# Patient Record
Sex: FEMALE | Race: WHITE | NOT HISPANIC OR LATINO | Employment: OTHER | URBAN - METROPOLITAN AREA
[De-identification: names, ages, dates, MRNs, and addresses within clinical notes are randomized per-mention and may not be internally consistent; named-entity substitution may affect disease eponyms.]

---

## 2017-01-04 ENCOUNTER — TRANSCRIBE ORDERS (OUTPATIENT)
Dept: ENDOCRINOLOGY | Facility: CLINIC | Age: 77
End: 2017-01-04

## 2017-01-04 DIAGNOSIS — E11.40 TYPE 2 DIABETES MELLITUS WITH DIABETIC NEUROPATHY, UNSPECIFIED LONG TERM INSULIN USE STATUS: Primary | ICD-10-CM

## 2017-03-27 ENCOUNTER — HOSPITAL ENCOUNTER (OUTPATIENT)
Dept: OTHER | Age: 77
Discharge: HOME | End: 2017-03-27
Attending: NURSE PRACTITIONER | Admitting: NURSE PRACTITIONER

## 2017-05-15 ENCOUNTER — HOSPITAL ENCOUNTER (OUTPATIENT)
Dept: OTHER | Age: 77
Discharge: HOME | End: 2017-05-15
Attending: INTERNAL MEDICINE | Admitting: INTERNAL MEDICINE

## 2017-05-15 DIAGNOSIS — M79.642 PAIN IN BOTH HANDS: ICD-10-CM

## 2017-05-15 DIAGNOSIS — M25.562 PAIN IN BOTH KNEES, UNSPECIFIED CHRONICITY: ICD-10-CM

## 2017-05-15 DIAGNOSIS — M79.641 PAIN IN BOTH HANDS: ICD-10-CM

## 2017-05-15 DIAGNOSIS — M25.561 PAIN IN BOTH KNEES, UNSPECIFIED CHRONICITY: ICD-10-CM

## 2017-07-10 ENCOUNTER — HOSPITAL ENCOUNTER (OUTPATIENT)
Dept: OTHER | Age: 77
Discharge: HOME | End: 2017-07-10
Attending: NURSE PRACTITIONER | Admitting: NURSE PRACTITIONER

## 2017-08-30 ENCOUNTER — OFFICE VISIT (OUTPATIENT)
Dept: PHARMACY | Age: 77
End: 2017-08-30

## 2017-08-30 DIAGNOSIS — Z79.899 MEDICATION MANAGEMENT: Primary | ICD-10-CM

## 2017-08-30 RX ORDER — GABAPENTIN 400 MG/1
400 CAPSULE ORAL 3 TIMES DAILY
COMMUNITY
End: 2019-12-03 | Stop reason: DRUGHIGH

## 2017-08-30 RX ORDER — OMEPRAZOLE 20 MG/1
40 CAPSULE, DELAYED RELEASE ORAL DAILY
Status: ON HOLD | COMMUNITY
End: 2020-01-18 | Stop reason: SDUPTHER

## 2017-08-30 RX ORDER — PROPRANOLOL HYDROCHLORIDE 20 MG/1
20 TABLET ORAL 2 TIMES DAILY
COMMUNITY
End: 2019-12-03 | Stop reason: DRUGHIGH

## 2017-08-30 RX ORDER — GLIPIZIDE 10 MG/1
10 TABLET ORAL
Status: ON HOLD | COMMUNITY
End: 2019-12-19

## 2017-08-30 RX ORDER — LOSARTAN POTASSIUM 50 MG/1
50 TABLET ORAL DAILY
COMMUNITY
End: 2019-12-03 | Stop reason: DRUGHIGH

## 2017-08-30 RX ORDER — LORAZEPAM 2 MG/1
2 TABLET ORAL 2 TIMES DAILY PRN
COMMUNITY
End: 2019-12-03

## 2017-08-30 RX ORDER — MELOXICAM 15 MG/1
15 TABLET ORAL DAILY
COMMUNITY
End: 2019-12-03

## 2017-08-30 RX ORDER — HYDROCODONE BITARTRATE AND ACETAMINOPHEN 7.5; 325 MG/1; MG/1
1 TABLET ORAL 2 TIMES DAILY PRN
Status: ON HOLD | COMMUNITY
End: 2019-12-19

## 2017-08-30 RX ORDER — LANOLIN ALCOHOL/MO/W.PET/CERES
1000 CREAM (GRAM) TOPICAL DAILY
COMMUNITY
End: 2019-12-03

## 2017-09-01 ENCOUNTER — HOSPITAL ENCOUNTER (OUTPATIENT)
Dept: OTHER | Age: 77
Discharge: HOME | End: 2017-09-01
Attending: NURSE PRACTITIONER | Admitting: NURSE PRACTITIONER

## 2017-09-08 ENCOUNTER — HOSPITAL ENCOUNTER (OUTPATIENT)
Dept: OTHER | Age: 77
Discharge: HOME | End: 2017-09-08
Attending: NURSE PRACTITIONER | Admitting: NURSE PRACTITIONER

## 2017-09-08 LAB
A/G RATIO: 1.7 (ref 0.8–2)
ALBUMIN SERPL-MCNC: 4.5 G/DL (ref 3.4–4.8)
ALP BLD-CCNC: 64 U/L (ref 25–100)
ALT SERPL-CCNC: 11 U/L (ref 4–36)
ANION GAP SERPL CALCULATED.3IONS-SCNC: 14 MMOL/L (ref 3–16)
AST SERPL-CCNC: 18 U/L (ref 8–33)
BASOPHILS ABSOLUTE: 0 K/UL (ref 0–0.1)
BASOPHILS RELATIVE PERCENT: 0.4 %
BILIRUB SERPL-MCNC: <0.2 MG/DL (ref 0.3–1.2)
BUN BLDV-MCNC: 25 MG/DL (ref 6–20)
CALCIUM SERPL-MCNC: 9.6 MG/DL (ref 8.5–10.5)
CHLORIDE BLD-SCNC: 100 MMOL/L (ref 98–107)
CO2: 26 MMOL/L (ref 20–30)
CREAT SERPL-MCNC: 1.2 MG/DL (ref 0.4–1.2)
EOSINOPHILS ABSOLUTE: 0.2 K/UL (ref 0–0.4)
EOSINOPHILS RELATIVE PERCENT: 2.6 %
GFR AFRICAN AMERICAN: 53
GFR NON-AFRICAN AMERICAN: 44
GLOBULIN: 2.7 G/DL
GLUCOSE BLD-MCNC: 148 MG/DL (ref 74–106)
HCT VFR BLD CALC: 40.7 % (ref 37–47)
HEMOGLOBIN: 13.2 G/DL (ref 11.5–16.5)
LYMPHOCYTES ABSOLUTE: 2.5 K/UL (ref 1.5–4)
LYMPHOCYTES RELATIVE PERCENT: 33.8 % (ref 20–40)
MCH RBC QN AUTO: 31.2 PG (ref 27–32)
MCHC RBC AUTO-ENTMCNC: 32.4 G/DL (ref 31–35)
MCV RBC AUTO: 96.2 FL (ref 80–100)
MONOCYTES ABSOLUTE: 0.6 K/UL (ref 0.2–0.8)
MONOCYTES RELATIVE PERCENT: 7.9 % (ref 3–10)
NEUTROPHILS ABSOLUTE: 4 K/UL (ref 2–7.5)
NEUTROPHILS RELATIVE PERCENT: 55.3 %
PDW BLD-RTO: 13.3 % (ref 11–16)
PLATELET # BLD: 187 K/UL (ref 150–400)
PMV BLD AUTO: 11.6 FL (ref 6–10)
POTASSIUM SERPL-SCNC: 5 MMOL/L (ref 3.4–5.1)
RBC # BLD: 4.23 M/UL (ref 3.8–5.8)
SODIUM BLD-SCNC: 140 MMOL/L (ref 136–145)
TOTAL PROTEIN: 7.2 G/DL (ref 6.4–8.3)
WBC # BLD: 7.3 K/UL (ref 4–11)

## 2017-10-02 ENCOUNTER — HOSPITAL ENCOUNTER (OUTPATIENT)
Dept: OTHER | Age: 77
Discharge: HOME | End: 2017-10-02
Attending: NURSE PRACTITIONER | Admitting: NURSE PRACTITIONER

## 2017-10-02 LAB
A/G RATIO: 1.4 (ref 0.8–2)
ALBUMIN SERPL-MCNC: 4.4 G/DL (ref 3.4–4.8)
ALP BLD-CCNC: 65 U/L (ref 25–100)
ALT SERPL-CCNC: 9 U/L (ref 4–36)
ANION GAP SERPL CALCULATED.3IONS-SCNC: 13 MMOL/L (ref 3–16)
AST SERPL-CCNC: 17 U/L (ref 8–33)
BASOPHILS ABSOLUTE: 0 K/UL (ref 0–0.1)
BASOPHILS RELATIVE PERCENT: 0.2 %
BILIRUB SERPL-MCNC: <0.2 MG/DL (ref 0.3–1.2)
BUN BLDV-MCNC: 21 MG/DL (ref 6–20)
CALCIUM SERPL-MCNC: 9.8 MG/DL (ref 8.5–10.5)
CHLORIDE BLD-SCNC: 102 MMOL/L (ref 98–107)
CO2: 27 MMOL/L (ref 20–30)
CREAT SERPL-MCNC: 1.3 MG/DL (ref 0.4–1.2)
EOSINOPHILS ABSOLUTE: 0.2 K/UL (ref 0–0.4)
EOSINOPHILS RELATIVE PERCENT: 1.7 %
GFR AFRICAN AMERICAN: 48
GFR NON-AFRICAN AMERICAN: 40
GLOBULIN: 3.1 G/DL
GLUCOSE BLD-MCNC: 134 MG/DL (ref 74–106)
HCT VFR BLD CALC: 43 % (ref 37–47)
HEMOGLOBIN: 14 G/DL (ref 11.5–16.5)
LYMPHOCYTES ABSOLUTE: 2.8 K/UL (ref 1.5–4)
LYMPHOCYTES RELATIVE PERCENT: 31.6 % (ref 20–40)
MCH RBC QN AUTO: 31 PG (ref 27–32)
MCHC RBC AUTO-ENTMCNC: 32.6 G/DL (ref 31–35)
MCV RBC AUTO: 95.3 FL (ref 80–100)
MONOCYTES ABSOLUTE: 0.7 K/UL (ref 0.2–0.8)
MONOCYTES RELATIVE PERCENT: 7.9 % (ref 3–10)
NEUTROPHILS ABSOLUTE: 5.1 K/UL (ref 2–7.5)
NEUTROPHILS RELATIVE PERCENT: 58.6 %
PDW BLD-RTO: 13.2 % (ref 11–16)
PLATELET # BLD: 203 K/UL (ref 150–400)
PMV BLD AUTO: 11.4 FL (ref 6–10)
POTASSIUM SERPL-SCNC: 5.1 MMOL/L (ref 3.4–5.1)
RBC # BLD: 4.51 M/UL (ref 3.8–5.8)
SODIUM BLD-SCNC: 142 MMOL/L (ref 136–145)
TOTAL PROTEIN: 7.5 G/DL (ref 6.4–8.3)
WBC # BLD: 8.8 K/UL (ref 4–11)

## 2017-10-04 ENCOUNTER — HOSPITAL ENCOUNTER (OUTPATIENT)
Dept: OTHER | Age: 77
Discharge: HOME | End: 2017-10-04
Attending: NURSE PRACTITIONER | Admitting: NURSE PRACTITIONER

## 2017-10-04 DIAGNOSIS — M54.5 LOW BACK PAIN, UNSPECIFIED BACK PAIN LATERALITY, UNSPECIFIED CHRONICITY, WITH SCIATICA PRESENCE UNSPECIFIED: ICD-10-CM

## 2017-10-05 LAB — URINE CULTURE, ROUTINE: NORMAL

## 2017-10-11 ENCOUNTER — OFFICE VISIT (OUTPATIENT)
Dept: CARDIOLOGY | Facility: CLINIC | Age: 77
End: 2017-10-11

## 2017-10-11 ENCOUNTER — HOSPITAL ENCOUNTER (OUTPATIENT)
Dept: CARDIOLOGY | Facility: HOSPITAL | Age: 77
Discharge: HOME | End: 2017-10-11
Attending: INTERNAL MEDICINE | Admitting: INTERNAL MEDICINE

## 2017-10-11 VITALS
SYSTOLIC BLOOD PRESSURE: 146 MMHG | BODY MASS INDEX: 30.62 KG/M2 | DIASTOLIC BLOOD PRESSURE: 86 MMHG | HEIGHT: 68 IN | WEIGHT: 202 LBS

## 2017-10-11 VITALS
HEART RATE: 72 BPM | BODY MASS INDEX: 30.92 KG/M2 | SYSTOLIC BLOOD PRESSURE: 148 MMHG | HEIGHT: 68 IN | WEIGHT: 204 LBS | DIASTOLIC BLOOD PRESSURE: 86 MMHG

## 2017-10-11 DIAGNOSIS — R94.31 ABNORMAL EKG: Primary | ICD-10-CM

## 2017-10-11 DIAGNOSIS — R94.31 ABNORMAL EKG: ICD-10-CM

## 2017-10-11 DIAGNOSIS — I10 ESSENTIAL HYPERTENSION: ICD-10-CM

## 2017-10-11 PROBLEM — K21.9 GERD (GASTROESOPHAGEAL REFLUX DISEASE): Status: ACTIVE | Noted: 2017-10-11

## 2017-10-11 PROBLEM — E78.5 HYPERLIPIDEMIA LDL GOAL <70: Status: ACTIVE | Noted: 2017-10-11

## 2017-10-11 PROBLEM — R00.2 PALPITATIONS: Status: ACTIVE | Noted: 2017-10-11

## 2017-10-11 PROBLEM — M19.90 OSTEOARTHRITIS: Status: ACTIVE | Noted: 2017-10-11

## 2017-10-11 PROBLEM — F41.9 ANXIETY: Status: ACTIVE | Noted: 2017-10-11

## 2017-10-11 PROBLEM — E11.9 TYPE 2 DIABETES MELLITUS: Status: ACTIVE | Noted: 2017-10-11

## 2017-10-11 PROCEDURE — 93000 ELECTROCARDIOGRAM COMPLETE: CPT | Performed by: INTERNAL MEDICINE

## 2017-10-11 PROCEDURE — 93306 TTE W/DOPPLER COMPLETE: CPT

## 2017-10-11 PROCEDURE — 99204 OFFICE O/P NEW MOD 45 MIN: CPT | Performed by: INTERNAL MEDICINE

## 2017-10-11 PROCEDURE — 93306 TTE W/DOPPLER COMPLETE: CPT | Performed by: INTERNAL MEDICINE

## 2017-10-11 RX ORDER — DOXYCYCLINE HYCLATE 100 MG/1
100 CAPSULE ORAL 2 TIMES DAILY
COMMUNITY
End: 2018-10-05

## 2017-10-11 RX ORDER — PROPRANOLOL HYDROCHLORIDE 40 MG/1
40 TABLET ORAL 2 TIMES DAILY
COMMUNITY
End: 2023-01-28 | Stop reason: HOSPADM

## 2017-10-11 RX ORDER — HYDROCODONE BITARTRATE AND ACETAMINOPHEN 7.5; 325 MG/1; MG/1
1 TABLET ORAL EVERY 6 HOURS PRN
Status: ON HOLD | COMMUNITY
End: 2023-01-11 | Stop reason: SDUPTHER

## 2017-10-11 RX ORDER — LORAZEPAM 1 MG/1
1 TABLET ORAL EVERY 8 HOURS PRN
Status: ON HOLD | COMMUNITY
End: 2023-01-11 | Stop reason: SDUPTHER

## 2017-10-11 RX ORDER — AMLODIPINE BESYLATE 10 MG/1
10 TABLET ORAL DAILY
Status: ON HOLD | COMMUNITY
End: 2023-01-10

## 2017-10-11 RX ORDER — OMEPRAZOLE 40 MG/1
40 CAPSULE, DELAYED RELEASE ORAL DAILY
Status: ON HOLD | COMMUNITY

## 2017-10-11 RX ORDER — GLIPIZIDE 10 MG/1
10 TABLET ORAL
Status: ON HOLD | COMMUNITY
End: 2023-01-11

## 2017-10-11 RX ORDER — LOSARTAN POTASSIUM 100 MG/1
100 TABLET ORAL DAILY
Status: ON HOLD | COMMUNITY
End: 2023-01-11

## 2017-10-11 RX ORDER — GABAPENTIN 300 MG/1
300 CAPSULE ORAL 3 TIMES DAILY
COMMUNITY
End: 2018-10-05

## 2017-10-11 NOTE — PROGRESS NOTES
Encounter Date:10/11/2017    Patient ID: Cheri rCuz is a 77 y.o. female who resides in [place], [place].    CC/Reason for visit:  Chest Pain; Fatigue; and Palpitations         Problem List Items Addressed This Visit        Cardiovascular and Mediastinum    Essential hypertension    Relevant Medications    losartan (COZAAR) 50 MG tablet    propranolol (INDERAL) 20 MG tablet    amLODIPine (NORVASC) 2.5 MG tablet       Other    Abnormal EKG - Primary    Overview     · EKG at [facility] reportedly shows inferior infarct  · EKG (10/11/17): Sinus rhythm.  Poor R-wave progression in the precordial leads.  · Echo (10/11/17): Normal LVEF with no regional wall motion abnormality.  No significant valvular abnormality         Current Assessment & Plan     The patient has no symptoms of angina and no clinical history of previous MI.  Her echo performed today shows no evidence of prior myocardial infarction or regional wall motion abnormality.  The patient would prefer not to proceed with noninvasive ischemic testing because she had a terrible experience with it in the past.  I think that it's reasonable to defer such testing given her asymptomatic status and normal echo.  I advised her to contact my office if she were to develop exertional chest pain symptoms.         Relevant Orders    ECG 12 Lead    Adult Transthoracic Echo Complete W/ Cont if Necessary Per Protocol (Completed)               · We will defer noninvasive ischemic evaluation  · Patient advised to contact the office if she were to develop exertional chest discomfort or shortness of breath  · Follow-up with me as needed        Cheri Cruz is referred to my office by  EVELINA Arreguin for evaluation of abnormal EKG.  The patient is a 77-year-old female who saw her primary care provider recently.  During examination, the patient mentioned that she has had stable palpitations symptoms for nearly 50 years.  These symptoms typically occur when she is  "anxious and improve with benzodiazepine use.  Due to this history, the patient underwent an EKG.  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  The patient denies any history of prolonged chest pain symptoms or symptoms of prolonged indigestion.  She has no exertional symptoms of angina presently.  She states the palpitations that she is experiences have not become more frequent or prolonged in nature.  She states that her blood pressure has been elevated and recently one of her blood pressure medicines was increased.  She also states that her hemoglobin A1c is >8.  She has not been on statin therapy for prevention of CV disease.  She does have a history of muscle ache as well as osteoarthritis.  He denies TIA or stroke symptoms.  No orthopnea or PND.  The patient adamantly denies chemical stress testing as the last time she had a chemical stress test she \"would've rather \".    Review of Systems   Constitution: Negative for weakness and malaise/fatigue.   Eyes: Negative for vision loss in left eye and vision loss in right eye.   Cardiovascular: Negative for chest pain, dyspnea on exertion, near-syncope, orthopnea, palpitations, paroxysmal nocturnal dyspnea and syncope.   Musculoskeletal: Negative for myalgias.   Neurological: Negative for brief paralysis, excessive daytime sleepiness, focal weakness, numbness and paresthesias.   All other systems reviewed and are negative.      The patient's past medical, social, family history and ROS reviewed in the patient's electronic medical record.    Allergies  Penicillins and Sulfa antibiotics    Outpatient Prescriptions Marked as Taking for the 10/11/17 encounter (Office Visit) with Jonathon Shaw MD   Medication Sig Dispense Refill   • amLODIPine (NORVASC) 2.5 MG tablet Take 2.5 mg by mouth Daily.     • doxycycline (VIBRAMYCIN) 100 MG capsule Take 100 mg by mouth 2 (Two) Times a Day.     • gabapentin (NEURONTIN) 300 MG capsule Take 300 mg by mouth 3 " "(Three) Times a Day.     • glipiZIDE (GLUCOTROL) 10 MG tablet Take 10 mg by mouth 2 (Two) Times a Day Before Meals.     • HYDROcodone-acetaminophen (NORCO) 7.5-325 MG per tablet Take 1 tablet by mouth Every 6 (Six) Hours As Needed for Moderate Pain .     • LORazepam (ATIVAN) 1 MG tablet Take 1 mg by mouth Every 8 (Eight) Hours As Needed for Anxiety.     • losartan (COZAAR) 50 MG tablet Take 50 mg by mouth 2 (Two) Times a Day.     • metFORMIN (GLUCOPHAGE) 1000 MG tablet Take 1,000 mg by mouth 2 (Two) Times a Day With Meals.     • omeprazole (priLOSEC) 40 MG capsule Take 40 mg by mouth Daily.     • propranolol (INDERAL) 20 MG tablet Take 20 mg by mouth 3 (Three) Times a Day.           Blood pressure 148/86, pulse 72, height 68\" (172.7 cm), weight 204 lb (92.5 kg).  Body mass index is 31.02 kg/(m^2).    Physical Exam   Constitutional: She is oriented to person, place, and time. She appears well-developed and well-nourished.   HENT:   Head: Normocephalic and atraumatic.   Eyes: Pupils are equal, round, and reactive to light. No scleral icterus.   Neck: No JVD present. No thyromegaly present.   Cardiovascular: Normal rate and regular rhythm.  Exam reveals no gallop.    No murmur heard.  Pulses:       Dorsalis pedis pulses are 1+ on the right side, and 2+ on the left side.        Posterior tibial pulses are 1+ on the right side, and 1+ on the left side.   Pulmonary/Chest: Effort normal and breath sounds normal.   Abdominal: Soft. She exhibits no distension. There is no hepatosplenomegaly.   Musculoskeletal: She exhibits no edema.   Neurological: She is alert and oriented to person, place, and time.   Skin: Skin is warm and dry.   Psychiatric: She has a normal mood and affect. Her behavior is normal.       Data Review:     ECG 12 Lead  Date/Time: 10/11/2017 1:29 PM  Performed by: LASHAWN RADER  Authorized by: LASHAWN RADER   Rhythm: sinus rhythm  BPM: 69  Other findings: LVH and PRWP  Clinical " impression: abnormal ECG              Jonathon Shaw MD  10/17/2017

## 2017-10-11 NOTE — ASSESSMENT & PLAN NOTE
The patient has no symptoms of angina and no clinical history of previous MI.  Her echo performed today shows no evidence of prior myocardial infarction or regional wall motion abnormality.  The patient would prefer not to proceed with noninvasive ischemic testing because she had a terrible experience with it in the past.  I think that it's reasonable to defer such testing given her asymptomatic status and normal echo.  I advised her to contact my office if she were to develop exertional chest pain symptoms.

## 2017-10-12 LAB
BH CV ECHO MEAS - AO MAX PG (FULL): 3.5 MMHG
BH CV ECHO MEAS - AO MAX PG: 7.6 MMHG
BH CV ECHO MEAS - AO MEAN PG (FULL): 2 MMHG
BH CV ECHO MEAS - AO MEAN PG: 5 MMHG
BH CV ECHO MEAS - AO ROOT AREA (BSA CORRECTED): 1.2
BH CV ECHO MEAS - AO ROOT AREA: 4.9 CM^2
BH CV ECHO MEAS - AO ROOT DIAM: 2.5 CM
BH CV ECHO MEAS - AO V2 MAX: 138 CM/SEC
BH CV ECHO MEAS - AO V2 MEAN: 103 CM/SEC
BH CV ECHO MEAS - AO V2 VTI: 31.9 CM
BH CV ECHO MEAS - AVA(I,A): 2.3 CM^2
BH CV ECHO MEAS - AVA(I,D): 2.3 CM^2
BH CV ECHO MEAS - AVA(V,A): 2.3 CM^2
BH CV ECHO MEAS - AVA(V,D): 2.3 CM^2
BH CV ECHO MEAS - BSA(HAYCOCK): 2.1 M^2
BH CV ECHO MEAS - BSA: 2.1 M^2
BH CV ECHO MEAS - BZI_BMI: 30.7 KILOGRAMS/M^2
BH CV ECHO MEAS - BZI_METRIC_HEIGHT: 172.7 CM
BH CV ECHO MEAS - BZI_METRIC_WEIGHT: 91.6 KG
BH CV ECHO MEAS - EDV(CUBED): 95.4 ML
BH CV ECHO MEAS - EDV(TEICH): 95.9 ML
BH CV ECHO MEAS - EF(CUBED): 81.8 %
BH CV ECHO MEAS - EF(TEICH): 74.6 %
BH CV ECHO MEAS - ESV(CUBED): 17.4 ML
BH CV ECHO MEAS - ESV(TEICH): 24.4 ML
BH CV ECHO MEAS - FS: 43.3 %
BH CV ECHO MEAS - IVS/LVPW: 1
BH CV ECHO MEAS - IVSD: 1.1 CM
BH CV ECHO MEAS - LA DIMENSION: 4.1 CM
BH CV ECHO MEAS - LA/AO: 1.6
BH CV ECHO MEAS - LAT PEAK E' VEL: 12 CM/SEC
BH CV ECHO MEAS - LV MASS(C)D: 188.6 GRAMS
BH CV ECHO MEAS - LV MASS(C)DI: 91.9 GRAMS/M^2
BH CV ECHO MEAS - LV MAX PG: 4.2 MMHG
BH CV ECHO MEAS - LV MEAN PG: 3 MMHG
BH CV ECHO MEAS - LV V1 MAX: 102 CM/SEC
BH CV ECHO MEAS - LV V1 MEAN: 75.4 CM/SEC
BH CV ECHO MEAS - LV V1 VTI: 23.3 CM
BH CV ECHO MEAS - LVIDD: 4.6 CM
BH CV ECHO MEAS - LVIDS: 2.6 CM
BH CV ECHO MEAS - LVOT AREA (M): 3.1 CM^2
BH CV ECHO MEAS - LVOT AREA: 3.1 CM^2
BH CV ECHO MEAS - LVOT DIAM: 2 CM
BH CV ECHO MEAS - LVPWD: 1.1 CM
BH CV ECHO MEAS - MED PEAK E' VEL: 4.96 CM/SEC
BH CV ECHO MEAS - MV A MAX VEL: 122 CM/SEC
BH CV ECHO MEAS - MV DEC SLOPE: 276 CM/SEC^2
BH CV ECHO MEAS - MV E MAX VEL: 83.9 CM/SEC
BH CV ECHO MEAS - MV E/A: 0.69
BH CV ECHO MEAS - MV MAX PG: 6.9 MMHG
BH CV ECHO MEAS - MV MEAN PG: 3 MMHG
BH CV ECHO MEAS - MV P1/2T MAX VEL: 103 CM/SEC
BH CV ECHO MEAS - MV P1/2T: 109.3 MSEC
BH CV ECHO MEAS - MV V2 MAX: 131 CM/SEC
BH CV ECHO MEAS - MV V2 MEAN: 74.5 CM/SEC
BH CV ECHO MEAS - MV V2 VTI: 34.2 CM
BH CV ECHO MEAS - MVA P1/2T LCG: 2.1 CM^2
BH CV ECHO MEAS - MVA(P1/2T): 2 CM^2
BH CV ECHO MEAS - MVA(VTI): 2.1 CM^2
BH CV ECHO MEAS - PA ACC SLOPE: 867 CM/SEC^2
BH CV ECHO MEAS - PA ACC TIME: 0.11 SEC
BH CV ECHO MEAS - PA MAX PG: 4.8 MMHG
BH CV ECHO MEAS - PA PR(ACCEL): 28.2 MMHG
BH CV ECHO MEAS - PA V2 MAX: 109 CM/SEC
BH CV ECHO MEAS - RAP SYSTOLE: 3 MMHG
BH CV ECHO MEAS - RVSP: 21.3 MMHG
BH CV ECHO MEAS - SI(AO): 76.3 ML/M^2
BH CV ECHO MEAS - SI(CUBED): 38 ML/M^2
BH CV ECHO MEAS - SI(LVOT): 35.7 ML/M^2
BH CV ECHO MEAS - SI(TEICH): 34.8 ML/M^2
BH CV ECHO MEAS - SV(AO): 156.6 ML
BH CV ECHO MEAS - SV(CUBED): 78.1 ML
BH CV ECHO MEAS - SV(LVOT): 73.2 ML
BH CV ECHO MEAS - SV(TEICH): 71.5 ML
BH CV ECHO MEAS - TAPSE (>1.6): 2.9 CM2
BH CV ECHO MEAS - TR MAX VEL: 214 CM/SEC
BH CV VAS BP RIGHT ARM: NORMAL MMHG
BH CV XLRA - RV BASE: 2.8 CM
BH CV XLRA - RV LENGTH: 7.3 CM
BH CV XLRA - RV MID: 3.1 CM
BH CV XLRA - TDI S': 9.79 CM/SEC
E/E' RATIO: 7
LEFT ATRIUM VOLUME INDEX: 29.8 ML/M2
LV EF 2D ECHO EST: 70 %

## 2017-12-22 ENCOUNTER — HOSPITAL ENCOUNTER (OUTPATIENT)
Dept: OTHER | Age: 77
Discharge: HOME | End: 2017-12-22
Attending: NURSE PRACTITIONER | Admitting: NURSE PRACTITIONER

## 2017-12-22 LAB
RAPID INFLUENZA  B AGN: NEGATIVE
RAPID INFLUENZA A AGN: NEGATIVE

## 2018-01-09 ENCOUNTER — HOSPITAL ENCOUNTER (OUTPATIENT)
Dept: OTHER | Age: 78
Discharge: HOME | End: 2018-01-09
Attending: NURSE PRACTITIONER | Admitting: NURSE PRACTITIONER

## 2018-03-07 ENCOUNTER — HOSPITAL ENCOUNTER (OUTPATIENT)
Dept: OTHER | Age: 78
Discharge: HOME | End: 2018-03-07
Attending: NURSE PRACTITIONER | Admitting: NURSE PRACTITIONER

## 2018-03-26 ENCOUNTER — HOSPITAL ENCOUNTER (OUTPATIENT)
Dept: OTHER | Age: 78
Discharge: HOME | End: 2018-03-26
Attending: NURSE PRACTITIONER | Admitting: NURSE PRACTITIONER

## 2018-04-10 ENCOUNTER — HOSPITAL ENCOUNTER (OUTPATIENT)
Dept: OTHER | Age: 78
Discharge: HOME | End: 2018-04-10
Attending: NURSE PRACTITIONER | Admitting: NURSE PRACTITIONER

## 2018-04-23 ENCOUNTER — HOSPITAL ENCOUNTER (OUTPATIENT)
Dept: OTHER | Age: 78
Discharge: HOME | End: 2018-04-23
Attending: NURSE PRACTITIONER | Admitting: NURSE PRACTITIONER

## 2018-07-25 ENCOUNTER — HOSPITAL ENCOUNTER (OUTPATIENT)
Facility: HOSPITAL | Age: 78
Discharge: HOME | End: 2018-07-25
Payer: MEDICARE

## 2018-09-04 ENCOUNTER — HOSPITAL ENCOUNTER (OUTPATIENT)
Facility: HOSPITAL | Age: 78
Discharge: HOME | End: 2018-09-04
Payer: MEDICARE

## 2018-09-04 PROCEDURE — 93005 ELECTROCARDIOGRAM TRACING: CPT

## 2018-09-04 PROCEDURE — 36415 COLL VENOUS BLD VENIPUNCTURE: CPT

## 2018-10-03 NOTE — PROGRESS NOTES
"Encounter Date:10/05/2018    Patient ID: Cheri Cruz is a 78 y.o. female who resides in [place], [place]    CC/Reason for visit:  Abnormal ECG            Cheri Cruz returns today for 12 month follow-up for palpitations and cardiac risk factors.  At her last visit one year ago the patient was seen in consultation for an abnormal EKG.  Echocardiogram showed normal LVEF and normal valvular structure.  The patient had no evidence of a previous MI or regional wall abnormality.  She also preferred not to proceed with a noninvasive ischemic stress test because of a terrible experience in the 1980s where she felt like \"she would die\".  Since her last visit she has done well.  Nothing has changed with her palpitations.  She is not particularly symptomatic when they occur but they do make her \"nervous\".  She denies chest pain but does experience shortness of breath when she is performing her household duties.  Nothing has changed beyond her baseline over the last several years.  She has type 2 diabetes mellitus and her most recent hemoglobin A1c was a little over 7.  Although she is diabetic she is currently not on aspirin or statin therapy.  Does recall trying Lipitor in the past but it caused severe myalgias.  She has chronic arthritis and chronic and is supposed to have a nerve block procedure and in the near future.    Review of Systems   Constitution: Negative for weakness and malaise/fatigue.   Eyes: Negative for vision loss in left eye and vision loss in right eye.   Cardiovascular: Positive for palpitations. Negative for chest pain, dyspnea on exertion, near-syncope, orthopnea, paroxysmal nocturnal dyspnea and syncope.   Respiratory: Positive for shortness of breath.    Musculoskeletal: Positive for back pain. Negative for myalgias.   Neurological: Negative for brief paralysis, excessive daytime sleepiness, focal weakness, numbness and paresthesias.   All other systems reviewed and are negative.      The " "patient's past medical, social, family history and ROS reviewed in the patient's electronic medical record.    Allergies  Penicillins and Sulfa antibiotics    Outpatient Prescriptions Marked as Taking for the 10/5/18 encounter (Office Visit) with Tiffany Hutchinson APRN   Medication Sig Dispense Refill   • amLODIPine (NORVASC) 10 MG tablet Take 10 mg by mouth Daily.     • busPIRone (BUSPAR) 7.5 MG tablet Take 7.5 mg by mouth 2 (Two) Times a Day.     • DULoxetine (CYMBALTA) 60 MG capsule Take 60 mg by mouth Daily.     • glipiZIDE (GLUCOTROL) 10 MG tablet Take 10 mg by mouth 2 (Two) Times a Day Before Meals.     • HYDROcodone-acetaminophen (NORCO) 7.5-325 MG per tablet Take 1 tablet by mouth Every 6 (Six) Hours As Needed for Moderate Pain .     • LORazepam (ATIVAN) 1 MG tablet Take 1 mg by mouth Every 8 (Eight) Hours As Needed for Anxiety.     • losartan (COZAAR) 100 MG tablet Take 100 mg by mouth Daily.     • metFORMIN (GLUCOPHAGE) 1000 MG tablet Take 1,000 mg by mouth 2 (Two) Times a Day With Meals.     • omeprazole (priLOSEC) 40 MG capsule Take 40 mg by mouth Daily.     • propranolol (INDERAL) 20 MG tablet Take 20 mg by mouth 2 (Two) Times a Day.             Blood pressure 126/72, pulse 70, height 170.2 cm (67\"), weight 92.5 kg (204 lb).  Body mass index is 31.95 kg/m².  There were no vitals filed for this visit.    Physical Exam   Constitutional: She is oriented to person, place, and time. She appears well-developed and well-nourished.   HENT:   Head: Normocephalic and atraumatic.   Eyes: Pupils are equal, round, and reactive to light. No scleral icterus.   Neck: No JVD present. Carotid bruit is not present. No thyromegaly present.   Cardiovascular: Normal rate and regular rhythm.  Exam reveals no gallop.    No murmur heard.  Pulmonary/Chest: Effort normal and breath sounds normal.   Abdominal: Soft. She exhibits no distension. There is no hepatosplenomegaly.   Musculoskeletal: She exhibits no edema. " "  Neurological: She is alert and oriented to person, place, and time.   Skin: Skin is warm and dry.   Psychiatric: She has a normal mood and affect. Her behavior is normal.       Data Review:     Procedures    No results found for: CHOL, TRIG, HDL, LDL, AST, ALT    No results found for: HGBA1C         Problem List Items Addressed This Visit        Cardiovascular and Mediastinum    Hyperlipidemia LDL goal <100    Overview     · Statin therapy indicated given diabetic status         Current Assessment & Plan     · Recommend statin therapy given presence diabetes mellitus  · Start Crestor 5 mg daily         Relevant Medications    rosuvastatin (CRESTOR) 5 MG tablet    Essential hypertension    Current Assessment & Plan     · Hypertension is controlled  · Continue losartan 100 mg daily  · Continue amlodipine 10 mg daily         Abnormal EKG    Overview     · EKG at [facility] reportedly shows inferior infarct  · EKG (10/11/17): Sinus rhythm.  Poor R-wave progression in the precordial leads.  · Echo (10/11/17): Normal LVEF with no regional wall motion abnormality.  No significant valvular abnormality         Current Assessment & Plan     · If patient experiences exertional chest pain symptoms or increase in palpitations will order a myocardial perfusion study         Palpitations - Primary    Overview     · Echo (10/11/17): Normal LVEF with no regional wall motion abnormality.  No significant valvular abnormality         Current Assessment & Plan     · Continue propranolol 20 mg twice a day  · No changes in frequency or intensity of palpitations         Relevant Medications    aspirin 81 MG tablet      The patient is doing well without any changes in her symptoms.  She denies chest pain and her shortness of breath is unchanged beyond her baseline over the last several years.  She certainly has risk factors for coronary disease and I did recommend she have a stress test however the patient is \"terrified\" of stress " test since her bad experience.  It is reasonable to defer for now given her lack of anginal symptoms.  We will try a primary prevention strategy.  I instructed her to start an 81 mg aspirin daily.  I will prescribe Crestor 5 mg daily given her presence of diabetes mellitus.  I instructed her to contact our office if she looks exertional chest pain and shortness of breath and or her palpitations progress or worsen.         · Start 81 mg aspirin  · Start Crestor 5 mg daily.  Follow-up with PCP for management of lipids  · Continue all other medications as prescribed  · Follow-up with me in 12 months or sooner if needed    Laila Hutchinson, EVELINA  10/5/2018

## 2018-10-05 ENCOUNTER — OFFICE VISIT (OUTPATIENT)
Dept: CARDIOLOGY | Facility: CLINIC | Age: 78
End: 2018-10-05

## 2018-10-05 VITALS
SYSTOLIC BLOOD PRESSURE: 126 MMHG | DIASTOLIC BLOOD PRESSURE: 72 MMHG | HEIGHT: 67 IN | HEART RATE: 70 BPM | BODY MASS INDEX: 32.02 KG/M2 | WEIGHT: 204 LBS

## 2018-10-05 DIAGNOSIS — E78.5 HYPERLIPIDEMIA LDL GOAL <100: ICD-10-CM

## 2018-10-05 DIAGNOSIS — R94.31 ABNORMAL EKG: ICD-10-CM

## 2018-10-05 DIAGNOSIS — R00.2 PALPITATIONS: Primary | ICD-10-CM

## 2018-10-05 DIAGNOSIS — I10 ESSENTIAL HYPERTENSION: ICD-10-CM

## 2018-10-05 PROCEDURE — 99214 OFFICE O/P EST MOD 30 MIN: CPT | Performed by: NURSE PRACTITIONER

## 2018-10-05 RX ORDER — BUSPIRONE HYDROCHLORIDE 7.5 MG/1
7.5 TABLET ORAL 2 TIMES DAILY
Status: ON HOLD | COMMUNITY

## 2018-10-05 RX ORDER — ROSUVASTATIN CALCIUM 5 MG/1
5 TABLET, COATED ORAL DAILY
Qty: 30 TABLET | Refills: 3 | Status: SHIPPED | OUTPATIENT
Start: 2018-10-05 | End: 2018-10-05 | Stop reason: SDUPTHER

## 2018-10-05 RX ORDER — ROSUVASTATIN CALCIUM 5 MG/1
5 TABLET, COATED ORAL DAILY
Qty: 30 TABLET | Refills: 3 | Status: ON HOLD | OUTPATIENT
Start: 2018-10-05 | End: 2023-01-11

## 2018-10-05 RX ORDER — DULOXETIN HYDROCHLORIDE 60 MG/1
60 CAPSULE, DELAYED RELEASE ORAL DAILY
Status: ON HOLD | COMMUNITY

## 2018-10-05 NOTE — ASSESSMENT & PLAN NOTE
· If patient experiences exertional chest pain symptoms or increase in palpitations will order a myocardial perfusion study

## 2018-10-05 NOTE — ASSESSMENT & PLAN NOTE
· Hemoglobin A1c goal less than 7.0  · Consistent low carbohydrate diet.  · Start aspirin and statin therapy for primary prevention

## 2018-11-05 ENCOUNTER — HOSPITAL ENCOUNTER (OUTPATIENT)
Facility: HOSPITAL | Age: 78
Discharge: HOME | End: 2018-11-05
Payer: MEDICARE

## 2018-11-05 PROCEDURE — 36415 COLL VENOUS BLD VENIPUNCTURE: CPT

## 2018-11-06 PROCEDURE — 36415 COLL VENOUS BLD VENIPUNCTURE: CPT

## 2019-04-22 ENCOUNTER — HOSPITAL ENCOUNTER (OUTPATIENT)
Dept: ULTRASOUND IMAGING | Facility: HOSPITAL | Age: 79
Discharge: HOME | End: 2019-04-22
Payer: MEDICARE

## 2019-04-22 ENCOUNTER — HOSPITAL ENCOUNTER (OUTPATIENT)
Facility: HOSPITAL | Age: 79
Discharge: HOME | End: 2019-04-22
Payer: MEDICARE

## 2019-04-22 DIAGNOSIS — E11.649 UNCONTROLLED TYPE 2 DIABETES MELLITUS WITH HYPOGLYCEMIA WITHOUT COMA (HCC): ICD-10-CM

## 2019-04-22 DIAGNOSIS — I10 ESSENTIAL HYPERTENSION, MALIGNANT: ICD-10-CM

## 2019-04-22 DIAGNOSIS — E11.65 UNCONTROLLED TYPE 2 DIABETES MELLITUS WITH HYPERGLYCEMIA (HCC): ICD-10-CM

## 2019-04-22 DIAGNOSIS — N18.30 CHRONIC KIDNEY DISEASE, STAGE III (MODERATE) (HCC): ICD-10-CM

## 2019-04-22 LAB
ALBUMIN SERPL-MCNC: 4.4 G/DL (ref 3.4–4.8)
ANION GAP SERPL CALCULATED.3IONS-SCNC: 9 MMOL/L (ref 3–16)
BACTERIA: ABNORMAL /HPF
BILIRUBIN URINE: NEGATIVE
BLOOD, URINE: NEGATIVE
BUN BLDV-MCNC: 20 MG/DL (ref 6–20)
CALCIUM SERPL-MCNC: 9.5 MG/DL (ref 8.5–10.5)
CHLORIDE BLD-SCNC: 105 MMOL/L (ref 98–107)
CLARITY: CLEAR
CO2: 24 MMOL/L (ref 20–30)
COLOR: YELLOW
CREAT SERPL-MCNC: 1.3 MG/DL (ref 0.4–1.2)
CREATININE URINE: 111.6 MG/DL (ref 28–259)
EPITHELIAL CELLS, UA: ABNORMAL /HPF
GFR AFRICAN AMERICAN: 48
GFR NON-AFRICAN AMERICAN: 40
GLUCOSE BLD-MCNC: 167 MG/DL (ref 74–106)
GLUCOSE URINE: NEGATIVE MG/DL
HBA1C MFR BLD: 7.4 %
KETONES, URINE: NEGATIVE MG/DL
LEUKOCYTE ESTERASE, URINE: ABNORMAL
MICROSCOPIC EXAMINATION: YES
MUCUS: ABNORMAL /LPF
NITRITE, URINE: NEGATIVE
PARATHYROID HORMONE INTACT: 88.9 PG/ML (ref 14–72)
PH UA: 5.5 (ref 5–8)
PHOSPHORUS: 3.8 MG/DL (ref 2.5–4.5)
POTASSIUM SERPL-SCNC: 4.9 MMOL/L (ref 3.4–5.1)
PROTEIN PROTEIN: 17 MG/DL
PROTEIN UA: NEGATIVE MG/DL
PROTEIN/CREAT RATIO: 0.2 MG/DL
RBC UA: ABNORMAL /HPF (ref 0–2)
SODIUM BLD-SCNC: 138 MMOL/L (ref 136–145)
SPECIFIC GRAVITY UA: 1.02 (ref 1–1.03)
TSH SERPL DL<=0.05 MIU/L-ACNC: 4.43 UIU/ML (ref 0.35–5.5)
URIC ACID, SERUM: 5.1 MG/DL (ref 2.5–7.1)
URINE TYPE: ABNORMAL
UROBILINOGEN, URINE: 0.2 E.U./DL
VITAMIN D 25-HYDROXY: 15.9 (ref 32–100)
WBC UA: ABNORMAL /HPF (ref 0–5)

## 2019-04-22 PROCEDURE — 83036 HEMOGLOBIN GLYCOSYLATED A1C: CPT

## 2019-04-22 PROCEDURE — 76770 US EXAM ABDO BACK WALL COMP: CPT

## 2019-04-22 PROCEDURE — 93976 VASCULAR STUDY: CPT

## 2019-04-22 PROCEDURE — 82306 VITAMIN D 25 HYDROXY: CPT

## 2019-04-22 PROCEDURE — 82570 ASSAY OF URINE CREATININE: CPT

## 2019-04-22 PROCEDURE — 81001 URINALYSIS AUTO W/SCOPE: CPT

## 2019-04-22 PROCEDURE — 36415 COLL VENOUS BLD VENIPUNCTURE: CPT

## 2019-04-22 PROCEDURE — 84443 ASSAY THYROID STIM HORMONE: CPT

## 2019-04-22 PROCEDURE — 84550 ASSAY OF BLOOD/URIC ACID: CPT

## 2019-04-22 PROCEDURE — 84156 ASSAY OF PROTEIN URINE: CPT

## 2019-04-22 PROCEDURE — 80069 RENAL FUNCTION PANEL: CPT

## 2019-04-22 PROCEDURE — 83970 ASSAY OF PARATHORMONE: CPT

## 2019-07-01 ENCOUNTER — APPOINTMENT (OUTPATIENT)
Dept: GENERAL RADIOLOGY | Facility: HOSPITAL | Age: 79
End: 2019-07-01

## 2019-07-01 ENCOUNTER — HOSPITAL ENCOUNTER (EMERGENCY)
Facility: HOSPITAL | Age: 79
Discharge: HOME | End: 2019-07-01
Attending: EMERGENCY MEDICINE | Admitting: EMERGENCY MEDICINE

## 2019-07-01 VITALS
SYSTOLIC BLOOD PRESSURE: 92 MMHG | WEIGHT: 212 LBS | RESPIRATION RATE: 16 BRPM | OXYGEN SATURATION: 93 % | DIASTOLIC BLOOD PRESSURE: 61 MMHG | HEART RATE: 70 BPM | HEIGHT: 67 IN | BODY MASS INDEX: 33.27 KG/M2 | TEMPERATURE: 97.4 F

## 2019-07-01 DIAGNOSIS — S42.291A OTHER CLOSED DISPLACED FRACTURE OF PROXIMAL END OF RIGHT HUMERUS, INITIAL ENCOUNTER: Primary | ICD-10-CM

## 2019-07-01 PROCEDURE — 73080 X-RAY EXAM OF ELBOW: CPT

## 2019-07-01 PROCEDURE — 99283 EMERGENCY DEPT VISIT LOW MDM: CPT

## 2019-07-01 PROCEDURE — 73110 X-RAY EXAM OF WRIST: CPT

## 2019-07-01 PROCEDURE — 73030 X-RAY EXAM OF SHOULDER: CPT

## 2019-07-01 PROCEDURE — 71101 X-RAY EXAM UNILAT RIBS/CHEST: CPT

## 2019-07-01 RX ORDER — DIAZEPAM 5 MG/1
5 TABLET ORAL EVERY 8 HOURS PRN
Qty: 10 TABLET | Refills: 0 | Status: ON HOLD | OUTPATIENT
Start: 2019-07-01 | End: 2023-01-11

## 2019-07-01 RX ORDER — OXYCODONE HYDROCHLORIDE AND ACETAMINOPHEN 5; 325 MG/1; MG/1
1 TABLET ORAL ONCE
Status: COMPLETED | OUTPATIENT
Start: 2019-07-01 | End: 2019-07-01

## 2019-07-01 RX ORDER — ONDANSETRON 4 MG/1
4 TABLET, ORALLY DISINTEGRATING ORAL ONCE
Status: COMPLETED | OUTPATIENT
Start: 2019-07-01 | End: 2019-07-01

## 2019-07-01 RX ORDER — DIAZEPAM 5 MG/1
5 TABLET ORAL ONCE
Status: COMPLETED | OUTPATIENT
Start: 2019-07-01 | End: 2019-07-01

## 2019-07-01 RX ORDER — MORPHINE SULFATE 4 MG/ML
4 INJECTION, SOLUTION INTRAMUSCULAR; INTRAVENOUS ONCE
Status: DISCONTINUED | OUTPATIENT
Start: 2019-07-01 | End: 2019-07-01

## 2019-07-01 RX ADMIN — DIAZEPAM 5 MG: 5 TABLET ORAL at 13:55

## 2019-07-01 RX ADMIN — ONDANSETRON 4 MG: 4 TABLET, ORALLY DISINTEGRATING ORAL at 12:17

## 2019-07-01 RX ADMIN — OXYCODONE HYDROCHLORIDE AND ACETAMINOPHEN 1 TABLET: 5; 325 TABLET ORAL at 12:17

## 2019-07-01 NOTE — ED NOTES
At this time, Dr. Carlos was contacted and left a voicemail for Dr. Long.        Malini Cortes  07/01/19 [id number]

## 2019-07-01 NOTE — ED NOTES
At this time, Dr. Carlos's PA, states Dr. Carlos has not seen this patient in the past three years. He also states that he would prefer if Dr. Long calls the on call doctor.      Malini Cortes  07/01/19 1319       Malini Cortes  07/01/19 1322

## 2019-07-01 NOTE — ED PROVIDER NOTES
Subjective   78 year old female presenting with fall. She states that just prior to arrival she tripped walking out of her house, fell 2 steps onto her right side.  Did not strike her head or lose consciousness.  She complains of right rib, shoulder, elbow and wrist pain.  Denies headache, nausea, vomiting, numbness, weakness.  Has a couple abrasions as well.  No other complaints.            Review of Systems   Constitutional: Negative.    HENT: Negative.    Eyes: Negative.    Respiratory: Negative.    Cardiovascular: Negative.    Gastrointestinal: Negative.    Genitourinary: Negative.    Musculoskeletal: Positive for arthralgias. Negative for back pain and neck pain.   Skin: Negative.    Neurological: Negative.    Psychiatric/Behavioral: Negative.        Past Medical History:   Diagnosis Date   • Abnormal heart rhythm    • Arrhythmia    • Diabetes mellitus (CMS/[facility])    • Hypertension    • Kidney disorder    • Uterus cancer (CMS/[facility])        Allergies   Allergen Reactions   • Penicillins    • Sulfa Antibiotics        Past Surgical History:   Procedure Laterality Date   • CATARACT EXTRACTION, BILATERAL         Family History   Problem Relation Age of Onset   • Heart disease Mother    • Cancer Brother        Social History     Socioeconomic History   • Marital status: Unknown     Spouse name: Not on file   • Number of children: Not on file   • Years of education: Not on file   • Highest education level: Not on file   Tobacco Use   • Smoking status: Never Smoker   • Smokeless tobacco: Never Used   Substance and Sexual Activity   • Alcohol use: No   • Drug use: No   • Sexual activity: Defer           Objective   Physical Exam   Constitutional: She is oriented to person, place, and time. She appears well-developed and well-nourished. No distress.   HENT:   Head: Normocephalic and atraumatic.   Right Ear: External ear normal.   Left Ear: External ear normal.   Nose: Nose normal.   Mouth/Throat: Oropharynx is clear and  moist.   Eyes: Conjunctivae and EOM are normal. Pupils are equal, round, and reactive to light.   Neck: Normal range of motion. Neck supple.   Normal range of motion, no midline tenderness   Cardiovascular: Normal rate, regular rhythm, normal heart sounds and intact distal pulses.   2+ radials   Pulmonary/Chest: Effort normal and breath sounds normal. No respiratory distress.   Abdominal: Soft. Bowel sounds are normal. She exhibits no distension. There is no tenderness. There is no rebound and no guarding.   Musculoskeletal: Normal range of motion. She exhibits no edema or deformity.   Tenderness to the right shoulder, elbow and wrist, all joints are stable, no other tenderness/instability/deformity   Neurological: She is alert and oriented to person, place, and time.   Normal strength and sensation   Skin: Skin is warm and dry. No rash noted.   Small abrasion to the right knee and wrist   Psychiatric: She has a normal mood and affect. Her behavior is normal.   Nursing note and vitals reviewed.      Procedures           ED Course                  MDM  Number of Diagnoses or Management Options  Other closed displaced fracture of proximal end of right humerus, initial encounter:   Diagnosis management comments: 78-year-old female with fall.  Well-developed, well-nourished elderly lady in no distress with exam as above.  She is neurovascular intact.  Will check x-rays and treat pain.  Do not feel head or neck imaging is indicated at this time.  Disposition pending work-up.    DDX: Fall, contusion, abrasion, fracture, dislocation    X-rays reveal comminuted displaced proximal humerus fracture.  Per patient request attempted to contact Dr. Carlos, he is unavailable.  Discussed with Dr. Heard, will place into sling and swestefania, will see in clinic first thing tomorrow for possible operative planning.  Supportive care discussed otherwise.  Patient and family are comfortable with an understanding the plan.       Amount and/or  Complexity of Data Reviewed  Tests in the radiology section of CPT®: reviewed          Final diagnoses:   Other closed displaced fracture of proximal end of right humerus, initial encounter            Aniket Long MD  07/01/19 [id number]

## 2019-07-01 NOTE — ED NOTES
At this time, Dr. Heard was contacted and transferred to Dr. Long.      Malini Cortes  07/01/19 7708

## 2019-07-08 ENCOUNTER — TRANSCRIBE ORDERS (OUTPATIENT)
Dept: ADMINISTRATIVE | Facility: HOSPITAL | Age: 79
End: 2019-07-08

## 2019-07-08 DIAGNOSIS — S42.351A CLOSED DISPLACED COMMINUTED FRACTURE OF SHAFT OF RIGHT HUMERUS, INITIAL ENCOUNTER: Primary | ICD-10-CM

## 2019-07-10 ENCOUNTER — APPOINTMENT (OUTPATIENT)
Dept: GENERAL RADIOLOGY | Facility: HOSPITAL | Age: 79
End: 2019-07-10

## 2019-07-10 ENCOUNTER — HOSPITAL ENCOUNTER (EMERGENCY)
Facility: HOSPITAL | Age: 79
Discharge: HOME | End: 2019-07-10
Attending: EMERGENCY MEDICINE | Admitting: EMERGENCY MEDICINE

## 2019-07-10 ENCOUNTER — APPOINTMENT (OUTPATIENT)
Dept: CT IMAGING | Facility: HOSPITAL | Age: 79
End: 2019-07-10

## 2019-07-10 VITALS
OXYGEN SATURATION: 94 % | DIASTOLIC BLOOD PRESSURE: 76 MMHG | WEIGHT: 212 LBS | BODY MASS INDEX: 33.27 KG/M2 | SYSTOLIC BLOOD PRESSURE: 135 MMHG | TEMPERATURE: 99 F | HEIGHT: 67 IN | HEART RATE: 77 BPM | RESPIRATION RATE: 18 BRPM
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
## 2019-07-10 DIAGNOSIS — S20.212A RIB CONTUSION, LEFT, INITIAL ENCOUNTER: ICD-10-CM

## 2019-07-10 DIAGNOSIS — S40.012A CONTUSION OF LEFT SHOULDER, INITIAL ENCOUNTER: ICD-10-CM

## 2019-07-10 LAB
GLUCOSE BLDC GLUCOMTR-MCNC: 290 MG/DL (ref 70–130)
HOLD SPECIMEN: NORMAL
HOLD SPECIMEN: NORMAL
WHOLE BLOOD HOLD SPECIMEN: NORMAL

## 2019-07-10 PROCEDURE — 73030 X-RAY EXAM OF SHOULDER: CPT

## 2019-07-10 PROCEDURE — 99283 EMERGENCY DEPT VISIT LOW MDM: CPT

## 2019-07-10 PROCEDURE — 93005 ELECTROCARDIOGRAM TRACING: CPT

## 2019-07-10 PROCEDURE — 82962 GLUCOSE BLOOD TEST: CPT

## 2019-07-10 PROCEDURE — 70450 CT HEAD/BRAIN W/O DYE: CPT

## 2019-07-10 PROCEDURE — 71101 X-RAY EXAM UNILAT RIBS/CHEST: CPT

## 2019-07-10 RX ORDER — SODIUM CHLORIDE 0.9 % (FLUSH) 0.9 %
10 SYRINGE (ML) INJECTION AS NEEDED
Status: DISCONTINUED | OUTPATIENT
Start: 2019-07-10 | End: 2019-07-11 | Stop reason: HOSPADM

## 2019-07-11 NOTE — ED NOTES
Pt reports her blurry vision is better, her only c/o is pain from a fall      Mirela Roberts RN  07/10/19 [id number]

## 2019-07-11 NOTE — ED PROVIDER NOTES
Subjective   78-year-old female presents to the ED with chief complaint of fall.  The patient indicates that she tripped over a fan cord and fell into a tall house vein.  She landed on her left side on top of the fan.  She presents complaining of some left-sided chest wall pain as well as left shoulder pain.  She has a known right shoulder fracture and known swelling edema and ecchymosis to her right arm that is been present since the fracture occurred on July 1, 2019.  Patient also notes that during the fall she felt like she had an episode of double vision.  She does note that she had some cataract surgery on her eyes about a month and a half ago.  She states intermittently she has had some changes in vision but it is currently back to her baseline.  She denies headache lightheadedness or dizziness.  She did not strike her head.  She denies neck or back pain.  No nausea vomiting diarrhea or abdominal pain.  No chest pain or shortness of breath.  No other complaints at this time.            Review of Systems   Eyes: Positive for visual disturbance. Negative for photophobia, pain and redness.   Cardiovascular: Negative for chest pain.        Left rib pain   Musculoskeletal: Positive for arthralgias.   All other systems reviewed and are negative.      Past Medical History:   Diagnosis Date   • Abnormal heart rhythm    • Arrhythmia    • Diabetes mellitus (CMS/HCC)    • Hypertension    • Kidney disorder    • Uterus cancer (CMS/HCC)        Allergies   Allergen Reactions   • Penicillins    • Sulfa Antibiotics        Past Surgical History:   Procedure Laterality Date   • CATARACT EXTRACTION, BILATERAL         Family History   Problem Relation Age of Onset   • Heart disease Mother    • Cancer Brother        Social History     Socioeconomic History   • Marital status: Unknown     Spouse name: Not on file   • Number of children: Not on file   • Years of education: Not on file   • Highest education level: Not on file    Tobacco Use   • Smoking status: Never Smoker   • Smokeless tobacco: Never Used   Substance and Sexual Activity   • Alcohol use: No   • Drug use: No   • Sexual activity: Defer           Objective   Physical Exam   Constitutional: She is oriented to person, place, and time. No distress.   Chronically ill appearing   HENT:   Head: Normocephalic and atraumatic.   Nose: Nose normal.   Eyes: Conjunctivae and EOM are normal.   Cardiovascular: Normal rate, regular rhythm and intact distal pulses.   Pulmonary/Chest: Effort normal and breath sounds normal. No respiratory distress. She exhibits tenderness.   TTP left inferior anterior chest wall    Abdominal: Soft. She exhibits no distension. There is no tenderness. There is no guarding.   Musculoskeletal: She exhibits no edema or deformity.        Cervical back: She exhibits no tenderness.        Thoracic back: She exhibits no tenderness.        Lumbar back: She exhibits no tenderness.   TTP left anterior shoulder   Pitting edema of the right forearm with ecchymosis and edema.  Distal pulses normal.   Neurological: She is alert and oriented to person, place, and time. No cranial nerve deficit.   Skin: She is not diaphoretic.   Nursing note and vitals reviewed.      Procedures           ED Course  ED Course as of Jul 11 0500   Wed Jul 10, 2019   2225 EKG interpreted by me.  Sinus rhythm.  Rate of 86.  Nonspecific Q wave.  No ST segment depression or elevation.  No T wave at normalities.  Abnormal EKG  [CG]      ED Course User Index  [CG] Theron Joe DO      Presents status post fall.  Imaging is negative for acute traumatic injury.  She states that she has had some intermittent diplopia in the last month and a half following her cataract surgery.  She has normal vision per her own account currently.  She is resting comfortably.  No neurological deficits.  Recommend following up outpatient with her surgeon.  She is agreeable to this plan.  Discharged to follow-up as  needed.            MDM      Final diagnoses:   Fall, initial encounter   Contusion of left shoulder, initial encounter   Rib contusion, left, initial encounter            Theron Joe, DO  07/11/19 [id number]

## 2019-07-12 ENCOUNTER — HOSPITAL ENCOUNTER (OUTPATIENT)
Dept: CT IMAGING | Facility: HOSPITAL | Age: 79
Discharge: HOME | End: 2019-07-12
Admitting: ORTHOPAEDIC SURGERY

## 2019-07-12 DIAGNOSIS — S42.351A CLOSED DISPLACED COMMINUTED FRACTURE OF SHAFT OF RIGHT HUMERUS, INITIAL ENCOUNTER: ICD-10-CM

## 2019-07-12 PROCEDURE — 73200 CT UPPER EXTREMITY W/O DYE: CPT

## 2019-07-19 ENCOUNTER — HOSPITAL ENCOUNTER (OUTPATIENT)
Facility: HOSPITAL | Age: 79
Discharge: HOME | End: 2019-07-19
Payer: MEDICARE

## 2019-07-19 ENCOUNTER — HOSPITAL ENCOUNTER (OUTPATIENT)
Dept: GENERAL RADIOLOGY | Facility: HOSPITAL | Age: 79
Discharge: HOME | End: 2019-07-19
Payer: MEDICARE

## 2019-07-19 DIAGNOSIS — E78.5 HYPERLIPIDEMIA, UNSPECIFIED HYPERLIPIDEMIA TYPE: ICD-10-CM

## 2019-07-19 DIAGNOSIS — S42.201S CLOSED FRACTURE OF PROXIMAL END OF RIGHT HUMERUS, UNSPECIFIED FRACTURE MORPHOLOGY, SEQUELA: ICD-10-CM

## 2019-07-19 DIAGNOSIS — Z01.818 PREOP EXAMINATION: ICD-10-CM

## 2019-07-19 DIAGNOSIS — E11.65 UNCONTROLLED TYPE 2 DIABETES MELLITUS WITH HYPERGLYCEMIA (HCC): ICD-10-CM

## 2019-07-19 PROCEDURE — 93005 ELECTROCARDIOGRAM TRACING: CPT

## 2019-07-19 PROCEDURE — 36415 COLL VENOUS BLD VENIPUNCTURE: CPT

## 2019-07-19 PROCEDURE — 71046 X-RAY EXAM CHEST 2 VIEWS: CPT

## 2019-08-07 ENCOUNTER — HOSPITAL ENCOUNTER (OUTPATIENT)
Facility: HOSPITAL | Age: 79
Setting detail: SURGERY ADMIT
End: 2019-08-07
Attending: ORTHOPAEDIC SURGERY | Admitting: ORTHOPAEDIC SURGERY

## 2019-08-15 ENCOUNTER — HOSPITAL ENCOUNTER (OUTPATIENT)
Dept: ULTRASOUND IMAGING | Facility: HOSPITAL | Age: 79
Discharge: HOME | End: 2019-08-15
Payer: MEDICARE

## 2019-08-15 DIAGNOSIS — H53.2 DOUBLE VISION: ICD-10-CM

## 2019-08-15 DIAGNOSIS — G83.12 MONOPARESIS OF LOWER EXTREMITY AFFECTING LEFT DOMINANT SIDE (HCC): ICD-10-CM

## 2019-08-15 PROCEDURE — 93880 EXTRACRANIAL BILAT STUDY: CPT

## 2019-09-23 ENCOUNTER — OFFICE VISIT (OUTPATIENT)
Dept: NEUROLOGY | Facility: CLINIC | Age: 79
End: 2019-09-23

## 2019-09-23 VITALS
WEIGHT: 213 LBS | BODY MASS INDEX: 33.43 KG/M2 | OXYGEN SATURATION: 95 % | RESPIRATION RATE: 16 BRPM | HEART RATE: 65 BPM | SYSTOLIC BLOOD PRESSURE: 130 MMHG | DIASTOLIC BLOOD PRESSURE: 84 MMHG | HEIGHT: 67 IN

## 2019-09-23 DIAGNOSIS — R42 VERTIGO: Primary | ICD-10-CM

## 2019-09-23 PROCEDURE — 99214 OFFICE O/P EST MOD 30 MIN: CPT | Performed by: NURSE PRACTITIONER

## 2019-09-23 RX ORDER — BUSPIRONE HYDROCHLORIDE 15 MG/1
7.5 TABLET ORAL 2 TIMES DAILY
Refills: 2 | Status: ON HOLD | COMMUNITY
Start: 2019-08-06 | End: 2023-01-11 | Stop reason: SDUPTHER

## 2019-09-23 RX ORDER — LANOLIN ALCOHOL/MO/W.PET/CERES
1000 CREAM (GRAM) TOPICAL
Status: ON HOLD | COMMUNITY
End: 2023-01-11

## 2019-09-23 RX ORDER — GABAPENTIN 300 MG/1
600 CAPSULE ORAL 2 TIMES DAILY
Refills: 5 | Status: ON HOLD | COMMUNITY
Start: 2019-09-09 | End: 2023-01-28 | Stop reason: SDUPTHER

## 2019-09-23 RX ORDER — MECLIZINE HYDROCHLORIDE 25 MG/1
25 TABLET ORAL 3 TIMES DAILY PRN
Qty: 90 TABLET | Refills: 3 | Status: SHIPPED | OUTPATIENT
Start: 2019-09-23 | End: 2020-02-26

## 2019-09-23 RX ORDER — ASPIRIN 81 MG/1
81 TABLET ORAL DAILY
Status: ON HOLD | COMMUNITY
End: 2023-01-11

## 2019-09-23 RX ORDER — LORAZEPAM 0.5 MG/1
0.5 TABLET ORAL EVERY 8 HOURS PRN
Refills: 0 | COMMUNITY
Start: 2019-08-22 | End: 2023-01-28 | Stop reason: HOSPADM

## 2019-09-23 RX ORDER — MELOXICAM 15 MG/1
15 TABLET ORAL
COMMUNITY
End: 2023-01-28 | Stop reason: HOSPADM

## 2019-09-23 RX ORDER — GLIPIZIDE 10 MG/1
20 TABLET ORAL
Status: ON HOLD | COMMUNITY
End: 2023-01-11 | Stop reason: SDUPTHER

## 2019-09-23 RX ORDER — GABAPENTIN 400 MG/1
600 CAPSULE ORAL 3 TIMES DAILY
Status: ON HOLD | COMMUNITY
End: 2023-01-11 | Stop reason: SDUPTHER

## 2019-09-23 RX ORDER — PRAVASTATIN SODIUM 20 MG
20 TABLET ORAL
COMMUNITY
End: 2023-01-28 | Stop reason: HOSPADM

## 2019-09-23 RX ORDER — OMEPRAZOLE 20 MG/1
40 CAPSULE, DELAYED RELEASE ORAL
Status: ON HOLD | COMMUNITY
End: 2023-01-11 | Stop reason: SDUPTHER

## 2019-10-21 PROBLEM — R42 VERTIGO: Status: ACTIVE | Noted: 2019-10-21

## 2019-12-03 ENCOUNTER — APPOINTMENT (OUTPATIENT)
Dept: GENERAL RADIOLOGY | Facility: HOSPITAL | Age: 79
End: 2019-12-03
Payer: MEDICARE

## 2019-12-03 ENCOUNTER — APPOINTMENT (OUTPATIENT)
Dept: CT IMAGING | Facility: HOSPITAL | Age: 79
End: 2019-12-03
Payer: MEDICARE

## 2019-12-03 ENCOUNTER — HOSPITAL ENCOUNTER (EMERGENCY)
Facility: HOSPITAL | Age: 79
Discharge: ANOTHER HOSPITAL | End: 2019-12-03
Attending: EMERGENCY MEDICINE
Payer: MEDICARE

## 2019-12-03 VITALS
SYSTOLIC BLOOD PRESSURE: 86 MMHG | BODY MASS INDEX: 32.1 KG/M2 | OXYGEN SATURATION: 97 % | WEIGHT: 220.5 LBS | DIASTOLIC BLOOD PRESSURE: 42 MMHG | HEART RATE: 71 BPM | RESPIRATION RATE: 19 BRPM | TEMPERATURE: 98.2 F

## 2019-12-03 DIAGNOSIS — E87.5 HYPERKALEMIA: ICD-10-CM

## 2019-12-03 DIAGNOSIS — E16.2 HYPOGLYCEMIA: Primary | ICD-10-CM

## 2019-12-03 DIAGNOSIS — I95.9 HYPOTENSION, UNSPECIFIED HYPOTENSION TYPE: ICD-10-CM

## 2019-12-03 DIAGNOSIS — N17.9 AKI (ACUTE KIDNEY INJURY) (HCC): ICD-10-CM

## 2019-12-03 DIAGNOSIS — D72.829 LEUKOCYTOSIS, UNSPECIFIED TYPE: ICD-10-CM

## 2019-12-03 LAB
A/G RATIO: 1.2 (ref 0.8–2)
ALBUMIN SERPL-MCNC: 3.4 G/DL (ref 3.4–4.8)
ALP BLD-CCNC: 396 U/L (ref 25–100)
ALT SERPL-CCNC: 255 U/L (ref 4–36)
AMMONIA: 87 MCG/DL (ref 19–87)
AMORPHOUS: ABNORMAL /HPF
ANION GAP SERPL CALCULATED.3IONS-SCNC: 25 MMOL/L (ref 3–16)
AST SERPL-CCNC: 1064 U/L (ref 8–33)
BACTERIA: ABNORMAL /HPF
BASOPHILS ABSOLUTE: 0.1 K/UL (ref 0–0.1)
BASOPHILS RELATIVE PERCENT: 0.3 %
BILIRUB SERPL-MCNC: 2.6 MG/DL (ref 0.3–1.2)
BILIRUBIN URINE: ABNORMAL
BLOOD, URINE: NEGATIVE
BUN BLDV-MCNC: 68 MG/DL (ref 6–20)
CALCIUM SERPL-MCNC: 8.4 MG/DL (ref 8.5–10.5)
CHLORIDE BLD-SCNC: 92 MMOL/L (ref 98–107)
CHP ED QC CHECK: NORMAL
CLARITY: ABNORMAL
CO2: 14 MMOL/L (ref 20–30)
COLOR: ABNORMAL
CREAT SERPL-MCNC: 5 MG/DL (ref 0.4–1.2)
EOSINOPHILS ABSOLUTE: 0 K/UL (ref 0–0.4)
EOSINOPHILS RELATIVE PERCENT: 0 %
EPITHELIAL CELLS, UA: ABNORMAL /HPF
GFR AFRICAN AMERICAN: 10
GFR NON-AFRICAN AMERICAN: 8
GLOBULIN: 2.8 G/DL
GLUCOSE BLD-MCNC: 106 MG/DL (ref 74–106)
GLUCOSE BLD-MCNC: 110 MG/DL
GLUCOSE BLD-MCNC: 110 MG/DL (ref 74–106)
GLUCOSE BLD-MCNC: 153 MG/DL (ref 74–106)
GLUCOSE URINE: 100 MG/DL
HCT VFR BLD CALC: 37.8 % (ref 37–47)
HEMOGLOBIN: 11.6 G/DL (ref 11.5–16.5)
IMMATURE GRANULOCYTES #: 0.3 K/UL
IMMATURE GRANULOCYTES %: 1 % (ref 0–5)
KETONES, URINE: NEGATIVE MG/DL
LACTIC ACID: 7.1 MMOL/L (ref 0.4–2)
LEUKOCYTE ESTERASE, URINE: NEGATIVE
LYMPHOCYTES ABSOLUTE: 0.8 K/UL (ref 1.5–4)
LYMPHOCYTES RELATIVE PERCENT: 3 %
MCH RBC QN AUTO: 30.6 PG (ref 27–32)
MCHC RBC AUTO-ENTMCNC: 30.7 G/DL (ref 31–35)
MCV RBC AUTO: 99.7 FL (ref 80–100)
MICROSCOPIC EXAMINATION: YES
MONOCYTES ABSOLUTE: 1 K/UL (ref 0.2–0.8)
MONOCYTES RELATIVE PERCENT: 3.8 %
NEUTROPHILS ABSOLUTE: 24.5 K/UL (ref 2–7.5)
NEUTROPHILS RELATIVE PERCENT: 91.9 %
NITRITE, URINE: NEGATIVE
PDW BLD-RTO: 14.5 % (ref 11–16)
PERFORMED ON: ABNORMAL
PERFORMED ON: NORMAL
PH UA: 5 (ref 5–8)
PLATELET # BLD: 214 K/UL (ref 150–400)
PMV BLD AUTO: 12.6 FL (ref 6–10)
POTASSIUM REFLEX MAGNESIUM: 6.2 MMOL/L (ref 3.4–5.1)
PRO-BNP: 4994 PG/ML (ref 0–1800)
PROTEIN UA: 100 MG/DL
RBC # BLD: 3.79 M/UL (ref 3.8–5.8)
RBC UA: ABNORMAL /HPF (ref 0–2)
SODIUM BLD-SCNC: 131 MMOL/L (ref 136–145)
SPECIFIC GRAVITY UA: >=1.03 (ref 1–1.03)
TOTAL PROTEIN: 6.2 G/DL (ref 6.4–8.3)
TROPONIN: <0.3 NG/ML
URINE REFLEX TO CULTURE: ABNORMAL
URINE TYPE: ABNORMAL
UROBILINOGEN, URINE: 0.2 E.U./DL
WBC # BLD: 26.6 K/UL (ref 4–11)
WBC UA: ABNORMAL /HPF (ref 0–5)

## 2019-12-03 PROCEDURE — 84484 ASSAY OF TROPONIN QUANT: CPT

## 2019-12-03 PROCEDURE — 6370000000 HC RX 637 (ALT 250 FOR IP): Performed by: EMERGENCY MEDICINE

## 2019-12-03 PROCEDURE — 2500000003 HC RX 250 WO HCPCS: Performed by: EMERGENCY MEDICINE

## 2019-12-03 PROCEDURE — 96367 TX/PROPH/DG ADDL SEQ IV INF: CPT

## 2019-12-03 PROCEDURE — 96365 THER/PROPH/DIAG IV INF INIT: CPT

## 2019-12-03 PROCEDURE — 85025 COMPLETE CBC W/AUTO DIFF WBC: CPT

## 2019-12-03 PROCEDURE — 2580000003 HC RX 258: Performed by: EMERGENCY MEDICINE

## 2019-12-03 PROCEDURE — 87150 DNA/RNA AMPLIFIED PROBE: CPT

## 2019-12-03 PROCEDURE — 96361 HYDRATE IV INFUSION ADD-ON: CPT

## 2019-12-03 PROCEDURE — 87040 BLOOD CULTURE FOR BACTERIA: CPT

## 2019-12-03 PROCEDURE — 74176 CT ABD & PELVIS W/O CONTRAST: CPT

## 2019-12-03 PROCEDURE — 83605 ASSAY OF LACTIC ACID: CPT

## 2019-12-03 PROCEDURE — 81001 URINALYSIS AUTO W/SCOPE: CPT

## 2019-12-03 PROCEDURE — 71045 X-RAY EXAM CHEST 1 VIEW: CPT

## 2019-12-03 PROCEDURE — 83880 ASSAY OF NATRIURETIC PEPTIDE: CPT

## 2019-12-03 PROCEDURE — 6370000000 HC RX 637 (ALT 250 FOR IP)

## 2019-12-03 PROCEDURE — 99284 EMERGENCY DEPT VISIT MOD MDM: CPT

## 2019-12-03 PROCEDURE — 96375 TX/PRO/DX INJ NEW DRUG ADDON: CPT

## 2019-12-03 PROCEDURE — 87186 SC STD MICRODIL/AGAR DIL: CPT

## 2019-12-03 PROCEDURE — 82140 ASSAY OF AMMONIA: CPT

## 2019-12-03 PROCEDURE — 6360000002 HC RX W HCPCS: Performed by: EMERGENCY MEDICINE

## 2019-12-03 PROCEDURE — 70450 CT HEAD/BRAIN W/O DYE: CPT

## 2019-12-03 PROCEDURE — 80053 COMPREHEN METABOLIC PANEL: CPT

## 2019-12-03 PROCEDURE — 36415 COLL VENOUS BLD VENIPUNCTURE: CPT

## 2019-12-03 RX ORDER — PANTOPRAZOLE SODIUM 40 MG/1
40 TABLET, DELAYED RELEASE ORAL
Status: DISCONTINUED | OUTPATIENT
Start: 2019-12-04 | End: 2019-12-04 | Stop reason: HOSPADM

## 2019-12-03 RX ORDER — 0.9 % SODIUM CHLORIDE 0.9 %
1000 INTRAVENOUS SOLUTION INTRAVENOUS ONCE
Status: COMPLETED | OUTPATIENT
Start: 2019-12-03 | End: 2019-12-03

## 2019-12-03 RX ORDER — MECLIZINE HYDROCHLORIDE 25 MG/1
25 TABLET ORAL 3 TIMES DAILY PRN
Status: ON HOLD | COMMUNITY
End: 2020-01-18 | Stop reason: SDUPTHER

## 2019-12-03 RX ORDER — DULOXETIN HYDROCHLORIDE 60 MG/1
60 CAPSULE, DELAYED RELEASE ORAL DAILY
Status: ON HOLD | COMMUNITY
End: 2020-01-18 | Stop reason: SDUPTHER

## 2019-12-03 RX ORDER — DEXTROSE MONOHYDRATE 25 G/50ML
25 INJECTION, SOLUTION INTRAVENOUS ONCE
Status: COMPLETED | OUTPATIENT
Start: 2019-12-03 | End: 2019-12-03

## 2019-12-03 RX ORDER — DEXTROSE MONOHYDRATE 50 MG/ML
100 INJECTION, SOLUTION INTRAVENOUS PRN
Status: DISCONTINUED | OUTPATIENT
Start: 2019-12-03 | End: 2019-12-04 | Stop reason: HOSPADM

## 2019-12-03 RX ORDER — DULOXETIN HYDROCHLORIDE 30 MG/1
CAPSULE, DELAYED RELEASE ORAL
Status: COMPLETED
Start: 2019-12-03 | End: 2019-12-03

## 2019-12-03 RX ORDER — PROPRANOLOL HYDROCHLORIDE 40 MG/1
40 TABLET ORAL 2 TIMES DAILY
Status: ON HOLD | COMMUNITY
End: 2019-12-19

## 2019-12-03 RX ORDER — GABAPENTIN 300 MG/1
300 CAPSULE ORAL 3 TIMES DAILY
Status: DISCONTINUED | OUTPATIENT
Start: 2019-12-03 | End: 2019-12-04 | Stop reason: HOSPADM

## 2019-12-03 RX ORDER — DULOXETIN HYDROCHLORIDE 30 MG/1
60 CAPSULE, DELAYED RELEASE ORAL DAILY
Status: DISCONTINUED | OUTPATIENT
Start: 2019-12-04 | End: 2019-12-04 | Stop reason: HOSPADM

## 2019-12-03 RX ORDER — AMLODIPINE BESYLATE 10 MG/1
10 TABLET ORAL DAILY
Status: ON HOLD | COMMUNITY
End: 2020-01-18 | Stop reason: SDUPTHER

## 2019-12-03 RX ORDER — GABAPENTIN 300 MG/1
600 CAPSULE ORAL 3 TIMES DAILY
Status: ON HOLD | COMMUNITY
End: 2021-06-16

## 2019-12-03 RX ORDER — HYDROCODONE BITARTRATE AND ACETAMINOPHEN 5; 325 MG/1; MG/1
1 TABLET ORAL ONCE
Status: COMPLETED | OUTPATIENT
Start: 2019-12-03 | End: 2019-12-03

## 2019-12-03 RX ORDER — NICOTINE POLACRILEX 4 MG
15 LOZENGE BUCCAL PRN
Status: DISCONTINUED | OUTPATIENT
Start: 2019-12-03 | End: 2019-12-04 | Stop reason: HOSPADM

## 2019-12-03 RX ORDER — LOSARTAN POTASSIUM 100 MG/1
25 TABLET ORAL DAILY
Status: ON HOLD | COMMUNITY
End: 2020-01-18 | Stop reason: SDUPTHER

## 2019-12-03 RX ORDER — CALCIUM GLUCONATE 94 MG/ML
1 INJECTION, SOLUTION INTRAVENOUS ONCE
Status: COMPLETED | OUTPATIENT
Start: 2019-12-03 | End: 2019-12-03

## 2019-12-03 RX ORDER — DEXTROSE MONOHYDRATE 25 G/50ML
12.5 INJECTION, SOLUTION INTRAVENOUS PRN
Status: DISCONTINUED | OUTPATIENT
Start: 2019-12-03 | End: 2019-12-04 | Stop reason: HOSPADM

## 2019-12-03 RX ADMIN — SODIUM CHLORIDE 1000 ML: 9 INJECTION, SOLUTION INTRAVENOUS at 18:35

## 2019-12-03 RX ADMIN — HYDROCODONE BITARTRATE AND ACETAMINOPHEN 1 TABLET: 5; 325 TABLET ORAL at 22:49

## 2019-12-03 RX ADMIN — DULOXETIN HYDROCHLORIDE 60 MG: 30 CAPSULE, DELAYED RELEASE ORAL at 22:48

## 2019-12-03 RX ADMIN — GABAPENTIN 300 MG: 300 CAPSULE ORAL at 22:49

## 2019-12-03 RX ADMIN — SODIUM CHLORIDE 1000 ML: 9 INJECTION, SOLUTION INTRAVENOUS at 16:45

## 2019-12-03 RX ADMIN — DEXTROSE MONOHYDRATE 25 G: 25 INJECTION, SOLUTION INTRAVENOUS at 17:58

## 2019-12-03 RX ADMIN — CALCIUM GLUCONATE 1 G: 98 INJECTION, SOLUTION INTRAVENOUS at 17:59

## 2019-12-03 RX ADMIN — VANCOMYCIN 1000 MG: 1 INJECTION, SOLUTION INTRAVENOUS at 19:23

## 2019-12-03 RX ADMIN — INSULIN HUMAN 10 UNITS: 100 INJECTION, SOLUTION PARENTERAL at 17:57

## 2019-12-03 RX ADMIN — DULOXETINE HYDROCHLORIDE 60 MG: 30 CAPSULE, DELAYED RELEASE ORAL at 22:48

## 2019-12-03 RX ADMIN — MEROPENEM 1 G: 1 INJECTION, POWDER, FOR SOLUTION INTRAVENOUS at 18:02

## 2019-12-03 RX ADMIN — SODIUM BICARBONATE 50 MEQ: 84 INJECTION, SOLUTION INTRAVENOUS at 17:52

## 2019-12-03 ASSESSMENT — PAIN DESCRIPTION - ORIENTATION: ORIENTATION: RIGHT

## 2019-12-03 ASSESSMENT — PAIN DESCRIPTION - LOCATION: LOCATION: SHOULDER

## 2019-12-05 LAB — REPORT: NORMAL

## 2019-12-07 LAB
BLOOD CULTURE, ROUTINE: ABNORMAL
BLOOD CULTURE, ROUTINE: ABNORMAL
CULTURE, BLOOD 2: ABNORMAL
ORGANISM: ABNORMAL

## 2019-12-19 ENCOUNTER — HOSPITAL ENCOUNTER (INPATIENT)
Facility: HOSPITAL | Age: 79
LOS: 31 days | Discharge: HOME HEALTH | DRG: 947 | End: 2020-01-19
Attending: INTERNAL MEDICINE | Admitting: INTERNAL MEDICINE
Payer: MEDICARE

## 2019-12-19 PROBLEM — R53.81 DECLINING FUNCTIONAL STATUS: Status: ACTIVE | Noted: 2019-12-19

## 2019-12-19 LAB
GLUCOSE BLD-MCNC: 231 MG/DL (ref 74–106)
GLUCOSE BLD-MCNC: 338 MG/DL (ref 74–106)
PERFORMED ON: ABNORMAL
PERFORMED ON: ABNORMAL

## 2019-12-19 PROCEDURE — 6370000000 HC RX 637 (ALT 250 FOR IP): Performed by: PHYSICIAN ASSISTANT

## 2019-12-19 PROCEDURE — 1200000002 HC SEMI PRIVATE SWING BED

## 2019-12-19 PROCEDURE — G8997 SWALLOW GOAL STATUS: HCPCS

## 2019-12-19 PROCEDURE — G8996 SWALLOW CURRENT STATUS: HCPCS

## 2019-12-19 PROCEDURE — 92610 EVALUATE SWALLOWING FUNCTION: CPT

## 2019-12-19 PROCEDURE — G8998 SWALLOW D/C STATUS: HCPCS

## 2019-12-19 RX ORDER — ONDANSETRON HYDROCHLORIDE 8 MG/1
8 TABLET, FILM COATED ORAL EVERY 6 HOURS PRN
Status: ON HOLD | COMMUNITY
End: 2020-01-18 | Stop reason: HOSPADM

## 2019-12-19 RX ORDER — SENNA AND DOCUSATE SODIUM 50; 8.6 MG/1; MG/1
1 TABLET, FILM COATED ORAL 2 TIMES DAILY
Status: DISCONTINUED | OUTPATIENT
Start: 2019-12-19 | End: 2020-01-19 | Stop reason: HOSPADM

## 2019-12-19 RX ORDER — POLYETHYLENE GLYCOL 3350 17 G/17G
17 POWDER, FOR SOLUTION ORAL DAILY
Status: ON HOLD | COMMUNITY
End: 2020-01-18 | Stop reason: SDUPTHER

## 2019-12-19 RX ORDER — BUSPIRONE HYDROCHLORIDE 5 MG/1
5 TABLET ORAL 2 TIMES DAILY
Status: ON HOLD | COMMUNITY
End: 2020-01-18 | Stop reason: SDUPTHER

## 2019-12-19 RX ORDER — CLOTRIMAZOLE 1 %
CREAM (GRAM) TOPICAL 2 TIMES DAILY
Status: DISCONTINUED | OUTPATIENT
Start: 2019-12-19 | End: 2020-01-19 | Stop reason: HOSPADM

## 2019-12-19 RX ORDER — MECLIZINE HYDROCHLORIDE 25 MG/1
25 TABLET ORAL 3 TIMES DAILY PRN
Status: DISCONTINUED | OUTPATIENT
Start: 2019-12-19 | End: 2020-01-19 | Stop reason: HOSPADM

## 2019-12-19 RX ORDER — LOSARTAN POTASSIUM 25 MG/1
25 TABLET ORAL DAILY
Status: DISCONTINUED | OUTPATIENT
Start: 2019-12-19 | End: 2020-01-19 | Stop reason: HOSPADM

## 2019-12-19 RX ORDER — INSULIN GLARGINE 100 [IU]/ML
27 INJECTION, SOLUTION SUBCUTANEOUS NIGHTLY
Status: DISCONTINUED | OUTPATIENT
Start: 2019-12-19 | End: 2020-01-15

## 2019-12-19 RX ORDER — DEXTROSE MONOHYDRATE 25 G/50ML
12.5 INJECTION, SOLUTION INTRAVENOUS PRN
Status: DISCONTINUED | OUTPATIENT
Start: 2019-12-19 | End: 2020-01-19 | Stop reason: HOSPADM

## 2019-12-19 RX ORDER — ASPIRIN 81 MG/1
81 TABLET ORAL DAILY
Status: DISCONTINUED | OUTPATIENT
Start: 2019-12-19 | End: 2020-01-19 | Stop reason: HOSPADM

## 2019-12-19 RX ORDER — HYDROCODONE BITARTRATE AND ACETAMINOPHEN 5; 325 MG/1; MG/1
0.5 TABLET ORAL EVERY 8 HOURS PRN
Status: DISCONTINUED | OUTPATIENT
Start: 2019-12-19 | End: 2020-01-01

## 2019-12-19 RX ORDER — GABAPENTIN 300 MG/1
300 CAPSULE ORAL 3 TIMES DAILY
Status: DISCONTINUED | OUTPATIENT
Start: 2019-12-19 | End: 2020-01-19 | Stop reason: HOSPADM

## 2019-12-19 RX ORDER — BUSPIRONE HYDROCHLORIDE 5 MG/1
5 TABLET ORAL 2 TIMES DAILY
Status: DISCONTINUED | OUTPATIENT
Start: 2019-12-19 | End: 2020-01-19 | Stop reason: HOSPADM

## 2019-12-19 RX ORDER — PANTOPRAZOLE SODIUM 40 MG/1
40 TABLET, DELAYED RELEASE ORAL
Status: DISCONTINUED | OUTPATIENT
Start: 2019-12-20 | End: 2020-01-19 | Stop reason: HOSPADM

## 2019-12-19 RX ORDER — ACETAMINOPHEN 325 MG/1
650 TABLET ORAL EVERY 6 HOURS PRN
Status: DISCONTINUED | OUTPATIENT
Start: 2019-12-19 | End: 2020-01-19 | Stop reason: HOSPADM

## 2019-12-19 RX ORDER — AMOXICILLIN 250 MG
1 CAPSULE ORAL 2 TIMES DAILY
Status: ON HOLD | COMMUNITY
End: 2020-01-18 | Stop reason: SDUPTHER

## 2019-12-19 RX ORDER — INSULIN GLARGINE 100 [IU]/ML
27 INJECTION, SOLUTION SUBCUTANEOUS NIGHTLY
Status: ON HOLD | COMMUNITY
End: 2020-01-18 | Stop reason: SDUPTHER

## 2019-12-19 RX ORDER — NICOTINE POLACRILEX 4 MG
15 LOZENGE BUCCAL PRN
Status: DISCONTINUED | OUTPATIENT
Start: 2019-12-19 | End: 2020-01-19 | Stop reason: HOSPADM

## 2019-12-19 RX ORDER — FLUTICASONE PROPIONATE 50 MCG
2 SPRAY, SUSPENSION (ML) NASAL 2 TIMES DAILY
Status: ON HOLD | COMMUNITY
End: 2020-01-18 | Stop reason: SDUPTHER

## 2019-12-19 RX ORDER — HYDROCODONE BITARTRATE AND ACETAMINOPHEN 5; 325 MG/1; MG/1
0.5 TABLET ORAL EVERY 8 HOURS PRN
Status: ON HOLD | COMMUNITY
End: 2020-03-24

## 2019-12-19 RX ORDER — POLYETHYLENE GLYCOL 3350 17 G/17G
17 POWDER, FOR SOLUTION ORAL DAILY
Status: DISCONTINUED | OUTPATIENT
Start: 2019-12-19 | End: 2019-12-21 | Stop reason: SDUPTHER

## 2019-12-19 RX ORDER — LIDOCAINE 50 MG/G
1 PATCH TOPICAL DAILY PRN
Status: ON HOLD | COMMUNITY
End: 2020-01-18 | Stop reason: SDUPTHER

## 2019-12-19 RX ORDER — LIDOCAINE 4 G/G
1 PATCH TOPICAL DAILY
Status: DISCONTINUED | OUTPATIENT
Start: 2019-12-19 | End: 2020-01-05

## 2019-12-19 RX ORDER — ONDANSETRON 4 MG/1
4 TABLET, FILM COATED ORAL EVERY 8 HOURS PRN
Status: DISCONTINUED | OUTPATIENT
Start: 2019-12-19 | End: 2020-01-19 | Stop reason: HOSPADM

## 2019-12-19 RX ORDER — DULOXETIN HYDROCHLORIDE 30 MG/1
60 CAPSULE, DELAYED RELEASE ORAL DAILY
Status: DISCONTINUED | OUTPATIENT
Start: 2019-12-19 | End: 2020-01-19 | Stop reason: HOSPADM

## 2019-12-19 RX ORDER — ACETAMINOPHEN 325 MG/1
650 TABLET ORAL EVERY 6 HOURS PRN
Status: ON HOLD | COMMUNITY
End: 2020-01-18 | Stop reason: HOSPADM

## 2019-12-19 RX ORDER — DEXTROSE MONOHYDRATE 50 MG/ML
100 INJECTION, SOLUTION INTRAVENOUS PRN
Status: DISCONTINUED | OUTPATIENT
Start: 2019-12-19 | End: 2020-01-19 | Stop reason: HOSPADM

## 2019-12-19 RX ORDER — CLOTRIMAZOLE 1 %
CREAM (GRAM) TOPICAL 2 TIMES DAILY
Status: ON HOLD | COMMUNITY
End: 2020-01-18 | Stop reason: HOSPADM

## 2019-12-19 RX ORDER — AMLODIPINE BESYLATE 5 MG/1
10 TABLET ORAL DAILY
Status: DISCONTINUED | OUTPATIENT
Start: 2019-12-19 | End: 2020-01-19 | Stop reason: HOSPADM

## 2019-12-19 RX ORDER — FLUTICASONE PROPIONATE 50 MCG
2 SPRAY, SUSPENSION (ML) NASAL 2 TIMES DAILY
Status: DISCONTINUED | OUTPATIENT
Start: 2019-12-19 | End: 2020-01-19 | Stop reason: HOSPADM

## 2019-12-19 RX ADMIN — SENNOSIDES AND DOCUSATE SODIUM 1 TABLET: 8.6; 5 TABLET ORAL at 20:52

## 2019-12-19 RX ADMIN — Medication 4 UNITS: at 17:10

## 2019-12-19 RX ADMIN — FLUTICASONE PROPIONATE 2 SPRAY: 50 SPRAY, METERED NASAL at 20:57

## 2019-12-19 RX ADMIN — Medication 14 UNITS: at 17:09

## 2019-12-19 RX ADMIN — MICONAZOLE NITRATE 1 APPLICATOR: 20 CREAM VAGINAL at 20:52

## 2019-12-19 RX ADMIN — INSULIN LISPRO 1 UNITS: 100 INJECTION, SOLUTION INTRAVENOUS; SUBCUTANEOUS at 20:57

## 2019-12-19 RX ADMIN — BUSPIRONE HYDROCHLORIDE 5 MG: 5 TABLET ORAL at 20:52

## 2019-12-19 RX ADMIN — CLOTRIMAZOLE: 10 CREAM TOPICAL at 20:52

## 2019-12-19 RX ADMIN — GABAPENTIN 300 MG: 300 CAPSULE ORAL at 20:52

## 2019-12-19 RX ADMIN — INSULIN GLARGINE 27 UNITS: 100 INJECTION, SOLUTION SUBCUTANEOUS at 20:58

## 2019-12-19 NOTE — PROGRESS NOTES
Skilled ST services not indicated at this time. General  Chart Reviewed: Yes  Subjective  Subjective: Patient upright in bed, with family at bedside, consuming dinner. Alert and Ox4, denies difficulty swallowing and reported having MBSS at [facility] yesterday. Behavior/Cognition: Alert; Cooperative;Pleasant mood  Respiratory Status: Room air  Communication Observation: Functional  Follows Directions: Complex  Dentition: Some missing teeth  Patient Positioning: Upright in bed  Baseline Vocal Quality: Normal  Volitional Cough: Strong  Prior Dysphagia History: None reported or indicated. Consistencies Administered: Reg solid; Thin - cup    Vision/Hearing  Vision  Vision: Within Functional Limits  Hearing  Hearing: Within functional limits    Oral Motor Deficits  Oral/Motor  Oral Motor: Within functional limits    Oral Phase Dysfunction  Oral Phase  Oral Phase: WNL  Oral Phase  Oral Phase - Comment: No deficits indicated. Indicators of Pharyngeal Phase Dysfunction   Pharyngeal Phase  Pharyngeal Phase: WNL  Pharyngeal Phase   Pharyngeal: No deficits indicated    Prognosis  Prognosis  Prognosis for safe diet advancement: good  Barriers to reach goals: age;motivation  Barriers/Prognosis Comment: Good for recommended diet. Individuals consulted  Consulted and agree with results and recommendations: Patient; Family member  Family member consulted: Daughter, son and     Education  Patient Education: Aspiration precaution guidelines. safe swallow strategies. Patient Education Response: Verbalizes understanding;Demonstrated understanding  Safety Devices in place: Yes  Type of devices: Left in bed;Call light within reach    G-Code  SLP G-Codes  Functional Assessment Tool Used:  Informal bedside swallow evaluation  Functional Limitations: Swallowing  Swallow Current Status (): 0 percent impaired, limited or restricted  Swallow Goal Status (): 0 percent impaired, limited or restricted  Swallow Discharge Status (): 0 percent impaired, limited or restricted    Therapy Time  SLP Individual Minutes  Time In: 1815  Time Out: 8053  Minutes: [address] [place], [place]  12/19/2019 6:35 PM

## 2019-12-20 PROBLEM — K12.0 APHTHOUS ULCER: Status: ACTIVE | Noted: 2019-12-20

## 2019-12-20 PROBLEM — F32.A ANXIETY AND DEPRESSION: Status: ACTIVE | Noted: 2019-12-20

## 2019-12-20 PROBLEM — G89.29 CHRONIC BACK PAIN: Status: ACTIVE | Noted: 2019-12-20

## 2019-12-20 PROBLEM — M54.9 CHRONIC BACK PAIN: Status: ACTIVE | Noted: 2019-12-20

## 2019-12-20 PROBLEM — G62.9 PERIPHERAL NEUROPATHY: Status: ACTIVE | Noted: 2019-12-20

## 2019-12-20 PROBLEM — R13.10 DYSPHAGIA: Status: ACTIVE | Noted: 2019-12-20

## 2019-12-20 PROBLEM — F41.9 ANXIETY AND DEPRESSION: Status: ACTIVE | Noted: 2019-12-20

## 2019-12-20 PROBLEM — S42.301A: Status: ACTIVE | Noted: 2019-12-20

## 2019-12-20 PROBLEM — N18.30 CKD (CHRONIC KIDNEY DISEASE) STAGE 3, GFR 30-59 ML/MIN (HCC): Status: ACTIVE | Noted: 2019-12-20

## 2019-12-20 LAB
A/G RATIO: 0.8 (ref 0.8–2)
ALBUMIN SERPL-MCNC: 2.8 G/DL (ref 3.4–4.8)
ALP BLD-CCNC: 84 U/L (ref 25–100)
ALT SERPL-CCNC: <5 U/L (ref 4–36)
ANION GAP SERPL CALCULATED.3IONS-SCNC: 11 MMOL/L (ref 3–16)
AST SERPL-CCNC: 14 U/L (ref 8–33)
BASOPHILS ABSOLUTE: 0 K/UL (ref 0–0.1)
BASOPHILS RELATIVE PERCENT: 0.5 %
BILIRUB SERPL-MCNC: 0.3 MG/DL (ref 0.3–1.2)
BUN BLDV-MCNC: 11 MG/DL (ref 6–20)
CALCIUM SERPL-MCNC: 8.4 MG/DL (ref 8.5–10.5)
CHLORIDE BLD-SCNC: 100 MMOL/L (ref 98–107)
CO2: 28 MMOL/L (ref 20–30)
CREAT SERPL-MCNC: 1.3 MG/DL (ref 0.4–1.2)
EOSINOPHILS ABSOLUTE: 0.2 K/UL (ref 0–0.4)
EOSINOPHILS RELATIVE PERCENT: 2.6 %
FERRITIN: 180 NG/ML (ref 22–322)
FOLATE: 12.68 NG/ML
GFR AFRICAN AMERICAN: 48
GFR NON-AFRICAN AMERICAN: 40
GLOBULIN: 3.7 G/DL
GLUCOSE BLD-MCNC: 138 MG/DL (ref 74–106)
GLUCOSE BLD-MCNC: 150 MG/DL (ref 74–106)
GLUCOSE BLD-MCNC: 204 MG/DL (ref 74–106)
GLUCOSE BLD-MCNC: 225 MG/DL (ref 74–106)
GLUCOSE BLD-MCNC: 244 MG/DL (ref 74–106)
HCT VFR BLD CALC: 26.1 % (ref 37–47)
HEMOGLOBIN: 7.7 G/DL (ref 11.5–16.5)
IMMATURE GRANULOCYTES #: 0 K/UL
IMMATURE GRANULOCYTES %: 0.5 % (ref 0–5)
IRON SATURATION: 26 % (ref 15–50)
IRON: 46 UG/DL (ref 37–145)
LYMPHOCYTES ABSOLUTE: 1.3 K/UL (ref 1.5–4)
LYMPHOCYTES RELATIVE PERCENT: 23.1 %
MAGNESIUM: 1.1 MG/DL (ref 1.7–2.4)
MCH RBC QN AUTO: 30 PG (ref 27–32)
MCHC RBC AUTO-ENTMCNC: 29.5 G/DL (ref 31–35)
MCV RBC AUTO: 101.6 FL (ref 80–100)
MONOCYTES ABSOLUTE: 0.5 K/UL (ref 0.2–0.8)
MONOCYTES RELATIVE PERCENT: 9.4 %
NEUTROPHILS ABSOLUTE: 3.7 K/UL (ref 2–7.5)
NEUTROPHILS RELATIVE PERCENT: 63.9 %
PDW BLD-RTO: 15.5 % (ref 11–16)
PERFORMED ON: ABNORMAL
PLATELET # BLD: 220 K/UL (ref 150–400)
PMV BLD AUTO: 10.6 FL (ref 6–10)
POTASSIUM REFLEX MAGNESIUM: 3.4 MMOL/L (ref 3.4–5.1)
RBC # BLD: 2.57 M/UL (ref 3.8–5.8)
SODIUM BLD-SCNC: 139 MMOL/L (ref 136–145)
TOTAL IRON BINDING CAPACITY: 180 UG/DL (ref 250–450)
TOTAL PROTEIN: 6.5 G/DL (ref 6.4–8.3)
VITAMIN B-12: 540 PG/ML (ref 211–911)
WBC # BLD: 5.7 K/UL (ref 4–11)

## 2019-12-20 PROCEDURE — 6360000002 HC RX W HCPCS: Performed by: PHYSICIAN ASSISTANT

## 2019-12-20 PROCEDURE — 83735 ASSAY OF MAGNESIUM: CPT

## 2019-12-20 PROCEDURE — 85025 COMPLETE CBC W/AUTO DIFF WBC: CPT

## 2019-12-20 PROCEDURE — 1200000002 HC SEMI PRIVATE SWING BED

## 2019-12-20 PROCEDURE — 82607 VITAMIN B-12: CPT

## 2019-12-20 PROCEDURE — 82728 ASSAY OF FERRITIN: CPT

## 2019-12-20 PROCEDURE — 99305 1ST NF CARE MODERATE MDM 35: CPT | Performed by: INTERNAL MEDICINE

## 2019-12-20 PROCEDURE — 6370000000 HC RX 637 (ALT 250 FOR IP): Performed by: PHYSICIAN ASSISTANT

## 2019-12-20 PROCEDURE — 6360000002 HC RX W HCPCS: Performed by: INTERNAL MEDICINE

## 2019-12-20 PROCEDURE — 83540 ASSAY OF IRON: CPT

## 2019-12-20 PROCEDURE — 6370000000 HC RX 637 (ALT 250 FOR IP)

## 2019-12-20 PROCEDURE — 82746 ASSAY OF FOLIC ACID SERUM: CPT

## 2019-12-20 PROCEDURE — 97530 THERAPEUTIC ACTIVITIES: CPT

## 2019-12-20 PROCEDURE — 97161 PT EVAL LOW COMPLEX 20 MIN: CPT

## 2019-12-20 PROCEDURE — 97165 OT EVAL LOW COMPLEX 30 MIN: CPT

## 2019-12-20 PROCEDURE — 36415 COLL VENOUS BLD VENIPUNCTURE: CPT

## 2019-12-20 PROCEDURE — 80053 COMPREHEN METABOLIC PANEL: CPT

## 2019-12-20 PROCEDURE — 83550 IRON BINDING TEST: CPT

## 2019-12-20 RX ORDER — MAGNESIUM SULFATE IN WATER 40 MG/ML
2 INJECTION, SOLUTION INTRAVENOUS ONCE
Status: COMPLETED | OUTPATIENT
Start: 2019-12-20 | End: 2019-12-20

## 2019-12-20 RX ORDER — DIPHENHYDRAMINE HYDROCHLORIDE AND LIDOCAINE HYDROCHLORIDE AND ALUMINUM HYDROXIDE AND MAGNESIUM HYDRO
5 KIT 4 TIMES DAILY
Status: DISCONTINUED | OUTPATIENT
Start: 2019-12-20 | End: 2020-01-01

## 2019-12-20 RX ORDER — DIPHENHYDRAMINE HYDROCHLORIDE AND LIDOCAINE HYDROCHLORIDE AND ALUMINUM HYDROXIDE AND MAGNESIUM HYDRO
KIT
Status: COMPLETED
Start: 2019-12-20 | End: 2019-12-20

## 2019-12-20 RX ADMIN — INSULIN LISPRO 1 UNITS: 100 INJECTION, SOLUTION INTRAVENOUS; SUBCUTANEOUS at 21:26

## 2019-12-20 RX ADMIN — INSULIN GLARGINE 27 UNITS: 100 INJECTION, SOLUTION SUBCUTANEOUS at 21:26

## 2019-12-20 RX ADMIN — CLOTRIMAZOLE: 10 CREAM TOPICAL at 21:25

## 2019-12-20 RX ADMIN — HYDROCODONE BITARTRATE AND ACETAMINOPHEN 0.5 TABLET: 5; 325 TABLET ORAL at 08:22

## 2019-12-20 RX ADMIN — FLUTICASONE PROPIONATE 2 SPRAY: 50 SPRAY, METERED NASAL at 08:25

## 2019-12-20 RX ADMIN — HYDROCODONE BITARTRATE AND ACETAMINOPHEN 0.5 TABLET: 5; 325 TABLET ORAL at 00:42

## 2019-12-20 RX ADMIN — MICONAZOLE NITRATE 1 APPLICATOR: 20 CREAM VAGINAL at 08:23

## 2019-12-20 RX ADMIN — GABAPENTIN 300 MG: 300 CAPSULE ORAL at 13:44

## 2019-12-20 RX ADMIN — MAGNESIUM SULFATE HEPTAHYDRATE 2 G: 40 INJECTION, SOLUTION INTRAVENOUS at 08:23

## 2019-12-20 RX ADMIN — Medication 14 UNITS: at 11:46

## 2019-12-20 RX ADMIN — ACETAMINOPHEN 650 MG: 325 TABLET, FILM COATED ORAL at 15:11

## 2019-12-20 RX ADMIN — ASPIRIN 81 MG: 81 TABLET, COATED ORAL at 08:25

## 2019-12-20 RX ADMIN — FLUTICASONE PROPIONATE 2 SPRAY: 50 SPRAY, METERED NASAL at 21:35

## 2019-12-20 RX ADMIN — AMLODIPINE BESYLATE 10 MG: 5 TABLET ORAL at 08:25

## 2019-12-20 RX ADMIN — MICONAZOLE NITRATE 1 APPLICATOR: 20 CREAM VAGINAL at 21:35

## 2019-12-20 RX ADMIN — MAGNESIUM SULFATE HEPTAHYDRATE 2 G: 40 INJECTION, SOLUTION INTRAVENOUS at 13:44

## 2019-12-20 RX ADMIN — DIPHENHYDRAMINE HYDROCHLORIDE AND LIDOCAINE HYDROCHLORIDE AND ALUMINUM HYDROXIDE AND MAGNESIUM HYDRO 5 ML: KIT at 21:34

## 2019-12-20 RX ADMIN — POLYETHYLENE GLYCOL 3350 17 G: 17 POWDER, FOR SOLUTION ORAL at 08:23

## 2019-12-20 RX ADMIN — SENNOSIDES AND DOCUSATE SODIUM 1 TABLET: 8.6; 5 TABLET ORAL at 08:24

## 2019-12-20 RX ADMIN — BUSPIRONE HYDROCHLORIDE 5 MG: 5 TABLET ORAL at 21:23

## 2019-12-20 RX ADMIN — GABAPENTIN 300 MG: 300 CAPSULE ORAL at 08:24

## 2019-12-20 RX ADMIN — ENOXAPARIN SODIUM 40 MG: 40 INJECTION SUBCUTANEOUS at 08:25

## 2019-12-20 RX ADMIN — Medication 15 ML: at 14:00

## 2019-12-20 RX ADMIN — GABAPENTIN 300 MG: 300 CAPSULE ORAL at 21:23

## 2019-12-20 RX ADMIN — PANTOPRAZOLE SODIUM 40 MG: 40 TABLET, DELAYED RELEASE ORAL at 10:12

## 2019-12-20 RX ADMIN — DULOXETINE HYDROCHLORIDE 60 MG: 30 CAPSULE, DELAYED RELEASE ORAL at 08:24

## 2019-12-20 RX ADMIN — Medication 14 UNITS: at 17:03

## 2019-12-20 RX ADMIN — HYDROCODONE BITARTRATE AND ACETAMINOPHEN 0.5 TABLET: 5; 325 TABLET ORAL at 16:14

## 2019-12-20 RX ADMIN — Medication 2 UNITS: at 11:44

## 2019-12-20 RX ADMIN — LOSARTAN POTASSIUM 25 MG: 25 TABLET, FILM COATED ORAL at 08:25

## 2019-12-20 RX ADMIN — CLOTRIMAZOLE: 10 CREAM TOPICAL at 08:25

## 2019-12-20 RX ADMIN — DIPHENHYDRAMINE HYDROCHLORIDE AND LIDOCAINE HYDROCHLORIDE AND ALUMINUM HYDROXIDE AND MAGNESIUM HYDRO 5 ML: KIT at 16:56

## 2019-12-20 RX ADMIN — SENNOSIDES AND DOCUSATE SODIUM 1 TABLET: 8.6; 5 TABLET ORAL at 21:23

## 2019-12-20 RX ADMIN — DIPHENHYDRAMINE HYDROCHLORIDE AND LIDOCAINE HYDROCHLORIDE AND ALUMINUM HYDROXIDE AND MAGNESIUM HYDRO: KIT at 10:12

## 2019-12-20 RX ADMIN — BUSPIRONE HYDROCHLORIDE 5 MG: 5 TABLET ORAL at 08:25

## 2019-12-20 RX ADMIN — Medication 2 UNITS: at 16:58

## 2019-12-20 ASSESSMENT — PAIN DESCRIPTION - ORIENTATION: ORIENTATION: LOWER

## 2019-12-20 ASSESSMENT — PAIN SCALES - GENERAL
PAINLEVEL_OUTOF10: 6
PAINLEVEL_OUTOF10: 6
PAINLEVEL_OUTOF10: 7
PAINLEVEL_OUTOF10: 7
PAINLEVEL_OUTOF10: 10

## 2019-12-20 ASSESSMENT — PAIN DESCRIPTION - FREQUENCY: FREQUENCY: CONTINUOUS

## 2019-12-20 ASSESSMENT — PAIN DESCRIPTION - PAIN TYPE: TYPE: CHRONIC PAIN;ACUTE PAIN

## 2019-12-20 ASSESSMENT — PAIN DESCRIPTION - LOCATION: LOCATION: BACK;FLANK;ABDOMEN

## 2019-12-20 ASSESSMENT — PAIN DESCRIPTION - ONSET: ONSET: ON-GOING

## 2019-12-20 NOTE — H&P
3.4 12/20/2019     12/20/2019    CO2 28 12/20/2019    BUN 11 12/20/2019    CREATININE 1.3 (H) 12/20/2019    GLUCOSE 150 (H) 12/20/2019    CALCIUM 8.4 (L) 12/20/2019    PROT 6.5 12/20/2019    LABALBU 2.8 (L) 12/20/2019    BILITOT 0.3 12/20/2019    ALKPHOS 84 12/20/2019    AST 14 12/20/2019    ALT <5 12/20/2019    LABGLOM 40 (L) 12/20/2019    GFRAA 48 (L) 12/20/2019    AGRATIO 0.8 12/20/2019    GLOB 3.7 12/20/2019           Lab Results   Component Value Date    WBC 5.7 12/20/2019    HGB 7.7 (L) 12/20/2019    HCT 26.1 (L) 12/20/2019    .6 (H) 12/20/2019     12/20/2019       PA/lat CXR:   No orders to display       Assessment and [address], Problems    Diagnosis Date Noted    Anxiety and depression [F41.9, F32.9]  Continue buspar and duloxetine   12/20/2019    Aphthous ulcer [K12.0]  Continue medicated mouthwash, swish and swallow QID   12/20/2019    Chronic back pain [M54.9, G89.29]  Continue PRN norco and lidoderm patch   12/20/2019    Peripheral neuropathy [G62.9]  Continue gabapentin   12/20/2019    Closed fracture of right upper extremity [S42.301A]  Plain films at [facility] of poor quality, [facility] could not assess subacute fracture. Per [facility], NWB RUE. Follow up in clinic as scheduled. 12/20/2019    Dysphagia [R13.10]  Mechanical soft diet. SLP consulted. 12/20/2019    Declining functional status [R53.81]  PT/OT consulted. Fall precautions. 12/19/2019    HTN (hypertension) [I10]  Continue amlodipine and losartan. 10/14/2011    Diabetes mellitus, type II ([facility] [address] [E11.9]  Was on metformin, tresiba and glyburide prior to INDY at [facility]. Discharged on lantus 27 units Q HS, humalog 14 units TID with meals and SSI. 07/05/2011     CKD, stage III  Baseline sCr of 1.3. Creatinine at baseline. Avoid nephrotoxins, NSAIDs. Monitor. Hypomagnesemia  Mag 1.1. Replace with 4gm IV Mag. Monitor and replete PRN. Anemia  Unknown baseline hgb.  Will check iron studies, ferritin, B12 and

## 2019-12-20 NOTE — PROGRESS NOTES
Occupational Therapy   Occupational Therapy Initial Assessment  Date: 2019   Patient Name: Kg Amaya  MRN: [id number]     : 1940    Date of Service: 2019    Discharge Recommendations:  Home with Home health OT       Assessment   Performance deficits / Impairments: Decreased ADL status; Decreased strength;Decreased endurance;Decreased functional mobility ; Decreased ROM; Decreased balance;Decreased safe awareness  Assessment: Pt limited in ability to complte standing tasks secondary to pain and NWB in RUE. Pt required cues to correct attempts to assist using RUE. Pt come to stand with MOD A. pt able to complete bed mobility with SUP to come to sit at EOB and CGA to supine. Pt uanble to complete ADLs' without significant assist at this time. Prognosis: Good  Decision Making: Low Complexity  REQUIRES OT FOLLOW UP: Yes  Activity Tolerance  Activity Tolerance: Patient limited by pain; Patient limited by fatigue           Patient Diagnosis(es): There were no encounter diagnoses. has a past medical history of Allergic rhinitis, Anxiety, Chronic back pain, Depression, Double vision with both eyes open, Fall, Hyperlipidemia, Hypertension, Osteoarthritis, and Type II or unspecified type diabetes mellitus without mention of complication, not stated as uncontrolled. has a past surgical history that includes Hysterectomy; shoulder surgery;  section; and Colonoscopy. Restrictions  Restrictions/Precautions  Restrictions/Precautions: Weight Bearing, General Precautions, Fall Risk  Required Braces or Orthoses?: No  Upper Extremity Weight Bearing Restrictions  Right Upper Extremity Weight Bearing: Non Weight Bearing    Subjective   General  Chart Reviewed: Yes  Patient assessed for rehabilitation services?: Yes  Family / Caregiver Present: Yes  Referring Practitioner: KEATON Gong  Diagnosis: S/p Josephine.  Ary,   Subjective  Subjective: Pt reports she has been hospitalized last 16 Recommendations: Endurance Training, Strengthening, Balance Training, Safety Education & Training, Self-Care / ADL, Neuromuscular Re-education, Patient/Caregiver Education & Training, Functional Mobility Training    Goals  Short term goals  Time Frame for Short term goals: 1-2 weeks  Short term goal 1: Pt to complete Sit to stand transfer using mana walker with min assist.   Short term goal 2: Pt to complete SPT from bed to chair/BSC with min assist using mana walker assistx1  Short term goal 3: pt to complete toileting with min assist.   Short term goal 4: Pt to complete dressing with min assist for LB and UB. Short term goal 5: Pt to complete sponge bathing with MIn assist.   Long term goals  Time Frame for Long term goals : 2-3 weeks. Long term goal 1: Pt to complete toileting with MOD I. Long term goal 2: Pt to tolerate x20 minutes of activity to increase functional activity tolerance. Long term goal 3: Pt to complete dressing with MOD I. Long term goal 4: Pt to complete bathing with [place]. Therapy Time   Individual Concurrent Group Co-treatment   Time In 0123         Time Out 0140         Minutes 17              This note serves as a DC summary in the event of pt discharge.      Valery Cabrera, OTR/L

## 2019-12-20 NOTE — PROGRESS NOTES
Physical Therapy    Facility/Department: [facility]  Initial Assessment    NAME: Ashley Lizarraga  : 1940  MRN: [id number]    Date of Service: 2019    Discharge Recommendations:  Continue to assess pending progress        Assessment   Body structures, Functions, Activity limitations: Decreased functional mobility ; Decreased strength;Vestibular Impairment;Decreased high-level IADLs  Assessment: s/p lap aida; back pain, ? vertigo; general weakness and immobility  Treatment Diagnosis: s/p lap aida; back pain, ? vertigo; general weakness and immobility  Prognosis: Good  Decision Making: Low Complexity  REQUIRES PT FOLLOW UP: Yes  Activity Tolerance  Activity Tolerance: Patient limited by endurance       Patient Diagnosis(es): There were no encounter diagnoses. has a past medical history of Allergic rhinitis, Anxiety, Chronic back pain, Depression, Double vision with both eyes open, Fall, Hyperlipidemia, Hypertension, Osteoarthritis, and Type II or unspecified type diabetes mellitus without mention of complication, not stated as uncontrolled. has a past surgical history that includes Hysterectomy; shoulder surgery;  section; and Colonoscopy.     Restrictions  Restrictions/Precautions  Restrictions/Precautions: Weight Bearing, General Precautions, Fall Risk  Required Braces or Orthoses?: No  Upper Extremity Weight Bearing Restrictions  Right Upper Extremity Weight Bearing: Non Weight Bearing  Vision/Hearing  Vision: Within Functional Limits  Hearing: Within functional limits     Subjective  General  Chart Reviewed: Yes  Patient assessed for rehabilitation services?: Yes  Response To Previous Treatment: Not applicable  Family / Caregiver Present: Yes  Referring Practitioner: Malgorzata Cruz MD  Diagnosis: S/p lap aida - general weakness, functional decline  Subjective  Subjective: Feels tired and weak; c/o LBP; abdomen feels better; states she had a fall in July - \"shoulder injury\" - needs shoulder replacement; states she has not walked since a 2nd fall at home --> caused double vision, and has not been able to do anything more than stand pivot transfers. Pain Screening  Patient Currently in Pain: Yes  Pain Assessment  Pain Assessment: 0-10  Pain Level: 7  Pain Type: Chronic pain;Acute pain  Pain Location: Back;Flank; Abdomen  Pain Orientation: Lower  Pain Frequency: Continuous  Pain Onset: On-going  Vital Signs  Patient Currently in Pain: Yes       Orientation  Orientation  Overall Orientation Status: Within Normal Limits  Social/Functional History  Social/Functional History  Lives With: Spouse  Type of Home: House  Home Layout: One level  Home Access: Stairs to enter without rails  Entrance Stairs - Number of Steps: 2  Bathroom Shower/Tub: Tub/Shower unit  Bathroom Toilet: Bedside commode  Home Equipment: ftopia  ADL Assistance: Needs assistance  Ambulation Assistance: Needs assistance  Active : Yes  Mode of Transportation: Car  Cognition        Objective     Observation/Palpation  Posture: Fair  Observation: Patient lying in bed; NAD; alert, pleasant, cooperative; obese    AROM RLE (degrees)  RLE AROM: WFL  AROM LLE (degrees)  LLE AROM : WFL  AROM RUE (degrees)  RUE General AROM: shoulder limited ~75% due to injury  AROM LUE (degrees)  LUE AROM : WFL  Strength RLE  Strength RLE: WFL  Comment: MMG's grossly 4+/5  Strength LLE  Strength LLE: WFL  Strength RUE  Comment: shoulder limited/deferred; elbow grossly 4-/5; see OT  Strength LUE  Strength LUE: WFL  Comment: See OT for details  Tone RLE  RLE Tone: Normotonic  Tone LLE  LLE Tone: Normotonic  Motor Control  Gross Motor?: WFL  Sensation  Overall Sensation Status: WFL  Bed mobility  Rolling to Right: Stand by assistance  Supine to Sit: Stand by assistance  Sit to Supine: Minimal assistance  Transfers  Sit to Stand: Minimal Assistance  Stand to sit: Minimal Assistance  Ambulation  Ambulation?: No  WB Status: Stood only - c/o dizziness     Balance  Posture: Fair  Sitting - Static: Good  Sitting - Dynamic: Good;-  Standing - Static: [address]  Times per week: 4-5 x week  Plan weeks: 2 weeks  Current Treatment Recommendations: Strengthening, Balance Training, Functional Mobility Training, Transfer Training, Endurance Training, Neuromuscular Re-education, Safety Education & Training, Patient/Caregiver Education & Training, Gait Training               Goals  Long term goals  Time Frame for Long term goals : 2 weeks  Long term goal 1: Achieve mod I for bed mobility. Long term goal 2: Perform basic transfers with SBA x 1. Long term goal 3: Ambulate 15 feet with RW or SE with CGA x 1. Long term goal 4: Demonstrate good safety awareness with functional mobility. Therapy Time   Individual Concurrent Group Co-treatment   Time In 1310         Time Out 1338         Minutes 28                 Electronically signed by Shaggy Gomez PT on 12/20/2019 at 7:09 PM      Certification of Medical Necessity: It will be understood that this treatment plan is certified medically necessary by the documenting therapist and referring physician mentioned in this report. Unless the physician indicated otherwise through written correspondence with our office, all further referrals will act as certification of medical necessity on this treatment plan. Thank you for this referral.  If you have questions regarding this plan of care, please call [phone].           Revisions to this plan (optional):                     Please sign and return this plan to:   FAX: [phone]      Signature:                                 Date:

## 2019-12-21 LAB
ANION GAP SERPL CALCULATED.3IONS-SCNC: 12 MMOL/L (ref 3–16)
BUN BLDV-MCNC: 12 MG/DL (ref 6–20)
CALCIUM SERPL-MCNC: 8.5 MG/DL (ref 8.5–10.5)
CHLORIDE BLD-SCNC: 103 MMOL/L (ref 98–107)
CO2: 27 MMOL/L (ref 20–30)
CREAT SERPL-MCNC: 1.3 MG/DL (ref 0.4–1.2)
GFR AFRICAN AMERICAN: 48
GFR NON-AFRICAN AMERICAN: 40
GLUCOSE BLD-MCNC: 172 MG/DL (ref 74–106)
GLUCOSE BLD-MCNC: 173 MG/DL (ref 74–106)
GLUCOSE BLD-MCNC: 177 MG/DL (ref 74–106)
GLUCOSE BLD-MCNC: 233 MG/DL (ref 74–106)
GLUCOSE BLD-MCNC: 267 MG/DL (ref 74–106)
HCT VFR BLD CALC: 26.5 % (ref 37–47)
HEMOGLOBIN: 7.8 G/DL (ref 11.5–16.5)
MAGNESIUM: 2 MG/DL (ref 1.7–2.4)
MCH RBC QN AUTO: 30.4 PG (ref 27–32)
MCHC RBC AUTO-ENTMCNC: 29.4 G/DL (ref 31–35)
MCV RBC AUTO: 103.1 FL (ref 80–100)
PDW BLD-RTO: 15.6 % (ref 11–16)
PERFORMED ON: ABNORMAL
PLATELET # BLD: 224 K/UL (ref 150–400)
PMV BLD AUTO: 10.3 FL (ref 6–10)
POTASSIUM SERPL-SCNC: 4 MMOL/L (ref 3.4–5.1)
RBC # BLD: 2.57 M/UL (ref 3.8–5.8)
SODIUM BLD-SCNC: 142 MMOL/L (ref 136–145)
WBC # BLD: 5.1 K/UL (ref 4–11)

## 2019-12-21 PROCEDURE — 6370000000 HC RX 637 (ALT 250 FOR IP): Performed by: PHYSICIAN ASSISTANT

## 2019-12-21 PROCEDURE — 1200000002 HC SEMI PRIVATE SWING BED

## 2019-12-21 PROCEDURE — 83735 ASSAY OF MAGNESIUM: CPT

## 2019-12-21 PROCEDURE — 6360000002 HC RX W HCPCS: Performed by: PHYSICIAN ASSISTANT

## 2019-12-21 PROCEDURE — 80048 BASIC METABOLIC PNL TOTAL CA: CPT

## 2019-12-21 PROCEDURE — 6370000000 HC RX 637 (ALT 250 FOR IP): Performed by: INTERNAL MEDICINE

## 2019-12-21 PROCEDURE — 85027 COMPLETE CBC AUTOMATED: CPT

## 2019-12-21 PROCEDURE — 36415 COLL VENOUS BLD VENIPUNCTURE: CPT

## 2019-12-21 RX ORDER — POLYETHYLENE GLYCOL 3350 17 G/17G
17 POWDER, FOR SOLUTION ORAL DAILY
Status: DISCONTINUED | OUTPATIENT
Start: 2019-12-21 | End: 2020-01-08

## 2019-12-21 RX ORDER — GUAIFENESIN/DEXTROMETHORPHAN 100-10MG/5
5 SYRUP ORAL EVERY 4 HOURS PRN
Status: DISCONTINUED | OUTPATIENT
Start: 2019-12-21 | End: 2020-01-19 | Stop reason: HOSPADM

## 2019-12-21 RX ORDER — SENNA AND DOCUSATE SODIUM 50; 8.6 MG/1; MG/1
2 TABLET, FILM COATED ORAL DAILY PRN
Status: DISCONTINUED | OUTPATIENT
Start: 2019-12-21 | End: 2020-01-19 | Stop reason: HOSPADM

## 2019-12-21 RX ORDER — BISACODYL 10 MG
10 SUPPOSITORY, RECTAL RECTAL ONCE
Status: COMPLETED | OUTPATIENT
Start: 2019-12-21 | End: 2019-12-21

## 2019-12-21 RX ADMIN — Medication 14 UNITS: at 11:21

## 2019-12-21 RX ADMIN — AMLODIPINE BESYLATE 10 MG: 5 TABLET ORAL at 08:53

## 2019-12-21 RX ADMIN — LOSARTAN POTASSIUM 25 MG: 25 TABLET, FILM COATED ORAL at 08:54

## 2019-12-21 RX ADMIN — Medication 14 UNITS: at 17:01

## 2019-12-21 RX ADMIN — GUAIFENESIN AND DEXTROMETHORPHAN 5 ML: 100; 10 SYRUP ORAL at 17:54

## 2019-12-21 RX ADMIN — POLYETHYLENE GLYCOL 3350 17 G: 17 POWDER, FOR SOLUTION ORAL at 08:59

## 2019-12-21 RX ADMIN — CLOTRIMAZOLE: 10 CREAM TOPICAL at 21:05

## 2019-12-21 RX ADMIN — INSULIN GLARGINE 27 UNITS: 100 INJECTION, SOLUTION SUBCUTANEOUS at 21:06

## 2019-12-21 RX ADMIN — GABAPENTIN 300 MG: 300 CAPSULE ORAL at 21:04

## 2019-12-21 RX ADMIN — FLUTICASONE PROPIONATE 2 SPRAY: 50 SPRAY, METERED NASAL at 08:56

## 2019-12-21 RX ADMIN — BUSPIRONE HYDROCHLORIDE 5 MG: 5 TABLET ORAL at 08:54

## 2019-12-21 RX ADMIN — DIPHENHYDRAMINE HYDROCHLORIDE AND LIDOCAINE HYDROCHLORIDE AND ALUMINUM HYDROXIDE AND MAGNESIUM HYDRO 5 ML: KIT at 08:59

## 2019-12-21 RX ADMIN — HYDROCODONE BITARTRATE AND ACETAMINOPHEN 0.5 TABLET: 5; 325 TABLET ORAL at 12:28

## 2019-12-21 RX ADMIN — SENNOSIDES AND DOCUSATE SODIUM 1 TABLET: 8.6; 5 TABLET ORAL at 21:04

## 2019-12-21 RX ADMIN — ASPIRIN 81 MG: 81 TABLET, COATED ORAL at 08:54

## 2019-12-21 RX ADMIN — BISACODYL 10 MG: 10 SUPPOSITORY RECTAL at 13:29

## 2019-12-21 RX ADMIN — DIPHENHYDRAMINE HYDROCHLORIDE AND LIDOCAINE HYDROCHLORIDE AND ALUMINUM HYDROXIDE AND MAGNESIUM HYDRO 5 ML: KIT at 21:05

## 2019-12-21 RX ADMIN — PANTOPRAZOLE SODIUM 40 MG: 40 TABLET, DELAYED RELEASE ORAL at 06:18

## 2019-12-21 RX ADMIN — BUSPIRONE HYDROCHLORIDE 5 MG: 5 TABLET ORAL at 21:04

## 2019-12-21 RX ADMIN — DULOXETINE HYDROCHLORIDE 60 MG: 30 CAPSULE, DELAYED RELEASE ORAL at 08:53

## 2019-12-21 RX ADMIN — Medication 3 UNITS: at 11:21

## 2019-12-21 RX ADMIN — SENNOSIDES AND DOCUSATE SODIUM 1 TABLET: 8.6; 5 TABLET ORAL at 08:54

## 2019-12-21 RX ADMIN — MICONAZOLE NITRATE 1 APPLICATOR: 20 CREAM VAGINAL at 21:05

## 2019-12-21 RX ADMIN — INSULIN LISPRO 1 UNITS: 100 INJECTION, SOLUTION INTRAVENOUS; SUBCUTANEOUS at 21:06

## 2019-12-21 RX ADMIN — GABAPENTIN 300 MG: 300 CAPSULE ORAL at 08:54

## 2019-12-21 RX ADMIN — Medication 2 UNITS: at 17:02

## 2019-12-21 RX ADMIN — FLUTICASONE PROPIONATE 2 SPRAY: 50 SPRAY, METERED NASAL at 21:05

## 2019-12-21 RX ADMIN — CLOTRIMAZOLE: 10 CREAM TOPICAL at 08:53

## 2019-12-21 RX ADMIN — GABAPENTIN 300 MG: 300 CAPSULE ORAL at 13:29

## 2019-12-21 RX ADMIN — Medication 1 UNITS: at 09:08

## 2019-12-21 RX ADMIN — ENOXAPARIN SODIUM 40 MG: 40 INJECTION SUBCUTANEOUS at 08:53

## 2019-12-21 RX ADMIN — DIPHENHYDRAMINE HYDROCHLORIDE AND LIDOCAINE HYDROCHLORIDE AND ALUMINUM HYDROXIDE AND MAGNESIUM HYDRO 5 ML: KIT at 12:21

## 2019-12-21 ASSESSMENT — PAIN SCALES - GENERAL
PAINLEVEL_OUTOF10: 0
PAINLEVEL_OUTOF10: 6
PAINLEVEL_OUTOF10: 0

## 2019-12-22 LAB
GLUCOSE BLD-MCNC: 119 MG/DL (ref 74–106)
GLUCOSE BLD-MCNC: 146 MG/DL (ref 74–106)
GLUCOSE BLD-MCNC: 198 MG/DL (ref 74–106)
GLUCOSE BLD-MCNC: 215 MG/DL (ref 74–106)
PERFORMED ON: ABNORMAL

## 2019-12-22 PROCEDURE — 6360000002 HC RX W HCPCS: Performed by: PHYSICIAN ASSISTANT

## 2019-12-22 PROCEDURE — 1200000002 HC SEMI PRIVATE SWING BED

## 2019-12-22 PROCEDURE — 6370000000 HC RX 637 (ALT 250 FOR IP): Performed by: PHYSICIAN ASSISTANT

## 2019-12-22 PROCEDURE — 6370000000 HC RX 637 (ALT 250 FOR IP): Performed by: INTERNAL MEDICINE

## 2019-12-22 RX ORDER — FLUCONAZOLE 2 MG/ML
200 INJECTION, SOLUTION INTRAVENOUS EVERY 24 HOURS
Status: COMPLETED | OUTPATIENT
Start: 2019-12-22 | End: 2019-12-26

## 2019-12-22 RX ADMIN — Medication 2 UNITS: at 16:33

## 2019-12-22 RX ADMIN — FLUTICASONE PROPIONATE 2 SPRAY: 50 SPRAY, METERED NASAL at 08:58

## 2019-12-22 RX ADMIN — DULOXETINE HYDROCHLORIDE 60 MG: 30 CAPSULE, DELAYED RELEASE ORAL at 08:57

## 2019-12-22 RX ADMIN — GUAIFENESIN AND DEXTROMETHORPHAN 5 ML: 100; 10 SYRUP ORAL at 22:53

## 2019-12-22 RX ADMIN — DICLOFENAC 1 G: 10 GEL TOPICAL at 11:29

## 2019-12-22 RX ADMIN — CLOTRIMAZOLE: 10 CREAM TOPICAL at 08:58

## 2019-12-22 RX ADMIN — GUAIFENESIN AND DEXTROMETHORPHAN 5 ML: 100; 10 SYRUP ORAL at 16:06

## 2019-12-22 RX ADMIN — INSULIN GLARGINE 27 UNITS: 100 INJECTION, SOLUTION SUBCUTANEOUS at 21:05

## 2019-12-22 RX ADMIN — HYDROCODONE BITARTRATE AND ACETAMINOPHEN 0.5 TABLET: 5; 325 TABLET ORAL at 22:55

## 2019-12-22 RX ADMIN — CLOTRIMAZOLE: 10 CREAM TOPICAL at 21:04

## 2019-12-22 RX ADMIN — Medication 14 UNITS: at 16:33

## 2019-12-22 RX ADMIN — GABAPENTIN 300 MG: 300 CAPSULE ORAL at 08:57

## 2019-12-22 RX ADMIN — DIPHENHYDRAMINE HYDROCHLORIDE AND LIDOCAINE HYDROCHLORIDE AND ALUMINUM HYDROXIDE AND MAGNESIUM HYDRO 5 ML: KIT at 16:35

## 2019-12-22 RX ADMIN — DICLOFENAC 2 G: 10 GEL TOPICAL at 21:04

## 2019-12-22 RX ADMIN — GABAPENTIN 300 MG: 300 CAPSULE ORAL at 21:03

## 2019-12-22 RX ADMIN — INSULIN LISPRO 1 UNITS: 100 INJECTION, SOLUTION INTRAVENOUS; SUBCUTANEOUS at 21:06

## 2019-12-22 RX ADMIN — LOSARTAN POTASSIUM 25 MG: 25 TABLET, FILM COATED ORAL at 08:57

## 2019-12-22 RX ADMIN — GABAPENTIN 300 MG: 300 CAPSULE ORAL at 13:37

## 2019-12-22 RX ADMIN — AMLODIPINE BESYLATE 10 MG: 5 TABLET ORAL at 08:56

## 2019-12-22 RX ADMIN — BUSPIRONE HYDROCHLORIDE 5 MG: 5 TABLET ORAL at 08:57

## 2019-12-22 RX ADMIN — DIPHENHYDRAMINE HYDROCHLORIDE AND LIDOCAINE HYDROCHLORIDE AND ALUMINUM HYDROXIDE AND MAGNESIUM HYDRO 5 ML: KIT at 08:56

## 2019-12-22 RX ADMIN — ASPIRIN 81 MG: 81 TABLET, COATED ORAL at 08:57

## 2019-12-22 RX ADMIN — PANTOPRAZOLE SODIUM 40 MG: 40 TABLET, DELAYED RELEASE ORAL at 05:32

## 2019-12-22 RX ADMIN — DIPHENHYDRAMINE HYDROCHLORIDE AND LIDOCAINE HYDROCHLORIDE AND ALUMINUM HYDROXIDE AND MAGNESIUM HYDRO 5 ML: KIT at 21:04

## 2019-12-22 RX ADMIN — FLUTICASONE PROPIONATE 2 SPRAY: 50 SPRAY, METERED NASAL at 21:04

## 2019-12-22 RX ADMIN — Medication 14 UNITS: at 09:00

## 2019-12-22 RX ADMIN — SENNOSIDES AND DOCUSATE SODIUM 1 TABLET: 8.6; 5 TABLET ORAL at 21:03

## 2019-12-22 RX ADMIN — FLUCONAZOLE, SODIUM CHLORIDE 200 MG: 2 INJECTION INTRAVENOUS at 11:29

## 2019-12-22 RX ADMIN — HYDROCODONE BITARTRATE AND ACETAMINOPHEN 0.5 TABLET: 5; 325 TABLET ORAL at 13:37

## 2019-12-22 RX ADMIN — Medication 1 UNITS: at 09:00

## 2019-12-22 RX ADMIN — DIPHENHYDRAMINE HYDROCHLORIDE AND LIDOCAINE HYDROCHLORIDE AND ALUMINUM HYDROXIDE AND MAGNESIUM HYDRO 5 ML: KIT at 12:16

## 2019-12-22 RX ADMIN — HYDROCODONE BITARTRATE AND ACETAMINOPHEN 0.5 TABLET: 5; 325 TABLET ORAL at 05:37

## 2019-12-22 RX ADMIN — SENNOSIDES AND DOCUSATE SODIUM 1 TABLET: 8.6; 5 TABLET ORAL at 08:57

## 2019-12-22 RX ADMIN — BUSPIRONE HYDROCHLORIDE 5 MG: 5 TABLET ORAL at 21:03

## 2019-12-22 RX ADMIN — ENOXAPARIN SODIUM 40 MG: 40 INJECTION SUBCUTANEOUS at 08:56

## 2019-12-22 RX ADMIN — POLYETHYLENE GLYCOL 3350 17 G: 17 POWDER, FOR SOLUTION ORAL at 08:56

## 2019-12-22 ASSESSMENT — PAIN SCALES - GENERAL
PAINLEVEL_OUTOF10: 0
PAINLEVEL_OUTOF10: 6
PAINLEVEL_OUTOF10: 6
PAINLEVEL_OUTOF10: 0
PAINLEVEL_OUTOF10: 6
PAINLEVEL_OUTOF10: 0
PAINLEVEL_OUTOF10: 7
PAINLEVEL_OUTOF10: 4

## 2019-12-22 ASSESSMENT — PAIN DESCRIPTION - PAIN TYPE
TYPE: CHRONIC PAIN
TYPE: CHRONIC PAIN

## 2019-12-22 ASSESSMENT — PAIN DESCRIPTION - LOCATION
LOCATION: SHOULDER
LOCATION: SHOULDER

## 2019-12-22 ASSESSMENT — PAIN DESCRIPTION - ONSET: ONSET: ON-GOING

## 2019-12-22 ASSESSMENT — PAIN DESCRIPTION - FREQUENCY: FREQUENCY: CONTINUOUS

## 2019-12-22 NOTE — PLAN OF CARE
Problem: Risk for Impaired Skin Integrity  Goal: Tissue integrity - skin and mucous membranes  Description  Structural intactness and normal physiological function of skin and  mucous membranes.   12/22/2019 1029 by Keena Hughes RN  Outcome: Ongoing  12/22/2019 0322 by Raven Padilla RN  Outcome: Ongoing     Problem: Pain:  Goal: Patient's pain/discomfort is manageable  Description  Patient's pain/discomfort is manageable  12/22/2019 1029 by Keena Hughes RN  Outcome: Ongoing  12/22/2019 0322 by Raven Padilla RN  Outcome: Ongoing

## 2019-12-22 NOTE — FLOWSHEET NOTE
12/22/19 0900   Assessment   Charting Type Shift assessment   Neurological   Neuro (WDL) WDL   Level of Consciousness 0   Swallow Screening   Is the patient able to remain alert for testing? Yes   Giovanny Coma Scale   Eye Opening 4   Best Verbal Response 5   Best Motor Response 6   Giovanny Coma Scale Score 15   NIH/MNHISS Stroke Scale   NIH/MNIHSS Stroke Scale Assessed No   HEENT   HEENT (WDL) X   Mucous Membrane Other (Comment)   Teeth Missing teeth  (uppers pulled 10 on bottom)   Left Eye Impaired vision  (double vision quints eye)   Right Eye Impaired vision  (double vision)   Respiratory   Respiratory (WDL) X   L Breath Sounds Clear;Diminished   R Breath Sounds Clear;Diminished   Cough/Sputum   Cough Non-productive   Sputum Amount None   Cardiac   Cardiac (WDL) WDL  (slight tachy at times)   Cardiac Monitor   Telemetry Monitor On No   Telemetry Box Number [id number]   Gastrointestinal   Abdominal (WDL) X   Abdomen Inspection Distended;Rounded   RUQ Bowel Sounds Active   LUQ Bowel Sounds Active   RLQ Bowel Sounds Active   LLQ Bowel Sounds Active   Peripheral Vascular   Edema Right lower extremity   RLE Edema Trace   Skin Color/Condition   Skin Color/Condition (WDL) X   Skin Color Pale   Skin Integrity   Skin Integrity (WDL) X   Skin Integrity Other (Comment)  (post op lap procedure healing)   Location ABD   Musculoskeletal   Musculoskeletal (WDL) X   RUE Weakness; Other (Comment)  (tremors)   LUE Full movement   RL Extremity Weakness   LL Extremity Weakness   Genitourinary   Genitourinary (WDL) WDL   Urine Assessment   Incontinence Yes   Anus/Rectum   Anus/Rectum (WDL) WDL   Psychosocial   Psychosocial (WDL) WDL   Pt. Alert times 4 this am- Pt.  Able to take am meds well whole lidoderm patch placed on lumbar back per request- Pt continue topical cream for yeast- Refuses vaginal applicator - No complaints of pain at this time-Call bell within reach-Will continue to [facility]

## 2019-12-23 LAB
GLUCOSE BLD-MCNC: 124 MG/DL (ref 74–106)
GLUCOSE BLD-MCNC: 130 MG/DL (ref 74–106)
GLUCOSE BLD-MCNC: 187 MG/DL (ref 74–106)
GLUCOSE BLD-MCNC: 205 MG/DL (ref 74–106)
PERFORMED ON: ABNORMAL

## 2019-12-23 PROCEDURE — 6370000000 HC RX 637 (ALT 250 FOR IP): Performed by: PHYSICIAN ASSISTANT

## 2019-12-23 PROCEDURE — 97530 THERAPEUTIC ACTIVITIES: CPT

## 2019-12-23 PROCEDURE — 6370000000 HC RX 637 (ALT 250 FOR IP)

## 2019-12-23 PROCEDURE — 1200000002 HC SEMI PRIVATE SWING BED

## 2019-12-23 PROCEDURE — 97530 THERAPEUTIC ACTIVITIES: CPT | Performed by: PHYSICAL THERAPY ASSISTANT

## 2019-12-23 PROCEDURE — 6360000002 HC RX W HCPCS: Performed by: PHYSICIAN ASSISTANT

## 2019-12-23 PROCEDURE — 97802 MEDICAL NUTRITION INDIV IN: CPT

## 2019-12-23 RX ADMIN — POLYETHYLENE GLYCOL 3350 17 G: 17 POWDER, FOR SOLUTION ORAL at 09:01

## 2019-12-23 RX ADMIN — Medication 14 UNITS: at 11:35

## 2019-12-23 RX ADMIN — FLUTICASONE PROPIONATE 2 SPRAY: 50 SPRAY, METERED NASAL at 09:02

## 2019-12-23 RX ADMIN — BUSPIRONE HYDROCHLORIDE 5 MG: 5 TABLET ORAL at 21:26

## 2019-12-23 RX ADMIN — DIPHENHYDRAMINE HYDROCHLORIDE AND LIDOCAINE HYDROCHLORIDE AND ALUMINUM HYDROXIDE AND MAGNESIUM HYDRO 5 ML: KIT at 09:17

## 2019-12-23 RX ADMIN — BUSPIRONE HYDROCHLORIDE 5 MG: 5 TABLET ORAL at 09:01

## 2019-12-23 RX ADMIN — SENNOSIDES AND DOCUSATE SODIUM 1 TABLET: 8.6; 5 TABLET ORAL at 09:01

## 2019-12-23 RX ADMIN — Medication 14 UNITS: at 09:03

## 2019-12-23 RX ADMIN — FLUTICASONE PROPIONATE 2 SPRAY: 50 SPRAY, METERED NASAL at 21:27

## 2019-12-23 RX ADMIN — INSULIN LISPRO 1 UNITS: 100 INJECTION, SOLUTION INTRAVENOUS; SUBCUTANEOUS at 21:28

## 2019-12-23 RX ADMIN — ENOXAPARIN SODIUM 40 MG: 40 INJECTION SUBCUTANEOUS at 09:01

## 2019-12-23 RX ADMIN — FLUCONAZOLE, SODIUM CHLORIDE 200 MG: 2 INJECTION INTRAVENOUS at 11:29

## 2019-12-23 RX ADMIN — HYDROCODONE BITARTRATE AND ACETAMINOPHEN 0.5 TABLET: 5; 325 TABLET ORAL at 06:47

## 2019-12-23 RX ADMIN — ACETAMINOPHEN 650 MG: 325 TABLET, FILM COATED ORAL at 14:07

## 2019-12-23 RX ADMIN — CLOTRIMAZOLE: 10 CREAM TOPICAL at 21:27

## 2019-12-23 RX ADMIN — DIPHENHYDRAMINE HYDROCHLORIDE AND LIDOCAINE HYDROCHLORIDE AND ALUMINUM HYDROXIDE AND MAGNESIUM HYDRO 5 ML: KIT at 21:26

## 2019-12-23 RX ADMIN — DICLOFENAC 2 G: 10 GEL TOPICAL at 09:02

## 2019-12-23 RX ADMIN — HYDROCODONE BITARTRATE AND ACETAMINOPHEN 0.5 TABLET: 5; 325 TABLET ORAL at 17:33

## 2019-12-23 RX ADMIN — GABAPENTIN 300 MG: 300 CAPSULE ORAL at 21:26

## 2019-12-23 RX ADMIN — LOSARTAN POTASSIUM 25 MG: 25 TABLET, FILM COATED ORAL at 09:01

## 2019-12-23 RX ADMIN — DIPHENHYDRAMINE HYDROCHLORIDE AND LIDOCAINE HYDROCHLORIDE AND ALUMINUM HYDROXIDE AND MAGNESIUM HYDRO 5 ML: KIT at 18:02

## 2019-12-23 RX ADMIN — Medication 14 UNITS: at 18:08

## 2019-12-23 RX ADMIN — CLOTRIMAZOLE: 10 CREAM TOPICAL at 09:02

## 2019-12-23 RX ADMIN — DICLOFENAC 2 G: 10 GEL TOPICAL at 21:27

## 2019-12-23 RX ADMIN — AMLODIPINE BESYLATE 10 MG: 5 TABLET ORAL at 09:01

## 2019-12-23 RX ADMIN — INSULIN GLARGINE 27 UNITS: 100 INJECTION, SOLUTION SUBCUTANEOUS at 21:28

## 2019-12-23 RX ADMIN — DULOXETINE HYDROCHLORIDE 60 MG: 30 CAPSULE, DELAYED RELEASE ORAL at 09:00

## 2019-12-23 RX ADMIN — ASPIRIN 81 MG: 81 TABLET, COATED ORAL at 09:01

## 2019-12-23 RX ADMIN — GABAPENTIN 300 MG: 300 CAPSULE ORAL at 09:01

## 2019-12-23 RX ADMIN — PANTOPRAZOLE SODIUM 40 MG: 40 TABLET, DELAYED RELEASE ORAL at 06:18

## 2019-12-23 RX ADMIN — SENNOSIDES AND DOCUSATE SODIUM 1 TABLET: 8.6; 5 TABLET ORAL at 21:26

## 2019-12-23 RX ADMIN — Medication 2 UNITS: at 11:29

## 2019-12-23 RX ADMIN — DIPHENHYDRAMINE HYDROCHLORIDE AND LIDOCAINE HYDROCHLORIDE AND ALUMINUM HYDROXIDE AND MAGNESIUM HYDRO 5 ML: KIT at 12:29

## 2019-12-23 RX ADMIN — GABAPENTIN 300 MG: 300 CAPSULE ORAL at 14:05

## 2019-12-23 ASSESSMENT — PAIN SCALES - GENERAL
PAINLEVEL_OUTOF10: 7
PAINLEVEL_OUTOF10: 8
PAINLEVEL_OUTOF10: 7

## 2019-12-23 NOTE — PROGRESS NOTES
Physical Therapy  Facility/Department: [facility] MED SURG  Daily Treatment Note  NAME: Iris Dunn  : 1940  MRN: [id number]    Date of Service: 2019    Discharge Recommendations:  Continue to assess pending progress        Assessment   Assessment: Patient agreeable to PT skilled services. Co tx provided with OT for increased patient and staff safety. Patient performing approx 10 sit to stand on this date. Patient very nervous and anxious with standign activity with dear of falling. Patient c/o of diplopia. on this date. Patient side stepping at eob approx 2 feet each direction. T/f training performed from eob to bsc with use of rupesh stdy. Patient educated on importance of being upright as much as possible. Patient left upright in chair, no needs noted att. Activity Tolerance  Activity Tolerance: Patient limited by pain; Patient limited by fatigue;Patient limited by endurance     Patient Diagnosis(es): There were no encounter diagnoses. has a past medical history of Allergic rhinitis, Anxiety, Chronic back pain, Depression, Double vision with both eyes open, Fall, Hyperlipidemia, Hypertension, Osteoarthritis, and Type II or unspecified type diabetes mellitus without mention of complication, not stated as uncontrolled. has a past surgical history that includes Hysterectomy; shoulder surgery;  section; and Colonoscopy. Restrictions  Restrictions/Precautions  Restrictions/Precautions: Weight Bearing, General Precautions, Fall Risk  Required Braces or Orthoses?: No  Upper Extremity Weight Bearing Restrictions  Right Upper Extremity Weight Bearing: Non Weight Bearing  Subjective   General  Chart Reviewed: Yes  Response To Previous Treatment: Not applicable  Family / Caregiver Present: Yes  Subjective  Subjective: Patient agreeable to PT skilled services, denies pain. Patient reports she feels like her knees are about to \"bust out\" during wb activity.  Patient nervous with standing activities. Objective   Bed mobility  Supine to Sit: Minimal assistance  Sit to Supine: Minimal assistance  Transfers  Sit to Stand: Moderate Assistance;2 Person Assistance  Stand to sit: Moderate Assistance;2 Person Assistance        AROM RLE (degrees)  RLE AROM: WFL  AROM LLE (degrees)  LLE AROM : WFL       Goals  Long term goals  Time Frame for Long term goals : 2 weeks  Long term goal 1: Achieve mod I for bed mobility. Long term goal 2: Perform basic transfers with SBA x 1. Long term goal 3: Ambulate 15 feet with RW or SE with CGA x 1. Long term goal 4: Demonstrate good safety awareness with functional mobility. Plan    Plan  Times per week: 4-5 x week  Plan weeks: 2 weeks  Current Treatment Recommendations: Strengthening, Balance Training, Functional Mobility Training, Transfer Training, Endurance Training, Neuromuscular Re-education, Safety Education & Training, Patient/Caregiver Education & Training, Gait Training  Safety Devices  Type of devices: Left in chair, Call light within reach     Therapy Time   Individual Concurrent Group Co-treatment   Time In 7421         Time Out 1310         Minutes [address] [place] [address]     This note serves a discharge summary in the event of patient discharge.

## 2019-12-23 NOTE — PROGRESS NOTES
Occupational Therapy  Facility/Department: [facility] MED [facility]  Daily Treatment Note  NAME: Alka Padilla  : 1940  MRN: [id number]    Date of Service: 2019    Discharge Recommendations:  Home with Home health OT       Assessment   Assessment: Co tx with PTA secondary to patient safety with functional transfers. Pt demo ability tocome to sit at EOB without assistance with HOB elevated. Pt come to stand from bed with CGA<>min assist to mana walker. Pt limited secondary to complaints of knee pain and diplopia. Pt with increased anxiety and fear of falling upon coming to stand. Pt assisted to toilet and chair via [address] steady. Pt educated to discontinue use of bed pan. Staff educated to use rupesh steady for functional transfers with assistx2. Pt come to stand ~10 times throughout session. pt having difficulty maintaining position secondary to c/o knee pain. Pt left seated upright in chair with call light in reach and spouse present in room. Activity Tolerance  Activity Tolerance: Patient limited by fatigue;Patient limited by pain         Patient Diagnosis(es): There were no encounter diagnoses. has a past medical history of Allergic rhinitis, Anxiety, Chronic back pain, Depression, Double vision with both eyes open, Fall, Hyperlipidemia, Hypertension, Osteoarthritis, and Type II or unspecified type diabetes mellitus without mention of complication, not stated as uncontrolled. has a past surgical history that includes Hysterectomy; shoulder surgery;  section; and Colonoscopy.     Restrictions  Restrictions/Precautions  Restrictions/Precautions: Weight Bearing, General Precautions, Fall Risk  Required Braces or Orthoses?: No  Upper Extremity Weight Bearing Restrictions  Right Upper Extremity Weight Bearing: Non Weight Bearing  Subjective   General  Chart Reviewed: Yes  Patient assessed for rehabilitation services?: Yes  Family / Caregiver Present: Yes  Referring Practitioner: Corky Buckley

## 2019-12-23 NOTE — CONSULTS
Nutrition Assessment    Type and Reason for Visit: Initial, Consult    Nutrition Recommendations: Will start ONS BID r/t dislike of hospital and increased needs r/t wound healing. Nutrition Assessment: pt at risk for nutritional compromise AEB increased needs and limited preferences. Malnutrition Assessment:  · Malnutrition Status: At risk for malnutrition  · Context: Acute illness or injury  · Findings of the 6 clinical characteristics of malnutrition (Minimum of 2 out of 6 clinical characteristics is required to make the diagnosis of moderate or severe Protein Calorie Malnutrition based on AND/ASPEN Guidelines):  1. Energy Intake- ,      2. Weight Loss-No significant weight loss,    3. Fat Loss-No significant subcutaneous fat loss,    4. Muscle Loss-No significant muscle mass loss,    5. Fluid Accumulation-Mild fluid accumulation, Extremities  6.  Strength-     Nutrition Risk Level: Moderate    Nutrient Needs:  · Estimated Daily Total Kcal: 8516-9604  · Estimated Daily Protein (g): 77(1.25 gm/kg IBw)  · Estimated Daily Total Fluid (ml/day): 0735-1040(4 ml/kcal of est energy intakes/needs)    Nutrition Diagnosis:   · Problem: Food and nutrition-related knowledge deficit  · Etiology: related to Endocrine dysfunction     Signs and symptoms:  as evidenced by Patient report of, Localized or generalized fluid accumulation, Lab values    Objective Information:  · Nutrition-Focused Physical Findings: pt has no known wt loss, no apparent fat loss or muscle wasting, does have healing incision from gallbladder removal.  Has trace edema.   · Wound Type: Surgical Wound  · Current Nutrition Therapies:  · Oral Diet Orders: Carb Control 4 Carbs/Meal, Dental Soft   · Oral Diet intake: %  · Oral Nutrition Supplement (ONS) Orders:    · ONS intake:    · Anthropometric Measures:  · Ht: 5' 7\" (170.2 cm)   · Current Body Wt: 211 lb (95.7 kg)  · Admission Body Wt:    · Usual Body Wt:    · % Weight Change:  ,     · Ideal

## 2019-12-23 NOTE — PLAN OF CARE
Problem: Falls - Risk of:  Goal: Will remain free from falls  Description  Will remain free from falls  Outcome: Ongoing     Problem: Risk for Impaired Skin Integrity  Goal: Tissue integrity - skin and mucous membranes  Description  Structural intactness and normal physiological function of skin and  mucous membranes.   Outcome: Ongoing     Problem: Infection:  Goal: Will remain free from infection  Description  Will remain free from infection  Outcome: Ongoing     Problem: Safety:  Goal: Free from accidental physical injury  Description  Free from accidental physical injury  Outcome: Ongoing     Problem: Daily Care:  Goal: Daily care needs are met  Description  Daily care needs are met  Outcome: Ongoing     Problem: Pain:  Goal: Patient's pain/discomfort is manageable  Description  Patient's pain/discomfort is manageable  Outcome: Ongoing  Goal: Pain level will decrease  Description  Pain level will decrease  Outcome: Ongoing  Goal: Control of acute pain  Description  Control of acute pain  Outcome: Ongoing

## 2019-12-24 LAB
GLUCOSE BLD-MCNC: 110 MG/DL (ref 74–106)
GLUCOSE BLD-MCNC: 198 MG/DL (ref 74–106)
GLUCOSE BLD-MCNC: 228 MG/DL (ref 74–106)
GLUCOSE BLD-MCNC: 310 MG/DL (ref 74–106)
PERFORMED ON: ABNORMAL

## 2019-12-24 PROCEDURE — 6360000002 HC RX W HCPCS: Performed by: PHYSICIAN ASSISTANT

## 2019-12-24 PROCEDURE — 97535 SELF CARE MNGMENT TRAINING: CPT

## 2019-12-24 PROCEDURE — 97530 THERAPEUTIC ACTIVITIES: CPT

## 2019-12-24 PROCEDURE — 1200000002 HC SEMI PRIVATE SWING BED

## 2019-12-24 PROCEDURE — 97530 THERAPEUTIC ACTIVITIES: CPT | Performed by: PHYSICAL THERAPY ASSISTANT

## 2019-12-24 PROCEDURE — 6370000000 HC RX 637 (ALT 250 FOR IP): Performed by: PHYSICIAN ASSISTANT

## 2019-12-24 RX ADMIN — Medication 14 UNITS: at 11:05

## 2019-12-24 RX ADMIN — Medication 14 UNITS: at 18:24

## 2019-12-24 RX ADMIN — GABAPENTIN 300 MG: 300 CAPSULE ORAL at 21:05

## 2019-12-24 RX ADMIN — POLYETHYLENE GLYCOL 3350 17 G: 17 POWDER, FOR SOLUTION ORAL at 09:00

## 2019-12-24 RX ADMIN — SENNOSIDES AND DOCUSATE SODIUM 1 TABLET: 8.6; 5 TABLET ORAL at 09:11

## 2019-12-24 RX ADMIN — FLUCONAZOLE, SODIUM CHLORIDE 200 MG: 2 INJECTION INTRAVENOUS at 12:22

## 2019-12-24 RX ADMIN — AMLODIPINE BESYLATE 10 MG: 5 TABLET ORAL at 09:00

## 2019-12-24 RX ADMIN — BUSPIRONE HYDROCHLORIDE 5 MG: 5 TABLET ORAL at 09:01

## 2019-12-24 RX ADMIN — HYDROCODONE BITARTRATE AND ACETAMINOPHEN 0.5 TABLET: 5; 325 TABLET ORAL at 21:05

## 2019-12-24 RX ADMIN — Medication 14 UNITS: at 09:05

## 2019-12-24 RX ADMIN — BUSPIRONE HYDROCHLORIDE 5 MG: 5 TABLET ORAL at 21:05

## 2019-12-24 RX ADMIN — INSULIN LISPRO 2 UNITS: 100 INJECTION, SOLUTION INTRAVENOUS; SUBCUTANEOUS at 21:07

## 2019-12-24 RX ADMIN — HYDROCODONE BITARTRATE AND ACETAMINOPHEN 0.5 TABLET: 5; 325 TABLET ORAL at 12:22

## 2019-12-24 RX ADMIN — GABAPENTIN 300 MG: 300 CAPSULE ORAL at 09:01

## 2019-12-24 RX ADMIN — ASPIRIN 81 MG: 81 TABLET, COATED ORAL at 09:00

## 2019-12-24 RX ADMIN — CLOTRIMAZOLE: 10 CREAM TOPICAL at 21:07

## 2019-12-24 RX ADMIN — DICLOFENAC 2 G: 10 GEL TOPICAL at 21:07

## 2019-12-24 RX ADMIN — SENNOSIDES AND DOCUSATE SODIUM 1 TABLET: 8.6; 5 TABLET ORAL at 09:01

## 2019-12-24 RX ADMIN — ENOXAPARIN SODIUM 40 MG: 40 INJECTION SUBCUTANEOUS at 09:00

## 2019-12-24 RX ADMIN — GABAPENTIN 300 MG: 300 CAPSULE ORAL at 12:22

## 2019-12-24 RX ADMIN — PANTOPRAZOLE SODIUM 40 MG: 40 TABLET, DELAYED RELEASE ORAL at 05:54

## 2019-12-24 RX ADMIN — LOSARTAN POTASSIUM 25 MG: 25 TABLET, FILM COATED ORAL at 09:01

## 2019-12-24 RX ADMIN — SENNOSIDES AND DOCUSATE SODIUM 1 TABLET: 8.6; 5 TABLET ORAL at 21:05

## 2019-12-24 RX ADMIN — DULOXETINE HYDROCHLORIDE 60 MG: 30 CAPSULE, DELAYED RELEASE ORAL at 09:00

## 2019-12-24 RX ADMIN — Medication 1 UNITS: at 11:04

## 2019-12-24 RX ADMIN — HYDROCODONE BITARTRATE AND ACETAMINOPHEN 0.5 TABLET: 5; 325 TABLET ORAL at 02:48

## 2019-12-24 RX ADMIN — Medication 2 UNITS: at 18:23

## 2019-12-24 RX ADMIN — INSULIN GLARGINE 27 UNITS: 100 INJECTION, SOLUTION SUBCUTANEOUS at 21:08

## 2019-12-24 RX ADMIN — DICLOFENAC 2 G: 10 GEL TOPICAL at 09:02

## 2019-12-24 ASSESSMENT — PAIN SCALES - GENERAL
PAINLEVEL_OUTOF10: 8
PAINLEVEL_OUTOF10: 6
PAINLEVEL_OUTOF10: 10
PAINLEVEL_OUTOF10: 6

## 2019-12-24 ASSESSMENT — PAIN DESCRIPTION - PAIN TYPE: TYPE: CHRONIC PAIN

## 2019-12-24 ASSESSMENT — PAIN DESCRIPTION - LOCATION: LOCATION: SHOULDER

## 2019-12-24 NOTE — PROGRESS NOTES
Bed mobility  Supine to Sit: Modified independent  Scooting: Modified independent  Transfers  Sit to stand: Minimal assistance;2 Person assistance        Plan   Plan  Times per week: 3-5  Times per day: Daily  Plan weeks: 1-3  Current Treatment Recommendations: Endurance Training, Strengthening, Balance Training, Safety Education & Training, Self-Care / ADL, Neuromuscular Re-education, Patient/Caregiver Education & Training, Functional Mobility Training    Goals  Short term goals  Time Frame for Short term goals: 1-2 weeks  Short term goal 1: Pt to complete Sit to stand transfer using mana walker with min assist.   Short term goal 2: Pt to complete SPT from bed to chair/BSC with min assist using mana walker assistx1  Short term goal 3: pt to complete toileting with min assist.   Short term goal 4: Pt to complete dressing with min assist for LB and UB. Short term goal 5: Pt to complete sponge bathing with MIn assist.   Long term goals  Time Frame for Long term goals : 2-3 weeks. Long term goal 1: Pt to complete toileting with MOD I. Long term goal 2: Pt to tolerate x20 minutes of activity to increase functional activity tolerance. Long term goal 3: Pt to complete dressing with MOD I. Long term goal 4: Pt to complete bathing with SULLIVAN. Therapy Time   Individual Concurrent Group Co-treatment   Time In 0945         Time Out 7409         Minutes 29              This note serves as a DC summary in the event of pt discharge.      SHIRLEY Ugalde/CAMILLA

## 2019-12-24 NOTE — PROGRESS NOTES
Physical Therapy  Facility/Department: [facility] MED SURG  Daily Treatment Note  NAME: Rashmi Laguerre  : 1940  MRN: [id number]    Date of Service: 2019    Discharge Recommendations:  Continue to assess pending progress        Assessment   Assessment: Co tx with OT on this date. Pt demonstrated Dunn with bed mobility skills and able to come to stand with min assistx2.7 sts performed  throughout tx session today. Transferred to chair via [address] steady and attempted sit to stand transfers and attempt to advace with weight shifting and stepping. Pt tolerated standing 30-50 second increments. pt unable to take steps using mana walker with assist x2. Pt reports weakness in BLE knees limiting ability to complete task. Pt assisted to recliner left with call light in reach  Activity Tolerance  Activity Tolerance: Patient limited by fatigue;Patient limited by endurance     Patient Diagnosis(es): There were no encounter diagnoses. has a past medical history of Allergic rhinitis, Anxiety, Chronic back pain, Depression, Double vision with both eyes open, Fall, Hyperlipidemia, Hypertension, Osteoarthritis, and Type II or unspecified type diabetes mellitus without mention of complication, not stated as uncontrolled. has a past surgical history that includes Hysterectomy; shoulder surgery;  section; and Colonoscopy. Restrictions  Restrictions/Precautions  Restrictions/Precautions: Weight Bearing, General Precautions, Fall Risk  Required Braces or Orthoses?: No  Upper Extremity Weight Bearing Restrictions  Right Upper Extremity Weight Bearing: Non Weight Bearing  Subjective   General  Chart Reviewed: Yes  Response To Previous Treatment: Patient with no complaints from previous session. Family / Caregiver Present: No  Subjective  Subjective: Patient agreeable to PT skilled services. Denies pain.         Objective   Bed mobility  Rolling to Left: Modified independent  Supine to Sit: Modified

## 2019-12-24 NOTE — FLOWSHEET NOTE
Pt laying in bed, Alert, No acute distress noted at this time. Pt continues on RA. Pt is Alert and Oriented x 4. Pt reports no BM in 2-3 days. Refused MOM, or any alternative. No change from previous assessment. POC for the day reviewed. Pt agrees she will participated in swing bed Activity for the day reviewed. Pt requested PRN pain medication with AM medications for  Chronic pain, aware that she can pain medication after 1045 this AM. See MAR. Pt denies questions. Pt denies any other needs at this time. Will continue to monitor. 12/24/19 0914   Assessment   Charting Type Shift assessment   Neurological   Neuro (WDL) WDL   Level of Consciousness 0   [place] Coma Scale   Eye Opening 4   Best Verbal Response 5   Best Motor Response 6   [place] Coma Scale Score 15   HEENT   HEENT (WDL) X   Mucous Membrane Other (Comment)   Teeth Missing teeth   Left Eye Impaired vision  (double vision quints eye)   Right Eye Impaired vision  (double vision)   Respiratory   Respiratory (WDL) X   L Breath Sounds Clear;Diminished   R Breath Sounds Clear;Diminished   Cough/Sputum   Cough Non-productive;Dry   Frequency Infrequent   Cough Description   Sputum Amount None   Cardiac   Cardiac (WDL) X   Cardiac Monitor   Telemetry Monitor On No   Telemetry Audible Yes   Telemetry Alarms Set Yes   Telemetry Box Number [id number]   Telemetry Monitor Alarm Parameters    Gastrointestinal   Abdominal (WDL) X   Abdomen Inspection Distended;Rounded; Soft   RUQ Bowel Sounds Active   LUQ Bowel Sounds Active   RLQ Bowel Sounds Active   LLQ Bowel Sounds Active   Peripheral Vascular   Edema Right lower extremity   RLE Edema Trace   Skin Color/Condition   Skin Color/Condition (WDL) X   Skin Color Pale   Skin Condition/Temp Dry; Warm   Skin Integrity   Skin Integrity (WDL) X   Musculoskeletal   Musculoskeletal (WDL) X   RUE Weakness; Other (Comment)  (tremors)   LUE Full movement   RL Extremity Weakness   LL Extremity Weakness   Genitourinary Genitourinary (WDL) WDL   Urine Assessment   Incontinence Yes   Anus/Rectum   Anus/Rectum (WDL) WDL   Psychosocial   Psychosocial (WDL) WDL

## 2019-12-25 LAB
BASOPHILS ABSOLUTE: 0 K/UL (ref 0–0.1)
BASOPHILS RELATIVE PERCENT: 0.4 %
EOSINOPHILS ABSOLUTE: 0.2 K/UL (ref 0–0.4)
EOSINOPHILS RELATIVE PERCENT: 3.4 %
GLUCOSE BLD-MCNC: 182 MG/DL (ref 74–106)
GLUCOSE BLD-MCNC: 224 MG/DL (ref 74–106)
GLUCOSE BLD-MCNC: 229 MG/DL (ref 74–106)
GLUCOSE BLD-MCNC: 283 MG/DL (ref 74–106)
HCT VFR BLD CALC: 27.6 % (ref 37–47)
HEMOGLOBIN: 8.3 G/DL (ref 11.5–16.5)
IMMATURE GRANULOCYTES #: 0 K/UL
IMMATURE GRANULOCYTES %: 0.8 % (ref 0–5)
LYMPHOCYTES ABSOLUTE: 1.9 K/UL (ref 1.5–4)
LYMPHOCYTES RELATIVE PERCENT: 36.5 %
MCH RBC QN AUTO: 30.5 PG (ref 27–32)
MCHC RBC AUTO-ENTMCNC: 30.1 G/DL (ref 31–35)
MCV RBC AUTO: 101.5 FL (ref 80–100)
MONOCYTES ABSOLUTE: 0.5 K/UL (ref 0.2–0.8)
MONOCYTES RELATIVE PERCENT: 9.7 %
NEUTROPHILS ABSOLUTE: 2.5 K/UL (ref 2–7.5)
NEUTROPHILS RELATIVE PERCENT: 49.2 %
PDW BLD-RTO: 15.7 % (ref 11–16)
PERFORMED ON: ABNORMAL
PLATELET # BLD: 260 K/UL (ref 150–400)
PMV BLD AUTO: 10.3 FL (ref 6–10)
RBC # BLD: 2.72 M/UL (ref 3.8–5.8)
WBC # BLD: 5.1 K/UL (ref 4–11)

## 2019-12-25 PROCEDURE — 6360000002 HC RX W HCPCS: Performed by: PHYSICIAN ASSISTANT

## 2019-12-25 PROCEDURE — 85025 COMPLETE CBC W/AUTO DIFF WBC: CPT

## 2019-12-25 PROCEDURE — 36415 COLL VENOUS BLD VENIPUNCTURE: CPT

## 2019-12-25 PROCEDURE — 6370000000 HC RX 637 (ALT 250 FOR IP): Performed by: INTERNAL MEDICINE

## 2019-12-25 PROCEDURE — 6370000000 HC RX 637 (ALT 250 FOR IP): Performed by: PHYSICIAN ASSISTANT

## 2019-12-25 PROCEDURE — 1200000002 HC SEMI PRIVATE SWING BED

## 2019-12-25 RX ADMIN — Medication 14 UNITS: at 11:40

## 2019-12-25 RX ADMIN — ENOXAPARIN SODIUM 40 MG: 40 INJECTION SUBCUTANEOUS at 08:16

## 2019-12-25 RX ADMIN — INSULIN LISPRO 2 UNITS: 100 INJECTION, SOLUTION INTRAVENOUS; SUBCUTANEOUS at 21:06

## 2019-12-25 RX ADMIN — Medication 2 UNITS: at 18:11

## 2019-12-25 RX ADMIN — SENNOSIDES AND DOCUSATE SODIUM 1 TABLET: 8.6; 5 TABLET ORAL at 21:01

## 2019-12-25 RX ADMIN — DULOXETINE HYDROCHLORIDE 60 MG: 30 CAPSULE, DELAYED RELEASE ORAL at 08:15

## 2019-12-25 RX ADMIN — PANTOPRAZOLE SODIUM 40 MG: 40 TABLET, DELAYED RELEASE ORAL at 05:55

## 2019-12-25 RX ADMIN — ASPIRIN 81 MG: 81 TABLET, COATED ORAL at 08:15

## 2019-12-25 RX ADMIN — DIPHENHYDRAMINE HYDROCHLORIDE AND LIDOCAINE HYDROCHLORIDE AND ALUMINUM HYDROXIDE AND MAGNESIUM HYDRO 5 ML: KIT at 08:18

## 2019-12-25 RX ADMIN — FLUTICASONE PROPIONATE 2 SPRAY: 50 SPRAY, METERED NASAL at 21:02

## 2019-12-25 RX ADMIN — Medication 2 UNITS: at 08:23

## 2019-12-25 RX ADMIN — Medication 1 UNITS: at 11:37

## 2019-12-25 RX ADMIN — Medication 14 UNITS: at 08:27

## 2019-12-25 RX ADMIN — DIPHENHYDRAMINE HYDROCHLORIDE AND LIDOCAINE HYDROCHLORIDE AND ALUMINUM HYDROXIDE AND MAGNESIUM HYDRO 5 ML: KIT at 11:31

## 2019-12-25 RX ADMIN — INSULIN GLARGINE 27 UNITS: 100 INJECTION, SOLUTION SUBCUTANEOUS at 21:06

## 2019-12-25 RX ADMIN — GABAPENTIN 300 MG: 300 CAPSULE ORAL at 18:27

## 2019-12-25 RX ADMIN — BUSPIRONE HYDROCHLORIDE 5 MG: 5 TABLET ORAL at 08:15

## 2019-12-25 RX ADMIN — HYDROCODONE BITARTRATE AND ACETAMINOPHEN 0.5 TABLET: 5; 325 TABLET ORAL at 18:25

## 2019-12-25 RX ADMIN — MECLIZINE HYDROCHLORIDE 25 MG: 25 TABLET ORAL at 18:26

## 2019-12-25 RX ADMIN — DICLOFENAC 2 G: 10 GEL TOPICAL at 08:17

## 2019-12-25 RX ADMIN — DIPHENHYDRAMINE HYDROCHLORIDE AND LIDOCAINE HYDROCHLORIDE AND ALUMINUM HYDROXIDE AND MAGNESIUM HYDRO 5 ML: KIT at 21:03

## 2019-12-25 RX ADMIN — BUSPIRONE HYDROCHLORIDE 5 MG: 5 TABLET ORAL at 21:01

## 2019-12-25 RX ADMIN — FLUCONAZOLE, SODIUM CHLORIDE 200 MG: 2 INJECTION INTRAVENOUS at 11:31

## 2019-12-25 RX ADMIN — SENNOSIDES AND DOCUSATE SODIUM 1 TABLET: 8.6; 5 TABLET ORAL at 08:15

## 2019-12-25 RX ADMIN — GABAPENTIN 300 MG: 300 CAPSULE ORAL at 08:15

## 2019-12-25 RX ADMIN — CLOTRIMAZOLE: 10 CREAM TOPICAL at 08:19

## 2019-12-25 RX ADMIN — AMLODIPINE BESYLATE 10 MG: 5 TABLET ORAL at 08:14

## 2019-12-25 RX ADMIN — GUAIFENESIN AND DEXTROMETHORPHAN 5 ML: 100; 10 SYRUP ORAL at 16:43

## 2019-12-25 RX ADMIN — LOSARTAN POTASSIUM 25 MG: 25 TABLET, FILM COATED ORAL at 08:15

## 2019-12-25 RX ADMIN — MECLIZINE HYDROCHLORIDE 25 MG: 25 TABLET ORAL at 08:30

## 2019-12-25 RX ADMIN — DICLOFENAC 2 G: 10 GEL TOPICAL at 21:02

## 2019-12-25 RX ADMIN — Medication 14 UNITS: at 18:12

## 2019-12-25 ASSESSMENT — PAIN SCALES - GENERAL: PAINLEVEL_OUTOF10: 5

## 2019-12-25 NOTE — FLOWSHEET NOTE
12/25/19 0838   Assessment   Charting Type Shift assessment   Neurological   Neuro (WDL) WDL   Level of Consciousness 0   [place] Coma Scale   Eye Opening 4   Best Verbal Response 5   Best Motor Response 6   Giovanny Coma Scale Score 15   NIH/MNHISS Stroke Scale   NIH/MNIHSS Stroke Scale Assessed No   HEENT   HEENT (WDL) X   Mucous Membrane Other (Comment)   Teeth Missing teeth   Left Eye Impaired vision  (double vision quints eye)   Right Eye Impaired vision  (double vision)   Respiratory   Respiratory (WDL) X   L Breath Sounds Clear;Diminished   R Breath Sounds Clear;Diminished   Cough/Sputum   Cough Non-productive;Dry   Cough Description   Sputum Amount None   Cardiac   Cardiac (WDL) X   Cardiac Monitor   Telemetry Monitor On No   Gastrointestinal   Abdominal (WDL) X   Abdomen Inspection Distended;Rounded; Soft   RUQ Bowel Sounds Active   LUQ Bowel Sounds Active   RLQ Bowel Sounds Active   LLQ Bowel Sounds Active   Peripheral Vascular   Edema Right lower extremity; Left lower extremity   RLE Edema +1;Pitting   LLE Edema Pitting;+1   Skin Color/Condition   Skin Color/Condition (WDL) X   Skin Color Pale   Skin Condition/Temp Warm;Dry   Skin Integrity   Skin Integrity (WDL) X   Skin Integrity Other (Comment)  (healing incision)   Location abd   Musculoskeletal   Musculoskeletal (WDL) X   RUE Weakness; Other (Comment)  (tremors)   LUE Full movement   RL Extremity Weakness   LL Extremity Weakness   Genitourinary   Genitourinary (WDL) WDL   Urine Assessment   Incontinence Yes   Anus/Rectum   Anus/Rectum (WDL) WDL   Psychosocial   Psychosocial (WDL) WDL   Pt awake in bed. Pt alert and oriented. Pt appears in no acute distress. Pt currently on RA. Pt lung sounds clear with diminished sounds in bases juan j. Pt encouraged to cough and deep breathe. Pt incision on abd healing. Pt patch placed on sacral spine. Pt has +1 pitting edema in BLE. Pt call bell and bedside table within reach. Will continue to monitor pt.

## 2019-12-25 NOTE — PROGRESS NOTES
Pt had visitors earlier in the shift. She was hungry earlier and stated she didn't eat well. Brought her a sandwich box. Pt was satisfied.

## 2019-12-25 NOTE — PLAN OF CARE
Problem: Falls - Risk of:  Goal: Will remain free from falls  Description  Will remain free from falls  12/25/2019 0836 by Maury Vargas RN  Outcome: Ongoing  12/25/2019 0522 by Jordan Nguyen RN  Outcome: Ongoing  12/25/2019 0522 by Jordan Nguyen RN  Outcome: Ongoing  Goal: Absence of physical injury  Description  Absence of physical injury  12/25/2019 0836 by Maury Vargas RN  Outcome: Ongoing  12/25/2019 0522 by Jordan Nguyen RN  Outcome: Ongoing  12/25/2019 0522 by Jordan Nguyen RN  Outcome: Ongoing     Problem: Risk for Impaired Skin Integrity  Goal: Tissue integrity - skin and mucous membranes  Description  Structural intactness and normal physiological function of skin and  mucous membranes.   12/25/2019 0836 by Maury Vargas RN  Outcome: Ongoing  12/25/2019 0522 by Jordan Nguyen RN  Outcome: Ongoing  12/25/2019 0522 by Jordan Nguyen RN  Outcome: Ongoing     Problem: Infection:  Goal: Will remain free from infection  Description  Will remain free from infection  12/25/2019 0522 by Jordan Nguyen RN  Outcome: Ongoing  12/25/2019 0522 by Jordan Nguyen RN  Outcome: Ongoing     Problem: Safety:  Goal: Free from accidental physical injury  Description  Free from accidental physical injury  12/25/2019 0522 by Jordan Nguyen RN  Outcome: Ongoing  12/25/2019 0522 by Jordan Nguyen RN  Outcome: Ongoing  Goal: Free from intentional harm  Description  Free from intentional harm  12/25/2019 0522 by Jordan Nguyen RN  Outcome: Ongoing  12/25/2019 0522 by Jordan Nguyen RN  Outcome: Ongoing     Problem: Pain:  Goal: Patient's pain/discomfort is manageable  Description  Patient's pain/discomfort is manageable  12/25/2019 0836 by Maury Vargas RN  Outcome: Ongoing  12/25/2019 0522 by Jordan Nguyen RN  Outcome: Ongoing  12/25/2019 0522 by Jordan Nguyen RN  Outcome: Ongoing  Goal: Pain level will decrease  Description  Pain level will decrease  12/25/2019 0522 by Jordan Nguyen RN  Outcome: Ongoing  12/25/2019 0522 by

## 2019-12-26 LAB
ANION GAP SERPL CALCULATED.3IONS-SCNC: 9 MMOL/L (ref 3–16)
BUN BLDV-MCNC: 13 MG/DL (ref 6–20)
CALCIUM SERPL-MCNC: 8.8 MG/DL (ref 8.5–10.5)
CHLORIDE BLD-SCNC: 100 MMOL/L (ref 98–107)
CO2: 30 MMOL/L (ref 20–30)
CREAT SERPL-MCNC: 1.3 MG/DL (ref 0.4–1.2)
GFR AFRICAN AMERICAN: 48
GFR NON-AFRICAN AMERICAN: 40
GLUCOSE BLD-MCNC: 146 MG/DL (ref 74–106)
GLUCOSE BLD-MCNC: 150 MG/DL (ref 74–106)
GLUCOSE BLD-MCNC: 154 MG/DL (ref 74–106)
GLUCOSE BLD-MCNC: 202 MG/DL (ref 74–106)
GLUCOSE BLD-MCNC: 233 MG/DL (ref 74–106)
HCT VFR BLD CALC: 28 % (ref 37–47)
HEMOGLOBIN: 8.3 G/DL (ref 11.5–16.5)
MCH RBC QN AUTO: 30.4 PG (ref 27–32)
MCHC RBC AUTO-ENTMCNC: 29.6 G/DL (ref 31–35)
MCV RBC AUTO: 102.6 FL (ref 80–100)
PDW BLD-RTO: 15.7 % (ref 11–16)
PERFORMED ON: ABNORMAL
PLATELET # BLD: 256 K/UL (ref 150–400)
PMV BLD AUTO: 10.3 FL (ref 6–10)
POTASSIUM SERPL-SCNC: 4.7 MMOL/L (ref 3.4–5.1)
RBC # BLD: 2.73 M/UL (ref 3.8–5.8)
SODIUM BLD-SCNC: 139 MMOL/L (ref 136–145)
WBC # BLD: 5.5 K/UL (ref 4–11)

## 2019-12-26 PROCEDURE — 80048 BASIC METABOLIC PNL TOTAL CA: CPT

## 2019-12-26 PROCEDURE — 6360000002 HC RX W HCPCS: Performed by: PHYSICIAN ASSISTANT

## 2019-12-26 PROCEDURE — 97530 THERAPEUTIC ACTIVITIES: CPT

## 2019-12-26 PROCEDURE — 97116 GAIT TRAINING THERAPY: CPT | Performed by: PHYSICAL THERAPY ASSISTANT

## 2019-12-26 PROCEDURE — 36415 COLL VENOUS BLD VENIPUNCTURE: CPT

## 2019-12-26 PROCEDURE — 1200000002 HC SEMI PRIVATE SWING BED

## 2019-12-26 PROCEDURE — 85027 COMPLETE CBC AUTOMATED: CPT

## 2019-12-26 PROCEDURE — 97530 THERAPEUTIC ACTIVITIES: CPT | Performed by: PHYSICAL THERAPY ASSISTANT

## 2019-12-26 PROCEDURE — 6370000000 HC RX 637 (ALT 250 FOR IP): Performed by: PHYSICIAN ASSISTANT

## 2019-12-26 PROCEDURE — 99308 SBSQ NF CARE LOW MDM 20: CPT | Performed by: INTERNAL MEDICINE

## 2019-12-26 PROCEDURE — 6370000000 HC RX 637 (ALT 250 FOR IP): Performed by: INTERNAL MEDICINE

## 2019-12-26 RX ADMIN — Medication 1 UNITS: at 08:38

## 2019-12-26 RX ADMIN — BUSPIRONE HYDROCHLORIDE 5 MG: 5 TABLET ORAL at 08:35

## 2019-12-26 RX ADMIN — CLOTRIMAZOLE: 10 CREAM TOPICAL at 08:36

## 2019-12-26 RX ADMIN — SENNOSIDES AND DOCUSATE SODIUM 1 TABLET: 8.6; 5 TABLET ORAL at 08:34

## 2019-12-26 RX ADMIN — GUAIFENESIN AND DEXTROMETHORPHAN 5 ML: 100; 10 SYRUP ORAL at 16:41

## 2019-12-26 RX ADMIN — SENNOSIDES AND DOCUSATE SODIUM 1 TABLET: 8.6; 5 TABLET ORAL at 20:50

## 2019-12-26 RX ADMIN — PANTOPRAZOLE SODIUM 40 MG: 40 TABLET, DELAYED RELEASE ORAL at 06:28

## 2019-12-26 RX ADMIN — GABAPENTIN 300 MG: 300 CAPSULE ORAL at 20:50

## 2019-12-26 RX ADMIN — Medication 2 UNITS: at 16:36

## 2019-12-26 RX ADMIN — Medication 14 UNITS: at 08:43

## 2019-12-26 RX ADMIN — INSULIN LISPRO 1 UNITS: 100 INJECTION, SOLUTION INTRAVENOUS; SUBCUTANEOUS at 20:55

## 2019-12-26 RX ADMIN — FLUTICASONE PROPIONATE 2 SPRAY: 50 SPRAY, METERED NASAL at 20:51

## 2019-12-26 RX ADMIN — DICLOFENAC 2 G: 10 GEL TOPICAL at 08:35

## 2019-12-26 RX ADMIN — LOSARTAN POTASSIUM 25 MG: 25 TABLET, FILM COATED ORAL at 08:35

## 2019-12-26 RX ADMIN — Medication 14 UNITS: at 16:38

## 2019-12-26 RX ADMIN — GABAPENTIN 300 MG: 300 CAPSULE ORAL at 08:35

## 2019-12-26 RX ADMIN — DICLOFENAC 2 G: 10 GEL TOPICAL at 20:51

## 2019-12-26 RX ADMIN — AMLODIPINE BESYLATE 10 MG: 5 TABLET ORAL at 08:35

## 2019-12-26 RX ADMIN — Medication 14 UNITS: at 11:36

## 2019-12-26 RX ADMIN — BUSPIRONE HYDROCHLORIDE 5 MG: 5 TABLET ORAL at 20:50

## 2019-12-26 RX ADMIN — HYDROCODONE BITARTRATE AND ACETAMINOPHEN 0.5 TABLET: 5; 325 TABLET ORAL at 09:04

## 2019-12-26 RX ADMIN — HYDROCODONE BITARTRATE AND ACETAMINOPHEN 0.5 TABLET: 5; 325 TABLET ORAL at 17:37

## 2019-12-26 RX ADMIN — Medication 1 UNITS: at 11:32

## 2019-12-26 RX ADMIN — DULOXETINE HYDROCHLORIDE 60 MG: 30 CAPSULE, DELAYED RELEASE ORAL at 08:35

## 2019-12-26 RX ADMIN — INSULIN GLARGINE 27 UNITS: 100 INJECTION, SOLUTION SUBCUTANEOUS at 20:55

## 2019-12-26 RX ADMIN — FLUCONAZOLE, SODIUM CHLORIDE 200 MG: 2 INJECTION INTRAVENOUS at 11:25

## 2019-12-26 RX ADMIN — ENOXAPARIN SODIUM 40 MG: 40 INJECTION SUBCUTANEOUS at 08:34

## 2019-12-26 RX ADMIN — GABAPENTIN 300 MG: 300 CAPSULE ORAL at 13:37

## 2019-12-26 RX ADMIN — ASPIRIN 81 MG: 81 TABLET, COATED ORAL at 08:35

## 2019-12-26 ASSESSMENT — PAIN SCALES - GENERAL
PAINLEVEL_OUTOF10: 7
PAINLEVEL_OUTOF10: 7

## 2019-12-26 NOTE — FLOWSHEET NOTE
12/26/19 0854   Assessment   Charting Type Shift assessment   Neurological   Neuro (WDL) WDL   Level of Consciousness 0   [place] Coma Scale   Eye Opening 4   Best Verbal Response 5   Best Motor Response 6   Giovanny Coma Scale Score 15   NIH/MNHISS Stroke Scale   NIH/MNIHSS Stroke Scale Assessed No   HEENT   HEENT (WDL) X   Mucous Membrane Other (Comment)   Teeth Missing teeth   Left Eye Impaired vision  (double vision quints eye)   Right Eye Impaired vision  (double vision)   Respiratory   Respiratory (WDL) X   L Breath Sounds Clear;Diminished   R Breath Sounds Clear;Diminished   Cough/Sputum   Cough Non-productive;Dry   Cough Description   Sputum Amount None   Cardiac   Cardiac (WDL) X   Cardiac Monitor   Telemetry Monitor On No   Gastrointestinal   Abdominal (WDL) X   Abdomen Inspection Distended;Rounded; Soft   RUQ Bowel Sounds Active   LUQ Bowel Sounds Active   RLQ Bowel Sounds Active   LLQ Bowel Sounds Active   Skin Color/Condition   Skin Color/Condition (WDL) X   Skin Color Pale   Skin Condition/Temp Warm;Dry   Skin Integrity   Skin Integrity (WDL) X   Skin Integrity Other (Comment)  (healing incision)   Location abd   Musculoskeletal   Musculoskeletal (WDL) X   RUE Weakness; Other (Comment)  (tremors)   LUE Full movement   RL Extremity Weakness   LL Extremity Weakness   Genitourinary   Genitourinary (WDL) WDL   Urine Assessment   Incontinence Yes   Anus/Rectum   Anus/Rectum (WDL) WDL   Psychosocial   Psychosocial (WDL) WDL   Pt awake in bed. Pt alert and oriented. Pt appears in no acute distress. Pt currently on RA. Pt lung sounds clear with diminished sounds in bases juan j. Pt encouraged to cough and deep breathe. Pt states pain, see eMAR for intervention. Pt call bell and bedside table within reach. Will continue to monitor pt.

## 2019-12-26 NOTE — PROGRESS NOTES
conjunctiva normal, EOMI. HENT:  Atraumatic, external ears normal, external nose/nares normal, oropharynx moist, no pharyngeal exudates. Neck:  Supple. No JVD or thyromegaly. Respiratory:  No respiratory distress, normal breath sounds, no rales, no wheezing. Cardiovascular:  Normal rate, normal rhythm, no murmurs, no gallops, no rubs. GI:  Soft, nondistended, normal bowel sounds, nontender, no organomegaly, no mass. Results:     Lab Results   Component Value Date    WBC 5.5 12/26/2019    HGB 8.3 (L) 12/26/2019    HCT 28.0 (L) 12/26/2019    .6 (H) 12/26/2019     12/26/2019       Lab Results   Component Value Date     12/26/2019    K 4.7 12/26/2019    K 3.4 12/20/2019     12/26/2019    CO2 30 12/26/2019    BUN 13 12/26/2019    CREATININE 1.3 12/26/2019    GLUCOSE 150 12/26/2019    CALCIUM 8.8 12/26/2019        Assessment and Plan: Active Hospital Problems    Diagnosis Date Noted    Anxiety and depression [F41.9, F32.9] 12/20/2019    Aphthous ulcer [K12.0] 12/20/2019    Chronic back pain [M54.9, G89.29] 12/20/2019    Peripheral neuropathy [G62.9] 12/20/2019    Closed fracture of right upper extremity [S42.301A] 12/20/2019    Dysphagia [R13.10] 12/20/2019    CKD (chronic kidney disease) stage 3, GFR 30-59 ml/min (HCC) [N18.3] 12/20/2019    Declining functional status [R53.81] 12/19/2019    HTN (hypertension) [I10] 10/14/2011    Diabetes mellitus, type II ([facility] [address] [E11.9] 07/05/2011       -Aphthous ulcer  Continue medicated mouthwash, swish and swallow QID  -Chronic back pain   Continue PRN norco and lidoderm patch      -Peripheral neuropathy   Continue gabapentin      -Closed fracture of right upper extremity   Plain films at [facility] of poor quality, [facility] could not assess subacute fracture. Per [facility], NWB RUE. Follow up in clinic as scheduled. -Dysphagia   Mechanical soft diet. SLP consulted. -Declining functional status   PT/OT consulted.  Fall precautions. -HTN (hypertension) [I10]  Continue amlodipine and losartan. -Diabetes mellitus, type II   Was on metformin, tresiba and glyburide prior to INDY at [facility]. Discharged on lantus 27 units Q HS, humalog 14 units TID with meals and SSI. CKD, stage III  Baseline sCr of 1.3. Creatinine at baseline. Avoid nephrotoxins, NSAIDs. Monitor.     -Hypomagnesemia   Monitor and replete PRN. -Anemia  Unknown baseline hgb. Hgb today 8.3  Will check iron studies, B12 and folate WNL  Monitor and transfuse for Hgb <7.0. No active bleeding.     -Recent cholecystitis s/p CCY with suspected cholangitis  Completed antibiotics at [facility]. Follow up with [facility] in Clinic in 4 weeks.                  Electronically signed by Jam Hammer MD on 12/26/2019 at 11:05 AM

## 2019-12-26 NOTE — PROGRESS NOTES
assistance        Functional Mobility  Functional - Mobility Device: Hemiwalker  Activity: Other  Functional Mobility Comments: 4,4, 4, 6     Transfers  Stand Step Transfers: Minimal assistance;2 Person assistance  Sit to stand: Minimal assistance;2 Person assistance; Independent              Plan   Plan  Times per week: 3-5  Times per day: Daily  Plan weeks: 1-3  Current Treatment Recommendations: Endurance Training, Strengthening, Balance Training, Safety Education & Training, Self-Care / ADL, Neuromuscular Re-education, Patient/Caregiver Education & Training, Functional Mobility Training    Goals  Short term goals  Time Frame for Short term goals: 1-2 weeks  Short term goal 1: Pt to complete Sit to stand transfer using mana walker with min assist.   Short term goal 2: Pt to complete SPT from bed to chair/BSC with min assist using mana walker assistx1  Short term goal 3: pt to complete toileting with min assist.   Short term goal 4: Pt to complete dressing with min assist for LB and UB. Short term goal 5: Pt to complete sponge bathing with MIn assist.   Long term goals  Time Frame for Long term goals : 2-3 weeks. Long term goal 1: Pt to complete toileting with MOD I. Long term goal 2: Pt to tolerate x20 minutes of activity to increase functional activity tolerance. Long term goal 3: Pt to complete dressing with MOD I. Long term goal 4: Pt to complete bathing with SULLIVAN. Therapy Time   Individual Concurrent Group Co-treatment   Time In 1218         Time Out 3231         Minutes 38              This note serves as a DC summary in the event of pt discharge.      Valery Cabrera, OTR/L

## 2019-12-27 LAB
GLUCOSE BLD-MCNC: 166 MG/DL (ref 74–106)
GLUCOSE BLD-MCNC: 219 MG/DL (ref 74–106)
GLUCOSE BLD-MCNC: 220 MG/DL (ref 74–106)
GLUCOSE BLD-MCNC: 260 MG/DL (ref 74–106)
PERFORMED ON: ABNORMAL

## 2019-12-27 PROCEDURE — 97530 THERAPEUTIC ACTIVITIES: CPT

## 2019-12-27 PROCEDURE — 6370000000 HC RX 637 (ALT 250 FOR IP): Performed by: PHYSICIAN ASSISTANT

## 2019-12-27 PROCEDURE — 6360000002 HC RX W HCPCS: Performed by: PHYSICIAN ASSISTANT

## 2019-12-27 PROCEDURE — 6370000000 HC RX 637 (ALT 250 FOR IP): Performed by: INTERNAL MEDICINE

## 2019-12-27 PROCEDURE — 92610 EVALUATE SWALLOWING FUNCTION: CPT

## 2019-12-27 PROCEDURE — 97116 GAIT TRAINING THERAPY: CPT | Performed by: PHYSICAL THERAPY ASSISTANT

## 2019-12-27 PROCEDURE — 1200000002 HC SEMI PRIVATE SWING BED

## 2019-12-27 PROCEDURE — 97803 MED NUTRITION INDIV SUBSEQ: CPT

## 2019-12-27 RX ADMIN — HYDROCODONE BITARTRATE AND ACETAMINOPHEN 0.5 TABLET: 5; 325 TABLET ORAL at 16:54

## 2019-12-27 RX ADMIN — GUAIFENESIN AND DEXTROMETHORPHAN 5 ML: 100; 10 SYRUP ORAL at 17:00

## 2019-12-27 RX ADMIN — GABAPENTIN 300 MG: 300 CAPSULE ORAL at 15:01

## 2019-12-27 RX ADMIN — Medication 2 UNITS: at 16:58

## 2019-12-27 RX ADMIN — FLUTICASONE PROPIONATE 2 SPRAY: 50 SPRAY, METERED NASAL at 21:00

## 2019-12-27 RX ADMIN — DIPHENHYDRAMINE HYDROCHLORIDE AND LIDOCAINE HYDROCHLORIDE AND ALUMINUM HYDROXIDE AND MAGNESIUM HYDRO 5 ML: KIT at 20:59

## 2019-12-27 RX ADMIN — INSULIN LISPRO 2 UNITS: 100 INJECTION, SOLUTION INTRAVENOUS; SUBCUTANEOUS at 21:04

## 2019-12-27 RX ADMIN — Medication 14 UNITS: at 16:57

## 2019-12-27 RX ADMIN — DULOXETINE HYDROCHLORIDE 60 MG: 30 CAPSULE, DELAYED RELEASE ORAL at 08:20

## 2019-12-27 RX ADMIN — BUSPIRONE HYDROCHLORIDE 5 MG: 5 TABLET ORAL at 20:59

## 2019-12-27 RX ADMIN — SENNOSIDES AND DOCUSATE SODIUM 1 TABLET: 8.6; 5 TABLET ORAL at 08:20

## 2019-12-27 RX ADMIN — AMLODIPINE BESYLATE 10 MG: 5 TABLET ORAL at 08:20

## 2019-12-27 RX ADMIN — CLOTRIMAZOLE: 10 CREAM TOPICAL at 20:59

## 2019-12-27 RX ADMIN — LOSARTAN POTASSIUM 25 MG: 25 TABLET, FILM COATED ORAL at 08:20

## 2019-12-27 RX ADMIN — ENOXAPARIN SODIUM 40 MG: 40 INJECTION SUBCUTANEOUS at 08:21

## 2019-12-27 RX ADMIN — BUSPIRONE HYDROCHLORIDE 5 MG: 5 TABLET ORAL at 08:20

## 2019-12-27 RX ADMIN — Medication 2 UNITS: at 12:22

## 2019-12-27 RX ADMIN — Medication 14 UNITS: at 08:28

## 2019-12-27 RX ADMIN — GABAPENTIN 300 MG: 300 CAPSULE ORAL at 20:59

## 2019-12-27 RX ADMIN — PANTOPRAZOLE SODIUM 40 MG: 40 TABLET, DELAYED RELEASE ORAL at 06:32

## 2019-12-27 RX ADMIN — INSULIN GLARGINE 27 UNITS: 100 INJECTION, SOLUTION SUBCUTANEOUS at 21:04

## 2019-12-27 RX ADMIN — GABAPENTIN 300 MG: 300 CAPSULE ORAL at 08:20

## 2019-12-27 RX ADMIN — SENNOSIDES AND DOCUSATE SODIUM 1 TABLET: 8.6; 5 TABLET ORAL at 20:59

## 2019-12-27 RX ADMIN — HYDROCODONE BITARTRATE AND ACETAMINOPHEN 0.5 TABLET: 5; 325 TABLET ORAL at 08:26

## 2019-12-27 RX ADMIN — Medication 14 UNITS: at 12:21

## 2019-12-27 RX ADMIN — ASPIRIN 81 MG: 81 TABLET, COATED ORAL at 08:20

## 2019-12-27 RX ADMIN — Medication 1 UNITS: at 08:31

## 2019-12-27 ASSESSMENT — PAIN SCALES - GENERAL
PAINLEVEL_OUTOF10: 6
PAINLEVEL_OUTOF10: 6

## 2019-12-27 NOTE — PROGRESS NOTES
Nutrition Assessment    Type and Reason for Visit: (follow up)    Nutrition Recommendations: encourage compliance of diet and intakes. Nutrition Assessment: pt at risk for nutritional compromise AEB increased needs and limited preferences. Does not have upper dentures. Malnutrition Assessment:  · Malnutrition Status: At risk for malnutrition  · Context: Acute illness or injury  · Findings of the 6 clinical characteristics of malnutrition (Minimum of 2 out of 6 clinical characteristics is required to make the diagnosis of moderate or severe Protein Calorie Malnutrition based on AND/ASPEN Guidelines):  1. Energy Intake- ,      2. Weight Loss-No significant weight loss,    3. Fat Loss-No significant subcutaneous fat loss,    4. Muscle Loss-No significant muscle mass loss,    5. Fluid Accumulation-Mild fluid accumulation, Extremities  6.  Strength-Not measured    Nutrition Risk Level: Moderate    Nutrient Needs:  · Estimated Daily Total Kcal: 3106-7859  · Estimated Daily Protein (g): 77(1.25 gm/kg IBw)  · Estimated Daily Total Fluid (ml/day): 3807-9554(4 ml/kcal of est energy intakes/needs)    Nutrition Diagnosis:   · Problem: Predicted suboptimal energy intake  · Etiology: related to Partial or complete edentulism, Renal dysfunction, Endocrine dysfunction(limited food preferences.)     Signs and symptoms:  as evidenced by Patient report of, Localized or generalized fluid accumulation, Lab values, Intake 50-75%    Objective Information:  · Nutrition-Focused Physical Findings: pt has no known wt loss, no apparent fat loss or muscle wasting, does have healing incision from gallbladder removal.  Has trace edema.   · Wound Type: Surgical Wound  · Current Nutrition Therapies:  · Oral Diet Orders: Dental Soft   · Oral Diet intake: 51-75%  · Oral Nutrition Supplement (ONS) Orders: Standard High Calorie Oral Supplement  · ONS intake: 26-50%  · Anthropometric Measures:  · Ht: 5' 7\" (170.2 cm)   · Current Body Wt: 213 lb 3 oz (96.7 kg)  · Admission Body Wt: 211 lb (95.7 kg)  · Usual Body Wt:    · % Weight Change:  ,     · Ideal Body Wt: 135 lb (61.2 kg), % Ideal Body 156  · Adjusted Body Wt:  , body weight adjusted for    · BMI Classification: BMI 30.0 - 34.9 Obese Class I(33.14)    Nutrition Interventions:   Continue current diet, Continue current ONS  Continued Inpatient Monitoring, Coordination of Care    Nutrition Evaluation:   · Evaluation: Progressing toward goals   · Goals: to meet est needs for age/condition    · Monitoring: Nutrition Progression, Meal Intake, Supplement Intake, I&O, Weight, Pertinent Labs      Electronically signed by Jayashree Hughes on 12/27/19 at 12:46 PM

## 2019-12-28 LAB
GLUCOSE BLD-MCNC: 198 MG/DL (ref 74–106)
GLUCOSE BLD-MCNC: 205 MG/DL (ref 74–106)
GLUCOSE BLD-MCNC: 228 MG/DL (ref 74–106)
GLUCOSE BLD-MCNC: 236 MG/DL (ref 74–106)
PERFORMED ON: ABNORMAL

## 2019-12-28 PROCEDURE — 1200000002 HC SEMI PRIVATE SWING BED

## 2019-12-28 PROCEDURE — 6370000000 HC RX 637 (ALT 250 FOR IP): Performed by: PHYSICIAN ASSISTANT

## 2019-12-28 PROCEDURE — 6360000002 HC RX W HCPCS: Performed by: PHYSICIAN ASSISTANT

## 2019-12-28 RX ADMIN — CLOTRIMAZOLE: 10 CREAM TOPICAL at 21:40

## 2019-12-28 RX ADMIN — PANTOPRAZOLE SODIUM 40 MG: 40 TABLET, DELAYED RELEASE ORAL at 06:17

## 2019-12-28 RX ADMIN — GABAPENTIN 300 MG: 300 CAPSULE ORAL at 13:10

## 2019-12-28 RX ADMIN — ENOXAPARIN SODIUM 40 MG: 40 INJECTION SUBCUTANEOUS at 08:33

## 2019-12-28 RX ADMIN — BUSPIRONE HYDROCHLORIDE 5 MG: 5 TABLET ORAL at 07:34

## 2019-12-28 RX ADMIN — FLUTICASONE PROPIONATE 2 SPRAY: 50 SPRAY, METERED NASAL at 21:46

## 2019-12-28 RX ADMIN — HYDROCODONE BITARTRATE AND ACETAMINOPHEN 0.5 TABLET: 5; 325 TABLET ORAL at 21:39

## 2019-12-28 RX ADMIN — GABAPENTIN 300 MG: 300 CAPSULE ORAL at 07:34

## 2019-12-28 RX ADMIN — Medication 14 UNITS: at 07:41

## 2019-12-28 RX ADMIN — DULOXETINE HYDROCHLORIDE 60 MG: 30 CAPSULE, DELAYED RELEASE ORAL at 07:34

## 2019-12-28 RX ADMIN — Medication 1 UNITS: at 07:42

## 2019-12-28 RX ADMIN — SENNOSIDES AND DOCUSATE SODIUM 1 TABLET: 8.6; 5 TABLET ORAL at 07:34

## 2019-12-28 RX ADMIN — MECLIZINE HYDROCHLORIDE 25 MG: 25 TABLET ORAL at 13:10

## 2019-12-28 RX ADMIN — Medication 2 UNITS: at 16:30

## 2019-12-28 RX ADMIN — SENNOSIDES AND DOCUSATE SODIUM 1 TABLET: 8.6; 5 TABLET ORAL at 21:39

## 2019-12-28 RX ADMIN — Medication 14 UNITS: at 16:32

## 2019-12-28 RX ADMIN — INSULIN GLARGINE 27 UNITS: 100 INJECTION, SOLUTION SUBCUTANEOUS at 21:46

## 2019-12-28 RX ADMIN — HYDROCODONE BITARTRATE AND ACETAMINOPHEN 0.5 TABLET: 5; 325 TABLET ORAL at 06:21

## 2019-12-28 RX ADMIN — GABAPENTIN 300 MG: 300 CAPSULE ORAL at 21:39

## 2019-12-28 RX ADMIN — AMLODIPINE BESYLATE 10 MG: 5 TABLET ORAL at 07:34

## 2019-12-28 RX ADMIN — LOSARTAN POTASSIUM 25 MG: 25 TABLET, FILM COATED ORAL at 07:34

## 2019-12-28 RX ADMIN — DIPHENHYDRAMINE HYDROCHLORIDE AND LIDOCAINE HYDROCHLORIDE AND ALUMINUM HYDROXIDE AND MAGNESIUM HYDRO 5 ML: KIT at 21:41

## 2019-12-28 RX ADMIN — INSULIN LISPRO 1 UNITS: 100 INJECTION, SOLUTION INTRAVENOUS; SUBCUTANEOUS at 21:46

## 2019-12-28 RX ADMIN — Medication 2 UNITS: at 11:17

## 2019-12-28 RX ADMIN — ASPIRIN 81 MG: 81 TABLET, COATED ORAL at 07:34

## 2019-12-28 RX ADMIN — BUSPIRONE HYDROCHLORIDE 5 MG: 5 TABLET ORAL at 21:39

## 2019-12-28 RX ADMIN — Medication 14 UNITS: at 11:20

## 2019-12-28 ASSESSMENT — PAIN SCALES - GENERAL
PAINLEVEL_OUTOF10: 6
PAINLEVEL_OUTOF10: 6

## 2019-12-28 NOTE — PLAN OF CARE
Problem: Falls - Risk of:  Goal: Will remain free from falls  Description  Will remain free from falls  12/28/2019 0945 by Jasmin Villa RN  Outcome: Ongoing  12/27/2019 2241 by Hemant Parker RN  Outcome: Ongoing     Problem: Risk for Impaired Skin Integrity  Goal: Tissue integrity - skin and mucous membranes  Description  Structural intactness and normal physiological function of skin and  mucous membranes.   12/28/2019 0945 by Jasmin Villa RN  Outcome: Ongoing  12/27/2019 2241 by Hemant Parker RN  Outcome: Ongoing     Problem: Infection:  Goal: Will remain free from infection  Description  Will remain free from infection  12/27/2019 2241 by Hemant Parker RN  Outcome: Ongoing     Problem: Pain:  Goal: Patient's pain/discomfort is manageable  Description  Patient's pain/discomfort is manageable  Outcome: Ongoing

## 2019-12-28 NOTE — FLOWSHEET NOTE
12/27/19 2246   Assessment   Charting Type Shift assessment   Neurological   Neuro (WDL) WDL   Level of Consciousness 0   Swallow Screening   Is the patient able to remain alert for testing? Yes   Giovanny Coma Scale   Eye Opening 4   Best Verbal Response 5   Best Motor Response 6   Gi[place]anny Coma Scale Score 15   NIH/MNHISS Stroke Scale   NIH/MNIHSS Stroke Scale Assessed No   HEENT   HEENT (WDL) X   Teeth Missing teeth   Left Eye Impaired vision;Double Vision   Right Eye Impaired vision;Double vision   Respiratory   Respiratory (WDL) X   L Breath Sounds Clear   R Breath Sounds Clear   Cough/Sputum   Cough Non-productive;Dry   Cough Description   Sputum Amount None   Cardiac   Cardiac (WDL) X   Cardiac Monitor   Telemetry Monitor On No   Gastrointestinal   Abdominal (WDL) X   Abdomen Inspection Distended;Rounded; Soft   RUQ Bowel Sounds Active   LUQ Bowel Sounds Active   RLQ Bowel Sounds Active   LLQ Bowel Sounds Active   Peripheral Vascular   Edema Right lower extremity; Left lower extremity   RLE Edema Pitting;+1   LLE Edema Pitting;+1   Skin Color/Condition   Skin Color/Condition (WDL) X   Skin Color Pale   Skin Condition/Temp Warm;Dry   Skin Integrity   Skin Integrity (WDL) X   Skin Integrity Intact  (healing scabbed areas)   Location Abdomen   Preventative Dressing No   Skin Fold Management No   Musculoskeletal   Musculoskeletal (WDL) X   RUE Weakness; Other (Comment)  (tremors)   LUE Full movement   RL Extremity Weakness   LL Extremity Weakness   Genitourinary   Genitourinary (WDL) WDL   Psychosocial   Psychosocial (WDL) WDL

## 2019-12-29 LAB
GLUCOSE BLD-MCNC: 111 MG/DL (ref 74–106)
GLUCOSE BLD-MCNC: 142 MG/DL (ref 74–106)
GLUCOSE BLD-MCNC: 200 MG/DL (ref 74–106)
GLUCOSE BLD-MCNC: 244 MG/DL (ref 74–106)
PERFORMED ON: ABNORMAL

## 2019-12-29 PROCEDURE — 6370000000 HC RX 637 (ALT 250 FOR IP): Performed by: PHYSICIAN ASSISTANT

## 2019-12-29 PROCEDURE — 1200000002 HC SEMI PRIVATE SWING BED

## 2019-12-29 PROCEDURE — 6360000002 HC RX W HCPCS: Performed by: PHYSICIAN ASSISTANT

## 2019-12-29 RX ADMIN — Medication 14 UNITS: at 11:31

## 2019-12-29 RX ADMIN — ENOXAPARIN SODIUM 40 MG: 40 INJECTION SUBCUTANEOUS at 08:30

## 2019-12-29 RX ADMIN — Medication 14 UNITS: at 08:38

## 2019-12-29 RX ADMIN — FLUTICASONE PROPIONATE 2 SPRAY: 50 SPRAY, METERED NASAL at 08:31

## 2019-12-29 RX ADMIN — GABAPENTIN 300 MG: 300 CAPSULE ORAL at 20:13

## 2019-12-29 RX ADMIN — HYDROCODONE BITARTRATE AND ACETAMINOPHEN 0.5 TABLET: 5; 325 TABLET ORAL at 20:13

## 2019-12-29 RX ADMIN — GABAPENTIN 300 MG: 300 CAPSULE ORAL at 08:31

## 2019-12-29 RX ADMIN — AMLODIPINE BESYLATE 10 MG: 5 TABLET ORAL at 08:31

## 2019-12-29 RX ADMIN — FLUTICASONE PROPIONATE 2 SPRAY: 50 SPRAY, METERED NASAL at 20:15

## 2019-12-29 RX ADMIN — GABAPENTIN 300 MG: 300 CAPSULE ORAL at 15:10

## 2019-12-29 RX ADMIN — INSULIN LISPRO 1 UNITS: 100 INJECTION, SOLUTION INTRAVENOUS; SUBCUTANEOUS at 20:19

## 2019-12-29 RX ADMIN — SENNOSIDES AND DOCUSATE SODIUM 1 TABLET: 8.6; 5 TABLET ORAL at 08:31

## 2019-12-29 RX ADMIN — PANTOPRAZOLE SODIUM 40 MG: 40 TABLET, DELAYED RELEASE ORAL at 06:30

## 2019-12-29 RX ADMIN — INSULIN GLARGINE 27 UNITS: 100 INJECTION, SOLUTION SUBCUTANEOUS at 20:19

## 2019-12-29 RX ADMIN — Medication 1 UNITS: at 11:31

## 2019-12-29 RX ADMIN — LOSARTAN POTASSIUM 25 MG: 25 TABLET, FILM COATED ORAL at 08:31

## 2019-12-29 RX ADMIN — BUSPIRONE HYDROCHLORIDE 5 MG: 5 TABLET ORAL at 20:13

## 2019-12-29 RX ADMIN — SENNOSIDES AND DOCUSATE SODIUM 1 TABLET: 8.6; 5 TABLET ORAL at 20:13

## 2019-12-29 RX ADMIN — HYDROCODONE BITARTRATE AND ACETAMINOPHEN 0.5 TABLET: 5; 325 TABLET ORAL at 06:30

## 2019-12-29 RX ADMIN — Medication 2 UNITS: at 08:36

## 2019-12-29 RX ADMIN — BUSPIRONE HYDROCHLORIDE 5 MG: 5 TABLET ORAL at 08:31

## 2019-12-29 RX ADMIN — ASPIRIN 81 MG: 81 TABLET, COATED ORAL at 08:31

## 2019-12-29 RX ADMIN — CLOTRIMAZOLE: 10 CREAM TOPICAL at 20:15

## 2019-12-29 RX ADMIN — DIPHENHYDRAMINE HYDROCHLORIDE AND LIDOCAINE HYDROCHLORIDE AND ALUMINUM HYDROXIDE AND MAGNESIUM HYDRO 5 ML: KIT at 20:13

## 2019-12-29 RX ADMIN — DULOXETINE HYDROCHLORIDE 60 MG: 30 CAPSULE, DELAYED RELEASE ORAL at 08:31

## 2019-12-29 ASSESSMENT — PAIN SCALES - GENERAL
PAINLEVEL_OUTOF10: 7
PAINLEVEL_OUTOF10: 6

## 2019-12-30 LAB
GLUCOSE BLD-MCNC: 178 MG/DL (ref 74–106)
GLUCOSE BLD-MCNC: 179 MG/DL (ref 74–106)
GLUCOSE BLD-MCNC: 269 MG/DL (ref 74–106)
GLUCOSE BLD-MCNC: 280 MG/DL (ref 74–106)
PERFORMED ON: ABNORMAL

## 2019-12-30 PROCEDURE — 97530 THERAPEUTIC ACTIVITIES: CPT

## 2019-12-30 PROCEDURE — 6370000000 HC RX 637 (ALT 250 FOR IP): Performed by: PHYSICIAN ASSISTANT

## 2019-12-30 PROCEDURE — 1200000002 HC SEMI PRIVATE SWING BED

## 2019-12-30 PROCEDURE — 6360000002 HC RX W HCPCS: Performed by: PHYSICIAN ASSISTANT

## 2019-12-30 PROCEDURE — 97116 GAIT TRAINING THERAPY: CPT | Performed by: PHYSICAL THERAPY ASSISTANT

## 2019-12-30 PROCEDURE — 97530 THERAPEUTIC ACTIVITIES: CPT | Performed by: PHYSICAL THERAPY ASSISTANT

## 2019-12-30 RX ADMIN — INSULIN GLARGINE 27 UNITS: 100 INJECTION, SOLUTION SUBCUTANEOUS at 21:47

## 2019-12-30 RX ADMIN — Medication 3 UNITS: at 11:45

## 2019-12-30 RX ADMIN — Medication 14 UNITS: at 17:54

## 2019-12-30 RX ADMIN — FLUTICASONE PROPIONATE 2 SPRAY: 50 SPRAY, METERED NASAL at 09:37

## 2019-12-30 RX ADMIN — CLOTRIMAZOLE: 10 CREAM TOPICAL at 09:38

## 2019-12-30 RX ADMIN — POLYETHYLENE GLYCOL 3350 17 G: 17 POWDER, FOR SOLUTION ORAL at 09:36

## 2019-12-30 RX ADMIN — BUSPIRONE HYDROCHLORIDE 5 MG: 5 TABLET ORAL at 21:45

## 2019-12-30 RX ADMIN — Medication 14 UNITS: at 11:46

## 2019-12-30 RX ADMIN — Medication 1 UNITS: at 17:54

## 2019-12-30 RX ADMIN — Medication 1 UNITS: at 09:44

## 2019-12-30 RX ADMIN — GABAPENTIN 300 MG: 300 CAPSULE ORAL at 13:49

## 2019-12-30 RX ADMIN — DIPHENHYDRAMINE HYDROCHLORIDE AND LIDOCAINE HYDROCHLORIDE AND ALUMINUM HYDROXIDE AND MAGNESIUM HYDRO 5 ML: KIT at 21:46

## 2019-12-30 RX ADMIN — ASPIRIN 81 MG: 81 TABLET, COATED ORAL at 09:36

## 2019-12-30 RX ADMIN — AMLODIPINE BESYLATE 10 MG: 5 TABLET ORAL at 09:36

## 2019-12-30 RX ADMIN — SENNOSIDES AND DOCUSATE SODIUM 1 TABLET: 8.6; 5 TABLET ORAL at 09:36

## 2019-12-30 RX ADMIN — SENNOSIDES AND DOCUSATE SODIUM 1 TABLET: 8.6; 5 TABLET ORAL at 21:45

## 2019-12-30 RX ADMIN — Medication 14 UNITS: at 09:44

## 2019-12-30 RX ADMIN — ENOXAPARIN SODIUM 40 MG: 40 INJECTION SUBCUTANEOUS at 09:36

## 2019-12-30 RX ADMIN — LOSARTAN POTASSIUM 25 MG: 25 TABLET, FILM COATED ORAL at 09:37

## 2019-12-30 RX ADMIN — PANTOPRAZOLE SODIUM 40 MG: 40 TABLET, DELAYED RELEASE ORAL at 06:25

## 2019-12-30 RX ADMIN — GABAPENTIN 300 MG: 300 CAPSULE ORAL at 09:36

## 2019-12-30 RX ADMIN — HYDROCODONE BITARTRATE AND ACETAMINOPHEN 0.5 TABLET: 5; 325 TABLET ORAL at 06:25

## 2019-12-30 RX ADMIN — BUSPIRONE HYDROCHLORIDE 5 MG: 5 TABLET ORAL at 09:36

## 2019-12-30 RX ADMIN — HYDROCODONE BITARTRATE AND ACETAMINOPHEN 0.5 TABLET: 5; 325 TABLET ORAL at 13:49

## 2019-12-30 RX ADMIN — FLUTICASONE PROPIONATE 2 SPRAY: 50 SPRAY, METERED NASAL at 21:46

## 2019-12-30 RX ADMIN — HYDROCODONE BITARTRATE AND ACETAMINOPHEN 0.5 TABLET: 5; 325 TABLET ORAL at 21:45

## 2019-12-30 RX ADMIN — INSULIN LISPRO 2 UNITS: 100 INJECTION, SOLUTION INTRAVENOUS; SUBCUTANEOUS at 21:47

## 2019-12-30 RX ADMIN — GABAPENTIN 300 MG: 300 CAPSULE ORAL at 21:45

## 2019-12-30 RX ADMIN — DULOXETINE HYDROCHLORIDE 60 MG: 30 CAPSULE, DELAYED RELEASE ORAL at 09:36

## 2019-12-30 ASSESSMENT — PAIN SCALES - GENERAL
PAINLEVEL_OUTOF10: 6
PAINLEVEL_OUTOF10: 4
PAINLEVEL_OUTOF10: 6

## 2019-12-30 NOTE — PROGRESS NOTES
Physical Therapy  Facility/Department: [facility] MED SURG  Daily Treatment Note  NAME: Favian Barajas  : 1940  MRN: [id number]    Date of Service: 2019    Discharge Recommendations:  Continue to assess pending progress        Assessment   Assessment: Patient agreeable to PT skilled services. Co tx provided with OT for increased patient and staff safety. Paitent amb 50ftx2 reps on this date. Max cuing provided for three point gait pattern. Increased assist required as patient fatigued. Patient returned to room and completed 3 t/f from chair to chair. Patient demonstrated poor safety awareness, sitting prior to lining up with chair and reaching for arm rests. Patient t/f to reclienr chair with use of rupesh stdy, no needs noted. Spouse present, call light within reach. Activity Tolerance  Activity Tolerance: Patient Tolerated treatment well     Patient Diagnosis(es): There were no encounter diagnoses. has a past medical history of Allergic rhinitis, Anxiety, Chronic back pain, Depression, Double vision with both eyes open, Fall, Hyperlipidemia, Hypertension, Osteoarthritis, and Type II or unspecified type diabetes mellitus without mention of complication, not stated as uncontrolled. has a past surgical history that includes Hysterectomy; shoulder surgery;  section; and Colonoscopy. Restrictions  Restrictions/Precautions  Restrictions/Precautions: Weight Bearing, General Precautions, Fall Risk  Required Braces or Orthoses?: No  Upper Extremity Weight Bearing Restrictions  Right Upper Extremity Weight Bearing: Non Weight Bearing  Subjective   General  Chart Reviewed: Yes  Response To Previous Treatment: Patient with no complaints from previous session. Family / Caregiver Present: No  Subjective  Subjective: Patient agreeable to PT skilled services. Patient reports she has had a fall today and yesterday landing on her knee.        Objective   Bed mobility  Rolling to Right: Minimal assistance  Supine to Sit: Minimal assistance  Transfers  Sit to Stand: Moderate Assistance;2 Person Assistance  Stand to sit: Moderate Assistance;2 Person Assistance  Bed to Chair: Moderate assistance;2 Person Assistance;Minimal assistance  Ambulation  Ambulation?: Yes  Ambulation 1  Surface: level tile  Device: Hemiwalker  Assistance: Minimal assistance;2 Person assistance  Distance: [id number]  Comments: Poor safety awareness. Decreased step length and cadance. Step to gait cycle. PROM RLE (degrees)  RLE PROM: WFL  AROM RLE (degrees)  RLE AROM: WFL  PROM LLE (degrees)  LLE PROM: WFL  AROM LLE (degrees)  LLE AROM : WFL       Goals  Long term goals  Time Frame for Long term goals : 2 weeks  Long term goal 1: Achieve mod I for bed mobility. Long term goal 2: Perform basic transfers with SBA x 1. Long term goal 3: Ambulate 15 feet with RW or SE with CGA x 1. Long term goal 4: Demonstrate good safety awareness with functional mobility. Plan    Plan  Times per week: 4-5 x week  Plan weeks: 2 weeks  Current Treatment Recommendations: Strengthening, Balance Training, Functional Mobility Training, Transfer Training, Endurance Training, Neuromuscular Re-education, Safety Education & Training, Patient/Caregiver Education & Training, Gait Training  Safety Devices  Type of devices: Call light within reach, Left in chair     Therapy Time   Individual Concurrent Group Co-treatment   Time In 1260         Time Out 1438         Minutes [address] [place] [address]     This note serves a discharge summary in the event of patient discharge.

## 2019-12-30 NOTE — FLOWSHEET NOTE
Pt laying in bed, Alert, No acute distress noted at this time. Pt continues on RA. Pt is Alert and Oriented x 4. No change from previous assessment, no edema noted. Abdomen is soft, non-tender, \"sore on the inside\" of RUQ, denies any changes. POC for the day reviewed. Pt agrees she  will participated in swing bed Activity for the day reviewed. Pt denies questions. Pt denies any other needs at this time. Will continue to monitor. 12/30/19 0930   Assessment   Charting Type Shift assessment   Neurological   Neuro (WDL) WDL   Level of Consciousness 0   [place] Coma Scale   Eye Opening 4   Best Verbal Response 5   Best Motor Response 6   [place] Coma Scale Score 15   HEENT   HEENT (WDL) X   Mucous Membrane Other (Comment)   Teeth Missing teeth   Left Eye Impaired vision;Double Vision   Right Eye Impaired vision;Double vision   Respiratory   Respiratory (WDL) X   L Breath Sounds Clear   R Breath Sounds Clear   Cough/Sputum   Cough Non-productive;Dry   Cough Description   Sputum Amount None   Cardiac   Cardiac (WDL) X   Cardiac Monitor   Telemetry Monitor On No   Gastrointestinal   Abdominal (WDL) X   Abdomen Inspection Distended;Rounded; Soft   RUQ Bowel Sounds Active   LUQ Bowel Sounds Active   RLQ Bowel Sounds Active   LLQ Bowel Sounds Active   Peripheral Vascular   Edema None   RLE Edema None   LLE Edema None   Skin Color/Condition   Skin Color/Condition (WDL) X   Skin Integrity   Skin Integrity (WDL) X   Skin Integrity Intact  (incisions)   Location abd   Preventative Dressing No   Skin Fold Management No   Musculoskeletal   Musculoskeletal (WDL) X   RUE Weakness; Other (Comment)  (tremors)   LUE Full movement   RL Extremity Weakness   LL Extremity Weakness   Genitourinary   Genitourinary (WDL) WDL   Urine Assessment   Incontinence No   Anus/Rectum   Anus/Rectum (WDL) WDL   Psychosocial   Psychosocial (WDL) WDL

## 2019-12-30 NOTE — PROGRESS NOTES
Occupational Therapy  Facility/Department: [facility] MED SURG  Daily Treatment Note  NAME: Haley Fraire  : 1940  MRN: [id number]    Date of Service: 2019    Discharge Recommendations:  Home with Home health OT       Assessment   Assessment: CO tx with PTA on this date. Pt come to sit at EOB with SBA. Pt come to stand with CGA and transferred with min assist x2. Pt ambulated in hallway with CGA to come to stand and MOD A to weight shift. Pt having difficulty attempting to weight shift. Pt assisted to chair and educated to continue to increase time OOB and to have staff assist with going to bathroom using rupesh steady. Pt left with spouse present in room. Patient Diagnosis(es): There were no encounter diagnoses. has a past medical history of Allergic rhinitis, Anxiety, Chronic back pain, Depression, Double vision with both eyes open, Fall, Hyperlipidemia, Hypertension, Osteoarthritis, and Type II or unspecified type diabetes mellitus without mention of complication, not stated as uncontrolled. has a past surgical history that includes Hysterectomy; shoulder surgery;  section; and Colonoscopy. Restrictions  Restrictions/Precautions  Restrictions/Precautions: Weight Bearing, General Precautions, Fall Risk  Required Braces or Orthoses?: No  Upper Extremity Weight Bearing Restrictions  Right Upper Extremity Weight Bearing: Non Weight Bearing  Subjective   General  Chart Reviewed: Yes  Patient assessed for rehabilitation services?: Yes  Family / Caregiver Present: Yes  Referring Practitioner: KEATON Covington  Diagnosis: S/p Avinash Mcallister,   Subjective  Subjective: Pt reports she has been hospitalized last 16 days. Has been unable to complete self care and mobility. Using lift for transfers.        Orientation     Objective             Functional Mobility  Assist Level: (CGA<>MOD A)  Functional Mobility Comments: 25, 18, 8, 10 Plan   Plan  Times per week: 3-5  Times per day: Daily  Plan weeks: 1-3  Current Treatment Recommendations: Endurance Training, Strengthening, Balance Training, Safety Education & Training, Self-Care / ADL, Neuromuscular Re-education, Patient/Caregiver Education & Training, Functional Mobility Training  G-Code     OutComes Score                                                  AM-PAC Score             Goals  Short term goals  Time Frame for Short term goals: 1-2 weeks  Short term goal 1: Pt to complete Sit to stand transfer using mana walker with min assist.   Short term goal 2: Pt to complete SPT from bed to chair/BSC with min assist using mana walker assistx1  Short term goal 3: pt to complete toileting with min assist.   Short term goal 4: Pt to complete dressing with min assist for LB and UB. Short term goal 5: Pt to complete sponge bathing with MIn assist.   Long term goals  Time Frame for Long term goals : 2-3 weeks. Long term goal 1: Pt to complete toileting with MOD I. Long term goal 2: Pt to tolerate x20 minutes of activity to increase functional activity tolerance. Long term goal 3: Pt to complete dressing with MOD I. Long term goal 4: Pt to complete bathing with SULLIVAN. Therapy Time   Individual Concurrent Group Co-treatment   Time In  3776         Time Out  1125         Minutes  30           This note serves as a DC summary in the event of pt discharge.         SHIRLEY Ugalde/L

## 2019-12-31 LAB
GLUCOSE BLD-MCNC: 128 MG/DL (ref 74–106)
GLUCOSE BLD-MCNC: 216 MG/DL (ref 74–106)
GLUCOSE BLD-MCNC: 218 MG/DL (ref 74–106)
GLUCOSE BLD-MCNC: 87 MG/DL (ref 74–106)
PERFORMED ON: ABNORMAL
PERFORMED ON: NORMAL

## 2019-12-31 PROCEDURE — 6370000000 HC RX 637 (ALT 250 FOR IP): Performed by: INTERNAL MEDICINE

## 2019-12-31 PROCEDURE — 6370000000 HC RX 637 (ALT 250 FOR IP): Performed by: PHYSICIAN ASSISTANT

## 2019-12-31 PROCEDURE — 6370000000 HC RX 637 (ALT 250 FOR IP)

## 2019-12-31 PROCEDURE — 1200000002 HC SEMI PRIVATE SWING BED

## 2019-12-31 PROCEDURE — 97530 THERAPEUTIC ACTIVITIES: CPT

## 2019-12-31 PROCEDURE — 97116 GAIT TRAINING THERAPY: CPT

## 2019-12-31 PROCEDURE — 6360000002 HC RX W HCPCS: Performed by: PHYSICIAN ASSISTANT

## 2019-12-31 RX ORDER — LORAZEPAM 0.5 MG/1
0.5 TABLET ORAL EVERY 8 HOURS PRN
Status: DISCONTINUED | OUTPATIENT
Start: 2019-12-31 | End: 2020-01-19 | Stop reason: HOSPADM

## 2019-12-31 RX ORDER — LORAZEPAM 0.5 MG/1
TABLET ORAL
Status: COMPLETED
Start: 2019-12-31 | End: 2019-12-31

## 2019-12-31 RX ADMIN — GABAPENTIN 300 MG: 300 CAPSULE ORAL at 08:34

## 2019-12-31 RX ADMIN — Medication 14 UNITS: at 17:34

## 2019-12-31 RX ADMIN — ENOXAPARIN SODIUM 40 MG: 40 INJECTION SUBCUTANEOUS at 08:35

## 2019-12-31 RX ADMIN — PANTOPRAZOLE SODIUM 40 MG: 40 TABLET, DELAYED RELEASE ORAL at 05:47

## 2019-12-31 RX ADMIN — DULOXETINE HYDROCHLORIDE 60 MG: 30 CAPSULE, DELAYED RELEASE ORAL at 08:34

## 2019-12-31 RX ADMIN — Medication 14 UNITS: at 11:31

## 2019-12-31 RX ADMIN — HYDROCODONE BITARTRATE AND ACETAMINOPHEN 0.5 TABLET: 5; 325 TABLET ORAL at 08:39

## 2019-12-31 RX ADMIN — INSULIN GLARGINE 27 UNITS: 100 INJECTION, SOLUTION SUBCUTANEOUS at 22:29

## 2019-12-31 RX ADMIN — AMLODIPINE BESYLATE 10 MG: 5 TABLET ORAL at 08:35

## 2019-12-31 RX ADMIN — LORAZEPAM 0.5 MG: 0.5 TABLET ORAL at 11:32

## 2019-12-31 RX ADMIN — HYDROCODONE BITARTRATE AND ACETAMINOPHEN 0.5 TABLET: 5; 325 TABLET ORAL at 21:18

## 2019-12-31 RX ADMIN — GUAIFENESIN AND DEXTROMETHORPHAN 5 ML: 100; 10 SYRUP ORAL at 21:22

## 2019-12-31 RX ADMIN — SENNOSIDES AND DOCUSATE SODIUM 1 TABLET: 8.6; 5 TABLET ORAL at 21:18

## 2019-12-31 RX ADMIN — ASPIRIN 81 MG: 81 TABLET, COATED ORAL at 08:34

## 2019-12-31 RX ADMIN — Medication 2 UNITS: at 08:44

## 2019-12-31 RX ADMIN — INSULIN LISPRO 6 UNITS: 100 INJECTION, SOLUTION INTRAVENOUS; SUBCUTANEOUS at 11:32

## 2019-12-31 RX ADMIN — Medication 14 UNITS: at 08:48

## 2019-12-31 RX ADMIN — BUSPIRONE HYDROCHLORIDE 5 MG: 5 TABLET ORAL at 21:18

## 2019-12-31 RX ADMIN — SENNOSIDES AND DOCUSATE SODIUM 1 TABLET: 8.6; 5 TABLET ORAL at 08:35

## 2019-12-31 RX ADMIN — LOSARTAN POTASSIUM 25 MG: 25 TABLET, FILM COATED ORAL at 08:35

## 2019-12-31 RX ADMIN — LORAZEPAM 0.5 MG: 0.5 TABLET ORAL at 21:18

## 2019-12-31 RX ADMIN — BUSPIRONE HYDROCHLORIDE 5 MG: 5 TABLET ORAL at 08:35

## 2019-12-31 RX ADMIN — GABAPENTIN 300 MG: 300 CAPSULE ORAL at 14:39

## 2019-12-31 RX ADMIN — GABAPENTIN 300 MG: 300 CAPSULE ORAL at 21:18

## 2019-12-31 ASSESSMENT — PAIN SCALES - GENERAL
PAINLEVEL_OUTOF10: 6
PAINLEVEL_OUTOF10: 6
PAINLEVEL_OUTOF10: 5
PAINLEVEL_OUTOF10: 3

## 2019-12-31 NOTE — FLOWSHEET NOTE
Pt laying in bed, Alert, No acute distress noted at this time. Pt is Alert and Oriented x 4. No change from previous assessment. POC for the day reviewed. Pt agrees  will participated in swing bed Activity for the day reviewed. Pt denies questions. Pt denies any other needs at this time. Will continue to monitor. 12/31/19 1015   Assessment   Charting Type Shift assessment   Neurological   Neuro (WDL) WDL   Level of Consciousness 0   [place] Coma Scale   Eye Opening 4   Best Verbal Response 5   Best Motor Response 6   [place] Coma Scale Score 15   HEENT   HEENT (WDL) X   Mucous Membrane Other (Comment)   Teeth Missing teeth   Left Eye Impaired vision;Double Vision   Right Eye Impaired vision;Double vision   Respiratory   Respiratory (WDL) X   L Breath Sounds Clear   R Breath Sounds Clear   Cough/Sputum   Cough Non-productive;Dry   Cough Description   Sputum Amount None   Cardiac   Cardiac (WDL) X   Cardiac Monitor   Telemetry Monitor On No   Gastrointestinal   Abdominal (WDL) X   Abdomen Inspection Distended;Rounded; Soft   RUQ Bowel Sounds Active   LUQ Bowel Sounds Active   RLQ Bowel Sounds Active   LLQ Bowel Sounds Active   Peripheral Vascular   Edema None   RLE Edema None   LLE Edema None   Skin Color/Condition   Skin Color/Condition (WDL) X   Skin Integrity   Skin Integrity (WDL) X   Musculoskeletal   Musculoskeletal (WDL) X   RUE Weakness; Other (Comment)  (tremors)   LUE Full movement   RL Extremity Weakness   LL Extremity Weakness   Genitourinary   Genitourinary (WDL) WDL   Urine Assessment   Incontinence No   Anus/Rectum   Anus/Rectum (WDL) WDL   Psychosocial   Psychosocial (WDL) WDL

## 2019-12-31 NOTE — PROGRESS NOTES
Spoke with Dr. Allegra Kelly requiring family and pt request for anxiety medication. Reports that she takes 0.5mg Ativan q8 at home. Also reviewed BG trend, verbal to increase sliding to high. Aware that Pt receives 14 units standing.

## 2019-12-31 NOTE — PROGRESS NOTES
Occupational Therapy  Facility/Department: [facility] MED SURG  Daily Treatment Note  NAME: Erwin Herrmann  : 1940  MRN: [id number]    Date of Service: 2019    Discharge Recommendations:  Home with Home health OT       Assessment   Performance deficits / Impairments: Decreased ADL status; Decreased strength;Decreased endurance;Decreased functional mobility ; Decreased ROM; Decreased balance;Decreased safe awareness  Assessment: Co tx with PTA. Pt continues to c/o knee & shoulder pain & anxiety with ambulation. Pt ambulated with Min physical assist x2 using hemiwalker with w/c follow. Pt required verbal prompting for safety techniques. Pt ambulated 75 feet total with 3 rest breaks. OT provided ransfer training from EOB to w/c & w/c to bedside chair using safety techniques. Safety continues to be a barrier to pt functional performances. Family member present. Pt was seated in bedside chair with call bell in reach upon exit. OT Education: Transfer Training  Activity Tolerance  Activity Tolerance: Patient limited by fatigue;Patient limited by pain         Patient Diagnosis(es): There were no encounter diagnoses. has a past medical history of Allergic rhinitis, Anxiety, Chronic back pain, Depression, Double vision with both eyes open, Fall, Hyperlipidemia, Hypertension, Osteoarthritis, and Type II or unspecified type diabetes mellitus without mention of complication, not stated as uncontrolled. has a past surgical history that includes Hysterectomy; shoulder surgery;  section; and Colonoscopy.     Restrictions  Restrictions/Precautions  Restrictions/Precautions: Weight Bearing, General Precautions, Fall Risk  Required Braces or Orthoses?: No  Upper Extremity Weight Bearing Restrictions  Right Upper Extremity Weight Bearing: Non Weight Bearing  Subjective   General  Chart Reviewed: Yes  Patient assessed for rehabilitation services?: Yes  Family / Caregiver Present: Yes  Referring Practitioner:

## 2020-01-01 LAB
GLUCOSE BLD-MCNC: 185 MG/DL (ref 74–106)
GLUCOSE BLD-MCNC: 224 MG/DL (ref 74–106)
GLUCOSE BLD-MCNC: 230 MG/DL (ref 74–106)
GLUCOSE BLD-MCNC: 273 MG/DL (ref 74–106)
PERFORMED ON: ABNORMAL

## 2020-01-01 PROCEDURE — 87186 SC STD MICRODIL/AGAR DIL: CPT

## 2020-01-01 PROCEDURE — 6360000002 HC RX W HCPCS: Performed by: PHYSICIAN ASSISTANT

## 2020-01-01 PROCEDURE — 87086 URINE CULTURE/COLONY COUNT: CPT

## 2020-01-01 PROCEDURE — 6370000000 HC RX 637 (ALT 250 FOR IP): Performed by: INTERNAL MEDICINE

## 2020-01-01 PROCEDURE — 87077 CULTURE AEROBIC IDENTIFY: CPT

## 2020-01-01 PROCEDURE — 1200000002 HC SEMI PRIVATE SWING BED

## 2020-01-01 PROCEDURE — 6370000000 HC RX 637 (ALT 250 FOR IP): Performed by: PHYSICIAN ASSISTANT

## 2020-01-01 RX ORDER — HYDROCODONE BITARTRATE AND ACETAMINOPHEN 5; 325 MG/1; MG/1
1 TABLET ORAL EVERY 6 HOURS PRN
Status: DISCONTINUED | OUTPATIENT
Start: 2020-01-01 | End: 2020-01-19 | Stop reason: HOSPADM

## 2020-01-01 RX ADMIN — CLOTRIMAZOLE: 10 CREAM TOPICAL at 20:54

## 2020-01-01 RX ADMIN — GABAPENTIN 300 MG: 300 CAPSULE ORAL at 20:53

## 2020-01-01 RX ADMIN — DULOXETINE HYDROCHLORIDE 60 MG: 30 CAPSULE, DELAYED RELEASE ORAL at 09:00

## 2020-01-01 RX ADMIN — PANTOPRAZOLE SODIUM 40 MG: 40 TABLET, DELAYED RELEASE ORAL at 06:27

## 2020-01-01 RX ADMIN — Medication 14 UNITS: at 16:56

## 2020-01-01 RX ADMIN — INSULIN GLARGINE 27 UNITS: 100 INJECTION, SOLUTION SUBCUTANEOUS at 20:56

## 2020-01-01 RX ADMIN — Medication 14 UNITS: at 11:19

## 2020-01-01 RX ADMIN — ASPIRIN 81 MG: 81 TABLET, COATED ORAL at 08:59

## 2020-01-01 RX ADMIN — SENNOSIDES AND DOCUSATE SODIUM 1 TABLET: 8.6; 5 TABLET ORAL at 20:53

## 2020-01-01 RX ADMIN — GABAPENTIN 300 MG: 300 CAPSULE ORAL at 08:59

## 2020-01-01 RX ADMIN — BUSPIRONE HYDROCHLORIDE 5 MG: 5 TABLET ORAL at 20:53

## 2020-01-01 RX ADMIN — INSULIN LISPRO 3 UNITS: 100 INJECTION, SOLUTION INTRAVENOUS; SUBCUTANEOUS at 16:57

## 2020-01-01 RX ADMIN — FLUTICASONE PROPIONATE 2 SPRAY: 50 SPRAY, METERED NASAL at 08:59

## 2020-01-01 RX ADMIN — SENNOSIDES AND DOCUSATE SODIUM 1 TABLET: 8.6; 5 TABLET ORAL at 09:00

## 2020-01-01 RX ADMIN — FLUTICASONE PROPIONATE 2 SPRAY: 50 SPRAY, METERED NASAL at 20:54

## 2020-01-01 RX ADMIN — INSULIN LISPRO 3 UNITS: 100 INJECTION, SOLUTION INTRAVENOUS; SUBCUTANEOUS at 20:56

## 2020-01-01 RX ADMIN — GABAPENTIN 300 MG: 300 CAPSULE ORAL at 13:29

## 2020-01-01 RX ADMIN — BUSPIRONE HYDROCHLORIDE 5 MG: 5 TABLET ORAL at 09:00

## 2020-01-01 RX ADMIN — CLOTRIMAZOLE: 10 CREAM TOPICAL at 09:02

## 2020-01-01 RX ADMIN — LOSARTAN POTASSIUM 25 MG: 25 TABLET, FILM COATED ORAL at 09:00

## 2020-01-01 RX ADMIN — AMLODIPINE BESYLATE 10 MG: 5 TABLET ORAL at 09:00

## 2020-01-01 RX ADMIN — HYDROCODONE BITARTRATE AND ACETAMINOPHEN 1 TABLET: 5; 325 TABLET ORAL at 15:23

## 2020-01-01 RX ADMIN — HYDROCODONE BITARTRATE AND ACETAMINOPHEN 0.5 TABLET: 5; 325 TABLET ORAL at 09:00

## 2020-01-01 RX ADMIN — Medication 14 UNITS: at 09:08

## 2020-01-01 RX ADMIN — ENOXAPARIN SODIUM 40 MG: 40 INJECTION SUBCUTANEOUS at 08:59

## 2020-01-01 RX ADMIN — INSULIN LISPRO 6 UNITS: 100 INJECTION, SOLUTION INTRAVENOUS; SUBCUTANEOUS at 09:08

## 2020-01-01 RX ADMIN — INSULIN LISPRO 9 UNITS: 100 INJECTION, SOLUTION INTRAVENOUS; SUBCUTANEOUS at 11:20

## 2020-01-01 ASSESSMENT — PAIN SCALES - GENERAL
PAINLEVEL_OUTOF10: 0
PAINLEVEL_OUTOF10: 5
PAINLEVEL_OUTOF10: 8
PAINLEVEL_OUTOF10: 0
PAINLEVEL_OUTOF10: 7
PAINLEVEL_OUTOF10: 0
PAINLEVEL_OUTOF10: 0
PAINLEVEL_OUTOF10: 10

## 2020-01-01 ASSESSMENT — PAIN DESCRIPTION - ORIENTATION: ORIENTATION: LOWER

## 2020-01-01 ASSESSMENT — PAIN DESCRIPTION - LOCATION: LOCATION: SHOULDER

## 2020-01-01 ASSESSMENT — PAIN DESCRIPTION - PAIN TYPE: TYPE: CHRONIC PAIN

## 2020-01-01 NOTE — FLOWSHEET NOTE
01/01/20 0900   Assessment   Charting Type Shift assessment   Neurological   Neuro (WDL) WDL   Level of Consciousness 0   Swallow Screening   Is the patient able to remain alert for testing? Yes   Was the Patient Eating a Modified Diet Prior to being Admitted? No   [place] Coma Scale   Eye Opening 4   Best Verbal Response 5   Best Motor Response 6   Giovanny Coma Scale Score 15   NIH/MNHISS Stroke Scale   NIH/MNIHSS Stroke Scale Assessed No   HEENT   HEENT (WDL) X   Mucous Membrane Other (Comment)   Teeth Missing teeth   Left Eye Impaired vision;Double Vision   Right Eye Impaired vision;Double vision   Respiratory   Respiratory (WDL) X   L Breath Sounds Clear   R Breath Sounds Clear   Cough/Sputum   Cough Non-productive;Dry   Cough Description   Sputum Amount None   Cardiac   Cardiac (WDL) X   Cardiac Monitor   Telemetry Monitor On No   Telemetry Audible Yes   Telemetry Alarms Set Yes   Telemetry Box Number [id number]   Telemetry Monitor Alarm Parameters    Gastrointestinal   Abdominal (WDL) X   Abdomen Inspection Distended;Rounded; Soft   RUQ Bowel Sounds Active   LUQ Bowel Sounds Active   RLQ Bowel Sounds Active   LLQ Bowel Sounds Active   Peripheral Vascular   Edema None   RLE Edema None   LLE Edema None   Skin Color/Condition   Skin Color/Condition (WDL) X   Skin Color Pale   Skin Condition/Temp Dry; Warm   Skin Integrity   Skin Integrity (WDL) X   Skin Integrity Intact  (incisions healing abd)   Location ABD   Preventative Dressing No   Skin Fold Management No   Musculoskeletal   Musculoskeletal (WDL) X   RUE Weakness; Other (Comment)  (tremors)   LUE Full movement   RL Extremity Weakness   LL Extremity Weakness   Genitourinary   Genitourinary (WDL) WDL   Urine Assessment   Incontinence No   Anus/Rectum   Anus/Rectum (WDL) WDL   Psychosocial   Psychosocial (WDL) WDL   Pt.  Alert times 4 this am -Pt able to take meds whole- Pt given pain med for complaints of pain- Call bell within reach- Will monitor- [facility]

## 2020-01-02 LAB
GLUCOSE BLD-MCNC: 121 MG/DL (ref 74–106)
GLUCOSE BLD-MCNC: 143 MG/DL (ref 74–106)
GLUCOSE BLD-MCNC: 173 MG/DL (ref 74–106)
GLUCOSE BLD-MCNC: 216 MG/DL (ref 74–106)
GLUCOSE BLD-MCNC: 220 MG/DL (ref 74–106)
PERFORMED ON: ABNORMAL

## 2020-01-02 PROCEDURE — 97116 GAIT TRAINING THERAPY: CPT | Performed by: PHYSICAL THERAPY ASSISTANT

## 2020-01-02 PROCEDURE — 6370000000 HC RX 637 (ALT 250 FOR IP): Performed by: INTERNAL MEDICINE

## 2020-01-02 PROCEDURE — 6360000002 HC RX W HCPCS: Performed by: PHYSICIAN ASSISTANT

## 2020-01-02 PROCEDURE — 6370000000 HC RX 637 (ALT 250 FOR IP): Performed by: PHYSICIAN ASSISTANT

## 2020-01-02 PROCEDURE — 97535 SELF CARE MNGMENT TRAINING: CPT

## 2020-01-02 PROCEDURE — 97803 MED NUTRITION INDIV SUBSEQ: CPT

## 2020-01-02 PROCEDURE — 1200000002 HC SEMI PRIVATE SWING BED

## 2020-01-02 PROCEDURE — 97530 THERAPEUTIC ACTIVITIES: CPT

## 2020-01-02 PROCEDURE — 97530 THERAPEUTIC ACTIVITIES: CPT | Performed by: PHYSICAL THERAPY ASSISTANT

## 2020-01-02 RX ADMIN — AMLODIPINE BESYLATE 10 MG: 5 TABLET ORAL at 08:17

## 2020-01-02 RX ADMIN — DULOXETINE HYDROCHLORIDE 60 MG: 30 CAPSULE, DELAYED RELEASE ORAL at 08:18

## 2020-01-02 RX ADMIN — CLOTRIMAZOLE: 10 CREAM TOPICAL at 20:44

## 2020-01-02 RX ADMIN — INSULIN LISPRO 3 UNITS: 100 INJECTION, SOLUTION INTRAVENOUS; SUBCUTANEOUS at 08:19

## 2020-01-02 RX ADMIN — GABAPENTIN 300 MG: 300 CAPSULE ORAL at 13:40

## 2020-01-02 RX ADMIN — SENNOSIDES AND DOCUSATE SODIUM 1 TABLET: 8.6; 5 TABLET ORAL at 20:43

## 2020-01-02 RX ADMIN — Medication 14 UNITS: at 18:13

## 2020-01-02 RX ADMIN — INSULIN LISPRO 3 UNITS: 100 INJECTION, SOLUTION INTRAVENOUS; SUBCUTANEOUS at 20:45

## 2020-01-02 RX ADMIN — CLOTRIMAZOLE: 10 CREAM TOPICAL at 08:19

## 2020-01-02 RX ADMIN — ASPIRIN 81 MG: 81 TABLET, COATED ORAL at 08:18

## 2020-01-02 RX ADMIN — INSULIN LISPRO 3 UNITS: 100 INJECTION, SOLUTION INTRAVENOUS; SUBCUTANEOUS at 18:11

## 2020-01-02 RX ADMIN — ENOXAPARIN SODIUM 40 MG: 40 INJECTION SUBCUTANEOUS at 08:17

## 2020-01-02 RX ADMIN — INSULIN LISPRO 6 UNITS: 100 INJECTION, SOLUTION INTRAVENOUS; SUBCUTANEOUS at 11:45

## 2020-01-02 RX ADMIN — SENNOSIDES AND DOCUSATE SODIUM 1 TABLET: 8.6; 5 TABLET ORAL at 08:18

## 2020-01-02 RX ADMIN — GABAPENTIN 300 MG: 300 CAPSULE ORAL at 20:43

## 2020-01-02 RX ADMIN — GABAPENTIN 300 MG: 300 CAPSULE ORAL at 08:18

## 2020-01-02 RX ADMIN — HYDROCODONE BITARTRATE AND ACETAMINOPHEN 1 TABLET: 5; 325 TABLET ORAL at 08:17

## 2020-01-02 RX ADMIN — Medication 14 UNITS: at 11:46

## 2020-01-02 RX ADMIN — FLUTICASONE PROPIONATE 2 SPRAY: 50 SPRAY, METERED NASAL at 20:44

## 2020-01-02 RX ADMIN — PANTOPRAZOLE SODIUM 40 MG: 40 TABLET, DELAYED RELEASE ORAL at 06:25

## 2020-01-02 RX ADMIN — Medication 14 UNITS: at 08:23

## 2020-01-02 RX ADMIN — LOSARTAN POTASSIUM 25 MG: 25 TABLET, FILM COATED ORAL at 08:17

## 2020-01-02 RX ADMIN — BUSPIRONE HYDROCHLORIDE 5 MG: 5 TABLET ORAL at 20:43

## 2020-01-02 RX ADMIN — INSULIN GLARGINE 27 UNITS: 100 INJECTION, SOLUTION SUBCUTANEOUS at 20:45

## 2020-01-02 RX ADMIN — BUSPIRONE HYDROCHLORIDE 5 MG: 5 TABLET ORAL at 08:18

## 2020-01-02 ASSESSMENT — PAIN SCALES - GENERAL: PAINLEVEL_OUTOF10: 6

## 2020-01-02 NOTE — PROGRESS NOTES
Hysterectomy; shoulder surgery;  section; and Colonoscopy. Restrictions  Restrictions/Precautions  Restrictions/Precautions: Weight Bearing, General Precautions, Fall Risk  Required Braces or Orthoses?: No  Upper Extremity Weight Bearing Restrictions  Right Upper Extremity Weight Bearing: Non Weight Bearing  Subjective   General  Chart Reviewed: Yes  Patient assessed for rehabilitation services?: Yes  Family / Caregiver Present: Yes  Referring Practitioner: KEATON Mishra  Diagnosis: S/p Lap. Ary,   Subjective  Subjective: Pt reports she has been hospitalized last 16 days. Has been unable to complete self care and mobility. Using lift for transfers. Orientation     Objective    ADL  Grooming: Contact guard assistance  Toileting: Contact guard assistance;Minimal assistance        Functional Mobility  Functional Mobility Comments: 29, 25, 18, 10, 12         Plan   Plan  Times per week: 3-5  Times per day: Daily  Plan weeks: 1-3  Current Treatment Recommendations: Endurance Training, Strengthening, Balance Training, Safety Education & Training, Self-Care / ADL, Neuromuscular Re-education, Patient/Caregiver Education & Training, Functional Mobility Training    Goals  Short term goals  Time Frame for Short term goals: 1-2 weeks  Short term goal 1: Pt to complete Sit to stand transfer using mana walker with min assist. GOAL MET  Short term goal 2: Pt to complete SPT from bed to chair/BSC with min assist using mana walker assistx1 GOAL MET  Short term goal 3: pt to complete toileting with min assist. GOAL MET  Short term goal 4: Pt to complete dressing with min assist for LB and UB. GOAL MET  Short term goal 5: Pt to complete sponge bathing with MIn assist. Progressing  Long term goals   Time Frame for Long term goals : 2-3 weeks. Long term goal 1: Pt to complete toileting with MOD I. GOAL NOT MET  Long term goal 2: Pt to tolerate x20 minutes of activity to increase functional activity tolerance. GOAL NOT MET  Long term goal 3: Pt to complete dressing with MOD I. GOAL NOT MET  Long term goal 4: Pt to complete bathing with SULLIVAN. GOAL NOT MET       Therapy Time   Individual Concurrent Group Co-treatment   Time In 1101         Time Out 1140         Minutes 39              This note serves as a DC summary in the event of pt discharge.      Valery Cabrera, OTR/L

## 2020-01-02 NOTE — PROGRESS NOTES
Physical Therapy  Facility/Department: [facility] MED SURG  Daily Treatment Note  NAME: Addi Dickens  : 1940  MRN: [id number]    Date of Service: 2020    Discharge Recommendations:  Continue to assess pending progress        Assessment   Assessment: Patient agreeable to PT skilled services. Co tx provided with OT for increased patient and staff safety. Patient completed bed mobility skills coming to eob. Patient t/f to w/c with cuing on safety awareness. Patient amb short distances today with c/o stomach sickness and dizzness. Patient returned to room and educated on t/f to and from elevaed toilet seat. Patient with poor safety awareness reacing outside chidi and sitting without stepping back to chair. Patient t/f to recliner, left with call light. NO needs noted att. Activity Tolerance  Activity Tolerance: Patient limited by endurance; Patient limited by pain; Patient limited by fatigue     Patient Diagnosis(es): There were no encounter diagnoses. has a past medical history of Allergic rhinitis, Anxiety, Chronic back pain, Depression, Double vision with both eyes open, Fall, Hyperlipidemia, Hypertension, Osteoarthritis, and Type II or unspecified type diabetes mellitus without mention of complication, not stated as uncontrolled. has a past surgical history that includes Hysterectomy; shoulder surgery;  section; and Colonoscopy. Restrictions  Restrictions/Precautions  Restrictions/Precautions: Weight Bearing, General Precautions, Fall Risk  Required Braces or Orthoses?: No  Upper Extremity Weight Bearing Restrictions  Right Upper Extremity Weight Bearing: Non Weight Bearing  Subjective   General  Chart Reviewed: Yes  Response To Previous Treatment: Patient with no complaints from previous session. Family / Caregiver Present: No  Subjective  Subjective: Patient agreeable to PT skilled services. Patient reports feeling sick to stomach today.      Objective   Bed mobility  Rolling to Left: Contact guard assistance;Minimal assistance  Supine to Sit: Contact guard assistance  Sit to Supine: Contact guard assistance  Transfers  Sit to Stand: Contact guard assistance  Stand to sit: Contact guard assistance  Bed to Chair: Contact guard assistance;Minimal assistance  Ambulation  Ambulation?: Yes  WB Status: FWB  Ambulation 1  Surface: level tile  Device: Hemiwalker  Assistance: Minimal assistance;2 Person assistance;Contact guard assistance  Distance: 29ft, 25ft, 18ft, 12ft, and 10ft. Comments: Poor safety awareness. Decreased step length and cadance. Step to gait cycle. PROM RLE (degrees)  RLE PROM: WFL  AROM RLE (degrees)  RLE AROM: WFL  PROM LLE (degrees)  LLE PROM: WFL  AROM LLE (degrees)  LLE AROM : WFL       Goals  Long term goals  Time Frame for Long term goals : 2 weeks  Long term goal 1: Achieve mod I for bed mobility. Long term goal 2: Perform basic transfers with SBA x 1. Long term goal 3: Ambulate 15 feet with RW or SE with CGA x 1. Long term goal 4: Demonstrate good safety awareness with functional mobility. Plan    Plan  Times per week: 4-5 x week  Plan weeks: 2 weeks  Current Treatment Recommendations: Strengthening, Balance Training, Functional Mobility Training, Transfer Training, Endurance Training, Neuromuscular Re-education, Safety Education & Training, Patient/Caregiver Education & Training, Gait Training  Safety Devices  Type of devices: Call light within reach, Left in chair     Therapy Time   Individual Concurrent Group Co-treatment   Time In 1102         Time Out 1140         Minutes [address] [facility], [facility]     This note serves a discharge summary in the event of patient discharge.

## 2020-01-02 NOTE — PROGRESS NOTES
Nutrition Assessment    Type and Reason for Visit: (follow up)    Nutrition Recommendations: Encourage intakes of meals. Recommend only taking supplement when eating less than 50% of meals. Nutrition Assessment: Pt at risk for nutritional compromise AEB increased needs. Pt is eating good and has ONS in place. Malnutrition Assessment:  · Malnutrition Status: At risk for malnutrition  · Context: Acute illness or injury  · Findings of the 6 clinical characteristics of malnutrition (Minimum of 2 out of 6 clinical characteristics is required to make the diagnosis of moderate or severe Protein Calorie Malnutrition based on AND/ASPEN Guidelines):  1. Energy Intake- ,      2. Weight Loss-No significant weight loss,    3. Fat Loss-No significant subcutaneous fat loss,    4. Muscle Loss-No significant muscle mass loss,    5. Fluid Accumulation-Mild fluid accumulation, Extremities  6.  Strength-Not measured    Nutrition Risk Level: Moderate    Nutrient Needs:  · Estimated Daily Total Kcal: 9038-4120  · Estimated Daily Protein (g): 77(1.25 gm/kg IBw)  · Estimated Daily Total Fluid (ml/day): 5977-0328(1 ml/kcal of est energy intakes/needs)    Nutrition Diagnosis:   · Problem: No nutrition diagnosis at this time  · Etiology: related to Partial or complete edentulism, Renal dysfunction, Endocrine dysfunction(limited food preferences.)     Signs and symptoms:  as evidenced by Patient report of, Localized or generalized fluid accumulation, Lab values, Intake 50-75%    Objective Information:  · Nutrition-Focused Physical Findings: pt has no known wt loss, no apparent fat loss or muscle wasting, does have healing incision from gallbladder removal.  Has trace edema.   · Wound Type: Surgical Wound  · Current Nutrition Therapies:  · Oral Diet Orders: Dental Soft, Carb Control 4 Carbs/Meal   · Oral Diet intake: %  · Oral Nutrition Supplement (ONS) Orders: Diabetic Oral Supplement(BID)  · ONS intake: %  · Anthropometric Measures:  · Ht: 5' 7\" (170.2 cm)   · Current Body Wt: 210 lb 8.6 oz (95.5 kg)  · Admission Body Wt: 211 lb (95.7 kg)  · Usual Body Wt:    · % Weight Change:  ,     · Ideal Body Wt: 135 lb (61.2 kg), % Ideal Body 156  · Adjusted Body Wt:  , body weight adjusted for    · BMI Classification: BMI 30.0 - 34.9 Obese Class I(33.14)    Nutrition Interventions:   Continue current ONS, Continue current diet  Continued Inpatient Monitoring, Coordination of Care, Education Initiated, Education Completed(spoke with  about supplementation, only to use if patient not eating or eating less than 50% of meal.)    Nutrition Evaluation:   · Evaluation: Progressing toward goals   · Goals: to meet est needs for age/condition    · Monitoring: Meal Intake, Supplement Intake, Wound Healing, Pertinent Labs, Weight, Patient/Family Education      Electronically signed by Ryan Scales on 1/2/20 at 2:26 PM

## 2020-01-03 LAB
GLUCOSE BLD-MCNC: 168 MG/DL (ref 74–106)
GLUCOSE BLD-MCNC: 190 MG/DL (ref 74–106)
GLUCOSE BLD-MCNC: 242 MG/DL (ref 74–106)
GLUCOSE BLD-MCNC: 265 MG/DL (ref 74–106)
PERFORMED ON: ABNORMAL

## 2020-01-03 PROCEDURE — 6360000002 HC RX W HCPCS: Performed by: PHYSICIAN ASSISTANT

## 2020-01-03 PROCEDURE — 97530 THERAPEUTIC ACTIVITIES: CPT

## 2020-01-03 PROCEDURE — 97116 GAIT TRAINING THERAPY: CPT | Performed by: PHYSICAL THERAPY ASSISTANT

## 2020-01-03 PROCEDURE — 1200000002 HC SEMI PRIVATE SWING BED

## 2020-01-03 PROCEDURE — 6360000002 HC RX W HCPCS: Performed by: NURSE PRACTITIONER

## 2020-01-03 PROCEDURE — 6370000000 HC RX 637 (ALT 250 FOR IP): Performed by: INTERNAL MEDICINE

## 2020-01-03 PROCEDURE — 6370000000 HC RX 637 (ALT 250 FOR IP): Performed by: NURSE PRACTITIONER

## 2020-01-03 PROCEDURE — 6370000000 HC RX 637 (ALT 250 FOR IP): Performed by: PHYSICIAN ASSISTANT

## 2020-01-03 RX ORDER — LEVOFLOXACIN 5 MG/ML
250 INJECTION, SOLUTION INTRAVENOUS EVERY 24 HOURS
Status: COMPLETED | OUTPATIENT
Start: 2020-01-04 | End: 2020-01-10

## 2020-01-03 RX ORDER — PHENAZOPYRIDINE HYDROCHLORIDE 95 MG/1
95 TABLET ORAL 2 TIMES DAILY PRN
Status: DISPENSED | OUTPATIENT
Start: 2020-01-03 | End: 2020-01-06

## 2020-01-03 RX ORDER — LEVOFLOXACIN 5 MG/ML
500 INJECTION, SOLUTION INTRAVENOUS ONCE
Status: COMPLETED | OUTPATIENT
Start: 2020-01-03 | End: 2020-01-03

## 2020-01-03 RX ADMIN — INSULIN LISPRO 5 UNITS: 100 INJECTION, SOLUTION INTRAVENOUS; SUBCUTANEOUS at 21:11

## 2020-01-03 RX ADMIN — CLOTRIMAZOLE: 10 CREAM TOPICAL at 08:08

## 2020-01-03 RX ADMIN — GABAPENTIN 300 MG: 300 CAPSULE ORAL at 12:59

## 2020-01-03 RX ADMIN — BUSPIRONE HYDROCHLORIDE 5 MG: 5 TABLET ORAL at 08:07

## 2020-01-03 RX ADMIN — GABAPENTIN 300 MG: 300 CAPSULE ORAL at 21:09

## 2020-01-03 RX ADMIN — Medication 14 UNITS: at 08:15

## 2020-01-03 RX ADMIN — SENNOSIDES AND DOCUSATE SODIUM 1 TABLET: 8.6; 5 TABLET ORAL at 21:09

## 2020-01-03 RX ADMIN — PANTOPRAZOLE SODIUM 40 MG: 40 TABLET, DELAYED RELEASE ORAL at 08:07

## 2020-01-03 RX ADMIN — HYDROCODONE BITARTRATE AND ACETAMINOPHEN 1 TABLET: 5; 325 TABLET ORAL at 13:46

## 2020-01-03 RX ADMIN — INSULIN LISPRO 3 UNITS: 100 INJECTION, SOLUTION INTRAVENOUS; SUBCUTANEOUS at 08:09

## 2020-01-03 RX ADMIN — ENOXAPARIN SODIUM 40 MG: 40 INJECTION SUBCUTANEOUS at 08:05

## 2020-01-03 RX ADMIN — SENNOSIDES AND DOCUSATE SODIUM 1 TABLET: 8.6; 5 TABLET ORAL at 08:07

## 2020-01-03 RX ADMIN — BUSPIRONE HYDROCHLORIDE 5 MG: 5 TABLET ORAL at 21:09

## 2020-01-03 RX ADMIN — FLUTICASONE PROPIONATE 2 SPRAY: 50 SPRAY, METERED NASAL at 21:09

## 2020-01-03 RX ADMIN — INSULIN GLARGINE 27 UNITS: 100 INJECTION, SOLUTION SUBCUTANEOUS at 21:11

## 2020-01-03 RX ADMIN — AMLODIPINE BESYLATE 10 MG: 5 TABLET ORAL at 08:06

## 2020-01-03 RX ADMIN — DULOXETINE HYDROCHLORIDE 60 MG: 30 CAPSULE, DELAYED RELEASE ORAL at 08:05

## 2020-01-03 RX ADMIN — LOSARTAN POTASSIUM 25 MG: 25 TABLET, FILM COATED ORAL at 08:07

## 2020-01-03 RX ADMIN — INSULIN LISPRO 3 UNITS: 100 INJECTION, SOLUTION INTRAVENOUS; SUBCUTANEOUS at 11:43

## 2020-01-03 RX ADMIN — Medication 14 UNITS: at 17:41

## 2020-01-03 RX ADMIN — FLUTICASONE PROPIONATE 2 SPRAY: 50 SPRAY, METERED NASAL at 08:07

## 2020-01-03 RX ADMIN — GABAPENTIN 300 MG: 300 CAPSULE ORAL at 08:06

## 2020-01-03 RX ADMIN — HYDROCODONE BITARTRATE AND ACETAMINOPHEN 1 TABLET: 5; 325 TABLET ORAL at 22:35

## 2020-01-03 RX ADMIN — CLOTRIMAZOLE: 10 CREAM TOPICAL at 21:09

## 2020-01-03 RX ADMIN — LEVOFLOXACIN 500 MG: 5 INJECTION, SOLUTION INTRAVENOUS at 17:33

## 2020-01-03 RX ADMIN — INSULIN LISPRO 6 UNITS: 100 INJECTION, SOLUTION INTRAVENOUS; SUBCUTANEOUS at 17:34

## 2020-01-03 RX ADMIN — ASPIRIN 81 MG: 81 TABLET, COATED ORAL at 08:07

## 2020-01-03 RX ADMIN — Medication 95 MG: at 18:58

## 2020-01-03 RX ADMIN — Medication 14 UNITS: at 11:47

## 2020-01-03 ASSESSMENT — PAIN SCALES - GENERAL
PAINLEVEL_OUTOF10: 7
PAINLEVEL_OUTOF10: 7

## 2020-01-03 NOTE — PROGRESS NOTES
Occupational Therapy  Facility/Department: [facility]  Daily Treatment Note  NAME: Alka Padilla  : 1940  MRN: [id number]    Date of Service: 1/3/2020    Discharge Recommendations:  Home with Home health OT       Assessment   Assessment: Co tx with PTA. Pt education continued for safe transfer techniques. Poor carryover between sessions. Pt continues to demonstrate poor ability to follow through with techniques discussed in prior sessions to decrease risk of falls and for energy conservation. Pt having difficulty with sequencing use of mana cane with weight shifting to decrease risko f falls. Pt continues to have c/o dizziness. Pt ambulated 25, 25, 15, 15, 25 feet with mana walker and WC follow. Pt requires min<>CGA for all transfers with poor safety noted descending to sitting position. Patient Diagnosis(es): There were no encounter diagnoses. has a past medical history of Allergic rhinitis, Anxiety, Chronic back pain, Depression, Double vision with both eyes open, Fall, Hyperlipidemia, Hypertension, Osteoarthritis, and Type II or unspecified type diabetes mellitus without mention of complication, not stated as uncontrolled. has a past surgical history that includes Hysterectomy; shoulder surgery;  section; and Colonoscopy. Restrictions  Restrictions/Precautions  Restrictions/Precautions: Weight Bearing, General Precautions, Fall Risk  Required Braces or Orthoses?: No  Upper Extremity Weight Bearing Restrictions  Right Upper Extremity Weight Bearing: Non Weight Bearing  Subjective   General  Chart Reviewed: Yes  Patient assessed for rehabilitation services?: Yes  Family / Caregiver Present: Yes  Referring Practitioner: KEATON Casiano  Diagnosis: S/p Lap. Ary,   Subjective  Subjective: Pt reports she has been hospitalized last 16 days. Has been unable to complete self care and mobility. Using lift for transfers.        Orientation     Objective    ADL  LE Dressing:

## 2020-01-04 LAB
ANION GAP SERPL CALCULATED.3IONS-SCNC: 11 MMOL/L (ref 3–16)
BUN BLDV-MCNC: 22 MG/DL (ref 6–20)
CALCIUM SERPL-MCNC: 9.1 MG/DL (ref 8.5–10.5)
CHLORIDE BLD-SCNC: 100 MMOL/L (ref 98–107)
CO2: 27 MMOL/L (ref 20–30)
CREAT SERPL-MCNC: 1.5 MG/DL (ref 0.4–1.2)
GFR AFRICAN AMERICAN: 41
GFR NON-AFRICAN AMERICAN: 34
GLUCOSE BLD-MCNC: 168 MG/DL (ref 74–106)
GLUCOSE BLD-MCNC: 189 MG/DL (ref 74–106)
GLUCOSE BLD-MCNC: 194 MG/DL (ref 74–106)
GLUCOSE BLD-MCNC: 198 MG/DL (ref 74–106)
GLUCOSE BLD-MCNC: 244 MG/DL (ref 74–106)
HCT VFR BLD CALC: 30 % (ref 37–47)
HEMOGLOBIN: 9 G/DL (ref 11.5–16.5)
MCH RBC QN AUTO: 30.8 PG (ref 27–32)
MCHC RBC AUTO-ENTMCNC: 30 G/DL (ref 31–35)
MCV RBC AUTO: 102.7 FL (ref 80–100)
PDW BLD-RTO: 15.8 % (ref 11–16)
PERFORMED ON: ABNORMAL
PLATELET # BLD: 192 K/UL (ref 150–400)
PMV BLD AUTO: 11.2 FL (ref 6–10)
POTASSIUM SERPL-SCNC: 4.8 MMOL/L (ref 3.4–5.1)
RBC # BLD: 2.92 M/UL (ref 3.8–5.8)
SODIUM BLD-SCNC: 138 MMOL/L (ref 136–145)
WBC # BLD: 5.5 K/UL (ref 4–11)

## 2020-01-04 PROCEDURE — 80048 BASIC METABOLIC PNL TOTAL CA: CPT

## 2020-01-04 PROCEDURE — 36415 COLL VENOUS BLD VENIPUNCTURE: CPT

## 2020-01-04 PROCEDURE — 6370000000 HC RX 637 (ALT 250 FOR IP): Performed by: PHYSICIAN ASSISTANT

## 2020-01-04 PROCEDURE — 2580000003 HC RX 258: Performed by: NURSE PRACTITIONER

## 2020-01-04 PROCEDURE — 6370000000 HC RX 637 (ALT 250 FOR IP): Performed by: INTERNAL MEDICINE

## 2020-01-04 PROCEDURE — 1200000002 HC SEMI PRIVATE SWING BED

## 2020-01-04 PROCEDURE — 6360000002 HC RX W HCPCS: Performed by: NURSE PRACTITIONER

## 2020-01-04 PROCEDURE — 85027 COMPLETE CBC AUTOMATED: CPT

## 2020-01-04 PROCEDURE — 6360000002 HC RX W HCPCS: Performed by: PHYSICIAN ASSISTANT

## 2020-01-04 RX ORDER — SODIUM CHLORIDE 9 MG/ML
INJECTION, SOLUTION INTRAVENOUS CONTINUOUS
Status: ACTIVE | OUTPATIENT
Start: 2020-01-04 | End: 2020-01-04

## 2020-01-04 RX ADMIN — PANTOPRAZOLE SODIUM 40 MG: 40 TABLET, DELAYED RELEASE ORAL at 06:17

## 2020-01-04 RX ADMIN — ASPIRIN 81 MG: 81 TABLET, COATED ORAL at 08:14

## 2020-01-04 RX ADMIN — INSULIN GLARGINE 27 UNITS: 100 INJECTION, SOLUTION SUBCUTANEOUS at 21:07

## 2020-01-04 RX ADMIN — INSULIN LISPRO 3 UNITS: 100 INJECTION, SOLUTION INTRAVENOUS; SUBCUTANEOUS at 08:19

## 2020-01-04 RX ADMIN — HYDROCODONE BITARTRATE AND ACETAMINOPHEN 1 TABLET: 5; 325 TABLET ORAL at 14:14

## 2020-01-04 RX ADMIN — ENOXAPARIN SODIUM 40 MG: 40 INJECTION SUBCUTANEOUS at 08:13

## 2020-01-04 RX ADMIN — Medication 14 UNITS: at 11:10

## 2020-01-04 RX ADMIN — SODIUM CHLORIDE: 9 INJECTION, SOLUTION INTRAVENOUS at 07:47

## 2020-01-04 RX ADMIN — GABAPENTIN 300 MG: 300 CAPSULE ORAL at 21:05

## 2020-01-04 RX ADMIN — HYDROCODONE BITARTRATE AND ACETAMINOPHEN 1 TABLET: 5; 325 TABLET ORAL at 21:06

## 2020-01-04 RX ADMIN — SENNOSIDES AND DOCUSATE SODIUM 1 TABLET: 8.6; 5 TABLET ORAL at 08:14

## 2020-01-04 RX ADMIN — CLOTRIMAZOLE: 10 CREAM TOPICAL at 21:00

## 2020-01-04 RX ADMIN — CLOTRIMAZOLE: 10 CREAM TOPICAL at 08:15

## 2020-01-04 RX ADMIN — Medication 14 UNITS: at 16:38

## 2020-01-04 RX ADMIN — DULOXETINE HYDROCHLORIDE 60 MG: 30 CAPSULE, DELAYED RELEASE ORAL at 08:13

## 2020-01-04 RX ADMIN — INSULIN LISPRO 3 UNITS: 100 INJECTION, SOLUTION INTRAVENOUS; SUBCUTANEOUS at 11:11

## 2020-01-04 RX ADMIN — LOSARTAN POTASSIUM 25 MG: 25 TABLET, FILM COATED ORAL at 08:14

## 2020-01-04 RX ADMIN — BUSPIRONE HYDROCHLORIDE 5 MG: 5 TABLET ORAL at 08:14

## 2020-01-04 RX ADMIN — Medication 14 UNITS: at 08:16

## 2020-01-04 RX ADMIN — BUSPIRONE HYDROCHLORIDE 5 MG: 5 TABLET ORAL at 21:06

## 2020-01-04 RX ADMIN — AMLODIPINE BESYLATE 10 MG: 5 TABLET ORAL at 08:14

## 2020-01-04 RX ADMIN — GABAPENTIN 300 MG: 300 CAPSULE ORAL at 08:14

## 2020-01-04 RX ADMIN — GABAPENTIN 300 MG: 300 CAPSULE ORAL at 13:50

## 2020-01-04 RX ADMIN — HYDROCODONE BITARTRATE AND ACETAMINOPHEN 1 TABLET: 5; 325 TABLET ORAL at 08:13

## 2020-01-04 RX ADMIN — INSULIN LISPRO 2 UNITS: 100 INJECTION, SOLUTION INTRAVENOUS; SUBCUTANEOUS at 21:06

## 2020-01-04 RX ADMIN — LEVOFLOXACIN 250 MG: 5 INJECTION, SOLUTION INTRAVENOUS at 16:40

## 2020-01-04 RX ADMIN — INSULIN LISPRO 6 UNITS: 100 INJECTION, SOLUTION INTRAVENOUS; SUBCUTANEOUS at 16:38

## 2020-01-04 RX ADMIN — POLYETHYLENE GLYCOL 3350 17 G: 17 POWDER, FOR SOLUTION ORAL at 08:13

## 2020-01-04 RX ADMIN — SENNOSIDES AND DOCUSATE SODIUM 1 TABLET: 8.6; 5 TABLET ORAL at 21:05

## 2020-01-04 ASSESSMENT — PAIN SCALES - GENERAL
PAINLEVEL_OUTOF10: 7
PAINLEVEL_OUTOF10: 7
PAINLEVEL_OUTOF10: 4
PAINLEVEL_OUTOF10: 4
PAINLEVEL_OUTOF10: 0
PAINLEVEL_OUTOF10: 8
PAINLEVEL_OUTOF10: 0
PAINLEVEL_OUTOF10: 0
PAINLEVEL_OUTOF10: 7
PAINLEVEL_OUTOF10: 0

## 2020-01-04 ASSESSMENT — PAIN DESCRIPTION - LOCATION
LOCATION: BACK;KNEE;SHOULDER
LOCATION: BACK;SHOULDER

## 2020-01-04 ASSESSMENT — PAIN DESCRIPTION - ORIENTATION
ORIENTATION: RIGHT
ORIENTATION: RIGHT

## 2020-01-04 ASSESSMENT — PAIN DESCRIPTION - ONSET: ONSET: ON-GOING

## 2020-01-04 ASSESSMENT — PAIN DESCRIPTION - PAIN TYPE
TYPE: CHRONIC PAIN
TYPE: CHRONIC PAIN

## 2020-01-04 ASSESSMENT — PAIN DESCRIPTION - FREQUENCY: FREQUENCY: CONTINUOUS

## 2020-01-05 PROBLEM — N30.01 ACUTE CYSTITIS WITH HEMATURIA: Status: ACTIVE | Noted: 2020-01-05

## 2020-01-05 LAB
GLUCOSE BLD-MCNC: 121 MG/DL (ref 74–106)
GLUCOSE BLD-MCNC: 196 MG/DL (ref 74–106)
GLUCOSE BLD-MCNC: 234 MG/DL (ref 74–106)
GLUCOSE BLD-MCNC: 235 MG/DL (ref 74–106)
ORGANISM: ABNORMAL
PERFORMED ON: ABNORMAL
URINE CULTURE, ROUTINE: ABNORMAL

## 2020-01-05 PROCEDURE — 51798 US URINE CAPACITY MEASURE: CPT

## 2020-01-05 PROCEDURE — 6360000002 HC RX W HCPCS: Performed by: PHYSICIAN ASSISTANT

## 2020-01-05 PROCEDURE — 99232 SBSQ HOSP IP/OBS MODERATE 35: CPT | Performed by: NURSE PRACTITIONER

## 2020-01-05 PROCEDURE — 6370000000 HC RX 637 (ALT 250 FOR IP): Performed by: NURSE PRACTITIONER

## 2020-01-05 PROCEDURE — 1200000002 HC SEMI PRIVATE SWING BED

## 2020-01-05 PROCEDURE — 2580000003 HC RX 258: Performed by: NURSE PRACTITIONER

## 2020-01-05 PROCEDURE — 6370000000 HC RX 637 (ALT 250 FOR IP): Performed by: PHYSICIAN ASSISTANT

## 2020-01-05 PROCEDURE — 6360000002 HC RX W HCPCS: Performed by: NURSE PRACTITIONER

## 2020-01-05 PROCEDURE — 6370000000 HC RX 637 (ALT 250 FOR IP): Performed by: INTERNAL MEDICINE

## 2020-01-05 RX ORDER — LIDOCAINE 4 G/G
2 PATCH TOPICAL DAILY
Status: DISCONTINUED | OUTPATIENT
Start: 2020-01-05 | End: 2020-01-19 | Stop reason: HOSPADM

## 2020-01-05 RX ORDER — SODIUM CHLORIDE 9 MG/ML
INJECTION, SOLUTION INTRAVENOUS CONTINUOUS
Status: ACTIVE | OUTPATIENT
Start: 2020-01-05 | End: 2020-01-06

## 2020-01-05 RX ADMIN — PANTOPRAZOLE SODIUM 40 MG: 40 TABLET, DELAYED RELEASE ORAL at 06:21

## 2020-01-05 RX ADMIN — INSULIN LISPRO 6 UNITS: 100 INJECTION, SOLUTION INTRAVENOUS; SUBCUTANEOUS at 11:29

## 2020-01-05 RX ADMIN — INSULIN LISPRO 3 UNITS: 100 INJECTION, SOLUTION INTRAVENOUS; SUBCUTANEOUS at 20:25

## 2020-01-05 RX ADMIN — GABAPENTIN 300 MG: 300 CAPSULE ORAL at 08:39

## 2020-01-05 RX ADMIN — SODIUM CHLORIDE: 9 INJECTION, SOLUTION INTRAVENOUS at 10:32

## 2020-01-05 RX ADMIN — Medication 14 UNITS: at 11:30

## 2020-01-05 RX ADMIN — HYDROCODONE BITARTRATE AND ACETAMINOPHEN 1 TABLET: 5; 325 TABLET ORAL at 12:24

## 2020-01-05 RX ADMIN — ASPIRIN 81 MG: 81 TABLET, COATED ORAL at 08:39

## 2020-01-05 RX ADMIN — Medication 14 UNITS: at 08:45

## 2020-01-05 RX ADMIN — HYDROCODONE BITARTRATE AND ACETAMINOPHEN 1 TABLET: 5; 325 TABLET ORAL at 20:16

## 2020-01-05 RX ADMIN — DULOXETINE HYDROCHLORIDE 60 MG: 30 CAPSULE, DELAYED RELEASE ORAL at 08:38

## 2020-01-05 RX ADMIN — LORAZEPAM 0.5 MG: 0.5 TABLET ORAL at 20:16

## 2020-01-05 RX ADMIN — MECLIZINE HYDROCHLORIDE 25 MG: 25 TABLET ORAL at 20:16

## 2020-01-05 RX ADMIN — SENNOSIDES AND DOCUSATE SODIUM 1 TABLET: 8.6; 5 TABLET ORAL at 20:19

## 2020-01-05 RX ADMIN — INSULIN LISPRO 3 UNITS: 100 INJECTION, SOLUTION INTRAVENOUS; SUBCUTANEOUS at 08:47

## 2020-01-05 RX ADMIN — GABAPENTIN 300 MG: 300 CAPSULE ORAL at 13:57

## 2020-01-05 RX ADMIN — GABAPENTIN 300 MG: 300 CAPSULE ORAL at 20:17

## 2020-01-05 RX ADMIN — BUSPIRONE HYDROCHLORIDE 5 MG: 5 TABLET ORAL at 08:39

## 2020-01-05 RX ADMIN — INSULIN GLARGINE 27 UNITS: 100 INJECTION, SOLUTION SUBCUTANEOUS at 20:24

## 2020-01-05 RX ADMIN — CLOTRIMAZOLE: 10 CREAM TOPICAL at 20:20

## 2020-01-05 RX ADMIN — FLUTICASONE PROPIONATE 2 SPRAY: 50 SPRAY, METERED NASAL at 20:19

## 2020-01-05 RX ADMIN — AMLODIPINE BESYLATE 10 MG: 5 TABLET ORAL at 08:39

## 2020-01-05 RX ADMIN — LEVOFLOXACIN 250 MG: 5 INJECTION, SOLUTION INTRAVENOUS at 17:06

## 2020-01-05 RX ADMIN — SENNOSIDES AND DOCUSATE SODIUM 1 TABLET: 8.6; 5 TABLET ORAL at 08:39

## 2020-01-05 RX ADMIN — BUSPIRONE HYDROCHLORIDE 5 MG: 5 TABLET ORAL at 20:18

## 2020-01-05 RX ADMIN — DICLOFENAC 1 G: 10 GEL TOPICAL at 20:20

## 2020-01-05 RX ADMIN — ENOXAPARIN SODIUM 40 MG: 40 INJECTION SUBCUTANEOUS at 08:38

## 2020-01-05 RX ADMIN — CLOTRIMAZOLE: 10 CREAM TOPICAL at 08:50

## 2020-01-05 RX ADMIN — HYDROCODONE BITARTRATE AND ACETAMINOPHEN 1 TABLET: 5; 325 TABLET ORAL at 06:21

## 2020-01-05 RX ADMIN — LOSARTAN POTASSIUM 25 MG: 25 TABLET, FILM COATED ORAL at 08:38

## 2020-01-05 ASSESSMENT — PAIN SCALES - GENERAL
PAINLEVEL_OUTOF10: 5
PAINLEVEL_OUTOF10: 7
PAINLEVEL_OUTOF10: 6
PAINLEVEL_OUTOF10: 3
PAINLEVEL_OUTOF10: 4
PAINLEVEL_OUTOF10: 0
PAINLEVEL_OUTOF10: 5
PAINLEVEL_OUTOF10: 0
PAINLEVEL_OUTOF10: 3
PAINLEVEL_OUTOF10: 7

## 2020-01-05 NOTE — PLAN OF CARE
Problem: Infection:  Goal: Will remain free from infection  Description  Will remain free from infection  1/5/2020 1011 by Aiden Tidwell RN  Outcome: Ongoing  1/4/2020 2318 by Mustapha Ruffin RN  Outcome: Ongoing     Problem: Pain:  Goal: Patient's pain/discomfort is manageable  Description  Patient's pain/discomfort is manageable  1/5/2020 1011 by Aiden Tidwell RN  Outcome: Ongoing  1/4/2020 2318 by Mustapha Ruffin RN  Outcome: Ongoing

## 2020-01-05 NOTE — PROGRESS NOTES
Urine culture positive klebsiella pneumoniae. Sensitive to levaquin. Cont IV levaquin for 5 days. Light IVF. Pyridium PRN. PVR WNL. Monitor. 01/05/2020    Anxiety and depression [F41.9, F32.9]  Stable. Continue current regimen and medication. Will monitor. 12/20/2019    Aphthous ulcer [K12.0]  Stable. Continue current regimen and medication. Will monitor. 12/20/2019    Chronic back pain [M54.9, G89.29]  Stable. Continue current regimen and medication. Added patch for right shoulder too. PT/OT. Will monitor. 12/20/2019    Peripheral neuropathy [G62.9]  Stable. Continue current regimen. 12/20/2019    Closed fracture of right upper extremity [S42.301A]  C/o right shoulder pain. Pain control. PT/OT. Follow general surgery/orthopedic surgery recommendations. Follow up ortho outpatient. 12/20/2019    Dysphagia [R13.10]  Diet per speech and dietician following. Aspiration precautions. 12/20/2019    CKD (chronic kidney disease) stage 3, GFR 30-59 ml/min (HCC) [N18.3]  Creat 1.5 and baseline 1.3. Stable. Avoid nephrotoxic agents. IVF today. Monitor. 12/20/2019    Declining functional status [R53.81]  Manage all aspects of care from chronic to acute. Physical and occupational therapy following and working on improving overall strength and endurance. Continue PT/OT. Dietician follwing for adequate nutrition. Ensure proper wound care, skin assessments and turning patient Q2H if needed. Monitor lab work. 12/19/2019    HTN (hypertension) [I10]  Stable. Cont current regimen. Adjust meds if needed. Low sodium. 10/14/2011    Diabetes mellitus, type II ([facility] [address] [E11.9]  Monitor fingersticks and administer SSI per protocol. Hypoglycemia protocol. Diabetic diet. Diabetes education and lifestyle modifications provided. Monitor closely and adjust medication regimen as needed. 07/05/2011   GI Prophylaxis. Patient was seen and examined and plans of care discussed via telephone with Dr. Srini Puente.

## 2020-01-06 LAB
GLUCOSE BLD-MCNC: 125 MG/DL (ref 74–106)
GLUCOSE BLD-MCNC: 136 MG/DL (ref 74–106)
GLUCOSE BLD-MCNC: 176 MG/DL (ref 74–106)
GLUCOSE BLD-MCNC: 226 MG/DL (ref 74–106)
PERFORMED ON: ABNORMAL

## 2020-01-06 PROCEDURE — 6360000002 HC RX W HCPCS: Performed by: NURSE PRACTITIONER

## 2020-01-06 PROCEDURE — 2700000000 HC OXYGEN THERAPY PER DAY

## 2020-01-06 PROCEDURE — 97110 THERAPEUTIC EXERCISES: CPT | Performed by: PHYSICAL THERAPY ASSISTANT

## 2020-01-06 PROCEDURE — 6370000000 HC RX 637 (ALT 250 FOR IP): Performed by: PHYSICIAN ASSISTANT

## 2020-01-06 PROCEDURE — 97530 THERAPEUTIC ACTIVITIES: CPT

## 2020-01-06 PROCEDURE — 6370000000 HC RX 637 (ALT 250 FOR IP): Performed by: INTERNAL MEDICINE

## 2020-01-06 PROCEDURE — 99308 SBSQ NF CARE LOW MDM 20: CPT | Performed by: INTERNAL MEDICINE

## 2020-01-06 PROCEDURE — 1200000002 HC SEMI PRIVATE SWING BED

## 2020-01-06 PROCEDURE — 6360000002 HC RX W HCPCS: Performed by: PHYSICIAN ASSISTANT

## 2020-01-06 PROCEDURE — 97116 GAIT TRAINING THERAPY: CPT | Performed by: PHYSICAL THERAPY ASSISTANT

## 2020-01-06 RX ADMIN — GABAPENTIN 300 MG: 300 CAPSULE ORAL at 21:23

## 2020-01-06 RX ADMIN — BUSPIRONE HYDROCHLORIDE 5 MG: 5 TABLET ORAL at 21:23

## 2020-01-06 RX ADMIN — SENNOSIDES AND DOCUSATE SODIUM 1 TABLET: 8.6; 5 TABLET ORAL at 09:00

## 2020-01-06 RX ADMIN — INSULIN LISPRO 6 UNITS: 100 INJECTION, SOLUTION INTRAVENOUS; SUBCUTANEOUS at 11:39

## 2020-01-06 RX ADMIN — CLOTRIMAZOLE: 10 CREAM TOPICAL at 09:01

## 2020-01-06 RX ADMIN — INSULIN GLARGINE 27 UNITS: 100 INJECTION, SOLUTION SUBCUTANEOUS at 21:33

## 2020-01-06 RX ADMIN — Medication 14 UNITS: at 11:43

## 2020-01-06 RX ADMIN — SENNOSIDES AND DOCUSATE SODIUM 1 TABLET: 8.6; 5 TABLET ORAL at 21:23

## 2020-01-06 RX ADMIN — MECLIZINE HYDROCHLORIDE 25 MG: 25 TABLET ORAL at 21:23

## 2020-01-06 RX ADMIN — POLYETHYLENE GLYCOL 3350 17 G: 17 POWDER, FOR SOLUTION ORAL at 09:00

## 2020-01-06 RX ADMIN — DULOXETINE HYDROCHLORIDE 60 MG: 30 CAPSULE, DELAYED RELEASE ORAL at 09:00

## 2020-01-06 RX ADMIN — INSULIN LISPRO 3 UNITS: 100 INJECTION, SOLUTION INTRAVENOUS; SUBCUTANEOUS at 09:02

## 2020-01-06 RX ADMIN — LORAZEPAM 0.5 MG: 0.5 TABLET ORAL at 21:22

## 2020-01-06 RX ADMIN — LOSARTAN POTASSIUM 25 MG: 25 TABLET, FILM COATED ORAL at 09:00

## 2020-01-06 RX ADMIN — Medication 14 UNITS: at 09:08

## 2020-01-06 RX ADMIN — PANTOPRAZOLE SODIUM 40 MG: 40 TABLET, DELAYED RELEASE ORAL at 05:56

## 2020-01-06 RX ADMIN — BUSPIRONE HYDROCHLORIDE 5 MG: 5 TABLET ORAL at 09:00

## 2020-01-06 RX ADMIN — GABAPENTIN 300 MG: 300 CAPSULE ORAL at 09:00

## 2020-01-06 RX ADMIN — FLUTICASONE PROPIONATE 2 SPRAY: 50 SPRAY, METERED NASAL at 09:01

## 2020-01-06 RX ADMIN — ENOXAPARIN SODIUM 40 MG: 40 INJECTION SUBCUTANEOUS at 09:00

## 2020-01-06 RX ADMIN — LEVOFLOXACIN 250 MG: 5 INJECTION, SOLUTION INTRAVENOUS at 17:17

## 2020-01-06 RX ADMIN — ASPIRIN 81 MG: 81 TABLET, COATED ORAL at 09:00

## 2020-01-06 RX ADMIN — CLOTRIMAZOLE: 10 CREAM TOPICAL at 21:25

## 2020-01-06 RX ADMIN — HYDROCODONE BITARTRATE AND ACETAMINOPHEN 1 TABLET: 5; 325 TABLET ORAL at 17:17

## 2020-01-06 RX ADMIN — GABAPENTIN 300 MG: 300 CAPSULE ORAL at 13:35

## 2020-01-06 RX ADMIN — HYDROCODONE BITARTRATE AND ACETAMINOPHEN 1 TABLET: 5; 325 TABLET ORAL at 09:07

## 2020-01-06 RX ADMIN — AMLODIPINE BESYLATE 10 MG: 5 TABLET ORAL at 09:00

## 2020-01-06 RX ADMIN — FLUTICASONE PROPIONATE 2 SPRAY: 50 SPRAY, METERED NASAL at 21:24

## 2020-01-06 RX ADMIN — Medication 14 UNITS: at 17:18

## 2020-01-06 ASSESSMENT — PAIN SCALES - GENERAL
PAINLEVEL_OUTOF10: 6
PAINLEVEL_OUTOF10: 7

## 2020-01-06 NOTE — FLOWSHEET NOTE
Patient awake in bed,  at bedside. No complaints or needs at this time. PRN pain medication administered. Instructed to call out for assistance. 01/06/20 0900   Assessment   Charting Type Shift assessment   Neurological   Neuro (WDL) WDL   Level of Consciousness 0   Gi[place]anny Coma Scale   Eye Opening 4   Best Verbal Response 5   Best Motor Response 6   [place] Coma Scale Score 15   HEENT   HEENT (WDL) X   Teeth Missing teeth   Left Eye Impaired vision;Double Vision   Right Eye Impaired vision;Double vision   Respiratory   Respiratory (WDL) X   L Breath Sounds Clear   R Breath Sounds Clear   Cough/Sputum   Cough Non-productive;Dry   Cough Description   Sputum Amount None   Cardiac   Cardiac (WDL) WDL   Cardiac Monitor   Telemetry Monitor On No   Gastrointestinal   Abdominal (WDL) X   Abdomen Inspection Distended;Rounded; Soft   RUQ Bowel Sounds Active   LUQ Bowel Sounds Active   RLQ Bowel Sounds Active   LLQ Bowel Sounds Active   Peripheral Vascular   Peripheral Vascular (WDL) WDL   Edema None   Skin Color/Condition   Skin Color/Condition (WDL) X   Skin Color Pale   Skin Condition/Temp Dry; Warm   Skin Integrity   Skin Integrity (WDL) X  (migdalia area redness- medication applied per order)   Musculoskeletal   Musculoskeletal (WDL) X   RUE Limited movement; Injury/trauma;Weakness  (tremors)   LUE Full movement   RL Extremity Weakness   LL Extremity Weakness   Genitourinary   Genitourinary (WDL) WDL   Urine Assessment   Incontinence No   Anus/Rectum   Anus/Rectum (WDL) WDL   Psychosocial   Psychosocial (WDL) WDL

## 2020-01-06 NOTE — PLAN OF CARE
Problem: Infection:  Goal: Will remain free from infection  Description  Will remain free from infection  1/5/2020 1011 by Lilly Salgado RN  Outcome: Ongoing     Problem: Safety:  Goal: Free from accidental physical injury  Description  Free from accidental physical injury  Outcome: Ongoing     Problem: Daily Care:  Goal: Daily care needs are met  Description  Daily care needs are met  Outcome: Ongoing     Problem: Pain:  Goal: Patient's pain/discomfort is manageable  Description  Patient's pain/discomfort is manageable  1/5/2020 1011 by Lilly Salgado RN  Outcome: Ongoing

## 2020-01-06 NOTE — PROGRESS NOTES
Extremity Weight Bearing: Non Weight Bearing  Subjective   General  Chart Reviewed: Yes  Response To Previous Treatment: Patient with no complaints from previous session. Family / Caregiver Present: No  Subjective  Subjective: Patient agreeable to PT skilled services. Patient reports she has fallen three times over the weekend. Objective   Bed mobility  Rolling to Left: Contact guard assistance  Rolling to Right: Contact guard assistance  Supine to Sit: Stand by assistance  Sit to Supine: Contact guard assistance  Scooting: Stand by assistance  Transfers  Sit to Stand: Contact guard assistance;Minimal Assistance  Stand to sit: Contact guard assistance  Bed to Chair: Contact guard assistance;Minimal assistance  Ambulation  Ambulation?: Yes  WB Status: FWB  Ambulation 1  Surface: level tile  Device: Hemiwalker  Assistance: Minimal assistance;2 Person assistance;Contact guard assistance  Distance: 25ftx 2 reps, 15ft, and 10x 2 reps  Comments: Poor safety awareness. Decreased step length and cadance. Step to gait cycle. PROM RLE (degrees)  RLE PROM: WFL  AROM RLE (degrees)  RLE AROM: WFL  PROM LLE (degrees)  LLE PROM: WFL  AROM LLE (degrees)  LLE AROM : WFL     Goals  Long term goals  Time Frame for Long term goals : 2 weeks  Long term goal 1: Achieve mod I for bed mobility. Long term goal 2: Perform basic transfers with SBA x 1. Long term goal 3: Ambulate 15 feet with RW or SE with CGA x 1. Long term goal 4: Demonstrate good safety awareness with functional mobility.     Plan    Plan  Times per week: 4-5 x week  Plan weeks: 2 weeks  Current Treatment Recommendations: Strengthening, Balance Training, Functional Mobility Training, Transfer Training, Endurance Training, Neuromuscular Re-education, Safety Education & Training, Patient/Caregiver Education & Training, Gait Training  Safety Devices  Type of devices: Call light within reach, Left in chair     Therapy Time   Individual Concurrent Group

## 2020-01-06 NOTE — PROGRESS NOTES
Has been unable to complete self care and mobility. Using lift for transfers. Orientation     Objective             Functional Mobility  Functional - Mobility Device: Hemiwalker  Activity: Other  Assist Level: Contact guard assistance(CGA<>MIN A)  Functional Mobility Comments: 25 x2, 15, 10x2  Bed mobility  Supine to Sit: Stand by assistance  Scooting: Stand by assistance  Transfers  Stand Pivot Transfers: Contact guard assistance;Minimal assistance;2 Person assistance  Sit to stand: Contact guard assistance;Minimal assistance     Plan   Plan  Times per week: 3-5  Times per day: Daily  Plan weeks: 1-3  Current Treatment Recommendations: Endurance Training, Strengthening, Balance Training, Safety Education & Training, Self-Care / ADL, Neuromuscular Re-education, Patient/Caregiver Education & Training, Functional Mobility Training    Goals  Short term goals  Time Frame for Short term goals: 1-2 weeks  Short term goal 1: Pt to complete Sit to stand transfer using mana walker with min assist.   Short term goal 2: Pt to complete SPT from bed to chair/BSC with min assist using mana walker assistx1  Short term goal 3: pt to complete toileting with min assist.   Short term goal 4: Pt to complete dressing with min assist for LB and UB. Short term goal 5: Pt to complete sponge bathing with MIn assist.   Long term goals  Time Frame for Long term goals : 2-3 weeks. Long term goal 1: Pt to complete toileting with MOD I. Long term goal 2: Pt to tolerate x20 minutes of activity to increase functional activity tolerance. Long term goal 3: Pt to complete dressing with MOD I. Long term goal 4: Pt to complete bathing with [place]. Therapy Time   Individual Concurrent Group Co-treatment   Time In 2588         Time Out 0315         Minutes 55              This note serves as a DC summary in the event of pt discharge.      Valery Cabrera, OTR/L

## 2020-01-06 NOTE — PROGRESS NOTES
Progress Note      Subjective:   Chief complaint: Declining functional status     Interval History:   Reports feeling weak on the left side since onset of illness several months ago. Also complains of right knee pain and requests to be seen by Ortho for possible injections. Review of systems:     Constitutional:  Denies fever or chills. Eyes:  Denies change in visual acuity or discharge. HENT:  Denies nasal congestion or sore throat. Respiratory:  Denies cough or shortness of breath. Cardiovascular:  Denies chest pain, palpitation or swelling in LEs. GI:  Denies abdominal pain, nausea, vomiting, bloody stools or diarrhea. :  Denies dysuria or frequency. Musculoskeletal:  + Right knee pain. Integument:  Denies rash or itching. Neurologic:  Denies headache, focal weakness or sensory changes. Endocrine:  Denies polyuria or polydipsia. Lymphatic:  Denies swollen glands or night sweats. Psychiatric:  Denies depression or anxiety. Past medical history, surgical history, family history and social history reviewed and unchanged compared to H&P earlier this admission.     Medications:   Scheduled Meds:   lidocaine  2 patch Transdermal Daily    levofloxacin  250 mg Intravenous Q24H    insulin lispro  0-18 Units Subcutaneous TID WC    insulin lispro  0-9 Units Subcutaneous Nightly    polyethylene glycol  17 g Oral Daily    amLODIPine  10 mg Oral Daily    aspirin  81 mg Oral Daily    busPIRone  5 mg Oral BID    clotrimazole   Topical BID    DULoxetine  60 mg Oral Daily    fluticasone  2 spray Each Nostril BID    gabapentin  300 mg Oral TID    insulin glargine  27 Units Subcutaneous Nightly    insulin lispro  14 Units Subcutaneous TID WC    losartan  25 mg Oral Daily    pantoprazole  40 mg Oral QAM AC    sennosides-docusate sodium  1 tablet Oral BID    enoxaparin  40 mg Subcutaneous Daily     Continuous Infusions:   dextrose         Objective:     Vital Signs  Temp: 97.9 °F (36.6 °C)  Pulse: 98  Resp: 18  BP: 110/62  SpO2: 91 %  O2 Device: Nasal cannula       Vital signs reviewed in electronic charts. Physical exam    Constitutional:  Well developed, well nourished, no acute distress. Eyes:  PERRL, conjunctiva normal, EOMI. HENT:  Atraumatic, external ears normal, external nose/nares normal, oropharynx moist, no pharyngeal exudates. Neck:  Supple. No JVD or thyromegaly. Respiratory:  No respiratory distress, normal breath sounds, no rales, no wheezing. Cardiovascular:  Normal rate, normal rhythm, no murmurs, no gallops, no rubs. GI:  Soft, nondistended, normal bowel sounds, nontender, no organomegaly, no mass. :  No costovertebral angle tenderness. Musculoskeletal:  No edema, no tenderness, no obvious deformities. Patient is moving all extremities. Integument:  Well hydrated, no rash. Lymphatic:  No cervical or axillary lymphadenopathy noted. Neurologic:  Alert & oriented x 3,  no focal deficits noted. Strength is equal throughout. Psychiatric:  Speech and behavior appropriate. Results:     Lab Results   Component Value Date    WBC 5.5 01/04/2020    HGB 9.0 (L) 01/04/2020    HCT 30.0 (L) 01/04/2020    .7 (H) 01/04/2020     01/04/2020       Lab Results   Component Value Date     01/04/2020    K 4.8 01/04/2020    K 3.4 12/20/2019     01/04/2020    CO2 27 01/04/2020    BUN 22 01/04/2020    CREATININE 1.5 01/04/2020    GLUCOSE 194 01/04/2020    CALCIUM 9.1 01/04/2020        Assessment and Plan:   Aphthous ulcer  Continue medicated mouthwash, swish and swallow QID    Chronic back pain and chronic right knee pain  Continue PRN norco and lidoderm patch  Will need outpatient referral to [facility] at discharge                 Peripheral neuropathy   Continue gabapentin                 Closed fracture of right upper extremity   Plain films at [facility] of poor quality, [facility] could not assess subacute fracture. Per [facility], NWB RUE.  Follow up in clinic as scheduled.                 Dysphagia   Mechanical soft diet. SLP following                 Declining functional status   PT/OT consulted. Fall precautions.     HTN (hypertension) [I10]  Continue amlodipine and losartan.                 Diabetes mellitus, type II   Was on metformin, tresiba and glyburide prior to INDY at [facility]. Discharged on lantus 27 units Q HS, humalog 14 units TID with meals and SSI.        CKD, stage III  Baseline sCr of 1.3. Creatinine at baseline. Avoid nephrotoxins, NSAIDs. Monitor.     Hypomagnesemia   Monitor and replete PRN.     Anemia  Unknown baseline hgb. Hgb stable  Iron studies, B12 and folate WNL  Monitor and transfuse for Hgb <7.0. No active bleeding.     Recent cholecystitis s/p CCY with suspected cholangitis  Completed antibiotics at [facility]. Follow up with [facility] in Clinic in 4 weeks.       Patient was seen and examined by Dr. Hannah Kothari and plan of care reviewed.       Electronically signed by KEATON Feliz on 1/6/2020 at 3:22 PM

## 2020-01-07 LAB
GLUCOSE BLD-MCNC: 107 MG/DL (ref 74–106)
GLUCOSE BLD-MCNC: 140 MG/DL (ref 74–106)
GLUCOSE BLD-MCNC: 221 MG/DL (ref 74–106)
GLUCOSE BLD-MCNC: 235 MG/DL (ref 74–106)
PERFORMED ON: ABNORMAL

## 2020-01-07 PROCEDURE — 97530 THERAPEUTIC ACTIVITIES: CPT

## 2020-01-07 PROCEDURE — 6370000000 HC RX 637 (ALT 250 FOR IP): Performed by: PHYSICIAN ASSISTANT

## 2020-01-07 PROCEDURE — 1200000002 HC SEMI PRIVATE SWING BED

## 2020-01-07 PROCEDURE — 97530 THERAPEUTIC ACTIVITIES: CPT | Performed by: PHYSICAL THERAPY ASSISTANT

## 2020-01-07 PROCEDURE — 6370000000 HC RX 637 (ALT 250 FOR IP): Performed by: INTERNAL MEDICINE

## 2020-01-07 PROCEDURE — 6360000002 HC RX W HCPCS: Performed by: PHYSICIAN ASSISTANT

## 2020-01-07 PROCEDURE — 6360000002 HC RX W HCPCS: Performed by: NURSE PRACTITIONER

## 2020-01-07 RX ADMIN — CLOTRIMAZOLE: 10 CREAM TOPICAL at 20:36

## 2020-01-07 RX ADMIN — Medication 14 UNITS: at 11:30

## 2020-01-07 RX ADMIN — DICLOFENAC 1 G: 10 GEL TOPICAL at 20:36

## 2020-01-07 RX ADMIN — FLUTICASONE PROPIONATE 2 SPRAY: 50 SPRAY, METERED NASAL at 09:22

## 2020-01-07 RX ADMIN — HYDROCODONE BITARTRATE AND ACETAMINOPHEN 1 TABLET: 5; 325 TABLET ORAL at 20:36

## 2020-01-07 RX ADMIN — Medication 14 UNITS: at 09:24

## 2020-01-07 RX ADMIN — SENNOSIDES AND DOCUSATE SODIUM 1 TABLET: 8.6; 5 TABLET ORAL at 20:36

## 2020-01-07 RX ADMIN — PANTOPRAZOLE SODIUM 40 MG: 40 TABLET, DELAYED RELEASE ORAL at 05:55

## 2020-01-07 RX ADMIN — DULOXETINE HYDROCHLORIDE 60 MG: 30 CAPSULE, DELAYED RELEASE ORAL at 09:22

## 2020-01-07 RX ADMIN — ASPIRIN 81 MG: 81 TABLET, COATED ORAL at 09:22

## 2020-01-07 RX ADMIN — HYDROCODONE BITARTRATE AND ACETAMINOPHEN 1 TABLET: 5; 325 TABLET ORAL at 12:58

## 2020-01-07 RX ADMIN — LOSARTAN POTASSIUM 25 MG: 25 TABLET, FILM COATED ORAL at 09:22

## 2020-01-07 RX ADMIN — SENNOSIDES AND DOCUSATE SODIUM 1 TABLET: 8.6; 5 TABLET ORAL at 09:22

## 2020-01-07 RX ADMIN — HYDROCODONE BITARTRATE AND ACETAMINOPHEN 1 TABLET: 5; 325 TABLET ORAL at 05:55

## 2020-01-07 RX ADMIN — INSULIN LISPRO 6 UNITS: 100 INJECTION, SOLUTION INTRAVENOUS; SUBCUTANEOUS at 09:23

## 2020-01-07 RX ADMIN — LEVOFLOXACIN 250 MG: 5 INJECTION, SOLUTION INTRAVENOUS at 17:00

## 2020-01-07 RX ADMIN — Medication 14 UNITS: at 17:25

## 2020-01-07 RX ADMIN — BUSPIRONE HYDROCHLORIDE 5 MG: 5 TABLET ORAL at 09:22

## 2020-01-07 RX ADMIN — GABAPENTIN 300 MG: 300 CAPSULE ORAL at 20:36

## 2020-01-07 RX ADMIN — LORAZEPAM 0.5 MG: 0.5 TABLET ORAL at 20:36

## 2020-01-07 RX ADMIN — MECLIZINE HYDROCHLORIDE 25 MG: 25 TABLET ORAL at 20:37

## 2020-01-07 RX ADMIN — BUSPIRONE HYDROCHLORIDE 5 MG: 5 TABLET ORAL at 20:36

## 2020-01-07 RX ADMIN — GABAPENTIN 300 MG: 300 CAPSULE ORAL at 09:22

## 2020-01-07 RX ADMIN — GABAPENTIN 300 MG: 300 CAPSULE ORAL at 14:16

## 2020-01-07 RX ADMIN — AMLODIPINE BESYLATE 10 MG: 5 TABLET ORAL at 09:22

## 2020-01-07 RX ADMIN — INSULIN LISPRO 2 UNITS: 100 INJECTION, SOLUTION INTRAVENOUS; SUBCUTANEOUS at 20:40

## 2020-01-07 RX ADMIN — INSULIN LISPRO 6 UNITS: 100 INJECTION, SOLUTION INTRAVENOUS; SUBCUTANEOUS at 11:31

## 2020-01-07 RX ADMIN — INSULIN GLARGINE 27 UNITS: 100 INJECTION, SOLUTION SUBCUTANEOUS at 20:39

## 2020-01-07 RX ADMIN — ENOXAPARIN SODIUM 40 MG: 40 INJECTION SUBCUTANEOUS at 09:22

## 2020-01-07 RX ADMIN — FLUTICASONE PROPIONATE 2 SPRAY: 50 SPRAY, METERED NASAL at 20:36

## 2020-01-07 ASSESSMENT — PAIN SCALES - GENERAL
PAINLEVEL_OUTOF10: 7
PAINLEVEL_OUTOF10: 7
PAINLEVEL_OUTOF10: 6

## 2020-01-07 NOTE — PROGRESS NOTES
Occupational Therapy  Facility/Department: [facility]  Daily Treatment Note  NAME: Ambrose Garcia  : 1940  MRN: [id number]    Date of Service: 2020    Discharge Recommendations:  Home with Home health OT       Assessment   Assessment: Co tx with PTA. Pt hesitant to participate secondary to pain in RLE knee. Pt education provided on pt participation to improve independence with transfers in order to return home safely with spouse. Pt/caregiver education provided on transfer techniques. Spouse assisted with transfers x3. Pt ambulated to bathroom with CGA and transferred with CGA x2. Pt demo improved ability to completed transfers with carryover however impulsive motions noted requiring cues to correct. Patient Diagnosis(es): There were no encounter diagnoses. has a past medical history of Allergic rhinitis, Anxiety, Chronic back pain, Depression, Double vision with both eyes open, Fall, Hyperlipidemia, Hypertension, Osteoarthritis, and Type II or unspecified type diabetes mellitus without mention of complication, not stated as uncontrolled. has a past surgical history that includes Hysterectomy; shoulder surgery;  section; and Colonoscopy. Restrictions  Restrictions/Precautions  Restrictions/Precautions: Weight Bearing, General Precautions, Fall Risk  Required Braces or Orthoses?: No  Upper Extremity Weight Bearing Restrictions  Right Upper Extremity Weight Bearing: Non Weight Bearing  Subjective   General  Chart Reviewed: Yes  Patient assessed for rehabilitation services?: Yes  Family / Caregiver Present: Yes  Referring Practitioner: KEATON Nuno  Diagnosis: S/p Avinash Mcallister,   Subjective  Subjective: Pt reports she has been hospitalized last 16 days. Has been unable to complete self care and mobility. Using lift for transfers.        Orientation     Objective             Functional Mobility  Functional - Mobility Device: Hemiwalker  Activity: Other  Assist Level:

## 2020-01-07 NOTE — PROGRESS NOTES
Physical Therapy  Facility/Department: [facility] MED SURG  Daily Treatment Note  NAME: Oksana Jauregui  : 1940  MRN: [id number]    Date of Service: 2020    Discharge Recommendations:  Continue to assess pending progress        Assessment   Assessment: Patient agreeable to PT skilled services with Mod verbal encourgaement. Co tx provided with OT for increased patient and staff safety. Patient able to complete bed mobility skills coming to eob. T/f training performed from tob to chair and back. Mod cuing and education cont. to be required for proper use of mana walker, and turning for energy conservation. Spouse participated in t/f x 2 with cuing provided as needed. Patient t/f from eob to elevated toilet seat and back. Patient left in chair with call light and spouse present. No needs noted att. Activity Tolerance  Activity Tolerance: Patient Tolerated treatment well     Patient Diagnosis(es): There were no encounter diagnoses. has a past medical history of Allergic rhinitis, Anxiety, Chronic back pain, Depression, Double vision with both eyes open, Fall, Hyperlipidemia, Hypertension, Osteoarthritis, and Type II or unspecified type diabetes mellitus without mention of complication, not stated as uncontrolled. has a past surgical history that includes Hysterectomy; shoulder surgery;  section; and Colonoscopy. Restrictions  Restrictions/Precautions  Restrictions/Precautions: Weight Bearing, General Precautions, Fall Risk  Required Braces or Orthoses?: No  Upper Extremity Weight Bearing Restrictions  Right Upper Extremity Weight Bearing: Non Weight Bearing  Subjective   General  Chart Reviewed: Yes  Response To Previous Treatment: Patient with no complaints from previous session. Family / Caregiver Present: No  Subjective  Subjective: Patient agreeable to PT skilled services. Patient reports she is sore from falling yesterday afternoon.        Objective   Bed mobility  Rolling to Right: Contact

## 2020-01-07 NOTE — FLOWSHEET NOTE
Patient awake in bed, no complaints at this time. Pt dressing to LFA is CDI. Bacitracin ointment ordered per verbal order from PA to apply to skin tear site. Will redress wound after med order is filled. 01/07/20 0927   Assessment   Charting Type Shift assessment   Neurological   Neuro (WDL) WDL   Level of Consciousness 0   Giovanny Coma Scale   Eye Opening 4   Best Verbal Response 5   Best Motor Response 6   [place] Coma Scale Score 15   NIH/MNHISS Stroke Scale   Interval Baseline   HEENT   HEENT (WDL) X   Teeth Missing teeth   Left Eye Impaired vision;Double Vision   Right Eye Impaired vision;Double vision   Respiratory   Respiratory (WDL) X   L Breath Sounds Clear   R Breath Sounds Clear   Cough/Sputum   Cough Non-productive;Dry   Cough Description   Sputum Amount None   Cardiac   Cardiac (WDL) WDL   Cardiac Monitor   Telemetry Monitor On No   Gastrointestinal   Abdominal (WDL) X   Last BM (including prior to admit) 01/06/20   Abdomen Inspection Distended;Rounded; Soft   RUQ Bowel Sounds Active   LUQ Bowel Sounds Active   RLQ Bowel Sounds Active   LLQ Bowel Sounds Active   Peripheral Vascular   Peripheral Vascular (WDL) WDL   Edema None   Skin Color/Condition   Skin Color/Condition (WDL) X   Skin Color Pale   Skin Condition/Temp Dry; Warm   Skin Integrity   Skin Integrity (WDL) X   Skin Integrity Redness   Location migdalia area   Multiple Skin Integrity Sites Yes   Skin Integrity Site 2   Skin Integrity Location 2 Tear   Location 2 left forearm   Preventative Dressing Yes   Musculoskeletal   Musculoskeletal (WDL) X   RUE Limited movement; Injury/trauma;Weakness  (tremors)   LUE Full movement   RL Extremity Weakness   LL Extremity Weakness   Genitourinary   Genitourinary (WDL) WDL   Urine Assessment   Incontinence No   Anus/Rectum   Anus/Rectum (WDL) WDL   Psychosocial   Psychosocial (WDL) WDL

## 2020-01-08 LAB
GLUCOSE BLD-MCNC: 165 MG/DL (ref 74–106)
GLUCOSE BLD-MCNC: 171 MG/DL (ref 74–106)
GLUCOSE BLD-MCNC: 187 MG/DL (ref 74–106)
GLUCOSE BLD-MCNC: 195 MG/DL (ref 74–106)
PERFORMED ON: ABNORMAL

## 2020-01-08 PROCEDURE — 6360000002 HC RX W HCPCS: Performed by: PHYSICIAN ASSISTANT

## 2020-01-08 PROCEDURE — 6370000000 HC RX 637 (ALT 250 FOR IP): Performed by: PHYSICIAN ASSISTANT

## 2020-01-08 PROCEDURE — 6370000000 HC RX 637 (ALT 250 FOR IP): Performed by: INTERNAL MEDICINE

## 2020-01-08 PROCEDURE — 1200000002 HC SEMI PRIVATE SWING BED

## 2020-01-08 PROCEDURE — 97535 SELF CARE MNGMENT TRAINING: CPT

## 2020-01-08 PROCEDURE — 97530 THERAPEUTIC ACTIVITIES: CPT | Performed by: PHYSICAL THERAPY ASSISTANT

## 2020-01-08 PROCEDURE — 6360000002 HC RX W HCPCS: Performed by: NURSE PRACTITIONER

## 2020-01-08 PROCEDURE — 97530 THERAPEUTIC ACTIVITIES: CPT

## 2020-01-08 RX ORDER — POLYETHYLENE GLYCOL 3350 17 G/17G
17 POWDER, FOR SOLUTION ORAL 2 TIMES DAILY
Status: DISCONTINUED | OUTPATIENT
Start: 2020-01-08 | End: 2020-01-19 | Stop reason: HOSPADM

## 2020-01-08 RX ADMIN — LOSARTAN POTASSIUM 25 MG: 25 TABLET, FILM COATED ORAL at 08:53

## 2020-01-08 RX ADMIN — FLUTICASONE PROPIONATE 2 SPRAY: 50 SPRAY, METERED NASAL at 20:35

## 2020-01-08 RX ADMIN — DULOXETINE HYDROCHLORIDE 60 MG: 30 CAPSULE, DELAYED RELEASE ORAL at 08:54

## 2020-01-08 RX ADMIN — LORAZEPAM 0.5 MG: 0.5 TABLET ORAL at 20:39

## 2020-01-08 RX ADMIN — INSULIN LISPRO 3 UNITS: 100 INJECTION, SOLUTION INTRAVENOUS; SUBCUTANEOUS at 09:01

## 2020-01-08 RX ADMIN — SENNOSIDES AND DOCUSATE SODIUM 1 TABLET: 8.6; 5 TABLET ORAL at 08:53

## 2020-01-08 RX ADMIN — GABAPENTIN 300 MG: 300 CAPSULE ORAL at 14:29

## 2020-01-08 RX ADMIN — Medication 14 UNITS: at 17:27

## 2020-01-08 RX ADMIN — Medication 14 UNITS: at 11:54

## 2020-01-08 RX ADMIN — LEVOFLOXACIN 250 MG: 5 INJECTION, SOLUTION INTRAVENOUS at 19:30

## 2020-01-08 RX ADMIN — GABAPENTIN 300 MG: 300 CAPSULE ORAL at 08:54

## 2020-01-08 RX ADMIN — ENOXAPARIN SODIUM 40 MG: 40 INJECTION SUBCUTANEOUS at 08:53

## 2020-01-08 RX ADMIN — INSULIN LISPRO 3 UNITS: 100 INJECTION, SOLUTION INTRAVENOUS; SUBCUTANEOUS at 11:57

## 2020-01-08 RX ADMIN — AMLODIPINE BESYLATE 10 MG: 5 TABLET ORAL at 08:53

## 2020-01-08 RX ADMIN — GABAPENTIN 300 MG: 300 CAPSULE ORAL at 20:35

## 2020-01-08 RX ADMIN — PANTOPRAZOLE SODIUM 40 MG: 40 TABLET, DELAYED RELEASE ORAL at 05:58

## 2020-01-08 RX ADMIN — INSULIN LISPRO 2 UNITS: 100 INJECTION, SOLUTION INTRAVENOUS; SUBCUTANEOUS at 20:41

## 2020-01-08 RX ADMIN — Medication 14 UNITS: at 09:06

## 2020-01-08 RX ADMIN — HYDROCODONE BITARTRATE AND ACETAMINOPHEN 1 TABLET: 5; 325 TABLET ORAL at 20:39

## 2020-01-08 RX ADMIN — INSULIN GLARGINE 27 UNITS: 100 INJECTION, SOLUTION SUBCUTANEOUS at 20:41

## 2020-01-08 RX ADMIN — BUSPIRONE HYDROCHLORIDE 5 MG: 5 TABLET ORAL at 20:35

## 2020-01-08 RX ADMIN — INSULIN LISPRO 3 UNITS: 100 INJECTION, SOLUTION INTRAVENOUS; SUBCUTANEOUS at 17:25

## 2020-01-08 RX ADMIN — CLOTRIMAZOLE: 10 CREAM TOPICAL at 20:36

## 2020-01-08 RX ADMIN — BUSPIRONE HYDROCHLORIDE 5 MG: 5 TABLET ORAL at 08:53

## 2020-01-08 RX ADMIN — HYDROCODONE BITARTRATE AND ACETAMINOPHEN 1 TABLET: 5; 325 TABLET ORAL at 08:53

## 2020-01-08 RX ADMIN — ASPIRIN 81 MG: 81 TABLET, COATED ORAL at 08:53

## 2020-01-08 ASSESSMENT — PAIN SCALES - GENERAL
PAINLEVEL_OUTOF10: 7
PAINLEVEL_OUTOF10: 7

## 2020-01-08 NOTE — FLOWSHEET NOTE
01/08/20 0909   Assessment   Charting Type Shift assessment   Neurological   Neuro (WDL) WDL   Level of Consciousness 0   [place] Coma Scale   Eye Opening 4   Best Verbal Response 5   Best Motor Response 6   Giovanny Coma Scale Score 15   NIH/MNHISS Stroke Scale   Interval Baseline   HEENT   HEENT (WDL) X   Teeth Missing teeth   Left Eye Impaired vision;Double Vision   Right Eye Impaired vision;Double vision   Respiratory   Respiratory (WDL) X   L Breath Sounds Clear   R Breath Sounds Clear   Cough/Sputum   Cough Non-productive;Dry   Cough Description   Sputum Amount None   Cardiac   Cardiac (WDL) WDL   Cardiac Monitor   Telemetry Monitor On No   Gastrointestinal   Abdominal (WDL) X   Abdomen Inspection Distended;Rounded; Soft   RUQ Bowel Sounds Active   LUQ Bowel Sounds Active   RLQ Bowel Sounds Active   LLQ Bowel Sounds Active   Peripheral Vascular   Peripheral Vascular (WDL) WDL   Edema None   Skin Color/Condition   Skin Color/Condition (WDL) X   Skin Color Pale   Skin Condition/Temp Warm;Dry   Skin Integrity   Skin Integrity (WDL) X   Skin Integrity Redness   Location migdalia area   Skin Integrity Site 2   Skin Integrity Location 2 Tear   Location 2 LFA   Musculoskeletal   Musculoskeletal (WDL) X   RUE Limited movement; Injury/trauma;Weakness  (tremors)   LUE Full movement   RL Extremity Weakness   LL Extremity Weakness   Genitourinary   Genitourinary (WDL) WDL   Urine Assessment   Incontinence No   Anus/Rectum   Anus/Rectum (WDL) WDL   Psychosocial   Psychosocial (WDL) WDL   Pt awake in bed. Pt alert and oriented. Pt appears in no acute distress. Pt currently on RA. Pt lung sounds clear juan j. Pt encouraged to cough and deep breathe. Pt dressing on LFA CDI. Pt call bell and bedside table within reach. Will continue to monitor pt.

## 2020-01-08 NOTE — PLAN OF CARE
Problem: Falls - Risk of:  Goal: Will remain free from falls  Description  Will remain free from falls  1/8/2020 0908 by Diane Devries RN  Outcome: Ongoing  1/8/2020 0216 by Tc Villeda RN  Outcome: Ongoing     Problem: Daily Care:  Goal: Daily care needs are met  Description  Daily care needs are met  1/8/2020 0224 by Tc Villeda RN  Outcome: Ongoing  1/8/2020 0216 by Tc Villeda RN  Outcome: Ongoing     Problem: Pain:  Goal: Patient's pain/discomfort is manageable  Description  Patient's pain/discomfort is manageable  Outcome: Ongoing  Goal: Pain level will decrease  Description  Pain level will decrease  Outcome: Ongoing     Problem: Risk for Impaired Skin Integrity  Goal: Tissue integrity - skin and mucous membranes  Description  Structural intactness and normal physiological function of skin and  mucous membranes.   1/8/2020 0908 by Diane Devries RN  Outcome: Ongoing  1/8/2020 0224 by Tc Villeda RN  Outcome: Ongoing

## 2020-01-08 NOTE — PROGRESS NOTES
Time Out 0228         Minutes 36              This note serves as a DC summary in the event of pt discharge.      Valery Cabrera, OTR/L

## 2020-01-08 NOTE — PROGRESS NOTES
assistance  Ambulation  Ambulation?: Yes  WB Status: FWB  Ambulation 1  Surface: level tile  Device: Hemiwalker  Assistance: Minimal assistance;2 Person assistance;Contact guard assistance  Distance: 15ft, 12ft  Comments: Poor safety awareness. Decreased step length and cadance. Step to gait cycle. PROM RLE (degrees)  RLE PROM: WFL  AROM RLE (degrees)  RLE AROM: WFL  PROM LLE (degrees)  LLE PROM: WFL  AROM LLE (degrees)  LLE AROM : WFL       Goals  Long term goals  Time Frame for Long term goals : 2 weeks  Long term goal 1: Achieve mod I for bed mobility. Long term goal 2: Perform basic transfers with SBA x 1. Long term goal 3: Ambulate 15 feet with RW or SE with CGA x 1. Long term goal 4: Demonstrate good safety awareness with functional mobility. Plan    Plan  Times per week: 4-5 x week  Plan weeks: 2 weeks  Current Treatment Recommendations: Strengthening, Balance Training, Functional Mobility Training, Transfer Training, Endurance Training, Neuromuscular Re-education, Safety Education & Training, Patient/Caregiver Education & Training, Gait Training  Safety Devices  Type of devices: Call light within reach, Left in chair     Therapy Time   Individual Concurrent Group Co-treatment   Time In 1352         Time Out 1428         Minutes 1240 [facility]     This note serves a discharge summary in the event of patient discharge.

## 2020-01-09 LAB
ANION GAP SERPL CALCULATED.3IONS-SCNC: 9 MMOL/L (ref 3–16)
BUN BLDV-MCNC: 18 MG/DL (ref 6–20)
CALCIUM SERPL-MCNC: 8.8 MG/DL (ref 8.5–10.5)
CHLORIDE BLD-SCNC: 100 MMOL/L (ref 98–107)
CO2: 27 MMOL/L (ref 20–30)
CREAT SERPL-MCNC: 1.4 MG/DL (ref 0.4–1.2)
FERRITIN: 167.2 NG/ML (ref 22–322)
GFR AFRICAN AMERICAN: 44
GFR NON-AFRICAN AMERICAN: 36
GLUCOSE BLD-MCNC: 121 MG/DL (ref 74–106)
GLUCOSE BLD-MCNC: 169 MG/DL (ref 74–106)
GLUCOSE BLD-MCNC: 187 MG/DL (ref 74–106)
GLUCOSE BLD-MCNC: 188 MG/DL (ref 74–106)
GLUCOSE BLD-MCNC: 200 MG/DL (ref 74–106)
HCT VFR BLD CALC: 28.2 % (ref 37–47)
HEMOGLOBIN: 8.5 G/DL (ref 11.5–16.5)
IRON SATURATION: 21 % (ref 15–50)
IRON: 51 UG/DL (ref 37–145)
MCH RBC QN AUTO: 30.7 PG (ref 27–32)
MCHC RBC AUTO-ENTMCNC: 30.1 G/DL (ref 31–35)
MCV RBC AUTO: 101.8 FL (ref 80–100)
PDW BLD-RTO: 15.9 % (ref 11–16)
PERFORMED ON: ABNORMAL
PLATELET # BLD: 177 K/UL (ref 150–400)
PMV BLD AUTO: 11 FL (ref 6–10)
POTASSIUM SERPL-SCNC: 4.7 MMOL/L (ref 3.4–5.1)
RBC # BLD: 2.77 M/UL (ref 3.8–5.8)
SODIUM BLD-SCNC: 136 MMOL/L (ref 136–145)
TOTAL IRON BINDING CAPACITY: 245 UG/DL (ref 250–450)
WBC # BLD: 7 K/UL (ref 4–11)

## 2020-01-09 PROCEDURE — 97116 GAIT TRAINING THERAPY: CPT | Performed by: PHYSICAL THERAPY ASSISTANT

## 2020-01-09 PROCEDURE — 6360000002 HC RX W HCPCS: Performed by: NURSE PRACTITIONER

## 2020-01-09 PROCEDURE — 97803 MED NUTRITION INDIV SUBSEQ: CPT

## 2020-01-09 PROCEDURE — 83550 IRON BINDING TEST: CPT

## 2020-01-09 PROCEDURE — 6370000000 HC RX 637 (ALT 250 FOR IP): Performed by: NURSE PRACTITIONER

## 2020-01-09 PROCEDURE — 1200000002 HC SEMI PRIVATE SWING BED

## 2020-01-09 PROCEDURE — 80048 BASIC METABOLIC PNL TOTAL CA: CPT

## 2020-01-09 PROCEDURE — 82728 ASSAY OF FERRITIN: CPT

## 2020-01-09 PROCEDURE — 97530 THERAPEUTIC ACTIVITIES: CPT | Performed by: PHYSICAL THERAPY ASSISTANT

## 2020-01-09 PROCEDURE — 83540 ASSAY OF IRON: CPT

## 2020-01-09 PROCEDURE — 85027 COMPLETE CBC AUTOMATED: CPT

## 2020-01-09 PROCEDURE — 6360000002 HC RX W HCPCS: Performed by: PHYSICIAN ASSISTANT

## 2020-01-09 PROCEDURE — 36415 COLL VENOUS BLD VENIPUNCTURE: CPT

## 2020-01-09 PROCEDURE — 6370000000 HC RX 637 (ALT 250 FOR IP): Performed by: INTERNAL MEDICINE

## 2020-01-09 PROCEDURE — 97535 SELF CARE MNGMENT TRAINING: CPT

## 2020-01-09 PROCEDURE — 6370000000 HC RX 637 (ALT 250 FOR IP): Performed by: PHYSICIAN ASSISTANT

## 2020-01-09 RX ADMIN — INSULIN LISPRO 2 UNITS: 100 INJECTION, SOLUTION INTRAVENOUS; SUBCUTANEOUS at 21:29

## 2020-01-09 RX ADMIN — LOSARTAN POTASSIUM 25 MG: 25 TABLET, FILM COATED ORAL at 08:11

## 2020-01-09 RX ADMIN — BUSPIRONE HYDROCHLORIDE 5 MG: 5 TABLET ORAL at 21:27

## 2020-01-09 RX ADMIN — BUSPIRONE HYDROCHLORIDE 5 MG: 5 TABLET ORAL at 08:12

## 2020-01-09 RX ADMIN — SENNOSIDES AND DOCUSATE SODIUM 1 TABLET: 8.6; 5 TABLET ORAL at 08:12

## 2020-01-09 RX ADMIN — GABAPENTIN 300 MG: 300 CAPSULE ORAL at 21:26

## 2020-01-09 RX ADMIN — HYDROCODONE BITARTRATE AND ACETAMINOPHEN 1 TABLET: 5; 325 TABLET ORAL at 21:26

## 2020-01-09 RX ADMIN — INSULIN GLARGINE 27 UNITS: 100 INJECTION, SOLUTION SUBCUTANEOUS at 21:29

## 2020-01-09 RX ADMIN — INSULIN LISPRO 3 UNITS: 100 INJECTION, SOLUTION INTRAVENOUS; SUBCUTANEOUS at 11:23

## 2020-01-09 RX ADMIN — PANTOPRAZOLE SODIUM 40 MG: 40 TABLET, DELAYED RELEASE ORAL at 05:39

## 2020-01-09 RX ADMIN — Medication 14 UNITS: at 16:43

## 2020-01-09 RX ADMIN — DULOXETINE HYDROCHLORIDE 60 MG: 30 CAPSULE, DELAYED RELEASE ORAL at 08:11

## 2020-01-09 RX ADMIN — Medication 14 UNITS: at 08:19

## 2020-01-09 RX ADMIN — Medication 14 UNITS: at 11:24

## 2020-01-09 RX ADMIN — INSULIN LISPRO 3 UNITS: 100 INJECTION, SOLUTION INTRAVENOUS; SUBCUTANEOUS at 08:16

## 2020-01-09 RX ADMIN — LEVOFLOXACIN 250 MG: 5 INJECTION, SOLUTION INTRAVENOUS at 16:43

## 2020-01-09 RX ADMIN — GABAPENTIN 300 MG: 300 CAPSULE ORAL at 08:11

## 2020-01-09 RX ADMIN — LORAZEPAM 0.5 MG: 0.5 TABLET ORAL at 21:27

## 2020-01-09 RX ADMIN — ENOXAPARIN SODIUM 40 MG: 40 INJECTION SUBCUTANEOUS at 08:12

## 2020-01-09 RX ADMIN — GABAPENTIN 300 MG: 300 CAPSULE ORAL at 14:05

## 2020-01-09 RX ADMIN — FLUTICASONE PROPIONATE 2 SPRAY: 50 SPRAY, METERED NASAL at 21:27

## 2020-01-09 RX ADMIN — ASPIRIN 81 MG: 81 TABLET, COATED ORAL at 08:12

## 2020-01-09 RX ADMIN — HYDROCODONE BITARTRATE AND ACETAMINOPHEN 1 TABLET: 5; 325 TABLET ORAL at 08:24

## 2020-01-09 RX ADMIN — AMLODIPINE BESYLATE 10 MG: 5 TABLET ORAL at 08:11

## 2020-01-09 ASSESSMENT — PAIN SCALES - GENERAL
PAINLEVEL_OUTOF10: 7
PAINLEVEL_OUTOF10: 7

## 2020-01-09 NOTE — FLOWSHEET NOTE
01/09/20 0831   Assessment   Charting Type Shift assessment   Neurological   Neuro (WDL) WDL   Level of Consciousness 0   [place] Coma Scale   Eye Opening 4   Best Verbal Response 5   Best Motor Response 6   Giovanny Coma Scale Score 15   HEENT   HEENT (WDL) X   Teeth Missing teeth   Left Eye Impaired vision;Double Vision   Right Eye Impaired vision;Double vision   Respiratory   Respiratory (WDL) X   L Breath Sounds Clear   R Breath Sounds Clear   Cough/Sputum   Cough Non-productive;Dry   Cough Description   Sputum Amount None   Cardiac   Cardiac (WDL) WDL   Cardiac Monitor   Telemetry Monitor On No   Gastrointestinal   Abdominal (WDL) X   Abdomen Inspection Distended;Rounded; Soft   RUQ Bowel Sounds Active   LUQ Bowel Sounds Active   RLQ Bowel Sounds Active   LLQ Bowel Sounds Active   Peripheral Vascular   Peripheral Vascular (WDL) WDL   Edema None   Skin Color/Condition   Skin Color/Condition (WDL) X   Skin Color Pale   Skin Condition/Temp Warm;Dry   Skin Integrity   Skin Integrity (WDL) X   Skin Integrity Redness   Location [place] area   Skin Integrity Site 2   Skin Integrity Location 2 Tear   Location 2 LFA   Preventative Dressing Yes   Musculoskeletal   Musculoskeletal (WDL) X   RUE Limited movement; Injury/trauma;Weakness  (tremors)   LUE Full movement   RL Extremity Weakness   LL Extremity Weakness   Genitourinary   Genitourinary (WDL) WDL   Urine Assessment   Incontinence No   Anus/Rectum   Anus/Rectum (WDL) WDL   Psychosocial   Psychosocial (WDL) WDL   Pt awake in bed. Pt alert and oriented. Pt appears in no acute distress. Pt currently on R. Pt lung sounds clear juan j. Pt encouraged to cough and deep breathe. Pt states pain 7 out of 10, see eMAR for intervention. Pt lidocaine patch placed on lower back. Pt dressing on LUE CDI. Pt call bell and bedside table within reach. Will continue to monitor pt.

## 2020-01-09 NOTE — PROGRESS NOTES
Physical Therapy  Facility/Department: [facility] MED SURG  Daily Treatment Note  NAME: Bibiana Sanchez  : 1940  MRN: [id number]    Date of Service: 2020    Discharge Recommendations:  Continue to assess pending progress        Assessment   Assessment: Patient agreeable to PT skilled services. Co tx provided with OT for increased patient and staff safety. Patient with decreased amb distance with increased c/o R knee pain and dizzyness. Patient requests to stop gait training activity at this time. Patient returned to room and completed t/f training x 6 reps from chair to chair. Patient cont. to demonstrate poor safety awareness with inability to self correct with max verbal cues. Patient left upright in chair, call light within reach. Activity Tolerance  Activity Tolerance: Patient limited by pain; Patient limited by fatigue;Patient limited by endurance     Patient Diagnosis(es): There were no encounter diagnoses. has a past medical history of Allergic rhinitis, Anxiety, Chronic back pain, Depression, Double vision with both eyes open, Fall, Hyperlipidemia, Hypertension, Osteoarthritis, and Type II or unspecified type diabetes mellitus without mention of complication, not stated as uncontrolled. has a past surgical history that includes Hysterectomy; shoulder surgery;  section; and Colonoscopy. Restrictions  Restrictions/Precautions  Restrictions/Precautions: Weight Bearing, General Precautions, Fall Risk  Required Braces or Orthoses?: No  Upper Extremity Weight Bearing Restrictions  Right Upper Extremity Weight Bearing: Non Weight Bearing  Subjective   General  Chart Reviewed: Yes  Response To Previous Treatment: Patient with no complaints from previous session. Family / Caregiver Present: No  Subjective  Subjective: Patient agreeable to PT skilled services. Patient reports R knee pain, does not rate.           Objective   Bed mobility  Rolling to Left: Contact guard assistance  Rolling to

## 2020-01-09 NOTE — PROGRESS NOTES
Occupational Therapy  Facility/Department: [facility]  Daily Treatment Note  NAME: Jessica Oconnor  : 1940  MRN: [id number]    Date of Service: 2020    Discharge Recommendations:  Home with Home health OT       Assessment   Assessment: Pt on bed pan upon arrival to pt room. Pt educated on not using bed pan in order to prepare for discharge home. Pt states, \"I do have a bed pan at home. \" Pt assisted off of bed pan. Pt donned gown with SULLIVAN on EOB. Pt donned socks with SULLIVAN. Pt transferred to chair with CGA using mana walker. Pt ambulated 25 feet, 15 feet with CGA<>min assist. Pt having difficulty with carryover of basic safety information despite max verbal cuing daily. Pt completed SPT 7x with poor safety. Pt required min with LOB requiring mod assist to correct. Pt left seated on chair with call light in reach. Patient Diagnosis(es): There were no encounter diagnoses. has a past medical history of Allergic rhinitis, Anxiety, Chronic back pain, Depression, Double vision with both eyes open, Fall, Hyperlipidemia, Hypertension, Osteoarthritis, and Type II or unspecified type diabetes mellitus without mention of complication, not stated as uncontrolled. has a past surgical history that includes Hysterectomy; shoulder surgery;  section; and Colonoscopy. Restrictions  Restrictions/Precautions  Restrictions/Precautions: Weight Bearing, General Precautions, Fall Risk  Required Braces or Orthoses?: No  Upper Extremity Weight Bearing Restrictions  Right Upper Extremity Weight Bearing: Non Weight Bearing  Subjective   General  Chart Reviewed: Yes  Patient assessed for rehabilitation services?: Yes  Family / Caregiver Present: Yes  Referring Practitioner: KEATON Garcia  Diagnosis: S/p Avinash Mcallister,   Subjective  Subjective: Pt reports she has been hospitalized last 16 days. Has been unable to complete self care and mobility. Using lift for transfers.        Orientation     Objective ADL  UE Dressing: Setup        Functional Mobility  Functional - Mobility Device: Hemiwalker  Activity: Other  Assist Level: Contact guard assistance(x2)  Functional Mobility Comments: 25, 15 feet CGA     Transfers  Stand Pivot Transfers: Contact guard assistance; Moderate assistance;2 Person assistance  Sit to stand: Stand by assistance     Plan   Plan  Times per week: 3-5  Times per day: Daily  Plan weeks: 1-3  Current Treatment Recommendations: Endurance Training, Strengthening, Balance Training, Safety Education & Training, Self-Care / ADL, Neuromuscular Re-education, Patient/Caregiver Education & Training, Functional Mobility Training    Goals  Short term goals  Time Frame for Short term goals: 1-2 weeks  Short term goal 1: Pt to complete Sit to stand transfer using mana walker with min assist.   Short term goal 2: Pt to complete SPT from bed to chair/BSC with min assist using mana walker assistx1  Short term goal 3: pt to complete toileting with min assist.   Short term goal 4: Pt to complete dressing with min assist for LB and UB. Short term goal 5: Pt to complete sponge bathing with MIn assist.   Long term goals  Time Frame for Long term goals : 2-3 weeks. Long term goal 1: Pt to complete toileting with MOD I. Long term goal 2: Pt to tolerate x20 minutes of activity to increase functional activity tolerance. Long term goal 3: Pt to complete dressing with MOD I. Long term goal 4: Pt to complete bathing with SULLIVAN. Therapy Time   Individual Concurrent Group Co-treatment   Time In 3119         Time Out 0246         Minutes 33              This note serves as a DC summary in the event of pt discharge.      Valery Cabrera OTR/L

## 2020-01-10 LAB
GLUCOSE BLD-MCNC: 158 MG/DL (ref 74–106)
GLUCOSE BLD-MCNC: 175 MG/DL (ref 74–106)
GLUCOSE BLD-MCNC: 201 MG/DL (ref 74–106)
GLUCOSE BLD-MCNC: 295 MG/DL (ref 74–106)
PERFORMED ON: ABNORMAL

## 2020-01-10 PROCEDURE — 1200000002 HC SEMI PRIVATE SWING BED

## 2020-01-10 PROCEDURE — 97530 THERAPEUTIC ACTIVITIES: CPT

## 2020-01-10 PROCEDURE — 6370000000 HC RX 637 (ALT 250 FOR IP): Performed by: PHYSICIAN ASSISTANT

## 2020-01-10 PROCEDURE — 6360000002 HC RX W HCPCS: Performed by: PHYSICIAN ASSISTANT

## 2020-01-10 PROCEDURE — 6370000000 HC RX 637 (ALT 250 FOR IP): Performed by: INTERNAL MEDICINE

## 2020-01-10 PROCEDURE — 97116 GAIT TRAINING THERAPY: CPT | Performed by: PHYSICAL THERAPY ASSISTANT

## 2020-01-10 PROCEDURE — 6370000000 HC RX 637 (ALT 250 FOR IP): Performed by: NURSE PRACTITIONER

## 2020-01-10 RX ADMIN — INSULIN LISPRO 9 UNITS: 100 INJECTION, SOLUTION INTRAVENOUS; SUBCUTANEOUS at 09:00

## 2020-01-10 RX ADMIN — GABAPENTIN 300 MG: 300 CAPSULE ORAL at 21:08

## 2020-01-10 RX ADMIN — FLUTICASONE PROPIONATE 2 SPRAY: 50 SPRAY, METERED NASAL at 21:09

## 2020-01-10 RX ADMIN — INSULIN LISPRO 3 UNITS: 100 INJECTION, SOLUTION INTRAVENOUS; SUBCUTANEOUS at 08:52

## 2020-01-10 RX ADMIN — Medication 14 UNITS: at 08:51

## 2020-01-10 RX ADMIN — DULOXETINE HYDROCHLORIDE 60 MG: 30 CAPSULE, DELAYED RELEASE ORAL at 08:43

## 2020-01-10 RX ADMIN — SENNOSIDES AND DOCUSATE SODIUM 1 TABLET: 8.6; 5 TABLET ORAL at 08:43

## 2020-01-10 RX ADMIN — INSULIN LISPRO 3 UNITS: 100 INJECTION, SOLUTION INTRAVENOUS; SUBCUTANEOUS at 17:02

## 2020-01-10 RX ADMIN — PANTOPRAZOLE SODIUM 40 MG: 40 TABLET, DELAYED RELEASE ORAL at 05:48

## 2020-01-10 RX ADMIN — GABAPENTIN 300 MG: 300 CAPSULE ORAL at 14:47

## 2020-01-10 RX ADMIN — ENOXAPARIN SODIUM 40 MG: 40 INJECTION SUBCUTANEOUS at 08:44

## 2020-01-10 RX ADMIN — HYDROCODONE BITARTRATE AND ACETAMINOPHEN 1 TABLET: 5; 325 TABLET ORAL at 21:08

## 2020-01-10 RX ADMIN — INSULIN LISPRO 3 UNITS: 100 INJECTION, SOLUTION INTRAVENOUS; SUBCUTANEOUS at 21:10

## 2020-01-10 RX ADMIN — BUSPIRONE HYDROCHLORIDE 5 MG: 5 TABLET ORAL at 21:08

## 2020-01-10 RX ADMIN — AMLODIPINE BESYLATE 10 MG: 5 TABLET ORAL at 08:43

## 2020-01-10 RX ADMIN — INSULIN GLARGINE 27 UNITS: 100 INJECTION, SOLUTION SUBCUTANEOUS at 21:11

## 2020-01-10 RX ADMIN — GABAPENTIN 300 MG: 300 CAPSULE ORAL at 08:43

## 2020-01-10 RX ADMIN — Medication 14 UNITS: at 17:07

## 2020-01-10 RX ADMIN — LEVOFLOXACIN 250 MG: 5 INJECTION, SOLUTION INTRAVENOUS at 17:02

## 2020-01-10 RX ADMIN — LORAZEPAM 0.5 MG: 0.5 TABLET ORAL at 21:09

## 2020-01-10 RX ADMIN — Medication 14 UNITS: at 11:27

## 2020-01-10 RX ADMIN — BUSPIRONE HYDROCHLORIDE 5 MG: 5 TABLET ORAL at 08:43

## 2020-01-10 RX ADMIN — ASPIRIN 81 MG: 81 TABLET, COATED ORAL at 08:39

## 2020-01-10 RX ADMIN — LOSARTAN POTASSIUM 25 MG: 25 TABLET, FILM COATED ORAL at 08:43

## 2020-01-10 ASSESSMENT — PAIN SCALES - GENERAL: PAINLEVEL_OUTOF10: 6

## 2020-01-10 NOTE — PROGRESS NOTES
Occupational Therapy  Facility/Department: [facility] MED [facility]  Daily Treatment Note  NAME: Brandon Arora  : 1940  MRN: [id number]    Date of Service: 1/10/2020    Discharge Recommendations:  Home with Home health OT       Assessment   Assessment: Cotx with PTA. pt continues to demo poor safety with functional SPT with max verbal cuing. Pt ambulated 25 feet x4 reps with CGA x2 assist and WC follow. Pt continues to require mod verbal cuing for safety with mana walker. pt demo non compliance with NWB status of RUE. pt assisted to chair with min assist and left with call light in reach. Patient Diagnosis(es): There were no encounter diagnoses. has a past medical history of Allergic rhinitis, Anxiety, Chronic back pain, Depression, Double vision with both eyes open, Fall, Hyperlipidemia, Hypertension, Osteoarthritis, and Type II or unspecified type diabetes mellitus without mention of complication, not stated as uncontrolled. has a past surgical history that includes Hysterectomy; shoulder surgery;  section; and Colonoscopy. Restrictions  Restrictions/Precautions  Restrictions/Precautions: Weight Bearing, General Precautions, Fall Risk  Required Braces or Orthoses?: No  Upper Extremity Weight Bearing Restrictions  Right Upper Extremity Weight Bearing: Non Weight Bearing  Subjective   General  Chart Reviewed: Yes  Patient assessed for rehabilitation services?: Yes  Family / Caregiver Present: Yes  Referring Practitioner: KEATON Aguilera  Diagnosis: S/p Avinash Mcallister,   Subjective  Subjective: Pt reports she has been hospitalized last 16 days. Has been unable to complete self care and mobility. Using lift for transfers.        Orientation     Objective    ADL  UE Dressing: Setup  LE Dressing: Setup        Functional Mobility  Functional - Mobility Device: Hemiwalker  Assist Level: Contact guard assistance  Functional Mobility Comments: 25 feet x4               Plan   Plan  Times per week: 3-5  Times per day: Daily  Plan weeks: 1-3  Current Treatment Recommendations: Endurance Training, Strengthening, Balance Training, Safety Education & Training, Self-Care / ADL, Neuromuscular Re-education, Patient/Caregiver Education & Training, Functional Mobility Training    Goals  Short term goals  Time Frame for Short term goals: 1-2 weeks  Short term goal 1: Pt to complete Sit to stand transfer using mana walker with min assist.   Short term goal 2: Pt to complete SPT from bed to chair/BSC with min assist using mana walker assistx1  Short term goal 3: pt to complete toileting with min assist.   Short term goal 4: Pt to complete dressing with min assist for LB and UB. Short term goal 5: Pt to complete sponge bathing with MIn assist.   Long term goals  Time Frame for Long term goals : 2-3 weeks. Long term goal 1: Pt to complete toileting with MOD I. Long term goal 2: Pt to tolerate x20 minutes of activity to increase functional activity tolerance. Long term goal 3: Pt to complete dressing with MOD I. Long term goal 4: Pt to complete bathing with SULLIVAN. Therapy Time   Individual Concurrent Group Co-treatment   Time In 0106         Time Out 2367         Minutes 29              This note serves as a DC summary in the event of pt discharge.      Valery Cabrera OTR/L

## 2020-01-11 LAB
GLUCOSE BLD-MCNC: 143 MG/DL (ref 74–106)
GLUCOSE BLD-MCNC: 148 MG/DL (ref 74–106)
GLUCOSE BLD-MCNC: 216 MG/DL (ref 74–106)
GLUCOSE BLD-MCNC: 285 MG/DL (ref 74–106)
PERFORMED ON: ABNORMAL

## 2020-01-11 PROCEDURE — 6370000000 HC RX 637 (ALT 250 FOR IP): Performed by: NURSE PRACTITIONER

## 2020-01-11 PROCEDURE — 6370000000 HC RX 637 (ALT 250 FOR IP): Performed by: INTERNAL MEDICINE

## 2020-01-11 PROCEDURE — 6360000002 HC RX W HCPCS: Performed by: PHYSICIAN ASSISTANT

## 2020-01-11 PROCEDURE — 1200000002 HC SEMI PRIVATE SWING BED

## 2020-01-11 PROCEDURE — 6370000000 HC RX 637 (ALT 250 FOR IP): Performed by: PHYSICIAN ASSISTANT

## 2020-01-11 RX ADMIN — SENNOSIDES AND DOCUSATE SODIUM 1 TABLET: 8.6; 5 TABLET ORAL at 21:22

## 2020-01-11 RX ADMIN — FLUTICASONE PROPIONATE 2 SPRAY: 50 SPRAY, METERED NASAL at 21:22

## 2020-01-11 RX ADMIN — INSULIN LISPRO 1 UNITS: 100 INJECTION, SOLUTION INTRAVENOUS; SUBCUTANEOUS at 09:41

## 2020-01-11 RX ADMIN — SENNOSIDES AND DOCUSATE SODIUM 1 TABLET: 8.6; 5 TABLET ORAL at 09:34

## 2020-01-11 RX ADMIN — Medication 14 UNITS: at 17:43

## 2020-01-11 RX ADMIN — GABAPENTIN 300 MG: 300 CAPSULE ORAL at 21:22

## 2020-01-11 RX ADMIN — DULOXETINE HYDROCHLORIDE 60 MG: 30 CAPSULE, DELAYED RELEASE ORAL at 09:34

## 2020-01-11 RX ADMIN — HYDROCODONE BITARTRATE AND ACETAMINOPHEN 1 TABLET: 5; 325 TABLET ORAL at 09:44

## 2020-01-11 RX ADMIN — INSULIN LISPRO 3 UNITS: 100 INJECTION, SOLUTION INTRAVENOUS; SUBCUTANEOUS at 17:42

## 2020-01-11 RX ADMIN — HYDROCODONE BITARTRATE AND ACETAMINOPHEN 1 TABLET: 5; 325 TABLET ORAL at 21:22

## 2020-01-11 RX ADMIN — INSULIN GLARGINE 27 UNITS: 100 INJECTION, SOLUTION SUBCUTANEOUS at 21:26

## 2020-01-11 RX ADMIN — INSULIN LISPRO 3 UNITS: 100 INJECTION, SOLUTION INTRAVENOUS; SUBCUTANEOUS at 21:26

## 2020-01-11 RX ADMIN — AMLODIPINE BESYLATE 10 MG: 5 TABLET ORAL at 09:34

## 2020-01-11 RX ADMIN — ENOXAPARIN SODIUM 40 MG: 40 INJECTION SUBCUTANEOUS at 09:34

## 2020-01-11 RX ADMIN — ASPIRIN 81 MG: 81 TABLET, COATED ORAL at 09:34

## 2020-01-11 RX ADMIN — Medication 14 UNITS: at 11:36

## 2020-01-11 RX ADMIN — BUSPIRONE HYDROCHLORIDE 5 MG: 5 TABLET ORAL at 21:22

## 2020-01-11 RX ADMIN — CLOTRIMAZOLE: 10 CREAM TOPICAL at 22:03

## 2020-01-11 RX ADMIN — GABAPENTIN 300 MG: 300 CAPSULE ORAL at 14:15

## 2020-01-11 RX ADMIN — MECLIZINE HYDROCHLORIDE 25 MG: 25 TABLET ORAL at 09:44

## 2020-01-11 RX ADMIN — Medication 14 UNITS: at 09:43

## 2020-01-11 RX ADMIN — BUSPIRONE HYDROCHLORIDE 5 MG: 5 TABLET ORAL at 09:34

## 2020-01-11 RX ADMIN — GABAPENTIN 300 MG: 300 CAPSULE ORAL at 09:34

## 2020-01-11 RX ADMIN — MECLIZINE HYDROCHLORIDE 25 MG: 25 TABLET ORAL at 21:21

## 2020-01-11 RX ADMIN — INSULIN LISPRO 9 UNITS: 100 INJECTION, SOLUTION INTRAVENOUS; SUBCUTANEOUS at 11:34

## 2020-01-11 RX ADMIN — LOSARTAN POTASSIUM 25 MG: 25 TABLET, FILM COATED ORAL at 09:34

## 2020-01-11 RX ADMIN — LORAZEPAM 0.5 MG: 0.5 TABLET ORAL at 21:22

## 2020-01-11 RX ADMIN — PANTOPRAZOLE SODIUM 40 MG: 40 TABLET, DELAYED RELEASE ORAL at 06:33

## 2020-01-11 ASSESSMENT — PAIN SCALES - GENERAL
PAINLEVEL_OUTOF10: 7
PAINLEVEL_OUTOF10: 8

## 2020-01-12 LAB
GLUCOSE BLD-MCNC: 145 MG/DL (ref 74–106)
GLUCOSE BLD-MCNC: 153 MG/DL (ref 74–106)
GLUCOSE BLD-MCNC: 185 MG/DL (ref 74–106)
GLUCOSE BLD-MCNC: 228 MG/DL (ref 74–106)
PERFORMED ON: ABNORMAL

## 2020-01-12 PROCEDURE — 1200000002 HC SEMI PRIVATE SWING BED

## 2020-01-12 PROCEDURE — 6370000000 HC RX 637 (ALT 250 FOR IP): Performed by: INTERNAL MEDICINE

## 2020-01-12 PROCEDURE — 6370000000 HC RX 637 (ALT 250 FOR IP): Performed by: PHYSICIAN ASSISTANT

## 2020-01-12 PROCEDURE — 6360000002 HC RX W HCPCS: Performed by: PHYSICIAN ASSISTANT

## 2020-01-12 PROCEDURE — 6370000000 HC RX 637 (ALT 250 FOR IP): Performed by: NURSE PRACTITIONER

## 2020-01-12 PROCEDURE — 6370000000 HC RX 637 (ALT 250 FOR IP)

## 2020-01-12 RX ORDER — BUSPIRONE HYDROCHLORIDE 15 MG/1
TABLET ORAL
Status: COMPLETED
Start: 2020-01-12 | End: 2020-01-12

## 2020-01-12 RX ADMIN — INSULIN LISPRO 3 UNITS: 100 INJECTION, SOLUTION INTRAVENOUS; SUBCUTANEOUS at 17:10

## 2020-01-12 RX ADMIN — Medication 14 UNITS: at 11:14

## 2020-01-12 RX ADMIN — BUSPIRONE HYDROCHLORIDE 5 MG: 15 TABLET ORAL at 21:01

## 2020-01-12 RX ADMIN — PANTOPRAZOLE SODIUM 40 MG: 40 TABLET, DELAYED RELEASE ORAL at 05:38

## 2020-01-12 RX ADMIN — DULOXETINE HYDROCHLORIDE 60 MG: 30 CAPSULE, DELAYED RELEASE ORAL at 09:10

## 2020-01-12 RX ADMIN — Medication 14 UNITS: at 09:16

## 2020-01-12 RX ADMIN — GABAPENTIN 300 MG: 300 CAPSULE ORAL at 09:10

## 2020-01-12 RX ADMIN — GABAPENTIN 300 MG: 300 CAPSULE ORAL at 14:35

## 2020-01-12 RX ADMIN — INSULIN LISPRO 3 UNITS: 100 INJECTION, SOLUTION INTRAVENOUS; SUBCUTANEOUS at 21:07

## 2020-01-12 RX ADMIN — LORAZEPAM 0.5 MG: 0.5 TABLET ORAL at 21:06

## 2020-01-12 RX ADMIN — AMLODIPINE BESYLATE 10 MG: 5 TABLET ORAL at 09:11

## 2020-01-12 RX ADMIN — INSULIN GLARGINE 27 UNITS: 100 INJECTION, SOLUTION SUBCUTANEOUS at 21:07

## 2020-01-12 RX ADMIN — HYDROCODONE BITARTRATE AND ACETAMINOPHEN 1 TABLET: 5; 325 TABLET ORAL at 05:41

## 2020-01-12 RX ADMIN — ENOXAPARIN SODIUM 40 MG: 40 INJECTION SUBCUTANEOUS at 09:11

## 2020-01-12 RX ADMIN — INSULIN LISPRO 3 UNITS: 100 INJECTION, SOLUTION INTRAVENOUS; SUBCUTANEOUS at 11:13

## 2020-01-12 RX ADMIN — BUSPIRONE HYDROCHLORIDE 5 MG: 5 TABLET ORAL at 09:10

## 2020-01-12 RX ADMIN — GABAPENTIN 300 MG: 300 CAPSULE ORAL at 21:01

## 2020-01-12 RX ADMIN — CLOTRIMAZOLE: 10 CREAM TOPICAL at 21:03

## 2020-01-12 RX ADMIN — HYDROCODONE BITARTRATE AND ACETAMINOPHEN 1 TABLET: 5; 325 TABLET ORAL at 21:00

## 2020-01-12 RX ADMIN — INSULIN LISPRO 3 UNITS: 100 INJECTION, SOLUTION INTRAVENOUS; SUBCUTANEOUS at 09:17

## 2020-01-12 RX ADMIN — BUSPIRONE HYDROCHLORIDE 5 MG: 5 TABLET ORAL at 21:01

## 2020-01-12 RX ADMIN — LOSARTAN POTASSIUM 25 MG: 25 TABLET, FILM COATED ORAL at 09:10

## 2020-01-12 RX ADMIN — SENNOSIDES AND DOCUSATE SODIUM 1 TABLET: 8.6; 5 TABLET ORAL at 09:10

## 2020-01-12 RX ADMIN — SENNOSIDES AND DOCUSATE SODIUM 1 TABLET: 8.6; 5 TABLET ORAL at 21:01

## 2020-01-12 RX ADMIN — ASPIRIN 81 MG: 81 TABLET, COATED ORAL at 09:10

## 2020-01-12 ASSESSMENT — PAIN SCALES - GENERAL
PAINLEVEL_OUTOF10: 7
PAINLEVEL_OUTOF10: 7

## 2020-01-13 LAB
GLUCOSE BLD-MCNC: 204 MG/DL (ref 74–106)
GLUCOSE BLD-MCNC: 214 MG/DL (ref 74–106)
GLUCOSE BLD-MCNC: 315 MG/DL (ref 74–106)
GLUCOSE BLD-MCNC: 80 MG/DL (ref 74–106)
PERFORMED ON: ABNORMAL
PERFORMED ON: NORMAL

## 2020-01-13 PROCEDURE — 97530 THERAPEUTIC ACTIVITIES: CPT

## 2020-01-13 PROCEDURE — 6360000002 HC RX W HCPCS: Performed by: PHYSICIAN ASSISTANT

## 2020-01-13 PROCEDURE — 6370000000 HC RX 637 (ALT 250 FOR IP): Performed by: INTERNAL MEDICINE

## 2020-01-13 PROCEDURE — 97530 THERAPEUTIC ACTIVITIES: CPT | Performed by: PHYSICAL THERAPY ASSISTANT

## 2020-01-13 PROCEDURE — 6370000000 HC RX 637 (ALT 250 FOR IP): Performed by: NURSE PRACTITIONER

## 2020-01-13 PROCEDURE — 97110 THERAPEUTIC EXERCISES: CPT

## 2020-01-13 PROCEDURE — 1200000002 HC SEMI PRIVATE SWING BED

## 2020-01-13 PROCEDURE — 6370000000 HC RX 637 (ALT 250 FOR IP): Performed by: PHYSICIAN ASSISTANT

## 2020-01-13 PROCEDURE — 97116 GAIT TRAINING THERAPY: CPT | Performed by: PHYSICAL THERAPY ASSISTANT

## 2020-01-13 RX ADMIN — ASPIRIN 81 MG: 81 TABLET, COATED ORAL at 08:50

## 2020-01-13 RX ADMIN — LOSARTAN POTASSIUM 25 MG: 25 TABLET, FILM COATED ORAL at 08:50

## 2020-01-13 RX ADMIN — MECLIZINE HYDROCHLORIDE 25 MG: 25 TABLET ORAL at 20:43

## 2020-01-13 RX ADMIN — AMLODIPINE BESYLATE 10 MG: 5 TABLET ORAL at 08:50

## 2020-01-13 RX ADMIN — INSULIN LISPRO 6 UNITS: 100 INJECTION, SOLUTION INTRAVENOUS; SUBCUTANEOUS at 20:44

## 2020-01-13 RX ADMIN — Medication 14 UNITS: at 11:46

## 2020-01-13 RX ADMIN — SENNOSIDES AND DOCUSATE SODIUM 1 TABLET: 8.6; 5 TABLET ORAL at 20:43

## 2020-01-13 RX ADMIN — Medication 14 UNITS: at 08:57

## 2020-01-13 RX ADMIN — SENNOSIDES AND DOCUSATE SODIUM 1 TABLET: 8.6; 5 TABLET ORAL at 08:50

## 2020-01-13 RX ADMIN — GABAPENTIN 300 MG: 300 CAPSULE ORAL at 08:50

## 2020-01-13 RX ADMIN — DULOXETINE HYDROCHLORIDE 60 MG: 30 CAPSULE, DELAYED RELEASE ORAL at 08:50

## 2020-01-13 RX ADMIN — HYDROCODONE BITARTRATE AND ACETAMINOPHEN 1 TABLET: 5; 325 TABLET ORAL at 08:49

## 2020-01-13 RX ADMIN — LORAZEPAM 0.5 MG: 0.5 TABLET ORAL at 20:42

## 2020-01-13 RX ADMIN — ENOXAPARIN SODIUM 40 MG: 40 INJECTION SUBCUTANEOUS at 08:50

## 2020-01-13 RX ADMIN — INSULIN LISPRO 6 UNITS: 100 INJECTION, SOLUTION INTRAVENOUS; SUBCUTANEOUS at 11:39

## 2020-01-13 RX ADMIN — PANTOPRAZOLE SODIUM 40 MG: 40 TABLET, DELAYED RELEASE ORAL at 06:03

## 2020-01-13 RX ADMIN — HYDROCODONE BITARTRATE AND ACETAMINOPHEN 1 TABLET: 5; 325 TABLET ORAL at 20:42

## 2020-01-13 RX ADMIN — BUSPIRONE HYDROCHLORIDE 5 MG: 5 TABLET ORAL at 20:42

## 2020-01-13 RX ADMIN — FLUTICASONE PROPIONATE 2 SPRAY: 50 SPRAY, METERED NASAL at 20:43

## 2020-01-13 RX ADMIN — INSULIN GLARGINE 27 UNITS: 100 INJECTION, SOLUTION SUBCUTANEOUS at 20:43

## 2020-01-13 RX ADMIN — GABAPENTIN 300 MG: 300 CAPSULE ORAL at 13:40

## 2020-01-13 RX ADMIN — GABAPENTIN 300 MG: 300 CAPSULE ORAL at 20:43

## 2020-01-13 RX ADMIN — INSULIN LISPRO 6 UNITS: 100 INJECTION, SOLUTION INTRAVENOUS; SUBCUTANEOUS at 08:52

## 2020-01-13 ASSESSMENT — PAIN SCALES - GENERAL
PAINLEVEL_OUTOF10: 7
PAINLEVEL_OUTOF10: 7

## 2020-01-13 NOTE — PLAN OF CARE
Problem: Falls - Risk of:  Goal: Will remain free from falls  Description  Will remain free from falls  Outcome: Ongoing     Problem: Risk for Impaired Skin Integrity  Goal: Tissue integrity - skin and mucous membranes  Description  Structural intactness and normal physiological function of skin and  mucous membranes.   Outcome: Completed     Problem: Infection:  Goal: Will remain free from infection  Description  Will remain free from infection  Outcome: Ongoing     Problem: Daily Care:  Goal: Daily care needs are met  Description  Daily care needs are met  Outcome: Ongoing

## 2020-01-13 NOTE — PROGRESS NOTES
assistance  Ambulation  Ambulation?: Yes  WB Status: FWB  Ambulation 1  Surface: level tile  Device: Hemiwalker  Assistance: Minimal assistance;2 Person assistance;Contact guard assistance  Distance: [id number]  Comments: Poor safety awareness. Decreased step length and cadance. Step to gait cycle. PROM RLE (degrees)  RLE PROM: WFL  AROM RLE (degrees)  RLE AROM: WFL  PROM LLE (degrees)  LLE PROM: WFL  AROM LLE (degrees)  LLE AROM : WFL             Goals  Long term goals  Time Frame for Long term goals : 2 weeks  Long term goal 1: Achieve mod I for bed mobility. Long term goal 2: Perform basic transfers with SBA x 1. Long term goal 3: Ambulate 15 feet with RW or SE with CGA x 1. Long term goal 4: Demonstrate good safety awareness with functional mobility. Plan    Plan  Times per week: 4-5 x week  Plan weeks: 2 weeks  Current Treatment Recommendations: Strengthening, Balance Training, Functional Mobility Training, Transfer Training, Endurance Training, Neuromuscular Re-education, Safety Education & Training, Patient/Caregiver Education & Training, Gait Training  Safety Devices  Type of devices: Call light within reach, Left in chair     Therapy Time   Individual Concurrent Group Co-treatment   Time In 1356         Time Out 1434         Minutes [address], PTA     This note serves a discharge summary in the event of patient discharge.

## 2020-01-13 NOTE — PROGRESS NOTES
Occupational Therapy  Facility/Department: [facility] MED SURG  Daily Treatment Note  NAME: Kristen Vanessa  : 1940  MRN: [id number]    Date of Service: 2020    Discharge Recommendations:  Home with Home health OT       Assessment   Performance deficits / Impairments: Decreased ADL status; Decreased strength;Decreased endurance;Decreased functional mobility ; Decreased ROM; Decreased balance;Decreased safe awareness  Assessment: Co tx with PTA on this date for safety. Pt ambulated 25 feet x5 trials with CGA requiring min verbal cuing. Pt completed functional SPT using mana walker with CGA on this date demo improved safety overall however, continues to demo safety deficits/concerns which could lead to falls. Pt having difficulty with basic carryover of skills. Activity Tolerance  Activity Tolerance: Patient limited by fatigue;Patient limited by pain         Patient Diagnosis(es): There were no encounter diagnoses. has a past medical history of Allergic rhinitis, Anxiety, Chronic back pain, Depression, Double vision with both eyes open, Fall, Hyperlipidemia, Hypertension, Osteoarthritis, and Type II or unspecified type diabetes mellitus without mention of complication, not stated as uncontrolled. has a past surgical history that includes Hysterectomy; shoulder surgery;  section; and Colonoscopy. Restrictions  Restrictions/Precautions  Restrictions/Precautions: Weight Bearing, General Precautions, Fall Risk  Required Braces or Orthoses?: No  Upper Extremity Weight Bearing Restrictions  Right Upper Extremity Weight Bearing: Non Weight Bearing  Subjective   General  Chart Reviewed: Yes  Patient assessed for rehabilitation services?: Yes  Family / Caregiver Present: Yes  Referring Practitioner: KEATON Ramirez  Diagnosis: S/p Lap. Ary,   Subjective  Subjective: Pt reports she has been hospitalized last 16 days. Has been unable to complete self care and mobility. Using lift for transfers. Orientation     Objective                                                                                     Plan   Plan  Times per week: 3-5  Times per day: Daily  Plan weeks: 1-3  Current Treatment Recommendations: Endurance Training, Strengthening, Balance Training, Safety Education & Training, Self-Care / ADL, Neuromuscular Re-education, Patient/Caregiver Education & Training, Functional Mobility Training    Goals  Short term goals  Time Frame for Short term goals: 1-2 weeks  Short term goal 1: Pt to complete Sit to stand transfer using mnaa walker with min assist.   Short term goal 2: Pt to complete SPT from bed to chair/BSC with min assist using mana walker assistx1  Short term goal 3: pt to complete toileting with min assist.   Short term goal 4: Pt to complete dressing with min assist for LB and UB. Short term goal 5: Pt to complete sponge bathing with MIn assist.   Long term goals  Time Frame for Long term goals : 2-3 weeks. Long term goal 1: Pt to complete toileting with MOD I. Long term goal 2: Pt to tolerate x20 minutes of activity to increase functional activity tolerance. Long term goal 3: Pt to complete dressing with MOD I. Long term goal 4: Pt to complete bathing with SULLIVAN. Therapy Time   Individual Concurrent Group Co-treatment   Time In 2155         Time Out 0234         Minutes 38              This note serves as a DC summary in the event of pt discharge.      SHIRLEY Ugalde/L

## 2020-01-13 NOTE — PROGRESS NOTES
Occupational Therapy  Facility/Department: [facility] MED [facility]  Daily Treatment Note  NAME: Reyna Morris  : 1940  MRN: [id number]    Date of Service: 2020    Discharge Recommendations:  Home with Home health OT       Assessment   Performance deficits / Impairments: Decreased ADL status; Decreased strength;Decreased endurance;Decreased functional mobility ; Decreased ROM; Decreased balance;Decreased safe awareness  Assessment: Pt seen for skilled OT interventions. Family present. R UE exercises & core exercises were limited due to fatigue/ decreased strength. Pt does continues to not follow through with safety commands during transfers, Upon exit, pt supine in bed with call bell in reach. Activity Tolerance  Activity Tolerance: Patient limited by fatigue;Patient limited by pain         Patient Diagnosis(es): There were no encounter diagnoses. has a past medical history of Allergic rhinitis, Anxiety, Chronic back pain, Depression, Double vision with both eyes open, Fall, Hyperlipidemia, Hypertension, Osteoarthritis, and Type II or unspecified type diabetes mellitus without mention of complication, not stated as uncontrolled. has a past surgical history that includes Hysterectomy; shoulder surgery;  section; and Colonoscopy. Restrictions  Restrictions/Precautions  Restrictions/Precautions: Weight Bearing, General Precautions, Fall Risk  Required Braces or Orthoses?: No  Upper Extremity Weight Bearing Restrictions  Right Upper Extremity Weight Bearing: Non Weight Bearing  Subjective   General  Chart Reviewed: Yes  Patient assessed for rehabilitation services?: Yes  Family / Caregiver Present: Yes  Referring Practitioner: KEATON Mishra  Diagnosis: S/p Avinash Mcallister,   Subjective  Subjective: Pt reports she has been hospitalized last 16 days. Has been unable to complete self care and mobility. Using lift for transfers.        Objective    Balance  Sitting Balance: Stand by assistance  Standing Balance: Contact guard assistance  Functional Mobility  Functional - Mobility Device: Hemiwalker  Assist Level: Contact guard assistance  Bed mobility  Sit to Supine: Contact guard assistance  Transfers  Stand Pivot Transfers: Contact guard assistance        Type of ROM/Therapeutic Exercise  Comment: R UE due to L UE restrictions   Exercises  Shoulder Flexion: 10x  Shoulder Extension: 10x  Shoulder ABduction: 10x  Shoulder ADduction: 10x  Horizontal ABduction: 10x  Horizontal ADduction: 10x  Elbow Flexion: 10x  Elbow Extension: 10x  Supination: 10x  Pronation: 10x      Plan   Plan  Times per week: 3-5  Times per day: Daily  Plan weeks: 1-3  Current Treatment Recommendations: Endurance Training, Strengthening, Balance Training, Safety Education & Training, Self-Care / ADL, Neuromuscular Re-education, Patient/Caregiver Education & Training, Functional Mobility Training    Goals  Short term goals  Time Frame for Short term goals: 1-2 weeks  Short term goal 1: Pt to complete Sit to stand transfer using mana walker with min assist.   Short term goal 2: Pt to complete SPT from bed to chair/BSC with min assist using mana walker assistx1  Short term goal 3: pt to complete toileting with min assist.   Short term goal 4: Pt to complete dressing with min assist for LB and UB. Short term goal 5: Pt to complete sponge bathing with MIn assist.   Long term goals  Time Frame for Long term goals : 2-3 weeks. Long term goal 1: Pt to complete toileting with MOD I. Long term goal 2: Pt to tolerate x20 minutes of activity to increase functional activity tolerance. Long term goal 3: Pt to complete dressing with MOD I. Long term goal 4: Pt to complete bathing with SULLIVAN. Therapy Time   Individual Concurrent Group Co-treatment   Time In 2762         Time Out 0876         Minutes 18               In event of d/c, this note will serve as discharge summary.    Dorothy Sesay, OTR/L

## 2020-01-14 LAB
GLUCOSE BLD-MCNC: 102 MG/DL (ref 74–106)
GLUCOSE BLD-MCNC: 314 MG/DL (ref 74–106)
GLUCOSE BLD-MCNC: 342 MG/DL (ref 74–106)
PERFORMED ON: ABNORMAL
PERFORMED ON: ABNORMAL
PERFORMED ON: NORMAL

## 2020-01-14 PROCEDURE — 6370000000 HC RX 637 (ALT 250 FOR IP): Performed by: INTERNAL MEDICINE

## 2020-01-14 PROCEDURE — 6370000000 HC RX 637 (ALT 250 FOR IP): Performed by: NURSE PRACTITIONER

## 2020-01-14 PROCEDURE — 97530 THERAPEUTIC ACTIVITIES: CPT | Performed by: PHYSICAL THERAPY ASSISTANT

## 2020-01-14 PROCEDURE — 6360000002 HC RX W HCPCS: Performed by: PHYSICIAN ASSISTANT

## 2020-01-14 PROCEDURE — 1200000002 HC SEMI PRIVATE SWING BED

## 2020-01-14 PROCEDURE — 97530 THERAPEUTIC ACTIVITIES: CPT

## 2020-01-14 PROCEDURE — 97535 SELF CARE MNGMENT TRAINING: CPT

## 2020-01-14 PROCEDURE — 6370000000 HC RX 637 (ALT 250 FOR IP): Performed by: PHYSICIAN ASSISTANT

## 2020-01-14 RX ADMIN — SENNOSIDES AND DOCUSATE SODIUM 1 TABLET: 8.6; 5 TABLET ORAL at 22:05

## 2020-01-14 RX ADMIN — ASPIRIN 81 MG: 81 TABLET, COATED ORAL at 09:17

## 2020-01-14 RX ADMIN — HYDROCODONE BITARTRATE AND ACETAMINOPHEN 1 TABLET: 5; 325 TABLET ORAL at 22:04

## 2020-01-14 RX ADMIN — HYDROCODONE BITARTRATE AND ACETAMINOPHEN 1 TABLET: 5; 325 TABLET ORAL at 09:17

## 2020-01-14 RX ADMIN — ENOXAPARIN SODIUM 40 MG: 40 INJECTION SUBCUTANEOUS at 13:10

## 2020-01-14 RX ADMIN — GABAPENTIN 300 MG: 300 CAPSULE ORAL at 09:17

## 2020-01-14 RX ADMIN — BUSPIRONE HYDROCHLORIDE 5 MG: 5 TABLET ORAL at 09:17

## 2020-01-14 RX ADMIN — INSULIN LISPRO 6 UNITS: 100 INJECTION, SOLUTION INTRAVENOUS; SUBCUTANEOUS at 22:07

## 2020-01-14 RX ADMIN — MECLIZINE HYDROCHLORIDE 25 MG: 25 TABLET ORAL at 22:05

## 2020-01-14 RX ADMIN — GABAPENTIN 300 MG: 300 CAPSULE ORAL at 22:05

## 2020-01-14 RX ADMIN — FLUTICASONE PROPIONATE 2 SPRAY: 50 SPRAY, METERED NASAL at 22:12

## 2020-01-14 RX ADMIN — INSULIN LISPRO 12 UNITS: 100 INJECTION, SOLUTION INTRAVENOUS; SUBCUTANEOUS at 16:32

## 2020-01-14 RX ADMIN — LORAZEPAM 0.5 MG: 0.5 TABLET ORAL at 22:04

## 2020-01-14 RX ADMIN — PANTOPRAZOLE SODIUM 40 MG: 40 TABLET, DELAYED RELEASE ORAL at 06:48

## 2020-01-14 RX ADMIN — LOSARTAN POTASSIUM 25 MG: 25 TABLET, FILM COATED ORAL at 09:17

## 2020-01-14 RX ADMIN — DULOXETINE HYDROCHLORIDE 60 MG: 30 CAPSULE, DELAYED RELEASE ORAL at 09:17

## 2020-01-14 RX ADMIN — INSULIN GLARGINE 27 UNITS: 100 INJECTION, SOLUTION SUBCUTANEOUS at 22:08

## 2020-01-14 RX ADMIN — CLOTRIMAZOLE: 10 CREAM TOPICAL at 22:13

## 2020-01-14 RX ADMIN — AMLODIPINE BESYLATE 10 MG: 5 TABLET ORAL at 09:17

## 2020-01-14 RX ADMIN — BUSPIRONE HYDROCHLORIDE 5 MG: 5 TABLET ORAL at 22:05

## 2020-01-14 RX ADMIN — LORAZEPAM 0.5 MG: 0.5 TABLET ORAL at 09:18

## 2020-01-14 RX ADMIN — GABAPENTIN 300 MG: 300 CAPSULE ORAL at 13:10

## 2020-01-14 ASSESSMENT — PAIN SCALES - GENERAL
PAINLEVEL_OUTOF10: 7
PAINLEVEL_OUTOF10: 8

## 2020-01-14 NOTE — PROGRESS NOTES
Physical Therapy  Facility/Department: [facility] MED SURG  Daily Treatment Note  NAME: Claire Lundy  : 1940  MRN: [id number]    Date of Service: 2020    Discharge Recommendations:  Continue to assess pending progress        Assessment   Assessment: Patient agreeable to PT skilled services. Co tx provided with OT. Patient completed bed mobiltiy activities to eob. Patient amb to and from restroom. Cuingprovided for increased saferty and ind with t/f training. Following adl, patient t/f to eob and then to chair. Patient left with call light. No needs noted att. Activity Tolerance  Activity Tolerance: Patient Tolerated treatment well     Patient Diagnosis(es): There were no encounter diagnoses. has a past medical history of Allergic rhinitis, Anxiety, Chronic back pain, Depression, Double vision with both eyes open, Fall, Hyperlipidemia, Hypertension, Osteoarthritis, and Type II or unspecified type diabetes mellitus without mention of complication, not stated as uncontrolled. has a past surgical history that includes Hysterectomy; shoulder surgery;  section; and Colonoscopy. Restrictions  Restrictions/Precautions  Restrictions/Precautions: Weight Bearing, General Precautions, Fall Risk  Required Braces or Orthoses?: No  Upper Extremity Weight Bearing Restrictions  Right Upper Extremity Weight Bearing: Non Weight Bearing  Subjective   General  Chart Reviewed: Yes  Response To Previous Treatment: Patient with no complaints from previous session. Family / Caregiver Present: No  Subjective  Subjective: Patient agreeable to PT skilled services. Patient requests to go to restroom upon therapists enterance.      Objective      Transfers  Sit to Stand: Stand by assistance  Stand to sit: Stand by assistance  Bed to Chair: Contact guard assistance               PROM RLE (degrees)  RLE PROM: WFL  AROM RLE (degrees)  RLE AROM: WFL  PROM LLE (degrees)  LLE PROM: WFL  AROM LLE (degrees)  LLE AROM : [facility] Goals  Long term goals  Time Frame for Long term goals : 2 weeks  Long term goal 1: Achieve mod I for bed mobility. Long term goal 2: Perform basic transfers with SBA x 1. Long term goal 3: Ambulate 15 feet with RW or SE with CGA x 1. Long term goal 4: Demonstrate good safety awareness with functional mobility. Plan    Plan  Times per week: 4-5 x week  Plan weeks: 2 weeks  Current Treatment Recommendations: Strengthening, Balance Training, Functional Mobility Training, Transfer Training, Endurance Training, Neuromuscular Re-education, Safety Education & Training, Patient/Caregiver Education & Training, Gait Training  Safety Devices  Type of devices: Call light within reach, Left in chair     Therapy Time   Individual Concurrent Group Co-treatment   Time In 1312         Time Out 1328         Minutes JEAN CLAUDE Brumfield     This note serves a discharge summary in the event of patient discharge.

## 2020-01-14 NOTE — PROGRESS NOTES
assistance  Transfers  Stand Pivot Transfers: Contact guard assistance;2 Person assistance  Sit to stand: Contact guard assistance;2 Person assistance                                                                 Plan   Plan  Times per week: 3-5  Times per day: Daily  Plan weeks: 1-3  Current Treatment Recommendations: Endurance Training, Strengthening, Balance Training, Safety Education & Training, Self-Care / ADL, Neuromuscular Re-education, Patient/Caregiver Education & Training, Functional Mobility Training  G-Code     OutComes Score                                                  AM-PAC Score             Goals  Short term goals  Time Frame for Short term goals: 1-2 weeks  Short term goal 1: Pt to complete Sit to stand transfer using mana walker with min assist.   Short term goal 2: Pt to complete SPT from bed to chair/BSC with min assist using mana walker assistx1  Short term goal 3: pt to complete toileting with min assist.   Short term goal 4: Pt to complete dressing with min assist for LB and UB. Short term goal 5: Pt to complete sponge bathing with MIn assist.   Long term goals  Time Frame for Long term goals : 2-3 weeks. Long term goal 1: Pt to complete toileting with MOD I. Long term goal 2: Pt to tolerate x20 minutes of activity to increase functional activity tolerance. Long term goal 3: Pt to complete dressing with MOD I. Long term goal 4: Pt to complete bathing with SULLIVAN. Therapy Time   Individual Concurrent Group Co-treatment   Time In 0326         Time Out 5808         Minutes 23              This note serves as a DC summary in the event of pt discharge.      Valery Cabrera OTR/L

## 2020-01-14 NOTE — CARE COORDINATION
Tx session withheld on this date per patient MARC for MD visit. Will cont. With PT skilled services on following date, 1.15.20.     Electronically signed by Sky Adams PTA on 1/14/20 at 12:27 PM

## 2020-01-14 NOTE — FLOWSHEET NOTE
01/13/20 2049   Assessment   Charting Type Shift assessment   Neurological   Neuro (WDL) WDL   Level of Consciousness 0   [place] Coma Scale   Eye Opening 4   Best Verbal Response 5   Best Motor Response 6   [place] Coma Scale Score 15   HEENT   HEENT (WDL) X   Mucous Membrane Other (Comment)   Teeth Missing teeth   Left Eye Impaired vision;Double Vision   Right Eye Impaired vision;Double vision   Respiratory   Respiratory (WDL) WDL   L Breath Sounds Clear   R Breath Sounds Clear   Cough/Sputum   Cough Non-productive;Dry   Cough Description   Sputum Amount None   Cardiac   Cardiac (WDL) WDL   Cardiac Monitor   Telemetry Monitor On No   Gastrointestinal   Abdominal (WDL) X   Abdomen Inspection Soft   RUQ Bowel Sounds Active   LUQ Bowel Sounds Active   RLQ Bowel Sounds Active   LLQ Bowel Sounds Active   Peripheral Vascular   Peripheral Vascular (WDL) WDL   Edema None   RLE Edema None   LLE Edema None   Skin Color/Condition   Skin Color/Condition (WDL) X   Skin Color Pale   Skin Condition/Temp Warm;Dry   Skin Integrity   Skin Integrity (WDL) X   Skin Integrity Redness   Location [place] area   Preventative Dressing No   Skin Integrity Site 2   Skin Integrity Location 2 Tear   Location 2 LFA   Preventative Dressing Yes   Date Applied 01/13/20   Musculoskeletal   Musculoskeletal (WDL) X   RUE Limited movement; Injury/trauma;Weakness  (tremors)   LUE Full movement   RL Extremity Weakness   LL Extremity Weakness   Genitourinary   Genitourinary (WDL) WDL   Urine Assessment   Incontinence Yes  (at times)   Anus/Rectum   Anus/Rectum (WDL) WDL   Psychosocial   Psychosocial (WDL) WDL     Patient awake in bed. Alert and oriented x4. Lung sounds CTA, patient continues on RA, no acute distress noted. Patient c/o left knee pain, requests prn pain meds , meds administered, see mar. Call bell and bedside table within reach. Bed in lowest position. Will continue to monitor.

## 2020-01-14 NOTE — CARE COORDINATION
Pt went to see [facility] per Bharat Bernabe from [place] summary. Spoke with [facility] from Dr. Vega Gutierrez office (trauma surgeon). [facility] states that pt was under the impression that she was getting injections in her knee, but Prim does not do that as he is a trauma surgeon. [facility] states that pt was educated she was there for the shoulder fractures and the MD could talk with her about that today. Pt declined to see MD.    Pt is also refusing to [facility] medicine this week for knee injection eval as she states Dr. Shanon Cabot will do them. Pt states she will call and schedule them. She also states that Dr. Shanon Cabot will also take care of her shoulder.

## 2020-01-14 NOTE — FLOWSHEET NOTE
01/14/20 0800   Assessment   Charting Type Shift assessment   Neurological   Neuro (WDL) WDL   Level of Consciousness 0   [place] Coma Scale   Eye Opening 4   Best Verbal Response 5   Best Motor Response 6   Gi[place]anny Coma Scale Score 15   HEENT   HEENT (WDL) X   Right Eye Impaired vision;Double vision   Left Eye Impaired vision;Double Vision   Mucous Membrane Other (Comment)   Teeth Missing teeth   Respiratory   Respiratory (WDL) WDL   L Breath Sounds Clear   R Breath Sounds Clear   Cough/Sputum   Cough Non-productive;Dry   Frequency Infrequent   Cough Description   Sputum Amount None   Cardiac   Cardiac (WDL) WDL   Cardiac Monitor   Telemetry Monitor On No   Gastrointestinal   Abdominal (WDL) X   Abdomen Inspection Soft   RUQ Bowel Sounds Active   LUQ Bowel Sounds Active   RLQ Bowel Sounds Active   LLQ Bowel Sounds Active   Peripheral Vascular   Peripheral Vascular (WDL) WDL   Edema None   RLE Edema None   LLE Edema None   Skin Color/Condition   Skin Color/Condition (WDL) X   Skin Color Pale   Skin Condition/Temp Warm;Dry   Skin Integrity   Skin Integrity (WDL) X   Skin Integrity Redness   Location [place] area   Skin Integrity Site 2   Skin Integrity Location 2 Tear   Location 2 lfa   Musculoskeletal   Musculoskeletal (WDL) X   RUE Limited movement; Injury/trauma;Weakness  (tremors)   LUE Full movement   RL Extremity Weakness   LL Extremity Weakness   Genitourinary   Genitourinary (WDL) WDL   Urine Assessment   Incontinence Yes  (at times)   Anus/Rectum   Anus/Rectum (WDL) WDL   Psychosocial   Psychosocial (WDL) WDL

## 2020-01-14 NOTE — PROGRESS NOTES
Occupational Therapy  Facility/Department: [facility] MED SURG  Daily Treatment Note  NAME: Althia Ganser  : 1940  MRN: [id number]    Date of Service: 2020    Discharge Recommendations:  Home with Home health OT       Assessment   Assessment: Co tx with PTA. Pt completed bed mobility without assistance. Pt ambulated to bathroom with CGA x2 assist with hemiwalker placement. Pt requires cues for safety during toileting transfers. pt completed LB clothing management and [place] hygiene without assistance. Pt assisted to chair and SULLIVAN with lunch. Patient Diagnosis(es): There were no encounter diagnoses. has a past medical history of Allergic rhinitis, Anxiety, Chronic back pain, Depression, Double vision with both eyes open, Fall, Hyperlipidemia, Hypertension, Osteoarthritis, and Type II or unspecified type diabetes mellitus without mention of complication, not stated as uncontrolled. has a past surgical history that includes Hysterectomy; shoulder surgery;  section; and Colonoscopy. Restrictions  Restrictions/Precautions  Restrictions/Precautions: Weight Bearing, General Precautions, Fall Risk  Required Braces or Orthoses?: No  Upper Extremity Weight Bearing Restrictions  Right Upper Extremity Weight Bearing: Non Weight Bearing  Subjective   General  Chart Reviewed: Yes  Patient assessed for rehabilitation services?: Yes  Family / Caregiver Present: Yes  Referring Practitioner: KEATON Angulo  Diagnosis: S/p Avinash Mcallister,   Subjective  Subjective: Pt reports she has been hospitalized last 16 days. Has been unable to complete self care and mobility. Using lift for transfers.        Orientation     Objective    ADL  Toileting: Contact guard assistance        Functional Mobility  Functional - Mobility Device: Hemiwalker  Activity: To/from bathroom  Assist Level: Contact guard assistance  Bed mobility  Supine to Sit: Stand by assistance  Scooting: Stand by assistance  Transfers  Stand Pivot Transfers: Contact guard assistance;2 Person assistance  Sit to stand: Contact guard assistance;2 Person assistance                                                                 Plan   Plan  Times per week: 3-5  Times per day: Daily  Plan weeks: 1-3  Current Treatment Recommendations: Endurance Training, Strengthening, Balance Training, Safety Education & Training, Self-Care / ADL, Neuromuscular Re-education, Patient/Caregiver Education & Training, Functional Mobility Training  G-Code     OutComes Score                                                  AM-PAC Score             Goals  Short term goals  Time Frame for Short term goals: 1-2 weeks  Short term goal 1: Pt to complete Sit to stand transfer using mana walker with min assist.   Short term goal 2: Pt to complete SPT from bed to chair/BSC with min assist using mana walker assistx1  Short term goal 3: pt to complete toileting with min assist.   Short term goal 4: Pt to complete dressing with min assist for LB and UB. Short term goal 5: Pt to complete sponge bathing with MIn assist.   Long term goals  Time Frame for Long term goals : 2-3 weeks. Long term goal 1: Pt to complete toileting with MOD I. Long term goal 2: Pt to tolerate x20 minutes of activity to increase functional activity tolerance. Long term goal 3: Pt to complete dressing with MOD I. Long term goal 4: Pt to complete bathing with SULLIVAN. Therapy Time   Individual Concurrent Group Co-treatment   Time In 0112         Time Out 0121         Minutes 9              This note serves as a DC summary in the event of pt discharge.      Valery Cabrera OTR/L

## 2020-01-15 LAB
GLUCOSE BLD-MCNC: 178 MG/DL (ref 74–106)
GLUCOSE BLD-MCNC: 233 MG/DL (ref 74–106)
GLUCOSE BLD-MCNC: 279 MG/DL (ref 74–106)
GLUCOSE BLD-MCNC: 97 MG/DL (ref 74–106)
PERFORMED ON: ABNORMAL
PERFORMED ON: NORMAL

## 2020-01-15 PROCEDURE — 97530 THERAPEUTIC ACTIVITIES: CPT | Performed by: PHYSICAL THERAPY ASSISTANT

## 2020-01-15 PROCEDURE — 6370000000 HC RX 637 (ALT 250 FOR IP): Performed by: PHYSICIAN ASSISTANT

## 2020-01-15 PROCEDURE — 6360000002 HC RX W HCPCS: Performed by: PHYSICIAN ASSISTANT

## 2020-01-15 PROCEDURE — 6370000000 HC RX 637 (ALT 250 FOR IP): Performed by: INTERNAL MEDICINE

## 2020-01-15 PROCEDURE — 6370000000 HC RX 637 (ALT 250 FOR IP): Performed by: NURSE PRACTITIONER

## 2020-01-15 PROCEDURE — 97116 GAIT TRAINING THERAPY: CPT | Performed by: PHYSICAL THERAPY ASSISTANT

## 2020-01-15 PROCEDURE — 1200000002 HC SEMI PRIVATE SWING BED

## 2020-01-15 PROCEDURE — 97530 THERAPEUTIC ACTIVITIES: CPT

## 2020-01-15 RX ORDER — INSULIN GLARGINE 100 [IU]/ML
30 INJECTION, SOLUTION SUBCUTANEOUS NIGHTLY
Status: DISCONTINUED | OUTPATIENT
Start: 2020-01-15 | End: 2020-01-19 | Stop reason: HOSPADM

## 2020-01-15 RX ADMIN — HYDROCODONE BITARTRATE AND ACETAMINOPHEN 1 TABLET: 5; 325 TABLET ORAL at 09:12

## 2020-01-15 RX ADMIN — GABAPENTIN 300 MG: 300 CAPSULE ORAL at 09:12

## 2020-01-15 RX ADMIN — MECLIZINE HYDROCHLORIDE 25 MG: 25 TABLET ORAL at 21:17

## 2020-01-15 RX ADMIN — HYDROCODONE BITARTRATE AND ACETAMINOPHEN 1 TABLET: 5; 325 TABLET ORAL at 21:17

## 2020-01-15 RX ADMIN — GABAPENTIN 300 MG: 300 CAPSULE ORAL at 21:17

## 2020-01-15 RX ADMIN — INSULIN LISPRO 2 UNITS: 100 INJECTION, SOLUTION INTRAVENOUS; SUBCUTANEOUS at 21:18

## 2020-01-15 RX ADMIN — GABAPENTIN 300 MG: 300 CAPSULE ORAL at 13:18

## 2020-01-15 RX ADMIN — FLUTICASONE PROPIONATE 2 SPRAY: 50 SPRAY, METERED NASAL at 21:23

## 2020-01-15 RX ADMIN — Medication 14 UNITS: at 09:18

## 2020-01-15 RX ADMIN — AMLODIPINE BESYLATE 10 MG: 5 TABLET ORAL at 09:12

## 2020-01-15 RX ADMIN — SENNOSIDES AND DOCUSATE SODIUM 1 TABLET: 8.6; 5 TABLET ORAL at 09:12

## 2020-01-15 RX ADMIN — LORAZEPAM 0.5 MG: 0.5 TABLET ORAL at 21:16

## 2020-01-15 RX ADMIN — FLUTICASONE PROPIONATE 2 SPRAY: 50 SPRAY, METERED NASAL at 09:13

## 2020-01-15 RX ADMIN — BUSPIRONE HYDROCHLORIDE 5 MG: 5 TABLET ORAL at 21:17

## 2020-01-15 RX ADMIN — CLOTRIMAZOLE: 10 CREAM TOPICAL at 21:23

## 2020-01-15 RX ADMIN — ASPIRIN 81 MG: 81 TABLET, COATED ORAL at 09:12

## 2020-01-15 RX ADMIN — Medication 14 UNITS: at 11:49

## 2020-01-15 RX ADMIN — INSULIN GLARGINE 30 UNITS: 100 INJECTION, SOLUTION SUBCUTANEOUS at 21:17

## 2020-01-15 RX ADMIN — ENOXAPARIN SODIUM 40 MG: 40 INJECTION SUBCUTANEOUS at 09:13

## 2020-01-15 RX ADMIN — INSULIN LISPRO 6 UNITS: 100 INJECTION, SOLUTION INTRAVENOUS; SUBCUTANEOUS at 09:15

## 2020-01-15 RX ADMIN — LOSARTAN POTASSIUM 25 MG: 25 TABLET, FILM COATED ORAL at 09:20

## 2020-01-15 RX ADMIN — BUSPIRONE HYDROCHLORIDE 5 MG: 5 TABLET ORAL at 09:12

## 2020-01-15 RX ADMIN — PANTOPRAZOLE SODIUM 40 MG: 40 TABLET, DELAYED RELEASE ORAL at 06:05

## 2020-01-15 RX ADMIN — DULOXETINE HYDROCHLORIDE 60 MG: 30 CAPSULE, DELAYED RELEASE ORAL at 09:12

## 2020-01-15 RX ADMIN — SENNOSIDES AND DOCUSATE SODIUM 1 TABLET: 8.6; 5 TABLET ORAL at 21:17

## 2020-01-15 RX ADMIN — INSULIN LISPRO 9 UNITS: 100 INJECTION, SOLUTION INTRAVENOUS; SUBCUTANEOUS at 11:49

## 2020-01-15 ASSESSMENT — PAIN SCALES - GENERAL
PAINLEVEL_OUTOF10: 7
PAINLEVEL_OUTOF10: 6

## 2020-01-15 NOTE — PROGRESS NOTES
Physical Therapy  Facility/Department: [facility] MED SURG  Daily Treatment Note  NAME: You Morris  : 1940  MRN: [id number]    Date of Service: 1/15/2020    Discharge Recommendations:  Continue to assess pending progress        Assessment   Assessment: Patient agreeable to PT skilled services. Co tx provided with OT. Patient completed bed mobiltiy activities to eob. Patient demo improvement with gait training activity with increased distance noted. Patient also with improved yamile. Patient returned to room and completed three t/f from chair to chair. Patient cont. To demo decreased safety awareness with use of mana walker. Max cuing privded for self correction. Patient left upright in chair, no needs noted. Activity Tolerance  Activity Tolerance: Patient Tolerated treatment well     Patient Diagnosis(es): There were no encounter diagnoses. has a past medical history of Allergic rhinitis, Anxiety, Chronic back pain, Depression, Double vision with both eyes open, Fall, Hyperlipidemia, Hypertension, Osteoarthritis, and Type II or unspecified type diabetes mellitus without mention of complication, not stated as uncontrolled. has a past surgical history that includes Hysterectomy; shoulder surgery;  section; and Colonoscopy. Restrictions  Restrictions/Precautions  Restrictions/Precautions: Weight Bearing, General Precautions, Fall Risk  Required Braces or Orthoses?: No  Upper Extremity Weight Bearing Restrictions  Right Upper Extremity Weight Bearing: Non Weight Bearing  Subjective   General  Chart Reviewed: Yes  Response To Previous Treatment: Patient with no complaints from previous session. Family / Caregiver Present: Yes  Subjective  Subjective: Patient agreeable to PT skilled services. Patient cont. to report feeling sick to stomach.      Objective      Transfers  Sit to Stand: Stand by assistance  Stand to sit: Stand by assistance  Bed to Chair: Contact guard

## 2020-01-15 NOTE — FLOWSHEET NOTE
01/14/20 2210   Assessment   Charting Type Shift assessment   Neurological   Neuro (WDL) WDL   Level of Consciousness 0   [place] Coma Scale   Eye Opening 4   Best Verbal Response 5   Best Motor Response 6   Giovanny Coma Scale Score 15   HEENT   HEENT (WDL) X   Right Eye Impaired vision;Double vision   Left Eye Impaired vision;Double Vision   Teeth Missing teeth   Respiratory   Respiratory (WDL) WDL   L Breath Sounds Clear   R Breath Sounds Clear   Cough/Sputum   Cough Non-productive;Dry   Cough Description   Sputum Amount None   Cardiac   Cardiac (WDL) WDL   Cardiac Monitor   Telemetry Monitor On No   Gastrointestinal   Abdominal (WDL) X   Abdomen Inspection Soft   RUQ Bowel Sounds Active   LUQ Bowel Sounds Active   RLQ Bowel Sounds Active   LLQ Bowel Sounds Active   Peripheral Vascular   Peripheral Vascular (WDL) WDL   Edema None   RLE Edema None   LLE Edema None   Skin Color/Condition   Skin Color/Condition (WDL) X   Skin Color Pale   Skin Condition/Temp Warm;Dry   Skin Integrity   Skin Integrity (WDL) X   Skin Integrity Redness   Location [place] area   Skin Integrity Site 2   Skin Integrity Location 2 Tear   Location 2 LFA   Preventative Dressing Yes   Date Applied 01/13/20   Musculoskeletal   Musculoskeletal (WDL) X   RUE Limited movement; Injury/trauma;Weakness  (tremors)   LUE Full movement   RL Extremity Weakness   LL Extremity Weakness   Genitourinary   Genitourinary (WDL) WDL   Urine Assessment   Incontinence Yes  (at times)   Anus/Rectum   Anus/Rectum (WDL) WDL   Psychosocial   Psychosocial (WDL) WDL     Patient awake in bed. Alert and oriented x4. Lung sounds CTA, patient continues on RA, no acute distress noted. Patient c/o generalized pain, meds administered, see mar. Call bell and bedside table within reach. Bed in lowest position. Will continue to monitor.

## 2020-01-15 NOTE — PROGRESS NOTES
Measures:  · Ht: 5' 7\" (170.2 cm)   · Current Body Wt: 219 lb (99.3 kg)  · Admission Body Wt: 211 lb (95.7 kg)  · Usual Body Wt:    · % Weight Change:  ,     · Ideal Body Wt: 135 lb (61.2 kg), % Ideal Body 156  · Adjusted Body Wt:  , body weight adjusted for    · BMI Classification: BMI 30.0 - 34.9 Obese Class I(33.14)    Nutrition Interventions:   Continue current diet, Continue current ONS  (Explained to family that patient doesn't need supplement at this time, however pt demands supplement.   Tried emphasizing not to take unless eating less than 50% of meals.)    Nutrition Evaluation:   · Evaluation: Progressing toward goals   · Goals: to meet est needs for age/condition    · Monitoring: Meal Intake, Supplement Intake, Wound Healing, I&O, Weight, Pertinent Labs      Electronically signed by Patricia Bennett on 1/15/20 at 12:54 PM

## 2020-01-16 LAB
GLUCOSE BLD-MCNC: 100 MG/DL (ref 74–106)
GLUCOSE BLD-MCNC: 209 MG/DL (ref 74–106)
GLUCOSE BLD-MCNC: 219 MG/DL (ref 74–106)
GLUCOSE BLD-MCNC: 258 MG/DL (ref 74–106)
PERFORMED ON: ABNORMAL
PERFORMED ON: NORMAL

## 2020-01-16 PROCEDURE — 6370000000 HC RX 637 (ALT 250 FOR IP): Performed by: PHYSICIAN ASSISTANT

## 2020-01-16 PROCEDURE — 97116 GAIT TRAINING THERAPY: CPT | Performed by: PHYSICAL THERAPY ASSISTANT

## 2020-01-16 PROCEDURE — 6370000000 HC RX 637 (ALT 250 FOR IP): Performed by: NURSE PRACTITIONER

## 2020-01-16 PROCEDURE — 6370000000 HC RX 637 (ALT 250 FOR IP): Performed by: INTERNAL MEDICINE

## 2020-01-16 PROCEDURE — 97530 THERAPEUTIC ACTIVITIES: CPT

## 2020-01-16 PROCEDURE — 6360000002 HC RX W HCPCS: Performed by: PHYSICIAN ASSISTANT

## 2020-01-16 PROCEDURE — 97535 SELF CARE MNGMENT TRAINING: CPT

## 2020-01-16 PROCEDURE — 97530 THERAPEUTIC ACTIVITIES: CPT | Performed by: PHYSICAL THERAPY ASSISTANT

## 2020-01-16 PROCEDURE — 1200000002 HC SEMI PRIVATE SWING BED

## 2020-01-16 RX ADMIN — LOSARTAN POTASSIUM 25 MG: 25 TABLET, FILM COATED ORAL at 08:24

## 2020-01-16 RX ADMIN — BUSPIRONE HYDROCHLORIDE 5 MG: 5 TABLET ORAL at 22:12

## 2020-01-16 RX ADMIN — HYDROCODONE BITARTRATE AND ACETAMINOPHEN 1 TABLET: 5; 325 TABLET ORAL at 22:12

## 2020-01-16 RX ADMIN — ASPIRIN 81 MG: 81 TABLET, COATED ORAL at 08:24

## 2020-01-16 RX ADMIN — Medication 14 UNITS: at 08:32

## 2020-01-16 RX ADMIN — AMLODIPINE BESYLATE 10 MG: 5 TABLET ORAL at 08:24

## 2020-01-16 RX ADMIN — INSULIN LISPRO 6 UNITS: 100 INJECTION, SOLUTION INTRAVENOUS; SUBCUTANEOUS at 08:29

## 2020-01-16 RX ADMIN — HYDROCODONE BITARTRATE AND ACETAMINOPHEN 1 TABLET: 5; 325 TABLET ORAL at 08:24

## 2020-01-16 RX ADMIN — Medication 14 UNITS: at 11:44

## 2020-01-16 RX ADMIN — DULOXETINE HYDROCHLORIDE 60 MG: 30 CAPSULE, DELAYED RELEASE ORAL at 08:24

## 2020-01-16 RX ADMIN — GABAPENTIN 300 MG: 300 CAPSULE ORAL at 08:24

## 2020-01-16 RX ADMIN — LORAZEPAM 0.5 MG: 0.5 TABLET ORAL at 22:12

## 2020-01-16 RX ADMIN — SENNOSIDES AND DOCUSATE SODIUM 1 TABLET: 8.6; 5 TABLET ORAL at 08:24

## 2020-01-16 RX ADMIN — ENOXAPARIN SODIUM 40 MG: 40 INJECTION SUBCUTANEOUS at 08:25

## 2020-01-16 RX ADMIN — FLUTICASONE PROPIONATE 2 SPRAY: 50 SPRAY, METERED NASAL at 22:13

## 2020-01-16 RX ADMIN — BUSPIRONE HYDROCHLORIDE 5 MG: 5 TABLET ORAL at 08:24

## 2020-01-16 RX ADMIN — GABAPENTIN 300 MG: 300 CAPSULE ORAL at 14:52

## 2020-01-16 RX ADMIN — GABAPENTIN 300 MG: 300 CAPSULE ORAL at 22:12

## 2020-01-16 RX ADMIN — INSULIN GLARGINE 30 UNITS: 100 INJECTION, SOLUTION SUBCUTANEOUS at 22:15

## 2020-01-16 RX ADMIN — PANTOPRAZOLE SODIUM 40 MG: 40 TABLET, DELAYED RELEASE ORAL at 05:44

## 2020-01-16 RX ADMIN — INSULIN LISPRO 6 UNITS: 100 INJECTION, SOLUTION INTRAVENOUS; SUBCUTANEOUS at 11:44

## 2020-01-16 RX ADMIN — INSULIN LISPRO 5 UNITS: 100 INJECTION, SOLUTION INTRAVENOUS; SUBCUTANEOUS at 22:14

## 2020-01-16 ASSESSMENT — PAIN SCALES - GENERAL
PAINLEVEL_OUTOF10: 6
PAINLEVEL_OUTOF10: 6

## 2020-01-16 NOTE — PROGRESS NOTES
Physical Therapy  Facility/Department: [facility] MED SURG  Daily Treatment Note  NAME: Favian Barajas  : 1940  MRN: [id number]    Date of Service: 2020    Discharge Recommendations:  Continue to assess pending progress        Assessment   Assessment: Patient agreeable to PT skilled services. Patient demo improved tolerance to tx session with increased amb distance and self propelling of w/c. Patient cont to receive verbal cues for gait deficits with difficulty to self correct. Patient returned to room and placed in chair. No needs noted. Activity Tolerance  Activity Tolerance: Patient Tolerated treatment well     Patient Diagnosis(es): There were no encounter diagnoses. has a past medical history of Allergic rhinitis, Anxiety, Chronic back pain, Depression, Double vision with both eyes open, Fall, Hyperlipidemia, Hypertension, Osteoarthritis, and Type II or unspecified type diabetes mellitus without mention of complication, not stated as uncontrolled. has a past surgical history that includes Hysterectomy; shoulder surgery;  section; and Colonoscopy. Restrictions  Restrictions/Precautions  Restrictions/Precautions: Weight Bearing, General Precautions, Fall Risk  Required Braces or Orthoses?: No  Upper Extremity Weight Bearing Restrictions  Right Upper Extremity Weight Bearing: Non Weight Bearing  Subjective   General  Chart Reviewed: Yes  Response To Previous Treatment: Patient with no complaints from previous session. Family / Caregiver Present: Yes  Subjective  Subjective: Patient agreeable to PT skilled services. Patient states \"I'm tired but will do what I can. \"     Objective         Ambulation  Ambulation?: Yes  WB Status: FWB  Ambulation 1  Surface: level tile  Device: Hemiwalker  Assistance: Contact guard assistance  Distance: 175ft  Comments: Poor safety awareness. Decreased step length and cadance. Step to gait cycle.             PROM RLE (degrees)  RLE PROM: WFL  AROM RLE (degrees)  RLE AROM: WFL  PROM LLE (degrees)  LLE PROM: WFL  AROM LLE (degrees)  LLE AROM : WFL       Goals  Long term goals  Time Frame for Long term goals : 2 weeks  Long term goal 1: Achieve mod I for bed mobility. Long term goal 2: Perform basic transfers with SBA x 1. Long term goal 3: Ambulate 15 feet with RW or SE with CGA x 1. Long term goal 4: Demonstrate good safety awareness with functional mobility. Plan    Plan  Times per week: 4-5 x week  Plan weeks: 2 weeks  Current Treatment Recommendations: Strengthening, Balance Training, Functional Mobility Training, Transfer Training, Endurance Training, Neuromuscular Re-education, Safety Education & Training, Patient/Caregiver Education & Training, Gait Training  Safety Devices  Type of devices: Call light within reach, Left in chair     Therapy Time   Individual Concurrent Group Co-treatment   Time In 8134         Time Out 1433         Minutes [address] [place] [address]     This note serves a discharge summary in the event of patient discharge.

## 2020-01-16 NOTE — FLOWSHEET NOTE
01/16/20 1319   Assessment   Charting Type Shift assessment   Neurological   Neuro (WDL) WDL   Level of Consciousness 0   [place] Coma Scale   Eye Opening 4   Best Verbal Response 5   Best Motor Response 6   Giovanny Coma Scale Score 15   HEENT   HEENT (WDL) X   Right Eye Impaired vision;Double vision   Left Eye Impaired vision;Double Vision   Teeth Missing teeth   Respiratory   Respiratory (WDL) WDL   L Breath Sounds Clear   R Breath Sounds Clear   Cough/Sputum   Cough Non-productive;Dry   Cough Description   Sputum Amount None   Cardiac   Cardiac (WDL) WDL   Cardiac Monitor   Telemetry Monitor On No   Gastrointestinal   Abdominal (WDL) X   Abdomen Inspection Soft   RUQ Bowel Sounds Active   LUQ Bowel Sounds Active   RLQ Bowel Sounds Active   LLQ Bowel Sounds Active   Peripheral Vascular   Peripheral Vascular (WDL) WDL   Edema None   RLE Edema Trace   LLE Edema Trace   Skin Color/Condition   Skin Color/Condition (WDL) X   Skin Color Pale   Skin Condition/Temp Warm;Dry   Skin Integrity   Skin Integrity (WDL) X   Skin Integrity Redness   Location [place] area   Preventative Dressing No   Skin Fold Management No   Skin Integrity Site 2   Skin Integrity Location 2 Tear   Location 2 LFA   Preventative Dressing Yes   Musculoskeletal   Musculoskeletal (WDL) X   RUE Limited movement; Injury/trauma;Weakness  (tremors)   LUE Full movement   RL Extremity Weakness   LL Extremity Weakness   Genitourinary   Genitourinary (WDL) WDL   Urine Assessment   Incontinence Yes  (at times)   Anus/Rectum   Anus/Rectum (WDL) WDL   Psychosocial   Psychosocial (WDL) WDL   Pt awake in chair. Pt alert and oriented. Pt appears in no acute distress. Pt currently on RA. Pt lung sounds clear juan j. Pt encouraged to cough and deep breathe. Pt states pain, see eMAR for intervention. Pt call bell and bedside table within reach. Will continue to monitor pt.

## 2020-01-17 LAB
ANION GAP SERPL CALCULATED.3IONS-SCNC: 9 MMOL/L (ref 3–16)
BILIRUBIN URINE: NEGATIVE
BLOOD, URINE: NEGATIVE
BUN BLDV-MCNC: 18 MG/DL (ref 6–20)
CALCIUM SERPL-MCNC: 9 MG/DL (ref 8.5–10.5)
CHLORIDE BLD-SCNC: 101 MMOL/L (ref 98–107)
CLARITY: CLEAR
CO2: 28 MMOL/L (ref 20–30)
COLOR: YELLOW
CREAT SERPL-MCNC: 1.5 MG/DL (ref 0.4–1.2)
GFR AFRICAN AMERICAN: 41
GFR NON-AFRICAN AMERICAN: 34
GLUCOSE BLD-MCNC: 221 MG/DL (ref 74–106)
GLUCOSE BLD-MCNC: 231 MG/DL (ref 74–106)
GLUCOSE BLD-MCNC: 237 MG/DL (ref 74–106)
GLUCOSE BLD-MCNC: 85 MG/DL (ref 74–106)
GLUCOSE BLD-MCNC: 88 MG/DL (ref 74–106)
GLUCOSE URINE: 250 MG/DL
HCT VFR BLD CALC: 30.1 % (ref 37–47)
HEMOGLOBIN: 9.2 G/DL (ref 11.5–16.5)
KETONES, URINE: NEGATIVE MG/DL
LEUKOCYTE ESTERASE, URINE: NEGATIVE
MCH RBC QN AUTO: 30.8 PG (ref 27–32)
MCHC RBC AUTO-ENTMCNC: 30.6 G/DL (ref 31–35)
MCV RBC AUTO: 100.7 FL (ref 80–100)
MICROSCOPIC EXAMINATION: ABNORMAL
NITRITE, URINE: NEGATIVE
PDW BLD-RTO: 15.2 % (ref 11–16)
PERFORMED ON: ABNORMAL
PERFORMED ON: NORMAL
PH UA: 7 (ref 5–8)
PLATELET # BLD: 209 K/UL (ref 150–400)
PMV BLD AUTO: 10.3 FL (ref 6–10)
POTASSIUM SERPL-SCNC: 4.7 MMOL/L (ref 3.4–5.1)
PROTEIN UA: NEGATIVE MG/DL
RBC # BLD: 2.99 M/UL (ref 3.8–5.8)
SODIUM BLD-SCNC: 138 MMOL/L (ref 136–145)
SPECIFIC GRAVITY UA: 1.01 (ref 1–1.03)
URINE REFLEX TO CULTURE: ABNORMAL
URINE TYPE: ABNORMAL
UROBILINOGEN, URINE: 0.2 E.U./DL
WBC # BLD: 5.9 K/UL (ref 4–11)

## 2020-01-17 PROCEDURE — 80048 BASIC METABOLIC PNL TOTAL CA: CPT

## 2020-01-17 PROCEDURE — 97530 THERAPEUTIC ACTIVITIES: CPT | Performed by: PHYSICAL THERAPY ASSISTANT

## 2020-01-17 PROCEDURE — 6370000000 HC RX 637 (ALT 250 FOR IP): Performed by: NURSE PRACTITIONER

## 2020-01-17 PROCEDURE — 81003 URINALYSIS AUTO W/O SCOPE: CPT

## 2020-01-17 PROCEDURE — 6360000002 HC RX W HCPCS: Performed by: PHYSICIAN ASSISTANT

## 2020-01-17 PROCEDURE — 6370000000 HC RX 637 (ALT 250 FOR IP): Performed by: INTERNAL MEDICINE

## 2020-01-17 PROCEDURE — 97116 GAIT TRAINING THERAPY: CPT | Performed by: PHYSICAL THERAPY ASSISTANT

## 2020-01-17 PROCEDURE — 6370000000 HC RX 637 (ALT 250 FOR IP): Performed by: PHYSICIAN ASSISTANT

## 2020-01-17 PROCEDURE — 1200000002 HC SEMI PRIVATE SWING BED

## 2020-01-17 PROCEDURE — 36415 COLL VENOUS BLD VENIPUNCTURE: CPT

## 2020-01-17 PROCEDURE — 97530 THERAPEUTIC ACTIVITIES: CPT

## 2020-01-17 PROCEDURE — 85027 COMPLETE CBC AUTOMATED: CPT

## 2020-01-17 RX ADMIN — BUSPIRONE HYDROCHLORIDE 5 MG: 5 TABLET ORAL at 09:41

## 2020-01-17 RX ADMIN — DULOXETINE HYDROCHLORIDE 60 MG: 30 CAPSULE, DELAYED RELEASE ORAL at 09:41

## 2020-01-17 RX ADMIN — HYDROCODONE BITARTRATE AND ACETAMINOPHEN 1 TABLET: 5; 325 TABLET ORAL at 20:08

## 2020-01-17 RX ADMIN — PANTOPRAZOLE SODIUM 40 MG: 40 TABLET, DELAYED RELEASE ORAL at 05:54

## 2020-01-17 RX ADMIN — Medication 14 UNITS: at 12:06

## 2020-01-17 RX ADMIN — AMLODIPINE BESYLATE 10 MG: 5 TABLET ORAL at 09:42

## 2020-01-17 RX ADMIN — INSULIN LISPRO 6 UNITS: 100 INJECTION, SOLUTION INTRAVENOUS; SUBCUTANEOUS at 12:04

## 2020-01-17 RX ADMIN — LORAZEPAM 0.5 MG: 0.5 TABLET ORAL at 20:08

## 2020-01-17 RX ADMIN — HYDROCODONE BITARTRATE AND ACETAMINOPHEN 1 TABLET: 5; 325 TABLET ORAL at 05:57

## 2020-01-17 RX ADMIN — Medication 14 UNITS: at 09:47

## 2020-01-17 RX ADMIN — SENNOSIDES AND DOCUSATE SODIUM 1 TABLET: 8.6; 5 TABLET ORAL at 20:08

## 2020-01-17 RX ADMIN — GABAPENTIN 300 MG: 300 CAPSULE ORAL at 09:42

## 2020-01-17 RX ADMIN — LOSARTAN POTASSIUM 25 MG: 25 TABLET, FILM COATED ORAL at 09:42

## 2020-01-17 RX ADMIN — ENOXAPARIN SODIUM 40 MG: 40 INJECTION SUBCUTANEOUS at 09:42

## 2020-01-17 RX ADMIN — INSULIN LISPRO 3 UNITS: 100 INJECTION, SOLUTION INTRAVENOUS; SUBCUTANEOUS at 20:15

## 2020-01-17 RX ADMIN — INSULIN GLARGINE 30 UNITS: 100 INJECTION, SOLUTION SUBCUTANEOUS at 20:15

## 2020-01-17 RX ADMIN — POLYETHYLENE GLYCOL 3350 17 G: 17 POWDER, FOR SOLUTION ORAL at 09:43

## 2020-01-17 RX ADMIN — BUSPIRONE HYDROCHLORIDE 5 MG: 5 TABLET ORAL at 20:08

## 2020-01-17 RX ADMIN — SENNOSIDES AND DOCUSATE SODIUM 1 TABLET: 8.6; 5 TABLET ORAL at 09:41

## 2020-01-17 RX ADMIN — ASPIRIN 81 MG: 81 TABLET, COATED ORAL at 09:41

## 2020-01-17 RX ADMIN — GABAPENTIN 300 MG: 300 CAPSULE ORAL at 20:08

## 2020-01-17 RX ADMIN — GABAPENTIN 300 MG: 300 CAPSULE ORAL at 14:00

## 2020-01-17 RX ADMIN — INSULIN LISPRO 6 UNITS: 100 INJECTION, SOLUTION INTRAVENOUS; SUBCUTANEOUS at 09:45

## 2020-01-17 ASSESSMENT — PAIN SCALES - GENERAL
PAINLEVEL_OUTOF10: 5
PAINLEVEL_OUTOF10: 7

## 2020-01-17 NOTE — CARE COORDINATION
[facility] notified of referral and they will review pt case.  Faxed [facility] orders to [facility] at this time

## 2020-01-17 NOTE — PROGRESS NOTES
guard assistance  Distance: [id number]  Comments: Poor safety awareness. Decreased step length and cadance. Step to gait cycle. PROM RLE (degrees)  RLE PROM: WFL  AROM RLE (degrees)  RLE AROM: WFL  PROM LLE (degrees)  LLE PROM: WFL  AROM LLE (degrees)  LLE AROM : WFL       Goals  Long term goals  Time Frame for Long term goals : 2 weeks  Long term goal 1: Achieve mod I for bed mobility. Long term goal 2: Perform basic transfers with SBA x 1. Long term goal 3: Ambulate 15 feet with RW or SE with CGA x 1. Long term goal 4: Demonstrate good safety awareness with functional mobility. Plan    Plan  Times per week: 4-5 x week  Plan weeks: 2 weeks  Current Treatment Recommendations: Strengthening, Balance Training, Functional Mobility Training, Transfer Training, Endurance Training, Neuromuscular Re-education, Safety Education & Training, Patient/Caregiver Education & Training, Gait Training  Safety Devices  Type of devices: Call light within reach, Left in chair     Therapy Time   Individual Concurrent Group Co-treatment   Time In 7733         Time Out 1418         Minutes [facility]     This note serves a discharge summary in the event of patient discharge.

## 2020-01-17 NOTE — CARE COORDINATION
Pt notified of last covered day and signed [place] 30. Pt would like to go home Saturday or Sunday. Pt DME orders (KALYN yanes, BSC) faxed to [facility] and notified by phone. Pt aware that they may have to bring her equipment to her house Monday. Pt agreeable to [facility] but no company requested at this time.

## 2020-01-17 NOTE — PROGRESS NOTES
Occupational Therapy  Facility/Department: [facility]  Daily Treatment Note  NAME: Addi Dickens  : 1940  MRN: [id number]    Date of Service: 2020    Discharge Recommendations:  Home with Home health OT       Assessment   Assessment: Co tx with PTA on this date. Pt come to sitat EOB with CGA. Pt ambulated 25 feet x3 requiring min<>CGA x2 assist with WC follow. Continues to demo poor safety. Pt self propelled WC 75 feet with increased timing needed> Pt completed SPT with min assist wtih cues for safety and sequencing task appropriately. Left seated in chair with call ight in reach. Pt and spouse stated they were prepared for DC home no further training needed on transfer techniques. Pt declined any needs for toileting ETC.             Patient Diagnosis(es): There were no encounter diagnoses. has a past medical history of Allergic rhinitis, Anxiety, Chronic back pain, Depression, Double vision with both eyes open, Fall, Hyperlipidemia, Hypertension, Osteoarthritis, and Type II or unspecified type diabetes mellitus without mention of complication, not stated as uncontrolled. has a past surgical history that includes Hysterectomy; shoulder surgery;  section; and Colonoscopy. Restrictions  Restrictions/Precautions  Restrictions/Precautions: Weight Bearing, General Precautions, Fall Risk  Required Braces or Orthoses?: No  Upper Extremity Weight Bearing Restrictions  Right Upper Extremity Weight Bearing: Non Weight Bearing  Subjective   General  Chart Reviewed: Yes  Patient assessed for rehabilitation services?: Yes  Family / Caregiver Present: Yes  Referring Practitioner: KEATON Woodruff  Diagnosis: S/p Lap. Ary,   Subjective  Subjective: Pt reports she has been hospitalized last 16 days. Has been unable to complete self care and mobility. Using lift for transfers.        Orientation     Objective Plan   Plan  Times per week: 3-5  Times per day: Daily  Plan weeks: 1-3  Current Treatment Recommendations: Endurance Training, Strengthening, Balance Training, Safety Education & Training, Self-Care / ADL, Neuromuscular Re-education, Patient/Caregiver Education & Training, Functional Mobility Training  G-Code     OutComes Score                                                  AM-PAC Score             Goals  Short term goals  Time Frame for Short term goals: 1-2 weeks  Short term goal 1: Pt to complete Sit to stand transfer using mana walker with min assist.   Short term goal 2: Pt to complete SPT from bed to chair/BSC with min assist using mana walker assistx1  Short term goal 3: pt to complete toileting with min assist.   Short term goal 4: Pt to complete dressing with min assist for LB and UB. Short term goal 5: Pt to complete sponge bathing with MIn assist.   Long term goals  Time Frame for Long term goals : 2-3 weeks. Long term goal 1: Pt to complete toileting with MOD I. Long term goal 2: Pt to tolerate x20 minutes of activity to increase functional activity tolerance. Long term goal 3: Pt to complete dressing with MOD I. Long term goal 4: Pt to complete bathing with SULLIVAN. Therapy Time   Individual Concurrent Group Co-treatment   Time In 0155         Time Out 7381         Minutes 24              This note serves as a DC summary in the event of pt discharge.      SHIRLEY Ugalde/L

## 2020-01-18 LAB
GLUCOSE BLD-MCNC: 178 MG/DL (ref 74–106)
GLUCOSE BLD-MCNC: 196 MG/DL (ref 74–106)
GLUCOSE BLD-MCNC: 228 MG/DL (ref 74–106)
GLUCOSE BLD-MCNC: 229 MG/DL (ref 74–106)
PERFORMED ON: ABNORMAL

## 2020-01-18 PROCEDURE — 6370000000 HC RX 637 (ALT 250 FOR IP): Performed by: PHYSICIAN ASSISTANT

## 2020-01-18 PROCEDURE — 6360000002 HC RX W HCPCS: Performed by: PHYSICIAN ASSISTANT

## 2020-01-18 PROCEDURE — 6370000000 HC RX 637 (ALT 250 FOR IP): Performed by: NURSE PRACTITIONER

## 2020-01-18 PROCEDURE — 6370000000 HC RX 637 (ALT 250 FOR IP): Performed by: INTERNAL MEDICINE

## 2020-01-18 PROCEDURE — 1200000002 HC SEMI PRIVATE SWING BED

## 2020-01-18 RX ORDER — AMOXICILLIN 250 MG
1 CAPSULE ORAL 2 TIMES DAILY
Qty: 60 TABLET | Refills: 0 | Status: ON HOLD | OUTPATIENT
Start: 2020-01-18 | End: 2020-04-20 | Stop reason: SDUPTHER

## 2020-01-18 RX ORDER — FLUTICASONE PROPIONATE 50 MCG
2 SPRAY, SUSPENSION (ML) NASAL 2 TIMES DAILY
Qty: 1 BOTTLE | Refills: 3 | Status: ON HOLD | OUTPATIENT
Start: 2020-01-18 | End: 2020-03-24

## 2020-01-18 RX ORDER — LIDOCAINE 50 MG/G
1 PATCH TOPICAL DAILY PRN
Qty: 30 PATCH | Refills: 0 | Status: ON HOLD | OUTPATIENT
Start: 2020-01-18 | End: 2020-03-24

## 2020-01-18 RX ORDER — BUSPIRONE HYDROCHLORIDE 5 MG/1
5 TABLET ORAL 2 TIMES DAILY
Qty: 60 TABLET | Refills: 0 | Status: ON HOLD | OUTPATIENT
Start: 2020-01-18 | End: 2020-03-24

## 2020-01-18 RX ORDER — MECLIZINE HYDROCHLORIDE 25 MG/1
25 TABLET ORAL 3 TIMES DAILY PRN
Qty: 30 TABLET | Refills: 0 | Status: SHIPPED | OUTPATIENT
Start: 2020-01-18

## 2020-01-18 RX ORDER — DULOXETIN HYDROCHLORIDE 60 MG/1
60 CAPSULE, DELAYED RELEASE ORAL DAILY
Qty: 30 CAPSULE | Refills: 0 | Status: ON HOLD | OUTPATIENT
Start: 2020-01-18 | End: 2020-03-24

## 2020-01-18 RX ORDER — LOSARTAN POTASSIUM 25 MG/1
25 TABLET ORAL DAILY
Qty: 30 TABLET | Refills: 0 | Status: ON HOLD | OUTPATIENT
Start: 2020-01-18 | End: 2020-03-24

## 2020-01-18 RX ORDER — INSULIN GLARGINE 100 [IU]/ML
30 INJECTION, SOLUTION SUBCUTANEOUS NIGHTLY
Qty: 1 VIAL | Refills: 3 | Status: ON HOLD
Start: 2020-01-18 | End: 2020-04-20 | Stop reason: SDUPTHER

## 2020-01-18 RX ORDER — AMLODIPINE BESYLATE 10 MG/1
10 TABLET ORAL DAILY
Qty: 30 TABLET | Refills: 0 | Status: ON HOLD | OUTPATIENT
Start: 2020-01-18 | End: 2020-03-24

## 2020-01-18 RX ORDER — INSULIN LISPRO 100 [IU]/ML
0-18 INJECTION, SOLUTION INTRAVENOUS; SUBCUTANEOUS
Qty: 16.2 ML | Refills: 0 | Status: ON HOLD | OUTPATIENT
Start: 2020-01-18 | End: 2020-04-20

## 2020-01-18 RX ORDER — BLOOD-GLUCOSE METER
1 KIT MISCELLANEOUS DAILY
Qty: 1 KIT | Refills: 0 | Status: SHIPPED | OUTPATIENT
Start: 2020-01-18

## 2020-01-18 RX ORDER — OMEPRAZOLE 20 MG/1
40 CAPSULE, DELAYED RELEASE ORAL DAILY
Qty: 30 CAPSULE | Refills: 0 | Status: ON HOLD | OUTPATIENT
Start: 2020-01-18 | End: 2020-04-20 | Stop reason: SDUPTHER

## 2020-01-18 RX ORDER — POLYETHYLENE GLYCOL 3350 17 G/17G
17 POWDER, FOR SOLUTION ORAL 2 TIMES DAILY
Qty: 1020 G | Refills: 0 | Status: SHIPPED | OUTPATIENT
Start: 2020-01-18 | End: 2020-02-17

## 2020-01-18 RX ADMIN — AMLODIPINE BESYLATE 10 MG: 5 TABLET ORAL at 08:47

## 2020-01-18 RX ADMIN — INSULIN LISPRO 2 UNITS: 100 INJECTION, SOLUTION INTRAVENOUS; SUBCUTANEOUS at 21:01

## 2020-01-18 RX ADMIN — Medication 14 UNITS: at 08:54

## 2020-01-18 RX ADMIN — ENOXAPARIN SODIUM 40 MG: 40 INJECTION SUBCUTANEOUS at 08:48

## 2020-01-18 RX ADMIN — SENNOSIDES AND DOCUSATE SODIUM 1 TABLET: 8.6; 5 TABLET ORAL at 08:48

## 2020-01-18 RX ADMIN — POLYETHYLENE GLYCOL 3350 17 G: 17 POWDER, FOR SOLUTION ORAL at 08:48

## 2020-01-18 RX ADMIN — BUSPIRONE HYDROCHLORIDE 5 MG: 5 TABLET ORAL at 08:47

## 2020-01-18 RX ADMIN — ACETAMINOPHEN 650 MG: 325 TABLET, FILM COATED ORAL at 13:24

## 2020-01-18 RX ADMIN — GABAPENTIN 300 MG: 300 CAPSULE ORAL at 20:55

## 2020-01-18 RX ADMIN — PANTOPRAZOLE SODIUM 40 MG: 40 TABLET, DELAYED RELEASE ORAL at 06:11

## 2020-01-18 RX ADMIN — INSULIN LISPRO 6 UNITS: 100 INJECTION, SOLUTION INTRAVENOUS; SUBCUTANEOUS at 16:38

## 2020-01-18 RX ADMIN — LORAZEPAM 0.5 MG: 0.5 TABLET ORAL at 20:55

## 2020-01-18 RX ADMIN — GABAPENTIN 300 MG: 300 CAPSULE ORAL at 13:15

## 2020-01-18 RX ADMIN — INSULIN LISPRO 6 UNITS: 100 INJECTION, SOLUTION INTRAVENOUS; SUBCUTANEOUS at 08:54

## 2020-01-18 RX ADMIN — FLUTICASONE PROPIONATE 2 SPRAY: 50 SPRAY, METERED NASAL at 08:47

## 2020-01-18 RX ADMIN — Medication 14 UNITS: at 11:20

## 2020-01-18 RX ADMIN — LOSARTAN POTASSIUM 25 MG: 25 TABLET, FILM COATED ORAL at 08:47

## 2020-01-18 RX ADMIN — BUSPIRONE HYDROCHLORIDE 5 MG: 5 TABLET ORAL at 20:55

## 2020-01-18 RX ADMIN — MECLIZINE HYDROCHLORIDE 25 MG: 25 TABLET ORAL at 21:00

## 2020-01-18 RX ADMIN — INSULIN GLARGINE 30 UNITS: 100 INJECTION, SOLUTION SUBCUTANEOUS at 21:01

## 2020-01-18 RX ADMIN — HYDROCODONE BITARTRATE AND ACETAMINOPHEN 1 TABLET: 5; 325 TABLET ORAL at 15:18

## 2020-01-18 RX ADMIN — GABAPENTIN 300 MG: 300 CAPSULE ORAL at 08:47

## 2020-01-18 RX ADMIN — ASPIRIN 81 MG: 81 TABLET, COATED ORAL at 08:48

## 2020-01-18 RX ADMIN — HYDROCODONE BITARTRATE AND ACETAMINOPHEN 1 TABLET: 5; 325 TABLET ORAL at 20:55

## 2020-01-18 RX ADMIN — DULOXETINE HYDROCHLORIDE 60 MG: 30 CAPSULE, DELAYED RELEASE ORAL at 08:48

## 2020-01-18 RX ADMIN — INSULIN LISPRO 3 UNITS: 100 INJECTION, SOLUTION INTRAVENOUS; SUBCUTANEOUS at 11:20

## 2020-01-18 RX ADMIN — Medication 14 UNITS: at 16:38

## 2020-01-18 RX ADMIN — HYDROCODONE BITARTRATE AND ACETAMINOPHEN 1 TABLET: 5; 325 TABLET ORAL at 08:47

## 2020-01-18 ASSESSMENT — PAIN DESCRIPTION - PAIN TYPE
TYPE: CHRONIC PAIN

## 2020-01-18 ASSESSMENT — PAIN SCALES - GENERAL
PAINLEVEL_OUTOF10: 0
PAINLEVEL_OUTOF10: 6
PAINLEVEL_OUTOF10: 9
PAINLEVEL_OUTOF10: 7
PAINLEVEL_OUTOF10: 7
PAINLEVEL_OUTOF10: 0
PAINLEVEL_OUTOF10: 5
PAINLEVEL_OUTOF10: 10
PAINLEVEL_OUTOF10: 5
PAINLEVEL_OUTOF10: 0

## 2020-01-18 ASSESSMENT — PAIN DESCRIPTION - ORIENTATION: ORIENTATION: RIGHT

## 2020-01-18 ASSESSMENT — PAIN DESCRIPTION - LOCATION
LOCATION: BACK;KNEE;SHOULDER
LOCATION: BACK;KNEE;SHOULDER

## 2020-01-18 NOTE — PLAN OF CARE
Problem: Falls - Risk of:  Goal: Will remain free from falls  Description  Will remain free from falls  Outcome: Ongoing     Problem: Pain:  Goal: Patient's pain/discomfort is manageable  Description  Patient's pain/discomfort is manageable  Outcome: Ongoing

## 2020-01-18 NOTE — DISCHARGE SUMMARY
Discharge Summary      Patient ID: Bibiana Sanchez      Patient's PCP: Kristie Hopkins, MIRNA - CNP    Admit Date: 12/19/2019     Discharge Date:  1/19/20    Admitting Provider: Elizabeth Bartholomew MD    Discharging Provider: KEATON Saucedo     Reason for this admission:   Declining functional status    Discharge Diagnoses: Active Hospital Problems    Diagnosis Date Noted    Acute cystitis with hematuria [N30.01] 01/05/2020    Anxiety and depression [F41.9, F32.9] 12/20/2019    Aphthous ulcer [K12.0] 12/20/2019    Chronic back pain [M54.9, G89.29] 12/20/2019    Peripheral neuropathy [G62.9] 12/20/2019    Closed fracture of right upper extremity [S42.301A] 12/20/2019    Dysphagia [R13.10] 12/20/2019    CKD (chronic kidney disease) stage 3, GFR 30-59 ml/min (HCC) [N18.3] 12/20/2019    Declining functional status [R53.81] 12/19/2019    HTN (hypertension) [I10] 10/14/2011    Diabetes mellitus, type II ([facility] [address] [E11.9] 07/05/2011       Procedures:  No orders to display       Consults:   IP CONSULT TO CASE MANAGEMENT  IP CONSULT TO PODIATRY  IP CONSULT TO DIETITIAN  IP CONSULT TO HOME CARE NEEDS  PT/OT    Briefly:   Ms. Sheryl Krishnamurthy is a 66 yo female with PMH of chronic back pain, CKD, HTN, DM II who was admitted due to declining functional status. Hospital Course: Active Hospital Problems    Diagnosis Date Noted    Acute cystitis with hematuria [N30.01]  Urine culture 1/1 klebsiella pneumoniae. Completed antibiotic. Repeat urine negative. 01/05/2020    Anxiety and depression [F41.9, F32.9]  continue home regimen 12/20/2019    Aphthous ulcer [K12.0]  Resolved. 12/20/2019    Chronic back pain [M54.9, G89.29]  Continue prn norco and lidoderm patch. Plans to follow up with Dr. Tylor Lord regarding chronic right knee pain, right shoulder pain, and chronic back pain.   12/20/2019    Peripheral neuropathy [G62.9]  Continue gabapentin 12/20/2019    Closed fracture of right upper extremity [S42.301A]  Per dc summary at 300 mg by mouth 3 times daily. Historical Med           Gabapentin  300 mg capsule tid provided by Dr. Jayshree Funes at [place] [address] 0.5 mg tab q8h prn pain script provided by Dr. Jayshree Funes at [facility]     Patient was seen and examined by Dr. Marian Wooten and plan of care reviewed. Signed:  Electronically signed by KEATON Wright on 1/19/2020 at 9:05 AM       Thank you MIRNA Fu CNP for the opportunity to be involved in this patient's care. If you have any questions or concerns please feel free to contact me at [phone].

## 2020-01-18 NOTE — FLOWSHEET NOTE
01/18/20 0815   Assessment   Charting Type Shift assessment   Neurological   Neuro (WDL) WDL   Level of Consciousness 0   Swallow Screening   Is the patient able to remain alert for testing? Yes   Was the Patient Eating a Modified Diet Prior to being Admitted? No   Is there an Existing PEG or Abdominal Feeding Tube? No   Does the patient have a Head of Bed [facility]) restriction <30°? No   [place] Coma Scale   Eye Opening 4   Best Verbal Response 5   Best Motor Response 6   [place] Coma Scale Score 15   HEENT   HEENT (WDL) X   Right Eye Impaired vision;Double vision   Left Eye Impaired vision;Double Vision   Teeth Missing teeth   Respiratory   Respiratory (WDL) WDL   L Breath Sounds Clear   R Breath Sounds Clear   Cough/Sputum   Cough Non-productive;Dry   Cough Description   Sputum Amount None   Cardiac   Cardiac (WDL) WDL   Cardiac Monitor   Telemetry Monitor On No   Gastrointestinal   Abdominal (WDL) X   Abdomen Inspection Soft   RUQ Bowel Sounds Active   LUQ Bowel Sounds Active   RLQ Bowel Sounds Active   LLQ Bowel Sounds Active   Peripheral Vascular   Peripheral Vascular (WDL) WDL   Edema None   RLE Edema Trace   LLE Edema Trace   Skin Color/Condition   Skin Color/Condition (WDL) X   Skin Color Pale   Skin Condition/Temp Dry; Warm   Skin Integrity   Skin Integrity (WDL) X   Skin Integrity Redness   Location migdalia area   Preventative Dressing No   Skin Fold Management No   Multiple Skin Integrity Sites Yes   Skin Integrity Site 2   Skin Integrity Location 2 Tear   Location 2 LFA   Preventative Dressing Yes   Musculoskeletal   Musculoskeletal (WDL) X   RUE Limited movement; Injury/trauma;Weakness  (tremors)   LUE Full movement   RL Extremity Weakness   LL Extremity Weakness   Genitourinary   Genitourinary (WDL) X  (incontinent)   Urine Assessment   Incontinence Yes  (at times)   Anus/Rectum   Anus/Rectum (WDL) WDL   Psychosocial   Psychosocial (WDL) WDL   Pt.  Alert times 4 this am- Pt able to take am meds well whole pain pill given for chronic pain Lidoderm patches pplaced on lower right backside- Pt assisted up to chair- Call bell within reach- Will monitor- GUZMAN

## 2020-01-19 VITALS
HEIGHT: 67 IN | TEMPERATURE: 97.7 F | WEIGHT: 219.1 LBS | OXYGEN SATURATION: 93 % | RESPIRATION RATE: 18 BRPM | DIASTOLIC BLOOD PRESSURE: 51 MMHG | BODY MASS INDEX: 34.39 KG/M2 | HEART RATE: 90 BPM | SYSTOLIC BLOOD PRESSURE: 121 MMHG

## 2020-01-19 PROBLEM — D64.9 ANEMIA: Status: ACTIVE | Noted: 2020-01-19

## 2020-01-19 PROBLEM — Z90.49 HISTORY OF CHOLECYSTECTOMY: Status: ACTIVE | Noted: 2020-01-19

## 2020-01-19 LAB
GLUCOSE BLD-MCNC: 168 MG/DL (ref 74–106)
GLUCOSE BLD-MCNC: 217 MG/DL (ref 74–106)
PERFORMED ON: ABNORMAL
PERFORMED ON: ABNORMAL

## 2020-01-19 PROCEDURE — 6370000000 HC RX 637 (ALT 250 FOR IP): Performed by: NURSE PRACTITIONER

## 2020-01-19 PROCEDURE — 99315 NF DSCHRG MGMT 30 MIN/LESS: CPT | Performed by: INTERNAL MEDICINE

## 2020-01-19 PROCEDURE — 6370000000 HC RX 637 (ALT 250 FOR IP): Performed by: INTERNAL MEDICINE

## 2020-01-19 PROCEDURE — 6360000002 HC RX W HCPCS: Performed by: PHYSICIAN ASSISTANT

## 2020-01-19 PROCEDURE — 6370000000 HC RX 637 (ALT 250 FOR IP): Performed by: PHYSICIAN ASSISTANT

## 2020-01-19 RX ADMIN — ASPIRIN 81 MG: 81 TABLET, COATED ORAL at 08:33

## 2020-01-19 RX ADMIN — AMLODIPINE BESYLATE 10 MG: 5 TABLET ORAL at 08:33

## 2020-01-19 RX ADMIN — BUSPIRONE HYDROCHLORIDE 5 MG: 5 TABLET ORAL at 08:33

## 2020-01-19 RX ADMIN — Medication 14 UNITS: at 08:30

## 2020-01-19 RX ADMIN — HYDROCODONE BITARTRATE AND ACETAMINOPHEN 1 TABLET: 5; 325 TABLET ORAL at 08:33

## 2020-01-19 RX ADMIN — INSULIN LISPRO 6 UNITS: 100 INJECTION, SOLUTION INTRAVENOUS; SUBCUTANEOUS at 11:44

## 2020-01-19 RX ADMIN — GABAPENTIN 300 MG: 300 CAPSULE ORAL at 08:33

## 2020-01-19 RX ADMIN — ENOXAPARIN SODIUM 40 MG: 40 INJECTION SUBCUTANEOUS at 08:32

## 2020-01-19 RX ADMIN — Medication 14 UNITS: at 11:43

## 2020-01-19 RX ADMIN — DULOXETINE HYDROCHLORIDE 60 MG: 30 CAPSULE, DELAYED RELEASE ORAL at 08:32

## 2020-01-19 RX ADMIN — MECLIZINE HYDROCHLORIDE 25 MG: 25 TABLET ORAL at 08:33

## 2020-01-19 RX ADMIN — SENNOSIDES AND DOCUSATE SODIUM 1 TABLET: 8.6; 5 TABLET ORAL at 08:33

## 2020-01-19 RX ADMIN — PANTOPRAZOLE SODIUM 40 MG: 40 TABLET, DELAYED RELEASE ORAL at 06:09

## 2020-01-19 RX ADMIN — INSULIN LISPRO 3 UNITS: 100 INJECTION, SOLUTION INTRAVENOUS; SUBCUTANEOUS at 08:31

## 2020-01-19 RX ADMIN — LOSARTAN POTASSIUM 25 MG: 25 TABLET, FILM COATED ORAL at 08:33

## 2020-01-19 ASSESSMENT — PAIN DESCRIPTION - PAIN TYPE: TYPE: CHRONIC PAIN

## 2020-01-19 ASSESSMENT — PAIN DESCRIPTION - ORIENTATION: ORIENTATION: RIGHT

## 2020-01-19 ASSESSMENT — PAIN DESCRIPTION - LOCATION: LOCATION: BACK;KNEE;SHOULDER

## 2020-01-19 ASSESSMENT — PAIN DESCRIPTION - ONSET: ONSET: ON-GOING

## 2020-01-19 ASSESSMENT — PAIN SCALES - GENERAL
PAINLEVEL_OUTOF10: 6
PAINLEVEL_OUTOF10: 6
PAINLEVEL_OUTOF10: 0
PAINLEVEL_OUTOF10: 0

## 2020-01-19 ASSESSMENT — PAIN DESCRIPTION - FREQUENCY: FREQUENCY: CONTINUOUS

## 2020-02-03 ENCOUNTER — HOSPITAL ENCOUNTER (OUTPATIENT)
Facility: HOSPITAL | Age: 80
Discharge: HOME | End: 2020-02-03
Payer: MEDICARE

## 2020-02-26 RX ORDER — MECLIZINE HYDROCHLORIDE 25 MG/1
TABLET ORAL
Qty: 90 TABLET | Refills: 0 | Status: ON HOLD | OUTPATIENT
Start: 2020-02-26

## 2020-03-09 ENCOUNTER — HOSPITAL ENCOUNTER (OUTPATIENT)
Facility: HOSPITAL | Age: 80
Discharge: HOME | End: 2020-03-09
Payer: MEDICARE

## 2020-03-09 PROCEDURE — 36415 COLL VENOUS BLD VENIPUNCTURE: CPT

## 2020-03-24 ENCOUNTER — HOSPITAL ENCOUNTER (INPATIENT)
Facility: HOSPITAL | Age: 80
LOS: 27 days | Discharge: HOME HEALTH | DRG: 561 | End: 2020-04-20
Attending: INTERNAL MEDICINE | Admitting: INTERNAL MEDICINE
Payer: MEDICARE

## 2020-03-24 LAB
GLUCOSE BLD-MCNC: 248 MG/DL (ref 74–106)
PERFORMED ON: ABNORMAL

## 2020-03-24 PROCEDURE — 97161 PT EVAL LOW COMPLEX 20 MIN: CPT

## 2020-03-24 PROCEDURE — 6370000000 HC RX 637 (ALT 250 FOR IP): Performed by: PHYSICIAN ASSISTANT

## 2020-03-24 PROCEDURE — 1200000002 HC SEMI PRIVATE SWING BED

## 2020-03-24 PROCEDURE — 97530 THERAPEUTIC ACTIVITIES: CPT

## 2020-03-24 PROCEDURE — 6360000002 HC RX W HCPCS: Performed by: PHYSICIAN ASSISTANT

## 2020-03-24 RX ORDER — POLYETHYLENE GLYCOL 3350 17 G/17G
17 POWDER, FOR SOLUTION ORAL DAILY PRN
Status: DISCONTINUED | OUTPATIENT
Start: 2020-03-24 | End: 2020-04-20 | Stop reason: HOSPADM

## 2020-03-24 RX ORDER — PRAVASTATIN SODIUM 20 MG
20 TABLET ORAL NIGHTLY
Status: DISCONTINUED | OUTPATIENT
Start: 2020-03-24 | End: 2020-04-20 | Stop reason: HOSPADM

## 2020-03-24 RX ORDER — NICOTINE POLACRILEX 4 MG
15 LOZENGE BUCCAL PRN
Status: DISCONTINUED | OUTPATIENT
Start: 2020-03-24 | End: 2020-04-20 | Stop reason: HOSPADM

## 2020-03-24 RX ORDER — VITAMIN B COMPLEX
1000 TABLET ORAL DAILY
Status: DISCONTINUED | OUTPATIENT
Start: 2020-03-25 | End: 2020-04-20 | Stop reason: HOSPADM

## 2020-03-24 RX ORDER — PROPRANOLOL HYDROCHLORIDE 40 MG/1
40 TABLET ORAL 2 TIMES DAILY
Status: ON HOLD | COMMUNITY
End: 2020-04-20 | Stop reason: SDUPTHER

## 2020-03-24 RX ORDER — GABAPENTIN 300 MG/1
300 CAPSULE ORAL 3 TIMES DAILY
Status: DISCONTINUED | OUTPATIENT
Start: 2020-03-24 | End: 2020-04-01

## 2020-03-24 RX ORDER — INSULIN GLARGINE 100 [IU]/ML
44 INJECTION, SOLUTION SUBCUTANEOUS NIGHTLY
Status: DISCONTINUED | OUTPATIENT
Start: 2020-03-24 | End: 2020-04-10

## 2020-03-24 RX ORDER — MECLIZINE HYDROCHLORIDE 25 MG/1
25 TABLET ORAL 3 TIMES DAILY PRN
Status: DISCONTINUED | OUTPATIENT
Start: 2020-03-24 | End: 2020-04-20 | Stop reason: HOSPADM

## 2020-03-24 RX ORDER — ALOGLIPTIN 12.5 MG/1
12.5 TABLET, FILM COATED ORAL DAILY
Status: DISCONTINUED | OUTPATIENT
Start: 2020-03-25 | End: 2020-04-20 | Stop reason: HOSPADM

## 2020-03-24 RX ORDER — LORAZEPAM 0.5 MG/1
0.5 TABLET ORAL DAILY PRN
Status: ON HOLD | COMMUNITY
End: 2021-06-16

## 2020-03-24 RX ORDER — METHOCARBAMOL 500 MG/1
500 TABLET, FILM COATED ORAL EVERY 8 HOURS PRN
Status: ON HOLD | COMMUNITY
End: 2020-04-20 | Stop reason: SDUPTHER

## 2020-03-24 RX ORDER — POLYETHYLENE GLYCOL 3350 17 G/17G
17 POWDER, FOR SOLUTION ORAL 2 TIMES DAILY
Status: DISCONTINUED | OUTPATIENT
Start: 2020-03-24 | End: 2020-04-20 | Stop reason: HOSPADM

## 2020-03-24 RX ORDER — PRAVASTATIN SODIUM 20 MG
20 TABLET ORAL NIGHTLY
Status: ON HOLD | COMMUNITY
End: 2020-04-20 | Stop reason: SDUPTHER

## 2020-03-24 RX ORDER — ACETAMINOPHEN 500 MG
1000 TABLET ORAL 4 TIMES DAILY
Status: ON HOLD | COMMUNITY
End: 2020-04-20 | Stop reason: HOSPADM

## 2020-03-24 RX ORDER — DEXTROSE MONOHYDRATE 50 MG/ML
100 INJECTION, SOLUTION INTRAVENOUS PRN
Status: DISCONTINUED | OUTPATIENT
Start: 2020-03-24 | End: 2020-04-20 | Stop reason: HOSPADM

## 2020-03-24 RX ORDER — LORAZEPAM 0.5 MG/1
0.5 TABLET ORAL DAILY PRN
Status: DISCONTINUED | OUTPATIENT
Start: 2020-03-24 | End: 2020-04-20 | Stop reason: HOSPADM

## 2020-03-24 RX ORDER — POLYETHYLENE GLYCOL 3350 17 G/17G
17 POWDER, FOR SOLUTION ORAL 2 TIMES DAILY
Status: ON HOLD | COMMUNITY
End: 2020-04-20 | Stop reason: SDUPTHER

## 2020-03-24 RX ORDER — TRAMADOL HYDROCHLORIDE 50 MG/1
50 TABLET ORAL 2 TIMES DAILY PRN
Status: ON HOLD | COMMUNITY
End: 2020-04-20 | Stop reason: HOSPADM

## 2020-03-24 RX ORDER — ONDANSETRON 8 MG/1
4 TABLET, ORALLY DISINTEGRATING ORAL EVERY 6 HOURS PRN
Status: ON HOLD | COMMUNITY
End: 2020-04-20 | Stop reason: HOSPADM

## 2020-03-24 RX ORDER — PANTOPRAZOLE SODIUM 40 MG/1
40 TABLET, DELAYED RELEASE ORAL
Status: DISCONTINUED | OUTPATIENT
Start: 2020-03-25 | End: 2020-04-20 | Stop reason: HOSPADM

## 2020-03-24 RX ORDER — ACETAMINOPHEN 500 MG
1000 TABLET ORAL 4 TIMES DAILY
Status: DISCONTINUED | OUTPATIENT
Start: 2020-03-24 | End: 2020-03-25

## 2020-03-24 RX ORDER — DEXTROSE MONOHYDRATE 25 G/50ML
12.5 INJECTION, SOLUTION INTRAVENOUS PRN
Status: DISCONTINUED | OUTPATIENT
Start: 2020-03-24 | End: 2020-04-20 | Stop reason: HOSPADM

## 2020-03-24 RX ORDER — METHOCARBAMOL 500 MG/1
500 TABLET, FILM COATED ORAL EVERY 8 HOURS PRN
Status: DISCONTINUED | OUTPATIENT
Start: 2020-03-24 | End: 2020-03-25

## 2020-03-24 RX ORDER — TRAMADOL HYDROCHLORIDE 50 MG/1
50 TABLET ORAL 2 TIMES DAILY PRN
Status: DISCONTINUED | OUTPATIENT
Start: 2020-03-24 | End: 2020-03-25

## 2020-03-24 RX ORDER — ACETAMINOPHEN 325 MG/1
650 TABLET ORAL EVERY 4 HOURS PRN
Status: DISCONTINUED | OUTPATIENT
Start: 2020-03-24 | End: 2020-04-20 | Stop reason: HOSPADM

## 2020-03-24 RX ORDER — SENNA AND DOCUSATE SODIUM 50; 8.6 MG/1; MG/1
1 TABLET, FILM COATED ORAL 2 TIMES DAILY
Status: DISCONTINUED | OUTPATIENT
Start: 2020-03-24 | End: 2020-04-20 | Stop reason: HOSPADM

## 2020-03-24 RX ORDER — ASPIRIN 81 MG/1
81 TABLET, CHEWABLE ORAL DAILY
Status: DISCONTINUED | OUTPATIENT
Start: 2020-03-25 | End: 2020-04-20 | Stop reason: HOSPADM

## 2020-03-24 RX ORDER — PROPRANOLOL HYDROCHLORIDE 20 MG/1
40 TABLET ORAL 2 TIMES DAILY
Status: DISCONTINUED | OUTPATIENT
Start: 2020-03-24 | End: 2020-04-20 | Stop reason: HOSPADM

## 2020-03-24 RX ADMIN — PROPRANOLOL HYDROCHLORIDE 40 MG: 20 TABLET ORAL at 20:22

## 2020-03-24 RX ADMIN — ACETAMINOPHEN 1000 MG: 500 TABLET, FILM COATED ORAL at 17:18

## 2020-03-24 RX ADMIN — GABAPENTIN 300 MG: 300 CAPSULE ORAL at 20:22

## 2020-03-24 RX ADMIN — TRAMADOL HYDROCHLORIDE 50 MG: 50 TABLET, FILM COATED ORAL at 23:55

## 2020-03-24 RX ADMIN — PRAVASTATIN SODIUM 20 MG: 20 TABLET ORAL at 20:22

## 2020-03-24 RX ADMIN — GABAPENTIN 300 MG: 300 CAPSULE ORAL at 17:18

## 2020-03-24 RX ADMIN — SENNOSIDES AND DOCUSATE SODIUM 1 TABLET: 8.6; 5 TABLET ORAL at 20:22

## 2020-03-24 RX ADMIN — ACETAMINOPHEN 1000 MG: 500 TABLET, FILM COATED ORAL at 20:23

## 2020-03-24 RX ADMIN — Medication 1 UNITS: at 20:25

## 2020-03-24 RX ADMIN — Medication 2 UNITS: at 16:47

## 2020-03-24 RX ADMIN — ENOXAPARIN SODIUM 30 MG: 30 INJECTION SUBCUTANEOUS at 20:22

## 2020-03-24 RX ADMIN — METHOCARBAMOL TABLETS 500 MG: 500 TABLET, COATED ORAL at 23:55

## 2020-03-24 RX ADMIN — Medication 44 UNITS: at 20:25

## 2020-03-24 ASSESSMENT — PAIN DESCRIPTION - FREQUENCY: FREQUENCY: CONTINUOUS

## 2020-03-24 ASSESSMENT — PAIN SCALES - GENERAL
PAINLEVEL_OUTOF10: 7
PAINLEVEL_OUTOF10: 7

## 2020-03-24 ASSESSMENT — PAIN DESCRIPTION - DESCRIPTORS: DESCRIPTORS: ACHING;SORE

## 2020-03-24 ASSESSMENT — PAIN DESCRIPTION - ORIENTATION: ORIENTATION: RIGHT

## 2020-03-24 ASSESSMENT — PAIN DESCRIPTION - LOCATION: LOCATION: HIP

## 2020-03-24 ASSESSMENT — PAIN DESCRIPTION - ONSET: ONSET: ON-GOING

## 2020-03-24 NOTE — PROGRESS NOTES
assistance  Supine to Sit: Moderate assistance;2 Person assistance  Sit to Supine: Moderate assistance;2 Person assistance  Scooting: Contact guard assistance  Transfers  Sit to Stand: Moderate Assistance;2 Person Assistance  Stand to sit: Moderate Assistance;2 Person Assistance  Ambulation  Ambulation?: No     Balance  Posture: Fair  Sitting - Static: Good;-  Sitting - Dynamic: Fair;+  Standing - Static: Poor;+        Plan   Plan  Times per week: 4-5 x week  Plan weeks: 2 weeks  Current Treatment Recommendations: Strengthening, Gait Training, Balance Training, Functional Mobility Training, Transfer Training, Pain Management, Neuromuscular Re-education, Safety Education & Training, Patient/Caregiver Education & Training            Goals  Long term goals  Time Frame for Long term goals : 2 weeks  Long term goal 1: Achieve Bed mobility with mod I.  Long term goal 2: Perform basic transfers with CGA x 1. Long term goal 3: Ambulate 10 feet with CGA x 1 with SW wtih good safety awareness. Long term goal 4: Patient-family education/training as needed. Therapy Time   Individual Concurrent Group Co-treatment   Time In 2717         Time Out 1625         Minutes 39                 Electronically signed by Liv Law PT on 4/17/3548 at 9:83 PM    Certification of Medical Necessity: It will be understood that this treatment plan is certified medically necessary by the documenting therapist and referring physician mentioned in this report. Unless the physician indicated otherwise through written correspondence with our office, all further referrals will act as certification of medical necessity on this treatment plan. Thank you for this referral.  If you have questions regarding this plan of care, please call [phone].           Revisions to this plan (optional):                     Please sign and return this plan to:   FAX: [phone]      Signature:                                 Date:

## 2020-03-25 PROBLEM — Z87.81 HISTORY OF FEMUR FRACTURE: Status: ACTIVE | Noted: 2020-03-25

## 2020-03-25 PROBLEM — S72.91XA CLOSED FRACTURE OF RIGHT FEMUR (HCC): Status: ACTIVE | Noted: 2020-03-25

## 2020-03-25 LAB
A/G RATIO: 1 (ref 0.8–2)
ALBUMIN SERPL-MCNC: 3.2 G/DL (ref 3.4–4.8)
ALP BLD-CCNC: 72 U/L (ref 25–100)
ALT SERPL-CCNC: <5 U/L (ref 4–36)
ANION GAP SERPL CALCULATED.3IONS-SCNC: 11 MMOL/L (ref 3–16)
AST SERPL-CCNC: 11 U/L (ref 8–33)
BASOPHILS ABSOLUTE: 0 K/UL (ref 0–0.1)
BASOPHILS RELATIVE PERCENT: 0.3 %
BILIRUB SERPL-MCNC: 0.4 MG/DL (ref 0.3–1.2)
BUN BLDV-MCNC: 22 MG/DL (ref 6–20)
CALCIUM SERPL-MCNC: 9 MG/DL (ref 8.5–10.5)
CHLORIDE BLD-SCNC: 101 MMOL/L (ref 98–107)
CO2: 28 MMOL/L (ref 20–30)
CREAT SERPL-MCNC: 1.6 MG/DL (ref 0.4–1.2)
EOSINOPHILS ABSOLUTE: 0.2 K/UL (ref 0–0.4)
EOSINOPHILS RELATIVE PERCENT: 2.5 %
GFR AFRICAN AMERICAN: 38
GFR NON-AFRICAN AMERICAN: 31
GLOBULIN: 3.2 G/DL
GLUCOSE BLD-MCNC: 160 MG/DL (ref 74–106)
GLUCOSE BLD-MCNC: 198 MG/DL (ref 74–106)
GLUCOSE BLD-MCNC: 235 MG/DL (ref 74–106)
GLUCOSE BLD-MCNC: 245 MG/DL (ref 74–106)
GLUCOSE BLD-MCNC: 256 MG/DL (ref 74–106)
HCT VFR BLD CALC: 27.6 % (ref 37–47)
HEMOGLOBIN: 8.4 G/DL (ref 11.5–16.5)
IMMATURE GRANULOCYTES #: 0.1 K/UL
IMMATURE GRANULOCYTES %: 1.1 % (ref 0–5)
LYMPHOCYTES ABSOLUTE: 2.2 K/UL (ref 1.5–4)
LYMPHOCYTES RELATIVE PERCENT: 24.8 %
MCH RBC QN AUTO: 30.5 PG (ref 27–32)
MCHC RBC AUTO-ENTMCNC: 30.4 G/DL (ref 31–35)
MCV RBC AUTO: 100.4 FL (ref 80–100)
MONOCYTES ABSOLUTE: 0.9 K/UL (ref 0.2–0.8)
MONOCYTES RELATIVE PERCENT: 10.3 %
NEUTROPHILS ABSOLUTE: 5.3 K/UL (ref 2–7.5)
NEUTROPHILS RELATIVE PERCENT: 61 %
PDW BLD-RTO: 15.7 % (ref 11–16)
PERFORMED ON: ABNORMAL
PLATELET # BLD: 223 K/UL (ref 150–400)
PMV BLD AUTO: 11.2 FL (ref 6–10)
POTASSIUM REFLEX MAGNESIUM: 4.8 MMOL/L (ref 3.4–5.1)
RBC # BLD: 2.75 M/UL (ref 3.8–5.8)
SODIUM BLD-SCNC: 140 MMOL/L (ref 136–145)
TOTAL PROTEIN: 6.4 G/DL (ref 6.4–8.3)
WBC # BLD: 8.7 K/UL (ref 4–11)

## 2020-03-25 PROCEDURE — 97802 MEDICAL NUTRITION INDIV IN: CPT

## 2020-03-25 PROCEDURE — 1200000002 HC SEMI PRIVATE SWING BED

## 2020-03-25 PROCEDURE — 36415 COLL VENOUS BLD VENIPUNCTURE: CPT

## 2020-03-25 PROCEDURE — 6370000000 HC RX 637 (ALT 250 FOR IP): Performed by: PHYSICIAN ASSISTANT

## 2020-03-25 PROCEDURE — 97530 THERAPEUTIC ACTIVITIES: CPT

## 2020-03-25 PROCEDURE — 85025 COMPLETE CBC W/AUTO DIFF WBC: CPT

## 2020-03-25 PROCEDURE — 80053 COMPREHEN METABOLIC PANEL: CPT

## 2020-03-25 PROCEDURE — 99305 1ST NF CARE MODERATE MDM 35: CPT | Performed by: INTERNAL MEDICINE

## 2020-03-25 PROCEDURE — 97165 OT EVAL LOW COMPLEX 30 MIN: CPT

## 2020-03-25 PROCEDURE — 6360000002 HC RX W HCPCS: Performed by: PHYSICIAN ASSISTANT

## 2020-03-25 PROCEDURE — 97110 THERAPEUTIC EXERCISES: CPT

## 2020-03-25 PROCEDURE — 6370000000 HC RX 637 (ALT 250 FOR IP)

## 2020-03-25 PROCEDURE — 92610 EVALUATE SWALLOWING FUNCTION: CPT

## 2020-03-25 PROCEDURE — 6370000000 HC RX 637 (ALT 250 FOR IP): Performed by: PEDIATRICS

## 2020-03-25 RX ORDER — HYDROCODONE BITARTRATE AND ACETAMINOPHEN 5; 325 MG/1; MG/1
1 TABLET ORAL 3 TIMES DAILY
Status: DISCONTINUED | OUTPATIENT
Start: 2020-03-25 | End: 2020-04-01

## 2020-03-25 RX ORDER — HYDROCODONE BITARTRATE AND ACETAMINOPHEN 5; 325 MG/1; MG/1
TABLET ORAL
Status: COMPLETED
Start: 2020-03-25 | End: 2020-03-25

## 2020-03-25 RX ORDER — METHOCARBAMOL 500 MG/1
500 TABLET, FILM COATED ORAL 2 TIMES DAILY
Status: DISCONTINUED | OUTPATIENT
Start: 2020-03-25 | End: 2020-04-01

## 2020-03-25 RX ORDER — HYDROCODONE BITARTRATE AND ACETAMINOPHEN 5; 325 MG/1; MG/1
1 TABLET ORAL 2 TIMES DAILY
Status: DISCONTINUED | OUTPATIENT
Start: 2020-03-25 | End: 2020-03-25

## 2020-03-25 RX ORDER — ACETAMINOPHEN 500 MG
500 TABLET ORAL 4 TIMES DAILY
Status: DISCONTINUED | OUTPATIENT
Start: 2020-03-25 | End: 2020-04-08

## 2020-03-25 RX ADMIN — HYDROCODONE BITARTRATE AND ACETAMINOPHEN 1 TABLET: 5; 325 TABLET ORAL at 17:56

## 2020-03-25 RX ADMIN — SENNOSIDES AND DOCUSATE SODIUM 1 TABLET: 8.6; 5 TABLET ORAL at 09:22

## 2020-03-25 RX ADMIN — ACETAMINOPHEN 650 MG: 325 TABLET, FILM COATED ORAL at 05:22

## 2020-03-25 RX ADMIN — Medication 1 UNITS: at 11:24

## 2020-03-25 RX ADMIN — SENNOSIDES AND DOCUSATE SODIUM 1 TABLET: 8.6; 5 TABLET ORAL at 20:46

## 2020-03-25 RX ADMIN — PRAVASTATIN SODIUM 20 MG: 20 TABLET ORAL at 20:46

## 2020-03-25 RX ADMIN — ACETAMINOPHEN 1000 MG: 500 TABLET, FILM COATED ORAL at 09:25

## 2020-03-25 RX ADMIN — METHOCARBAMOL TABLETS 500 MG: 500 TABLET, COATED ORAL at 09:22

## 2020-03-25 RX ADMIN — METHOCARBAMOL TABLETS 500 MG: 500 TABLET, COATED ORAL at 20:46

## 2020-03-25 RX ADMIN — Medication 1 UNITS: at 17:57

## 2020-03-25 RX ADMIN — ALOGLIPTIN 12.5 MG: 12.5 TABLET, FILM COATED ORAL at 09:25

## 2020-03-25 RX ADMIN — MELATONIN 1000 UNITS: at 09:22

## 2020-03-25 RX ADMIN — ENOXAPARIN SODIUM 30 MG: 30 INJECTION SUBCUTANEOUS at 20:46

## 2020-03-25 RX ADMIN — INSULIN LISPRO 6 UNITS: 100 INJECTION, SOLUTION INTRAVENOUS; SUBCUTANEOUS at 09:33

## 2020-03-25 RX ADMIN — Medication 1 UNITS: at 20:50

## 2020-03-25 RX ADMIN — HYDROCODONE BITARTRATE AND ACETAMINOPHEN 1 TABLET: 5; 325 TABLET ORAL at 12:04

## 2020-03-25 RX ADMIN — GABAPENTIN 300 MG: 300 CAPSULE ORAL at 09:23

## 2020-03-25 RX ADMIN — PROPRANOLOL HYDROCHLORIDE 40 MG: 20 TABLET ORAL at 09:22

## 2020-03-25 RX ADMIN — HYDROCODONE BITARTRATE AND ACETAMINOPHEN 1 TABLET: 5; 325 TABLET ORAL at 18:15

## 2020-03-25 RX ADMIN — POLYETHYLENE GLYCOL (3350) 17 G: 17 POWDER, FOR SOLUTION ORAL at 09:23

## 2020-03-25 RX ADMIN — PROPRANOLOL HYDROCHLORIDE 40 MG: 20 TABLET ORAL at 20:48

## 2020-03-25 RX ADMIN — INSULIN LISPRO 6 UNITS: 100 INJECTION, SOLUTION INTRAVENOUS; SUBCUTANEOUS at 17:59

## 2020-03-25 RX ADMIN — PANTOPRAZOLE SODIUM 40 MG: 40 TABLET, DELAYED RELEASE ORAL at 05:22

## 2020-03-25 RX ADMIN — ASPIRIN 81 MG 81 MG: 81 TABLET ORAL at 09:22

## 2020-03-25 RX ADMIN — Medication 2 UNITS: at 09:25

## 2020-03-25 RX ADMIN — INSULIN LISPRO 6 UNITS: 100 INJECTION, SOLUTION INTRAVENOUS; SUBCUTANEOUS at 11:24

## 2020-03-25 RX ADMIN — GABAPENTIN 300 MG: 300 CAPSULE ORAL at 16:46

## 2020-03-25 RX ADMIN — Medication 44 UNITS: at 20:50

## 2020-03-25 RX ADMIN — ACETAMINOPHEN 500 MG: 500 TABLET, FILM COATED ORAL at 20:47

## 2020-03-25 RX ADMIN — TRAMADOL HYDROCHLORIDE 50 MG: 50 TABLET, FILM COATED ORAL at 09:22

## 2020-03-25 RX ADMIN — GABAPENTIN 300 MG: 300 CAPSULE ORAL at 20:46

## 2020-03-25 RX ADMIN — LORAZEPAM 0.5 MG: 0.5 TABLET ORAL at 05:22

## 2020-03-25 ASSESSMENT — PAIN DESCRIPTION - ORIENTATION: ORIENTATION: RIGHT

## 2020-03-25 ASSESSMENT — PAIN SCALES - GENERAL
PAINLEVEL_OUTOF10: 7
PAINLEVEL_OUTOF10: 8
PAINLEVEL_OUTOF10: 8
PAINLEVEL_OUTOF10: 9
PAINLEVEL_OUTOF10: 10
PAINLEVEL_OUTOF10: 8
PAINLEVEL_OUTOF10: 10
PAINLEVEL_OUTOF10: 7
PAINLEVEL_OUTOF10: 8
PAINLEVEL_OUTOF10: 8

## 2020-03-25 ASSESSMENT — PAIN DESCRIPTION - PAIN TYPE: TYPE: SURGICAL PAIN

## 2020-03-25 ASSESSMENT — PAIN DESCRIPTION - LOCATION: LOCATION: HIP

## 2020-03-25 NOTE — PLAN OF CARE
Problem: Safety:  Goal: Free from accidental physical injury  Description: Free from accidental physical injury  Outcome: Ongoing

## 2020-03-25 NOTE — H&P
reports that she has never smoked. She has never used smokeless tobacco.  ETOH:   reports no history of alcohol use. OCCUPATION:  None     Family History:       Problem Relation Age of Onset    Heart Disease Mother         CHF,CAD    Cancer Maternal Aunt     High Blood Pressure Sister        Review of system  Constitutional:  Denies fever or chills . Positive for declining functional status. Eyes:  Denies eye pain or redness  HENT:  Denies nasal congestion or sore throat   Respiratory:  Denies cough or shortness of breath   Cardiovascular:  Denies chest pain or edema   GI:  Denies abdominal pain, nausea, vomiting, bloody stools or diarrhea   :  Denies dysuria or frequency  Musculoskeletal:  Positive for right hip pain and chronic arthralgias. Integument:  Denies rash or itching  Neurologic:  Denies headache, dizziness, numbness, tingling or unilateral weakness  Psychiatric:  Denies acute depression or acute anxiety      Vitals:    03/25/20 0759   BP: (!) 126/50   Pulse: 84   Resp: 20   Temp: 96.8 °F (36 °C)   SpO2: 92%       Physical exam  Constitutional:  Well developed, elderly lady, well nourished, no acute distress. Eyes:  PERRL, conjunctiva normal, sclera without icterus. HENT:  Atraumatic, external ears normal, nose normal, oropharynx moist, no pharyngeal exudates. Neck- supple, no JVD, no lymphadenopathy. Respiratory:  No respiratory distress on room air, no wheezing, rales or rhonchi detected. Cardiovascular:  Normal rate, normal rhythm, no murmurs, no gallops, no rubs. GI:  Soft, nondistended, normal bowel sounds, nontender, no hepatosplenomegaly appreciated. Musculoskeletal:  No edema, cyanosis or obvious acute deformity. Moving all extremities. Decreased ROM RLE in setting of surgical repair right femur fracture. Integument:  Warm and dry. No rash. Neurologic:  Alert & oriented x 3, no apparent focal deficits noted.    Psychiatric:  Speech and behavior appropriate.       Lab Results Component Value Date     03/25/2020    K 4.8 03/25/2020     03/25/2020    CO2 28 03/25/2020    BUN 22 (H) 03/25/2020    CREATININE 1.6 (H) 03/25/2020    GLUCOSE 256 (H) 03/25/2020    CALCIUM 9.0 03/25/2020    PROT 6.4 03/25/2020    LABALBU 3.2 (L) 03/25/2020    BILITOT 0.4 03/25/2020    ALKPHOS 72 03/25/2020    AST 11 03/25/2020    ALT <5 03/25/2020    LABGLOM 31 (L) 03/25/2020    GFRAA 38 (L) 03/25/2020    AGRATIO 1.0 03/25/2020    GLOB 3.2 03/25/2020           Lab Results   Component Value Date    WBC 8.7 03/25/2020    HGB 8.4 (L) 03/25/2020    HCT 27.6 (L) 03/25/2020    .4 (H) 03/25/2020     03/25/2020       Assessment and Plan     Active Hospital Problems    Diagnosis Date Noted    Closed fracture of right femur ([address] Casimiro Stiles  S/p intramedullary nailing on 3/18 at [facility]. Continue PT/OT. Weight bearing as tolerated RLE. Due to uncontrolled pain added norco prn today. Continue scheduled tylenol. Robaxin prn. dvt ppx with lovenox 30 mg daily until 4/22. Fall precautions. F/u with Marcelle Lightning at [facility] on 4/3/20 as scheduled. Case management following for dc planning assistance. 03/25/2020    Anemia [D64.9]  Hgb trended down to 6.3 at [facility] following  surgical intervention she received 1 unit PRBC on 3/20 and hemoglobin trended up. Hgb 8.4 today. Monitor closely. On GI ppx. Of note per dc summary on 1/19 b12, folate, iron studies within normal limits. 01/19/2020    CKD (chronic kidney disease) stage 3, GFR 30-59 ml/min ([facility]) [N18.3]  Patient with acute renal failure at outside hospital however this has improved. Cr 1.6 today. Baseline per chart review ~ 1.5. avoid nephrotoxic agent as able. Monitor. 12/20/2019    Declining functional status [R53.81]  See above under femur fx 12/19/2019    HTN (hypertension) [I10]  BP stable. Previously prescribed losartan and amlodipine which have been held. Remained normotensive. Adjust regimen as needed.  10/14/2011    Diabetes mellitus, type II

## 2020-03-25 NOTE — PROGRESS NOTES
goal 1: Pt to complete SPT from bed to BSC/chair with MOD A. Short term goal 2: Pt to complete dressing with min assist using AE as needed. Short term goal 3: Pt to complete toileting with min assist.   Short term goal 4: Pt to complete sponge bathing with Min A. Short term goal 5: Pt to tolerate x15 minutes of activity to increase independence with ADL tasks. Long term goals  Time Frame for Long term goals : 2 weeks  Long term goal 1: Pt to complete SPT from bed to chair with CGA using RW. Long term goal 2: pt to complete dressing with SULLIVAN. Long term goal 3: pt tocomplete toileting with SBA. Long term goal 4: Pt to complete bathing with SULLIVAN. Therapy Time   Individual Concurrent Group Co-treatment   Time In 0848         Time Out 0911         Minutes 23              This note serves as a DC summary in the event of pt discharge.      Valery Cabrera OTR/L

## 2020-03-25 NOTE — PROGRESS NOTES
lives at home with )     Recommended Diet and Intervention  Diet Solids Recommendation: Dysphagia Soft and Bite-Sized (Dysphagia III)(d/t missing teeth)  Liquid Consistency Recommendation: Thin  Recommended Form of Meds: PO     Compensatory Swallowing Strategies  Compensatory Swallowing Strategies: Alternate solids and liquids;Small bites/sips;Upright as possible for all oral intake;Eat/Feed slowly; Lingual sweep;Remain upright for 30-45 minutes after meals    Treatment/Goals  Short-term Goals  Timeframe for Short-term Goals: NA; skilled ST services not indicated at this time  Long-term Goals  Timeframe for Long-term Goals: NA; skilled ST services not indicated at this time    General  Comments: Pt reported that she has a difficult time chewing sometimes due to not having teeth on top. She stated that she would like to eat \"soft food for dental purposes\" and she would like her \"meat cut up in little pieces. \" She also reported that she was able to chew and swallow her breakfast.   Subjective  Subjective: Patient upright in bed; pleasant and agreeable to ST eval. Oriented x4. Behavior/Cognition: Alert; Cooperative;Pleasant mood  Respiratory Status: Room air  O2 Device: None (Room air)  Communication Observation: Functional  Follows Directions: Simple  Dentition: Some missing teeth  Patient Positioning: Upright in bed  Prior Dysphagia History: Patient was evaluated on 12/19/19 with no diet change and no overt s/sx of dysphagia indicated. Consistencies Administered: Dysphagia Soft and Bite-Sized (Dysphagia III); Thin - straw    Vision/Hearing  Vision  Vision: Impaired  Vision Exceptions: Wears glasses for reading    Oral Motor Deficits  Oral/Motor  Oral Motor: Within functional limits    Oral Phase Dysfunction  Oral Phase  Oral Phase: WFL  Oral Phase  Oral Phase - Comment: No overt s/sx of oral phase dysphagia indicated.       Indicators of Pharyngeal Phase Dysfunction   Pharyngeal Phase  Pharyngeal Phase:

## 2020-03-25 NOTE — PROGRESS NOTES
9/91/9245    I certify that the skilled nursing and/or rehabilitation services are required to be given on a daily basis, which as a practical matter can only be provided in a skilled nursing unit on an inpatient basis.     Electronically signed by Annetta Coleman RN on 3/25/2020 at 8:11 AM

## 2020-03-25 NOTE — CONSULTS
Nutrition Assessment    Type and Reason for Visit: Initial, Consult    Nutrition Recommendations: Will add a protein ONS BID. Nutrition Assessment: Pt at risk for ongoing nutritional compromise r/t obesity and increased needs AEB BMI, surgical wound. Malnutrition Assessment:  · Malnutrition Status: No malnutrition  · Context: Acute illness or injury  · Findings of the 6 clinical characteristics of malnutrition (Minimum of 2 out of 6 clinical characteristics is required to make the diagnosis of moderate or severe Protein Calorie Malnutrition based on AND/ASPEN Guidelines):  1. Energy Intake-Unable to assess, Unable to assess    2. Weight Loss-No significant weight loss,    3. Fat Loss-No significant subcutaneous fat loss,    4. Muscle Loss-No significant muscle mass loss,    5. Fluid Accumulation-No significant fluid accumulation,    6.  Strength-Not measured    Nutrition Risk Level: Low    Nutrient Needs:  · Estimated Daily Total Kcal: 2161-4626  · Estimated Daily Protein (g): 76-92(1.25-1.5 gm/kg IBW)  · Estimated Daily Total Fluid (ml/day): 6313-6231(3 ml/kcal)    Nutrition Diagnosis:   · Problem: Food and nutrition-related knowledge deficit  · Etiology: related to Endocrine dysfunction, Renal dysfunction     Signs and symptoms:  as evidenced by Presence of wounds, Lab values    Objective Information:  · Nutrition-Focused Physical Findings: No known weight loss, no apparent fat loss or muslce wasting. No edema reported. Patient has incision for broken hip repair.   · Wound Type: Surgical Wound  · Current Nutrition Therapies:  · Oral Diet Orders: Carb Control 4 Carbs/Meal   · Oral Diet intake: %  · Oral Nutrition Supplement (ONS) Orders: None  · ONS intake:    · Anthropometric Measures:  · Ht: 5' 7\" (170.2 cm)   · Current Body Wt: 211 lb (95.7 kg)(from past  appointment 12/19, no current wt.)  · Admission Body Wt:    · Usual Body Wt:    · % Weight Change:  ,     · Ideal Body Wt: 135 lb (61.2

## 2020-03-26 LAB
GLUCOSE BLD-MCNC: 104 MG/DL (ref 74–106)
GLUCOSE BLD-MCNC: 155 MG/DL (ref 74–106)
GLUCOSE BLD-MCNC: 179 MG/DL (ref 74–106)
GLUCOSE BLD-MCNC: 192 MG/DL (ref 74–106)
PERFORMED ON: ABNORMAL
PERFORMED ON: NORMAL

## 2020-03-26 PROCEDURE — 6370000000 HC RX 637 (ALT 250 FOR IP): Performed by: PEDIATRICS

## 2020-03-26 PROCEDURE — 6370000000 HC RX 637 (ALT 250 FOR IP): Performed by: PHYSICIAN ASSISTANT

## 2020-03-26 PROCEDURE — 6360000002 HC RX W HCPCS: Performed by: PHYSICIAN ASSISTANT

## 2020-03-26 PROCEDURE — 97530 THERAPEUTIC ACTIVITIES: CPT

## 2020-03-26 PROCEDURE — 1200000002 HC SEMI PRIVATE SWING BED

## 2020-03-26 RX ADMIN — ACETAMINOPHEN 650 MG: 325 TABLET, FILM COATED ORAL at 06:36

## 2020-03-26 RX ADMIN — GABAPENTIN 300 MG: 300 CAPSULE ORAL at 13:50

## 2020-03-26 RX ADMIN — Medication 44 UNITS: at 21:33

## 2020-03-26 RX ADMIN — SENNOSIDES AND DOCUSATE SODIUM 1 TABLET: 8.6; 5 TABLET ORAL at 08:04

## 2020-03-26 RX ADMIN — INSULIN LISPRO 6 UNITS: 100 INJECTION, SOLUTION INTRAVENOUS; SUBCUTANEOUS at 17:10

## 2020-03-26 RX ADMIN — Medication 1 UNITS: at 21:33

## 2020-03-26 RX ADMIN — Medication 1 UNITS: at 17:10

## 2020-03-26 RX ADMIN — HYDROCODONE BITARTRATE AND ACETAMINOPHEN 1 TABLET: 5; 325 TABLET ORAL at 08:06

## 2020-03-26 RX ADMIN — ACETAMINOPHEN 500 MG: 500 TABLET, FILM COATED ORAL at 17:52

## 2020-03-26 RX ADMIN — PRAVASTATIN SODIUM 20 MG: 20 TABLET ORAL at 21:31

## 2020-03-26 RX ADMIN — INSULIN LISPRO 6 UNITS: 100 INJECTION, SOLUTION INTRAVENOUS; SUBCUTANEOUS at 11:11

## 2020-03-26 RX ADMIN — ASPIRIN 81 MG 81 MG: 81 TABLET ORAL at 08:04

## 2020-03-26 RX ADMIN — GABAPENTIN 300 MG: 300 CAPSULE ORAL at 08:01

## 2020-03-26 RX ADMIN — LORAZEPAM 0.5 MG: 0.5 TABLET ORAL at 06:36

## 2020-03-26 RX ADMIN — PROPRANOLOL HYDROCHLORIDE 40 MG: 20 TABLET ORAL at 21:31

## 2020-03-26 RX ADMIN — GABAPENTIN 300 MG: 300 CAPSULE ORAL at 21:31

## 2020-03-26 RX ADMIN — POLYETHYLENE GLYCOL (3350) 17 G: 17 POWDER, FOR SOLUTION ORAL at 08:01

## 2020-03-26 RX ADMIN — HYDROCODONE BITARTRATE AND ACETAMINOPHEN 1 TABLET: 5; 325 TABLET ORAL at 21:33

## 2020-03-26 RX ADMIN — ACETAMINOPHEN 500 MG: 500 TABLET, FILM COATED ORAL at 08:04

## 2020-03-26 RX ADMIN — HYDROCODONE BITARTRATE AND ACETAMINOPHEN 1 TABLET: 5; 325 TABLET ORAL at 13:51

## 2020-03-26 RX ADMIN — MELATONIN 1000 UNITS: at 08:15

## 2020-03-26 RX ADMIN — ALOGLIPTIN 12.5 MG: 12.5 TABLET, FILM COATED ORAL at 08:28

## 2020-03-26 RX ADMIN — ACETAMINOPHEN 500 MG: 500 TABLET, FILM COATED ORAL at 21:31

## 2020-03-26 RX ADMIN — ENOXAPARIN SODIUM 30 MG: 30 INJECTION SUBCUTANEOUS at 21:31

## 2020-03-26 RX ADMIN — METHOCARBAMOL TABLETS 500 MG: 500 TABLET, COATED ORAL at 21:31

## 2020-03-26 RX ADMIN — ACETAMINOPHEN 500 MG: 500 TABLET, FILM COATED ORAL at 13:04

## 2020-03-26 RX ADMIN — PANTOPRAZOLE SODIUM 40 MG: 40 TABLET, DELAYED RELEASE ORAL at 06:36

## 2020-03-26 RX ADMIN — Medication 1 UNITS: at 11:10

## 2020-03-26 RX ADMIN — INSULIN LISPRO 6 UNITS: 100 INJECTION, SOLUTION INTRAVENOUS; SUBCUTANEOUS at 08:07

## 2020-03-26 RX ADMIN — METHOCARBAMOL TABLETS 500 MG: 500 TABLET, COATED ORAL at 08:12

## 2020-03-26 RX ADMIN — PROPRANOLOL HYDROCHLORIDE 40 MG: 20 TABLET ORAL at 08:01

## 2020-03-26 ASSESSMENT — PAIN SCALES - GENERAL
PAINLEVEL_OUTOF10: 6
PAINLEVEL_OUTOF10: 6
PAINLEVEL_OUTOF10: 7
PAINLEVEL_OUTOF10: 9
PAINLEVEL_OUTOF10: 6
PAINLEVEL_OUTOF10: 9

## 2020-03-27 LAB
ANION GAP SERPL CALCULATED.3IONS-SCNC: 12 MMOL/L (ref 3–16)
BUN BLDV-MCNC: 25 MG/DL (ref 6–20)
CALCIUM SERPL-MCNC: 8.9 MG/DL (ref 8.5–10.5)
CHLORIDE BLD-SCNC: 99 MMOL/L (ref 98–107)
CO2: 26 MMOL/L (ref 20–30)
CREAT SERPL-MCNC: 1.6 MG/DL (ref 0.4–1.2)
FOLATE: 14.03 NG/ML
GFR AFRICAN AMERICAN: 38
GFR NON-AFRICAN AMERICAN: 31
GLUCOSE BLD-MCNC: 156 MG/DL (ref 74–106)
GLUCOSE BLD-MCNC: 173 MG/DL (ref 74–106)
GLUCOSE BLD-MCNC: 206 MG/DL (ref 74–106)
GLUCOSE BLD-MCNC: 220 MG/DL (ref 74–106)
GLUCOSE BLD-MCNC: 279 MG/DL (ref 74–106)
HCT VFR BLD CALC: 27.7 % (ref 37–47)
HEMOGLOBIN: 8.3 G/DL (ref 11.5–16.5)
MCH RBC QN AUTO: 30.4 PG (ref 27–32)
MCHC RBC AUTO-ENTMCNC: 30 G/DL (ref 31–35)
MCV RBC AUTO: 101.5 FL (ref 80–100)
PDW BLD-RTO: 16.3 % (ref 11–16)
PERFORMED ON: ABNORMAL
PLATELET # BLD: 254 K/UL (ref 150–400)
PMV BLD AUTO: 10.4 FL (ref 6–10)
POTASSIUM SERPL-SCNC: 4.2 MMOL/L (ref 3.4–5.1)
RBC # BLD: 2.73 M/UL (ref 3.8–5.8)
SODIUM BLD-SCNC: 137 MMOL/L (ref 136–145)
VITAMIN B-12: 305 PG/ML (ref 211–911)
WBC # BLD: 7.9 K/UL (ref 4–11)

## 2020-03-27 PROCEDURE — 6370000000 HC RX 637 (ALT 250 FOR IP): Performed by: INTERNAL MEDICINE

## 2020-03-27 PROCEDURE — 97110 THERAPEUTIC EXERCISES: CPT

## 2020-03-27 PROCEDURE — 80048 BASIC METABOLIC PNL TOTAL CA: CPT

## 2020-03-27 PROCEDURE — 6360000002 HC RX W HCPCS: Performed by: PHYSICIAN ASSISTANT

## 2020-03-27 PROCEDURE — 6370000000 HC RX 637 (ALT 250 FOR IP): Performed by: PHYSICIAN ASSISTANT

## 2020-03-27 PROCEDURE — 36415 COLL VENOUS BLD VENIPUNCTURE: CPT

## 2020-03-27 PROCEDURE — 97530 THERAPEUTIC ACTIVITIES: CPT

## 2020-03-27 PROCEDURE — 85027 COMPLETE CBC AUTOMATED: CPT

## 2020-03-27 PROCEDURE — 1200000002 HC SEMI PRIVATE SWING BED

## 2020-03-27 PROCEDURE — 82746 ASSAY OF FOLIC ACID SERUM: CPT

## 2020-03-27 PROCEDURE — 82607 VITAMIN B-12: CPT

## 2020-03-27 PROCEDURE — 6370000000 HC RX 637 (ALT 250 FOR IP): Performed by: PEDIATRICS

## 2020-03-27 RX ORDER — TIZANIDINE 4 MG/1
2 TABLET ORAL ONCE
Status: COMPLETED | OUTPATIENT
Start: 2020-03-27 | End: 2020-03-27

## 2020-03-27 RX ORDER — MECLIZINE HYDROCHLORIDE 25 MG/1
TABLET ORAL
Qty: 90 TABLET | Refills: 0 | OUTPATIENT
Start: 2020-03-27

## 2020-03-27 RX ADMIN — METHOCARBAMOL TABLETS 500 MG: 500 TABLET, COATED ORAL at 21:21

## 2020-03-27 RX ADMIN — INSULIN LISPRO 6 UNITS: 100 INJECTION, SOLUTION INTRAVENOUS; SUBCUTANEOUS at 11:17

## 2020-03-27 RX ADMIN — ACETAMINOPHEN 500 MG: 500 TABLET, FILM COATED ORAL at 21:20

## 2020-03-27 RX ADMIN — METHOCARBAMOL TABLETS 500 MG: 500 TABLET, COATED ORAL at 08:34

## 2020-03-27 RX ADMIN — ACETAMINOPHEN 500 MG: 500 TABLET, FILM COATED ORAL at 08:34

## 2020-03-27 RX ADMIN — Medication 1 UNITS: at 21:22

## 2020-03-27 RX ADMIN — ASPIRIN 81 MG 81 MG: 81 TABLET ORAL at 08:35

## 2020-03-27 RX ADMIN — Medication 2 UNITS: at 16:58

## 2020-03-27 RX ADMIN — GABAPENTIN 300 MG: 300 CAPSULE ORAL at 21:21

## 2020-03-27 RX ADMIN — Medication 44 UNITS: at 21:22

## 2020-03-27 RX ADMIN — PRAVASTATIN SODIUM 20 MG: 20 TABLET ORAL at 21:21

## 2020-03-27 RX ADMIN — Medication 1 UNITS: at 08:48

## 2020-03-27 RX ADMIN — TIZANIDINE 2 MG: 4 TABLET ORAL at 05:52

## 2020-03-27 RX ADMIN — LORAZEPAM 0.5 MG: 0.5 TABLET ORAL at 21:35

## 2020-03-27 RX ADMIN — PANTOPRAZOLE SODIUM 40 MG: 40 TABLET, DELAYED RELEASE ORAL at 05:52

## 2020-03-27 RX ADMIN — Medication 10 UNITS: at 16:59

## 2020-03-27 RX ADMIN — HYDROCODONE BITARTRATE AND ACETAMINOPHEN 1 TABLET: 5; 325 TABLET ORAL at 08:33

## 2020-03-27 RX ADMIN — POLYETHYLENE GLYCOL (3350) 17 G: 17 POWDER, FOR SOLUTION ORAL at 08:33

## 2020-03-27 RX ADMIN — HYDROCODONE BITARTRATE AND ACETAMINOPHEN 1 TABLET: 5; 325 TABLET ORAL at 14:01

## 2020-03-27 RX ADMIN — INSULIN LISPRO 6 UNITS: 100 INJECTION, SOLUTION INTRAVENOUS; SUBCUTANEOUS at 08:37

## 2020-03-27 RX ADMIN — MELATONIN 1000 UNITS: at 08:34

## 2020-03-27 RX ADMIN — ALOGLIPTIN 12.5 MG: 12.5 TABLET, FILM COATED ORAL at 08:37

## 2020-03-27 RX ADMIN — SENNOSIDES AND DOCUSATE SODIUM 1 TABLET: 8.6; 5 TABLET ORAL at 08:34

## 2020-03-27 RX ADMIN — ENOXAPARIN SODIUM 30 MG: 30 INJECTION SUBCUTANEOUS at 21:21

## 2020-03-27 RX ADMIN — HYDROCODONE BITARTRATE AND ACETAMINOPHEN 1 TABLET: 5; 325 TABLET ORAL at 21:21

## 2020-03-27 RX ADMIN — ACETAMINOPHEN 650 MG: 325 TABLET, FILM COATED ORAL at 01:14

## 2020-03-27 RX ADMIN — GABAPENTIN 300 MG: 300 CAPSULE ORAL at 08:34

## 2020-03-27 RX ADMIN — ACETAMINOPHEN 500 MG: 500 TABLET, FILM COATED ORAL at 16:57

## 2020-03-27 RX ADMIN — PROPRANOLOL HYDROCHLORIDE 40 MG: 20 TABLET ORAL at 08:34

## 2020-03-27 RX ADMIN — GABAPENTIN 300 MG: 300 CAPSULE ORAL at 14:01

## 2020-03-27 RX ADMIN — LORAZEPAM 0.5 MG: 0.5 TABLET ORAL at 01:14

## 2020-03-27 RX ADMIN — Medication 3 UNITS: at 11:12

## 2020-03-27 ASSESSMENT — PAIN SCALES - GENERAL
PAINLEVEL_OUTOF10: 8
PAINLEVEL_OUTOF10: 6
PAINLEVEL_OUTOF10: 7
PAINLEVEL_OUTOF10: 7
PAINLEVEL_OUTOF10: 9
PAINLEVEL_OUTOF10: 5

## 2020-03-27 NOTE — PROGRESS NOTES
Pt c/o muscle spasms in right leg. Pt moaning and yelling out. States she wants some medicine and she wants it now to staff. Advised pt that MD was notified and awaiting orders. Luzmaria, on call, ordered zanaflex once. See MAR.

## 2020-03-27 NOTE — PROGRESS NOTES
Pain: Yes  Pain Assessment  Pain Assessment: 0-10  Pain Level: 5  Vital Signs  Patient Currently in Pain: Yes       Orientation  Orientation  Overall Orientation Status: Within Normal Limits  Cognition      Objective   Bed mobility  Rolling to Right: Minimal assistance  Supine to Sit: Moderate assistance  Sit to Supine: Moderate assistance  Scooting: Contact guard assistance;Minimal assistance  Transfers  Sit to Stand: Minimal Assistance  Stand to sit: Minimal Assistance  Bed to Chair: Minimal assistance  Comment: used sera-steady for transfer to chair; patient unable to tolerate adequate WBing on the right LE to ambulate           Exercises  Quad Sets: 20x  Gluteal Sets: 20x  Hip Abduction: 20x - assist with right LE  Knee Long Arc Quad: 20x  Ankle Pumps: 20x                                 Goals  Long term goals  Time Frame for Long term goals : 2 weeks  Long term goal 1: Achieve Bed mobility with mod I.  Long term goal 2: Perform basic transfers with CGA x 1. Long term goal 3: Ambulate 10 feet [facility] x [facility]. Long term goal 4: Patient-family education/training as needed.     Plan    Plan  Times per week: 4-5 x week  Plan weeks: 2 weeks  Current Treatment Recommendations: Strengthening, Gait Training, Balance Training, Functional Mobility Training, Transfer Training, Pain Management, Neuromuscular Re-education, Safety Education & Training, Patient/Caregiver Education & Training  Safety Devices  Type of devices: Left in chair, Call light within reach, Nurse notified     Therapy Time   Individual Concurrent Group Co-treatment   Time In 0951         Time Out 1022         Minutes [facility] [address] [facility]

## 2020-03-27 NOTE — PROGRESS NOTES
Occupational Therapy  Facility/Department: [facility] MED SURG  Daily Treatment Note  NAME: Smith Doan  : 1940  MRN: [id number]    Date of Service: 3/27/2020    Discharge Recommendations:  Home with Home health OT       Assessment   Assessment: Pt continues to be limited by pain. Pt required MOD A to come to sit at EOB. Continues to present with L side leaning. Pt come to stand with decreased level of assist. Pt presents with poor safety awareness with descent to seated position. Pt come to stand ~10x throughout session with focus on standing tolerance and weight shifting. Pt unable to complete SPT at this time and rupesh dangelo used to complete transfer. Completed co tx with PT on this date for pt safety in attempts to progress transfers. Activity Tolerance  Activity Tolerance: Patient limited by pain         Patient Diagnosis(es): There were no encounter diagnoses. has a past medical history of Allergic rhinitis, Anxiety, Chronic back pain, Depression, Double vision with both eyes open, Fall, Hyperlipidemia, Hypertension, Osteoarthritis, and Type II or unspecified type diabetes mellitus without mention of complication, not stated as uncontrolled. has a past surgical history that includes Hysterectomy; shoulder surgery;  section; and Colonoscopy. Restrictions  Restrictions/Precautions  Restrictions/Precautions: Weight Bearing, General Precautions, Fall Risk  Required Braces or Orthoses?: No  Lower Extremity Weight Bearing Restrictions  Right Lower Extremity Weight Bearing: Weight Bearing As Tolerated  Subjective   General  Chart Reviewed: Yes  Patient assessed for rehabilitation services?: Yes  Family / Caregiver Present: No  Referring Practitioner: Hellen Dunaway PA-C  Diagnosis: RLE ORIF 3/18/20  S/P mechanical fall. Subjective  Subjective: Pt states she fell from her chair leaning over and fell onto floor. Pt agreeable to OT services.        Orientation     Objective                Bed

## 2020-03-28 LAB
GLUCOSE BLD-MCNC: 168 MG/DL (ref 74–106)
GLUCOSE BLD-MCNC: 179 MG/DL (ref 74–106)
GLUCOSE BLD-MCNC: 181 MG/DL (ref 74–106)
GLUCOSE BLD-MCNC: 209 MG/DL (ref 74–106)
PERFORMED ON: ABNORMAL

## 2020-03-28 PROCEDURE — 6370000000 HC RX 637 (ALT 250 FOR IP): Performed by: PEDIATRICS

## 2020-03-28 PROCEDURE — 1200000002 HC SEMI PRIVATE SWING BED

## 2020-03-28 PROCEDURE — 6370000000 HC RX 637 (ALT 250 FOR IP): Performed by: PHYSICIAN ASSISTANT

## 2020-03-28 PROCEDURE — 6360000002 HC RX W HCPCS: Performed by: PHYSICIAN ASSISTANT

## 2020-03-28 RX ADMIN — ASPIRIN 81 MG 81 MG: 81 TABLET ORAL at 08:43

## 2020-03-28 RX ADMIN — ACETAMINOPHEN 500 MG: 500 TABLET, FILM COATED ORAL at 16:56

## 2020-03-28 RX ADMIN — PANTOPRAZOLE SODIUM 40 MG: 40 TABLET, DELAYED RELEASE ORAL at 06:30

## 2020-03-28 RX ADMIN — GABAPENTIN 300 MG: 300 CAPSULE ORAL at 20:44

## 2020-03-28 RX ADMIN — Medication 10 UNITS: at 17:02

## 2020-03-28 RX ADMIN — GABAPENTIN 300 MG: 300 CAPSULE ORAL at 08:43

## 2020-03-28 RX ADMIN — ALOGLIPTIN 12.5 MG: 12.5 TABLET, FILM COATED ORAL at 11:28

## 2020-03-28 RX ADMIN — METHOCARBAMOL TABLETS 500 MG: 500 TABLET, COATED ORAL at 20:43

## 2020-03-28 RX ADMIN — PROPRANOLOL HYDROCHLORIDE 40 MG: 20 TABLET ORAL at 08:43

## 2020-03-28 RX ADMIN — PROPRANOLOL HYDROCHLORIDE 40 MG: 20 TABLET ORAL at 20:44

## 2020-03-28 RX ADMIN — ENOXAPARIN SODIUM 30 MG: 30 INJECTION SUBCUTANEOUS at 20:43

## 2020-03-28 RX ADMIN — Medication 44 UNITS: at 20:44

## 2020-03-28 RX ADMIN — ACETAMINOPHEN 500 MG: 500 TABLET, FILM COATED ORAL at 20:44

## 2020-03-28 RX ADMIN — METHOCARBAMOL TABLETS 500 MG: 500 TABLET, COATED ORAL at 08:43

## 2020-03-28 RX ADMIN — HYDROCODONE BITARTRATE AND ACETAMINOPHEN 1 TABLET: 5; 325 TABLET ORAL at 20:44

## 2020-03-28 RX ADMIN — Medication 2 UNITS: at 17:00

## 2020-03-28 RX ADMIN — MELATONIN 1000 UNITS: at 08:43

## 2020-03-28 RX ADMIN — HYDROCODONE BITARTRATE AND ACETAMINOPHEN 1 TABLET: 5; 325 TABLET ORAL at 08:42

## 2020-03-28 RX ADMIN — ACETAMINOPHEN 500 MG: 500 TABLET, FILM COATED ORAL at 13:35

## 2020-03-28 RX ADMIN — PRAVASTATIN SODIUM 20 MG: 20 TABLET ORAL at 20:44

## 2020-03-28 RX ADMIN — Medication 10 UNITS: at 11:22

## 2020-03-28 RX ADMIN — ACETAMINOPHEN 500 MG: 500 TABLET, FILM COATED ORAL at 08:42

## 2020-03-28 RX ADMIN — Medication 10 UNITS: at 08:47

## 2020-03-28 RX ADMIN — Medication 1 UNITS: at 20:44

## 2020-03-28 RX ADMIN — SENNOSIDES AND DOCUSATE SODIUM 1 TABLET: 8.6; 5 TABLET ORAL at 20:43

## 2020-03-28 RX ADMIN — Medication 1 UNITS: at 11:21

## 2020-03-28 RX ADMIN — LORAZEPAM 0.5 MG: 0.5 TABLET ORAL at 20:43

## 2020-03-28 RX ADMIN — HYDROCODONE BITARTRATE AND ACETAMINOPHEN 1 TABLET: 5; 325 TABLET ORAL at 13:35

## 2020-03-28 RX ADMIN — GABAPENTIN 300 MG: 300 CAPSULE ORAL at 13:35

## 2020-03-28 RX ADMIN — Medication 1 UNITS: at 08:44

## 2020-03-28 ASSESSMENT — PAIN SCALES - GENERAL
PAINLEVEL_OUTOF10: 6
PAINLEVEL_OUTOF10: 7
PAINLEVEL_OUTOF10: 6
PAINLEVEL_OUTOF10: 7

## 2020-03-28 NOTE — PLAN OF CARE
Problem: Pain:  Goal: Pain level will decrease  Description: Pain level will decrease  3/28/2020 0935 by Brent Wilson RN  Outcome: Ongoing  3/28/2020 0056 by Juju Vernon RN  Outcome: Ongoing  Goal: Control of acute pain  Description: Control of acute pain  3/28/2020 0056 by Juju Vernon RN  Outcome: Ongoing  Goal: Control of chronic pain  Description: Control of chronic pain  3/28/2020 0056 by Juju Vernon RN  Outcome: Ongoing  Goal: Patient's pain/discomfort is manageable  Description: Patient's pain/discomfort is manageable  3/28/2020 0056 by Juju Vernon RN  Outcome: Ongoing     Problem: Infection:  Goal: Will remain free from infection  Description: Will remain free from infection  3/28/2020 0056 by Juju Vernon RN  Outcome: Ongoing     Problem: Safety:  Goal: Free from accidental physical injury  Description: Free from accidental physical injury  3/28/2020 0935 by Brent Wilson RN  Outcome: Ongoing  3/28/2020 0056 by Juju Vernon RN  Outcome: Ongoing  Goal: Free from intentional harm  Description: Free from intentional harm  3/28/2020 0056 by Juju Vernon RN  Outcome: Ongoing     Problem: Skin Integrity:  Goal: Skin integrity will stabilize  Description: Skin integrity will stabilize  3/28/2020 0935 by Brent Wilson RN  Outcome: Ongoing  3/28/2020 0056 by Juju Vernon RN  Outcome: Ongoing

## 2020-03-29 LAB
GLUCOSE BLD-MCNC: 117 MG/DL (ref 74–106)
GLUCOSE BLD-MCNC: 164 MG/DL (ref 74–106)
GLUCOSE BLD-MCNC: 177 MG/DL (ref 74–106)
GLUCOSE BLD-MCNC: 239 MG/DL (ref 74–106)
PERFORMED ON: ABNORMAL

## 2020-03-29 PROCEDURE — 6370000000 HC RX 637 (ALT 250 FOR IP)

## 2020-03-29 PROCEDURE — 1200000002 HC SEMI PRIVATE SWING BED

## 2020-03-29 PROCEDURE — 6370000000 HC RX 637 (ALT 250 FOR IP): Performed by: PHYSICIAN ASSISTANT

## 2020-03-29 PROCEDURE — 6370000000 HC RX 637 (ALT 250 FOR IP): Performed by: PEDIATRICS

## 2020-03-29 PROCEDURE — 6360000002 HC RX W HCPCS: Performed by: PHYSICIAN ASSISTANT

## 2020-03-29 RX ADMIN — GABAPENTIN 300 MG: 300 CAPSULE ORAL at 20:55

## 2020-03-29 RX ADMIN — Medication 1 UNITS: at 17:05

## 2020-03-29 RX ADMIN — ENOXAPARIN SODIUM 30 MG: 30 INJECTION SUBCUTANEOUS at 20:55

## 2020-03-29 RX ADMIN — LORAZEPAM 0.5 MG: 0.5 TABLET ORAL at 21:58

## 2020-03-29 RX ADMIN — Medication 1 UNITS: at 22:27

## 2020-03-29 RX ADMIN — SENNOSIDES AND DOCUSATE SODIUM 1 TABLET: 8.6; 5 TABLET ORAL at 08:11

## 2020-03-29 RX ADMIN — Medication 10 UNITS: at 08:14

## 2020-03-29 RX ADMIN — ACETAMINOPHEN 500 MG: 500 TABLET, FILM COATED ORAL at 20:55

## 2020-03-29 RX ADMIN — METHOCARBAMOL TABLETS 500 MG: 500 TABLET, COATED ORAL at 20:55

## 2020-03-29 RX ADMIN — ACETAMINOPHEN 500 MG: 500 TABLET, FILM COATED ORAL at 11:26

## 2020-03-29 RX ADMIN — MELATONIN 1000 UNITS: at 08:10

## 2020-03-29 RX ADMIN — ACETAMINOPHEN 500 MG: 500 TABLET, FILM COATED ORAL at 17:05

## 2020-03-29 RX ADMIN — PRAVASTATIN SODIUM 20 MG: 20 TABLET ORAL at 20:55

## 2020-03-29 RX ADMIN — PROPRANOLOL HYDROCHLORIDE 40 MG: 20 TABLET ORAL at 08:11

## 2020-03-29 RX ADMIN — ASPIRIN 81 MG 81 MG: 81 TABLET ORAL at 08:11

## 2020-03-29 RX ADMIN — GABAPENTIN 300 MG: 300 CAPSULE ORAL at 15:06

## 2020-03-29 RX ADMIN — POLYETHYLENE GLYCOL (3350) 17 G: 17 POWDER, FOR SOLUTION ORAL at 08:11

## 2020-03-29 RX ADMIN — ALOGLIPTIN 12.5 MG: 12.5 TABLET, FILM COATED ORAL at 08:10

## 2020-03-29 RX ADMIN — PANTOPRAZOLE SODIUM 40 MG: 40 TABLET, DELAYED RELEASE ORAL at 04:27

## 2020-03-29 RX ADMIN — Medication 44 UNITS: at 20:55

## 2020-03-29 RX ADMIN — PROPRANOLOL HYDROCHLORIDE 40 MG: 20 TABLET ORAL at 20:55

## 2020-03-29 RX ADMIN — Medication 1 UNITS: at 11:29

## 2020-03-29 RX ADMIN — HYDROCODONE BITARTRATE AND ACETAMINOPHEN 1 TABLET: 5; 325 TABLET ORAL at 04:25

## 2020-03-29 RX ADMIN — GABAPENTIN 300 MG: 300 CAPSULE ORAL at 08:10

## 2020-03-29 RX ADMIN — HYDROCODONE BITARTRATE AND ACETAMINOPHEN 1 TABLET: 5; 325 TABLET ORAL at 15:06

## 2020-03-29 RX ADMIN — METHOCARBAMOL TABLETS 500 MG: 500 TABLET, COATED ORAL at 08:10

## 2020-03-29 RX ADMIN — ACETAMINOPHEN 500 MG: 500 TABLET, FILM COATED ORAL at 08:10

## 2020-03-29 RX ADMIN — Medication 10 UNITS: at 11:31

## 2020-03-29 RX ADMIN — HYDROCODONE BITARTRATE AND ACETAMINOPHEN 1 TABLET: 5; 325 TABLET ORAL at 20:55

## 2020-03-29 RX ADMIN — Medication 10 UNITS: at 17:05

## 2020-03-29 ASSESSMENT — PAIN SCALES - GENERAL
PAINLEVEL_OUTOF10: 7
PAINLEVEL_OUTOF10: 9
PAINLEVEL_OUTOF10: 7
PAINLEVEL_OUTOF10: 7
PAINLEVEL_OUTOF10: 10
PAINLEVEL_OUTOF10: 8

## 2020-03-30 LAB
ANION GAP SERPL CALCULATED.3IONS-SCNC: 10 MMOL/L (ref 3–16)
BUN BLDV-MCNC: 22 MG/DL (ref 6–20)
CALCIUM SERPL-MCNC: 9.6 MG/DL (ref 8.5–10.5)
CHLORIDE BLD-SCNC: 101 MMOL/L (ref 98–107)
CO2: 27 MMOL/L (ref 20–30)
CREAT SERPL-MCNC: 1.5 MG/DL (ref 0.4–1.2)
GFR AFRICAN AMERICAN: 41
GFR NON-AFRICAN AMERICAN: 34
GLUCOSE BLD-MCNC: 143 MG/DL (ref 74–106)
GLUCOSE BLD-MCNC: 170 MG/DL (ref 74–106)
GLUCOSE BLD-MCNC: 178 MG/DL (ref 74–106)
GLUCOSE BLD-MCNC: 182 MG/DL (ref 74–106)
GLUCOSE BLD-MCNC: 90 MG/DL (ref 74–106)
HCT VFR BLD CALC: 29.1 % (ref 37–47)
HEMOGLOBIN: 8.6 G/DL (ref 11.5–16.5)
MCH RBC QN AUTO: 30.3 PG (ref 27–32)
MCHC RBC AUTO-ENTMCNC: 29.6 G/DL (ref 31–35)
MCV RBC AUTO: 102.5 FL (ref 80–100)
PDW BLD-RTO: 16.2 % (ref 11–16)
PERFORMED ON: ABNORMAL
PERFORMED ON: NORMAL
PLATELET # BLD: 299 K/UL (ref 150–400)
PMV BLD AUTO: 10.7 FL (ref 6–10)
POTASSIUM SERPL-SCNC: 5 MMOL/L (ref 3.4–5.1)
RBC # BLD: 2.84 M/UL (ref 3.8–5.8)
SODIUM BLD-SCNC: 138 MMOL/L (ref 136–145)
WBC # BLD: 8.2 K/UL (ref 4–11)

## 2020-03-30 PROCEDURE — 85027 COMPLETE CBC AUTOMATED: CPT

## 2020-03-30 PROCEDURE — 6370000000 HC RX 637 (ALT 250 FOR IP): Performed by: PEDIATRICS

## 2020-03-30 PROCEDURE — 6370000000 HC RX 637 (ALT 250 FOR IP): Performed by: PHYSICIAN ASSISTANT

## 2020-03-30 PROCEDURE — 80048 BASIC METABOLIC PNL TOTAL CA: CPT

## 2020-03-30 PROCEDURE — 6360000002 HC RX W HCPCS: Performed by: PHYSICIAN ASSISTANT

## 2020-03-30 PROCEDURE — 97530 THERAPEUTIC ACTIVITIES: CPT

## 2020-03-30 PROCEDURE — 1200000002 HC SEMI PRIVATE SWING BED

## 2020-03-30 PROCEDURE — 36415 COLL VENOUS BLD VENIPUNCTURE: CPT

## 2020-03-30 RX ADMIN — Medication 10 UNITS: at 11:14

## 2020-03-30 RX ADMIN — ENOXAPARIN SODIUM 30 MG: 30 INJECTION SUBCUTANEOUS at 21:16

## 2020-03-30 RX ADMIN — GABAPENTIN 300 MG: 300 CAPSULE ORAL at 14:20

## 2020-03-30 RX ADMIN — Medication 1 UNITS: at 11:15

## 2020-03-30 RX ADMIN — ASPIRIN 81 MG 81 MG: 81 TABLET ORAL at 08:29

## 2020-03-30 RX ADMIN — LORAZEPAM 0.5 MG: 0.5 TABLET ORAL at 21:18

## 2020-03-30 RX ADMIN — Medication 1 UNITS: at 08:34

## 2020-03-30 RX ADMIN — Medication 1 UNITS: at 21:26

## 2020-03-30 RX ADMIN — PANTOPRAZOLE SODIUM 40 MG: 40 TABLET, DELAYED RELEASE ORAL at 06:12

## 2020-03-30 RX ADMIN — GABAPENTIN 300 MG: 300 CAPSULE ORAL at 08:32

## 2020-03-30 RX ADMIN — MELATONIN 1000 UNITS: at 08:30

## 2020-03-30 RX ADMIN — SENNOSIDES AND DOCUSATE SODIUM 1 TABLET: 8.6; 5 TABLET ORAL at 21:16

## 2020-03-30 RX ADMIN — PROPRANOLOL HYDROCHLORIDE 40 MG: 20 TABLET ORAL at 08:29

## 2020-03-30 RX ADMIN — HYDROCODONE BITARTRATE AND ACETAMINOPHEN 1 TABLET: 5; 325 TABLET ORAL at 08:30

## 2020-03-30 RX ADMIN — ALOGLIPTIN 12.5 MG: 12.5 TABLET, FILM COATED ORAL at 08:32

## 2020-03-30 RX ADMIN — Medication 44 UNITS: at 21:27

## 2020-03-30 RX ADMIN — PROPRANOLOL HYDROCHLORIDE 40 MG: 20 TABLET ORAL at 21:17

## 2020-03-30 RX ADMIN — HYDROCODONE BITARTRATE AND ACETAMINOPHEN 1 TABLET: 5; 325 TABLET ORAL at 21:17

## 2020-03-30 RX ADMIN — Medication 10 UNITS: at 08:35

## 2020-03-30 RX ADMIN — ACETAMINOPHEN 500 MG: 500 TABLET, FILM COATED ORAL at 08:30

## 2020-03-30 RX ADMIN — ACETAMINOPHEN 500 MG: 500 TABLET, FILM COATED ORAL at 14:20

## 2020-03-30 RX ADMIN — ACETAMINOPHEN 650 MG: 325 TABLET, FILM COATED ORAL at 04:27

## 2020-03-30 RX ADMIN — METHOCARBAMOL TABLETS 500 MG: 500 TABLET, COATED ORAL at 21:16

## 2020-03-30 RX ADMIN — ACETAMINOPHEN 500 MG: 500 TABLET, FILM COATED ORAL at 21:18

## 2020-03-30 RX ADMIN — METHOCARBAMOL TABLETS 500 MG: 500 TABLET, COATED ORAL at 08:29

## 2020-03-30 RX ADMIN — PRAVASTATIN SODIUM 20 MG: 20 TABLET ORAL at 21:17

## 2020-03-30 RX ADMIN — HYDROCODONE BITARTRATE AND ACETAMINOPHEN 1 TABLET: 5; 325 TABLET ORAL at 14:20

## 2020-03-30 RX ADMIN — GABAPENTIN 300 MG: 300 CAPSULE ORAL at 21:17

## 2020-03-30 ASSESSMENT — PAIN SCALES - GENERAL
PAINLEVEL_OUTOF10: 10
PAINLEVEL_OUTOF10: 7
PAINLEVEL_OUTOF10: 7
PAINLEVEL_OUTOF10: 6

## 2020-03-30 NOTE — PROGRESS NOTES
Occupational Therapy  Facility/Department: [facility] MED [facility]  Daily Treatment Note  NAME: Zach Donahue  : 1940  MRN: [id number]    Date of Service: 3/30/2020    Discharge Recommendations:  Home with Home health OT       Assessment   Assessment: Co tx with PT on this date secondary to decreased pt safety awareness with transfers. Pt come to sit at EOB with min assist on this date. Pt continues to present with left sided leaning consistently with ability to self correct however, gradually begins to lean as seated. Pt come to stand at [facility] with CGA ~10 attempts on this date. Pt able to demo weight shifting with c/o pain and attempts to sit. Pt side stepped 1 step and sat down. Pt completed SPT with min x2 assist with max verbal cuing. Pt left seated upright in recliner. Patient Diagnosis(es): There were no encounter diagnoses. has a past medical history of Allergic rhinitis, Anxiety, Chronic back pain, Depression, Double vision with both eyes open, Fall, Hyperlipidemia, Hypertension, Osteoarthritis, and Type II or unspecified type diabetes mellitus without mention of complication, not stated as uncontrolled. has a past surgical history that includes Hysterectomy; shoulder surgery;  section; and Colonoscopy. Restrictions  Restrictions/Precautions  Restrictions/Precautions: Weight Bearing, General Precautions, Fall Risk  Required Braces or Orthoses?: No  Lower Extremity Weight Bearing Restrictions  Right Lower Extremity Weight Bearing: Weight Bearing As Tolerated  Subjective   General  Chart Reviewed: Yes  Patient assessed for rehabilitation services?: Yes  Family / Caregiver Present: No  Referring Practitioner: José Antonio Velez PA-C  Diagnosis: RLE ORIF 3/18/20  S/P mechanical fall. Subjective  Subjective: Pt states she fell from her chair leaning over and fell onto floor. Pt agreeable to OT services.        Orientation     Objective       Plan   Plan  Times per week: 3-5  Times per day: Daily  Plan weeks: 2  Current Treatment Recommendations: Self-Care / ADL, Strengthening, Balance Training, Safety Education & Training, Patient/Caregiver Education & Training    Goals  Short term goals  Time Frame for Short term goals: 1 week  Short term goal 1: Pt to complete SPT from bed to BSC/chair with MOD A. Short term goal 2: Pt to complete dressing with min assist using AE as needed. Short term goal 3: Pt to complete toileting with min assist.   Short term goal 4: Pt to complete sponge bathing with Min A. Short term goal 5: Pt to tolerate x15 minutes of activity to increase independence with ADL tasks. Long term goals  Time Frame for Long term goals : 2 weeks  Long term goal 1: Pt to complete SPT from bed to chair with CGA using RW. Long term goal 2: pt to complete dressing with SULLIVAN. Long term goal 3: pt tocomplete toileting with SBA. Long term goal 4: Pt to complete bathing with SULLIVAN. Therapy Time   Individual Concurrent Group Co-treatment   Time In 0926         Time Out 9606         Minutes 29              This note serves as a DC summary in the event of pt discharge.      Valery Cabrera, OTR/L

## 2020-03-30 NOTE — PROGRESS NOTES
Physical Therapy  Facility/Department: [facility] MED SURG  Daily Treatment Note  NAME: Av Alvarado  : 1940  MRN: [id number]    Date of Service: 3/30/2020    Discharge Recommendations:  Continue to assess pending progress, Home with Home health PT, 24 hour supervision or assist        Assessment   Body structures, Functions, Activity limitations: Decreased functional mobility ; Decreased strength;Decreased high-level IADLs;Decreased balance; Increased pain  Assessment: Co-treat with OT was performed this date; pt was able to perform sit to stand with greater ease today. Pt was also able to perform a stand pivot transfer from bed to chair with small steps and assistance with guiding the walker. Pt was left sitting in a chair with feet elevated. Treatment Diagnosis: s/p right hip fracture with ORIF - IMN - 3-  Prognosis: Good  Decision Making: Low Complexity  REQUIRES PT FOLLOW UP: Yes  Activity Tolerance  Activity Tolerance: Patient Tolerated treatment well;Patient limited by pain; Patient limited by endurance     Patient Diagnosis(es): There were no encounter diagnoses. has a past medical history of Allergic rhinitis, Anxiety, Chronic back pain, Depression, Double vision with both eyes open, Fall, Hyperlipidemia, Hypertension, Osteoarthritis, and Type II or unspecified type diabetes mellitus without mention of complication, not stated as uncontrolled. has a past surgical history that includes Hysterectomy; shoulder surgery;  section; and Colonoscopy. Restrictions  Restrictions/Precautions  Restrictions/Precautions: Weight Bearing, General Precautions, Fall Risk  Required Braces or Orthoses?: No  Lower Extremity Weight Bearing Restrictions  Right Lower Extremity Weight Bearing: Weight Bearing As Tolerated     Subjective   General  Chart Reviewed: Yes  Response To Previous Treatment: Patient with no complaints from previous session.   Family / Caregiver Present: No  Referring Practitioner: Robin Meneses MD  Subjective  Subjective: Pt presents supine in bed, agreeable to PT treatment although states she is sore and having pain. Orientation  Orientation  Overall Orientation Status: Within Normal Limits    Objective   Bed mobility  Rolling to Left: Stand by assistance  Rolling to Right: Minimal assistance  Supine to Sit: Moderate assistance  Sit to Supine: Moderate assistance  Scooting: Contact guard assistance;Minimal assistance     Transfers  Sit to Stand: Minimal Assistance  Stand to sit: Minimal Assistance  Bed to Chair: Minimal assistance  Ambulation  Ambulation?: No          Goals  Long term goals  Time Frame for Long term goals : 2 weeks  Long term goal 1: Achieve Bed mobility with mod I.  Long term goal 2: Perform basic transfers with CGA x 1. Long term goal 3: Ambulate 10 feet with CGA x 1 with [facility] good safety awareness. Long term goal 4: Patient-family education/training as needed. Plan    Plan  Times per week: 4-5 x week  Plan weeks: 2 weeks  Current Treatment Recommendations: Strengthening, Gait Training, Balance Training, Functional Mobility Training, Transfer Training, Pain Management, Neuromuscular Re-education, Safety Education & Training, Patient/Caregiver Education & Training  Safety Devices  Type of devices: Left in chair, Call light within reach, Nurse notified     Therapy Time   Individual Concurrent Group Co-treatment   Time In [id number]         Time Out 0955         Minutes 30                 Marielos Dhaliwal, PT       This note serves as D/C summary if patient is discharged prior to next visit.

## 2020-03-31 LAB
GLUCOSE BLD-MCNC: 155 MG/DL (ref 74–106)
GLUCOSE BLD-MCNC: 168 MG/DL (ref 74–106)
GLUCOSE BLD-MCNC: 170 MG/DL (ref 74–106)
GLUCOSE BLD-MCNC: 189 MG/DL (ref 74–106)
PERFORMED ON: ABNORMAL

## 2020-03-31 PROCEDURE — 6360000002 HC RX W HCPCS: Performed by: PHYSICIAN ASSISTANT

## 2020-03-31 PROCEDURE — 1200000002 HC SEMI PRIVATE SWING BED

## 2020-03-31 PROCEDURE — 97110 THERAPEUTIC EXERCISES: CPT

## 2020-03-31 PROCEDURE — 6370000000 HC RX 637 (ALT 250 FOR IP): Performed by: PHYSICIAN ASSISTANT

## 2020-03-31 PROCEDURE — 97530 THERAPEUTIC ACTIVITIES: CPT

## 2020-03-31 PROCEDURE — 6370000000 HC RX 637 (ALT 250 FOR IP): Performed by: PEDIATRICS

## 2020-03-31 RX ORDER — CYCLOBENZAPRINE HCL 10 MG
5 TABLET ORAL EVERY 8 HOURS PRN
Status: DISCONTINUED | OUTPATIENT
Start: 2020-03-31 | End: 2020-04-08

## 2020-03-31 RX ADMIN — GABAPENTIN 300 MG: 300 CAPSULE ORAL at 08:30

## 2020-03-31 RX ADMIN — HYDROCODONE BITARTRATE AND ACETAMINOPHEN 1 TABLET: 5; 325 TABLET ORAL at 14:41

## 2020-03-31 RX ADMIN — SENNOSIDES AND DOCUSATE SODIUM 1 TABLET: 8.6; 5 TABLET ORAL at 20:31

## 2020-03-31 RX ADMIN — Medication 10 UNITS: at 08:41

## 2020-03-31 RX ADMIN — HYDROCODONE BITARTRATE AND ACETAMINOPHEN 1 TABLET: 5; 325 TABLET ORAL at 20:32

## 2020-03-31 RX ADMIN — ALOGLIPTIN 12.5 MG: 12.5 TABLET, FILM COATED ORAL at 08:35

## 2020-03-31 RX ADMIN — HYDROCODONE BITARTRATE AND ACETAMINOPHEN 1 TABLET: 5; 325 TABLET ORAL at 08:30

## 2020-03-31 RX ADMIN — MELATONIN 1000 UNITS: at 08:30

## 2020-03-31 RX ADMIN — GABAPENTIN 300 MG: 300 CAPSULE ORAL at 20:32

## 2020-03-31 RX ADMIN — Medication 1 UNITS: at 20:37

## 2020-03-31 RX ADMIN — ENOXAPARIN SODIUM 40 MG: 40 INJECTION SUBCUTANEOUS at 20:31

## 2020-03-31 RX ADMIN — ACETAMINOPHEN 500 MG: 500 TABLET, FILM COATED ORAL at 20:31

## 2020-03-31 RX ADMIN — ACETAMINOPHEN 500 MG: 500 TABLET, FILM COATED ORAL at 17:00

## 2020-03-31 RX ADMIN — PANTOPRAZOLE SODIUM 40 MG: 40 TABLET, DELAYED RELEASE ORAL at 06:11

## 2020-03-31 RX ADMIN — METHOCARBAMOL TABLETS 500 MG: 500 TABLET, COATED ORAL at 08:29

## 2020-03-31 RX ADMIN — CYCLOBENZAPRINE HYDROCHLORIDE 5 MG: 10 TABLET, FILM COATED ORAL at 23:21

## 2020-03-31 RX ADMIN — Medication 44 UNITS: at 20:36

## 2020-03-31 RX ADMIN — ACETAMINOPHEN 650 MG: 325 TABLET, FILM COATED ORAL at 03:42

## 2020-03-31 RX ADMIN — PRAVASTATIN SODIUM 20 MG: 20 TABLET ORAL at 20:32

## 2020-03-31 RX ADMIN — Medication 1 UNITS: at 08:35

## 2020-03-31 RX ADMIN — Medication 1 UNITS: at 17:01

## 2020-03-31 RX ADMIN — PROPRANOLOL HYDROCHLORIDE 40 MG: 20 TABLET ORAL at 08:29

## 2020-03-31 RX ADMIN — SENNOSIDES AND DOCUSATE SODIUM 1 TABLET: 8.6; 5 TABLET ORAL at 08:30

## 2020-03-31 RX ADMIN — ASPIRIN 81 MG 81 MG: 81 TABLET ORAL at 08:29

## 2020-03-31 RX ADMIN — LORAZEPAM 0.5 MG: 0.5 TABLET ORAL at 23:21

## 2020-03-31 RX ADMIN — Medication 1 UNITS: at 11:33

## 2020-03-31 RX ADMIN — GABAPENTIN 300 MG: 300 CAPSULE ORAL at 14:41

## 2020-03-31 RX ADMIN — ACETAMINOPHEN 500 MG: 500 TABLET, FILM COATED ORAL at 14:40

## 2020-03-31 RX ADMIN — PROPRANOLOL HYDROCHLORIDE 40 MG: 20 TABLET ORAL at 20:32

## 2020-03-31 RX ADMIN — METHOCARBAMOL TABLETS 500 MG: 500 TABLET, COATED ORAL at 20:32

## 2020-03-31 RX ADMIN — Medication 10 UNITS: at 17:03

## 2020-03-31 RX ADMIN — Medication 10 UNITS: at 11:34

## 2020-03-31 ASSESSMENT — PAIN SCALES - GENERAL
PAINLEVEL_OUTOF10: 9
PAINLEVEL_OUTOF10: 10
PAINLEVEL_OUTOF10: 7
PAINLEVEL_OUTOF10: 9
PAINLEVEL_OUTOF10: 8

## 2020-03-31 NOTE — PROGRESS NOTES
Occupational Therapy  Facility/Department: [facility] MED SUR[facility]  Daily Treatment Note  NAME: Lee Gutierrez  : 1940  MRN: [id number]    Date of Service: 3/31/2020    Discharge Recommendations:  Home with Home health OT       Assessment   Assessment: Pt seen for OT services. Pt come to sit at EOB with MOD A. Pt transferred from bed to chair using rupesh steady requiring min assist to come to stand. Pt comes to stand from elevated surface. Pt with c/o pain in RLE. Pt lunch tray arrived and session ended. Plan for BID session this PM.             Patient Diagnosis(es): There were no encounter diagnoses. has a past medical history of Allergic rhinitis, Anxiety, Chronic back pain, Depression, Double vision with both eyes open, Fall, Hyperlipidemia, Hypertension, Osteoarthritis, and Type II or unspecified type diabetes mellitus without mention of complication, not stated as uncontrolled. has a past surgical history that includes Hysterectomy; shoulder surgery;  section; and Colonoscopy. Restrictions  Restrictions/Precautions  Restrictions/Precautions: Weight Bearing, General Precautions, Fall Risk  Required Braces or Orthoses?: No  Lower Extremity Weight Bearing Restrictions  Right Lower Extremity Weight Bearing: Weight Bearing As Tolerated  Subjective   General  Chart Reviewed: Yes  Patient assessed for rehabilitation services?: Yes  Family / Caregiver Present: No  Referring Practitioner: Manfred Jo PA-C  Diagnosis: RLE ORIF 3/18/20  S/P mechanical fall. Subjective  Subjective: Pt states she fell from her chair leaning over and fell onto floor. Pt agreeable to OT services.        Orientation     Objective                Bed mobility  Supine to Sit: Moderate assistance  Scooting: Contact guard assistance  Transfers  Stand Pivot Transfers: (rupesh steady)  Sit to stand: Minimal assistance(elevated surface)        Plan   Plan  Times per week: 3-5  Times per day: Daily  Plan weeks: 2  Current Treatment Recommendations: Self-Care / ADL, Strengthening, Balance Training, Safety Education & Training, Patient/Caregiver Education & Training    Goals  Short term goals  Time Frame for Short term goals: 1 week  Short term goal 1: Pt to complete SPT from bed to BSC/chair with MOD A. Short term goal 2: Pt to complete dressing with min assist using AE as needed. Short term goal 3: Pt to complete toileting with min assist.   Short term goal 4: Pt to complete sponge bathing with Min A. Short term goal 5: Pt to tolerate x15 minutes of activity to increase independence with ADL tasks. Long term goals  Time Frame for Long term goals : 2 weeks  Long term goal 1: Pt to complete SPT from bed to chair with CGA using RW. Long term goal 2: pt to complete dressing with SULLIVAN. Long term goal 3: pt tocomplete toileting with SBA. Long term goal 4: Pt to complete bathing with SULLIVAN. Therapy Time   Individual Concurrent Group Co-treatment   Time In 3212         Time Out 0904         Minutes 16              This note serves as a DC summary in the event of pt discharge.      Valery Cabrera OTR/L

## 2020-03-31 NOTE — PROGRESS NOTES
Physical Therapy  Facility/Department: [facility] MED SURG  Daily Treatment Note  NAME: Rcihard Pizano  : 1940  MRN: [id number]    Date of Service: 3/31/2020    Discharge Recommendations:  Continue to assess pending progress, Home with Home health PT, 24 hour supervision or assist        Assessment   Body structures, Functions, Activity limitations: Decreased functional mobility ; Decreased strength;Decreased high-level IADLs;Decreased balance; Increased pain  Assessment: Pt is moving sit to stand with greater ease from the EOB. Standing endurance was performed today with pt tolerating 70\" of standing. Bed therex was performed for LE ROM and strengthening. Treatment Diagnosis: s/p right hip fracture with ORIF - IMN - 3-  Prognosis: Good  Decision Making: Low Complexity  REQUIRES PT FOLLOW UP: Yes  Activity Tolerance  Activity Tolerance: Patient Tolerated treatment well;Patient limited by pain; Patient limited by endurance     Patient Diagnosis(es): There were no encounter diagnoses. has a past medical history of Allergic rhinitis, Anxiety, Chronic back pain, Depression, Double vision with both eyes open, Fall, Hyperlipidemia, Hypertension, Osteoarthritis, and Type II or unspecified type diabetes mellitus without mention of complication, not stated as uncontrolled. has a past surgical history that includes Hysterectomy; shoulder surgery;  section; and Colonoscopy. Restrictions  Restrictions/Precautions  Restrictions/Precautions: Weight Bearing, General Precautions, Fall Risk  Required Braces or Orthoses?: No  Lower Extremity Weight Bearing Restrictions  Right Lower Extremity Weight Bearing: Weight Bearing As Tolerated     Subjective   General  Chart Reviewed: Yes  Response To Previous Treatment: Patient with no complaints from previous session.   Family / Caregiver Present: No  Referring Practitioner: Ina Suarez MD  Subjective  Subjective: Pt presents sitting up in a chair; she would like to use the bed side commode. Pain Screening  Patient Currently in Pain: Yes  Vital Signs  Patient Currently in Pain: Yes       Orientation  Orientation  Overall Orientation Status: Within Normal Limits    Objective   Bed mobility  Rolling to Left: Stand by assistance  Rolling to Right: Minimal assistance  Sit to Supine: Moderate assistance  Scooting: Contact guard assistance     Transfers  Sit to Stand: Minimal Assistance  Stand to sit: Minimal Assistance  Comment: Shyam dangelo was used for transfer to [facility] and back to bed. Exercises  Quad Sets: 3x10 RLE  Heelslides: 3x10 B  Ankle Pumps: 30x  Other exercises  Other exercises?: Yes  Other exercises 1: Standing tolerance: 3 trials - 70\", 30\", 70\"                       Goals  Long term goals  Time Frame for Long term goals : 2 weeks  Long term goal 1: Achieve Bed mobility with mod I.  Long term goal 2: Perform basic transfers with CGA x 1. Long term goal 3: Ambulate 10 feet with [facility] x [facility]. Long term goal 4: Patient-family education/training as needed. Plan    Plan  Times per week: 4-5 x week  Plan weeks: 2 weeks  Current Treatment Recommendations: Strengthening, Gait Training, Balance Training, Functional Mobility Training, Transfer Training, Pain Management, Neuromuscular Re-education, Safety Education & Training, Patient/Caregiver Education & Training  Safety Devices  Type of devices: Call light within reach, Left in bed     Therapy Time   Individual Concurrent Group Co-treatment   Time In 1325         Time Out 1358         Minutes [address] PT       This note serves as D/C summary if patient is discharged prior to next visit.

## 2020-03-31 NOTE — PROGRESS NOTES
Occupational Therapy  Facility/Department: [facility] MED [facility]  Daily Treatment Note  NAME: Ileana Selby  : 1940  MRN: [id number]    Date of Service: 3/31/2020    Discharge Recommendations:  Home with Home health OT       Assessment   Assessment: Co tx with PT on this date. Pt on BSC requiring max assist for toileting and LB clothing management. Pt transferred to bed via rupesh steady. Pt completed come to stand with CGA x2 3x standing 60, 70, 70 seconds. Pt with c/o pain and fatigue in attempts to stand. Pt requested to stop standing activity. OT ended session and left in care of PT. Patient Diagnosis(es): There were no encounter diagnoses. has a past medical history of Allergic rhinitis, Anxiety, Chronic back pain, Depression, Double vision with both eyes open, Fall, Hyperlipidemia, Hypertension, Osteoarthritis, and Type II or unspecified type diabetes mellitus without mention of complication, not stated as uncontrolled. has a past surgical history that includes Hysterectomy; shoulder surgery;  section; and Colonoscopy. Restrictions  Restrictions/Precautions  Restrictions/Precautions: Weight Bearing, General Precautions, Fall Risk  Required Braces or Orthoses?: No  Lower Extremity Weight Bearing Restrictions  Right Lower Extremity Weight Bearing: Weight Bearing As Tolerated  Subjective   General  Chart Reviewed: Yes  Patient assessed for rehabilitation services?: Yes  Family / Caregiver Present: No  Referring Practitioner: Sadiq Hartley PA-C  Diagnosis: RLE ORIF 3/18/20  S/P mechanical fall. Subjective  Subjective: Pt states she fell from her chair leaning over and fell onto floor. Pt agreeable to OT services.        Orientation     Objective                Bed mobility  Supine to Sit: Moderate assistance  Scooting: Contact guard assistance  Transfers  Stand Pivot Transfers: (rupesh steady)  Sit to stand: Minimal assistance(elevated surface)         Plan   Plan  Times per week: 3-5  Times per day: Daily  Plan weeks: 2  Current Treatment Recommendations: Self-Care / ADL, Strengthening, Balance Training, Safety Education & Training, Patient/Caregiver Education & Training    Goals  Short term goals  Time Frame for Short term goals: 1 week  Short term goal 1: Pt to complete SPT from bed to BSC/chair with MOD A. Short term goal 2: Pt to complete dressing with min assist using AE as needed. Short term goal 3: Pt to complete toileting with min assist.   Short term goal 4: Pt to complete sponge bathing with Min A. Short term goal 5: Pt to tolerate x15 minutes of activity to increase independence with ADL tasks. Long term goals  Time Frame for Long term goals : 2 weeks  Long term goal 1: Pt to complete SPT from bed to chair with CGA using RW. Long term goal 2: pt to complete dressing with SULLIVAN. Long term goal 3: pt tocomplete toileting with SBA. Long term goal 4: Pt to complete bathing with SULLIVAN. Therapy Time   Individual Concurrent Group Co-treatment   Time In 0129         Time Out 0144         Minutes 15              This note serves as a DC summary in the event of pt discharge.      Valery Cabrera, OTR/L

## 2020-04-01 LAB
GLUCOSE BLD-MCNC: 100 MG/DL (ref 74–106)
GLUCOSE BLD-MCNC: 127 MG/DL (ref 74–106)
GLUCOSE BLD-MCNC: 213 MG/DL (ref 74–106)
GLUCOSE BLD-MCNC: 217 MG/DL (ref 74–106)
PERFORMED ON: ABNORMAL
PERFORMED ON: NORMAL

## 2020-04-01 PROCEDURE — 6370000000 HC RX 637 (ALT 250 FOR IP): Performed by: PEDIATRICS

## 2020-04-01 PROCEDURE — 1200000002 HC SEMI PRIVATE SWING BED

## 2020-04-01 PROCEDURE — 6360000002 HC RX W HCPCS: Performed by: PHYSICIAN ASSISTANT

## 2020-04-01 PROCEDURE — 97110 THERAPEUTIC EXERCISES: CPT

## 2020-04-01 PROCEDURE — 97530 THERAPEUTIC ACTIVITIES: CPT

## 2020-04-01 PROCEDURE — 97535 SELF CARE MNGMENT TRAINING: CPT

## 2020-04-01 PROCEDURE — 6370000000 HC RX 637 (ALT 250 FOR IP): Performed by: PHYSICIAN ASSISTANT

## 2020-04-01 RX ORDER — METHOCARBAMOL 500 MG/1
500 TABLET, FILM COATED ORAL 3 TIMES DAILY
Status: DISCONTINUED | OUTPATIENT
Start: 2020-04-01 | End: 2020-04-20 | Stop reason: HOSPADM

## 2020-04-01 RX ORDER — GABAPENTIN 400 MG/1
400 CAPSULE ORAL 3 TIMES DAILY
Status: DISCONTINUED | OUTPATIENT
Start: 2020-04-01 | End: 2020-04-20 | Stop reason: HOSPADM

## 2020-04-01 RX ORDER — OXYCODONE HYDROCHLORIDE 5 MG/1
10 TABLET ORAL 4 TIMES DAILY
Status: DISCONTINUED | OUTPATIENT
Start: 2020-04-01 | End: 2020-04-09

## 2020-04-01 RX ORDER — MORPHINE SULFATE 4 MG/ML
4 INJECTION, SOLUTION INTRAMUSCULAR; INTRAVENOUS EVERY 4 HOURS PRN
Status: DISCONTINUED | OUTPATIENT
Start: 2020-04-01 | End: 2020-04-06

## 2020-04-01 RX ADMIN — Medication 44 UNITS: at 20:23

## 2020-04-01 RX ADMIN — MELATONIN 1000 UNITS: at 08:31

## 2020-04-01 RX ADMIN — Medication 10 UNITS: at 16:55

## 2020-04-01 RX ADMIN — Medication 2 UNITS: at 11:10

## 2020-04-01 RX ADMIN — PRAVASTATIN SODIUM 20 MG: 20 TABLET ORAL at 20:22

## 2020-04-01 RX ADMIN — ALOGLIPTIN 12.5 MG: 12.5 TABLET, FILM COATED ORAL at 08:35

## 2020-04-01 RX ADMIN — ENOXAPARIN SODIUM 40 MG: 40 INJECTION SUBCUTANEOUS at 20:23

## 2020-04-01 RX ADMIN — PROPRANOLOL HYDROCHLORIDE 40 MG: 20 TABLET ORAL at 08:30

## 2020-04-01 RX ADMIN — METHOCARBAMOL TABLETS 500 MG: 500 TABLET, COATED ORAL at 20:22

## 2020-04-01 RX ADMIN — OXYCODONE 10 MG: 5 TABLET ORAL at 13:22

## 2020-04-01 RX ADMIN — ASPIRIN 81 MG 81 MG: 81 TABLET ORAL at 08:30

## 2020-04-01 RX ADMIN — ACETAMINOPHEN 500 MG: 500 TABLET, FILM COATED ORAL at 20:22

## 2020-04-01 RX ADMIN — MORPHINE SULFATE 4 MG: 4 INJECTION INTRAVENOUS at 10:05

## 2020-04-01 RX ADMIN — HYDROCODONE BITARTRATE AND ACETAMINOPHEN 1 TABLET: 5; 325 TABLET ORAL at 08:31

## 2020-04-01 RX ADMIN — SENNOSIDES AND DOCUSATE SODIUM 1 TABLET: 8.6; 5 TABLET ORAL at 20:22

## 2020-04-01 RX ADMIN — PROPRANOLOL HYDROCHLORIDE 40 MG: 20 TABLET ORAL at 20:22

## 2020-04-01 RX ADMIN — ACETAMINOPHEN 500 MG: 500 TABLET, FILM COATED ORAL at 08:30

## 2020-04-01 RX ADMIN — METHOCARBAMOL TABLETS 500 MG: 500 TABLET, COATED ORAL at 08:31

## 2020-04-01 RX ADMIN — CYCLOBENZAPRINE HYDROCHLORIDE 5 MG: 10 TABLET, FILM COATED ORAL at 08:30

## 2020-04-01 RX ADMIN — OXYCODONE 10 MG: 5 TABLET ORAL at 16:51

## 2020-04-01 RX ADMIN — Medication 2 UNITS: at 16:54

## 2020-04-01 RX ADMIN — ACETAMINOPHEN 650 MG: 325 TABLET, FILM COATED ORAL at 03:56

## 2020-04-01 RX ADMIN — METHOCARBAMOL TABLETS 500 MG: 500 TABLET, COATED ORAL at 13:22

## 2020-04-01 RX ADMIN — ACETAMINOPHEN 500 MG: 500 TABLET, FILM COATED ORAL at 16:51

## 2020-04-01 RX ADMIN — OXYCODONE 10 MG: 5 TABLET ORAL at 20:22

## 2020-04-01 RX ADMIN — Medication 10 UNITS: at 08:32

## 2020-04-01 RX ADMIN — SENNOSIDES AND DOCUSATE SODIUM 1 TABLET: 8.6; 5 TABLET ORAL at 08:29

## 2020-04-01 RX ADMIN — GABAPENTIN 400 MG: 400 CAPSULE ORAL at 13:22

## 2020-04-01 RX ADMIN — PANTOPRAZOLE SODIUM 40 MG: 40 TABLET, DELAYED RELEASE ORAL at 08:35

## 2020-04-01 RX ADMIN — GABAPENTIN 400 MG: 400 CAPSULE ORAL at 20:22

## 2020-04-01 RX ADMIN — GABAPENTIN 300 MG: 300 CAPSULE ORAL at 08:29

## 2020-04-01 RX ADMIN — Medication 10 UNITS: at 11:11

## 2020-04-01 ASSESSMENT — PAIN SCALES - GENERAL
PAINLEVEL_OUTOF10: 9
PAINLEVEL_OUTOF10: 4
PAINLEVEL_OUTOF10: 4
PAINLEVEL_OUTOF10: 5
PAINLEVEL_OUTOF10: 7
PAINLEVEL_OUTOF10: 6
PAINLEVEL_OUTOF10: 6
PAINLEVEL_OUTOF10: 7

## 2020-04-01 NOTE — PLAN OF CARE
Problem: Pain:  Goal: Pain level will decrease  Description: Pain level will decrease  4/1/2020 0838 by Mary Chaudhari RN  Outcome: Ongoing  3/31/2020 2108 by Safia Salamanca LPN  Outcome: Ongoing  Goal: Control of acute pain  Description: Control of acute pain  3/31/2020 2108 by Safia Salamanca LPN  Outcome: Ongoing     Problem: Infection:  Goal: Will remain free from infection  Description: Will remain free from infection  Outcome: Ongoing     Problem: Skin Integrity:  Goal: Skin integrity will stabilize  Description: Skin integrity will stabilize  Outcome: Ongoing

## 2020-04-01 NOTE — PROGRESS NOTES
in Pain: Yes  Pain Assessment  Pain Assessment: 0-10  Pain Level: 4  Vital Signs  Patient Currently in Pain: Yes       Orientation  Orientation  Overall Orientation Status: Within Normal Limits  Cognition      Objective   Bed mobility  Rolling to Right: Minimal assistance  Supine to Sit: Moderate assistance  Sit to Supine: Moderate assistance  Scooting: Contact guard assistance  Transfers  Sit to Stand: Contact guard assistance;Minimal Assistance  Stand to sit: Contact guard assistance;Minimal Assistance  Bed to Chair: Minimal assistance  Ambulation  Ambulation?: Yes  Ambulation 1  Surface: level tile  Device: Rolling Walker  Assistance: Minimal assistance;2 Person assistance  Gait Deviations: Slow Trang;Decreased step length;Decreased step height  Distance: 3 feet, 5 feet  Comments: Improved tolerance to WBing on right LE; fatigues easily        Exercises  Quad Sets: 3x10 RLE  Gluteal Sets: 20x  Hip Flexion: seated marching - AAROM for right hip - 2 x 20  Hip Abduction: 20x - assist with right LE + adduction pillow squeeze  Knee Long Arc Quad: 20x2  Ankle Pumps: 30x                              Goals  Long term goals  Time Frame for Long term goals : 2 weeks  Long term goal 1: Achieve Bed mobility with mod I.  Long term goal 2: Perform basic transfers with CGA x 1. Long term goal 3: Ambulate 10 feet with CGA x [facility]. Long term goal 4: Patient-family education/training as needed.     Plan    Plan  Times per week: 4-5 x week  Plan weeks: 2 weeks  Current Treatment Recommendations: Strengthening, Gait Training, Balance Training, Functional Mobility Training, Transfer Training, Pain Management, Neuromuscular Re-education, Safety Education & Training, Patient/Caregiver Education & Training  Safety Devices  Type of devices: Call light within reach, Left in bed     Therapy Time   Individual Concurrent Group Co-treatment   Time In 0084         Time Out 1308         Minutes 23 Laurie Rosenbaum, PT

## 2020-04-02 LAB
GLUCOSE BLD-MCNC: 117 MG/DL (ref 74–106)
GLUCOSE BLD-MCNC: 125 MG/DL (ref 74–106)
GLUCOSE BLD-MCNC: 149 MG/DL (ref 74–106)
GLUCOSE BLD-MCNC: 92 MG/DL (ref 74–106)
PERFORMED ON: ABNORMAL
PERFORMED ON: NORMAL

## 2020-04-02 PROCEDURE — 6370000000 HC RX 637 (ALT 250 FOR IP): Performed by: PEDIATRICS

## 2020-04-02 PROCEDURE — 1200000002 HC SEMI PRIVATE SWING BED

## 2020-04-02 PROCEDURE — 6370000000 HC RX 637 (ALT 250 FOR IP): Performed by: PHYSICIAN ASSISTANT

## 2020-04-02 PROCEDURE — 97110 THERAPEUTIC EXERCISES: CPT

## 2020-04-02 PROCEDURE — 6360000002 HC RX W HCPCS: Performed by: PHYSICIAN ASSISTANT

## 2020-04-02 PROCEDURE — 99308 SBSQ NF CARE LOW MDM 20: CPT | Performed by: INTERNAL MEDICINE

## 2020-04-02 RX ADMIN — Medication 1 UNITS: at 20:55

## 2020-04-02 RX ADMIN — ALOGLIPTIN 12.5 MG: 12.5 TABLET, FILM COATED ORAL at 08:31

## 2020-04-02 RX ADMIN — ACETAMINOPHEN 500 MG: 500 TABLET, FILM COATED ORAL at 14:45

## 2020-04-02 RX ADMIN — MORPHINE SULFATE 4 MG: 4 INJECTION INTRAVENOUS at 11:33

## 2020-04-02 RX ADMIN — ASPIRIN 81 MG 81 MG: 81 TABLET ORAL at 08:31

## 2020-04-02 RX ADMIN — ACETAMINOPHEN 500 MG: 500 TABLET, FILM COATED ORAL at 20:54

## 2020-04-02 RX ADMIN — OXYCODONE 10 MG: 5 TABLET ORAL at 14:46

## 2020-04-02 RX ADMIN — GABAPENTIN 400 MG: 400 CAPSULE ORAL at 08:24

## 2020-04-02 RX ADMIN — MELATONIN 1000 UNITS: at 08:28

## 2020-04-02 RX ADMIN — ACETAMINOPHEN 500 MG: 500 TABLET, FILM COATED ORAL at 16:44

## 2020-04-02 RX ADMIN — PANTOPRAZOLE SODIUM 40 MG: 40 TABLET, DELAYED RELEASE ORAL at 06:04

## 2020-04-02 RX ADMIN — GABAPENTIN 400 MG: 400 CAPSULE ORAL at 14:45

## 2020-04-02 RX ADMIN — METHOCARBAMOL TABLETS 500 MG: 500 TABLET, COATED ORAL at 14:45

## 2020-04-02 RX ADMIN — Medication 10 UNITS: at 08:32

## 2020-04-02 RX ADMIN — ENOXAPARIN SODIUM 40 MG: 40 INJECTION SUBCUTANEOUS at 20:54

## 2020-04-02 RX ADMIN — METHOCARBAMOL TABLETS 500 MG: 500 TABLET, COATED ORAL at 08:24

## 2020-04-02 RX ADMIN — ACETAMINOPHEN 500 MG: 500 TABLET, FILM COATED ORAL at 08:31

## 2020-04-02 RX ADMIN — Medication 10 UNITS: at 17:40

## 2020-04-02 RX ADMIN — POLYETHYLENE GLYCOL (3350) 17 G: 17 POWDER, FOR SOLUTION ORAL at 08:28

## 2020-04-02 RX ADMIN — GABAPENTIN 400 MG: 400 CAPSULE ORAL at 20:54

## 2020-04-02 RX ADMIN — PROPRANOLOL HYDROCHLORIDE 40 MG: 20 TABLET ORAL at 20:54

## 2020-04-02 RX ADMIN — PROPRANOLOL HYDROCHLORIDE 40 MG: 20 TABLET ORAL at 08:28

## 2020-04-02 RX ADMIN — SENNOSIDES AND DOCUSATE SODIUM 1 TABLET: 8.6; 5 TABLET ORAL at 08:28

## 2020-04-02 RX ADMIN — METHOCARBAMOL TABLETS 500 MG: 500 TABLET, COATED ORAL at 20:54

## 2020-04-02 RX ADMIN — PRAVASTATIN SODIUM 20 MG: 20 TABLET ORAL at 20:54

## 2020-04-02 RX ADMIN — OXYCODONE 10 MG: 5 TABLET ORAL at 08:24

## 2020-04-02 RX ADMIN — OXYCODONE 10 MG: 5 TABLET ORAL at 20:54

## 2020-04-02 RX ADMIN — Medication 10 UNITS: at 11:29

## 2020-04-02 RX ADMIN — OXYCODONE 10 MG: 5 TABLET ORAL at 16:44

## 2020-04-02 RX ADMIN — Medication 44 UNITS: at 20:55

## 2020-04-02 RX ADMIN — CYCLOBENZAPRINE HYDROCHLORIDE 5 MG: 10 TABLET, FILM COATED ORAL at 11:33

## 2020-04-02 RX ADMIN — SENNOSIDES AND DOCUSATE SODIUM 1 TABLET: 8.6; 5 TABLET ORAL at 20:54

## 2020-04-02 ASSESSMENT — PAIN SCALES - GENERAL
PAINLEVEL_OUTOF10: 7
PAINLEVEL_OUTOF10: 9
PAINLEVEL_OUTOF10: 8
PAINLEVEL_OUTOF10: 7
PAINLEVEL_OUTOF10: 7
PAINLEVEL_OUTOF10: 8
PAINLEVEL_OUTOF10: 7

## 2020-04-02 NOTE — PROGRESS NOTES
Progress Note      Subjective:   Chief complaint:   Right hip pain    Interval History:   After working with PT OT on 4/1, patient had increased pain. Pain medication adjusted with better improvement of pain. Wound also examined with no evidence of acute infection. Daughter, Fred Elise, updated. Review of systems:     Constitutional:  Denies fever or chills. Positive for generalized weakness. Eyes:  Denies change in visual acuity or discharge. HENT:  Denies nasal congestion or sore throat. Respiratory:  Denies cough or shortness of breath. Cardiovascular:  Denies chest pain, palpitation or swelling in LEs. GI:  Denies abdominal pain, nausea, vomiting, bloody stools or diarrhea. :  Denies dysuria or frequency. Musculoskeletal: Positive for right hip pain. Denies back pain. .   Integument:  Denies rash or itching. Neurologic:  Denies headache, focal weakness or sensory changes. Endocrine:  Denies polyuria or polydipsia. Lymphatic:  Denies swollen glands or night sweats. Psychiatric:  Denies depression or anxiety. Past medical history, surgical history, family history and social history reviewed and unchanged compared to H&P earlier this admission.     Medications:   Scheduled Meds:   oxyCODONE  10 mg Oral 4x Daily    gabapentin  400 mg Oral TID    methocarbamol  500 mg Oral TID    enoxaparin  40 mg Subcutaneous Nightly    insulin lispro  10 Units Subcutaneous TID WC    acetaminophen  500 mg Oral 4x Daily    aspirin  81 mg Oral Daily    insulin glargine  44 Units Subcutaneous Nightly    pantoprazole  40 mg Oral QAM AC    polyethylene glycol  17 g Oral BID    pravastatin  20 mg Oral Nightly    propranolol  40 mg Oral BID    sennosides-docusate sodium  1 tablet Oral BID    alogliptin  12.5 mg Oral Daily    Vitamin D  1,000 Units Oral Daily    insulin lispro  0-6 Units Subcutaneous TID     insulin lispro  0-3 Units Subcutaneous Nightly     Continuous Infusions:   dextrose IV morphine. Will discontinue IV morphine once pain is better controlled. No regimen while receiving narcotics. Follow-up with Avinash Alonso at [facility] on 4/3/2020 unless rescheduled.  following for discharge planning assistance. 03/25/2020    Anemia [D64.9]  Received 1 unit packed red blood cell on 3/20 at [facility] when hemoglobin trended down to 6.3. Hemoglobin stable since arrival to swing bed. B12, folate and iron studies checked in January and found to be normal.   01/19/2020    CKD (chronic kidney disease) stage 3, GFR 30-59 ml/min ([facility]) [N18.3]  Patient with acute renal failure at outside hospital however creatinine has improved to baseline of 1.5. Avoid nephrotoxins and NSAIDs. Monitor closely. Encourage good p.o. intake. 12/20/2019    Declining functional status [R53.81]  Suspect secondary to recent right femur fracture. Continue PT OT and fall precautions. 12/19/2019    HTN (hypertension) [I10]  Blood pressure stable with current regimen of propranolol. 10/14/2011    Diabetes mellitus, type II ([facility] [address] [E11.9]  Hemoglobin A1c 9.6. Continue insulin regimen, alogliptin. Diabetic diet. 07/05/2011     Patient was seen and examined by Dr. Luiza Casillas and plan of care reviewed.       Electronically signed by KEATON Love on 4/2/2020 at 11:26 AM

## 2020-04-02 NOTE — PROGRESS NOTES
bed exercises. Pain Screening  Patient Currently in Pain: Yes  Pain Assessment  Pain Level: 8  Vital Signs  Patient Currently in Pain: Yes       Orientation  Orientation  Overall Orientation Status: Within Normal Limits  Cognition      Objective                  Exercises  Quad Sets: 1 x 20  Heelslides: 2 x 10  Gluteal Sets: 20x  Ankle Pumps: 20x                                 Goals  Long term goals  Time Frame for Long term goals : 2 weeks  Long term goal 1: Achieve Bed mobility with mod I.  Long term goal 2: Perform basic transfers with CGA x 1. Long term goal 3: Ambulate 10 feet with [facility] x [facility] with [facility] good safety awareness. Long term goal 4: Patient-family education/training as needed.     Plan    Plan  Times per week: 4-5 x week  Plan weeks: 2 weeks  Current Treatment Recommendations: Strengthening, Gait Training, Balance Training, Functional Mobility Training, Transfer Training, Pain Management, Neuromuscular Re-education, Safety Education & Training, Patient/Caregiver Education & Training  Safety Devices  Type of devices: Call light within reach, Left in bed     Therapy Time   Individual Concurrent Group Co-treatment   Time In 1413         Time Out 1428         Minutes 15                 Camden Gonzalez [place]

## 2020-04-02 NOTE — CARE COORDINATION
2 attempts to tx pt for OT services on this date. Pt with increased c/o pain despite pain medication administered. Pt reports she is unable to complete UE ther ex in bed.

## 2020-04-03 LAB
GLUCOSE BLD-MCNC: 245 MG/DL (ref 74–106)
GLUCOSE BLD-MCNC: 254 MG/DL (ref 74–106)
GLUCOSE BLD-MCNC: 79 MG/DL (ref 74–106)
GLUCOSE BLD-MCNC: 88 MG/DL (ref 74–106)
PERFORMED ON: ABNORMAL
PERFORMED ON: ABNORMAL
PERFORMED ON: NORMAL
PERFORMED ON: NORMAL

## 2020-04-03 PROCEDURE — 6370000000 HC RX 637 (ALT 250 FOR IP): Performed by: PHYSICIAN ASSISTANT

## 2020-04-03 PROCEDURE — 97535 SELF CARE MNGMENT TRAINING: CPT

## 2020-04-03 PROCEDURE — 97530 THERAPEUTIC ACTIVITIES: CPT

## 2020-04-03 PROCEDURE — 6360000002 HC RX W HCPCS: Performed by: PHYSICIAN ASSISTANT

## 2020-04-03 PROCEDURE — 1200000002 HC SEMI PRIVATE SWING BED

## 2020-04-03 PROCEDURE — 6370000000 HC RX 637 (ALT 250 FOR IP): Performed by: PEDIATRICS

## 2020-04-03 RX ADMIN — PRAVASTATIN SODIUM 20 MG: 20 TABLET ORAL at 20:25

## 2020-04-03 RX ADMIN — ACETAMINOPHEN 500 MG: 500 TABLET, FILM COATED ORAL at 12:50

## 2020-04-03 RX ADMIN — SENNOSIDES AND DOCUSATE SODIUM 1 TABLET: 8.6; 5 TABLET ORAL at 08:00

## 2020-04-03 RX ADMIN — ACETAMINOPHEN 500 MG: 500 TABLET, FILM COATED ORAL at 08:01

## 2020-04-03 RX ADMIN — METHOCARBAMOL TABLETS 500 MG: 500 TABLET, COATED ORAL at 08:00

## 2020-04-03 RX ADMIN — MELATONIN 1000 UNITS: at 08:01

## 2020-04-03 RX ADMIN — PROPRANOLOL HYDROCHLORIDE 40 MG: 20 TABLET ORAL at 08:00

## 2020-04-03 RX ADMIN — OXYCODONE 10 MG: 5 TABLET ORAL at 12:49

## 2020-04-03 RX ADMIN — METHOCARBAMOL TABLETS 500 MG: 500 TABLET, COATED ORAL at 20:25

## 2020-04-03 RX ADMIN — Medication 10 UNITS: at 08:07

## 2020-04-03 RX ADMIN — ACETAMINOPHEN 500 MG: 500 TABLET, FILM COATED ORAL at 00:00

## 2020-04-03 RX ADMIN — Medication 2 UNITS: at 16:42

## 2020-04-03 RX ADMIN — OXYCODONE 10 MG: 5 TABLET ORAL at 20:25

## 2020-04-03 RX ADMIN — OXYCODONE 10 MG: 5 TABLET ORAL at 08:03

## 2020-04-03 RX ADMIN — ASPIRIN 81 MG 81 MG: 81 TABLET ORAL at 08:00

## 2020-04-03 RX ADMIN — GABAPENTIN 400 MG: 400 CAPSULE ORAL at 08:01

## 2020-04-03 RX ADMIN — Medication 2 UNITS: at 20:27

## 2020-04-03 RX ADMIN — SENNOSIDES AND DOCUSATE SODIUM 1 TABLET: 8.6; 5 TABLET ORAL at 20:25

## 2020-04-03 RX ADMIN — ACETAMINOPHEN 650 MG: 325 TABLET, FILM COATED ORAL at 05:24

## 2020-04-03 RX ADMIN — OXYCODONE 10 MG: 5 TABLET ORAL at 16:38

## 2020-04-03 RX ADMIN — GABAPENTIN 400 MG: 400 CAPSULE ORAL at 20:25

## 2020-04-03 RX ADMIN — Medication 10 UNITS: at 16:44

## 2020-04-03 RX ADMIN — POLYETHYLENE GLYCOL (3350) 17 G: 17 POWDER, FOR SOLUTION ORAL at 20:26

## 2020-04-03 RX ADMIN — Medication 44 UNITS: at 20:26

## 2020-04-03 RX ADMIN — PANTOPRAZOLE SODIUM 40 MG: 40 TABLET, DELAYED RELEASE ORAL at 05:24

## 2020-04-03 RX ADMIN — PROPRANOLOL HYDROCHLORIDE 40 MG: 20 TABLET ORAL at 20:25

## 2020-04-03 RX ADMIN — ENOXAPARIN SODIUM 40 MG: 40 INJECTION SUBCUTANEOUS at 20:26

## 2020-04-03 RX ADMIN — METHOCARBAMOL TABLETS 500 MG: 500 TABLET, COATED ORAL at 14:38

## 2020-04-03 RX ADMIN — ACETAMINOPHEN 500 MG: 500 TABLET, FILM COATED ORAL at 20:25

## 2020-04-03 RX ADMIN — POLYETHYLENE GLYCOL (3350) 17 G: 17 POWDER, FOR SOLUTION ORAL at 08:02

## 2020-04-03 RX ADMIN — GABAPENTIN 400 MG: 400 CAPSULE ORAL at 14:38

## 2020-04-03 RX ADMIN — ALOGLIPTIN 12.5 MG: 12.5 TABLET, FILM COATED ORAL at 08:00

## 2020-04-03 ASSESSMENT — PAIN SCALES - GENERAL
PAINLEVEL_OUTOF10: 9
PAINLEVEL_OUTOF10: 10
PAINLEVEL_OUTOF10: 6
PAINLEVEL_OUTOF10: 8
PAINLEVEL_OUTOF10: 9
PAINLEVEL_OUTOF10: 5
PAINLEVEL_OUTOF10: 7

## 2020-04-03 ASSESSMENT — PAIN DESCRIPTION - PAIN TYPE: TYPE: ACUTE PAIN

## 2020-04-03 ASSESSMENT — PAIN DESCRIPTION - LOCATION: LOCATION: HIP

## 2020-04-04 LAB
GLUCOSE BLD-MCNC: 104 MG/DL (ref 74–106)
GLUCOSE BLD-MCNC: 126 MG/DL (ref 74–106)
GLUCOSE BLD-MCNC: 161 MG/DL (ref 74–106)
GLUCOSE BLD-MCNC: 338 MG/DL (ref 74–106)
PERFORMED ON: ABNORMAL
PERFORMED ON: NORMAL

## 2020-04-04 PROCEDURE — 6370000000 HC RX 637 (ALT 250 FOR IP): Performed by: PHYSICIAN ASSISTANT

## 2020-04-04 PROCEDURE — 6370000000 HC RX 637 (ALT 250 FOR IP): Performed by: PEDIATRICS

## 2020-04-04 PROCEDURE — 1200000002 HC SEMI PRIVATE SWING BED

## 2020-04-04 PROCEDURE — 6360000002 HC RX W HCPCS: Performed by: PHYSICIAN ASSISTANT

## 2020-04-04 RX ADMIN — SENNOSIDES AND DOCUSATE SODIUM 1 TABLET: 8.6; 5 TABLET ORAL at 20:40

## 2020-04-04 RX ADMIN — OXYCODONE 10 MG: 5 TABLET ORAL at 14:34

## 2020-04-04 RX ADMIN — METHOCARBAMOL TABLETS 500 MG: 500 TABLET, COATED ORAL at 14:35

## 2020-04-04 RX ADMIN — PANTOPRAZOLE SODIUM 40 MG: 40 TABLET, DELAYED RELEASE ORAL at 06:12

## 2020-04-04 RX ADMIN — Medication 44 UNITS: at 20:40

## 2020-04-04 RX ADMIN — Medication 10 UNITS: at 11:35

## 2020-04-04 RX ADMIN — PROPRANOLOL HYDROCHLORIDE 40 MG: 20 TABLET ORAL at 08:03

## 2020-04-04 RX ADMIN — GABAPENTIN 400 MG: 400 CAPSULE ORAL at 08:03

## 2020-04-04 RX ADMIN — Medication 10 UNITS: at 08:11

## 2020-04-04 RX ADMIN — MELATONIN 1000 UNITS: at 08:03

## 2020-04-04 RX ADMIN — ACETAMINOPHEN 500 MG: 500 TABLET, FILM COATED ORAL at 20:40

## 2020-04-04 RX ADMIN — OXYCODONE 10 MG: 5 TABLET ORAL at 19:44

## 2020-04-04 RX ADMIN — Medication 1 UNITS: at 08:07

## 2020-04-04 RX ADMIN — GABAPENTIN 400 MG: 400 CAPSULE ORAL at 20:40

## 2020-04-04 RX ADMIN — Medication 3 UNITS: at 20:41

## 2020-04-04 RX ADMIN — OXYCODONE 10 MG: 5 TABLET ORAL at 08:03

## 2020-04-04 RX ADMIN — GABAPENTIN 400 MG: 400 CAPSULE ORAL at 14:35

## 2020-04-04 RX ADMIN — PROPRANOLOL HYDROCHLORIDE 40 MG: 20 TABLET ORAL at 20:40

## 2020-04-04 RX ADMIN — METHOCARBAMOL TABLETS 500 MG: 500 TABLET, COATED ORAL at 20:40

## 2020-04-04 RX ADMIN — POLYETHYLENE GLYCOL (3350) 17 G: 17 POWDER, FOR SOLUTION ORAL at 08:04

## 2020-04-04 RX ADMIN — ENOXAPARIN SODIUM 40 MG: 40 INJECTION SUBCUTANEOUS at 20:36

## 2020-04-04 RX ADMIN — ASPIRIN 81 MG 81 MG: 81 TABLET ORAL at 08:03

## 2020-04-04 RX ADMIN — OXYCODONE 10 MG: 5 TABLET ORAL at 23:03

## 2020-04-04 RX ADMIN — ALOGLIPTIN 12.5 MG: 12.5 TABLET, FILM COATED ORAL at 08:06

## 2020-04-04 RX ADMIN — ACETAMINOPHEN 500 MG: 500 TABLET, FILM COATED ORAL at 14:34

## 2020-04-04 RX ADMIN — ACETAMINOPHEN 500 MG: 500 TABLET, FILM COATED ORAL at 08:04

## 2020-04-04 RX ADMIN — SENNOSIDES AND DOCUSATE SODIUM 1 TABLET: 8.6; 5 TABLET ORAL at 08:03

## 2020-04-04 RX ADMIN — METHOCARBAMOL TABLETS 500 MG: 500 TABLET, COATED ORAL at 08:03

## 2020-04-04 RX ADMIN — PRAVASTATIN SODIUM 20 MG: 20 TABLET ORAL at 20:40

## 2020-04-04 ASSESSMENT — PAIN SCALES - GENERAL
PAINLEVEL_OUTOF10: 7
PAINLEVEL_OUTOF10: 9
PAINLEVEL_OUTOF10: 9
PAINLEVEL_OUTOF10: 6
PAINLEVEL_OUTOF10: 6
PAINLEVEL_OUTOF10: 8

## 2020-04-04 ASSESSMENT — PAIN DESCRIPTION - LOCATION
LOCATION: HIP
LOCATION: HIP

## 2020-04-04 ASSESSMENT — PAIN DESCRIPTION - PAIN TYPE
TYPE: ACUTE PAIN
TYPE: ACUTE PAIN

## 2020-04-04 NOTE — PLAN OF CARE
Problem: Pain:  Goal: Pain level will decrease  Description: Pain level will decrease  4/4/2020 1041 by Saintclair Carton, RN  Outcome: Ongoing  4/3/2020 2357 by Pinky Richmond RN  Outcome: Ongoing     Problem: Infection:  Goal: Will remain free from infection  Description: Will remain free from infection  4/3/2020 2357 by Pinky Richmond RN  Outcome: Ongoing     Problem: Skin Integrity:  Goal: Skin integrity will stabilize  Description: Skin integrity will stabilize  Outcome: Ongoing

## 2020-04-05 LAB
ANION GAP SERPL CALCULATED.3IONS-SCNC: 11 MMOL/L (ref 3–16)
ANION GAP SERPL CALCULATED.3IONS-SCNC: 12 MMOL/L (ref 3–16)
BASOPHILS ABSOLUTE: 0.1 K/UL (ref 0–0.1)
BASOPHILS RELATIVE PERCENT: 0.6 %
BUN BLDV-MCNC: 29 MG/DL (ref 6–20)
BUN BLDV-MCNC: 31 MG/DL (ref 6–20)
CALCIUM SERPL-MCNC: 9.3 MG/DL (ref 8.5–10.5)
CALCIUM SERPL-MCNC: 9.5 MG/DL (ref 8.5–10.5)
CHLORIDE BLD-SCNC: 100 MMOL/L (ref 98–107)
CHLORIDE BLD-SCNC: 99 MMOL/L (ref 98–107)
CO2: 24 MMOL/L (ref 20–30)
CO2: 26 MMOL/L (ref 20–30)
CREAT SERPL-MCNC: 1.5 MG/DL (ref 0.4–1.2)
CREAT SERPL-MCNC: 1.6 MG/DL (ref 0.4–1.2)
EOSINOPHILS ABSOLUTE: 0.2 K/UL (ref 0–0.4)
EOSINOPHILS RELATIVE PERCENT: 2.6 %
GFR AFRICAN AMERICAN: 38
GFR AFRICAN AMERICAN: 41
GFR NON-AFRICAN AMERICAN: 31
GFR NON-AFRICAN AMERICAN: 34
GLUCOSE BLD-MCNC: 104 MG/DL (ref 74–106)
GLUCOSE BLD-MCNC: 145 MG/DL (ref 74–106)
GLUCOSE BLD-MCNC: 148 MG/DL (ref 74–106)
GLUCOSE BLD-MCNC: 186 MG/DL (ref 74–106)
GLUCOSE BLD-MCNC: 74 MG/DL (ref 74–106)
GLUCOSE BLD-MCNC: 98 MG/DL (ref 74–106)
HCT VFR BLD CALC: 31 % (ref 37–47)
HEMOGLOBIN: 9.3 G/DL (ref 11.5–16.5)
IMMATURE GRANULOCYTES #: 0 K/UL
IMMATURE GRANULOCYTES %: 0.3 % (ref 0–5)
LYMPHOCYTES ABSOLUTE: 1.9 K/UL (ref 1.5–4)
LYMPHOCYTES RELATIVE PERCENT: 21.2 %
MCH RBC QN AUTO: 30.3 PG (ref 27–32)
MCHC RBC AUTO-ENTMCNC: 30 G/DL (ref 31–35)
MCV RBC AUTO: 101 FL (ref 80–100)
MONOCYTES ABSOLUTE: 0.6 K/UL (ref 0.2–0.8)
MONOCYTES RELATIVE PERCENT: 6.8 %
NEUTROPHILS ABSOLUTE: 6.1 K/UL (ref 2–7.5)
NEUTROPHILS RELATIVE PERCENT: 68.5 %
PDW BLD-RTO: 16.1 % (ref 11–16)
PERFORMED ON: ABNORMAL
PERFORMED ON: ABNORMAL
PERFORMED ON: NORMAL
PERFORMED ON: NORMAL
PLATELET # BLD: 304 K/UL (ref 150–400)
PMV BLD AUTO: 10 FL (ref 6–10)
POTASSIUM SERPL-SCNC: 5.5 MMOL/L (ref 3.4–5.1)
POTASSIUM SERPL-SCNC: 6.4 MMOL/L (ref 3.4–5.1)
POTASSIUM SERPL-SCNC: 6.5 MMOL/L (ref 3.4–5.1)
RBC # BLD: 3.07 M/UL (ref 3.8–5.8)
SODIUM BLD-SCNC: 135 MMOL/L (ref 136–145)
SODIUM BLD-SCNC: 137 MMOL/L (ref 136–145)
WBC # BLD: 8.9 K/UL (ref 4–11)

## 2020-04-05 PROCEDURE — 84132 ASSAY OF SERUM POTASSIUM: CPT

## 2020-04-05 PROCEDURE — 6370000000 HC RX 637 (ALT 250 FOR IP): Performed by: PEDIATRICS

## 2020-04-05 PROCEDURE — 6370000000 HC RX 637 (ALT 250 FOR IP): Performed by: PHYSICIAN ASSISTANT

## 2020-04-05 PROCEDURE — 6360000002 HC RX W HCPCS: Performed by: PHYSICIAN ASSISTANT

## 2020-04-05 PROCEDURE — 80048 BASIC METABOLIC PNL TOTAL CA: CPT

## 2020-04-05 PROCEDURE — 2580000003 HC RX 258: Performed by: PHYSICIAN ASSISTANT

## 2020-04-05 PROCEDURE — 1200000002 HC SEMI PRIVATE SWING BED

## 2020-04-05 PROCEDURE — 93005 ELECTROCARDIOGRAM TRACING: CPT

## 2020-04-05 PROCEDURE — 36415 COLL VENOUS BLD VENIPUNCTURE: CPT

## 2020-04-05 PROCEDURE — 85025 COMPLETE CBC W/AUTO DIFF WBC: CPT

## 2020-04-05 RX ORDER — SODIUM CHLORIDE 9 MG/ML
INJECTION, SOLUTION INTRAVENOUS CONTINUOUS
Status: DISCONTINUED | OUTPATIENT
Start: 2020-04-05 | End: 2020-04-06

## 2020-04-05 RX ORDER — SODIUM POLYSTYRENE SULFONATE 15 G/60ML
30 SUSPENSION ORAL; RECTAL ONCE
Status: COMPLETED | OUTPATIENT
Start: 2020-04-05 | End: 2020-04-05

## 2020-04-05 RX ADMIN — Medication 44 UNITS: at 20:07

## 2020-04-05 RX ADMIN — Medication 1 UNITS: at 20:08

## 2020-04-05 RX ADMIN — SENNOSIDES AND DOCUSATE SODIUM 1 TABLET: 8.6; 5 TABLET ORAL at 07:52

## 2020-04-05 RX ADMIN — GABAPENTIN 400 MG: 400 CAPSULE ORAL at 19:59

## 2020-04-05 RX ADMIN — PROPRANOLOL HYDROCHLORIDE 40 MG: 20 TABLET ORAL at 19:58

## 2020-04-05 RX ADMIN — LORAZEPAM 0.5 MG: 0.5 TABLET ORAL at 19:58

## 2020-04-05 RX ADMIN — METHOCARBAMOL TABLETS 500 MG: 500 TABLET, COATED ORAL at 07:52

## 2020-04-05 RX ADMIN — GABAPENTIN 400 MG: 400 CAPSULE ORAL at 07:52

## 2020-04-05 RX ADMIN — PANTOPRAZOLE SODIUM 40 MG: 40 TABLET, DELAYED RELEASE ORAL at 06:25

## 2020-04-05 RX ADMIN — ACETAMINOPHEN 500 MG: 500 TABLET, FILM COATED ORAL at 07:52

## 2020-04-05 RX ADMIN — PRAVASTATIN SODIUM 20 MG: 20 TABLET ORAL at 19:59

## 2020-04-05 RX ADMIN — METHOCARBAMOL TABLETS 500 MG: 500 TABLET, COATED ORAL at 19:59

## 2020-04-05 RX ADMIN — ASPIRIN 81 MG 81 MG: 81 TABLET ORAL at 07:52

## 2020-04-05 RX ADMIN — MELATONIN 1000 UNITS: at 07:53

## 2020-04-05 RX ADMIN — PROPRANOLOL HYDROCHLORIDE 40 MG: 20 TABLET ORAL at 07:52

## 2020-04-05 RX ADMIN — OXYCODONE 10 MG: 5 TABLET ORAL at 14:08

## 2020-04-05 RX ADMIN — ENOXAPARIN SODIUM 40 MG: 40 INJECTION SUBCUTANEOUS at 19:57

## 2020-04-05 RX ADMIN — GABAPENTIN 400 MG: 400 CAPSULE ORAL at 14:08

## 2020-04-05 RX ADMIN — METHOCARBAMOL TABLETS 500 MG: 500 TABLET, COATED ORAL at 14:08

## 2020-04-05 RX ADMIN — SODIUM POLYSTYRENE SULFONATE 30 G: 15 SUSPENSION ORAL; RECTAL at 11:17

## 2020-04-05 RX ADMIN — OXYCODONE 10 MG: 5 TABLET ORAL at 23:28

## 2020-04-05 RX ADMIN — OXYCODONE 10 MG: 5 TABLET ORAL at 19:25

## 2020-04-05 RX ADMIN — ACETAMINOPHEN 500 MG: 500 TABLET, FILM COATED ORAL at 20:00

## 2020-04-05 RX ADMIN — SODIUM CHLORIDE: 9 INJECTION, SOLUTION INTRAVENOUS at 23:29

## 2020-04-05 RX ADMIN — SODIUM CHLORIDE: 9 INJECTION, SOLUTION INTRAVENOUS at 11:15

## 2020-04-05 RX ADMIN — Medication 10 UNITS: at 11:15

## 2020-04-05 RX ADMIN — SENNOSIDES AND DOCUSATE SODIUM 1 TABLET: 8.6; 5 TABLET ORAL at 19:58

## 2020-04-05 RX ADMIN — ACETAMINOPHEN 500 MG: 500 TABLET, FILM COATED ORAL at 17:07

## 2020-04-05 RX ADMIN — OXYCODONE 10 MG: 5 TABLET ORAL at 06:25

## 2020-04-05 RX ADMIN — ALOGLIPTIN 12.5 MG: 12.5 TABLET, FILM COATED ORAL at 07:57

## 2020-04-05 RX ADMIN — Medication 1 UNITS: at 11:16

## 2020-04-05 ASSESSMENT — PAIN SCALES - GENERAL
PAINLEVEL_OUTOF10: 6
PAINLEVEL_OUTOF10: 8
PAINLEVEL_OUTOF10: 8
PAINLEVEL_OUTOF10: 7
PAINLEVEL_OUTOF10: 5
PAINLEVEL_OUTOF10: 9
PAINLEVEL_OUTOF10: 8

## 2020-04-05 ASSESSMENT — PAIN DESCRIPTION - ORIENTATION: ORIENTATION: RIGHT

## 2020-04-05 ASSESSMENT — PAIN DESCRIPTION - PAIN TYPE: TYPE: ACUTE PAIN

## 2020-04-05 ASSESSMENT — PAIN DESCRIPTION - LOCATION: LOCATION: HIP

## 2020-04-06 LAB
ANION GAP SERPL CALCULATED.3IONS-SCNC: 11 MMOL/L (ref 3–16)
BUN BLDV-MCNC: 28 MG/DL (ref 6–20)
CALCIUM SERPL-MCNC: 8.8 MG/DL (ref 8.5–10.5)
CHLORIDE BLD-SCNC: 102 MMOL/L (ref 98–107)
CO2: 27 MMOL/L (ref 20–30)
CREAT SERPL-MCNC: 1.5 MG/DL (ref 0.4–1.2)
GFR AFRICAN AMERICAN: 41
GFR NON-AFRICAN AMERICAN: 34
GLUCOSE BLD-MCNC: 113 MG/DL (ref 74–106)
GLUCOSE BLD-MCNC: 170 MG/DL (ref 74–106)
GLUCOSE BLD-MCNC: 188 MG/DL (ref 74–106)
GLUCOSE BLD-MCNC: 95 MG/DL (ref 74–106)
GLUCOSE BLD-MCNC: 97 MG/DL (ref 74–106)
HCT VFR BLD CALC: 34 % (ref 37–47)
HEMOGLOBIN: 9.8 G/DL (ref 11.5–16.5)
MCH RBC QN AUTO: 30.6 PG (ref 27–32)
MCHC RBC AUTO-ENTMCNC: 28.8 G/DL (ref 31–35)
MCV RBC AUTO: 106.3 FL (ref 80–100)
PDW BLD-RTO: 16.1 % (ref 11–16)
PERFORMED ON: ABNORMAL
PERFORMED ON: ABNORMAL
PERFORMED ON: NORMAL
PERFORMED ON: NORMAL
PLATELET # BLD: 282 K/UL (ref 150–400)
PMV BLD AUTO: 10.1 FL (ref 6–10)
POTASSIUM SERPL-SCNC: 5.7 MMOL/L (ref 3.4–5.1)
RBC # BLD: 3.2 M/UL (ref 3.8–5.8)
SODIUM BLD-SCNC: 140 MMOL/L (ref 136–145)
WBC # BLD: 7.8 K/UL (ref 4–11)

## 2020-04-06 PROCEDURE — 6370000000 HC RX 637 (ALT 250 FOR IP): Performed by: PHYSICIAN ASSISTANT

## 2020-04-06 PROCEDURE — 97110 THERAPEUTIC EXERCISES: CPT

## 2020-04-06 PROCEDURE — 6370000000 HC RX 637 (ALT 250 FOR IP): Performed by: PEDIATRICS

## 2020-04-06 PROCEDURE — 1200000002 HC SEMI PRIVATE SWING BED

## 2020-04-06 PROCEDURE — 85027 COMPLETE CBC AUTOMATED: CPT

## 2020-04-06 PROCEDURE — 80048 BASIC METABOLIC PNL TOTAL CA: CPT

## 2020-04-06 PROCEDURE — 97530 THERAPEUTIC ACTIVITIES: CPT

## 2020-04-06 PROCEDURE — 97535 SELF CARE MNGMENT TRAINING: CPT

## 2020-04-06 PROCEDURE — 36415 COLL VENOUS BLD VENIPUNCTURE: CPT

## 2020-04-06 PROCEDURE — 6360000002 HC RX W HCPCS: Performed by: PHYSICIAN ASSISTANT

## 2020-04-06 RX ORDER — SODIUM POLYSTYRENE SULFONATE 15 G/60ML
15 SUSPENSION ORAL; RECTAL ONCE
Status: COMPLETED | OUTPATIENT
Start: 2020-04-06 | End: 2020-04-06

## 2020-04-06 RX ADMIN — GABAPENTIN 400 MG: 400 CAPSULE ORAL at 20:21

## 2020-04-06 RX ADMIN — ASPIRIN 81 MG 81 MG: 81 TABLET ORAL at 08:57

## 2020-04-06 RX ADMIN — MELATONIN 1000 UNITS: at 08:57

## 2020-04-06 RX ADMIN — Medication 1 UNITS: at 11:36

## 2020-04-06 RX ADMIN — METHOCARBAMOL TABLETS 500 MG: 500 TABLET, COATED ORAL at 20:21

## 2020-04-06 RX ADMIN — GABAPENTIN 400 MG: 400 CAPSULE ORAL at 08:57

## 2020-04-06 RX ADMIN — Medication 1 UNITS: at 20:34

## 2020-04-06 RX ADMIN — OXYCODONE 10 MG: 5 TABLET ORAL at 23:01

## 2020-04-06 RX ADMIN — ALOGLIPTIN 12.5 MG: 12.5 TABLET, FILM COATED ORAL at 08:56

## 2020-04-06 RX ADMIN — GABAPENTIN 400 MG: 400 CAPSULE ORAL at 12:36

## 2020-04-06 RX ADMIN — CYCLOBENZAPRINE HYDROCHLORIDE 5 MG: 10 TABLET, FILM COATED ORAL at 06:22

## 2020-04-06 RX ADMIN — OXYCODONE 10 MG: 5 TABLET ORAL at 15:29

## 2020-04-06 RX ADMIN — SENNOSIDES AND DOCUSATE SODIUM 1 TABLET: 8.6; 5 TABLET ORAL at 08:57

## 2020-04-06 RX ADMIN — Medication 10 UNITS: at 11:39

## 2020-04-06 RX ADMIN — METHOCARBAMOL TABLETS 500 MG: 500 TABLET, COATED ORAL at 08:57

## 2020-04-06 RX ADMIN — ENOXAPARIN SODIUM 40 MG: 40 INJECTION SUBCUTANEOUS at 20:20

## 2020-04-06 RX ADMIN — ACETAMINOPHEN 500 MG: 500 TABLET, FILM COATED ORAL at 08:57

## 2020-04-06 RX ADMIN — CYCLOBENZAPRINE HYDROCHLORIDE 5 MG: 10 TABLET, FILM COATED ORAL at 23:01

## 2020-04-06 RX ADMIN — ACETAMINOPHEN 500 MG: 500 TABLET, FILM COATED ORAL at 18:33

## 2020-04-06 RX ADMIN — PROPRANOLOL HYDROCHLORIDE 40 MG: 20 TABLET ORAL at 20:21

## 2020-04-06 RX ADMIN — PRAVASTATIN SODIUM 20 MG: 20 TABLET ORAL at 20:23

## 2020-04-06 RX ADMIN — LORAZEPAM 0.5 MG: 0.5 TABLET ORAL at 20:21

## 2020-04-06 RX ADMIN — POLYETHYLENE GLYCOL (3350) 17 G: 17 POWDER, FOR SOLUTION ORAL at 20:23

## 2020-04-06 RX ADMIN — METHOCARBAMOL TABLETS 500 MG: 500 TABLET, COATED ORAL at 12:36

## 2020-04-06 RX ADMIN — SODIUM POLYSTYRENE SULFONATE 15 G: 15 SUSPENSION ORAL; RECTAL at 12:40

## 2020-04-06 RX ADMIN — SENNOSIDES AND DOCUSATE SODIUM 1 TABLET: 8.6; 5 TABLET ORAL at 20:21

## 2020-04-06 RX ADMIN — POLYETHYLENE GLYCOL (3350) 17 G: 17 POWDER, FOR SOLUTION ORAL at 08:57

## 2020-04-06 RX ADMIN — PANTOPRAZOLE SODIUM 40 MG: 40 TABLET, DELAYED RELEASE ORAL at 06:16

## 2020-04-06 RX ADMIN — PROPRANOLOL HYDROCHLORIDE 40 MG: 20 TABLET ORAL at 08:56

## 2020-04-06 RX ADMIN — OXYCODONE 10 MG: 5 TABLET ORAL at 18:33

## 2020-04-06 RX ADMIN — ACETAMINOPHEN 500 MG: 500 TABLET, FILM COATED ORAL at 20:21

## 2020-04-06 RX ADMIN — OXYCODONE 10 MG: 5 TABLET ORAL at 06:16

## 2020-04-06 RX ADMIN — Medication 44 UNITS: at 20:35

## 2020-04-06 RX ADMIN — ACETAMINOPHEN 500 MG: 500 TABLET, FILM COATED ORAL at 12:37

## 2020-04-06 ASSESSMENT — PAIN SCALES - GENERAL
PAINLEVEL_OUTOF10: 6
PAINLEVEL_OUTOF10: 7
PAINLEVEL_OUTOF10: 8
PAINLEVEL_OUTOF10: 8
PAINLEVEL_OUTOF10: 7
PAINLEVEL_OUTOF10: 5

## 2020-04-07 LAB
ANION GAP SERPL CALCULATED.3IONS-SCNC: 11 MMOL/L (ref 3–16)
BUN BLDV-MCNC: 27 MG/DL (ref 6–20)
CALCIUM SERPL-MCNC: 9.1 MG/DL (ref 8.5–10.5)
CHLORIDE BLD-SCNC: 100 MMOL/L (ref 98–107)
CO2: 27 MMOL/L (ref 20–30)
CREAT SERPL-MCNC: 1.6 MG/DL (ref 0.4–1.2)
GFR AFRICAN AMERICAN: 38
GFR NON-AFRICAN AMERICAN: 31
GLUCOSE BLD-MCNC: 110 MG/DL (ref 74–106)
GLUCOSE BLD-MCNC: 110 MG/DL (ref 74–106)
GLUCOSE BLD-MCNC: 120 MG/DL (ref 74–106)
GLUCOSE BLD-MCNC: 123 MG/DL (ref 74–106)
GLUCOSE BLD-MCNC: 124 MG/DL (ref 74–106)
PERFORMED ON: ABNORMAL
POTASSIUM SERPL-SCNC: 5.1 MMOL/L (ref 3.4–5.1)
SODIUM BLD-SCNC: 138 MMOL/L (ref 136–145)

## 2020-04-07 PROCEDURE — 6370000000 HC RX 637 (ALT 250 FOR IP): Performed by: PHYSICIAN ASSISTANT

## 2020-04-07 PROCEDURE — 1200000002 HC SEMI PRIVATE SWING BED

## 2020-04-07 PROCEDURE — 97110 THERAPEUTIC EXERCISES: CPT

## 2020-04-07 PROCEDURE — 80048 BASIC METABOLIC PNL TOTAL CA: CPT

## 2020-04-07 PROCEDURE — 97535 SELF CARE MNGMENT TRAINING: CPT

## 2020-04-07 PROCEDURE — 97803 MED NUTRITION INDIV SUBSEQ: CPT

## 2020-04-07 PROCEDURE — 97530 THERAPEUTIC ACTIVITIES: CPT

## 2020-04-07 PROCEDURE — 6360000002 HC RX W HCPCS: Performed by: PHYSICIAN ASSISTANT

## 2020-04-07 PROCEDURE — 36415 COLL VENOUS BLD VENIPUNCTURE: CPT

## 2020-04-07 PROCEDURE — 6370000000 HC RX 637 (ALT 250 FOR IP): Performed by: PEDIATRICS

## 2020-04-07 RX ADMIN — PROPRANOLOL HYDROCHLORIDE 40 MG: 20 TABLET ORAL at 20:08

## 2020-04-07 RX ADMIN — MECLIZINE HYDROCHLORIDE 25 MG: 25 TABLET ORAL at 08:26

## 2020-04-07 RX ADMIN — SENNOSIDES AND DOCUSATE SODIUM 1 TABLET: 8.6; 5 TABLET ORAL at 20:09

## 2020-04-07 RX ADMIN — Medication 44 UNITS: at 20:13

## 2020-04-07 RX ADMIN — PANTOPRAZOLE SODIUM 40 MG: 40 TABLET, DELAYED RELEASE ORAL at 06:52

## 2020-04-07 RX ADMIN — Medication 10 UNITS: at 17:32

## 2020-04-07 RX ADMIN — ACETAMINOPHEN 500 MG: 500 TABLET, FILM COATED ORAL at 20:08

## 2020-04-07 RX ADMIN — ENOXAPARIN SODIUM 40 MG: 40 INJECTION SUBCUTANEOUS at 20:09

## 2020-04-07 RX ADMIN — ACETAMINOPHEN 500 MG: 500 TABLET, FILM COATED ORAL at 17:31

## 2020-04-07 RX ADMIN — POLYETHYLENE GLYCOL (3350) 17 G: 17 POWDER, FOR SOLUTION ORAL at 08:24

## 2020-04-07 RX ADMIN — METHOCARBAMOL TABLETS 500 MG: 500 TABLET, COATED ORAL at 08:24

## 2020-04-07 RX ADMIN — OXYCODONE 10 MG: 5 TABLET ORAL at 14:41

## 2020-04-07 RX ADMIN — OXYCODONE 10 MG: 5 TABLET ORAL at 20:09

## 2020-04-07 RX ADMIN — PRAVASTATIN SODIUM 20 MG: 20 TABLET ORAL at 20:09

## 2020-04-07 RX ADMIN — OXYCODONE 10 MG: 5 TABLET ORAL at 06:52

## 2020-04-07 RX ADMIN — PROPRANOLOL HYDROCHLORIDE 40 MG: 20 TABLET ORAL at 08:23

## 2020-04-07 RX ADMIN — GABAPENTIN 400 MG: 400 CAPSULE ORAL at 14:41

## 2020-04-07 RX ADMIN — ASPIRIN 81 MG 81 MG: 81 TABLET ORAL at 08:24

## 2020-04-07 RX ADMIN — SENNOSIDES AND DOCUSATE SODIUM 1 TABLET: 8.6; 5 TABLET ORAL at 08:24

## 2020-04-07 RX ADMIN — GABAPENTIN 400 MG: 400 CAPSULE ORAL at 20:09

## 2020-04-07 RX ADMIN — ACETAMINOPHEN 500 MG: 500 TABLET, FILM COATED ORAL at 08:23

## 2020-04-07 RX ADMIN — ALOGLIPTIN 12.5 MG: 12.5 TABLET, FILM COATED ORAL at 08:26

## 2020-04-07 RX ADMIN — METHOCARBAMOL TABLETS 500 MG: 500 TABLET, COATED ORAL at 20:08

## 2020-04-07 RX ADMIN — METHOCARBAMOL TABLETS 500 MG: 500 TABLET, COATED ORAL at 14:41

## 2020-04-07 RX ADMIN — Medication 10 UNITS: at 08:27

## 2020-04-07 RX ADMIN — Medication 10 UNITS: at 11:35

## 2020-04-07 RX ADMIN — GABAPENTIN 400 MG: 400 CAPSULE ORAL at 08:23

## 2020-04-07 RX ADMIN — MELATONIN 1000 UNITS: at 08:24

## 2020-04-07 ASSESSMENT — PAIN SCALES - GENERAL
PAINLEVEL_OUTOF10: 7
PAINLEVEL_OUTOF10: 8
PAINLEVEL_OUTOF10: 5
PAINLEVEL_OUTOF10: 8

## 2020-04-07 NOTE — PROGRESS NOTES
Physical Therapy  Facility/Department: [facility] MED SURG  14 Day Re-Assessment / Daily Treatment Note  NAME: Kaiden Tuttle  : 1940  MRN: [id number]    Date of Service: 2020    Discharge Recommendations:  Continue to assess pending progress, Home with Home health PT, 24 hour supervision or assist        Assessment   Body structures, Functions, Activity limitations: Decreased functional mobility ; Decreased strength;Decreased high-level IADLs;Decreased balance; Increased pain  Assessment: Functional mobility training was performed with pt performing sit to stand x approximately 4-5 times. Pt was able to take 3 steps using a walker x 2 bouts. Education was given to pt regarding proper walker advancement distance as well as LE sequencing and posture. Treatment Diagnosis: s/p right hip fracture with ORIF - IMN - 3-  Prognosis: Good  Decision Making: Low Complexity  REQUIRES PT FOLLOW UP: Yes  Activity Tolerance  Activity Tolerance: Patient Tolerated treatment well;Patient limited by pain; Patient limited by endurance     Patient Diagnosis(es): There were no encounter diagnoses. has a past medical history of Allergic rhinitis, Anxiety, Chronic back pain, Depression, Double vision with both eyes open, Fall, Hyperlipidemia, Hypertension, Osteoarthritis, and Type II or unspecified type diabetes mellitus without mention of complication, not stated as uncontrolled. has a past surgical history that includes Hysterectomy; shoulder surgery;  section; and Colonoscopy. Restrictions  Restrictions/Precautions  Restrictions/Precautions: Weight Bearing, General Precautions, Fall Risk  Required Braces or Orthoses?: No  Lower Extremity Weight Bearing Restrictions  Right Lower Extremity Weight Bearing: Weight Bearing As Tolerated     Subjective   General  Chart Reviewed: Yes  Response To Previous Treatment: Patient with no complaints from previous session.   Family / Caregiver Present: No  Referring Practitioner: Yudy Coburn MD  Subjective  Subjective: Pt presents sitting on bedside commode; pt has been working with OT and is dressed and willing to perform functional mobility training. Pain Screening  Patient Currently in Pain: Yes  Vital Signs  Patient Currently in Pain: Yes       Orientation  Orientation  Overall Orientation Status: Within Normal Limits    Objective      Transfers  Sit to Stand: Contact guard assistance;Minimal Assistance  Stand to sit: Contact guard assistance;Minimal Assistance  Bed to Chair: Minimal assistance     Ambulation  Ambulation?: Yes  Ambulation 1  Surface: level tile  Device: Rolling Walker  Assistance: Minimal assistance;2 Person assistance  Gait Deviations: Slow Trang;Decreased step length;Decreased step height  Distance: 3 steps x 2 bouts     Balance  Posture: Fair  Sitting - Static: Good;-  Sitting - Dynamic: Fair;+  Standing - Static: Poor;+        Goals  Long term goals  Time Frame for Long term goals : 2 weeks  Long term goal 1: Achieve Bed mobility with mod I.  Long term goal 2: Perform basic transfers with CGA x 1. Long term goal 3: Ambulate 10 feet with CGA x 1 with [facility] good safety awareness. Long term goal 4: Patient-family education/training as needed. Plan    Plan  Times per week: 4-5 x week  Plan weeks: 2 weeks  Current Treatment Recommendations: Strengthening, Gait Training, Balance Training, Functional Mobility Training, Transfer Training, Pain Management, Neuromuscular Re-education, Safety Education & Training, Patient/Caregiver Education & Training  Safety Devices  Type of devices: Call light within reach, Left in chair     Therapy Time   Individual Concurrent Group Co-treatment   Time In 1006         Time Out 1025         Minutes Lexi [address] PT     This note serves as D/C summary if patient is discharged prior to next visit.

## 2020-04-07 NOTE — PROGRESS NOTES
Called family to set up transportation for apt on 4/10/20 dr Rodrigues Heart @3:40p    Daughter  Nya Flynn to transport pt to apt.

## 2020-04-07 NOTE — PROGRESS NOTES
Family called back and changed time for apt   Date is the same 4/10/20   Time is 1pm  Have pt ready at 11am

## 2020-04-08 LAB
GLUCOSE BLD-MCNC: 107 MG/DL (ref 74–106)
GLUCOSE BLD-MCNC: 143 MG/DL (ref 74–106)
GLUCOSE BLD-MCNC: 214 MG/DL (ref 74–106)
GLUCOSE BLD-MCNC: 98 MG/DL (ref 74–106)
PERFORMED ON: ABNORMAL
PERFORMED ON: NORMAL

## 2020-04-08 PROCEDURE — 6370000000 HC RX 637 (ALT 250 FOR IP): Performed by: PEDIATRICS

## 2020-04-08 PROCEDURE — 97110 THERAPEUTIC EXERCISES: CPT

## 2020-04-08 PROCEDURE — 6370000000 HC RX 637 (ALT 250 FOR IP): Performed by: PHYSICIAN ASSISTANT

## 2020-04-08 PROCEDURE — 97530 THERAPEUTIC ACTIVITIES: CPT

## 2020-04-08 PROCEDURE — 1200000002 HC SEMI PRIVATE SWING BED

## 2020-04-08 PROCEDURE — 6360000002 HC RX W HCPCS: Performed by: PHYSICIAN ASSISTANT

## 2020-04-08 PROCEDURE — 99308 SBSQ NF CARE LOW MDM 20: CPT | Performed by: INTERNAL MEDICINE

## 2020-04-08 PROCEDURE — 97535 SELF CARE MNGMENT TRAINING: CPT

## 2020-04-08 RX ADMIN — GABAPENTIN 400 MG: 400 CAPSULE ORAL at 14:01

## 2020-04-08 RX ADMIN — OXYCODONE 10 MG: 5 TABLET ORAL at 22:56

## 2020-04-08 RX ADMIN — SENNOSIDES AND DOCUSATE SODIUM 1 TABLET: 8.6; 5 TABLET ORAL at 19:49

## 2020-04-08 RX ADMIN — PROPRANOLOL HYDROCHLORIDE 40 MG: 20 TABLET ORAL at 19:48

## 2020-04-08 RX ADMIN — ASPIRIN 81 MG 81 MG: 81 TABLET ORAL at 08:33

## 2020-04-08 RX ADMIN — OXYCODONE 10 MG: 5 TABLET ORAL at 14:01

## 2020-04-08 RX ADMIN — PRAVASTATIN SODIUM 20 MG: 20 TABLET ORAL at 19:48

## 2020-04-08 RX ADMIN — Medication 10 UNITS: at 11:29

## 2020-04-08 RX ADMIN — SENNOSIDES AND DOCUSATE SODIUM 1 TABLET: 8.6; 5 TABLET ORAL at 08:33

## 2020-04-08 RX ADMIN — METHOCARBAMOL TABLETS 500 MG: 500 TABLET, COATED ORAL at 14:01

## 2020-04-08 RX ADMIN — METHOCARBAMOL TABLETS 500 MG: 500 TABLET, COATED ORAL at 19:48

## 2020-04-08 RX ADMIN — ALOGLIPTIN 12.5 MG: 12.5 TABLET, FILM COATED ORAL at 08:33

## 2020-04-08 RX ADMIN — Medication 1 UNITS: at 19:50

## 2020-04-08 RX ADMIN — PANTOPRAZOLE SODIUM 40 MG: 40 TABLET, DELAYED RELEASE ORAL at 06:39

## 2020-04-08 RX ADMIN — GABAPENTIN 400 MG: 400 CAPSULE ORAL at 08:33

## 2020-04-08 RX ADMIN — PROPRANOLOL HYDROCHLORIDE 40 MG: 20 TABLET ORAL at 08:33

## 2020-04-08 RX ADMIN — MELATONIN 1000 UNITS: at 08:33

## 2020-04-08 RX ADMIN — OXYCODONE 10 MG: 5 TABLET ORAL at 18:24

## 2020-04-08 RX ADMIN — METHOCARBAMOL TABLETS 500 MG: 500 TABLET, COATED ORAL at 08:33

## 2020-04-08 RX ADMIN — OXYCODONE 10 MG: 5 TABLET ORAL at 06:39

## 2020-04-08 RX ADMIN — Medication 2 UNITS: at 11:27

## 2020-04-08 RX ADMIN — OXYCODONE 10 MG: 5 TABLET ORAL at 00:04

## 2020-04-08 RX ADMIN — ENOXAPARIN SODIUM 40 MG: 40 INJECTION SUBCUTANEOUS at 19:49

## 2020-04-08 RX ADMIN — ACETAMINOPHEN 500 MG: 500 TABLET, FILM COATED ORAL at 08:33

## 2020-04-08 RX ADMIN — POLYETHYLENE GLYCOL (3350) 17 G: 17 POWDER, FOR SOLUTION ORAL at 08:33

## 2020-04-08 RX ADMIN — Medication 44 UNITS: at 19:49

## 2020-04-08 RX ADMIN — GABAPENTIN 400 MG: 400 CAPSULE ORAL at 19:48

## 2020-04-08 ASSESSMENT — PAIN SCALES - GENERAL
PAINLEVEL_OUTOF10: 7
PAINLEVEL_OUTOF10: 7
PAINLEVEL_OUTOF10: 8
PAINLEVEL_OUTOF10: 6
PAINLEVEL_OUTOF10: 6
PAINLEVEL_OUTOF10: 4
PAINLEVEL_OUTOF10: 7
PAINLEVEL_OUTOF10: 7

## 2020-04-08 NOTE — PROGRESS NOTES
Objective:     Vital Signs  Temp: 97.6 °F (36.4 °C)  Pulse: 89  Resp: 20  BP: (!) 151/58  SpO2: 91 %  O2 Device: None (Room air)       Vital signs reviewed in electronic charts. Physical exam  Constitutional:  Well developed, well nourished, no acute distress. Eyes:  PERRL, conjunctiva normal, EOMI. HENT:  Atraumatic, external ears normal, external nose/nares normal, oropharynx moist, no pharyngeal exudates. Neck:  Supple. No JVD or thyromegaly. Respiratory:  No respiratory distress, normal breath sounds, no rales, no wheezing. Cardiovascular:  Normal rate, normal rhythm, no murmurs, no gallops, no rubs. GI:  Soft, nondistended, normal bowel sounds, nontender, no organomegaly, no mass. :  No costovertebral angle tenderness. Musculoskeletal:  No edema, no tenderness, no obvious deformities. Patient is moving all extremities. ROM decreased right hip and right lower extremity. Integument:  Well hydrated, no rash. dressing right hip c/d/i. Neurologic:  Alert & oriented x 3,  no focal deficits noted. Follows commands. Psychiatric:  Speech and behavior appropriate.       Results:     Lab Results   Component Value Date    WBC 7.8 04/06/2020    HGB 9.8 (L) 04/06/2020    HCT 34.0 (L) 04/06/2020    .3 (H) 04/06/2020     04/06/2020       Lab Results   Component Value Date     04/07/2020    K 5.1 04/07/2020    K 4.8 03/25/2020     04/07/2020    CO2 27 04/07/2020    BUN 27 04/07/2020    CREATININE 1.6 04/07/2020    GLUCOSE 123 04/07/2020    CALCIUM 9.1 04/07/2020        Assessment and Plan: Active Hospital Problems    Diagnosis Date Noted    History of femur fracture [Z87.81]  - Status post intramedullary nailing on 3/18 by [address] [place] [address]. - Continue PT OT. Weightbearing as tolerated right lower extremity. - Continue pain regimen of oxycodone, Neurontin, Robaxin. Will wean as tolerated.   - bowel regimen ordered  - Follow-up with Norman Cloud at [facility] on 4/10/2020

## 2020-04-08 NOTE — CARE COORDINATION
Interdisciplinary rounding completed. Erna Way (provider rep), Cliff Guzman (), Sylwia Wooten (nursing), (PT), Valery (OT), Benny Lozano (RT), Denise (pharmacy), and Yadira (dietitian) all involved. Activities reviewed with OT.      lives with SO and family assist.  Has walker, WC, BSC, cane. [facility] (pt's provider). DME per therapy recs Transfers  Sit to Stand: Contact guard assistance;Minimal Assistance  Stand to sit: Contact guard assistance;Minimal Assistance  Bed to Chair: Minimal assistance      Ambulation  Ambulation?: Yes  Ambulation 1  Surface: level tile  Device: Rolling Walker  Assistance: Minimal assistance;2 Person assistance  Gait Deviations: Slow Trang;Decreased step length;Decreased step height  Distance: 3 steps x 2 bouts wt: 222.1lb; carb control diet, dysphagia 3, soft and bite sized. Weight stable; No edema, healing incision, eating 62-86% of meals No current antibiotics. Lovenox 40mg subq nightly for VTE prophylaxis. has appt on Friday with MD.  Transportation being arranged- daughter, Diana Garcia, stacey. Spoke to daughter on the phone as well. All questions answered.   Agreeable to POC

## 2020-04-08 NOTE — PROGRESS NOTES
Patient in bed with eyes closed. Patient woke for medication administration. Patient states pain at this time is 7 on scale of 1-10. Per therapy patient did well today with standing and pivoting around. Dr Benjamin Smoke aware. Scheduled tylenol discontinued at this time and prn dose remains. Will continue to monitor.

## 2020-04-08 NOTE — PROGRESS NOTES
Occupational Therapy  Facility/Department: [facility] MED SURG  Daily Treatment Note  NAME: Estephanie Barrera  : 1940  MRN: [id number]    Date of Service: 2020    Discharge Recommendations:  Home with Home health OT       Assessment   Assessment: Pt agreeable to OT services. Pt continues to require MOD A for bed mobility to transition to seated position however, pt sleeps in recliner at home. Pt continues to lean to left side able to self correct with cues during ADL's and static sitting at EOB. Pt transferred to [facility] with CGA MOD VC's for safety. pt able to don socks seated at EOB with SULLIVAN using AE. pt able to initiate pants and undergarment using reacher with SULLIVAN however, after standing and transferring pt fatigue and required min assist in standing to complete LB clothing management to pull up over hips. Pt completed sponge bathing seated on BSC without difficulty only requiring SULLIVAN assistance. Partial cotx with PT after ADL retraining. Pt ambulated 6 feet 3x with CGA x2. Pt demo poor safety with transfers with poor control of descent into chair despite max verbal cuing to correct. Pt continues to report limitations secondary to pain. Pt left seated upright in recliner with call light in reach. Patient Diagnosis(es): There were no encounter diagnoses. has a past medical history of Allergic rhinitis, Anxiety, Chronic back pain, Depression, Double vision with both eyes open, Fall, Hyperlipidemia, Hypertension, Osteoarthritis, and Type II or unspecified type diabetes mellitus without mention of complication, not stated as uncontrolled. has a past surgical history that includes Hysterectomy; shoulder surgery;  section; and Colonoscopy.     Restrictions  Restrictions/Precautions  Restrictions/Precautions: Weight Bearing, General Precautions, Fall Risk  Required Braces or Orthoses?: No  Lower Extremity Weight Bearing Restrictions  Right Lower Extremity Weight Bearing: Weight Bearing As

## 2020-04-08 NOTE — PROGRESS NOTES
Occupational Therapy  Facility/Department: [facility] MED SURG  Daily Treatment Note  NAME: Colleen Munoz  : 1940  MRN: [id number]    Date of Service: 2020    Discharge Recommendations:  Continue to assess pending progress. Assessment   Assessment: Pt seen for BID session. Pt tolerated with some rest breaks. Pt completed x10 reps secondary to reported fatigue after ambulating. Pt tolerated increased distance with CGA x2. partial co tx with PT during ambulation. Pt come to stand with min assist from recliner. Pt left in chair with call light in reach. Patient Diagnosis(es): There were no encounter diagnoses. has a past medical history of Allergic rhinitis, Anxiety, Chronic back pain, Depression, Double vision with both eyes open, Fall, Hyperlipidemia, Hypertension, Osteoarthritis, and Type II or unspecified type diabetes mellitus without mention of complication, not stated as uncontrolled. has a past surgical history that includes Hysterectomy; shoulder surgery;  section; and Colonoscopy. Restrictions  Restrictions/Precautions  Restrictions/Precautions: Weight Bearing, General Precautions, Fall Risk  Required Braces or Orthoses?: No  Lower Extremity Weight Bearing Restrictions  Right Lower Extremity Weight Bearing: Weight Bearing As Tolerated  Subjective   General  Chart Reviewed: Yes  Patient assessed for rehabilitation services?: Yes  Family / Caregiver Present: No  Referring Practitioner: Grisel Bonilla PA-C  Diagnosis: RLE ORIF 3/18/20  S/P mechanical fall. Subjective  Subjective: Pt states she fell from her chair leaning over and fell onto floor. Pt agreeable to OT services. Orientation     Objective    ADL  UE Bathing: Setup  LE Bathing: Setup  UE Dressing: Setup  LE Dressing: Minimal assistance; Increased time to complete        Standing Balance  Time: 3 minutes  Activity: static standing  Functional Mobility  Functional - Mobility Device: Standard

## 2020-04-09 ENCOUNTER — APPOINTMENT (OUTPATIENT)
Dept: ULTRASOUND IMAGING | Facility: HOSPITAL | Age: 80
DRG: 561 | End: 2020-04-09
Attending: INTERNAL MEDICINE
Payer: MEDICARE

## 2020-04-09 LAB
ANION GAP SERPL CALCULATED.3IONS-SCNC: 10 MMOL/L (ref 3–16)
BUN BLDV-MCNC: 26 MG/DL (ref 6–20)
CALCIUM SERPL-MCNC: 9.2 MG/DL (ref 8.5–10.5)
CHLORIDE BLD-SCNC: 100 MMOL/L (ref 98–107)
CO2: 29 MMOL/L (ref 20–30)
CREAT SERPL-MCNC: 1.5 MG/DL (ref 0.4–1.2)
GFR AFRICAN AMERICAN: 41
GFR NON-AFRICAN AMERICAN: 34
GLUCOSE BLD-MCNC: 103 MG/DL (ref 74–106)
GLUCOSE BLD-MCNC: 113 MG/DL (ref 74–106)
GLUCOSE BLD-MCNC: 115 MG/DL (ref 74–106)
GLUCOSE BLD-MCNC: 128 MG/DL (ref 74–106)
GLUCOSE BLD-MCNC: 132 MG/DL (ref 74–106)
PERFORMED ON: ABNORMAL
PERFORMED ON: NORMAL
POTASSIUM SERPL-SCNC: 5.5 MMOL/L (ref 3.4–5.1)
SODIUM BLD-SCNC: 139 MMOL/L (ref 136–145)

## 2020-04-09 PROCEDURE — 6370000000 HC RX 637 (ALT 250 FOR IP): Performed by: INTERNAL MEDICINE

## 2020-04-09 PROCEDURE — 97530 THERAPEUTIC ACTIVITIES: CPT

## 2020-04-09 PROCEDURE — 6360000002 HC RX W HCPCS: Performed by: PHYSICIAN ASSISTANT

## 2020-04-09 PROCEDURE — 1200000002 HC SEMI PRIVATE SWING BED

## 2020-04-09 PROCEDURE — 93971 EXTREMITY STUDY: CPT

## 2020-04-09 PROCEDURE — 97802 MEDICAL NUTRITION INDIV IN: CPT

## 2020-04-09 PROCEDURE — 80048 BASIC METABOLIC PNL TOTAL CA: CPT

## 2020-04-09 PROCEDURE — 97110 THERAPEUTIC EXERCISES: CPT

## 2020-04-09 PROCEDURE — 6370000000 HC RX 637 (ALT 250 FOR IP): Performed by: PHYSICIAN ASSISTANT

## 2020-04-09 PROCEDURE — 36415 COLL VENOUS BLD VENIPUNCTURE: CPT

## 2020-04-09 RX ORDER — OXYCODONE HYDROCHLORIDE 5 MG/1
10 TABLET ORAL EVERY 6 HOURS SCHEDULED
Status: DISCONTINUED | OUTPATIENT
Start: 2020-04-09 | End: 2020-04-20

## 2020-04-09 RX ADMIN — POLYETHYLENE GLYCOL (3350) 17 G: 17 POWDER, FOR SOLUTION ORAL at 20:29

## 2020-04-09 RX ADMIN — PRAVASTATIN SODIUM 20 MG: 20 TABLET ORAL at 20:24

## 2020-04-09 RX ADMIN — METHOCARBAMOL TABLETS 500 MG: 500 TABLET, COATED ORAL at 08:22

## 2020-04-09 RX ADMIN — Medication 10 UNITS: at 08:24

## 2020-04-09 RX ADMIN — OXYCODONE 10 MG: 5 TABLET ORAL at 13:40

## 2020-04-09 RX ADMIN — SENNOSIDES AND DOCUSATE SODIUM 1 TABLET: 8.6; 5 TABLET ORAL at 08:22

## 2020-04-09 RX ADMIN — PROPRANOLOL HYDROCHLORIDE 40 MG: 20 TABLET ORAL at 08:22

## 2020-04-09 RX ADMIN — ENOXAPARIN SODIUM 40 MG: 40 INJECTION SUBCUTANEOUS at 20:23

## 2020-04-09 RX ADMIN — METHOCARBAMOL TABLETS 500 MG: 500 TABLET, COATED ORAL at 20:25

## 2020-04-09 RX ADMIN — Medication 10 UNITS: at 18:03

## 2020-04-09 RX ADMIN — OXYCODONE 10 MG: 5 TABLET ORAL at 17:57

## 2020-04-09 RX ADMIN — PROPRANOLOL HYDROCHLORIDE 40 MG: 20 TABLET ORAL at 20:24

## 2020-04-09 RX ADMIN — GABAPENTIN 400 MG: 400 CAPSULE ORAL at 08:22

## 2020-04-09 RX ADMIN — GABAPENTIN 400 MG: 400 CAPSULE ORAL at 13:40

## 2020-04-09 RX ADMIN — MELATONIN 1000 UNITS: at 08:22

## 2020-04-09 RX ADMIN — ALOGLIPTIN 12.5 MG: 12.5 TABLET, FILM COATED ORAL at 08:22

## 2020-04-09 RX ADMIN — OXYCODONE 10 MG: 5 TABLET ORAL at 06:22

## 2020-04-09 RX ADMIN — METHOCARBAMOL TABLETS 500 MG: 500 TABLET, COATED ORAL at 13:40

## 2020-04-09 RX ADMIN — GABAPENTIN 400 MG: 400 CAPSULE ORAL at 20:24

## 2020-04-09 RX ADMIN — ASPIRIN 81 MG 81 MG: 81 TABLET ORAL at 08:22

## 2020-04-09 RX ADMIN — SENNOSIDES AND DOCUSATE SODIUM 1 TABLET: 8.6; 5 TABLET ORAL at 20:24

## 2020-04-09 RX ADMIN — PANTOPRAZOLE SODIUM 40 MG: 40 TABLET, DELAYED RELEASE ORAL at 06:22

## 2020-04-09 RX ADMIN — POLYETHYLENE GLYCOL (3350) 17 G: 17 POWDER, FOR SOLUTION ORAL at 08:23

## 2020-04-09 ASSESSMENT — PAIN SCALES - GENERAL
PAINLEVEL_OUTOF10: 7
PAINLEVEL_OUTOF10: 8
PAINLEVEL_OUTOF10: 0
PAINLEVEL_OUTOF10: 7

## 2020-04-09 NOTE — CARE COORDINATION
Attempted second treatment this afternoon, pt stating she has been having increase in pain this afternoon and doesn't feel like she can stand. Will continue to monitor and attempt PT tomorrow.       Electronically signed by Pollo Blake PT on 7/6/8400 at 2:47 PM

## 2020-04-09 NOTE — PROGRESS NOTES
Physical Therapy  Facility/Department: [facility] MED SURG  Daily Treatment Note  NAME: Abby Belcher  : 1940  MRN: [id number]    Date of Service: 2020    Discharge Recommendations:  Continue to assess pending progress, Home with Home health PT, 24 hour supervision or assist        Assessment   Body structures, Functions, Activity limitations: Decreased functional mobility ; Decreased strength;Decreased high-level IADLs;Decreased balance; Increased pain  Assessment: Pt performed bed therex with good tolerance. She required Min A to move supine to sit but was able to transfer bed to chair using walker x CGA. Pt demonstrated improved eccentric control when lowering to sit in the chair. Treatment Diagnosis: s/p right hip fracture with ORIF - IMN - 3-  Prognosis: Good  Decision Making: Low Complexity  REQUIRES PT FOLLOW UP: Yes  Activity Tolerance  Activity Tolerance: Patient Tolerated treatment well;Patient limited by pain; Patient limited by endurance     Patient Diagnosis(es): There were no encounter diagnoses. has a past medical history of Allergic rhinitis, Anxiety, Chronic back pain, Depression, Double vision with both eyes open, Fall, Hyperlipidemia, Hypertension, Osteoarthritis, and Type II or unspecified type diabetes mellitus without mention of complication, not stated as uncontrolled. has a past surgical history that includes Hysterectomy; shoulder surgery;  section; and Colonoscopy. Restrictions  Restrictions/Precautions  Restrictions/Precautions: Weight Bearing, General Precautions, Fall Risk  Required Braces or Orthoses?: No  Lower Extremity Weight Bearing Restrictions  Right Lower Extremity Weight Bearing: Weight Bearing As Tolerated     Subjective   General  Chart Reviewed: Yes  Family / Caregiver Present: No  Referring Practitioner: Arnulfo Rodriguez MD  Subjective  Subjective: Pt reports having soreness in her R hip and shoulder from yesterday's therapy sessions. Orientation  Orientation  Overall Orientation Status: Within Normal Limits    Objective   Bed mobility  Supine to Sit: Moderate assistance  Scooting: Minimal assistance  Transfers  Sit to Stand: Contact guard assistance;Minimal Assistance  Stand to sit: Contact guard assistance;Minimal Assistance  Bed to Chair: Contact guard assistance           Exercises  Straight Leg Raise: 2x10 LLE  Heelslides: 2x20 B  Hip Abduction: supine: 2x10 RLE, 2x20 LLE  Ankle Pumps: x30                        Goals  Long term goals  Time Frame for Long term goals : 2 weeks  Long term goal 1: Achieve Bed mobility with mod I.  Long term goal 2: Perform basic transfers with CGA x 1. Long term goal 3: Ambulate 10 feet with CGA x 1 with  wtih good safety awareness. Long term goal 4: Patient-family education/training as needed. Plan    Plan  Times per week: 4-5 x week  Plan weeks: 2 weeks  Current Treatment Recommendations: Strengthening, Gait Training, Balance Training, Functional Mobility Training, Transfer Training, Pain Management, Neuromuscular Re-education, Safety Education & Training, Patient/Caregiver Education & Training  Safety Devices  Type of devices: Call light within reach, Left in chair     Therapy Time   Individual Concurrent Group Co-treatment   Time In 0950         Time Out 1019         Minutes Heather 36, PT     This note serves as D/C summary if patient is discharged prior to next visit.

## 2020-04-09 NOTE — CONSULTS
Nutrition Assessment    Type and Reason for Visit: Reassess, Consult, Patient Education    Nutrition Recommendations: Diet changed to include low potassium. Written and verbal info given to patient to guide choices in lower potassium foods. Encourage compliance of diet. Nutrition Assessment: Pt at risk for additional nutritional compromise r/t nutrition knowledge deficit  AEB elevated potassium level and patient states not knowing what to eat. Malnutrition Assessment:  · Malnutrition Status: No malnutrition  · Context: Acute illness or injury  · Findings of the 6 clinical characteristics of malnutrition (Minimum of 2 out of 6 clinical characteristics is required to make the diagnosis of moderate or severe Protein Calorie Malnutrition based on AND/ASPEN Guidelines):  1. Energy Intake-Unable to assess, Unable to assess    2. Weight Loss-No significant weight loss,    3. Fat Loss-No significant subcutaneous fat loss,    4. Muscle Loss-No significant muscle mass loss,    5. Fluid Accumulation-No significant fluid accumulation,    6.  Strength-Not measured    Nutrition Risk Level: Low, Moderate    Nutrient Needs:  · Estimated Daily Total Kcal: 7934-8369  · Estimated Daily Protein (g): 76-92(1.25-1.5 gm/kg IBW)  · Estimated Daily Total Fluid (ml/day): 3087-0508(3 ml/kcal)    Nutrition Diagnosis:   · Problem: Food and nutrition-related knowledge deficit  · Etiology: related to Renal dysfunction, Lack of prior nutrition-related education     Signs and symptoms:  as evidenced by Lab values, Patient report of    Objective Information:  · Nutrition-Focused Physical Findings: Weight stable, no edema; redness is noted.   · Wound Type: Surgical Wound  · Current Nutrition Therapies:  · Oral Diet Orders: Carb Control 4 Carbs/Meal, Low Potassium   · Oral Diet intake: (62-86% x past 9 meals)  · Oral Nutrition Supplement (ONS) Orders: Wound Healing Oral Supplement  · ONS intake:    · Anthropometric Measures:  · Ht: 5' 7\" (170.2 cm)   · Current Body Wt: 223 lb 6.4 oz (101.3 kg)  · Admission Body Wt: 222 lb 6.4 oz (100.9 kg)(3/31/20)  · Usual Body Wt:    · % Weight Change:  ,     · Ideal Body Wt: 135 lb (61.2 kg), % Ideal Body 156  · Adjusted Body Wt:  , body weight adjusted for    · BMI Classification: BMI 30.0 - 34.9 Obese Class I(33.1)    Nutrition Interventions:   Modify current diet, Continue current ONS  Continued Inpatient Monitoring, Education Needed, Education Initiated, Education Completed, Coordination of Care    Nutrition Evaluation:   · Evaluation: Goals set   · Goals: to meet est needs and help patient to gain the knowledge needed for improving pertinent labs and QOL.     · Monitoring: Nutrition Progression, Meal Intake, Supplement Intake, I&O, Weight, Pertinent Labs, Patient/Family Education      Electronically signed by Carmelo Sewell on 4/9/20 at 1:21 PM EDT

## 2020-04-10 LAB
GLUCOSE BLD-MCNC: 111 MG/DL (ref 74–106)
GLUCOSE BLD-MCNC: 207 MG/DL (ref 74–106)
GLUCOSE BLD-MCNC: 210 MG/DL (ref 74–106)
GLUCOSE BLD-MCNC: 219 MG/DL (ref 74–106)
PERFORMED ON: ABNORMAL

## 2020-04-10 PROCEDURE — 97535 SELF CARE MNGMENT TRAINING: CPT

## 2020-04-10 PROCEDURE — 97110 THERAPEUTIC EXERCISES: CPT

## 2020-04-10 PROCEDURE — 6370000000 HC RX 637 (ALT 250 FOR IP): Performed by: INTERNAL MEDICINE

## 2020-04-10 PROCEDURE — 6360000002 HC RX W HCPCS: Performed by: PHYSICIAN ASSISTANT

## 2020-04-10 PROCEDURE — 6370000000 HC RX 637 (ALT 250 FOR IP): Performed by: PHYSICIAN ASSISTANT

## 2020-04-10 PROCEDURE — 1200000002 HC SEMI PRIVATE SWING BED

## 2020-04-10 RX ORDER — INSULIN GLARGINE 100 [IU]/ML
10 INJECTION, SOLUTION SUBCUTANEOUS NIGHTLY
Status: DISCONTINUED | OUTPATIENT
Start: 2020-04-10 | End: 2020-04-11

## 2020-04-10 RX ORDER — ZINC OXIDE AND DIMETHICONE 120; 10 MG/G; MG/G
CREAM TOPICAL DAILY PRN
Status: DISCONTINUED | OUTPATIENT
Start: 2020-04-10 | End: 2020-04-20 | Stop reason: HOSPADM

## 2020-04-10 RX ADMIN — METHOCARBAMOL TABLETS 500 MG: 500 TABLET, COATED ORAL at 07:55

## 2020-04-10 RX ADMIN — PANTOPRAZOLE SODIUM 40 MG: 40 TABLET, DELAYED RELEASE ORAL at 05:58

## 2020-04-10 RX ADMIN — ENOXAPARIN SODIUM 40 MG: 40 INJECTION SUBCUTANEOUS at 21:01

## 2020-04-10 RX ADMIN — Medication 2 UNITS: at 16:48

## 2020-04-10 RX ADMIN — SENNOSIDES AND DOCUSATE SODIUM 1 TABLET: 8.6; 5 TABLET ORAL at 21:00

## 2020-04-10 RX ADMIN — ALOGLIPTIN 12.5 MG: 12.5 TABLET, FILM COATED ORAL at 07:56

## 2020-04-10 RX ADMIN — INSULIN GLARGINE 10 UNITS: 100 INJECTION, SOLUTION SUBCUTANEOUS at 21:27

## 2020-04-10 RX ADMIN — MELATONIN 1000 UNITS: at 07:56

## 2020-04-10 RX ADMIN — GABAPENTIN 400 MG: 400 CAPSULE ORAL at 07:56

## 2020-04-10 RX ADMIN — POLYETHYLENE GLYCOL (3350) 17 G: 17 POWDER, FOR SOLUTION ORAL at 21:01

## 2020-04-10 RX ADMIN — OXYCODONE 10 MG: 5 TABLET ORAL at 05:58

## 2020-04-10 RX ADMIN — Medication 1 UNITS: at 21:27

## 2020-04-10 RX ADMIN — OXYCODONE 10 MG: 5 TABLET ORAL at 21:01

## 2020-04-10 RX ADMIN — METHOCARBAMOL TABLETS 500 MG: 500 TABLET, COATED ORAL at 21:00

## 2020-04-10 RX ADMIN — ASPIRIN 81 MG 81 MG: 81 TABLET ORAL at 07:56

## 2020-04-10 RX ADMIN — PROPRANOLOL HYDROCHLORIDE 40 MG: 20 TABLET ORAL at 21:00

## 2020-04-10 RX ADMIN — PROPRANOLOL HYDROCHLORIDE 40 MG: 20 TABLET ORAL at 07:55

## 2020-04-10 RX ADMIN — GABAPENTIN 400 MG: 400 CAPSULE ORAL at 21:00

## 2020-04-10 RX ADMIN — PRAVASTATIN SODIUM 20 MG: 20 TABLET ORAL at 21:00

## 2020-04-10 RX ADMIN — SENNOSIDES AND DOCUSATE SODIUM 1 TABLET: 8.6; 5 TABLET ORAL at 07:56

## 2020-04-10 RX ADMIN — OXYCODONE 10 MG: 5 TABLET ORAL at 00:39

## 2020-04-10 RX ADMIN — Medication 2 UNITS: at 11:32

## 2020-04-10 RX ADMIN — OXYCODONE 10 MG: 5 TABLET ORAL at 15:43

## 2020-04-10 ASSESSMENT — PAIN SCALES - GENERAL
PAINLEVEL_OUTOF10: 0
PAINLEVEL_OUTOF10: 9
PAINLEVEL_OUTOF10: 0
PAINLEVEL_OUTOF10: 10

## 2020-04-10 NOTE — CARE COORDINATION
PT: attempt to treat; patient with c/o soreness and tiredness, already worked some with OT; declined PT at this time.   Electronically signed by Dhara Murray PT on 4/10/2020 at 1:18 PM

## 2020-04-10 NOTE — PROGRESS NOTES
2100 RN spoke with MD Josiah Sheriff. Patient's HS fingerstick glucose is 113. RN held sliding scale insulin. MD states to also hold 44 units of scheduled lantus. RN will continue to monitor.

## 2020-04-10 NOTE — PROGRESS NOTES
Dr. More Ban nurse returned call and stated that they must have missed the stiches on pt lower leg and that they needed to be removed. Stitches were removed from lower leg at this time without difficulty. Pt tolerated well.

## 2020-04-11 LAB
ANION GAP SERPL CALCULATED.3IONS-SCNC: 10 MMOL/L (ref 3–16)
BUN BLDV-MCNC: 31 MG/DL (ref 6–20)
CALCIUM SERPL-MCNC: 9 MG/DL (ref 8.5–10.5)
CHLORIDE BLD-SCNC: 101 MMOL/L (ref 98–107)
CO2: 28 MMOL/L (ref 20–30)
CREAT SERPL-MCNC: 1.4 MG/DL (ref 0.4–1.2)
FERRITIN: 78.1 NG/ML (ref 22–322)
GFR AFRICAN AMERICAN: 44
GFR NON-AFRICAN AMERICAN: 36
GLUCOSE BLD-MCNC: 167 MG/DL (ref 74–106)
GLUCOSE BLD-MCNC: 177 MG/DL (ref 74–106)
GLUCOSE BLD-MCNC: 181 MG/DL (ref 74–106)
GLUCOSE BLD-MCNC: 184 MG/DL (ref 74–106)
GLUCOSE BLD-MCNC: 210 MG/DL (ref 74–106)
HCT VFR BLD CALC: 29.9 % (ref 37–47)
HEMOGLOBIN: 8.8 G/DL (ref 11.5–16.5)
IRON SATURATION: 25 % (ref 15–50)
IRON: 56 UG/DL (ref 37–145)
MCH RBC QN AUTO: 30.6 PG (ref 27–32)
MCHC RBC AUTO-ENTMCNC: 29.4 G/DL (ref 31–35)
MCV RBC AUTO: 103.8 FL (ref 80–100)
PDW BLD-RTO: 15.9 % (ref 11–16)
PERFORMED ON: ABNORMAL
PLATELET # BLD: 192 K/UL (ref 150–400)
PMV BLD AUTO: 11 FL (ref 6–10)
POTASSIUM SERPL-SCNC: 4.9 MMOL/L (ref 3.4–5.1)
RBC # BLD: 2.88 M/UL (ref 3.8–5.8)
SODIUM BLD-SCNC: 139 MMOL/L (ref 136–145)
TOTAL IRON BINDING CAPACITY: 221 UG/DL (ref 250–450)
WBC # BLD: 5.6 K/UL (ref 4–11)

## 2020-04-11 PROCEDURE — 1200000002 HC SEMI PRIVATE SWING BED

## 2020-04-11 PROCEDURE — 36415 COLL VENOUS BLD VENIPUNCTURE: CPT

## 2020-04-11 PROCEDURE — 85027 COMPLETE CBC AUTOMATED: CPT

## 2020-04-11 PROCEDURE — 6360000002 HC RX W HCPCS: Performed by: PHYSICIAN ASSISTANT

## 2020-04-11 PROCEDURE — 83550 IRON BINDING TEST: CPT

## 2020-04-11 PROCEDURE — 82728 ASSAY OF FERRITIN: CPT

## 2020-04-11 PROCEDURE — 83540 ASSAY OF IRON: CPT

## 2020-04-11 PROCEDURE — 80048 BASIC METABOLIC PNL TOTAL CA: CPT

## 2020-04-11 PROCEDURE — 6370000000 HC RX 637 (ALT 250 FOR IP): Performed by: INTERNAL MEDICINE

## 2020-04-11 PROCEDURE — 6370000000 HC RX 637 (ALT 250 FOR IP): Performed by: PHYSICIAN ASSISTANT

## 2020-04-11 RX ORDER — INSULIN GLARGINE 100 [IU]/ML
15 INJECTION, SOLUTION SUBCUTANEOUS NIGHTLY
Status: DISCONTINUED | OUTPATIENT
Start: 2020-04-11 | End: 2020-04-16

## 2020-04-11 RX ADMIN — ENOXAPARIN SODIUM 40 MG: 40 INJECTION SUBCUTANEOUS at 20:26

## 2020-04-11 RX ADMIN — METHOCARBAMOL TABLETS 500 MG: 500 TABLET, COATED ORAL at 13:03

## 2020-04-11 RX ADMIN — MELATONIN 1000 UNITS: at 08:05

## 2020-04-11 RX ADMIN — GABAPENTIN 400 MG: 400 CAPSULE ORAL at 20:27

## 2020-04-11 RX ADMIN — METHOCARBAMOL TABLETS 500 MG: 500 TABLET, COATED ORAL at 08:05

## 2020-04-11 RX ADMIN — ASPIRIN 81 MG 81 MG: 81 TABLET ORAL at 08:05

## 2020-04-11 RX ADMIN — OXYCODONE 10 MG: 5 TABLET ORAL at 08:04

## 2020-04-11 RX ADMIN — Medication 1 UNITS: at 16:53

## 2020-04-11 RX ADMIN — INSULIN GLARGINE 15 UNITS: 100 INJECTION, SOLUTION SUBCUTANEOUS at 20:29

## 2020-04-11 RX ADMIN — SENNOSIDES AND DOCUSATE SODIUM 1 TABLET: 8.6; 5 TABLET ORAL at 08:05

## 2020-04-11 RX ADMIN — GABAPENTIN 400 MG: 400 CAPSULE ORAL at 13:03

## 2020-04-11 RX ADMIN — GABAPENTIN 400 MG: 400 CAPSULE ORAL at 08:05

## 2020-04-11 RX ADMIN — METHOCARBAMOL TABLETS 500 MG: 500 TABLET, COATED ORAL at 20:27

## 2020-04-11 RX ADMIN — PRAVASTATIN SODIUM 20 MG: 20 TABLET ORAL at 20:27

## 2020-04-11 RX ADMIN — LORAZEPAM 0.5 MG: 0.5 TABLET ORAL at 11:10

## 2020-04-11 RX ADMIN — Medication 1 UNITS: at 11:23

## 2020-04-11 RX ADMIN — PROPRANOLOL HYDROCHLORIDE 40 MG: 20 TABLET ORAL at 08:05

## 2020-04-11 RX ADMIN — PANTOPRAZOLE SODIUM 40 MG: 40 TABLET, DELAYED RELEASE ORAL at 06:16

## 2020-04-11 RX ADMIN — OXYCODONE 10 MG: 5 TABLET ORAL at 03:58

## 2020-04-11 RX ADMIN — Medication 1 UNITS: at 08:07

## 2020-04-11 RX ADMIN — OXYCODONE 10 MG: 5 TABLET ORAL at 15:53

## 2020-04-11 RX ADMIN — OXYCODONE 10 MG: 5 TABLET ORAL at 20:26

## 2020-04-11 RX ADMIN — SENNOSIDES AND DOCUSATE SODIUM 1 TABLET: 8.6; 5 TABLET ORAL at 20:28

## 2020-04-11 RX ADMIN — PROPRANOLOL HYDROCHLORIDE 40 MG: 20 TABLET ORAL at 20:27

## 2020-04-11 RX ADMIN — Medication 1 UNITS: at 20:29

## 2020-04-11 RX ADMIN — ALOGLIPTIN 12.5 MG: 12.5 TABLET, FILM COATED ORAL at 08:05

## 2020-04-11 ASSESSMENT — PAIN SCALES - GENERAL
PAINLEVEL_OUTOF10: 8
PAINLEVEL_OUTOF10: 8
PAINLEVEL_OUTOF10: 7
PAINLEVEL_OUTOF10: 0

## 2020-04-11 NOTE — PLAN OF CARE
Problem: Pain:  Goal: Pain level will decrease  Description: Pain level will decrease  4/11/2020 0919 by Veronique Lucero RN  Outcome: Ongoing  4/11/2020 0008 by Jessa Castillo RN  Outcome: Ongoing  Goal: Control of acute pain  Description: Control of acute pain  4/11/2020 0008 by Jessa Castillo RN  Outcome: Ongoing  Goal: Control of chronic pain  Description: Control of chronic pain  4/11/2020 0008 by Jessa Castillo RN  Outcome: Ongoing  Goal: Patient's pain/discomfort is manageable  Description: Patient's pain/discomfort is manageable  4/11/2020 0008 by Jessa Castillo RN  Outcome: Ongoing     Problem: Infection:  Goal: Will remain free from infection  Description: Will remain free from infection  4/11/2020 0008 by Jessa Castillo RN  Outcome: Ongoing     Problem: Safety:  Goal: Free from accidental physical injury  Description: Free from accidental physical injury  4/11/2020 0919 by Veronique Lucero RN  Outcome: Ongoing  4/11/2020 0008 by Jessa Castillo RN  Outcome: Ongoing  Goal: Free from intentional harm  Description: Free from intentional harm  4/11/2020 0008 by Jessa Castillo RN  Outcome: Ongoing     Problem: Daily Care:  Goal: Daily care needs are met  Description: Daily care needs are met  4/11/2020 0008 by Jessa Castillo RN  Outcome: Ongoing     Problem: Skin Integrity:  Goal: Skin integrity will stabilize  Description: Skin integrity will stabilize  4/11/2020 0919 by Veronique Lucero RN  Outcome: Ongoing  4/11/2020 0008 by Jessa Castillo RN  Outcome: Ongoing     Problem: Discharge Planning:  Goal: Patients continuum of care needs are met  Description: Patients continuum of care needs are met  4/11/2020 0008 by Jessa Castillo RN  Outcome: Ongoing

## 2020-04-12 LAB
GLUCOSE BLD-MCNC: 160 MG/DL (ref 74–106)
GLUCOSE BLD-MCNC: 178 MG/DL (ref 74–106)
GLUCOSE BLD-MCNC: 252 MG/DL (ref 74–106)
GLUCOSE BLD-MCNC: 255 MG/DL (ref 74–106)
PERFORMED ON: ABNORMAL

## 2020-04-12 PROCEDURE — 6360000002 HC RX W HCPCS: Performed by: PHYSICIAN ASSISTANT

## 2020-04-12 PROCEDURE — 6370000000 HC RX 637 (ALT 250 FOR IP): Performed by: INTERNAL MEDICINE

## 2020-04-12 PROCEDURE — 1200000002 HC SEMI PRIVATE SWING BED

## 2020-04-12 PROCEDURE — 6370000000 HC RX 637 (ALT 250 FOR IP): Performed by: PHYSICIAN ASSISTANT

## 2020-04-12 RX ORDER — FERROUS SULFATE TAB EC 324 MG (65 MG FE EQUIVALENT) 324 (65 FE) MG
324 TABLET DELAYED RESPONSE ORAL
Status: DISCONTINUED | OUTPATIENT
Start: 2020-04-12 | End: 2020-04-20 | Stop reason: HOSPADM

## 2020-04-12 RX ADMIN — PROPRANOLOL HYDROCHLORIDE 40 MG: 20 TABLET ORAL at 07:54

## 2020-04-12 RX ADMIN — METHOCARBAMOL TABLETS 500 MG: 500 TABLET, COATED ORAL at 07:55

## 2020-04-12 RX ADMIN — INSULIN GLARGINE 15 UNITS: 100 INJECTION, SOLUTION SUBCUTANEOUS at 20:10

## 2020-04-12 RX ADMIN — PANTOPRAZOLE SODIUM 40 MG: 40 TABLET, DELAYED RELEASE ORAL at 06:20

## 2020-04-12 RX ADMIN — OXYCODONE 10 MG: 5 TABLET ORAL at 15:00

## 2020-04-12 RX ADMIN — OXYCODONE 10 MG: 5 TABLET ORAL at 20:10

## 2020-04-12 RX ADMIN — OXYCODONE 10 MG: 5 TABLET ORAL at 03:08

## 2020-04-12 RX ADMIN — ENOXAPARIN SODIUM 40 MG: 40 INJECTION SUBCUTANEOUS at 20:08

## 2020-04-12 RX ADMIN — PROPRANOLOL HYDROCHLORIDE 40 MG: 20 TABLET ORAL at 20:08

## 2020-04-12 RX ADMIN — GABAPENTIN 400 MG: 400 CAPSULE ORAL at 13:33

## 2020-04-12 RX ADMIN — MELATONIN 1000 UNITS: at 07:54

## 2020-04-12 RX ADMIN — SENNOSIDES AND DOCUSATE SODIUM 1 TABLET: 8.6; 5 TABLET ORAL at 20:08

## 2020-04-12 RX ADMIN — POLYETHYLENE GLYCOL (3350) 17 G: 17 POWDER, FOR SOLUTION ORAL at 07:56

## 2020-04-12 RX ADMIN — OXYCODONE 10 MG: 5 TABLET ORAL at 07:54

## 2020-04-12 RX ADMIN — GABAPENTIN 400 MG: 400 CAPSULE ORAL at 20:08

## 2020-04-12 RX ADMIN — SENNOSIDES AND DOCUSATE SODIUM 1 TABLET: 8.6; 5 TABLET ORAL at 07:54

## 2020-04-12 RX ADMIN — FERROUS SULFATE TAB EC 324 MG (65 MG FE EQUIVALENT) 324 MG: 324 (65 FE) TABLET DELAYED RESPONSE at 08:47

## 2020-04-12 RX ADMIN — POLYETHYLENE GLYCOL (3350) 17 G: 17 POWDER, FOR SOLUTION ORAL at 20:08

## 2020-04-12 RX ADMIN — GABAPENTIN 400 MG: 400 CAPSULE ORAL at 07:55

## 2020-04-12 RX ADMIN — ALOGLIPTIN 12.5 MG: 12.5 TABLET, FILM COATED ORAL at 07:55

## 2020-04-12 RX ADMIN — Medication 3 UNITS: at 11:35

## 2020-04-12 RX ADMIN — Medication 1 UNITS: at 07:57

## 2020-04-12 RX ADMIN — METHOCARBAMOL TABLETS 500 MG: 500 TABLET, COATED ORAL at 13:33

## 2020-04-12 RX ADMIN — Medication 1 UNITS: at 17:16

## 2020-04-12 RX ADMIN — PRAVASTATIN SODIUM 20 MG: 20 TABLET ORAL at 20:08

## 2020-04-12 RX ADMIN — ASPIRIN 81 MG 81 MG: 81 TABLET ORAL at 07:54

## 2020-04-12 RX ADMIN — Medication 2 UNITS: at 20:10

## 2020-04-12 RX ADMIN — METHOCARBAMOL TABLETS 500 MG: 500 TABLET, COATED ORAL at 20:08

## 2020-04-12 ASSESSMENT — PAIN SCALES - GENERAL
PAINLEVEL_OUTOF10: 9
PAINLEVEL_OUTOF10: 7
PAINLEVEL_OUTOF10: 8
PAINLEVEL_OUTOF10: 7

## 2020-04-12 ASSESSMENT — PAIN DESCRIPTION - LOCATION: LOCATION: HIP

## 2020-04-12 ASSESSMENT — PAIN DESCRIPTION - PAIN TYPE: TYPE: ACUTE PAIN

## 2020-04-12 ASSESSMENT — PAIN DESCRIPTION - ORIENTATION: ORIENTATION: RIGHT

## 2020-04-12 NOTE — PLAN OF CARE
Problem: Pain:  Goal: Pain level will decrease  Description: Pain level will decrease  Outcome: Ongoing     Problem: Safety:  Goal: Free from accidental physical injury  Description: Free from accidental physical injury  Outcome: Ongoing     Problem: Skin Integrity:  Goal: Skin integrity will stabilize  Description: Skin integrity will stabilize  Outcome: Ongoing

## 2020-04-13 LAB
BILIRUBIN URINE: NEGATIVE
BLOOD, URINE: NEGATIVE
CLARITY: CLEAR
COLOR: YELLOW
EPITHELIAL CELLS, UA: ABNORMAL /HPF (ref 0–5)
GLUCOSE BLD-MCNC: 179 MG/DL (ref 74–106)
GLUCOSE BLD-MCNC: 202 MG/DL (ref 74–106)
GLUCOSE BLD-MCNC: 209 MG/DL (ref 74–106)
GLUCOSE BLD-MCNC: 246 MG/DL (ref 74–106)
GLUCOSE URINE: NEGATIVE MG/DL
KETONES, URINE: NEGATIVE MG/DL
LEUKOCYTE ESTERASE, URINE: NEGATIVE
MICROSCOPIC EXAMINATION: NORMAL
NITRITE, URINE: NEGATIVE
PERFORMED ON: ABNORMAL
PH UA: 5.5 (ref 5–8)
PROTEIN UA: NEGATIVE MG/DL
RBC UA: ABNORMAL /HPF (ref 0–4)
SPECIFIC GRAVITY UA: 1.01 (ref 1–1.03)
URINE REFLEX TO CULTURE: NORMAL
URINE TYPE: ABNORMAL
URINE TYPE: NORMAL
UROBILINOGEN, URINE: 0.2 E.U./DL
WBC UA: ABNORMAL /HPF (ref 0–5)

## 2020-04-13 PROCEDURE — 6360000002 HC RX W HCPCS: Performed by: PHYSICIAN ASSISTANT

## 2020-04-13 PROCEDURE — 81001 URINALYSIS AUTO W/SCOPE: CPT

## 2020-04-13 PROCEDURE — 97535 SELF CARE MNGMENT TRAINING: CPT

## 2020-04-13 PROCEDURE — 6370000000 HC RX 637 (ALT 250 FOR IP): Performed by: INTERNAL MEDICINE

## 2020-04-13 PROCEDURE — 1200000002 HC SEMI PRIVATE SWING BED

## 2020-04-13 PROCEDURE — 97530 THERAPEUTIC ACTIVITIES: CPT

## 2020-04-13 PROCEDURE — 6370000000 HC RX 637 (ALT 250 FOR IP): Performed by: PHYSICIAN ASSISTANT

## 2020-04-13 RX ADMIN — Medication 1 UNITS: at 20:53

## 2020-04-13 RX ADMIN — OXYCODONE 10 MG: 5 TABLET ORAL at 20:47

## 2020-04-13 RX ADMIN — INSULIN GLARGINE 15 UNITS: 100 INJECTION, SOLUTION SUBCUTANEOUS at 20:53

## 2020-04-13 RX ADMIN — OXYCODONE 10 MG: 5 TABLET ORAL at 16:58

## 2020-04-13 RX ADMIN — PROPRANOLOL HYDROCHLORIDE 40 MG: 20 TABLET ORAL at 08:41

## 2020-04-13 RX ADMIN — GABAPENTIN 400 MG: 400 CAPSULE ORAL at 08:41

## 2020-04-13 RX ADMIN — METHOCARBAMOL TABLETS 500 MG: 500 TABLET, COATED ORAL at 20:47

## 2020-04-13 RX ADMIN — PROPRANOLOL HYDROCHLORIDE 40 MG: 20 TABLET ORAL at 20:48

## 2020-04-13 RX ADMIN — LORAZEPAM 0.5 MG: 0.5 TABLET ORAL at 14:22

## 2020-04-13 RX ADMIN — Medication 2 UNITS: at 08:42

## 2020-04-13 RX ADMIN — PANTOPRAZOLE SODIUM 40 MG: 40 TABLET, DELAYED RELEASE ORAL at 05:57

## 2020-04-13 RX ADMIN — Medication 2 UNITS: at 11:33

## 2020-04-13 RX ADMIN — POLYETHYLENE GLYCOL (3350) 17 G: 17 POWDER, FOR SOLUTION ORAL at 20:48

## 2020-04-13 RX ADMIN — SENNOSIDES AND DOCUSATE SODIUM 1 TABLET: 8.6; 5 TABLET ORAL at 08:41

## 2020-04-13 RX ADMIN — GABAPENTIN 400 MG: 400 CAPSULE ORAL at 20:48

## 2020-04-13 RX ADMIN — PRAVASTATIN SODIUM 20 MG: 20 TABLET ORAL at 20:48

## 2020-04-13 RX ADMIN — INSULIN LISPRO 5 UNITS: 100 INJECTION, SOLUTION INTRAVENOUS; SUBCUTANEOUS at 17:02

## 2020-04-13 RX ADMIN — SENNOSIDES AND DOCUSATE SODIUM 1 TABLET: 8.6; 5 TABLET ORAL at 20:48

## 2020-04-13 RX ADMIN — Medication 2 UNITS: at 17:01

## 2020-04-13 RX ADMIN — POLYETHYLENE GLYCOL (3350) 17 G: 17 POWDER, FOR SOLUTION ORAL at 08:41

## 2020-04-13 RX ADMIN — OXYCODONE 10 MG: 5 TABLET ORAL at 03:46

## 2020-04-13 RX ADMIN — MELATONIN 1000 UNITS: at 08:41

## 2020-04-13 RX ADMIN — METHOCARBAMOL TABLETS 500 MG: 500 TABLET, COATED ORAL at 08:40

## 2020-04-13 RX ADMIN — GABAPENTIN 400 MG: 400 CAPSULE ORAL at 14:20

## 2020-04-13 RX ADMIN — ASPIRIN 81 MG 81 MG: 81 TABLET ORAL at 08:41

## 2020-04-13 RX ADMIN — ENOXAPARIN SODIUM 40 MG: 40 INJECTION SUBCUTANEOUS at 20:47

## 2020-04-13 RX ADMIN — OXYCODONE 10 MG: 5 TABLET ORAL at 08:41

## 2020-04-13 RX ADMIN — METHOCARBAMOL TABLETS 500 MG: 500 TABLET, COATED ORAL at 14:20

## 2020-04-13 RX ADMIN — ALOGLIPTIN 12.5 MG: 12.5 TABLET, FILM COATED ORAL at 08:41

## 2020-04-13 RX ADMIN — FERROUS SULFATE TAB EC 324 MG (65 MG FE EQUIVALENT) 324 MG: 324 (65 FE) TABLET DELAYED RESPONSE at 08:41

## 2020-04-13 ASSESSMENT — PAIN SCALES - GENERAL
PAINLEVEL_OUTOF10: 8
PAINLEVEL_OUTOF10: 5
PAINLEVEL_OUTOF10: 6
PAINLEVEL_OUTOF10: 7
PAINLEVEL_OUTOF10: 8

## 2020-04-13 ASSESSMENT — PAIN DESCRIPTION - ORIENTATION
ORIENTATION: RIGHT
ORIENTATION: RIGHT

## 2020-04-13 ASSESSMENT — PAIN DESCRIPTION - LOCATION
LOCATION: HIP
LOCATION: HIP

## 2020-04-13 ASSESSMENT — PAIN DESCRIPTION - PAIN TYPE
TYPE: ACUTE PAIN
TYPE: SURGICAL PAIN

## 2020-04-13 NOTE — PROGRESS NOTES
Occupational Therapy  Facility/Department: [facility] MED [facility]  Daily Treatment Note  NAME: Semaj Pantoja  : 1940  MRN: [id number]    Date of Service: 2020    Discharge Recommendations:  Home with Home health OT       Assessment   Assessment: Pt seen for BID session partial co tx with PT for ambulation. Pt continues to have decreased safety awareness with descent to seated position. Pt fatigues easily with attempts to come to stand and complete standing tolerance activity and ambulation. Pt completed sponge bathing with SLULIVAN seated on BSC. Pt able to complete LB dressing using AE with SBA however, when standing to complete LB clothing management pt fatigues and requires CGA<>MIN A to pull up pants. Pt ambulated in room with PT and OT 6-10 feet intervals. Pt fatigued easily and requested to return to bed at end of session. Patient Diagnosis(es): There were no encounter diagnoses. has a past medical history of Allergic rhinitis, Anxiety, Chronic back pain, Depression, Double vision with both eyes open, Fall, Hyperlipidemia, Hypertension, Osteoarthritis, and Type II or unspecified type diabetes mellitus without mention of complication, not stated as uncontrolled. has a past surgical history that includes Hysterectomy; shoulder surgery;  section; and Colonoscopy. Restrictions  Restrictions/Precautions  Restrictions/Precautions: Weight Bearing, General Precautions, Fall Risk  Required Braces or Orthoses?: No  Lower Extremity Weight Bearing Restrictions  Right Lower Extremity Weight Bearing: Weight Bearing As Tolerated  Subjective   General  Chart Reviewed: Yes  Patient assessed for rehabilitation services?: Yes  Family / Caregiver Present: No  Referring Practitioner: Simona Rodriguez PA-C  Diagnosis: RLE ORIF 3/18/20  S/P mechanical fall. Subjective  Subjective: Pt states she fell from her chair leaning over and fell onto floor. Pt agreeable to OT services.        Orientation

## 2020-04-13 NOTE — PROGRESS NOTES
Occupational Therapy  Facility/Department: [facility]  Daily Treatment Note  NAME: Migue Vargas  : 1940  MRN: [id number]    Date of Service: 2020    Discharge Recommendations:  Home with Home health OT       Assessment   Assessment: pt agreeable to OT services. Pt come to sit at EOB with decreased level of assist. Pt able to sit at EOB and don socks using sock aid with SULLIVAN. Pt come to stand and completed SPT with decreased level of assist and decreased verbal cuing. Pt tolerated tx well. Pt SULLIVAN with lunch tray and session ended. Patient Diagnosis(es): There were no encounter diagnoses. has a past medical history of Allergic rhinitis, Anxiety, Chronic back pain, Depression, Double vision with both eyes open, Fall, Hyperlipidemia, Hypertension, Osteoarthritis, and Type II or unspecified type diabetes mellitus without mention of complication, not stated as uncontrolled. has a past surgical history that includes Hysterectomy; shoulder surgery;  section; and Colonoscopy. Restrictions  Restrictions/Precautions  Restrictions/Precautions: Weight Bearing, General Precautions, Fall Risk  Required Braces or Orthoses?: No  Lower Extremity Weight Bearing Restrictions  Right Lower Extremity Weight Bearing: Weight Bearing As Tolerated  Subjective   General  Chart Reviewed: Yes  Patient assessed for rehabilitation services?: Yes  Family / Caregiver Present: No  Referring Practitioner: Karla Remy PA-C  Diagnosis: RLE ORIF 3/18/20  S/P mechanical fall. Subjective  Subjective: Pt states she fell from her chair leaning over and fell onto floor. Pt agreeable to OT services.        Orientation     Objective                Bed mobility  Supine to Sit: Minimal assistance  Scooting: Stand by assistance  Transfers  Stand Pivot Transfers: Contact guard assistance;Stand by assistance  Sit to stand: Stand by assistance(from EOB)        Plan   Plan  Times per week: 3-5  Times per day:

## 2020-04-13 NOTE — PROGRESS NOTES
Physical Therapy  Facility/Department: [facility] MED SURG  Daily Treatment Note  NAME: Jasmin Soto  : 1940  MRN: [id number]    Date of Service: 2020    Discharge Recommendations:  Continue to assess pending progress, Home with Home health PT, 24 hour supervision or assist        Assessment   Body structures, Functions, Activity limitations: Decreased functional mobility ; Decreased strength;Decreased high-level IADLs;Decreased balance; Increased pain  Assessment: Pt required frequent sitting rest breaks due to R hip pain and fatigue in UE's from using the walker. Pt is improving with functional mobility with greater ease in moving sit to stand and improved eccentric lowering. She requires cues to bring walker with her during pivot sit to bed. Treatment Diagnosis: s/p right hip fracture with ORIF - IMN - 3-  Prognosis: Good  Decision Making: Low Complexity  REQUIRES PT FOLLOW UP: Yes  Activity Tolerance  Activity Tolerance: Patient Tolerated treatment well;Patient limited by pain; Patient limited by endurance     Patient Diagnosis(es): There were no encounter diagnoses. has a past medical history of Allergic rhinitis, Anxiety, Chronic back pain, Depression, Double vision with both eyes open, Fall, Hyperlipidemia, Hypertension, Osteoarthritis, and Type II or unspecified type diabetes mellitus without mention of complication, not stated as uncontrolled. has a past surgical history that includes Hysterectomy; shoulder surgery;  section; and Colonoscopy. Restrictions  Restrictions/Precautions  Restrictions/Precautions: Weight Bearing, General Precautions, Fall Risk  Required Braces or Orthoses?: No  Lower Extremity Weight Bearing Restrictions  Right Lower Extremity Weight Bearing: Weight Bearing As Tolerated     Subjective   General  Chart Reviewed: Yes  Response To Previous Treatment: Patient with no complaints from previous session.   Family / Caregiver Present: No  Referring Practitioner: Shreya Uribe MD    Subjective: Pt is agreeable to attempt to ambulate with PT / OT; she has just completed toileting and ADL activies with OT. Pain Screening  Patient Currently in Pain: Yes  Pain Assessment  Pain Assessment: 0-10  Pain Level: 8  Pain Type: Surgical pain  Pain Location: Hip  Pain Orientation: Right  Vital Signs  Patient Currently in Pain: Yes       Objective      Transfers  Sit to Stand: Contact guard assistance;Minimal Assistance  Stand to sit: Contact guard assistance;Minimal Assistance  Bed to Chair: Contact guard assistance    Ambulation  Ambulation?: Yes  Ambulation 1  Surface: level tile  Device: Standard Walker  Assistance: Contact guard assistance;2 Person assistance  Quality of Gait: R foot toe out laterally  Gait Deviations: Slow Trang;Decreased step length;Decreased step height  Distance: 6', 10', 5'          Goals  Long term goals  Time Frame for Long term goals : 2 weeks  Long term goal 1: Achieve Bed mobility with mod I.  Long term goal 2: Perform basic transfers with CGA x 1. Long term goal 3: Ambulate 10 feet with CGA x 1 with [facility] good safety awareness. Long term goal 4: Patient-family education/training as needed. Plan    Plan  Times per week: 4-5 x week  Plan weeks: 2 weeks  Current Treatment Recommendations: Strengthening, Gait Training, Balance Training, Functional Mobility Training, Transfer Training, Pain Management, Neuromuscular Re-education, Safety Education & Training, Patient/Caregiver Education & Training  Safety Devices  Type of devices: Call light within reach, Left in bed     Therapy Time   Individual Concurrent Group Co-treatment   Time In 1603         Time Out 1618         Minutes Mela Jacinto 50, PT       This note serves as D/C summary if patient is discharged prior to next visit.

## 2020-04-14 LAB
GLUCOSE BLD-MCNC: 143 MG/DL (ref 74–106)
GLUCOSE BLD-MCNC: 147 MG/DL (ref 74–106)
GLUCOSE BLD-MCNC: 188 MG/DL (ref 74–106)
GLUCOSE BLD-MCNC: 228 MG/DL (ref 74–106)
PERFORMED ON: ABNORMAL

## 2020-04-14 PROCEDURE — 99308 SBSQ NF CARE LOW MDM 20: CPT | Performed by: INTERNAL MEDICINE

## 2020-04-14 PROCEDURE — 6360000002 HC RX W HCPCS: Performed by: PHYSICIAN ASSISTANT

## 2020-04-14 PROCEDURE — 97803 MED NUTRITION INDIV SUBSEQ: CPT

## 2020-04-14 PROCEDURE — 6370000000 HC RX 637 (ALT 250 FOR IP): Performed by: PHYSICIAN ASSISTANT

## 2020-04-14 PROCEDURE — 97530 THERAPEUTIC ACTIVITIES: CPT

## 2020-04-14 PROCEDURE — 1200000002 HC SEMI PRIVATE SWING BED

## 2020-04-14 PROCEDURE — 97535 SELF CARE MNGMENT TRAINING: CPT

## 2020-04-14 PROCEDURE — 6370000000 HC RX 637 (ALT 250 FOR IP): Performed by: INTERNAL MEDICINE

## 2020-04-14 RX ADMIN — POLYETHYLENE GLYCOL (3350) 17 G: 17 POWDER, FOR SOLUTION ORAL at 08:01

## 2020-04-14 RX ADMIN — ENOXAPARIN SODIUM 40 MG: 40 INJECTION SUBCUTANEOUS at 20:09

## 2020-04-14 RX ADMIN — METHOCARBAMOL TABLETS 500 MG: 500 TABLET, COATED ORAL at 12:44

## 2020-04-14 RX ADMIN — PROPRANOLOL HYDROCHLORIDE 40 MG: 20 TABLET ORAL at 20:08

## 2020-04-14 RX ADMIN — LORAZEPAM 0.5 MG: 0.5 TABLET ORAL at 20:38

## 2020-04-14 RX ADMIN — Medication 1 UNITS: at 17:08

## 2020-04-14 RX ADMIN — INSULIN GLARGINE 15 UNITS: 100 INJECTION, SOLUTION SUBCUTANEOUS at 20:09

## 2020-04-14 RX ADMIN — MECLIZINE HYDROCHLORIDE 25 MG: 25 TABLET ORAL at 20:43

## 2020-04-14 RX ADMIN — OXYCODONE 10 MG: 5 TABLET ORAL at 20:09

## 2020-04-14 RX ADMIN — PROPRANOLOL HYDROCHLORIDE 40 MG: 20 TABLET ORAL at 08:00

## 2020-04-14 RX ADMIN — SENNOSIDES AND DOCUSATE SODIUM 1 TABLET: 8.6; 5 TABLET ORAL at 20:09

## 2020-04-14 RX ADMIN — OXYCODONE 10 MG: 5 TABLET ORAL at 03:09

## 2020-04-14 RX ADMIN — SENNOSIDES AND DOCUSATE SODIUM 1 TABLET: 8.6; 5 TABLET ORAL at 08:00

## 2020-04-14 RX ADMIN — GABAPENTIN 400 MG: 400 CAPSULE ORAL at 12:44

## 2020-04-14 RX ADMIN — Medication 1 UNITS: at 11:28

## 2020-04-14 RX ADMIN — METHOCARBAMOL TABLETS 500 MG: 500 TABLET, COATED ORAL at 08:00

## 2020-04-14 RX ADMIN — INSULIN LISPRO 5 UNITS: 100 INJECTION, SOLUTION INTRAVENOUS; SUBCUTANEOUS at 11:29

## 2020-04-14 RX ADMIN — ALOGLIPTIN 12.5 MG: 12.5 TABLET, FILM COATED ORAL at 08:01

## 2020-04-14 RX ADMIN — OXYCODONE 10 MG: 5 TABLET ORAL at 08:00

## 2020-04-14 RX ADMIN — INSULIN LISPRO 5 UNITS: 100 INJECTION, SOLUTION INTRAVENOUS; SUBCUTANEOUS at 08:06

## 2020-04-14 RX ADMIN — GABAPENTIN 400 MG: 400 CAPSULE ORAL at 08:00

## 2020-04-14 RX ADMIN — MECLIZINE HYDROCHLORIDE 25 MG: 25 TABLET ORAL at 12:44

## 2020-04-14 RX ADMIN — Medication 1 UNITS: at 08:01

## 2020-04-14 RX ADMIN — PANTOPRAZOLE SODIUM 40 MG: 40 TABLET, DELAYED RELEASE ORAL at 06:31

## 2020-04-14 RX ADMIN — GABAPENTIN 400 MG: 400 CAPSULE ORAL at 20:09

## 2020-04-14 RX ADMIN — OXYCODONE 10 MG: 5 TABLET ORAL at 15:52

## 2020-04-14 RX ADMIN — Medication 1 UNITS: at 20:10

## 2020-04-14 RX ADMIN — INSULIN LISPRO 5 UNITS: 100 INJECTION, SOLUTION INTRAVENOUS; SUBCUTANEOUS at 17:11

## 2020-04-14 RX ADMIN — PRAVASTATIN SODIUM 20 MG: 20 TABLET ORAL at 20:09

## 2020-04-14 RX ADMIN — METHOCARBAMOL TABLETS 500 MG: 500 TABLET, COATED ORAL at 20:08

## 2020-04-14 RX ADMIN — MELATONIN 1000 UNITS: at 08:00

## 2020-04-14 RX ADMIN — FERROUS SULFATE TAB EC 324 MG (65 MG FE EQUIVALENT) 324 MG: 324 (65 FE) TABLET DELAYED RESPONSE at 08:01

## 2020-04-14 RX ADMIN — ASPIRIN 81 MG 81 MG: 81 TABLET ORAL at 08:01

## 2020-04-14 ASSESSMENT — PAIN SCALES - GENERAL
PAINLEVEL_OUTOF10: 6
PAINLEVEL_OUTOF10: 7
PAINLEVEL_OUTOF10: 6
PAINLEVEL_OUTOF10: 6

## 2020-04-14 NOTE — FLOWSHEET NOTE
03/28/20 0936   Assessment   Charting Type Shift assessment   Neurological   Neuro (WDL) X  (pt general weaker on the L side, pt states this is baseline)   Level of Consciousness 0   Orientation Level Oriented X4   Cognition Follows commands   Language Clear   NIH/MNIHSS Stroke Scale Assessed No   [place] Coma Scale   Eye Opening 4   Best Verbal Response 5   Best Motor Response 6   [place] Coma Scale Score 15   HEENT   HEENT (WDL) WDL   Respiratory   Respiratory (WDL) WDL   Cardiac   Cardiac (WDL) WDL   Gastrointestinal   Abdominal (WDL) WDL   Peripheral Vascular   Peripheral Vascular (WDL) WDL   Edema None   Skin Color/Condition   Skin Color/Condition (WDL) X   Skin Color Pale   Skin Condition/Temp Warm;Dry   Skin Integrity   Skin Integrity (WDL) X   Skin Integrity Other (Comment)  (healing sx incision)   Location rt hip   Skin Fold Management Yes   Multiple Skin Integrity Sites Yes   Skin Integrity Site 2   Skin Integrity Location 2 Bruising   Location 2 groin   Skin Integrity Site 3   Skin Integrity Location 3 Redness    Location 3 skin folds   Musculoskeletal   Musculoskeletal (WDL) X   RUE Full movement   LUE Full movement   RL Extremity Weakness   LL Extremity Weakness   Genitourinary   Genitourinary (WDL) X  (pt states incontinene at times )   Flank Tenderness No   Suprapubic Tenderness No   Dysuria No   Anus/Rectum   Anus/Rectum (WDL) WDL   Psychosocial   Psychosocial (WDL) WDL   Pt awake in bed. Pt alert and oriented. Pt appears in no acute distress. Pt currently on RA. Pt lung sounds clear throughout. Pt encouraged to cough and deep breathe. Pt states pain is getting under control. Pt dressing on right hip CDI. Pt call bell and bedside table within reach. Will continue to monitor pt.
04/09/20 1800   Cardiac Monitor   Telemetry Monitor On No   Skin Integrity   Skin Integrity (WDL) X   Skin Integrity Other (Comment)  (surgical site)   Location right hip   Preventative Dressing Yes  (coverderm changed)   Dressing Site   (hip)   Date Applied 04/09/20   Assessed this shift? Yes   pt c/o incision site hurting. Changed coverderm at this time. Incision healing well with sutures in place. Small amount of what appears to be scabbing where pt c/o pain. Pt has f/u appt tomorrow and pt states it will be fine until then. Pt given scheduled pain medication and coverderms CDI. Pt encouraged to turn frequently and reposition to help with comfort.
Patient asleep in bed, easy to wake. Alert and oriented x 4. Pain 6/10. No distress noted. Lung sounds clear. Encouraged to cough & deep breathe. Call bell and bedside table in reach. Pt given night snack, sugarless pudding. Pt does not need anything at this time. Instructed to call out for assistance. Will continue to monitor. 04/01/20 2022   Assessment   Charting Type Shift assessment   Neurological   Neuro (WDL) X  (pt weaker on the L side, pt states baseline)   Level of Consciousness 0   Orientation Level Oriented X4   Cognition Follows commands   Language Clear   [place] Coma Scale   Eye Opening 4   Best Verbal Response 5   Best Motor Response 6   [place] Coma Scale Score 15   HEENT   HEENT (WDL) WDL   Respiratory   Respiratory (WDL) WDL   Cardiac   Cardiac (WDL) WDL   Cardiac Monitor   Telemetry Monitor On No   Gastrointestinal   Abdominal (WDL) WDL   Peripheral Vascular   Peripheral Vascular (WDL) WDL   Edema None   Skin Color/Condition   Skin Color/Condition (WDL) X   Skin Color Pale   Skin Condition/Temp Dry; Warm   Skin Integrity   Skin Integrity (WDL) X   Skin Integrity   (healing incision)   Location right hip   Preventative Dressing Yes   Date Applied 04/01/20   Assessed this shift?    (dressing CDI)   Skin Integrity Site 2   Skin Integrity Location 2 Bruising   Location 2 scattered   Skin Integrity Site 3   Skin Integrity Location 3 Redness    Location 3 skin folds   Musculoskeletal   Musculoskeletal (WDL) X   RUE Full movement   LUE Full movement   RL Extremity Weakness   LL Extremity Weakness   Genitourinary   Genitourinary (WDL) X  (pt states incontinence at times )   Flank Tenderness No   Suprapubic Tenderness No   Dysuria No   Psychosocial   Psychosocial (WDL) WDL
Patient awake in bed, alert and oriented x 4. Pt not complaining of pain at this time. No distress noted. Lung sounds clear, pt on room air. Encouraged to cough & deep breathe. Call bell and bedside table in reach. Instructed to call out for assistance. Will continue to monitor.         03/29/20 2055   Assessment   Charting Type Shift assessment   Neurological   Neuro (WDL) X  (pt general weaker on the L side, pt states this is baseline)   Level of Consciousness 0   Orientation Level Oriented X4   Cognition Follows commands   Language Clear   [place] Coma Scale   Eye Opening 4   Best Verbal Response 5   Best Motor Response 6   [place] Coma Scale Score 15   HEENT   HEENT (WDL) WDL   Respiratory   Respiratory (WDL) WDL   Cardiac   Cardiac (WDL) WDL   Cardiac Monitor   Telemetry Monitor On No   Gastrointestinal   Abdominal (WDL) WDL   Peripheral Vascular   Peripheral Vascular (WDL) WDL   Edema None   Skin Color/Condition   Skin Color/Condition (WDL) X   Skin Color Pale   Skin Condition/Temp Warm;Dry   Skin Integrity   Skin Integrity (WDL) X   Skin Integrity   (healing incision)   Location right hip   Preventative Dressing   (covaderm)   Multiple Skin Integrity Sites Yes   Skin Integrity Site 2   Skin Integrity Location 2 Bruising   Location 2 groin   Skin Integrity Site 3   Skin Integrity Location 3 Redness    Location 3 skin folds   Musculoskeletal   Musculoskeletal (WDL) X   RUE Full movement   LUE Full movement   RL Extremity Weakness   LL Extremity Weakness   Genitourinary   Genitourinary (WDL) X  (pt states incontinence at times )   Flank Tenderness No   Suprapubic Tenderness No   Dysuria No   Anus/Rectum   Anus/Rectum (WDL) WDL   Psychosocial   Psychosocial (WDL) WDL
Pt resting quietly in bed. Am assessment complete, respirations equal and unlabored. Pt took am medications without difficulty. Pt instructed to call out with any needs or concerns, none at this time. Reviewed plan of care with pt, verbalized understanding. Call bell within pt reach.       04/05/20 0930   Assessment   Charting Type Shift assessment   Neurological   Neuro (WDL) X   Level of Consciousness 0   Orientation Level Oriented X4   Cognition Follows commands   Language Clear   [place] Coma Scale   Eye Opening 4   Best Verbal Response 5   Best Motor Response 6   [place] Coma Scale Score 15   HEENT   HEENT (WDL) X   Right Eye Impaired vision   Left Eye Impaired vision   Teeth Missing teeth   Respiratory   Respiratory (WDL) WDL   Cardiac   Cardiac (WDL) WDL   Cardiac Monitor   Telemetry Monitor On No   Gastrointestinal   Abdominal (WDL) WDL   Peripheral Vascular   Peripheral Vascular (WDL) WDL   Edema None   Skin Color/Condition   Skin Color/Condition (WDL) X   Skin Color Pale   Skin Condition/Temp Warm;Dry   Skin Integrity   Skin Integrity (WDL) X   Skin Integrity Intact   Location right hip   Skin Integrity Site 2   Skin Integrity Location 2 Bruising   Location 2 scattered   Skin Integrity Site 3   Skin Integrity Location 3 Redness    Location 3 skin folds   Musculoskeletal   Musculoskeletal (WDL) X   RUE Full movement   LUE Full movement   RL Extremity Weakness   LL Extremity Weakness   Genitourinary   Genitourinary (WDL) X   Flank Tenderness No   Suprapubic Tenderness No   Dysuria No   Urine Assessment   Incontinence Yes  (At times)   Psychosocial   Psychosocial (WDL) WDL
Abdominal (WDL) WDL   Abdomen Inspection Soft;Rounded;Rotund   Last BM (including prior to admit) 04/09/20   Tenderness Nontender; Soft   RUQ Bowel Sounds Active   LUQ Bowel Sounds Active   RLQ Bowel Sounds Active   LLQ Bowel Sounds Active   Peripheral Vascular   Peripheral Vascular (WDL) WDL   Edema None   Skin Color/Condition   Skin Color/Condition (WDL) WDL   Skin Integrity   Skin Integrity (WDL) X   Skin Integrity Other (Comment)  (surgical incision)   Location right hip   Preventative Dressing Yes   Skin Fold Management Yes   Multiple Skin Integrity Sites Yes   Dressing Site Abdominal pannus   Treatment Pharmaceutical   Assessed this shift Red   Assessed this shift? Yes   Skin Integrity Site 2   Skin Integrity Location 2 Bruising   Location 2 scattered   Preventative Dressing No   Skin Integrity Site 3   Skin Integrity Location 3 Redness    Location 3 skin folds   Preventative Dressing No   Assessed this shift?  Yes   Musculoskeletal   Musculoskeletal (WDL) X   RUE Full movement   LUE Full movement   RL Extremity Weakness;Limited movement   LL Extremity Weakness;Limited movement   Genitourinary   Genitourinary (WDL) X   Flank Tenderness No   Suprapubic Tenderness No   Dysuria No   Urine Assessment   Incontinence Yes  (at times)   Genitalia   Female Genitalia Normal   Psychosocial   Psychosocial (WDL) WDL
Location 2 Bruising   Location 2 scattered   Preventative Dressing No   Musculoskeletal   Musculoskeletal (WDL) X   RUE Full movement   LUE Full movement   RL Extremity Weakness;Limited movement   LL Extremity Weakness;Limited movement   Genitourinary   Genitourinary (WDL) X   Flank Tenderness No   Suprapubic Tenderness No   Dysuria No   Urine Assessment   Incontinence Yes  (at times)   Anus/Rectum   Anus/Rectum (WDL) WDL   Psychosocial   Psychosocial (WDL) WDL   Pt resting in bed in no noted distress this morning. Pt took medication with no difficulty. Pt is s/p right hip surgery with staples and coverderm covering incision site CDI. Pt c/o some pain this morning, was given scheduled medications. Pt on room air and denies dyspnea except on exertion. No further needs noted.
Soft;Rounded;Rotund   Tenderness Nontender; Soft   RUQ Bowel Sounds Active   LUQ Bowel Sounds Active   RLQ Bowel Sounds Active   LLQ Bowel Sounds Active   Peripheral Vascular   Peripheral Vascular (WDL) WDL   Edema None   Skin Color/Condition   Skin Color/Condition (WDL) WDL   Skin Integrity   Skin Integrity (WDL) X   Skin Integrity Other (Comment)  (surgical incision)   Location right hip   Skin Integrity Site 2   Skin Integrity Location 2 Bruising   Location 2 scattered   Preventative Dressing No   Skin Integrity Site 3   Skin Integrity Location 3 Redness    Location 3 skin folds   Preventative Dressing No   Assessed this shift?  Yes   Musculoskeletal   Musculoskeletal (WDL) X   RUE Full movement   LUE Full movement   RL Extremity Weakness;Limited movement   LL Extremity Weakness;Limited movement   Genitourinary   Genitourinary (WDL) X   Flank Tenderness No   Suprapubic Tenderness No   Dysuria No   Urine Urgency   Urine Urgency Yes   Urine Assessment   Incontinence Yes  (at times)   Genitalia   Female Genitalia Normal   Psychosocial   Psychosocial (WDL) WDL
Urine Urgency Yes   Urine Assessment   Incontinence Yes   Genitalia   Female Genitalia Normal   Anus/Rectum   Anus/Rectum (WDL) WDL   Psychosocial   Psychosocial (WDL) WDL     No acute distress  Will monitor

## 2020-04-14 NOTE — PROGRESS NOTES
Physical Therapy  Facility/Department: [facility] MED SURG  Daily Treatment Note  NAME: Migue Vargas  : 1940  MRN: [id number]    Date of Service: 2020    Discharge Recommendations:  Continue to assess pending progress, Home with Home health PT, 24 hour supervision or assist        Assessment   Body structures, Functions, Activity limitations: Decreased functional mobility ; Decreased strength;Decreased high-level IADLs;Decreased balance; Increased pain  Assessment: Pt was able to increase with ambulation distance today and demonstrated consistent good eccentric lowering to seated position from standing. Pt is making good progress toward goals. Treatment Diagnosis: s/p right hip fracture with ORIF - IMN - 3-  Prognosis: Good  Decision Making: Low Complexity  REQUIRES PT FOLLOW UP: Yes  Activity Tolerance  Activity Tolerance: Patient Tolerated treatment well;Patient limited by pain; Patient limited by endurance     Patient Diagnosis(es): There were no encounter diagnoses. has a past medical history of Allergic rhinitis, Anxiety, Chronic back pain, Depression, Double vision with both eyes open, Fall, Hyperlipidemia, Hypertension, Osteoarthritis, and Type II or unspecified type diabetes mellitus without mention of complication, not stated as uncontrolled. has a past surgical history that includes Hysterectomy; shoulder surgery;  section; and Colonoscopy. Restrictions  Restrictions/Precautions  Restrictions/Precautions: Weight Bearing, General Precautions, Fall Risk  Required Braces or Orthoses?: No  Lower Extremity Weight Bearing Restrictions  Right Lower Extremity Weight Bearing: Weight Bearing As Tolerated     Subjective   General  Chart Reviewed: Yes  Response To Previous Treatment: Patient with no complaints from previous session. Family / Caregiver Present: No  Referring Practitioner: Loyda Alvarado MD  Subjective  Subjective: Pt presents supine in bed, agreeable to PT / OT treatment. Orientation  Orientation  Overall Orientation Status: Within Normal Limits    Objective   Bed mobility  Rolling to Left: Stand by assistance  Rolling to Right: Stand by assistance  Supine to Sit: Stand by assistance  Scooting: Stand by assistance     Transfers  Sit to Stand: Contact guard assistance  Stand to sit: Contact guard assistance  Bed to Chair: Contact guard assistance     Ambulation  Ambulation?: Yes  Ambulation 1  Surface: level tile  Device: Standard Walker  Assistance: Contact guard assistance;2 Person assistance  Quality of Gait: R foot toe out laterally  Distance: 2x17', then 5' WC to recliner. Goals  Long term goals  Time Frame for Long term goals : 2 weeks  Long term goal 1: Achieve Bed mobility with mod I.  Long term goal 2: Perform basic transfers with CGA x 1. Long term goal 3: Ambulate 10 feet with CGA x 1 with [facility] good safety awareness. Long term goal 4: Patient-family education/training as needed. Plan    Plan  Times per week: 4-5 x week  Plan weeks: 2 weeks  Current Treatment Recommendations: Strengthening, Gait Training, Balance Training, Functional Mobility Training, Transfer Training, Pain Management, Neuromuscular Re-education, Safety Education & Training, Patient/Caregiver Education & Training  Safety Devices  Type of devices: Left in chair, Call light within reach     Therapy Time   Individual Concurrent Group Co-treatment   Time In 1030         Time Out 1059         Minutes Heather 36, PT         This note serves as D/C summary if patient is discharged prior to next visit.

## 2020-04-15 LAB
GLUCOSE BLD-MCNC: 171 MG/DL (ref 74–106)
GLUCOSE BLD-MCNC: 194 MG/DL (ref 74–106)
GLUCOSE BLD-MCNC: 198 MG/DL (ref 74–106)
GLUCOSE BLD-MCNC: 206 MG/DL (ref 74–106)
PERFORMED ON: ABNORMAL

## 2020-04-15 PROCEDURE — 97535 SELF CARE MNGMENT TRAINING: CPT

## 2020-04-15 PROCEDURE — 6370000000 HC RX 637 (ALT 250 FOR IP): Performed by: PHYSICIAN ASSISTANT

## 2020-04-15 PROCEDURE — 97530 THERAPEUTIC ACTIVITIES: CPT

## 2020-04-15 PROCEDURE — 1200000002 HC SEMI PRIVATE SWING BED

## 2020-04-15 PROCEDURE — 6370000000 HC RX 637 (ALT 250 FOR IP): Performed by: INTERNAL MEDICINE

## 2020-04-15 PROCEDURE — 6360000002 HC RX W HCPCS: Performed by: PHYSICIAN ASSISTANT

## 2020-04-15 RX ADMIN — METHOCARBAMOL TABLETS 500 MG: 500 TABLET, COATED ORAL at 13:52

## 2020-04-15 RX ADMIN — Medication 1 UNITS: at 20:56

## 2020-04-15 RX ADMIN — PROPRANOLOL HYDROCHLORIDE 40 MG: 20 TABLET ORAL at 20:53

## 2020-04-15 RX ADMIN — Medication 1 UNITS: at 17:41

## 2020-04-15 RX ADMIN — INSULIN LISPRO 5 UNITS: 100 INJECTION, SOLUTION INTRAVENOUS; SUBCUTANEOUS at 11:19

## 2020-04-15 RX ADMIN — POLYETHYLENE GLYCOL (3350) 17 G: 17 POWDER, FOR SOLUTION ORAL at 08:39

## 2020-04-15 RX ADMIN — OXYCODONE 10 MG: 5 TABLET ORAL at 13:52

## 2020-04-15 RX ADMIN — PROPRANOLOL HYDROCHLORIDE 40 MG: 20 TABLET ORAL at 08:38

## 2020-04-15 RX ADMIN — SENNOSIDES AND DOCUSATE SODIUM 1 TABLET: 8.6; 5 TABLET ORAL at 08:39

## 2020-04-15 RX ADMIN — INSULIN GLARGINE 15 UNITS: 100 INJECTION, SOLUTION SUBCUTANEOUS at 20:56

## 2020-04-15 RX ADMIN — GABAPENTIN 400 MG: 400 CAPSULE ORAL at 20:53

## 2020-04-15 RX ADMIN — ENOXAPARIN SODIUM 40 MG: 40 INJECTION SUBCUTANEOUS at 20:54

## 2020-04-15 RX ADMIN — ASPIRIN 81 MG 81 MG: 81 TABLET ORAL at 08:39

## 2020-04-15 RX ADMIN — PANTOPRAZOLE SODIUM 40 MG: 40 TABLET, DELAYED RELEASE ORAL at 06:03

## 2020-04-15 RX ADMIN — GABAPENTIN 400 MG: 400 CAPSULE ORAL at 13:52

## 2020-04-15 RX ADMIN — INSULIN LISPRO 5 UNITS: 100 INJECTION, SOLUTION INTRAVENOUS; SUBCUTANEOUS at 08:55

## 2020-04-15 RX ADMIN — MELATONIN 1000 UNITS: at 08:38

## 2020-04-15 RX ADMIN — Medication 1 UNITS: at 08:39

## 2020-04-15 RX ADMIN — Medication 1 UNITS: at 11:17

## 2020-04-15 RX ADMIN — METHOCARBAMOL TABLETS 500 MG: 500 TABLET, COATED ORAL at 08:38

## 2020-04-15 RX ADMIN — INSULIN LISPRO 5 UNITS: 100 INJECTION, SOLUTION INTRAVENOUS; SUBCUTANEOUS at 17:42

## 2020-04-15 RX ADMIN — SENNOSIDES AND DOCUSATE SODIUM 1 TABLET: 8.6; 5 TABLET ORAL at 20:53

## 2020-04-15 RX ADMIN — OXYCODONE 10 MG: 5 TABLET ORAL at 03:22

## 2020-04-15 RX ADMIN — ALOGLIPTIN 12.5 MG: 12.5 TABLET, FILM COATED ORAL at 08:39

## 2020-04-15 RX ADMIN — FERROUS SULFATE TAB EC 324 MG (65 MG FE EQUIVALENT) 324 MG: 324 (65 FE) TABLET DELAYED RESPONSE at 08:39

## 2020-04-15 RX ADMIN — METHOCARBAMOL TABLETS 500 MG: 500 TABLET, COATED ORAL at 20:53

## 2020-04-15 RX ADMIN — GABAPENTIN 400 MG: 400 CAPSULE ORAL at 08:39

## 2020-04-15 RX ADMIN — OXYCODONE 10 MG: 5 TABLET ORAL at 08:38

## 2020-04-15 RX ADMIN — PRAVASTATIN SODIUM 20 MG: 20 TABLET ORAL at 20:53

## 2020-04-15 RX ADMIN — OXYCODONE 10 MG: 5 TABLET ORAL at 20:53

## 2020-04-15 ASSESSMENT — PAIN SCALES - GENERAL
PAINLEVEL_OUTOF10: 7

## 2020-04-15 NOTE — PROGRESS NOTES
Orientation  Orientation  Overall Orientation Status: Within Normal Limits  Cognition      Objective   Bed mobility  Rolling to Right: Stand by assistance  Supine to Sit: Stand by assistance  Transfers  Sit to Stand: Contact guard assistance  Stand to sit: Contact guard assistance  Bed to Chair: Contact guard assistance  Ambulation  Ambulation?: Yes  Ambulation 1  Surface: level tile  Device: Standard Walker  Assistance: Contact guard assistance;2 Person assistance  Gait Deviations: Slow Trang;Decreased step length;Decreased step height  Distance: 15', 18', 15'  Comments: Improved tolerance to [address] on right LE; fatigues easily               Strength RLE  R Hip Flexion: 3+/5  R Hip ABduction: 3+/5  R Hip ADduction: 3+/5  R Knee Flexion: 4-/5  R Knee Extension: 4-/5  R Ankle Dorsiflexion: 4/5                       Goals  Long term goals  Time Frame for Long term goals : 2 weeks  Long term goal 1: Achieve Bed mobility with mod I.  Long term goal 2: Perform basic transfers with CGA x 1. Long term goal 3: Ambulate 10 feet with CGA x 1 with [facility] good safety awareness. Long term goal 4: Patient-family education/training as needed.     Plan    Plan  Times per week: 4-5 x week  Plan weeks: 1-2 weeks  Current Treatment Recommendations: Strengthening, Gait Training, Balance Training, Functional Mobility Training, Transfer Training, Pain Management, Neuromuscular Re-education, Safety Education & Training, Patient/Caregiver Education & Training  Safety Devices  Type of devices: Left in chair, Call light within reach     Therapy Time   Individual Concurrent Group Co-treatment   Time In 0947         Time Out 1025         Minutes Lew White [address] [place] [address]

## 2020-04-15 NOTE — PROGRESS NOTES
Occupational Therapy  Facility/Department: [facility] MED SURG  Daily Treatment Note  NAME: Araceli Luis  : 1940  MRN: [id number]    Date of Service: 4/15/2020    Discharge Recommendations:  Home with Home health OT       Assessment   Assessment: Pt agreeable to OT services. Co tx with PT. Come to sit with SBA. Pt completed LB dressing leaning to left side on this date with occasional CGA<>min assist to correct. Pt transferred to standing and completed LB clothing management with CGA. Pt donned socks using sock aid with extra time needed to complete task. Pt ambulated variable distances with fatigue noted and several rest breaks. Pt tolerated increased activity however, continues to be limited by pain and fatigue. Pt left seated upright in recliner with call light in reach. Patient Diagnosis(es): There were no encounter diagnoses. has a past medical history of Allergic rhinitis, Anxiety, Chronic back pain, Depression, Double vision with both eyes open, Fall, Hyperlipidemia, Hypertension, Osteoarthritis, and Type II or unspecified type diabetes mellitus without mention of complication, not stated as uncontrolled. has a past surgical history that includes Hysterectomy; shoulder surgery;  section; and Colonoscopy. Restrictions  Restrictions/Precautions  Restrictions/Precautions: Weight Bearing, General Precautions, Fall Risk  Required Braces or Orthoses?: No  Lower Extremity Weight Bearing Restrictions  Right Lower Extremity Weight Bearing: Weight Bearing As Tolerated  Subjective   General  Chart Reviewed: Yes  Patient assessed for rehabilitation services?: Yes  Family / Caregiver Present: No  Referring Practitioner: Han Steel PA-C  Diagnosis: RLE ORIF 3/18/20  S/P mechanical fall. Subjective  Subjective: Pt states she fell from her chair leaning over and fell onto floor. Pt agreeable to OT services.        Orientation     Objective    ADL  LE Dressing: Contact guard

## 2020-04-16 LAB
GLUCOSE BLD-MCNC: 117 MG/DL (ref 74–106)
GLUCOSE BLD-MCNC: 165 MG/DL (ref 74–106)
GLUCOSE BLD-MCNC: 183 MG/DL (ref 74–106)
GLUCOSE BLD-MCNC: 193 MG/DL (ref 74–106)
PERFORMED ON: ABNORMAL

## 2020-04-16 PROCEDURE — 97110 THERAPEUTIC EXERCISES: CPT

## 2020-04-16 PROCEDURE — 6370000000 HC RX 637 (ALT 250 FOR IP): Performed by: PHYSICIAN ASSISTANT

## 2020-04-16 PROCEDURE — 97530 THERAPEUTIC ACTIVITIES: CPT

## 2020-04-16 PROCEDURE — 6360000002 HC RX W HCPCS: Performed by: PHYSICIAN ASSISTANT

## 2020-04-16 PROCEDURE — 6370000000 HC RX 637 (ALT 250 FOR IP): Performed by: INTERNAL MEDICINE

## 2020-04-16 PROCEDURE — 97535 SELF CARE MNGMENT TRAINING: CPT

## 2020-04-16 PROCEDURE — 1200000002 HC SEMI PRIVATE SWING BED

## 2020-04-16 RX ORDER — INSULIN GLARGINE 100 [IU]/ML
20 INJECTION, SOLUTION SUBCUTANEOUS NIGHTLY
Status: DISCONTINUED | OUTPATIENT
Start: 2020-04-16 | End: 2020-04-17

## 2020-04-16 RX ADMIN — OXYCODONE 10 MG: 5 TABLET ORAL at 02:54

## 2020-04-16 RX ADMIN — OXYCODONE 10 MG: 5 TABLET ORAL at 08:32

## 2020-04-16 RX ADMIN — Medication 1 UNITS: at 20:30

## 2020-04-16 RX ADMIN — OXYCODONE 10 MG: 5 TABLET ORAL at 15:41

## 2020-04-16 RX ADMIN — METHOCARBAMOL TABLETS 500 MG: 500 TABLET, COATED ORAL at 20:22

## 2020-04-16 RX ADMIN — Medication 1 UNITS: at 08:35

## 2020-04-16 RX ADMIN — PANTOPRAZOLE SODIUM 40 MG: 40 TABLET, DELAYED RELEASE ORAL at 05:43

## 2020-04-16 RX ADMIN — ENOXAPARIN SODIUM 40 MG: 40 INJECTION SUBCUTANEOUS at 20:21

## 2020-04-16 RX ADMIN — GABAPENTIN 400 MG: 400 CAPSULE ORAL at 15:41

## 2020-04-16 RX ADMIN — FERROUS SULFATE TAB EC 324 MG (65 MG FE EQUIVALENT) 324 MG: 324 (65 FE) TABLET DELAYED RESPONSE at 08:32

## 2020-04-16 RX ADMIN — POLYETHYLENE GLYCOL (3350) 17 G: 17 POWDER, FOR SOLUTION ORAL at 20:24

## 2020-04-16 RX ADMIN — MELATONIN 1000 UNITS: at 08:33

## 2020-04-16 RX ADMIN — SENNOSIDES AND DOCUSATE SODIUM 1 TABLET: 8.6; 5 TABLET ORAL at 20:22

## 2020-04-16 RX ADMIN — PRAVASTATIN SODIUM 20 MG: 20 TABLET ORAL at 20:21

## 2020-04-16 RX ADMIN — ASPIRIN 81 MG 81 MG: 81 TABLET ORAL at 08:32

## 2020-04-16 RX ADMIN — INSULIN LISPRO 5 UNITS: 100 INJECTION, SOLUTION INTRAVENOUS; SUBCUTANEOUS at 11:37

## 2020-04-16 RX ADMIN — METHOCARBAMOL TABLETS 500 MG: 500 TABLET, COATED ORAL at 08:32

## 2020-04-16 RX ADMIN — INSULIN LISPRO 5 UNITS: 100 INJECTION, SOLUTION INTRAVENOUS; SUBCUTANEOUS at 08:39

## 2020-04-16 RX ADMIN — ALOGLIPTIN 12.5 MG: 12.5 TABLET, FILM COATED ORAL at 08:32

## 2020-04-16 RX ADMIN — OXYCODONE 10 MG: 5 TABLET ORAL at 20:21

## 2020-04-16 RX ADMIN — METHOCARBAMOL TABLETS 500 MG: 500 TABLET, COATED ORAL at 15:41

## 2020-04-16 RX ADMIN — INSULIN LISPRO 5 UNITS: 100 INJECTION, SOLUTION INTRAVENOUS; SUBCUTANEOUS at 17:01

## 2020-04-16 RX ADMIN — MECLIZINE HYDROCHLORIDE 25 MG: 25 TABLET ORAL at 20:22

## 2020-04-16 RX ADMIN — LORAZEPAM 0.5 MG: 0.5 TABLET ORAL at 20:22

## 2020-04-16 RX ADMIN — SENNOSIDES AND DOCUSATE SODIUM 1 TABLET: 8.6; 5 TABLET ORAL at 08:32

## 2020-04-16 RX ADMIN — INSULIN GLARGINE 20 UNITS: 100 INJECTION, SOLUTION SUBCUTANEOUS at 20:30

## 2020-04-16 RX ADMIN — GABAPENTIN 400 MG: 400 CAPSULE ORAL at 08:32

## 2020-04-16 RX ADMIN — Medication 1 UNITS: at 11:34

## 2020-04-16 RX ADMIN — PROPRANOLOL HYDROCHLORIDE 40 MG: 20 TABLET ORAL at 20:22

## 2020-04-16 RX ADMIN — GABAPENTIN 400 MG: 400 CAPSULE ORAL at 20:22

## 2020-04-16 ASSESSMENT — PAIN SCALES - GENERAL
PAINLEVEL_OUTOF10: 7
PAINLEVEL_OUTOF10: 5
PAINLEVEL_OUTOF10: 5
PAINLEVEL_OUTOF10: 6
PAINLEVEL_OUTOF10: 8
PAINLEVEL_OUTOF10: 5

## 2020-04-16 NOTE — PROGRESS NOTES
Physical Therapy  Facility/Department: [facility] MED SURG  Daily Treatment Note  NAME: Altagracia Parker  : 1940  MRN: [id number]    Date of Service: 2020    Discharge Recommendations:  Continue to assess pending progress, Home with Home health PT, 24 hour supervision or assist        Assessment   Body structures, Functions, Activity limitations: Decreased functional mobility ; Decreased strength;Decreased high-level IADLs;Decreased balance; Increased pain  Assessment: Pt was able to increase with ambulation distance today and demonstrated consistent good eccentric lowering to seated position from standing. Pt is making good progress toward goals. Treatment Diagnosis: s/p right hip fracture with ORIF - IMN - 3-  Prognosis: Good  Decision Making: Low Complexity  REQUIRES PT FOLLOW UP: Yes  Activity Tolerance  Activity Tolerance: Patient Tolerated treatment well;Patient limited by pain; Patient limited by endurance     Patient Diagnosis(es): There were no encounter diagnoses. has a past medical history of Allergic rhinitis, Anxiety, Chronic back pain, Depression, Double vision with both eyes open, Fall, Hyperlipidemia, Hypertension, Osteoarthritis, and Type II or unspecified type diabetes mellitus without mention of complication, not stated as uncontrolled. has a past surgical history that includes Hysterectomy; shoulder surgery;  section; and Colonoscopy. Restrictions  Restrictions/Precautions  Restrictions/Precautions: Weight Bearing, General Precautions, Fall Risk  Required Braces or Orthoses?: No  Lower Extremity Weight Bearing Restrictions  Right Lower Extremity Weight Bearing: Weight Bearing As Tolerated  Subjective   General  Chart Reviewed: Yes  Response To Previous Treatment: Patient with no complaints from previous session. Family / Caregiver Present: No  Referring Practitioner: Dc Nicole MD  Subjective  Subjective: Patient in chair; no new c/o; agreeable to treatment.   Pain

## 2020-04-17 LAB
GLUCOSE BLD-MCNC: 193 MG/DL (ref 74–106)
GLUCOSE BLD-MCNC: 195 MG/DL (ref 74–106)
GLUCOSE BLD-MCNC: 211 MG/DL (ref 74–106)
GLUCOSE BLD-MCNC: 231 MG/DL (ref 74–106)
PERFORMED ON: ABNORMAL

## 2020-04-17 PROCEDURE — 6370000000 HC RX 637 (ALT 250 FOR IP): Performed by: INTERNAL MEDICINE

## 2020-04-17 PROCEDURE — 6370000000 HC RX 637 (ALT 250 FOR IP): Performed by: PHYSICIAN ASSISTANT

## 2020-04-17 PROCEDURE — 97530 THERAPEUTIC ACTIVITIES: CPT

## 2020-04-17 PROCEDURE — 97110 THERAPEUTIC EXERCISES: CPT

## 2020-04-17 PROCEDURE — 6360000002 HC RX W HCPCS: Performed by: PHYSICIAN ASSISTANT

## 2020-04-17 PROCEDURE — 1200000002 HC SEMI PRIVATE SWING BED

## 2020-04-17 PROCEDURE — 97535 SELF CARE MNGMENT TRAINING: CPT

## 2020-04-17 RX ORDER — INSULIN GLARGINE 100 [IU]/ML
25 INJECTION, SOLUTION SUBCUTANEOUS NIGHTLY
Status: DISCONTINUED | OUTPATIENT
Start: 2020-04-17 | End: 2020-04-18

## 2020-04-17 RX ADMIN — METHOCARBAMOL TABLETS 500 MG: 500 TABLET, COATED ORAL at 14:21

## 2020-04-17 RX ADMIN — OXYCODONE 10 MG: 5 TABLET ORAL at 14:21

## 2020-04-17 RX ADMIN — SENNOSIDES AND DOCUSATE SODIUM 1 TABLET: 8.6; 5 TABLET ORAL at 08:12

## 2020-04-17 RX ADMIN — INSULIN LISPRO 5 UNITS: 100 INJECTION, SOLUTION INTRAVENOUS; SUBCUTANEOUS at 16:42

## 2020-04-17 RX ADMIN — Medication 1 UNITS: at 08:15

## 2020-04-17 RX ADMIN — PROPRANOLOL HYDROCHLORIDE 40 MG: 20 TABLET ORAL at 08:11

## 2020-04-17 RX ADMIN — SENNOSIDES AND DOCUSATE SODIUM 1 TABLET: 8.6; 5 TABLET ORAL at 20:11

## 2020-04-17 RX ADMIN — PANTOPRAZOLE SODIUM 40 MG: 40 TABLET, DELAYED RELEASE ORAL at 06:05

## 2020-04-17 RX ADMIN — Medication 1 UNITS: at 16:41

## 2020-04-17 RX ADMIN — ASPIRIN 81 MG 81 MG: 81 TABLET ORAL at 08:11

## 2020-04-17 RX ADMIN — INSULIN GLARGINE 25 UNITS: 100 INJECTION, SOLUTION SUBCUTANEOUS at 20:12

## 2020-04-17 RX ADMIN — ALOGLIPTIN 12.5 MG: 12.5 TABLET, FILM COATED ORAL at 08:21

## 2020-04-17 RX ADMIN — Medication 1 UNITS: at 20:12

## 2020-04-17 RX ADMIN — PROPRANOLOL HYDROCHLORIDE 40 MG: 20 TABLET ORAL at 20:11

## 2020-04-17 RX ADMIN — OXYCODONE 10 MG: 5 TABLET ORAL at 20:11

## 2020-04-17 RX ADMIN — METHOCARBAMOL TABLETS 500 MG: 500 TABLET, COATED ORAL at 20:11

## 2020-04-17 RX ADMIN — Medication 2 UNITS: at 11:42

## 2020-04-17 RX ADMIN — GABAPENTIN 400 MG: 400 CAPSULE ORAL at 20:11

## 2020-04-17 RX ADMIN — GABAPENTIN 400 MG: 400 CAPSULE ORAL at 14:21

## 2020-04-17 RX ADMIN — PRAVASTATIN SODIUM 20 MG: 20 TABLET ORAL at 20:11

## 2020-04-17 RX ADMIN — ENOXAPARIN SODIUM 40 MG: 40 INJECTION SUBCUTANEOUS at 20:11

## 2020-04-17 RX ADMIN — METHOCARBAMOL TABLETS 500 MG: 500 TABLET, COATED ORAL at 08:11

## 2020-04-17 RX ADMIN — GABAPENTIN 400 MG: 400 CAPSULE ORAL at 08:11

## 2020-04-17 RX ADMIN — FERROUS SULFATE TAB EC 324 MG (65 MG FE EQUIVALENT) 324 MG: 324 (65 FE) TABLET DELAYED RESPONSE at 08:11

## 2020-04-17 RX ADMIN — INSULIN LISPRO 5 UNITS: 100 INJECTION, SOLUTION INTRAVENOUS; SUBCUTANEOUS at 08:13

## 2020-04-17 RX ADMIN — INSULIN LISPRO 5 UNITS: 100 INJECTION, SOLUTION INTRAVENOUS; SUBCUTANEOUS at 11:43

## 2020-04-17 RX ADMIN — MELATONIN 1000 UNITS: at 08:11

## 2020-04-17 RX ADMIN — POLYETHYLENE GLYCOL (3350) 17 G: 17 POWDER, FOR SOLUTION ORAL at 20:11

## 2020-04-17 RX ADMIN — OXYCODONE 10 MG: 5 TABLET ORAL at 08:11

## 2020-04-17 ASSESSMENT — PAIN DESCRIPTION - PAIN TYPE: TYPE: ACUTE PAIN

## 2020-04-17 ASSESSMENT — PAIN DESCRIPTION - LOCATION: LOCATION: HIP

## 2020-04-17 ASSESSMENT — PAIN SCALES - GENERAL
PAINLEVEL_OUTOF10: 9
PAINLEVEL_OUTOF10: 7
PAINLEVEL_OUTOF10: 9

## 2020-04-17 NOTE — PLAN OF CARE
Problem: Safety:  Goal: Free from accidental physical injury  Description: Free from accidental physical injury  Outcome: Ongoing     Problem: Daily Care:  Goal: Daily care needs are met  Description: Daily care needs are met  Outcome: Ongoing

## 2020-04-17 NOTE — PROGRESS NOTES
Physical Therapy  Facility/Department: [facility] MED SURG  Daily Treatment Note  NAME: Semaj Pantoja  : 1940  MRN: [id number]    Date of Service: 2020    Discharge Recommendations:  Continue to assess pending progress, Home with Home health PT, 24 hour supervision or assist        Assessment   Body structures, Functions, Activity limitations: Decreased functional mobility ; Decreased strength;Decreased high-level IADLs;Decreased balance; Increased pain  Assessment: continued c/o right thigh, hip pain; limited tolerance to tx today compared to the past 2 days- due to pain and fatigue; overall functional mobility is improved  Treatment Diagnosis: s/p right hip fracture with ORIF - IMN - 3-  Prognosis: Good  Decision Making: Low Complexity  REQUIRES PT FOLLOW UP: Yes  Activity Tolerance  Activity Tolerance: Patient Tolerated treatment well;Patient limited by pain; Patient limited by endurance     Patient Diagnosis(es): There were no encounter diagnoses. has a past medical history of Allergic rhinitis, Anxiety, Chronic back pain, Depression, Double vision with both eyes open, Fall, Hyperlipidemia, Hypertension, Osteoarthritis, and Type II or unspecified type diabetes mellitus without mention of complication, not stated as uncontrolled. has a past surgical history that includes Hysterectomy; shoulder surgery;  section; and Colonoscopy. Restrictions  Restrictions/Precautions  Restrictions/Precautions: Weight Bearing, General Precautions, Fall Risk  Required Braces or Orthoses?: No  Lower Extremity Weight Bearing Restrictions  Right Lower Extremity Weight Bearing: Weight Bearing As Tolerated  Subjective   General  Chart Reviewed: Yes  Response To Previous Treatment: Patient with no complaints from previous session. Family / Caregiver Present: No  Referring Practitioner: Tabitha Simeon MD  Subjective  Subjective: Patient in chair; no new c/o - continues to have hip pain; agreeable to treatment.

## 2020-04-17 NOTE — PROGRESS NOTES
Practitioner: Marvella Harada, PA-C  Diagnosis: RLE ORIF 3/18/20  S/P mechanical fall. Subjective  Subjective: Pt states she fell from her chair leaning over and fell onto floor. Pt agreeable to OT services. Orientation     Objective    ADL  LE Dressing: Contact guard assistance  Toileting: Contact guard assistance           Bed mobility  Supine to Sit: Modified independent  Scooting: Modified independent  Transfers  Stand Pivot Transfers: Contact guard assistance  Sit to stand: Stand by assistance  Stand to sit: Contact guard assistance;Maximum assistance        Plan   Plan  Times per week: 3-5  Times per day: Daily  Plan weeks: 2  Current Treatment Recommendations: Self-Care / ADL, Strengthening, Balance Training, Safety Education & Training, Patient/Caregiver Education & Training    Goals  Short term goals  Time Frame for Short term goals: 1 week  Short term goal 1: Pt to complete SPT from bed to BSC/chair with MOD A. Short term goal 2: Pt to complete dressing with min assist using AE as needed. Short term goal 3: Pt to complete toileting with min assist.   Short term goal 4: Pt to complete sponge bathing with Min A. Short term goal 5: Pt to tolerate x15 minutes of activity to increase independence with ADL tasks. Long term goals  Time Frame for Long term goals : 2 weeks  Long term goal 1: Pt to complete SPT from bed to chair with CGA using RW. Long term goal 2: pt to complete dressing with [place]. Long term goal 3: pt tocomplete toileting with SBA. Long term goal 4: Pt to complete bathing with [place]. Therapy Time   Individual Concurrent Group Co-treatment   Time In 4017         Time Out 2001         Minutes 40              This note serves as a DC summary in the event of pt discharge.      Valery Cabrera OTR/L

## 2020-04-18 LAB
GLUCOSE BLD-MCNC: 164 MG/DL (ref 74–106)
GLUCOSE BLD-MCNC: 188 MG/DL (ref 74–106)
GLUCOSE BLD-MCNC: 228 MG/DL (ref 74–106)
GLUCOSE BLD-MCNC: 252 MG/DL (ref 74–106)
PERFORMED ON: ABNORMAL

## 2020-04-18 PROCEDURE — 6370000000 HC RX 637 (ALT 250 FOR IP): Performed by: PHYSICIAN ASSISTANT

## 2020-04-18 PROCEDURE — 6360000002 HC RX W HCPCS: Performed by: PHYSICIAN ASSISTANT

## 2020-04-18 PROCEDURE — 6370000000 HC RX 637 (ALT 250 FOR IP): Performed by: INTERNAL MEDICINE

## 2020-04-18 PROCEDURE — 1200000002 HC SEMI PRIVATE SWING BED

## 2020-04-18 RX ORDER — INSULIN GLARGINE 100 [IU]/ML
30 INJECTION, SOLUTION SUBCUTANEOUS NIGHTLY
Status: DISCONTINUED | OUTPATIENT
Start: 2020-04-18 | End: 2020-04-20 | Stop reason: HOSPADM

## 2020-04-18 RX ADMIN — ENOXAPARIN SODIUM 40 MG: 40 INJECTION SUBCUTANEOUS at 20:15

## 2020-04-18 RX ADMIN — GABAPENTIN 400 MG: 400 CAPSULE ORAL at 20:16

## 2020-04-18 RX ADMIN — SENNOSIDES AND DOCUSATE SODIUM 1 TABLET: 8.6; 5 TABLET ORAL at 20:16

## 2020-04-18 RX ADMIN — PROPRANOLOL HYDROCHLORIDE 40 MG: 20 TABLET ORAL at 20:16

## 2020-04-18 RX ADMIN — GABAPENTIN 400 MG: 400 CAPSULE ORAL at 07:46

## 2020-04-18 RX ADMIN — INSULIN LISPRO 10 UNITS: 100 INJECTION, SOLUTION INTRAVENOUS; SUBCUTANEOUS at 16:21

## 2020-04-18 RX ADMIN — POLYETHYLENE GLYCOL (3350) 17 G: 17 POWDER, FOR SOLUTION ORAL at 07:53

## 2020-04-18 RX ADMIN — OXYCODONE 10 MG: 5 TABLET ORAL at 15:00

## 2020-04-18 RX ADMIN — INSULIN LISPRO 5 UNITS: 100 INJECTION, SOLUTION INTRAVENOUS; SUBCUTANEOUS at 07:55

## 2020-04-18 RX ADMIN — Medication 2 UNITS: at 20:17

## 2020-04-18 RX ADMIN — OXYCODONE 10 MG: 5 TABLET ORAL at 20:15

## 2020-04-18 RX ADMIN — MELATONIN 1000 UNITS: at 07:47

## 2020-04-18 RX ADMIN — PRAVASTATIN SODIUM 20 MG: 20 TABLET ORAL at 20:16

## 2020-04-18 RX ADMIN — Medication 1 UNITS: at 16:19

## 2020-04-18 RX ADMIN — INSULIN LISPRO 10 UNITS: 100 INJECTION, SOLUTION INTRAVENOUS; SUBCUTANEOUS at 11:37

## 2020-04-18 RX ADMIN — OXYCODONE 10 MG: 5 TABLET ORAL at 03:52

## 2020-04-18 RX ADMIN — METHOCARBAMOL TABLETS 500 MG: 500 TABLET, COATED ORAL at 07:46

## 2020-04-18 RX ADMIN — METHOCARBAMOL TABLETS 500 MG: 500 TABLET, COATED ORAL at 15:01

## 2020-04-18 RX ADMIN — SENNOSIDES AND DOCUSATE SODIUM 1 TABLET: 8.6; 5 TABLET ORAL at 07:47

## 2020-04-18 RX ADMIN — METHOCARBAMOL TABLETS 500 MG: 500 TABLET, COATED ORAL at 20:15

## 2020-04-18 RX ADMIN — Medication 1 UNITS: at 11:36

## 2020-04-18 RX ADMIN — PROPRANOLOL HYDROCHLORIDE 40 MG: 20 TABLET ORAL at 07:47

## 2020-04-18 RX ADMIN — PANTOPRAZOLE SODIUM 40 MG: 40 TABLET, DELAYED RELEASE ORAL at 06:09

## 2020-04-18 RX ADMIN — FERROUS SULFATE TAB EC 324 MG (65 MG FE EQUIVALENT) 324 MG: 324 (65 FE) TABLET DELAYED RESPONSE at 07:46

## 2020-04-18 RX ADMIN — GABAPENTIN 400 MG: 400 CAPSULE ORAL at 15:01

## 2020-04-18 RX ADMIN — Medication 2 UNITS: at 07:53

## 2020-04-18 RX ADMIN — OXYCODONE 10 MG: 5 TABLET ORAL at 07:47

## 2020-04-18 RX ADMIN — INSULIN GLARGINE 30 UNITS: 100 INJECTION, SOLUTION SUBCUTANEOUS at 20:16

## 2020-04-18 RX ADMIN — ASPIRIN 81 MG 81 MG: 81 TABLET ORAL at 07:46

## 2020-04-18 RX ADMIN — ALOGLIPTIN 12.5 MG: 12.5 TABLET, FILM COATED ORAL at 07:47

## 2020-04-18 ASSESSMENT — PAIN DESCRIPTION - LOCATION: LOCATION: HIP

## 2020-04-18 ASSESSMENT — PAIN SCALES - GENERAL
PAINLEVEL_OUTOF10: 8
PAINLEVEL_OUTOF10: 8
PAINLEVEL_OUTOF10: 10
PAINLEVEL_OUTOF10: 8

## 2020-04-18 ASSESSMENT — PAIN DESCRIPTION - ORIENTATION: ORIENTATION: RIGHT

## 2020-04-18 ASSESSMENT — PAIN DESCRIPTION - PAIN TYPE: TYPE: ACUTE PAIN

## 2020-04-19 LAB
GLUCOSE BLD-MCNC: 121 MG/DL (ref 74–106)
GLUCOSE BLD-MCNC: 126 MG/DL (ref 74–106)
GLUCOSE BLD-MCNC: 148 MG/DL (ref 74–106)
GLUCOSE BLD-MCNC: 152 MG/DL (ref 74–106)
PERFORMED ON: ABNORMAL

## 2020-04-19 PROCEDURE — 1200000002 HC SEMI PRIVATE SWING BED

## 2020-04-19 PROCEDURE — 6370000000 HC RX 637 (ALT 250 FOR IP): Performed by: INTERNAL MEDICINE

## 2020-04-19 PROCEDURE — 6370000000 HC RX 637 (ALT 250 FOR IP): Performed by: PHYSICIAN ASSISTANT

## 2020-04-19 PROCEDURE — 6360000002 HC RX W HCPCS: Performed by: PHYSICIAN ASSISTANT

## 2020-04-19 RX ADMIN — PANTOPRAZOLE SODIUM 40 MG: 40 TABLET, DELAYED RELEASE ORAL at 05:36

## 2020-04-19 RX ADMIN — METHOCARBAMOL TABLETS 500 MG: 500 TABLET, COATED ORAL at 07:52

## 2020-04-19 RX ADMIN — GABAPENTIN 400 MG: 400 CAPSULE ORAL at 20:25

## 2020-04-19 RX ADMIN — OXYCODONE 10 MG: 5 TABLET ORAL at 20:35

## 2020-04-19 RX ADMIN — INSULIN LISPRO 10 UNITS: 100 INJECTION, SOLUTION INTRAVENOUS; SUBCUTANEOUS at 11:44

## 2020-04-19 RX ADMIN — LORAZEPAM 0.5 MG: 0.5 TABLET ORAL at 20:25

## 2020-04-19 RX ADMIN — FERROUS SULFATE TAB EC 324 MG (65 MG FE EQUIVALENT) 324 MG: 324 (65 FE) TABLET DELAYED RESPONSE at 07:52

## 2020-04-19 RX ADMIN — OXYCODONE 10 MG: 5 TABLET ORAL at 03:37

## 2020-04-19 RX ADMIN — METHOCARBAMOL TABLETS 500 MG: 500 TABLET, COATED ORAL at 13:57

## 2020-04-19 RX ADMIN — PROPRANOLOL HYDROCHLORIDE 40 MG: 20 TABLET ORAL at 07:52

## 2020-04-19 RX ADMIN — INSULIN LISPRO 2 UNITS: 100 INJECTION, SOLUTION INTRAVENOUS; SUBCUTANEOUS at 11:43

## 2020-04-19 RX ADMIN — SENNOSIDES AND DOCUSATE SODIUM 1 TABLET: 8.6; 5 TABLET ORAL at 07:52

## 2020-04-19 RX ADMIN — PRAVASTATIN SODIUM 20 MG: 20 TABLET ORAL at 20:24

## 2020-04-19 RX ADMIN — OXYCODONE 10 MG: 5 TABLET ORAL at 13:57

## 2020-04-19 RX ADMIN — METHOCARBAMOL TABLETS 500 MG: 500 TABLET, COATED ORAL at 20:24

## 2020-04-19 RX ADMIN — SENNOSIDES AND DOCUSATE SODIUM 1 TABLET: 8.6; 5 TABLET ORAL at 20:24

## 2020-04-19 RX ADMIN — OXYCODONE 10 MG: 5 TABLET ORAL at 07:52

## 2020-04-19 RX ADMIN — PROPRANOLOL HYDROCHLORIDE 40 MG: 20 TABLET ORAL at 20:24

## 2020-04-19 RX ADMIN — INSULIN GLARGINE 30 UNITS: 100 INJECTION, SOLUTION SUBCUTANEOUS at 20:26

## 2020-04-19 RX ADMIN — MELATONIN 1000 UNITS: at 07:52

## 2020-04-19 RX ADMIN — Medication 1 UNITS: at 07:55

## 2020-04-19 RX ADMIN — GABAPENTIN 400 MG: 400 CAPSULE ORAL at 13:57

## 2020-04-19 RX ADMIN — INSULIN LISPRO 10 UNITS: 100 INJECTION, SOLUTION INTRAVENOUS; SUBCUTANEOUS at 16:37

## 2020-04-19 RX ADMIN — ALOGLIPTIN 12.5 MG: 12.5 TABLET, FILM COATED ORAL at 07:53

## 2020-04-19 RX ADMIN — POLYETHYLENE GLYCOL (3350) 17 G: 17 POWDER, FOR SOLUTION ORAL at 07:53

## 2020-04-19 RX ADMIN — ASPIRIN 81 MG 81 MG: 81 TABLET ORAL at 07:53

## 2020-04-19 RX ADMIN — GABAPENTIN 400 MG: 400 CAPSULE ORAL at 07:52

## 2020-04-19 RX ADMIN — ENOXAPARIN SODIUM 40 MG: 40 INJECTION SUBCUTANEOUS at 20:25

## 2020-04-19 RX ADMIN — POLYETHYLENE GLYCOL (3350) 17 G: 17 POWDER, FOR SOLUTION ORAL at 20:24

## 2020-04-19 RX ADMIN — INSULIN LISPRO 10 UNITS: 100 INJECTION, SOLUTION INTRAVENOUS; SUBCUTANEOUS at 07:57

## 2020-04-19 ASSESSMENT — PAIN SCALES - GENERAL
PAINLEVEL_OUTOF10: 8
PAINLEVEL_OUTOF10: 9

## 2020-04-20 VITALS
HEIGHT: 67 IN | HEART RATE: 75 BPM | SYSTOLIC BLOOD PRESSURE: 144 MMHG | RESPIRATION RATE: 18 BRPM | DIASTOLIC BLOOD PRESSURE: 36 MMHG | OXYGEN SATURATION: 96 % | BODY MASS INDEX: 35 KG/M2 | TEMPERATURE: 98.3 F | WEIGHT: 223 LBS

## 2020-04-20 PROBLEM — E87.5 HYPERKALEMIA: Status: ACTIVE | Noted: 2020-04-20

## 2020-04-20 LAB
GLUCOSE BLD-MCNC: 145 MG/DL (ref 74–106)
GLUCOSE BLD-MCNC: 257 MG/DL (ref 74–106)
PERFORMED ON: ABNORMAL
PERFORMED ON: ABNORMAL

## 2020-04-20 PROCEDURE — 6370000000 HC RX 637 (ALT 250 FOR IP): Performed by: PHYSICIAN ASSISTANT

## 2020-04-20 PROCEDURE — 6370000000 HC RX 637 (ALT 250 FOR IP): Performed by: INTERNAL MEDICINE

## 2020-04-20 PROCEDURE — 97803 MED NUTRITION INDIV SUBSEQ: CPT

## 2020-04-20 PROCEDURE — 99315 NF DSCHRG MGMT 30 MIN/LESS: CPT | Performed by: INTERNAL MEDICINE

## 2020-04-20 RX ORDER — AMOXICILLIN 250 MG
1 CAPSULE ORAL 2 TIMES DAILY
Qty: 60 TABLET | Refills: 0 | Status: ON HOLD | OUTPATIENT
Start: 2020-04-20 | End: 2021-06-16

## 2020-04-20 RX ORDER — POLYETHYLENE GLYCOL 3350 17 G/17G
17 POWDER, FOR SOLUTION ORAL 2 TIMES DAILY
Qty: 527 G | Refills: 1 | Status: ON HOLD | OUTPATIENT
Start: 2020-04-20 | End: 2021-06-16

## 2020-04-20 RX ORDER — FERROUS SULFATE TAB EC 324 MG (65 MG FE EQUIVALENT) 324 (65 FE) MG
324 TABLET DELAYED RESPONSE ORAL
Qty: 30 TABLET | Refills: 3 | Status: ON HOLD | OUTPATIENT
Start: 2020-04-21 | End: 2021-11-03

## 2020-04-20 RX ORDER — OXYCODONE HYDROCHLORIDE 5 MG/1
10 TABLET ORAL EVERY 6 HOURS PRN
Status: DISCONTINUED | OUTPATIENT
Start: 2020-04-20 | End: 2020-04-20 | Stop reason: HOSPADM

## 2020-04-20 RX ORDER — INSULIN LISPRO 100 [IU]/ML
0-18 INJECTION, SOLUTION INTRAVENOUS; SUBCUTANEOUS
Qty: 16.2 ML | Refills: 0 | Status: ON HOLD | OUTPATIENT
Start: 2020-04-20 | End: 2020-10-01

## 2020-04-20 RX ORDER — OMEPRAZOLE 20 MG/1
40 CAPSULE, DELAYED RELEASE ORAL DAILY
Qty: 60 CAPSULE | Refills: 0 | Status: SHIPPED | OUTPATIENT
Start: 2020-04-20

## 2020-04-20 RX ORDER — METHOCARBAMOL 500 MG/1
500 TABLET, FILM COATED ORAL EVERY 8 HOURS PRN
Qty: 30 TABLET | Refills: 0 | Status: SHIPPED | OUTPATIENT
Start: 2020-04-20 | End: 2020-04-30

## 2020-04-20 RX ORDER — PRAVASTATIN SODIUM 20 MG
20 TABLET ORAL NIGHTLY
Qty: 30 TABLET | Refills: 0 | Status: ON HOLD | OUTPATIENT
Start: 2020-04-20 | End: 2021-06-16

## 2020-04-20 RX ORDER — PROPRANOLOL HYDROCHLORIDE 40 MG/1
40 TABLET ORAL 2 TIMES DAILY
Qty: 60 TABLET | Refills: 0 | Status: SHIPPED | OUTPATIENT
Start: 2020-04-20

## 2020-04-20 RX ORDER — INSULIN GLARGINE 100 [IU]/ML
30 INJECTION, SOLUTION SUBCUTANEOUS NIGHTLY
Qty: 1 VIAL | Refills: 3 | Status: ON HOLD | OUTPATIENT
Start: 2020-04-20 | End: 2020-10-01

## 2020-04-20 RX ORDER — INSULIN LISPRO 100 [IU]/ML
10 INJECTION, SOLUTION INTRAVENOUS; SUBCUTANEOUS
Qty: 9 ML | Refills: 0 | Status: ON HOLD | OUTPATIENT
Start: 2020-04-20 | End: 2020-10-01

## 2020-04-20 RX ADMIN — MELATONIN 1000 UNITS: at 09:12

## 2020-04-20 RX ADMIN — POLYETHYLENE GLYCOL (3350) 17 G: 17 POWDER, FOR SOLUTION ORAL at 09:11

## 2020-04-20 RX ADMIN — METHOCARBAMOL TABLETS 500 MG: 500 TABLET, COATED ORAL at 09:12

## 2020-04-20 RX ADMIN — ALOGLIPTIN 12.5 MG: 12.5 TABLET, FILM COATED ORAL at 09:12

## 2020-04-20 RX ADMIN — PANTOPRAZOLE SODIUM 40 MG: 40 TABLET, DELAYED RELEASE ORAL at 05:33

## 2020-04-20 RX ADMIN — OXYCODONE 10 MG: 5 TABLET ORAL at 03:06

## 2020-04-20 RX ADMIN — ASPIRIN 81 MG 81 MG: 81 TABLET ORAL at 09:12

## 2020-04-20 RX ADMIN — INSULIN LISPRO 10 UNITS: 100 INJECTION, SOLUTION INTRAVENOUS; SUBCUTANEOUS at 11:31

## 2020-04-20 RX ADMIN — INSULIN LISPRO 3 UNITS: 100 INJECTION, SOLUTION INTRAVENOUS; SUBCUTANEOUS at 11:30

## 2020-04-20 RX ADMIN — SENNOSIDES AND DOCUSATE SODIUM 1 TABLET: 8.6; 5 TABLET ORAL at 09:11

## 2020-04-20 RX ADMIN — OXYCODONE 10 MG: 5 TABLET ORAL at 09:11

## 2020-04-20 RX ADMIN — FERROUS SULFATE TAB EC 324 MG (65 MG FE EQUIVALENT) 324 MG: 324 (65 FE) TABLET DELAYED RESPONSE at 09:12

## 2020-04-20 RX ADMIN — GABAPENTIN 400 MG: 400 CAPSULE ORAL at 09:11

## 2020-04-20 RX ADMIN — PROPRANOLOL HYDROCHLORIDE 40 MG: 20 TABLET ORAL at 09:12

## 2020-04-20 ASSESSMENT — PAIN SCALES - GENERAL
PAINLEVEL_OUTOF10: 6
PAINLEVEL_OUTOF10: 3

## 2020-04-20 NOTE — DISCHARGE SUMMARY
thigh.  Neurologic:  Alert & oriented x 3,  no focal deficits noted. Follows commands. Answers questions appropriately. Psychiatric:  Speech and behavior appropriate. Disposition: home with family assist and home health   Discharged Condition: Stable  Activity: activity as tolerated  Diet: diabetic diet and low potassium diet  Follow Up: Primary Care Physician in 2 weeks. Follow up with Berto Alejo in 3 weeks. Patient to proceed with the following lab (cbc, bmp) in 2 weeks  Monitor blood glucose three times a day and dose sliding scale insulin as prescribed. Write down readings to take to next PCP appointment so medication doses can be changed as needed. Labs:  For convenience and continuity at follow-up the following most recent labs are provided:    CBC:   Lab Results   Component Value Date    WBC 5.6 04/11/2020    HGB 8.8 04/11/2020    HCT 29.9 04/11/2020     04/11/2020       RENAL:   Lab Results   Component Value Date     04/11/2020    K 4.9 04/11/2020    K 4.8 03/25/2020     04/11/2020    CO2 28 04/11/2020    BUN 31 04/11/2020    CREATININE 1.4 04/11/2020         Discharge Medications:      Current Discharge Medication List           Details   ferrous sulfate 324 (65 Fe) MG EC tablet Take 1 tablet by mouth daily (with breakfast)  Qty: 30 tablet, Refills: 3              Details   aspirin 81 MG tablet Take 1 tablet by mouth daily  Qty: 30 tablet, Refills: 1      enoxaparin (LOVENOX) 40 MG/0.4ML injection Inject 0.4 mLs into the skin nightly for 3 days  Qty: 1.2 mL, Refills: 0    Comments: Please provide appropriate needles -stop date 4/22      insulin glargine (LANTUS) 100 UNIT/ML injection vial Inject 30 Units into the skin nightly  Qty: 1 vial, Refills: 3    Comments: Please provide appropriate pen needles      diclofenac sodium (VOLTAREN) 1 % GEL Apply 1 g topically 4 times daily as needed for Pain  Qty: 1 Tube, Refills: 3      polyethylene glycol (GLYCOLAX) packet Take 17

## 2020-04-20 NOTE — PROGRESS NOTES
Nutrition Assessment    Type and Reason for Visit: (follow up)    Nutrition Recommendations: No new recommendations at this time. Nutrition Assessment: Pt appears to be stable from a nutritional standpoint r/t stable intakes past 10 meals and stable weight. Malnutrition Assessment:  · Malnutrition Status: No malnutrition  · Context: Acute illness or injury  · Findings of the 6 clinical characteristics of malnutrition (Minimum of 2 out of 6 clinical characteristics is required to make the diagnosis of moderate or severe Protein Calorie Malnutrition based on AND/ASPEN Guidelines):  1. Energy Intake-Unable to assess, Unable to assess    2. Weight Loss-No significant weight loss,    3. Fat Loss-No significant subcutaneous fat loss,    4. Muscle Loss-No significant muscle mass loss,    5. Fluid Accumulation-No significant fluid accumulation,    6.  Strength-Not measured    Nutrition Risk Level: Low    Nutrient Needs:  · Estimated Daily Total Kcal: 6809-7067  · Estimated Daily Protein (g): 76-92(1.25-1.5 gm/kg IBW)  · Estimated Daily Total Fluid (ml/day): 2793-2896(0 ml/kcal)    Nutrition Diagnosis:   · Problem: Overweight/Obese  · Etiology: related to Excessive energy intake     Signs and symptoms:  as evidenced by BMI    Objective Information:  · Nutrition-Focused Physical Findings: No edema, weight appears to be stable. Healing incision to rt hip.   · Wound Type: Surgical Wound  · Current Nutrition Therapies:  · Oral Diet Orders: Carb Control 4 Carbs/Meal, Dysphagia  Soft and Bite-Sized (Dysphagia 3)   · Oral Diet intake: (71-95% x past 10 meals.)  · Oral Nutrition Supplement (ONS) Orders: Wound Healing Oral Supplement  · ONS intake: 51-75%  · Anthropometric Measures:  · Ht: 5' 7\" (170.2 cm)   · Current Body Wt: 223 lb 1.7 oz (101.2 kg)  · Admission Body Wt: 222 lb 6.4 oz (100.9 kg)(3/31/20)  · Usual Body Wt:    · % Weight Change:  ,     · Ideal Body Wt: 135 lb (61.2 kg), % Ideal Body 156  · Adjusted Body

## 2020-06-30 ENCOUNTER — HOSPITAL ENCOUNTER (OUTPATIENT)
Facility: HOSPITAL | Age: 80
Discharge: HOME | End: 2020-06-30
Payer: MEDICARE

## 2020-07-07 ENCOUNTER — HOSPITAL ENCOUNTER (OUTPATIENT)
Facility: HOSPITAL | Age: 80
Discharge: HOME | End: 2020-07-07
Payer: MEDICARE

## 2020-07-07 LAB
A/G RATIO: 1.4 (ref 0.8–2)
ALBUMIN SERPL-MCNC: 3.6 G/DL (ref 3.4–4.8)
ALP BLD-CCNC: 81 U/L (ref 25–100)
ALT SERPL-CCNC: <5 U/L (ref 4–36)
ANION GAP SERPL CALCULATED.3IONS-SCNC: 10 MMOL/L (ref 3–16)
AST SERPL-CCNC: 12 U/L (ref 8–33)
BASOPHILS ABSOLUTE: 0 K/UL (ref 0–0.1)
BASOPHILS RELATIVE PERCENT: 0.5 %
BILIRUB SERPL-MCNC: <0.2 MG/DL (ref 0.3–1.2)
BUN BLDV-MCNC: 33 MG/DL (ref 6–20)
CALCIUM SERPL-MCNC: 9.1 MG/DL (ref 8.5–10.5)
CHLORIDE BLD-SCNC: 103 MMOL/L (ref 98–107)
CHOLESTEROL, TOTAL: 235 MG/DL (ref 0–200)
CO2: 26 MMOL/L (ref 20–30)
CREAT SERPL-MCNC: 1.8 MG/DL (ref 0.4–1.2)
EOSINOPHILS ABSOLUTE: 0.2 K/UL (ref 0–0.4)
EOSINOPHILS RELATIVE PERCENT: 3.2 %
FOLATE: 12.26 NG/ML
GFR AFRICAN AMERICAN: 33
GFR NON-AFRICAN AMERICAN: 27
GLOBULIN: 2.6 G/DL
GLUCOSE BLD-MCNC: 188 MG/DL (ref 74–106)
HBA1C MFR BLD: 8.3 %
HCT VFR BLD CALC: 37.6 % (ref 37–47)
HDLC SERPL-MCNC: 41 MG/DL (ref 40–60)
HEMOGLOBIN: 11.8 G/DL (ref 11.5–16.5)
IMMATURE GRANULOCYTES #: 0 K/UL
IMMATURE GRANULOCYTES %: 0.7 % (ref 0–5)
LDL CHOLESTEROL CALCULATED: 170 MG/DL
LYMPHOCYTES ABSOLUTE: 2.3 K/UL (ref 1.5–4)
LYMPHOCYTES RELATIVE PERCENT: 38.3 %
MCH RBC QN AUTO: 30.5 PG (ref 27–32)
MCHC RBC AUTO-ENTMCNC: 31.4 G/DL (ref 31–35)
MCV RBC AUTO: 97.2 FL (ref 80–100)
MONOCYTES ABSOLUTE: 0.5 K/UL (ref 0.2–0.8)
MONOCYTES RELATIVE PERCENT: 8.4 %
NEUTROPHILS ABSOLUTE: 2.9 K/UL (ref 2–7.5)
NEUTROPHILS RELATIVE PERCENT: 48.9 %
PDW BLD-RTO: 13.9 % (ref 11–16)
PLATELET # BLD: 164 K/UL (ref 150–400)
PMV BLD AUTO: 11.5 FL (ref 6–10)
POTASSIUM SERPL-SCNC: 5.3 MMOL/L (ref 3.4–5.1)
RBC # BLD: 3.87 M/UL (ref 3.8–5.8)
SODIUM BLD-SCNC: 139 MMOL/L (ref 136–145)
TOTAL PROTEIN: 6.2 G/DL (ref 6.4–8.3)
TRIGL SERPL-MCNC: 122 MG/DL (ref 0–249)
TSH SERPL DL<=0.05 MIU/L-ACNC: 5.43 UIU/ML (ref 0.35–5.5)
VITAMIN B-12: 391 PG/ML (ref 211–911)
VITAMIN D 25-HYDROXY: 24.4 (ref 32–100)
VLDLC SERPL CALC-MCNC: 24 MG/DL
WBC # BLD: 6 K/UL (ref 4–11)

## 2020-07-07 PROCEDURE — 82746 ASSAY OF FOLIC ACID SERUM: CPT

## 2020-07-07 PROCEDURE — 85025 COMPLETE CBC W/AUTO DIFF WBC: CPT

## 2020-07-07 PROCEDURE — 80053 COMPREHEN METABOLIC PANEL: CPT

## 2020-07-07 PROCEDURE — 84443 ASSAY THYROID STIM HORMONE: CPT

## 2020-07-07 PROCEDURE — 82607 VITAMIN B-12: CPT

## 2020-07-07 PROCEDURE — 80061 LIPID PANEL: CPT

## 2020-07-07 PROCEDURE — 82306 VITAMIN D 25 HYDROXY: CPT

## 2020-07-07 PROCEDURE — 83036 HEMOGLOBIN GLYCOSYLATED A1C: CPT

## 2020-07-07 PROCEDURE — 36415 COLL VENOUS BLD VENIPUNCTURE: CPT

## 2020-07-20 ENCOUNTER — TELEPHONE (OUTPATIENT)
Dept: NEUROLOGY | Facility: CLINIC | Age: 80
End: 2020-07-20

## 2020-07-20 NOTE — TELEPHONE ENCOUNTER
BRANDEE FROM [facility] NEEDS LAST OFFICE NOTE FROM [facility] ON 9-     [phone] FAX  [phone] PHONE

## 2020-09-29 ENCOUNTER — APPOINTMENT (OUTPATIENT)
Dept: CT IMAGING | Facility: HOSPITAL | Age: 80
DRG: 176 | End: 2020-09-29
Payer: MEDICARE

## 2020-09-29 ENCOUNTER — HOSPITAL ENCOUNTER (INPATIENT)
Facility: HOSPITAL | Age: 80
LOS: 2 days | Discharge: HOME | DRG: 176 | End: 2020-10-01
Attending: EMERGENCY MEDICINE | Admitting: INTERNAL MEDICINE
Payer: MEDICARE

## 2020-09-29 ENCOUNTER — APPOINTMENT (OUTPATIENT)
Dept: GENERAL RADIOLOGY | Facility: HOSPITAL | Age: 80
DRG: 176 | End: 2020-09-29
Payer: MEDICARE

## 2020-09-29 PROBLEM — I26.99 PULMONARY EMBOLISM ON RIGHT (HCC): Status: ACTIVE | Noted: 2020-09-29

## 2020-09-29 LAB
A/G RATIO: 1.2 (ref 0.8–2)
ALBUMIN SERPL-MCNC: 3.7 G/DL (ref 3.4–4.8)
ALP BLD-CCNC: 213 U/L (ref 25–100)
ALT SERPL-CCNC: 188 U/L (ref 4–36)
ANION GAP SERPL CALCULATED.3IONS-SCNC: 12 MMOL/L (ref 3–16)
AST SERPL-CCNC: 318 U/L (ref 8–33)
BASOPHILS ABSOLUTE: 0 K/UL (ref 0–0.1)
BASOPHILS RELATIVE PERCENT: 0.3 %
BILIRUB SERPL-MCNC: 0.4 MG/DL (ref 0.3–1.2)
BILIRUBIN URINE: NEGATIVE
BLOOD, URINE: NEGATIVE
BUN BLDV-MCNC: 20 MG/DL (ref 6–20)
CALCIUM SERPL-MCNC: 9.1 MG/DL (ref 8.5–10.5)
CHLORIDE BLD-SCNC: 99 MMOL/L (ref 98–107)
CLARITY: CLEAR
CO2: 23 MMOL/L (ref 20–30)
COLOR: YELLOW
CREAT SERPL-MCNC: 1.6 MG/DL (ref 0.4–1.2)
D DIMER: 1.4 UG/ML FEU (ref 0–0.6)
EOSINOPHILS ABSOLUTE: 0.1 K/UL (ref 0–0.4)
EOSINOPHILS RELATIVE PERCENT: 1.5 %
GFR AFRICAN AMERICAN: 38
GFR NON-AFRICAN AMERICAN: 31
GLOBULIN: 3.2 G/DL
GLUCOSE BLD-MCNC: 219 MG/DL (ref 74–106)
GLUCOSE URINE: NEGATIVE MG/DL
HCT VFR BLD CALC: 40.4 % (ref 37–47)
HEMOGLOBIN: 12.6 G/DL (ref 11.5–16.5)
IMMATURE GRANULOCYTES #: 0.1 K/UL
IMMATURE GRANULOCYTES %: 0.5 % (ref 0–5)
KETONES, URINE: NEGATIVE MG/DL
LEUKOCYTE ESTERASE, URINE: NEGATIVE
LYMPHOCYTES ABSOLUTE: 1.8 K/UL (ref 1.5–4)
LYMPHOCYTES RELATIVE PERCENT: 19.5 %
MCH RBC QN AUTO: 31.9 PG (ref 27–32)
MCHC RBC AUTO-ENTMCNC: 31.2 G/DL (ref 31–35)
MCV RBC AUTO: 102.3 FL (ref 80–100)
MICROSCOPIC EXAMINATION: NORMAL
MONOCYTES ABSOLUTE: 0.8 K/UL (ref 0.2–0.8)
MONOCYTES RELATIVE PERCENT: 8.5 %
NEUTROPHILS ABSOLUTE: 6.4 K/UL (ref 2–7.5)
NEUTROPHILS RELATIVE PERCENT: 69.7 %
NITRITE, URINE: NEGATIVE
PDW BLD-RTO: 13.4 % (ref 11–16)
PH UA: 5.5 (ref 5–8)
PLATELET # BLD: 182 K/UL (ref 150–400)
PMV BLD AUTO: 10.6 FL (ref 6–10)
POTASSIUM REFLEX MAGNESIUM: 4.6 MMOL/L (ref 3.4–5.1)
PRO-BNP: 878 PG/ML (ref 0–1800)
PROTEIN UA: NEGATIVE MG/DL
RBC # BLD: 3.95 M/UL (ref 3.8–5.8)
SODIUM BLD-SCNC: 134 MMOL/L (ref 136–145)
SPECIFIC GRAVITY UA: 1.01 (ref 1–1.03)
TOTAL PROTEIN: 6.9 G/DL (ref 6.4–8.3)
TROPONIN: <0.3 NG/ML
TROPONIN: <0.3 NG/ML
URINE REFLEX TO CULTURE: NORMAL
URINE TYPE: NORMAL
UROBILINOGEN, URINE: 0.2 E.U./DL
WBC # BLD: 9.2 K/UL (ref 4–11)

## 2020-09-29 PROCEDURE — 80053 COMPREHEN METABOLIC PANEL: CPT

## 2020-09-29 PROCEDURE — 6360000002 HC RX W HCPCS: Performed by: EMERGENCY MEDICINE

## 2020-09-29 PROCEDURE — 2580000003 HC RX 258: Performed by: EMERGENCY MEDICINE

## 2020-09-29 PROCEDURE — 85025 COMPLETE CBC W/AUTO DIFF WBC: CPT

## 2020-09-29 PROCEDURE — 96361 HYDRATE IV INFUSION ADD-ON: CPT

## 2020-09-29 PROCEDURE — 96372 THER/PROPH/DIAG INJ SC/IM: CPT

## 2020-09-29 PROCEDURE — 6360000004 HC RX CONTRAST MEDICATION: Performed by: EMERGENCY MEDICINE

## 2020-09-29 PROCEDURE — 84484 ASSAY OF TROPONIN QUANT: CPT

## 2020-09-29 PROCEDURE — 93005 ELECTROCARDIOGRAM TRACING: CPT

## 2020-09-29 PROCEDURE — 96360 HYDRATION IV INFUSION INIT: CPT

## 2020-09-29 PROCEDURE — 71045 X-RAY EXAM CHEST 1 VIEW: CPT

## 2020-09-29 PROCEDURE — 36415 COLL VENOUS BLD VENIPUNCTURE: CPT

## 2020-09-29 PROCEDURE — 94640 AIRWAY INHALATION TREATMENT: CPT

## 2020-09-29 PROCEDURE — 6370000000 HC RX 637 (ALT 250 FOR IP): Performed by: EMERGENCY MEDICINE

## 2020-09-29 PROCEDURE — 83880 ASSAY OF NATRIURETIC PEPTIDE: CPT

## 2020-09-29 PROCEDURE — 71275 CT ANGIOGRAPHY CHEST: CPT

## 2020-09-29 PROCEDURE — 99284 EMERGENCY DEPT VISIT MOD MDM: CPT

## 2020-09-29 PROCEDURE — 1200000000 HC SEMI PRIVATE

## 2020-09-29 PROCEDURE — 99283 EMERGENCY DEPT VISIT LOW MDM: CPT

## 2020-09-29 PROCEDURE — 81003 URINALYSIS AUTO W/O SCOPE: CPT

## 2020-09-29 PROCEDURE — 85379 FIBRIN DEGRADATION QUANT: CPT

## 2020-09-29 RX ORDER — IPRATROPIUM BROMIDE AND ALBUTEROL SULFATE 2.5; .5 MG/3ML; MG/3ML
1 SOLUTION RESPIRATORY (INHALATION) ONCE
Status: COMPLETED | OUTPATIENT
Start: 2020-09-29 | End: 2020-09-29

## 2020-09-29 RX ORDER — DULOXETIN HYDROCHLORIDE 60 MG/1
60 CAPSULE, DELAYED RELEASE ORAL DAILY
COMMUNITY

## 2020-09-29 RX ORDER — OXYBUTYNIN CHLORIDE 5 MG/1
5 TABLET ORAL 2 TIMES DAILY
COMMUNITY

## 2020-09-29 RX ORDER — 0.9 % SODIUM CHLORIDE 0.9 %
500 INTRAVENOUS SOLUTION INTRAVENOUS ONCE
Status: COMPLETED | OUTPATIENT
Start: 2020-09-29 | End: 2020-09-29

## 2020-09-29 RX ORDER — ERGOCALCIFEROL 1.25 MG/1
50000 CAPSULE ORAL WEEKLY
COMMUNITY

## 2020-09-29 RX ADMIN — IOPAMIDOL 100 ML: 755 INJECTION, SOLUTION INTRAVENOUS at 18:35

## 2020-09-29 RX ADMIN — SODIUM CHLORIDE 500 ML: 9 INJECTION, SOLUTION INTRAVENOUS at 15:41

## 2020-09-29 RX ADMIN — ENOXAPARIN SODIUM 100 MG: 100 INJECTION SUBCUTANEOUS at 19:31

## 2020-09-29 RX ADMIN — IPRATROPIUM BROMIDE AND ALBUTEROL SULFATE 1 AMPULE: .5; 3 SOLUTION RESPIRATORY (INHALATION) at 15:50

## 2020-09-29 ASSESSMENT — PAIN DESCRIPTION - LOCATION: LOCATION: BACK;HEAD;HIP

## 2020-09-29 ASSESSMENT — PAIN SCALES - GENERAL: PAINLEVEL_OUTOF10: 5

## 2020-09-29 ASSESSMENT — PAIN DESCRIPTION - PAIN TYPE: TYPE: CHRONIC PAIN

## 2020-09-29 NOTE — ED PROVIDER NOTES
Emergency Department Encounter  Location: [address] [place] [address] Court    Patient: Roxann Beth  MRN: [id number]  : 1940  Date of evaluation: 6/15/9613  ED Provider: Nikia Hudson MD    [facility] Remigio Hoskins was checked out to me by Dr. Manjula Juan. Please see his/her initial documentation for details of the patient's initial ED presentation, physical exam and completed studies. In brief, Roxann Beth is a 79 y.o. female that presented to the emergency department for upper mid chest pain x 4 days. Pains have been intermittent and \"stabbing\" in nature. Patient also complains of SOA and a cough x 4 years. She admits to chronic dyspnea for the last few years. She has chronic back pain and chronic right shoulder pain. She is on chronic pain meds. She also has a lot of anxiety and is asking for \"nerve medicine\". She takes Ativan at home.   She has been having a lot of stress recently    I have reviewed and interpreted all of the currently available lab results and diagnostics from this visit:  Results for orders placed or performed during the hospital encounter of 20   CBC Auto Differential   Result Value Ref Range    WBC 9.2 4.0 - 11.0 K/uL    RBC 3.95 3.80 - 5.80 M/uL    Hemoglobin 12.6 11.5 - 16.5 g/dL    Hematocrit 40.4 37.0 - 47.0 %    .3 (H) 80.0 - 100.0 fL    MCH 31.9 27.0 - 32.0 pg    MCHC 31.2 31.0 - 35.0 g/dL    RDW 13.4 11.0 - 16.0 %    Platelets 599 262 - 661 K/uL    MPV 10.6 (H) 6.0 - 10.0 fL    Neutrophils % 69.7 %    Immature Granulocytes % 0.5 0.0 - 5.0 %    Lymphocytes % 19.5 %    Monocytes % 8.5 %    Eosinophils % 1.5 %    Basophils % 0.3 %    Neutrophils Absolute 6.4 2.0 - 7.5 K/uL    Immature Granulocytes # 0.1 K/uL    Lymphocytes Absolute 1.8 1.5 - 4.0 K/uL    Monocytes Absolute 0.8 0.2 - 0.8 K/uL    Eosinophils Absolute 0.1 0.0 - 0.4 K/uL    Basophils Absolute 0.0 0.0 - 0.1 K/uL   Troponin   Result Value Ref Range    Troponin <0.30 <0.30 ng/mL   Brain Natriuretic Peptide   Result Value Ref Range    Pro- 0 - 1,800 pg/mL   Comprehensive Metabolic Panel w/ Reflex to MG   Result Value Ref Range    Sodium 134 (L) 136 - 145 mmol/L    Potassium reflex Magnesium 4.6 3.4 - 5.1 mmol/L    Chloride 99 98 - 107 mmol/L    CO2 23 20 - 30 mmol/L    Anion Gap 12 3 - 16    Glucose 219 (H) 74 - 106 mg/dL    BUN 20 6 - 20 mg/dL    CREATININE 1.6 (H) 0.4 - 1.2 mg/dL    GFR Non- 31 (L) >59    GFR  38 (L) >59    Calcium 9.1 8.5 - 10.5 mg/dL    Total Protein 6.9 6.4 - 8.3 g/dL    Alb 3.7 3.4 - 4.8 g/dL    Albumin/Globulin Ratio 1.2 0.8 - 2.0    Total Bilirubin 0.4 0.3 - 1.2 mg/dL    Alkaline Phosphatase 213 (H) 25 - 100 U/L     (H) 4 - 36 U/L     (H) 8 - 33 U/L    Globulin 3.2 g/dL   D-Dimer, Quantitative   Result Value Ref Range    D-Dimer, Quant 1.40 (HH) 0.00 - 0.60 ug/mL FEU   Troponin   Result Value Ref Range    Troponin <0.30 <0.30 ng/mL   Urinalysis Reflex to Culture    Specimen: Urine voided   Result Value Ref Range    Color, UA Yellow Straw/Yellow    Clarity, UA Clear Clear    Glucose, Ur Negative Negative mg/dL    Bilirubin Urine Negative Negative    Ketones, Urine Negative Negative mg/dL    Specific Gravity, UA 1.010 1.005 - 1.030    Blood, Urine Negative Negative    pH, UA 5.5 5.0 - 8.0    Protein, UA Negative Negative mg/dL    Urobilinogen, Urine 0.2 <2.0 E.U./dL    Nitrite, Urine Negative Negative    Leukocyte Esterase, Urine Negative Negative    Microscopic Examination Not Indicated     Urine Type Voided     Urine Reflex to Culture Not Indicated      Xr Chest Portable    Result Date: 9/29/2020  AP CHEST HISTORY: Chest pain COMPARISON 12/3/2019 FINDINGS: The heart size is within normal limits. Lungs appear clear of acute disease. No acute disease     Cta Pulmonary W Contrast    Result Date: 9/29/2020  EXAM: CT CHEST PULMONARY EMBOLUS PROTOCOL ON 9/29/2020 INDICATION: Chest pain COMPARISON: None. TECHNIQUE: Multidetector CT imaging was obtained through the chest using CT pulmonary angiogram protocol. Axial images, multiplanar reformatted images and maximum intensity projection images were reviewed. Dose modulation, iterative reconstruction and/or weight based adjustments of the mA/kV were utilized to reduce radiation dose to as low as reasonably achievable IV Contrast Media and volume: 100 and cc Isovue 370 FINDINGS: : No additional abnormality DIAGNOSTIC QUALITY: Suboptimal. Multiple images are nondiagnostic due to motion and streak artifact PULMONARY EMBOLI: There appears to be acute emboli centrally in segmental and subsegmental arteries of the right upper lung and possibly the right lower lobe but the exam is limited. RIGHT HEART STRAIN: Signs present. There is reflux of contrast into the inferior vena cava. LUNGS AND AIRWAYS: The tracheobronchial tree appears patent. Evaluation of the lungs is degraded by motion. There is no obvious acute consolidation. PLEURA: There are no pleural effusions. HEART / GREAT VESSELS: The heart is enlarged. There is coronary artery calcification visualized. The aorta is normal in caliber. ADENOPATHY: There is no obvious adenopathy except for calcified lymph nodes. CHEST WALL / LOWER NECK: No significant abnormality. UPPER ABDOMEN: Images to the upper abdomen are limited. There is fatty infiltration of the liver. There is a moderate hiatal hernia with wall thickening of the distal esophagus. BONES: Severe degenerative changes are seen in the shoulders. OTHER FINDINGS: None. Findings consistent with pulmonary emboli and right upper lobe segmental and subsegmental arteries and possibly right lower lobe segmental and subsegmental arteries. However the exam is significantly degraded by motion. There is reflux of contrast into the inferior vena cava which can be seen with right heart strain. There is a small hiatal hernia with wall thickening of the distal esophagus. There is fatty infiltration of the liver       Final ED Course and MDM: In brief, Shantal Lamb is a 79 y.o. female whose care was signed out to me by the outgoing provider. In brief, the patient has multiple PE's and will need admission. 1  Called Dr. Tamica Velazquez  2030  Dr. Tamica Velazquez accepted the patient for admission. 2130  Dr. Jimi Jauregui, cardiology recommended ECHO tomorrow. He was comfortable with her staying at our facility. Lovenox was given. ED Medication Orders (From admission, onward)    Start Ordered     Status Ordering Provider    09/29/20 1930 09/29/20 1924  enoxaparin (LOVENOX) injection 100 mg  ONCE      Last MAR action:  Given - by Maranda Prescott on 09/29/20 at [address] [facility], [address] [place] [address]    11/83/54 0043 09/29/20 1834  iopamidol (ISOVUE-370) 76 % injection 100 mL  IMG ONCE PRN      Last MAR action:  Given - by Nicole Lipoma on 09/29/20 at [address] [place] [address] T    09/29/20 1545 09/29/20 1531  ipratropium-albuterol (DUONEB) nebulizer solution 1 ampule  ONCE      Last MAR action:  Given - by ZAKIA DAVENPORT on 09/29/20 at [facility] T    09/29/20 1545 09/29/20 1532  0.9 % sodium chloride bolus  ONCE      Last MAR action:  Stopped - by Maranda Prescott on 09/29/20 at [address] [place] [address]          Final Impression      1.  Multiple subsegmental pulmonary emboli without acute cor pulmonale ([facility] [address]        DISPOSITION  09/29/2020 07:35:47 PM     (Please note that portions of this note may have been completed with a voice recognition program. Efforts were made to edit the dictations but occasionally words are mis-transcribed.)    Farzana Reyna MD  [facility] [place]  27/90/49 Stephanie Pimentel MD  09/29/20 3123

## 2020-09-29 NOTE — ED TRIAGE NOTES
Patient states she has had upper mid chest pain, ongoing for 4 days. Pain comes and goes, worse in the evening. She states that she has gotten weaker. Denies chest pain during triage. Also has a cough, ongoing for 4 years. She also states she has been under a lot of stress and has chronic pain in the right shoulder due to a fracture.

## 2020-09-29 NOTE — ED NOTES
I did speak with pt daughter Merly Suárez [phone] advised that pt will be admitted to the floor, will be given lovenox injection and will most likely be switched to elequis for her PE, per Dr. Parminder Castillo. Agreement stated, advised no visitors in er at this time but that she may be able to visit tomorrow when the pt is on the floor. Call transferred to pt in room so that she may speak with her family.      Gretel Lawler RN  09/29/20 1940

## 2020-09-29 NOTE — ED NOTES
Called radiology in regards to Dr. Lehman Olszewski wanting patient to go ahead a have CTA. Radiology with another patient will get to her as soon as possible.       Dana Dowling, RN  09/29/20 1225

## 2020-09-30 ENCOUNTER — APPOINTMENT (OUTPATIENT)
Dept: ULTRASOUND IMAGING | Facility: HOSPITAL | Age: 80
DRG: 176 | End: 2020-09-30
Payer: MEDICARE

## 2020-09-30 ENCOUNTER — OUTSIDE FACILITY SERVICE (OUTPATIENT)
Dept: CARDIOLOGY | Facility: CLINIC | Age: 80
End: 2020-09-30

## 2020-09-30 PROBLEM — R74.01 TRANSAMINITIS: Status: ACTIVE | Noted: 2020-09-30

## 2020-09-30 LAB
A/G RATIO: 1.4 (ref 0.8–2)
ALBUMIN SERPL-MCNC: 3.9 G/DL (ref 3.4–4.8)
ALP BLD-CCNC: 197 U/L (ref 25–100)
ALT SERPL-CCNC: 134 U/L (ref 4–36)
ANION GAP SERPL CALCULATED.3IONS-SCNC: 11 MMOL/L (ref 3–16)
AST SERPL-CCNC: 133 U/L (ref 8–33)
BASOPHILS ABSOLUTE: 0 K/UL (ref 0–0.1)
BASOPHILS RELATIVE PERCENT: 0.4 %
BILIRUB SERPL-MCNC: 0.4 MG/DL (ref 0.3–1.2)
BUN BLDV-MCNC: 19 MG/DL (ref 6–20)
CALCIUM SERPL-MCNC: 9.1 MG/DL (ref 8.5–10.5)
CHLORIDE BLD-SCNC: 100 MMOL/L (ref 98–107)
CO2: 26 MMOL/L (ref 20–30)
CREAT SERPL-MCNC: 1.6 MG/DL (ref 0.4–1.2)
EOSINOPHILS ABSOLUTE: 0.2 K/UL (ref 0–0.4)
EOSINOPHILS RELATIVE PERCENT: 2.1 %
GFR AFRICAN AMERICAN: 38
GFR NON-AFRICAN AMERICAN: 31
GLOBULIN: 2.7 G/DL
GLUCOSE BLD-MCNC: 226 MG/DL (ref 74–106)
GLUCOSE BLD-MCNC: 230 MG/DL (ref 74–106)
GLUCOSE BLD-MCNC: 238 MG/DL (ref 74–106)
GLUCOSE BLD-MCNC: 271 MG/DL (ref 74–106)
GLUCOSE BLD-MCNC: 275 MG/DL (ref 74–106)
GLUCOSE BLD-MCNC: 314 MG/DL (ref 74–106)
HCT VFR BLD CALC: 41.7 % (ref 37–47)
HEMOGLOBIN: 12.2 G/DL (ref 11.5–16.5)
IMMATURE GRANULOCYTES #: 0 K/UL
IMMATURE GRANULOCYTES %: 0.4 % (ref 0–5)
LV EF: 58 %
LVEF MODALITY: NORMAL
LYMPHOCYTES ABSOLUTE: 1.8 K/UL (ref 1.5–4)
LYMPHOCYTES RELATIVE PERCENT: 23.8 %
MCH RBC QN AUTO: 31.9 PG (ref 27–32)
MCHC RBC AUTO-ENTMCNC: 29.3 G/DL (ref 31–35)
MCV RBC AUTO: 109.2 FL (ref 80–100)
MONOCYTES ABSOLUTE: 0.8 K/UL (ref 0.2–0.8)
MONOCYTES RELATIVE PERCENT: 10.7 %
NEUTROPHILS ABSOLUTE: 4.8 K/UL (ref 2–7.5)
NEUTROPHILS RELATIVE PERCENT: 62.6 %
PDW BLD-RTO: 13.6 % (ref 11–16)
PERFORMED ON: ABNORMAL
PLATELET # BLD: 121 K/UL (ref 150–400)
PMV BLD AUTO: 11.4 FL (ref 6–10)
POTASSIUM REFLEX MAGNESIUM: 4.5 MMOL/L (ref 3.4–5.1)
RBC # BLD: 3.82 M/UL (ref 3.8–5.8)
SODIUM BLD-SCNC: 137 MMOL/L (ref 136–145)
TOTAL PROTEIN: 6.6 G/DL (ref 6.4–8.3)
WBC # BLD: 7.7 K/UL (ref 4–11)

## 2020-09-30 PROCEDURE — 99223 1ST HOSP IP/OBS HIGH 75: CPT | Performed by: INTERNAL MEDICINE

## 2020-09-30 PROCEDURE — 96361 HYDRATE IV INFUSION ADD-ON: CPT

## 2020-09-30 PROCEDURE — 76705 ECHO EXAM OF ABDOMEN: CPT

## 2020-09-30 PROCEDURE — 2580000003 HC RX 258: Performed by: INTERNAL MEDICINE

## 2020-09-30 PROCEDURE — 93306 TTE W/DOPPLER COMPLETE: CPT

## 2020-09-30 PROCEDURE — 36415 COLL VENOUS BLD VENIPUNCTURE: CPT

## 2020-09-30 PROCEDURE — 6370000000 HC RX 637 (ALT 250 FOR IP): Performed by: INTERNAL MEDICINE

## 2020-09-30 PROCEDURE — 93306 TTE W/DOPPLER COMPLETE: CPT | Performed by: INTERNAL MEDICINE

## 2020-09-30 PROCEDURE — 93970 EXTREMITY STUDY: CPT

## 2020-09-30 PROCEDURE — 85025 COMPLETE CBC W/AUTO DIFF WBC: CPT

## 2020-09-30 PROCEDURE — 6370000000 HC RX 637 (ALT 250 FOR IP): Performed by: PHYSICIAN ASSISTANT

## 2020-09-30 PROCEDURE — 2580000003 HC RX 258: Performed by: PHYSICIAN ASSISTANT

## 2020-09-30 PROCEDURE — 1200000000 HC SEMI PRIVATE

## 2020-09-30 PROCEDURE — 80053 COMPREHEN METABOLIC PANEL: CPT

## 2020-09-30 PROCEDURE — 2500000003 HC RX 250 WO HCPCS: Performed by: INTERNAL MEDICINE

## 2020-09-30 RX ORDER — POLYETHYLENE GLYCOL 3350 17 G/17G
17 POWDER, FOR SOLUTION ORAL DAILY PRN
Status: DISCONTINUED | OUTPATIENT
Start: 2020-09-30 | End: 2020-09-30

## 2020-09-30 RX ORDER — MECLIZINE HYDROCHLORIDE 25 MG/1
25 TABLET ORAL 3 TIMES DAILY PRN
Status: DISCONTINUED | OUTPATIENT
Start: 2020-09-30 | End: 2020-10-01 | Stop reason: HOSPADM

## 2020-09-30 RX ORDER — POLYETHYLENE GLYCOL 3350 17 G/17G
17 POWDER, FOR SOLUTION ORAL 2 TIMES DAILY
Status: DISCONTINUED | OUTPATIENT
Start: 2020-09-30 | End: 2020-10-01 | Stop reason: HOSPADM

## 2020-09-30 RX ORDER — LORAZEPAM 0.5 MG/1
0.5 TABLET ORAL DAILY PRN
Status: DISCONTINUED | OUTPATIENT
Start: 2020-09-30 | End: 2020-10-01 | Stop reason: HOSPADM

## 2020-09-30 RX ORDER — GABAPENTIN 300 MG/1
300 CAPSULE ORAL 3 TIMES DAILY
Status: DISCONTINUED | OUTPATIENT
Start: 2020-09-30 | End: 2020-10-01 | Stop reason: HOSPADM

## 2020-09-30 RX ORDER — ONDANSETRON 2 MG/ML
4 INJECTION INTRAMUSCULAR; INTRAVENOUS EVERY 6 HOURS PRN
Status: DISCONTINUED | OUTPATIENT
Start: 2020-09-30 | End: 2020-09-30

## 2020-09-30 RX ORDER — HYDROCODONE BITARTRATE AND ACETAMINOPHEN 7.5; 325 MG/1; MG/1
1 TABLET ORAL EVERY 6 HOURS PRN
Status: DISCONTINUED | OUTPATIENT
Start: 2020-09-30 | End: 2020-10-01 | Stop reason: HOSPADM

## 2020-09-30 RX ORDER — ACETAMINOPHEN 325 MG/1
650 TABLET ORAL EVERY 6 HOURS PRN
Status: DISCONTINUED | OUTPATIENT
Start: 2020-09-30 | End: 2020-10-01 | Stop reason: HOSPADM

## 2020-09-30 RX ORDER — PROMETHAZINE HYDROCHLORIDE 25 MG/1
12.5 TABLET ORAL EVERY 6 HOURS PRN
Status: DISCONTINUED | OUTPATIENT
Start: 2020-09-30 | End: 2020-09-30

## 2020-09-30 RX ORDER — DULOXETIN HYDROCHLORIDE 30 MG/1
60 CAPSULE, DELAYED RELEASE ORAL DAILY
Status: DISCONTINUED | OUTPATIENT
Start: 2020-09-30 | End: 2020-10-01 | Stop reason: HOSPADM

## 2020-09-30 RX ORDER — ONDANSETRON 2 MG/ML
4 INJECTION INTRAMUSCULAR; INTRAVENOUS EVERY 6 HOURS PRN
Status: DISCONTINUED | OUTPATIENT
Start: 2020-09-30 | End: 2020-10-01 | Stop reason: HOSPADM

## 2020-09-30 RX ORDER — DEXTROSE MONOHYDRATE 50 MG/ML
100 INJECTION, SOLUTION INTRAVENOUS PRN
Status: DISCONTINUED | OUTPATIENT
Start: 2020-09-30 | End: 2020-10-01 | Stop reason: HOSPADM

## 2020-09-30 RX ORDER — INSULIN GLARGINE 100 [IU]/ML
35 INJECTION, SOLUTION SUBCUTANEOUS NIGHTLY
Status: DISCONTINUED | OUTPATIENT
Start: 2020-09-30 | End: 2020-10-01 | Stop reason: HOSPADM

## 2020-09-30 RX ORDER — INSULIN GLARGINE 100 [IU]/ML
30 INJECTION, SOLUTION SUBCUTANEOUS NIGHTLY
Status: DISCONTINUED | OUTPATIENT
Start: 2020-09-30 | End: 2020-09-30

## 2020-09-30 RX ORDER — SODIUM CHLORIDE 0.9 % (FLUSH) 0.9 %
10 SYRINGE (ML) INJECTION EVERY 12 HOURS SCHEDULED
Status: DISCONTINUED | OUTPATIENT
Start: 2020-09-30 | End: 2020-10-01 | Stop reason: HOSPADM

## 2020-09-30 RX ORDER — ACETAMINOPHEN 650 MG/1
650 SUPPOSITORY RECTAL EVERY 6 HOURS PRN
Status: DISCONTINUED | OUTPATIENT
Start: 2020-09-30 | End: 2020-09-30

## 2020-09-30 RX ORDER — ERGOCALCIFEROL 1.25 MG/1
50000 CAPSULE ORAL WEEKLY
Status: DISCONTINUED | OUTPATIENT
Start: 2020-09-30 | End: 2020-09-30

## 2020-09-30 RX ORDER — ACETAMINOPHEN 325 MG/1
650 TABLET ORAL EVERY 6 HOURS PRN
Status: DISCONTINUED | OUTPATIENT
Start: 2020-09-30 | End: 2020-09-30

## 2020-09-30 RX ORDER — SODIUM CHLORIDE 9 MG/ML
INJECTION, SOLUTION INTRAVENOUS CONTINUOUS
Status: DISCONTINUED | OUTPATIENT
Start: 2020-09-30 | End: 2020-10-01 | Stop reason: HOSPADM

## 2020-09-30 RX ORDER — NICOTINE POLACRILEX 4 MG
15 LOZENGE BUCCAL PRN
Status: DISCONTINUED | OUTPATIENT
Start: 2020-09-30 | End: 2020-10-01 | Stop reason: HOSPADM

## 2020-09-30 RX ORDER — PANTOPRAZOLE SODIUM 40 MG/1
40 TABLET, DELAYED RELEASE ORAL
Status: DISCONTINUED | OUTPATIENT
Start: 2020-09-30 | End: 2020-10-01 | Stop reason: HOSPADM

## 2020-09-30 RX ORDER — OXYBUTYNIN CHLORIDE 5 MG/1
5 TABLET ORAL 2 TIMES DAILY
Status: DISCONTINUED | OUTPATIENT
Start: 2020-09-30 | End: 2020-10-01 | Stop reason: HOSPADM

## 2020-09-30 RX ORDER — PROPRANOLOL HYDROCHLORIDE 20 MG/1
40 TABLET ORAL 2 TIMES DAILY
Status: DISCONTINUED | OUTPATIENT
Start: 2020-09-30 | End: 2020-10-01 | Stop reason: HOSPADM

## 2020-09-30 RX ORDER — SODIUM CHLORIDE 0.9 % (FLUSH) 0.9 %
10 SYRINGE (ML) INJECTION PRN
Status: DISCONTINUED | OUTPATIENT
Start: 2020-09-30 | End: 2020-09-30

## 2020-09-30 RX ORDER — SODIUM CHLORIDE 0.9 % (FLUSH) 0.9 %
10 SYRINGE (ML) INJECTION EVERY 12 HOURS SCHEDULED
Status: DISCONTINUED | OUTPATIENT
Start: 2020-09-30 | End: 2020-09-30

## 2020-09-30 RX ORDER — SODIUM CHLORIDE 0.9 % (FLUSH) 0.9 %
10 SYRINGE (ML) INJECTION PRN
Status: DISCONTINUED | OUTPATIENT
Start: 2020-09-30 | End: 2020-10-01 | Stop reason: HOSPADM

## 2020-09-30 RX ORDER — FERROUS SULFATE TAB EC 324 MG (65 MG FE EQUIVALENT) 324 (65 FE) MG
324 TABLET DELAYED RESPONSE ORAL
Status: DISCONTINUED | OUTPATIENT
Start: 2020-09-30 | End: 2020-10-01 | Stop reason: HOSPADM

## 2020-09-30 RX ORDER — ERGOCALCIFEROL 1.25 MG/1
50000 CAPSULE ORAL WEEKLY
Status: DISCONTINUED | OUTPATIENT
Start: 2020-10-06 | End: 2020-10-01 | Stop reason: HOSPADM

## 2020-09-30 RX ORDER — VITAMIN B COMPLEX
1000 TABLET ORAL DAILY
Status: DISCONTINUED | OUTPATIENT
Start: 2020-09-30 | End: 2020-10-01 | Stop reason: HOSPADM

## 2020-09-30 RX ORDER — ALOGLIPTIN 12.5 MG/1
6.25 TABLET, FILM COATED ORAL DAILY
Status: DISCONTINUED | OUTPATIENT
Start: 2020-09-30 | End: 2020-10-01 | Stop reason: HOSPADM

## 2020-09-30 RX ORDER — ACETAMINOPHEN 650 MG/1
650 SUPPOSITORY RECTAL EVERY 6 HOURS PRN
Status: DISCONTINUED | OUTPATIENT
Start: 2020-09-30 | End: 2020-10-01 | Stop reason: HOSPADM

## 2020-09-30 RX ORDER — DEXTROSE MONOHYDRATE 25 G/50ML
12.5 INJECTION, SOLUTION INTRAVENOUS PRN
Status: DISCONTINUED | OUTPATIENT
Start: 2020-09-30 | End: 2020-10-01 | Stop reason: HOSPADM

## 2020-09-30 RX ORDER — PROMETHAZINE HYDROCHLORIDE 25 MG/1
12.5 TABLET ORAL EVERY 6 HOURS PRN
Status: DISCONTINUED | OUTPATIENT
Start: 2020-09-30 | End: 2020-10-01 | Stop reason: HOSPADM

## 2020-09-30 RX ORDER — ASPIRIN 81 MG/1
81 TABLET, CHEWABLE ORAL DAILY
Status: DISCONTINUED | OUTPATIENT
Start: 2020-09-30 | End: 2020-10-01 | Stop reason: HOSPADM

## 2020-09-30 RX ADMIN — INSULIN LISPRO 2 UNITS: 100 INJECTION, SOLUTION INTRAVENOUS; SUBCUTANEOUS at 20:09

## 2020-09-30 RX ADMIN — OXYBUTYNIN CHLORIDE 5 MG: 5 TABLET ORAL at 20:02

## 2020-09-30 RX ADMIN — POLYETHYLENE GLYCOL 3350 17 G: 17 POWDER, FOR SOLUTION ORAL at 09:25

## 2020-09-30 RX ADMIN — FERROUS SULFATE TAB EC 324 MG (65 MG FE EQUIVALENT) 324 MG: 324 (65 FE) TABLET DELAYED RESPONSE at 09:25

## 2020-09-30 RX ADMIN — MICONAZOLE NITRATE: 2 POWDER TOPICAL at 20:13

## 2020-09-30 RX ADMIN — POLYETHYLENE GLYCOL 3350 17 G: 17 POWDER, FOR SOLUTION ORAL at 20:03

## 2020-09-30 RX ADMIN — PROPRANOLOL HYDROCHLORIDE 40 MG: 20 TABLET ORAL at 00:46

## 2020-09-30 RX ADMIN — LORAZEPAM 0.5 MG: 0.5 TABLET ORAL at 20:02

## 2020-09-30 RX ADMIN — DULOXETINE HYDROCHLORIDE 60 MG: 30 CAPSULE, DELAYED RELEASE ORAL at 09:25

## 2020-09-30 RX ADMIN — INSULIN LISPRO 6 UNITS: 100 INJECTION, SOLUTION INTRAVENOUS; SUBCUTANEOUS at 18:39

## 2020-09-30 RX ADMIN — Medication 1000 UNITS: at 09:34

## 2020-09-30 RX ADMIN — SODIUM CHLORIDE: 9 INJECTION, SOLUTION INTRAVENOUS at 10:36

## 2020-09-30 RX ADMIN — INSULIN LISPRO 2 UNITS: 100 INJECTION, SOLUTION INTRAVENOUS; SUBCUTANEOUS at 11:33

## 2020-09-30 RX ADMIN — INSULIN GLARGINE 30 UNITS: 100 INJECTION, SOLUTION SUBCUTANEOUS at 00:46

## 2020-09-30 RX ADMIN — INSULIN LISPRO 2 UNITS: 100 INJECTION, SOLUTION INTRAVENOUS; SUBCUTANEOUS at 09:37

## 2020-09-30 RX ADMIN — PANTOPRAZOLE SODIUM 40 MG: 40 TABLET, DELAYED RELEASE ORAL at 09:37

## 2020-09-30 RX ADMIN — HYDROCODONE BITARTRATE AND ACETAMINOPHEN 1 TABLET: 7.5; 325 TABLET ORAL at 20:54

## 2020-09-30 RX ADMIN — GABAPENTIN 300 MG: 300 CAPSULE ORAL at 13:02

## 2020-09-30 RX ADMIN — ASPIRIN 81 MG: 81 TABLET, CHEWABLE ORAL at 09:24

## 2020-09-30 RX ADMIN — PROPRANOLOL HYDROCHLORIDE 40 MG: 20 TABLET ORAL at 20:02

## 2020-09-30 RX ADMIN — ALOGLIPTIN 6.25 MG: 12.5 TABLET, FILM COATED ORAL at 09:24

## 2020-09-30 RX ADMIN — GABAPENTIN 300 MG: 300 CAPSULE ORAL at 20:02

## 2020-09-30 RX ADMIN — GABAPENTIN 300 MG: 300 CAPSULE ORAL at 09:24

## 2020-09-30 RX ADMIN — INSULIN GLARGINE 35 UNITS: 100 INJECTION, SOLUTION SUBCUTANEOUS at 20:05

## 2020-09-30 RX ADMIN — APIXABAN 10 MG: 5 TABLET, FILM COATED ORAL at 20:02

## 2020-09-30 RX ADMIN — OXYBUTYNIN CHLORIDE 5 MG: 5 TABLET ORAL at 00:46

## 2020-09-30 RX ADMIN — SODIUM CHLORIDE, PRESERVATIVE FREE 10 ML: 5 INJECTION INTRAVENOUS at 20:11

## 2020-09-30 RX ADMIN — PROPRANOLOL HYDROCHLORIDE 40 MG: 20 TABLET ORAL at 09:25

## 2020-09-30 RX ADMIN — APIXABAN 10 MG: 5 TABLET, FILM COATED ORAL at 13:02

## 2020-09-30 RX ADMIN — OXYBUTYNIN CHLORIDE 5 MG: 5 TABLET ORAL at 09:24

## 2020-09-30 ASSESSMENT — PAIN SCALES - GENERAL
PAINLEVEL_OUTOF10: 7
PAINLEVEL_OUTOF10: 0
PAINLEVEL_OUTOF10: 2
PAINLEVEL_OUTOF10: 0

## 2020-09-30 ASSESSMENT — PAIN DESCRIPTION - LOCATION: LOCATION: BACK;HEAD;HIP

## 2020-09-30 ASSESSMENT — PAIN DESCRIPTION - PAIN TYPE: TYPE: CHRONIC PAIN

## 2020-09-30 NOTE — FLOWSHEET NOTE
09/30/20 0945   Assessment   Charting Type Shift assessment   Neurological   Neuro (WDL) WDL   Level of Consciousness 0   [place] Coma Scale   Eye Opening 4   Best Verbal Response 5   Best Motor Response 6   [place] Coma Scale Score 15   NIH/MNHISS Stroke Scale   NIH/MNIHSS Stroke Scale Assessed No   HEENT   HEENT (WDL) X   Right Eye Double vision   Left Eye Double Vision   Teeth Other (Comment)  (missing upper teeth)   Respiratory   Respiratory (WDL) X   Breath Sounds   Right Upper Lobe Diminished   Right Middle Lobe Diminished   Right Lower Lobe Diminished   Left Upper Lobe Diminished   Left Lower Lobe Diminished   Cough/Sputum   Cough Dry;Non-productive   Sputum Amount None   Sputum Color CHAVA   Tenacity CHAVA   Cardiac   Cardiac (WDL) WDL   Cardiac Monitor   Telemetry Monitor On Yes   Telemetry Audible Yes   Telemetry Alarms Set Yes   Telemetry Box Number [id number]   Gastrointestinal   Abdominal (WDL) X   Abdomen Inspection Rounded   Last BM (including prior to admit) 09/28/20   Tenderness Soft;Tenderness   RUQ Bowel Sounds Active   LUQ Bowel Sounds Active   RLQ Bowel Sounds Active   LLQ Bowel Sounds Active   Hernia Umbilical   Peripheral Vascular   Peripheral Vascular (WDL) WDL   Skin Color/Condition   Skin Color/Condition (WDL) X   Skin Color Pale   Skin Condition/Temp Cool;Dry   Skin Integrity   Skin Integrity (WDL) X   Skin Integrity Bruising   Location abdomen    Musculoskeletal   Musculoskeletal (WDL) X   RUE Limited movement;Weakness   LUE Full movement   RL Extremity Limited movement;Weakness   LL Extremity Limited movement;Weakness   Genitourinary   Genitourinary (WDL) X  (incontinent at times)   Urine Assessment   Incontinence No   Urine Color Yellow/straw   Urine Appearance Clear   Urine Odor No odor   Anus/Rectum   Anus/Rectum (WDL) WDL   Psychosocial   Psychosocial (WDL) WDL     Patient resting in bed at this time. Patient show no signs of shortness of breath at this time.  Patient with no complaints of pain. Patient states her last bowel movement was 9/28- miralax administered per order. Bowel sounds active. Call light within reach, will continue to monitor.

## 2020-09-30 NOTE — FLOWSHEET NOTE
09/30/20 0000   Assessment   Charting Type Admission   Neurological   Neuro (WDL) WDL   Swallow Screening   Is the patient able to remain alert for testing? Yes   Was the Patient Eating a Modified Diet Prior to being Admitted? No   [place] Coma Scale   Eye Opening 4   Best Verbal Response 5   Best Motor Response 6   [place] Coma Scale Score 15   NIH/MNHISS Stroke Scale   NIH/MNIHSS Stroke Scale Assessed No   Respiratory   Respiratory (WDL) X   Breath Sounds   Right Upper Lobe Clear   Right Middle Lobe Diminished   Right Lower Lobe Diminished   Left Upper Lobe Clear   Left Lower Lobe Diminished   Cough/Sputum   Sputum How Obtained Cough on request   Cough Dry;Non-productive   Frequency Infrequent   Sputum Amount None   Sputum Color CHAVA   Tenacity CHAVA   Cardiac   Cardiac (WDL) WDL   Cardiac Monitor   Telemetry Monitor On No   Gastrointestinal   Abdominal (WDL) X   Abdomen Inspection Rounded   Last BM (including prior to admit) 09/29/20   Tenderness Soft   RUQ Bowel Sounds Active   LUQ Bowel Sounds Active   RLQ Bowel Sounds Active   LLQ Bowel Sounds Active   Peripheral Vascular   Peripheral Vascular (WDL) WDL   Skin Color/Condition   Skin Color/Condition (WDL) X   Skin Color Pale   Skin Condition/Temp Cool;Dry   Skin Integrity   Skin Integrity (WDL) WDL   Musculoskeletal   Musculoskeletal (WDL) X   RUE Limited movement;Weakness   LUE Full movement   RL Extremity Limited movement;Weakness   LL Extremity Limited movement;Weakness   Genitourinary   Genitourinary (WDL) X  (incontnence at times)   Anus/Rectum   Anus/Rectum (WDL) WDL   Psychosocial   Psychosocial (WDL) WDL   Pt.  Arrive to floor in stretcher and transferred to bed -Pt alert times 4- pt stated, \" I was getting short of breath today with mid chest pain and my  family brought to hospital via car\"-Pt admitted with PE- diagnosis- Lovenox given in er noted oxygen 94% on room  air at this time-Telemetry placed -NSR on monitor rate of 86- Pt oriented to call bell tv and phone- No complaints at this time-Pt told to call out with any need -Pt verbalized understanding- Will monitor

## 2020-09-30 NOTE — CONSULTS
Lovenox for pulmonary embolism ordered per pharmacy per Dr. Jack James. Crcl 26.69. Please make lovenox 1mg/kg (100mg)  subQ q24h. Patient received lovenox 100mg subq at 1931 on 9-29-20. [facility]. Ph.  9/30/2020 1:03 AM

## 2020-09-30 NOTE — H&P
History and Physical    Patient:  Ina Romero    CHIEF COMPLAINT:    Chest pain    HISTORY OF PRESENT ILLNESS:   The patient is a 79 y.o. female with past medical history of chronic back pain, chronic right shoulder pain secondary to past fracture, hypertension, hyperlipidemia, depression, osteoarthritis, diabetes mellitus type 2 and anxiety who presented to the emergency department complaining of chest pain and shortness of breath for 4 days. Patient also endorsed a chronic cough. For the past 4 nights, she states she had been stressed. While sitting in her chair at home, she would experience a stabbing substernal pain. Pain radiated into her neck and upper back. She denied any exertional symptoms, nausea, dizziness, diaphoresis. Patient denied any recent travel. No known exposure to COVID-19. No fevers reported. In the emergency department, patient underwent routine evaluation. WBC 9.2, hematocrit 40.4, sodium 134, potassium 4.6, CO2 23, BUN 20, creatinine 1.6, proBNP 875, troponin less than 0.30, alkaline phos 213, , , total bilirubin 0.4, glucose 219. EKG with normal sinus rhythm, heart rate 83. D-dimer elevated at 1.4.  UA negative. Chest x-ray negative. CTA chest revealed findings consistent with pulmonary emboli and right upper lobe segmental and subsegmental arteries and possibly right lower lobe segmental and subsegmental arteries. However this exam is significantly degraded by motion. There is reflux of contrast into the inferior vena cava which could be seen with right heart strain. There is small hiatal hernia with wall thickening of the distal esophagus. There is fatty infiltration of the liver. Given findings of PE, patient was started on therapeutic Lovenox and admitted for further care.     Past Medical History:      Diagnosis Date    Allergic rhinitis     Anxiety     Chronic back pain     Depression     Double vision with both eyes open     Pt states she can see ok with one eye shut    Fall     Hyperlipidemia     Hypertension     Osteoarthritis     Type II or unspecified type diabetes mellitus without mention of complication, not stated as uncontrolled        Past Surgical History:      Procedure Laterality Date     SECTION      COLONOSCOPY      HYSTERECTOMY      TOTAL    SHOULDER SURGERY      RIGHT X 2       Medications Prior to Admission:    Prior to Admission medications    Medication Sig Start Date End Date Taking? Authorizing Provider   DULoxetine (CYMBALTA) 60 MG extended release capsule Take 60 mg by mouth daily   Yes Historical Provider, MD   oxybutynin (DITROPAN) 5 MG tablet Take 5 mg by mouth 2 times daily   Yes Historical Provider, MD   aspirin 81 MG tablet Take 1 tablet by mouth daily 20 Yes Vera Score, PA   ferrous sulfate 324 (65 Fe) MG EC tablet Take 1 tablet by mouth daily (with breakfast) 20  Yes Vera Score, PA   omeprazole (PRILOSEC) 20 MG delayed release capsule Take 2 capsules by mouth daily 20  Yes Vera Score, PA   vitamin D (CHOLECALCIFEROL) 25 MCG (1000 UT) TABS tablet Take 1 tablet by mouth daily 20  Yes Vera Score, PA   propranolol (INDERAL) 40 MG tablet Take 1 tablet by mouth 2 times daily 20  Yes Vera Score, PA   pravastatin (PRAVACHOL) 20 MG tablet Take 1 tablet by mouth nightly 20  Yes Vera Score, PA   SITagliptin (JANUVIA) 25 MG tablet Take 2 tablets by mouth daily 20  Yes Vera Score, PA   LORazepam (ATIVAN) 0.5 MG tablet Take 0.5 mg by mouth daily as needed for Anxiety. Yes Historical Provider, MD   meclizine (ANTIVERT) 25 MG tablet Take 1 tablet by mouth 3 times daily as needed for Dizziness 20  Yes Vera Score, PA   glucose monitoring kit (FREESTYLE) monitoring kit 1 kit by Does not apply route daily 20  Yes Vera Score, PA   gabapentin (NEURONTIN) 300 MG capsule Take 300 mg by mouth 3 times daily.    Yes Historical Provider, MD vitamin D (ERGOCALCIFEROL) 1.25 MG (29178 UT) CAPS capsule Take 50,000 Units by mouth once a week    Historical Provider, MD   insulin glargine (LANTUS) 100 UNIT/ML injection vial Inject 30 Units into the skin nightly 4/20/20   KEATON Sanchez   diclofenac sodium (VOLTAREN) 1 % GEL Apply 1 g topically 4 times daily as needed for Pain 4/20/20   KEATON Sanchez   polyethylene glycol [facility]) packet Take 17 g by mouth 2 times daily 4/20/20   KEATON Sanchez   senna-docusate (PERICOLACE) 8.6-50 MG per tablet Take 1 tablet by mouth 2 times daily 4/20/20   KEATON Sanchez   insulin lispro, 1 Unit Dial, (HUMALOG KWIKPEN) 100 UNIT/ML SOPN Inject 0-18 Units into the skin 3 times daily (before meals)   No Insulin   140-199 3 Units   200-249 6 Units   250-299 9 Units   300-349 12   Units 350-400 15 Units   Over 400 18 Units 4/20/20 5/20/20  KEATON Sanchez   insulin lispro, 1 Unit Dial, (HUMALOG KWIKPEN) 100 UNIT/ML SOPN Inject 10 Units into the skin 3 times daily (before meals) 4/20/20 5/20/20  KEATON Sanchez       Allergies:  Latex; Penicillins; Sulfa antibiotics; and Tetracyclines & related    Social History:   TOBACCO:   reports that she has never smoked. She has never used smokeless tobacco.  ETOH:   reports no history of alcohol use. OCCUPATION:  None     Family History:       Problem Relation Age of Onset    Heart Disease Mother         CHF,CAD    Cancer Maternal Aunt     High Blood Pressure Sister        Review of system  Constitutional:  Denies fever or chills   Eyes:  Denies eye pain or redness  HENT:  Denies nasal congestion or sore throat   Respiratory: Positive for cough and shortness of breath. Cardiovascular: Positive for chest pain.   GI:  Denies abdominal pain, nausea, vomiting, bloody stools or diarrhea   :  Denies dysuria or frequency  Musculoskeletal:  Denies acute neck pain or body aches  Integument:  Denies rash or itching  Neurologic:  Denies headache, dizziness, numbness, tingling or unilateral weakness  Psychiatric:  Denies acute depression or acute anxiety      Vital Signs  Temp: 98.9 °F (37.2 °C)  Pulse: 67  Resp: 18  BP: (!) 110/52  SpO2: 91 %  O2 Device: None (Room air)       vital signs reviewed in electronic chart. Physical exam  Constitutional:  Well developed, well nourished, elderly debilitated lady lying in bed in no acute distress  Eyes:  PERRL, no scleral icterus, conjunctiva normal   HENT:  Atraumatic, external ears normal, nose normal, oropharynx moist, no pharyngeal exudates. Neck- supple, no JVD, no lymphadenopathy  Respiratory:  No respiratory distress, no wheezing, rales or rhonchi detected  Cardiovascular:  Normal rate, normal rhythm, no murmurs, no gallops, no rubs, no edema   GI:  Soft, nondistended, normal bowel sounds, nontender, no voluntary guarding  Musculoskeletal:  No cyanosis or obvious acute deformity. Moving all extremities   Integument:  Warm and dry.   Lymphatic:  No cervical or axillary lymphadenopathy noted   Neurologic:  Alert & oriented x 3, no apparent focal deficits noted   Psychiatric:  Speech and behavior appropriate         Lab Results   Component Value Date     09/30/2020    K 4.5 09/30/2020     09/30/2020    CO2 26 09/30/2020    BUN 19 09/30/2020    CREATININE 1.6 (H) 09/30/2020    GLUCOSE 271 (H) 09/30/2020    CALCIUM 9.1 09/30/2020    PROT 6.6 09/30/2020    LABALBU 3.9 09/30/2020    BILITOT 0.4 09/30/2020    ALKPHOS 197 (H) 09/30/2020     (H) 09/30/2020     (H) 09/30/2020    LABGLOM 31 (L) 09/30/2020    GFRAA 38 (L) 09/30/2020    AGRATIO 1.4 09/30/2020    GLOB 2.7 09/30/2020           Lab Results   Component Value Date    WBC 7.7 09/30/2020    HGB 12.2 09/30/2020    HCT 41.7 09/30/2020    .2 (H) 09/30/2020     (L) 09/30/2020       PA/lat CXR:   US LIVER   Final Result      Fatty infiltration of the liver      Cholecystectomy      2 cm fluid collection within the gallbladder fossa adjacent to surgical clips indeterminate. Findings may relate to seroma or biloma. Infected collection cannot be excluded. Correlation with clinical history and laboratory values recommended. CTA PULMONARY W CONTRAST   Final Result      Findings consistent with pulmonary emboli and right upper lobe segmental and subsegmental arteries and possibly right lower lobe segmental and subsegmental arteries. However the exam is significantly degraded by motion. There is reflux of contrast into the inferior vena cava which can be seen with right heart strain. There is a small hiatal hernia with wall thickening of the distal esophagus. There is fatty infiltration of the liver            XR CHEST PORTABLE   Final Result      No acute disease               EKG: Normal sinus rhythm, rate 83 bpm, normal QRS, normal QT, no ST depression or elevation, normal T wave, Q waves in the inferior leads    Assessment and Plan     Active Hospital Problems    Diagnosis Date Noted    Pulmonary embolism on right [facility]) [I26.99]  -CTA chest 9/29 with findings consistent with pulmonary emboli and right upper lobe segmental and subsegmental arteries and possibly right lower lobe segmental and subsegmental arteries. There is reflux of contrast into the inferior vena cava which could be seen with right heart strain.  -Echocardiogram 9/30 with EF 70%. RVSP 29.  Echocardiogram findings do not suggest evidence of right heart strain.  -Started on therapeutic Lovenox by the ED. Will transition to Eliquis today (9/30).   -Continue telemetry  -Check bilateral lower extremity venous duplex   09/29/2020    Chronic back pain [M54.9, G89.29]  -continue home gabapentin  -PRN Tylenol   12/20/2019    CKD (chronic kidney disease) stage 3, GFR 30-59 ml/min ([facility]) [N18.3]  -Serum creatinine currently at baseline of 1.6  -Avoid nephrotoxins and NSAIDs  -Encourage good p.o. intake   12/20/2019    Peripheral neuropathy [G62.9]  -Continue home gabapentin 12/20/2019    Anxiety and depression [F41.9, F32.9]  -Continue home Cymbalta and PRN Ativan   12/20/2019    HTN (hypertension) [I10]  -BP initially elevated on admission but now normotensive with systolic   -Continue home propranolol   10/14/2011    Diabetes mellitus, type II ([facility] [address] [E11.9]  -Home regimen: Januvia, Lantus 30 units nightly and sliding scale insulin  -Blood glucose is currently elevated and ranging from 219-271 with above regimen  -We will increase Lantus to 35 units nightly and increase sliding scale insulin to medium dose. Continue substitute for Januvia. Transaminitis  -Unclear etiology. History of cholecystectomy.  -Liver ultrasound 9/30 with fatty infiltration of the liver. 2 mm fluid collection within the gallbladder fossa adjacent to surgical clips indeterminate. Findings may relate to seroma or bilioma. Infected collection cannot be excluded. -Consult  tomorrow 07/05/2011     Patient was seen and examined by Dr. Farah Record and plan of care reviewed.       Irlanda Kearney certifies per [employer] regulation for 42 .15(a), that the patient may reasonably be expected to be discharged or transferred to a hospital within 96 hours after admission to [address] [place] [address]    Electronically signed by KEATON Ching on 9/30/2020 at 1:46 PM

## 2020-09-30 NOTE — ED NOTES
Report given to Angel Luis Clark RN on medical unit at this time.       Valencia Allen RN  09/29/20 0395

## 2020-09-30 NOTE — ED NOTES
Medical unit to call for report after nurse who will be taking patient gets to facility.       Kathi Tariq RN  09/29/20 2008

## 2020-09-30 NOTE — ED NOTES
Called [facility] in regards to getting consult with cardiology for patient per request of Dr. Diane Mahoney.      Kathi Mai RN  09/29/20 [id number]

## 2020-09-30 NOTE — PROGRESS NOTES
Confirmed with insurance that the pt's Eliquis will be available without a prior authorization upon discharge.     Nuzhat Snyder, ScarD

## 2020-09-30 NOTE — ED NOTES
Patient vitals prior to taking patient down to medical unit are as follows: Heart Rate:85, BP:150/55, O2 Saturation:99% on 2L nasal cannula.       Alden Schneider RN  09/29/20 2102

## 2020-09-30 NOTE — PROGRESS NOTES
Spoke with pharmacy  regarding NPO status due to US liver ordered for am - OK to give long acting insulin per pharmacy blood glucose 314at this time-Sliding scale insulin held per pharmacy recommendation -will continue to monitor and reassess-

## 2020-09-30 NOTE — ED NOTES
Received call from [address] [place] [address] with [facility] with the cariology from [facility] in [place] on the line. Transferred call to Dr. Saul Osuna.      Sandra Darling  09/29/20 2125

## 2020-10-01 ENCOUNTER — OUTSIDE FACILITY SERVICE (OUTPATIENT)
Dept: CARDIOLOGY | Facility: CLINIC | Age: 80
End: 2020-10-01

## 2020-10-01 ENCOUNTER — OUTSIDE FACILITY SERVICE (OUTPATIENT)
Dept: SURGERY | Facility: CLINIC | Age: 80
End: 2020-10-01

## 2020-10-01 VITALS
TEMPERATURE: 98.2 F | DIASTOLIC BLOOD PRESSURE: 55 MMHG | HEART RATE: 78 BPM | HEIGHT: 66 IN | SYSTOLIC BLOOD PRESSURE: 142 MMHG | RESPIRATION RATE: 19 BRPM | OXYGEN SATURATION: 91 % | BODY MASS INDEX: 35.9 KG/M2 | WEIGHT: 223.4 LBS

## 2020-10-01 DIAGNOSIS — Z01.818 PREOP TESTING: Primary | ICD-10-CM

## 2020-10-01 LAB
A/G RATIO: 1.1 (ref 0.8–2)
ALBUMIN SERPL-MCNC: 3.2 G/DL (ref 3.4–4.8)
ALP BLD-CCNC: 137 U/L (ref 25–100)
ALT SERPL-CCNC: 68 U/L (ref 4–36)
ANION GAP SERPL CALCULATED.3IONS-SCNC: 8 MMOL/L (ref 3–16)
AST SERPL-CCNC: 37 U/L (ref 8–33)
BILIRUB SERPL-MCNC: 0.3 MG/DL (ref 0.3–1.2)
BUN BLDV-MCNC: 16 MG/DL (ref 6–20)
CALCIUM SERPL-MCNC: 8.2 MG/DL (ref 8.5–10.5)
CHLORIDE BLD-SCNC: 102 MMOL/L (ref 98–107)
CO2: 26 MMOL/L (ref 20–30)
CREAT SERPL-MCNC: 1.6 MG/DL (ref 0.4–1.2)
GFR AFRICAN AMERICAN: 38
GFR NON-AFRICAN AMERICAN: 31
GLOBULIN: 2.8 G/DL
GLUCOSE BLD-MCNC: 230 MG/DL (ref 74–106)
GLUCOSE BLD-MCNC: 289 MG/DL (ref 74–106)
GLUCOSE BLD-MCNC: 329 MG/DL (ref 74–106)
HCT VFR BLD CALC: 36.6 % (ref 37–47)
HEMOGLOBIN: 11 G/DL (ref 11.5–16.5)
MCH RBC QN AUTO: 31.3 PG (ref 27–32)
MCHC RBC AUTO-ENTMCNC: 30.1 G/DL (ref 31–35)
MCV RBC AUTO: 104.3 FL (ref 80–100)
PDW BLD-RTO: 13.7 % (ref 11–16)
PERFORMED ON: ABNORMAL
PERFORMED ON: ABNORMAL
PLATELET # BLD: 139 K/UL (ref 150–400)
PMV BLD AUTO: 11 FL (ref 6–10)
POTASSIUM REFLEX MAGNESIUM: 4.5 MMOL/L (ref 3.4–5.1)
RBC # BLD: 3.51 M/UL (ref 3.8–5.8)
SODIUM BLD-SCNC: 136 MMOL/L (ref 136–145)
TOTAL PROTEIN: 6 G/DL (ref 6.4–8.3)
WBC # BLD: 6 K/UL (ref 4–11)

## 2020-10-01 PROCEDURE — 99222 1ST HOSP IP/OBS MODERATE 55: CPT | Performed by: SURGERY

## 2020-10-01 PROCEDURE — 96361 HYDRATE IV INFUSION ADD-ON: CPT

## 2020-10-01 PROCEDURE — 85027 COMPLETE CBC AUTOMATED: CPT

## 2020-10-01 PROCEDURE — 97535 SELF CARE MNGMENT TRAINING: CPT

## 2020-10-01 PROCEDURE — 80053 COMPREHEN METABOLIC PANEL: CPT

## 2020-10-01 PROCEDURE — 36415 COLL VENOUS BLD VENIPUNCTURE: CPT

## 2020-10-01 PROCEDURE — 6370000000 HC RX 637 (ALT 250 FOR IP): Performed by: INTERNAL MEDICINE

## 2020-10-01 PROCEDURE — 2580000003 HC RX 258: Performed by: PHYSICIAN ASSISTANT

## 2020-10-01 PROCEDURE — 99238 HOSP IP/OBS DSCHRG MGMT 30/<: CPT | Performed by: INTERNAL MEDICINE

## 2020-10-01 PROCEDURE — 6370000000 HC RX 637 (ALT 250 FOR IP): Performed by: PHYSICIAN ASSISTANT

## 2020-10-01 PROCEDURE — 97165 OT EVAL LOW COMPLEX 30 MIN: CPT

## 2020-10-01 PROCEDURE — 97161 PT EVAL LOW COMPLEX 20 MIN: CPT

## 2020-10-01 PROCEDURE — 99222 1ST HOSP IP/OBS MODERATE 55: CPT | Performed by: INTERNAL MEDICINE

## 2020-10-01 RX ORDER — PEN NEEDLE, DIABETIC 29 GAUGE
1 NEEDLE, DISPOSABLE MISCELLANEOUS DAILY
Qty: 100 EACH | Refills: 3 | Status: ON HOLD | OUTPATIENT
Start: 2020-10-01 | End: 2021-06-16

## 2020-10-01 RX ORDER — INSULIN GLARGINE 100 [IU]/ML
35 INJECTION, SOLUTION SUBCUTANEOUS NIGHTLY
Qty: 3150 UNITS | Refills: 1 | Status: SHIPPED | OUTPATIENT
Start: 2020-10-01 | End: 2020-10-02 | Stop reason: HOSPADM

## 2020-10-01 RX ORDER — INSULIN ASPART 100 [IU]/ML
INJECTION, SOLUTION INTRAVENOUS; SUBCUTANEOUS
Qty: 5 PEN | Refills: 3 | Status: SHIPPED | OUTPATIENT
Start: 2020-10-01 | End: 2020-10-01 | Stop reason: SDUPTHER

## 2020-10-01 RX ORDER — INSULIN ASPART 100 [IU]/ML
INJECTION, SOLUTION INTRAVENOUS; SUBCUTANEOUS
Qty: 5 PEN | Refills: 3 | Status: ON HOLD | OUTPATIENT
Start: 2020-10-01 | End: 2021-06-16

## 2020-10-01 RX ORDER — INSULIN GLARGINE 100 [IU]/ML
35 INJECTION, SOLUTION SUBCUTANEOUS NIGHTLY
Qty: 3150 UNITS | Refills: 1 | Status: SHIPPED | OUTPATIENT
Start: 2020-10-01 | End: 2020-10-01

## 2020-10-01 RX ORDER — INSULIN ASPART 100 [IU]/ML
INJECTION, SOLUTION INTRAVENOUS; SUBCUTANEOUS
Status: ON HOLD | COMMUNITY
End: 2020-10-01 | Stop reason: HOSPADM

## 2020-10-01 RX ADMIN — POLYETHYLENE GLYCOL 3350 17 G: 17 POWDER, FOR SOLUTION ORAL at 08:58

## 2020-10-01 RX ADMIN — HYDROCODONE BITARTRATE AND ACETAMINOPHEN 1 TABLET: 7.5; 325 TABLET ORAL at 05:13

## 2020-10-01 RX ADMIN — OXYBUTYNIN CHLORIDE 5 MG: 5 TABLET ORAL at 08:59

## 2020-10-01 RX ADMIN — FERROUS SULFATE TAB EC 324 MG (65 MG FE EQUIVALENT) 324 MG: 324 (65 FE) TABLET DELAYED RESPONSE at 08:59

## 2020-10-01 RX ADMIN — SODIUM CHLORIDE: 9 INJECTION, SOLUTION INTRAVENOUS at 13:08

## 2020-10-01 RX ADMIN — GABAPENTIN 300 MG: 300 CAPSULE ORAL at 08:58

## 2020-10-01 RX ADMIN — PANTOPRAZOLE SODIUM 40 MG: 40 TABLET, DELAYED RELEASE ORAL at 05:13

## 2020-10-01 RX ADMIN — INSULIN LISPRO 8 UNITS: 100 INJECTION, SOLUTION INTRAVENOUS; SUBCUTANEOUS at 09:05

## 2020-10-01 RX ADMIN — APIXABAN 10 MG: 5 TABLET, FILM COATED ORAL at 08:59

## 2020-10-01 RX ADMIN — GABAPENTIN 300 MG: 300 CAPSULE ORAL at 13:34

## 2020-10-01 RX ADMIN — PROPRANOLOL HYDROCHLORIDE 40 MG: 20 TABLET ORAL at 08:59

## 2020-10-01 RX ADMIN — MICONAZOLE NITRATE: 2 POWDER TOPICAL at 09:00

## 2020-10-01 RX ADMIN — ALOGLIPTIN 6.25 MG: 12.5 TABLET, FILM COATED ORAL at 08:58

## 2020-10-01 RX ADMIN — DULOXETINE HYDROCHLORIDE 60 MG: 30 CAPSULE, DELAYED RELEASE ORAL at 08:59

## 2020-10-01 RX ADMIN — SODIUM CHLORIDE: 9 INJECTION, SOLUTION INTRAVENOUS at 03:18

## 2020-10-01 RX ADMIN — Medication 1000 UNITS: at 08:59

## 2020-10-01 RX ADMIN — ASPIRIN 81 MG: 81 TABLET, CHEWABLE ORAL at 08:59

## 2020-10-01 RX ADMIN — INSULIN LISPRO 4 UNITS: 100 INJECTION, SOLUTION INTRAVENOUS; SUBCUTANEOUS at 11:26

## 2020-10-01 ASSESSMENT — PAIN SCALES - GENERAL
PAINLEVEL_OUTOF10: 5
PAINLEVEL_OUTOF10: 0
PAINLEVEL_OUTOF10: 0
PAINLEVEL_OUTOF10: 2
PAINLEVEL_OUTOF10: 0

## 2020-10-01 ASSESSMENT — PAIN DESCRIPTION - PAIN TYPE
TYPE: CHRONIC PAIN
TYPE: CHRONIC PAIN

## 2020-10-01 ASSESSMENT — PAIN DESCRIPTION - LOCATION: LOCATION: BACK;HEAD;HIP

## 2020-10-01 NOTE — DISCHARGE SUMMARY
Discharge Summary      Patient ID: Cynthia Escamilla      Patient's PCP: Marquis Turcios, MIRNA - CNP    Admit Date: 9/29/2020     Discharge Date:  10/01/2020    Admitting Provider: Tami Le MD    Discharging Provider: KEATON Rich     Reason for this admission:   Chest pain  Bilateral PE's    Discharge Diagnoses: Active Hospital Problems    Diagnosis Date Noted    Transaminitis [R74.01] 09/30/2020    Pulmonary embolism on right [facility]) [I26.99] 09/29/2020    Chronic back pain [M54.9, G89.29] 12/20/2019    CKD (chronic kidney disease) stage 3, GFR 30-59 ml/min [N18.30] 12/20/2019    Peripheral neuropathy [G62.9] 12/20/2019    Anxiety and depression [F41.9, F32.9] 12/20/2019    HTN (hypertension) [I10] 10/14/2011    Hyperlipidemia [E78.5] 10/14/2011    Diabetes mellitus, type II ([facility] [address] [E11.9] 07/05/2011       Procedures:  US DUP LOWER EXTREMITIES BILATERAL VENOUS   Final Result      No evidence for deep venous thrombosis involving the bilateral lower extremities      US LIVER   Final Result      Fatty infiltration of the liver      Cholecystectomy      2 cm fluid collection within the gallbladder fossa adjacent to surgical clips indeterminate. Findings may relate to seroma or biloma. Infected collection cannot be excluded. Correlation with clinical history and laboratory values recommended. CTA PULMONARY W CONTRAST   Final Result      Findings consistent with pulmonary emboli and right upper lobe segmental and subsegmental arteries and possibly right lower lobe segmental and subsegmental arteries. However the exam is significantly degraded by motion. There is reflux of contrast into the inferior vena cava which can be seen with right heart strain. There is a small hiatal hernia with wall thickening of the distal esophagus.       There is fatty infiltration of the liver            XR CHEST PORTABLE   Final Result      No acute disease               Consults:   IP CONSULT TO PHARMACY  IP CONSULT TO GENERAL SURGERY  IP CONSULT TO CARDIOLOGY  PT OT    Briefly:   Ms. Keaton Weller is a 79year old female with PMH of chronic back pain, chronic right shoulder pain secondary to past fracture, HTN, HLD, depression, OA, DMII and anxiety admitted for bilateral PE's. [facility] Eliquis. CT chest with possible right heart strain, however, echo with RVSP 29, thus not consistent with right heart strain. BLE Venous duplex negative for DVT. Pt also noted to have elevated LFTs and liver US was obtained. This revealed fatty infiltration of the liver, prior CCY and 2 cm fluid collection within the gallbladder fossa adjacent to surgical clips indeterminate. Of note, on CTA chest, a hiatal hernia with wall thickening of the distal esophagus seen. Pt evaluated by Ezequiel Aly with recommendations for EGD to evaluate area of thickening on CT scan. Cardiology, Zaida Wrihgt also evaluated with recommendations for    Hospital Course: Active Hospital Problems     Diagnosis Date Noted    Pulmonary embolism on right [facility]) [I26.99]  -CTA chest 9/29 with findings consistent with pulmonary emboli and right upper lobe segmental and subsegmental arteries and possibly right lower lobe segmental and subsegmental arteries. There is reflux of contrast into the inferior vena cava which could be seen with right heart strain.  -Echocardiogram 9/30 with EF 70%. RVSP 29.  Echocardiogram findings do not suggest evidence of right heart strain.  -Started on therapeutic Lovenox by the ED. Transitioned to Eliquis 9/30   -BLE venous duplex negative for DVT  -HR 80s, O2 sats stable on RA  - consulted with recs for     09/29/2020    Chronic back pain [M54.9, G89.29]  -continue home gabapentin  -PRN Tylenol and norco    12/20/2019    CKD (chronic kidney disease) stage 3, GFR 30-59 ml/min ([facility]) [N18.3]  -Serum creatinine currently at baseline of 1.6  -Avoid nephrotoxins and NSAIDs  -Encourage good p.o. intake    12/20/2019    Peripheral neuropathy [G62.9]  -Continue home gabapentin    12/20/2019    Anxiety and depression [F41.9, F32.9]  -Continue home Cymbalta and PRN Ativan    12/20/2019    HTN (hypertension) [I10]  -BP initially elevated on admission but now normotensive with systolic   -Continue home propranolol    10/14/2011    Diabetes mellitus, type II ([facility] [address] [E11.9]  -Home regimen: Januvia, Lantus 30 units nightly and sliding scale insulin  -Blood glucose elevated with above regimen so lantus increased to 35 units QHS with better control  -continue SSI and Januvia  -provided a 3 month refill of lantus and SSI with 1 refill      Transaminitis  Thickening of distal esophagus  -Unclear etiology. History of cholecystectomy.  -Liver ultrasound 9/30 with fatty infiltration of the liver. 2 mm fluid collection within the gallbladder fossa adjacent to surgical clips indeterminate. Findings may relate to seroma or bilioma. Infected collection cannot be excluded.  - consulted with recommendations for EGD next week as OP        Disposition: home    Discharged Condition: Stable    Vital Signs  Temp: 98.2 °F (36.8 °C)  Pulse: 78  Resp: 19  BP: (!) 142/55  SpO2: 91 %  O2 Device: None (Room air)       Vital signs reviewed in electronic chart. Physical exam  Constitutional:  Well developed, well nourished, elderly debilitated lady lying in bed in no acute distress  Eyes:  PERRL, no scleral icterus, conjunctiva normal   HENT:  Atraumatic, external ears normal, nose normal, oropharynx moist, no pharyngeal exudates. Neck- supple, no JVD, no lymphadenopathy  Respiratory:  No respiratory distress, no wheezing, rales or rhonchi detected  Cardiovascular:  Normal rate, normal rhythm, no murmurs, no gallops, no rubs, no edema   GI:  Soft, nondistended, normal bowel sounds, nontender, no voluntary guarding  Musculoskeletal:  No cyanosis or obvious acute deformity.  Moving all extremities   Integument:  Warm and dry.  Lymphatic:  No cervical or axillary lymphadenopathy noted   Neurologic:  Alert & oriented x 3, no apparent focal deficits noted   Psychiatric:  Speech and behavior appropriate . Activity: activity as tolerated  Diet: diabetic diet  Follow Up: Primary Care Physician in 2 weeks,  as scheduled and  as scheduled      Labs: For convenience and continuity at follow-up the following most recent labs are provided:    CBC:   Lab Results   Component Value Date    WBC 6.0 10/01/2020    HGB 11.0 10/01/2020    HCT 36.6 10/01/2020     10/01/2020       RENAL:   Lab Results   Component Value Date     10/01/2020    K 4.5 10/01/2020     10/01/2020    CO2 26 10/01/2020    BUN 16 10/01/2020    CREATININE 1.6 10/01/2020         Discharge Medications:     Current Discharge Medication List           Details   !! apixaban (ELIQUIS) 5 MG TABS tablet Take 2 tablets by mouth 2 times daily for 6 days  Qty: 24 tablet, Refills: 0      !! apixaban (ELIQUIS) 5 MG TABS tablet Take 1 tablet by mouth 2 times daily Start after completion of loading dose Eliquis 10mg BID x 6 days  Qty: 60 tablet, Refills: 0      Insulin Pen Needle ([employer] PEN NEEDLES 29G) 29G X 12MM MISC 1 each by Does not apply route daily  Qty: 100 each, Refills: 3       !! - Potential duplicate medications found. Please discuss with provider. Details   insulin glargine (LANTUS) 100 UNIT/ML injection vial Inject 35 Units into the skin nightly Dispense on 340 B plan  Qty: 3150 Units, Refills: 1      insulin aspart (NOVOLOG FLEXPEN) 100 UNIT/ML injection pen Per low dose sliding scale.  Please run on 340B program.  Qty: 5 pen, Refills: 3              Details   DULoxetine (CYMBALTA) 60 MG extended release capsule Take 60 mg by mouth daily      oxybutynin (DITROPAN) 5 MG tablet Take 5 mg by mouth 2 times daily      aspirin 81 MG tablet Take 1 tablet by mouth daily  Qty: 30 tablet, Refills: 1      ferrous sulfate 324 (65 Fe) MG EC tablet Take 1 tablet by mouth daily (with breakfast)  Qty: 30 tablet, Refills: 3      omeprazole (PRILOSEC) 20 MG delayed release capsule Take 2 capsules by mouth daily  Qty: 60 capsule, Refills: 0      vitamin D (CHOLECALCIFEROL) 25 MCG (1000 UT) TABS tablet Take 1 tablet by mouth daily  Qty: 30 tablet, Refills: 0      propranolol (INDERAL) 40 MG tablet Take 1 tablet by mouth 2 times daily  Qty: 60 tablet, Refills: 0      pravastatin (PRAVACHOL) 20 MG tablet Take 1 tablet by mouth nightly  Qty: 30 tablet, Refills: 0      SITagliptin (JANUVIA) 25 MG tablet Take 2 tablets by mouth daily  Qty: 30 tablet, Refills: 0      LORazepam (ATIVAN) 0.5 MG tablet Take 0.5 mg by mouth daily as needed for Anxiety. meclizine (ANTIVERT) 25 MG tablet Take 1 tablet by mouth 3 times daily as needed for Dizziness  Qty: 30 tablet, Refills: 0      glucose monitoring kit (FREESTYLE) monitoring kit 1 kit by Does not apply route daily  Qty: 1 kit, Refills: 0      gabapentin (NEURONTIN) 300 MG capsule Take 300 mg by mouth 3 times daily. vitamin D (ERGOCALCIFEROL) 1.25 MG (62558 UT) CAPS capsule Take 50,000 Units by mouth once a week      diclofenac sodium (VOLTAREN) 1 % GEL Apply 1 g topically 4 times daily as needed for Pain  Qty: 1 Tube, Refills: 3      polyethylene glycol (GLYCOLAX) packet Take 17 g by mouth 2 times daily  Qty: 527 g, Refills: 1      senna-docusate (PERICOLACE) 8.6-50 MG per tablet Take 1 tablet by mouth 2 times daily  Qty: 60 tablet, Refills: 0              Patient was seen and examined by Dr. Farah Record and plan of care reviewed. Signed:  Electronically signed by KEATON Ching on 10/1/2020 at 1:12 PM       Thank you MIRNA Ku CNP for the opportunity to be involved in this patient's care. If you have any questions or concerns please feel free to contact me at [phone].

## 2020-10-01 NOTE — FLOWSHEET NOTE
09/30/20 1940   Vital Signs   Temp 98.8 °F (37.1 °C)   Temp Source Oral   Pulse 80   Resp 19   BP (!) 154/69   MAP (mmHg) 87   Oxygen Therapy   SpO2 92 %   O2 Device None (Room air)

## 2020-10-01 NOTE — PROGRESS NOTES
Physical Therapy    Facility/Department: [facility] MED SURG  Initial Assessment    NAME: Fernando Mendes  : 1940  MRN: [id number]    Date of Service: 10/1/2020    Discharge Recommendations:  Continue to assess pending progress        Assessment   Body structures, Functions, Activity limitations: Decreased functional mobility ; Decreased high-level IADLs;Decreased balance  Assessment: Pulmonary embolism; general weakness and immobility; difficulty walking; will benefit from PT to help improve mobility and safety  Treatment Diagnosis: Pulmonary embolism; general weakness and immobility; difficulty walking  Prognosis: Good  Decision Making: Low Complexity  REQUIRES PT FOLLOW UP: Yes  Activity Tolerance  Activity Tolerance: Patient Tolerated treatment well       Patient Diagnosis(es): The encounter diagnosis was Multiple subsegmental pulmonary emboli without acute cor pulmonale ([address] has a past medical history of Allergic rhinitis, Anxiety, Chronic back pain, Depression, Double vision with both eyes open, Fall, Hyperlipidemia, Hypertension, Osteoarthritis, and Type II or unspecified type diabetes mellitus without mention of complication, not stated as uncontrolled. has a past surgical history that includes Hysterectomy;  section; Colonoscopy; and shoulder surgery.     Restrictions  Restrictions/Precautions  Restrictions/Precautions: General Precautions, Fall Risk  Required Braces or Orthoses?: No  Vision/Hearing  Vision Exceptions: Wears glasses for reading  Hearing: Within functional limits     Subjective  General  Chart Reviewed: Yes  Patient assessed for rehabilitation services?: Yes  Response To Previous Treatment: Not applicable  Family / Caregiver Present: No  Referring Practitioner: Christa Sam MD  Diagnosis: Pulmonary embolism  Follows Commands: Within Functional Limits  Subjective  Subjective: States she feels ok this morning; agreeable to consult; has been walking short distances with a walker; c/o left torso weakness  Pain Screening  Patient Currently in Pain: Yes  Pain Assessment  Pain Assessment: 0-10  Pain Level: 2  Pain Type: Chronic pain  Vital Signs  Patient Currently in Pain: Yes       Orientation  Orientation  Overall Orientation Status: Within Functional Limits  Social/Functional History  Social/Functional History  Lives With: Spouse  Type of Home: House  Home Layout: One level  Home Access: Ramped entrance  Bathroom Shower/Tub: Tub/Shower unit  Bathroom Toilet: Standard  Bathroom Equipment: Shower chair, Grab bars in shower  Bathroom Accessibility: Accessible  Home Equipment: Rolling walker, [place], Lift chair, Hospital bed  Receives Help From: Family  ADL Assistance: Needs assistance  Homemaking Assistance: Needs assistance  Homemaking Responsibilities: No  Ambulation Assistance: Needs assistance  Transfer Assistance: Needs assistance  Active : No  Cognition        Objective     Observation/Palpation  Posture: Fair  Observation: Room air, NAD, lying in bed, pleasant , cooperative    AROM RLE (degrees)  RLE AROM: WFL  AROM LLE (degrees)  LLE AROM : WFL  Strength RLE  Strength RLE: WFL  Comment: MMG's grossly 4+/5 - 5-/5  Strength LLE  Strength LLE: WFL  Comment: MMG's grossly 4+/5 - 5-/5  Tone RLE  RLE Tone: Normotonic  Tone LLE  LLE Tone: Normotonic  Motor Control  Gross Motor?: WFL  Sensation  Overall Sensation Status: WFL  Bed mobility  Rolling to Left: Stand by assistance  Supine to Sit: Contact guard assistance  Sit to Supine: Stand by assistance  Scooting: Stand by assistance  Transfers  Sit to Stand: Stand by assistance;Contact guard assistance  Stand to sit: Stand by assistance;Contact guard assistance  Ambulation  Ambulation?: Yes  Ambulation 1  Surface: level tile  Device: Standard Walker  Assistance: Contact guard assistance  Gait Deviations: Slow Trang;Decreased step length;Decreased step height  Distance: 20 feet     Balance  Posture: Fair  Sitting - Static: Good  Sitting - Dynamic: Good;-  Standing - Static: Fair  Standing - Dynamic: Fair;-        Plan   Plan  Times per week: 3-4 days  Plan weeks: 3-4 days  Current Treatment Recommendations: Strengthening, Neuromuscular Re-education, Safety Education & Training, Transfer Training, Gait Training, Functional Mobility Training, Balance Training               Goals  Long term goals  Time Frame for Long term goals : 3-4 days  Long term goal 1: Achieve Supervision for bed mobility. Long term goal 2: Achieve Supervision-SBA x 1 for basic transfers. Long term goal 3: Transfer bed to chair with walker and SBA x 1. Long term goal 4: Ambulate 30 feet x 2 with SW with good safety awareness. Therapy Time   Individual Concurrent Group Co-treatment   Time In           Time Out           Minutes                   Griselda Tam PT       Certification of Medical Necessity: It will be understood that this treatment plan is certified medically necessary by the documenting therapist and referring physician mentioned in this report. Unless the physician indicated otherwise through written correspondence with our office, all further referrals will act as certification of medical necessity on this treatment plan. Thank you for this referral.  If you have questions regarding this plan of care, please call [phone].           Revisions to this plan (optional):                     Please sign and return this plan to:   FAX: [phone]      Signature:                                 Date:

## 2020-10-01 NOTE — H&P
[facility] Consult Note      Referring Provider: No ref. provider found  Primary Provider:  Lorrie Canada, EVELINA  Reason for Consultation: Pulmonary emboli    Problem list:    1. Hyperlipidemia  2. Hypertension  3. Abnormal EKG  4. Palpitations  5. Pulmonary emobli  1. Admitted to [facility] 9/2020 with probable bilateral pulmonary emboli  2. CTA chest pulmonary embolus protocol 9/29/20: acute emboli centrally in the segmental and subsegmental arteries of the right upper lung and possibly the right lower lobe. Signs of RV strain.  Limited exam.   3. US Duplex bilateral lower extremities, 9/30/20: no DVT noted.  4. Echo 9/29/20: EF 60%, no RV strain. Trace MR. Mild TR.   6. Cholelithiasis  1. US Liver 9/30/20: Fatty infiltration of the liver, 2 cm fluid collection within the gallbladder fossa adjacent to surgical clips indeterminate. Seroma vs. Biloma.     Allergies:  Penicillins, Sulfa antibiotics, Tetracyclines & related, and Valium [diazepam]    Current Medications:    1. Apixaban 10 mg bid until 10/7/20 then Apixaban 5 mg bid thereafter.   2. Vitamin D 50,000 unites oral once weekly  3. Insulin lispro 0-6 unites subcutaneous nightly.   4. Lantus 35 units subcutaneous nightly.   5. Insulin lispro 0-12 units three times daily with meals.   6. Aspirin 81 mg daily  7. Cymbalta 60 mg daily.   8. Gabapentin 300  Mg tid  9. Alogliptin 6.25 mg daily  10. Vitamin D 1000 units oral daily.   11. Ferrous sulfate EC tablet 324 mg oral daily.   12. Protonix 40  Mg daily.   13. Oxybutynin 5 mg tablet oral bid  14. Glycolax 17 grams oral twice daily.   15. Propranolol 40  Mg tablet twice daily.   PRN meds:   Norco 7.5/325 mg Q6hrs PRN.   Ativan 25 mg oral three times daily PRN  Promethazine 12.5 mg every 6 hours PRN.   Zofran 4 mg IV every 6 hours.   TYlenol 650 mg oral every 6 hours as needed.       History of present illness:  Ms. Cruz is a 81 y/o female with the above noted past medical history who  presented to [facility] on 9/29/20 with complaints of chest discomfort and shortness of breath for a couple of months. She had tried an inhaler without any improvement at which time her daughter felt she needed to come to ED for further evaluation. Upon arrival a CT chest PE protocol was ordered revealing right sided and possible left sided pulmonary embolic with signs of RV strain. She subsequently was started on heparin gtt and transitioned to Eliquis today. She had an echocardiogram today as well which did not show any evidence of RV strain. She remains somewhat short of breath but states it is improved. She still has some substernal sharp chest pain. It is not worsened with inspiration. She denies PND, orthopnea, AMARA. She reports having a fall in December and being mostly sedentary since that time. Her mother had a h/o DVT but otherwise no family history of blood clots.       Social History:  Social History     Socioeconomic History   • Marital status: Unknown     Spouse name: Not on file   • Number of children: Not on file   • Years of education: Not on file   • Highest education level: Not on file   Tobacco Use   • Smoking status: Never Smoker   • Smokeless tobacco: Never Used   Substance and Sexual Activity   • Alcohol use: No   • Drug use: No   • Sexual activity: Defer       Family History:  Family History   Problem Relation Age of Onset   • Heart disease Mother    • Cancer Mother    • Congenital heart disease Mother    • Cancer Father    • Cancer Brother    • Alcohol abuse Brother        Review of Systems  Pertinent positives are listed in the HPI.  All other systems reviewed are negative.     Objective     Vital Signs  Wt. 223 lbs.   /55.  HR 72.   Temp 98.2.  RR 19.   O2 91%.        Physical Exam:  GENERAL: This is a well-developed, well-nourished, female who is in no acute distress. Alert and oriented x3. Normal mood and affect.   SKIN: Pink and warm without rash or abnormality noted.    HEENT: Head is normocephalic and atraumatic. Pupils are equal and reactive to light bilaterally. Mucous membranes are pink and moist.   NECK: Supple without lymphadenopathy or thyromegaly. There is no jugular venous distention at 30°.  LUNGS: Clear to auscultation bilaterally without wheezing, rhonchi, or rales noted.   CARDIOVASCULAR: The heart has a regular rate with a normal S1 and S2. There is no murmur, gallop, rub, or click appreciated. The PMI is nondisplaced. Carotid upstrokes are 2+ and symmetrical without bruits.   ABDOMEN: Soft and nondistended with positive bowel sounds x4. The patient denies tenderness of palpitation.   MUSCULOSKELETAL: There are no obvious bony abnormalities. Normal range of tenderness to palpation.   NEUROLOGICAL: Nonfocal.   PERIPHERAL VASCULAR: Femoral pulses are 2+ and symmetrical without bruits. Posterior tibial and dorsalis pedis pulses are 2+ and symmetrical. There is no peripheral edema.      EKG:  n/a  Tele: SR 83 bpm, cannot rule out prior inferior infarct.     Labs:   9/29: creatinine 1.6BUN 20, ProBNP 878, Troponin negative x 2, , , Alk phos 213, bilirubin 0.4. Hgb 12.6, Hct 40.4.     No results found for: INR, PROTIME       Ejection Fraction:      Results Review:  I reviewed the patients new clinical results.      Assessment and Plan:    1) Pulmonary emboli  - PE noted on right and probably left.   -RV strain noted on CT chest. No RV strain on Echo.   - Started on heparin gtt x 24 hours. Eliquis 10 mg bid to start today and continue until 10/7 at which time she will be on 5 mg bid.   - We agree with use of Eliquis as above.   - Nothing further to add at this time.     2) Hypertension  - per primary team.   3) Hyperlipidemia  - continue statin  4) CKD  - creatinine 1.6.   5) Possible seroma vs. Biloma at prior CCY site.   - per primary team.       I discussed the patients findings and my recommendations with patient.    Scribed for Breanna France MD by  KEIRY Rice, APRN. 10/1/2020  12:49 EDT

## 2020-10-01 NOTE — PROGRESS NOTES
Confirmed with family that pt was taking at home [place] 200unit/mL 15 units nightly, novolog 10 units TID with meals, and also novolog sliding scale with meals (3 units for every 50 starting at glucose of 150). Pt wants insulin on 340B plan and will be going home with Lantus [place] not on 340B) and low dose sliding scale novolog. Scheduled novolog has been discontinued at this time. Notified pt and daughter Neetu Landry of the above info and that the Lantus will be replacing [place]. They are excited that pt will get insulin on the 340B plan at [facility]. Of note, pt's daughter Neetu Landry stated on the phone that she has been forgetting to give the pt her scheduled novolog 10 units with meals when she doesn't need the sliding scale. Thus, this could be contributing to high glucose upon admission. Lastly, the pt's med rec was not correctly done upon admission as it listed the pt as taking Lantus (30 units nightly) and Humalog (scheduled and prn). Med rec has been updated to accurately reflect the insulins the pt was taking at home.     Ladi Hopper, PharmD

## 2020-10-01 NOTE — FLOWSHEET NOTE
09/30/20 2100   Assessment   Charting Type Shift assessment   Neurological   Neuro (WDL) WDL   Level of Consciousness 0   Swallow Screening   Is the patient able to remain alert for testing? Yes   Was the Patient Eating a Modified Diet Prior to being Admitted? No   [place] Coma Scale   Eye Opening 4   Best Verbal Response 5   Best Motor Response 6   [place] Coma Scale Score 15   NIH/MNHISS Stroke Scale   NIH/MNIHSS Stroke Scale Assessed No   HEENT   HEENT (WDL) X   Right Eye Double vision   Left Eye Double Vision   Teeth Other (Comment)  (missing upper teeth)   Respiratory   Respiratory (WDL) X   Breath Sounds   Right Upper Lobe Diminished   Right Middle Lobe Diminished   Right Lower Lobe Diminished   Left Upper Lobe Diminished   Left Lower Lobe Diminished   Cough/Sputum   Cough Dry;Non-productive   Sputum Amount None   Sputum Color CHAVA   Tenacity CHAVA   Cardiac   Cardiac (WDL) WDL   Rhythm Interpretation   Lead Monitored Lead II   Rhythm Normal sinus rhythm   Cardiac Monitor   Telemetry Monitor On Yes   Telemetry Audible Yes   Telemetry Alarms Set Yes   Telemetry Box Number [id number]   Gastrointestinal   Abdominal (WDL) X   Abdomen Inspection Rounded   Tenderness Soft;Tenderness   RUQ Bowel Sounds Active   LUQ Bowel Sounds Active   RLQ Bowel Sounds Active   LLQ Bowel Sounds Active   Hernia Umbilical   Peripheral Vascular   Peripheral Vascular (WDL) WDL   Skin Color/Condition   Skin Color/Condition (WDL) X   Skin Color Pale   Skin Condition/Temp Cool;Dry   Skin Integrity   Skin Integrity (WDL) X   Skin Integrity Bruising   Location abdomen   Skin Integrity Site 2   Skin Integrity Location 2 Redness   Location 2 ABD   Preventative Dressing No   Assessed this shift?  Yes   Musculoskeletal   Musculoskeletal (WDL) X   RUE Limited movement;Weakness   LUE Full movement   RL Extremity Limited movement;Weakness   LL Extremity Limited movement;Weakness   Genitourinary   Genitourinary (WDL) X  (incontinent at times)   Urine Assessment   Incontinence No   Urine Color Yellow/straw   Urine Appearance Clear   Urine Odor No odor   Anus/Rectum   Anus/Rectum (WDL) WDL   Psychosocial   Psychosocial (WDL) WDL   Pt  Alert times  4-  Pt able to take pm medications well whole- Pt complaining of generalized chronic pan- received new order for pain medication every 6 hours -Pain medication given-Call bell within reach -No complaints of shortness of breath pt 92% on room air Will  monitor

## 2020-10-01 NOTE — PROGRESS NOTES
emboli  Subjective  Subjective: Pt states that she was having trouble walking and feeling fatigued. She was able to transfer with someone closeby, but could not ambulate anywhere. Patient Currently in Pain: Yes  Vital Signs  Patient Currently in Pain: Yes  Social/Functional History  Social/Functional History  Lives With: Spouse  Type of Home: House  Home Layout: One level  Home Access: Ramped entrance  Bathroom Shower/Tub: Tub/Shower unit  Bathroom Toilet: Standard  Bathroom Equipment: Shower chair, Grab bars in shower  Bathroom Accessibility: Accessible  Home Equipment: Rolling walker, Kassy Marvin, [address] chair, Hospital bed  Receives Help From: Family(daughters and son)  ADL Assistance: Needs assistance(Spouse assists with dressing, bathing and toileting as needed.  Pt states she is able complete most of the activity her self)  Homemaking Assistance: Needs assistance  Homemaking Responsibilities: No  Ambulation Assistance: Needs assistance(with RW.)  Transfer Assistance: Needs assistance(family provides CGA<>SBA)  Active : No       Objective   Vision: Impaired  Vision Exceptions: Wears glasses for reading  Hearing: Within functional limits    Orientation  Overall Orientation Status: Within Functional Limits  Observation/Palpation  Observation: Room air, NAD, lying in bed  Balance  Sitting Balance: Stand by assistance  Standing Balance: Stand by assistance  Functional Mobility  Functional - Mobility Device: Standard Walker  Activity: Other  Assist Level: Contact guard assistance  Functional Mobility Comments: 20 feet  ADL  LE Dressing: Contact guard assistance;Minimal assistance  Tone RUE  RUE Tone: Normotonic  Tone LUE  LUE Tone: Normotonic  Coordination  Movements Are Fluid And Coordinated: Yes     Bed mobility  Supine to Sit: Contact guard assistance  Sit to Supine: Stand by assistance  Scooting: Stand by assistance  Transfers  Stand Pivot Transfers: Contact guard assistance  Sit to stand: Stand by assistance  Stand to sit: Stand by assistance     Cognition  Overall Cognitive Status: WFL                 LUE AROM (degrees)  LUE AROM : WFL  RUE PROM (degrees)  RUE General PROM: limited in end ranges  RUE AROM (degrees)  RUE General AROM: limited in end ranges  LUE Strength  LUE Strength Comment: 4/5 grossly BUE                   Plan   Plan  Times per week: 3-5  Times per day: Daily  Plan weeks: 1  Current Treatment Recommendations: ROM, Safety Education & Training, Balance Training, Patient/Caregiver Education & Training, Self-Care / ADL, Functional Mobility Training, Endurance Training    Goals  Short term goals  Time Frame for Short term goals: 1 week  Short term goal 1: Pt will complete dressing with SUP. Short term goal 2: Pt will complete bathing with SUP. Short term goal 3: Pt will complete toileting with SUP. Short term goal 4: Pt will complete ADL transfer with SUP with good safety awareness. Short term goal 5: Pt will increase activity tolerance x15 min to increase independence in ADL routine. Therapy Time   Individual Concurrent Group Co-treatment   Time In 8410         Time Out 1636         Minutes 25              This note serves as a DC summary in the event of pt discharge.      Valery Cabrera, OTR/L

## 2020-10-01 NOTE — CONSULTS
PATIENT:    Maury Mederos     PHYSICIAN:    Devon Boone MD, FACS    YOB: 1940    DATE: 10/01/20      Reason for Consultation: Abnormal CT scan with thickening of the esophagus and stomach      Subjective . History of present illness: 80-year-old feel presents having recent pulmonary embolus requiring anticoagulation. Patient comfortable now breathing well with minimal oxygen. Patient placed on blood thinners, found to have an abnormal thickening of the stomach and esophagus on CT scan. Consulted for EGD and evaluation. No rectal bleeding, no melena or significant abdominal pain. No difficulty swallowing or reflux symptoms. No weight loss or anemia. Review of Systems:  Constitutional:  Negative for chills, fever, and unexpected weight change. HENT: Negative for trouble swallowing and voice change. Eyes:  Negative for visual disturbance. Respiratory:  Negative for apnea, cough, chest tightness, shortness of breath, and wheezing. Cardiovascular:  Negative for chest pain, palpitations, and leg swelling. Gastrointestinal:  Negative for abdominal distention, abdominal pain, anal bleeding, blood in stool, constipation, diarrhea, nausea, rectal pain, and vomiting. Musculoskeletal:  Negative for back pain, gait problem, and joint swelling. Skin:  Negative for color change, rash, and wound  Neurological:  Negative for dizziness, syncope, speech difficulty, weakness, numbness, and headaches. Hematological:  Negative for adenopathy. Does not bruise/bleed easily. Psychiatric/Behavioral:  Negative for confusion.   The patient is not nervous/anxious    History:    Past Medical History:   Diagnosis Date    Allergic rhinitis     Anxiety     Chronic back pain     Depression     Double vision with both eyes open     Pt states she can see ok with one eye shut    Fall     Hyperlipidemia     Hypertension     Osteoarthritis     Type II or unspecified type diabetes mellitus without mention of complication, not stated as uncontrolled        Past Surgical History:   Procedure Laterality Date     SECTION      COLONOSCOPY      HYSTERECTOMY      TOTAL    SHOULDER SURGERY      RIGHT X 2       Family History   Problem Relation Age of Onset    Heart Disease Mother         CHF,CAD    Cancer Maternal Aunt     High Blood Pressure Sister        Social History     Socioeconomic History    Marital status:      Spouse name: Not on file    Number of children: Not on file    Years of education: Not on file    Highest education level: Not on file   Occupational History    Not on file   Social Needs    Financial resource strain: Not on file    Food insecurity     Worry: Not on file     Inability: Not on file    Transportation needs     Medical: Not on file     Non-medical: Not on file   Tobacco Use    Smoking status: Never Smoker    Smokeless tobacco: Never Used   Substance and Sexual Activity    Alcohol use: No    Drug use: No    Sexual activity: Yes     Partners: Male   Lifestyle    Physical activity     Days per week: Not on file     Minutes per session: Not on file    Stress: Not on file   Relationships    Social connections     Talks on phone: Not on file     Gets together: Not on file     Attends Jewish service: Not on file     Active member of club or organization: Not on file     Attends meetings of clubs or organizations: Not on file     Relationship status: Not on file    Intimate partner violence     Fear of current or ex partner: Not on file     Emotionally abused: Not on file     Physically abused: Not on file     Forced sexual activity: Not on file   Other Topics Concern    Not on file   Social History Narrative    Not on file       Allergies:   Allergies   Allergen Reactions    Latex     Penicillins     Sulfa Antibiotics     Tetracyclines & Related        Medications:    Current Facility-Administered Medications:     aspirin chewable tablet 81 mg, 81 mg, Oral, Daily, Jabari Woodruff MD, 81 mg at 10/01/20 0859    DULoxetine (CYMBALTA) extended release capsule 60 mg, 60 mg, Oral, Daily, Jabari Woodruff MD, 60 mg at 10/01/20 3601    ferrous sulfate EC tablet 324 mg, 324 mg, Oral, Daily with breakfast, Jabari Woodruff MD, 324 mg at 10/01/20 9593    gabapentin (NEURONTIN) capsule 300 mg, 300 mg, Oral, TID, Jabari Woodruff MD, 300 mg at 10/01/20 1014    LORazepam (ATIVAN) tablet 0.5 mg, 0.5 mg, Oral, Daily PRN, Jabari Woodruff MD, 0.5 mg at 09/30/20 2002    meclizine (ANTIVERT) tablet 25 mg, 25 mg, Oral, TID PRN, Jabari Woodruff MD    pantoprazole (PROTONIX) tablet 40 mg, 40 mg, Oral, QAM AC, Jabari Woodruff MD, 40 mg at 10/01/20 0513    oxybutynin (DITROPAN) tablet 5 mg, 5 mg, Oral, BID, Jabari Woodruff MD, 5 mg at 10/01/20 0859    polyethylene glycol (GLYCOLAX) packet 17 g, 17 g, Oral, BID, Jabari Woodruff MD, 17 g at 10/01/20 0858    propranolol (INDERAL) tablet 40 mg, 40 mg, Oral, BID, Jabari Woodruff MD, 40 mg at 10/01/20 7929    alogliptin (NESINA) tablet 6.25 mg, 6.25 mg, Oral, Daily, Jabari Woodruff MD, 6.25 mg at 10/01/20 1601    Vitamin D (CHOLECALCIFEROL) tablet 1,000 Units, 1,000 Units, Oral, Daily, Jabari Woodruff MD, 1,000 Units at 10/01/20 0859    sodium chloride flush 0.9 % injection 10 mL, 10 mL, Intravenous, 2 times per day, Jabari Woodruff MD, 10 mL at 09/30/20 2011    promethazine (PHENERGAN) tablet 12.5 mg, 12.5 mg, Oral, Q6H PRN **OR** ondansetron (ZOFRAN) injection 4 mg, 4 mg, Intravenous, Q6H PRN, Jabari Woodruff MD    sodium chloride flush 0.9 % injection 10 mL, 10 mL, Intravenous, PRN, Jabari Woodruff MD    acetaminophen (TYLENOL) tablet 650 mg, 650 mg, Oral, Q6H PRN **OR** acetaminophen (TYLENOL) suppository 650 mg, 650 mg, Rectal, Q6H PRN, MD Catherine Black  [START ON 10/6/2020] vitamin D (ERGOCALCIFEROL) capsule 50,000 Units, 50,000 Units, Oral, Weekly, Jabari Woodruff MD    miconazole (MICOTIN) 2 % powder, , Topical, BID, Jabari Woodruff MD    glucose (GLUTOSE) 40 % oral gel 15 g, 15 g, Oral, PRN, KEATON Jones    dextrose 50 % IV solution, 12.5 g, Intravenous, PRN, KEATON Jones    glucagon (rDNA) injection 1 mg, 1 mg, Intramuscular, PRN, KEATON Jones    dextrose 5 % solution, 100 mL/hr, Intravenous, PRN, KEATON Jones    0.9 % sodium chloride infusion, , Intravenous, Continuous, KEATON Jones, Last Rate: 75 mL/hr at 10/01/20 0318    apixaban (ELIQUIS) tablet 10 mg, 10 mg, Oral, BID, 10 mg at 10/01/20 0859 **FOLLOWED BY** [START ON 10/7/2020] apixaban (ELIQUIS) tablet 5 mg, 5 mg, Oral, BID, Steffi Goodman MD    insulin lispro (HUMALOG) injection vial 0-12 Units, 0-12 Units, Subcutaneous, TID WC, KEATON Zarate, 4 Units at 10/01/20 1126    insulin lispro (HUMALOG) injection vial 0-6 Units, 0-6 Units, Subcutaneous, Nightly, KEATON Jones, 2 Units at 09/30/20 2009    insulin glargine (LANTUS) injection vial 35 Units, 35 Units, Subcutaneous, Nightly, Herb Miranda [place], 35 Units at 09/30/20 2005    HYDROcodone-acetaminophen (NORCO) 7.5-325 MG per tablet 1 tablet, 1 tablet, Oral, Q6H PRN, KEATON Jones, 1 tablet at 10/01/20 0513    Objective     Vital Signs:   Vitals:    10/01/20 0041 10/01/20 0512 10/01/20 0538 10/01/20 1017   BP: (!) 132/42 (!) 142/55     Pulse: 76 72  78   Resp: 20 19     Temp: 98.6 °F (37 °C) 98.2 °F (36.8 °C)     TempSrc: Oral      SpO2: 90% 91%     Weight:   223 lb 6.4 oz (101.3 kg)    Height:           Physical Exam:   General Appearance:    Alert, cooperative, in no acute distress   Head:    Normocephalic, without obvious abnormality, atraumatic   Eyes:            Lids and lashes normal, conjunctivae and sclerae normal, no   icterus, no pallor, corneas clear, PERRLA   Throat:   No oral lesions, no thrush, oral mucosa moist   Neck:   No adenopathy, supple, trachea midline, no thyromegaly, no   carotid bruit, no JVD   Lungs:     Clear to auscultation,respirations regular, even and                  unlabored    Heart: Regular rhythm and normal rate, normal S1 and S2, no            murmur, no gallop, no rub, no click   Chest Wall:    No abnormalities observed   Abdomen:     Normal bowel sounds, no masses, no organomegaly, soft        non-tender, non-distended, no guarding, no rebound                tenderness   Extremities:   Moves all extremities well, no edema, no cyanosis, no             redness   Pulses:   Pulses palpable and equal bilaterally   Skin:   No bleeding, bruising or rash   Lymph nodes:   No palpable adenopathy   Neurologic:   Cranial nerves 2 - 12 grossly intact, sensation intact, DTR       present and equal bilaterally   Results Review:   I reviewed the patient's new clinical results. Assessment and Plan:    1. Multiple subsegmental pulmonary emboli without acute cor pulmonale (HCC)    2. Abnormal thickening of distal esophagus stomach on CT scan    Patient scheduled for EGD next week to evaluate the area of thickening on CT scan of the esophagus. Risk of bleeding perforation discussed and patient agreeable.     I discussed the patients findings and my recommendations with patient    Electronically signed by Mathew Verduzco FACS  10/01/20  12:00 PM

## 2020-10-01 NOTE — PROGRESS NOTES
Pt d/c'd per MD order. D/C instructions given written and verbal. Pt/Family with questions and   concerns R/T D/C. Irlanda APARICIO in to talk with pt and  prior to d/c.

## 2020-10-01 NOTE — PLAN OF CARE
Problem: Falls - Risk of:  Goal: Will remain free from falls  Description: Will remain free from falls  9/30/2020 2157 by Marcos Bernal RN  Outcome: Ongoing     Problem: Pain:  Goal: Pain level will decrease  Description: Pain level will decrease  9/30/2020 2157 by Marcos Bernal RN  Outcome: Ongoing  9/30/2020 1232 by Arin Mortensen RN  Outcome: Ongoing

## 2020-10-02 RX ORDER — INSULIN GLARGINE 100 [IU]/ML
35 INJECTION, SOLUTION SUBCUTANEOUS NIGHTLY
Qty: 5 PEN | Refills: 3 | Status: ON HOLD | OUTPATIENT
Start: 2020-10-02 | End: 2020-10-13 | Stop reason: HOSPADM

## 2020-10-07 ENCOUNTER — PREP FOR SURGERY (OUTPATIENT)
Dept: OTHER | Facility: HOSPITAL | Age: 80
End: 2020-10-07

## 2020-10-07 DIAGNOSIS — D50.0 IRON DEFICIENCY ANEMIA DUE TO CHRONIC BLOOD LOSS: Primary | ICD-10-CM

## 2020-10-08 ENCOUNTER — APPOINTMENT (OUTPATIENT)
Dept: GENERAL RADIOLOGY | Facility: HOSPITAL | Age: 80
DRG: 195 | End: 2020-10-08
Payer: MEDICARE

## 2020-10-08 ENCOUNTER — HOSPITAL ENCOUNTER (INPATIENT)
Facility: HOSPITAL | Age: 80
LOS: 7 days | Discharge: HOME | DRG: 195 | End: 2020-10-15
Attending: EMERGENCY MEDICINE | Admitting: INTERNAL MEDICINE
Payer: MEDICARE

## 2020-10-08 PROBLEM — J18.9 PNEUMONIA: Status: ACTIVE | Noted: 2020-10-08

## 2020-10-08 LAB
A/G RATIO: 1.1 (ref 0.8–2)
ALBUMIN SERPL-MCNC: 3.8 G/DL (ref 3.4–4.8)
ALP BLD-CCNC: 378 U/L (ref 25–100)
ALT SERPL-CCNC: 120 U/L (ref 4–36)
AMMONIA: 42 MCG/DL (ref 19–87)
ANION GAP SERPL CALCULATED.3IONS-SCNC: 14 MMOL/L (ref 3–16)
APTT: 34 SEC (ref 21.6–33.4)
AST SERPL-CCNC: 237 U/L (ref 8–33)
BASE EXCESS ARTERIAL: 0.8 MMOL/L (ref -3–3)
BASOPHILS ABSOLUTE: 0 K/UL (ref 0–0.1)
BASOPHILS RELATIVE PERCENT: 0.3 %
BILIRUB SERPL-MCNC: 4.1 MG/DL (ref 0.3–1.2)
BILIRUBIN URINE: ABNORMAL
BLOOD, URINE: NEGATIVE
BUN BLDV-MCNC: 20 MG/DL (ref 6–20)
CALCIUM SERPL-MCNC: 9.3 MG/DL (ref 8.5–10.5)
CHLORIDE BLD-SCNC: 98 MMOL/L (ref 98–107)
CLARITY: CLEAR
CO2: 22 MMOL/L (ref 20–30)
COLOR: ABNORMAL
CREAT SERPL-MCNC: 1.6 MG/DL (ref 0.4–1.2)
EOSINOPHILS ABSOLUTE: 0.1 K/UL (ref 0–0.4)
EOSINOPHILS RELATIVE PERCENT: 0.4 %
EPITHELIAL CELLS, UA: ABNORMAL /HPF (ref 0–5)
FIO2: 0.21 %
GFR AFRICAN AMERICAN: 38
GFR NON-AFRICAN AMERICAN: 31
GLOBULIN: 3.5 G/DL
GLUCOSE BLD-MCNC: 180 MG/DL (ref 74–106)
GLUCOSE BLD-MCNC: 243 MG/DL (ref 74–106)
GLUCOSE BLD-MCNC: 249 MG/DL (ref 74–106)
GLUCOSE URINE: 100 MG/DL
HCO3 ARTERIAL: 26 MMOL/L (ref 22–26)
HCT VFR BLD CALC: 40 % (ref 37–47)
HEMOGLOBIN: 12.2 G/DL (ref 11.5–16.5)
IMMATURE GRANULOCYTES #: 0.1 K/UL
IMMATURE GRANULOCYTES %: 0.8 % (ref 0–5)
INR BLD: 1.64 (ref 0.87–1.14)
KETONES, URINE: ABNORMAL MG/DL
LACTIC ACID: 2.8 MMOL/L (ref 0.4–2)
LEUKOCYTE ESTERASE, URINE: NEGATIVE
LYMPHOCYTES ABSOLUTE: 1.3 K/UL (ref 1.5–4)
LYMPHOCYTES RELATIVE PERCENT: 10 %
MCH RBC QN AUTO: 31.8 PG (ref 27–32)
MCHC RBC AUTO-ENTMCNC: 30.5 G/DL (ref 31–35)
MCV RBC AUTO: 104.2 FL (ref 80–100)
MICROSCOPIC EXAMINATION: YES
MONOCYTES ABSOLUTE: 1.1 K/UL (ref 0.2–0.8)
MONOCYTES RELATIVE PERCENT: 8.5 %
NEUTROPHILS ABSOLUTE: 10.1 K/UL (ref 2–7.5)
NEUTROPHILS RELATIVE PERCENT: 80 %
NITRITE, URINE: NEGATIVE
O2 SAT, ARTERIAL: 94.2 %
O2 THERAPY: ABNORMAL
PCO2 ARTERIAL: 43.9 MMHG (ref 35–45)
PDW BLD-RTO: 13.8 % (ref 11–16)
PERFORMED ON: ABNORMAL
PERFORMED ON: ABNORMAL
PH ARTERIAL: 7.39 (ref 7.35–7.45)
PH UA: 5.5 (ref 5–8)
PLATELET # BLD: 279 K/UL (ref 150–400)
PMV BLD AUTO: 11.5 FL (ref 6–10)
PO2 ARTERIAL: 75.2 MMHG (ref 80–100)
POTASSIUM REFLEX MAGNESIUM: 4.5 MMOL/L (ref 3.4–5.1)
PRO-BNP: 1165 PG/ML (ref 0–1800)
PROTEIN UA: 30 MG/DL
PROTHROMBIN TIME: 19.5 SEC (ref 11.7–14.6)
RAPID INFLUENZA  B AGN: NEGATIVE
RAPID INFLUENZA A AGN: NEGATIVE
RBC # BLD: 3.84 M/UL (ref 3.8–5.8)
RBC UA: ABNORMAL /HPF (ref 0–4)
SARS-COV-2, NAAT: NOT DETECTED
SODIUM BLD-SCNC: 134 MMOL/L (ref 136–145)
SPECIFIC GRAVITY UA: 1.02 (ref 1–1.03)
TCO2 ARTERIAL: 27.3 MMOL/L (ref 24–30)
TOTAL CK: 45 U/L (ref 26–174)
TOTAL PROTEIN: 7.3 G/DL (ref 6.4–8.3)
TROPONIN: <0.3 NG/ML
URINE REFLEX TO CULTURE: ABNORMAL
URINE TYPE: ABNORMAL
UROBILINOGEN, URINE: >=8 E.U./DL
WBC # BLD: 12.7 K/UL (ref 4–11)
WBC UA: ABNORMAL /HPF (ref 0–5)

## 2020-10-08 PROCEDURE — 80053 COMPREHEN METABOLIC PANEL: CPT

## 2020-10-08 PROCEDURE — 85610 PROTHROMBIN TIME: CPT

## 2020-10-08 PROCEDURE — 2580000003 HC RX 258: Performed by: PHYSICIAN ASSISTANT

## 2020-10-08 PROCEDURE — 36415 COLL VENOUS BLD VENIPUNCTURE: CPT

## 2020-10-08 PROCEDURE — 84484 ASSAY OF TROPONIN QUANT: CPT

## 2020-10-08 PROCEDURE — 81001 URINALYSIS AUTO W/SCOPE: CPT

## 2020-10-08 PROCEDURE — 6370000000 HC RX 637 (ALT 250 FOR IP): Performed by: PHYSICIAN ASSISTANT

## 2020-10-08 PROCEDURE — 87804 INFLUENZA ASSAY W/OPTIC: CPT

## 2020-10-08 PROCEDURE — 85730 THROMBOPLASTIN TIME PARTIAL: CPT

## 2020-10-08 PROCEDURE — 94640 AIRWAY INHALATION TREATMENT: CPT

## 2020-10-08 PROCEDURE — 36600 WITHDRAWAL OF ARTERIAL BLOOD: CPT

## 2020-10-08 PROCEDURE — 93005 ELECTROCARDIOGRAM TRACING: CPT

## 2020-10-08 PROCEDURE — 82803 BLOOD GASES ANY COMBINATION: CPT

## 2020-10-08 PROCEDURE — 6360000002 HC RX W HCPCS: Performed by: EMERGENCY MEDICINE

## 2020-10-08 PROCEDURE — 82140 ASSAY OF AMMONIA: CPT

## 2020-10-08 PROCEDURE — 82550 ASSAY OF CK (CPK): CPT

## 2020-10-08 PROCEDURE — 99284 EMERGENCY DEPT VISIT MOD MDM: CPT

## 2020-10-08 PROCEDURE — U0002 COVID-19 LAB TEST NON-CDC: HCPCS

## 2020-10-08 PROCEDURE — 83880 ASSAY OF NATRIURETIC PEPTIDE: CPT

## 2020-10-08 PROCEDURE — 71045 X-RAY EXAM CHEST 1 VIEW: CPT

## 2020-10-08 PROCEDURE — 87040 BLOOD CULTURE FOR BACTERIA: CPT

## 2020-10-08 PROCEDURE — 1200000000 HC SEMI PRIVATE

## 2020-10-08 PROCEDURE — 85025 COMPLETE CBC W/AUTO DIFF WBC: CPT

## 2020-10-08 PROCEDURE — 83605 ASSAY OF LACTIC ACID: CPT

## 2020-10-08 RX ORDER — INSULIN GLARGINE 100 [IU]/ML
35 INJECTION, SOLUTION SUBCUTANEOUS NIGHTLY
Status: DISCONTINUED | OUTPATIENT
Start: 2020-10-08 | End: 2020-10-10

## 2020-10-08 RX ORDER — ASPIRIN 81 MG/1
81 TABLET, CHEWABLE ORAL DAILY
Status: DISCONTINUED | OUTPATIENT
Start: 2020-10-09 | End: 2020-10-15 | Stop reason: HOSPADM

## 2020-10-08 RX ORDER — PROPRANOLOL HYDROCHLORIDE 20 MG/1
40 TABLET ORAL 2 TIMES DAILY
Status: DISCONTINUED | OUTPATIENT
Start: 2020-10-08 | End: 2020-10-15 | Stop reason: HOSPADM

## 2020-10-08 RX ORDER — LEVOFLOXACIN 5 MG/ML
750 INJECTION, SOLUTION INTRAVENOUS ONCE
Status: COMPLETED | OUTPATIENT
Start: 2020-10-08 | End: 2020-10-08

## 2020-10-08 RX ORDER — DEXTROSE MONOHYDRATE 25 G/50ML
12.5 INJECTION, SOLUTION INTRAVENOUS PRN
Status: DISCONTINUED | OUTPATIENT
Start: 2020-10-08 | End: 2020-10-15 | Stop reason: HOSPADM

## 2020-10-08 RX ORDER — IPRATROPIUM BROMIDE AND ALBUTEROL SULFATE 2.5; .5 MG/3ML; MG/3ML
1 SOLUTION RESPIRATORY (INHALATION)
Status: DISCONTINUED | OUTPATIENT
Start: 2020-10-08 | End: 2020-10-11

## 2020-10-08 RX ORDER — SODIUM CHLORIDE 9 MG/ML
INJECTION, SOLUTION INTRAVENOUS CONTINUOUS
Status: ACTIVE | OUTPATIENT
Start: 2020-10-08 | End: 2020-10-09

## 2020-10-08 RX ORDER — PROMETHAZINE HYDROCHLORIDE 25 MG/1
12.5 TABLET ORAL EVERY 6 HOURS PRN
Status: DISCONTINUED | OUTPATIENT
Start: 2020-10-08 | End: 2020-10-15 | Stop reason: HOSPADM

## 2020-10-08 RX ORDER — ACETAMINOPHEN 325 MG/1
650 TABLET ORAL EVERY 6 HOURS PRN
Status: DISCONTINUED | OUTPATIENT
Start: 2020-10-08 | End: 2020-10-08

## 2020-10-08 RX ORDER — NICOTINE POLACRILEX 4 MG
15 LOZENGE BUCCAL PRN
Status: DISCONTINUED | OUTPATIENT
Start: 2020-10-08 | End: 2020-10-15 | Stop reason: HOSPADM

## 2020-10-08 RX ORDER — FERROUS SULFATE TAB EC 324 MG (65 MG FE EQUIVALENT) 324 (65 FE) MG
324 TABLET DELAYED RESPONSE ORAL
Status: DISCONTINUED | OUTPATIENT
Start: 2020-10-09 | End: 2020-10-15 | Stop reason: HOSPADM

## 2020-10-08 RX ORDER — DEXTROSE MONOHYDRATE 50 MG/ML
100 INJECTION, SOLUTION INTRAVENOUS PRN
Status: DISCONTINUED | OUTPATIENT
Start: 2020-10-08 | End: 2020-10-15 | Stop reason: HOSPADM

## 2020-10-08 RX ORDER — SODIUM CHLORIDE 0.9 % (FLUSH) 0.9 %
10 SYRINGE (ML) INJECTION PRN
Status: DISCONTINUED | OUTPATIENT
Start: 2020-10-08 | End: 2020-10-15 | Stop reason: HOSPADM

## 2020-10-08 RX ORDER — ONDANSETRON 2 MG/ML
4 INJECTION INTRAMUSCULAR; INTRAVENOUS EVERY 6 HOURS PRN
Status: DISCONTINUED | OUTPATIENT
Start: 2020-10-08 | End: 2020-10-15 | Stop reason: HOSPADM

## 2020-10-08 RX ORDER — ALOGLIPTIN 12.5 MG/1
12.5 TABLET, FILM COATED ORAL DAILY
Status: DISCONTINUED | OUTPATIENT
Start: 2020-10-08 | End: 2020-10-15 | Stop reason: HOSPADM

## 2020-10-08 RX ORDER — GABAPENTIN 300 MG/1
300 CAPSULE ORAL 3 TIMES DAILY
Status: DISCONTINUED | OUTPATIENT
Start: 2020-10-08 | End: 2020-10-15 | Stop reason: HOSPADM

## 2020-10-08 RX ORDER — VITAMIN B COMPLEX
1000 TABLET ORAL DAILY
Status: DISCONTINUED | OUTPATIENT
Start: 2020-10-09 | End: 2020-10-15 | Stop reason: HOSPADM

## 2020-10-08 RX ORDER — HYDROCODONE BITARTRATE AND ACETAMINOPHEN 10; 325 MG/1; MG/1
1 TABLET ORAL 3 TIMES DAILY PRN
Status: ON HOLD | COMMUNITY
End: 2021-11-03

## 2020-10-08 RX ORDER — ACETAMINOPHEN 650 MG/1
650 SUPPOSITORY RECTAL EVERY 6 HOURS PRN
Status: DISCONTINUED | OUTPATIENT
Start: 2020-10-08 | End: 2020-10-08

## 2020-10-08 RX ORDER — POLYETHYLENE GLYCOL 3350 17 G/17G
17 POWDER, FOR SOLUTION ORAL DAILY PRN
Status: DISCONTINUED | OUTPATIENT
Start: 2020-10-08 | End: 2020-10-15 | Stop reason: HOSPADM

## 2020-10-08 RX ORDER — SENNA AND DOCUSATE SODIUM 50; 8.6 MG/1; MG/1
1 TABLET, FILM COATED ORAL 2 TIMES DAILY
Status: DISCONTINUED | OUTPATIENT
Start: 2020-10-08 | End: 2020-10-15 | Stop reason: HOSPADM

## 2020-10-08 RX ORDER — SODIUM CHLORIDE 0.9 % (FLUSH) 0.9 %
10 SYRINGE (ML) INJECTION EVERY 12 HOURS SCHEDULED
Status: DISCONTINUED | OUTPATIENT
Start: 2020-10-08 | End: 2020-10-15 | Stop reason: HOSPADM

## 2020-10-08 RX ORDER — POLYETHYLENE GLYCOL 3350 17 G/17G
17 POWDER, FOR SOLUTION ORAL 2 TIMES DAILY
Status: DISCONTINUED | OUTPATIENT
Start: 2020-10-08 | End: 2020-10-15 | Stop reason: HOSPADM

## 2020-10-08 RX ORDER — ACETAMINOPHEN 325 MG/1
650 TABLET ORAL 2 TIMES DAILY PRN
Status: DISCONTINUED | OUTPATIENT
Start: 2020-10-08 | End: 2020-10-15 | Stop reason: HOSPADM

## 2020-10-08 RX ORDER — PANTOPRAZOLE SODIUM 40 MG/1
40 TABLET, DELAYED RELEASE ORAL
Status: DISCONTINUED | OUTPATIENT
Start: 2020-10-09 | End: 2020-10-15 | Stop reason: HOSPADM

## 2020-10-08 RX ORDER — LORAZEPAM 0.5 MG/1
0.5 TABLET ORAL 2 TIMES DAILY PRN
Status: DISCONTINUED | OUTPATIENT
Start: 2020-10-08 | End: 2020-10-15 | Stop reason: HOSPADM

## 2020-10-08 RX ORDER — HYDROCODONE BITARTRATE AND ACETAMINOPHEN 10; 325 MG/1; MG/1
1 TABLET ORAL 3 TIMES DAILY PRN
Status: DISCONTINUED | OUTPATIENT
Start: 2020-10-08 | End: 2020-10-15 | Stop reason: HOSPADM

## 2020-10-08 RX ORDER — MECLIZINE HYDROCHLORIDE 25 MG/1
25 TABLET ORAL 3 TIMES DAILY PRN
Status: DISCONTINUED | OUTPATIENT
Start: 2020-10-08 | End: 2020-10-15 | Stop reason: HOSPADM

## 2020-10-08 RX ORDER — LORAZEPAM 0.5 MG/1
0.5 TABLET ORAL DAILY PRN
Status: DISCONTINUED | OUTPATIENT
Start: 2020-10-08 | End: 2020-10-08

## 2020-10-08 RX ORDER — ASPIRIN 81 MG/1
81 TABLET, CHEWABLE ORAL DAILY
Status: DISCONTINUED | OUTPATIENT
Start: 2020-10-08 | End: 2020-10-08

## 2020-10-08 RX ORDER — LEVOFLOXACIN 5 MG/ML
750 INJECTION, SOLUTION INTRAVENOUS
Status: DISCONTINUED | OUTPATIENT
Start: 2020-10-10 | End: 2020-10-15 | Stop reason: HOSPADM

## 2020-10-08 RX ORDER — OXYBUTYNIN CHLORIDE 5 MG/1
5 TABLET ORAL 2 TIMES DAILY
Status: DISCONTINUED | OUTPATIENT
Start: 2020-10-08 | End: 2020-10-15 | Stop reason: HOSPADM

## 2020-10-08 RX ADMIN — DOCUSATE SODIUM 50MG AND SENNOSIDES 8.6MG 1 TABLET: 8.6; 5 TABLET, FILM COATED ORAL at 20:33

## 2020-10-08 RX ADMIN — OXYBUTYNIN CHLORIDE 5 MG: 5 TABLET ORAL at 20:33

## 2020-10-08 RX ADMIN — IPRATROPIUM BROMIDE AND ALBUTEROL SULFATE 1 AMPULE: .5; 3 SOLUTION RESPIRATORY (INHALATION) at 19:46

## 2020-10-08 RX ADMIN — LEVOFLOXACIN 750 MG: 5 INJECTION, SOLUTION INTRAVENOUS at 10:42

## 2020-10-08 RX ADMIN — GABAPENTIN 300 MG: 300 CAPSULE ORAL at 12:40

## 2020-10-08 RX ADMIN — INSULIN GLARGINE 35 UNITS: 100 INJECTION, SOLUTION SUBCUTANEOUS at 20:36

## 2020-10-08 RX ADMIN — GABAPENTIN 300 MG: 300 CAPSULE ORAL at 20:34

## 2020-10-08 RX ADMIN — INSULIN LISPRO 2 UNITS: 100 INJECTION, SOLUTION INTRAVENOUS; SUBCUTANEOUS at 16:24

## 2020-10-08 RX ADMIN — IPRATROPIUM BROMIDE AND ALBUTEROL SULFATE 1 AMPULE: .5; 3 SOLUTION RESPIRATORY (INHALATION) at 16:34

## 2020-10-08 RX ADMIN — PROPRANOLOL HYDROCHLORIDE 40 MG: 20 TABLET ORAL at 20:33

## 2020-10-08 RX ADMIN — SODIUM CHLORIDE: 9 INJECTION, SOLUTION INTRAVENOUS at 11:47

## 2020-10-08 RX ADMIN — POLYETHYLENE GLYCOL (3350) 17 G: 17 POWDER, FOR SOLUTION ORAL at 20:40

## 2020-10-08 RX ADMIN — APIXABAN 5 MG: 5 TABLET, FILM COATED ORAL at 20:34

## 2020-10-08 RX ADMIN — INSULIN LISPRO 1 UNITS: 100 INJECTION, SOLUTION INTRAVENOUS; SUBCUTANEOUS at 20:35

## 2020-10-08 ASSESSMENT — PAIN SCALES - GENERAL
PAINLEVEL_OUTOF10: 0
PAINLEVEL_OUTOF10: 6

## 2020-10-08 ASSESSMENT — PAIN DESCRIPTION - ORIENTATION: ORIENTATION: LOWER;MID

## 2020-10-08 ASSESSMENT — PAIN DESCRIPTION - DESCRIPTORS: DESCRIPTORS: PRESSURE

## 2020-10-08 ASSESSMENT — PAIN DESCRIPTION - FREQUENCY: FREQUENCY: INTERMITTENT

## 2020-10-08 ASSESSMENT — PAIN DESCRIPTION - PROGRESSION: CLINICAL_PROGRESSION: GRADUALLY WORSENING

## 2020-10-08 ASSESSMENT — PAIN DESCRIPTION - PAIN TYPE: TYPE: ACUTE PAIN

## 2020-10-08 ASSESSMENT — PAIN DESCRIPTION - LOCATION: LOCATION: ABDOMEN

## 2020-10-08 NOTE — ED NOTES
Nilson Sage RN in [facility] notified that patient would not be at her appointment for EGD this morning with Dr Anat Motta due to her being an ED patient. She stated that registration had called and notified them earlier.      Temple Osgood, RN  10/08/20 0027

## 2020-10-08 NOTE — ED PROVIDER NOTES
[address] ENCOUNTER      Pt Name: Ashutosh Covarrubias  MRN: [id number]  YOB: 1940  Date of evaluation: 10/8/2020  Provider: Esvin Siu MD    CHIEF COMPLAINT       Chief Complaint   Patient presents with    Shortness of Breath         HISTORY OF PRESENT ILLNESS  (Location/Symptom, Timing/Onset, Context/Setting, Quality, Duration, Modifying Factors, Severity.)   Ashutosh Covarrubias is a 79 y.o. female who presents to the emergency department by private vehicle complaining of shortness of breath. She was recently admitted for 2 days and discharged a week ago. She was started on anticoagulation with Eliquis for pulmonary embolism. She reports she has been short of breath since that diagnosis and was discharged also on home oxygen at 1.5 L per nasal cannula. She reports no improvement in breathing and may have been worse since last night. She has a cough which is sometimes productive. No fever or chills. She has had some chest discomfort ever since she was admitted. Nursing notes were reviewed. REVIEW OFSYSTEMS    (2-9 systems for level 4, 10 or more for level 5)   ROS:  General:  No fevers, no chills, no weakness  Cardiovascular:  + chest pain, no palpitations  Respiratory:  + shortness of breath, no cough, no wheezing  Gastrointestinal:  No pain, no nausea, no vomiting, no diarrhea  Musculoskeletal:  No muscle pain, no joint pain  Skin:  No rash, no easy bruising  Neurologic:  No speech problems, no headache, no extremity weakness  Psychiatric:  No anxiety  Genitourinary:  No dysuria, no hematuria    Except as noted above the remainder of the review of systems was reviewed and negative.        PAST MEDICAL HISTORY     Past Medical History:   Diagnosis Date    Allergic rhinitis     Anxiety     Chronic back pain     Depression     Double vision with both eyes open     Pt states she can see ok with one eye shut    Fall     Hyperlipidemia     Hypertension     Osteoarthritis     Type II or unspecified type diabetes mellitus without mention of complication, not stated as uncontrolled          SURGICAL HISTORY       Past Surgical History:   Procedure Laterality Date     SECTION      COLONOSCOPY      HYSTERECTOMY      TOTAL    SHOULDER SURGERY      RIGHT X 2         CURRENT MEDICATIONS       Previous Medications    APIXABAN (ELIQUIS) 5 MG TABS TABLET    Take 1 tablet by mouth 2 times daily Start after completion of loading dose Eliquis 10mg BID x 6 days    ASPIRIN 81 MG TABLET    Take 1 tablet by mouth daily    DICLOFENAC SODIUM (VOLTAREN) 1 % GEL    Apply 1 g topically 4 times daily as needed for Pain    DULOXETINE (CYMBALTA) 60 MG EXTENDED RELEASE CAPSULE    Take 60 mg by mouth daily    FERROUS SULFATE 324 (65 FE) MG EC TABLET    Take 1 tablet by mouth daily (with breakfast)    GABAPENTIN (NEURONTIN) 300 MG CAPSULE    Take 300 mg by mouth 3 times daily. GLUCOSE MONITORING KIT (FREESTYLE) MONITORING KIT    1 kit by Does not apply route daily    INSULIN ASPART (NOVOLOG FLEXPEN) 100 UNIT/ML INJECTION PEN    Per low dose sliding scale. Please run on 340B program.    INSULIN GLARGINE (LANTUS SOLOSTAR) 100 UNIT/ML INJECTION PEN    Inject 35 Units into the skin nightly Please run on 340b program    INSULIN PEN NEEDLE ([employer] PEN NEEDLES 29G) 29G X 12MM MISC    1 each by Does not apply route daily    LORAZEPAM (ATIVAN) 0.5 MG TABLET    Take 0.5 mg by mouth daily as needed for Anxiety.     MECLIZINE (ANTIVERT) 25 MG TABLET    Take 1 tablet by mouth 3 times daily as needed for Dizziness    OMEPRAZOLE (PRILOSEC) 20 MG DELAYED RELEASE CAPSULE    Take 2 capsules by mouth daily    OXYBUTYNIN (DITROPAN) 5 MG TABLET    Take 5 mg by mouth 2 times daily    POLYETHYLENE GLYCOL (GLYCOLAX) PACKET    Take 17 g by mouth 2 times daily    PRAVASTATIN (PRAVACHOL) 20 MG TABLET    Take 1 tablet by mouth nightly    PROPRANOLOL (INDERAL) 40 MG TABLET Take 1 tablet by mouth 2 times daily    SENNA-DOCUSATE (PERICOLACE) 8.6-50 MG PER TABLET    Take 1 tablet by mouth 2 times daily    SITAGLIPTIN (JANUVIA) 25 MG TABLET    Take 2 tablets by mouth daily    VITAMIN D (CHOLECALCIFEROL) 25 MCG (1000 UT) TABS TABLET    Take 1 tablet by mouth daily    VITAMIN D (ERGOCALCIFEROL) 1.25 MG (53974 UT) CAPS CAPSULE    Take 50,000 Units by mouth once a week       ALLERGIES     Latex;  Penicillins; Sulfa antibiotics; and Tetracyclines & related    FAMILY HISTORY       Family History   Problem Relation Age of Onset    Heart Disease Mother         CHF,CAD    Cancer Maternal Aunt     High Blood Pressure Sister           SOCIAL HISTORY       Social History     Socioeconomic History    Marital status:      Spouse name: None    Number of children: None    Years of education: None    Highest education level: None   Occupational History    None   Social Needs    Financial resource strain: None    Food insecurity     Worry: None     Inability: None    Transportation needs     Medical: None     Non-medical: None   Tobacco Use    Smoking status: Never Smoker    Smokeless tobacco: Never Used   Substance and Sexual Activity    Alcohol use: No    Drug use: No    Sexual activity: Yes     Partners: Male   Lifestyle    Physical activity     Days per week: None     Minutes per session: None    Stress: None   Relationships    Social connections     Talks on phone: None     Gets together: None     Attends Episcopal service: None     Active member of club or organization: None     Attends meetings of clubs or organizations: None     Relationship status: None    Intimate partner violence     Fear of current or ex partner: None     Emotionally abused: None     Physically abused: None     Forced sexual activity: None   Other Topics Concern    None   Social History Narrative    None         PHYSICAL EXAM    (up to 7 for level 4, 8 or more for level 5)     ED Triage Vitals BP Temp Temp src Pulse Resp SpO2 Height Weight   -- -- -- -- -- -- -- --       Physical Exam  General :Patient is awake, alert, oriented, in no acute distress, nontoxic appearing  HEENT: Pupils are equally round and reactive to light, EOMI, conjunctivae clear. Oral mucosa is moist, no exudate. Uvula is midline  Neck: Neck is supple, full range of motion, trachea midline  Cardiac: Heart regular rate, rhythm, no murmurs, rubs, or gallops  Lungs: Lungs are clear to auscultation, there is no wheezing, rhonchi, or rales. There is no use of accessory muscles. Chest wall: There is no tenderness to palpation over the chest wall or over ribs  Abdomen: Abdomen is soft, nontender, nondistended. There is no firm or pulsatile masses, no rebound rigidity or guarding. Musculoskeletal: 5 out of 5 strength in all 4 extremities. No focal muscle deficits are appreciated  Neuro: Motor intact, sensory intact, level of consciousness is normal, cerebellar function is normal, reflexes are grossly normal. No evidence of incontinence or loss of bowel or bladder function, no saddle anesthesia noted   Dermatology: Skin is warm and dry  Psych: Mentation is grossly normal, cognition is grossly normal. Affect is appropriate. DIAGNOSTIC RESULTS     EKG: All EKG's are interpreted by the Emergency Department Physician who either signs or Co-signs this chart in the [address] a cardiologist.    The EKG interpreted by me shows normal sinus rhythm at 78 bpm.  No acute ischemic change    RADIOLOGY:   Non-plain film images such as CT, Ultrasound and MRI are read by the radiologist. Plain radiographic images are visualized and preliminarily interpreted by the emergency physician with the below findings:      ? Radiologist's Report Reviewed:  XR CHEST PORTABLE   Final Result   Impression: Airspace opacity of the left lung base laterally.             ED BEDSIDE ULTRASOUND:   Performed by ED Physician - none    LABS:    I have reviewed and interpreted all of the currently available lab results from this visit (ifapplicable):  Results for orders placed or performed during the hospital encounter of 10/08/20   CBC Auto Differential   Result Value Ref Range    WBC 12.7 (H) 4.0 - 11.0 K/uL    RBC 3.84 3.80 - 5.80 M/uL    Hemoglobin 12.2 11.5 - 16.5 g/dL    Hematocrit 40.0 37.0 - 47.0 %    .2 (H) 80.0 - 100.0 fL    MCH 31.8 27.0 - 32.0 pg    MCHC 30.5 (L) 31.0 - 35.0 g/dL    RDW 13.8 11.0 - 16.0 %    Platelets 973 275 - 571 K/uL    MPV 11.5 (H) 6.0 - 10.0 fL    Neutrophils % 80.0 %    Immature Granulocytes % 0.8 0.0 - 5.0 %    Lymphocytes % 10.0 %    Monocytes % 8.5 %    Eosinophils % 0.4 %    Basophils % 0.3 %    Neutrophils Absolute 10.1 (H) 2.0 - 7.5 K/uL    Immature Granulocytes # 0.1 K/uL    Lymphocytes Absolute 1.3 (L) 1.5 - 4.0 K/uL    Monocytes Absolute 1.1 (H) 0.2 - 0.8 K/uL    Eosinophils Absolute 0.1 0.0 - 0.4 K/uL    Basophils Absolute 0.0 0.0 - 0.1 K/uL   Comprehensive Metabolic Panel w/ Reflex to MG   Result Value Ref Range    Sodium 134 (L) 136 - 145 mmol/L    Potassium reflex Magnesium 4.5 3.4 - 5.1 mmol/L    Chloride 98 98 - 107 mmol/L    CO2 22 20 - 30 mmol/L    Anion Gap 14 3 - 16    Glucose 243 (H) 74 - 106 mg/dL    BUN 20 6 - 20 mg/dL    CREATININE 1.6 (H) 0.4 - 1.2 mg/dL    GFR Non- 31 (L) >59    GFR  38 (L) >59    Calcium 9.3 8.5 - 10.5 mg/dL    Total Protein 7.3 6.4 - 8.3 g/dL    Alb 3.8 3.4 - 4.8 g/dL    Albumin/Globulin Ratio 1.1 0.8 - 2.0    Total Bilirubin 4.1 (H) 0.3 - 1.2 mg/dL    Alkaline Phosphatase 378 (H) 25 - 100 U/L     (H) 4 - 36 U/L     (H) 8 - 33 U/L    Globulin 3.5 g/dL   Protime-INR   Result Value Ref Range    Protime 19.5 (H) 11.7 - 14.6 sec    INR 1.64 (H) 0.87 - 1.14   APTT   Result Value Ref Range    aPTT 34.0 (H) 21.6 - 33.4 sec   Troponin   Result Value Ref Range    Troponin <0.30 <0.30 ng/mL   Brain Natriuretic Peptide   Result Value Ref Range    Pro-BNP 1,165 0 - 1,800 pg/mL   Lactic Acid, Plasma   Result Value Ref Range    Lactic Acid 2.8 (H) 0.4 - 2.0 mmol/L   Blood Gas, Arterial   Result Value Ref Range    pH, Arterial 7.390 7.350 - 7.450    pCO2, Arterial 43.9 35.0 - 45.0 mmHg    pO2, Arterial 75.2 (L) 80.0 - 100.0 mmHg    HCO3, Arterial 26.0 22.0 - 26.0 mmol/L    Base Excess, Arterial 0.8 -3.0 - 3.0 mmol/L    O2 Sat, Arterial 94.2 >92 %    TCO2, Arterial 27.3 24.0 - 30.0 mmol/L    O2 Therapy Unknown     FIO2 0.21 Not Established %        All other labs were within normal range or not returned as of this dictation. EMERGENCY DEPARTMENT COURSE and DIFFERENTIAL DIAGNOSIS/MDM:   Vitals: There were no vitals filed for this visit. MEDICATIONS ADMINISTERED IN ED:  Medications - No data to display    10:29 AM.  Patient accepted by hospitalist Manjula Rojas for admission         CONSULTS:  None    PROCEDURES:  Procedures    CRITICAL CARE TIME    Total Critical Care time was 0 minutes, excluding separately reportable procedures. There was a high probability of clinically significant/life threatening deterioration in the patient's condition which required my urgent intervention. FINAL IMPRESSION      1. Pneumonia due to organism New Problem         DISPOSITION/PLAN   DISPOSITION        PATIENT REFERRED TO:  No follow-up provider specified. DISCHARGE MEDICATIONS:  New Prescriptions    No medications on file       Comment: Please note this report has been produced using speech recognition software and may contain errorsrelated to that system including errors in grammar, punctuation, and spelling, as well as words and phrases that may be inappropriate. If there are any questions or concerns please feel free to contact the dictating providerfor clarification.     Alessandra Miller MD  Attending Emergency Physician             Alessandra Miller MD  10/08/20 8704

## 2020-10-08 NOTE — PROGRESS NOTES
To floor by er tech, nad noted, see vitals, oriented to room and call light, admit forms completed by staff, provider aware of arrival.

## 2020-10-08 NOTE — ED NOTES
Bed 8-2 is available for admission on medical unit per S[place]. Ramesh Persaud [place] requested to call ED for possible admission.      Ayaz Chicas RN  10/08/20 1015

## 2020-10-08 NOTE — ED NOTES
Rachael Garza, patient's daughter and spouse updated on admission.      Anne Bahena RN  10/08/20 4674

## 2020-10-08 NOTE — ED NOTES
Attempted to call patient report. It is unknown which nurse is taking patient. They will call back asap.      Ambrose Vanessa RN  10/08/20 8507

## 2020-10-08 NOTE — ED TRIAGE NOTES
Patient states that she has had soa since she was released from hospital about a week ago. soa worse starting last night.

## 2020-10-08 NOTE — ED NOTES
Patient report to [place], RN on medical unit. Patient to go to room 8-2 when registration completes admission.      Chris Rutherford RN  10/08/20 4004

## 2020-10-08 NOTE — ED NOTES
Malissa Valero, patient's son called and updated. He states that patient had episodes of hallucinations last night and some this morning on the way to hospital. He also states that she is unable to walk / use her legs where she is so weak. Dr Evonne Holloway notified.      Adore Bettencourt, RN  10/08/20 AZIZA Hinojosa  10/08/20 0182

## 2020-10-09 LAB
A/G RATIO: 1 (ref 0.8–2)
ALBUMIN SERPL-MCNC: 3.2 G/DL (ref 3.4–4.8)
ALP BLD-CCNC: 317 U/L (ref 25–100)
ALT SERPL-CCNC: 94 U/L (ref 4–36)
ANION GAP SERPL CALCULATED.3IONS-SCNC: 9 MMOL/L (ref 3–16)
AST SERPL-CCNC: 136 U/L (ref 8–33)
BASOPHILS ABSOLUTE: 0 K/UL (ref 0–0.1)
BASOPHILS RELATIVE PERCENT: 0.3 %
BILIRUB SERPL-MCNC: 3.7 MG/DL (ref 0.3–1.2)
BUN BLDV-MCNC: 20 MG/DL (ref 6–20)
CALCIUM SERPL-MCNC: 8.9 MG/DL (ref 8.5–10.5)
CHLORIDE BLD-SCNC: 103 MMOL/L (ref 98–107)
CO2: 26 MMOL/L (ref 20–30)
CREAT SERPL-MCNC: 1.7 MG/DL (ref 0.4–1.2)
EOSINOPHILS ABSOLUTE: 0.1 K/UL (ref 0–0.4)
EOSINOPHILS RELATIVE PERCENT: 2 %
GFR AFRICAN AMERICAN: 35
GFR NON-AFRICAN AMERICAN: 29
GLOBULIN: 3.3 G/DL
GLUCOSE BLD-MCNC: 162 MG/DL (ref 74–106)
GLUCOSE BLD-MCNC: 173 MG/DL (ref 74–106)
GLUCOSE BLD-MCNC: 176 MG/DL (ref 74–106)
GLUCOSE BLD-MCNC: 200 MG/DL (ref 74–106)
GLUCOSE BLD-MCNC: 258 MG/DL (ref 74–106)
HCT VFR BLD CALC: 37.1 % (ref 37–47)
HEMOGLOBIN: 10.8 G/DL (ref 11.5–16.5)
IMMATURE GRANULOCYTES #: 0 K/UL
IMMATURE GRANULOCYTES %: 0.5 % (ref 0–5)
LACTIC ACID: 1.8 MMOL/L (ref 0.4–2)
LYMPHOCYTES ABSOLUTE: 1.2 K/UL (ref 1.5–4)
LYMPHOCYTES RELATIVE PERCENT: 20.1 %
MCH RBC QN AUTO: 31.7 PG (ref 27–32)
MCHC RBC AUTO-ENTMCNC: 29.1 G/DL (ref 31–35)
MCV RBC AUTO: 108.8 FL (ref 80–100)
MONOCYTES ABSOLUTE: 0.6 K/UL (ref 0.2–0.8)
MONOCYTES RELATIVE PERCENT: 10.5 %
NEUTROPHILS ABSOLUTE: 3.9 K/UL (ref 2–7.5)
NEUTROPHILS RELATIVE PERCENT: 66.6 %
PDW BLD-RTO: 13.7 % (ref 11–16)
PERFORMED ON: ABNORMAL
PLATELET # BLD: 169 K/UL (ref 150–400)
PMV BLD AUTO: 10.9 FL (ref 6–10)
POTASSIUM REFLEX MAGNESIUM: 4.7 MMOL/L (ref 3.4–5.1)
RBC # BLD: 3.41 M/UL (ref 3.8–5.8)
SODIUM BLD-SCNC: 138 MMOL/L (ref 136–145)
TOTAL PROTEIN: 6.5 G/DL (ref 6.4–8.3)
WBC # BLD: 5.9 K/UL (ref 4–11)

## 2020-10-09 PROCEDURE — 97535 SELF CARE MNGMENT TRAINING: CPT

## 2020-10-09 PROCEDURE — 2700000000 HC OXYGEN THERAPY PER DAY

## 2020-10-09 PROCEDURE — 99222 1ST HOSP IP/OBS MODERATE 55: CPT | Performed by: INTERNAL MEDICINE

## 2020-10-09 PROCEDURE — 94640 AIRWAY INHALATION TREATMENT: CPT

## 2020-10-09 PROCEDURE — 94761 N-INVAS EAR/PLS OXIMETRY MLT: CPT

## 2020-10-09 PROCEDURE — 85025 COMPLETE CBC W/AUTO DIFF WBC: CPT

## 2020-10-09 PROCEDURE — 6370000000 HC RX 637 (ALT 250 FOR IP): Performed by: PHYSICIAN ASSISTANT

## 2020-10-09 PROCEDURE — 80053 COMPREHEN METABOLIC PANEL: CPT

## 2020-10-09 PROCEDURE — 97161 PT EVAL LOW COMPLEX 20 MIN: CPT

## 2020-10-09 PROCEDURE — 97165 OT EVAL LOW COMPLEX 30 MIN: CPT

## 2020-10-09 PROCEDURE — 36415 COLL VENOUS BLD VENIPUNCTURE: CPT

## 2020-10-09 PROCEDURE — 83605 ASSAY OF LACTIC ACID: CPT

## 2020-10-09 PROCEDURE — 1200000000 HC SEMI PRIVATE

## 2020-10-09 RX ADMIN — INSULIN LISPRO 1 UNITS: 100 INJECTION, SOLUTION INTRAVENOUS; SUBCUTANEOUS at 08:28

## 2020-10-09 RX ADMIN — IPRATROPIUM BROMIDE AND ALBUTEROL SULFATE 1 AMPULE: .5; 3 SOLUTION RESPIRATORY (INHALATION) at 06:21

## 2020-10-09 RX ADMIN — PROPRANOLOL HYDROCHLORIDE 40 MG: 20 TABLET ORAL at 20:28

## 2020-10-09 RX ADMIN — INSULIN LISPRO 3 UNITS: 100 INJECTION, SOLUTION INTRAVENOUS; SUBCUTANEOUS at 11:16

## 2020-10-09 RX ADMIN — APIXABAN 5 MG: 5 TABLET, FILM COATED ORAL at 08:21

## 2020-10-09 RX ADMIN — FERROUS SULFATE TAB EC 324 MG (65 MG FE EQUIVALENT) 324 MG: 324 (65 FE) TABLET DELAYED RESPONSE at 08:22

## 2020-10-09 RX ADMIN — PROPRANOLOL HYDROCHLORIDE 40 MG: 20 TABLET ORAL at 08:22

## 2020-10-09 RX ADMIN — OXYBUTYNIN CHLORIDE 5 MG: 5 TABLET ORAL at 20:28

## 2020-10-09 RX ADMIN — IPRATROPIUM BROMIDE AND ALBUTEROL SULFATE 1 AMPULE: .5; 3 SOLUTION RESPIRATORY (INHALATION) at 17:26

## 2020-10-09 RX ADMIN — GABAPENTIN 300 MG: 300 CAPSULE ORAL at 08:22

## 2020-10-09 RX ADMIN — ASPIRIN 81 MG: 81 TABLET, CHEWABLE ORAL at 08:22

## 2020-10-09 RX ADMIN — GABAPENTIN 300 MG: 300 CAPSULE ORAL at 20:28

## 2020-10-09 RX ADMIN — POLYETHYLENE GLYCOL (3350) 17 G: 17 POWDER, FOR SOLUTION ORAL at 20:30

## 2020-10-09 RX ADMIN — GABAPENTIN 300 MG: 300 CAPSULE ORAL at 14:17

## 2020-10-09 RX ADMIN — DOCUSATE SODIUM 50MG AND SENNOSIDES 8.6MG 1 TABLET: 8.6; 5 TABLET, FILM COATED ORAL at 20:28

## 2020-10-09 RX ADMIN — APIXABAN 5 MG: 5 TABLET, FILM COATED ORAL at 20:28

## 2020-10-09 RX ADMIN — ALOGLIPTIN 12.5 MG: 12.5 TABLET, FILM COATED ORAL at 08:23

## 2020-10-09 RX ADMIN — INSULIN GLARGINE 35 UNITS: 100 INJECTION, SOLUTION SUBCUTANEOUS at 20:32

## 2020-10-09 RX ADMIN — IPRATROPIUM BROMIDE AND ALBUTEROL SULFATE 1 AMPULE: .5; 3 SOLUTION RESPIRATORY (INHALATION) at 20:59

## 2020-10-09 RX ADMIN — HYDROCODONE BITARTRATE AND ACETAMINOPHEN 1 TABLET: 10; 325 TABLET ORAL at 08:26

## 2020-10-09 RX ADMIN — IPRATROPIUM BROMIDE AND ALBUTEROL SULFATE 1 AMPULE: .5; 3 SOLUTION RESPIRATORY (INHALATION) at 11:10

## 2020-10-09 RX ADMIN — PANTOPRAZOLE SODIUM 40 MG: 40 TABLET, DELAYED RELEASE ORAL at 05:14

## 2020-10-09 RX ADMIN — INSULIN LISPRO 2 UNITS: 100 INJECTION, SOLUTION INTRAVENOUS; SUBCUTANEOUS at 17:17

## 2020-10-09 RX ADMIN — Medication 1000 UNITS: at 08:22

## 2020-10-09 RX ADMIN — DOCUSATE SODIUM 50MG AND SENNOSIDES 8.6MG 1 TABLET: 8.6; 5 TABLET, FILM COATED ORAL at 08:22

## 2020-10-09 RX ADMIN — INSULIN LISPRO 1 UNITS: 100 INJECTION, SOLUTION INTRAVENOUS; SUBCUTANEOUS at 20:31

## 2020-10-09 RX ADMIN — POLYETHYLENE GLYCOL (3350) 17 G: 17 POWDER, FOR SOLUTION ORAL at 08:23

## 2020-10-09 RX ADMIN — OXYBUTYNIN CHLORIDE 5 MG: 5 TABLET ORAL at 08:22

## 2020-10-09 ASSESSMENT — PAIN SCALES - GENERAL
PAINLEVEL_OUTOF10: 6
PAINLEVEL_OUTOF10: 6

## 2020-10-09 ASSESSMENT — PAIN DESCRIPTION - PAIN TYPE: TYPE: CHRONIC PAIN

## 2020-10-09 NOTE — PROGRESS NOTES
Physical Therapy    Facility/Department: [facility] MED SURG  Initial Assessment    NAME: Pavel Yang  : 1940  MRN: [id number]    Date of Service: 10/9/2020    Discharge Recommendations:  Home with assist PRN, 24 hour supervision or assist, Home with Home health PT        Assessment   Body structures, Functions, Activity limitations: Decreased functional mobility ; Decreased high-level IADLs  Assessment: SOB; difficulty walking, general weakness; will benefit from PT to help improve functional mobility and safety  Treatment Diagnosis: SOB; difficulty walking, general weakness  Prognosis: Excellent  Decision Making: Low Complexity  REQUIRES PT FOLLOW UP: Yes  Activity Tolerance  Activity Tolerance: Patient Tolerated treatment well       Patient Diagnosis(es): The encounter diagnosis was Pneumonia due to organism. has a past medical history of Allergic rhinitis, Anxiety, Chronic back pain, Depression, Double vision with both eyes open, Fall, Hyperlipidemia, Hypertension, Osteoarthritis, and Type II or unspecified type diabetes mellitus without mention of complication, not stated as uncontrolled. has a past surgical history that includes Hysterectomy;  section; Colonoscopy; and shoulder surgery. Restrictions  Restrictions/Precautions  Restrictions/Precautions: General Precautions, Fall Risk  Required Braces or Orthoses?: No  Vision/Hearing  Vision: Within Functional Limits  Hearing: Within functional limits     Subjective  General  Chart Reviewed: Yes  Patient assessed for rehabilitation services?: Yes  Response To Previous Treatment: Not applicable  Family / Caregiver Present: No  Referring Practitioner: Moon Duran MD  Diagnosis: SOB  Follows Commands: Within Functional Limits  Subjective  Subjective: Patient admitted for SOB; reports feeling OK this morning; agreeable to consult; uses walker at home, ambulates up to 20 feet.   Pain Screening  Patient Currently in Pain: Yes  Pain Assessment  Pain Assessment: 0-10  Pain Level: 6  Pain Type: Chronic pain  Vital Signs  Patient Currently in Pain: Yes       Orientation  Orientation  Overall Orientation Status: Within Functional Limits  Social/Functional History  Social/Functional History  Lives With: Spouse  Type of Home: House  Home Layout: One level  Home Access: Ramped entrance  Bathroom Shower/Tub: Tub/Shower unit  Bathroom Toilet: Standard  Bathroom Equipment: 3-in-1 commode  Bathroom Accessibility: Not accessible  Home Equipment: Rolling walker, Oxygen, Wheelchair-manual, Lift chair, Hospital bed, Alert Button, Cane  Receives Help From: Family  ADL Assistance: Needs assistance  Homemaking Responsibilities: No  Ambulation Assistance: Needs assistance  Transfer Assistance: Needs assistance  Active : No  Cognition        Objective     Observation/Palpation  Posture: Fair  Observation: 1.5L 02, lying in bed, NAD, pleasant, cooperative    AROM RLE (degrees)  RLE AROM: WFL  AROM LLE (degrees)  LLE AROM : WFL  AROM RUE (degrees)  RUE AROM : WFL  AROM LUE (degrees)  LUE AROM : WFL  Strength RLE  Strength RLE: WFL  Strength LLE  Strength LLE: WFL  Strength RUE  Strength RUE: WFL  Comment: See OT for details  Strength LUE  Strength LUE: WFL  Comment: See OT for details  Tone RLE  RLE Tone: Normotonic  Tone LLE  LLE Tone: Normotonic  Motor Control  Gross Motor?: WFL  Sensation  Overall Sensation Status: WFL  Bed mobility  Rolling to Left: Modified independent  Supine to Sit: Stand by assistance  Scooting: Stand by assistance  Transfers  Sit to Stand: Contact guard assistance  Stand to sit: Contact guard assistance  Bed to Chair: Contact guard assistance  Ambulation  Ambulation?: Yes  Ambulation 1  Surface: level tile  Device: Standard Walker  Assistance: Contact guard assistance  Gait Deviations: Slow Trang;Decreased step length;Decreased step height  Distance: 20 feet     Balance  Posture: Fair  Sitting - Static: Good  Sitting - Dynamic: Good;-  Standing - Static: Fair  Standing - Dynamic: Fair;-        Plan   Plan  Times per week: 3-4 days  Plan weeks: 3-4 days  Current Treatment Recommendations: Strengthening, Neuromuscular Re-education, Safety Education & Training, Balance Training, Functional Mobility Training, Transfer Training, Gait Training, Patient/Caregiver Education & Training               Goals  Long term goals  Time Frame for Long term goals : 3-4 days  Long term goal 1: Achieve basic transfers with SBA x - supervision  Long term goal 2: Ambulate 30 feet x 2 with SBA x 1 with SW with good safety awareness. Therapy Time   Individual Concurrent Group Co-treatment   Time In           Time Out           Minutes                   Lui Moore PT       Certification of Medical Necessity: It will be understood that this treatment plan is certified medically necessary by the documenting therapist and referring physician mentioned in this report. Unless the physician indicated otherwise through written correspondence with our office, all further referrals will act as certification of medical necessity on this treatment plan. Thank you for this referral.  If you have questions regarding this plan of care, please call [phone].           Revisions to this plan (optional):                     Please sign and return this plan to:   FAX: [phone]      Signature:                                 Date:

## 2020-10-09 NOTE — PROGRESS NOTES
Pt was previously sent home on Lantus on the 340B program during last admission. Lantus is no longer available on the 340B program, Levemir is currently the covered option. Discussed with [facility] that the pt needs a rx sent to [facility] upon discharge for Face++ on 340B. Please send rx for pen needles as well. Pharmacy will continue to follow.   Blanka Acuna, ScarD

## 2020-10-09 NOTE — FLOWSHEET NOTE
10/08/20 2050   Assessment   Charting Type Shift assessment   Neurological   Neuro (WDL) WDL   Level of Consciousness 0   [place] Coma Scale   Eye Opening 4   Best Verbal Response 5   Best Motor Response 6   Giovanny Coma Scale Score 15   HEENT   HEENT (WDL) X   Right Eye Impaired vision   Left Eye Impaired vision   Teeth Missing teeth   Respiratory   Respiratory (WDL) X   L Breath Sounds Diminished   R Breath Sounds Diminished   Breath Sounds   Right Upper Lobe Diminished   Right Middle Lobe Diminished   Right Lower Lobe Diminished   Left Upper Lobe Diminished   Left Lower Lobe Diminished   Cough/Sputum   Cough Non-productive   Cardiac   Cardiac (WDL) WDL   Rhythm Interpretation   Lead Monitored Lead II   Rhythm Normal sinus rhythm   Cardiac Monitor   Telemetry Monitor On Yes   Telemetry Audible Yes   Telemetry Alarms Set Yes   Telemetry Box Number [id number]   Gastrointestinal   Abdomen Inspection Rounded   Peripheral Vascular   Peripheral Vascular (WDL) WDL   Skin Color/Condition   Skin Color/Condition (WDL) X   Skin Color Pale   Skin Condition/Temp Dry; Warm   Skin Integrity   Skin Integrity (WDL) X   Skin Integrity Bruising   Location BUE   Musculoskeletal   Musculoskeletal (WDL) X   RUE Limited movement   LUE Full movement   RL Extremity Limited movement;Weakness; Unsteady   LL Extremity Limited movement;Weakness; Unsteady   Genitourinary   Genitourinary (WDL) X   Urine Assessment   Incontinence Yes  (at times)   Psychosocial   Psychosocial (WDL) WDL

## 2020-10-09 NOTE — PROGRESS NOTES
Occupational Therapy   Occupational Therapy Initial Assessment  Date: 10/9/2020   Patient Name: David Case  MRN: [id number]     : 1940    Date of Service: 10/9/2020    Discharge Recommendations:  24 hour supervision or assist       Assessment   Performance deficits / Impairments: Decreased ADL status; Decreased endurance;Decreased functional mobility ; Decreased high-level IADLs;Decreased balance;Decreased strength;Decreased ROM; Decreased safe awareness  Assessment: Pt agreeable to OT services. Pt come to sit at EOB without difficulty. Pt able to follow all commands appropriately. Pt limited in AROM in RUE from previous injury. Pt demo poor safety awareness upon coming to stand with LOB requiring min assist for correction. Pt ambulated with standard walker x20 feet with CGA . Pt transferred to chair with CGA. Pt 02 sats 86% upon returning to chair. Pt able to rebound within ~1 minute. Pt completed LB dressing with SBA to initiate and CGA in standing with no LOB. Pt able to use BUE to pull up brief in standing. Pt left seated in chair with call light in reach. Pt will benefit from skilled OT services to improve  safety awareness with ADL tasks and to decrease risk of falls. Pt reports she does require assist for ADL tasks at home from spouse and family. Pt states distance ambulated today is typical distance she ambulates at home. Occupational therapy during IP stay to maintain ADL status to prevent decline and focus on fall prevention techniques during ADL and mobility. Prognosis: Good  Decision Making: Low Complexity  REQUIRES OT FOLLOW UP: Yes  Activity Tolerance  Activity Tolerance: Patient Tolerated treatment well           Patient Diagnosis(es): The encounter diagnosis was Pneumonia due to organism.      has a past medical history of Allergic rhinitis, Anxiety, Chronic back pain, Depression, Double vision with both eyes open, Fall, Hyperlipidemia, Hypertension, Osteoarthritis, and Type II or unspecified type diabetes mellitus without mention of complication, not stated as uncontrolled. has a past surgical history that includes Hysterectomy;  section; Colonoscopy; and shoulder surgery. Restrictions  Restrictions/Precautions  Restrictions/Precautions: General Precautions, Fall Risk    Subjective   General  Chart Reviewed: Yes  Patient assessed for rehabilitation services?: Yes  Family / Caregiver Present: No  Referring Practitioner: Chad Gonzalez MD  Diagnosis: Pneumonia  Subjective  Subjective: Pt reports she has not improved since finding out she had PE last week. Pt agreeable to OT services.   Patient Currently in Pain: Yes  Pain Assessment  Pain Level: 6  Vital Signs  Pulse: 67  Level of Consciousness: Alert  Patient Currently in Pain: Yes  Social/Functional History  Social/Functional History  Lives With: Spouse  Type of Home: House  Home Layout: One level  Home Access: Ramped entrance  Bathroom Shower/Tub: Tub/Shower unit([place])  Bathroom Toilet: Standard  Bathroom Equipment: 3-in-1 commode  Bathroom Accessibility: Not accessible  Home Equipment: Rolling walker, Oxygen, Wheelchair-manual, Lift chair, Hospital bed, Alert Button, [address] Help From: Family  ADL Assistance: Needs assistance(Spouse assists with dressing as needed, assists with bathing, toilets with CGA at [facility])  Homemaking Responsibilities: No  Ambulation Assistance: Needs assistance(uses RW)  Transfer Assistance: Needs assistance(pt requires CGA with transfers and ambulation)  Active : No       Objective   Vision: Within Functional Limits  Hearing: Within functional limits    Orientation  Overall Orientation Status: Within Functional Limits  Observation/Palpation  Observation: 1.5L 02, lying in bed, NAD,  Functional Mobility  Functional Mobility Comments: 20 feet with standard walker CGA x2  ADL  LE Dressing: Stand by assistance;Contact guard assistance  Tone RUE  RUE Tone: Normotonic  Tone LUE  LUE Tone:

## 2020-10-09 NOTE — PLAN OF CARE
Problem: Falls - Risk of:  Goal: Will remain free from falls  Description: Will remain free from falls  10/9/2020 0924 by Erna Louis RN  Outcome: Ongoing  10/8/2020 2105 by Mando Johnson LPN  Outcome: Ongoing  Goal: Absence of physical injury  Description: Absence of physical injury  10/8/2020 2105 by Mando Johnson LPN  Outcome: Ongoing     Problem: Skin Integrity:  Goal: Will show no infection signs and symptoms  Description: Will show no infection signs and symptoms  10/8/2020 2105 by Mando Johnson LPN  Outcome: Ongoing     Problem: PAIN  Goal: Patient's pain/discomfort is manageable  Outcome: Ongoing

## 2020-10-09 NOTE — FLOWSHEET NOTE
10/09/20 0926   Assessment   Charting Type Shift assessment   Neurological   Neuro (WDL) WDL   Level of Consciousness 0   Swallow Screening   Is the patient able to remain alert for testing? Yes   Giovanny Coma Scale   Eye Opening 4   Best Verbal Response 5   Best Motor Response 6   Giovanny Coma Scale Score 15   HEENT   HEENT (WDL) X   Right Eye Impaired vision   Left Eye Impaired vision   Teeth Missing teeth   Respiratory   Respiratory (WDL) X   L Breath Sounds Diminished   R Breath Sounds Diminished   Breath Sounds   Right Upper Lobe Diminished   Right Middle Lobe Diminished   Right Lower Lobe Diminished   Left Upper Lobe Diminished   Left Lower Lobe Diminished   Cardiac   Cardiac (WDL) WDL   Cardiac Regularity Regular   Cardiac Rhythm NSR  (no complaints of soa at this time, RR reg/unlabored at 22bpm)   Rhythm Interpretation   Pulse 67   Cardiac Monitor   Telemetry Monitor On Yes   Telemetry Audible Yes   Telemetry Alarms Set Yes   Telemetry Box Number [id number]   Telemetry Monitor Alarm Parameters    Gastrointestinal   Abdominal (WDL) X   RUQ Bowel Sounds Active   LUQ Bowel Sounds Active   RLQ Bowel Sounds Active   LLQ Bowel Sounds Active   Abdomen Inspection Rounded   Tenderness Soft;Tenderness   Hernia Umbilical   Peripheral Vascular   Peripheral Vascular (WDL) WDL   Skin Color/Condition   Skin Color/Condition (WDL) X   Skin Color Pale   Skin Condition/Temp Dry; Warm   Skin Integrity   Skin Integrity (WDL) X   Skin Integrity Bruising   Location scattered   Skin Integrity Site 2   Skin Integrity Location 2 Redness   Location 2 skin folds   Musculoskeletal   Musculoskeletal (WDL) X   RUE Limited movement   LUE Full movement   RL Extremity Limited movement;Weakness; Unsteady   LL Extremity Limited movement;Weakness; Unsteady   Genitourinary   Genitourinary (WDL) X   Urine Assessment   Incontinence Yes  (at times)   Psychosocial   Psychosocial (WDL) WDL

## 2020-10-09 NOTE — H&P
History and Physical    Patient:  Leslie Armenta    CHIEF COMPLAINT:    Shortness of breath     HISTORY OF PRESENT ILLNESS:   The patient is a 79 y.o. female with PMH of chronic back pain, anxiety, HTN, CKD, HTN, DM II, HLD, peipheral neuropathy, recently diagnosed pulmonary embolism on eliquis who presents due to shortness of breath. Patient states her shorntess of breath started 2 weeks ago. She was admitted at this facility from  to 10/1 and diagnosed with PE. Anticoagulation initiated. She was discharged home on 1.5L O2. She presented to ED stating her shortness of breath has not improved. She is having cough, productive at times. No fever, sweats, chills. Functional status is at her baseline. She is able to walk approximately 20 feet with assistance which is what she does at home. Has 24 hour care from family. She denies abdominal pain. Denies known sick contacts. States that she does have occasional nausea and episode of vomiting without any worsening factors. Past Medical History:      Diagnosis Date    Allergic rhinitis     Anxiety     Chronic back pain     Depression     Double vision with both eyes open     Pt states she can see ok with one eye shut    Fall     Hyperlipidemia     Hypertension     Osteoarthritis     Type II or unspecified type diabetes mellitus without mention of complication, not stated as uncontrolled        Past Surgical History:      Procedure Laterality Date     SECTION      COLONOSCOPY      HYSTERECTOMY      TOTAL    SHOULDER SURGERY      RIGHT X 2       Medications Prior to Admission:    Prior to Admission medications    Medication Sig Start Date End Date Taking? Authorizing Provider   OXYGEN Inhale into the lungs 1.5 L N/C   Yes Historical Provider, MD   HYDROcodone-acetaminophen (NORCO)  MG per tablet Take 1 tablet by mouth 3 times daily as needed for Pain.    Yes Historical Provider, MD   insulin glargine (LANTUS SOLOSTAR) 100 UNIT/ML injection pen Inject 35 Units into the skin nightly Please run on 340b program 10/2/20  Yes KEATON Riley   apixaban (ELIQUIS) 5 MG TABS tablet Take 1 tablet by mouth 2 times daily Start after completion of loading dose Eliquis 10mg BID x 6 days  Patient taking differently: Take 5 mg by mouth 2 times daily Patient has completed her loading dose of Eliquis 10mg twice daily for 6 days this morning. 10/7/20  Yes KEATON Riley   oxybutynin (DITROPAN) 5 MG tablet Take 5 mg by mouth 2 times daily   Yes Historical Provider, MD   vitamin D (ERGOCALCIFEROL) 1.25 MG (81419 UT) CAPS capsule Take 50,000 Units by mouth once a week   Yes Historical Provider, MD   aspirin 81 MG tablet Take 1 tablet by mouth daily 4/20/20 10/8/20 Yes KEATON Hayden   diclofenac sodium (VOLTAREN) 1 % GEL Apply 1 g topically 4 times daily as needed for Pain 4/20/20  Yes KEATON Hayden   ferrous sulfate 324 (65 Fe) MG EC tablet Take 1 tablet by mouth daily (with breakfast) 4/21/20  Yes KEATON Hayden   polyethylene glycol [facility]) packet Take 17 g by mouth 2 times daily 4/20/20  Yes KEATON Hayden   omeprazole (PRILOSEC) 20 MG delayed release capsule Take 2 capsules by mouth daily 4/20/20  Yes KEATON Hayden   vitamin D (CHOLECALCIFEROL) 25 MCG (1000 UT) TABS tablet Take 1 tablet by mouth daily 4/20/20  Yes KEATON Hayden   propranolol (INDERAL) 40 MG tablet Take 1 tablet by mouth 2 times daily 4/20/20  Yes KEATON Hayden   pravastatin (PRAVACHOL) 20 MG tablet Take 1 tablet by mouth nightly 4/20/20  Yes KEATON Hayden   SITagliptin (JANUVIA) 25 MG tablet Take 2 tablets by mouth daily 4/20/20  Yes KEATON Hayden   LORazepam (ATIVAN) 0.5 MG tablet Take 0.5 mg by mouth daily as needed for Anxiety.    Yes Historical Provider, MD   meclizine (ANTIVERT) 25 MG tablet Take 1 tablet by mouth 3 times daily as needed for Dizziness 1/18/20  Yes KEATON Hayden   gabapentin (NEURONTIN) 300 MG capsule Take 300 mg by mouth 3 times daily. Yes Historical Provider, MD   insulin aspart (NOVOLOG FLEXPEN) 100 UNIT/ML injection pen Per low dose sliding scale. Please run on 340B program. 10/1/20   KEATON Omer   Insulin Pen Needle ([employer] PEN NEEDLES 29G) 29G X 12MM MISC 1 each by Does not apply route daily 10/1/20   KEATON Omer   DULoxetine (CYMBALTA) 60 MG extended release capsule Take 60 mg by mouth daily Patient does not know if she is taking this medication    Historical Provider, MD   senna-docusate (PERICOLACE) 8.6-50 MG per tablet Take 1 tablet by mouth 2 times daily 4/20/20   KEATON Nogueira   glucose monitoring kit (FREESTYLE) monitoring kit 1 kit by Does not apply route daily 1/18/20   Maria R KEATON Marmolejo       Allergies:  Latex; Penicillins; Sulfa antibiotics; Tetracyclines & related; and Diazepam    Social History:   TOBACCO:   reports that she has never smoked. She has never used smokeless tobacco.  ETOH:   reports no history of alcohol use. OCCUPATION:  None     Family History:       Problem Relation Age of Onset    Heart Disease Mother         CHF,CAD    Cancer Maternal Aunt     High Blood Pressure Sister        Review of system  Constitutional:  Denies fever or chills. Eyes:  Denies eye pain or redness  HENT:  Denies nasal congestion or sore throat   Respiratory:  Admits to cough and shortness of breath   Cardiovascular:  Denies chest pain or edema   GI:  Denies abdominal pain, bloody stools or diarrhea. Positive for occasional nausea and vomiting (not currently). :  Denies dysuria or frequency  Musculoskeletal:  Denies acute neck pain or body aches. Positive for chronic arthralgias.    Integument:  Denies rash or itching  Neurologic:  Denies headache, dizziness, numbness, tingling or unilateral weakness  Psychiatric:  Denies acute depression or acute anxiety      Vital Signs  Temp: 98 °F (36.7 °C)  Pulse: 67  Resp: 18  BP: (!) 122/59  SpO2: 90 %  O2 Device: None (Room air)  O2 Flow Rate (L/min): 1.5 L/min    vital signs reviewed in electronic chart. Physical exam  Constitutional:  Well developed, well nourished, no acute distress  Eyes:  PERRL, no scleral icterus, conjunctiva normal , chronic left ptosis  HENT:  Atraumatic, external ears normal, nose normal, oropharynx moist, no pharyngeal exudates. Neck- supple, no JVD, no lymphadenopathy  Respiratory:  No respiratory distress on 1.5L O2, diminished breath sounds throughout, no wheezing, rales or rhonchi detected  Cardiovascular:  Normal rate, normal rhythm, no murmurs, no gallops, no rubs, no edema   GI:  Soft, obese, nondistended, normal bowel sounds, nontender, no voluntary guarding  Musculoskeletal:  No cyanosis or obvious acute deformity. Moving all extremities   Integument:  Warm and dry. Lymphatic:  No cervical or axillary lymphadenopathy noted   Neurologic:  Alert & oriented x 3, no apparent focal deficits noted   Psychiatric:  Speech and behavior appropriate         Lab Results   Component Value Date     10/09/2020    K 4.7 10/09/2020     10/09/2020    CO2 26 10/09/2020    BUN 20 10/09/2020    CREATININE 1.7 (H) 10/09/2020    GLUCOSE 173 (H) 10/09/2020    CALCIUM 8.9 10/09/2020    PROT 6.5 10/09/2020    LABALBU 3.2 (L) 10/09/2020    BILITOT 3.7 (H) 10/09/2020    ALKPHOS 317 (H) 10/09/2020     (H) 10/09/2020    ALT 94 (H) 10/09/2020    LABGLOM 29 (L) 10/09/2020    GFRAA 35 (L) 10/09/2020    AGRATIO 1.0 10/09/2020    GLOB 3.3 10/09/2020           Lab Results   Component Value Date    WBC 5.9 10/09/2020    HGB 10.8 (L) 10/09/2020    HCT 37.1 10/09/2020    .8 (H) 10/09/2020     10/09/2020       XR CHEST PORTABLE   Final Result   Impression: Airspace opacity of the left lung base laterally. XR CHEST PORTABLE   Final Result   Impression: Airspace opacity of the left lung base laterally.             Assessment and Plan     Active Hospital Problems    Diagnosis Date Noted    Pneumonia [J18.9]  - noted on CXR on arrival  - started on levaquin  - continue 1.5L O2  - duonebs ordered 10/08/2020    Transaminitis [R74.01]  - etiology unclear. History of cholecystectomy. Recent Liver US 9/30 with fatty infiltration of liver, 2 mm fluid collection with gallbladder fossa adjacent to surgical clips indeterminate, findings may relate to seroma or bilioma. Infected collection cannot be excluded. Previously seen by Dr. Marlon Gomez during recent admission last week who recommended EGD. - no adominal pain. Does admit to occasional n/v but not currently. - 10/8 , , Bilirubin 4.1, alk phos 378 on arrival  - 10/9 ALT 94, , bilirubin 3.7, Alk phos 317  - follow up with Dr. Marlon Gomez as scheduled. Consider GI follow up as outpatient as well. - continue to monitor  09/30/2020    Pulmonary embolism on right [facility]) [I26.99]  - CTA chest 9/29 with findings consistent with pulmonary emboli and right upper lobe segmental and subsegmental arteries and possibly right lower lobe segmental and subsegmental arteries.  There is reflux of contrast into the inferior vena cava which could be seen with right heart strain.  - Echocardiogram 9/30 with EF 70%.   RVSP 29.  Echocardiogram findings do not suggest evidence of right heart strain.  - Follow up with  as scheduled  - continue eliquis and supplemental O2 09/29/2020    Chronic back pain [M54.9, G89.29]  - continue current regimen  12/20/2019    Declining functional status [R53.81]  - PT/OT consulted and patient at her baseline functionally per therapy  - plan to resume home health on discharge 12/19/2019    HTN (hypertension) [I10]  - BP stable on current regimen  10/14/2011    Type 2 diabetes mellitus with stage 3 chronic kidney disease, with long-term current use of insulin (HCC) [E11.22, N18.30, Z79.4]  - Cr at baseline around 1.7  - continue currnet regimen with sliding scale coverage as needed   - encourage diabetic diet  07/05/2011     Patient was seen and examined by Dr. Christa Sam and plan of care reviewed.     Chang Goyal certifies per CMS regulation for 42 .15(a), that the patient may reasonably be expected to be discharged or transferred to a hospital within 96 hours after admission to [address] [place] [address]    Electronically signed by KEATON Jacinto on 10/9/2020 at 1:43 PM

## 2020-10-10 LAB
A/G RATIO: 1.1 (ref 0.8–2)
ALBUMIN SERPL-MCNC: 3.3 G/DL (ref 3.4–4.8)
ALP BLD-CCNC: 286 U/L (ref 25–100)
ALT SERPL-CCNC: 68 U/L (ref 4–36)
ANION GAP SERPL CALCULATED.3IONS-SCNC: 12 MMOL/L (ref 3–16)
AST SERPL-CCNC: 79 U/L (ref 8–33)
BILIRUB SERPL-MCNC: 1.6 MG/DL (ref 0.3–1.2)
BUN BLDV-MCNC: 20 MG/DL (ref 6–20)
CALCIUM SERPL-MCNC: 8.9 MG/DL (ref 8.5–10.5)
CHLORIDE BLD-SCNC: 103 MMOL/L (ref 98–107)
CO2: 22 MMOL/L (ref 20–30)
CREAT SERPL-MCNC: 1.6 MG/DL (ref 0.4–1.2)
GFR AFRICAN AMERICAN: 38
GFR NON-AFRICAN AMERICAN: 31
GLOBULIN: 3.1 G/DL
GLUCOSE BLD-MCNC: 162 MG/DL (ref 74–106)
GLUCOSE BLD-MCNC: 176 MG/DL (ref 74–106)
GLUCOSE BLD-MCNC: 188 MG/DL (ref 74–106)
GLUCOSE BLD-MCNC: 217 MG/DL (ref 74–106)
GLUCOSE BLD-MCNC: 317 MG/DL (ref 74–106)
HCT VFR BLD CALC: 36 % (ref 37–47)
HEMOGLOBIN: 10.7 G/DL (ref 11.5–16.5)
MCH RBC QN AUTO: 31.8 PG (ref 27–32)
MCHC RBC AUTO-ENTMCNC: 29.7 G/DL (ref 31–35)
MCV RBC AUTO: 107.1 FL (ref 80–100)
PDW BLD-RTO: 14 % (ref 11–16)
PERFORMED ON: ABNORMAL
PLATELET # BLD: 181 K/UL (ref 150–400)
PMV BLD AUTO: 11.1 FL (ref 6–10)
POTASSIUM SERPL-SCNC: 4.4 MMOL/L (ref 3.4–5.1)
RBC # BLD: 3.36 M/UL (ref 3.8–5.8)
SODIUM BLD-SCNC: 137 MMOL/L (ref 136–145)
TOTAL PROTEIN: 6.4 G/DL (ref 6.4–8.3)
WBC # BLD: 5.5 K/UL (ref 4–11)

## 2020-10-10 PROCEDURE — 85027 COMPLETE CBC AUTOMATED: CPT

## 2020-10-10 PROCEDURE — 36415 COLL VENOUS BLD VENIPUNCTURE: CPT

## 2020-10-10 PROCEDURE — 99232 SBSQ HOSP IP/OBS MODERATE 35: CPT | Performed by: PHYSICIAN ASSISTANT

## 2020-10-10 PROCEDURE — 94640 AIRWAY INHALATION TREATMENT: CPT

## 2020-10-10 PROCEDURE — 2700000000 HC OXYGEN THERAPY PER DAY

## 2020-10-10 PROCEDURE — 2580000003 HC RX 258: Performed by: PHYSICIAN ASSISTANT

## 2020-10-10 PROCEDURE — 6360000002 HC RX W HCPCS: Performed by: PHYSICIAN ASSISTANT

## 2020-10-10 PROCEDURE — 6370000000 HC RX 637 (ALT 250 FOR IP): Performed by: PHYSICIAN ASSISTANT

## 2020-10-10 PROCEDURE — 80053 COMPREHEN METABOLIC PANEL: CPT

## 2020-10-10 PROCEDURE — 94761 N-INVAS EAR/PLS OXIMETRY MLT: CPT

## 2020-10-10 PROCEDURE — 1200000000 HC SEMI PRIVATE

## 2020-10-10 RX ORDER — FUROSEMIDE 10 MG/ML
20 INJECTION INTRAMUSCULAR; INTRAVENOUS ONCE
Status: COMPLETED | OUTPATIENT
Start: 2020-10-10 | End: 2020-10-10

## 2020-10-10 RX ORDER — INSULIN GLARGINE 100 [IU]/ML
38 INJECTION, SOLUTION SUBCUTANEOUS NIGHTLY
Status: DISCONTINUED | OUTPATIENT
Start: 2020-10-10 | End: 2020-10-12

## 2020-10-10 RX ADMIN — INSULIN GLARGINE 38 UNITS: 100 INJECTION, SOLUTION SUBCUTANEOUS at 20:45

## 2020-10-10 RX ADMIN — LORAZEPAM 0.5 MG: 0.5 TABLET ORAL at 20:44

## 2020-10-10 RX ADMIN — ASPIRIN 81 MG: 81 TABLET, CHEWABLE ORAL at 08:37

## 2020-10-10 RX ADMIN — POLYETHYLENE GLYCOL (3350) 17 G: 17 POWDER, FOR SOLUTION ORAL at 08:39

## 2020-10-10 RX ADMIN — OXYBUTYNIN CHLORIDE 5 MG: 5 TABLET ORAL at 20:40

## 2020-10-10 RX ADMIN — IPRATROPIUM BROMIDE AND ALBUTEROL SULFATE 1 AMPULE: .5; 3 SOLUTION RESPIRATORY (INHALATION) at 06:06

## 2020-10-10 RX ADMIN — APIXABAN 5 MG: 5 TABLET, FILM COATED ORAL at 08:33

## 2020-10-10 RX ADMIN — Medication 1000 UNITS: at 08:33

## 2020-10-10 RX ADMIN — PROPRANOLOL HYDROCHLORIDE 40 MG: 20 TABLET ORAL at 08:33

## 2020-10-10 RX ADMIN — HYDROCODONE BITARTRATE AND ACETAMINOPHEN 1 TABLET: 10; 325 TABLET ORAL at 20:45

## 2020-10-10 RX ADMIN — APIXABAN 5 MG: 5 TABLET, FILM COATED ORAL at 20:40

## 2020-10-10 RX ADMIN — GABAPENTIN 300 MG: 300 CAPSULE ORAL at 08:33

## 2020-10-10 RX ADMIN — OXYBUTYNIN CHLORIDE 5 MG: 5 TABLET ORAL at 08:33

## 2020-10-10 RX ADMIN — Medication 10 ML: at 08:34

## 2020-10-10 RX ADMIN — FUROSEMIDE 20 MG: 10 INJECTION, SOLUTION INTRAMUSCULAR; INTRAVENOUS at 10:43

## 2020-10-10 RX ADMIN — GABAPENTIN 300 MG: 300 CAPSULE ORAL at 20:39

## 2020-10-10 RX ADMIN — INSULIN LISPRO 1 UNITS: 100 INJECTION, SOLUTION INTRAVENOUS; SUBCUTANEOUS at 20:45

## 2020-10-10 RX ADMIN — IPRATROPIUM BROMIDE AND ALBUTEROL SULFATE 1 AMPULE: .5; 3 SOLUTION RESPIRATORY (INHALATION) at 22:23

## 2020-10-10 RX ADMIN — INSULIN LISPRO 4 UNITS: 100 INJECTION, SOLUTION INTRAVENOUS; SUBCUTANEOUS at 11:35

## 2020-10-10 RX ADMIN — INSULIN LISPRO 1 UNITS: 100 INJECTION, SOLUTION INTRAVENOUS; SUBCUTANEOUS at 16:25

## 2020-10-10 RX ADMIN — DOCUSATE SODIUM 50MG AND SENNOSIDES 8.6MG 1 TABLET: 8.6; 5 TABLET, FILM COATED ORAL at 08:33

## 2020-10-10 RX ADMIN — FERROUS SULFATE TAB EC 324 MG (65 MG FE EQUIVALENT) 324 MG: 324 (65 FE) TABLET DELAYED RESPONSE at 08:32

## 2020-10-10 RX ADMIN — PANTOPRAZOLE SODIUM 40 MG: 40 TABLET, DELAYED RELEASE ORAL at 05:29

## 2020-10-10 RX ADMIN — LEVOFLOXACIN 750 MG: 5 INJECTION, SOLUTION INTRAVENOUS at 08:33

## 2020-10-10 RX ADMIN — GABAPENTIN 300 MG: 300 CAPSULE ORAL at 14:54

## 2020-10-10 RX ADMIN — IPRATROPIUM BROMIDE AND ALBUTEROL SULFATE 1 AMPULE: .5; 3 SOLUTION RESPIRATORY (INHALATION) at 16:52

## 2020-10-10 RX ADMIN — ALOGLIPTIN 12.5 MG: 12.5 TABLET, FILM COATED ORAL at 08:32

## 2020-10-10 RX ADMIN — INSULIN LISPRO 1 UNITS: 100 INJECTION, SOLUTION INTRAVENOUS; SUBCUTANEOUS at 08:40

## 2020-10-10 ASSESSMENT — PAIN SCALES - GENERAL
PAINLEVEL_OUTOF10: 3
PAINLEVEL_OUTOF10: 0
PAINLEVEL_OUTOF10: 5

## 2020-10-10 ASSESSMENT — PAIN DESCRIPTION - PAIN TYPE: TYPE: CHRONIC PAIN

## 2020-10-10 ASSESSMENT — PAIN DESCRIPTION - LOCATION: LOCATION: BACK

## 2020-10-10 NOTE — PLAN OF CARE
Problem: Falls - Risk of:  Goal: Will remain free from falls  Description: Will remain free from falls  10/10/2020 0945 by Erna Louis RN  Outcome: Ongoing  10/9/2020 2121 by Mando Johnson LPN  Outcome: Ongoing  Goal: Absence of physical injury  Description: Absence of physical injury  10/9/2020 2121 by Mando Johnson LPN  Outcome: Ongoing     Problem: Skin Integrity:  Goal: Will show no infection signs and symptoms  Description: Will show no infection signs and symptoms  10/9/2020 2121 by Mando Johnson LPN  Outcome: Ongoing     Problem: PAIN  Goal: Patient's pain/discomfort is manageable  Outcome: Ongoing

## 2020-10-10 NOTE — FLOWSHEET NOTE
10/10/20 0946   Assessment   Charting Type Shift assessment   Neurological   Neuro (WDL) WDL   Level of Consciousness 0   Swallow Screening   Is the patient able to remain alert for testing? Yes   [place] Coma Scale   Eye Opening 4   Best Verbal Response 5   Best Motor Response 6   [place] Coma Scale Score 15   HEENT   HEENT (WDL) X   Right Eye Impaired vision   Left Eye Impaired vision   Teeth Missing teeth   Respiratory   Respiratory (WDL) X   L Breath Sounds Diminished   R Breath Sounds Diminished   Breath Sounds   Right Upper Lobe Diminished   Right Middle Lobe Diminished   Right Lower Lobe Diminished   Left Upper Lobe Diminished   Left Lower Lobe Diminished   Cardiac   Cardiac (WDL) WDL   Cardiac Regularity Regular   Rhythm Interpretation   Pulse 72   Cardiac Monitor   Telemetry Monitor On Yes   Telemetry Audible Yes   Telemetry Alarms Set Yes   Telemetry Box Number [id number]   Telemetry Monitor Alarm Parameters    Gastrointestinal   Abdominal (WDL) X   RUQ Bowel Sounds Active   LUQ Bowel Sounds Active   RLQ Bowel Sounds Active   LLQ Bowel Sounds Active   Abdomen Inspection Rounded   Tenderness Soft;Tenderness   Hernia Umbilical   Peripheral Vascular   Peripheral Vascular (WDL) WDL   Skin Color/Condition   Skin Color/Condition (WDL) X   Skin Color Pale   Skin Condition/Temp Dry; Warm   Skin Integrity   Skin Integrity (WDL) X   Skin Integrity Bruising   Location scattered   Skin Integrity Site 2   Skin Integrity Location 2 Redness   Location 2 skin folds   Musculoskeletal   Musculoskeletal (WDL) X   RUE Limited movement   LUE Full movement   RL Extremity Limited movement;Weakness; Unsteady   LL Extremity Limited movement;Weakness; Unsteady   Genitourinary   Genitourinary (WDL) X   Urine Assessment   Incontinence Yes  (at times)   Urine Color Tea   Anus/Rectum   Anus/Rectum (WDL) WDL   Psychosocial   Psychosocial (WDL) WDL

## 2020-10-10 NOTE — PROGRESS NOTES
Progress Note      Subjective:   Chief complaint: shortness of breath     Interval History:   Resting in bed sleeping this morning. On room air. Denies shortness of breath and states seh is feeling better today. O2 sat noted to be 77% on RA. Placed on 2L and improved to 97%. Denies acute complaint today. Family coming to visit. Review of systems:   Constitutional:  Denies fever or chills. positive for chronic fatigue. Eyes:  Denies eye pain or redness  HENT:  Denies nasal congestion or sore throat   Respiratory:  denies cough and shortness of breath. Positive for hypoxia on room air. Cardiovascular:  Denies chest pain or edema   GI:  Denies abdominal pain, bloody stools or diarrhea. Positive for occasional nausea and vomiting (not currently). :  Denies dysuria or frequency  Musculoskeletal:  Denies acute neck pain or body aches. Positive for chronic arthralgias. Integument:  Denies rash or itching  Neurologic:  Denies headache, dizziness, numbness, tingling or unilateral weakness  Psychiatric:  Denies acute depression or acute anxiety       Past medical history, surgical history, family history and social history reviewed and unchanged compared to H&P earlier this admission.     Medications:   Scheduled Meds:   insulin glargine  38 Units Subcutaneous Nightly    apixaban  5 mg Oral BID    ferrous sulfate  324 mg Oral Daily with breakfast    gabapentin  300 mg Oral TID    pantoprazole  40 mg Oral QAM AC    oxybutynin  5 mg Oral BID    polyethylene glycol  17 g Oral BID    propranolol  40 mg Oral BID    sennosides-docusate sodium  1 tablet Oral BID    alogliptin  12.5 mg Oral Daily    Vitamin D  1,000 Units Oral Daily    sodium chloride flush  10 mL Intravenous 2 times per day    ipratropium-albuterol  1 ampule Inhalation Q4H WA    levofloxacin  750 mg Intravenous Q48H    influenza virus vaccine  0.5 mL Intramuscular Prior to discharge    aspirin  81 mg Oral Daily    insulin lispro  0-6 Units Subcutaneous TID WC    insulin lispro  0-3 Units Subcutaneous Nightly     Continuous Infusions:   dextrose         Objective:     Vital Signs  Temp: 97.8 °F (36.6 °C)  Pulse: 72  Resp: 20  BP: 129/89  SpO2: 97 %  O2 Device: Nasal cannula  O2 Flow Rate (L/min): 2 L/min    Vital signs reviewed in electronic charts. Physical exam  Constitutional:  Well developed, well nourished, no acute distress  Eyes:  PERRL, no scleral icterus, conjunctiva normal , chronic left ptosis  HENT:  Atraumatic, external ears normal, nose normal, oropharynx moist, no pharyngeal exudates. Neck- supple, no JVD, no lymphadenopathy  Respiratory:  No respiratory distress on room air, diminished breath sounds throughout, no wheezing, rales or rhonchi detected  Cardiovascular:  Normal rate, normal rhythm, no murmurs, no gallops, no rubs, no edema   GI:  Soft, obese, nondistended, normal bowel sounds, nontender, no voluntary guarding  Musculoskeletal:  No cyanosis or obvious acute deformity. Moving all extremities   Integument:  Warm and dry. Neurologic:  Alert & oriented x 3, no apparent focal deficits noted   Psychiatric:  Speech and behavior appropriate        Results:     Lab Results   Component Value Date    WBC 5.5 10/10/2020    HGB 10.7 (L) 10/10/2020    HCT 36.0 (L) 10/10/2020    .1 (H) 10/10/2020     10/10/2020       Lab Results   Component Value Date     10/10/2020    K 4.4 10/10/2020    K 4.7 10/09/2020     10/10/2020    CO2 22 10/10/2020    BUN 20 10/10/2020    CREATININE 1.6 10/10/2020    GLUCOSE 217 10/10/2020    CALCIUM 8.9 10/10/2020        Assessment and Plan:      Pneumonia [J18.9]  - noted on CXR on arrival  - started on levaquin  - continue supplemental O2  - duonebs ordered  - repeat CXR in AM  10/08/2020    Transaminitis [R74.01]  - etiology unclear. History of cholecystectomy.  Recent Liver US 9/30 with fatty infiltration of liver, 2 mm fluid collection with gallbladder fossa adjacent to surgical clips indeterminate, findings may relate to seroma or bilioma. Infected collection cannot be excluded. Previously seen by Dr. Danielle Kaba during recent admission last week who recommended EGD which was scheduled, however patient missed appointment due to current hospitalization. - no adominal pain. Does admit to occasional n/v but not currently. - 10/8 , , Bilirubin 4.1, alk phos 378 on arrival  - 10/9 ALT 94, , bilirubin 3.7, Alk phos 317  - 10/10 ALT 68, AST 79, bilirubin 1.6  - continues to trend down without specific intervention   - follow up with Dr. Danielle Kaba as scheduled. Consider GI follow up as outpatient as well. - continue to monitor  09/30/2020    Pulmonary embolism on right [facility]) [I26.99]  - CTA chest 9/29 with findings consistent with pulmonary emboli and right upper lobe segmental and subsegmental arteries and possibly right lower lobe segmental and subsegmental arteries.  There is reflux of contrast into the inferior vena cava which could be seen with right heart strain.  - Echocardiogram 9/30 with EF 55-60%.  Echocardiogram findings do not suggest evidence of right heart strain.  - Follow up with  as scheduled  - continue eliquis and supplemental O2 09/29/2020    Chronic back pain [M54.9, G89.29]  - continue current regimen  12/20/2019    Declining functional status [R53.81]  - PT/OT consulted and patient at her baseline functionally per therapy  - plan to resume home health on discharge 12/19/2019    HTN (hypertension) [I10]  - BP stable on current regimen  10/14/2011    Type 2 diabetes mellitus with stage 3 chronic kidney disease, with long-term current use of insulin ([facility] [address] [E11.22, N18.30, Z79.4]  - Cr at baseline around 1.7  - hyperglycemic and lantus dose increased.  Continue currnet regimen with sliding scale coverage as needed   - encourage diabetic diet             The case was discussed with Dr. Diane Mahoney by phone and plan of care reviewed    Electronically signed by KETAON Hayden on 10/10/2020 at 11:37 AM

## 2020-10-10 NOTE — FLOWSHEET NOTE
10/09/20 2030   Assessment   Charting Type Shift assessment   Neurological   Neuro (WDL) WDL   Level of Consciousness 0   [place] Coma Scale   Eye Opening 4   Best Verbal Response 5   Best Motor Response 6   [place] Coma Scale Score 15   HEENT   HEENT (WDL) X   Right Eye Impaired vision   Left Eye Impaired vision   Teeth Missing teeth   Respiratory   Respiratory (WDL) X   L Breath Sounds Diminished   R Breath Sounds Diminished   Breath Sounds   Right Upper Lobe Diminished   Right Middle Lobe Diminished   Right Lower Lobe Diminished   Left Upper Lobe Diminished   Left Lower Lobe Diminished   Cardiac   Cardiac (WDL) WDL   Cardiac Regularity Regular   Cardiac Rhythm NSR  (no complaints of soa at this time, RR reg/unlabored at 22bpm)   Cardiac Monitor   Telemetry Monitor On Yes   Telemetry Audible Yes   Telemetry Alarms Set Yes   Telemetry Box Number [id number]   Telemetry Monitor Alarm Parameters    Gastrointestinal   Abdominal (WDL) X   RUQ Bowel Sounds Active   LUQ Bowel Sounds Active   RLQ Bowel Sounds Active   LLQ Bowel Sounds Active   Abdomen Inspection Rounded   Tenderness Soft;Tenderness   Hernia Umbilical   Peripheral Vascular   Peripheral Vascular (WDL) WDL   Skin Color/Condition   Skin Color/Condition (WDL) X   Skin Color Pale   Skin Condition/Temp Dry; Warm   Skin Integrity   Skin Integrity (WDL) X   Skin Integrity Bruising   Location Scattered   Skin Integrity Site 2   Skin Integrity Location 2 Redness   Location 2 Skin Folds    Preventative Dressing No   Musculoskeletal   Musculoskeletal (WDL) X   RUE Limited movement   LUE Full movement   RL Extremity Limited movement;Weakness; Unsteady   LL Extremity Limited movement;Weakness; Unsteady   Genitourinary   Genitourinary (WDL) X   Urine Assessment   Incontinence Yes  (at times)   Psychosocial   Psychosocial (WDL) WDL

## 2020-10-11 ENCOUNTER — APPOINTMENT (OUTPATIENT)
Dept: CT IMAGING | Facility: HOSPITAL | Age: 80
DRG: 195 | End: 2020-10-11
Payer: MEDICARE

## 2020-10-11 ENCOUNTER — APPOINTMENT (OUTPATIENT)
Dept: GENERAL RADIOLOGY | Facility: HOSPITAL | Age: 80
DRG: 195 | End: 2020-10-11
Payer: MEDICARE

## 2020-10-11 LAB
A/G RATIO: 1.4 (ref 0.8–2)
ALBUMIN SERPL-MCNC: 3.4 G/DL (ref 3.4–4.8)
ALP BLD-CCNC: 324 U/L (ref 25–100)
ALT SERPL-CCNC: 71 U/L (ref 4–36)
ANION GAP SERPL CALCULATED.3IONS-SCNC: 9 MMOL/L (ref 3–16)
AST SERPL-CCNC: 136 U/L (ref 8–33)
BILIRUB SERPL-MCNC: 2.3 MG/DL (ref 0.3–1.2)
BUN BLDV-MCNC: 20 MG/DL (ref 6–20)
CALCIUM SERPL-MCNC: 9 MG/DL (ref 8.5–10.5)
CHLORIDE BLD-SCNC: 102 MMOL/L (ref 98–107)
CO2: 24 MMOL/L (ref 20–30)
CREAT SERPL-MCNC: 1.6 MG/DL (ref 0.4–1.2)
GFR AFRICAN AMERICAN: 38
GFR NON-AFRICAN AMERICAN: 31
GLOBULIN: 2.4 G/DL
GLUCOSE BLD-MCNC: 205 MG/DL (ref 74–106)
GLUCOSE BLD-MCNC: 239 MG/DL (ref 74–106)
GLUCOSE BLD-MCNC: 246 MG/DL (ref 74–106)
GLUCOSE BLD-MCNC: 256 MG/DL (ref 74–106)
GLUCOSE BLD-MCNC: 286 MG/DL (ref 74–106)
HCT VFR BLD CALC: 35.8 % (ref 37–47)
HEMOGLOBIN: 11 G/DL (ref 11.5–16.5)
MCH RBC QN AUTO: 33.1 PG (ref 27–32)
MCHC RBC AUTO-ENTMCNC: 30.7 G/DL (ref 31–35)
MCV RBC AUTO: 107.8 FL (ref 80–100)
PDW BLD-RTO: 14.2 % (ref 11–16)
PERFORMED ON: ABNORMAL
PLATELET # BLD: 207 K/UL (ref 150–400)
PMV BLD AUTO: 11.6 FL (ref 6–10)
POTASSIUM SERPL-SCNC: 4.7 MMOL/L (ref 3.4–5.1)
RBC # BLD: 3.32 M/UL (ref 3.8–5.8)
SODIUM BLD-SCNC: 135 MMOL/L (ref 136–145)
TOTAL PROTEIN: 5.8 G/DL (ref 6.4–8.3)
WBC # BLD: 5.8 K/UL (ref 4–11)

## 2020-10-11 PROCEDURE — 1200000000 HC SEMI PRIVATE

## 2020-10-11 PROCEDURE — 74176 CT ABD & PELVIS W/O CONTRAST: CPT

## 2020-10-11 PROCEDURE — 80053 COMPREHEN METABOLIC PANEL: CPT

## 2020-10-11 PROCEDURE — 94761 N-INVAS EAR/PLS OXIMETRY MLT: CPT

## 2020-10-11 PROCEDURE — 93005 ELECTROCARDIOGRAM TRACING: CPT

## 2020-10-11 PROCEDURE — 94640 AIRWAY INHALATION TREATMENT: CPT

## 2020-10-11 PROCEDURE — 99232 SBSQ HOSP IP/OBS MODERATE 35: CPT | Performed by: PHYSICIAN ASSISTANT

## 2020-10-11 PROCEDURE — 6370000000 HC RX 637 (ALT 250 FOR IP): Performed by: PHYSICIAN ASSISTANT

## 2020-10-11 PROCEDURE — 36415 COLL VENOUS BLD VENIPUNCTURE: CPT

## 2020-10-11 PROCEDURE — 71045 X-RAY EXAM CHEST 1 VIEW: CPT

## 2020-10-11 PROCEDURE — 2580000003 HC RX 258: Performed by: PHYSICIAN ASSISTANT

## 2020-10-11 PROCEDURE — 85027 COMPLETE CBC AUTOMATED: CPT

## 2020-10-11 RX ORDER — LIDOCAINE 4 G/G
1 PATCH TOPICAL DAILY
Status: DISCONTINUED | OUTPATIENT
Start: 2020-10-11 | End: 2020-10-15 | Stop reason: HOSPADM

## 2020-10-11 RX ORDER — IPRATROPIUM BROMIDE AND ALBUTEROL SULFATE 2.5; .5 MG/3ML; MG/3ML
1 SOLUTION RESPIRATORY (INHALATION) EVERY 4 HOURS PRN
Status: DISCONTINUED | OUTPATIENT
Start: 2020-10-11 | End: 2020-10-15 | Stop reason: HOSPADM

## 2020-10-11 RX ADMIN — PROPRANOLOL HYDROCHLORIDE 40 MG: 20 TABLET ORAL at 20:25

## 2020-10-11 RX ADMIN — HYDROCODONE BITARTRATE AND ACETAMINOPHEN 1 TABLET: 10; 325 TABLET ORAL at 17:30

## 2020-10-11 RX ADMIN — LORAZEPAM 0.5 MG: 0.5 TABLET ORAL at 17:30

## 2020-10-11 RX ADMIN — FERROUS SULFATE TAB EC 324 MG (65 MG FE EQUIVALENT) 324 MG: 324 (65 FE) TABLET DELAYED RESPONSE at 09:49

## 2020-10-11 RX ADMIN — LIDOCAINE HYDROCHLORIDE: 20 SOLUTION ORAL; TOPICAL at 16:08

## 2020-10-11 RX ADMIN — PROPRANOLOL HYDROCHLORIDE 40 MG: 20 TABLET ORAL at 09:49

## 2020-10-11 RX ADMIN — DOCUSATE SODIUM 50MG AND SENNOSIDES 8.6MG 1 TABLET: 8.6; 5 TABLET, FILM COATED ORAL at 09:49

## 2020-10-11 RX ADMIN — GABAPENTIN 300 MG: 300 CAPSULE ORAL at 20:25

## 2020-10-11 RX ADMIN — APIXABAN 5 MG: 5 TABLET, FILM COATED ORAL at 20:25

## 2020-10-11 RX ADMIN — INSULIN LISPRO 2 UNITS: 100 INJECTION, SOLUTION INTRAVENOUS; SUBCUTANEOUS at 20:28

## 2020-10-11 RX ADMIN — PANTOPRAZOLE SODIUM 40 MG: 40 TABLET, DELAYED RELEASE ORAL at 06:10

## 2020-10-11 RX ADMIN — Medication 10 ML: at 20:32

## 2020-10-11 RX ADMIN — Medication 1000 UNITS: at 09:49

## 2020-10-11 RX ADMIN — OXYBUTYNIN CHLORIDE 5 MG: 5 TABLET ORAL at 09:49

## 2020-10-11 RX ADMIN — DICLOFENAC SODIUM 1 G: 10 GEL TOPICAL at 20:32

## 2020-10-11 RX ADMIN — IPRATROPIUM BROMIDE AND ALBUTEROL SULFATE 1 AMPULE: .5; 3 SOLUTION RESPIRATORY (INHALATION) at 06:04

## 2020-10-11 RX ADMIN — INSULIN LISPRO 2 UNITS: 100 INJECTION, SOLUTION INTRAVENOUS; SUBCUTANEOUS at 11:37

## 2020-10-11 RX ADMIN — ACETAMINOPHEN 650 MG: 325 TABLET, FILM COATED ORAL at 20:25

## 2020-10-11 RX ADMIN — APIXABAN 5 MG: 5 TABLET, FILM COATED ORAL at 09:49

## 2020-10-11 RX ADMIN — OXYBUTYNIN CHLORIDE 5 MG: 5 TABLET ORAL at 20:25

## 2020-10-11 RX ADMIN — ASPIRIN 81 MG: 81 TABLET, CHEWABLE ORAL at 09:49

## 2020-10-11 RX ADMIN — MECLIZINE HYDROCHLORIDE 25 MG: 25 TABLET ORAL at 20:25

## 2020-10-11 RX ADMIN — GABAPENTIN 300 MG: 300 CAPSULE ORAL at 13:33

## 2020-10-11 RX ADMIN — GABAPENTIN 300 MG: 300 CAPSULE ORAL at 09:49

## 2020-10-11 RX ADMIN — PROMETHAZINE HYDROCHLORIDE 12.5 MG: 25 TABLET ORAL at 20:25

## 2020-10-11 RX ADMIN — INSULIN LISPRO 3 UNITS: 100 INJECTION, SOLUTION INTRAVENOUS; SUBCUTANEOUS at 17:24

## 2020-10-11 RX ADMIN — ALOGLIPTIN 12.5 MG: 12.5 TABLET, FILM COATED ORAL at 09:49

## 2020-10-11 RX ADMIN — INSULIN LISPRO 2 UNITS: 100 INJECTION, SOLUTION INTRAVENOUS; SUBCUTANEOUS at 09:52

## 2020-10-11 RX ADMIN — INSULIN GLARGINE 38 UNITS: 100 INJECTION, SOLUTION SUBCUTANEOUS at 20:28

## 2020-10-11 RX ADMIN — Medication 10 ML: at 09:53

## 2020-10-11 ASSESSMENT — PAIN SCALES - GENERAL
PAINLEVEL_OUTOF10: 0
PAINLEVEL_OUTOF10: 8
PAINLEVEL_OUTOF10: 7
PAINLEVEL_OUTOF10: 0

## 2020-10-11 NOTE — PROGRESS NOTES
Prior to discharge    aspirin  81 mg Oral Daily    insulin lispro  0-6 Units Subcutaneous TID WC    insulin lispro  0-3 Units Subcutaneous Nightly     Continuous Infusions:   dextrose         Objective:     Vital Signs  Temp: 97.8 °F (36.6 °C)  Pulse: 81  Resp: 18  BP: (!) 148/69  SpO2: 97 %  O2 Device: Nasal cannula  O2 Flow Rate (L/min): 1.5 L/min    Vital signs reviewed in electronic charts. Physical exam  Constitutional:  Well developed, well nourished, no acute distress. Obese. Eyes:  PERRL, no scleral icterus, conjunctiva normal , chronic left ptosis  HENT:  Atraumatic, external ears normal, nose normal, oropharynx moist, no pharyngeal exudates. Neck- supple, no JVD, no lymphadenopathy  Respiratory:  No respiratory distress on room air, diminished breath sounds throughout, no wheezing, rales or rhonchi detected  Cardiovascular:  Normal rate, normal rhythm, no murmurs, no gallops, no rubs, no edema   GI:  Soft, obese, nondistended, normal bowel sounds, nontender, no voluntary guarding  Musculoskeletal:  No cyanosis or obvious acute deformity. Moving all extremities   Integument:  Warm and dry. Neurologic:  Alert & oriented x 3, no apparent focal deficits noted   Psychiatric:  Speech and behavior appropriate      Results:     Lab Results   Component Value Date    WBC 5.8 10/11/2020    HGB 11.0 (L) 10/11/2020    HCT 35.8 (L) 10/11/2020    .8 (H) 10/11/2020     10/11/2020       Lab Results   Component Value Date     10/11/2020    K 4.7 10/11/2020    K 4.7 10/09/2020     10/11/2020    CO2 24 10/11/2020    BUN 20 10/11/2020    CREATININE 1.6 10/11/2020    GLUCOSE 239 10/11/2020    CALCIUM 9.0 10/11/2020        Assessment and Plan:      Pneumonia [J18.9]  - noted on CXR on arrival  - started on levaquin  - continue 1.5L O2  - duonebs ordered  - repeat CXR 10/11 without acute finding  10/08/2020    Transaminitis [R74.01]  - etiology unclear. History of cholecystectomy.  Recent Liver US 9/30 with fatty infiltration of liver, 2 mm fluid collection with gallbladder fossa adjacent to surgical clips indeterminate, findings may relate to seroma or bilioma. Infected collection cannot be excluded. Previously seen by Dr. Kymberly Jean during recent admission last week who recommended EGD due to thickening of distal esophagus. - no adominal pain. Does admit to occasional n/v but not currently. - 10/8 , , Bilirubin 4.1, alk phos 378 on arrival  - 10/9 ALT 94, , bilirubin 3.7, Alk phos 317  - 10/10 ALT 68, AST 79, bilirubin 1.6, alk phos 286  - 10/11 ALT 71, , bilirubin 2.3, Alk phos 324  - will obtain ct abd/pelv today with fluctuating LFTs, bilirubin, alk phos with history of complicated cholecystectomy with questionable cholangitis  As well as recent liver US findings above   - plan to follow up with Dr. Kymberly Jean as scheduled.    - continue to monitor   - per chart review [facility] records no blue surgery follow up in January or February as recommended following cholecystectomy in dec 2019 09/30/2020    Pulmonary embolism on right [facility]) [I26.99]  - CTA chest 9/29 with findings consistent with pulmonary emboli and right upper lobe segmental and subsegmental arteries and possibly right lower lobe segmental and subsegmental arteries.  There is reflux of contrast into the inferior vena cava which could be seen with right heart strain.  - Echocardiogram 9/30 with EF 55-60%.  Echocardiogram findings do not suggest evidence of right heart strain.  - Follow up with  as scheduled  - continue eliquis and supplemental O2 09/29/2020    Chronic back pain [M54.9, G89.29]  - continue current regimen  - add lidoderm patch 10/11  12/20/2019    Declining functional status [R53.81]  - PT/OT consulted and patient at her baseline functionally per therapy  - plan to resume home health on discharge 12/19/2019    HTN (hypertension) [I10]  - BP stable on current regimen  10/14/2011    Type 2 diabetes mellitus with stage 3 chronic kidney disease, with long-term current use of insulin ([facility]) [E11.22, N18.30, Z79.4]  - Cr at baseline around 1.6  - increased lantus regimen for hyperglycemia. Continue sliding scale coverage as needed. - encourage diabetic diet compliance        The case was discussed with Dr. Christa Sam by phone and plan of care reviewed.     Electronically signed by KEATON Sands on 10/11/2020 at 11:27 AM

## 2020-10-11 NOTE — FLOWSHEET NOTE
10/11/20 1308   Assessment   Charting Type Shift assessment   Neurological   Neuro (WDL) WDL   Level of Consciousness 0   Swallow Screening   Is the patient able to remain alert for testing? Yes   [place] Coma Scale   Eye Opening 4   Best Verbal Response 5   Best Motor Response 6   Gi[place]anny Coma Scale Score 15   HEENT   HEENT (WDL) X   Right Eye Impaired vision   Left Eye Impaired vision   Teeth Missing teeth   Respiratory   Respiratory (WDL) X   L Breath Sounds Diminished   R Breath Sounds Diminished   Breath Sounds   Right Upper Lobe Clear;Diminished   Right Middle Lobe Diminished   Right Lower Lobe Diminished   Left Upper Lobe Clear;Diminished   Left Lower Lobe Diminished   Cough/Sputum   Sputum How Obtained None   Cardiac   Cardiac (WDL) WDL   Cardiac Regularity Regular   Cardiac Rhythm NSR  (no complaints of soa at this time, RR reg/unlabored at 22bpm)   Cardiac Monitor   Telemetry Monitor On Yes   Telemetry Audible Yes   Telemetry Alarms Set Yes   Telemetry Box Number [id number]   Telemetry Monitor Alarm Parameters    Gastrointestinal   Abdominal (WDL) X   RUQ Bowel Sounds Active   LUQ Bowel Sounds Active   RLQ Bowel Sounds Active   LLQ Bowel Sounds Active   Abdomen Inspection Rounded   Tenderness Soft;Tenderness   Hernia Umbilical   Peripheral Vascular   Peripheral Vascular (WDL) WDL   Skin Color/Condition   Skin Color/Condition (WDL) X   Skin Color Pale   Skin Condition/Temp Dry; Warm   Skin Integrity   Skin Integrity (WDL) X   Skin Integrity Bruising   Location scattered   Musculoskeletal   Musculoskeletal (WDL) X   RUE Limited movement   LUE Full movement   RL Extremity Limited movement;Weakness; Unsteady   LL Extremity Limited movement;Weakness; Unsteady   Genitourinary   Genitourinary (WDL) X   Urine Assessment   Incontinence Yes  (at times)   Anus/Rectum   Anus/Rectum (WDL) WDL   Psychosocial   Psychosocial (WDL) WDL

## 2020-10-11 NOTE — FLOWSHEET NOTE
10/10/20 2100   Assessment   Charting Type Shift assessment   Neurological   Neuro (WDL) WDL   Level of Consciousness 0   Swallow Screening   Is the patient able to remain alert for testing? Yes   Was the Patient Eating a Modified Diet Prior to being Admitted? No   [place] Coma Scale   Eye Opening 4   Best Verbal Response 5   Best Motor Response 6   [place] Coma Scale Score 15   NIH/MNHISS Stroke Scale   NIH/MNIHSS Stroke Scale Assessed No   HEENT   HEENT (WDL) X   Right Eye Impaired vision   Left Eye Impaired vision   Teeth Missing teeth   Respiratory   Respiratory (WDL) X   L Breath Sounds Diminished   R Breath Sounds Diminished   Breath Sounds   Right Upper Lobe Clear;Diminished   Right Middle Lobe Diminished   Right Lower Lobe Diminished   Left Upper Lobe Clear;Diminished   Left Lower Lobe Diminished   Cough/Sputum   Sputum How Obtained None   Cough and Deep Breathe   Cough and Deep Breathe Yes   Cardiac   Cardiac (WDL) WDL   Cardiac Regularity Regular   Rhythm Interpretation   Lead Monitored Lead II   Rhythm Normal sinus rhythm   Cardiac Monitor   Telemetry Monitor On Yes   Telemetry Audible Yes   Telemetry Alarms Set Yes   Telemetry Box Number [id number]   Telemetry Monitor Alarm Parameters    Gastrointestinal   Abdominal (WDL) X   RUQ Bowel Sounds Active   LUQ Bowel Sounds Active   RLQ Bowel Sounds Active   LLQ Bowel Sounds Active   Abdomen Inspection Rounded   Tenderness Soft;Tenderness   Hernia Umbilical   Peripheral Vascular   Peripheral Vascular (WDL) WDL   Skin Color/Condition   Skin Color/Condition (WDL) X   Skin Color Pale   Skin Condition/Temp Dry; Warm   Skin Integrity   Skin Integrity (WDL) X   Skin Integrity Bruising   Location scattered   Musculoskeletal   Musculoskeletal (WDL) X   RUE Limited movement   LUE Full movement   RL Extremity Limited movement;Weakness; Unsteady   LL Extremity Limited movement;Weakness; Unsteady   Genitourinary   Genitourinary (WDL) X   Urine Assessment   Incontinence

## 2020-10-11 NOTE — PROGRESS NOTES
Pt called out with c/o chest pain. EKG normal, VSS. KEATON Perera notified. Note new orders for GI cocktail. Will continue to monitor.

## 2020-10-11 NOTE — PLAN OF CARE
Problem: Falls - Risk of:  Goal: Will remain free from falls  Description: Will remain free from falls  10/10/2020 2125 by Gerardo Valencia RN  Outcome: Ongoing  10/10/2020 0945 by Ila Johnson RN  Outcome: Ongoing     Problem: PAIN  Goal: Patient's pain/discomfort is manageable  10/10/2020 0945 by Ila Johnson RN  Outcome: Ongoing

## 2020-10-12 LAB
A/G RATIO: 1 (ref 0.8–2)
ALBUMIN SERPL-MCNC: 3.3 G/DL (ref 3.4–4.8)
ALP BLD-CCNC: 357 U/L (ref 25–100)
ALT SERPL-CCNC: 94 U/L (ref 4–36)
ANION GAP SERPL CALCULATED.3IONS-SCNC: 9 MMOL/L (ref 3–16)
AST SERPL-CCNC: 198 U/L (ref 8–33)
BILIRUB SERPL-MCNC: 3.6 MG/DL (ref 0.3–1.2)
BLOOD CULTURE, ROUTINE: NORMAL
BUN BLDV-MCNC: 18 MG/DL (ref 6–20)
CALCIUM SERPL-MCNC: 9.4 MG/DL (ref 8.5–10.5)
CHLORIDE BLD-SCNC: 99 MMOL/L (ref 98–107)
CO2: 27 MMOL/L (ref 20–30)
CREAT SERPL-MCNC: 1.6 MG/DL (ref 0.4–1.2)
CULTURE, BLOOD 2: NORMAL
GFR AFRICAN AMERICAN: 38
GFR NON-AFRICAN AMERICAN: 31
GLOBULIN: 3.3 G/DL
GLUCOSE BLD-MCNC: 170 MG/DL (ref 74–106)
GLUCOSE BLD-MCNC: 204 MG/DL (ref 74–106)
GLUCOSE BLD-MCNC: 281 MG/DL (ref 74–106)
GLUCOSE BLD-MCNC: 290 MG/DL (ref 74–106)
GLUCOSE BLD-MCNC: 359 MG/DL (ref 74–106)
HBA1C MFR BLD: 7.5 %
HCT VFR BLD CALC: 37.3 % (ref 37–47)
HEMOGLOBIN: 11.2 G/DL (ref 11.5–16.5)
LIPASE: 378 U/L (ref 5.6–51.3)
MCH RBC QN AUTO: 31.8 PG (ref 27–32)
MCHC RBC AUTO-ENTMCNC: 30 G/DL (ref 31–35)
MCV RBC AUTO: 106 FL (ref 80–100)
PDW BLD-RTO: 13.9 % (ref 11–16)
PERFORMED ON: ABNORMAL
PLATELET # BLD: 218 K/UL (ref 150–400)
PMV BLD AUTO: 11.5 FL (ref 6–10)
POTASSIUM SERPL-SCNC: 4.9 MMOL/L (ref 3.4–5.1)
RBC # BLD: 3.52 M/UL (ref 3.8–5.8)
SODIUM BLD-SCNC: 135 MMOL/L (ref 136–145)
TOTAL PROTEIN: 6.6 G/DL (ref 6.4–8.3)
WBC # BLD: 6.6 K/UL (ref 4–11)

## 2020-10-12 PROCEDURE — 97110 THERAPEUTIC EXERCISES: CPT

## 2020-10-12 PROCEDURE — 36415 COLL VENOUS BLD VENIPUNCTURE: CPT

## 2020-10-12 PROCEDURE — 83690 ASSAY OF LIPASE: CPT

## 2020-10-12 PROCEDURE — 6360000002 HC RX W HCPCS: Performed by: PHYSICIAN ASSISTANT

## 2020-10-12 PROCEDURE — 99231 SBSQ HOSP IP/OBS SF/LOW 25: CPT | Performed by: INTERNAL MEDICINE

## 2020-10-12 PROCEDURE — 6370000000 HC RX 637 (ALT 250 FOR IP): Performed by: PHYSICIAN ASSISTANT

## 2020-10-12 PROCEDURE — 2700000000 HC OXYGEN THERAPY PER DAY

## 2020-10-12 PROCEDURE — 2580000003 HC RX 258: Performed by: PHYSICIAN ASSISTANT

## 2020-10-12 PROCEDURE — 80053 COMPREHEN METABOLIC PANEL: CPT

## 2020-10-12 PROCEDURE — 1200000000 HC SEMI PRIVATE

## 2020-10-12 PROCEDURE — 83036 HEMOGLOBIN GLYCOSYLATED A1C: CPT

## 2020-10-12 PROCEDURE — 97530 THERAPEUTIC ACTIVITIES: CPT

## 2020-10-12 PROCEDURE — 85027 COMPLETE CBC AUTOMATED: CPT

## 2020-10-12 PROCEDURE — 97535 SELF CARE MNGMENT TRAINING: CPT

## 2020-10-12 PROCEDURE — 97116 GAIT TRAINING THERAPY: CPT

## 2020-10-12 RX ORDER — INSULIN GLARGINE 100 [IU]/ML
45 INJECTION, SOLUTION SUBCUTANEOUS NIGHTLY
Status: DISCONTINUED | OUTPATIENT
Start: 2020-10-12 | End: 2020-10-13

## 2020-10-12 RX ADMIN — GABAPENTIN 300 MG: 300 CAPSULE ORAL at 09:28

## 2020-10-12 RX ADMIN — POLYETHYLENE GLYCOL (3350) 17 G: 17 POWDER, FOR SOLUTION ORAL at 20:36

## 2020-10-12 RX ADMIN — DOCUSATE SODIUM 50MG AND SENNOSIDES 8.6MG 1 TABLET: 8.6; 5 TABLET, FILM COATED ORAL at 09:28

## 2020-10-12 RX ADMIN — Medication 10 ML: at 20:46

## 2020-10-12 RX ADMIN — OXYBUTYNIN CHLORIDE 5 MG: 5 TABLET ORAL at 09:27

## 2020-10-12 RX ADMIN — DICLOFENAC SODIUM 1 G: 10 GEL TOPICAL at 20:46

## 2020-10-12 RX ADMIN — APIXABAN 5 MG: 5 TABLET, FILM COATED ORAL at 09:25

## 2020-10-12 RX ADMIN — LEVOFLOXACIN 750 MG: 5 INJECTION, SOLUTION INTRAVENOUS at 09:24

## 2020-10-12 RX ADMIN — INSULIN LISPRO 1 UNITS: 100 INJECTION, SOLUTION INTRAVENOUS; SUBCUTANEOUS at 17:12

## 2020-10-12 RX ADMIN — APIXABAN 5 MG: 5 TABLET, FILM COATED ORAL at 20:37

## 2020-10-12 RX ADMIN — INSULIN GLARGINE 45 UNITS: 100 INJECTION, SOLUTION SUBCUTANEOUS at 20:42

## 2020-10-12 RX ADMIN — ASPIRIN 81 MG: 81 TABLET, CHEWABLE ORAL at 09:25

## 2020-10-12 RX ADMIN — PANTOPRAZOLE SODIUM 40 MG: 40 TABLET, DELAYED RELEASE ORAL at 05:13

## 2020-10-12 RX ADMIN — GABAPENTIN 300 MG: 300 CAPSULE ORAL at 20:37

## 2020-10-12 RX ADMIN — Medication 1000 UNITS: at 09:25

## 2020-10-12 RX ADMIN — OXYBUTYNIN CHLORIDE 5 MG: 5 TABLET ORAL at 20:37

## 2020-10-12 RX ADMIN — PROPRANOLOL HYDROCHLORIDE 40 MG: 20 TABLET ORAL at 09:27

## 2020-10-12 RX ADMIN — INSULIN LISPRO 5 UNITS: 100 INJECTION, SOLUTION INTRAVENOUS; SUBCUTANEOUS at 11:50

## 2020-10-12 RX ADMIN — POLYETHYLENE GLYCOL (3350) 17 G: 17 POWDER, FOR SOLUTION ORAL at 09:25

## 2020-10-12 RX ADMIN — INSULIN LISPRO 5 UNITS: 100 INJECTION, SOLUTION INTRAVENOUS; SUBCUTANEOUS at 11:51

## 2020-10-12 RX ADMIN — GABAPENTIN 300 MG: 300 CAPSULE ORAL at 14:35

## 2020-10-12 RX ADMIN — ALOGLIPTIN 12.5 MG: 12.5 TABLET, FILM COATED ORAL at 09:25

## 2020-10-12 RX ADMIN — INSULIN LISPRO 1 UNITS: 100 INJECTION, SOLUTION INTRAVENOUS; SUBCUTANEOUS at 20:42

## 2020-10-12 RX ADMIN — INSULIN LISPRO 5 UNITS: 100 INJECTION, SOLUTION INTRAVENOUS; SUBCUTANEOUS at 17:14

## 2020-10-12 RX ADMIN — FERROUS SULFATE TAB EC 324 MG (65 MG FE EQUIVALENT) 324 MG: 324 (65 FE) TABLET DELAYED RESPONSE at 09:25

## 2020-10-12 RX ADMIN — PROPRANOLOL HYDROCHLORIDE 40 MG: 20 TABLET ORAL at 20:37

## 2020-10-12 NOTE — PROGRESS NOTES
Physical Therapy  Facility/Department: [facility] MED SURG  Daily Treatment Note  NAME: Fernando Mendes  : 1940  MRN: [id number]    Date of Service: 10/12/2020    Discharge Recommendations:  Home with assist PRN, 24 hour supervision or assist, Home with Home health PT, Continue to assess pending progress      Assessment   Body structures, Functions, Activity limitations: Decreased functional mobility ; Decreased high-level IADLs  Assessment: Cotreated with OT. Patient completed supine to sit with SBA and sit to stand with CGA. Transferred to chair for a short rest.  Ambulated 8 feet and 15 feet with standard walker and CGA with 2nd person SBA for safety. Patient requires cues for safety awareness and proper hand/foot placement and for pushing up from and reaching back for transfer surfaces. Transferred back to the recliner for seated exercises. Treatment Diagnosis: SOB; difficulty walking, general weakness  Prognosis: Excellent  REQUIRES PT FOLLOW UP: Yes  Activity Tolerance  Activity Tolerance: Patient Tolerated treatment well     Patient Diagnosis(es): The encounter diagnosis was Pneumonia due to organism. has a past medical history of Allergic rhinitis, Anxiety, Chronic back pain, Depression, Double vision with both eyes open, Fall, Hyperlipidemia, Hypertension, Osteoarthritis, and Type II or unspecified type diabetes mellitus without mention of complication, not stated as uncontrolled. has a past surgical history that includes Hysterectomy;  section; Colonoscopy; and shoulder surgery. Restrictions  Restrictions/Precautions  Restrictions/Precautions: General Precautions, Fall Risk  Required Braces or Orthoses?: No  Subjective   General  Response To Previous Treatment: Not applicable  Family / Caregiver Present: No  Referring Practitioner: Christa Sam MD  Subjective  Subjective: Patient states she doesn't know if she can do anything.   Pain Screening  Patient Currently in Pain: Denies  Vital Signs  Patient Currently in Pain: Denies       Orientation  Orientation  Overall Orientation Status: Within Normal Limits  Cognition      Objective   Bed mobility  Rolling to Left: Modified independent  Supine to Sit: Stand by assistance  Scooting: Stand by assistance  Transfers  Sit to Stand: Contact guard assistance  Stand to sit: Contact guard assistance  Bed to Chair: Contact guard assistance  Ambulation  Ambulation?: Yes  Ambulation 1  Surface: level tile  Device: Standard Walker  Assistance: Contact guard assistance;2 Person assistance(2nd person SBA for safety)  Gait Deviations: Slow Trang;Decreased step length;Decreased step height  Distance: 8 and 15 feet     Balance  Posture: Fair  Sitting - Static: Good  Sitting - Dynamic: Good;-  Standing - Static: Fair  Standing - Dynamic: Fair;-  Exercises  Hip Flexion: 10  Knee Long Arc Quad: 10  Ankle Pumps: 10      Goals  Long term goals  Time Frame for Long term goals : 3-4 days  Long term goal 1: Achieve basic transfers with SBA x - supervision  Long term goal 2: Ambulate 30 feet x 2 with SBA x 1 with SW with good safety awareness. Plan    Plan  Times per week: 3-4 days  Plan weeks: 3-4 days  Current Treatment Recommendations: Strengthening, Neuromuscular Re-education, Safety Education & Training, Balance Training, Functional Mobility Training, Transfer Training, Gait Training, Patient/Caregiver Education & Training  Safety Devices  Type of devices: Left in chair, Call light within reach     Therapy Time   Individual Concurrent Group Co-treatment   Time In 1021         Time Out 1052         Minutes 31              This note serves as D/C summary if patient is discharged prior to next visit.   Chelsey Rivera, PTA

## 2020-10-12 NOTE — PROGRESS NOTES
Occupational Therapy  Facility/Department: [facility] MED SURG  Daily Treatment Note  NAME: Shiva Mendez  : 1940  MRN: [id number]    Date of Service: 10/12/2020    Discharge Recommendations:  24 hour supervision or assist       Assessment   Assessment: Pt seen for OT services with co tx with PTA. Pt reports having chest pain last night. Pt states that has since resolved. Pt come to sit at EOB with CGA. Pt doffed and donned UB clothing with SULLIVAN. Pt donned pants with CGA seated in chair. Pt completed transfers to stand with SBA but required CGA for SPT. Pt ambulated 8 feet, 15 feet with CGA x2 to pull up chair as needed and for IV management. Pt transferred to chair and completed 2x5 chair push ups with c/o fatigue. Pt completed grooming seated in chair with MOD I. Left seated in chair with call light. Patient Diagnosis(es): The encounter diagnosis was Pneumonia due to organism. has a past medical history of Allergic rhinitis, Anxiety, Chronic back pain, Depression, Double vision with both eyes open, Fall, Hyperlipidemia, Hypertension, Osteoarthritis, and Type II or unspecified type diabetes mellitus without mention of complication, not stated as uncontrolled. has a past surgical history that includes Hysterectomy;  section; Colonoscopy; and shoulder surgery. Restrictions  Restrictions/Precautions  Restrictions/Precautions: General Precautions, Fall Risk  Required Braces or Orthoses?: No  Subjective   General  Chart Reviewed: Yes  Patient assessed for rehabilitation services?: Yes  Family / Caregiver Present: No  Referring Practitioner: Mel Fitzpatrick MD  Diagnosis: Pneumonia  Subjective  Subjective: Pt reports she has not improved since finding out she had PE last week. Pt agreeable to OT services.       Orientation     Objective    ADL  UE Dressing: Setup  LE Dressing: Contact guard assistance        Functional Mobility  Functional - Mobility Device: Rolling Walker  Activity: Other  Assist Level: Contact guard assistance(x2)  Functional Mobility Comments: 8 feet with RW, 15 feet with RW  Bed mobility  Supine to Sit: Contact guard assistance  Scooting: Stand by assistance  Transfers  Stand Pivot Transfers: Contact guard assistance;2 Person assistance  Sit to stand: Stand by assistance                                            Exercises  Chair Push-ups: 2 x5                    Plan   Plan  Times per week: 3-5  Times per day: Daily  Plan weeks: 1  Current Treatment Recommendations: Strengthening, ROM, Balance Training, Functional Mobility Training, Endurance Training, Self-Care / ADL, Patient/Caregiver Education & Training, Safety Education & Training  G-Code     OutComes Score                                                  AM-PAC Score             Goals  Short term goals  Time Frame for Short term goals: 1 week  Short term goal 1: Pt to complete toileting with SBA demo good safety awareness. Short term goal 2: Pt to complete sponge bathing with CGA demo good safety awareness  Short term goal 3: Pt to increase functional activity tolerance x15 minutes maintaining 02 sats >90%. Short term goal 4: Pt to complete dressing with SUP. Short term goal 5: Pt to complete ADL transfers with SBA using walker with good safety awareness. Therapy Time   Individual Concurrent Group Co-treatment   Time In 1020         Time Out 1051         Minutes 31              This note serves as a DC summary in the event of pt discharge.    Valery Cabrera OTR/L

## 2020-10-12 NOTE — PROGRESS NOTES
aspirin  81 mg Oral Daily    insulin lispro  0-6 Units Subcutaneous TID     insulin lispro  0-3 Units Subcutaneous Nightly     Continuous Infusions:   dextrose         Objective:     Vital Signs  Temp: 98.4 °F (36.9 °C)  Pulse: 74  Resp: 18  BP: (!) 113/50  SpO2: 99 %  O2 Device: Nasal cannula  O2 Flow Rate (L/min): 1.5 L/min    Vital signs reviewed in electronic charts. Physical exam    Constitutional:  Well developed, well nourished, morbidly obese female sitting upright in bed in no acute distress. Eyes:  PERRL, conjunctiva normal, EOMI. HENT:  Atraumatic, external ears normal, external nose/nares normal, oropharynx moist, no pharyngeal exudates. Neck:  Supple. No JVD or thyromegaly. Respiratory:  No respiratory distress, normal breath sounds, no rales, no wheezing. Cardiovascular:  Normal rate, normal rhythm, no murmurs, no gallops, no rubs. GI:  Soft, nondistended, normal bowel sounds, nontender, no organomegaly, no mass. :  No costovertebral angle tenderness. Musculoskeletal:  No edema, no tenderness, no obvious deformities. Patient is moving all extremities. Integument:  Well hydrated, no rash. Lymphatic:  No cervical or axillary lymphadenopathy noted. Neurologic:  Alert & oriented x 3,  no focal deficits noted. Strength is equal throughout. Psychiatric:  Speech and behavior appropriate. Results:     Lab Results   Component Value Date    WBC 6.6 10/12/2020    HGB 11.2 (L) 10/12/2020    HCT 37.3 10/12/2020    .0 (H) 10/12/2020     10/12/2020       Lab Results   Component Value Date     10/12/2020    K 4.9 10/12/2020    K 4.7 10/09/2020    CL 99 10/12/2020    CO2 27 10/12/2020    BUN 18 10/12/2020    CREATININE 1.6 10/12/2020    GLUCOSE 290 10/12/2020    CALCIUM 9.4 10/12/2020        Assessment and Plan:      Active Hospital Problems    Diagnosis Date Noted    Pneumonia of left lower lobe due to infectious organism [J18.9]  -Continue Levaquin and Jeremi  -Repeat chest x-ray 10/11 without acute findings  -At baseline oxygen requirement of 1.5 L  -Encourage incentive spirometry and ambulation   10/08/2020    Transaminitis [R74.01]  -Unclear etiology. History of cholecystectomy. Recent liver ultrasound 9/30 with fatty infiltration of liver, 2 mm fluid collection with gallbladder fossa adjacent to surgical clips indeterminate, findings may relate to seroma or bili aroma. Infected collection cannot be excluded. Repeat CT abdomen pelvis 10/11 with continued evidence of fluid collection that is approximately the same size.  -Patient seen and examined by Dr. Danielle Kaba last admission who recommended EGD due to thickening of distal esophagus. Patient unable to make follow-up appointment.  -Since patient had cholecystectomy at [facility], she wishes to follow-up with [facility] GI. Will schedule outpatient follow-up with [facility] upon discharge.  -Denies abdominal pain   09/30/2020    Pulmonary embolism on right [facility]) [I26.99]  -CTA chest 9/29 with findings consistent with pulmonary emboli and right upper lobe segmental and subsegmental arteries and possibly right lower lobe segmental and subsegmental arteries.  There is reflux of contrast into the inferior vena cava which could be seen with right heart strain.  - Echocardiogram 9/30 with EF 55-60%.  Echocardiogram findings do not suggest evidence of right heart strain.  - Follow up with  as scheduled  - continue eliquis and supplemental O2   09/29/2020    Chronic back pain [M54.9, G89.29]  -Continue current pain regimen and Lidoderm patch   12/20/2019    Declining functional status [R53.81]  -PT OT consulted and feel patient is at her baseline functional level. They recommend discharge home with home health. Family requesting swing bed admission. Pre-CERT sent to insurance for review.    12/19/2019    HTN (hypertension) [I10]  -Blood pressure stable with current regimen   10/14/2011    Type 2 diabetes mellitus with stage 3 chronic kidney disease, with long-term current use of insulin ([facility]) [E11.22, N18.30, Z79.4]  -Blood glucose is noted to be elevated. Will increase Lantus from 38 units to 45 units nightly and add Humalog 5 units 3 times daily. Continue sliding scale insulin.  -Encourage compliance with diabetic diet 07/05/2011     Patient was seen and examined by Dr. Sharon Camara and plan of care reviewed.       Electronically signed by KEATON Bean on 10/12/2020 at 4:05 PM

## 2020-10-12 NOTE — CARE COORDINATION
[place] for SWB initiated this AM.  Spoke with daughter, Julieth Lee, about the likelihood that SWB will be denied due to she is only requiring CGA and IV meds q48 that can be changed to PO. Daughter asked how they were supposed to care for her. I asked about NHP and she adamantly refused. Pt is at or better than baseline physically. I have been in contact with the RN reviewer at the insurance and the case is going to the medical director for review.

## 2020-10-12 NOTE — CARE COORDINATION
10/08/20 1150   Discharge Planning   [facility] Spouse/Significant Other;Children   Current Services Prior To Admission Durable Medical Equipment;Home Care;Oxygen Therapy   DME Wheelchair;Walker; Hospital Bed;Bedside Commode;Oxygen Therapy (Comment); Cane  (lift chair, alert button, RW)   [facility]   ([address] [place] [address])   Potential Assistance Needed Home Care   Potential Assistance Purchasing Medications No   DME   (as per therapy recs)   Type of Home Care Services Nursing Services;OT;PT;Aide Services;Skilled Therapy   Patient expects to be discharged to: Home   Expected Discharge Date 10/15/20   Follow Up Appointment: Best Day/Time    (any)

## 2020-10-12 NOTE — PLAN OF CARE
Problem: Falls - Risk of:  Goal: Will remain free from falls  Description: Will remain free from falls  10/12/2020 1500 by Lizy Ortega RN  Outcome: Ongoing     Problem: Skin Integrity:  Goal: Will show no infection signs and symptoms  Description: Will show no infection signs and symptoms  10/12/2020 1500 by Lizy Ortega RN  Outcome: Ongoing

## 2020-10-12 NOTE — ACP (ADVANCE CARE PLANNING)
Advance Care Planning     Advance Care Planning Activator (Inpatient)  Conversation Note      Date of ACP Conversation: 10/8/2020    Conversation Conducted with: Patient with Decision Making Capacity and daughter    ACP Activator: Hunter Styles    *When Decision Maker makes decisions on behalf of the incapacitated patient: Magdalena Ramierz is asked to consider and make decisions based on patient values, known preferences, or best interests. Health Care Decision Maker:     Current Designated Health Care Decision Maker:   (If there is a valid [place] named in the [address] [place] [address] Makers\" box in the ACP activity, but it is not visible above, be sure to open that field and then select the health care decision maker relationship (ie \"primary\") in the blank space to the right of the name.) Validate  this information as still accurate & up-to-date; edit [place] field as needed.)    Note: Assess and validate information in current ACP documents, as indicated. If no Decision Maker listed above or available through scanned documents, then:    If no Authorized Decision Maker has previously been identified, then patient chooses [place]:  \"Who would you like to name as your primary health care decision-maker? \"                 Name: Meghann Christianson        Relationship: Spouse          Phone number: [phone]  Juana Wetzel this person be reached easily? \" Yes   Name: Britni Phillips        Relationship: child          Phone number: [phone]  Juana Wetzel this person be reached easily? \" Yes    Note: If the relationship of these Decision-Makers to the patient does NOT follow your state's Next of Kin hierarchy, recommend that patient complete ACP document that meets state-specific requirements to allow them to act on the patient's behalf when appropriate. Care Preferences    Ventilation:   \"If you were in your present state of health and suddenly became very ill and were unable to breathe on your own, what would your preference be about the use of a ventilator (breathing machine) if it were available to you? \"      Would the patient desire the use of ventilator (breathing machine)?: yes    \"If your health worsens and it becomes clear that your chance of recovery is unlikely, what would your preference be about the use of a ventilator (breathing machine) if it were available to you? \"     Would the patient desire the use of ventilator (breathing machine)?: Yes      Resuscitation  \"CPR works best to restart the heart when there is a sudden event, like a heart attack, in someone who is otherwise healthy. Unfortunately, CPR does not typically restart the heart for people who have serious health conditions or who are very sick. \"    \"In the event your heart stopped as a result of an underlying serious health condition, would you want attempts to be made to restart your heart (answer \"yes\" for attempt to resuscitate) or would you prefer a natural death (answer \"no\" for do not attempt to resuscitate)? \" yes      NOTE: If the patient has a valid advance directive AND now provides care preference(s) that are inconsistent with that prior directive, advise the patient to consider either: creating a new advance directive that complies with state-specific requirements; or, if that is not possible, orally revoking that prior directive in accordance with state-specific requirements, which must be documented in the EHR. [] Yes   [x] No   Educated Patient / Emir Faria regarding differences between Advance Directives and portable DNR orders.     Length of ACP Conversation in minutes:      Conversation Outcomes:  [x] ACP discussion completed  [] Existing advance directive reviewed with patient; no changes to patient's previously recorded wishes  [] New Advance Directive completed  [] Portable Do Not Rescitate prepared for Provider review and signature  [] POLST/POST/MOLST/MOST prepared for Provider review

## 2020-10-13 LAB
GLUCOSE BLD-MCNC: 127 MG/DL (ref 74–106)
GLUCOSE BLD-MCNC: 186 MG/DL (ref 74–106)
GLUCOSE BLD-MCNC: 234 MG/DL (ref 74–106)
GLUCOSE BLD-MCNC: 282 MG/DL (ref 74–106)
PERFORMED ON: ABNORMAL

## 2020-10-13 PROCEDURE — 6370000000 HC RX 637 (ALT 250 FOR IP): Performed by: PHYSICIAN ASSISTANT

## 2020-10-13 PROCEDURE — 2580000003 HC RX 258: Performed by: PHYSICIAN ASSISTANT

## 2020-10-13 PROCEDURE — 99231 SBSQ HOSP IP/OBS SF/LOW 25: CPT | Performed by: INTERNAL MEDICINE

## 2020-10-13 PROCEDURE — 2700000000 HC OXYGEN THERAPY PER DAY

## 2020-10-13 PROCEDURE — 1200000000 HC SEMI PRIVATE

## 2020-10-13 RX ORDER — INSULIN DETEMIR 100 [IU]/ML
35 INJECTION, SOLUTION SUBCUTANEOUS NIGHTLY
Qty: 45 ML | Refills: 3 | Status: SHIPPED | OUTPATIENT
Start: 2020-10-13 | End: 2020-10-13 | Stop reason: SDUPTHER

## 2020-10-13 RX ORDER — LEVOFLOXACIN 750 MG/1
750 TABLET ORAL
Qty: 2 TABLET | Refills: 0 | Status: SHIPPED | OUTPATIENT
Start: 2020-10-13 | End: 2020-10-15 | Stop reason: HOSPADM

## 2020-10-13 RX ORDER — INSULIN GLARGINE 100 [IU]/ML
50 INJECTION, SOLUTION SUBCUTANEOUS NIGHTLY
Status: DISCONTINUED | OUTPATIENT
Start: 2020-10-13 | End: 2020-10-14

## 2020-10-13 RX ORDER — INSULIN DETEMIR 100 [IU]/ML
50 INJECTION, SOLUTION SUBCUTANEOUS NIGHTLY
Qty: 45 ML | Refills: 3 | Status: SHIPPED | OUTPATIENT
Start: 2020-10-13 | End: 2020-10-15 | Stop reason: HOSPADM

## 2020-10-13 RX ADMIN — INSULIN LISPRO 2 UNITS: 100 INJECTION, SOLUTION INTRAVENOUS; SUBCUTANEOUS at 08:01

## 2020-10-13 RX ADMIN — Medication 1000 UNITS: at 08:00

## 2020-10-13 RX ADMIN — INSULIN GLARGINE 50 UNITS: 100 INJECTION, SOLUTION SUBCUTANEOUS at 21:34

## 2020-10-13 RX ADMIN — DOCUSATE SODIUM 50MG AND SENNOSIDES 8.6MG 1 TABLET: 8.6; 5 TABLET, FILM COATED ORAL at 08:00

## 2020-10-13 RX ADMIN — ALOGLIPTIN 12.5 MG: 12.5 TABLET, FILM COATED ORAL at 08:00

## 2020-10-13 RX ADMIN — INSULIN LISPRO 3 UNITS: 100 INJECTION, SOLUTION INTRAVENOUS; SUBCUTANEOUS at 11:05

## 2020-10-13 RX ADMIN — FERROUS SULFATE TAB EC 324 MG (65 MG FE EQUIVALENT) 324 MG: 324 (65 FE) TABLET DELAYED RESPONSE at 08:00

## 2020-10-13 RX ADMIN — DICLOFENAC SODIUM 1 G: 10 GEL TOPICAL at 21:39

## 2020-10-13 RX ADMIN — INSULIN LISPRO 1 UNITS: 100 INJECTION, SOLUTION INTRAVENOUS; SUBCUTANEOUS at 21:34

## 2020-10-13 RX ADMIN — OXYBUTYNIN CHLORIDE 5 MG: 5 TABLET ORAL at 21:29

## 2020-10-13 RX ADMIN — Medication 10 ML: at 21:39

## 2020-10-13 RX ADMIN — GABAPENTIN 300 MG: 300 CAPSULE ORAL at 21:28

## 2020-10-13 RX ADMIN — PROPRANOLOL HYDROCHLORIDE 40 MG: 20 TABLET ORAL at 08:00

## 2020-10-13 RX ADMIN — PANTOPRAZOLE SODIUM 40 MG: 40 TABLET, DELAYED RELEASE ORAL at 05:51

## 2020-10-13 RX ADMIN — ASPIRIN 81 MG: 81 TABLET, CHEWABLE ORAL at 08:00

## 2020-10-13 RX ADMIN — OXYBUTYNIN CHLORIDE 5 MG: 5 TABLET ORAL at 08:00

## 2020-10-13 RX ADMIN — APIXABAN 5 MG: 5 TABLET, FILM COATED ORAL at 08:00

## 2020-10-13 RX ADMIN — DOCUSATE SODIUM 50MG AND SENNOSIDES 8.6MG 1 TABLET: 8.6; 5 TABLET, FILM COATED ORAL at 21:28

## 2020-10-13 RX ADMIN — GABAPENTIN 300 MG: 300 CAPSULE ORAL at 14:53

## 2020-10-13 RX ADMIN — PROPRANOLOL HYDROCHLORIDE 40 MG: 20 TABLET ORAL at 21:29

## 2020-10-13 RX ADMIN — INSULIN LISPRO 10 UNITS: 100 INJECTION, SOLUTION INTRAVENOUS; SUBCUTANEOUS at 11:05

## 2020-10-13 RX ADMIN — APIXABAN 5 MG: 5 TABLET, FILM COATED ORAL at 21:28

## 2020-10-13 RX ADMIN — INSULIN LISPRO 5 UNITS: 100 INJECTION, SOLUTION INTRAVENOUS; SUBCUTANEOUS at 08:02

## 2020-10-13 RX ADMIN — DICLOFENAC SODIUM 1 G: 10 GEL TOPICAL at 08:09

## 2020-10-13 RX ADMIN — LORAZEPAM 0.5 MG: 0.5 TABLET ORAL at 08:09

## 2020-10-13 RX ADMIN — GABAPENTIN 300 MG: 300 CAPSULE ORAL at 08:00

## 2020-10-13 RX ADMIN — HYDROCODONE BITARTRATE AND ACETAMINOPHEN 1 TABLET: 10; 325 TABLET ORAL at 08:09

## 2020-10-13 ASSESSMENT — PAIN SCALES - GENERAL: PAINLEVEL_OUTOF10: 2

## 2020-10-13 NOTE — PROGRESS NOTES
Progress Note      Subjective:   Chief complaint:   Shortness of air    Interval History:   Patient seen and examined this morning. She complains of feeling generally weak. No acute events overnight. She denies any shortness of breath and states her breathing has improved. Review of systems:     Constitutional:  Denies fever or chills. Positive for generalized weakness and fatigue. Eyes:  Denies change in visual acuity or discharge. HENT:  Denies nasal congestion or sore throat. Respiratory:  Denies cough or shortness of breath. Cardiovascular:  Denies chest pain, palpitation or swelling in LEs. GI:  Denies abdominal pain, nausea, vomiting, bloody stools or diarrhea. :  Denies dysuria or frequency. Musculoskeletal:  Denies back pain or joint pain. Integument:  Denies rash or itching. Neurologic:  Denies headache, focal weakness or sensory changes. Endocrine:  Denies polyuria or polydipsia. Lymphatic:  Denies swollen glands or night sweats. Psychiatric:  Denies depression or anxiety. Past medical history, surgical history, family history and social history reviewed and unchanged compared to H&P earlier this admission.     Medications:   Scheduled Meds:   insulin lispro  10 Units Subcutaneous TID WC    insulin glargine  50 Units Subcutaneous Nightly    lidocaine  1 patch Transdermal Daily    diclofenac sodium  1 g Topical BID    apixaban  5 mg Oral BID    ferrous sulfate  324 mg Oral Daily with breakfast    gabapentin  300 mg Oral TID    pantoprazole  40 mg Oral QAM AC    oxybutynin  5 mg Oral BID    polyethylene glycol  17 g Oral BID    propranolol  40 mg Oral BID    sennosides-docusate sodium  1 tablet Oral BID    alogliptin  12.5 mg Oral Daily    Vitamin D  1,000 Units Oral Daily    sodium chloride flush  10 mL Intravenous 2 times per day    levofloxacin  750 mg Intravenous Q48H    influenza virus vaccine  0.5 mL Intramuscular Prior to discharge    aspirin  81 mg Oral Daily    insulin lispro  0-6 Units Subcutaneous TID WC    insulin lispro  0-3 Units Subcutaneous Nightly     Continuous Infusions:   dextrose         Objective:     Vital Signs  Temp: 99 °F (37.2 °C)  Pulse: 75  Resp: 18  BP: (!) 113/57  SpO2: 92 %  O2 Device: None (Room air)  O2 Flow Rate (L/min): 1.5 L/min    Vital signs reviewed in electronic charts. Physical exam  Constitutional:  Well developed, well nourished, morbidly obese female sitting upright in bed in no acute distress. Eyes:  PERRL, conjunctiva normal, EOMI. HENT:  Atraumatic, external ears normal, external nose/nares normal, oropharynx moist, no pharyngeal exudates. Neck:  Supple. No JVD or thyromegaly. Respiratory:  No respiratory distress, normal breath sounds, no rales, no wheezing. Cardiovascular:  Normal rate, normal rhythm, no murmurs, no gallops, no rubs. GI:  Soft, nondistended, normal bowel sounds, nontender, no organomegaly, no mass. :  No costovertebral angle tenderness. Musculoskeletal:  No edema, no tenderness, no obvious deformities. Patient is moving all extremities. Integument:  Well hydrated, no rash. Lymphatic:  No cervical or axillary lymphadenopathy noted. Neurologic:  Alert & oriented x 3,  no focal deficits noted. Strength is equal throughout. Psychiatric:  Speech and behavior appropriate    Results:     Lab Results   Component Value Date    WBC 6.6 10/12/2020    HGB 11.2 (L) 10/12/2020    HCT 37.3 10/12/2020    .0 (H) 10/12/2020     10/12/2020       Lab Results   Component Value Date     10/12/2020    K 4.9 10/12/2020    K 4.7 10/09/2020    CL 99 10/12/2020    CO2 27 10/12/2020    BUN 18 10/12/2020    CREATININE 1.6 10/12/2020    GLUCOSE 290 10/12/2020    CALCIUM 9.4 10/12/2020        Assessment and Plan:      Active Hospital Problems     Diagnosis Date Noted    Pneumonia of left lower lobe due to infectious organism [J18.9]  -Treated with Levaquin and DuoNebs with mellitus with stage 3 chronic kidney disease, with long-term current use of insulin (HCC) [E11.22, N18.30, Z79.4]  -Blood glucose is noted to be elevated.  Will increase Lantus from 50 units nightly and Humalog to 10 units 3 times daily.  Continue sliding scale insulin.  -Encourage compliance with diabetic diet          Patient was seen and examined by Dr. Sharon Camara and plan of care reviewed.       Electronically signed by KEATON Bean on 10/13/2020 at 2:56 PM

## 2020-10-13 NOTE — CARE COORDINATION
Pt daughter Darliss Sexton called back, notified that pt SWG denied and given appeals number. Rylee Salinas said she will call to appeal the denial as she wants her mother to have another week of therapy.

## 2020-10-13 NOTE — FLOWSHEET NOTE
10/12/20 2030   Assessment   Charting Type Shift assessment   Neurological   Neuro (WDL) WDL   Level of Consciousness 0   Spencer Coma Scale   Eye Opening 4   Best Verbal Response 5   Best Motor Response 6   Giovanny Coma Scale Score 15   HEENT   HEENT (WDL) X   Right Eye Impaired vision   Left Eye Impaired vision   Teeth Missing teeth   Respiratory   Respiratory (WDL) X   L Breath Sounds Diminished   R Breath Sounds Diminished   Breath Sounds   Right Upper Lobe Clear;Diminished   Right Middle Lobe Diminished   Right Lower Lobe Diminished   Left Upper Lobe Clear;Diminished   Left Lower Lobe Diminished   Cough/Sputum   Sputum How Obtained None   Cardiac   Cardiac (WDL) WDL   Cardiac Regularity Regular   Cardiac Monitor   Telemetry Monitor On Yes   Telemetry Audible Yes   Telemetry Alarms Set Yes   Telemetry Box Number [id number]   Telemetry Monitor Alarm Parameters    Gastrointestinal   Abdominal (WDL) X   RUQ Bowel Sounds Active   LUQ Bowel Sounds Active   RLQ Bowel Sounds Active   LLQ Bowel Sounds Active   Abdomen Inspection Rounded   Tenderness Soft;Tenderness   Hernia Umbilical   Peripheral Vascular   Peripheral Vascular (WDL) WDL   Skin Color/Condition   Skin Color/Condition (WDL) X   Skin Color Pale   Skin Condition/Temp Dry; Warm   Skin Integrity   Skin Integrity (WDL) X   Skin Integrity Bruising   Location Scattered   Skin Integrity Site 2   Skin Integrity Location 2 Redness   Location 2 Skin Folds    Musculoskeletal   Musculoskeletal (WDL) X   RUE Limited movement   LUE Full movement   RL Extremity Limited movement;Weakness; Unsteady   LL Extremity Limited movement;Weakness; Unsteady   Genitourinary   Genitourinary (WDL) X   Urine Assessment   Incontinence Yes  (at times)   Anus/Rectum   Anus/Rectum (WDL) WDL   Psychosocial   Psychosocial (WDL) WDL

## 2020-10-13 NOTE — CARE COORDINATION
Pt daughter Naomy Shepard called and stated that pt and pt family all agreed they wanted to appeal denial. Naomy Shepard stated that she did submit appeal form for her mother on insurance site.

## 2020-10-13 NOTE — FLOWSHEET NOTE
10/13/20 1101   Assessment   Charting Type Shift assessment   Neurological   Neuro (WDL) WDL   Level of Consciousness 0   Byars Coma Scale   Eye Opening 4   Best Verbal Response 5   Best Motor Response 6   Giovanny Coma Scale Score 15   HEENT   HEENT (WDL) X   Right Eye Impaired vision   Left Eye Impaired vision   Teeth Missing teeth   Respiratory   Respiratory (WDL) X   L Breath Sounds Diminished   R Breath Sounds Diminished   Breath Sounds   Right Upper Lobe Clear   Right Middle Lobe Clear   Right Lower Lobe Diminished   Left Upper Lobe Clear   Left Lower Lobe Diminished   Cough/Sputum   Sputum How Obtained None   Cardiac   Cardiac (WDL) WDL   Cardiac Regularity Regular   Gastrointestinal   Abdominal (WDL) X   RUQ Bowel Sounds Active   LUQ Bowel Sounds Active   RLQ Bowel Sounds Active   LLQ Bowel Sounds Active   Abdomen Inspection Rounded   Tenderness Soft;Tenderness   Hernia Umbilical   Peripheral Vascular   Peripheral Vascular (WDL) WDL   Skin Color/Condition   Skin Color/Condition (WDL) X   Skin Integrity   Skin Integrity (WDL) X   Musculoskeletal   Musculoskeletal (WDL) X   RUE Limited movement   LUE Full movement   RL Extremity Limited movement;Weakness; Unsteady   LL Extremity Limited movement;Weakness; Unsteady   Genitourinary   Genitourinary (WDL) X   Urine Assessment   Incontinence Yes  (at times)   Anus/Rectum   Anus/Rectum (WDL) WDL   Psychosocial   Psychosocial (WDL) WDL   Pt in bed denies complaints. Denies pain. No dizziness. Abdomen soft and BS Positive in each quad. No nausea or vomiting. Anticipates dc today.

## 2020-10-13 NOTE — DISCHARGE SUMMARY
Discharge Summary      Patient ID: Ashutosh Covarrubias      Patient's PCP: MIRNA Catalan CNP    Admit Date: 10/8/2020     Discharge Date:  10/13/2020    Admitting Provider: Min Mckeon MD    Discharging Provider: KEATON Randall     Reason for this admission:   [facility]    Discharge Diagnoses: Active Hospital Problems    Diagnosis Date Noted    Pneumonia of left lower lobe due to infectious organism [J18.9] 10/08/2020    Transaminitis [R74.01] 09/30/2020    Pulmonary embolism on right ([address] [I26.99] 09/29/2020    Chronic back pain [M54.9, G89.29] 12/20/2019    Declining functional status [R53.81] 12/19/2019    HTN (hypertension) [I10] 10/14/2011    Type 2 diabetes mellitus with stage 3 chronic kidney disease, with long-term current use of insulin (HCC) [E11.22, N18.30, Z79.4] 07/05/2011       Procedures:  CT ABDOMEN PELVIS WO CONTRAST Additional Contrast? None   Final Result   Addendum 1 of 1   ADDENDUM #1   Addendum report      Not mentioned on initial dictation is a small fluid collection in the    region of the gallbladder fossa measuring 1.6 cm (previously 2 cm on    ultrasound of 9/30/2020). Consideration might be given to repeat liver ultrasound in 2 weeks to    confirm continued regression or resolution of the fluid collection. Graylon Travis EXAM: CT ABDOMEN AND PELVIS WITHOUT CONTRAST      INDICATION: transaminitis, hyperbilirubinemia, fluid collection liver us    7/61, history complicated cholecystectomy,      COMPARISON: 12/3/2019. TECHNIQUE: Axial CT imaging obtained from lung bases through pelvis. Axial    images and multiplanar reformatted images are provided for review. Individualized dose optimization technique was used in order to meet ALARA    standards for radiation dose    reduction.   In addition to vendor specific dose reduction algorithms, the    dose reduction techniques vary based on the specific scanner utilized but    frequently include automated exposure control, L  -Encourage incentive spirometry and ambulation    10/08/2020    Transaminitis [R74.01]  -Unclear etiology. History of cholecystectomy. Recent liver ultrasound 9/30 with fatty infiltration of liver, 2 mm fluid collection with gallbladder fossa adjacent to surgical clips indeterminate, findings may relate to seroma or bili aroma. Infected collection cannot be excluded. Repeat CT abdomen pelvis 10/11 with continued evidence of fluid collection that is approximately the same size.  -Patient seen and examined by Dr. Sid Ma last admission who recommended EGD due to thickening of distal esophagus. Patient unable to make follow-up appointment.  -Since patient had cholecystectomy at [facility], she wishes to follow-up with [facility].  OP follow up scheduled for 12/3/20 @ 10:40AM.  -Denies abdominal pain    09/30/2020    Pulmonary embolism on right [facility]) [I26.99]  -CTA chest 9/29 with findings consistent with pulmonary emboli and right upper lobe segmental and subsegmental arteries and possibly right lower lobe segmental and subsegmental arteries.  There is reflux of contrast into the inferior vena cava which could be seen with right heart strain.  - Echocardiogram 9/30 with EF 55-60%.  Echocardiogram findings do not suggest evidence of right heart strain.  - Follow up with  as scheduled  - continue eliquis and supplemental O2    09/29/2020    Chronic back pain [M54.9, G89.29]  -Continue current pain regimen and Lidoderm patch    12/20/2019    Declining functional status [R53.81]  -PT OT consulted and feel patient is at her baseline functional level. They recommend discharge home with home health and 24 hour supervision.  Swing bed denied by insurance.    12/19/2019    HTN (hypertension) [I10]  -Blood pressure stable with current regimen    10/14/2011    Type 2 diabetes mellitus with stage 3 chronic kidney disease, with long-term current use of insulin (HCC) [E11.22, N18.30, Z79.4]  -Blood glucose is noted to nightly  Qty: 30 tablet, Refills: 0      SITagliptin (JANUVIA) 25 MG tablet Take 2 tablets by mouth daily  Qty: 30 tablet, Refills: 0      LORazepam (ATIVAN) 0.5 MG tablet Take 0.5 mg by mouth daily as needed for Anxiety. meclizine (ANTIVERT) 25 MG tablet Take 1 tablet by mouth 3 times daily as needed for Dizziness  Qty: 30 tablet, Refills: 0      gabapentin (NEURONTIN) 300 MG capsule Take 300 mg by mouth 3 times daily. insulin aspart (NOVOLOG FLEXPEN) 100 UNIT/ML injection pen Per low dose sliding scale. Please run on 340B program.  Qty: 5 pen, Refills: 3      Insulin Pen Needle ([employer] PEN NEEDLES 29G) 29G X 12MM MISC 1 each by Does not apply route daily  Qty: 100 each, Refills: 3      DULoxetine (CYMBALTA) 60 MG extended release capsule Take 60 mg by mouth daily Patient does not know if she is taking this medication      senna-docusate (PERICOLACE) 8.6-50 MG per tablet Take 1 tablet by mouth 2 times daily  Qty: 60 tablet, Refills: 0      glucose monitoring kit (FREESTYLE) monitoring kit 1 kit by Does not apply route daily  Qty: 1 kit, Refills: 0              Patient was seen and examined by Dr. Erika Wright and plan of care reviewed. Signed:  Electronically signed by KEATON Kauffman on 10/13/2020 at 10:35 AM       Thank you MIRNA Duggan CNP for the opportunity to be involved in this patient's care. If you have any questions or concerns please feel free to contact me at [phone].

## 2020-10-14 LAB
A/G RATIO: 1.4 (ref 0.8–2)
ALBUMIN SERPL-MCNC: 3.4 G/DL (ref 3.4–4.8)
ALP BLD-CCNC: 287 U/L (ref 25–100)
ALT SERPL-CCNC: 52 U/L (ref 4–36)
ANION GAP SERPL CALCULATED.3IONS-SCNC: 12 MMOL/L (ref 3–16)
AST SERPL-CCNC: 45 U/L (ref 8–33)
BILIRUB SERPL-MCNC: 1 MG/DL (ref 0.3–1.2)
BUN BLDV-MCNC: 25 MG/DL (ref 6–20)
CALCIUM SERPL-MCNC: 9.1 MG/DL (ref 8.5–10.5)
CHLORIDE BLD-SCNC: 102 MMOL/L (ref 98–107)
CO2: 23 MMOL/L (ref 20–30)
CREAT SERPL-MCNC: 1.6 MG/DL (ref 0.4–1.2)
GFR AFRICAN AMERICAN: 38
GFR NON-AFRICAN AMERICAN: 31
GLOBULIN: 2.5 G/DL
GLUCOSE BLD-MCNC: 114 MG/DL (ref 74–106)
GLUCOSE BLD-MCNC: 238 MG/DL (ref 74–106)
GLUCOSE BLD-MCNC: 252 MG/DL (ref 74–106)
GLUCOSE BLD-MCNC: 255 MG/DL (ref 74–106)
GLUCOSE BLD-MCNC: 96 MG/DL (ref 74–106)
PERFORMED ON: ABNORMAL
PERFORMED ON: NORMAL
POTASSIUM REFLEX MAGNESIUM: 5.2 MMOL/L (ref 3.4–5.1)
SODIUM BLD-SCNC: 137 MMOL/L (ref 136–145)
TOTAL PROTEIN: 5.9 G/DL (ref 6.4–8.3)

## 2020-10-14 PROCEDURE — 2700000000 HC OXYGEN THERAPY PER DAY

## 2020-10-14 PROCEDURE — 97530 THERAPEUTIC ACTIVITIES: CPT

## 2020-10-14 PROCEDURE — 80053 COMPREHEN METABOLIC PANEL: CPT

## 2020-10-14 PROCEDURE — 6370000000 HC RX 637 (ALT 250 FOR IP): Performed by: PHYSICIAN ASSISTANT

## 2020-10-14 PROCEDURE — 2580000003 HC RX 258: Performed by: PHYSICIAN ASSISTANT

## 2020-10-14 PROCEDURE — 36415 COLL VENOUS BLD VENIPUNCTURE: CPT

## 2020-10-14 PROCEDURE — 99231 SBSQ HOSP IP/OBS SF/LOW 25: CPT | Performed by: INTERNAL MEDICINE

## 2020-10-14 PROCEDURE — 1200000000 HC SEMI PRIVATE

## 2020-10-14 PROCEDURE — 97116 GAIT TRAINING THERAPY: CPT

## 2020-10-14 PROCEDURE — 6360000002 HC RX W HCPCS: Performed by: PHYSICIAN ASSISTANT

## 2020-10-14 RX ORDER — INSULIN GLARGINE 100 [IU]/ML
60 INJECTION, SOLUTION SUBCUTANEOUS NIGHTLY
Status: DISCONTINUED | OUTPATIENT
Start: 2020-10-14 | End: 2020-10-15 | Stop reason: HOSPADM

## 2020-10-14 RX ADMIN — POLYETHYLENE GLYCOL (3350) 17 G: 17 POWDER, FOR SOLUTION ORAL at 20:05

## 2020-10-14 RX ADMIN — DOCUSATE SODIUM 50MG AND SENNOSIDES 8.6MG 1 TABLET: 8.6; 5 TABLET, FILM COATED ORAL at 08:41

## 2020-10-14 RX ADMIN — PROPRANOLOL HYDROCHLORIDE 40 MG: 20 TABLET ORAL at 20:05

## 2020-10-14 RX ADMIN — HYDROCODONE BITARTRATE AND ACETAMINOPHEN 1 TABLET: 10; 325 TABLET ORAL at 09:34

## 2020-10-14 RX ADMIN — Medication 1000 UNITS: at 08:32

## 2020-10-14 RX ADMIN — INSULIN LISPRO 15 UNITS: 100 INJECTION, SOLUTION INTRAVENOUS; SUBCUTANEOUS at 12:01

## 2020-10-14 RX ADMIN — DICLOFENAC SODIUM 1 G: 10 GEL TOPICAL at 08:40

## 2020-10-14 RX ADMIN — PANTOPRAZOLE SODIUM 40 MG: 40 TABLET, DELAYED RELEASE ORAL at 08:32

## 2020-10-14 RX ADMIN — GABAPENTIN 300 MG: 300 CAPSULE ORAL at 20:05

## 2020-10-14 RX ADMIN — OXYBUTYNIN CHLORIDE 5 MG: 5 TABLET ORAL at 20:05

## 2020-10-14 RX ADMIN — APIXABAN 5 MG: 5 TABLET, FILM COATED ORAL at 20:05

## 2020-10-14 RX ADMIN — DICLOFENAC SODIUM 1 G: 10 GEL TOPICAL at 20:07

## 2020-10-14 RX ADMIN — ASPIRIN 81 MG: 81 TABLET, CHEWABLE ORAL at 08:31

## 2020-10-14 RX ADMIN — INSULIN LISPRO 3 UNITS: 100 INJECTION, SOLUTION INTRAVENOUS; SUBCUTANEOUS at 08:47

## 2020-10-14 RX ADMIN — APIXABAN 5 MG: 5 TABLET, FILM COATED ORAL at 08:31

## 2020-10-14 RX ADMIN — GABAPENTIN 300 MG: 300 CAPSULE ORAL at 08:32

## 2020-10-14 RX ADMIN — Medication 10 ML: at 20:07

## 2020-10-14 RX ADMIN — LORAZEPAM 0.5 MG: 0.5 TABLET ORAL at 20:05

## 2020-10-14 RX ADMIN — INSULIN LISPRO 2 UNITS: 100 INJECTION, SOLUTION INTRAVENOUS; SUBCUTANEOUS at 12:02

## 2020-10-14 RX ADMIN — GABAPENTIN 300 MG: 300 CAPSULE ORAL at 14:07

## 2020-10-14 RX ADMIN — LEVOFLOXACIN 750 MG: 5 INJECTION, SOLUTION INTRAVENOUS at 08:28

## 2020-10-14 RX ADMIN — DOCUSATE SODIUM 50MG AND SENNOSIDES 8.6MG 1 TABLET: 8.6; 5 TABLET, FILM COATED ORAL at 20:05

## 2020-10-14 RX ADMIN — OXYBUTYNIN CHLORIDE 5 MG: 5 TABLET ORAL at 08:32

## 2020-10-14 RX ADMIN — PROPRANOLOL HYDROCHLORIDE 40 MG: 20 TABLET ORAL at 08:31

## 2020-10-14 RX ADMIN — FERROUS SULFATE TAB EC 324 MG (65 MG FE EQUIVALENT) 324 MG: 324 (65 FE) TABLET DELAYED RESPONSE at 08:31

## 2020-10-14 RX ADMIN — ALOGLIPTIN 12.5 MG: 12.5 TABLET, FILM COATED ORAL at 08:31

## 2020-10-14 RX ADMIN — INSULIN LISPRO 15 UNITS: 100 INJECTION, SOLUTION INTRAVENOUS; SUBCUTANEOUS at 17:23

## 2020-10-14 ASSESSMENT — PAIN DESCRIPTION - LOCATION: LOCATION: BACK

## 2020-10-14 ASSESSMENT — PAIN SCALES - GENERAL
PAINLEVEL_OUTOF10: 6
PAINLEVEL_OUTOF10: 4
PAINLEVEL_OUTOF10: 4

## 2020-10-14 NOTE — FLOWSHEET NOTE
10/14/20 0858   Assessment   Charting Type Shift assessment   Neurological   Neuro (WDL) WDL   Level of Consciousness 0   [place] Coma Scale   Eye Opening 4   Best Verbal Response 5   Best Motor Response 6   [place] Coma Scale Score 15   HEENT   HEENT (WDL) X   Right Eye Impaired vision   Left Eye Impaired vision   Teeth Missing teeth   Respiratory   Respiratory (WDL) X   L Breath Sounds Diminished   R Breath Sounds Diminished   Breath Sounds   Right Upper Lobe Clear   Right Middle Lobe Clear   Right Lower Lobe Diminished   Left Upper Lobe Clear   Left Lower Lobe Diminished   Cough/Sputum   Cough Non-productive   Cough and Deep Breathe   Cough and Deep Breathe Yes   Cardiac   Cardiac (WDL) WDL   Cardiac Regularity Regular   Cardiac Monitor   Telemetry Monitor On Yes   Telemetry Audible Yes   Telemetry Alarms Set Yes   Telemetry Box Number [id number]   Telemetry Monitor Alarm Parameters    Gastrointestinal   Abdominal (WDL) X   RUQ Bowel Sounds Active   LUQ Bowel Sounds Active   RLQ Bowel Sounds Active   LLQ Bowel Sounds Active   Abdomen Inspection Rounded   Last BM (including prior to admit) 10/12/02   Tenderness Soft;Tenderness   Peripheral Vascular   Peripheral Vascular (WDL) WDL   Skin Color/Condition   Skin Color/Condition (WDL) X   Skin Color Pale   Skin Condition/Temp Dry; Warm   Skin Integrity   Skin Integrity (WDL) X   Skin Integrity Bruising   Location scattered   Musculoskeletal   Musculoskeletal (WDL) X   RUE Limited movement   LUE Full movement   RL Extremity Limited movement;Weakness; Unsteady   LL Extremity Limited movement;Weakness; Unsteady   Genitourinary   Genitourinary (WDL) X   Urine Assessment   Incontinence Yes  (at times)   Anus/Rectum   Anus/Rectum (WDL) WDL   Psychosocial   Psychosocial (WDL) WDL   Patient resting quietly in bed at this time. Took medications with applesauce with no difficulty. Patient complains of some back pain rating a 4/10. Patient denies any needs at this time.  Will continue to monitor.

## 2020-10-14 NOTE — FLOWSHEET NOTE
10/13/20 2100   Assessment   Charting Type Shift assessment   Neurological   Neuro (WDL) WDL   Level of Consciousness 0   [place] Coma Scale   Eye Opening 4   Best Verbal Response 5   Best Motor Response 6   [place] Coma Scale Score 15   HEENT   HEENT (WDL) X   Right Eye Impaired vision   Left Eye Impaired vision   Teeth Missing teeth   Respiratory   Respiratory (WDL) X   L Breath Sounds Diminished   R Breath Sounds Diminished   Breath Sounds   Right Upper Lobe Clear   Right Middle Lobe Clear   Right Lower Lobe Diminished   Left Upper Lobe Clear   Left Lower Lobe Diminished   Cough/Sputum   Sputum How Obtained None   Cardiac   Cardiac (WDL) WDL   Cardiac Regularity Regular   Cardiac Monitor   Telemetry Monitor On Yes   Telemetry Audible Yes   Telemetry Alarms Set Yes   Telemetry Box Number [id number]   Telemetry Monitor Alarm Parameters    Gastrointestinal   Abdominal (WDL) X   RUQ Bowel Sounds Active   LUQ Bowel Sounds Active   RLQ Bowel Sounds Active   LLQ Bowel Sounds Active   Abdomen Inspection Rounded   Tenderness Soft;Tenderness   Hernia Umbilical   Peripheral Vascular   Peripheral Vascular (WDL) WDL   Skin Color/Condition   Skin Color/Condition (WDL) X   Skin Color Pale   Skin Condition/Temp Dry; Warm   Skin Integrity   Skin Integrity (WDL) X   Skin Integrity Bruising   Location Scattered   Skin Integrity Site 2   Skin Integrity Location 2 Redness   Location 2 Skin Folds    Musculoskeletal   Musculoskeletal (WDL) X   RUE Limited movement   LUE Full movement   RL Extremity Limited movement;Weakness; Unsteady   LL Extremity Limited movement;Weakness; Unsteady   Genitourinary   Genitourinary (WDL) X   Urine Assessment   Incontinence Yes  (at times)   Anus/Rectum   Anus/Rectum (WDL) WDL   Psychosocial   Psychosocial (WDL) WDL

## 2020-10-14 NOTE — PLAN OF CARE
Problem: Pain Control  Goal: Maintain pain level at or below patient's acceptable level (or 5 if patient is unable to determine acceptable level)  10/14/2020 1105 by Markus Gurrola RN  Outcome: Ongoing  10/13/2020 2228 by Mando Johnson LPN  Outcome: Ongoing  Goal: Improvement in pain related behaviors BP/HR WNL  10/13/2020 2228 by Mando Johnson LPN  Outcome: Ongoing     Problem: Respiratory  Goal: No pulmonary complications  23/98/8474 2228 by Mando Johnson LPN  Outcome: Ongoing  Goal: O2 Sat > 90%  10/14/2020 1105 by Markus Gurrola RN  Outcome: Ongoing  10/13/2020 2228 by Mando Johnson LPN  Outcome: Ongoing     Problem:   Goal: Adequate urinary output  Outcome: Ongoing     Problem: Skin Integrity/Risk  Goal: No skin breakdown during hospitalization  Outcome: Ongoing

## 2020-10-14 NOTE — PROGRESS NOTES
Progress Note      Subjective:   Chief complaint:   SOA  Generalized weakness    Interval History:   Patient seen and examined this morning. She complains of low back pain. She also reports feeling generally weak. She states she had difficulty sleeping. No other complaints. Review of systems:     Constitutional:  Denies fever or chills. Positive for generalized weakness and fatigue. Eyes:  Denies change in visual acuity or discharge. HENT:  Denies nasal congestion or sore throat. Respiratory:  Denies cough or shortness of breath. Cardiovascular:  Denies chest pain, palpitation or swelling in LEs. GI:  Denies abdominal pain, nausea, vomiting, bloody stools or diarrhea. :  Denies dysuria or frequency. Musculoskeletal:  Denies joint pain. Positive for low back pain. Integument:  Denies rash or itching. Neurologic:  Denies headache, focal weakness or sensory changes. Endocrine:  Denies polyuria or polydipsia. Lymphatic:  Denies swollen glands or night sweats. Psychiatric:  Denies depression or anxiety. Past medical history, surgical history, family history and social history reviewed and unchanged compared to H&P earlier this admission.     Medications:   Scheduled Meds:   insulin glargine  60 Units Subcutaneous Nightly    insulin lispro  15 Units Subcutaneous TID WC    lidocaine  1 patch Transdermal Daily    diclofenac sodium  1 g Topical BID    apixaban  5 mg Oral BID    ferrous sulfate  324 mg Oral Daily with breakfast    gabapentin  300 mg Oral TID    pantoprazole  40 mg Oral QAM AC    oxybutynin  5 mg Oral BID    polyethylene glycol  17 g Oral BID    propranolol  40 mg Oral BID    sennosides-docusate sodium  1 tablet Oral BID    alogliptin  12.5 mg Oral Daily    Vitamin D  1,000 Units Oral Daily    sodium chloride flush  10 mL Intravenous 2 times per day    levofloxacin  750 mg Intravenous Q48H    influenza virus vaccine  0.5 mL Intramuscular Prior to discharge    aspirin  81 mg Oral Daily    insulin lispro  0-6 Units Subcutaneous TID     insulin lispro  0-3 Units Subcutaneous Nightly     Continuous Infusions:   dextrose         Objective:     Vital Signs  Temp: 97.7 °F (36.5 °C)  Pulse: 64  Resp: 18  BP: (!) 126/90  SpO2: 96 %  O2 Device: Nasal cannula  O2 Flow Rate (L/min): 1.5 L/min    Vital signs reviewed in electronic charts. Physical exam    Constitutional:  Well developed, well nourished, morbidly obese female lying in bed in no acute distress. Eyes:  PERRL, conjunctiva normal, EOMI. HENT:  Atraumatic, external ears normal, external nose/nares normal, oropharynx moist, no pharyngeal exudates. Neck:  Supple. No JVD or thyromegaly. Respiratory:  No respiratory distress, normal breath sounds, no rales, no wheezing. Cardiovascular:  Normal rate, normal rhythm, no murmurs, no gallops, no rubs. GI:  Soft, nondistended, normal bowel sounds, nontender, no organomegaly, no mass. :  No costovertebral angle tenderness. Musculoskeletal:  No edema, no tenderness, no obvious deformities. Patient is moving all extremities. Integument:  Well hydrated, no rash. Lymphatic:  No cervical or axillary lymphadenopathy noted. Neurologic:  Alert & oriented x 3,  no focal deficits noted. Strength is equal throughout. Psychiatric:  Speech and behavior appropriate. Results:     Lab Results   Component Value Date    WBC 6.6 10/12/2020    HGB 11.2 (L) 10/12/2020    HCT 37.3 10/12/2020    .0 (H) 10/12/2020     10/12/2020       Lab Results   Component Value Date     10/12/2020    K 4.9 10/12/2020    K 4.7 10/09/2020    CL 99 10/12/2020    CO2 27 10/12/2020    BUN 18 10/12/2020    CREATININE 1.6 10/12/2020    GLUCOSE 290 10/12/2020    CALCIUM 9.4 10/12/2020        Assessment and Plan:      Active Hospital Problems     Diagnosis Date Noted    Pneumonia of left lower lobe due to infectious organism [J18.9]  -Treating with Levaquin and DuoNebs with improvement  -Repeat chest x-ray 10/11 without acute findings  -At baseline oxygen requirement of 1.5 L  -Encourage incentive spirometry and ambulation    10/08/2020    Transaminitis [R74.01]  -Unclear etiology.  History of cholecystectomy.  Recent liver ultrasound 9/30 with fatty infiltration of liver, 2 mm fluid collection with gallbladder fossa adjacent to surgical clips indeterminate, findings may relate to seroma or bilioma.  Infected collection cannot be excluded.  Repeat CT abdomen pelvis 10/11 with continued evidence of fluid collection that is approximately the same size.  -Patient seen and examined by [address] [place] [address] admission who recommended EGD due to thickening of distal esophagus.  Patient unable to make follow-up appointment.  -Since patient had cholecystectomy at [facility], she wishes to follow-up with [facility].  OP follow up scheduled for 12/3/20 @ 10:40AM.  -Denies abdominal pain    09/30/2020    Pulmonary embolism on right [facility]) [I26.99]  -CTA chest 9/29 with findings consistent with pulmonary emboli and right upper lobe segmental and subsegmental arteries and possibly right lower lobe segmental and subsegmental arteries.  There is reflux of contrast into the inferior vena cava which could be seen with right heart strain.  - Echocardiogram 9/30 with EF 55-60%.  Echocardiogram findings do not suggest evidence of right heart strain.  - Follow up with  as scheduled  - continue eliquis and supplemental O2    09/29/2020    Chronic back pain [M54.9, G89.29]  -Continue current pain regimen and Lidoderm patch    12/20/2019    Declining functional status [R53.81]  -PT OT consulted and feel patient is at her baseline functional level.  They recommend discharge home with home health and 24 hour supervision. Swing bed denied by insurance 10/13.  Family filed appeal 10/13.    12/19/2019    HTN (hypertension) [I10]  -Blood pressure stable with current regimen    10/14/2011    Type 2 diabetes mellitus

## 2020-10-14 NOTE — PROGRESS NOTES
Occupational Therapy  Facility/Department: [facility]  Daily Treatment Note  NAME: Pavel Yang  : 1940  MRN: [id number]    Date of Service: 10/14/2020    Discharge Recommendations:  24 hour supervision or assist       Assessment   Assessment: Pt seen for OT services. Co tx with PTA. Pt come to sit at EOB with SBA. Pt donned pants with CGA. Pt continues leaning to left side requiring cues to self correct. Pt come to stand with SBA. Pt ambulated CGA x2 with WC follow. Pt completed chair push ups x5 x2 sets. Pt SULLIVAN with lunch tray and session ended. Pt left with call light in reach and spouse present in room. Patient Diagnosis(es): The primary encounter diagnosis was Pneumonia due to organism. A diagnosis of Transaminitis was also pertinent to this visit. has a past medical history of Allergic rhinitis, Anxiety, Chronic back pain, Depression, Double vision with both eyes open, Fall, Hyperlipidemia, Hypertension, Osteoarthritis, and Type II or unspecified type diabetes mellitus without mention of complication, not stated as uncontrolled. has a past surgical history that includes Hysterectomy;  section; Colonoscopy; and shoulder surgery. Restrictions  Restrictions/Precautions  Restrictions/Precautions: General Precautions, Fall Risk  Required Braces or Orthoses?: No  Subjective   General  Chart Reviewed: Yes  Patient assessed for rehabilitation services?: Yes  Family / Caregiver Present: No  Referring Practitioner: Rancho Stratton MD  Diagnosis: Pneumonia  Subjective  Subjective: Pt reports she has not improved since finding out she had PE last week. Pt agreeable to OT services.       Orientation     Objective    ADL  LE Dressing: Contact guard assistance        Functional Mobility  Functional - Mobility Device: Rolling Walker  Assist Level: Contact guard assistance  Functional Mobility Comments: 20 feet x2 with chair follow and RW     Exercises  Chair Push-ups: 2 x5    Plan Plan  Times per week: 3-5  Times per day: Daily  Plan weeks: 1  Current Treatment Recommendations: Strengthening, ROM, Balance Training, Functional Mobility Training, Endurance Training, Self-Care / ADL, Patient/Caregiver Education & Training, Safety Education & Training    Goals  Short term goals  Time Frame for Short term goals: 1 week  Short term goal 1: Pt to complete toileting with SBA demo good safety awareness. Short term goal 2: Pt to complete sponge bathing with CGA demo good safety awareness  Short term goal 3: Pt to increase functional activity tolerance x15 minutes maintaining 02 sats >90%. Short term goal 4: Pt to complete dressing with SUP. Short term goal 5: Pt to complete ADL transfers with SBA using walker with good safety awareness. Therapy Time   Individual Concurrent Group Co-treatment   Time In 2874         Time Out 4168         Minutes 20              This note serves as a DC summary in the event of pt discharge.      Valery Cabrera, OTR/L

## 2020-10-15 VITALS
SYSTOLIC BLOOD PRESSURE: 110 MMHG | TEMPERATURE: 98 F | HEART RATE: 73 BPM | BODY MASS INDEX: 34.53 KG/M2 | HEIGHT: 67 IN | DIASTOLIC BLOOD PRESSURE: 78 MMHG | WEIGHT: 220 LBS | OXYGEN SATURATION: 94 % | RESPIRATION RATE: 18 BRPM

## 2020-10-15 LAB
GLUCOSE BLD-MCNC: 208 MG/DL (ref 74–106)
GLUCOSE BLD-MCNC: 225 MG/DL (ref 74–106)
PERFORMED ON: ABNORMAL
PERFORMED ON: ABNORMAL

## 2020-10-15 PROCEDURE — 99238 HOSP IP/OBS DSCHRG MGMT 30/<: CPT | Performed by: INTERNAL MEDICINE

## 2020-10-15 PROCEDURE — 6370000000 HC RX 637 (ALT 250 FOR IP): Performed by: PHYSICIAN ASSISTANT

## 2020-10-15 PROCEDURE — 2580000003 HC RX 258: Performed by: PHYSICIAN ASSISTANT

## 2020-10-15 PROCEDURE — 97161 PT EVAL LOW COMPLEX 20 MIN: CPT

## 2020-10-15 PROCEDURE — 97802 MEDICAL NUTRITION INDIV IN: CPT

## 2020-10-15 RX ORDER — INSULIN GLARGINE 100 [IU]/ML
60 INJECTION, SOLUTION SUBCUTANEOUS NIGHTLY
Qty: 5400 UNITS | Refills: 3 | Status: SHIPPED | OUTPATIENT
Start: 2020-10-15 | End: 2020-10-15 | Stop reason: HOSPADM

## 2020-10-15 RX ORDER — INSULIN DETEMIR 100 [IU]/ML
65 INJECTION, SOLUTION SUBCUTANEOUS NIGHTLY
Qty: 5 PEN | Refills: 3 | Status: ON HOLD | OUTPATIENT
Start: 2020-10-15 | End: 2021-11-03

## 2020-10-15 RX ORDER — PEN NEEDLE, DIABETIC 29 GAUGE
1 NEEDLE, DISPOSABLE MISCELLANEOUS DAILY
Qty: 100 EACH | Refills: 3 | Status: SHIPPED | OUTPATIENT
Start: 2020-10-15

## 2020-10-15 RX ORDER — INSULIN ASPART 100 [IU]/ML
15 INJECTION, SOLUTION INTRAVENOUS; SUBCUTANEOUS
Qty: 5 PEN | Refills: 3 | Status: ON HOLD | OUTPATIENT
Start: 2020-10-15 | End: 2021-11-03

## 2020-10-15 RX ADMIN — ASPIRIN 81 MG: 81 TABLET, CHEWABLE ORAL at 08:31

## 2020-10-15 RX ADMIN — DICLOFENAC SODIUM 1 G: 10 GEL TOPICAL at 08:32

## 2020-10-15 RX ADMIN — GABAPENTIN 300 MG: 300 CAPSULE ORAL at 08:31

## 2020-10-15 RX ADMIN — Medication 10 ML: at 08:32

## 2020-10-15 RX ADMIN — PROPRANOLOL HYDROCHLORIDE 40 MG: 20 TABLET ORAL at 08:31

## 2020-10-15 RX ADMIN — Medication 1000 UNITS: at 08:31

## 2020-10-15 RX ADMIN — INSULIN LISPRO 2 UNITS: 100 INJECTION, SOLUTION INTRAVENOUS; SUBCUTANEOUS at 08:32

## 2020-10-15 RX ADMIN — HYDROCODONE BITARTRATE AND ACETAMINOPHEN 1 TABLET: 10; 325 TABLET ORAL at 08:41

## 2020-10-15 RX ADMIN — FERROUS SULFATE TAB EC 324 MG (65 MG FE EQUIVALENT) 324 MG: 324 (65 FE) TABLET DELAYED RESPONSE at 08:31

## 2020-10-15 RX ADMIN — INSULIN LISPRO 15 UNITS: 100 INJECTION, SOLUTION INTRAVENOUS; SUBCUTANEOUS at 12:20

## 2020-10-15 RX ADMIN — APIXABAN 5 MG: 5 TABLET, FILM COATED ORAL at 08:31

## 2020-10-15 RX ADMIN — DOCUSATE SODIUM 50MG AND SENNOSIDES 8.6MG 1 TABLET: 8.6; 5 TABLET, FILM COATED ORAL at 08:31

## 2020-10-15 RX ADMIN — POLYETHYLENE GLYCOL (3350) 17 G: 17 POWDER, FOR SOLUTION ORAL at 08:32

## 2020-10-15 RX ADMIN — GABAPENTIN 300 MG: 300 CAPSULE ORAL at 14:49

## 2020-10-15 RX ADMIN — ALOGLIPTIN 12.5 MG: 12.5 TABLET, FILM COATED ORAL at 08:31

## 2020-10-15 RX ADMIN — OXYBUTYNIN CHLORIDE 5 MG: 5 TABLET ORAL at 08:31

## 2020-10-15 RX ADMIN — INSULIN LISPRO 15 UNITS: 100 INJECTION, SOLUTION INTRAVENOUS; SUBCUTANEOUS at 08:37

## 2020-10-15 RX ADMIN — PANTOPRAZOLE SODIUM 40 MG: 40 TABLET, DELAYED RELEASE ORAL at 05:55

## 2020-10-15 RX ADMIN — INSULIN LISPRO 2 UNITS: 100 INJECTION, SOLUTION INTRAVENOUS; SUBCUTANEOUS at 12:20

## 2020-10-15 ASSESSMENT — PAIN SCALES - GENERAL: PAINLEVEL_OUTOF10: 5

## 2020-10-15 NOTE — PROGRESS NOTES
Pt discharged at this time 1615. Pt daughter and spouse at bedside. Pt educated on new medications. Pt educated on when to take medications. Pt educated on pharmacy to  medications. Pt educated on follow up appointments. Pt and family states no further questions. Pt stable. Pt left via wc on BMT.

## 2020-10-15 NOTE — PLAN OF CARE
Problem: Pain Control  Goal: Maintain pain level at or below patient's acceptable level (or 5 if patient is unable to determine acceptable level)  Outcome: Ongoing  Goal: Improvement in pain related behaviors BP/HR WNL  Outcome: Ongoing     Problem: Cardiovascular  Goal: Hemodynamic stability  Outcome: Ongoing     Problem: Respiratory  Goal: No pulmonary complications  50/87/2157 2207 by Yadira Lam LPN  Outcome: Ongoing  Goal: O2 Sat > 90%  10/15/2020 0842 by Anderson Lowe RN  Outcome: Ongoing  10/14/2020 2207 by Yadira Lam LPN  Outcome: Ongoing     Problem: GI  Goal: No bowel complications  Outcome: Ongoing

## 2020-10-15 NOTE — CARE COORDINATION
Spoke with daughters (2) at bsd with pt. Jah December states she did the appeal.  Fang Dickens that [facility] does not have an appeal on file. Daughters are agreeable to take her home once they were notified of denial of inpt and swing bed.   Requesting [place] with [employer] [place]

## 2020-10-15 NOTE — CARE COORDINATION
Spoke with [facility] reps for over an hour and a half on the phone. No appeal was initiated for SWING bed stay. Inpatient stay has been denied as well. Pt and daughter at [facility] notified. They were notified that pt is medically cleared for DC with HH. Daughter states she can't take her home with her vehicle. I told them I can get transportation for her, but daughter stated \"no one is putting mom in a cab\". Educated that we use a medical transport bus. Daughter is going to call the other daughter and discuss plan.

## 2020-10-15 NOTE — PROGRESS NOTES
Comprehensive Nutrition Assessment    Type and Reason for Visit:  RD Nutrition Re-Screen/LOS    Nutrition Recommendations/Plan: Will monitor intakes and provide contact info if patient decides she wants to follow prescribed plan. Nutrition Assessment:  Pt is LOS and has low risk for nutritional compromise. Will offer nutritional counseling for prescribed diet. Malnutrition Assessment:  Malnutrition Status:  No malnutrition    Context:  Acute Illness     Findings of the 6 clinical characteristics of malnutrition:  Energy Intake:  No significant decrease in energy intake  Weight Loss:  No significant weight loss     Body Fat Loss:  No significant body fat loss     Muscle Mass Loss:  No significant muscle mass loss    Fluid Accumulation:  No significant fluid accumulation     Strength:  Not Performed    Estimated Daily Nutrient Needs:  Energy (kcal):  7127-3250(15-16 kcal/kg abw); Weight Used for Energy Requirements:  Current     Protein (g):  74-86(1.3-1.4 gm/kg IBW); Weight Used for Protein Requirements:  Ideal        Fluid (ml/day):  8539-9042 (1 ml/kcal); Weight Used for Fluid Requirements:  Current      Nutrition Related Findings:  Pt is obese,  No edema reported, does havr redness noted but no other skin issues. PT has elevated ALT and Blood Glucose . Eating fair-good. Non-adherence with diabetic diet. Wounds:  None       Current Nutrition Therapies:    DIET CARB CONTROL; Anthropometric Measures:  · Height: 5' 7\" (170.2 cm)  · Current Body Weight: 220 lb (99.8 kg)   · Admission Body Weight:      · Usual Body Weight: 223 lb (101.2 kg)     · Ideal Body Weight: 135 lbs; % Ideal Body Weight 163 %   · BMI: 34.4  · Adjusted Body Weight:  ; No Adjustment   · Adjusted BMI:      · BMI Categories: Obese Class 1 (BMI 30.0-34. 9)       Nutrition Diagnosis:   · Limited adherence to nutrition-related recommendations related to endocrine dysfuntion as evidenced by BMI, lab values      Nutrition Interventions:   Food and/or Nutrient Delivery:  Continue Current Diet  Nutrition Education/Counseling:  Education needed, Education initiated, Education completed(Pt listened but said she knew what to do,)   Coordination of Nutrition Care:  No recommendation at this time    Goals:  to meet est needs for age/condition, to provide info to patient to help with adherence of diet.        Nutrition Monitoring and Evaluation:   Behavioral-Environmental Outcomes:  Readiness for Change   Food/Nutrient Intake Outcomes:  Food and Nutrient Intake  Physical Signs/Symptoms Outcomes:  Biochemical Data, Weight, Skin     Discharge Planning:    Continue current diet     Electronically signed by Akbar Cartwright on 10/15/20 at 1:26 PM EDT

## 2020-10-15 NOTE — DISCHARGE SUMMARY
Discharge Summary      Patient ID: Zuleima Dc      Patient's PCP: Bev Rodríguez, MIRNA - CNP    Admit Date: 10/8/2020     Discharge Date:  10/15/2020    Admitting Provider: Steffi Goodman MD    Discharging Provider: KEATON Jones     Reason for this admission:   [facility]    Discharge Diagnoses: Active Hospital Problems    Diagnosis Date Noted    Pneumonia of left lower lobe due to infectious organism [J18.9] 10/08/2020    Transaminitis [R74.01] 09/30/2020    Pulmonary embolism on right ([address] [I26.99] 09/29/2020    Chronic back pain [M54.9, G89.29] 12/20/2019    Declining functional status [R53.81] 12/19/2019    HTN (hypertension) [I10] 10/14/2011    Type 2 diabetes mellitus with stage 3 chronic kidney disease, with long-term current use of insulin (HCC) [E11.22, N18.30, Z79.4] 07/05/2011       Procedures:  CT ABDOMEN PELVIS WO CONTRAST Additional Contrast? None   Final Result   Addendum 1 of 1   ADDENDUM #1   Addendum report      Not mentioned on initial dictation is a small fluid collection in the    region of the gallbladder fossa measuring 1.6 cm (previously 2 cm on    ultrasound of 9/30/2020). Consideration might be given to repeat liver ultrasound in 2 weeks to    confirm continued regression or resolution of the fluid collection. [facility] Billing EXAM: CT ABDOMEN AND PELVIS WITHOUT CONTRAST      INDICATION: transaminitis, hyperbilirubinemia, fluid collection liver us    2/46, history complicated cholecystectomy,      COMPARISON: 12/3/2019. TECHNIQUE: Axial CT imaging obtained from lung bases through pelvis. Axial    images and multiplanar reformatted images are provided for review. Individualized dose optimization technique was used in order to meet ALARA    standards for radiation dose    reduction.   In addition to vendor specific dose reduction algorithms, the    dose reduction techniques vary based on the specific scanner utilized but    frequently include automated exposure control, adjustment of the mA and/or    kV according to patient size,    and use of iterative reconstruction technique. IV Contrast: None    Oral Contrast: None      FINDINGS:      LUNG BASES: Multiple calcified granulomas. Small hiatal hernia. Buster Founds LIVER: Normal.      GALLBLADDER AND BILIARY DUCTS: Postcholecystectomy. Buster Founds PANCREAS: Normal.      SPLEEN: Multiple tiny calcified granulomas. .      ADRENAL GLANDS: Normal.      KIDNEYS AND URETERS: No hydronephrosis  No renal stones. URINARY BLADDER: Normal.      REPRODUCTIVE ORGANS: Post hysterectomy. BOWEL: Normal caliber. Normal appendix. LYMPH NODES: No abnormally enlarged nodes. PERITONEUM/RETROPERITONEUM: No ascites or free air. VESSELS: Atherosclerotic calcification of aorta without evidence of    aneurysm. ABDOMINAL WALL: No significant abnormality. Gomer Founds BONES: No destructive process. Degenerative changes lumbar spine. Final      XR CHEST PORTABLE   Final Result      No focal airspace disease. XR CHEST PORTABLE   Final Result   Impression: Airspace opacity of the left lung base laterally. Consults:   IP CONSULT TO GENERAL SURGERY  IP CONSULT TO HOME CARE NEEDS  PT OT    Briefly:   78-year-old female with past medical history of chronic back pain, anxiety, hypertension, chronic kidney disease, DMII, HLD, peripheral neuropathy, recently diagnosed pulmonary embolism on eliquis admitted to acute care for pneumonia. She was treated with levaquin and duonebs with improvement. She was evaluated by PT OT with recommendations for 24 hour supervision or assist. She requested swing bed admission, but this was denied by her insurance.     Hospital Course:       Active Hospital Problems     Diagnosis Date Noted    Pneumonia of left lower lobe due to infectious organism [J18.9]  -Treated with a course Levaquin and DuoNebs with improvement  -Repeat chest x-ray 10/11 without acute findings  -At baseline oxygen requirement of 1.5 L  -Encourage incentive spirometry and ambulation    10/08/2020    Transaminitis [R74.01]  -Unclear etiology.  History of cholecystectomy.  Recent liver ultrasound 9/30 with fatty infiltration of liver, 2 mm fluid collection with gallbladder fossa adjacent to surgical clips indeterminate, findings may relate to seroma or bili aroma.  Infected collection cannot be excluded.  Repeat CT abdomen pelvis 10/11 with continued evidence of fluid collection that is approximately the same size.  -Patient seen and examined by [address] [place] [address] admission who recommended EGD due to thickening of distal esophagus.  Patient unable to make follow-up appointment.  -Since patient had cholecystectomy at [facility], she wishes to follow-up with [facility] GI.  OP follow up scheduled for 12/3/20 @ 10:40AM.  -Denies abdominal pain    09/30/2020    Pulmonary embolism on right [facility]) [I26.99]  -CTA chest 9/29 with findings consistent with pulmonary emboli and right upper lobe segmental and subsegmental arteries and possibly right lower lobe segmental and subsegmental arteries.  There is reflux of contrast into the inferior vena cava which could be seen with right heart strain.  - Echocardiogram 9/30 with EF 55-60%.  Echocardiogram findings do not suggest evidence of right heart strain.  - Follow up with  as scheduled  - continue eliquis and supplemental O2    09/29/2020    Chronic back pain [M54.9, G89.29]  -Continue current pain regimen and Lidoderm patch    12/20/2019    Declining functional status [R53.81]  -PT OT consulted and feel patient is at her baseline functional level.  They recommend discharge home with home health and 24 hour supervision.  Swing bed denied by insurance.    12/19/2019    HTN (hypertension) [I10]  -Blood pressure stable with current regimen    10/14/2011    Type 2 diabetes mellitus with stage 3 chronic kidney disease, with long-term current use of insulin (HCC) [E11.22, N18.30, Z79.4]  -Blood glucose is noted to be elevated.  Will increase Lantus from 50 units nightly and Humalog to 10 units 3 times daily.  Continue sliding scale insulin.  -Encourage compliance with diabetic diet            Disposition: home    Discharged Condition: Stable    Vital Signs  Temp: 98 °F (36.7 °C)  Pulse: 73  Resp: 18  BP: 110/78  SpO2: 94 %  O2 Device: None (Room air)  O2 Flow Rate (L/min): 1.5 L/min    Vital signs reviewed in electronic chart. Physical exam  Constitutional:  Well developed, well nourished, no acute distress. Eyes:  PERRL, conjunctiva normal, sclera without icterus. HENT:  Atraumatic, external ears normal, nose normal, oropharynx moist, no pharyngeal exudates. Neck- supple, no JVD, no lymphadenopathy. Respiratory:  No respiratory distress, no wheezing, rales or rhonchi detected. Cardiovascular:  Normal rate, normal rhythm, no murmurs, no gallops, no rubs. GI:  Soft, nondistended, normal bowel sounds, nontender, no hepatosplenomegaly appreciated. Musculoskeletal:  No edema, cyanosis or obvious acute deformity. Moving all extremities. Integument:  Warm and dry. No rash. Neurologic:  Alert & oriented x 3, no apparent focal deficits noted. Psychiatric:  Speech and behavior appropriate. Activity: activity as tolerated  Diet: diabetic diet  Follow Up: Primary Care Physician in 2 weeks and with [facility] as scheduled    Labs:  For convenience and continuity at follow-up the following most recent labs are provided:    CBC:   Lab Results   Component Value Date    WBC 6.6 10/12/2020    HGB 11.2 10/12/2020    HCT 37.3 10/12/2020     10/12/2020       RENAL:   Lab Results   Component Value Date     10/14/2020    K 5.2 10/14/2020     10/14/2020    CO2 23 10/14/2020    BUN 25 10/14/2020    CREATININE 1.6 10/14/2020         Disharge Medications:     Current Discharge Medication List           Details   insulin detemir (LEVEMIR FLEXTOUCH) 100 UNIT/ML injection pen Inject 65 Units into the

## 2020-10-15 NOTE — FLOWSHEET NOTE
10/14/20 2000   Assessment   Charting Type Shift assessment   Neurological   Neuro (WDL) WDL   Level of Consciousness 0   [place] Coma Scale   Eye Opening 4   Best Verbal Response 5   Best Motor Response 6   [place] Coma Scale Score 15   HEENT   HEENT (WDL) X   Right Eye Impaired vision   Left Eye Impaired vision   Teeth Missing teeth   Respiratory   Respiratory (WDL) X   L Breath Sounds Diminished   R Breath Sounds Diminished   Breath Sounds   Right Upper Lobe Clear   Right Middle Lobe Clear   Right Lower Lobe Diminished   Left Upper Lobe Clear   Left Lower Lobe Diminished   Cough/Sputum   Cough Non-productive   Cardiac   Cardiac (WDL) WDL   Cardiac Regularity Regular   Cardiac Monitor   Telemetry Monitor On Yes   Telemetry Audible Yes   Telemetry Alarms Set Yes   Telemetry Box Number [id number]   Telemetry Monitor Alarm Parameters    Gastrointestinal   Abdominal (WDL) X   RUQ Bowel Sounds Active   LUQ Bowel Sounds Active   RLQ Bowel Sounds Active   LLQ Bowel Sounds Active   Abdomen Inspection Rounded   Tenderness Soft;Tenderness   Peripheral Vascular   Peripheral Vascular (WDL) WDL   Skin Color/Condition   Skin Color/Condition (WDL) X   Skin Color Pale   Skin Condition/Temp Dry; Warm   Skin Integrity   Skin Integrity (WDL) X   Skin Integrity Bruising   Location Scattered   Musculoskeletal   Musculoskeletal (WDL) X   RUE Limited movement   LUE Full movement   RL Extremity Limited movement;Weakness; Unsteady   LL Extremity Limited movement;Weakness; Unsteady   Genitourinary   Genitourinary (WDL) X   Urine Assessment   Incontinence Yes  (at times)   Anus/Rectum   Anus/Rectum (WDL) WDL   Psychosocial   Psychosocial (WDL) WDL

## 2020-10-15 NOTE — FLOWSHEET NOTE
10/15/20 0845   Assessment   Charting Type Shift assessment   Neurological   Neuro (WDL) WDL   Level of Consciousness 0   [place] Coma Scale   Eye Opening 4   Best Verbal Response 5   Best Motor Response 6   [place] Coma Scale Score 15   NIH/MNHISS Stroke Scale   NIH/MNIHSS Stroke Scale Assessed No   HEENT   HEENT (WDL) X   Right Eye Impaired vision   Left Eye Impaired vision   Teeth Missing teeth   Respiratory   Respiratory (WDL) X   L Breath Sounds Diminished   R Breath Sounds Diminished   Breath Sounds   Right Upper Lobe Clear   Right Middle Lobe Clear   Right Lower Lobe Diminished   Left Upper Lobe Clear   Left Lower Lobe Diminished   Cough/Sputum   Cough Non-productive   Cardiac   Cardiac (WDL) WDL   Cardiac Regularity Regular   Cardiac Monitor   Telemetry Monitor On Yes   Telemetry Audible Yes   Telemetry Alarms Set Yes   Telemetry Box Number [id number]   Telemetry Monitor Alarm Parameters    Gastrointestinal   Abdominal (WDL) X   RUQ Bowel Sounds Active   LUQ Bowel Sounds Active   RLQ Bowel Sounds Active   LLQ Bowel Sounds Active   Abdomen Inspection Rounded   Tenderness Soft;Tenderness   Peripheral Vascular   Peripheral Vascular (WDL) WDL   Skin Color/Condition   Skin Color/Condition (WDL) X   Skin Color Pale   Skin Condition/Temp Warm;Dry   Skin Integrity   Skin Integrity (WDL) X   Skin Integrity Bruising   Location scattered   Preventative Dressing No   Skin Integrity Site 2   Skin Integrity Location 2 Redness   Location 2 skin folds   Preventative Dressing No   Musculoskeletal   Musculoskeletal (WDL) X   RUE Limited movement   LUE Full movement   RL Extremity Limited movement;Weakness; Unsteady   LL Extremity Limited movement;Weakness; Unsteady   Genitourinary   Genitourinary (WDL) X   Urine Assessment   Incontinence Yes  (at times)   Anus/Rectum   Anus/Rectum (WDL) WDL   Psychosocial   Psychosocial (WDL) WDL   Pt awake in bed. Pt alert and oriented. Pt appears in no acute distress.  Pt currently on 1.5L NC. Pt lung sounds clear with diminished sounds in bases juan j. Pt encouraged to cough and deep breathe. Pt states pain at 5 out of 10, see eMAR for intervention. Pt call bell and bedside table within reach. Will continue to monitor pt.

## 2020-11-01 ENCOUNTER — RESULTS ENCOUNTER (OUTPATIENT)
Dept: SURGERY | Facility: CLINIC | Age: 80
End: 2020-11-01

## 2020-11-01 DIAGNOSIS — Z01.818 PREOP TESTING: ICD-10-CM

## 2021-01-07 ENCOUNTER — HOSPITAL ENCOUNTER (OUTPATIENT)
Facility: HOSPITAL | Age: 81
Discharge: HOME | End: 2021-01-07
Payer: MEDICARE

## 2021-01-19 ENCOUNTER — TELEPHONE (OUTPATIENT)
Dept: PHARMACY | Facility: HOSPITAL | Age: 81
End: 2021-01-19

## 2021-01-19 NOTE — TELEPHONE ENCOUNTER
Called pt's daughter Lawrence Hernandez and let her know that we would not be able to see the pt in the [facility] clinic on 2/1/21 since there has been a new delay in turning on the 340B program for the clinic. The pt fills three meds thru the 340B program currently and qualified for the program from a discharge last year with [facility].  Those three meds are Eliquis, Novolog, and Levemir. I stated that I am going to work with the pt's PCP Nakia Choi on getting her insulins covered through the  assistance program.  In regard to the Eliquis, I stated that we would no longer be able to provide samples as it will be several months before we can see the pt in our clinic. Per our corporate 340B team the earliest we could prescribe a medication on the 340B program would be June. I stated that I am going to give her PCP Ivette Puls office our Eliquis drug rep contact info and see if they can order Eliquis samples. Otherwise, the pt would have to fill the medication through her insurance. Last time the pt paid for the medication it was over $100 with insurance. Lawrence Hernandez verbalized understanding of the above information and will notify the pt.     Ghassan Lynne

## 2021-01-26 ENCOUNTER — HOSPITAL ENCOUNTER (OUTPATIENT)
Dept: GENERAL RADIOLOGY | Facility: HOSPITAL | Age: 81
Discharge: HOME | End: 2021-01-26
Payer: MEDICARE

## 2021-01-26 ENCOUNTER — HOSPITAL ENCOUNTER (OUTPATIENT)
Facility: HOSPITAL | Age: 81
Discharge: HOME | End: 2021-01-26
Payer: MEDICARE

## 2021-01-26 DIAGNOSIS — R05.9 COUGH: ICD-10-CM

## 2021-01-26 PROCEDURE — 71046 X-RAY EXAM CHEST 2 VIEWS: CPT

## 2021-01-26 PROCEDURE — 36415 COLL VENOUS BLD VENIPUNCTURE: CPT

## 2021-02-01 ENCOUNTER — TELEPHONE (OUTPATIENT)
Dept: PHARMACY | Facility: HOSPITAL | Age: 81
End: 2021-02-01

## 2021-02-01 ENCOUNTER — HOSPITAL ENCOUNTER (OUTPATIENT)
Facility: HOSPITAL | Age: 81
Discharge: HOME | End: 2021-02-01
Payer: MEDICARE

## 2021-02-01 PROCEDURE — 36415 COLL VENOUS BLD VENIPUNCTURE: CPT

## 2021-04-19 ENCOUNTER — HOSPITAL ENCOUNTER (OUTPATIENT)
Facility: HOSPITAL | Age: 81
Discharge: HOME | End: 2021-04-19
Payer: MEDICARE

## 2021-04-19 ENCOUNTER — HOSPITAL ENCOUNTER (OUTPATIENT)
Dept: GENERAL RADIOLOGY | Facility: HOSPITAL | Age: 81
Discharge: HOME | End: 2021-04-19
Payer: MEDICARE

## 2021-04-19 DIAGNOSIS — R05.9 COUGH: ICD-10-CM

## 2021-04-19 DIAGNOSIS — R06.02 BREATH SHORTNESS: ICD-10-CM

## 2021-04-19 PROCEDURE — 71046 X-RAY EXAM CHEST 2 VIEWS: CPT

## 2021-04-20 ENCOUNTER — TELEPHONE (OUTPATIENT)
Dept: PHARMACY | Facility: HOSPITAL | Age: 81
End: 2021-04-20

## 2021-04-20 NOTE — TELEPHONE ENCOUNTER
Notified by pt's daughter Bjorn Pritchett that pt needed assistance with obtaining insulin at affordable cost.  Assisted pt in applying for  assistance program.  Pt was approved for both Levemir and Novolog and insulin will ship to [address] office within the next two weeks. Notified pt's daughter Bjorn Pritchett.     Rafat Infante, ScarD

## 2021-05-27 ENCOUNTER — TELEPHONE (OUTPATIENT)
Dept: SURGERY | Facility: CLINIC | Age: 81
End: 2021-05-27

## 2021-05-27 NOTE — TELEPHONE ENCOUNTER
Tried to contact the patient to reschedule an appointment from 10/08/20 with dr rodriguez. Sending a letter

## 2021-06-15 ENCOUNTER — HOSPITAL ENCOUNTER (OUTPATIENT)
Facility: HOSPITAL | Age: 81
Setting detail: OBSERVATION
Discharge: HOME HEALTH | End: 2021-06-17
Attending: EMERGENCY MEDICINE | Admitting: INTERNAL MEDICINE
Payer: MEDICARE

## 2021-06-15 ENCOUNTER — APPOINTMENT (OUTPATIENT)
Dept: GENERAL RADIOLOGY | Facility: HOSPITAL | Age: 81
End: 2021-06-15
Payer: MEDICARE

## 2021-06-15 DIAGNOSIS — E87.5 HYPERKALEMIA: ICD-10-CM

## 2021-06-15 DIAGNOSIS — J96.90 RESPIRATORY FAILURE, UNSPECIFIED CHRONICITY, UNSPECIFIED WHETHER WITH HYPOXIA OR HYPERCAPNIA (HCC): ICD-10-CM

## 2021-06-15 DIAGNOSIS — R06.89 DYSPNEA AND RESPIRATORY ABNORMALITIES: Primary | ICD-10-CM

## 2021-06-15 DIAGNOSIS — R06.00 DYSPNEA AND RESPIRATORY ABNORMALITIES: Primary | ICD-10-CM

## 2021-06-15 PROBLEM — R09.02 HYPOXIA: Status: ACTIVE | Noted: 2021-06-15

## 2021-06-15 LAB
A/G RATIO: 1.1 (ref 0.8–2)
ALBUMIN SERPL-MCNC: 3.5 G/DL (ref 3.4–4.8)
ALP BLD-CCNC: 65 U/L (ref 25–100)
ALT SERPL-CCNC: 7 U/L (ref 4–36)
ANION GAP SERPL CALCULATED.3IONS-SCNC: 7 MMOL/L (ref 3–16)
AST SERPL-CCNC: 19 U/L (ref 8–33)
BASE EXCESS ARTERIAL: 4.5 MMOL/L (ref -3–3)
BASE EXCESS ARTERIAL: 5.3 MMOL/L (ref -3–3)
BASOPHILS ABSOLUTE: 0 K/UL (ref 0–0.1)
BASOPHILS RELATIVE PERCENT: 0.5 %
BILIRUB SERPL-MCNC: <0.2 MG/DL (ref 0.3–1.2)
BILIRUBIN URINE: NEGATIVE
BLOOD, URINE: NEGATIVE
BUN BLDV-MCNC: 16 MG/DL (ref 6–20)
CALCIUM SERPL-MCNC: 9.2 MG/DL (ref 8.5–10.5)
CHLORIDE BLD-SCNC: 100 MMOL/L (ref 98–107)
CLARITY: CLEAR
CO2: 30 MMOL/L (ref 20–30)
COLOR: YELLOW
CREAT SERPL-MCNC: 1.3 MG/DL (ref 0.4–1.2)
EOSINOPHILS ABSOLUTE: 0.3 K/UL (ref 0–0.4)
EOSINOPHILS RELATIVE PERCENT: 3.2 %
FIO2: 0.28 %
FIO2: 0.32 %
GFR AFRICAN AMERICAN: 48
GFR NON-AFRICAN AMERICAN: 39
GLOBULIN: 3.3 G/DL
GLUCOSE BLD-MCNC: 156 MG/DL (ref 74–106)
GLUCOSE BLD-MCNC: 192 MG/DL (ref 74–106)
GLUCOSE URINE: NEGATIVE MG/DL
HCO3 ARTERIAL: 31.2 MMOL/L (ref 22–26)
HCO3 ARTERIAL: 31.4 MMOL/L (ref 22–26)
HCT VFR BLD CALC: 38.4 % (ref 37–47)
HEMOGLOBIN: 12 G/DL (ref 11.5–16.5)
IMMATURE GRANULOCYTES #: 0 K/UL
IMMATURE GRANULOCYTES %: 0.5 % (ref 0–5)
KETONES, URINE: NEGATIVE MG/DL
LACTIC ACID: 1.8 MMOL/L (ref 0.4–2)
LEUKOCYTE ESTERASE, URINE: NEGATIVE
LYMPHOCYTES ABSOLUTE: 2 K/UL (ref 1.5–4)
LYMPHOCYTES RELATIVE PERCENT: 25.6 %
MAGNESIUM: 1.5 MG/DL (ref 1.7–2.4)
MCH RBC QN AUTO: 32.8 PG (ref 27–32)
MCHC RBC AUTO-ENTMCNC: 31.3 G/DL (ref 31–35)
MCV RBC AUTO: 104.9 FL (ref 80–100)
MICROSCOPIC EXAMINATION: NORMAL
MONOCYTES ABSOLUTE: 0.6 K/UL (ref 0.2–0.8)
MONOCYTES RELATIVE PERCENT: 8 %
NEUTROPHILS ABSOLUTE: 4.8 K/UL (ref 2–7.5)
NEUTROPHILS RELATIVE PERCENT: 62.2 %
NITRITE, URINE: NEGATIVE
O2 SAT, ARTERIAL: 95.2 %
O2 SAT, ARTERIAL: 99.2 %
O2 THERAPY: ABNORMAL
O2 THERAPY: ABNORMAL
PCO2 ARTERIAL: 53.9 MMHG (ref 35–45)
PCO2 ARTERIAL: 57.4 MMHG (ref 35–45)
PDW BLD-RTO: 13.5 % (ref 11–16)
PERFORMED ON: ABNORMAL
PH ARTERIAL: 7.35 (ref 7.35–7.45)
PH ARTERIAL: 7.38 (ref 7.35–7.45)
PH UA: 5.5 (ref 5–8)
PLATELET # BLD: 192 K/UL (ref 150–400)
PMV BLD AUTO: 11.6 FL (ref 6–10)
PO2 ARTERIAL: 168.8 MMHG (ref 80–100)
PO2 ARTERIAL: 75.2 MMHG (ref 80–100)
POTASSIUM SERPL-SCNC: 5.2 MMOL/L (ref 3.4–5.1)
PRO-BNP: 576 PG/ML (ref 0–1800)
PROTEIN UA: NEGATIVE MG/DL
RBC # BLD: 3.66 M/UL (ref 3.8–5.8)
SODIUM BLD-SCNC: 137 MMOL/L (ref 136–145)
SPECIFIC GRAVITY UA: 1.02 (ref 1–1.03)
TCO2 ARTERIAL: 33 MMOL/L (ref 24–30)
TCO2 ARTERIAL: 33.1 MMOL/L (ref 24–30)
TOTAL PROTEIN: 6.8 G/DL (ref 6.4–8.3)
TROPONIN: <0.3 NG/ML
URINE TYPE: NORMAL
UROBILINOGEN, URINE: 0.2 E.U./DL
WBC # BLD: 7.8 K/UL (ref 4–11)

## 2021-06-15 PROCEDURE — 85025 COMPLETE CBC W/AUTO DIFF WBC: CPT

## 2021-06-15 PROCEDURE — 80053 COMPREHEN METABOLIC PANEL: CPT

## 2021-06-15 PROCEDURE — 99285 EMERGENCY DEPT VISIT HI MDM: CPT

## 2021-06-15 PROCEDURE — 84484 ASSAY OF TROPONIN QUANT: CPT

## 2021-06-15 PROCEDURE — G0378 HOSPITAL OBSERVATION PER HR: HCPCS

## 2021-06-15 PROCEDURE — 6370000000 HC RX 637 (ALT 250 FOR IP): Performed by: INTERNAL MEDICINE

## 2021-06-15 PROCEDURE — 36415 COLL VENOUS BLD VENIPUNCTURE: CPT

## 2021-06-15 PROCEDURE — 71045 X-RAY EXAM CHEST 1 VIEW: CPT

## 2021-06-15 PROCEDURE — 83880 ASSAY OF NATRIURETIC PEPTIDE: CPT

## 2021-06-15 PROCEDURE — 81003 URINALYSIS AUTO W/O SCOPE: CPT

## 2021-06-15 PROCEDURE — 93005 ELECTROCARDIOGRAM TRACING: CPT

## 2021-06-15 PROCEDURE — 83605 ASSAY OF LACTIC ACID: CPT

## 2021-06-15 PROCEDURE — 82803 BLOOD GASES ANY COMBINATION: CPT

## 2021-06-15 PROCEDURE — 83735 ASSAY OF MAGNESIUM: CPT

## 2021-06-15 RX ORDER — DEXTROSE 25 % IN WATER 25 %
10 SYRINGE (ML) INTRAVENOUS ONCE
Status: DISCONTINUED | OUTPATIENT
Start: 2021-06-15 | End: 2021-06-15

## 2021-06-15 RX ORDER — ASPIRIN 81 MG/1
81 TABLET ORAL DAILY
Status: DISCONTINUED | OUTPATIENT
Start: 2021-06-16 | End: 2021-06-17 | Stop reason: HOSPADM

## 2021-06-15 RX ORDER — DEXTROSE MONOHYDRATE 25 G/50ML
12.5 INJECTION, SOLUTION INTRAVENOUS PRN
Status: DISCONTINUED | OUTPATIENT
Start: 2021-06-15 | End: 2021-06-17 | Stop reason: HOSPADM

## 2021-06-15 RX ORDER — INSULIN GLARGINE 100 [IU]/ML
50 INJECTION, SOLUTION SUBCUTANEOUS NIGHTLY
Status: DISCONTINUED | OUTPATIENT
Start: 2021-06-15 | End: 2021-06-17 | Stop reason: HOSPADM

## 2021-06-15 RX ORDER — DEXTROSE MONOHYDRATE 50 MG/ML
100 INJECTION, SOLUTION INTRAVENOUS PRN
Status: DISCONTINUED | OUTPATIENT
Start: 2021-06-15 | End: 2021-06-17 | Stop reason: HOSPADM

## 2021-06-15 RX ORDER — SENNA AND DOCUSATE SODIUM 50; 8.6 MG/1; MG/1
1 TABLET, FILM COATED ORAL 2 TIMES DAILY
Status: DISCONTINUED | OUTPATIENT
Start: 2021-06-15 | End: 2021-06-17 | Stop reason: HOSPADM

## 2021-06-15 RX ORDER — ERGOCALCIFEROL 1.25 MG/1
50000 CAPSULE ORAL WEEKLY
Status: DISCONTINUED | OUTPATIENT
Start: 2021-06-15 | End: 2021-06-17 | Stop reason: HOSPADM

## 2021-06-15 RX ORDER — FERROUS SULFATE TAB EC 324 MG (65 MG FE EQUIVALENT) 324 (65 FE) MG
324 TABLET DELAYED RESPONSE ORAL
Status: DISCONTINUED | OUTPATIENT
Start: 2021-06-16 | End: 2021-06-17 | Stop reason: HOSPADM

## 2021-06-15 RX ORDER — ALOGLIPTIN 12.5 MG/1
6.25 TABLET, FILM COATED ORAL DAILY
Status: DISCONTINUED | OUTPATIENT
Start: 2021-06-16 | End: 2021-06-16

## 2021-06-15 RX ORDER — ONDANSETRON 4 MG/1
4 TABLET, ORALLY DISINTEGRATING ORAL EVERY 8 HOURS PRN
Status: DISCONTINUED | OUTPATIENT
Start: 2021-06-15 | End: 2021-06-17 | Stop reason: HOSPADM

## 2021-06-15 RX ORDER — OXYBUTYNIN CHLORIDE 5 MG/1
5 TABLET ORAL 2 TIMES DAILY
Status: DISCONTINUED | OUTPATIENT
Start: 2021-06-15 | End: 2021-06-17 | Stop reason: HOSPADM

## 2021-06-15 RX ORDER — POLYETHYLENE GLYCOL 3350 17 G/17G
17 POWDER, FOR SOLUTION ORAL 2 TIMES DAILY
Status: DISCONTINUED | OUTPATIENT
Start: 2021-06-15 | End: 2021-06-17 | Stop reason: HOSPADM

## 2021-06-15 RX ORDER — PANTOPRAZOLE SODIUM 40 MG/1
40 TABLET, DELAYED RELEASE ORAL
Status: DISCONTINUED | OUTPATIENT
Start: 2021-06-16 | End: 2021-06-17 | Stop reason: HOSPADM

## 2021-06-15 RX ORDER — LORAZEPAM 0.5 MG/1
0.5 TABLET ORAL DAILY PRN
Status: DISCONTINUED | OUTPATIENT
Start: 2021-06-15 | End: 2021-06-16

## 2021-06-15 RX ORDER — GABAPENTIN 300 MG/1
600 CAPSULE ORAL 2 TIMES DAILY
Status: DISCONTINUED | OUTPATIENT
Start: 2021-06-16 | End: 2021-06-17 | Stop reason: HOSPADM

## 2021-06-15 RX ORDER — HYDROCODONE BITARTRATE AND ACETAMINOPHEN 10; 325 MG/1; MG/1
1 TABLET ORAL 3 TIMES DAILY PRN
Status: DISCONTINUED | OUTPATIENT
Start: 2021-06-15 | End: 2021-06-17 | Stop reason: HOSPADM

## 2021-06-15 RX ORDER — BUSPIRONE HYDROCHLORIDE 5 MG/1
7.5 TABLET ORAL 3 TIMES DAILY
Status: DISCONTINUED | OUTPATIENT
Start: 2021-06-15 | End: 2021-06-16

## 2021-06-15 RX ORDER — FERROUS FUMARATE 324(106)MG
TABLET ORAL
Status: ON HOLD | COMMUNITY
End: 2021-06-16

## 2021-06-15 RX ORDER — BUSPIRONE HYDROCHLORIDE 15 MG/1
7.5 TABLET ORAL 3 TIMES DAILY
Status: ON HOLD | COMMUNITY
End: 2021-06-16

## 2021-06-15 RX ORDER — PRAVASTATIN SODIUM 20 MG
20 TABLET ORAL NIGHTLY
Status: DISCONTINUED | OUTPATIENT
Start: 2021-06-15 | End: 2021-06-16

## 2021-06-15 RX ORDER — NICOTINE POLACRILEX 4 MG
15 LOZENGE BUCCAL PRN
Status: DISCONTINUED | OUTPATIENT
Start: 2021-06-15 | End: 2021-06-17 | Stop reason: HOSPADM

## 2021-06-15 RX ORDER — ACETAMINOPHEN 325 MG/1
650 TABLET ORAL EVERY 6 HOURS PRN
Status: DISCONTINUED | OUTPATIENT
Start: 2021-06-15 | End: 2021-06-17 | Stop reason: HOSPADM

## 2021-06-15 RX ORDER — PROPRANOLOL HYDROCHLORIDE 20 MG/1
40 TABLET ORAL 2 TIMES DAILY
Status: DISCONTINUED | OUTPATIENT
Start: 2021-06-15 | End: 2021-06-17 | Stop reason: HOSPADM

## 2021-06-15 RX ORDER — DICLOFENAC SODIUM 1 MG/ML
1 SOLUTION/ DROPS OPHTHALMIC 4 TIMES DAILY
Status: ON HOLD | COMMUNITY
End: 2021-06-16

## 2021-06-15 RX ORDER — ACETAMINOPHEN 650 MG/1
650 SUPPOSITORY RECTAL EVERY 6 HOURS PRN
Status: DISCONTINUED | OUTPATIENT
Start: 2021-06-15 | End: 2021-06-17 | Stop reason: HOSPADM

## 2021-06-15 RX ORDER — POLYETHYLENE GLYCOL 3350 17 G/17G
17 POWDER, FOR SOLUTION ORAL DAILY PRN
Status: DISCONTINUED | OUTPATIENT
Start: 2021-06-15 | End: 2021-06-17 | Stop reason: HOSPADM

## 2021-06-15 RX ORDER — ONDANSETRON 2 MG/ML
4 INJECTION INTRAMUSCULAR; INTRAVENOUS EVERY 6 HOURS PRN
Status: DISCONTINUED | OUTPATIENT
Start: 2021-06-15 | End: 2021-06-17 | Stop reason: HOSPADM

## 2021-06-15 RX ORDER — VITAMIN B COMPLEX
1000 TABLET ORAL DAILY
Status: DISCONTINUED | OUTPATIENT
Start: 2021-06-16 | End: 2021-06-17 | Stop reason: HOSPADM

## 2021-06-15 RX ADMIN — INSULIN LISPRO 1 UNITS: 100 INJECTION, SOLUTION INTRAVENOUS; SUBCUTANEOUS at 22:23

## 2021-06-15 RX ADMIN — PROPRANOLOL HYDROCHLORIDE 40 MG: 20 TABLET ORAL at 22:22

## 2021-06-15 RX ADMIN — POLYETHYLENE GLYCOL 3350 17 G: 17 POWDER, FOR SOLUTION ORAL at 22:22

## 2021-06-15 RX ADMIN — BUSPIRONE HYDROCHLORIDE 7.5 MG: 5 TABLET ORAL at 22:22

## 2021-06-15 RX ADMIN — INSULIN GLARGINE 50 UNITS: 100 INJECTION, SOLUTION SUBCUTANEOUS at 22:23

## 2021-06-15 RX ADMIN — PRAVASTATIN SODIUM 20 MG: 20 TABLET ORAL at 22:22

## 2021-06-15 RX ADMIN — DOCUSATE SODIUM 50MG AND SENNOSIDES 8.6MG 1 TABLET: 8.6; 5 TABLET, FILM COATED ORAL at 22:22

## 2021-06-15 RX ADMIN — APIXABAN 5 MG: 5 TABLET, FILM COATED ORAL at 22:22

## 2021-06-15 RX ADMIN — OXYBUTYNIN CHLORIDE 5 MG: 5 TABLET ORAL at 22:22

## 2021-06-15 ASSESSMENT — PAIN SCALES - GENERAL: PAINLEVEL_OUTOF10: 3

## 2021-06-15 ASSESSMENT — PAIN DESCRIPTION - FREQUENCY: FREQUENCY: CONTINUOUS

## 2021-06-15 ASSESSMENT — PAIN DESCRIPTION - PAIN TYPE: TYPE: ACUTE PAIN

## 2021-06-15 ASSESSMENT — PAIN DESCRIPTION - DESCRIPTORS: DESCRIPTORS: SPASM;DULL

## 2021-06-15 ASSESSMENT — PAIN DESCRIPTION - LOCATION: LOCATION: ABDOMEN

## 2021-06-15 NOTE — ED NOTES
Patients  at the bedside per wheelchair at this time. He was brought down from medical unit.      Gareth Otoole RN  06/15/21 [id number]

## 2021-06-15 NOTE — ED PROVIDER NOTES
[address] [place] [address] MED SURG  eMERGENCY dEPARTMENT eNCOUnter      Pt Name: Modesto Suggs  MRN: [id number]  YOB: 1940  Date ofevaluation: 6/15/2021  Provider: Jakub Barroso, [facility]       Chief Complaint   Patient presents with    Shortness of Breath         HISTORY OF PRESENT ILLNESS  (Location/Symptom, Timing/Onset, Context/Setting, Quality, Duration, Modifying Factors, Severity.)   Modesto Suggs is a 79 y.o. female who presents to the emergency department by squad with complaint of shortness of breath that is been ongoing for several months but today became worse because of increased exertional dyspnea. Patient is on home oxygen 2 L by nasal cannula with oxygen saturation of 85 to 89%. When the oxygen was increased to 2.5 L by nasal cannula she improved to 95 to 96%. Patient denies any chest pain, palpitation and diaphoresis. She also denies any fever, chills, nausea and vomiting and diarrhea stools. Patient is fully vaccinated for COVID-19 virus. Nursing notes were reviewed. REVIEW OF SYSTEMS    (2-9systems for level 4, 10 or more for level 5)   ROS:  General:  No fevers, no chills, no weakness  HEENT: No sore throat, runny nose or ear pain  Cardiovascular:  No chest pain, no palpitations  Respiratory: Patient admits to shortness of breath, no cough, no wheezing  Gastrointestinal:  No pain, no nausea, no vomiting, no diarrhea  Musculoskeletal:  No muscle pain, no joint pain  Skin:  No rash, no easy bruising  Genitourinary:  No dysuria, no hematuria    Except as noted above theremainder of the review of systems was reviewed and negative.        PASTMEDICAL HISTORY     Past Medical History:   Diagnosis Date    Allergic rhinitis     Anxiety     Chronic back pain     Depression     Double vision with both eyes open     Pt states she can see ok with one eye shut    Fall     Hyperlipidemia     Hypertension     Osteoarthritis     Type II or unspecified type diabetes mellitus without mention of complication, not stated as uncontrolled          SURGICAL HISTORY       Past Surgical History:   Procedure Laterality Date     SECTION      COLONOSCOPY      HYSTERECTOMY      TOTAL    SHOULDER SURGERY      RIGHT X 2         CURRENT MEDICATIONS       Current Discharge Medication List      CONTINUE these medications which have NOT CHANGED    Details   busPIRone (BUSPAR) 15 MG tablet Take 7.5 mg by mouth 3 times daily      diclofenac (VOLTAREN) 0.1 % ophthalmic solution 1 drop 4 times daily      Ferrous Fumarate 324 (106 Fe) MG TABS Take by mouth      insulin detemir (LEVEMIR FLEXTOUCH) 100 UNIT/ML injection pen Inject 65 Units into the skin nightly Please run on 340B program  Qty: 5 pen, Refills: 3      OXYGEN Inhale into the lungs 1.5 L N/C      HYDROcodone-acetaminophen (NORCO)  MG per tablet Take 1 tablet by mouth 3 times daily as needed for Pain. !! insulin aspart (NOVOLOG FLEXPEN) 100 UNIT/ML injection pen Per low dose sliding scale.  Please run on 340B program.  Qty: 5 pen, Refills: 3      apixaban (ELIQUIS) 5 MG TABS tablet Take 1 tablet by mouth 2 times daily Start after completion of loading dose Eliquis 10mg BID x 6 days  Qty: 60 tablet, Refills: 0      DULoxetine (CYMBALTA) 60 MG extended release capsule Take 60 mg by mouth daily Patient does not know if she is taking this medication      aspirin 81 MG tablet Take 1 tablet by mouth daily  Qty: 30 tablet, Refills: 1      omeprazole (PRILOSEC) 20 MG delayed release capsule Take 2 capsules by mouth daily  Qty: 60 capsule, Refills: 0      vitamin D (CHOLECALCIFEROL) 25 MCG (1000 UT) TABS tablet Take 1 tablet by mouth daily  Qty: 30 tablet, Refills: 0      propranolol (INDERAL) 40 MG tablet Take 1 tablet by mouth 2 times daily  Qty: 60 tablet, Refills: 0      SITagliptin (JANUVIA) 25 MG tablet Take 2 tablets by mouth daily  Qty: 30 tablet, Refills: 0      LORazepam (ATIVAN) 0.5 MG tablet Take 0.5 mg by mouth daily as  48 (*)     Total Bilirubin <0.2 (*)     All other components within normal limits    Narrative:     Performed at:  [address] [facility]  [address] [place], [place] [address], [place] [address]   Phone [phone]   BLOOD GAS, ARTERIAL - Abnormal; Notable for the following components:    pCO2, Arterial 53.9 (*)     pO2, Arterial 75.2 (*)     HCO3, Arterial 31.4 (*)     Base Excess, Arterial 5.3 (*)     TCO2, Arterial 33.1 (*)     All other components within normal limits    Narrative:     Performed at:  [address] [facility]  [address] [place], [place] [address], [place] [address]   Phone [phone]   POCT GLUCOSE - Abnormal; Notable for the following components:    POC Glucose 192 (*)     All other components within normal limits    Narrative:     Performed at:  [address] [place] [address] Laboratory  [address] [place], [place] [address], [place] [address]   Phone [phone]   POCT GLUCOSE - Abnormal; Notable for the following components:    POC Glucose 221 (*)     All other components within normal limits    Narrative:     Performed at:  [facility] [address] [facility]  [address] [place], [place] [address], [place] [address]   Phone [phone]   TROPONIN    Narrative:     Performed at:  [address] [place] [address] Laboratory  [address] [place], [place] [address], [place] [address]   Phone [phone]    Narrative:     Performed at:  [address] [place] [address] Laboratory  [address] [place], [place] [address], [place]   Phone [phone]   URINALYSIS    Narrative:     Performed at:  [address] [place] [address] Laboratory  [address] [place], [place] [address], [place] [address]   Phone [phone]   LACTIC ACID, PLASMA    Narrative:     Performed at:  [address] [place] [address] Laboratory  [address] [place], [place] [address], [place] [address]   Phone [phone]   POCT GLUCOSE POCT GLUCOSE   POCT GLUCOSE   POCT GLUCOSE   POCT GLUCOSE   POCT GLUCOSE       I have reviewed and interpreted all of the currently available lab resultsfrom this visit (if applicable):  Results for orders placed or performed during the hospital encounter of 06/15/21   CBC Auto Differential   Result Value Ref Range    WBC 7.8 4.0 - 11.0 K/uL    RBC 3.66 (L) 3.80 - 5.80 M/uL    Hemoglobin 12.0 11.5 - 16.5 g/dL    Hematocrit 38.4 37.0 - 47.0 %    .9 (H) 80.0 - 100.0 fL    MCH 32.8 (H) 27.0 - 32.0 pg    MCHC 31.3 31.0 - 35.0 g/dL    RDW 13.5 11.0 - 16.0 %    Platelets 996 765 - 553 K/uL    MPV 11.6 (H) 6.0 - 10.0 fL    Neutrophils % 62.2 %    Immature Granulocytes % 0.5 0.0 - 5.0 %    Lymphocytes % 25.6 %    Monocytes % 8.0 %    Eosinophils % 3.2 %    Basophils % 0.5 %    Neutrophils Absolute 4.8 2.0 - 7.5 K/uL    Immature Granulocytes # 0.0 K/uL    Lymphocytes Absolute 2.0 1.5 - 4.0 K/uL    Monocytes Absolute 0.6 0.2 - 0.8 K/uL    Eosinophils Absolute 0.3 0.0 - 0.4 K/uL    Basophils Absolute 0.0 0.0 - 0.1 K/uL   Troponin   Result Value Ref Range    Troponin <0.30 <0.30 ng/mL   Brain Natriuretic Peptide   Result Value Ref Range    Pro- 0 - 1,800 pg/mL   Blood Gas, Arterial   Result Value Ref Range    pH, Arterial 7.353 7.350 - 7.450    pCO2, Arterial 57.4 (H) 35.0 - 45.0 mmHg    pO2, Arterial 168.8 (H) 80.0 - 100.0 mmHg    HCO3, Arterial 31.2 (H) 22.0 - 26.0 mmol/L    Base Excess, Arterial 4.5 (H) -3.0 - 3.0 mmol/L    O2 Sat, Arterial 99.2 >92 %    TCO2, Arterial 33.0 (H) 24.0 - 30.0 mmol/L    O2 Therapy Unknown     FIO2 0.32 Not Established %   Urinalysis, reflex to microscopic   Result Value Ref Range    Color, UA Yellow Straw/Yellow    Clarity, UA Clear Clear    Glucose, Ur Negative Negative mg/dL    Bilirubin Urine Negative Negative    Ketones, Urine Negative Negative mg/dL    Specific Gravity, UA 1.020 1.005 - 1.030    Blood, Urine Negative Negative    pH, UA 5.5 5.0 - 8.0    Protein, UA Negative Negative mg/dL    Urobilinogen, Urine 0.2 <2.0 E.U./dL    Nitrite, Urine Negative Negative    Leukocyte Esterase, Urine Negative Negative    Microscopic Examination Not Indicated     Urine Type Catheter    Lactic Acid, Plasma   Result Value Ref Range    Lactic Acid 1.8 0.4 - 2.0 mmol/L   Magnesium   Result Value Ref Range    Magnesium 1.5 (L) 1.7 - 2.4 mg/dL   Comprehensive Metabolic Panel   Result Value Ref Range    Sodium 137 136 - 145 mmol/L    Potassium 5.2 (H) 3.4 - 5.1 mmol/L    Chloride 100 98 - 107 mmol/L    CO2 30 20 - 30 mmol/L    Anion Gap 7 3 - 16    Glucose 156 (H) 74 - 106 mg/dL    BUN 16 6 - 20 mg/dL    CREATININE 1.3 (H) 0.4 - 1.2 mg/dL    GFR Non-African American 39 (L) >59    GFR  48 (L) >59    Calcium 9.2 8.5 - 10.5 mg/dL    Total Protein 6.8 6.4 - 8.3 g/dL    Albumin 3.5 3.4 - 4.8 g/dL    Albumin/Globulin Ratio 1.1 0.8 - 2.0    Total Bilirubin <0.2 (L) 0.3 - 1.2 mg/dL    Alkaline Phosphatase 65 25 - 100 U/L    ALT 7 4 - 36 U/L    AST 19 8 - 33 U/L    Globulin 3.3 g/dL   Blood Gas, Arterial   Result Value Ref Range    pH, Arterial 7.384 7.350 - 7.450    pCO2, Arterial 53.9 (H) 35.0 - 45.0 mmHg    pO2, Arterial 75.2 (L) 80.0 - 100.0 mmHg    HCO3, Arterial 31.4 (H) 22.0 - 26.0 mmol/L    Base Excess, Arterial 5.3 (H) -3.0 - 3.0 mmol/L    O2 Sat, Arterial 95.2 >92 %    TCO2, Arterial 33.1 (H) 24.0 - 30.0 mmol/L    O2 Therapy Unknown     FIO2 0.28 Not Established %   POCT Glucose   Result Value Ref Range    POC Glucose 192 (H) 74 - 106 mg/dl    Performed on ACCU-CHEK    POCT Glucose   Result Value Ref Range    POC Glucose 221 (H) 74 - 106 mg/dl    Performed on ACCU-CHEK         All other labs were within normal range or not returned as of this dictation.     EMERGENCY DEPARTMENT COURSE and DIFFERENTIAL DIAGNOSIS/MDM:   Vitals:    Vitals:    06/15/21 2015 06/15/21 2045 06/16/21 0240 06/16/21 0636   BP: (!) 146/38 (!) 147/69 (!) 148/54 (!) 158/63   Pulse:  72 73 72   Resp: 15 18 18 18 Temp:  98.1 °F (36.7 °C) 96.9 °F (36.1 °C) 97.9 °F (36.6 °C)   TempSrc:  Oral Temporal Temporal   SpO2:  97% 96% 97%   Weight:       Height:         Patient remained stable while in the emergency department. I discussed this case with Dr. Radha Gandhi will admit patient to the hospital for further stabilization with treatment. .         CONSULTS:  None    PROCEDURES:  Procedures    CRITICAL CARE TIME    Total Critical Care time was 0 minutes, excluding separately reportable procedures. There was a high probability of clinically significant/life threatening deterioration in the patient's condition which required my urgent intervention. FINAL IMPRESSION      1. Dyspnea and respiratory abnormalities Stable   2. Hyperkalemia Stable   3. Respiratory failure, unspecified chronicity, unspecified whether with hypoxia or hypercapnia ([facility] [address] Stable         DISPOSITION/PLAN   DISPOSITION  06/15/2021 07:55:50 PM      PATIENT REFERRED TO:  MIRNA Peña - [address]  [phone]    In 2 days  If symptoms worsen      DISCHARGE MEDICATIONS:  Current Discharge Medication List          Comment: Please note this report has been produced using speech recognition software and may contain errors related to that system including errors in grammar, punctuation, and spelling, as well as words and phrases that may be inappropriate. If there are any questions or concerns please feel free to contact the dictating provider forclarification.     Hay Grayson DO  Attending Emergency Physician                  Bernarda Wilkerson DO  06/16/21 1974

## 2021-06-15 NOTE — ED NOTES
Patient with c/o shortness of breath and patient states that family states that she has been hallucinating.      Marco Antonio Wallis RN  06/15/21 9474

## 2021-06-15 NOTE — ED NOTES
Patients daughter Juan Antonio Guy brought back at this time to exam room.       Katlyn Pugh RN  06/15/21 6094

## 2021-06-15 NOTE — ED NOTES
Called to get a bed for patient at this time, supervisor will call me back.      Geovanna Lee RN  06/15/21 3502

## 2021-06-15 NOTE — ED NOTES
Patients oxygen turned down to 2 liters per nasal cannula at this time.      Ayse Buckner RN  06/15/21 6199

## 2021-06-15 NOTE — ED NOTES
Patient incontinent of moderate amount of urine at this time per adult pull up, migdalia care given and patient changed into an adult brief after in and out cath for specimen done at this time.      Marlyn Hills RN  06/15/21 6203

## 2021-06-16 ENCOUNTER — APPOINTMENT (OUTPATIENT)
Dept: CT IMAGING | Facility: HOSPITAL | Age: 81
End: 2021-06-16
Payer: MEDICARE

## 2021-06-16 PROBLEM — J96.10 CHRONIC RESPIRATORY FAILURE (HCC): Status: ACTIVE | Noted: 2021-06-16

## 2021-06-16 PROBLEM — R41.82 ALTERED MENTAL STATUS: Status: ACTIVE | Noted: 2021-06-16

## 2021-06-16 PROBLEM — Z86.711 HISTORY OF PULMONARY EMBOLISM: Status: ACTIVE | Noted: 2021-06-16

## 2021-06-16 PROBLEM — Z86.73 HISTORY OF STROKE: Status: ACTIVE | Noted: 2021-06-16

## 2021-06-16 PROBLEM — E83.42 HYPOMAGNESEMIA: Status: ACTIVE | Noted: 2021-06-16

## 2021-06-16 PROBLEM — R53.1 GENERALIZED WEAKNESS: Status: ACTIVE | Noted: 2021-06-16

## 2021-06-16 LAB
AMMONIA: 41 MCG/DL (ref 19–87)
AMPHETAMINE SCREEN, URINE: ABNORMAL
BARBITURATE SCREEN URINE: ABNORMAL
BENZODIAZEPINE SCREEN, URINE: POSITIVE
BUPRENORPHINE QUAL, URINE: ABNORMAL
CANNABINOID SCREEN URINE: ABNORMAL
COCAINE METABOLITE SCREEN URINE: ABNORMAL
FOLATE: 12.14 NG/ML
GLUCOSE BLD-MCNC: 221 MG/DL (ref 74–106)
GLUCOSE BLD-MCNC: 229 MG/DL (ref 74–106)
GLUCOSE BLD-MCNC: 247 MG/DL (ref 74–106)
GLUCOSE BLD-MCNC: 271 MG/DL (ref 74–106)
GLUCOSE BLD-MCNC: 309 MG/DL (ref 74–106)
HBA1C MFR BLD: 7.2 %
Lab: ABNORMAL
MAGNESIUM: 1.7 MG/DL (ref 1.7–2.4)
METHADONE SCREEN, URINE: ABNORMAL
METHAMPHETAMINE, URINE: ABNORMAL
OPIATE SCREEN URINE: POSITIVE
PERFORMED ON: ABNORMAL
PHENCYCLIDINE SCREEN URINE: ABNORMAL
PROPOXYPHENE SCREEN, URINE: ABNORMAL
RAPID INFLUENZA  B AGN: NEGATIVE
RAPID INFLUENZA A AGN: NEGATIVE
SARS-COV-2, NAAT: NOT DETECTED
TOTAL CK: 58 U/L (ref 26–174)
TRICYCLIC, URINE: ABNORMAL
UR OXYCODONE RAPID SCREEN: ABNORMAL
VITAMIN B-12: 569 PG/ML (ref 211–911)

## 2021-06-16 PROCEDURE — G0378 HOSPITAL OBSERVATION PER HR: HCPCS

## 2021-06-16 PROCEDURE — 99219 PR INITIAL OBSERVATION CARE/DAY 50 MINUTES: CPT | Performed by: INTERNAL MEDICINE

## 2021-06-16 PROCEDURE — 87635 SARS-COV-2 COVID-19 AMP PRB: CPT

## 2021-06-16 PROCEDURE — 82140 ASSAY OF AMMONIA: CPT

## 2021-06-16 PROCEDURE — 87086 URINE CULTURE/COLONY COUNT: CPT

## 2021-06-16 PROCEDURE — 97530 THERAPEUTIC ACTIVITIES: CPT

## 2021-06-16 PROCEDURE — 87804 INFLUENZA ASSAY W/OPTIC: CPT

## 2021-06-16 PROCEDURE — 36415 COLL VENOUS BLD VENIPUNCTURE: CPT

## 2021-06-16 PROCEDURE — 82746 ASSAY OF FOLIC ACID SERUM: CPT

## 2021-06-16 PROCEDURE — 97162 PT EVAL MOD COMPLEX 30 MIN: CPT

## 2021-06-16 PROCEDURE — 87186 SC STD MICRODIL/AGAR DIL: CPT

## 2021-06-16 PROCEDURE — 83735 ASSAY OF MAGNESIUM: CPT

## 2021-06-16 PROCEDURE — 87077 CULTURE AEROBIC IDENTIFY: CPT

## 2021-06-16 PROCEDURE — 82607 VITAMIN B-12: CPT

## 2021-06-16 PROCEDURE — 80307 DRUG TEST PRSMV CHEM ANLYZR: CPT

## 2021-06-16 PROCEDURE — 96365 THER/PROPH/DIAG IV INF INIT: CPT

## 2021-06-16 PROCEDURE — 83036 HEMOGLOBIN GLYCOSYLATED A1C: CPT

## 2021-06-16 PROCEDURE — 70450 CT HEAD/BRAIN W/O DYE: CPT

## 2021-06-16 PROCEDURE — 97166 OT EVAL MOD COMPLEX 45 MIN: CPT

## 2021-06-16 PROCEDURE — 82550 ASSAY OF CK (CPK): CPT

## 2021-06-16 PROCEDURE — 6370000000 HC RX 637 (ALT 250 FOR IP): Performed by: PHYSICIAN ASSISTANT

## 2021-06-16 PROCEDURE — 6370000000 HC RX 637 (ALT 250 FOR IP): Performed by: INTERNAL MEDICINE

## 2021-06-16 PROCEDURE — 2700000000 HC OXYGEN THERAPY PER DAY

## 2021-06-16 PROCEDURE — 6360000002 HC RX W HCPCS: Performed by: PHYSICIAN ASSISTANT

## 2021-06-16 RX ORDER — BUSPIRONE HYDROCHLORIDE 7.5 MG/1
7.5 TABLET ORAL 2 TIMES DAILY
COMMUNITY

## 2021-06-16 RX ORDER — PRAVASTATIN SODIUM 20 MG
20 TABLET ORAL NIGHTLY
Status: DISCONTINUED | OUTPATIENT
Start: 2021-06-16 | End: 2021-06-17 | Stop reason: HOSPADM

## 2021-06-16 RX ORDER — LORAZEPAM 0.5 MG/1
0.5 TABLET ORAL 2 TIMES DAILY PRN
COMMUNITY

## 2021-06-16 RX ORDER — MAGNESIUM SULFATE 1 G/100ML
1000 INJECTION INTRAVENOUS ONCE
Status: COMPLETED | OUTPATIENT
Start: 2021-06-16 | End: 2021-06-16

## 2021-06-16 RX ORDER — GABAPENTIN 600 MG/1
600 TABLET ORAL 3 TIMES DAILY
COMMUNITY

## 2021-06-16 RX ORDER — BUSPIRONE HYDROCHLORIDE 5 MG/1
7.5 TABLET ORAL 2 TIMES DAILY
Status: DISCONTINUED | OUTPATIENT
Start: 2021-06-16 | End: 2021-06-17 | Stop reason: HOSPADM

## 2021-06-16 RX ORDER — LORAZEPAM 0.5 MG/1
0.5 TABLET ORAL 2 TIMES DAILY PRN
Status: DISCONTINUED | OUTPATIENT
Start: 2021-06-16 | End: 2021-06-17 | Stop reason: HOSPADM

## 2021-06-16 RX ORDER — ALBUTEROL SULFATE 90 UG/1
AEROSOL, METERED RESPIRATORY (INHALATION)
COMMUNITY

## 2021-06-16 RX ADMIN — BUSPIRONE HYDROCHLORIDE 7.5 MG: 5 TABLET ORAL at 08:49

## 2021-06-16 RX ADMIN — INSULIN LISPRO 2 UNITS: 100 INJECTION, SOLUTION INTRAVENOUS; SUBCUTANEOUS at 08:50

## 2021-06-16 RX ADMIN — PANTOPRAZOLE SODIUM 40 MG: 40 TABLET, DELAYED RELEASE ORAL at 06:06

## 2021-06-16 RX ADMIN — OXYBUTYNIN CHLORIDE 5 MG: 5 TABLET ORAL at 08:50

## 2021-06-16 RX ADMIN — BUSPIRONE HYDROCHLORIDE 7.5 MG: 5 TABLET ORAL at 20:55

## 2021-06-16 RX ADMIN — INSULIN GLARGINE 50 UNITS: 100 INJECTION, SOLUTION SUBCUTANEOUS at 20:59

## 2021-06-16 RX ADMIN — PROPRANOLOL HYDROCHLORIDE 40 MG: 20 TABLET ORAL at 20:55

## 2021-06-16 RX ADMIN — PRAVASTATIN SODIUM 20 MG: 20 TABLET ORAL at 21:57

## 2021-06-16 RX ADMIN — APIXABAN 5 MG: 5 TABLET, FILM COATED ORAL at 08:49

## 2021-06-16 RX ADMIN — INSULIN LISPRO 2 UNITS: 100 INJECTION, SOLUTION INTRAVENOUS; SUBCUTANEOUS at 17:32

## 2021-06-16 RX ADMIN — FERROUS SULFATE TAB EC 324 MG (65 MG FE EQUIVALENT) 324 MG: 324 (65 FE) TABLET DELAYED RESPONSE at 08:49

## 2021-06-16 RX ADMIN — DOCUSATE SODIUM 50MG AND SENNOSIDES 8.6MG 1 TABLET: 8.6; 5 TABLET, FILM COATED ORAL at 20:56

## 2021-06-16 RX ADMIN — ASPIRIN 81 MG: 81 TABLET, COATED ORAL at 08:49

## 2021-06-16 RX ADMIN — ALOGLIPTIN 6.25 MG: 12.5 TABLET, FILM COATED ORAL at 08:49

## 2021-06-16 RX ADMIN — OXYBUTYNIN CHLORIDE 5 MG: 5 TABLET ORAL at 20:55

## 2021-06-16 RX ADMIN — LORAZEPAM 0.5 MG: 0.5 TABLET ORAL at 14:31

## 2021-06-16 RX ADMIN — INSULIN LISPRO 1 UNITS: 100 INJECTION, SOLUTION INTRAVENOUS; SUBCUTANEOUS at 20:59

## 2021-06-16 RX ADMIN — BUSPIRONE HYDROCHLORIDE 7.5 MG: 5 TABLET ORAL at 14:01

## 2021-06-16 RX ADMIN — PROPRANOLOL HYDROCHLORIDE 40 MG: 20 TABLET ORAL at 08:48

## 2021-06-16 RX ADMIN — INSULIN LISPRO 4 UNITS: 100 INJECTION, SOLUTION INTRAVENOUS; SUBCUTANEOUS at 11:12

## 2021-06-16 RX ADMIN — MAGNESIUM SULFATE HEPTAHYDRATE 1000 MG: 1 INJECTION, SOLUTION INTRAVENOUS at 08:57

## 2021-06-16 RX ADMIN — DOCUSATE SODIUM 50MG AND SENNOSIDES 8.6MG 1 TABLET: 8.6; 5 TABLET, FILM COATED ORAL at 08:49

## 2021-06-16 RX ADMIN — GABAPENTIN 600 MG: 300 CAPSULE ORAL at 08:57

## 2021-06-16 RX ADMIN — APIXABAN 5 MG: 5 TABLET, FILM COATED ORAL at 20:55

## 2021-06-16 ASSESSMENT — PAIN DESCRIPTION - PAIN TYPE: TYPE: CHRONIC PAIN

## 2021-06-16 ASSESSMENT — PAIN SCALES - GENERAL: PAINLEVEL_OUTOF10: 3

## 2021-06-16 ASSESSMENT — PAIN DESCRIPTION - LOCATION: LOCATION: BACK

## 2021-06-16 NOTE — PROGRESS NOTES
Occupational Therapy   Occupational Therapy Initial Assessment  Date: 2021   Patient Name: Oumou Ko  MRN: [id number]     : 1940    Date of Service: 2021    Discharge Recommendations:  Home with Home health OT  OT Equipment Recommendations  Equipment Needed: No    Assessment   Performance deficits / Impairments: Decreased functional mobility ; Decreased ADL status; Decreased ROM; Decreased strength;Decreased endurance;Decreased high-level IADLs;Decreased safe awareness;Decreased balance;Decreased coordination;Decreased posture  Assessment: Pt agreeable to OT services. Pt spouse hospitalized who typically assists with caregiver role. Pt has strong social support from family at home. Pt reports being sedentary at baseline. She is non ambulatory and does not get in [facility] to participate in [facility] mobility around home. Pt reports she stays in lift chair throughout day. Pt requires assistance to complete SPT from chair to [facility] from family at baseline. Pt required assist to come to sit at EOB on this date. Upon sitting at EOB pt required CGA<>Min assist to maintain balance. Pt leans to left while sitting upright at EOB requiring cues and assist to correct. Pt come to stand 4 trials at EOB with CGA. Pt side stepped at EOB ~3 steps with CGA x2 and RW. Nursing staff came to check BP and had difficulty getting reading in seated position. Pt assisted to supine and left in care of nursing staff. Pt impulsive and presents with safety awareness deficits. Pt presents with generalized weakness and has difficulty performing ADLs increasing caregiver burden. Pt will benefit from skilled OT services while IP to improve independence and safety with transfers and ADL tasks.   Prognosis: Good  Decision Making: Medium Complexity  REQUIRES OT FOLLOW UP: Yes  Activity Tolerance  Activity Tolerance: Patient limited by fatigue           Patient Diagnosis(es): The primary encounter diagnosis was Dyspnea and respiratory abnormalities. Diagnoses of Hyperkalemia and Respiratory failure, unspecified chronicity, unspecified whether with hypoxia or hypercapnia ([address] were also pertinent to this visit. has a past medical history of Allergic rhinitis, Anxiety, Chronic back pain, Depression, Double vision with both eyes open, Fall, Hyperlipidemia, Hypertension, Osteoarthritis, and Type II or unspecified type diabetes mellitus without mention of complication, not stated as uncontrolled. has a past surgical history that includes Hysterectomy;  section; Colonoscopy; and shoulder surgery. Restrictions  Restrictions/Precautions  Restrictions/Precautions: Fall Risk, General Precautions  Required Braces or Orthoses?: No    Subjective   General  Chart Reviewed: Yes  Patient assessed for rehabilitation services?: Yes  Family / Caregiver Present: Yes  Referring Practitioner: Yuki Keen PA-C  Diagnosis: hypoxia  Subjective  Subjective: Daughter present in room providing report pt lying in bed. Pt daughter states she has had increased difficulty transferring pt and pt is weak. Pt agreeable to OT services.   Patient Currently in Pain: Yes  Pain Assessment  Pain Level: 3  Pain Type: Chronic pain  Pain Location: Back  Vital Signs  Temp: 96.8 °F (36 °C)  Temp Source: Temporal  Pulse: 78  Heart Rate Source: Monitor  Resp: 20  BP: (!) 168/90 (manual)  Patient Currently in Pain: Yes  Oxygen Therapy  SpO2: 96 %  O2 Device: Nasal cannula  O2 Flow Rate (L/min): 2 L/min  Social/Functional History  Social/Functional History  Lives With: Spouse  Type of Home: House  Home Layout: One level  Home Access: Level entry  Bathroom Shower/Tub:  (sponge bath)  Bathroom Toilet: Bedside commode  Home Equipment: Rolling walker, Wheelchair-manual, Oxygen  Receives Help From: Family  ADL Assistance: Independent  Homemaking Assistance: Independent  Transfer Assistance: Needs assistance  Active : No       Objective   Vision: Impaired  Vision

## 2021-06-16 NOTE — PROGRESS NOTES
I reviewed the patient's medication list from [facility] and [facility] HARPREET STAPLES. I spoke with the patient and her daughter about her home medications. I added the following medications per pharmacy fill history and information provided from the patient and her daughter:  -albuterol HFA     I changed the following medications per pharmacy fill history and information provided from the patient and her daughter:  -buspirone 7.5 mg from three times daily to twice daily   -lorazepam 0.5 mg from daily to twice daily   -patient has completed loading dose of Eliquis and now takes 5 mg twice daily   -gabapentin product from 300 mg capsule to 600 mg tablet     I removed the following medications per pharmacy fill history and information provided from the patient and her daughter:   -diclofenac 0.1% oph solution: daughter states she no longer uses this medication   -senna-docusate 8.6-50 mg: daughter states she no longer uses this medication   -polyethylene glycol packet: daughter states she no longer uses this medication   -pravastatin 20 mg tablet: daughter states she no longer uses this medication   -Januvia 25 mg tablet: daughter states she no longer uses this medication   -Novolog sliding scale: patient injects 15 units three times daily before meals scheduled   -Ferrous Fumarate 324 (106 Fe) mg tablets: patient is taking ferrous sulfate 324 (65 Fe) mg EC tablet     Of note, patient states she uses her albuterol inhaler every 2 hours as needed. Her prescription states 2 puffs every 4-6 hours as needed for cough and wheezing. Notified provider KEATON Arias.     Heladio Aguilar, Pharmacy Student

## 2021-06-16 NOTE — H&P
History and Physical    Patient:  Henrietta Becerril    CHIEF COMPLAINT:    Shortness of breath, altered mental status, generalized weakness    HISTORY OF PRESENT ILLNESS:   The patient is a 79 y.o. female with PMH anxiety, HTN, stroke, DM II, osteoarthritis, PE on eliquis who presents due to shortness of breath, generalized weakness, and altered mental status. According to the patient she denies shortness of breath or confusion. She does admit to generalized weakness. States she had to come to the hospital to get some strength back and work with therapy. No family at bedside. On 2L supplemental O2 at time of exam and per nursing staff she uses 1.5-2L at home chronically. No acute distress. No cough, soa, fever, abdominal pain. Answers questions appropriately. PT/OT worked with patient and she is at baseline functional status. She sits in chair at all times and transfers to bedside commode with assistance at baseline. Called daughter Elinor Honeycutt to obtain further history. Per Elinor Honeycutt patient has not been acting herself for the last 3 days. She has been talking out of her head and reaching for things that aren't there. She has been more confused than usual especially at night. States her mommy is usually pretty sharp but not the last few days. Has been urinating more often than usual. Only recent medication change is gabapentin dose was doubled last month. Yesterday patient seemed to be short of breath even using her oxygen and continued to be confused so they brought her to the hospital. Daughter states she must be severely dehydrated or have  A UTI. Family also feels she is very weak. Of note the patient's  who is her primary caregiver is also hospitalized at this time and one of her daughters has been staying with her.      Past Medical History:      Diagnosis Date    Allergic rhinitis     Anxiety     Chronic back pain     Depression     Double vision with both eyes open     Pt states she can see ok with one times daily   Yes Historical Provider, MD   vitamin D (ERGOCALCIFEROL) 1.25 MG (53519 UT) CAPS capsule Take 50,000 Units by mouth once a week   Yes Historical Provider, MD   aspirin 81 MG tablet Take 1 tablet by mouth daily 4/20/20 6/15/21 Yes KEATON Marquez   diclofenac sodium (VOLTAREN) 1 % GEL Apply 1 g topically 4 times daily as needed for Pain 4/20/20  Yes KEATON Marquez   ferrous sulfate 324 (65 Fe) MG EC tablet Take 1 tablet by mouth daily (with breakfast) 4/21/20  Yes KEATON Marquez   omeprazole (PRILOSEC) 20 MG delayed release capsule Take 2 capsules by mouth daily 4/20/20  Yes KEATON Marquez   vitamin D (CHOLECALCIFEROL) 25 MCG (1000 UT) TABS tablet Take 1 tablet by mouth daily 4/20/20  Yes KEATON Marquez   propranolol (INDERAL) 40 MG tablet Take 1 tablet by mouth 2 times daily 4/20/20  Yes KEATON Marquez   meclizine (ANTIVERT) 25 MG tablet Take 1 tablet by mouth 3 times daily as needed for Dizziness 1/18/20  Yes KEATON Marquez   glucose monitoring kit (FREESTYLE) monitoring kit 1 kit by Does not apply route daily 1/18/20  Yes KEATON Marquez       Allergies:  Latex, Penicillins, Sulfa antibiotics, Tetracyclines & related, and Diazepam    Social History:   TOBACCO:   reports that she has never smoked. She has never used smokeless tobacco.  ETOH:   reports no history of alcohol use. OCCUPATION:  None     Family History:       Problem Relation Age of Onset    Heart Disease Mother         CHF,CAD    Cancer Maternal Aunt     High Blood Pressure Sister        Review of system  Obtained from patient and family  Constitutional:  Denies fever or chills. Reports generalized weakness. Eyes:  Denies eye pain or redness  HENT:  Denies nasal congestion or sore throat   Respiratory:  Denies cough or shortness of breath   Cardiovascular:  Denies chest pain or edema   GI:  Denies abdominal pain, nausea, vomiting, bloody stools or diarrhea   :  Denies dysuria.  Family reports urinary frequency  Musculoskeletal:  Denies acute neck pain or body aches. Positive for chronic arthralgias. Integument:  Denies rash or itching  Neurologic:  Denies headache, dizziness, numbness, tingling or unilateral weakness  Psychiatric:  Denies acute depression or acute anxiety      Vital Signs  Temp: 97.6 °F (36.4 °C)  Pulse: 64  Resp: 20  BP: (!) 126/52  SpO2: 97 %  O2 Device: Nasal cannula  O2 Flow Rate (L/min): 2 L/min    vital signs reviewed in electronic chart. Physical exam  Constitutional:  Well developed, obese, no acute distress  Eyes:  PERRL, no scleral icterus, conjunctiva normal   HENT:  Atraumatic, external ears normal, nose normal, oropharynx moist, no pharyngeal exudates. Neck- supple, no JVD, no lymphadenopathy  Respiratory:  No respiratory distress on 2L O2, no wheezing, rales or rhonchi detected  Cardiovascular:  Normal rate, normal rhythm, no murmurs, no gallops, no rubs, no edema   GI:  Soft, nondistended, normal bowel sounds, nontender, no voluntary guarding  Musculoskeletal:  No cyanosis or obvious acute deformity. Moving all extremities   Integument:  Warm and dry. Neurologic:  Alert & oriented to self, place, situation. Answers questions appropriately.  no apparent focal deficits noted   Psychiatric:  Speech and behavior appropriate       Lab Results   Component Value Date     06/15/2021    K 5.2 (H) 06/15/2021     06/15/2021    CO2 30 06/15/2021    BUN 16 06/15/2021    CREATININE 1.3 (H) 06/15/2021    GLUCOSE 156 (H) 06/15/2021    CALCIUM 9.2 06/15/2021    PROT 6.8 06/15/2021    LABALBU 3.5 06/15/2021    BILITOT <0.2 (L) 06/15/2021    ALKPHOS 65 06/15/2021    AST 19 06/15/2021    ALT 7 06/15/2021    LABGLOM 39 (L) 06/15/2021    GFRAA 48 (L) 06/15/2021    AGRATIO 1.1 06/15/2021    GLOB 3.3 06/15/2021           Lab Results   Component Value Date    WBC 7.8 06/15/2021    HGB 12.0 06/15/2021    HCT 38.4 06/15/2021    .9 (H) 06/15/2021     06/15/2021     CT HEAD WO CONTRAST   Final Result      1. No intracranial hemorrhage, mass effect or large acute territorial infarction      XR CHEST PORTABLE   Final Result      No acute disease. Assessment and Plan     Active Hospital Problems    Diagnosis Date Noted    Altered mental status [R41.82]  - Family report AMS at home. No confusion noted during hospital course. Placed in observation.   - UA negative, ammonia within normal limits, renal function improved from previous, CXR negative, B12 and folate within normal limits  - Gabapentin placed on hold and if restarted needs to be renally dosed - home dose that was recently increased not appropriate with CrCl   - Will obtain CT head   - obtain urine culture per family request   - possible component of dementia - plan to discuss with pcp rob yanes   - monitor overnight  06/16/2021    History of pulmonary embolism [Z86.711]  - continue eliquis   - continue supplemental o2  06/16/2021    Generalized weakness [R53.1]  - Labs unremarkable  - pt/ot consulted and feel patient is at her baseline for discharge home. At baseline she sits in chair all day with occasional transfer to [facility] with assistance. - fall precautions ordered   - monitor in observation  06/16/2021    Chronic respiratory failure ([facility] [address] [J96.10]  - per staff report patient on 1.5-2L O2 at baseline continuously   - family report SOA 6/15 and O2 sats in 90s on 2.5L O2   - COVID and influenza negative   - CXR negative   - no acute SOA, cough, fever   - mointor  06/16/2021    Hypomagnesemia [E83.42]  - Replaced and repeat Mag within normal limits 06/16/2021    History of stroke [Z86.73]  - Previous CT head 2019 with evidence of old infarct   - on ASA. Per pharmacy not taking statin. Resume statin.    - obtain CT head with family report AMS  06/16/2021    Hyperkalemia [E87.5]  - Mild at 5.2 and has been chronic issue  - low K diet ordered   - monitor  04/20/2020    Anxiety and depression [F41.9, F32.9]  - continue home regimen including ativan prn  12/20/2019    Peripheral neuropathy [G62.9]  - hold gabapentin at this time with reported AMS  - if resumed needs to be renally dosed  12/20/2019    CKD (chronic kidney disease) stage 3, GFR 30-59 ml/min ([facility]) [N18.30]  - Cr 1.3 which is actually improved from previous admission when Cr 1.6   - avoid nephrotoxins and nsaids  12/20/2019    HTN (hypertension) [I10]  - BP stable  - monitor  10/14/2011    Type 2 diabetes mellitus with stage 3 chronic kidney disease, with long-term current use of insulin ([facility]) [E11.22, N18.30, Z79.4]  - A1C 7.2  - Continue lantus with sliding scale insulin coverage  - encourage diabetic diet  07/05/2011     Patient was seen and examined by Dr. Eliza Laura and plan of care reviewed.     KEATON Yanez certifies per CMS regulation for 42 CFR [phone]), that the patient may reasonably be expected to be discharged or transferred to a hospital within 96 hours after admission to [address] [place] [address]    Electronically signed by KEATON Yanez on 6/16/2021 at 4:47 PM

## 2021-06-16 NOTE — CARE COORDINATION
06/16/21 1416   Discharge [address] Spouse/Significant Other  (family staying with pt while  is in hospital)   Jeffrey Paulson Spouse/Significant Other;Family Members; Children   Current Services Prior To Admission Durable Medical Equipment; Oxygen Therapy  (oxygen company in [place])   DME [employer]; Wheelchair;Bedside Commode;Glucometer;Oxygen Therapy (Comment); Home Aerosol; Hospital Bed  (reclining lift chair, DME in [place])   Potential Assistance Needed Durable Medical Equipment;Home Care   Potential Assistance Purchasing Medications No   DME Bedside Commode; Shower Chair   Type of [address] OT;PT;Nursing Services   Patient expects to be discharged to: home with [place]   Expected Discharge Date 06/17/21   Discharge planning discussed with pt and pt daughter at bedside. Pt is alert and oriented at this time but daughter answering most of questions as pt lays there. Pt daughter requesting pt needs more therapy and may need home health at d/c. Pt daughter is also looking for some new equipment, BSC, SC and recliner lift.

## 2021-06-17 VITALS
WEIGHT: 221 LBS | HEIGHT: 68 IN | TEMPERATURE: 96.8 F | RESPIRATION RATE: 18 BRPM | HEART RATE: 71 BPM | DIASTOLIC BLOOD PRESSURE: 30 MMHG | OXYGEN SATURATION: 99 % | SYSTOLIC BLOOD PRESSURE: 142 MMHG | BODY MASS INDEX: 33.49 KG/M2

## 2021-06-17 LAB
A/G RATIO: 1.3 (ref 0.8–2)
ALBUMIN SERPL-MCNC: 3.5 G/DL (ref 3.4–4.8)
ALP BLD-CCNC: 62 U/L (ref 25–100)
ALT SERPL-CCNC: 6 U/L (ref 4–36)
ANION GAP SERPL CALCULATED.3IONS-SCNC: 7 MMOL/L (ref 3–16)
AST SERPL-CCNC: 19 U/L (ref 8–33)
BILIRUB SERPL-MCNC: 0.3 MG/DL (ref 0.3–1.2)
BUN BLDV-MCNC: 22 MG/DL (ref 6–20)
CALCIUM SERPL-MCNC: 9.3 MG/DL (ref 8.5–10.5)
CHLORIDE BLD-SCNC: 101 MMOL/L (ref 98–107)
CO2: 31 MMOL/L (ref 20–30)
CREAT SERPL-MCNC: 1.5 MG/DL (ref 0.4–1.2)
GFR AFRICAN AMERICAN: 40
GFR NON-AFRICAN AMERICAN: 33
GLOBULIN: 2.6 G/DL
GLUCOSE BLD-MCNC: 223 MG/DL (ref 74–106)
GLUCOSE BLD-MCNC: 231 MG/DL (ref 74–106)
GLUCOSE BLD-MCNC: 267 MG/DL (ref 74–106)
GLUCOSE BLD-MCNC: 306 MG/DL (ref 74–106)
HCT VFR BLD CALC: 38 % (ref 37–47)
HEMOGLOBIN: 11.7 G/DL (ref 11.5–16.5)
MAGNESIUM: 1.8 MG/DL (ref 1.7–2.4)
MCH RBC QN AUTO: 33.1 PG (ref 27–32)
MCHC RBC AUTO-ENTMCNC: 30.8 G/DL (ref 31–35)
MCV RBC AUTO: 107.3 FL (ref 80–100)
PDW BLD-RTO: 13.6 % (ref 11–16)
PERFORMED ON: ABNORMAL
PLATELET # BLD: 159 K/UL (ref 150–400)
PMV BLD AUTO: 12.1 FL (ref 6–10)
POTASSIUM SERPL-SCNC: 5.2 MMOL/L (ref 3.4–5.1)
RBC # BLD: 3.54 M/UL (ref 3.8–5.8)
SODIUM BLD-SCNC: 139 MMOL/L (ref 136–145)
TOTAL PROTEIN: 6.1 G/DL (ref 6.4–8.3)
WBC # BLD: 8 K/UL (ref 4–11)

## 2021-06-17 PROCEDURE — 85027 COMPLETE CBC AUTOMATED: CPT

## 2021-06-17 PROCEDURE — 97110 THERAPEUTIC EXERCISES: CPT

## 2021-06-17 PROCEDURE — 97530 THERAPEUTIC ACTIVITIES: CPT

## 2021-06-17 PROCEDURE — 83735 ASSAY OF MAGNESIUM: CPT

## 2021-06-17 PROCEDURE — 2700000000 HC OXYGEN THERAPY PER DAY

## 2021-06-17 PROCEDURE — 6370000000 HC RX 637 (ALT 250 FOR IP): Performed by: INTERNAL MEDICINE

## 2021-06-17 PROCEDURE — 36415 COLL VENOUS BLD VENIPUNCTURE: CPT

## 2021-06-17 PROCEDURE — 80053 COMPREHEN METABOLIC PANEL: CPT

## 2021-06-17 PROCEDURE — 99217 PR OBSERVATION CARE DISCHARGE MANAGEMENT: CPT | Performed by: INTERNAL MEDICINE

## 2021-06-17 PROCEDURE — G0378 HOSPITAL OBSERVATION PER HR: HCPCS

## 2021-06-17 PROCEDURE — 6370000000 HC RX 637 (ALT 250 FOR IP): Performed by: PHYSICIAN ASSISTANT

## 2021-06-17 PROCEDURE — 97535 SELF CARE MNGMENT TRAINING: CPT

## 2021-06-17 RX ORDER — PRAVASTATIN SODIUM 20 MG
20 TABLET ORAL NIGHTLY
Qty: 30 TABLET | Refills: 0 | Status: SHIPPED | OUTPATIENT
Start: 2021-06-17

## 2021-06-17 RX ADMIN — ASPIRIN 81 MG: 81 TABLET, COATED ORAL at 08:02

## 2021-06-17 RX ADMIN — PANTOPRAZOLE SODIUM 40 MG: 40 TABLET, DELAYED RELEASE ORAL at 06:56

## 2021-06-17 RX ADMIN — INSULIN LISPRO 4 UNITS: 100 INJECTION, SOLUTION INTRAVENOUS; SUBCUTANEOUS at 17:10

## 2021-06-17 RX ADMIN — LORAZEPAM 0.5 MG: 0.5 TABLET ORAL at 08:01

## 2021-06-17 RX ADMIN — FERROUS SULFATE TAB EC 324 MG (65 MG FE EQUIVALENT) 324 MG: 324 (65 FE) TABLET DELAYED RESPONSE at 08:00

## 2021-06-17 RX ADMIN — OXYBUTYNIN CHLORIDE 5 MG: 5 TABLET ORAL at 08:01

## 2021-06-17 RX ADMIN — APIXABAN 5 MG: 5 TABLET, FILM COATED ORAL at 08:01

## 2021-06-17 RX ADMIN — Medication 1000 UNITS: at 08:01

## 2021-06-17 RX ADMIN — BUSPIRONE HYDROCHLORIDE 7.5 MG: 5 TABLET ORAL at 08:01

## 2021-06-17 RX ADMIN — HYDROCODONE BITARTRATE AND ACETAMINOPHEN 1 TABLET: 10; 325 TABLET ORAL at 08:04

## 2021-06-17 RX ADMIN — INSULIN LISPRO 2 UNITS: 100 INJECTION, SOLUTION INTRAVENOUS; SUBCUTANEOUS at 08:05

## 2021-06-17 RX ADMIN — POLYETHYLENE GLYCOL 3350 17 G: 17 POWDER, FOR SOLUTION ORAL at 08:00

## 2021-06-17 RX ADMIN — DOCUSATE SODIUM 50MG AND SENNOSIDES 8.6MG 1 TABLET: 8.6; 5 TABLET, FILM COATED ORAL at 08:01

## 2021-06-17 RX ADMIN — PROPRANOLOL HYDROCHLORIDE 40 MG: 20 TABLET ORAL at 08:00

## 2021-06-17 RX ADMIN — INSULIN LISPRO 3 UNITS: 100 INJECTION, SOLUTION INTRAVENOUS; SUBCUTANEOUS at 11:15

## 2021-06-17 ASSESSMENT — PAIN SCALES - GENERAL: PAINLEVEL_OUTOF10: 3

## 2021-06-17 NOTE — DISCHARGE SUMMARY
Discharge Summary      Patient ID: Lilliam Carver      Patient's PCP: MIRNA Zhang - CNP    Admit Date: 6/15/2021     Discharge Date:  6/17/2021    Admitting Provider: Leander Sierra MD    Discharging Provider: KEATON Rock     Reason for this admission:   Generalized weakness  Altered mental status    Discharge Diagnoses: Active Hospital Problems    Diagnosis Date Noted    Altered mental status [R41.82] 06/16/2021    History of pulmonary embolism [Z86.711] 06/16/2021    Generalized weakness [R53.1] 06/16/2021    Chronic respiratory failure ([facility] [address] [J96.10] 06/16/2021    Hypomagnesemia [E83.42] 06/16/2021    History of stroke [Z86.73] 06/16/2021    Hyperkalemia [E87.5] 04/20/2020    Anxiety and depression [F41.9, F32.9] 12/20/2019    Peripheral neuropathy [G62.9] 12/20/2019    CKD (chronic kidney disease) stage 3, GFR 30-59 ml/min (HCC) [N18.30] 12/20/2019    HTN (hypertension) [I10] 10/14/2011    Type 2 diabetes mellitus with stage 3 chronic kidney disease, with long-term current use of insulin ([facility] [address] [E11.22, N18.30, Z79.4] 07/05/2011       Procedures:  CT HEAD WO CONTRAST   Final Result      1. No intracranial hemorrhage, mass effect or large acute territorial infarction      XR CHEST PORTABLE   Final Result      No acute disease. Consults:   PT/OT    Briefly:   Ms. Naz Wild is a 80 yo female with PMH of CKD, HTN, DM II, stroke, PE on eliquis, chronic mild hyperkalemia, and others who presented due to generalized weakness and altered mental status. See details of hospital course below. Hospital Course:       Altered mental status [R41.82]  - Family report AMS at home.  No confusion noted during hospital course while monitored in observation.   - UA negative (urine cx pending per family request), ck wnl, ammonia within normal limits, renal function improved from previous, CXR negative, B12 and folate within normal limits  - CT head without acute finding - evidence of old infarcts present  - Gabapentin placed on hold 6/16 PM due to family concern for AMS. Patient at baseline mental status overnight. Will resume gabapentin at 600 mg BID which is appropriate dose for her renal function. Family made aware that elevated gabapentin dose started last month (per their report) may be attributing to confusion.   - possible component of dementia - discussed with PCP Jenifer Andrew over the phone 6/16 and patient with mild memory problem for quite some time which may be exacerbated by anxiety. May benefit from further evaluation with MMSE in future or maybe aricept/namenda. Defer to pcp.   - stable for dc home  06/16/2021    History of pulmonary embolism [Z86.711]  - continue eliquis   - continue supplemental o2  06/16/2021    Generalized weakness [R53.1]  - Labs unremarkable as above  - pt/ot consulted and feel patient is at her baseline for discharge home. At baseline she sits in chair all day with occasional transfer to [facility] with assistance. She has 24 hour care at home.  - fall precautions ordered  06/16/2021    Chronic respiratory failure ([facility] [address] [J96.10]  - per staff report patient on 1.5-2L O2 at baseline continuously   - family report SOA 6/15 and O2 sats in 90s on 2.5L O2   - COVID and influenza negative   - CXR negative   - no acute SOA, cough, fever  - O2 sats stable on 2L O2 and asymptomatic as above   - Of note ABG with compensated ph and elevated co2 in 50s. Suspect patient may have sleep apnea and would benefit from sleep study but she refuses. Defer further conversation regarding this to pcp.    - mointor  06/16/2021    Hypomagnesemia [E83.42]  - Replaced and repeat Mag within normal limits 06/16/2021    History of stroke [Z86.73]  - CT head 2019 and 6/16/21 with evidence of old infarcts   - on ASA. Per pharmacy not taking statin. This may have been related to transaminitis in 2020 which has since resolved with unremarkable GI workup thus far per [facility] chart review.    - Resume statin.   06/16/2021    Hyperkalemia [E87.5]  - Mild at 5.2 and has been longstanding chronic issue  - low K diet ordered and encouraged  - monitor  04/20/2020    Anxiety and depression [F41.9, F32.9]  - continue home regimen including ativan prn  12/20/2019    Peripheral neuropathy [G62.9]  - resume gabapentin 600 bid with CKD 12/20/2019    CKD (chronic kidney disease) stage 3, GFR 30-59 ml/min ([facility]) [N18.30]  - Cr 1.3 on arrival which is actually improved from previous admission when Cr 1.6   - Cr 1.5 6/17   - suspect baseline 1.3-1.5  - avoid nephrotoxins and nsaids  12/20/2019    HTN (hypertension) [I10]  - BP stable 10/14/2011    Type 2 diabetes mellitus with stage 3 chronic kidney disease, with long-term current use of insulin ([facility]) [E11.22, N18.30, Z79.4]  - A1C 7.2  - Continue lantus with sliding scale insulin coverage  - encourage diabetic diet  07/05/2011        Vital Signs  Temp: 96.8 °F (36 °C)  Pulse: 71  Resp: 18  BP: (!) 142/30  SpO2: 99 %  O2 Device: Nasal cannula  O2 Flow Rate (L/min): 2 L/min    Vital signs reviewed in electronic chart. Physical exam  Constitutional:  Well developed, obese, no acute distress  Eyes:  PERRL, no scleral icterus, conjunctiva normal   HENT:  Atraumatic, external ears normal, nose normal, oropharynx moist, no pharyngeal exudates. Neck- supple, no JVD, no lymphadenopathy  Respiratory:  No respiratory distress on 2L O2, no wheezing, rales or rhonchi detected  Cardiovascular:  Normal rate, normal rhythm, no murmurs, no gallops, no rubs, no edema   GI:  Soft, obese, nondistended, normal bowel sounds, nontender, no voluntary guarding  Musculoskeletal:  No cyanosis or obvious acute deformity. Moving all extremities   Integument:  Warm and dry. Neurologic:  Alert & oriented to self, place, situation. Answers questions appropriately. no apparent focal deficits noted. No confusion.    Psychiatric:  Speech and behavior appropriate         Disposition: home with 24 hour care from family and home health referral   Discharged Condition: Stable  Activity: activity as tolerated  Diet: diabetic diet and low fat, low cholesterol diet , low potassium diet  Follow Up: Primary Care Provider in 1 week  Recommend sleep study as outpatient if patient agreeable. She is not agreeable at this time stating she would not use cpap machine if recommended. Defer to pcp. Take Gabapentin 600 mg TWICE DAILY not three times daily due to chronic kidney disease    Labs: For convenience and continuity at follow-up the following most recent labs are provided:    CBC:   Lab Results   Component Value Date    WBC 8.0 06/17/2021    HGB 11.7 06/17/2021    HCT 38.0 06/17/2021     06/17/2021       RENAL:   Lab Results   Component Value Date     06/17/2021    K 5.2 06/17/2021    K 5.2 10/14/2020     06/17/2021    CO2 31 06/17/2021    BUN 22 06/17/2021    CREATININE 1.5 06/17/2021         Discharge Medications:      Current Discharge Medication List           Details   pravastatin (PRAVACHOL) 20 MG tablet Take 1 tablet by mouth nightly  Qty: 30 tablet, Refills: 0      OXYGEN Inhale 2 L/min into the lungs continuous 2L  Qty: 1 Device, Refills: 0              Details   gabapentin (NEURONTIN) 600 MG tablet Take 600 mg by mouth 3 times daily. LORazepam (ATIVAN) 0.5 MG tablet Take 0.5 mg by mouth 2 times daily as needed for Anxiety.       busPIRone (BUSPAR) 7.5 MG tablet Take 7.5 mg by mouth 2 times daily      albuterol sulfate HFA (VENTOLIN HFA) 108 (90 Base) MCG/ACT inhaler Inhale 2 puffs by mouth every 4 to 6 hours as needed for cough and wheezing      apixaban (ELIQUIS) 5 MG TABS tablet Take 5 mg by mouth 2 times daily      insulin detemir (LEVEMIR FLEXTOUCH) 100 UNIT/ML injection pen Inject 65 Units into the skin nightly Please run on 340B program  Qty: 5 pen, Refills: 3      insulin aspart (NOVOLOG FLEXPEN) 100 UNIT/ML injection pen Inject 15 Units into the skin 3 times daily (before meals)

## 2021-06-17 NOTE — PROGRESS NOTES
Physical Therapy  Facility/Department: [facility] MED SURG  Daily Treatment Note  NAME: Chris Owens  : 1940  MRN: [id number]    Date of Service: 2021    Discharge Recommendations:  Continue to assess pending progress      Assessment   Assessment: Cotreated with OT. Patient required verbal cues and Min A for bed mobility tasks. Stood from bedside with CGA and ambulated ~3 feet with RW and CGA of 2 to the [facility]. Patient transferred back to the bed to prepare a chair for her to sit in. She then transferred to the chair with RW and CGA of 2.  REQUIRES PT FOLLOW UP: Yes  Activity Tolerance  Activity Tolerance: Patient Tolerated treatment well     Patient Diagnosis(es): The primary encounter diagnosis was Dyspnea and respiratory abnormalities. Diagnoses of Hyperkalemia and Respiratory failure, unspecified chronicity, unspecified whether with hypoxia or hypercapnia ([address] were also pertinent to this visit. has a past medical history of Allergic rhinitis, Anxiety, Chronic back pain, Depression, Double vision with both eyes open, Fall, Hyperlipidemia, Hypertension, Osteoarthritis, Stroke ([address] and Type II or unspecified type diabetes mellitus without mention of complication, not stated as uncontrolled. has a past surgical history that includes Hysterectomy;  section; Colonoscopy; and shoulder surgery. Restrictions  Restrictions/Precautions  Restrictions/Precautions: Fall Risk, General Precautions  Required Braces or Orthoses?: No  Subjective   General  Chart Reviewed: Yes  Response To Previous Treatment: Patient with no complaints from previous session. Family / Caregiver Present: No  Subjective  Subjective: Patient offers no new c/o. States she gets to go home today.   Pain Screening  Patient Currently in Pain: Yes  Vital Signs  Patient Currently in Pain: Yes       Objective   Bed mobility  Rolling to Right: Minimal assistance  Supine to Sit: Minimal assistance  Scooting: Contact guard assistance  Transfers  Sit to Stand: Contact guard assistance  Stand to sit: Contact guard assistance  Bed to Chair: Contact guard assistance;2 Person Assistance (with RW to transfer to [facility])  Ambulation  Ambulation?: Yes  Ambulation 1  Surface: level tile  Device: Rolling Walker  Other Apparatus: O2  Assistance: Contact guard assistance  Distance: 3 feet to [facility]     Balance  Posture: Fair  Sitting - Static: Good  Sitting - Dynamic: Good;-  Standing - Static: Fair;+  Standing - Dynamic: Fair      Goals  Long term goals  Time Frame for Long term goals : 3-4 days  Long term goal 1: Patient to achieve bed mobility with SBA x 1. Long term goal 2: Patient to perform basic transfers with CGA x 1. Long term goal 3: Patient to transfer bed to chair with RW with CGA x 1 with good safety awareness. Plan    Plan  Times per week: 3-4 days  Plan weeks: 3-4 days  Current Treatment Recommendations: Strengthening, ROM, Balance Training, Functional Mobility Training, Transfer Training, Gait Training, Neuromuscular Re-education, Safety Education & Training     Therapy Time   Individual Concurrent Group Co-treatment   Time In 1022         Time Out [phone]         Minutes 27              This note serves as D/C summary if patient is discharged prior to next visit.   Amparo Scott, PTA

## 2021-06-17 NOTE — PROGRESS NOTES
Occupational Therapy  Facility/Department: [facility] MED SURG  Daily Treatment Note  NAME: Oumou Ko  : 1940  MRN: [id number]    Date of Service: 2021    Discharge Recommendations:  Home with Home health OT       Assessment   Assessment: Pt agreeable to OT services. Partial co tx with PTA. Pt come to sit at EOB with min assist. Pt able to scoot at EOB with CGA. Pt come to stand with CGA and transferred from bed to [facility] with CGA. Pt required max assist to don brief. Pt able to toilet with min assist requiring assist for brief management. Pt transferred from [facility] to EOB. Pt then demo ability to transfer to chair ~4 feet with CGA x2 for safety. Pt demo decreased safety descending to chair requiring cues. Pt tolerated BUE AROM to improve flexiblity and AROM in BUE. Pt tolerated well. Pt left seatedupright in chair with call light in reach. Patient Diagnosis(es): The primary encounter diagnosis was Dyspnea and respiratory abnormalities. Diagnoses of Hyperkalemia and Respiratory failure, unspecified chronicity, unspecified whether with hypoxia or hypercapnia ([address] were also pertinent to this visit. has a past medical history of Allergic rhinitis, Anxiety, Chronic back pain, Depression, Double vision with both eyes open, Fall, Hyperlipidemia, Hypertension, Osteoarthritis, Stroke ([address] and Type II or unspecified type diabetes mellitus without mention of complication, not stated as uncontrolled. has a past surgical history that includes Hysterectomy;  section; Colonoscopy; and shoulder surgery.     Restrictions  Restrictions/Precautions  Restrictions/Precautions: Fall Risk, General Precautions  Required Braces or Orthoses?: No  Subjective   General  Chart Reviewed: Yes  Patient assessed for rehabilitation services?: Yes  Family / Caregiver Present: Yes  Referring Practitioner: Graham Rosario PA-C  Diagnosis: hypoxia  Subjective  Subjective: Daughter present in room providing report pt lying in bed. Pt daughter states she has had increased difficulty transferring pt and pt is weak. Pt agreeable to OT services. Orientation     Objective    ADL  LE Dressing: Moderate assistance  Toileting: Minimal assistance           Bed mobility  Supine to Sit: Minimal assistance  Scooting: Contact guard assistance  Transfers  Stand Pivot Transfers: Contact guard assistance  Sit to stand: Contact guard assistance;2 Person assistance        Type of ROM/Therapeutic Exercise  Type of ROM/Therapeutic Exercise: AROM  Exercises  Shoulder Flexion: x10  Shoulder Extension: x10  Shoulder ABduction: x10  Horizontal ABduction: x10  Horizontal ADduction: x10  Elbow Flexion: x10  Elbow Extension: x10  Supination: x10  Pronation: x10                    Plan   Plan  Times per week: 3-5  Times per day: Daily  Plan weeks: 1-2  Current Treatment Recommendations: Strengthening, Endurance Training, Balance Training, Functional Mobility Training, Safety Education & Training, Equipment Evaluation, Education, & procurement, Self-Care / ADL, Patient/Caregiver Education & Training, Neuromuscular Re-education    Goals  Short term goals  Time Frame for Short term goals: 1 week  Short term goal 1: Pt to don clothing with CGA. Short term goal 2: Pt to complete toileting with CGA. Short term goal 3: Pt to complete sponge bathing with CGA. Short term goal 4: Pt to tolerate x15 minutes of activity to increase functional activity tolerance. Short term goal 5: Pt to complete ADL transfers with CGA using RW for support. Therapy Time   Individual Concurrent Group Co-treatment   Time In 1011         Time Out 7694         Minutes 38              This note serves as a DC summary in the event of pt discharge.      Valery Cabrera OTR/CAMILLA

## 2021-06-17 NOTE — CARE COORDINATION
[address] magali Referral to [facility] VIKKI. Alena called back from [facility] accepted patient.

## 2021-06-17 NOTE — FLOWSHEET NOTE
06/16/21 0915   Assessment   Charting Type Shift assessment   Neurological   Neuro (WDL) WDL   Level of Consciousness Alert (0)   Giovanny Coma Scale   Eye Opening 4   Best Verbal Response 5   Best Motor Response 6   Giovanny Coma Scale Score 15   HEENT   HEENT (WDL) X   Right Eye Impaired vision   Left Eye Impaired vision   Teeth Missing teeth   Respiratory   Respiratory (WDL) X   Respiratory Pattern Regular   L Breath Sounds Clear;Diminished   R Breath Sounds Clear;Diminished   Breath Sounds   Right Upper Lobe Clear   Right Middle Lobe Diminished   Right Lower Lobe Diminished   Left Upper Lobe Clear   Left Lower Lobe Diminished   Cardiac Monitor   Telemetry Audible Yes   Telemetry Monitor On Yes   Telemetry Alarms Set Yes   Telemetry Monitor Alarm Parameters    Gastrointestinal   Abdominal (WDL) X   Abdomen Inspection Rotund; Soft   Tenderness Soft;Nontender   RUQ Bowel Sounds Active   LUQ Bowel Sounds Active   RLQ Bowel Sounds Active   LLQ Bowel Sounds Active   Peripheral Vascular   Peripheral Vascular (WDL) WDL   Edema None   Skin Color/Condition   Skin Color/Condition (WDL) X   Skin Color Pale   Skin Condition/Temp Dry; Warm   Skin Integrity   Skin Integrity (WDL) X   Skin Integrity Bruising   Location scattered   Skin Fold Management Yes   Dressing Site Abdominal pannus   Musculoskeletal   Musculoskeletal (WDL) X   RUE Weakness   LUE Weakness   RL Extremity Limited movement;Weakness   LL Extremity Limited movement;Weakness   Genitourinary   Genitourinary (WDL) X  (incontinent at times)   Urine Assessment   Incontinence Yes  (at times)   Urine Color Yellow/straw   Urine Appearance Clear   Anus/Rectum   Anus/Rectum (WDL) WDL
06/17/21 0808   Assessment   Charting Type Shift assessment   Neurological   Neuro (WDL) WDL   Level of Consciousness Alert (0)   Giovanny Coma Scale   Eye Opening 4   Best Verbal Response 5   Best Motor Response 6   Giovanny Coma Scale Score 15   HEENT   HEENT (WDL) X   Right Eye Impaired vision   Left Eye Impaired vision   Teeth Missing teeth   Respiratory   Respiratory (WDL) X   Respiratory Pattern Regular   L Breath Sounds Clear;Diminished   R Breath Sounds Clear;Diminished   Breath Sounds   Right Upper Lobe Clear   Right Middle Lobe Diminished   Right Lower Lobe Diminished   Left Upper Lobe Clear   Left Lower Lobe Diminished   Cardiac Monitor   Telemetry Audible Yes   Telemetry Monitor On Yes   Telemetry Alarms Set Yes   Telemetry Monitor Alarm Parameters    Gastrointestinal   Abdominal (WDL) X   Abdomen Inspection Rotund; Soft   Tenderness Soft;Nontender   RUQ Bowel Sounds Active   LUQ Bowel Sounds Active   RLQ Bowel Sounds Active   LLQ Bowel Sounds Active   Peripheral Vascular   Peripheral Vascular (WDL) WDL   Edema None   Skin Color/Condition   Skin Color/Condition (WDL) X   Skin Color Pale   Skin Condition/Temp Dry; Warm   Skin Integrity   Skin Integrity (WDL) X   Skin Integrity Bruising   Location scattered   Skin Fold Management Yes   Dressing Site Abdominal pannus   Musculoskeletal   Musculoskeletal (WDL) X   RUE Weakness   LUE Weakness   RL Extremity Limited movement;Weakness   LL Extremity Limited movement;Weakness   Genitourinary   Genitourinary (WDL) X  (incontinent at times)   Urine Assessment   Incontinence Yes  (at times)   Urine Color Yellow/straw   Urine Appearance Clear   Anus/Rectum   Anus/Rectum (WDL) WDL   Awake, alert. Pain pill, and ativan given this am.  Up in chair.
understanding. Call bell and bedside table within reach. Bed in lowest position. Will continue to monitor.

## 2021-06-17 NOTE — PROGRESS NOTES
Discharged home with daughters. Prescription given. Pt's daughter verbalized understanding of discharge instructions.   Accompanied to exit

## 2021-06-17 NOTE — PROGRESS NOTES
Called family at this time to inform the pt discharge, family said they had much to get done before they are able to  the pt and it would be around 4pm before they could come.

## 2021-06-19 LAB
ORGANISM: ABNORMAL
ORGANISM: ABNORMAL
URINE CULTURE, ROUTINE: ABNORMAL
URINE CULTURE, ROUTINE: ABNORMAL

## 2021-06-19 NOTE — PROGRESS NOTES
Due to family report that patient with urinary frequency, confusion UA was obtained which was negative. Daughter reported UA often did not result when her mother has a UTI and therefore separate urine culture was obtained. Reviewed urine culture 6/19 +klebsiella and omnicef 300 bid x 5 days was called in to her pharmacy.  or house to notify family.      Amparo Orantes PA-C

## 2021-07-02 ENCOUNTER — HOSPITAL ENCOUNTER (OUTPATIENT)
Facility: HOSPITAL | Age: 81
Discharge: HOME | End: 2021-07-02
Payer: MEDICARE

## 2021-07-15 ENCOUNTER — HOSPITAL ENCOUNTER (OUTPATIENT)
Facility: HOSPITAL | Age: 81
Discharge: HOME | End: 2021-07-15
Payer: MEDICARE

## 2021-07-15 LAB
A/G RATIO: 1.4 (ref 0.8–2)
ALBUMIN SERPL-MCNC: 3.8 G/DL (ref 3.4–4.8)
ALP BLD-CCNC: 78 U/L (ref 25–100)
ALT SERPL-CCNC: 8 U/L (ref 4–36)
ANION GAP SERPL CALCULATED.3IONS-SCNC: 11 MMOL/L (ref 3–16)
AST SERPL-CCNC: 16 U/L (ref 8–33)
BILIRUB SERPL-MCNC: <0.2 MG/DL (ref 0.3–1.2)
BUN BLDV-MCNC: 32 MG/DL (ref 6–20)
CALCIUM SERPL-MCNC: 9.1 MG/DL (ref 8.5–10.5)
CHLORIDE BLD-SCNC: 103 MMOL/L (ref 98–107)
CHOLESTEROL, TOTAL: 185 MG/DL (ref 0–200)
CO2: 25 MMOL/L (ref 20–30)
CREAT SERPL-MCNC: 1.5 MG/DL (ref 0.4–1.2)
FOLATE: 9.2 NG/ML
GFR AFRICAN AMERICAN: 40
GFR NON-AFRICAN AMERICAN: 33
GLOBULIN: 2.8 G/DL
GLUCOSE BLD-MCNC: 209 MG/DL (ref 74–106)
HBA1C MFR BLD: 7.9 %
HCT VFR BLD CALC: 41.6 % (ref 37–47)
HDLC SERPL-MCNC: 47 MG/DL (ref 40–60)
HEMOGLOBIN: 12.7 G/DL (ref 11.5–16.5)
LDL CHOLESTEROL CALCULATED: 105 MG/DL
MCH RBC QN AUTO: 32.8 PG (ref 27–32)
MCHC RBC AUTO-ENTMCNC: 30.5 G/DL (ref 31–35)
MCV RBC AUTO: 107.5 FL (ref 80–100)
PDW BLD-RTO: 13.5 % (ref 11–16)
PLATELET # BLD: 198 K/UL (ref 150–400)
PMV BLD AUTO: 12.4 FL (ref 6–10)
POTASSIUM SERPL-SCNC: 6 MMOL/L (ref 3.4–5.1)
RBC # BLD: 3.87 M/UL (ref 3.8–5.8)
SODIUM BLD-SCNC: 139 MMOL/L (ref 136–145)
TOTAL PROTEIN: 6.6 G/DL (ref 6.4–8.3)
TRIGL SERPL-MCNC: 165 MG/DL (ref 0–249)
TSH SERPL DL<=0.05 MIU/L-ACNC: 2.95 UIU/ML (ref 0.27–4.2)
VITAMIN B-12: 472 PG/ML (ref 211–911)
VITAMIN D 25-HYDROXY: 36.2 (ref 32–100)
VLDLC SERPL CALC-MCNC: 33 MG/DL
WBC # BLD: 10.7 K/UL (ref 4–11)

## 2021-07-15 PROCEDURE — 84443 ASSAY THYROID STIM HORMONE: CPT

## 2021-07-15 PROCEDURE — 85027 COMPLETE CBC AUTOMATED: CPT

## 2021-07-15 PROCEDURE — 83036 HEMOGLOBIN GLYCOSYLATED A1C: CPT

## 2021-07-15 PROCEDURE — 80053 COMPREHEN METABOLIC PANEL: CPT

## 2021-07-15 PROCEDURE — 82746 ASSAY OF FOLIC ACID SERUM: CPT

## 2021-07-15 PROCEDURE — 36415 COLL VENOUS BLD VENIPUNCTURE: CPT

## 2021-07-15 PROCEDURE — 80061 LIPID PANEL: CPT

## 2021-07-15 PROCEDURE — 82306 VITAMIN D 25 HYDROXY: CPT

## 2021-07-15 PROCEDURE — 82607 VITAMIN B-12: CPT

## 2021-07-22 ENCOUNTER — HOSPITAL ENCOUNTER (OUTPATIENT)
Facility: HOSPITAL | Age: 81
Discharge: HOME | End: 2021-07-22
Payer: MEDICARE

## 2021-09-08 ENCOUNTER — HOSPITAL ENCOUNTER (OUTPATIENT)
Facility: HOSPITAL | Age: 81
Discharge: HOME | End: 2021-09-08
Payer: MEDICARE

## 2021-09-08 PROCEDURE — 36415 COLL VENOUS BLD VENIPUNCTURE: CPT

## 2021-09-09 ENCOUNTER — HOSPITAL ENCOUNTER (OUTPATIENT)
Facility: HOSPITAL | Age: 81
Discharge: HOME | End: 2021-09-09
Payer: MEDICARE

## 2021-11-02 ENCOUNTER — APPOINTMENT (OUTPATIENT)
Dept: GENERAL RADIOLOGY | Facility: HOSPITAL | Age: 81
End: 2021-11-02
Payer: MEDICARE

## 2021-11-02 ENCOUNTER — APPOINTMENT (OUTPATIENT)
Dept: CT IMAGING | Facility: HOSPITAL | Age: 81
End: 2021-11-02
Payer: MEDICARE

## 2021-11-02 ENCOUNTER — HOSPITAL ENCOUNTER (OUTPATIENT)
Facility: HOSPITAL | Age: 81
Setting detail: OBSERVATION
Discharge: HOME | End: 2021-11-04
Attending: EMERGENCY MEDICINE | Admitting: INTERNAL MEDICINE
Payer: MEDICARE

## 2021-11-02 DIAGNOSIS — R53.81 DECLINING FUNCTIONAL STATUS: ICD-10-CM

## 2021-11-02 DIAGNOSIS — B37.2 YEAST INFECTION OF THE SKIN: Primary | ICD-10-CM

## 2021-11-02 DIAGNOSIS — R53.1 WEAKNESS GENERALIZED: ICD-10-CM

## 2021-11-02 LAB
A/G RATIO: 1.1 (ref 0.8–2)
ALBUMIN SERPL-MCNC: 3.8 G/DL (ref 3.4–4.8)
ALP BLD-CCNC: 72 U/L (ref 25–100)
ALT SERPL-CCNC: <5 U/L (ref 4–36)
ANION GAP SERPL CALCULATED.3IONS-SCNC: 9 MMOL/L (ref 3–16)
APTT: 33 SEC (ref 22.5–34.9)
AST SERPL-CCNC: 19 U/L (ref 8–33)
BASOPHILS ABSOLUTE: 0.1 K/UL (ref 0–0.1)
BASOPHILS RELATIVE PERCENT: 0.4 %
BILIRUB SERPL-MCNC: <0.2 MG/DL (ref 0.3–1.2)
BILIRUBIN URINE: NEGATIVE
BLOOD, URINE: NEGATIVE
BUN BLDV-MCNC: 18 MG/DL (ref 6–20)
CALCIUM SERPL-MCNC: 9.1 MG/DL (ref 8.5–10.5)
CHLORIDE BLD-SCNC: 103 MMOL/L (ref 98–107)
CLARITY: CLEAR
CO2: 25 MMOL/L (ref 20–30)
COLOR: YELLOW
CREAT SERPL-MCNC: 1.2 MG/DL (ref 0.4–1.2)
D DIMER: 0.37 UG/ML FEU (ref 0–0.6)
EOSINOPHILS ABSOLUTE: 0.2 K/UL (ref 0–0.4)
EOSINOPHILS RELATIVE PERCENT: 1.9 %
EPITHELIAL CELLS, UA: ABNORMAL /HPF (ref 0–5)
GFR AFRICAN AMERICAN: 52
GFR NON-AFRICAN AMERICAN: 43
GLOBULIN: 3.6 G/DL
GLUCOSE BLD-MCNC: 154 MG/DL (ref 74–106)
GLUCOSE URINE: NEGATIVE MG/DL
HCT VFR BLD CALC: 42.5 % (ref 37–47)
HEMOGLOBIN: 13 G/DL (ref 11.5–16.5)
IMMATURE GRANULOCYTES #: 0.1 K/UL
IMMATURE GRANULOCYTES %: 0.4 % (ref 0–5)
INR BLD: 1.24 (ref 0.88–1.11)
KETONES, URINE: ABNORMAL MG/DL
LEUKOCYTE ESTERASE, URINE: NEGATIVE
LYMPHOCYTES ABSOLUTE: 2.4 K/UL (ref 1.5–4)
LYMPHOCYTES RELATIVE PERCENT: 20.9 %
MCH RBC QN AUTO: 32.3 PG (ref 27–32)
MCHC RBC AUTO-ENTMCNC: 30.6 G/DL (ref 31–35)
MCV RBC AUTO: 105.5 FL (ref 80–100)
MICROSCOPIC EXAMINATION: YES
MONOCYTES ABSOLUTE: 0.7 K/UL (ref 0.2–0.8)
MONOCYTES RELATIVE PERCENT: 6.5 %
MUCUS: ABNORMAL /LPF
NEUTROPHILS ABSOLUTE: 7.9 K/UL (ref 2–7.5)
NEUTROPHILS RELATIVE PERCENT: 69.9 %
NITRITE, URINE: NEGATIVE
PDW BLD-RTO: 13.6 % (ref 11–16)
PH UA: 5 (ref 5–8)
PLATELET # BLD: 186 K/UL (ref 150–400)
PMV BLD AUTO: 11.9 FL (ref 6–10)
POTASSIUM REFLEX MAGNESIUM: 5.4 MMOL/L (ref 3.4–5.1)
PRO-BNP: 535 PG/ML (ref 0–1800)
PROTEIN UA: ABNORMAL MG/DL
PROTHROMBIN TIME: 15 SEC (ref 11.6–13.8)
RBC # BLD: 4.03 M/UL (ref 3.8–5.8)
RBC UA: ABNORMAL /HPF (ref 0–4)
SARS-COV-2, NAAT: NOT DETECTED
SODIUM BLD-SCNC: 137 MMOL/L (ref 136–145)
SPECIFIC GRAVITY UA: >=1.03 (ref 1–1.03)
TOTAL PROTEIN: 7.4 G/DL (ref 6.4–8.3)
TROPONIN: <0.3 NG/ML
URINE REFLEX TO CULTURE: ABNORMAL
URINE TYPE: ABNORMAL
UROBILINOGEN, URINE: 0.2 E.U./DL
WBC # BLD: 11.3 K/UL (ref 4–11)
WBC UA: ABNORMAL /HPF (ref 0–5)

## 2021-11-02 PROCEDURE — 36415 COLL VENOUS BLD VENIPUNCTURE: CPT

## 2021-11-02 PROCEDURE — 2580000003 HC RX 258: Performed by: EMERGENCY MEDICINE

## 2021-11-02 PROCEDURE — 85730 THROMBOPLASTIN TIME PARTIAL: CPT

## 2021-11-02 PROCEDURE — 80053 COMPREHEN METABOLIC PANEL: CPT

## 2021-11-02 PROCEDURE — 87635 SARS-COV-2 COVID-19 AMP PRB: CPT

## 2021-11-02 PROCEDURE — 6370000000 HC RX 637 (ALT 250 FOR IP): Performed by: EMERGENCY MEDICINE

## 2021-11-02 PROCEDURE — 85025 COMPLETE CBC W/AUTO DIFF WBC: CPT

## 2021-11-02 PROCEDURE — 71260 CT THORAX DX C+: CPT

## 2021-11-02 PROCEDURE — 6360000004 HC RX CONTRAST MEDICATION: Performed by: EMERGENCY MEDICINE

## 2021-11-02 PROCEDURE — 84484 ASSAY OF TROPONIN QUANT: CPT

## 2021-11-02 PROCEDURE — 71045 X-RAY EXAM CHEST 1 VIEW: CPT

## 2021-11-02 PROCEDURE — 85610 PROTHROMBIN TIME: CPT

## 2021-11-02 PROCEDURE — 99285 EMERGENCY DEPT VISIT HI MDM: CPT

## 2021-11-02 PROCEDURE — 85379 FIBRIN DEGRADATION QUANT: CPT

## 2021-11-02 PROCEDURE — 81001 URINALYSIS AUTO W/SCOPE: CPT

## 2021-11-02 PROCEDURE — 83880 ASSAY OF NATRIURETIC PEPTIDE: CPT

## 2021-11-02 PROCEDURE — 93005 ELECTROCARDIOGRAM TRACING: CPT

## 2021-11-02 PROCEDURE — 87086 URINE CULTURE/COLONY COUNT: CPT

## 2021-11-02 RX ORDER — NYSTATIN 100000 U/G
CREAM TOPICAL 2 TIMES DAILY
Status: DISCONTINUED | OUTPATIENT
Start: 2021-11-02 | End: 2021-11-04 | Stop reason: HOSPADM

## 2021-11-02 RX ORDER — SODIUM CHLORIDE 9 MG/ML
INJECTION, SOLUTION INTRAVENOUS CONTINUOUS
Status: DISCONTINUED | OUTPATIENT
Start: 2021-11-02 | End: 2021-11-03

## 2021-11-02 RX ORDER — FLUCONAZOLE 150 MG/1
150 TABLET ORAL ONCE
Status: COMPLETED | OUTPATIENT
Start: 2021-11-02 | End: 2021-11-02

## 2021-11-02 RX ADMIN — SODIUM CHLORIDE: 9 INJECTION, SOLUTION INTRAVENOUS at 20:49

## 2021-11-02 RX ADMIN — NYSTATIN: 100000 CREAM TOPICAL at 20:50

## 2021-11-02 RX ADMIN — IOPAMIDOL 100 ML: 755 INJECTION, SOLUTION INTRAVENOUS at 21:48

## 2021-11-02 RX ADMIN — FLUCONAZOLE 150 MG: 150 TABLET ORAL at 20:49

## 2021-11-02 ASSESSMENT — PAIN - FUNCTIONAL ASSESSMENT: PAIN_FUNCTIONAL_ASSESSMENT: PREVENTS OR INTERFERES SOME ACTIVE ACTIVITIES AND ADLS

## 2021-11-02 ASSESSMENT — PAIN DESCRIPTION - ORIENTATION: ORIENTATION: RIGHT;LEFT

## 2021-11-02 ASSESSMENT — PAIN DESCRIPTION - DESCRIPTORS: DESCRIPTORS: STABBING

## 2021-11-02 ASSESSMENT — PAIN DESCRIPTION - LOCATION: LOCATION: FOOT

## 2021-11-02 ASSESSMENT — PAIN DESCRIPTION - PAIN TYPE: TYPE: ACUTE PAIN

## 2021-11-02 ASSESSMENT — PAIN SCALES - GENERAL: PAINLEVEL_OUTOF10: 4

## 2021-11-02 NOTE — ED PROVIDER NOTES
Kennethgi[facility] Smith [address]      Pt Name: Nayeli Vargas  MRN: [id number]  YOB: 1940  Date of evaluation: 11/2/2021  Provider: Pallavi Moe MD    [address] [place] [address]       Chief Complaint   Patient presents with    Leg Swelling         HISTORY OF PRESENT ILLNESS  (Location/Symptom, Timing/Onset, Context/Setting, Quality, Duration, Modifying Factors, Severity.)   Nayeli Vargas is a [de-identified] y.o. female who presents to the emergency department of her feet off and on for the last 10 days her primary care are 2+ posterior tib and supposed to come see her today but her car broke down so she told the patient to come to the hospital she states that the swelling gets worse with dependency and better with elevation she also complains of shortness of breath productive cough but denies chest pain she is on O2 2 L nasal cannula routinely for history of pulmonary embolus and is on Eliquis. Nursing notes were reviewed. REVIEW OFSYSTEMS    (2-9 systems for level 4, 10 or more for level 5)   ROS:  General:  No fevers, no chills, no weakness  Cardiovascular:  No chest pain, no palpitations  Respiratory: Mild shortness of breath, mild cough, no wheezing  Gastrointestinal:  No pain, no nausea, no vomiting, no diarrhea  Musculoskeletal:  No muscle pain, no joint pain plus edema in the right foot  Skin:   feet are cyanotic  Neurologic:  No speech problems, no headache, no extremity weakness  Psychiatric:  No anxiety  Genitourinary:  No dysuria, no hematuria    Except as noted above the remainder of the review of systems was reviewed and negative.        PAST MEDICAL HISTORY     Past Medical History:   Diagnosis Date    Allergic rhinitis     Anxiety     Chronic back pain     Depression     Double vision with both eyes open     Pt states she can see ok with one eye shut    Fall     Hyperlipidemia     Hypertension     Osteoarthritis     Stroke ([address]     Type II or unspecified type diabetes mellitus without mention of complication, not stated as uncontrolled          SURGICAL HISTORY       Past Surgical History:   Procedure Laterality Date     SECTION      COLONOSCOPY      HYSTERECTOMY      TOTAL    SHOULDER SURGERY      RIGHT X 2         CURRENT MEDICATIONS       Previous Medications    ALBUTEROL SULFATE HFA (VENTOLIN HFA) 108 (90 BASE) MCG/ACT INHALER    Inhale 2 puffs by mouth every 4 to 6 hours as needed for cough and wheezing    APIXABAN (ELIQUIS) 5 MG TABS TABLET    Take 5 mg by mouth 2 times daily    ASPIRIN 81 MG TABLET    Take 1 tablet by mouth daily    BUSPIRONE (BUSPAR) 7.5 MG TABLET    Take 7.5 mg by mouth 2 times daily    DICLOFENAC SODIUM (VOLTAREN) 1 % GEL    Apply 1 g topically 4 times daily as needed for Pain    DULOXETINE (CYMBALTA) 60 MG EXTENDED RELEASE CAPSULE    Take 60 mg by mouth daily     FERROUS SULFATE 324 (65 FE) MG EC TABLET    Take 1 tablet by mouth daily (with breakfast)    GABAPENTIN (NEURONTIN) 600 MG TABLET    Take 600 mg by mouth 3 times daily. GLUCOSE MONITORING KIT (FREESTYLE) MONITORING KIT    1 kit by Does not apply route daily    HYDROCODONE-ACETAMINOPHEN (NORCO)  MG PER TABLET    Take 1 tablet by mouth 3 times daily as needed for Pain. INSULIN ASPART (NOVOLOG FLEXPEN) 100 UNIT/ML INJECTION PEN    Inject 15 Units into the skin 3 times daily (before meals) Please run on 340 B program    INSULIN DETEMIR (LEVEMIR FLEXTOUCH) 100 UNIT/ML INJECTION PEN    Inject 65 Units into the skin nightly Please run on 340B program    INSULIN PEN NEEDLE ([employer] PEN NEEDLES 29G) 29G X 12MM MISC    1 each by Does not apply route daily    LORAZEPAM (ATIVAN) 0.5 MG TABLET    Take 0.5 mg by mouth 2 times daily as needed for Anxiety.     MECLIZINE (ANTIVERT) 25 MG TABLET    Take 1 tablet by mouth 3 times daily as needed for Dizziness    OMEPRAZOLE (PRILOSEC) 20 MG DELAYED RELEASE CAPSULE    Take 2 capsules by mouth daily OXYBUTYNIN (DITROPAN) 5 MG TABLET    Take 5 mg by mouth 2 times daily    PRAVASTATIN (PRAVACHOL) 20 MG TABLET    Take 1 tablet by mouth nightly    PROPRANOLOL (INDERAL) 40 MG TABLET    Take 1 tablet by mouth 2 times daily    VITAMIN D (ERGOCALCIFEROL) 1.25 MG (27826 UT) CAPS CAPSULE    Take 50,000 Units by mouth once a week       ALLERGIES     Latex, Penicillins, Sulfa antibiotics, Tetracyclines & related, and Diazepam    FAMILY HISTORY       Family History   Problem Relation Age of Onset    Heart Disease Mother         CHF,CAD    Cancer Maternal Aunt     High Blood Pressure Sister           SOCIAL HISTORY       Social History     Socioeconomic History    Marital status:      Spouse name: None    Number of children: None    Years of education: None    Highest education level: None   Occupational History    None   Tobacco Use    Smoking status: Never Smoker    Smokeless tobacco: Never Used   Vaping Use    Vaping Use: Never used   Substance and Sexual Activity    Alcohol use: No    Drug use: No    Sexual activity: Yes     Partners: Male   Other Topics Concern    None   Social History Narrative    None     Social Determinants of Health     Financial Resource Strain:     Difficulty of Paying Living Expenses:    Food Insecurity:     Worried About Running Out of Food in the Last Year:     Ran Out of Food in the Last Year:    Transportation Needs:     Lack of Transportation (Medical):      Lack of Transportation (Non-Medical):    Physical Activity:     Days of Exercise per Week:     Minutes of Exercise per Session:    Stress:     Feeling of Stress :    Social Connections:     Frequency of Communication with Friends and Family:     Frequency of Social Gatherings with Friends and Family:     Attends [employer] Services:     Active Member of Clubs or Organizations:     Attends Club or Organization Meetings:     Marital Status:    Intimate Partner Violence:     Fear of Current or Ex-Partner:  Emotionally Abused:     Physically Abused:     Sexually Abused:          PHYSICAL EXAM    (up to 7 for level 4, 8 or more for level 5)     ED Triage Vitals [11/02/21 1820]   BP Temp Temp Source Pulse Resp SpO2 Height Weight   (!) 122/59 98.3 °F (36.8 °C) Oral 62 18 98 % 5' 7\" (1.702 m) 200 lb (90.7 kg)       Physical Exam  General :Patient is awake, alert, oriented, in no acute distress, nontoxic appearing  HEENT: Pupils are equally round and reactive to light, EOMI, conjunctivae clear. Neck: Neck is supple, full range of motion, trachea midline no JVD  Cardiac: Heart regular rate, rhythm, no murmurs, rubs, or gallops  Lungs: Lungs are clear to auscultation, there is no wheezing, rhonchi, or rales. There is no use of accessory muscles. Chest wall: There is no tenderness to palpation over the chest wall or over ribs patient has cutaneous candidiasis under her breasts  Abdomen: Abdomen is soft, nontender, nondistended. There is no firm or pulsatile masses, no rebound rigidity or guarding. Musculoskeletal: 5 out of 5 strength in all 4 extremities. No focal muscle deficits are appreciated plus edema right foot 2+ edema left foot  Neuro: Motor intact, sensory intact, level of consciousness is normal,Dermatology: Skin is warm and dry both feet are cold and cyanotic but pulses are 2+ posterior tibial and dorsal pedal bilaterally  Psych: Mentation is grossly normal, cognition is grossly normal. Affect is appropriate.       DIAGNOSTIC RESULTS     EKG: All EKG's are interpreted by the Emergency Department Physician who either signs or Co-signs this chart in the [address] a cardiologist.    The EKG interpreted by me shows sinus rhythm with a rate of 74 no acute ST changes and left ventricular hypertrophy    RADIOLOGY:   Non-plain film images such as CT, Ultrasound and MRI are read by the radiologist. Plain radiographic images are visualized and preliminarily interpreted by the emergency physician with the below findings:      ? Radiologist's Report Reviewed:  No orders to display         ED BEDSIDE ULTRASOUND:   Performed by ED Physician - none    LABS:    I have reviewed and interpreted all of the currently available lab results from this visit (ifapplicable):  No results found for this visit on 11/02/21. All other labs were within normal range or not returned as of this dictation. EMERGENCY DEPARTMENT COURSE and DIFFERENTIAL DIAGNOSIS/MDM:   Vitals:    Vitals:    11/02/21 1820   BP: (!) 122/59   Pulse: 62   Resp: 18   Temp: 98.3 °F (36.8 °C)   TempSrc: Oral   SpO2: 98%   Weight: 200 lb (90.7 kg)   Height: 5' 7\" (1.702 m)       MEDICATIONS ADMINISTERED IN ED:  Medications - No data to display    Case discussed with and signed over to Dr. Jeimy Chapman at 1973    The patient will follow-up with their PCP in 1-2 days for reevaluation. If the patient or family members have anyfurther concerns or any worsening symptoms they will return to the ED for reevaluation. CONSULTS:  None    PROCEDURES:  Procedures    CRITICAL CARE TIME    Total Critical Care time was 0 minutes, excluding separately reportable procedures. There was a high probability of clinically significant/life threatening deterioration in the patient's condition which required my urgent intervention. FINAL IMPRESSION    No diagnosis found. DISPOSITION/PLAN   DISPOSITION        PATIENT REFERRED TO:  No follow-up provider specified. DISCHARGE MEDICATIONS:  New Prescriptions    No medications on file       Comment: Please note this report has been produced using speech recognition software and may contain errorsrelated to that system including errors in grammar, punctuation, and spelling, as well as words and phrases that may be inappropriate. If there are any questions or concerns please feel free to contact the dictating providerfor clarification.     Mykel Laura MD  Attending Emergency Physician              Mykel Laura MD  11/02/21 238 Noah Parisi MD  11/02/21 5824

## 2021-11-02 NOTE — ED PROVIDER NOTES
Emergency Department Encounter  Location: [address] [place] [address] Court    Patient: Gregor Hendrix  MRN: [id number]  : 1940  Date of evaluation: 16/3/9181  ED Provider: Iva Montez MD    [address] was checked out to me by Dr. Tawny Todd. Please see his/her initial documentation for details of the patient's initial ED presentation, physical exam and completed studies. In brief, Gregor Hendrix is a [de-identified] y.o. female that presented to the emergency department with BLE swelling x 10 days. She has mild shortness of breath. She is on Eliquis for previous PE's. Her PCP sent her to the ER for an evaluation. Her entire workup is pending.      I have reviewed and interpreted all of the currently available lab results and diagnostics from this visit:  Results for orders placed or performed during the hospital encounter of 21   COVID-19, Rapid    Specimen: Nasopharyngeal Swab   Result Value Ref Range    SARS-CoV-2, NAAT Not Detected Not Detected   CBC Auto Differential   Result Value Ref Range    WBC 11.3 (H) 4.0 - 11.0 K/uL    RBC 4.03 3.80 - 5.80 M/uL    Hemoglobin 13.0 11.5 - 16.5 g/dL    Hematocrit 42.5 37.0 - 47.0 %    .5 (H) 80.0 - 100.0 fL    MCH 32.3 (H) 27.0 - 32.0 pg    MCHC 30.6 (L) 31.0 - 35.0 g/dL    RDW 13.6 11.0 - 16.0 %    Platelets 716 223 - 806 K/uL    MPV 11.9 (H) 6.0 - 10.0 fL    Neutrophils % 69.9 %    Immature Granulocytes % 0.4 0.0 - 5.0 %    Lymphocytes % 20.9 %    Monocytes % 6.5 %    Eosinophils % 1.9 %    Basophils % 0.4 %    Neutrophils Absolute 7.9 (H) 2.0 - 7.5 K/uL    Immature Granulocytes # 0.1 K/uL    Lymphocytes Absolute 2.4 1.5 - 4.0 K/uL    Monocytes Absolute 0.7 0.2 - 0.8 K/uL    Eosinophils Absolute 0.2 0.0 - 0.4 K/uL    Basophils Absolute 0.1 0.0 - 0.1 K/uL   Comprehensive Metabolic Panel w/ Reflex to MG   Result Value Ref Range    Sodium 137 136 - 145 mmol/L    Potassium reflex Magnesium 5.4 (H) 3.4 - 5.1 mmol/L    Chloride 103 98 - rotated to the left on the current exam.  The heart and pulmonary vasculature are within normal limits. Inspiration is limited, with low lung volumes. Lungs appear clear, with no focal infiltrates or effusions. A few calcified granulomas are noted in the right mid and lower lung and lateral left lower lung. There are no pleural effusions. Limited inspiration. No acute cardiopulmonary findings. CT CHEST PULMONARY EMBOLISM W CONTRAST    Result Date: 11/2/2021  CT ANGIOGRAPHY OF THE CHEST WITH CONTRAST: HISTORY: Shortness of breath. TECHNIQUE: Thin section axial images were obtained through the chest, per pulmonary embolism protocol. A total of 100 mL of Isovue-370 contrast was given for the study. Axial and coronal MIP and sagittal reformatting performed. COMPARISON: 9/29/2020. FINDINGS: There is no definite evidence of pulmonary embolism seen. The thoracic aorta is normal in caliber. The heart is not enlarged. There is no pericardial effusion. Tracheobronchial tree is patent. Linear parenchymal scarring is again evident in the medial right upper lobe, superior segment and medial right lower lobe, with associated bronchiectasis. Linear bands of increased density are seen in the lingular segment  of the left upper lobe and left lower lobe, with associated volume loss in the left lower lobe. The appearance is similar to the prior exam. No new airspace densities are seen. There are calcified granulomas in the lungs bilaterally. No significant hilar, mediastinal or axillary adenopathy is seen. Images of the upper abdomen are unremarkable. Adrenal glands are normal. Gallbladder has been removed. No definite CT evidence of pulmonary embolism. Stable linear parenchymal densities in the right upper lobe, bilateral lower lobes and lingular segment, consistent with atelectasis or parenchymal scarring. No other acute findings in the chest.      Final ED Course and MDM:   In brief, Quincy Millan is a [de-identified] y.o. female whose care was signed out to me by the outgoing provider. In brief, patient presents with bilateral lower extremity edema. WBC 11.3  Potassium 5.4 although I believe this is probably partially hemolyzed as the rest of her CMP is normal.  Troponin is negative  BNP is normal  D-dimer is negative    CXR is negative. UA is normal.  Cultures sent per daughter's request.    Patient has visible dyspnea despite lack of hypoxia. She is subtherapeutic on her Eliquis. Will get CT chest to exclude PE since she has a prior history. CT PE is negative. Daughter, Rubio Perkins, wants the patient admitted. She insists the patient is weak and cannot walk. She insists that she needs some PT. She believes we are missing a diagnosis. She requested that we give IVF's although there is no clear indication that she is dehydrated. [facility]  Luzmaria was paged. [phone]  He was willing to admit her overnight for observation. ED Medication Orders (From admission, onward)    Start Ordered     Status Ordering Provider    11/02/21 2148 11/02/21 2148  iopamidol (ISOVUE-370) 76 % injection 100 mL  IMG ONCE PRN      Last MAR action: Given - by Dulce Maria Jenkins on 11/02/21 at [facility] Justin    89/76/49 6898 11/02/21 2031  nystatin (MYCOSTATIN) cream  2 TIMES DAILY      Last MAR action: Given - by Ollie Farrar on [facility]02/21 [facility]    98/86/19 1552 11/02/21 2031  fluconazole (DIFLUCAN) tablet 150 mg  ONCE     Question:  Antimicrobial Indications  Answer:  Urinary Tract Infection    Last MAR action: Given - by Ollie Farrar on 11/02/21 [facility]    18/43/40 4061 11/02/21 2043  0.9 % sodium chloride infusion  CONTINUOUS      Last MAR action: New Bag - by Ollie Farrar on 11/02/21 at [facility]          Final Impression      1. Yeast infection of the skin    2. Weakness generalized    3.  Declining functional status        DISPOSITION       (Please note that portions of this note may have been completed with a voice recognition program. Efforts were made to edit the dictations but occasionally words are mis-transcribed.)    Lyric Sandhu MD  [address] [place] [address] MD Neva  11/02/21 3415

## 2021-11-02 NOTE — ED TRIAGE NOTES
Pt c/o BLE pain and swelling x 1 week. States she cannot get around like she usually can. 3+pitting edema to RLE and 2+pitting edema to LLE. VSS. Pt on 2L NC at home.

## 2021-11-03 ENCOUNTER — APPOINTMENT (OUTPATIENT)
Dept: CT IMAGING | Facility: HOSPITAL | Age: 81
End: 2021-11-03
Payer: MEDICARE

## 2021-11-03 PROBLEM — N39.0 RECURRENT UTI: Status: ACTIVE | Noted: 2021-11-03

## 2021-11-03 LAB
GLUCOSE BLD-MCNC: 226 MG/DL (ref 74–106)
GLUCOSE BLD-MCNC: 237 MG/DL (ref 74–106)
GLUCOSE BLD-MCNC: 271 MG/DL (ref 74–106)
GLUCOSE BLD-MCNC: 285 MG/DL (ref 74–106)
PERFORMED ON: ABNORMAL

## 2021-11-03 PROCEDURE — 72192 CT PELVIS W/O DYE: CPT

## 2021-11-03 PROCEDURE — 6370000000 HC RX 637 (ALT 250 FOR IP): Performed by: INTERNAL MEDICINE

## 2021-11-03 PROCEDURE — 97530 THERAPEUTIC ACTIVITIES: CPT

## 2021-11-03 PROCEDURE — 97161 PT EVAL LOW COMPLEX 20 MIN: CPT

## 2021-11-03 PROCEDURE — 97165 OT EVAL LOW COMPLEX 30 MIN: CPT

## 2021-11-03 PROCEDURE — 99219 PR INITIAL OBSERVATION CARE/DAY 50 MINUTES: CPT | Performed by: INTERNAL MEDICINE

## 2021-11-03 PROCEDURE — G0378 HOSPITAL OBSERVATION PER HR: HCPCS

## 2021-11-03 PROCEDURE — 72131 CT LUMBAR SPINE W/O DYE: CPT

## 2021-11-03 PROCEDURE — 2700000000 HC OXYGEN THERAPY PER DAY

## 2021-11-03 PROCEDURE — 36415 COLL VENOUS BLD VENIPUNCTURE: CPT

## 2021-11-03 PROCEDURE — 82947 ASSAY GLUCOSE BLOOD QUANT: CPT

## 2021-11-03 PROCEDURE — 2580000003 HC RX 258: Performed by: INTERNAL MEDICINE

## 2021-11-03 RX ORDER — INSULIN GLARGINE 100 [IU]/ML
65 INJECTION, SOLUTION SUBCUTANEOUS NIGHTLY
Status: DISCONTINUED | OUTPATIENT
Start: 2021-11-03 | End: 2021-11-04 | Stop reason: HOSPADM

## 2021-11-03 RX ORDER — NICOTINE POLACRILEX 4 MG
15 LOZENGE BUCCAL PRN
Status: DISCONTINUED | OUTPATIENT
Start: 2021-11-03 | End: 2021-11-04 | Stop reason: HOSPADM

## 2021-11-03 RX ORDER — ACETAMINOPHEN 325 MG/1
650 TABLET ORAL EVERY 6 HOURS PRN
Status: DISCONTINUED | OUTPATIENT
Start: 2021-11-03 | End: 2021-11-04 | Stop reason: HOSPADM

## 2021-11-03 RX ORDER — DEXTROSE MONOHYDRATE 25 G/50ML
12.5 INJECTION, SOLUTION INTRAVENOUS PRN
Status: DISCONTINUED | OUTPATIENT
Start: 2021-11-03 | End: 2021-11-04 | Stop reason: HOSPADM

## 2021-11-03 RX ORDER — OXYBUTYNIN CHLORIDE 5 MG/1
5 TABLET ORAL 2 TIMES DAILY
Status: DISCONTINUED | OUTPATIENT
Start: 2021-11-03 | End: 2021-11-04 | Stop reason: HOSPADM

## 2021-11-03 RX ORDER — SODIUM CHLORIDE 9 MG/ML
INJECTION, SOLUTION INTRAVENOUS ONCE
Status: COMPLETED | OUTPATIENT
Start: 2021-11-03 | End: 2021-11-03

## 2021-11-03 RX ORDER — ACETAMINOPHEN 650 MG/1
650 SUPPOSITORY RECTAL EVERY 6 HOURS PRN
Status: DISCONTINUED | OUTPATIENT
Start: 2021-11-03 | End: 2021-11-04 | Stop reason: HOSPADM

## 2021-11-03 RX ORDER — BUSPIRONE HYDROCHLORIDE 5 MG/1
7.5 TABLET ORAL 2 TIMES DAILY
Status: DISCONTINUED | OUTPATIENT
Start: 2021-11-03 | End: 2021-11-04 | Stop reason: HOSPADM

## 2021-11-03 RX ORDER — DULOXETIN HYDROCHLORIDE 30 MG/1
60 CAPSULE, DELAYED RELEASE ORAL DAILY
Status: DISCONTINUED | OUTPATIENT
Start: 2021-11-03 | End: 2021-11-04 | Stop reason: HOSPADM

## 2021-11-03 RX ORDER — HYDROCODONE BITARTRATE AND ACETAMINOPHEN 10; 325 MG/1; MG/1
1 TABLET ORAL 3 TIMES DAILY PRN
Status: DISCONTINUED | OUTPATIENT
Start: 2021-11-03 | End: 2021-11-04 | Stop reason: HOSPADM

## 2021-11-03 RX ORDER — POLYETHYLENE GLYCOL 3350 17 G/17G
17 POWDER, FOR SOLUTION ORAL DAILY PRN
Status: DISCONTINUED | OUTPATIENT
Start: 2021-11-03 | End: 2021-11-04 | Stop reason: HOSPADM

## 2021-11-03 RX ORDER — INSULIN ASPART 100 [IU]/ML
0-15 INJECTION, SOLUTION INTRAVENOUS; SUBCUTANEOUS
COMMUNITY

## 2021-11-03 RX ORDER — GABAPENTIN 300 MG/1
600 CAPSULE ORAL 3 TIMES DAILY
Status: DISCONTINUED | OUTPATIENT
Start: 2021-11-03 | End: 2021-11-04 | Stop reason: HOSPADM

## 2021-11-03 RX ORDER — LORAZEPAM 0.5 MG/1
0.5 TABLET ORAL 2 TIMES DAILY PRN
Status: DISCONTINUED | OUTPATIENT
Start: 2021-11-03 | End: 2021-11-04 | Stop reason: HOSPADM

## 2021-11-03 RX ORDER — ONDANSETRON 4 MG/1
4 TABLET, ORALLY DISINTEGRATING ORAL EVERY 8 HOURS PRN
Status: DISCONTINUED | OUTPATIENT
Start: 2021-11-03 | End: 2021-11-04 | Stop reason: HOSPADM

## 2021-11-03 RX ORDER — ONDANSETRON 2 MG/ML
4 INJECTION INTRAMUSCULAR; INTRAVENOUS EVERY 6 HOURS PRN
Status: DISCONTINUED | OUTPATIENT
Start: 2021-11-03 | End: 2021-11-04 | Stop reason: HOSPADM

## 2021-11-03 RX ORDER — PRAVASTATIN SODIUM 20 MG
20 TABLET ORAL NIGHTLY
Status: DISCONTINUED | OUTPATIENT
Start: 2021-11-03 | End: 2021-11-04 | Stop reason: HOSPADM

## 2021-11-03 RX ORDER — DEXTROSE MONOHYDRATE 50 MG/ML
100 INJECTION, SOLUTION INTRAVENOUS PRN
Status: DISCONTINUED | OUTPATIENT
Start: 2021-11-03 | End: 2021-11-04 | Stop reason: HOSPADM

## 2021-11-03 RX ORDER — PROPRANOLOL HYDROCHLORIDE 20 MG/1
40 TABLET ORAL 2 TIMES DAILY
Status: DISCONTINUED | OUTPATIENT
Start: 2021-11-03 | End: 2021-11-04 | Stop reason: HOSPADM

## 2021-11-03 RX ORDER — INSULIN DETEMIR 100 [IU]/ML
68 INJECTION, SOLUTION SUBCUTANEOUS NIGHTLY
Status: ON HOLD | COMMUNITY
End: 2021-11-04 | Stop reason: HOSPADM

## 2021-11-03 RX ORDER — FERROUS SULFATE TAB EC 324 MG (65 MG FE EQUIVALENT) 324 (65 FE) MG
324 TABLET DELAYED RESPONSE ORAL
Status: DISCONTINUED | OUTPATIENT
Start: 2021-11-03 | End: 2021-11-04 | Stop reason: HOSPADM

## 2021-11-03 RX ORDER — ERGOCALCIFEROL 1.25 MG/1
50000 CAPSULE ORAL WEEKLY
Status: DISCONTINUED | OUTPATIENT
Start: 2021-11-09 | End: 2021-11-04 | Stop reason: HOSPADM

## 2021-11-03 RX ORDER — PANTOPRAZOLE SODIUM 40 MG/1
40 TABLET, DELAYED RELEASE ORAL
Status: DISCONTINUED | OUTPATIENT
Start: 2021-11-03 | End: 2021-11-04 | Stop reason: HOSPADM

## 2021-11-03 RX ADMIN — SODIUM CHLORIDE: 9 INJECTION, SOLUTION INTRAVENOUS at 01:12

## 2021-11-03 RX ADMIN — PROPRANOLOL HYDROCHLORIDE 40 MG: 20 TABLET ORAL at 07:54

## 2021-11-03 RX ADMIN — PRAVASTATIN SODIUM 20 MG: 20 TABLET ORAL at 20:40

## 2021-11-03 RX ADMIN — FERROUS SULFATE TAB EC 324 MG (65 MG FE EQUIVALENT) 324 MG: 324 (65 FE) TABLET DELAYED RESPONSE at 07:53

## 2021-11-03 RX ADMIN — LORAZEPAM 0.5 MG: 0.5 TABLET ORAL at 08:15

## 2021-11-03 RX ADMIN — APIXABAN 5 MG: 5 TABLET, FILM COATED ORAL at 07:54

## 2021-11-03 RX ADMIN — PROPRANOLOL HYDROCHLORIDE 40 MG: 20 TABLET ORAL at 01:14

## 2021-11-03 RX ADMIN — BUSPIRONE HYDROCHLORIDE 7.5 MG: 5 TABLET ORAL at 01:14

## 2021-11-03 RX ADMIN — NYSTATIN: 100000 CREAM TOPICAL at 20:42

## 2021-11-03 RX ADMIN — INSULIN LISPRO 2 UNITS: 100 INJECTION, SOLUTION INTRAVENOUS; SUBCUTANEOUS at 17:35

## 2021-11-03 RX ADMIN — INSULIN LISPRO 1 UNITS: 100 INJECTION, SOLUTION INTRAVENOUS; SUBCUTANEOUS at 20:49

## 2021-11-03 RX ADMIN — APIXABAN 5 MG: 5 TABLET, FILM COATED ORAL at 01:14

## 2021-11-03 RX ADMIN — OXYBUTYNIN CHLORIDE 5 MG: 5 TABLET ORAL at 01:14

## 2021-11-03 RX ADMIN — BUSPIRONE HYDROCHLORIDE 7.5 MG: 5 TABLET ORAL at 20:39

## 2021-11-03 RX ADMIN — INSULIN LISPRO 10 UNITS: 100 INJECTION, SOLUTION INTRAVENOUS; SUBCUTANEOUS at 11:05

## 2021-11-03 RX ADMIN — GABAPENTIN 600 MG: 300 CAPSULE ORAL at 14:04

## 2021-11-03 RX ADMIN — PRAVASTATIN SODIUM 20 MG: 20 TABLET ORAL at 01:14

## 2021-11-03 RX ADMIN — INSULIN LISPRO 10 UNITS: 100 INJECTION, SOLUTION INTRAVENOUS; SUBCUTANEOUS at 08:07

## 2021-11-03 RX ADMIN — PANTOPRAZOLE SODIUM 40 MG: 40 TABLET, DELAYED RELEASE ORAL at 05:15

## 2021-11-03 RX ADMIN — GABAPENTIN 600 MG: 300 CAPSULE ORAL at 20:40

## 2021-11-03 RX ADMIN — INSULIN LISPRO 3 UNITS: 100 INJECTION, SOLUTION INTRAVENOUS; SUBCUTANEOUS at 11:05

## 2021-11-03 RX ADMIN — NYSTATIN: 100000 CREAM TOPICAL at 07:53

## 2021-11-03 RX ADMIN — DULOXETINE HYDROCHLORIDE 60 MG: 30 CAPSULE, DELAYED RELEASE ORAL at 07:55

## 2021-11-03 RX ADMIN — LORAZEPAM 0.5 MG: 0.5 TABLET ORAL at 20:45

## 2021-11-03 RX ADMIN — PROPRANOLOL HYDROCHLORIDE 40 MG: 20 TABLET ORAL at 20:39

## 2021-11-03 RX ADMIN — OXYBUTYNIN CHLORIDE 5 MG: 5 TABLET ORAL at 20:40

## 2021-11-03 RX ADMIN — INSULIN LISPRO 3 UNITS: 100 INJECTION, SOLUTION INTRAVENOUS; SUBCUTANEOUS at 07:59

## 2021-11-03 RX ADMIN — OXYBUTYNIN CHLORIDE 5 MG: 5 TABLET ORAL at 07:54

## 2021-11-03 RX ADMIN — GABAPENTIN 600 MG: 300 CAPSULE ORAL at 07:54

## 2021-11-03 RX ADMIN — INSULIN LISPRO 10 UNITS: 100 INJECTION, SOLUTION INTRAVENOUS; SUBCUTANEOUS at 17:38

## 2021-11-03 RX ADMIN — APIXABAN 5 MG: 5 TABLET, FILM COATED ORAL at 20:40

## 2021-11-03 RX ADMIN — BUSPIRONE HYDROCHLORIDE 7.5 MG: 5 TABLET ORAL at 07:55

## 2021-11-03 RX ADMIN — INSULIN GLARGINE 65 UNITS: 100 INJECTION, SOLUTION SUBCUTANEOUS at 20:49

## 2021-11-03 ASSESSMENT — PAIN SCALES - GENERAL: PAINLEVEL_OUTOF10: 4

## 2021-11-03 ASSESSMENT — PAIN DESCRIPTION - PAIN TYPE: TYPE: CHRONIC PAIN

## 2021-11-03 NOTE — PROGRESS NOTES
Physical Therapy    Facility/Department: [facility] MED SURG  Initial Assessment    NAME: Renetta Uriostegui  : 1940  MRN: [id number]    Date of Service: 11/3/2021    Discharge Recommendations:  Continue to assess pending progress        Assessment   Body structures, Functions, Activity limitations: Decreased functional mobility ; Decreased high-level IADLs  Assessment: Functional decline; chronicaly sedentary, obese female; appears to be near or at baseline; will benefit from PT to help improve bed mobility and transfer capabilities; has reported not ambulated any distance in quite some time  Treatment Diagnosis: Functional decline; chronicaly sedentary  Prognosis: Good  Decision Making: Low Complexity  REQUIRES PT FOLLOW UP: Yes  Activity Tolerance  Activity Tolerance: Patient Tolerated treatment well;Patient limited by endurance       Patient Diagnosis(es): The primary encounter diagnosis was Yeast infection of the skin. Diagnoses of Weakness generalized and Declining functional status were also pertinent to this visit. has a past medical history of Allergic rhinitis, Anxiety, Chronic back pain, Depression, Double vision with both eyes open, Fall, Hyperlipidemia, Hypertension, Osteoarthritis, Stroke ([address] and Type II or unspecified type diabetes mellitus without mention of complication, not stated as uncontrolled. has a past surgical history that includes Hysterectomy;  section; Colonoscopy; and shoulder surgery.     Restrictions  Restrictions/Precautions  Restrictions/Precautions: General Precautions, Fall Risk  Required Braces or Orthoses?: No  Vision/Hearing  Vision: Impaired  Hearing: Within functional limits     Subjective  General  Chart Reviewed: Yes  Patient assessed for rehabilitation services?: Yes  Response To Previous Treatment: Not applicable  Family / Caregiver Present: No  Referring Practitioner: Genoveva Nicole MD  Diagnosis: Functional decline  Follows Commands: Within Functional Limits  Subjective  Subjective: Patient agreeable to consult; states she has been non-ambulatory for a long time (~2 years); states she stands and transfers to [facility] and lift chair with walker and assistance; c/o right knee pain; legs are unable to hold her in standing position for any significant length of time. Pain Screening  Patient Currently in Pain: Yes  Pain Assessment  Pain Level: 4  Pain Type: Chronic pain  Vital Signs  Patient Currently in Pain: Yes       Orientation  Orientation  Overall Orientation Status: Within Functional Limits  Social/Functional History  Social/Functional History  Lives With: Family, Spouse  Cognition        Objective     Observation/Palpation  Posture: Fair  Observation: elderly, obese female lying in bed, NAD, pleasant and cooperative; leaning to her left, O2 at 2 liters via NC  Edema: no    AROM RLE (degrees)  RLE AROM: WFL  AROM LLE (degrees)  LLE AROM : WFL  AROM RUE (degrees)  RUE AROM : WFL  AROM LUE (degrees)  LUE AROM : WFL  Strength RLE  Strength RLE: WFL  Comment: MMG's of hips and knees = grossly 5-/5, ankles = 4/5  Strength LLE  Strength LLE: WFL  Comment: MMG's of hips and knees = grossly 5-/5, ankles = 4/5  Strength RUE  Strength RUE: WFL  Comment: See OT note for details  Strength LUE  Strength LUE: WFL  Comment: See OT note for details  Tone RLE  RLE Tone: Normotonic  Tone LLE  LLE Tone: Normotonic  Motor Control  Gross Motor?: WFL  Sensation  Overall Sensation Status: WFL  Bed mobility  Rolling to Right: Minimal assistance  Supine to Sit: Moderate assistance  Sit to Supine:  Moderate assistance  Scooting: Contact guard assistance  Transfers  Sit to Stand: Minimal Assistance;2 Person Assistance  Stand to sit: Minimal Assistance;2 Person Assistance  Ambulation  Ambulation?: No  WB Status: stood only - able to stand about 15 seconds, knees start to give way     Balance  Posture: Fair  Sitting - Static: Fair;+  Sitting - Dynamic: Fair  Standing - Static: Poor;+  Comments: sitting balance - patient leans to left, but can correct on her own with cueing        Plan   Plan  Times per week: 3-4 days  Plan weeks: 3-4 days  Current Treatment Recommendations: Neuromuscular Re-education, Balance Training, Functional Mobility Training, Transfer Training, Safety Education & Training, Patient/Caregiver Education & Training            Goals  Long term goals  Time Frame for Long term goals : 3-4 days  Long term goal 1: Achieve rolling in bed with CGA x 1. Long term goal 2: Achieve supine to sit transfers with min assist x 1. Long term goal 3: Achieve sit to stand transfer with min assist x 1.   Long term goal 4: Transfer bed to chair with min assist x 1 with walker       Therapy Time   Individual Concurrent Group Co-treatment   Time In           Time Out           Minutes                   Allie Skelton, PT

## 2021-11-03 NOTE — CARE COORDINATION
Spoke with daughter on the phone while PA present. Daughter was updated on POC and results by Juli Mota. Pt is non-ambulatory at baseline. Daughter is agreeable not to pursue NHP. Daughter educated that if tests continue to be negative, pt will be DC'd to home with [facility]. Daughter states she will talk to her mom about agreeing to therapy even with a male therapist.  All questions answered.

## 2021-11-03 NOTE — CARE COORDINATION
Spoke with pt at bedside. Pt understands that insurance will not pay for her stay if further testing is negative. CM and PA discussed NHP with pt and pt was adamantly refusing NHP. Pt states that she can back to the hospital because she can't walk, but hasn't walked for over a year and a half. Pt states that she has [facility] but doesn't like the therapist coming because it is a male. CM educated pt that we can attempt to find a company with a female therapist, but that the therapist is just there to ensure her strengthening. Pt states she is agreeable to home with [facility] [place]. Pt states she has 24/7 help at home. Pt denies any DME needs at this time. PT/OT eval ordered. CM will monitor DC needs.

## 2021-11-03 NOTE — PROGRESS NOTES
Pt to room 4-1 per stretcher and staff. Oriented to room and call light, bed in low position. No complaints att.

## 2021-11-03 NOTE — H&P
History and Physical    Patient:  Lesa Flank    CHIEF COMPLAINT:    Declining functional status    HISTORY OF PRESENT ILLNESS:   The patient is a [de-identified] y.o. female with past medical history of anxiety, chronic right shoulder pain, chronic back pain, depression, recurrent falls, hyperlipidemia, right femur fracture (2 years ago), hypertension, osteoarthritis, CVA, diabetes mellitus type 2 who presented to the emergency department with family secondary to declining functional status. Per patient and family report, she has not been able to ambulate for approximately 1.5 years however continues to get progressively weaker. Per patient report, she was seen by Dr. Claire Leonardo last week for injections in her knees. After the injections, she fell while transferring from her wheelchair to the car and was unable to stand back up. The fire department was ultimately called and they assisted patient into her vehicle. Spoke with patient's daughter over the phone. She is also noted bilateral lower extremity edema with red discoloration to patient's bilateral lower extremities and increased shortness of breath. Per daughter Rajendra Escudero, patient becoming increasingly more sedentary and dyspneic with any significant activity (even at rest). No recent fever, chills, nausea, vomiting or diarrhea. No known exposure to Covid positive patients. Patient underwent routine evaluation in the emergency department. Upon arrival, vitals found to be stable. She was afebrile. Labs including UA were unremarkable. Chest x-ray negative. Patient was then admitted for observation.     Past Medical History:      Diagnosis Date    Allergic rhinitis     Anxiety     Chronic back pain     Depression     Double vision with both eyes open     Pt states she can see ok with one eye shut    Fall     Hyperlipidemia     Hypertension     Osteoarthritis     St[facility] [address] [address]     Type II or unspecified type diabetes mellitus without mention of complication, not stated as uncontrolled        Past Surgical History:      Procedure Laterality Date     SECTION      COLONOSCOPY      HYSTERECTOMY      TOTAL    SHOULDER SURGERY      RIGHT X 2       Medications Prior to Admission:    Prior to Admission medications    Medication Sig Start Date End Date Taking? Authorizing Provider   insulin detemir (LEVEMIR FLEXTOUCH) 100 UNIT/ML injection pen Inject 68 Units into the skin nightly   Yes Historical Provider, MD   insulin aspart (NOVOLOG FLEXPEN) 100 UNIT/ML injection pen Inject 0-15 Units into the skin 3 times daily (before meals) Sliding scale   Yes Historical Provider, MD   nystatin (MYCOSTATIN) 452404 UNIT/ML suspension Take 500,000 Units by mouth every 4-6 hours as needed Swish and spit   Yes Moise Nunez   pravastatin (PRAVACHOL) 20 MG tablet Take 1 tablet by mouth nightly 21  Yes KEATON Dan   gabapentin (NEURONTIN) 600 MG tablet Take 600 mg by mouth 3 times daily. Yes Historical Provider, MD   LORazepam (ATIVAN) 0.5 MG tablet Take 0.5 mg by mouth 2 times daily as needed for Anxiety.    Yes Historical Provider, MD   busPIRone (BUSPAR) 7.5 MG tablet Take 7.5 mg by mouth 2 times daily   Yes Historical Provider, MD   albuterol sulfate HFA (VENTOLIN HFA) 108 (90 Base) MCG/ACT inhaler Inhale 2 puffs by mouth every 4 to 6 hours as needed for cough and wheezing   Yes Historical Provider, MD   apixaban (ELIQUIS) 5 MG TABS tablet Take 5 mg by mouth 2 times daily   Yes Historical Provider, MD   DULoxetine (CYMBALTA) 60 MG extended release capsule Take 60 mg by mouth daily    Yes Historical Provider, MD   oxybutynin (DITROPAN) 5 MG tablet Take 5 mg by mouth 2 times daily   Yes Historical Provider, MD   vitamin D (ERGOCALCIFEROL) 1.25 MG (06424 UT) CAPS capsule Take 50,000 Units by mouth once a week   Yes Historical Provider, MD   omeprazole (PRILOSEC) 20 MG delayed release capsule Take 2 capsules by mouth daily 20  Yes KEATON Dan propranolol (INDERAL) 40 MG tablet Take 1 tablet by mouth 2 times daily 4/20/20  Yes KEATON Davis   meclizine (ANTIVERT) 25 MG tablet Take 1 tablet by mouth 3 times daily as needed for Dizziness 1/18/20  Yes KEATON Davis   Insulin Pen Needle ([employer] PEN NEEDLES 29G) 29G X 12MM MISC 1 each by Does not apply route daily 10/15/20   KEATON Carrero   glucose monitoring kit (FREESTYLE) monitoring kit 1 kit by Does not apply route daily 1/18/20   KEATON Davis       Allergies:  Latex, Penicillins, Sulfa antibiotics, Tetracyclines & related, and Diazepam    Social History:   TOBACCO:   reports that she has never smoked. She has never used smokeless tobacco.  ETOH:   reports no history of alcohol use. OCCUPATION:  None     Family History:       Problem Relation Age of Onset    Heart Disease Mother         CHF,CAD    Cancer Maternal Aunt     High Blood Pressure Sister        Review of system  Constitutional:  Denies fever or chills. Positive for declining functional status. Eyes:  Denies eye pain or redness  HENT:  Denies nasal congestion or sore throat   Respiratory:  Denies cough. Positive for SOA. Cardiovascular:  Denies chest pain. Positive for BLE edema. GI:  Denies abdominal pain, nausea, vomiting, bloody stools or diarrhea   :  Denies dysuria or frequency  Musculoskeletal:  Denies acute neck pain or body aches. Positive for chronic right shoulder pain and chronic low back pain. Integument:  Denies rash or itching  Neurologic:  Denies headache, dizziness, numbness, tingling. Positive for generalized weakness. Psychiatric:  Denies acute depression or acute anxiety      Vital Signs  Temp: 98.1 °F (36.7 °C)  Pulse: 65  Resp: 18  BP: (!) 144/74  SpO2: 98 %  O2 Device: Nasal cannula  O2 Flow Rate (L/min): 2 L/min    vital signs reviewed in electronic chart.     Physical exam  Constitutional:  Well developed, well nourished, morbidly obese female laying in bed in no acute distress  Eyes:  no

## 2021-11-03 NOTE — ED NOTES
Report to [facility], patient transported to room 4 via stretcher.      Darrius Villagran RN  11/02/21 7894

## 2021-11-03 NOTE — FLOWSHEET NOTE
11/03/21 0810   Assessment   Charting Type Shift assessment   Neurological   Neuro (WDL) WDL   Level of Consciousness Alert (0)   Giovanny Coma Scale   Eye Opening 4   Best Verbal Response 5   Best Motor Response 6   Giovanny Coma Scale Score 15   HEENT   HEENT (WDL) WDL   Respiratory   Respiratory (WDL) WDL   Cardiac   Cardiac (WDL) WDL   Gastrointestinal   Abdominal (WDL) WDL   Peripheral Vascular   Peripheral Vascular (WDL) WDL   Edema None   Skin Color/Condition   Skin Color/Condition (WDL) WDL   Skin Integrity   Skin Integrity (WDL) WDL   Musculoskeletal   Musculoskeletal (WDL) WDL   Genitourinary   Genitourinary (WDL) WDL   Psychosocial   Psychosocial (WDL) WDL

## 2021-11-03 NOTE — PROGRESS NOTES
Occupational Therapy   Occupational Therapy Initial Assessment  Date: 11/3/2021   Patient Name: Ramila Sorenson  MRN: [id number]     : 1940    Date of Service: 11/3/2021    Discharge Recommendations:  24 hour supervision or assist  OT Equipment Recommendations  Equipment Needed: No    Assessment   Performance deficits / Impairments: Decreased functional mobility ; Decreased ADL status; Decreased high-level IADLs;Decreased strength;Decreased posture;Decreased safe awareness;Decreased balance;Decreased coordination;Decreased endurance;Decreased ROM  Assessment: Pt agreeable to OT services. Pt reports she is sedentary at home and family assists with all ADL's. Pt reports she has been non ambulatory for ~2 years. Pt reports she only gets in [facility] for MD appts and family assists with WC propulsion. Pt sits in lift chair throughout the day and to sleep. Pt reports family assists with dressing, bathing and toileting. Pt required MOD A to come to sit at EOB on this date. Pt did require CGA<>MOD A to correct leaning to left side. Pt has had difficulty maintaining static sitting balance at baseline. Pt is able to correct with verbal cues for short periods of time. Pt transferred to stand 3 trials at [facility] with min x2 assist at [facility]. Pt exhibits poor standing tolerance secondary to weakness. Pt assisted to supine and left in bed with call light in reach. Pt positioned with pillows to prevent leaning to left while Iying supine. Prognosis: Good  Decision Making: Low Complexity  REQUIRES OT FOLLOW UP: Yes           Patient Diagnosis(es): The primary encounter diagnosis was Yeast infection of the skin. Diagnoses of Weakness generalized and Declining functional status were also pertinent to this visit.      has a past medical history of Allergic rhinitis, Anxiety, Chronic back pain, Depression, Double vision with both eyes open, Fall, Hyperlipidemia, Hypertension, Osteoarthritis, Stroke ([address] and Type II or unspecified type diabetes mellitus without mention of complication, not stated as uncontrolled. has a past surgical history that includes Hysterectomy;  section; Colonoscopy; shoulder surgery; and Femur fracture surgery (Right, 2019). Restrictions  Restrictions/Precautions  Restrictions/Precautions: General Precautions, Fall Risk  Required Braces or Orthoses?: No    Subjective   General  Chart Reviewed: Yes  Patient assessed for rehabilitation services?: Yes  Family / Caregiver Present: No  Referring Practitioner: KEATON Soni  Diagnosis: Functional Decline  Subjective  Subjective: Pt agreeable to OT services. Pt states she has been unable to ambulate for past 2 years. Patient Currently in Pain: Yes  Pain Assessment  Pain Level: 4  Pain Type: Chronic pain  Vital Signs  Patient Currently in Pain: Yes  Social/Functional History  Social/Functional History  Lives With: Family, Spouse  Type of Home: House  Home Layout: One level  Home Access: Ramped entrance  Bathroom Shower/Tub:  (Sponge bathes)  Bathroom Toilet: Bedside commode  Home Equipment: Rolling walker, [address] bed, Oxygen, Lift chair  Receives Help From: Home health, Family  ADL Assistance: Needs assistance (Family assists with bathing, dressing, and toileting pt)  Homemaking Responsibilities: No  Ambulation Assistance: Needs assistance  Transfer Assistance: Needs assistance (Pt is non ambulatory and family pushes pt in [address] during appts.  Family assists with transfers)  Active : No       Objective   Vision: Impaired  Hearing: Within functional limits    Orientation  Overall Orientation Status: Within Functional Limits  Observation/Palpation  Posture: Fair  Observation: elderly, obese female lying in bed, NAD, pleasant and cooperative; leaning to her left, O2 at 2 liters via NC  Edema: no  Balance  Sitting Balance: Minimal assistance (<>MOD A)  Standing Balance: Minimal assistance  ADL  LE Dressing: Maximum assistance  Tone RUE  RUE Tone: Normotonic  Tone LUE  LUE Tone: Normotonic     Bed mobility  Rolling to Right: Minimal assistance  Supine to Sit: Moderate assistance  Sit to Supine: Moderate assistance  Scooting: Contact guard assistance  Transfers  Sit to stand: Minimal assistance; Moderate assistance     Cognition  Overall Cognitive Status: WFL        Sensation  Overall Sensation Status: WFL        LUE AROM (degrees)  LUE AROM : WFL  RUE AROM (degrees)  RUE General AROM: Limited shoulder flexion  LUE Strength  L Shoulder Flex: 3+/5  L Elbow Flex: 4/5  L Elbow Ext: 4-/5  L Hand General: 4/5  RUE Strength  R Shoulder Flex: 2+/5  R Elbow Flex: 4-/5  R Elbow Ext: 3+/5  R Hand General: 4/5                   Plan   Plan  Times per week: 3-5  Times per day: Daily  Plan weeks: 1  Current Treatment Recommendations: Strengthening, Endurance Training, Balance Training, Functional Mobility Training, Safety Education & Training, Self-Care / ADL, Patient/Caregiver Education & Training      Goals  Short term goals  Time Frame for Short term goals: 1 week  Short term goal 1: Pt to complete ADL transfers using RW for support with CGA. Short term goal 2: Pt to complete toileting with SBA. Short term goal 3: Pt to complete bed mobility with MOD I. Short term goal 4: Pt to tolerate x10 minutes of activity to increase functional activity tolerance maintianing 02 sats >90%. Short term goal 5: Pt to maintain static sitting balance at EOB with CGA x10 minutes to participate in ADL tasks. Therapy Time   Individual Concurrent Group Co-treatment   Time In 0139         Time Out 0202         Minutes 23              This note serves as a DC summary in the event of pt discharge.      Valery Cabrera OTR/L

## 2021-11-04 VITALS
OXYGEN SATURATION: 97 % | HEART RATE: 64 BPM | BODY MASS INDEX: 37.13 KG/M2 | TEMPERATURE: 97.8 F | DIASTOLIC BLOOD PRESSURE: 63 MMHG | SYSTOLIC BLOOD PRESSURE: 131 MMHG | RESPIRATION RATE: 19 BRPM | WEIGHT: 236.56 LBS | HEIGHT: 67 IN

## 2021-11-04 LAB
A/G RATIO: 1.1 (ref 0.8–2)
ALBUMIN SERPL-MCNC: 3.2 G/DL (ref 3.4–4.8)
ALP BLD-CCNC: 58 U/L (ref 25–100)
ALT SERPL-CCNC: <5 U/L (ref 4–36)
ANION GAP SERPL CALCULATED.3IONS-SCNC: 8 MMOL/L (ref 3–16)
AST SERPL-CCNC: 11 U/L (ref 8–33)
BILIRUB SERPL-MCNC: <0.2 MG/DL (ref 0.3–1.2)
BUN BLDV-MCNC: 18 MG/DL (ref 6–20)
CALCIUM SERPL-MCNC: 8.6 MG/DL (ref 8.5–10.5)
CHLORIDE BLD-SCNC: 106 MMOL/L (ref 98–107)
CO2: 27 MMOL/L (ref 20–30)
CREAT SERPL-MCNC: 1.2 MG/DL (ref 0.4–1.2)
GFR AFRICAN AMERICAN: 52
GFR NON-AFRICAN AMERICAN: 43
GLOBULIN: 2.8 G/DL
GLUCOSE BLD-MCNC: 214 MG/DL (ref 74–106)
GLUCOSE BLD-MCNC: 220 MG/DL (ref 74–106)
GLUCOSE BLD-MCNC: 221 MG/DL (ref 74–106)
HCT VFR BLD CALC: 34.2 % (ref 37–47)
HEMOGLOBIN: 10.3 G/DL (ref 11.5–16.5)
MCH RBC QN AUTO: 31.9 PG (ref 27–32)
MCHC RBC AUTO-ENTMCNC: 30.1 G/DL (ref 31–35)
MCV RBC AUTO: 105.9 FL (ref 80–100)
PDW BLD-RTO: 13.7 % (ref 11–16)
PERFORMED ON: ABNORMAL
PERFORMED ON: ABNORMAL
PLATELET # BLD: 151 K/UL (ref 150–400)
PMV BLD AUTO: 11.8 FL (ref 6–10)
POTASSIUM REFLEX MAGNESIUM: 5 MMOL/L (ref 3.4–5.1)
RBC # BLD: 3.23 M/UL (ref 3.8–5.8)
SODIUM BLD-SCNC: 141 MMOL/L (ref 136–145)
TOTAL PROTEIN: 6 G/DL (ref 6.4–8.3)
URINE CULTURE, ROUTINE: NORMAL
WBC # BLD: 6.2 K/UL (ref 4–11)

## 2021-11-04 PROCEDURE — 99217 PR OBSERVATION CARE DISCHARGE MANAGEMENT: CPT | Performed by: INTERNAL MEDICINE

## 2021-11-04 PROCEDURE — 85027 COMPLETE CBC AUTOMATED: CPT

## 2021-11-04 PROCEDURE — 80053 COMPREHEN METABOLIC PANEL: CPT

## 2021-11-04 PROCEDURE — 36415 COLL VENOUS BLD VENIPUNCTURE: CPT

## 2021-11-04 PROCEDURE — G0378 HOSPITAL OBSERVATION PER HR: HCPCS

## 2021-11-04 PROCEDURE — 6370000000 HC RX 637 (ALT 250 FOR IP): Performed by: INTERNAL MEDICINE

## 2021-11-04 PROCEDURE — 2700000000 HC OXYGEN THERAPY PER DAY

## 2021-11-04 RX ORDER — INSULIN DETEMIR 100 [IU]/ML
65 INJECTION, SOLUTION SUBCUTANEOUS NIGHTLY
Qty: 5 PEN | Refills: 3 | Status: SHIPPED | OUTPATIENT
Start: 2021-11-04

## 2021-11-04 RX ORDER — FERROUS SULFATE TAB EC 324 MG (65 MG FE EQUIVALENT) 324 (65 FE) MG
324 TABLET DELAYED RESPONSE ORAL
Qty: 30 TABLET | Refills: 3 | Status: SHIPPED | OUTPATIENT
Start: 2021-11-04

## 2021-11-04 RX ORDER — NYSTATIN 100000 U/G
CREAM TOPICAL
Qty: 30 G | Refills: 0 | Status: SHIPPED | OUTPATIENT
Start: 2021-11-04

## 2021-11-04 RX ADMIN — APIXABAN 5 MG: 5 TABLET, FILM COATED ORAL at 08:32

## 2021-11-04 RX ADMIN — INSULIN LISPRO 10 UNITS: 100 INJECTION, SOLUTION INTRAVENOUS; SUBCUTANEOUS at 11:43

## 2021-11-04 RX ADMIN — INSULIN LISPRO 10 UNITS: 100 INJECTION, SOLUTION INTRAVENOUS; SUBCUTANEOUS at 08:39

## 2021-11-04 RX ADMIN — INSULIN LISPRO 2 UNITS: 100 INJECTION, SOLUTION INTRAVENOUS; SUBCUTANEOUS at 08:33

## 2021-11-04 RX ADMIN — INSULIN LISPRO 2 UNITS: 100 INJECTION, SOLUTION INTRAVENOUS; SUBCUTANEOUS at 11:41

## 2021-11-04 RX ADMIN — NYSTATIN: 100000 CREAM TOPICAL at 08:33

## 2021-11-04 RX ADMIN — PROPRANOLOL HYDROCHLORIDE 40 MG: 20 TABLET ORAL at 08:32

## 2021-11-04 RX ADMIN — GABAPENTIN 600 MG: 300 CAPSULE ORAL at 08:32

## 2021-11-04 RX ADMIN — PANTOPRAZOLE SODIUM 40 MG: 40 TABLET, DELAYED RELEASE ORAL at 05:46

## 2021-11-04 RX ADMIN — FERROUS SULFATE TAB EC 324 MG (65 MG FE EQUIVALENT) 324 MG: 324 (65 FE) TABLET DELAYED RESPONSE at 08:33

## 2021-11-04 RX ADMIN — DULOXETINE HYDROCHLORIDE 60 MG: 30 CAPSULE, DELAYED RELEASE ORAL at 08:32

## 2021-11-04 RX ADMIN — OXYBUTYNIN CHLORIDE 5 MG: 5 TABLET ORAL at 08:33

## 2021-11-04 RX ADMIN — BUSPIRONE HYDROCHLORIDE 7.5 MG: 5 TABLET ORAL at 08:32

## 2021-11-04 ASSESSMENT — PAIN SCALES - GENERAL: PAINLEVEL_OUTOF10: 0

## 2021-11-04 NOTE — CARE COORDINATION
11/04/21 0957   Discharge [address] Spouse/Significant Other;Children;Family Members;Home Care Staff   Current Services Prior To Admission Durable Medical Equipment; Oxygen Therapy;Home Care   DME Wheelchair;Walker; Oxygen Therapy (Comment); Hospital Bed  (lift chair)   [facility]   ([facility])   Potential Assistance Needed Home Care   Potential Assistance Purchasing Medications No   Type of Home Care Services OT;PT;Nursing Services; Aide Services   Patient expects to be discharged to: [facility]   Expected Discharge Date 11/04/21   Follow Up Appointment: Best Day/Time    (any)     Lives with SO and children. Pt states family offers 24/7 care at home. Pt has RW, WC, bed, O2, lift chair. [facility] - needs new order. Pt is at baseline (non-ambulatory for more than 1.5 years). Pt states her family cares for her at home. Pt is now agreeable to do therapy at home. Pt asking about swingbed vs. NH. Educated that pt does not meet the criteria for subacute rehab as she can not tolerate approximately an hour of therapy per day or requiring IV abxs. Educated pt that all tests have come back WNL. Pt is now agreeable to go home with [facility] therapy and participate with therapy. No DME needs noted at this time.

## 2021-11-04 NOTE — FLOWSHEET NOTE
11/03/21 2106   Assessment   Charting Type Shift assessment   Neurological   Neuro (WDL) WDL   Level of Consciousness Alert (0)   Swallow Screening   Is the patient able to remain alert for testing?  Yes   Gray Coma Scale   Eye Opening 4   Best Verbal Response 4   Best Motor Response 6   Gray Coma Scale Score 14   NIHSS Stroke Scale   NIHSS Stroke Scale Assessed No   HEENT   HEENT (WDL) WDL   Respiratory   Respiratory (WDL) WDL   Cardiac   Cardiac (WDL) WDL   Cardiac Monitor   Telemetry Monitor On No   Gastrointestinal   Abdominal (WDL) WDL   Peripheral Vascular   Peripheral Vascular (WDL) WDL   Edema None   Skin Color/Condition   Skin Color/Condition (WDL) WDL   Skin Integrity   Skin Integrity (WDL) WDL   Musculoskeletal   Musculoskeletal (WDL) X   RUE Full movement   LUE Full movement   RL Extremity Weakness   LL Extremity Weakness   Genitourinary   Genitourinary (WDL) WDL   Psychosocial   Psychosocial (WDL) WDL

## 2021-11-04 NOTE — PROGRESS NOTES
Called Latisha Pastrana the patients daughter and informed her of the discharge instructions. Transportation arranged by BMT.

## 2021-11-04 NOTE — DISCHARGE SUMMARY
Discharge Summary      Patient ID: Sonali Townsend      Patient's PCP: MIRNA Santiago CNP    Admit Date: 11/2/2021     Discharge Date:  11/4/21    Admitting Provider: Rachael Castro MD    Discharging Provider: MIRNA Sheikh CNP     Reason for this admission:   Declining functional status     Discharge Diagnoses: Active Hospital Problems    Diagnosis Date Noted    Recurrent UTI [N39.0] 11/03/2021    Chronic respiratory failure ([facility] [address] [J96.10] 06/16/2021    History of pulmonary embolism [Z86.711] 06/16/2021    Anxiety and depression [F41.9, F32.A] 12/20/2019    Peripheral neuropathy [G62.9] 12/20/2019    Chronic back pain [M54.9, G89.29] 12/20/2019    Declining functional status [R53.81] 12/19/2019    Hyperlipidemia [E78.5] 10/14/2011    HTN (hypertension) [I10] 10/14/2011    Type 2 diabetes mellitus with stage 3 chronic kidney disease, with long-term current use of insulin ([facility] [address] [E11.22, N18.30, Z79.4] 07/05/2011       Procedures:  none    Consults:   None      Briefly:   The patient is a [de-identified] y.o. female with past medical history of anxiety, chronic right shoulder pain, chronic back pain, depression, recurrent falls, hyperlipidemia, right femur fracture (2 years ago), hypertension, osteoarthritis, CVA, diabetes mellitus type 2 who presented to the emergency department with family secondary to declining functional status. In ED, Patient underwent routine evaluation in the emergency department. Upon arrival, vitals found to be stable. She was afebrile. Labs including UA were unremarkable. Chest x-ray negative. Patient was then admitted for observation. Hospital Course: Active Hospital Problems    Diagnosis Date Noted    Recurrent UTI [N39.0]  -UA negative. Urine culture pending. No antibiotics needed at this time will follow and change if needed   11/03/2021    Chronic respiratory failure (HCC) [J96.10]  Continue home oxygen at 2 L nasal cannula. Symptom management. 06/16/2021    History of pulmonary embolism [Z86.711]  CTA chest negative for PE  -continue home eliquis   06/16/2021    Anxiety and depression [F41.9, F32.A]  Stable. Continue home medication   12/20/2019    Peripheral neuropathy [G62.9]  continue home Neurontin   12/20/2019    Chronic back pain [M54.9, G89.29]  Continue home meds. Symptom management. CT lumbar spine and pelvis show degenerative changes. No acute fractures. 12/20/2019    Declining functional status [R53.81]  -suspect due to patient's advanced arthritis causing the general debility and declining functional status. After speaking with patient's daughter, patient unable to do much at home and sits most of the day. Transfers have become difficult. Sedentary lifestyle. -Patient and family refused nursing home placement. -PT/OT followed and will have home health at home. Encouraged therapy. 12/19/2019    Hyperlipidemia [E78.5]  Cont home meds. Lifestyle modifications. 10/14/2011    HTN (hypertension) [I10]  Cont home propanolol. BP stable. 10/14/2011    Type 2 diabetes mellitus with stage 3 chronic kidney disease, with long-term current use of insulin (HCC) [E11.22, N18.30, Z79.4]  Diabetic diet. Lifestyle modifications. Continue home meds. F/u PCP 2 weeks. 07/05/2011       Disposition: home with home health     Discharged Condition: Stable    Vital Signs  Temp: 97.9 °F (36.6 °C)  Pulse: 65  Resp: 19  BP: (!) 129/48  SpO2: 97 %  O2 Device: Nasal cannula  O2 Flow Rate (L/min): 2 L/min    Vital signs reviewed in electronic chart. Physical exam  Constitutional:  Obese, Well developed, well nourished, no acute distress. Eyes:  PERRL, conjunctiva normal, sclera without icterus. HENT:  Atraumatic, external ears normal, nose normal, oropharynx moist, no pharyngeal exudates. Neck- supple, no JVD  Respiratory:  2L. No respiratory distress, no wheezing, rales or rhonchi detected.    Cardiovascular:  Normal rate, normal rhythm, no murmurs, no gallops, no rubs. GI:  Soft, nondistended, normal bowel sounds, nontender, no mass  Musculoskeletal:  No edema, cyanosis or obvious acute deformity. Moving all extremities 3-4/5 muscle strength. Integument:  Warm and dry. Neurologic:  Alert & oriented x 3, no apparent focal deficits noted. Psychiatric:  Speech and behavior appropriate. Activity: activity as tolerated  Diet: diabetic diet  Follow Up: Primary Care Physician in 2 weeks hospital follow up. Labs: For convenience and continuity at follow-up the following most recent labs are provided:    CBC:   Lab Results   Component Value Date    WBC 6.2 11/04/2021    HGB 10.3 11/04/2021    HCT 34.2 11/04/2021     11/04/2021       RENAL:   Lab Results   Component Value Date     11/04/2021    K 5.0 11/04/2021     11/04/2021    CO2 27 11/04/2021    BUN 18 11/04/2021    CREATININE 1.2 11/04/2021     CT LUMBAR SPINE WO CONTRAST   Final Result      No acute bony pathology        Mild generative changes as described above         CT PELVIS WO CONTRAST Additional Contrast? None   Final Result      No acute bony pathology. Mild degenerative changes of both hips with healed internally fixed fracture of the right proximal femur. CT CHEST PULMONARY EMBOLISM W CONTRAST   Final Result      No definite CT evidence of pulmonary embolism. Stable linear parenchymal densities in the right upper lobe, bilateral lower lobes and lingular segment, consistent with atelectasis or parenchymal scarring. No other acute findings in the chest.      XR CHEST PORTABLE   Final Result      Limited inspiration. No acute cardiopulmonary findings.              Discharge Medications:     Current Discharge Medication List           Details   insulin lispro (HUMALOG) 100 UNIT/ML injection vial Inject 13 Units into the skin 3 times daily (with meals)  Qty: 10 mL, Refills: 2      nystatin (MYCOSTATIN) 918203 UNIT/GM cream Apply topically under both breasts 2 times daily 14 days then as needed  Qty: 30 g, Refills: 0    Associated Diagnoses: Yeast infection of the skin              Details   insulin detemir (LEVEMIR FLEXTOUCH) 100 UNIT/ML injection pen Inject 65 Units into the skin nightly Please run on 340B program  Qty: 5 pen, Refills: 3      ferrous sulfate 324 (65 Fe) MG EC tablet Take 1 tablet by mouth daily (with breakfast)  Qty: 30 tablet, Refills: 3              Details   insulin aspart (NOVOLOG FLEXPEN) 100 UNIT/ML injection pen Inject 0-15 Units into the skin 3 times daily (before meals) Sliding scale      nystatin (MYCOSTATIN) 167963 UNIT/ML suspension Take 500,000 Units by mouth every 4-6 hours as needed Swish and spit      pravastatin (PRAVACHOL) 20 MG tablet Take 1 tablet by mouth nightly  Qty: 30 tablet, Refills: 0      gabapentin (NEURONTIN) 600 MG tablet Take 600 mg by mouth 3 times daily. LORazepam (ATIVAN) 0.5 MG tablet Take 0.5 mg by mouth 2 times daily as needed for Anxiety.       busPIRone (BUSPAR) 7.5 MG tablet Take 7.5 mg by mouth 2 times daily      albuterol sulfate HFA (VENTOLIN HFA) 108 (90 Base) MCG/ACT inhaler Inhale 2 puffs by mouth every 4 to 6 hours as needed for cough and wheezing      apixaban (ELIQUIS) 5 MG TABS tablet Take 5 mg by mouth 2 times daily      DULoxetine (CYMBALTA) 60 MG extended release capsule Take 60 mg by mouth daily       oxybutynin (DITROPAN) 5 MG tablet Take 5 mg by mouth 2 times daily      vitamin D (ERGOCALCIFEROL) 1.25 MG (54231 UT) CAPS capsule Take 50,000 Units by mouth once a week      omeprazole (PRILOSEC) 20 MG delayed release capsule Take 2 capsules by mouth daily  Qty: 60 capsule, Refills: 0      propranolol (INDERAL) 40 MG tablet Take 1 tablet by mouth 2 times daily  Qty: 60 tablet, Refills: 0      meclizine (ANTIVERT) 25 MG tablet Take 1 tablet by mouth 3 times daily as needed for Dizziness  Qty: 30 tablet, Refills: 0      Insulin Pen Needle ([employer] PEN NEEDLES 29G) 29G X 12MM MISC 1 each by Does not apply route daily  Qty: 100 each, Refills: 3      glucose monitoring kit (FREESTYLE) monitoring kit 1 kit by Does not apply route daily  Qty: 1 kit, Refills: 0              Patient was seen and examined by Dr. Carol Ann Holland and plan of care reviewed. Signed:  Electronically signed by MIRNA Patricio CNP on 11/4/2021 at 11:29 AM       Thank you MIRNA Welch CNP for the opportunity to be involved in this patient's care. If you have any questions or concerns please feel free to contact me at [phone].

## 2021-11-04 NOTE — ACP (ADVANCE CARE PLANNING)
Advance Care Planning     General Advance Care Planning (ACP) Conversation    Date of Conversation: 11/2/2021  Conducted with: Patient with Decision Making Capacity    Healthcare Decision Maker:    Primary Decision Maker: Lana Jones - Spouse - [phone]    Primary Decision Maker: Gustavo Kelly - Child - [phone]  Click here to complete Healthcare Decision Makers including selection of the Healthcare Decision Maker Relationship (ie \"Primary\"). Today we documented Decision Maker(s) consistent with Legal Next of Kin hierarchy.     Content/Action Overview:  Has NO ACP documents/care preferences - information provided, considering goals and options  Reviewed DNR/DNI and patient elects Full Code (Attempt Resuscitation)    Length of Voluntary ACP Conversation in minutes:  <16 minutes (Non-Billable)    Fransisco Peacock RN

## 2021-11-04 NOTE — PROGRESS NOTES
BMT here to get the patient for transport home. Assistance times 2 required to get the patient out of the bed and in to a wheelchair. Patient noted to be generalized weak unable to set up and remain in a sitting position. Patient unable to sit up in the wheelchair and remain in a safe setting position. Canceled the BMT transport and [facility] [address] [facility] for transport. [facility] [address] [facility] stated will be a while picking up the patient.

## 2021-11-09 ENCOUNTER — HOSPITAL ENCOUNTER (OUTPATIENT)
Facility: HOSPITAL | Age: 81
Discharge: HOME | End: 2021-11-09
Payer: MEDICARE

## 2021-11-09 LAB
BILIRUBIN URINE: NEGATIVE
BLOOD, URINE: NEGATIVE
CLARITY: CLEAR
COLOR: YELLOW
GLUCOSE URINE: NEGATIVE MG/DL
KETONES, URINE: NEGATIVE MG/DL
LEUKOCYTE ESTERASE, URINE: NEGATIVE
MICROSCOPIC EXAMINATION: NORMAL
NITRITE, URINE: NEGATIVE
PH UA: 5 (ref 5–8)
PROTEIN UA: NEGATIVE MG/DL
SPECIFIC GRAVITY UA: 1.02 (ref 1–1.03)
URINE REFLEX TO CULTURE: NORMAL
URINE TYPE: NORMAL
UROBILINOGEN, URINE: 0.2 E.U./DL

## 2021-11-09 PROCEDURE — 87086 URINE CULTURE/COLONY COUNT: CPT

## 2021-11-09 PROCEDURE — 81003 URINALYSIS AUTO W/O SCOPE: CPT

## 2021-11-12 LAB — URINE CULTURE, ROUTINE: NORMAL

## 2021-11-29 ENCOUNTER — APPOINTMENT (OUTPATIENT)
Dept: GENERAL RADIOLOGY | Facility: HOSPITAL | Age: 81
End: 2021-11-29

## 2021-11-29 ENCOUNTER — HOSPITAL ENCOUNTER (EMERGENCY)
Facility: HOSPITAL | Age: 81
Discharge: HOME | End: 2021-11-30
Attending: EMERGENCY MEDICINE | Admitting: EMERGENCY MEDICINE

## 2021-11-29 VITALS
OXYGEN SATURATION: 98 % | SYSTOLIC BLOOD PRESSURE: 109 MMHG | WEIGHT: 200 LBS | DIASTOLIC BLOOD PRESSURE: 58 MMHG | TEMPERATURE: 97.9 F | BODY MASS INDEX: 31.39 KG/M2 | HEIGHT: 67 IN | RESPIRATION RATE: 12 BRPM | HEART RATE: 70 BPM

## 2021-11-29 DIAGNOSIS — R53.1 GENERALIZED WEAKNESS: Primary | ICD-10-CM

## 2021-11-29 LAB
ALBUMIN SERPL-MCNC: 3.6 G/DL (ref 3.5–5.2)
ALBUMIN/GLOB SERPL: 1.1 G/DL
ALP SERPL-CCNC: 62 U/L (ref 39–117)
ALT SERPL W P-5'-P-CCNC: 6 U/L (ref 1–33)
ANION GAP SERPL CALCULATED.3IONS-SCNC: 8 MMOL/L (ref 5–15)
AST SERPL-CCNC: 16 U/L (ref 1–32)
BASOPHILS # BLD AUTO: 0.04 10*3/MM3 (ref 0–0.2)
BASOPHILS NFR BLD AUTO: 0.4 % (ref 0–1.5)
BILIRUB SERPL-MCNC: 0.2 MG/DL (ref 0–1.2)
BILIRUB UR QL STRIP: NEGATIVE
BUN SERPL-MCNC: 19 MG/DL (ref 8–23)
BUN/CREAT SERPL: 15.3 (ref 7–25)
CALCIUM SPEC-SCNC: 9 MG/DL (ref 8.6–10.5)
CHLORIDE SERPL-SCNC: 100 MMOL/L (ref 98–107)
CLARITY UR: CLEAR
CO2 SERPL-SCNC: 29 MMOL/L (ref 22–29)
COLOR UR: YELLOW
CREAT SERPL-MCNC: 1.24 MG/DL (ref 0.57–1)
DEPRECATED RDW RBC AUTO: 55.7 FL (ref 37–54)
EOSINOPHIL # BLD AUTO: 0.29 10*3/MM3 (ref 0–0.4)
EOSINOPHIL NFR BLD AUTO: 3 % (ref 0.3–6.2)
ERYTHROCYTE [DISTWIDTH] IN BLOOD BY AUTOMATED COUNT: 14.3 % (ref 12.3–15.4)
GFR SERPL CREATININE-BSD FRML MDRD: 42 ML/MIN/1.73
GFR SERPL CREATININE-BSD FRML MDRD: 50 ML/MIN/1.73
GLOBULIN UR ELPH-MCNC: 3.3 GM/DL
GLUCOSE SERPL-MCNC: 235 MG/DL (ref 65–99)
GLUCOSE UR STRIP-MCNC: NEGATIVE MG/DL
HCT VFR BLD AUTO: 32.2 % (ref 34–46.6)
HGB BLD-MCNC: 9.8 G/DL (ref 12–15.9)
HGB UR QL STRIP.AUTO: NEGATIVE
IMM GRANULOCYTES # BLD AUTO: 0.05 10*3/MM3 (ref 0–0.05)
IMM GRANULOCYTES NFR BLD AUTO: 0.5 % (ref 0–0.5)
KETONES UR QL STRIP: NEGATIVE
LEUKOCYTE ESTERASE UR QL STRIP.AUTO: NEGATIVE
LYMPHOCYTES # BLD AUTO: 2.16 10*3/MM3 (ref 0.7–3.1)
LYMPHOCYTES NFR BLD AUTO: 22.5 % (ref 19.6–45.3)
MACROCYTES BLD QL SMEAR: NORMAL
MAGNESIUM SERPL-MCNC: 1.6 MG/DL (ref 1.6–2.4)
MCH RBC QN AUTO: 32.3 PG (ref 26.6–33)
MCHC RBC AUTO-ENTMCNC: 30.4 G/DL (ref 31.5–35.7)
MCV RBC AUTO: 106.3 FL (ref 79–97)
MONOCYTES # BLD AUTO: 0.67 10*3/MM3 (ref 0.1–0.9)
MONOCYTES NFR BLD AUTO: 7 % (ref 5–12)
NEUTROPHILS NFR BLD AUTO: 6.38 10*3/MM3 (ref 1.7–7)
NEUTROPHILS NFR BLD AUTO: 66.6 % (ref 42.7–76)
NITRITE UR QL STRIP: NEGATIVE
NRBC BLD AUTO-RTO: 0 /100 WBC (ref 0–0.2)
PH UR STRIP.AUTO: <=5 [PH] (ref 5–8)
PLATELET # BLD AUTO: 170 10*3/MM3 (ref 140–450)
PMV BLD AUTO: 11.6 FL (ref 6–12)
POTASSIUM SERPL-SCNC: 5.6 MMOL/L (ref 3.5–5.2)
PROCALCITONIN SERPL-MCNC: 0.05 NG/ML (ref 0–0.25)
PROT SERPL-MCNC: 6.9 G/DL (ref 6–8.5)
PROT UR QL STRIP: NEGATIVE
RBC # BLD AUTO: 3.03 10*6/MM3 (ref 3.77–5.28)
SMALL PLATELETS BLD QL SMEAR: ADEQUATE
SODIUM SERPL-SCNC: 137 MMOL/L (ref 136–145)
SP GR UR STRIP: 1.02 (ref 1–1.03)
UROBILINOGEN UR QL STRIP: NORMAL
WBC MORPH BLD: NORMAL
WBC NRBC COR # BLD: 9.59 10*3/MM3 (ref 3.4–10.8)

## 2021-11-29 PROCEDURE — 85007 BL SMEAR W/DIFF WBC COUNT: CPT | Performed by: EMERGENCY MEDICINE

## 2021-11-29 PROCEDURE — 99284 EMERGENCY DEPT VISIT MOD MDM: CPT

## 2021-11-29 PROCEDURE — 85025 COMPLETE CBC W/AUTO DIFF WBC: CPT | Performed by: EMERGENCY MEDICINE

## 2021-11-29 PROCEDURE — 84145 PROCALCITONIN (PCT): CPT | Performed by: EMERGENCY MEDICINE

## 2021-11-29 PROCEDURE — 71045 X-RAY EXAM CHEST 1 VIEW: CPT

## 2021-11-29 PROCEDURE — 81003 URINALYSIS AUTO W/O SCOPE: CPT | Performed by: EMERGENCY MEDICINE

## 2021-11-29 PROCEDURE — P9612 CATHETERIZE FOR URINE SPEC: HCPCS

## 2021-11-29 PROCEDURE — 80053 COMPREHEN METABOLIC PANEL: CPT | Performed by: EMERGENCY MEDICINE

## 2021-11-29 PROCEDURE — 83735 ASSAY OF MAGNESIUM: CPT | Performed by: EMERGENCY MEDICINE

## 2021-11-29 RX ORDER — SODIUM CHLORIDE 0.9 % (FLUSH) 0.9 %
10 SYRINGE (ML) INJECTION AS NEEDED
Status: DISCONTINUED | OUTPATIENT
Start: 2021-11-29 | End: 2021-11-30 | Stop reason: HOSPADM

## 2021-11-29 RX ADMIN — SODIUM CHLORIDE 500 ML: 9 INJECTION, SOLUTION INTRAVENOUS at 19:51

## 2021-12-01 DIAGNOSIS — B37.2 YEAST INFECTION OF THE SKIN: ICD-10-CM

## 2021-12-01 RX ORDER — NYSTATIN 100000 U/G
CREAM TOPICAL
Qty: 30 G | Refills: 0 | OUTPATIENT
Start: 2021-12-01

## 2021-12-17 ENCOUNTER — HOSPITAL ENCOUNTER (OUTPATIENT)
Facility: HOSPITAL | Age: 81
Discharge: HOME | End: 2021-12-17
Payer: MEDICARE

## 2021-12-17 PROCEDURE — 80061 LIPID PANEL: CPT

## 2021-12-17 PROCEDURE — 83036 HEMOGLOBIN GLYCOSYLATED A1C: CPT

## 2021-12-17 PROCEDURE — 83735 ASSAY OF MAGNESIUM: CPT

## 2021-12-17 PROCEDURE — 82570 ASSAY OF URINE CREATININE: CPT

## 2021-12-17 PROCEDURE — 87186 SC STD MICRODIL/AGAR DIL: CPT

## 2021-12-17 PROCEDURE — 82746 ASSAY OF FOLIC ACID SERUM: CPT

## 2021-12-17 PROCEDURE — 82043 UR ALBUMIN QUANTITATIVE: CPT

## 2021-12-17 PROCEDURE — 87077 CULTURE AEROBIC IDENTIFY: CPT

## 2021-12-17 PROCEDURE — 82607 VITAMIN B-12: CPT

## 2021-12-17 PROCEDURE — 82306 VITAMIN D 25 HYDROXY: CPT

## 2021-12-17 PROCEDURE — 36415 COLL VENOUS BLD VENIPUNCTURE: CPT

## 2021-12-17 PROCEDURE — 80053 COMPREHEN METABOLIC PANEL: CPT

## 2021-12-17 PROCEDURE — 84443 ASSAY THYROID STIM HORMONE: CPT

## 2021-12-17 PROCEDURE — 81003 URINALYSIS AUTO W/O SCOPE: CPT

## 2021-12-17 PROCEDURE — 85025 COMPLETE CBC W/AUTO DIFF WBC: CPT

## 2021-12-17 PROCEDURE — 87086 URINE CULTURE/COLONY COUNT: CPT

## 2021-12-18 LAB
A/G RATIO: 1.4 (ref 0.8–2)
ALBUMIN SERPL-MCNC: 3.3 G/DL (ref 3.4–4.8)
ALP BLD-CCNC: 51 U/L (ref 25–100)
ALT SERPL-CCNC: <5 U/L (ref 4–36)
ANION GAP SERPL CALCULATED.3IONS-SCNC: 10 MMOL/L (ref 3–16)
AST SERPL-CCNC: 16 U/L (ref 8–33)
BASOPHILS ABSOLUTE: 0.1 K/UL (ref 0–0.1)
BASOPHILS RELATIVE PERCENT: 1 %
BILIRUB SERPL-MCNC: <0.2 MG/DL (ref 0.3–1.2)
BILIRUBIN URINE: NEGATIVE
BLOOD, URINE: NEGATIVE
BUN BLDV-MCNC: 15 MG/DL (ref 6–20)
CALCIUM SERPL-MCNC: 8.7 MG/DL (ref 8.5–10.5)
CHLORIDE BLD-SCNC: 102 MMOL/L (ref 98–107)
CHOLESTEROL, TOTAL: 247 MG/DL (ref 0–200)
CLARITY: CLEAR
CO2: 28 MMOL/L (ref 20–30)
COLOR: YELLOW
CREAT SERPL-MCNC: 1.2 MG/DL (ref 0.4–1.2)
CREATININE URINE: 95.6 MG/DL (ref 1.5–300)
EOSINOPHILS ABSOLUTE: 0.3 K/UL (ref 0–0.4)
EOSINOPHILS RELATIVE PERCENT: 4.3 %
FOLATE: 12.03 NG/ML
GFR AFRICAN AMERICAN: 52
GFR NON-AFRICAN AMERICAN: 43
GLOBULIN: 2.4 G/DL
GLUCOSE BLD-MCNC: 179 MG/DL (ref 74–106)
GLUCOSE URINE: NEGATIVE MG/DL
HBA1C MFR BLD: 7.2 %
HCT VFR BLD CALC: 36.3 % (ref 37–47)
HDLC SERPL-MCNC: 37 MG/DL (ref 40–60)
HEMOGLOBIN: 11.1 G/DL (ref 11.5–16.5)
IMMATURE GRANULOCYTES #: 0 K/UL
IMMATURE GRANULOCYTES %: 0.4 % (ref 0–5)
KETONES, URINE: NEGATIVE MG/DL
LDL CHOLESTEROL CALCULATED: 170 MG/DL
LEUKOCYTE ESTERASE, URINE: NEGATIVE
LYMPHOCYTES ABSOLUTE: 1.9 K/UL (ref 1.5–4)
LYMPHOCYTES RELATIVE PERCENT: 28 %
MAGNESIUM: 1.7 MG/DL (ref 1.7–2.4)
MCH RBC QN AUTO: 32.3 PG (ref 27–32)
MCHC RBC AUTO-ENTMCNC: 30.6 G/DL (ref 31–35)
MCV RBC AUTO: 105.5 FL (ref 80–100)
MICROALBUMIN UR-MCNC: <1.2 MG/DL (ref 0–22)
MICROALBUMIN/CREAT UR-RTO: NORMAL MG/G (ref 0–30)
MICROSCOPIC EXAMINATION: NORMAL
MONOCYTES ABSOLUTE: 0.5 K/UL (ref 0.2–0.8)
MONOCYTES RELATIVE PERCENT: 7.8 %
NEUTROPHILS ABSOLUTE: 4 K/UL (ref 2–7.5)
NEUTROPHILS RELATIVE PERCENT: 58.5 %
NITRITE, URINE: NEGATIVE
PDW BLD-RTO: 14.4 % (ref 11–16)
PH UA: 5.5 (ref 5–8)
PLATELET # BLD: 158 K/UL (ref 150–400)
PMV BLD AUTO: 12.6 FL (ref 6–10)
POTASSIUM SERPL-SCNC: 4.8 MMOL/L (ref 3.4–5.1)
PROTEIN UA: NEGATIVE MG/DL
RBC # BLD: 3.44 M/UL (ref 3.8–5.8)
SODIUM BLD-SCNC: 140 MMOL/L (ref 136–145)
SPECIFIC GRAVITY UA: 1.02 (ref 1–1.03)
TOTAL PROTEIN: 5.7 G/DL (ref 6.4–8.3)
TRIGL SERPL-MCNC: 201 MG/DL (ref 0–249)
TSH SERPL DL<=0.05 MIU/L-ACNC: 3.14 UIU/ML (ref 0.27–4.2)
URINE REFLEX TO CULTURE: NORMAL
URINE TYPE: NORMAL
UROBILINOGEN, URINE: 0.2 E.U./DL
VITAMIN B-12: 809 PG/ML (ref 211–911)
VLDLC SERPL CALC-MCNC: 40 MG/DL
WBC # BLD: 6.8 K/UL (ref 4–11)

## 2021-12-20 LAB
ORGANISM: ABNORMAL
URINE CULTURE, ROUTINE: ABNORMAL
VITAMIN D 25-HYDROXY: 24.5 (ref 32–100)

## 2022-02-16 ENCOUNTER — HOSPITAL ENCOUNTER (OUTPATIENT)
Facility: HOSPITAL | Age: 82
Discharge: HOME | End: 2022-02-16
Payer: MEDICARE

## 2022-02-16 LAB
A/G RATIO: 1.5 (ref 0.8–2)
ALBUMIN SERPL-MCNC: 3.6 G/DL (ref 3.4–4.8)
ALP BLD-CCNC: 57 U/L (ref 25–100)
ALT SERPL-CCNC: 7 U/L (ref 4–36)
ANION GAP SERPL CALCULATED.3IONS-SCNC: 11 MMOL/L (ref 3–16)
AST SERPL-CCNC: 20 U/L (ref 8–33)
BILIRUB SERPL-MCNC: <0.2 MG/DL (ref 0.3–1.2)
BUN BLDV-MCNC: 22 MG/DL (ref 6–20)
CALCIUM SERPL-MCNC: 8.7 MG/DL (ref 8.5–10.5)
CHLORIDE BLD-SCNC: 100 MMOL/L (ref 98–107)
CHOLESTEROL, TOTAL: 257 MG/DL (ref 0–200)
CO2: 29 MMOL/L (ref 20–30)
CREAT SERPL-MCNC: 1.4 MG/DL (ref 0.4–1.2)
FOLATE: 6.06 NG/ML
GFR AFRICAN AMERICAN: 44
GFR NON-AFRICAN AMERICAN: 36
GLOBULIN: 2.4 G/DL
GLUCOSE BLD-MCNC: 222 MG/DL (ref 74–106)
HBA1C MFR BLD: 7 %
HCT VFR BLD CALC: 36.6 % (ref 37–47)
HDLC SERPL-MCNC: 32 MG/DL (ref 40–60)
HEMOGLOBIN: 11.1 G/DL (ref 11.5–16.5)
LDL CHOLESTEROL CALCULATED: 186 MG/DL
MCH RBC QN AUTO: 32.1 PG (ref 27–32)
MCHC RBC AUTO-ENTMCNC: 30.3 G/DL (ref 31–35)
MCV RBC AUTO: 105.8 FL (ref 80–100)
PDW BLD-RTO: 13.1 % (ref 11–16)
PLATELET # BLD: 146 K/UL (ref 150–400)
PMV BLD AUTO: 12.6 FL (ref 6–10)
POTASSIUM SERPL-SCNC: 4.6 MMOL/L (ref 3.4–5.1)
RBC # BLD: 3.46 M/UL (ref 3.8–5.8)
SODIUM BLD-SCNC: 140 MMOL/L (ref 136–145)
TOTAL PROTEIN: 6 G/DL (ref 6.4–8.3)
TRIGL SERPL-MCNC: 195 MG/DL (ref 0–249)
TSH SERPL DL<=0.05 MIU/L-ACNC: 3.28 UIU/ML (ref 0.27–4.2)
VITAMIN D 25-HYDROXY: 35.2 (ref 32–100)
VLDLC SERPL CALC-MCNC: 39 MG/DL
WBC # BLD: 6.5 K/UL (ref 4–11)

## 2022-02-16 PROCEDURE — 83036 HEMOGLOBIN GLYCOSYLATED A1C: CPT

## 2022-02-16 PROCEDURE — 80061 LIPID PANEL: CPT

## 2022-02-16 PROCEDURE — 82746 ASSAY OF FOLIC ACID SERUM: CPT

## 2022-02-16 PROCEDURE — 36415 COLL VENOUS BLD VENIPUNCTURE: CPT

## 2022-02-16 PROCEDURE — 82607 VITAMIN B-12: CPT

## 2022-02-16 PROCEDURE — 85027 COMPLETE CBC AUTOMATED: CPT

## 2022-02-16 PROCEDURE — 82306 VITAMIN D 25 HYDROXY: CPT

## 2022-02-16 PROCEDURE — 84443 ASSAY THYROID STIM HORMONE: CPT

## 2022-02-16 PROCEDURE — 80053 COMPREHEN METABOLIC PANEL: CPT

## 2022-02-17 LAB — VITAMIN B-12: 445 PG/ML (ref 211–911)

## 2022-03-17 ENCOUNTER — HOSPITAL ENCOUNTER (OUTPATIENT)
Facility: HOSPITAL | Age: 82
Discharge: HOME | End: 2022-03-17
Payer: MEDICARE

## 2022-03-17 LAB
ANION GAP SERPL CALCULATED.3IONS-SCNC: 12 MMOL/L (ref 3–16)
BILIRUBIN URINE: NEGATIVE
BLOOD, URINE: NEGATIVE
BUN BLDV-MCNC: 18 MG/DL (ref 6–20)
CALCIUM SERPL-MCNC: 8.9 MG/DL (ref 8.5–10.5)
CHLORIDE BLD-SCNC: 98 MMOL/L (ref 98–107)
CLARITY: CLEAR
CO2: 30 MMOL/L (ref 20–30)
COLOR: YELLOW
CREAT SERPL-MCNC: 1.4 MG/DL (ref 0.4–1.2)
GFR AFRICAN AMERICAN: 44
GFR NON-AFRICAN AMERICAN: 36
GLUCOSE BLD-MCNC: 266 MG/DL (ref 74–106)
GLUCOSE URINE: NEGATIVE MG/DL
HCT VFR BLD CALC: 37.8 % (ref 37–47)
HEMOGLOBIN: 11.8 G/DL (ref 11.5–16.5)
KETONES, URINE: NEGATIVE MG/DL
LEUKOCYTE ESTERASE, URINE: NEGATIVE
MCH RBC QN AUTO: 32.1 PG (ref 27–32)
MCHC RBC AUTO-ENTMCNC: 31.2 G/DL (ref 31–35)
MCV RBC AUTO: 102.7 FL (ref 80–100)
MICROSCOPIC EXAMINATION: NORMAL
NITRITE, URINE: NEGATIVE
PDW BLD-RTO: 13.1 % (ref 11–16)
PH UA: 5.5 (ref 5–8)
PLATELET # BLD: 185 K/UL (ref 150–400)
PMV BLD AUTO: 12.3 FL (ref 6–10)
POTASSIUM SERPL-SCNC: 5.1 MMOL/L (ref 3.4–5.1)
PROTEIN UA: NEGATIVE MG/DL
RBC # BLD: 3.68 M/UL (ref 3.8–5.8)
SODIUM BLD-SCNC: 140 MMOL/L (ref 136–145)
SPECIFIC GRAVITY UA: 1.01 (ref 1–1.03)
URINE REFLEX TO CULTURE: NORMAL
URINE TYPE: NORMAL
UROBILINOGEN, URINE: 0.2 E.U./DL
WBC # BLD: 7.2 K/UL (ref 4–11)

## 2022-03-17 PROCEDURE — 80048 BASIC METABOLIC PNL TOTAL CA: CPT

## 2022-03-17 PROCEDURE — 81003 URINALYSIS AUTO W/O SCOPE: CPT

## 2022-03-17 PROCEDURE — 36415 COLL VENOUS BLD VENIPUNCTURE: CPT

## 2022-03-17 PROCEDURE — 85027 COMPLETE CBC AUTOMATED: CPT

## 2022-03-21 ENCOUNTER — TELEPHONE (OUTPATIENT)
Dept: PHARMACY | Facility: HOSPITAL | Age: 82
End: 2022-03-21

## 2022-03-21 NOTE — TELEPHONE ENCOUNTER
Called pt's daughter Giovanni jones regarding the pt's Eliquis, Novolog, and Levemir. Per daughter pt would like to continue getting these meds through  assistance program due to cost.  Informed Giovanni jones that [facility] would be taking over the pt's prescription assistance. Giovanni jones provided verbal permission for me to send application and financial documents to [facility]. Provided daughter with phone number to call [facility] to schedule appt.     Angelina Goss, PharmD

## 2022-04-26 ENCOUNTER — HOSPITAL ENCOUNTER (OUTPATIENT)
Facility: HOSPITAL | Age: 82
Discharge: HOME | End: 2022-04-26
Payer: MEDICARE

## 2022-04-26 PROCEDURE — 81003 URINALYSIS AUTO W/O SCOPE: CPT

## 2022-06-23 ENCOUNTER — HOSPITAL ENCOUNTER (OUTPATIENT)
Facility: HOSPITAL | Age: 82
Discharge: HOME | End: 2022-06-23
Payer: MEDICARE

## 2022-06-23 LAB
A/G RATIO: 1.5 (ref 0.8–2)
ALBUMIN SERPL-MCNC: 3.7 G/DL (ref 3.4–4.8)
ALP BLD-CCNC: 63 U/L (ref 25–100)
ALT SERPL-CCNC: <5 U/L (ref 4–36)
ANION GAP SERPL CALCULATED.3IONS-SCNC: 8 MMOL/L (ref 3–16)
AST SERPL-CCNC: 15 U/L (ref 8–33)
BILIRUB SERPL-MCNC: <0.2 MG/DL (ref 0.3–1.2)
BILIRUBIN URINE: NEGATIVE
BLOOD, URINE: NEGATIVE
BUN BLDV-MCNC: 14 MG/DL (ref 6–20)
CALCIUM SERPL-MCNC: 8.7 MG/DL (ref 8.5–10.5)
CHLORIDE BLD-SCNC: 97 MMOL/L (ref 98–107)
CHOLESTEROL, TOTAL: 180 MG/DL (ref 0–200)
CLARITY: CLEAR
CO2: 34 MMOL/L (ref 20–30)
COLOR: YELLOW
CREAT SERPL-MCNC: 1.4 MG/DL (ref 0.4–1.2)
FOLATE: 6.15 NG/ML
GFR AFRICAN AMERICAN: 44
GFR NON-AFRICAN AMERICAN: 36
GLOBULIN: 2.5 G/DL
GLUCOSE BLD-MCNC: 267 MG/DL (ref 74–106)
GLUCOSE URINE: NEGATIVE MG/DL
HBA1C MFR BLD: 7.7 %
HCT VFR BLD CALC: 32.6 % (ref 37–47)
HDLC SERPL-MCNC: 38 MG/DL (ref 40–60)
HEMOGLOBIN: 10.1 G/DL (ref 11.5–16.5)
KETONES, URINE: NEGATIVE MG/DL
LDL CHOLESTEROL CALCULATED: 107 MG/DL
LEUKOCYTE ESTERASE, URINE: NEGATIVE
MCH RBC QN AUTO: 32.5 PG (ref 27–32)
MCHC RBC AUTO-ENTMCNC: 31 G/DL (ref 31–35)
MCV RBC AUTO: 104.8 FL (ref 80–100)
MICROSCOPIC EXAMINATION: NORMAL
NITRITE, URINE: NEGATIVE
PDW BLD-RTO: 13.4 % (ref 11–16)
PH UA: 6 (ref 5–8)
PLATELET # BLD: 150 K/UL (ref 150–400)
PMV BLD AUTO: 12.1 FL (ref 6–10)
POTASSIUM SERPL-SCNC: 4.6 MMOL/L (ref 3.4–5.1)
PROTEIN UA: NEGATIVE MG/DL
RBC # BLD: 3.11 M/UL (ref 3.8–5.8)
SODIUM BLD-SCNC: 139 MMOL/L (ref 136–145)
SPECIFIC GRAVITY UA: 1.01 (ref 1–1.03)
TOTAL PROTEIN: 6.2 G/DL (ref 6.4–8.3)
TRIGL SERPL-MCNC: 177 MG/DL (ref 0–249)
TSH SERPL DL<=0.05 MIU/L-ACNC: 3.6 UIU/ML (ref 0.27–4.2)
URINE TYPE: NORMAL
UROBILINOGEN, URINE: 0.2 E.U./DL
VITAMIN B-12: 573 PG/ML (ref 211–911)
VITAMIN D 25-HYDROXY: 28.5 (ref 32–100)
VLDLC SERPL CALC-MCNC: 35 MG/DL
WBC # BLD: 7.7 K/UL (ref 4–11)

## 2022-06-23 PROCEDURE — 80053 COMPREHEN METABOLIC PANEL: CPT

## 2022-06-23 PROCEDURE — 80061 LIPID PANEL: CPT

## 2022-06-23 PROCEDURE — 84443 ASSAY THYROID STIM HORMONE: CPT

## 2022-06-23 PROCEDURE — 85027 COMPLETE CBC AUTOMATED: CPT

## 2022-06-23 PROCEDURE — 82746 ASSAY OF FOLIC ACID SERUM: CPT

## 2022-06-23 PROCEDURE — 82306 VITAMIN D 25 HYDROXY: CPT

## 2022-06-23 PROCEDURE — 81003 URINALYSIS AUTO W/O SCOPE: CPT

## 2022-06-23 PROCEDURE — 82607 VITAMIN B-12: CPT

## 2022-06-23 PROCEDURE — 83036 HEMOGLOBIN GLYCOSYLATED A1C: CPT

## 2022-06-23 PROCEDURE — 87086 URINE CULTURE/COLONY COUNT: CPT

## 2022-06-25 LAB — URINE CULTURE, ROUTINE: NORMAL

## 2022-08-16 ENCOUNTER — HOSPITAL ENCOUNTER (OUTPATIENT)
Facility: HOSPITAL | Age: 82
Discharge: HOME | End: 2022-08-16
Payer: MEDICARE

## 2022-08-16 LAB
A/G RATIO: 1.4 (ref 0.8–2)
ALBUMIN SERPL-MCNC: 3.8 G/DL (ref 3.4–4.8)
ALP BLD-CCNC: 59 U/L (ref 25–100)
ALT SERPL-CCNC: <5 U/L (ref 4–36)
AMORPHOUS: ABNORMAL /HPF
ANION GAP SERPL CALCULATED.3IONS-SCNC: 8 MMOL/L (ref 3–16)
AST SERPL-CCNC: 14 U/L (ref 8–33)
BACTERIA: ABNORMAL /HPF
BILIRUB SERPL-MCNC: <0.2 MG/DL (ref 0.3–1.2)
BILIRUBIN URINE: NEGATIVE
BLOOD, URINE: NEGATIVE
BUN BLDV-MCNC: 21 MG/DL (ref 6–20)
CALCIUM SERPL-MCNC: 8.7 MG/DL (ref 8.5–10.5)
CHLORIDE BLD-SCNC: 99 MMOL/L (ref 98–107)
CLARITY: ABNORMAL
CO2: 33 MMOL/L (ref 20–30)
COLOR: YELLOW
CREAT SERPL-MCNC: 1.4 MG/DL (ref 0.4–1.2)
EPITHELIAL CELLS, UA: ABNORMAL /HPF (ref 0–5)
FERRITIN: 375.7 NG/ML (ref 22–322)
FOLATE: 7.89 NG/ML
GFR AFRICAN AMERICAN: 44
GFR NON-AFRICAN AMERICAN: 36
GLOBULIN: 2.8 G/DL
GLUCOSE BLD-MCNC: 227 MG/DL (ref 74–106)
GLUCOSE URINE: NEGATIVE MG/DL
HBA1C MFR BLD: 4.3 %
HCT VFR BLD CALC: 32.1 % (ref 37–47)
HEMOGLOBIN: 9.6 G/DL (ref 11.5–16.5)
KETONES, URINE: NEGATIVE MG/DL
LEUKOCYTE ESTERASE, URINE: NEGATIVE
MCH RBC QN AUTO: 32 PG (ref 27–32)
MCHC RBC AUTO-ENTMCNC: 29.9 G/DL (ref 31–35)
MCV RBC AUTO: 107 FL (ref 80–100)
MICROSCOPIC EXAMINATION: YES
NITRITE, URINE: NEGATIVE
PDW BLD-RTO: 12.9 % (ref 11–16)
PH UA: 5.5 (ref 5–8)
PLATELET # BLD: 133 K/UL (ref 150–400)
PMV BLD AUTO: 12.4 FL (ref 6–10)
POTASSIUM SERPL-SCNC: 4.4 MMOL/L (ref 3.4–5.1)
PROTEIN UA: NEGATIVE MG/DL
RBC # BLD: 3 M/UL (ref 3.8–5.8)
RBC UA: ABNORMAL /HPF (ref 0–4)
SODIUM BLD-SCNC: 140 MMOL/L (ref 136–145)
SPECIFIC GRAVITY UA: >=1.03 (ref 1–1.03)
TOTAL PROTEIN: 6.6 G/DL (ref 6.4–8.3)
URINE REFLEX TO CULTURE: ABNORMAL
URINE TYPE: ABNORMAL
UROBILINOGEN, URINE: 0.2 E.U./DL
VITAMIN B-12: 576 PG/ML (ref 211–911)
VITAMIN D 25-HYDROXY: 57.1 (ref 32–100)
WBC # BLD: 6 K/UL (ref 4–11)
WBC UA: ABNORMAL /HPF (ref 0–5)

## 2022-08-16 PROCEDURE — 82607 VITAMIN B-12: CPT

## 2022-08-16 PROCEDURE — 85027 COMPLETE CBC AUTOMATED: CPT

## 2022-08-16 PROCEDURE — 81001 URINALYSIS AUTO W/SCOPE: CPT

## 2022-08-16 PROCEDURE — 82728 ASSAY OF FERRITIN: CPT

## 2022-08-16 PROCEDURE — 82306 VITAMIN D 25 HYDROXY: CPT

## 2022-08-16 PROCEDURE — 80053 COMPREHEN METABOLIC PANEL: CPT

## 2022-08-16 PROCEDURE — 82746 ASSAY OF FOLIC ACID SERUM: CPT

## 2022-08-16 PROCEDURE — 83036 HEMOGLOBIN GLYCOSYLATED A1C: CPT

## 2022-08-16 PROCEDURE — 36415 COLL VENOUS BLD VENIPUNCTURE: CPT

## 2022-11-09 ENCOUNTER — HOSPITAL ENCOUNTER (OUTPATIENT)
Facility: HOSPITAL | Age: 82
Discharge: HOME | End: 2022-11-09
Payer: MEDICARE

## 2022-11-09 PROCEDURE — 87086 URINE CULTURE/COLONY COUNT: CPT

## 2022-11-11 LAB — URINE CULTURE, ROUTINE: NORMAL

## 2022-12-06 ENCOUNTER — HOSPITAL ENCOUNTER (OUTPATIENT)
Facility: HOSPITAL | Age: 82
Discharge: HOME | End: 2022-12-06
Payer: MEDICARE

## 2022-12-06 LAB
A/G RATIO: 1.6 (ref 0.8–2)
ALBUMIN SERPL-MCNC: 3.9 G/DL (ref 3.4–4.8)
ALP BLD-CCNC: 66 U/L (ref 25–100)
ALT SERPL-CCNC: <5 U/L (ref 4–36)
ANION GAP SERPL CALCULATED.3IONS-SCNC: 10 MMOL/L (ref 3–16)
AST SERPL-CCNC: 17 U/L (ref 8–33)
BASOPHILS ABSOLUTE: 0 K/UL (ref 0–0.1)
BASOPHILS RELATIVE PERCENT: 0.3 %
BILIRUB SERPL-MCNC: <0.2 MG/DL (ref 0.3–1.2)
BUN BLDV-MCNC: 23 MG/DL (ref 6–20)
CALCIUM SERPL-MCNC: 9.4 MG/DL (ref 8.5–10.5)
CHLORIDE BLD-SCNC: 99 MMOL/L (ref 98–107)
CHOLESTEROL, TOTAL: 222 MG/DL (ref 0–200)
CO2: 32 MMOL/L (ref 20–30)
CREAT SERPL-MCNC: 1.2 MG/DL (ref 0.4–1.2)
EOSINOPHILS ABSOLUTE: 0.3 K/UL (ref 0–0.4)
EOSINOPHILS RELATIVE PERCENT: 2.4 %
FOLATE: 13.78 NG/ML
GFR SERPL CREATININE-BSD FRML MDRD: 45 ML/MIN/{1.73_M2}
GLOBULIN: 2.5 G/DL
GLUCOSE BLD-MCNC: 161 MG/DL (ref 74–106)
HBA1C MFR BLD: 7.5 %
HCT VFR BLD CALC: 34.3 % (ref 37–47)
HDLC SERPL-MCNC: 46 MG/DL (ref 40–60)
HEMOGLOBIN: 10.5 G/DL (ref 11.5–16.5)
IMMATURE GRANULOCYTES #: 0.1 K/UL
IMMATURE GRANULOCYTES %: 0.6 % (ref 0–5)
LDL CHOLESTEROL CALCULATED: 144 MG/DL
LYMPHOCYTES ABSOLUTE: 3.1 K/UL (ref 1.5–4)
LYMPHOCYTES RELATIVE PERCENT: 25 %
MCH RBC QN AUTO: 32 PG (ref 27–32)
MCHC RBC AUTO-ENTMCNC: 30.6 G/DL (ref 31–35)
MCV RBC AUTO: 104.6 FL (ref 80–100)
MONOCYTES ABSOLUTE: 0.7 K/UL (ref 0.2–0.8)
MONOCYTES RELATIVE PERCENT: 5.6 %
NEUTROPHILS ABSOLUTE: 8.2 K/UL (ref 2–7.5)
NEUTROPHILS RELATIVE PERCENT: 66.1 %
PDW BLD-RTO: 13.9 % (ref 11–16)
PLATELET # BLD: 200 K/UL (ref 150–400)
PMV BLD AUTO: 12.1 FL (ref 6–10)
POTASSIUM SERPL-SCNC: 5.5 MMOL/L (ref 3.4–5.1)
RBC # BLD: 3.28 M/UL (ref 3.8–5.8)
SODIUM BLD-SCNC: 141 MMOL/L (ref 136–145)
TOTAL PROTEIN: 6.4 G/DL (ref 6.4–8.3)
TRIGL SERPL-MCNC: 160 MG/DL (ref 0–249)
TSH SERPL DL<=0.05 MIU/L-ACNC: 6.84 UIU/ML (ref 0.27–4.2)
VITAMIN B-12: 696 PG/ML (ref 211–911)
VITAMIN D 25-HYDROXY: 61.1 (ref 32–100)
VLDLC SERPL CALC-MCNC: 32 MG/DL
WBC # BLD: 12.4 K/UL (ref 4–11)

## 2022-12-06 PROCEDURE — 83036 HEMOGLOBIN GLYCOSYLATED A1C: CPT

## 2022-12-06 PROCEDURE — 85025 COMPLETE CBC W/AUTO DIFF WBC: CPT

## 2022-12-06 PROCEDURE — 82746 ASSAY OF FOLIC ACID SERUM: CPT

## 2022-12-06 PROCEDURE — 80053 COMPREHEN METABOLIC PANEL: CPT

## 2022-12-06 PROCEDURE — 82306 VITAMIN D 25 HYDROXY: CPT

## 2022-12-06 PROCEDURE — 80061 LIPID PANEL: CPT

## 2022-12-06 PROCEDURE — 82607 VITAMIN B-12: CPT

## 2022-12-06 PROCEDURE — 84443 ASSAY THYROID STIM HORMONE: CPT

## 2023-01-10 ENCOUNTER — APPOINTMENT (OUTPATIENT)
Dept: MRI IMAGING | Facility: HOSPITAL | Age: 83
DRG: 64 | End: 2023-01-10
Payer: MEDICARE

## 2023-01-10 ENCOUNTER — HOSPITAL ENCOUNTER (INPATIENT)
Facility: HOSPITAL | Age: 83
LOS: 18 days | Discharge: NURSING, REHAB OR LONG-TERM CARE | DRG: 64 | End: 2023-01-28
Attending: EMERGENCY MEDICINE | Admitting: INTERNAL MEDICINE
Payer: MEDICARE

## 2023-01-10 ENCOUNTER — APPOINTMENT (OUTPATIENT)
Dept: GENERAL RADIOLOGY | Facility: HOSPITAL | Age: 83
DRG: 64 | End: 2023-01-10
Payer: MEDICARE

## 2023-01-10 ENCOUNTER — APPOINTMENT (OUTPATIENT)
Dept: CT IMAGING | Facility: HOSPITAL | Age: 83
DRG: 64 | End: 2023-01-10
Payer: MEDICARE

## 2023-01-10 DIAGNOSIS — F41.9 ANXIETY: ICD-10-CM

## 2023-01-10 DIAGNOSIS — I63.9 CEREBROVASCULAR ACCIDENT (CVA), UNSPECIFIED MECHANISM: Primary | ICD-10-CM

## 2023-01-10 LAB
ALBUMIN SERPL-MCNC: 3.4 G/DL (ref 3.5–5.2)
ALBUMIN/GLOB SERPL: 1.1 G/DL
ALP SERPL-CCNC: 60 U/L (ref 39–117)
ALT SERPL W P-5'-P-CCNC: <5 U/L (ref 1–33)
ANION GAP SERPL CALCULATED.3IONS-SCNC: 11.4 MMOL/L (ref 5–15)
AST SERPL-CCNC: 18 U/L (ref 1–32)
BASOPHILS # BLD AUTO: 0.04 10*3/MM3 (ref 0–0.2)
BASOPHILS NFR BLD AUTO: 0.4 % (ref 0–1.5)
BILIRUB SERPL-MCNC: 0.3 MG/DL (ref 0–1.2)
BILIRUB UR QL STRIP: NEGATIVE
BUN SERPL-MCNC: 18 MG/DL (ref 8–23)
BUN/CREAT SERPL: 14.4 (ref 7–25)
CALCIUM SPEC-SCNC: 8.9 MG/DL (ref 8.6–10.5)
CHLORIDE SERPL-SCNC: 97 MMOL/L (ref 98–107)
CLARITY UR: CLEAR
CO2 SERPL-SCNC: 29.6 MMOL/L (ref 22–29)
COLOR UR: YELLOW
CREAT SERPL-MCNC: 1.25 MG/DL (ref 0.57–1)
DEPRECATED RDW RBC AUTO: 51.7 FL (ref 37–54)
EGFRCR SERPLBLD CKD-EPI 2021: 43.4 ML/MIN/1.73
EOSINOPHIL # BLD AUTO: 0.14 10*3/MM3 (ref 0–0.4)
EOSINOPHIL NFR BLD AUTO: 1.4 % (ref 0.3–6.2)
ERYTHROCYTE [DISTWIDTH] IN BLOOD BY AUTOMATED COUNT: 13.6 % (ref 12.3–15.4)
GLOBULIN UR ELPH-MCNC: 3.2 GM/DL
GLUCOSE BLDC GLUCOMTR-MCNC: 194 MG/DL (ref 70–130)
GLUCOSE BLDC GLUCOMTR-MCNC: 206 MG/DL (ref 70–130)
GLUCOSE BLDC GLUCOMTR-MCNC: 285 MG/DL (ref 70–130)
GLUCOSE SERPL-MCNC: 293 MG/DL (ref 65–99)
GLUCOSE UR STRIP-MCNC: ABNORMAL MG/DL
HBA1C MFR BLD: 8.3 % (ref 4.8–5.6)
HCT VFR BLD AUTO: 31.2 % (ref 34–46.6)
HGB BLD-MCNC: 10.1 G/DL (ref 12–15.9)
HGB UR QL STRIP.AUTO: NEGATIVE
HOLD SPECIMEN: NORMAL
HOLD SPECIMEN: NORMAL
IMM GRANULOCYTES # BLD AUTO: 0.1 10*3/MM3 (ref 0–0.05)
IMM GRANULOCYTES NFR BLD AUTO: 1 % (ref 0–0.5)
KETONES UR QL STRIP: ABNORMAL
LEUKOCYTE ESTERASE UR QL STRIP.AUTO: NEGATIVE
LYMPHOCYTES # BLD AUTO: 2.12 10*3/MM3 (ref 0.7–3.1)
LYMPHOCYTES NFR BLD AUTO: 21.5 % (ref 19.6–45.3)
MAGNESIUM SERPL-MCNC: 1.3 MG/DL (ref 1.6–2.4)
MCH RBC QN AUTO: 33.6 PG (ref 26.6–33)
MCHC RBC AUTO-ENTMCNC: 32.4 G/DL (ref 31.5–35.7)
MCV RBC AUTO: 103.7 FL (ref 79–97)
MONOCYTES # BLD AUTO: 0.61 10*3/MM3 (ref 0.1–0.9)
MONOCYTES NFR BLD AUTO: 6.2 % (ref 5–12)
NEUTROPHILS NFR BLD AUTO: 6.87 10*3/MM3 (ref 1.7–7)
NEUTROPHILS NFR BLD AUTO: 69.5 % (ref 42.7–76)
NITRITE UR QL STRIP: NEGATIVE
NRBC BLD AUTO-RTO: 0.3 /100 WBC (ref 0–0.2)
PH UR STRIP.AUTO: 8 [PH] (ref 5–8)
PLATELET # BLD AUTO: 142 10*3/MM3 (ref 140–450)
PMV BLD AUTO: 11.8 FL (ref 6–12)
POTASSIUM SERPL-SCNC: 4.6 MMOL/L (ref 3.5–5.2)
POTASSIUM SERPL-SCNC: 5.6 MMOL/L (ref 3.5–5.2)
PROT SERPL-MCNC: 6.6 G/DL (ref 6–8.5)
PROT UR QL STRIP: ABNORMAL
RBC # BLD AUTO: 3.01 10*6/MM3 (ref 3.77–5.28)
SODIUM SERPL-SCNC: 138 MMOL/L (ref 136–145)
SP GR UR STRIP: 1.02 (ref 1–1.03)
TROPONIN T SERPL-MCNC: <0.01 NG/ML (ref 0–0.03)
UROBILINOGEN UR QL STRIP: ABNORMAL
WBC NRBC COR # BLD: 9.88 10*3/MM3 (ref 3.4–10.8)
WHOLE BLOOD HOLD COAG: NORMAL
WHOLE BLOOD HOLD SPECIMEN: NORMAL

## 2023-01-10 PROCEDURE — 71045 X-RAY EXAM CHEST 1 VIEW: CPT

## 2023-01-10 PROCEDURE — 83036 HEMOGLOBIN GLYCOSYLATED A1C: CPT | Performed by: INTERNAL MEDICINE

## 2023-01-10 PROCEDURE — 25010000002 LORAZEPAM PER 2 MG: Performed by: EMERGENCY MEDICINE

## 2023-01-10 PROCEDURE — 85025 COMPLETE CBC W/AUTO DIFF WBC: CPT | Performed by: EMERGENCY MEDICINE

## 2023-01-10 PROCEDURE — 84132 ASSAY OF SERUM POTASSIUM: CPT | Performed by: INTERNAL MEDICINE

## 2023-01-10 PROCEDURE — 63710000001 INSULIN ASPART PER 5 UNITS: Performed by: INTERNAL MEDICINE

## 2023-01-10 PROCEDURE — 99223 1ST HOSP IP/OBS HIGH 75: CPT | Performed by: INTERNAL MEDICINE

## 2023-01-10 PROCEDURE — 83735 ASSAY OF MAGNESIUM: CPT | Performed by: EMERGENCY MEDICINE

## 2023-01-10 PROCEDURE — 25010000002 LORAZEPAM PER 2 MG: Performed by: INTERNAL MEDICINE

## 2023-01-10 PROCEDURE — 99285 EMERGENCY DEPT VISIT HI MDM: CPT

## 2023-01-10 PROCEDURE — 82962 GLUCOSE BLOOD TEST: CPT

## 2023-01-10 PROCEDURE — 93005 ELECTROCARDIOGRAM TRACING: CPT | Performed by: EMERGENCY MEDICINE

## 2023-01-10 PROCEDURE — 80053 COMPREHEN METABOLIC PANEL: CPT | Performed by: EMERGENCY MEDICINE

## 2023-01-10 PROCEDURE — 70450 CT HEAD/BRAIN W/O DYE: CPT

## 2023-01-10 PROCEDURE — 70551 MRI BRAIN STEM W/O DYE: CPT

## 2023-01-10 PROCEDURE — 81003 URINALYSIS AUTO W/O SCOPE: CPT | Performed by: EMERGENCY MEDICINE

## 2023-01-10 PROCEDURE — P9612 CATHETERIZE FOR URINE SPEC: HCPCS

## 2023-01-10 PROCEDURE — 36415 COLL VENOUS BLD VENIPUNCTURE: CPT

## 2023-01-10 PROCEDURE — 84484 ASSAY OF TROPONIN QUANT: CPT | Performed by: EMERGENCY MEDICINE

## 2023-01-10 RX ORDER — SODIUM CHLORIDE 0.9 % (FLUSH) 0.9 %
10 SYRINGE (ML) INJECTION AS NEEDED
Status: DISCONTINUED | OUTPATIENT
Start: 2023-01-10 | End: 2023-01-28 | Stop reason: HOSPADM

## 2023-01-10 RX ORDER — BISACODYL 10 MG
10 SUPPOSITORY, RECTAL RECTAL DAILY PRN
Status: DISCONTINUED | OUTPATIENT
Start: 2023-01-10 | End: 2023-01-28 | Stop reason: HOSPADM

## 2023-01-10 RX ORDER — ACETAMINOPHEN 160 MG/5ML
650 SOLUTION ORAL EVERY 4 HOURS PRN
Status: DISCONTINUED | OUTPATIENT
Start: 2023-01-10 | End: 2023-01-28 | Stop reason: HOSPADM

## 2023-01-10 RX ORDER — SODIUM CHLORIDE 0.9 % (FLUSH) 0.9 %
3-10 SYRINGE (ML) INJECTION AS NEEDED
Status: DISCONTINUED | OUTPATIENT
Start: 2023-01-10 | End: 2023-01-28 | Stop reason: HOSPADM

## 2023-01-10 RX ORDER — SODIUM CHLORIDE 9 MG/ML
100 INJECTION, SOLUTION INTRAVENOUS CONTINUOUS
Status: DISCONTINUED | OUTPATIENT
Start: 2023-01-10 | End: 2023-01-14

## 2023-01-10 RX ORDER — NITROFURANTOIN 25; 75 MG/1; MG/1
100 CAPSULE ORAL NIGHTLY
COMMUNITY
End: 2023-01-28 | Stop reason: HOSPADM

## 2023-01-10 RX ORDER — ACETAMINOPHEN 650 MG/1
650 SUPPOSITORY RECTAL EVERY 4 HOURS PRN
Status: DISCONTINUED | OUTPATIENT
Start: 2023-01-10 | End: 2023-01-28 | Stop reason: HOSPADM

## 2023-01-10 RX ORDER — DEXTROSE MONOHYDRATE 25 G/50ML
25 INJECTION, SOLUTION INTRAVENOUS
Status: DISCONTINUED | OUTPATIENT
Start: 2023-01-10 | End: 2023-01-28 | Stop reason: HOSPADM

## 2023-01-10 RX ORDER — NICOTINE POLACRILEX 4 MG
15 LOZENGE BUCCAL
Status: DISCONTINUED | OUTPATIENT
Start: 2023-01-10 | End: 2023-01-28 | Stop reason: HOSPADM

## 2023-01-10 RX ORDER — SODIUM CHLORIDE 0.9 % (FLUSH) 0.9 %
3 SYRINGE (ML) INJECTION EVERY 12 HOURS SCHEDULED
Status: DISCONTINUED | OUTPATIENT
Start: 2023-01-10 | End: 2023-01-28 | Stop reason: HOSPADM

## 2023-01-10 RX ORDER — LEVOTHYROXINE SODIUM 0.03 MG/1
25 TABLET ORAL DAILY
Status: ON HOLD | COMMUNITY

## 2023-01-10 RX ORDER — AMOXICILLIN 250 MG
2 CAPSULE ORAL 2 TIMES DAILY
Status: DISCONTINUED | OUTPATIENT
Start: 2023-01-10 | End: 2023-01-28 | Stop reason: HOSPADM

## 2023-01-10 RX ORDER — DONEPEZIL HYDROCHLORIDE 10 MG/1
10 TABLET, FILM COATED ORAL NIGHTLY
Status: ON HOLD | COMMUNITY

## 2023-01-10 RX ORDER — FLUTICASONE PROPIONATE 50 MCG
2 SPRAY, SUSPENSION (ML) NASAL DAILY
Status: DISCONTINUED | OUTPATIENT
Start: 2023-01-10 | End: 2023-01-28 | Stop reason: HOSPADM

## 2023-01-10 RX ORDER — INSULIN LISPRO 100 [IU]/ML
INJECTION, SOLUTION INTRAVENOUS; SUBCUTANEOUS
Status: ON HOLD | COMMUNITY

## 2023-01-10 RX ORDER — POLYETHYLENE GLYCOL 3350 17 G/17G
17 POWDER, FOR SOLUTION ORAL DAILY PRN
Status: DISCONTINUED | OUTPATIENT
Start: 2023-01-10 | End: 2023-01-28 | Stop reason: HOSPADM

## 2023-01-10 RX ORDER — OXYBUTYNIN CHLORIDE 5 MG/1
5 TABLET ORAL 2 TIMES DAILY
COMMUNITY
End: 2023-02-16 | Stop reason: HOSPADM

## 2023-01-10 RX ORDER — LORAZEPAM 2 MG/ML
1 INJECTION INTRAMUSCULAR ONCE
Status: COMPLETED | OUTPATIENT
Start: 2023-01-10 | End: 2023-01-10

## 2023-01-10 RX ORDER — INSULIN ASPART 100 [IU]/ML
0-9 INJECTION, SOLUTION INTRAVENOUS; SUBCUTANEOUS
Status: DISCONTINUED | OUTPATIENT
Start: 2023-01-10 | End: 2023-01-28 | Stop reason: HOSPADM

## 2023-01-10 RX ORDER — LORAZEPAM 2 MG/ML
1 INJECTION INTRAMUSCULAR EVERY 4 HOURS PRN
Status: DISCONTINUED | OUTPATIENT
Start: 2023-01-10 | End: 2023-01-12

## 2023-01-10 RX ORDER — ACETAMINOPHEN 325 MG/1
650 TABLET ORAL EVERY 4 HOURS PRN
Status: DISCONTINUED | OUTPATIENT
Start: 2023-01-10 | End: 2023-01-28 | Stop reason: HOSPADM

## 2023-01-10 RX ORDER — SODIUM CHLORIDE 9 MG/ML
40 INJECTION, SOLUTION INTRAVENOUS AS NEEDED
Status: DISCONTINUED | OUTPATIENT
Start: 2023-01-10 | End: 2023-01-28 | Stop reason: HOSPADM

## 2023-01-10 RX ORDER — ONDANSETRON 2 MG/ML
4 INJECTION INTRAMUSCULAR; INTRAVENOUS EVERY 6 HOURS PRN
Status: DISCONTINUED | OUTPATIENT
Start: 2023-01-10 | End: 2023-01-28 | Stop reason: HOSPADM

## 2023-01-10 RX ORDER — BISACODYL 5 MG/1
5 TABLET, DELAYED RELEASE ORAL DAILY PRN
Status: DISCONTINUED | OUTPATIENT
Start: 2023-01-10 | End: 2023-01-28 | Stop reason: HOSPADM

## 2023-01-10 RX ORDER — PANTOPRAZOLE SODIUM 40 MG/10ML
40 INJECTION, POWDER, LYOPHILIZED, FOR SOLUTION INTRAVENOUS
Status: DISCONTINUED | OUTPATIENT
Start: 2023-01-11 | End: 2023-01-17

## 2023-01-10 RX ADMIN — INSULIN ASPART 4 UNITS: 100 INJECTION, SOLUTION INTRAVENOUS; SUBCUTANEOUS at 18:30

## 2023-01-10 RX ADMIN — LORAZEPAM 1 MG: 2 INJECTION INTRAMUSCULAR; INTRAVENOUS at 11:53

## 2023-01-10 RX ADMIN — Medication 3 ML: at 21:04

## 2023-01-10 RX ADMIN — LORAZEPAM 1 MG: 2 INJECTION INTRAMUSCULAR; INTRAVENOUS at 16:48

## 2023-01-10 RX ADMIN — FLUTICASONE PROPIONATE 2 SPRAY: 50 SPRAY, METERED NASAL at 17:34

## 2023-01-10 RX ADMIN — SODIUM CHLORIDE 100 ML/HR: 9 INJECTION, SOLUTION INTRAVENOUS at 17:34

## 2023-01-11 LAB
ANION GAP SERPL CALCULATED.3IONS-SCNC: 10.9 MMOL/L (ref 5–15)
BUN SERPL-MCNC: 14 MG/DL (ref 8–23)
BUN/CREAT SERPL: 11.9 (ref 7–25)
CALCIUM SPEC-SCNC: 9 MG/DL (ref 8.6–10.5)
CHLORIDE SERPL-SCNC: 101 MMOL/L (ref 98–107)
CHOLEST SERPL-MCNC: 189 MG/DL (ref 0–200)
CO2 SERPL-SCNC: 26.1 MMOL/L (ref 22–29)
CREAT SERPL-MCNC: 1.18 MG/DL (ref 0.57–1)
DEPRECATED RDW RBC AUTO: 52.8 FL (ref 37–54)
EGFRCR SERPLBLD CKD-EPI 2021: 46.5 ML/MIN/1.73
ERYTHROCYTE [DISTWIDTH] IN BLOOD BY AUTOMATED COUNT: 13.7 % (ref 12.3–15.4)
GLUCOSE BLDC GLUCOMTR-MCNC: 165 MG/DL (ref 70–130)
GLUCOSE BLDC GLUCOMTR-MCNC: 170 MG/DL (ref 70–130)
GLUCOSE BLDC GLUCOMTR-MCNC: 199 MG/DL (ref 70–130)
GLUCOSE BLDC GLUCOMTR-MCNC: 234 MG/DL (ref 70–130)
GLUCOSE SERPL-MCNC: 207 MG/DL (ref 65–99)
HCT VFR BLD AUTO: 32.9 % (ref 34–46.6)
HDLC SERPL-MCNC: 44 MG/DL (ref 40–60)
HGB BLD-MCNC: 10.4 G/DL (ref 12–15.9)
LDLC SERPL CALC-MCNC: 121 MG/DL (ref 0–100)
LDLC/HDLC SERPL: 2.69 {RATIO}
MCH RBC QN AUTO: 33.1 PG (ref 26.6–33)
MCHC RBC AUTO-ENTMCNC: 31.6 G/DL (ref 31.5–35.7)
MCV RBC AUTO: 104.8 FL (ref 79–97)
PLATELET # BLD AUTO: 136 10*3/MM3 (ref 140–450)
PMV BLD AUTO: 11.7 FL (ref 6–12)
POTASSIUM SERPL-SCNC: 4.3 MMOL/L (ref 3.5–5.2)
RBC # BLD AUTO: 3.14 10*6/MM3 (ref 3.77–5.28)
SODIUM SERPL-SCNC: 138 MMOL/L (ref 136–145)
TRIGL SERPL-MCNC: 134 MG/DL (ref 0–150)
VLDLC SERPL-MCNC: 24 MG/DL (ref 5–40)
WBC NRBC COR # BLD: 9.14 10*3/MM3 (ref 3.4–10.8)

## 2023-01-11 PROCEDURE — 80061 LIPID PANEL: CPT | Performed by: INTERNAL MEDICINE

## 2023-01-11 PROCEDURE — 99223 1ST HOSP IP/OBS HIGH 75: CPT | Performed by: PSYCHIATRY & NEUROLOGY

## 2023-01-11 PROCEDURE — 63710000001 INSULIN DETEMIR PER 5 UNITS: Performed by: INTERNAL MEDICINE

## 2023-01-11 PROCEDURE — 25010000002 LORAZEPAM PER 2 MG: Performed by: INTERNAL MEDICINE

## 2023-01-11 PROCEDURE — 85027 COMPLETE CBC AUTOMATED: CPT | Performed by: INTERNAL MEDICINE

## 2023-01-11 PROCEDURE — 99233 SBSQ HOSP IP/OBS HIGH 50: CPT | Performed by: INTERNAL MEDICINE

## 2023-01-11 PROCEDURE — 80048 BASIC METABOLIC PNL TOTAL CA: CPT | Performed by: INTERNAL MEDICINE

## 2023-01-11 PROCEDURE — 92610 EVALUATE SWALLOWING FUNCTION: CPT

## 2023-01-11 PROCEDURE — 25010000002 ONDANSETRON PER 1 MG: Performed by: INTERNAL MEDICINE

## 2023-01-11 PROCEDURE — 82962 GLUCOSE BLOOD TEST: CPT

## 2023-01-11 PROCEDURE — 63710000001 INSULIN ASPART PER 5 UNITS: Performed by: INTERNAL MEDICINE

## 2023-01-11 RX ORDER — ASPIRIN 81 MG/1
81 TABLET ORAL DAILY
Status: DISCONTINUED | OUTPATIENT
Start: 2023-01-11 | End: 2023-01-11

## 2023-01-11 RX ORDER — CLOPIDOGREL BISULFATE 75 MG/1
75 TABLET ORAL DAILY
Status: DISCONTINUED | OUTPATIENT
Start: 2023-01-11 | End: 2023-01-18

## 2023-01-11 RX ORDER — METOPROLOL TARTRATE 5 MG/5ML
2.5 INJECTION INTRAVENOUS EVERY 6 HOURS PRN
Status: DISCONTINUED | OUTPATIENT
Start: 2023-01-11 | End: 2023-01-28 | Stop reason: HOSPADM

## 2023-01-11 RX ORDER — ROSUVASTATIN CALCIUM 20 MG/1
40 TABLET, COATED ORAL NIGHTLY
Status: DISCONTINUED | OUTPATIENT
Start: 2023-01-11 | End: 2023-01-28 | Stop reason: HOSPADM

## 2023-01-11 RX ORDER — CHOLECALCIFEROL (VITAMIN D3) 125 MCG
5 CAPSULE ORAL NIGHTLY
Status: DISCONTINUED | OUTPATIENT
Start: 2023-01-12 | End: 2023-01-28 | Stop reason: HOSPADM

## 2023-01-11 RX ORDER — HYDROCODONE BITARTRATE AND ACETAMINOPHEN 10; 325 MG/1; MG/1
1 TABLET ORAL EVERY 6 HOURS PRN
Status: ON HOLD | COMMUNITY
End: 2023-01-28 | Stop reason: SDUPTHER

## 2023-01-11 RX ORDER — QUETIAPINE FUMARATE 25 MG/1
25 TABLET, FILM COATED ORAL ONCE
Status: COMPLETED | OUTPATIENT
Start: 2023-01-11 | End: 2023-01-11

## 2023-01-11 RX ORDER — ASPIRIN 325 MG
325 TABLET, DELAYED RELEASE (ENTERIC COATED) ORAL DAILY
Status: DISCONTINUED | OUTPATIENT
Start: 2023-01-12 | End: 2023-01-18

## 2023-01-11 RX ORDER — QUETIAPINE FUMARATE 25 MG/1
25 TABLET, FILM COATED ORAL EVERY 24 HOURS
Status: DISCONTINUED | OUTPATIENT
Start: 2023-01-11 | End: 2023-01-14

## 2023-01-11 RX ORDER — ROSUVASTATIN CALCIUM 5 MG/1
10 TABLET, COATED ORAL NIGHTLY
Status: DISCONTINUED | OUTPATIENT
Start: 2023-01-11 | End: 2023-01-11

## 2023-01-11 RX ORDER — QUETIAPINE FUMARATE 25 MG/1
50 TABLET, FILM COATED ORAL ONCE
Status: COMPLETED | OUTPATIENT
Start: 2023-01-12 | End: 2023-01-11

## 2023-01-11 RX ADMIN — QUETIAPINE FUMARATE 25 MG: 25 TABLET ORAL at 17:07

## 2023-01-11 RX ADMIN — SODIUM CHLORIDE 100 ML/HR: 9 INJECTION, SOLUTION INTRAVENOUS at 04:18

## 2023-01-11 RX ADMIN — METOPROLOL TARTRATE 25 MG: 25 TABLET, FILM COATED ORAL at 17:28

## 2023-01-11 RX ADMIN — INSULIN ASPART 4 UNITS: 100 INJECTION, SOLUTION INTRAVENOUS; SUBCUTANEOUS at 12:49

## 2023-01-11 RX ADMIN — ONDANSETRON 4 MG: 2 INJECTION INTRAMUSCULAR; INTRAVENOUS at 10:04

## 2023-01-11 RX ADMIN — CLOPIDOGREL BISULFATE 75 MG: 75 TABLET ORAL at 17:28

## 2023-01-11 RX ADMIN — Medication 3 ML: at 22:05

## 2023-01-11 RX ADMIN — LORAZEPAM 1 MG: 2 INJECTION INTRAMUSCULAR; INTRAVENOUS at 22:05

## 2023-01-11 RX ADMIN — LORAZEPAM 1 MG: 2 INJECTION INTRAMUSCULAR; INTRAVENOUS at 10:04

## 2023-01-11 RX ADMIN — QUETIAPINE FUMARATE 50 MG: 25 TABLET ORAL at 23:53

## 2023-01-11 RX ADMIN — QUETIAPINE FUMARATE 25 MG: 25 TABLET ORAL at 12:36

## 2023-01-11 RX ADMIN — SODIUM CHLORIDE 100 ML/HR: 9 INJECTION, SOLUTION INTRAVENOUS at 13:52

## 2023-01-11 RX ADMIN — PANTOPRAZOLE SODIUM 40 MG: 40 INJECTION, POWDER, FOR SOLUTION INTRAVENOUS at 06:40

## 2023-01-11 RX ADMIN — Medication 5 MG: at 23:53

## 2023-01-11 RX ADMIN — ROSUVASTATIN CALCIUM 40 MG: 20 TABLET, COATED ORAL at 22:04

## 2023-01-11 RX ADMIN — ONDANSETRON 4 MG: 2 INJECTION INTRAMUSCULAR; INTRAVENOUS at 18:02

## 2023-01-11 RX ADMIN — INSULIN ASPART 2 UNITS: 100 INJECTION, SOLUTION INTRAVENOUS; SUBCUTANEOUS at 06:40

## 2023-01-11 RX ADMIN — ASPIRIN 81 MG: 81 TABLET, COATED ORAL at 12:36

## 2023-01-11 RX ADMIN — INSULIN DETEMIR 5 UNITS: 100 INJECTION, SOLUTION SUBCUTANEOUS at 22:04

## 2023-01-11 RX ADMIN — INSULIN ASPART 2 UNITS: 100 INJECTION, SOLUTION INTRAVENOUS; SUBCUTANEOUS at 17:55

## 2023-01-12 LAB
GLUCOSE BLDC GLUCOMTR-MCNC: 134 MG/DL (ref 70–130)
GLUCOSE BLDC GLUCOMTR-MCNC: 179 MG/DL (ref 70–130)
GLUCOSE BLDC GLUCOMTR-MCNC: 182 MG/DL (ref 70–130)
GLUCOSE BLDC GLUCOMTR-MCNC: 218 MG/DL (ref 70–130)

## 2023-01-12 PROCEDURE — 63710000001 INSULIN ASPART PER 5 UNITS: Performed by: INTERNAL MEDICINE

## 2023-01-12 PROCEDURE — 82962 GLUCOSE BLOOD TEST: CPT

## 2023-01-12 PROCEDURE — 99232 SBSQ HOSP IP/OBS MODERATE 35: CPT | Performed by: INTERNAL MEDICINE

## 2023-01-12 PROCEDURE — 63710000001 INSULIN DETEMIR PER 5 UNITS: Performed by: INTERNAL MEDICINE

## 2023-01-12 RX ORDER — HYDROCODONE BITARTRATE AND ACETAMINOPHEN 10; 325 MG/1; MG/1
1 TABLET ORAL EVERY 6 HOURS PRN
Status: DISCONTINUED | OUTPATIENT
Start: 2023-01-12 | End: 2023-01-28 | Stop reason: HOSPADM

## 2023-01-12 RX ORDER — OXYBUTYNIN CHLORIDE 5 MG/1
5 TABLET ORAL 2 TIMES DAILY
Status: DISCONTINUED | OUTPATIENT
Start: 2023-01-12 | End: 2023-01-28 | Stop reason: HOSPADM

## 2023-01-12 RX ORDER — BUSPIRONE HYDROCHLORIDE 15 MG/1
7.5 TABLET ORAL 2 TIMES DAILY
Status: DISCONTINUED | OUTPATIENT
Start: 2023-01-12 | End: 2023-01-14

## 2023-01-12 RX ORDER — LEVOTHYROXINE SODIUM 0.03 MG/1
25 TABLET ORAL
Status: DISCONTINUED | OUTPATIENT
Start: 2023-01-12 | End: 2023-01-28 | Stop reason: HOSPADM

## 2023-01-12 RX ORDER — QUETIAPINE FUMARATE 25 MG/1
50 TABLET, FILM COATED ORAL EVERY 12 HOURS PRN
Status: DISCONTINUED | OUTPATIENT
Start: 2023-01-12 | End: 2023-01-13

## 2023-01-12 RX ORDER — GABAPENTIN 300 MG/1
300 CAPSULE ORAL EVERY 12 HOURS SCHEDULED
Status: DISCONTINUED | OUTPATIENT
Start: 2023-01-12 | End: 2023-01-28 | Stop reason: HOSPADM

## 2023-01-12 RX ORDER — DONEPEZIL HYDROCHLORIDE 5 MG/1
10 TABLET, FILM COATED ORAL NIGHTLY
Status: DISCONTINUED | OUTPATIENT
Start: 2023-01-12 | End: 2023-01-15

## 2023-01-12 RX ORDER — DULOXETIN HYDROCHLORIDE 30 MG/1
60 CAPSULE, DELAYED RELEASE ORAL DAILY
Status: DISCONTINUED | OUTPATIENT
Start: 2023-01-12 | End: 2023-01-15

## 2023-01-12 RX ADMIN — SENNOSIDES AND DOCUSATE SODIUM 2 TABLET: 50; 8.6 TABLET ORAL at 20:57

## 2023-01-12 RX ADMIN — INSULIN DETEMIR 5 UNITS: 100 INJECTION, SOLUTION SUBCUTANEOUS at 20:59

## 2023-01-12 RX ADMIN — INSULIN ASPART 4 UNITS: 100 INJECTION, SOLUTION INTRAVENOUS; SUBCUTANEOUS at 06:40

## 2023-01-12 RX ADMIN — Medication 5 MG: at 20:58

## 2023-01-12 RX ADMIN — SODIUM CHLORIDE 100 ML/HR: 9 INJECTION, SOLUTION INTRAVENOUS at 00:02

## 2023-01-12 RX ADMIN — OXYBUTYNIN CHLORIDE 5 MG: 5 TABLET ORAL at 20:57

## 2023-01-12 RX ADMIN — GABAPENTIN 300 MG: 300 CAPSULE ORAL at 20:58

## 2023-01-12 RX ADMIN — Medication 3 ML: at 20:56

## 2023-01-12 RX ADMIN — BUSPIRONE HYDROCHLORIDE 7.5 MG: 15 TABLET ORAL at 20:57

## 2023-01-12 RX ADMIN — METOPROLOL TARTRATE 25 MG: 25 TABLET, FILM COATED ORAL at 20:58

## 2023-01-12 RX ADMIN — ROSUVASTATIN CALCIUM 40 MG: 20 TABLET, COATED ORAL at 20:57

## 2023-01-12 RX ADMIN — QUETIAPINE FUMARATE 50 MG: 25 TABLET ORAL at 20:57

## 2023-01-12 RX ADMIN — DONEPEZIL HYDROCHLORIDE 10 MG: 5 TABLET, FILM COATED ORAL at 20:58

## 2023-01-12 RX ADMIN — INSULIN ASPART 2 UNITS: 100 INJECTION, SOLUTION INTRAVENOUS; SUBCUTANEOUS at 12:16

## 2023-01-12 RX ADMIN — PANTOPRAZOLE SODIUM 40 MG: 40 INJECTION, POWDER, FOR SOLUTION INTRAVENOUS at 06:40

## 2023-01-13 ENCOUNTER — APPOINTMENT (OUTPATIENT)
Dept: CT IMAGING | Facility: HOSPITAL | Age: 83
DRG: 64 | End: 2023-01-13
Payer: MEDICARE

## 2023-01-13 LAB
ANION GAP SERPL CALCULATED.3IONS-SCNC: 10.6 MMOL/L (ref 5–15)
BUN SERPL-MCNC: 10 MG/DL (ref 8–23)
BUN/CREAT SERPL: 8.8 (ref 7–25)
CALCIUM SPEC-SCNC: 8.4 MG/DL (ref 8.6–10.5)
CHLORIDE SERPL-SCNC: 103 MMOL/L (ref 98–107)
CO2 SERPL-SCNC: 26.4 MMOL/L (ref 22–29)
CREAT SERPL-MCNC: 1.13 MG/DL (ref 0.57–1)
DEPRECATED RDW RBC AUTO: 50.8 FL (ref 37–54)
EGFRCR SERPLBLD CKD-EPI 2021: 49 ML/MIN/1.73
ERYTHROCYTE [DISTWIDTH] IN BLOOD BY AUTOMATED COUNT: 13.6 % (ref 12.3–15.4)
GLUCOSE BLDC GLUCOMTR-MCNC: 132 MG/DL (ref 70–130)
GLUCOSE BLDC GLUCOMTR-MCNC: 146 MG/DL (ref 70–130)
GLUCOSE BLDC GLUCOMTR-MCNC: 163 MG/DL (ref 70–130)
GLUCOSE BLDC GLUCOMTR-MCNC: 213 MG/DL (ref 70–130)
GLUCOSE SERPL-MCNC: 171 MG/DL (ref 65–99)
HCT VFR BLD AUTO: 29.6 % (ref 34–46.6)
HGB BLD-MCNC: 9.7 G/DL (ref 12–15.9)
MCH RBC QN AUTO: 33.4 PG (ref 26.6–33)
MCHC RBC AUTO-ENTMCNC: 32.8 G/DL (ref 31.5–35.7)
MCV RBC AUTO: 102.1 FL (ref 79–97)
PLATELET # BLD AUTO: 145 10*3/MM3 (ref 140–450)
PMV BLD AUTO: 11.4 FL (ref 6–12)
POTASSIUM SERPL-SCNC: 3.8 MMOL/L (ref 3.5–5.2)
RBC # BLD AUTO: 2.9 10*6/MM3 (ref 3.77–5.28)
SODIUM SERPL-SCNC: 140 MMOL/L (ref 136–145)
WBC NRBC COR # BLD: 7.76 10*3/MM3 (ref 3.4–10.8)

## 2023-01-13 PROCEDURE — 97166 OT EVAL MOD COMPLEX 45 MIN: CPT

## 2023-01-13 PROCEDURE — 63710000001 INSULIN ASPART PER 5 UNITS: Performed by: INTERNAL MEDICINE

## 2023-01-13 PROCEDURE — 80048 BASIC METABOLIC PNL TOTAL CA: CPT | Performed by: INTERNAL MEDICINE

## 2023-01-13 PROCEDURE — 70450 CT HEAD/BRAIN W/O DYE: CPT

## 2023-01-13 PROCEDURE — 85027 COMPLETE CBC AUTOMATED: CPT | Performed by: INTERNAL MEDICINE

## 2023-01-13 PROCEDURE — 82962 GLUCOSE BLOOD TEST: CPT

## 2023-01-13 PROCEDURE — 63710000001 INSULIN DETEMIR PER 5 UNITS: Performed by: INTERNAL MEDICINE

## 2023-01-13 PROCEDURE — 99232 SBSQ HOSP IP/OBS MODERATE 35: CPT | Performed by: INTERNAL MEDICINE

## 2023-01-13 PROCEDURE — 97163 PT EVAL HIGH COMPLEX 45 MIN: CPT

## 2023-01-13 RX ORDER — AMLODIPINE BESYLATE 5 MG/1
10 TABLET ORAL
Status: DISCONTINUED | OUTPATIENT
Start: 2023-01-13 | End: 2023-01-28 | Stop reason: HOSPADM

## 2023-01-13 RX ADMIN — SENNOSIDES AND DOCUSATE SODIUM 2 TABLET: 50; 8.6 TABLET ORAL at 13:41

## 2023-01-13 RX ADMIN — QUETIAPINE FUMARATE 25 MG: 25 TABLET ORAL at 17:58

## 2023-01-13 RX ADMIN — CLOPIDOGREL BISULFATE 75 MG: 75 TABLET ORAL at 13:40

## 2023-01-13 RX ADMIN — OXYBUTYNIN CHLORIDE 5 MG: 5 TABLET ORAL at 21:11

## 2023-01-13 RX ADMIN — METOPROLOL TARTRATE 25 MG: 25 TABLET, FILM COATED ORAL at 21:10

## 2023-01-13 RX ADMIN — INSULIN ASPART 2 UNITS: 100 INJECTION, SOLUTION INTRAVENOUS; SUBCUTANEOUS at 06:51

## 2023-01-13 RX ADMIN — GABAPENTIN 300 MG: 300 CAPSULE ORAL at 13:40

## 2023-01-13 RX ADMIN — INSULIN DETEMIR 5 UNITS: 100 INJECTION, SOLUTION SUBCUTANEOUS at 21:10

## 2023-01-13 RX ADMIN — METOPROLOL TARTRATE 25 MG: 25 TABLET, FILM COATED ORAL at 13:42

## 2023-01-13 RX ADMIN — HYDROCODONE BITARTRATE AND ACETAMINOPHEN 1 TABLET: 10; 325 TABLET ORAL at 13:42

## 2023-01-13 RX ADMIN — INSULIN ASPART 4 UNITS: 100 INJECTION, SOLUTION INTRAVENOUS; SUBCUTANEOUS at 17:58

## 2023-01-13 RX ADMIN — ASPIRIN 325 MG: 325 TABLET, COATED ORAL at 13:40

## 2023-01-13 RX ADMIN — SENNOSIDES AND DOCUSATE SODIUM 2 TABLET: 50; 8.6 TABLET ORAL at 21:11

## 2023-01-13 RX ADMIN — BUSPIRONE HYDROCHLORIDE 7.5 MG: 15 TABLET ORAL at 21:10

## 2023-01-13 RX ADMIN — LEVOTHYROXINE SODIUM 25 MCG: 25 TABLET ORAL at 06:51

## 2023-01-13 RX ADMIN — DONEPEZIL HYDROCHLORIDE 10 MG: 5 TABLET, FILM COATED ORAL at 21:10

## 2023-01-13 RX ADMIN — GABAPENTIN 300 MG: 300 CAPSULE ORAL at 21:10

## 2023-01-13 RX ADMIN — DULOXETINE 60 MG: 30 CAPSULE, DELAYED RELEASE ORAL at 13:37

## 2023-01-13 RX ADMIN — OXYBUTYNIN CHLORIDE 5 MG: 5 TABLET ORAL at 13:41

## 2023-01-13 RX ADMIN — Medication 3 ML: at 09:11

## 2023-01-13 RX ADMIN — ROSUVASTATIN CALCIUM 40 MG: 20 TABLET, COATED ORAL at 21:11

## 2023-01-13 RX ADMIN — Medication 5 MG: at 21:10

## 2023-01-13 RX ADMIN — PANTOPRAZOLE SODIUM 40 MG: 40 INJECTION, POWDER, FOR SOLUTION INTRAVENOUS at 06:51

## 2023-01-13 RX ADMIN — LEVOTHYROXINE SODIUM 25 MCG: 25 TABLET ORAL at 13:42

## 2023-01-13 RX ADMIN — BUSPIRONE HYDROCHLORIDE 7.5 MG: 15 TABLET ORAL at 13:40

## 2023-01-13 RX ADMIN — AMLODIPINE BESYLATE 10 MG: 5 TABLET ORAL at 13:41

## 2023-01-14 LAB
GLUCOSE BLDC GLUCOMTR-MCNC: 100 MG/DL (ref 70–130)
GLUCOSE BLDC GLUCOMTR-MCNC: 105 MG/DL (ref 70–130)
GLUCOSE BLDC GLUCOMTR-MCNC: 120 MG/DL (ref 70–130)
GLUCOSE BLDC GLUCOMTR-MCNC: 177 MG/DL (ref 70–130)

## 2023-01-14 PROCEDURE — 99232 SBSQ HOSP IP/OBS MODERATE 35: CPT | Performed by: INTERNAL MEDICINE

## 2023-01-14 PROCEDURE — 97110 THERAPEUTIC EXERCISES: CPT

## 2023-01-14 PROCEDURE — 63710000001 INSULIN ASPART PER 5 UNITS: Performed by: INTERNAL MEDICINE

## 2023-01-14 PROCEDURE — 82962 GLUCOSE BLOOD TEST: CPT

## 2023-01-14 PROCEDURE — 63710000001 INSULIN DETEMIR PER 5 UNITS: Performed by: INTERNAL MEDICINE

## 2023-01-14 PROCEDURE — 63710000001 ONDANSETRON PER 8 MG: Performed by: INTERNAL MEDICINE

## 2023-01-14 PROCEDURE — 97530 THERAPEUTIC ACTIVITIES: CPT

## 2023-01-14 RX ORDER — ONDANSETRON 4 MG/1
4 TABLET, FILM COATED ORAL EVERY 6 HOURS PRN
Status: DISCONTINUED | OUTPATIENT
Start: 2023-01-14 | End: 2023-01-28 | Stop reason: HOSPADM

## 2023-01-14 RX ORDER — QUETIAPINE FUMARATE 25 MG/1
50 TABLET, FILM COATED ORAL EVERY 24 HOURS
Status: DISCONTINUED | OUTPATIENT
Start: 2023-01-15 | End: 2023-01-28 | Stop reason: HOSPADM

## 2023-01-14 RX ADMIN — LEVOTHYROXINE SODIUM 25 MCG: 25 TABLET ORAL at 05:36

## 2023-01-14 RX ADMIN — OXYBUTYNIN CHLORIDE 5 MG: 5 TABLET ORAL at 09:11

## 2023-01-14 RX ADMIN — INSULIN ASPART 2 UNITS: 100 INJECTION, SOLUTION INTRAVENOUS; SUBCUTANEOUS at 12:30

## 2023-01-14 RX ADMIN — QUETIAPINE FUMARATE 25 MG: 25 TABLET ORAL at 18:00

## 2023-01-14 RX ADMIN — ONDANSETRON 4 MG: 4 TABLET ORAL at 20:15

## 2023-01-14 RX ADMIN — INSULIN DETEMIR 5 UNITS: 100 INJECTION, SOLUTION SUBCUTANEOUS at 21:16

## 2023-01-14 RX ADMIN — DULOXETINE 60 MG: 30 CAPSULE, DELAYED RELEASE ORAL at 09:11

## 2023-01-14 RX ADMIN — AMLODIPINE BESYLATE 10 MG: 5 TABLET ORAL at 09:12

## 2023-01-14 RX ADMIN — SENNOSIDES AND DOCUSATE SODIUM 2 TABLET: 50; 8.6 TABLET ORAL at 09:11

## 2023-01-14 RX ADMIN — OXYBUTYNIN CHLORIDE 5 MG: 5 TABLET ORAL at 21:15

## 2023-01-14 RX ADMIN — ASPIRIN 325 MG: 325 TABLET, COATED ORAL at 09:12

## 2023-01-14 RX ADMIN — METOPROLOL TARTRATE 25 MG: 25 TABLET, FILM COATED ORAL at 21:16

## 2023-01-14 RX ADMIN — BUSPIRONE HYDROCHLORIDE 7.5 MG: 15 TABLET ORAL at 21:15

## 2023-01-14 RX ADMIN — ROSUVASTATIN CALCIUM 40 MG: 20 TABLET, COATED ORAL at 21:15

## 2023-01-14 RX ADMIN — DONEPEZIL HYDROCHLORIDE 10 MG: 5 TABLET, FILM COATED ORAL at 21:16

## 2023-01-14 RX ADMIN — HYDROCODONE BITARTRATE AND ACETAMINOPHEN 1 TABLET: 10; 325 TABLET ORAL at 09:11

## 2023-01-14 RX ADMIN — CLOPIDOGREL BISULFATE 75 MG: 75 TABLET ORAL at 09:12

## 2023-01-14 RX ADMIN — GABAPENTIN 300 MG: 300 CAPSULE ORAL at 21:16

## 2023-01-14 RX ADMIN — Medication 5 MG: at 21:16

## 2023-01-14 RX ADMIN — BUSPIRONE HYDROCHLORIDE 7.5 MG: 15 TABLET ORAL at 09:11

## 2023-01-14 RX ADMIN — METOPROLOL TARTRATE 25 MG: 25 TABLET, FILM COATED ORAL at 09:12

## 2023-01-14 RX ADMIN — GABAPENTIN 300 MG: 300 CAPSULE ORAL at 09:11

## 2023-01-15 LAB
ANION GAP SERPL CALCULATED.3IONS-SCNC: 13.7 MMOL/L (ref 5–15)
BUN SERPL-MCNC: 10 MG/DL (ref 8–23)
BUN/CREAT SERPL: 8.8 (ref 7–25)
CALCIUM SPEC-SCNC: 8.6 MG/DL (ref 8.6–10.5)
CHLORIDE SERPL-SCNC: 100 MMOL/L (ref 98–107)
CO2 SERPL-SCNC: 25.3 MMOL/L (ref 22–29)
CREAT SERPL-MCNC: 1.14 MG/DL (ref 0.57–1)
DEPRECATED RDW RBC AUTO: 53.3 FL (ref 37–54)
EGFRCR SERPLBLD CKD-EPI 2021: 48.5 ML/MIN/1.73
ERYTHROCYTE [DISTWIDTH] IN BLOOD BY AUTOMATED COUNT: 14.2 % (ref 12.3–15.4)
GLUCOSE BLDC GLUCOMTR-MCNC: 146 MG/DL (ref 70–130)
GLUCOSE BLDC GLUCOMTR-MCNC: 177 MG/DL (ref 70–130)
GLUCOSE BLDC GLUCOMTR-MCNC: 179 MG/DL (ref 70–130)
GLUCOSE BLDC GLUCOMTR-MCNC: 225 MG/DL (ref 70–130)
GLUCOSE SERPL-MCNC: 145 MG/DL (ref 65–99)
HCT VFR BLD AUTO: 29.7 % (ref 34–46.6)
HGB BLD-MCNC: 9.5 G/DL (ref 12–15.9)
IRON 24H UR-MRATE: 42 MCG/DL (ref 37–145)
IRON SATN MFR SERPL: 22 % (ref 20–50)
MCH RBC QN AUTO: 32.9 PG (ref 26.6–33)
MCHC RBC AUTO-ENTMCNC: 32 G/DL (ref 31.5–35.7)
MCV RBC AUTO: 102.8 FL (ref 79–97)
PLATELET # BLD AUTO: 152 10*3/MM3 (ref 140–450)
PMV BLD AUTO: 12 FL (ref 6–12)
POTASSIUM SERPL-SCNC: 3.5 MMOL/L (ref 3.5–5.2)
RBC # BLD AUTO: 2.89 10*6/MM3 (ref 3.77–5.28)
SODIUM SERPL-SCNC: 139 MMOL/L (ref 136–145)
TIBC SERPL-MCNC: 189 MCG/DL (ref 298–536)
TRANSFERRIN SERPL-MCNC: 127 MG/DL (ref 200–360)
WBC NRBC COR # BLD: 8.21 10*3/MM3 (ref 3.4–10.8)

## 2023-01-15 PROCEDURE — 99232 SBSQ HOSP IP/OBS MODERATE 35: CPT | Performed by: INTERNAL MEDICINE

## 2023-01-15 PROCEDURE — 84466 ASSAY OF TRANSFERRIN: CPT | Performed by: INTERNAL MEDICINE

## 2023-01-15 PROCEDURE — 85027 COMPLETE CBC AUTOMATED: CPT | Performed by: INTERNAL MEDICINE

## 2023-01-15 PROCEDURE — 80048 BASIC METABOLIC PNL TOTAL CA: CPT | Performed by: INTERNAL MEDICINE

## 2023-01-15 PROCEDURE — 97530 THERAPEUTIC ACTIVITIES: CPT

## 2023-01-15 PROCEDURE — 82962 GLUCOSE BLOOD TEST: CPT

## 2023-01-15 PROCEDURE — 83540 ASSAY OF IRON: CPT | Performed by: INTERNAL MEDICINE

## 2023-01-15 PROCEDURE — 63710000001 INSULIN DETEMIR PER 5 UNITS: Performed by: INTERNAL MEDICINE

## 2023-01-15 PROCEDURE — 63710000001 INSULIN ASPART PER 5 UNITS: Performed by: INTERNAL MEDICINE

## 2023-01-15 RX ORDER — QUETIAPINE FUMARATE 25 MG/1
50 TABLET, FILM COATED ORAL ONCE
Status: COMPLETED | OUTPATIENT
Start: 2023-01-15 | End: 2023-01-15

## 2023-01-15 RX ORDER — CLONIDINE HYDROCHLORIDE 0.1 MG/1
0.1 TABLET ORAL EVERY 6 HOURS PRN
Status: DISCONTINUED | OUTPATIENT
Start: 2023-01-15 | End: 2023-01-28 | Stop reason: HOSPADM

## 2023-01-15 RX ADMIN — INSULIN ASPART 2 UNITS: 100 INJECTION, SOLUTION INTRAVENOUS; SUBCUTANEOUS at 12:01

## 2023-01-15 RX ADMIN — CLOPIDOGREL BISULFATE 75 MG: 75 TABLET ORAL at 08:21

## 2023-01-15 RX ADMIN — HYDROCODONE BITARTRATE AND ACETAMINOPHEN 1 TABLET: 10; 325 TABLET ORAL at 08:22

## 2023-01-15 RX ADMIN — QUETIAPINE FUMARATE 50 MG: 25 TABLET ORAL at 02:13

## 2023-01-15 RX ADMIN — LEVOTHYROXINE SODIUM 25 MCG: 25 TABLET ORAL at 06:49

## 2023-01-15 RX ADMIN — Medication 5 MG: at 21:13

## 2023-01-15 RX ADMIN — GABAPENTIN 300 MG: 300 CAPSULE ORAL at 08:22

## 2023-01-15 RX ADMIN — OXYBUTYNIN CHLORIDE 5 MG: 5 TABLET ORAL at 21:14

## 2023-01-15 RX ADMIN — DONEPEZIL HYDROCHLORIDE 10 MG: 5 TABLET, FILM COATED ORAL at 21:13

## 2023-01-15 RX ADMIN — INSULIN ASPART 2 UNITS: 100 INJECTION, SOLUTION INTRAVENOUS; SUBCUTANEOUS at 18:04

## 2023-01-15 RX ADMIN — INSULIN DETEMIR 5 UNITS: 100 INJECTION, SOLUTION SUBCUTANEOUS at 21:14

## 2023-01-15 RX ADMIN — OXYBUTYNIN CHLORIDE 5 MG: 5 TABLET ORAL at 08:22

## 2023-01-15 RX ADMIN — METOPROLOL TARTRATE 25 MG: 25 TABLET, FILM COATED ORAL at 08:22

## 2023-01-15 RX ADMIN — METOPROLOL TARTRATE 25 MG: 25 TABLET, FILM COATED ORAL at 21:13

## 2023-01-15 RX ADMIN — ASPIRIN 325 MG: 325 TABLET, COATED ORAL at 08:21

## 2023-01-15 RX ADMIN — QUETIAPINE FUMARATE 50 MG: 25 TABLET ORAL at 18:04

## 2023-01-15 RX ADMIN — ROSUVASTATIN CALCIUM 40 MG: 20 TABLET, COATED ORAL at 21:13

## 2023-01-15 RX ADMIN — SENNOSIDES AND DOCUSATE SODIUM 2 TABLET: 50; 8.6 TABLET ORAL at 21:13

## 2023-01-15 RX ADMIN — AMLODIPINE BESYLATE 10 MG: 5 TABLET ORAL at 08:21

## 2023-01-15 RX ADMIN — GABAPENTIN 300 MG: 300 CAPSULE ORAL at 21:13

## 2023-01-15 RX ADMIN — SENNOSIDES AND DOCUSATE SODIUM 2 TABLET: 50; 8.6 TABLET ORAL at 08:21

## 2023-01-15 RX ADMIN — FLUTICASONE PROPIONATE 2 SPRAY: 50 SPRAY, METERED NASAL at 12:01

## 2023-01-15 RX ADMIN — DULOXETINE 60 MG: 30 CAPSULE, DELAYED RELEASE ORAL at 08:22

## 2023-01-16 ENCOUNTER — APPOINTMENT (OUTPATIENT)
Dept: CT IMAGING | Facility: HOSPITAL | Age: 83
DRG: 64 | End: 2023-01-16
Payer: MEDICARE

## 2023-01-16 LAB
GLUCOSE BLDC GLUCOMTR-MCNC: 114 MG/DL (ref 70–130)
GLUCOSE BLDC GLUCOMTR-MCNC: 118 MG/DL (ref 70–130)
GLUCOSE BLDC GLUCOMTR-MCNC: 150 MG/DL (ref 70–130)
GLUCOSE BLDC GLUCOMTR-MCNC: 183 MG/DL (ref 70–130)

## 2023-01-16 PROCEDURE — 97110 THERAPEUTIC EXERCISES: CPT

## 2023-01-16 PROCEDURE — 97530 THERAPEUTIC ACTIVITIES: CPT

## 2023-01-16 PROCEDURE — 82962 GLUCOSE BLOOD TEST: CPT

## 2023-01-16 PROCEDURE — 63710000001 ONDANSETRON PER 8 MG: Performed by: INTERNAL MEDICINE

## 2023-01-16 PROCEDURE — 63710000001 INSULIN ASPART PER 5 UNITS: Performed by: INTERNAL MEDICINE

## 2023-01-16 PROCEDURE — 97535 SELF CARE MNGMENT TRAINING: CPT

## 2023-01-16 PROCEDURE — 70450 CT HEAD/BRAIN W/O DYE: CPT

## 2023-01-16 PROCEDURE — 99232 SBSQ HOSP IP/OBS MODERATE 35: CPT | Performed by: INTERNAL MEDICINE

## 2023-01-16 PROCEDURE — 63710000001 INSULIN DETEMIR PER 5 UNITS: Performed by: INTERNAL MEDICINE

## 2023-01-16 RX ADMIN — GABAPENTIN 300 MG: 300 CAPSULE ORAL at 08:59

## 2023-01-16 RX ADMIN — ASPIRIN 325 MG: 325 TABLET, COATED ORAL at 08:58

## 2023-01-16 RX ADMIN — INSULIN ASPART 2 UNITS: 100 INJECTION, SOLUTION INTRAVENOUS; SUBCUTANEOUS at 18:10

## 2023-01-16 RX ADMIN — INSULIN ASPART 2 UNITS: 100 INJECTION, SOLUTION INTRAVENOUS; SUBCUTANEOUS at 10:54

## 2023-01-16 RX ADMIN — SENNOSIDES AND DOCUSATE SODIUM 2 TABLET: 50; 8.6 TABLET ORAL at 21:11

## 2023-01-16 RX ADMIN — GABAPENTIN 300 MG: 300 CAPSULE ORAL at 21:11

## 2023-01-16 RX ADMIN — OXYBUTYNIN CHLORIDE 5 MG: 5 TABLET ORAL at 08:59

## 2023-01-16 RX ADMIN — HYDROCODONE BITARTRATE AND ACETAMINOPHEN 1 TABLET: 10; 325 TABLET ORAL at 21:11

## 2023-01-16 RX ADMIN — HYDROCODONE BITARTRATE AND ACETAMINOPHEN 1 TABLET: 10; 325 TABLET ORAL at 13:52

## 2023-01-16 RX ADMIN — AMLODIPINE BESYLATE 10 MG: 5 TABLET ORAL at 10:54

## 2023-01-16 RX ADMIN — LEVOTHYROXINE SODIUM 25 MCG: 25 TABLET ORAL at 06:18

## 2023-01-16 RX ADMIN — ROSUVASTATIN CALCIUM 40 MG: 20 TABLET, COATED ORAL at 21:11

## 2023-01-16 RX ADMIN — SENNOSIDES AND DOCUSATE SODIUM 2 TABLET: 50; 8.6 TABLET ORAL at 08:59

## 2023-01-16 RX ADMIN — Medication 5 MG: at 21:11

## 2023-01-16 RX ADMIN — INSULIN DETEMIR 5 UNITS: 100 INJECTION, SOLUTION SUBCUTANEOUS at 21:11

## 2023-01-16 RX ADMIN — METOPROLOL TARTRATE 25 MG: 25 TABLET, FILM COATED ORAL at 21:11

## 2023-01-16 RX ADMIN — OXYBUTYNIN CHLORIDE 5 MG: 5 TABLET ORAL at 21:11

## 2023-01-16 RX ADMIN — QUETIAPINE FUMARATE 50 MG: 25 TABLET ORAL at 18:10

## 2023-01-16 RX ADMIN — CLOPIDOGREL BISULFATE 75 MG: 75 TABLET ORAL at 08:58

## 2023-01-16 RX ADMIN — ONDANSETRON 4 MG: 4 TABLET ORAL at 08:59

## 2023-01-17 LAB
ANION GAP SERPL CALCULATED.3IONS-SCNC: 11.1 MMOL/L (ref 5–15)
BUN SERPL-MCNC: 12 MG/DL (ref 8–23)
BUN/CREAT SERPL: 9.1 (ref 7–25)
CALCIUM SPEC-SCNC: 8.7 MG/DL (ref 8.6–10.5)
CHLORIDE SERPL-SCNC: 103 MMOL/L (ref 98–107)
CO2 SERPL-SCNC: 27.9 MMOL/L (ref 22–29)
CREAT SERPL-MCNC: 1.32 MG/DL (ref 0.57–1)
DEPRECATED RDW RBC AUTO: 55.1 FL (ref 37–54)
EGFRCR SERPLBLD CKD-EPI 2021: 40.6 ML/MIN/1.73
ERYTHROCYTE [DISTWIDTH] IN BLOOD BY AUTOMATED COUNT: 14.5 % (ref 12.3–15.4)
GLUCOSE BLDC GLUCOMTR-MCNC: 123 MG/DL (ref 70–130)
GLUCOSE BLDC GLUCOMTR-MCNC: 138 MG/DL (ref 70–130)
GLUCOSE BLDC GLUCOMTR-MCNC: 163 MG/DL (ref 70–130)
GLUCOSE BLDC GLUCOMTR-MCNC: 214 MG/DL (ref 70–130)
GLUCOSE SERPL-MCNC: 128 MG/DL (ref 65–99)
HCT VFR BLD AUTO: 32.2 % (ref 34–46.6)
HGB BLD-MCNC: 10.3 G/DL (ref 12–15.9)
INR PPP: 1.03 (ref 0.9–1.1)
MCH RBC QN AUTO: 33.2 PG (ref 26.6–33)
MCHC RBC AUTO-ENTMCNC: 32 G/DL (ref 31.5–35.7)
MCV RBC AUTO: 103.9 FL (ref 79–97)
PLATELET # BLD AUTO: 162 10*3/MM3 (ref 140–450)
PMV BLD AUTO: 11.5 FL (ref 6–12)
POTASSIUM SERPL-SCNC: 3.8 MMOL/L (ref 3.5–5.2)
PROTHROMBIN TIME: 13.9 SECONDS (ref 12.5–14.5)
RBC # BLD AUTO: 3.1 10*6/MM3 (ref 3.77–5.28)
SODIUM SERPL-SCNC: 142 MMOL/L (ref 136–145)
WBC NRBC COR # BLD: 7.57 10*3/MM3 (ref 3.4–10.8)

## 2023-01-17 PROCEDURE — 82962 GLUCOSE BLOOD TEST: CPT

## 2023-01-17 PROCEDURE — 97112 NEUROMUSCULAR REEDUCATION: CPT

## 2023-01-17 PROCEDURE — 99232 SBSQ HOSP IP/OBS MODERATE 35: CPT | Performed by: FAMILY MEDICINE

## 2023-01-17 PROCEDURE — 97530 THERAPEUTIC ACTIVITIES: CPT

## 2023-01-17 PROCEDURE — 80048 BASIC METABOLIC PNL TOTAL CA: CPT | Performed by: INTERNAL MEDICINE

## 2023-01-17 PROCEDURE — 63710000001 INSULIN DETEMIR PER 5 UNITS: Performed by: INTERNAL MEDICINE

## 2023-01-17 PROCEDURE — 63710000001 INSULIN ASPART PER 5 UNITS: Performed by: INTERNAL MEDICINE

## 2023-01-17 PROCEDURE — 85027 COMPLETE CBC AUTOMATED: CPT | Performed by: INTERNAL MEDICINE

## 2023-01-17 PROCEDURE — 85610 PROTHROMBIN TIME: CPT | Performed by: INTERNAL MEDICINE

## 2023-01-17 RX ORDER — PANTOPRAZOLE SODIUM 40 MG/1
40 TABLET, DELAYED RELEASE ORAL
Status: DISCONTINUED | OUTPATIENT
Start: 2023-01-17 | End: 2023-01-28 | Stop reason: HOSPADM

## 2023-01-17 RX ADMIN — OXYBUTYNIN CHLORIDE 5 MG: 5 TABLET ORAL at 22:14

## 2023-01-17 RX ADMIN — FLUTICASONE PROPIONATE 2 SPRAY: 50 SPRAY, METERED NASAL at 08:10

## 2023-01-17 RX ADMIN — GABAPENTIN 300 MG: 300 CAPSULE ORAL at 08:08

## 2023-01-17 RX ADMIN — SENNOSIDES AND DOCUSATE SODIUM 2 TABLET: 50; 8.6 TABLET ORAL at 22:14

## 2023-01-17 RX ADMIN — INSULIN ASPART 2 UNITS: 100 INJECTION, SOLUTION INTRAVENOUS; SUBCUTANEOUS at 11:24

## 2023-01-17 RX ADMIN — HYDROCODONE BITARTRATE AND ACETAMINOPHEN 1 TABLET: 10; 325 TABLET ORAL at 22:14

## 2023-01-17 RX ADMIN — METOPROLOL TARTRATE 25 MG: 25 TABLET, FILM COATED ORAL at 22:14

## 2023-01-17 RX ADMIN — OXYBUTYNIN CHLORIDE 5 MG: 5 TABLET ORAL at 08:11

## 2023-01-17 RX ADMIN — PANTOPRAZOLE SODIUM 40 MG: 40 TABLET, DELAYED RELEASE ORAL at 06:20

## 2023-01-17 RX ADMIN — LEVOTHYROXINE SODIUM 25 MCG: 25 TABLET ORAL at 05:16

## 2023-01-17 RX ADMIN — QUETIAPINE FUMARATE 50 MG: 25 TABLET ORAL at 17:10

## 2023-01-17 RX ADMIN — INSULIN DETEMIR 5 UNITS: 100 INJECTION, SOLUTION SUBCUTANEOUS at 22:14

## 2023-01-17 RX ADMIN — Medication 3 ML: at 08:11

## 2023-01-17 RX ADMIN — METOPROLOL TARTRATE 25 MG: 25 TABLET, FILM COATED ORAL at 08:08

## 2023-01-17 RX ADMIN — SENNOSIDES AND DOCUSATE SODIUM 2 TABLET: 50; 8.6 TABLET ORAL at 08:07

## 2023-01-17 RX ADMIN — ROSUVASTATIN CALCIUM 40 MG: 20 TABLET, COATED ORAL at 22:14

## 2023-01-17 RX ADMIN — GABAPENTIN 300 MG: 300 CAPSULE ORAL at 22:14

## 2023-01-17 RX ADMIN — Medication 5 MG: at 22:14

## 2023-01-17 RX ADMIN — ASPIRIN 325 MG: 325 TABLET, COATED ORAL at 08:07

## 2023-01-17 RX ADMIN — CLOPIDOGREL BISULFATE 75 MG: 75 TABLET ORAL at 08:08

## 2023-01-17 RX ADMIN — HYDROCODONE BITARTRATE AND ACETAMINOPHEN 1 TABLET: 10; 325 TABLET ORAL at 13:26

## 2023-01-18 LAB
GLUCOSE BLDC GLUCOMTR-MCNC: 125 MG/DL (ref 70–130)
GLUCOSE BLDC GLUCOMTR-MCNC: 130 MG/DL (ref 70–130)
GLUCOSE BLDC GLUCOMTR-MCNC: 163 MG/DL (ref 70–130)
GLUCOSE BLDC GLUCOMTR-MCNC: 207 MG/DL (ref 70–130)

## 2023-01-18 PROCEDURE — 97530 THERAPEUTIC ACTIVITIES: CPT

## 2023-01-18 PROCEDURE — 63710000001 INSULIN DETEMIR PER 5 UNITS: Performed by: INTERNAL MEDICINE

## 2023-01-18 PROCEDURE — 99232 SBSQ HOSP IP/OBS MODERATE 35: CPT | Performed by: FAMILY MEDICINE

## 2023-01-18 PROCEDURE — 82962 GLUCOSE BLOOD TEST: CPT

## 2023-01-18 PROCEDURE — 63710000001 INSULIN ASPART PER 5 UNITS: Performed by: INTERNAL MEDICINE

## 2023-01-18 RX ORDER — ASPIRIN 81 MG/1
81 TABLET ORAL DAILY
Status: DISCONTINUED | OUTPATIENT
Start: 2023-01-19 | End: 2023-01-28 | Stop reason: HOSPADM

## 2023-01-18 RX ADMIN — AMLODIPINE BESYLATE 10 MG: 5 TABLET ORAL at 10:28

## 2023-01-18 RX ADMIN — INSULIN DETEMIR 5 UNITS: 100 INJECTION, SOLUTION SUBCUTANEOUS at 22:55

## 2023-01-18 RX ADMIN — GABAPENTIN 300 MG: 300 CAPSULE ORAL at 10:28

## 2023-01-18 RX ADMIN — LEVOTHYROXINE SODIUM 25 MCG: 25 TABLET ORAL at 05:20

## 2023-01-18 RX ADMIN — ASPIRIN 325 MG: 325 TABLET, COATED ORAL at 10:28

## 2023-01-18 RX ADMIN — INSULIN ASPART 2 UNITS: 100 INJECTION, SOLUTION INTRAVENOUS; SUBCUTANEOUS at 14:03

## 2023-01-18 RX ADMIN — PANTOPRAZOLE SODIUM 40 MG: 40 TABLET, DELAYED RELEASE ORAL at 05:20

## 2023-01-18 RX ADMIN — SENNOSIDES AND DOCUSATE SODIUM 2 TABLET: 50; 8.6 TABLET ORAL at 10:28

## 2023-01-18 RX ADMIN — OXYBUTYNIN CHLORIDE 5 MG: 5 TABLET ORAL at 22:55

## 2023-01-18 RX ADMIN — CLOPIDOGREL BISULFATE 75 MG: 75 TABLET ORAL at 10:35

## 2023-01-18 RX ADMIN — OXYBUTYNIN CHLORIDE 5 MG: 5 TABLET ORAL at 10:28

## 2023-01-18 RX ADMIN — Medication 5 MG: at 22:56

## 2023-01-18 RX ADMIN — FLUTICASONE PROPIONATE 2 SPRAY: 50 SPRAY, METERED NASAL at 14:15

## 2023-01-18 RX ADMIN — SENNOSIDES AND DOCUSATE SODIUM 2 TABLET: 50; 8.6 TABLET ORAL at 22:54

## 2023-01-18 RX ADMIN — HYDROCODONE BITARTRATE AND ACETAMINOPHEN 1 TABLET: 10; 325 TABLET ORAL at 16:15

## 2023-01-18 RX ADMIN — METOPROLOL TARTRATE 25 MG: 25 TABLET, FILM COATED ORAL at 10:28

## 2023-01-18 RX ADMIN — GABAPENTIN 300 MG: 300 CAPSULE ORAL at 22:54

## 2023-01-18 RX ADMIN — ROSUVASTATIN CALCIUM 40 MG: 20 TABLET, COATED ORAL at 22:53

## 2023-01-19 LAB
GLUCOSE BLDC GLUCOMTR-MCNC: 141 MG/DL (ref 70–130)
GLUCOSE BLDC GLUCOMTR-MCNC: 154 MG/DL (ref 70–130)
GLUCOSE BLDC GLUCOMTR-MCNC: 173 MG/DL (ref 70–130)
GLUCOSE BLDC GLUCOMTR-MCNC: 180 MG/DL (ref 70–130)

## 2023-01-19 PROCEDURE — 63710000001 INSULIN ASPART PER 5 UNITS: Performed by: INTERNAL MEDICINE

## 2023-01-19 PROCEDURE — 99232 SBSQ HOSP IP/OBS MODERATE 35: CPT | Performed by: FAMILY MEDICINE

## 2023-01-19 PROCEDURE — 82962 GLUCOSE BLOOD TEST: CPT

## 2023-01-19 RX ADMIN — QUETIAPINE FUMARATE 50 MG: 25 TABLET ORAL at 17:31

## 2023-01-19 RX ADMIN — OXYBUTYNIN CHLORIDE 5 MG: 5 TABLET ORAL at 13:03

## 2023-01-19 RX ADMIN — HYDROCODONE BITARTRATE AND ACETAMINOPHEN 1 TABLET: 10; 325 TABLET ORAL at 18:36

## 2023-01-19 RX ADMIN — HYDROCODONE BITARTRATE AND ACETAMINOPHEN 1 TABLET: 10; 325 TABLET ORAL at 06:38

## 2023-01-19 RX ADMIN — INSULIN ASPART 2 UNITS: 100 INJECTION, SOLUTION INTRAVENOUS; SUBCUTANEOUS at 17:28

## 2023-01-19 RX ADMIN — HYDROCODONE BITARTRATE AND ACETAMINOPHEN 1 TABLET: 10; 325 TABLET ORAL at 13:02

## 2023-01-19 RX ADMIN — METOPROLOL TARTRATE 25 MG: 25 TABLET, FILM COATED ORAL at 13:02

## 2023-01-19 RX ADMIN — INSULIN ASPART 2 UNITS: 100 INJECTION, SOLUTION INTRAVENOUS; SUBCUTANEOUS at 13:28

## 2023-01-19 RX ADMIN — INSULIN ASPART 2 UNITS: 100 INJECTION, SOLUTION INTRAVENOUS; SUBCUTANEOUS at 06:38

## 2023-01-19 RX ADMIN — PANTOPRAZOLE SODIUM 40 MG: 40 TABLET, DELAYED RELEASE ORAL at 06:38

## 2023-01-19 RX ADMIN — FLUTICASONE PROPIONATE 2 SPRAY: 50 SPRAY, METERED NASAL at 13:13

## 2023-01-19 RX ADMIN — SENNOSIDES AND DOCUSATE SODIUM 2 TABLET: 50; 8.6 TABLET ORAL at 13:03

## 2023-01-19 RX ADMIN — LEVOTHYROXINE SODIUM 25 MCG: 25 TABLET ORAL at 06:38

## 2023-01-19 RX ADMIN — AMLODIPINE BESYLATE 10 MG: 5 TABLET ORAL at 13:03

## 2023-01-19 RX ADMIN — APIXABAN 5 MG: 5 TABLET, FILM COATED ORAL at 13:03

## 2023-01-19 RX ADMIN — GABAPENTIN 300 MG: 300 CAPSULE ORAL at 13:02

## 2023-01-19 RX ADMIN — ASPIRIN 81 MG: 81 TABLET, COATED ORAL at 13:03

## 2023-01-20 LAB
GLUCOSE BLDC GLUCOMTR-MCNC: 180 MG/DL (ref 70–130)
GLUCOSE BLDC GLUCOMTR-MCNC: 192 MG/DL (ref 70–130)
GLUCOSE BLDC GLUCOMTR-MCNC: 244 MG/DL (ref 70–130)
GLUCOSE BLDC GLUCOMTR-MCNC: 259 MG/DL (ref 70–130)

## 2023-01-20 PROCEDURE — 63710000001 INSULIN DETEMIR PER 5 UNITS: Performed by: INTERNAL MEDICINE

## 2023-01-20 PROCEDURE — 82962 GLUCOSE BLOOD TEST: CPT

## 2023-01-20 PROCEDURE — 63710000001 ONDANSETRON PER 8 MG: Performed by: INTERNAL MEDICINE

## 2023-01-20 PROCEDURE — 97535 SELF CARE MNGMENT TRAINING: CPT

## 2023-01-20 PROCEDURE — 99232 SBSQ HOSP IP/OBS MODERATE 35: CPT | Performed by: FAMILY MEDICINE

## 2023-01-20 PROCEDURE — 63710000001 INSULIN ASPART PER 5 UNITS: Performed by: INTERNAL MEDICINE

## 2023-01-20 RX ADMIN — HYDROCODONE BITARTRATE AND ACETAMINOPHEN 1 TABLET: 10; 325 TABLET ORAL at 13:19

## 2023-01-20 RX ADMIN — QUETIAPINE FUMARATE 50 MG: 25 TABLET ORAL at 18:30

## 2023-01-20 RX ADMIN — Medication 5 MG: at 21:06

## 2023-01-20 RX ADMIN — INSULIN ASPART 2 UNITS: 100 INJECTION, SOLUTION INTRAVENOUS; SUBCUTANEOUS at 18:30

## 2023-01-20 RX ADMIN — INSULIN ASPART 2 UNITS: 100 INJECTION, SOLUTION INTRAVENOUS; SUBCUTANEOUS at 06:55

## 2023-01-20 RX ADMIN — LEVOTHYROXINE SODIUM 25 MCG: 25 TABLET ORAL at 06:55

## 2023-01-20 RX ADMIN — GABAPENTIN 300 MG: 300 CAPSULE ORAL at 21:06

## 2023-01-20 RX ADMIN — GABAPENTIN 300 MG: 300 CAPSULE ORAL at 10:18

## 2023-01-20 RX ADMIN — ASPIRIN 81 MG: 81 TABLET, COATED ORAL at 10:18

## 2023-01-20 RX ADMIN — HYDROCODONE BITARTRATE AND ACETAMINOPHEN 1 TABLET: 10; 325 TABLET ORAL at 18:40

## 2023-01-20 RX ADMIN — ONDANSETRON 4 MG: 4 TABLET ORAL at 18:30

## 2023-01-20 RX ADMIN — APIXABAN 5 MG: 5 TABLET, FILM COATED ORAL at 21:06

## 2023-01-20 RX ADMIN — METOPROLOL TARTRATE 25 MG: 25 TABLET, FILM COATED ORAL at 10:18

## 2023-01-20 RX ADMIN — OXYBUTYNIN CHLORIDE 5 MG: 5 TABLET ORAL at 10:18

## 2023-01-20 RX ADMIN — INSULIN DETEMIR 5 UNITS: 100 INJECTION, SOLUTION SUBCUTANEOUS at 21:05

## 2023-01-20 RX ADMIN — SENNOSIDES AND DOCUSATE SODIUM 2 TABLET: 50; 8.6 TABLET ORAL at 21:06

## 2023-01-20 RX ADMIN — AMLODIPINE BESYLATE 10 MG: 5 TABLET ORAL at 10:18

## 2023-01-20 RX ADMIN — HYDROCODONE BITARTRATE AND ACETAMINOPHEN 1 TABLET: 10; 325 TABLET ORAL at 07:45

## 2023-01-20 RX ADMIN — PANTOPRAZOLE SODIUM 40 MG: 40 TABLET, DELAYED RELEASE ORAL at 06:55

## 2023-01-20 RX ADMIN — METOPROLOL TARTRATE 25 MG: 25 TABLET, FILM COATED ORAL at 21:07

## 2023-01-20 RX ADMIN — INSULIN ASPART 4 UNITS: 100 INJECTION, SOLUTION INTRAVENOUS; SUBCUTANEOUS at 12:23

## 2023-01-20 RX ADMIN — APIXABAN 5 MG: 5 TABLET, FILM COATED ORAL at 10:18

## 2023-01-20 RX ADMIN — ROSUVASTATIN CALCIUM 40 MG: 20 TABLET, COATED ORAL at 21:06

## 2023-01-20 RX ADMIN — FLUTICASONE PROPIONATE 2 SPRAY: 50 SPRAY, METERED NASAL at 10:19

## 2023-01-20 RX ADMIN — OXYBUTYNIN CHLORIDE 5 MG: 5 TABLET ORAL at 21:06

## 2023-01-20 RX ADMIN — SENNOSIDES AND DOCUSATE SODIUM 2 TABLET: 50; 8.6 TABLET ORAL at 10:18

## 2023-01-21 LAB
ANION GAP SERPL CALCULATED.3IONS-SCNC: 12.1 MMOL/L (ref 5–15)
BASOPHILS # BLD AUTO: 0.05 10*3/MM3 (ref 0–0.2)
BASOPHILS NFR BLD AUTO: 0.6 % (ref 0–1.5)
BUN SERPL-MCNC: 22 MG/DL (ref 8–23)
BUN/CREAT SERPL: 13.8 (ref 7–25)
CALCIUM SPEC-SCNC: 8.8 MG/DL (ref 8.6–10.5)
CHLORIDE SERPL-SCNC: 102 MMOL/L (ref 98–107)
CO2 SERPL-SCNC: 26.9 MMOL/L (ref 22–29)
CREAT SERPL-MCNC: 1.59 MG/DL (ref 0.57–1)
DEPRECATED RDW RBC AUTO: 53.9 FL (ref 37–54)
EGFRCR SERPLBLD CKD-EPI 2021: 32.5 ML/MIN/1.73
EOSINOPHIL # BLD AUTO: 0.34 10*3/MM3 (ref 0–0.4)
EOSINOPHIL NFR BLD AUTO: 4.1 % (ref 0.3–6.2)
ERYTHROCYTE [DISTWIDTH] IN BLOOD BY AUTOMATED COUNT: 14.4 % (ref 12.3–15.4)
GLUCOSE BLDC GLUCOMTR-MCNC: 189 MG/DL (ref 70–130)
GLUCOSE BLDC GLUCOMTR-MCNC: 222 MG/DL (ref 70–130)
GLUCOSE BLDC GLUCOMTR-MCNC: 254 MG/DL (ref 70–130)
GLUCOSE SERPL-MCNC: 183 MG/DL (ref 65–99)
HCT VFR BLD AUTO: 34.5 % (ref 34–46.6)
HGB BLD-MCNC: 11.1 G/DL (ref 12–15.9)
IMM GRANULOCYTES # BLD AUTO: 0.04 10*3/MM3 (ref 0–0.05)
IMM GRANULOCYTES NFR BLD AUTO: 0.5 % (ref 0–0.5)
LYMPHOCYTES # BLD AUTO: 2.34 10*3/MM3 (ref 0.7–3.1)
LYMPHOCYTES NFR BLD AUTO: 28.3 % (ref 19.6–45.3)
MCH RBC QN AUTO: 33 PG (ref 26.6–33)
MCHC RBC AUTO-ENTMCNC: 32.2 G/DL (ref 31.5–35.7)
MCV RBC AUTO: 102.7 FL (ref 79–97)
MONOCYTES # BLD AUTO: 0.85 10*3/MM3 (ref 0.1–0.9)
MONOCYTES NFR BLD AUTO: 10.3 % (ref 5–12)
NEUTROPHILS NFR BLD AUTO: 4.65 10*3/MM3 (ref 1.7–7)
NEUTROPHILS NFR BLD AUTO: 56.2 % (ref 42.7–76)
NRBC BLD AUTO-RTO: 0 /100 WBC (ref 0–0.2)
PLATELET # BLD AUTO: 171 10*3/MM3 (ref 140–450)
PMV BLD AUTO: 11.6 FL (ref 6–12)
POTASSIUM SERPL-SCNC: 3.5 MMOL/L (ref 3.5–5.2)
RBC # BLD AUTO: 3.36 10*6/MM3 (ref 3.77–5.28)
SODIUM SERPL-SCNC: 141 MMOL/L (ref 136–145)
WBC NRBC COR # BLD: 8.27 10*3/MM3 (ref 3.4–10.8)

## 2023-01-21 PROCEDURE — 63710000001 INSULIN DETEMIR PER 5 UNITS: Performed by: INTERNAL MEDICINE

## 2023-01-21 PROCEDURE — 82962 GLUCOSE BLOOD TEST: CPT

## 2023-01-21 PROCEDURE — 63710000001 INSULIN ASPART PER 5 UNITS: Performed by: INTERNAL MEDICINE

## 2023-01-21 PROCEDURE — 99232 SBSQ HOSP IP/OBS MODERATE 35: CPT | Performed by: FAMILY MEDICINE

## 2023-01-21 PROCEDURE — 85025 COMPLETE CBC W/AUTO DIFF WBC: CPT | Performed by: FAMILY MEDICINE

## 2023-01-21 PROCEDURE — 80048 BASIC METABOLIC PNL TOTAL CA: CPT | Performed by: FAMILY MEDICINE

## 2023-01-21 RX ORDER — SODIUM CHLORIDE 9 MG/ML
75 INJECTION, SOLUTION INTRAVENOUS CONTINUOUS
Status: ACTIVE | OUTPATIENT
Start: 2023-01-21 | End: 2023-01-22

## 2023-01-21 RX ADMIN — INSULIN DETEMIR 5 UNITS: 100 INJECTION, SOLUTION SUBCUTANEOUS at 21:02

## 2023-01-21 RX ADMIN — SENNOSIDES AND DOCUSATE SODIUM 2 TABLET: 50; 8.6 TABLET ORAL at 09:40

## 2023-01-21 RX ADMIN — APIXABAN 5 MG: 5 TABLET, FILM COATED ORAL at 21:01

## 2023-01-21 RX ADMIN — QUETIAPINE FUMARATE 50 MG: 25 TABLET ORAL at 18:15

## 2023-01-21 RX ADMIN — Medication 5 MG: at 21:01

## 2023-01-21 RX ADMIN — INSULIN ASPART 2 UNITS: 100 INJECTION, SOLUTION INTRAVENOUS; SUBCUTANEOUS at 12:58

## 2023-01-21 RX ADMIN — FLUTICASONE PROPIONATE 2 SPRAY: 50 SPRAY, METERED NASAL at 09:40

## 2023-01-21 RX ADMIN — HYDROCODONE BITARTRATE AND ACETAMINOPHEN 1 TABLET: 10; 325 TABLET ORAL at 12:58

## 2023-01-21 RX ADMIN — ROSUVASTATIN CALCIUM 40 MG: 20 TABLET, COATED ORAL at 21:00

## 2023-01-21 RX ADMIN — METOPROLOL TARTRATE 25 MG: 25 TABLET, FILM COATED ORAL at 21:01

## 2023-01-21 RX ADMIN — GABAPENTIN 300 MG: 300 CAPSULE ORAL at 21:01

## 2023-01-21 RX ADMIN — METOPROLOL TARTRATE 25 MG: 25 TABLET, FILM COATED ORAL at 09:40

## 2023-01-21 RX ADMIN — OXYBUTYNIN CHLORIDE 5 MG: 5 TABLET ORAL at 09:40

## 2023-01-21 RX ADMIN — APIXABAN 5 MG: 5 TABLET, FILM COATED ORAL at 09:40

## 2023-01-21 RX ADMIN — SENNOSIDES AND DOCUSATE SODIUM 2 TABLET: 50; 8.6 TABLET ORAL at 21:00

## 2023-01-21 RX ADMIN — AMLODIPINE BESYLATE 10 MG: 5 TABLET ORAL at 09:40

## 2023-01-21 RX ADMIN — OXYBUTYNIN CHLORIDE 5 MG: 5 TABLET ORAL at 21:01

## 2023-01-21 RX ADMIN — ASPIRIN 81 MG: 81 TABLET, COATED ORAL at 09:40

## 2023-01-21 RX ADMIN — GABAPENTIN 300 MG: 300 CAPSULE ORAL at 09:40

## 2023-01-21 RX ADMIN — HYDROCODONE BITARTRATE AND ACETAMINOPHEN 1 TABLET: 10; 325 TABLET ORAL at 07:34

## 2023-01-21 RX ADMIN — SODIUM CHLORIDE 75 ML/HR: 9 INJECTION, SOLUTION INTRAVENOUS at 15:55

## 2023-01-21 RX ADMIN — INSULIN ASPART 4 UNITS: 100 INJECTION, SOLUTION INTRAVENOUS; SUBCUTANEOUS at 17:47

## 2023-01-21 RX ADMIN — HYDROCODONE BITARTRATE AND ACETAMINOPHEN 1 TABLET: 10; 325 TABLET ORAL at 18:15

## 2023-01-21 RX ADMIN — Medication 3 ML: at 21:02

## 2023-01-22 LAB
ANION GAP SERPL CALCULATED.3IONS-SCNC: 9.6 MMOL/L (ref 5–15)
BUN SERPL-MCNC: 23 MG/DL (ref 8–23)
BUN/CREAT SERPL: 15.6 (ref 7–25)
CALCIUM SPEC-SCNC: 8.4 MG/DL (ref 8.6–10.5)
CHLORIDE SERPL-SCNC: 103 MMOL/L (ref 98–107)
CO2 SERPL-SCNC: 28.4 MMOL/L (ref 22–29)
CREAT SERPL-MCNC: 1.47 MG/DL (ref 0.57–1)
EGFRCR SERPLBLD CKD-EPI 2021: 35.7 ML/MIN/1.73
GLUCOSE BLDC GLUCOMTR-MCNC: 192 MG/DL (ref 70–130)
GLUCOSE BLDC GLUCOMTR-MCNC: 203 MG/DL (ref 70–130)
GLUCOSE BLDC GLUCOMTR-MCNC: 247 MG/DL (ref 70–130)
GLUCOSE BLDC GLUCOMTR-MCNC: 314 MG/DL (ref 70–130)
GLUCOSE SERPL-MCNC: 259 MG/DL (ref 65–99)
POTASSIUM SERPL-SCNC: 3.7 MMOL/L (ref 3.5–5.2)
SODIUM SERPL-SCNC: 141 MMOL/L (ref 136–145)

## 2023-01-22 PROCEDURE — 63710000001 INSULIN ASPART PER 5 UNITS: Performed by: INTERNAL MEDICINE

## 2023-01-22 PROCEDURE — 97530 THERAPEUTIC ACTIVITIES: CPT

## 2023-01-22 PROCEDURE — 63710000001 INSULIN DETEMIR PER 5 UNITS: Performed by: INTERNAL MEDICINE

## 2023-01-22 PROCEDURE — 80048 BASIC METABOLIC PNL TOTAL CA: CPT | Performed by: FAMILY MEDICINE

## 2023-01-22 PROCEDURE — 82962 GLUCOSE BLOOD TEST: CPT

## 2023-01-22 PROCEDURE — 99232 SBSQ HOSP IP/OBS MODERATE 35: CPT | Performed by: FAMILY MEDICINE

## 2023-01-22 RX ORDER — SODIUM CHLORIDE 9 MG/ML
75 INJECTION, SOLUTION INTRAVENOUS CONTINUOUS
Status: DISCONTINUED | OUTPATIENT
Start: 2023-01-22 | End: 2023-01-25

## 2023-01-22 RX ADMIN — SODIUM CHLORIDE 75 ML/HR: 9 INJECTION, SOLUTION INTRAVENOUS at 21:19

## 2023-01-22 RX ADMIN — PANTOPRAZOLE SODIUM 40 MG: 40 TABLET, DELAYED RELEASE ORAL at 06:26

## 2023-01-22 RX ADMIN — METOPROLOL TARTRATE 25 MG: 25 TABLET, FILM COATED ORAL at 10:32

## 2023-01-22 RX ADMIN — SENNOSIDES AND DOCUSATE SODIUM 2 TABLET: 50; 8.6 TABLET ORAL at 21:18

## 2023-01-22 RX ADMIN — FLUTICASONE PROPIONATE 2 SPRAY: 50 SPRAY, METERED NASAL at 10:33

## 2023-01-22 RX ADMIN — QUETIAPINE FUMARATE 50 MG: 25 TABLET ORAL at 18:23

## 2023-01-22 RX ADMIN — Medication 3 ML: at 10:33

## 2023-01-22 RX ADMIN — HYDROCODONE BITARTRATE AND ACETAMINOPHEN 1 TABLET: 10; 325 TABLET ORAL at 18:23

## 2023-01-22 RX ADMIN — SENNOSIDES AND DOCUSATE SODIUM 2 TABLET: 50; 8.6 TABLET ORAL at 10:32

## 2023-01-22 RX ADMIN — INSULIN ASPART 3 UNITS: 100 INJECTION, SOLUTION INTRAVENOUS; SUBCUTANEOUS at 17:47

## 2023-01-22 RX ADMIN — HYDROCODONE BITARTRATE AND ACETAMINOPHEN 1 TABLET: 10; 325 TABLET ORAL at 12:51

## 2023-01-22 RX ADMIN — LEVOTHYROXINE SODIUM 25 MCG: 25 TABLET ORAL at 06:26

## 2023-01-22 RX ADMIN — INSULIN ASPART 7 UNITS: 100 INJECTION, SOLUTION INTRAVENOUS; SUBCUTANEOUS at 12:51

## 2023-01-22 RX ADMIN — HYDROCODONE BITARTRATE AND ACETAMINOPHEN 1 TABLET: 10; 325 TABLET ORAL at 07:21

## 2023-01-22 RX ADMIN — AMLODIPINE BESYLATE 10 MG: 5 TABLET ORAL at 10:32

## 2023-01-22 RX ADMIN — APIXABAN 5 MG: 5 TABLET, FILM COATED ORAL at 10:33

## 2023-01-22 RX ADMIN — GABAPENTIN 300 MG: 300 CAPSULE ORAL at 21:18

## 2023-01-22 RX ADMIN — GABAPENTIN 300 MG: 300 CAPSULE ORAL at 10:33

## 2023-01-22 RX ADMIN — OXYBUTYNIN CHLORIDE 5 MG: 5 TABLET ORAL at 21:18

## 2023-01-22 RX ADMIN — APIXABAN 5 MG: 5 TABLET, FILM COATED ORAL at 21:18

## 2023-01-22 RX ADMIN — Medication 5 MG: at 21:18

## 2023-01-22 RX ADMIN — METOPROLOL TARTRATE 25 MG: 25 TABLET, FILM COATED ORAL at 21:18

## 2023-01-22 RX ADMIN — INSULIN ASPART 4 UNITS: 100 INJECTION, SOLUTION INTRAVENOUS; SUBCUTANEOUS at 06:31

## 2023-01-22 RX ADMIN — ROSUVASTATIN CALCIUM 40 MG: 20 TABLET, COATED ORAL at 21:18

## 2023-01-22 RX ADMIN — OXYBUTYNIN CHLORIDE 5 MG: 5 TABLET ORAL at 10:33

## 2023-01-22 RX ADMIN — ASPIRIN 81 MG: 81 TABLET, COATED ORAL at 10:33

## 2023-01-22 RX ADMIN — Medication 3 ML: at 21:18

## 2023-01-22 RX ADMIN — INSULIN DETEMIR 5 UNITS: 100 INJECTION, SOLUTION SUBCUTANEOUS at 21:21

## 2023-01-23 LAB
ANION GAP SERPL CALCULATED.3IONS-SCNC: 9.7 MMOL/L (ref 5–15)
BUN SERPL-MCNC: 24 MG/DL (ref 8–23)
BUN/CREAT SERPL: 15.8 (ref 7–25)
CALCIUM SPEC-SCNC: 8.3 MG/DL (ref 8.6–10.5)
CHLORIDE SERPL-SCNC: 103 MMOL/L (ref 98–107)
CO2 SERPL-SCNC: 24.3 MMOL/L (ref 22–29)
CREAT SERPL-MCNC: 1.52 MG/DL (ref 0.57–1)
EGFRCR SERPLBLD CKD-EPI 2021: 34.3 ML/MIN/1.73
GLUCOSE BLDC GLUCOMTR-MCNC: 165 MG/DL (ref 70–130)
GLUCOSE BLDC GLUCOMTR-MCNC: 200 MG/DL (ref 70–130)
GLUCOSE BLDC GLUCOMTR-MCNC: 231 MG/DL (ref 70–130)
GLUCOSE BLDC GLUCOMTR-MCNC: 234 MG/DL (ref 70–130)
GLUCOSE SERPL-MCNC: 200 MG/DL (ref 65–99)
POTASSIUM SERPL-SCNC: 3.8 MMOL/L (ref 3.5–5.2)
SODIUM SERPL-SCNC: 137 MMOL/L (ref 136–145)

## 2023-01-23 PROCEDURE — 63710000001 INSULIN ASPART PER 5 UNITS: Performed by: INTERNAL MEDICINE

## 2023-01-23 PROCEDURE — 97164 PT RE-EVAL EST PLAN CARE: CPT

## 2023-01-23 PROCEDURE — 82962 GLUCOSE BLOOD TEST: CPT

## 2023-01-23 PROCEDURE — 80048 BASIC METABOLIC PNL TOTAL CA: CPT | Performed by: FAMILY MEDICINE

## 2023-01-23 PROCEDURE — 99232 SBSQ HOSP IP/OBS MODERATE 35: CPT | Performed by: FAMILY MEDICINE

## 2023-01-23 PROCEDURE — 97535 SELF CARE MNGMENT TRAINING: CPT

## 2023-01-23 RX ADMIN — METOPROLOL TARTRATE 25 MG: 25 TABLET, FILM COATED ORAL at 09:16

## 2023-01-23 RX ADMIN — AMLODIPINE BESYLATE 10 MG: 5 TABLET ORAL at 09:15

## 2023-01-23 RX ADMIN — QUETIAPINE FUMARATE 50 MG: 25 TABLET ORAL at 18:12

## 2023-01-23 RX ADMIN — LEVOTHYROXINE SODIUM 25 MCG: 25 TABLET ORAL at 06:39

## 2023-01-23 RX ADMIN — INSULIN ASPART 4 UNITS: 100 INJECTION, SOLUTION INTRAVENOUS; SUBCUTANEOUS at 06:41

## 2023-01-23 RX ADMIN — APIXABAN 5 MG: 5 TABLET, FILM COATED ORAL at 09:16

## 2023-01-23 RX ADMIN — HYDROCODONE BITARTRATE AND ACETAMINOPHEN 1 TABLET: 10; 325 TABLET ORAL at 13:59

## 2023-01-23 RX ADMIN — SENNOSIDES AND DOCUSATE SODIUM 2 TABLET: 50; 8.6 TABLET ORAL at 09:15

## 2023-01-23 RX ADMIN — SODIUM CHLORIDE 75 ML/HR: 9 INJECTION, SOLUTION INTRAVENOUS at 09:15

## 2023-01-23 RX ADMIN — PANTOPRAZOLE SODIUM 40 MG: 40 TABLET, DELAYED RELEASE ORAL at 06:39

## 2023-01-23 RX ADMIN — FLUTICASONE PROPIONATE 2 SPRAY: 50 SPRAY, METERED NASAL at 09:29

## 2023-01-23 RX ADMIN — GABAPENTIN 300 MG: 300 CAPSULE ORAL at 09:15

## 2023-01-23 RX ADMIN — ASPIRIN 81 MG: 81 TABLET, COATED ORAL at 09:15

## 2023-01-23 RX ADMIN — OXYBUTYNIN CHLORIDE 5 MG: 5 TABLET ORAL at 09:15

## 2023-01-23 RX ADMIN — INSULIN ASPART 4 UNITS: 100 INJECTION, SOLUTION INTRAVENOUS; SUBCUTANEOUS at 11:52

## 2023-01-23 RX ADMIN — INSULIN ASPART 2 UNITS: 100 INJECTION, SOLUTION INTRAVENOUS; SUBCUTANEOUS at 18:14

## 2023-01-24 LAB
ANION GAP SERPL CALCULATED.3IONS-SCNC: 10 MMOL/L (ref 5–15)
BUN SERPL-MCNC: 20 MG/DL (ref 8–23)
BUN/CREAT SERPL: 14.6 (ref 7–25)
CALCIUM SPEC-SCNC: 8.3 MG/DL (ref 8.6–10.5)
CHLORIDE SERPL-SCNC: 105 MMOL/L (ref 98–107)
CO2 SERPL-SCNC: 26 MMOL/L (ref 22–29)
CREAT SERPL-MCNC: 1.37 MG/DL (ref 0.57–1)
EGFRCR SERPLBLD CKD-EPI 2021: 38.9 ML/MIN/1.73
GLUCOSE BLDC GLUCOMTR-MCNC: 181 MG/DL (ref 70–130)
GLUCOSE BLDC GLUCOMTR-MCNC: 182 MG/DL (ref 70–130)
GLUCOSE BLDC GLUCOMTR-MCNC: 251 MG/DL (ref 70–130)
GLUCOSE BLDC GLUCOMTR-MCNC: 258 MG/DL (ref 70–130)
GLUCOSE SERPL-MCNC: 240 MG/DL (ref 65–99)
POTASSIUM SERPL-SCNC: 3.9 MMOL/L (ref 3.5–5.2)
SODIUM SERPL-SCNC: 141 MMOL/L (ref 136–145)

## 2023-01-24 PROCEDURE — 99232 SBSQ HOSP IP/OBS MODERATE 35: CPT | Performed by: INTERNAL MEDICINE

## 2023-01-24 PROCEDURE — 97530 THERAPEUTIC ACTIVITIES: CPT

## 2023-01-24 PROCEDURE — 63710000001 INSULIN ASPART PER 5 UNITS: Performed by: INTERNAL MEDICINE

## 2023-01-24 PROCEDURE — 80048 BASIC METABOLIC PNL TOTAL CA: CPT | Performed by: FAMILY MEDICINE

## 2023-01-24 PROCEDURE — 63710000001 INSULIN DETEMIR PER 5 UNITS: Performed by: INTERNAL MEDICINE

## 2023-01-24 PROCEDURE — 97535 SELF CARE MNGMENT TRAINING: CPT

## 2023-01-24 PROCEDURE — 82962 GLUCOSE BLOOD TEST: CPT

## 2023-01-24 RX ADMIN — AMLODIPINE BESYLATE 10 MG: 5 TABLET ORAL at 08:47

## 2023-01-24 RX ADMIN — INSULIN ASPART 2 UNITS: 100 INJECTION, SOLUTION INTRAVENOUS; SUBCUTANEOUS at 18:13

## 2023-01-24 RX ADMIN — QUETIAPINE FUMARATE 50 MG: 25 TABLET ORAL at 18:12

## 2023-01-24 RX ADMIN — APIXABAN 5 MG: 5 TABLET, FILM COATED ORAL at 20:15

## 2023-01-24 RX ADMIN — METOPROLOL TARTRATE 25 MG: 25 TABLET, FILM COATED ORAL at 08:47

## 2023-01-24 RX ADMIN — METOPROLOL TARTRATE 2.5 MG: 1 INJECTION, SOLUTION INTRAVENOUS at 01:06

## 2023-01-24 RX ADMIN — GABAPENTIN 300 MG: 300 CAPSULE ORAL at 08:47

## 2023-01-24 RX ADMIN — Medication 5 MG: at 20:15

## 2023-01-24 RX ADMIN — ROSUVASTATIN CALCIUM 40 MG: 20 TABLET, COATED ORAL at 20:15

## 2023-01-24 RX ADMIN — HYDROCODONE BITARTRATE AND ACETAMINOPHEN 1 TABLET: 10; 325 TABLET ORAL at 14:13

## 2023-01-24 RX ADMIN — HYDROCODONE BITARTRATE AND ACETAMINOPHEN 1 TABLET: 10; 325 TABLET ORAL at 20:15

## 2023-01-24 RX ADMIN — OXYBUTYNIN CHLORIDE 5 MG: 5 TABLET ORAL at 08:47

## 2023-01-24 RX ADMIN — ASPIRIN 81 MG: 81 TABLET, COATED ORAL at 08:47

## 2023-01-24 RX ADMIN — METOPROLOL TARTRATE 25 MG: 25 TABLET, FILM COATED ORAL at 20:15

## 2023-01-24 RX ADMIN — FLUTICASONE PROPIONATE 2 SPRAY: 50 SPRAY, METERED NASAL at 09:16

## 2023-01-24 RX ADMIN — SENNOSIDES AND DOCUSATE SODIUM 2 TABLET: 50; 8.6 TABLET ORAL at 08:47

## 2023-01-24 RX ADMIN — GABAPENTIN 300 MG: 300 CAPSULE ORAL at 20:15

## 2023-01-24 RX ADMIN — INSULIN ASPART 4 UNITS: 100 INJECTION, SOLUTION INTRAVENOUS; SUBCUTANEOUS at 11:53

## 2023-01-24 RX ADMIN — SODIUM CHLORIDE 75 ML/HR: 9 INJECTION, SOLUTION INTRAVENOUS at 18:12

## 2023-01-24 RX ADMIN — APIXABAN 5 MG: 5 TABLET, FILM COATED ORAL at 08:47

## 2023-01-24 RX ADMIN — INSULIN DETEMIR 10 UNITS: 100 INJECTION, SOLUTION SUBCUTANEOUS at 20:15

## 2023-01-24 RX ADMIN — INSULIN ASPART 6 UNITS: 100 INJECTION, SOLUTION INTRAVENOUS; SUBCUTANEOUS at 06:46

## 2023-01-24 RX ADMIN — OXYBUTYNIN CHLORIDE 5 MG: 5 TABLET ORAL at 20:15

## 2023-01-24 RX ADMIN — SENNOSIDES AND DOCUSATE SODIUM 2 TABLET: 50; 8.6 TABLET ORAL at 20:17

## 2023-01-25 LAB
ANION GAP SERPL CALCULATED.3IONS-SCNC: 8.7 MMOL/L (ref 5–15)
BUN SERPL-MCNC: 18 MG/DL (ref 8–23)
BUN/CREAT SERPL: 14.4 (ref 7–25)
CALCIUM SPEC-SCNC: 8.3 MG/DL (ref 8.6–10.5)
CHLORIDE SERPL-SCNC: 106 MMOL/L (ref 98–107)
CO2 SERPL-SCNC: 26.3 MMOL/L (ref 22–29)
CREAT SERPL-MCNC: 1.25 MG/DL (ref 0.57–1)
EGFRCR SERPLBLD CKD-EPI 2021: 43.4 ML/MIN/1.73
GLUCOSE BLDC GLUCOMTR-MCNC: 157 MG/DL (ref 70–130)
GLUCOSE BLDC GLUCOMTR-MCNC: 171 MG/DL (ref 70–130)
GLUCOSE BLDC GLUCOMTR-MCNC: 220 MG/DL (ref 70–130)
GLUCOSE BLDC GLUCOMTR-MCNC: 274 MG/DL (ref 70–130)
GLUCOSE SERPL-MCNC: 180 MG/DL (ref 65–99)
POTASSIUM SERPL-SCNC: 3.8 MMOL/L (ref 3.5–5.2)
SODIUM SERPL-SCNC: 141 MMOL/L (ref 136–145)

## 2023-01-25 PROCEDURE — 97168 OT RE-EVAL EST PLAN CARE: CPT

## 2023-01-25 PROCEDURE — 80048 BASIC METABOLIC PNL TOTAL CA: CPT | Performed by: INTERNAL MEDICINE

## 2023-01-25 PROCEDURE — 99232 SBSQ HOSP IP/OBS MODERATE 35: CPT | Performed by: INTERNAL MEDICINE

## 2023-01-25 PROCEDURE — 82962 GLUCOSE BLOOD TEST: CPT

## 2023-01-25 PROCEDURE — 63710000001 INSULIN DETEMIR PER 5 UNITS: Performed by: INTERNAL MEDICINE

## 2023-01-25 PROCEDURE — 63710000001 INSULIN ASPART PER 5 UNITS: Performed by: INTERNAL MEDICINE

## 2023-01-25 RX ADMIN — SENNOSIDES AND DOCUSATE SODIUM 2 TABLET: 50; 8.6 TABLET ORAL at 08:02

## 2023-01-25 RX ADMIN — GABAPENTIN 300 MG: 300 CAPSULE ORAL at 21:49

## 2023-01-25 RX ADMIN — PANTOPRAZOLE SODIUM 40 MG: 40 TABLET, DELAYED RELEASE ORAL at 06:10

## 2023-01-25 RX ADMIN — HYDROCODONE BITARTRATE AND ACETAMINOPHEN 1 TABLET: 10; 325 TABLET ORAL at 10:42

## 2023-01-25 RX ADMIN — GABAPENTIN 300 MG: 300 CAPSULE ORAL at 08:02

## 2023-01-25 RX ADMIN — OXYBUTYNIN CHLORIDE 5 MG: 5 TABLET ORAL at 21:48

## 2023-01-25 RX ADMIN — INSULIN DETEMIR 10 UNITS: 100 INJECTION, SOLUTION SUBCUTANEOUS at 21:49

## 2023-01-25 RX ADMIN — METOPROLOL TARTRATE 25 MG: 25 TABLET, FILM COATED ORAL at 08:02

## 2023-01-25 RX ADMIN — APIXABAN 5 MG: 5 TABLET, FILM COATED ORAL at 08:03

## 2023-01-25 RX ADMIN — ROSUVASTATIN CALCIUM 40 MG: 20 TABLET, COATED ORAL at 21:48

## 2023-01-25 RX ADMIN — AMLODIPINE BESYLATE 10 MG: 5 TABLET ORAL at 08:02

## 2023-01-25 RX ADMIN — INSULIN ASPART 2 UNITS: 100 INJECTION, SOLUTION INTRAVENOUS; SUBCUTANEOUS at 06:33

## 2023-01-25 RX ADMIN — APIXABAN 5 MG: 5 TABLET, FILM COATED ORAL at 21:48

## 2023-01-25 RX ADMIN — ASPIRIN 81 MG: 81 TABLET, COATED ORAL at 08:02

## 2023-01-25 RX ADMIN — INSULIN ASPART 6 UNITS: 100 INJECTION, SOLUTION INTRAVENOUS; SUBCUTANEOUS at 16:54

## 2023-01-25 RX ADMIN — HYDROCODONE BITARTRATE AND ACETAMINOPHEN 1 TABLET: 10; 325 TABLET ORAL at 17:53

## 2023-01-25 RX ADMIN — INSULIN ASPART 2 UNITS: 100 INJECTION, SOLUTION INTRAVENOUS; SUBCUTANEOUS at 12:47

## 2023-01-25 RX ADMIN — OXYBUTYNIN CHLORIDE 5 MG: 5 TABLET ORAL at 08:03

## 2023-01-25 RX ADMIN — LEVOTHYROXINE SODIUM 25 MCG: 25 TABLET ORAL at 06:10

## 2023-01-25 RX ADMIN — Medication 3 ML: at 21:50

## 2023-01-25 RX ADMIN — FLUTICASONE PROPIONATE 2 SPRAY: 50 SPRAY, METERED NASAL at 08:03

## 2023-01-25 RX ADMIN — QUETIAPINE FUMARATE 50 MG: 25 TABLET ORAL at 16:54

## 2023-01-25 RX ADMIN — BISACODYL 10 MG: 10 SUPPOSITORY RECTAL at 09:01

## 2023-01-26 LAB
GLUCOSE BLDC GLUCOMTR-MCNC: 192 MG/DL (ref 70–130)
GLUCOSE BLDC GLUCOMTR-MCNC: 219 MG/DL (ref 70–130)
GLUCOSE BLDC GLUCOMTR-MCNC: 221 MG/DL (ref 70–130)
GLUCOSE BLDC GLUCOMTR-MCNC: 250 MG/DL (ref 70–130)

## 2023-01-26 PROCEDURE — 97110 THERAPEUTIC EXERCISES: CPT

## 2023-01-26 PROCEDURE — 82962 GLUCOSE BLOOD TEST: CPT

## 2023-01-26 PROCEDURE — 63710000001 INSULIN DETEMIR PER 5 UNITS: Performed by: INTERNAL MEDICINE

## 2023-01-26 PROCEDURE — 25010000002 ONDANSETRON PER 1 MG: Performed by: INTERNAL MEDICINE

## 2023-01-26 PROCEDURE — 99232 SBSQ HOSP IP/OBS MODERATE 35: CPT | Performed by: INTERNAL MEDICINE

## 2023-01-26 PROCEDURE — 63710000001 INSULIN ASPART PER 5 UNITS: Performed by: INTERNAL MEDICINE

## 2023-01-26 RX ADMIN — APIXABAN 5 MG: 5 TABLET, FILM COATED ORAL at 21:32

## 2023-01-26 RX ADMIN — ASPIRIN 81 MG: 81 TABLET, COATED ORAL at 09:17

## 2023-01-26 RX ADMIN — FLUTICASONE PROPIONATE 2 SPRAY: 50 SPRAY, METERED NASAL at 09:18

## 2023-01-26 RX ADMIN — QUETIAPINE FUMARATE 50 MG: 25 TABLET ORAL at 17:36

## 2023-01-26 RX ADMIN — APIXABAN 5 MG: 5 TABLET, FILM COATED ORAL at 09:17

## 2023-01-26 RX ADMIN — Medication 5 MG: at 21:32

## 2023-01-26 RX ADMIN — LEVOTHYROXINE SODIUM 25 MCG: 25 TABLET ORAL at 06:34

## 2023-01-26 RX ADMIN — INSULIN ASPART 2 UNITS: 100 INJECTION, SOLUTION INTRAVENOUS; SUBCUTANEOUS at 06:34

## 2023-01-26 RX ADMIN — GABAPENTIN 300 MG: 300 CAPSULE ORAL at 09:17

## 2023-01-26 RX ADMIN — SENNOSIDES AND DOCUSATE SODIUM 2 TABLET: 50; 8.6 TABLET ORAL at 09:17

## 2023-01-26 RX ADMIN — Medication 3 ML: at 09:18

## 2023-01-26 RX ADMIN — INSULIN DETEMIR 10 UNITS: 100 INJECTION, SOLUTION SUBCUTANEOUS at 21:32

## 2023-01-26 RX ADMIN — OXYBUTYNIN CHLORIDE 5 MG: 5 TABLET ORAL at 21:32

## 2023-01-26 RX ADMIN — GABAPENTIN 300 MG: 300 CAPSULE ORAL at 21:32

## 2023-01-26 RX ADMIN — INSULIN ASPART 6 UNITS: 100 INJECTION, SOLUTION INTRAVENOUS; SUBCUTANEOUS at 17:36

## 2023-01-26 RX ADMIN — OXYBUTYNIN CHLORIDE 5 MG: 5 TABLET ORAL at 09:17

## 2023-01-26 RX ADMIN — ONDANSETRON 4 MG: 2 INJECTION INTRAMUSCULAR; INTRAVENOUS at 17:50

## 2023-01-26 RX ADMIN — ROSUVASTATIN CALCIUM 40 MG: 20 TABLET, COATED ORAL at 21:32

## 2023-01-26 RX ADMIN — PANTOPRAZOLE SODIUM 40 MG: 40 TABLET, DELAYED RELEASE ORAL at 06:34

## 2023-01-26 RX ADMIN — INSULIN ASPART 4 UNITS: 100 INJECTION, SOLUTION INTRAVENOUS; SUBCUTANEOUS at 12:04

## 2023-01-26 RX ADMIN — METOPROLOL TARTRATE 25 MG: 25 TABLET, FILM COATED ORAL at 09:17

## 2023-01-27 LAB
ALBUMIN SERPL-MCNC: 3 G/DL (ref 3.5–5.2)
ALBUMIN/GLOB SERPL: 1.1 G/DL
ALP SERPL-CCNC: 51 U/L (ref 39–117)
ALT SERPL W P-5'-P-CCNC: <5 U/L (ref 1–33)
ANION GAP SERPL CALCULATED.3IONS-SCNC: 10 MMOL/L (ref 5–15)
AST SERPL-CCNC: 15 U/L (ref 1–32)
BILIRUB SERPL-MCNC: 0.3 MG/DL (ref 0–1.2)
BUN SERPL-MCNC: 18 MG/DL (ref 8–23)
BUN/CREAT SERPL: 13.1 (ref 7–25)
CALCIUM SPEC-SCNC: 8.6 MG/DL (ref 8.6–10.5)
CHLORIDE SERPL-SCNC: 107 MMOL/L (ref 98–107)
CO2 SERPL-SCNC: 25 MMOL/L (ref 22–29)
CREAT SERPL-MCNC: 1.37 MG/DL (ref 0.57–1)
DEPRECATED RDW RBC AUTO: 54.7 FL (ref 37–54)
EGFRCR SERPLBLD CKD-EPI 2021: 38.9 ML/MIN/1.73
ERYTHROCYTE [DISTWIDTH] IN BLOOD BY AUTOMATED COUNT: 14.6 % (ref 12.3–15.4)
GLOBULIN UR ELPH-MCNC: 2.7 GM/DL
GLUCOSE BLDC GLUCOMTR-MCNC: 183 MG/DL (ref 70–130)
GLUCOSE BLDC GLUCOMTR-MCNC: 191 MG/DL (ref 70–130)
GLUCOSE BLDC GLUCOMTR-MCNC: 283 MG/DL (ref 70–130)
GLUCOSE SERPL-MCNC: 197 MG/DL (ref 65–99)
HCT VFR BLD AUTO: 28.6 % (ref 34–46.6)
HGB BLD-MCNC: 9.2 G/DL (ref 12–15.9)
MCH RBC QN AUTO: 33.6 PG (ref 26.6–33)
MCHC RBC AUTO-ENTMCNC: 32.2 G/DL (ref 31.5–35.7)
MCV RBC AUTO: 104.4 FL (ref 79–97)
PLATELET # BLD AUTO: 144 10*3/MM3 (ref 140–450)
PMV BLD AUTO: 12.1 FL (ref 6–12)
POTASSIUM SERPL-SCNC: 3.7 MMOL/L (ref 3.5–5.2)
PROT SERPL-MCNC: 5.7 G/DL (ref 6–8.5)
RBC # BLD AUTO: 2.74 10*6/MM3 (ref 3.77–5.28)
SARS-COV-2 AG RESP QL IA.RAPID: NORMAL
SARS-COV-2 RNA PNL SPEC NAA+PROBE: DETECTED
SODIUM SERPL-SCNC: 142 MMOL/L (ref 136–145)
WBC NRBC COR # BLD: 6.81 10*3/MM3 (ref 3.4–10.8)

## 2023-01-27 PROCEDURE — 97535 SELF CARE MNGMENT TRAINING: CPT

## 2023-01-27 PROCEDURE — 82962 GLUCOSE BLOOD TEST: CPT

## 2023-01-27 PROCEDURE — 85027 COMPLETE CBC AUTOMATED: CPT | Performed by: INTERNAL MEDICINE

## 2023-01-27 PROCEDURE — 87426 SARSCOV CORONAVIRUS AG IA: CPT | Performed by: INTERNAL MEDICINE

## 2023-01-27 PROCEDURE — 97110 THERAPEUTIC EXERCISES: CPT

## 2023-01-27 PROCEDURE — 63710000001 INSULIN ASPART PER 5 UNITS: Performed by: INTERNAL MEDICINE

## 2023-01-27 PROCEDURE — 63710000001 INSULIN DETEMIR PER 5 UNITS: Performed by: INTERNAL MEDICINE

## 2023-01-27 PROCEDURE — 87635 SARS-COV-2 COVID-19 AMP PRB: CPT | Performed by: INTERNAL MEDICINE

## 2023-01-27 PROCEDURE — 80053 COMPREHEN METABOLIC PANEL: CPT | Performed by: INTERNAL MEDICINE

## 2023-01-27 PROCEDURE — 97530 THERAPEUTIC ACTIVITIES: CPT

## 2023-01-27 PROCEDURE — 99232 SBSQ HOSP IP/OBS MODERATE 35: CPT | Performed by: INTERNAL MEDICINE

## 2023-01-27 RX ADMIN — INSULIN ASPART 2 UNITS: 100 INJECTION, SOLUTION INTRAVENOUS; SUBCUTANEOUS at 06:50

## 2023-01-27 RX ADMIN — Medication 5 MG: at 20:28

## 2023-01-27 RX ADMIN — PANTOPRAZOLE SODIUM 40 MG: 40 TABLET, DELAYED RELEASE ORAL at 06:50

## 2023-01-27 RX ADMIN — INSULIN ASPART 6 UNITS: 100 INJECTION, SOLUTION INTRAVENOUS; SUBCUTANEOUS at 12:11

## 2023-01-27 RX ADMIN — GABAPENTIN 300 MG: 300 CAPSULE ORAL at 20:28

## 2023-01-27 RX ADMIN — OXYBUTYNIN CHLORIDE 5 MG: 5 TABLET ORAL at 20:28

## 2023-01-27 RX ADMIN — SENNOSIDES AND DOCUSATE SODIUM 2 TABLET: 50; 8.6 TABLET ORAL at 08:48

## 2023-01-27 RX ADMIN — INSULIN DETEMIR 10 UNITS: 100 INJECTION, SOLUTION SUBCUTANEOUS at 20:29

## 2023-01-27 RX ADMIN — APIXABAN 5 MG: 5 TABLET, FILM COATED ORAL at 08:48

## 2023-01-27 RX ADMIN — ROSUVASTATIN CALCIUM 40 MG: 20 TABLET, COATED ORAL at 20:29

## 2023-01-27 RX ADMIN — AMLODIPINE BESYLATE 10 MG: 5 TABLET ORAL at 08:48

## 2023-01-27 RX ADMIN — ASPIRIN 81 MG: 81 TABLET, COATED ORAL at 08:48

## 2023-01-27 RX ADMIN — METOPROLOL TARTRATE 25 MG: 25 TABLET, FILM COATED ORAL at 20:28

## 2023-01-27 RX ADMIN — QUETIAPINE FUMARATE 50 MG: 25 TABLET ORAL at 18:20

## 2023-01-27 RX ADMIN — INSULIN ASPART 2 UNITS: 100 INJECTION, SOLUTION INTRAVENOUS; SUBCUTANEOUS at 17:42

## 2023-01-27 RX ADMIN — SENNOSIDES AND DOCUSATE SODIUM 2 TABLET: 50; 8.6 TABLET ORAL at 20:28

## 2023-01-27 RX ADMIN — GABAPENTIN 300 MG: 300 CAPSULE ORAL at 08:48

## 2023-01-27 RX ADMIN — Medication 3 ML: at 20:27

## 2023-01-27 RX ADMIN — METOPROLOL TARTRATE 25 MG: 25 TABLET, FILM COATED ORAL at 08:48

## 2023-01-27 RX ADMIN — APIXABAN 5 MG: 5 TABLET, FILM COATED ORAL at 20:28

## 2023-01-27 RX ADMIN — LEVOTHYROXINE SODIUM 25 MCG: 25 TABLET ORAL at 06:50

## 2023-01-27 RX ADMIN — OXYBUTYNIN CHLORIDE 5 MG: 5 TABLET ORAL at 08:48

## 2023-01-27 RX ADMIN — FLUTICASONE PROPIONATE 2 SPRAY: 50 SPRAY, METERED NASAL at 08:52

## 2023-01-27 RX ADMIN — HYDROCODONE BITARTRATE AND ACETAMINOPHEN 1 TABLET: 10; 325 TABLET ORAL at 09:01

## 2023-01-27 RX ADMIN — Medication 3 ML: at 08:49

## 2023-01-28 VITALS
OXYGEN SATURATION: 99 % | SYSTOLIC BLOOD PRESSURE: 124 MMHG | HEIGHT: 67 IN | HEART RATE: 66 BPM | BODY MASS INDEX: 33.98 KG/M2 | WEIGHT: 216.49 LBS | DIASTOLIC BLOOD PRESSURE: 34 MMHG | RESPIRATION RATE: 18 BRPM | TEMPERATURE: 97.8 F

## 2023-01-28 LAB — GLUCOSE BLDC GLUCOMTR-MCNC: 205 MG/DL (ref 70–130)

## 2023-01-28 PROCEDURE — 82962 GLUCOSE BLOOD TEST: CPT

## 2023-01-28 PROCEDURE — 63710000001 INSULIN ASPART PER 5 UNITS: Performed by: INTERNAL MEDICINE

## 2023-01-28 PROCEDURE — 99239 HOSP IP/OBS DSCHRG MGMT >30: CPT | Performed by: INTERNAL MEDICINE

## 2023-01-28 RX ORDER — HYDROCODONE BITARTRATE AND ACETAMINOPHEN 10; 325 MG/1; MG/1
1 TABLET ORAL EVERY 6 HOURS PRN
Qty: 20 TABLET | Refills: 0 | Status: ON HOLD | OUTPATIENT
Start: 2023-01-28

## 2023-01-28 RX ORDER — AMOXICILLIN 250 MG
2 CAPSULE ORAL 2 TIMES DAILY
Start: 2023-01-28 | End: 2023-02-16 | Stop reason: HOSPADM

## 2023-01-28 RX ORDER — ROSUVASTATIN CALCIUM 40 MG/1
40 TABLET, COATED ORAL NIGHTLY
Qty: 90 TABLET | Status: ON HOLD
Start: 2023-01-28

## 2023-01-28 RX ORDER — GABAPENTIN 300 MG/1
300 CAPSULE ORAL 2 TIMES DAILY
Qty: 20 CAPSULE | Refills: 0 | Status: ON HOLD | OUTPATIENT
Start: 2023-01-28

## 2023-01-28 RX ORDER — QUETIAPINE FUMARATE 50 MG/1
50 TABLET, FILM COATED ORAL EVERY 24 HOURS
Status: ON HOLD
Start: 2023-01-28

## 2023-01-28 RX ORDER — ASPIRIN 81 MG/1
81 TABLET ORAL DAILY
Start: 2023-01-29 | End: 2023-02-16 | Stop reason: HOSPADM

## 2023-01-28 RX ADMIN — INSULIN ASPART 2 UNITS: 100 INJECTION, SOLUTION INTRAVENOUS; SUBCUTANEOUS at 06:48

## 2023-01-28 RX ADMIN — GABAPENTIN 300 MG: 300 CAPSULE ORAL at 08:53

## 2023-01-28 RX ADMIN — Medication 3 ML: at 08:32

## 2023-01-28 RX ADMIN — METOPROLOL TARTRATE 25 MG: 25 TABLET, FILM COATED ORAL at 08:53

## 2023-01-28 RX ADMIN — ASPIRIN 81 MG: 81 TABLET, COATED ORAL at 08:33

## 2023-01-28 RX ADMIN — PANTOPRAZOLE SODIUM 40 MG: 40 TABLET, DELAYED RELEASE ORAL at 06:10

## 2023-01-28 RX ADMIN — SENNOSIDES AND DOCUSATE SODIUM 2 TABLET: 50; 8.6 TABLET ORAL at 08:33

## 2023-01-28 RX ADMIN — INSULIN ASPART 4 UNITS: 100 INJECTION, SOLUTION INTRAVENOUS; SUBCUTANEOUS at 12:56

## 2023-01-28 RX ADMIN — OXYBUTYNIN CHLORIDE 5 MG: 5 TABLET ORAL at 08:33

## 2023-01-28 RX ADMIN — FLUTICASONE PROPIONATE 2 SPRAY: 50 SPRAY, METERED NASAL at 08:48

## 2023-01-28 RX ADMIN — LEVOTHYROXINE SODIUM 25 MCG: 25 TABLET ORAL at 06:10

## 2023-01-28 RX ADMIN — APIXABAN 5 MG: 5 TABLET, FILM COATED ORAL at 08:48

## 2023-01-31 ENCOUNTER — HOSPITAL ENCOUNTER (OUTPATIENT)
Facility: HOSPITAL | Age: 83
Discharge: HOME | End: 2023-01-31
Payer: MEDICARE

## 2023-01-31 LAB
BILIRUBIN URINE: NEGATIVE
BLOOD, URINE: NEGATIVE
CLARITY: CLEAR
COLOR: YELLOW
GLUCOSE URINE: 100 MG/DL
KETONES, URINE: NEGATIVE MG/DL
LEUKOCYTE ESTERASE, URINE: NEGATIVE
MICROSCOPIC EXAMINATION: ABNORMAL
NITRITE, URINE: NEGATIVE
PH UA: 5 (ref 5–8)
PROTEIN UA: NEGATIVE MG/DL
SPECIFIC GRAVITY UA: 1.02 (ref 1–1.03)
URINE REFLEX TO CULTURE: ABNORMAL
URINE TYPE: ABNORMAL
UROBILINOGEN, URINE: 0.2 E.U./DL

## 2023-01-31 PROCEDURE — 81003 URINALYSIS AUTO W/O SCOPE: CPT

## 2023-02-02 ENCOUNTER — HOSPITAL ENCOUNTER (OUTPATIENT)
Facility: HOSPITAL | Age: 83
Discharge: HOME | End: 2023-02-02

## 2023-02-02 LAB
A/G RATIO: 1.5 (ref 0.8–2)
ALBUMIN SERPL-MCNC: 3 G/DL (ref 3.4–4.8)
ALP BLD-CCNC: 52 U/L (ref 25–100)
ALT SERPL-CCNC: 8 U/L (ref 4–36)
ANION GAP SERPL CALCULATED.3IONS-SCNC: 8 MMOL/L (ref 3–16)
AST SERPL-CCNC: 27 U/L (ref 8–33)
BASOPHILS ABSOLUTE: 0 K/UL (ref 0–0.1)
BASOPHILS RELATIVE PERCENT: 0.4 %
BILIRUB SERPL-MCNC: <0.2 MG/DL (ref 0.3–1.2)
BUN BLDV-MCNC: 20 MG/DL (ref 6–20)
CALCIUM SERPL-MCNC: 8 MG/DL (ref 8.5–10.5)
CHLORIDE BLD-SCNC: 99 MMOL/L (ref 98–107)
CHOLESTEROL, TOTAL: 88 MG/DL (ref 0–200)
CO2: 29 MMOL/L (ref 20–30)
CREAT SERPL-MCNC: 1.3 MG/DL (ref 0.4–1.2)
EOSINOPHILS ABSOLUTE: 0.2 K/UL (ref 0–0.4)
EOSINOPHILS RELATIVE PERCENT: 4.2 %
GFR SERPL CREATININE-BSD FRML MDRD: 41 ML/MIN/{1.73_M2}
GLOBULIN: 2 G/DL
GLUCOSE BLD-MCNC: 238 MG/DL (ref 74–106)
HBA1C MFR BLD: 6.9 %
HCT VFR BLD CALC: 30.9 % (ref 37–47)
HDLC SERPL-MCNC: 29 MG/DL (ref 40–60)
HEMOGLOBIN: 9.6 G/DL (ref 11.5–16.5)
IMMATURE GRANULOCYTES #: 0 K/UL
IMMATURE GRANULOCYTES %: 0.5 % (ref 0–5)
LDL CHOLESTEROL CALCULATED: 38 MG/DL
LYMPHOCYTES ABSOLUTE: 1.5 K/UL (ref 1.5–4)
LYMPHOCYTES RELATIVE PERCENT: 27.4 %
MCH RBC QN AUTO: 32.7 PG (ref 27–32)
MCHC RBC AUTO-ENTMCNC: 31.1 G/DL (ref 31–35)
MCV RBC AUTO: 105.1 FL (ref 80–100)
MONOCYTES ABSOLUTE: 0.6 K/UL (ref 0.2–0.8)
MONOCYTES RELATIVE PERCENT: 10.4 %
NEUTROPHILS ABSOLUTE: 3.1 K/UL (ref 2–7.5)
NEUTROPHILS RELATIVE PERCENT: 57.1 %
PDW BLD-RTO: 15.2 % (ref 11–16)
PLATELET # BLD: 167 K/UL (ref 150–400)
PMV BLD AUTO: 11.7 FL (ref 6–10)
POTASSIUM REFLEX MAGNESIUM: 4.2 MMOL/L (ref 3.4–5.1)
RBC # BLD: 2.94 M/UL (ref 3.8–5.8)
SODIUM BLD-SCNC: 136 MMOL/L (ref 136–145)
TOTAL PROTEIN: 5 G/DL (ref 6.4–8.3)
TRIGL SERPL-MCNC: 104 MG/DL (ref 0–249)
TSH SERPL DL<=0.05 MIU/L-ACNC: 5.35 UIU/ML (ref 0.27–4.2)
VLDLC SERPL CALC-MCNC: 21 MG/DL
WBC # BLD: 5.5 K/UL (ref 4–11)

## 2023-02-02 PROCEDURE — 80053 COMPREHEN METABOLIC PANEL: CPT

## 2023-02-02 PROCEDURE — 80061 LIPID PANEL: CPT

## 2023-02-02 PROCEDURE — 85025 COMPLETE CBC W/AUTO DIFF WBC: CPT

## 2023-02-02 PROCEDURE — 83036 HEMOGLOBIN GLYCOSYLATED A1C: CPT

## 2023-02-02 PROCEDURE — 84443 ASSAY THYROID STIM HORMONE: CPT

## 2023-02-09 ENCOUNTER — APPOINTMENT (OUTPATIENT)
Dept: CT IMAGING | Facility: HOSPITAL | Age: 83
End: 2023-02-09
Payer: MEDICARE

## 2023-02-09 ENCOUNTER — HOSPITAL ENCOUNTER (EMERGENCY)
Facility: HOSPITAL | Age: 83
Discharge: ANOTHER HOSPITAL | End: 2023-02-10
Attending: HOSPITALIST
Payer: MEDICARE

## 2023-02-09 ENCOUNTER — APPOINTMENT (OUTPATIENT)
Dept: GENERAL RADIOLOGY | Facility: HOSPITAL | Age: 83
End: 2023-02-09
Payer: MEDICARE

## 2023-02-09 DIAGNOSIS — K56.41 FECAL IMPACTION (HCC): ICD-10-CM

## 2023-02-09 DIAGNOSIS — N30.00 ACUTE CYSTITIS WITHOUT HEMATURIA: ICD-10-CM

## 2023-02-09 DIAGNOSIS — I61.9 CEREBROVASCULAR ACCIDENT (CVA) WITH INTRACRANIAL HEMORRHAGE (HCC): Primary | ICD-10-CM

## 2023-02-09 DIAGNOSIS — R10.9 ABDOMINAL PAIN, UNSPECIFIED ABDOMINAL LOCATION: ICD-10-CM

## 2023-02-09 LAB
A/G RATIO: 1.1 (ref 0.8–2)
ALBUMIN SERPL-MCNC: 3.1 G/DL (ref 3.4–4.8)
ALP BLD-CCNC: 56 U/L (ref 25–100)
ALT SERPL-CCNC: <5 U/L (ref 4–36)
AMPHETAMINE SCREEN, URINE: ABNORMAL
ANION GAP SERPL CALCULATED.3IONS-SCNC: 9 MMOL/L (ref 3–16)
AST SERPL-CCNC: 20 U/L (ref 8–33)
BACTERIA: ABNORMAL /HPF
BARBITURATE SCREEN URINE: ABNORMAL
BASE EXCESS ARTERIAL: 0.9 MMOL/L (ref -3–3)
BASOPHILS ABSOLUTE: 0 K/UL (ref 0–0.1)
BASOPHILS RELATIVE PERCENT: 0.4 %
BENZODIAZEPINE SCREEN, URINE: POSITIVE
BILIRUB SERPL-MCNC: 0.4 MG/DL (ref 0.3–1.2)
BILIRUBIN URINE: NEGATIVE
BLOOD, URINE: ABNORMAL
BUN BLDV-MCNC: 22 MG/DL (ref 6–20)
BUPRENORPHINE QUAL, URINE: ABNORMAL
CALCIUM SERPL-MCNC: 8.5 MG/DL (ref 8.5–10.5)
CANNABINOID SCREEN URINE: ABNORMAL
CHLORIDE BLD-SCNC: 98 MMOL/L (ref 98–107)
CLARITY: ABNORMAL
CO2: 26 MMOL/L (ref 20–30)
COCAINE METABOLITE SCREEN URINE: ABNORMAL
COLOR: YELLOW
CREAT SERPL-MCNC: 1.2 MG/DL (ref 0.4–1.2)
EOSINOPHILS ABSOLUTE: 0.3 K/UL (ref 0–0.4)
EOSINOPHILS RELATIVE PERCENT: 3.5 %
FIO2: 0.28 %
GFR SERPL CREATININE-BSD FRML MDRD: 45 ML/MIN/{1.73_M2}
GLOBULIN: 2.7 G/DL
GLUCOSE BLD-MCNC: 165 MG/DL (ref 74–106)
GLUCOSE URINE: NEGATIVE MG/DL
HCO3 ARTERIAL: 26.3 MMOL/L (ref 22–26)
HCT VFR BLD CALC: 32.6 % (ref 37–47)
HEMOGLOBIN: 10.7 G/DL (ref 11.5–16.5)
IMMATURE GRANULOCYTES #: 0 K/UL
IMMATURE GRANULOCYTES %: 0.5 % (ref 0–5)
KETONES, URINE: 15 MG/DL
LACTIC ACID, SEPSIS: 1.8 MMOL/L (ref 0.4–1.9)
LEUKOCYTE ESTERASE, URINE: ABNORMAL
LIPASE: 7 U/L (ref 5.6–51.3)
LYMPHOCYTES ABSOLUTE: 2.3 K/UL (ref 1.5–4)
LYMPHOCYTES RELATIVE PERCENT: 30.6 %
Lab: ABNORMAL
MCH RBC QN AUTO: 33.6 PG (ref 27–32)
MCHC RBC AUTO-ENTMCNC: 32.8 G/DL (ref 31–35)
MCV RBC AUTO: 102.5 FL (ref 80–100)
METHADONE SCREEN, URINE: ABNORMAL
METHAMPHETAMINE, URINE: ABNORMAL
MICROSCOPIC EXAMINATION: YES
MONOCYTES ABSOLUTE: 0.6 K/UL (ref 0.2–0.8)
MONOCYTES RELATIVE PERCENT: 8.5 %
MUCUS: ABNORMAL /LPF
NEUTROPHILS ABSOLUTE: 4.2 K/UL (ref 2–7.5)
NEUTROPHILS RELATIVE PERCENT: 56.5 %
NITRITE, URINE: POSITIVE
O2 SAT, ARTERIAL: 96.6 %
O2 THERAPY: ABNORMAL
OPIATE SCREEN URINE: POSITIVE
PCO2 ARTERIAL: 45.9 MMHG (ref 35–45)
PDW BLD-RTO: 15.2 % (ref 11–16)
PH ARTERIAL: 7.38 (ref 7.35–7.45)
PH UA: 5.5 (ref 5–8)
PHENCYCLIDINE SCREEN URINE: ABNORMAL
PLATELET # BLD: 195 K/UL (ref 150–400)
PMV BLD AUTO: 11.2 FL (ref 6–10)
PO2 ARTERIAL: 90.1 MMHG (ref 80–100)
POTASSIUM REFLEX MAGNESIUM: 4.6 MMOL/L (ref 3.4–5.1)
PRO-BNP: 669 PG/ML (ref 0–1800)
PROPOXYPHENE SCREEN, URINE: ABNORMAL
PROTEIN UA: 30 MG/DL
RAPID INFLUENZA  B AGN: NEGATIVE
RAPID INFLUENZA A AGN: NEGATIVE
RBC # BLD: 3.18 M/UL (ref 3.8–5.8)
RBC UA: ABNORMAL /HPF (ref 0–4)
SARS-COV-2, NAAT: NOT DETECTED
SODIUM BLD-SCNC: 133 MMOL/L (ref 136–145)
SPECIFIC GRAVITY UA: 1.02 (ref 1–1.03)
TCO2 ARTERIAL: 27.8 MMOL/L (ref 24–30)
TOTAL PROTEIN: 5.8 G/DL (ref 6.4–8.3)
TRICYCLIC, URINE: ABNORMAL
TROPONIN: <0.3 NG/ML
UR OXYCODONE RAPID SCREEN: ABNORMAL
URINE REFLEX TO CULTURE: YES
URINE TYPE: ABNORMAL
UROBILINOGEN, URINE: 0.2 E.U./DL
WBC # BLD: 7.4 K/UL (ref 4–11)
WBC UA: >100 /HPF (ref 0–5)

## 2023-02-09 PROCEDURE — 6360000002 HC RX W HCPCS: Performed by: FAMILY MEDICINE

## 2023-02-09 PROCEDURE — 83690 ASSAY OF LIPASE: CPT

## 2023-02-09 PROCEDURE — 87040 BLOOD CULTURE FOR BACTERIA: CPT

## 2023-02-09 PROCEDURE — 70450 CT HEAD/BRAIN W/O DYE: CPT

## 2023-02-09 PROCEDURE — 96361 HYDRATE IV INFUSION ADD-ON: CPT

## 2023-02-09 PROCEDURE — 87077 CULTURE AEROBIC IDENTIFY: CPT

## 2023-02-09 PROCEDURE — 80307 DRUG TEST PRSMV CHEM ANLYZR: CPT

## 2023-02-09 PROCEDURE — 36600 WITHDRAWAL OF ARTERIAL BLOOD: CPT

## 2023-02-09 PROCEDURE — 2580000003 HC RX 258: Performed by: FAMILY MEDICINE

## 2023-02-09 PROCEDURE — 83880 ASSAY OF NATRIURETIC PEPTIDE: CPT

## 2023-02-09 PROCEDURE — 87086 URINE CULTURE/COLONY COUNT: CPT

## 2023-02-09 PROCEDURE — 87186 SC STD MICRODIL/AGAR DIL: CPT

## 2023-02-09 PROCEDURE — 85025 COMPLETE CBC W/AUTO DIFF WBC: CPT

## 2023-02-09 PROCEDURE — 71045 X-RAY EXAM CHEST 1 VIEW: CPT

## 2023-02-09 PROCEDURE — 83605 ASSAY OF LACTIC ACID: CPT

## 2023-02-09 PROCEDURE — 36415 COLL VENOUS BLD VENIPUNCTURE: CPT

## 2023-02-09 PROCEDURE — 99285 EMERGENCY DEPT VISIT HI MDM: CPT

## 2023-02-09 PROCEDURE — 96365 THER/PROPH/DIAG IV INF INIT: CPT

## 2023-02-09 PROCEDURE — 87804 INFLUENZA ASSAY W/OPTIC: CPT

## 2023-02-09 PROCEDURE — 82803 BLOOD GASES ANY COMBINATION: CPT

## 2023-02-09 PROCEDURE — 2580000003 HC RX 258: Performed by: HOSPITALIST

## 2023-02-09 PROCEDURE — 84484 ASSAY OF TROPONIN QUANT: CPT

## 2023-02-09 PROCEDURE — 87635 SARS-COV-2 COVID-19 AMP PRB: CPT

## 2023-02-09 PROCEDURE — 6370000000 HC RX 637 (ALT 250 FOR IP): Performed by: FAMILY MEDICINE

## 2023-02-09 PROCEDURE — 74176 CT ABD & PELVIS W/O CONTRAST: CPT

## 2023-02-09 PROCEDURE — 81001 URINALYSIS AUTO W/SCOPE: CPT

## 2023-02-09 PROCEDURE — 80053 COMPREHEN METABOLIC PANEL: CPT

## 2023-02-09 RX ORDER — MENTHOL AND ZINC OXIDE .44; 20.625 G/100G; G/100G
OINTMENT TOPICAL DAILY
COMMUNITY

## 2023-02-09 RX ORDER — LEVOTHYROXINE SODIUM 0.03 MG/1
25 TABLET ORAL DAILY
COMMUNITY

## 2023-02-09 RX ORDER — NYSTATIN 10B UNIT
POWDER (EA) MISCELLANEOUS 2 TIMES DAILY
COMMUNITY

## 2023-02-09 RX ORDER — 0.9 % SODIUM CHLORIDE 0.9 %
500 INTRAVENOUS SOLUTION INTRAVENOUS ONCE
Status: COMPLETED | OUTPATIENT
Start: 2023-02-09 | End: 2023-02-09

## 2023-02-09 RX ORDER — HYDROCODONE BITARTRATE AND ACETAMINOPHEN 10; 325 MG/1; MG/1
1 TABLET ORAL ONCE
Status: COMPLETED | OUTPATIENT
Start: 2023-02-09 | End: 2023-02-09

## 2023-02-09 RX ORDER — HYDROCODONE BITARTRATE AND ACETAMINOPHEN 10; 325 MG/1; MG/1
1 TABLET ORAL EVERY 6 HOURS PRN
COMMUNITY

## 2023-02-09 RX ORDER — HYDROXYZINE HYDROCHLORIDE 25 MG/1
25 TABLET, FILM COATED ORAL 3 TIMES DAILY PRN
COMMUNITY

## 2023-02-09 RX ORDER — SENNA AND DOCUSATE SODIUM 50; 8.6 MG/1; MG/1
1 TABLET, FILM COATED ORAL DAILY
COMMUNITY

## 2023-02-09 RX ORDER — DONEPEZIL HYDROCHLORIDE 10 MG/1
10 TABLET, FILM COATED ORAL NIGHTLY
COMMUNITY

## 2023-02-09 RX ORDER — ROSUVASTATIN CALCIUM 40 MG/1
40 TABLET, COATED ORAL EVERY EVENING
COMMUNITY

## 2023-02-09 RX ADMIN — SODIUM CHLORIDE 500 ML: 9 INJECTION, SOLUTION INTRAVENOUS at 19:38

## 2023-02-09 RX ADMIN — HYDROCODONE BITARTRATE AND ACETAMINOPHEN 1 TABLET: 10; 325 TABLET ORAL at 23:06

## 2023-02-09 RX ADMIN — CEFTRIAXONE 1000 MG: 1 INJECTION, POWDER, FOR SOLUTION INTRAMUSCULAR; INTRAVENOUS at 22:33

## 2023-02-09 NOTE — ED NOTES
Report from tanja at [facility]. She is sending 81 f who I alert to self. She has abd pain, moaning, poor appetite no vomiting. Last bm today. No fever. She has scheduled pain meds, norco and gabapentin. She had a stroke 1/10/23 and has been at [address] [place] [address] since 1/28/23 for rehab after her stroke. She is allergic to sulfa, pcn. Hx of dm htn pe and ckd II. Her weight is 209 vs 133/46 hr 71 90% room air. Pt does have oxygen prn which she does not wear. dtr is nelda akers and raffi hale.      Mago Lunsford, RN  02/09/23 1824

## 2023-02-09 NOTE — ED PROVIDER NOTES
[address] ENCOUNTER      Pt Name: Mckayla Ryan  MRN: [id number]  YOB: 1940  Date of evaluation: 2/9/2023  Provider: Jenny Wahl       Chief Complaint   Patient presents with    Other     Per EMS patient reports being sick for two days not eating and weakness. HISTORY OF PRESENT ILLNESS  (Location/Symptom, Timing/Onset, Context/Setting, Quality, Duration, Modifying Factors, Severity.)   Mckayla Ryan is a 81 y.o. female who presents to the emergency department for abdominal pain. Patient was brought in by EMS and per their complaint has been feeling sick for 2 days not eating and weakness. Report that was given from the nursing facility states that she has been complaining of abdominal pain which started today. She has had decreased appetite over the last couple of days. As far as they know no nausea or vomiting. Patient really is not able to write any history when you ask her anything she does states she does not know continually. Patient apparently is at the nursing facility status post having a stroke about 1 month ago. She is there for rehab. Patient just presented to the nursing facility on 1/28/2023. Patient had come from [address] [place] [address] to the nursing facility. Again patient cannot provide any medical history. The information that we do have his call in from the nursing home from report and what EMS provided this. Patient does have known history of stroke which she had approximately 1 month ago. She has had a history of anxiety and chronic low back pain. History of depression, hyperlipidemia, hypertension and osteoarthritis. She is diabetic. Patient is unable to get out of the bed. Per patient's daughter she thinks the stroke has made her aware she cannot understand what you are speaking to her about. Nursing notes were reviewed.     REVIEW OFSYSTEMS    (2-9 systems for level 4, 10 or more for level 5)   ROS:    Review of systems could not be obtained from patient secondary to her mental status sent for that that is already dictated above in the HPI from EMS and nursing home report    Except as noted above the remainder of the review of systems was reviewed and negative. PAST MEDICAL HISTORY     Past Medical History:   Diagnosis Date    Allergic rhinitis     Anxiety     Chronic back pain     Depression     Double vision with both eyes open     Pt states she can see ok with one eye shut    Fall     Hyperlipidemia     Hypertension     Osteoarthritis     Stroke ([facility] [address]     Type II or unspecified type diabetes mellitus without mention of complication, not stated as uncontrolled          SURGICAL HISTORY       Past Surgical History:   Procedure Laterality Date     SECTION      COLONOSCOPY      FEMUR FRACTURE SURGERY Right 2019    fixed at [facility] per daughter    HYSTERECTOMY (CERVIX STATUS UNKNOWN)      TOTAL    SHOULDER SURGERY      RIGHT X 2         CURRENT MEDICATIONS       Previous Medications    APIXABAN (ELIQUIS) 5 MG TABS TABLET    Take 5 mg by mouth 2 times daily    ASPIRIN 81 PO    Take by mouth    BUSPIRONE (BUSPAR) 7.5 MG TABLET    Take 7.5 mg by mouth 2 times daily    DONEPEZIL (ARICEPT) 10 MG TABLET    Take 10 mg by mouth nightly    DULOXETINE (CYMBALTA) 60 MG EXTENDED RELEASE CAPSULE    Take 60 mg by mouth daily     GABAPENTIN (NEURONTIN) 600 MG TABLET    Take 300 mg by mouth 3 times daily. HYDROCODONE-ACETAMINOPHEN (NORCO)  MG PER TABLET    Take 1 tablet by mouth every 6 hours as needed for Pain.     HYDROXYZINE HCL (ATARAX) 25 MG TABLET    Take 25 mg by mouth 3 times daily as needed for Itching    INSULIN LISPRO (HUMALOG) 100 UNIT/ML INJECTION VIAL    Inject 13 Units into the skin 3 times daily (with meals)    LEVOTHYROXINE (SYNTHROID) 25 MCG TABLET    Take 25 mcg by mouth Daily    LORAZEPAM (ATIVAN) 0.5 MG TABLET    Take 0.5 mg by mouth 2 times daily as needed for Anxiety. MENTHOL-ZINC OXIDE (CALMOSEPTINE) 0.44-20.625 % OINT OINTMENT    Apply topically daily Max 30 ml per day. METOPROLOL TARTRATE (LOPRESSOR) 25 MG TABLET    Take 25 mg by mouth 2 times daily    NYSTATIN (MYCOSTATIN) POWD POWDER    Apply topically 2 times daily    OMEPRAZOLE (PRILOSEC) 20 MG DELAYED RELEASE CAPSULE    Take 2 capsules by mouth daily    OXYBUTYNIN (DITROPAN) 5 MG TABLET    Take 5 mg by mouth 2 times daily    ROSUVASTATIN (CRESTOR) 40 MG TABLET    Take 40 mg by mouth every evening    SENNOSIDES-DOCUSATE SODIUM (SENOKOT-S) 8.6-50 MG TABLET    Take 1 tablet by mouth daily       ALLERGIES     Latex, Penicillins, Sulfa antibiotics, Tetracyclines & related, and Diazepam    FAMILY HISTORY       Family History   Problem Relation Age of Onset    Heart Disease Mother         CHF,CAD    Cancer Maternal Aunt     High Blood Pressure Sister           SOCIAL HISTORY       Social History     Socioeconomic History    Marital status:      Spouse name: None    Number of children: None    Years of education: None    Highest education level: None   Tobacco Use    Smoking status: Never    Smokeless tobacco: Never   Vaping Use    Vaping Use: Never used   Substance and Sexual Activity    Alcohol use: No    Drug use: No    Sexual activity: Yes     Partners: Male         PHYSICAL EXAM    (up to 7 for level 4, 8 or more for level 5)     ED Triage Vitals [02/09/23 1820]   BP Temp Temp src Heart Rate Resp SpO2 Height Weight   92/68 98 °F (36.7 °C) -- 85 18 92 % -- --       Physical Exam  General :Patient is awake, alert, oriented x1 only,   HEENT: Pupils are equally round and reactive to light, EOMI, conjunctivae clear. Oral mucosa is moist, no exudate. Uvula is midline  Cardiac: Heart regular rate, rhythm, no murmurs, rubs, or gallops, no edema to the bilateral lower extremities. Lungs: Lungs are clear to auscultation, there is no wheezing, rhonchi, or rales.  There is no use of accessory muscles. Abdomen: Abdomen is soft, palpable tenderness to the lower half of the abdomen. However she does have some hyperactive bowel sounds throughout the upper abdomen. Decreased in the right lower quadrant. Nondistended. There is no firm or pulsatile masses, no rebound rigidity or guarding. Obese  Neuro: Motor intact, sensory intact, level of consciousness is slightly decreased. Patient does seem slightly somnolent but she lays her with her eyes closed but when you touch her talk to her she will respond but again all she will say with questioning is I do not know. Dermatology: Skin is warm and dry  Psych: Unable to assess secondary to patient's mental status      DIAGNOSTIC RESULTS     EKG: All EKG's are interpreted by the Emergency Department Physician who either signs or Co-signs this chart in the absenceof a cardiologist.    The EKG interpreted by me shows sinus rhythm. Left ventricular hypertrophy. Old inferior infarct. Anterior Q waves possibly due to 11. Hypertrophy. Heart rate is 81 bpm, parable is 162 ms, QRS durations 86, QT is 390 and QTc is 452 ms. No acute ST elevations concerning for acute myocardial infarction. No ST depressions concerning for acute myocardial ischemia.     RADIOLOGY:   Non-plain film images such as CT, Ultrasound and MRI are read by the radiologist. Plain radiographic images are visualized and preliminarily interpreted by the emergency physician with the below findings:      [] Radiologist's Report Reviewed:  XR CHEST PORTABLE    (Results Pending)   CT ABDOMEN PELVIS WO CONTRAST Additional Contrast? None    (Results Pending)   CT Head WO Contrast    (Results Pending)         ED BEDSIDE ULTRASOUND:   Performed by ED Physician - none    LABS:    I have reviewed and interpreted all of the currently available lab results from this visit (ifapplicable):  Results for orders placed or performed during the hospital encounter of 02/09/23   Blood Gas, Arterial   Result Value Ref Range    pH, Arterial 7.377 7.350 - 7.450    pCO2, Arterial 45.9 (H) 35.0 - 45.0 mmHg    pO2, Arterial 90.1 80.0 - 100.0 mmHg    HCO3, Arterial 26.3 (H) 22.0 - 26.0 mmol/L    Base Excess, Arterial 0.9 -3.0 - 3.0 mmol/L    O2 Sat, Arterial 96.6 >92 %    TCO2, Arterial 27.8 24.0 - 30.0 mmol/L    O2 Therapy Unknown     FIO2 0.28 Not Established %        All other labs were within normal range or not returned as of this dictation. EMERGENCY DEPARTMENT COURSE and DIFFERENTIAL DIAGNOSIS/MDM:   Vitals:    Vitals:    02/09/23 1820 02/09/23 1842   BP: 92/68    Pulse: 85    Resp: 18    Temp: 98 °F (36.7 °C)    SpO2: 92%    Weight:  225 lb (102.1 kg)   Height:  5' 5\" (1.651 m)       MEDICATIONS ADMINISTERED IN ED:  Medications   0.9 % sodium chloride bolus (has no administration in time range)       After initial evaluation and examination I did have a conversation with the patient about upcoming plan, treatment and possible disposition but I am not sure if she even understood or retained what we discussed. Advised that we would going give her a small fluid bolus 500 mL since she had decreased appetite and intake over the last couple of days. Patient when she first came in her pulse ox was ranging from 96 and it would drop down to 85 and then will slowly go back up and then would drop down and slowly go back up again. We did go ahead and place her on 2 L nasal cannula and had an ABG performed. We will go ahead and check a portable chest radiograph to make sure there is no acute pneumonic or cardiopulmonary etiology for the patient's low pulse ox saturation. Patient will also have CT scan abdomen pelvis for evaluation of her abdominal discomfort and decreased appetite. Apparently from the nursing facility they think that she could possibly be constipated. However there is always concern for constipation versus partial or full bowel obstruction in elderly individuals with abdominal pain and decreased intake.   Patient also has CT scan of the head performed since nursing staff states that this is a different presentation for this patient when she is normally here she can usually understand and speak and answer questions however patient's daughter states that she thinks this may be secondary to her stroke because she does not seem to understand what she normally did before. Patient will have rapid COVID and influenza swab performed. Will check blood cultures x2. Patient will have lactic acid sepsis protocol performed along with a CBC, CMP, lipase, troponin, BNP, UA, urine drug screen and arterial blood gas. Patient will also have twelve-lead EKG performed. Patient's final disposition return once her radiological diagnostic studies been performed reviewed. Patient's final disposition will not determine prior to shift change. Patient be checked out to the oncoming physician for final disposition. 19:00p.m. I have signed out Kvng Butcher's Emergency Department care to Dr. Al Quispe. We discussed the pertinent history, physical exam, completed/pending test results (if applicable) and current treatment plan. Please refer to his/her chart for the patients remaining Emergency Department course and final disposition. Is this patient to be included in the SEP-1 Core Measure due to severe sepsis or septic shock? No   Exclusion criteria - the patient is NOT to be included for SEP-1 Core Measure due to:  2+ SIRS criteria are not met          CONSULTS:  None    PROCEDURES:  Procedures    CRITICAL CARE TIME    Total Critical Care time was 0 minutes, excluding separately reportable procedures. There was a high probability of clinically significant/life threatening deterioration in the patient's condition which required my urgent intervention. FINAL IMPRESSION      1. Lower abdominal pain    2.  Decreased appetite          DISPOSITION/PLAN   DISPOSITION        PATIENT REFERRED TO:  No follow-up provider specified. DISCHARGE MEDICATIONS:  New Prescriptions    No medications on file       Comment: Please note this report has been produced using speech recognition software and may contain errorsrelated to that system including errors in grammar, punctuation, and spelling, as well as words and phrases that may be inappropriate. If there are any questions or concerns please feel free to contact the dictating providerfor clarification.     Jeraline Felty, DO  Attending Emergency Physician               Jeraline Felty, DO  02/09/23 [id number]

## 2023-02-10 ENCOUNTER — APPOINTMENT (OUTPATIENT)
Dept: CARDIOLOGY | Facility: HOSPITAL | Age: 83
DRG: 64 | End: 2023-02-10
Payer: MEDICARE

## 2023-02-10 ENCOUNTER — TELEPHONE (OUTPATIENT)
Dept: CARDIOLOGY | Facility: CLINIC | Age: 83
End: 2023-02-10
Payer: MEDICARE

## 2023-02-10 ENCOUNTER — HOSPITAL ENCOUNTER (INPATIENT)
Facility: HOSPITAL | Age: 83
LOS: 6 days | Discharge: NURSING, REHAB OR LONG-TERM CARE | DRG: 64 | End: 2023-02-16
Attending: INTERNAL MEDICINE | Admitting: FAMILY MEDICINE
Payer: MEDICARE

## 2023-02-10 ENCOUNTER — APPOINTMENT (OUTPATIENT)
Dept: OTHER | Facility: HOSPITAL | Age: 83
DRG: 64 | End: 2023-02-10
Payer: MEDICARE

## 2023-02-10 ENCOUNTER — APPOINTMENT (OUTPATIENT)
Dept: CT IMAGING | Facility: HOSPITAL | Age: 83
DRG: 64 | End: 2023-02-10
Payer: MEDICARE

## 2023-02-10 VITALS
HEIGHT: 65 IN | TEMPERATURE: 98 F | RESPIRATION RATE: 15 BRPM | HEART RATE: 77 BPM | SYSTOLIC BLOOD PRESSURE: 132 MMHG | OXYGEN SATURATION: 98 % | DIASTOLIC BLOOD PRESSURE: 38 MMHG | BODY MASS INDEX: 37.49 KG/M2 | WEIGHT: 225 LBS

## 2023-02-10 DIAGNOSIS — R47.01 APHASIA: Primary | ICD-10-CM

## 2023-02-10 PROBLEM — Z86.73 HISTORY OF STROKE: Status: ACTIVE | Noted: 2023-01-10

## 2023-02-10 PROBLEM — G45.9 TIA (TRANSIENT ISCHEMIC ATTACK): Status: ACTIVE | Noted: 2023-02-10

## 2023-02-10 PROBLEM — I61.9 HEMORRHAGIC STROKE (HCC): Status: ACTIVE | Noted: 2023-02-10

## 2023-02-10 LAB
ALBUMIN SERPL-MCNC: 3.5 G/DL (ref 3.5–5.2)
ALBUMIN/GLOB SERPL: 1.5 G/DL
ALP SERPL-CCNC: 62 U/L (ref 39–117)
ALT SERPL W P-5'-P-CCNC: 5 U/L (ref 1–33)
ANION GAP SERPL CALCULATED.3IONS-SCNC: 14 MMOL/L (ref 5–15)
AST SERPL-CCNC: 21 U/L (ref 1–32)
BASOPHILS # BLD AUTO: 0.04 10*3/MM3 (ref 0–0.2)
BASOPHILS NFR BLD AUTO: 0.5 % (ref 0–1.5)
BILIRUB SERPL-MCNC: 0.3 MG/DL (ref 0–1.2)
BUN SERPL-MCNC: 20 MG/DL (ref 8–23)
BUN/CREAT SERPL: 14.5 (ref 7–25)
CALCIUM SPEC-SCNC: 8.6 MG/DL (ref 8.6–10.5)
CHLORIDE SERPL-SCNC: 101 MMOL/L (ref 98–107)
CO2 SERPL-SCNC: 25 MMOL/L (ref 22–29)
CREAT SERPL-MCNC: 1.38 MG/DL (ref 0.57–1)
DEPRECATED RDW RBC AUTO: 58.6 FL (ref 37–54)
EGFRCR SERPLBLD CKD-EPI 2021: 38.5 ML/MIN/1.73
EOSINOPHIL # BLD AUTO: 0.21 10*3/MM3 (ref 0–0.4)
EOSINOPHIL NFR BLD AUTO: 2.8 % (ref 0.3–6.2)
ERYTHROCYTE [DISTWIDTH] IN BLOOD BY AUTOMATED COUNT: 15 % (ref 12.3–15.4)
GLOBULIN UR ELPH-MCNC: 2.4 GM/DL
GLUCOSE BLDC GLUCOMTR-MCNC: 125 MG/DL (ref 70–130)
GLUCOSE BLDC GLUCOMTR-MCNC: 133 MG/DL (ref 70–130)
GLUCOSE BLDC GLUCOMTR-MCNC: 136 MG/DL (ref 70–130)
GLUCOSE BLDC GLUCOMTR-MCNC: 162 MG/DL (ref 70–130)
GLUCOSE SERPL-MCNC: 170 MG/DL (ref 65–99)
HCT VFR BLD AUTO: 37.3 % (ref 34–46.6)
HGB BLD-MCNC: 11.9 G/DL (ref 12–15.9)
IMM GRANULOCYTES # BLD AUTO: 0.04 10*3/MM3 (ref 0–0.05)
IMM GRANULOCYTES NFR BLD AUTO: 0.5 % (ref 0–0.5)
LYMPHOCYTES # BLD AUTO: 1.85 10*3/MM3 (ref 0.7–3.1)
LYMPHOCYTES NFR BLD AUTO: 24.4 % (ref 19.6–45.3)
MCH RBC QN AUTO: 33.4 PG (ref 26.6–33)
MCHC RBC AUTO-ENTMCNC: 31.9 G/DL (ref 31.5–35.7)
MCV RBC AUTO: 104.8 FL (ref 79–97)
MONOCYTES # BLD AUTO: 0.69 10*3/MM3 (ref 0.1–0.9)
MONOCYTES NFR BLD AUTO: 9.1 % (ref 5–12)
NEUTROPHILS NFR BLD AUTO: 4.75 10*3/MM3 (ref 1.7–7)
NEUTROPHILS NFR BLD AUTO: 62.7 % (ref 42.7–76)
NRBC BLD AUTO-RTO: 0.7 /100 WBC (ref 0–0.2)
PLATELET # BLD AUTO: 136 10*3/MM3 (ref 140–450)
PMV BLD AUTO: 11.4 FL (ref 6–12)
POTASSIUM SERPL-SCNC: 4.9 MMOL/L (ref 3.5–5.2)
PROT SERPL-MCNC: 5.9 G/DL (ref 6–8.5)
QT INTERVAL: 420 MS
QTC INTERVAL: 450 MS
RBC # BLD AUTO: 3.56 10*6/MM3 (ref 3.77–5.28)
SODIUM SERPL-SCNC: 140 MMOL/L (ref 136–145)
TSH SERPL DL<=0.05 MIU/L-ACNC: 2.33 UIU/ML (ref 0.27–4.2)
WBC NRBC COR # BLD: 7.58 10*3/MM3 (ref 3.4–10.8)

## 2023-02-10 PROCEDURE — 92610 EVALUATE SWALLOWING FUNCTION: CPT

## 2023-02-10 PROCEDURE — 93306 TTE W/DOPPLER COMPLETE: CPT | Performed by: INTERNAL MEDICINE

## 2023-02-10 PROCEDURE — 82962 GLUCOSE BLOOD TEST: CPT

## 2023-02-10 PROCEDURE — 85025 COMPLETE CBC W/AUTO DIFF WBC: CPT | Performed by: INTERNAL MEDICINE

## 2023-02-10 PROCEDURE — 92523 SPEECH SOUND LANG COMPREHEN: CPT

## 2023-02-10 PROCEDURE — 97166 OT EVAL MOD COMPLEX 45 MIN: CPT

## 2023-02-10 PROCEDURE — 99232 SBSQ HOSP IP/OBS MODERATE 35: CPT | Performed by: PSYCHIATRY & NEUROLOGY

## 2023-02-10 PROCEDURE — 93010 ELECTROCARDIOGRAM REPORT: CPT | Performed by: INTERNAL MEDICINE

## 2023-02-10 PROCEDURE — 70450 CT HEAD/BRAIN W/O DYE: CPT

## 2023-02-10 PROCEDURE — 99223 1ST HOSP IP/OBS HIGH 75: CPT | Performed by: INTERNAL MEDICINE

## 2023-02-10 PROCEDURE — 80053 COMPREHEN METABOLIC PANEL: CPT | Performed by: INTERNAL MEDICINE

## 2023-02-10 PROCEDURE — 97162 PT EVAL MOD COMPLEX 30 MIN: CPT

## 2023-02-10 PROCEDURE — 93005 ELECTROCARDIOGRAM TRACING: CPT | Performed by: INTERNAL MEDICINE

## 2023-02-10 PROCEDURE — 25010000002 CEFTRIAXONE PER 250 MG: Performed by: INTERNAL MEDICINE

## 2023-02-10 PROCEDURE — 84443 ASSAY THYROID STIM HORMONE: CPT | Performed by: INTERNAL MEDICINE

## 2023-02-10 PROCEDURE — 93306 TTE W/DOPPLER COMPLETE: CPT

## 2023-02-10 RX ORDER — INSULIN LISPRO 100 [IU]/ML
0-7 INJECTION, SOLUTION INTRAVENOUS; SUBCUTANEOUS EVERY 6 HOURS SCHEDULED
Status: DISCONTINUED | OUTPATIENT
Start: 2023-02-10 | End: 2023-02-16 | Stop reason: HOSPADM

## 2023-02-10 RX ORDER — SODIUM CHLORIDE 0.9 % (FLUSH) 0.9 %
10 SYRINGE (ML) INJECTION AS NEEDED
Status: DISCONTINUED | OUTPATIENT
Start: 2023-02-10 | End: 2023-02-10 | Stop reason: SDUPTHER

## 2023-02-10 RX ORDER — LABETALOL HYDROCHLORIDE 5 MG/ML
10 INJECTION, SOLUTION INTRAVENOUS ONCE
Status: DISCONTINUED | OUTPATIENT
Start: 2023-02-10 | End: 2023-02-10

## 2023-02-10 RX ORDER — DEXTROSE AND SODIUM CHLORIDE 5; .9 G/100ML; G/100ML
75 INJECTION, SOLUTION INTRAVENOUS CONTINUOUS
Status: ACTIVE | OUTPATIENT
Start: 2023-02-10 | End: 2023-02-11

## 2023-02-10 RX ORDER — BISACODYL 10 MG
10 SUPPOSITORY, RECTAL RECTAL ONCE
Status: COMPLETED | OUTPATIENT
Start: 2023-02-10 | End: 2023-02-10

## 2023-02-10 RX ORDER — SODIUM CHLORIDE 0.9 % (FLUSH) 0.9 %
10 SYRINGE (ML) INJECTION AS NEEDED
Status: DISCONTINUED | OUTPATIENT
Start: 2023-02-10 | End: 2023-02-16 | Stop reason: HOSPADM

## 2023-02-10 RX ORDER — LABETALOL HYDROCHLORIDE 5 MG/ML
10 INJECTION, SOLUTION INTRAVENOUS
Status: DISCONTINUED | OUTPATIENT
Start: 2023-02-10 | End: 2023-02-10

## 2023-02-10 RX ORDER — DULOXETIN HYDROCHLORIDE 60 MG/1
60 CAPSULE, DELAYED RELEASE ORAL DAILY
Status: DISCONTINUED | OUTPATIENT
Start: 2023-02-10 | End: 2023-02-16 | Stop reason: HOSPADM

## 2023-02-10 RX ORDER — GABAPENTIN 300 MG/1
300 CAPSULE ORAL 2 TIMES DAILY
Status: DISCONTINUED | OUTPATIENT
Start: 2023-02-10 | End: 2023-02-16 | Stop reason: HOSPADM

## 2023-02-10 RX ORDER — LABETALOL HYDROCHLORIDE 5 MG/ML
10 INJECTION, SOLUTION INTRAVENOUS ONCE AS NEEDED
Status: DISCONTINUED | OUTPATIENT
Start: 2023-02-10 | End: 2023-02-10

## 2023-02-10 RX ORDER — DEXTROSE MONOHYDRATE 25 G/50ML
25 INJECTION, SOLUTION INTRAVENOUS
Status: DISCONTINUED | OUTPATIENT
Start: 2023-02-10 | End: 2023-02-16 | Stop reason: HOSPADM

## 2023-02-10 RX ORDER — BISACODYL 10 MG
10 SUPPOSITORY, RECTAL RECTAL DAILY PRN
Status: DISCONTINUED | OUTPATIENT
Start: 2023-02-10 | End: 2023-02-16 | Stop reason: HOSPADM

## 2023-02-10 RX ORDER — ONDANSETRON 4 MG/1
4 TABLET, FILM COATED ORAL EVERY 6 HOURS PRN
Status: DISCONTINUED | OUTPATIENT
Start: 2023-02-10 | End: 2023-02-16 | Stop reason: HOSPADM

## 2023-02-10 RX ORDER — IBUPROFEN 600 MG/1
1 TABLET ORAL
Status: DISCONTINUED | OUTPATIENT
Start: 2023-02-10 | End: 2023-02-16 | Stop reason: HOSPADM

## 2023-02-10 RX ORDER — SODIUM CHLORIDE 9 MG/ML
40 INJECTION, SOLUTION INTRAVENOUS AS NEEDED
Status: DISCONTINUED | OUTPATIENT
Start: 2023-02-10 | End: 2023-02-10 | Stop reason: SDUPTHER

## 2023-02-10 RX ORDER — AMOXICILLIN 250 MG
2 CAPSULE ORAL 2 TIMES DAILY
Status: DISCONTINUED | OUTPATIENT
Start: 2023-02-10 | End: 2023-02-16 | Stop reason: HOSPADM

## 2023-02-10 RX ORDER — LEVOTHYROXINE SODIUM 0.03 MG/1
25 TABLET ORAL DAILY
Status: DISCONTINUED | OUTPATIENT
Start: 2023-02-10 | End: 2023-02-16 | Stop reason: HOSPADM

## 2023-02-10 RX ORDER — ONDANSETRON 2 MG/ML
4 INJECTION INTRAMUSCULAR; INTRAVENOUS EVERY 6 HOURS PRN
Status: DISCONTINUED | OUTPATIENT
Start: 2023-02-10 | End: 2023-02-16 | Stop reason: HOSPADM

## 2023-02-10 RX ORDER — HYDROCODONE BITARTRATE AND ACETAMINOPHEN 10; 325 MG/1; MG/1
1 TABLET ORAL EVERY 6 HOURS PRN
Status: DISCONTINUED | OUTPATIENT
Start: 2023-02-10 | End: 2023-02-16 | Stop reason: HOSPADM

## 2023-02-10 RX ORDER — NICOTINE POLACRILEX 4 MG
15 LOZENGE BUCCAL
Status: DISCONTINUED | OUTPATIENT
Start: 2023-02-10 | End: 2023-02-16 | Stop reason: HOSPADM

## 2023-02-10 RX ORDER — ACETAMINOPHEN 650 MG/1
650 SUPPOSITORY RECTAL EVERY 4 HOURS PRN
Status: DISCONTINUED | OUTPATIENT
Start: 2023-02-10 | End: 2023-02-16 | Stop reason: HOSPADM

## 2023-02-10 RX ORDER — DONEPEZIL HYDROCHLORIDE 10 MG/1
10 TABLET, FILM COATED ORAL NIGHTLY
Status: DISCONTINUED | OUTPATIENT
Start: 2023-02-10 | End: 2023-02-16 | Stop reason: HOSPADM

## 2023-02-10 RX ORDER — SODIUM CHLORIDE 9 MG/ML
40 INJECTION, SOLUTION INTRAVENOUS AS NEEDED
Status: DISCONTINUED | OUTPATIENT
Start: 2023-02-10 | End: 2023-02-16 | Stop reason: HOSPADM

## 2023-02-10 RX ORDER — SODIUM CHLORIDE 0.9 % (FLUSH) 0.9 %
10 SYRINGE (ML) INJECTION EVERY 12 HOURS SCHEDULED
Status: DISCONTINUED | OUTPATIENT
Start: 2023-02-10 | End: 2023-02-10 | Stop reason: SDUPTHER

## 2023-02-10 RX ORDER — BUSPIRONE HYDROCHLORIDE 5 MG/1
7.5 TABLET ORAL 2 TIMES DAILY
Status: DISCONTINUED | OUTPATIENT
Start: 2023-02-10 | End: 2023-02-16 | Stop reason: HOSPADM

## 2023-02-10 RX ORDER — ROSUVASTATIN CALCIUM 20 MG/1
40 TABLET, COATED ORAL NIGHTLY
Status: DISCONTINUED | OUTPATIENT
Start: 2023-02-10 | End: 2023-02-16 | Stop reason: HOSPADM

## 2023-02-10 RX ORDER — QUETIAPINE FUMARATE 25 MG/1
50 TABLET, FILM COATED ORAL NIGHTLY
Status: DISCONTINUED | OUTPATIENT
Start: 2023-02-10 | End: 2023-02-11

## 2023-02-10 RX ORDER — ACETAMINOPHEN 325 MG/1
650 TABLET ORAL EVERY 4 HOURS PRN
Status: DISCONTINUED | OUTPATIENT
Start: 2023-02-10 | End: 2023-02-16 | Stop reason: HOSPADM

## 2023-02-10 RX ORDER — POLYETHYLENE GLYCOL 3350 17 G/17G
17 POWDER, FOR SOLUTION ORAL DAILY
Status: DISCONTINUED | OUTPATIENT
Start: 2023-02-10 | End: 2023-02-16 | Stop reason: HOSPADM

## 2023-02-10 RX ORDER — ACETAMINOPHEN 160 MG/5ML
650 SOLUTION ORAL EVERY 4 HOURS PRN
Status: DISCONTINUED | OUTPATIENT
Start: 2023-02-10 | End: 2023-02-16 | Stop reason: HOSPADM

## 2023-02-10 RX ORDER — PANTOPRAZOLE SODIUM 40 MG/1
40 TABLET, DELAYED RELEASE ORAL
Status: DISCONTINUED | OUTPATIENT
Start: 2023-02-10 | End: 2023-02-16 | Stop reason: HOSPADM

## 2023-02-10 RX ORDER — BISACODYL 5 MG/1
5 TABLET, DELAYED RELEASE ORAL DAILY PRN
Status: DISCONTINUED | OUTPATIENT
Start: 2023-02-10 | End: 2023-02-16 | Stop reason: HOSPADM

## 2023-02-10 RX ORDER — SODIUM CHLORIDE 0.9 % (FLUSH) 0.9 %
10 SYRINGE (ML) INJECTION EVERY 12 HOURS SCHEDULED
Status: DISCONTINUED | OUTPATIENT
Start: 2023-02-10 | End: 2023-02-16 | Stop reason: HOSPADM

## 2023-02-10 RX ADMIN — Medication 10 MG: at 04:38

## 2023-02-10 RX ADMIN — CEFTRIAXONE 1 G: 1 INJECTION, POWDER, FOR SOLUTION INTRAMUSCULAR; INTRAVENOUS at 21:32

## 2023-02-10 RX ADMIN — DEXTROSE AND SODIUM CHLORIDE 75 ML/HR: 5; 900 INJECTION, SOLUTION INTRAVENOUS at 14:09

## 2023-02-10 RX ADMIN — LABETALOL HYDROCHLORIDE 10 MG: 5 INJECTION, SOLUTION INTRAVENOUS at 02:52

## 2023-02-10 RX ADMIN — Medication 10 MG: at 22:48

## 2023-02-10 NOTE — PROGRESS NOTES
[facility]  ADMISSION FOLLOW-UP NOTE          Patient admitted after midnight, H&P by my partner performed earlier on today's date reviewed.  Interim findings, labs, and charting also reviewed.        The [facility] Hospital Problem List has been managed and updated to include any new diagnoses:  Active Hospital Problems    Diagnosis  POA   • **Hemorrhagic stroke (HCC) [I61.9]  Yes   • TIA (transient ischemic attack) [G45.9]  Yes      Resolved Hospital Problems   No resolved problems to display.         ADDITIONAL PLAN:  - detailed assessment and plan from admission reviewed    Cheri Cruz is a 82 y.o. female with history of HTN, HLD, DMII, atrial fibrillation, PE, Dementia, anxiety and recent left MCA infarct with residual aphasia presents from rehab with increased confusion, RUE weakness, speech difficulty and abdominal pain.  CT at the OSH showed hemorrhagic conversion in the area of her recent stroke.  Patient transferred to [facility] for further evaluation    Altered mental status  Recent Left MCA CVA  A fib  - hemorrhagic conversion noted on OSH CT  - hold aspirin and eliquis  - statin  - speech evaluation to see. IV fluids, maintenance, while NPO. Seems pretty drowsy and she has already failed bedside dysphagia  - PT/OT  -will need alternative anticoagulation given hemorrhagic conversion. Cards consult to assist in deciding     Abdominal pain  - CT Abdomen Pelvis shows constipation, some concern for fecal impaction/stercoral colitis.  No current leukocytosis or fever  - scheduled laxatives  - suppository PRN     UTI  - continue rocephin (1st dose in OSH ED)     Atrial fibrillation  - currently NSR  - eliquis held     HTN  - goal SBP <140  -prn nicardipine drip     DMII  - SSI  -diabetes educator    CKD  -Cr seems to be around baseline. CTM     Dementia    Expected Discharge 2/15  Expected Discharge Date and Time     Expected Discharge Date Expected Discharge Time    Feb  15, 2023            Deepthi Sneed MD  02/10/23

## 2023-02-10 NOTE — THERAPY EVALUATION
Patient Name: Cheri Cruz  : 1940    MRN: [id number]                              Today's Date: 2/10/2023       Admit Date: 2/10/2023    Visit Dx: No diagnosis found.  Patient Active Problem List   Diagnosis   • Hyperlipidemia LDL goal <70   • Type 2 diabetes mellitus without complication, without long-term current use of insulin (HCC)   • GERD (gastroesophageal reflux disease)   • Essential hypertension   • Abnormal EKG   • Osteoarthritis   • Anxiety   • Palpitations   • Vertigo   • History of stroke, left parietal   • Hemorrhagic stroke (HCC)   • TIA (transient ischemic attack)     Past Medical History:   Diagnosis Date   • Abnormal heart rhythm    • Anxiety    • Arrhythmia    • Arthritis    • Atrial fibrillation (HCC)    • Dementia (HCC)    • Diabetes mellitus (HCC)    • Hyperlipidemia    • Hypertension    • Kidney disorder    • Stroke (HCC)    • Uterus cancer (HCC)      Past Surgical History:   Procedure Laterality Date   • CATARACT EXTRACTION, BILATERAL        General Information     Row Name 02/10/23 0857          OT Time and Intention    Document Type evaluation  -TA     Mode of Treatment occupational therapy  -TA     Row Name 02/10/23 0857          General Information    Patient Profile Reviewed yes  -TA     Prior Level of Function --  Pt poor historian, h/o dementia, no family present  -TA     Existing Precautions/Restrictions fall;oxygen therapy device and L/min  dementia, resistant to movement at time of eval  -TA     Barriers to Rehab medically complex;previous functional deficit;cognitive status  -TA     Row Name 02/10/23 0857          Occupational Profile    Reason for Services/Referral (Occupational Profile) likely fxl decline from PLOF  -TA     Patient Goals (Occupational Profile) none stated  -TA     Row Name 02/10/23 0857          Living Environment    People in Home spouse;child(sandoval), adult  per chart  -TA     Row Name 02/10/23 0857          Home Main Entrance    Number of Stairs,  Main Entrance --  UTD  -TA     Row Name 02/10/23 0857          Cognition    Orientation Status (Cognition) oriented to;person;disoriented to;place;situation;time  -TA     Row Name 02/10/23 0857          Safety Issues, Functional Mobility    Safety Issues Affecting Function (Mobility) safety precautions follow-through/compliance;safety precaution awareness;insight into deficits/self-awareness;ability to follow commands;impulsivity;awareness of need for assistance  -TA     Impairments Affecting Function (Mobility) balance;cognition;endurance/activity tolerance;strength  -TA     Cognitive Impairments, Mobility Safety/Performance safety precaution follow-through;safety precaution awareness;impulsivity;awareness, need for assistance  -TA           User Key  (r) = Recorded By, (t) = Taken By, (c) = Cosigned By    Initials Name Provider Type    Westley Linares OT Occupational Therapist                 Mobility/ADL's     Row Name 02/10/23 0857          Bed Mobility    Bed Mobility rolling left;rolling right;scooting/bridging;supine-sit;sit-supine  -TA     Rolling Left [place] (Bed Mobility) dependent (less than 25% patient effort);2 person assist;verbal cues;nonverbal cues (demo/gesture)  -TA     Rolling Right [place] (Bed Mobility) dependent (less than 25% patient effort);2 person assist;verbal cues;nonverbal cues (demo/gesture)  -TA     Scooting/Bridging [place] (Bed Mobility) dependent (less than 25% patient effort);2 person assist;verbal cues;nonverbal cues (demo/gesture)  -TA     Supine-Sit [place] (Bed Mobility) dependent (less than 25% patient effort);2 person assist;verbal cues;nonverbal cues (demo/gesture)  -TA     Sit-Supine [place] (Bed Mobility) dependent (less than 25% patient effort);2 person assist;verbal cues;nonverbal cues (demo/gesture)  -TA     Bed Mobility, Safety Issues decreased use of arms for pushing/pulling;impaired trunk control for bed mobility;cognitive deficits  limit understanding  -TA     Assistive Device (Bed Mobility) head of bed elevated;draw sheet  -TA     Row Name 02/10/23 0857          Transfers    Transfers sit-stand transfer  -TA     Comment, (Transfers) Pt resistant to stand; dep x 2, unable to stand upright  -TA     Row Name 02/10/23 0857          Sit-Stand Transfer    Sit-Stand [place] (Transfers) dependent (less than 25% patient effort);2 person assist  -TA     Assistive Device (Sit-Stand Transfers) --  BUE support/gait belt  -TA     Row Name 02/10/23 0857          Functional Mobility    Functional Mobility- Ind. Level not tested  -TA     Row Name 02/10/23 0857          Activities of Daily Living    BADL Assessment/Intervention upper body dressing;toileting;lower body dressing  -TA     Row Name 02/10/23 0857          Upper Body Dressing Assessment/Training    [place] Level (Upper Body Dressing) don;doff;pajama/robe;dependent (less than 25% patient effort)  -TA     Position (Upper Body Dressing) edge of bed sitting  -TA     Row Name 02/10/23 0857          Toileting Assessment/Training    [place] Level (Toileting) perform perineal hygiene;dependent (less than 25% patient effort)  -TA     Position (Toileting) supine  -TA     Row Name 02/10/23 0857          Lower Body Dressing Assessment/Training    [place] Level (Lower Body Dressing) lower body dressing skills;dependent (less than 25% patient effort)  clinical projection of fxl level  -TA           User Key  (r) = Recorded By, (t) = Taken By, (c) = Cosigned By    Initials Name Provider Type    Westley Linares, OT Occupational Therapist               Obj/Interventions     Row Name 02/10/23 0857          Sensory Assessment (Somatosensory)    Sensory Assessment (Somatosensory) bilateral LE;sensation intact  -TA     Row Name 02/10/23 0857          Vision Assessment/Intervention    Visual Processing Deficit tino-inattention/neglect, left  -TA     Row Name 02/10/23 0857          Range of  Motion Comprehensive    General Range of Motion bilateral upper extremity ROM WFL  -TA     Row Name 02/10/23 0857          Strength Comprehensive (MMT)    General Manual Muscle Testing (MMT) Assessment upper extremity strength deficits identified  -TA     Comment, General Manual Muscle Testing (MMT) Assessment BUE grossly 4-/5 symmetrical with fxl movement  -TA     Row Name 02/10/23 0857          Balance    Balance Assessment sitting static balance;standing static balance  -TA     Static Sitting Balance dependent;maximum assist  promarily dependent with moments of max A  -TA     Position, Sitting Balance sitting edge of bed  -TA     Static Standing Balance dependent;2-person assist  -TA     Position/Device Used, Standing Balance supported  BUE support/gait belt  -TA     Balance Interventions sit to stand;weight shifting activity;occupation based/functional task;UE activity with balance activity  -TA           User Key  (r) = Recorded By, (t) = Taken By, (c) = Cosigned By    Initials Name Provider Type    TA Westley Sotomayor, OT Occupational Therapist               Goals/Plan     Row Name 02/10/23 0857          Bed Mobility Goal 1 (OT)    Activity/Assistive Device (Bed Mobility Goal 1, OT) supine to sit;sit to supine  -TA     [place] Level/Cues Needed (Bed Mobility Goal 1, OT) moderate assist (50-74% patient effort)  -TA     Time Frame (Bed Mobility Goal 1, OT) by discharge  -TA     Progress/Outcomes (Bed Mobility Goal 1, OT) goal ongoing  -TA     Row Name 02/10/23 0857          Transfer Goal 1 (OT)    Activity/Assistive Device (Transfer Goal 1, OT) sit-to-stand/stand-to-sit;bed-to-chair/chair-to-bed;toilet  AAD  -TA     [place] Level/Cues Needed (Transfer Goal 1, OT) moderate assist (50-74% patient effort)  -TA     Time Frame (Transfer Goal 1, OT) by discharge  -TA     Progress/Outcome (Transfer Goal 1, OT) goal ongoing  -TA     Row Name 02/10/23 0857          Toileting Goal 1 (OT)    Activity/Device  (Toileting Goal 1, OT) adjust/manage clothing;perform perineal hygiene  -TA     [place] Level/Cues Needed (Toileting Goal 1, OT) moderate assist (50-74% patient effort)  -TA     Time Frame (Toileting Goal 1, OT) by discharge  -TA     Progress/Outcome (Toileting Goal 1, OT) goal ongoing  -TA     Row Name 02/10/23 0857          Grooming Goal 1 (OT)    Activity/Device (Grooming Goal 1, OT) wash face, hands;hair care  -TA     [place] (Grooming Goal 1, OT) set-up required;tactile cues required;verbal cues required;contact guard required  -TA     Time Frame (Grooming Goal 1, OT) by discharge  -TA     Progress/Outcome (Grooming Goal 1, OT) goal ongoing  -TA     Row Name 02/10/23 0857          Therapy Assessment/Plan (OT)    Planned Therapy Interventions (OT) activity tolerance training;BADL retraining;functional balance retraining;occupation/activity based interventions;patient/caregiver education/training;ROM/therapeutic exercise;strengthening exercise;transfer/mobility retraining  -TA           User Key  (r) = Recorded By, (t) = Taken By, (c) = Cosigned By    Initials Name Provider Type    TA Westley Sotomayor, OT Occupational Therapist               Clinical Impression     Row Name 02/10/23 0857          Pain Assessment    Additional Documentation Pain Scale: FACES Pre/Post-Treatment (Group)  -TA     Row Name 02/10/23 0857          Pain Scale: FACES Pre/Post-Treatment    Pain: FACES Scale, Pretreatment 2-->hurts little bit  -TA     Posttreatment Pain Rating 2-->hurts little bit  -TA     Pain Location generalized  -TA     Pre/Posttreatment Pain Comment pain vs. fear of movement  -TA     Row Name 02/10/23 0857          Plan of Care Review    Plan of Care Reviewed With patient  -TA     Outcome Evaluation VSS; Pt presents with fxl decline from PLOF, deficits in ADL performance, fxl mobility, occupational endurance. Pt limited by decreased cognition, lethargy, weakness, decreased balance, resistance to movement.  Pt required dependent x 2 for all bed mobility, attempt at STS, UBD, post toilet hygiene, anticipate dependent for LBD. Pt will benefit from skilled OT services to address deficits, facilitate increased fxl I. Recommend SNF for rehab at discharge.  -TA     Row Name 02/10/23 0857          Therapy Assessment/Plan (OT)    Patient/Family Therapy Goal Statement (OT) none stated  -TA     Rehab Potential (OT) fair, will monitor progress closely  -TA     Criteria for Skilled Therapeutic Interventions Met (OT) yes;skilled treatment is necessary  -TA     Therapy Frequency (OT) daily  -TA     Predicted Duration of Therapy Intervention (OT) 1 week  -TA     Row Name 02/10/23 0857          Therapy Plan Review/Discharge Plan (OT)    Anticipated Discharge Disposition (OT) skilled nursing facility  rehab  -TA     Row Name 02/10/23 0857          Vital Signs    Post Systolic BP Rehab 155  -TA     Post Treatment Diastolic BP 63  -TA     Posttreatment Heart Rate (beats/min) 69  -TA     Post SpO2 (%) 93  -TA     O2 Delivery Post Treatment supplemental O2  -TA     Pre Patient Position Supine  -TA     Intra Patient Position Standing  -TA     Post Patient Position Supine  -TA     Row Name 02/10/23 0857          Positioning and Restraints    Pre-Treatment Position in bed  -TA     Post Treatment Position bed  -TA     In Bed notified nsg;supine;call light within reach;encouraged to call for assist;exit alarm on;side rails up x3;RUE elevated;LUE elevated;legs elevated;SCD pump applied  -TA           User Key  (r) = Recorded By, (t) = Taken By, (c) = Cosigned By    Initials Name Provider Type    TA Westley Sotomayor, OT Occupational Therapist               Outcome Measures     Row Name 02/10/23 0857          How much help from another is currently needed...    Putting on and taking off regular lower body clothing? 1  -TA     Bathing (including washing, rinsing, and drying) 1  -TA     Toileting (which includes using toilet bed pan or urinal) 1   -TA     Putting on and taking off regular upper body clothing 1  -TA     Taking care of personal grooming (such as brushing teeth) 1  -TA     Eating meals 2  -TA     AM-PAC 6 Clicks Score (OT) 7  -TA     Row Name 02/10/23 0857          Modified Steven Scale    Pre-Stroke Modified [place] Scale 6 - Unable to determine (UTD) from the medical record documentation  -TA     Modified [place] Scale 5 - Severe disability.  Bedridden, incontinent, and requiring constant nursing care and attention.  -TA     Row Name 02/10/23 0857          Functional Assessment    Outcome Measure Options AM-PAC 6 Clicks Daily Activity (OT);Modified [place]  -TA           User Key  (r) = Recorded By, (t) = Taken By, (c) = Cosigned By    Initials Name Provider Type    Westley Linares OT Occupational Therapist                Occupational Therapy Education     Title: PT OT SLP Therapies (In Progress)     Topic: Occupational Therapy (In Progress)     Point: ADL training (In Progress)     Description:   Instruct learner(s) on proper safety adaptation and remediation techniques during self care or transfers.   Instruct in proper use of assistive devices.              Learning Progress Summary           Patient Acceptance, E, NR by TA at 2/10/2023 0941                   Point: Home exercise program (In Progress)     Description:   Instruct learner(s) on appropriate technique for monitoring, assisting and/or progressing therapeutic exercises/activities.              Learning Progress Summary           Patient Acceptance, E, NR by TA at 2/10/2023 0941                   Point: Precautions (In Progress)     Description:   Instruct learner(s) on prescribed precautions during self-care and functional transfers.              Learning Progress Summary           Patient Acceptance, E, NR by TA at 2/10/2023 0941                   Point: Body mechanics (In Progress)     Description:   Instruct learner(s) on proper positioning and spine alignment during  self-care, functional mobility activities and/or exercises.              Learning Progress Summary           Patient Acceptance, E, NR by TA at 2/10/2023 0941                               User Key     Initials Effective Dates Name Provider Type Discipline     06/16/21 -  Westley Sotomayor OT Occupational Therapist OT              OT Recommendation and Plan  Planned Therapy Interventions (OT): activity tolerance training, BADL retraining, functional balance retraining, occupation/activity based interventions, patient/caregiver education/training, ROM/therapeutic exercise, strengthening exercise, transfer/mobility retraining  Therapy Frequency (OT): daily  Plan of Care Review  Plan of Care Reviewed With: patient  Outcome Evaluation: VSS; Pt presents with fxl decline from PLOF, deficits in ADL performance, fxl mobility, occupational endurance. Pt limited by decreased cognition, lethargy, weakness, decreased balance, resistance to movement. Pt required dependent x 2 for all bed mobility, attempt at STS, UBD, post toilet hygiene, anticipate dependent for LBD. Pt will benefit from skilled OT services to address deficits, facilitate increased fxl I. Recommend SNF for rehab at discharge.     Time Calculation:    Time Calculation- OT     Row Name 02/10/23 0857             Time Calculation- OT    OT Start Time 0857  ttc 0 minutes  -TA      Total Timed Code Minutes- OT 0 minute(s)  -TA      OT Received On 02/10/23  -TA      OT Goal Re-Cert Due Date 02/20/23  -TA         Untimed Charges    OT Eval/Re-eval Minutes 47  -TA         Total Minutes    Untimed Charges Total Minutes 47  -TA       Total Minutes 47  -TA            User Key  (r) = Recorded By, (t) = Taken By, (c) = Cosigned By    Initials Name Provider Type    Westley Linares OT Occupational Therapist              Therapy Charges for Today     Code Description Service Date Service Provider Modifiers Qty    [id number] HC OT EVAL MOD COMPLEXITY 4 2/10/2023  Bran, Westley MONTES, OT GO 1               Westley Sotomayor, OT  2/10/2023

## 2023-02-10 NOTE — THERAPY EVALUATION
Acute Care - Speech Language Pathology Initial Evaluation  [facility]   Clinical Swallow Evaluation  Cognitive-Communication Evaluation       Patient Name: Cheri Cruz  : 1940  MRN: [id number]  Today's Date: 2/10/2023               Admit Date: 2/10/2023     Visit Dx:    ICD-10-CM ICD-9-CM   1. Aphasia  R47.01 784.3     Patient Active Problem List   Diagnosis   • Hyperlipidemia LDL goal <70   • Type 2 diabetes mellitus without complication, without long-term current use of insulin (HCC)   • GERD (gastroesophageal reflux disease)   • Essential hypertension   • Abnormal EKG   • Osteoarthritis   • Anxiety   • Palpitations   • Vertigo   • History of stroke, left parietal   • Hemorrhagic stroke (HCC)   • TIA (transient ischemic attack)     Past Medical History:   Diagnosis Date   • Abnormal heart rhythm    • Anxiety    • Arrhythmia    • Arthritis    • Atrial fibrillation (HCC)    • Dementia (HCC)    • Diabetes mellitus (HCC)    • Hyperlipidemia    • Hypertension    • Kidney disorder    • Stroke (HCC)    • Uterus cancer (HCC)      Past Surgical History:   Procedure Laterality Date   • CATARACT EXTRACTION, BILATERAL         SLP Recommendation and Plan  SLP Diagnosis: moderate-severe, aphasia, mild, dysarthria (02/10/23 0910)  SLP Diagnosis Comments: Pt presents w/ at least moderate to severe aphasia and mild dysarthria. Evaluation limitied by increased agitation and lethargy. Baseline status unknown, no family present. SLP will f/u as appropriate (02/10/23 0910)        Swallow Criteria for Skilled Therapeutic Interventions Met: demonstrates skilled criteria (02/10/23 0910)  SLC Criteria for Skilled Therapy Interventions Met: yes (02/10/23 0910)  Anticipated Discharge Disposition (SLP): unknown, anticipate therapy at next level of care (02/10/23 0910)        Predicted Duration Therapy Intervention (Days): until discharge (02/10/23 0910)  SLC Diagnostic Follow-Up Needed: clarification of baseline  cognitive-communication status, auditory comprehension, cognitive-linguistic (02/10/23 0910)                              SLP EVALUATION (last 72 hours)     SLP SLC Evaluation     Row Name 02/10/23 0910                   General Information    Prior Level of Function-Communication other (see comments)  dementia per chart  -        Patient's Goals for Discharge patient did not state  -           Comprehension Assessment/Intervention    Comprehension Assessment/Intervention Auditory Comprehension  -           Auditory Comprehension Assessment/Intervention    Auditory Comprehension (Communication) unable/difficult to assess  -        Answers Questions (Communication) yes/no;wh questions;simple;moderate impairment  -        Able to Follow Commands (Communication) 1-step;moderate impairment  -           Expression Assessment/Intervention    Expression Assessment/Intervention verbal expression  -           Verbal Expression Assessment/Intervention    Verbal Expression moderate impairment;severe impairment  -        Responsive Naming simple;severe impairment;perseverations;neologisms  -        Spontaneous/Functional Words simple;moderate impairment;severe impairment;perseverations;neologisms  -           Motor Speech Assessment/Intervention    Motor Speech Function mild impairment  -        Characteristics Consistent with Dysarthria decreased intensity;decreased articulation;slurred speech  -           Cognitive Assessment Intervention- SLP    Cognitive Function (Cognition) unable/difficult to assess  2' severity of aphasia  -           SLP Evaluation Clinical Impressions    SLP Diagnosis moderate-severe;aphasia;mild;dysarthria  -        SLP Diagnosis Comments Pt presents w/ at least moderate to severe aphasia and mild dysarthria. Evaluation limitied by increased agitation and lethargy. Baseline status unknown, no family present. SLP will f/u as appropriate  -[facility] Criteria for Skilled  Therapy Interventions Met yes  -        Functional Impact difficulty communicating wants, needs  -           Recommendations    Therapy Frequency (SLP SLC) 3 days per week  -        SLC Diagnostic Follow-Up Needed clarification of baseline cognitive-communication status;auditory comprehension;cognitive-linguistic  -              User Key  (r) = Recorded By, (t) = Taken By, (c) = Cosigned By    Initials Name Effective Dates     Jazlyn Rivas, MS, CFY-SLP 06/22/22 -                    EDUCATION  The patient has been educated in the following areas:     Cognitive Impairment Communication Impairment.           SLP GOALS     Row Name 02/10/23 0910             Patient will demonstrate functional speech skills for return to discharge environment    [place] Independently  -      Time frame by discharge  -      Progress/Outcomes new goal  -         Patient will demonstrate functional language skills for return to discharge environment     [place] Independently  -      Time frame by discharge  -      Progress/Outcomes new goal  -         Word Retrieval Skills Goal 1 (SLP)    Improve Word Retrieval Skills By Goal 1 (SLP) completing automatic speech task, alphabet;completing automatic speech task, counting;completing automatic speech task, days of the week;completing automatic speech task, months;completing automatic speech task, Pledge of Allegiance;completing automatic speech task, sing “Happy Birthday”;repeating words;repeating phrases;80%;with moderate cues (50-74%)  -      Time Frame (Word Retrieval Goal 1, SLP) short term goal (STG)  -      Progress/Outcomes (Word Retrieval Goal 1, SLP) new goal  -         Phonation Goal 1 (SLP)    Improve Phonation By Goal 1 (SLP) using loud speech;80%;with moderate cues (50-74%)  -      Time Frame (Phonation Goal 1, SLP) short term goal (STG)  -      Progress/Outcomes (Phonation Goal 1, SLP) new goal  -         Articulation Goal 1 (SLP)     Improve Articulation Goal 1 (SLP) by over-articulating at word level;80%;with moderate cues (50-74%)  -      Time Frame (Articulation Goal 1, SLP) short term goal (STG)  -      Progress/Outcomes (Articulation Goal 1, SLP) new goal  -            User Key  (r) = Recorded By, (t) = Taken By, (c) = Cosigned By    Initials Name Provider Type     Jazlyn Rivas MS, CFY-SLP Speech and Language Pathologist                        Time Calculation:      Time Calculation- SLP     Row Name 02/10/23 1331             Time Calculation- SLP    SLP Start Time 0910  -      SLP Received On 02/10/23  -         Untimed Charges    88316-FF Eval Speech and Production w/ Language Minutes 36  -MH      55733-KO Eval Oral Pharyng Swallow Minutes 36  -MH         Total Minutes    Untimed Charges Total Minutes 72  -MH       Total Minutes 72  -MH            User Key  (r) = Recorded By, (t) = Taken By, (c) = Cosigned By    Initials Name Provider Type     Jazlyn Rivas, MS, CFY-SLP Speech and Language Pathologist                Therapy Charges for Today     Code Description Service Date Service Provider Modifiers Qty    40492667607 HC ST EVAL ORAL PHARYNG SWALLOW 2 2/10/2023 Jazlyn Rivas, MS, CFY-SLP GN 1    28622019808 HC ST EVAL SPEECH AND PROD W LANG  2 2/10/2023 Jazlyn Rivas, MS, CFY-SLP GN 1                     Jazlyn Rivas MS, CFJEFF-SLP  2/10/2023 and Acute Care - Speech Language Pathology   Swallow Initial Evaluation  [place]     Patient Name: Cheri Cruz  : 1940  MRN: [id number]  Today's Date: 2/10/2023               Admit Date: 2/10/2023    Visit Dx:     ICD-10-CM ICD-9-CM   1. Aphasia  R47.01 784.3     Patient Active Problem List   Diagnosis   • Hyperlipidemia LDL goal <70   • Type 2 diabetes mellitus without complication, without long-term current use of insulin (HCC)   • GERD (gastroesophageal reflux disease)   • Essential hypertension   • Abnormal EKG   • Osteoarthritis   • Anxiety    • Palpitations   • Vertigo   • History of stroke, left parietal   • Hemorrhagic stroke (HCC)   • TIA (transient ischemic attack)     Past Medical History:   Diagnosis Date   • Abnormal heart rhythm    • Anxiety    • Arrhythmia    • Arthritis    • Atrial fibrillation (HCC)    • Dementia (HCC)    • Diabetes mellitus (HCC)    • Hyperlipidemia    • Hypertension    • Kidney disorder    • Stroke (HCC)    • Uterus cancer (HCC)      Past Surgical History:   Procedure Laterality Date   • CATARACT EXTRACTION, BILATERAL         SLP Recommendation and Plan  SLP Swallowing Diagnosis: other (see comments) (No overt s/s of pharyngeal dysphagia w/ minimal trials, evaluation limited d/t pt participation) (02/10/23 0910)  SLP Diet Recommendation: NPO (02/10/23 0910)  Recommended Precautions and Strategies: general aspiration precautions (02/10/23 0910)  SLP Rec. for Method of Medication Administration: meds via alternate route (02/10/23 0910)     Monitor for Signs of Aspiration: yes, notify SLP if any concerns (02/10/23 0910)  Recommended Diagnostics: reassess via clinical swallow evaluation (02/10/23 0910)  Swallow Criteria for Skilled Therapeutic Interventions Met: demonstrates skilled criteria (02/10/23 0910)  Anticipated Discharge Disposition (SLP): unknown, anticipate therapy at next level of care (02/10/23 0910)  Rehab Potential/Prognosis, Swallowing: good, to achieve stated therapy goals (02/10/23 0910)     Predicted Duration Therapy Intervention (Days): until discharge (02/10/23 0910)                                               SWALLOW EVALUATION (last 72 hours)     SLP Adult Swallow Evaluation     Row Name 02/10/23 0910                   Rehab Evaluation    Document Type evaluation  -        Subjective Information no complaints  -        Patient Observations poorly cooperative  -        Patient/Family/Caregiver Comments/Observations No family present  -        Patient Effort adequate  -        Symptoms Noted  During/After Treatment none  -           General Information    Patient Profile Reviewed yes  -        Pertinent History Of Current Problem Adm w/ increased confusion. Hx: recent L MCA CVA(admitted to [facility] 1/10/2023-01/28/2023). HTN, HLD, DMII, dementia. Head CT: acute L temporal/parietal infarct w/ minimal amount of hemorrhagic conversion  -        Current Method of Nutrition NPO  -        Precautions/Limitations, Vision WFL;for purposes of eval  -        Precautions/Limitations, Hearing WFL;for purposes of eval  -        Prior Level of Function-Communication unknown  dementia per chart  -        Prior Level of Function-Swallowing other (see comments);puree;thin liquids  CSE @ [facility] w/ recs for pureed/thin  -        Plans/Goals Discussed with patient  -        Barriers to Rehab medically complex;cognitive status  -        Patient's Goals for Discharge patient did not state  -           Pain    Additional Documentation Pain Scale: FACES Pre/Post-Treatment (Group)  -           Pain Scale: FACES Pre/Post-Treatment    Pain: FACES Scale, Pretreatment 0-->no hurt  -        Posttreatment Pain Rating 0-->no hurt  -           Oral Motor Structure and Function    Dentition Assessment missing teeth  -        Secretion Management WNL/WFL  -        Mucosal Quality moist, healthy  -           Oral Musculature and Cranial Nerve Assessment    Oral Motor General Assessment other (see comments);unable to assess  Pt unable to follow OM commands  -           General Eating/Swallowing Observations    Respiratory Support Currently in Use nasal cannula  -        O2 Liters 2L  -        Eating/Swallowing Skills fed by SLP  -        Positioning During Eating upright in bed  -        Utensils Used spoon;cup  -        Consistencies Trialed ice chips;thin liquids  -           Clinical Swallow Eval    Pharyngeal Phase no overt signs/symptoms of pharyngeal impairment  -         Clinical Swallow Evaluation Summary CSE limitied d/t lack of participation.  Pt accepted minimal trials of thin liquid w/o overt s/s of aspiration. When presented w/ further trials, pt became agitated, pursing lips and turning head from SLP. Unable to recommend safe PO diet @ this time given neuro status/ inability to remain awake/alert. Rec NPO, SLP will f/u for re-eval as appropriate  -           SLP Evaluation Clinical Impression    SLP Swallowing Diagnosis other (see comments)  No overt s/s of pharyngeal dysphagia w/ minimal trials, evaluation limited d/t pt participation  -        Functional Impact risk of aspiration/pneumonia  -        Rehab Potential/Prognosis, Swallowing good, to achieve stated therapy goals  -        Swallow Criteria for Skilled Therapeutic Interventions Met demonstrates skilled criteria  -           Recommendations    Predicted Duration Therapy Intervention (Days) until discharge  -        SLP Diet Recommendation NPO  -        Recommended Diagnostics reassess via clinical swallow evaluation  -        Recommended Precautions and Strategies general aspiration precautions  -        Oral Care Recommendations Oral Care BID/PRN  -        SLP Rec. for Method of Medication Administration meds via alternate route  -        Monitor for Signs of Aspiration yes;notify SLP if any concerns  -        Anticipated Discharge Disposition (SLP) unknown;anticipate therapy at next level of care  -              User Key  (r) = Recorded By, (t) = Taken By, (c) = Cosigned By    Initials Name Effective Dates     Jazlyn Rivas, MS, CFY-SLP 06/22/22 -                 EDUCATION  The patient has been educated in the following areas:   Dysphagia (Swallowing Impairment).        SLP GOALS     Row Name 02/10/23 0910             Patient will demonstrate functional speech skills for return to discharge environment    [place] Independently  -      Time frame by discharge  -       Progress/Outcomes new goal  -         Patient will demonstrate functional language skills for return to discharge environment     [place] Independently  -      Time frame by discharge  -      Progress/Outcomes new goal  -         Word Retrieval Skills Goal 1 (SLP)    Improve Word Retrieval Skills By Goal 1 (SLP) completing automatic speech task, alphabet;completing automatic speech task, counting;completing automatic speech task, days of the week;completing automatic speech task, months;completing automatic speech task, Pledge of Allegiance;completing automatic speech task, sing “Happy Birthday”;repeating words;repeating phrases;80%;with moderate cues (50-74%)  -      Time Frame (Word Retrieval Goal 1, SLP) short term goal (STG)  -      Progress/Outcomes (Word Retrieval Goal 1, SLP) new goal  -         Phonation Goal 1 (SLP)    Improve Phonation By Goal 1 (SLP) using loud speech;80%;with moderate cues (50-74%)  -      Time Frame (Phonation Goal 1, SLP) short term goal (STG)  -      Progress/Outcomes (Phonation Goal 1, SLP) new goal  -         Articulation Goal 1 (SLP)    Improve Articulation Goal 1 (SLP) by over-articulating at word level;80%;with moderate cues (50-74%)  -      Time Frame (Articulation Goal 1, SLP) short term goal (STG)  -      Progress/Outcomes (Articulation Goal 1, SLP) new goal  -            User Key  (r) = Recorded By, (t) = Taken By, (c) = Cosigned By    Initials Name Provider Type    Jazlyn Mercado, MS, CARLO-SLP Speech and Language Pathologist                   Time Calculation:    Time Calculation- SLP     Row Name 02/10/23 1331             Time Calculation- SLP    SLP Start Time 0910  -      SLP Received On 02/10/23  -         Untimed Charges    77674-AA Eval Speech and Production w/ Language Minutes 36  -      32236-NQ Eval Oral Pharyng Swallow Minutes 36  -MH         Total Minutes    Untimed Charges Total Minutes 72  -       Total Minutes 72  -MH             User Key  (r) = Recorded By, (t) = Taken By, (c) = Cosigned By    Initials Name Provider Type     Jazlyn Rivas, MS, CFY-SLP Speech and Language Pathologist                Therapy Charges for Today     Code Description Service Date Service Provider Modifiers Qty    23514571280 HC ST EVAL ORAL PHARYNG SWALLOW 2 2/10/2023 Jazlyn Rivas MS, CFJEFF-SLP GN 1    [id number] HC ST EVAL SPEECH AND PROD W LANG  2 2/10/2023 Jazlyn Rivas, MS, CARLO-SLP GN 1               Jazlyn Rivas MS, CARLO-SLP  2/10/2023

## 2023-02-10 NOTE — TELEPHONE ENCOUNTER
[facility] Nurse Navigator    Patient referred for possible Left Atrial Appendage Closure Device.      Atrial fibrillation / Atrial flutter:  Quality Measure    CHADS-VASc Score: 7    Anticoagulation plan:  CT at OSH showed hemorrhagic conversion in the area of her recent stroke.  Eliquis and ASA on hold.     Statin Therapy on Crestor 40 mg     Reviewed [facility] information with daughter, Radha, particularly procedural information and shared decision making.  Pamphlet mailed for future reference.  Verbalized understanding.  I gave them my contact information and encouraged to call me with questions or concerns in the interim.  Daughter unsure of future clinical course.  Offered to set up an appointment with Dr. Pierre as an outpatient for evaluation-she agreed.      Jenae Baker RN    02/10/23  10:12 EST

## 2023-02-10 NOTE — NURSING NOTE
"  ACC REVIEW REPORT: [facility]        PATIENT NAME: Cheri Cruz    PATIENT ID: [id number]        COVID-19 ACC SCREENING       DOES THE PATIENT HAVE A FEVER GREATER THAN OR EQUAL .4: N     IS THE PATIENT EXPERIENCING SHORTNESS OF BREATH: N    DOES THE PATIENT HAVE A COUGH: N    DOES THE PATIENT HAVE ANY OF THE FOLLOWING RISK FACTORS:    EXPOSURE TO SUSPECTED OR KNOWN COVID-19: N    RECENT TRAVEL HISTORY TO ENDEMIC AREA (DOMESTIC/LOCAL): N    IS THE PATIENT A HEALTHCARE WORKER: N    HAS THE PATIENT EXPERIENCED A LOSS OF SENSE OF TASTE OR SMELL: N    HAS THE PATIENT BEEN TESTED FOR COVID-19: Y    DATE TESTED: 2/9/2023    LAB TESTING SENT TO: [facility] (NEGATIVE)          BED: S353    BED TYPE: TELE    BED GIVEN TO: JEANNE QUINTANILLA RN    TIME BED GIVEN: 2337    TODAY'S DATE: 2/9/2023    TRANSFER DATE: 2/10/2023    ETA: WILL CALL WHEN PT LEAVES OSH    TRANSFERRING FACILITY: [facility]    TRANSFERRING FACILITY PHONE # : [phone]    TRANSFERRING MD: LALA    DATE/TIME REQUEST RECEIVED: 2/9/23@2308    [facility] RN: KACEY HUA RN    REPORT FROM: JEANNE QUINTANILLA RN    TIME REPORT TAKEN: 2337    DIAGNOSIS: HEMORRAGIC CONVERSION OF CVA    REASON FOR TRANSFER TO [facility]: HIGHER LEVEL OF CARE    TRANSPORTATION: AMBULANCE    CLINICAL REASON FOR TRANSFER TO [facility]: TO BE SEEN BY NEUROLOGY FOR CVA      CLINICAL INFORMATION    HEIGHT: 5'5\"    WEIGHT: 225LBS    ALLERGIES: PCN, VALIUM, LATEX, SULFA, TETRACYCLINE    INFECTIOUS DISEASE: N    ISOLATION: N    VITAL SIGNS:   TIME: 2337  TEMP: 98.2  PULSE: 74  B/P: 148/98  RESP: 15        LAB INFORMATION:   BUN 22  ALBUMIN 3.1    CULTURE INFORMATION:     MEDS/IV FLUIDS: 20G RFA  NS 500ML BOLUS  ROCHEPHIN 1GM  NORCO 10MG      CARDIAC SYSTEM:    CHEST PAIN: DENIES    RATE:     SCALE:     RHYTHM: SR    Is patient taking or has patient been given any drugs that could increase bleeding? Y    DRUG: ELIQUIS     DOSE/FREQUENCY: 5MG BID    TROPONIN:    DATE:   TIME: "   TROP:     DATE:   TIME:   TROP:       HEART CATH:     HEART CATH DATE:     HEART CATH RESULTS:     LAD:   RCA:   CX:   LMAIN:   EF:     SWAN:     SITE:   SIZE:    DATE INSERTED:     ARTLINE:     SITE:   SIZE:   DATE INSERTED:     SHEATH:    SITE:   SIZE:   DATE INSERTED:       VASOSEAL:    SITE:   DATE INSERTED:       EXTERNAL PACEMAKER:     RATE:   EXT PACER ON:       MODE:    DATE INSERTED:   OUTPUT SETTING:   SENSITIVITY SETTING:   SENSITIVITY TYPE:       IABP:    SITE:   AUG PRESSURE:   DATE INSERTED:     CARDIAC NOTES:       RESPIRATORY SYSTEM:    LUNG SOUNDS: CTA    ABG DATE:         ABG TIME:     ABG RESULTS:    PH:   PCO2:   PO2:   HCO3:   O2 SAT:       ETT SIZE:     ORAL:     NASAL:     SECURED AT MEASUREMENT (CM):     ON VENTILATOR:    TV:   FI02:   RATE:   PEEP:     OXYGEN: 2 L NC    O2 SAT: 95%    IMAGING FINDINGS:     PNEUMO CHEST TUBE SEAL TYPE:     RESPIRATORY STATUS:       CNS/MUSCULOSKELETAL    ALERT AND ORIENTED:    PERSON: CONFUSED  PLACE: CONFUSED  TIME: CONFUSED    INJURY:  WHERE:     BLADE COMA SCALE:    E:   M:   V:     STROKE SCALE: NO NIH WAS COMPLETED    SIZE OF HEMORRHAGE:     SYMPTOMS: (CHOOSE APPROPRIATE)    ASPHASIA:   ATAXIA:   DYSARTHRIA:   DYSPHASIA:   HEADACHE:   PARALYSIS:   SEIZURE:   SYNCOPE:   VERTIGO:   VISION CHANGE:        EXTREMITY WEAKNESS:    LEFT ARM:   RIGHT ARM: Y  LEFT LEG:   RIGHT LEG: Y    CAT SCAN RESULTS:     MRI RESULTS:     CNS/MUSCULOSKELETAL NOTES:       GI//GY      ABDOMINAL PAIN: Y    VOMITING: N    DIARRHEA: N    NAUSEA: N    BOWEL SOUNDS: ACTIVE    OCCULT STOOL: N    HEMATURIA: N    NG TUBE:    SIZE:     DATE INSERTED:       ULTRASOUND RESULTS:     ACUTE ABDOMEN RESULTS:     CT SCAN RESULTS: HEMORRHAGIC CONVERSION      GI//GY NOTES: IN/OUT CATH DONE TO GET URINE SPECIMEN    ZHANG:     PAST MEDICAL HISTORY: HLD  HTN  DM  GERD  ANXIETY  OSTEOPOROSIS  CVA 1/10/2023    OTHER SYMPTOM NOTES:     ADDITIONAL NOTES:           Hayde Gu,  RN  2/9/2023  23:58 EST

## 2023-02-10 NOTE — ED NOTES
Report given to Lien ERVIN at [facility]. Patient is going to [facility] room 353.      Solis Gray RN  02/09/23 5901

## 2023-02-10 NOTE — ED PROVIDER NOTES
8:36 PM EST  Juan Back was checked out to me by Dr. Lianne Whiting. Please see his/her initial documentation for details of the patient's ED presentation, physical exam and completed studies. In brief, Juan Back is a 81 y.o. female that presents to ED for having abdominal pain. She was recently placed in the [facility] for rehabilitation. Pt was initially at [facility] for having large MCA territorial infarct without hemorrhagic conversion. She was there about 18 days. She was discharged on 1/28. Daughter is here with the patient and had Wernicke's aphasia related complication. Had weakness to right arm but is improving. She still has some coordination issues with it. Pt isn't really comprehending what we say. She just says \"I don't know\". Pt is having some altered mental status changes per daughter and seems to act somewhat different. Had trouble with her medications. Was on Ativan routinely for years and was taking pain meds on routine schedule. She wasn't given them on schedule and had ativan changed. She seems to be hurting in her abdomen. She had repeat head CT done and CT abd/pelvis along with UA and baseline labs. HEAD CT SCAN WITHOUT CONTRAST         HISTORY: AMS, hx of stroke 1 month ago       COMPARISON:  Head CT dated June 1621       TECHNIQUE: Noncontrast CT images of the head were obtained. Images were reformatted in the coronal and sagittal planes. Up-to-date CT equipment and radiation dose reduction techniques were employed. FINDINGS: There is low attenuation in sulcal effacement identified within the posterior inferior aspect of the left parietal lobe consistent with subacute infarct. There are associated small areas of hemorrhagic transformation within the infarct. No herniation is identified. There is prominence of the ventricles, cistern and sulci. There is a remote lacunar infarct identified within the head of the left caudate nucleus. Impression   1. Subacute infarct is identified with the posterior inferior aspect left parietal lobe with some small areas of acute hemorrhagic conversion. 2. Mild atrophy. 3. Remote lacunar infarct is identified with the head of the  left caudate nucleus. CT ABDOMEN AND PELVIS:       INDICATION: abd pain, lower half       TECHNIQUE: Noncontrast CT images of the abdomen and pelvis were obtained. Up-to-date CT equipment and radiation dose reduction techniques were employed. COMPARISON: NONE. FINDINGS:  There are atelectatic changes identified within the medial aspect of the right upper lobe as well as the medial aspect the right lower lobe. There is some groundglass atelectasis identified at the left lung base. No pleural effusion is    identified. There is calcification of mitral annulus. There is atherosclerotic calcification left anterior descending coronary artery. Patient status post a cholecystectomy. No biliary dilatation is identified. There is small hiatal hernia. The liver appears unremarkable. There is moderate-severe atrophy of the pancreas. There are calcified granulomas present within normal size spleen. Adrenal glands are normal.       No hydronephrosis or nephrolithiasis is identified. There is mild renal atrophy. There is extensive calcification of the abdominal aorta. No aneurysm is identified. Large amount of stool is identified within the rectum, sigmoid colon and descending colon. There is some associated edema identified within the posterior perirectal fat. . No abnormal bowel wall thickening is identified. No bowel dilatation is identified. Appendix is visualized and is normal.       Patient status post hysterectomy. There is a right hip femoral neck screw. No osteolytic or osteoblastic lesions are identified. Facet joint spondylosis is present with the lumbar spine. Impression       1.  Large amount of stool is identified within the rectum with some associated edematous fat stranding identified within the posterior perirectal fat. The findings can be seen with fecal impaction and stercoral colitis. 2. Status post hysterectomy. 3. Status post cholecystectomy. 4. Atelectatic changes identified along the detailed above. 5. Small hiatal hernia. 6. Atrophic pancreas.      I have reviewed and interpreted all of the currently available lab results from this visit (if applicable):  Results for orders placed or performed during the hospital encounter of 02/09/23   COVID-19, Rapid    Specimen: Nasopharyngeal Swab   Result Value Ref Range    SARS-CoV-2, NAAT Not Detected Not Detected   Rapid Influenza A/B Antigens    Specimen: Nasopharyngeal   Result Value Ref Range    Rapid Influenza A Ag Negative Negative    Rapid Influenza B Ag Negative Negative   Blood Gas, Arterial   Result Value Ref Range    pH, Arterial 7.377 7.350 - 7.450    pCO2, Arterial 45.9 (H) 35.0 - 45.0 mmHg    pO2, Arterial 90.1 80.0 - 100.0 mmHg    HCO3, Arterial 26.3 (H) 22.0 - 26.0 mmol/L    Base Excess, Arterial 0.9 -3.0 - 3.0 mmol/L    O2 Sat, Arterial 96.6 >92 %    TCO2, Arterial 27.8 24.0 - 30.0 mmol/L    O2 Therapy Unknown     FIO2 0.28 Not Established %   Lactate, Sepsis   Result Value Ref Range    Lactic Acid, Sepsis 1.8 0.4 - 1.9 mmol/L   Urinalysis with Reflex to Culture    Specimen: Urine   Result Value Ref Range    Color, UA Yellow Straw/Yellow    Clarity, UA CLOUDY (A) Clear    Glucose, Ur Negative Negative mg/dL    Bilirubin Urine Negative Negative    Ketones, Urine 15 (A) Negative mg/dL    Specific Gravity, UA 1.020 1.005 - 1.030    Blood, Urine MODERATE (A) Negative    pH, UA 5.5 5.0 - 8.0    Protein, UA 30 (A) Negative mg/dL    Urobilinogen, Urine 0.2 <2.0 E.U./dL    Nitrite, Urine POSITIVE (A) Negative    Leukocyte Esterase, Urine LARGE (A) Negative    Microscopic Examination YES     Urine Type Catheter     Urine Reflex to Culture Yes Drug Screen, Multiple, Urine   Result Value Ref Range    PCP Screen, Urine Neg Negative <25 ng/mL    Benzodiazepine Screen, Urine POSITIVE (A) Negative <300 ng/mL    Cocaine Metabolite Screen, Urine Neg Negative <300 ng/mL    Amphetamine Screen, Urine Neg Negative <1000 ng/mL    Cannabinoid Scrn, Ur Neg Negative <50 ng/mL    Opiate Scrn, Ur POSITIVE (A) Negative <300 ng/mL    Barbiturate Screen, Ur Neg Negative <494 ng/mL    Tricyclic Neg Negative <424 ng/mL    Methadone Screen, Urine Neg Negative <300 ng/ml    Propoxyphene Screen, Urine Neg Negative <300 ng/mL    Methamphetamine, Urine Neg Negative <1000 ng/mL    UR Oxycodone Rapid Screen Neg Negative <100 ng/mL    Buprenorphine Qual, Urine Neg Negative <25 ng/mL    Drug Screen Comment: see below    CBC with Auto Differential   Result Value Ref Range    WBC 7.4 4.0 - 11.0 K/uL    RBC 3.18 (L) 3.80 - 5.80 M/uL    Hemoglobin 10.7 (L) 11.5 - 16.5 g/dL    Hematocrit 32.6 (L) 37.0 - 47.0 %    .5 (H) 80.0 - 100.0 fL    MCH 33.6 (H) 27.0 - 32.0 pg    MCHC 32.8 31.0 - 35.0 g/dL    RDW 15.2 11.0 - 16.0 %    Platelets 965 101 - 209 K/uL    MPV 11.2 (H) 6.0 - 10.0 fL    Neutrophils % 56.5 %    Immature Granulocytes % 0.5 0.0 - 5.0 %    Lymphocytes % 30.6 %    Monocytes % 8.5 %    Eosinophils % 3.5 %    Basophils % 0.4 %    Neutrophils Absolute 4.2 2.0 - 7.5 K/uL    Immature Granulocytes # 0.0 K/uL    Lymphocytes Absolute 2.3 1.5 - 4.0 K/uL    Monocytes Absolute 0.6 0.2 - 0.8 K/uL    Eosinophils Absolute 0.3 0.0 - 0.4 K/uL    Basophils Absolute 0.0 0.0 - 0.1 K/uL   Comprehensive Metabolic Panel w/ Reflex to MG   Result Value Ref Range    Sodium 133 (L) 136 - 145 mmol/L    Potassium reflex Magnesium 4.6 3.4 - 5.1 mmol/L    Chloride 98 98 - 107 mmol/L    CO2 26 20 - 30 mmol/L    Anion Gap 9 3 - 16    Glucose 165 (H) 74 - 106 mg/dL    BUN 22 (H) 6 - 20 mg/dL    Creatinine 1.2 0.4 - 1.2 mg/dL    Est, Glom Filt Rate 45 (L) >59    Calcium 8.5 8.5 - 10.5 mg/dL    Total Protein 5.8 (L) 6.4 - 8.3 g/dL    Albumin 3.1 (L) 3.4 - 4.8 g/dL    Albumin/Globulin Ratio 1.1 0.8 - 2.0    Total Bilirubin 0.4 0.3 - 1.2 mg/dL    Alkaline Phosphatase 56 25 - 100 U/L    ALT <5 4 - 36 U/L    AST 20 8 - 33 U/L    Globulin 2.7 Not Established g/dL   Lipase   Result Value Ref Range    Lipase 7.0 5.6 - 51.3 U/L   Brain Natriuretic Peptide   Result Value Ref Range    Pro- 0 - 1,800 pg/mL   Troponin   Result Value Ref Range    Troponin <0.30 <0.30 ng/mL   Microscopic Urinalysis   Result Value Ref Range    Mucus, UA 2+ (A) None Seen /LPF    WBC, UA >100 (A) 0 - 5 /HPF    RBC, UA see below (A) 0 - 4 /HPF    Bacteria, UA 3+ (A) None Seen /HPF       MDM:  Pt had CT showing left MCA large territorial infarct. She didn't have any bleed there. She had the anticoagulant meds as well as plavix held for quite some time. Recently restarted. She is showing a hemorrhagic conversion on the CT today in the MCA area. She has significant UTI with > 100 WBC, has abundant stool with probable fecal impaction which could be causing her pain along with the UTI. Review of outside notes from discharge summary from [facility] didn't show hemorrhagic conversion. Called Catherine from [facility] stroke team to discuss case and will accept the patient due to hemorrhagic findings. Recheck of her BP was 130/80's. Started the patient on Rocephin antibiotics. Family wanted her to get her pain med. Ordered it. Discussed that the pain meds could be contributing to her fecal impaction but needs to be addressed on admission. Accepted under Dr. Alex Forde at [facility]    Final Impression:  1. Cerebrovascular accident (CVA) with intracranial hemorrhage ([facility] [address] New Problem   2. Acute cystitis without hematuria New Problem   3. Fecal impaction ([address] New Problem   4.  Abdominal pain, unspecified abdominal location      Disposition: Transfer to [facility] to stroke team/hospitalist      (Please note that portions of this note may have been completed with a voice recognition program. Efforts were made to edit the dictations but occasionally words are mis-transcribed.)    Tyler Horton 144, DO  02/10/23 [id number]

## 2023-02-10 NOTE — PLAN OF CARE
Goal Outcome Evaluation:  Plan of Care Reviewed With: patient        Progress: no change  Outcome Evaluation: Patient presents w/ impaired balance, generalized weakness, decreased safety awareness, impaired cognition, and functional decline. She required dep A x  2 for all mobility and is below her functional baseline. Skilled IPPT warranted to improve pt's safety and independence w/ functional mobility. Recommend SNF rehab when medically appropriate.

## 2023-02-10 NOTE — PLAN OF CARE
Goal Outcome Evaluation:                     Patient resting, she is very confused and agitated at times, vital signs stable, BP kept below 140 systolic, NPO until speech eval is complete, waiting for family to show up for further info. Will continue to monitor

## 2023-02-10 NOTE — H&P
[facility] Medicine Services  HISTORY AND PHYSICAL    Patient Name: Cheri Cruz  : 1940  MRN: [id number]  Primary Care Physician: Lorrie Canada APRN  Date of admission: 2/10/2023      Subjective   Subjective     Chief Complaint:  confusion    HPI:  Cheri Cruz is a 82 y.o. female with history of HTN, HLD, DMII, atrial fibrillation, PE, Dementia, anxiety and recent left MCA infarct with residual aphasia presents from rehab with increased confusion, RUE weakness, speech difficulty and abdominal pain.  CT at the OSH showed hemorrhagic conversion in the area of her recent stroke.  Patient transferred to [facility] for further evaluation      Review of Systems   Unable to obtain due to aphasia    Personal History     Past Medical History:   Diagnosis Date   • Abnormal heart rhythm    • Anxiety    • Arrhythmia    • Arthritis    • Atrial fibrillation (HCC)    • Dementia (HCC)    • Diabetes mellitus (HCC)    • Hyperlipidemia    • Hypertension    • Kidney disorder    • Stroke (HCC)    • Uterus cancer (HCC)              Past Surgical History:   Procedure Laterality Date   • CATARACT EXTRACTION, BILATERAL         Family History:  family history includes Alcohol abuse in her brother; Cancer in her brother, father, and mother; Congenital heart disease in her mother; Heart disease in her mother. Otherwise pertinent FHx was reviewed and unremarkable.     Social History:  reports that she has never smoked. She has never used smokeless tobacco. She reports that she does not drink alcohol and does not use drugs.  Social History     Social History Narrative   • Not on file       Medications:  Available home medication information reviewed.  DULoxetine, Ergocalciferol, HYDROcodone-acetaminophen, Insulin Lispro, QUEtiapine, apixaban, aspirin, busPIRone, donepezil, gabapentin, levothyroxine, meclizine, metoprolol tartrate, omeprazole, oxybutynin, rosuvastatin, and  sennosides-docusate      Allergies   Allergen Reactions   • Penicillins    • Sulfa Antibiotics    • Tetracyclines & Related Unknown (See Comments)   • Valium [Diazepam] Anxiety       Objective   Objective     Vital Signs:   Temp:  [98.5 °F (36.9 °C)] 98.5 °F (36.9 °C)  Heart Rate:  [64-74] 69  Resp:  [16] 16  BP: ()/(37-70) 97/46  Flow (L/min):  [2] 2  Total (NIH Stroke Scale): 4    Physical Exam   Constitutional - non toxic, in bed  HEENT-NCAT, mucous membranes moist  CV-RRR  Resp-CTAB, no wheezes, rhonchi or rales  Abd-soft, nontender, nondistended, normoactive bowel sounds  Ext-No lower extremity cyanosis, clubbing or edema bilaterally  Neuro-alert but confused, speech nonsensical but clear, follows some commands, moves all extremities  Psych-normal affect   Skin- No rash on exposed UE or LE bilaterally      Result Review:  I have personally reviewed the results from the time of this admission to 2/10/2023 04:11 EST and agree with these findings:  []  Laboratory list / accordion  []  Microbiology  []  Radiology  []  EKG/Telemetry   []  Cardiology/Vascular   []  Pathology  []  Old records  []  Other:  Most notable findings include:         LAB RESULTS:      Lab 02/09/23 1910   WBC 7.4   HEMOGLOBIN 10.7*   HEMATOCRIT 32.6*   PLATELETS 195   NEUTROS ABS 4.2   IMMATURE GRANS (ABS) 0.0   LYMPHS ABS 2.3   MONOS ABS 0.6   EOS ABS 0.3   .5*             Lab 02/09/23 1910   LIPASE 7.0         Lab 02/09/23 1910   PROBNP 669   TROPONIN I <0.30                 Lab 02/09/23  1840   PH, ARTERIAL 7.377   PCO2, ARTERIAL 45.9*   O2 SATURATION ART 96.6   FIO2 0.28   HCO3 ART 26.3*     UA    Urinalysis 1/10/23 1/31/23 2/9/23 2/9/23      1925 1925   Specific Gravity, UA 1.018 1.020  1.020   Ketones, UA Trace (A)      Blood, UA Negative Negative  MODERATE (A)   Leukocytes, UA Negative Negative  LARGE (A)   Nitrite, UA Negative Negative  POSITIVE (A)   RBC, UA   see below (A)    Bacteria, UA   3+ (A)    (A) Abnormal  value       Comments are available for some flowsheets but are not being displayed.             Microbiology Results (last 10 days)     Procedure Component Value - Date/Time    Influenza Antigen, Rapid - , [[id number]] Collected: 02/09/23 1910    Lab Status: Final result Specimen: Nasopharyngeal Swab Updated: 02/10/23 0158     Rapid Influenza A Ag Negative     Rapid Influenza B Ag Negative          XR Outside Films    Result Date: 2/10/2023  This procedure was auto-finalized with no dictation required.    CT ABDOMEN PELVIS WO CONTRAST Additional Contrast? None    Result Date: 2/9/2023  CT ABDOMEN AND PELVIS: INDICATION: abd pain, lower half TECHNIQUE: Noncontrast CT images of the abdomen and pelvis were obtained. Up-to-date CT equipment and radiation dose reduction techniques were employed. COMPARISON: NONE. FINDINGS:  There are atelectatic changes identified within the medial aspect of the right upper lobe as well as the medial aspect the right lower lobe. There is some groundglass atelectasis identified at the left lung base. No pleural effusion is identified. There is calcification of mitral annulus. There is atherosclerotic calcification left anterior descending coronary artery. Patient status post a cholecystectomy. No biliary dilatation is identified. There is small hiatal hernia. The liver appears unremarkable. There is moderate-severe atrophy of the pancreas. There are calcified granulomas present within normal size spleen. Adrenal glands are normal. No hydronephrosis or nephrolithiasis is identified. There is mild renal atrophy. There is extensive calcification of the abdominal aorta. No aneurysm is identified. Large amount of stool is identified within the rectum, sigmoid colon and descending colon. There is some associated edema identified within the posterior perirectal fat.. No abnormal bowel wall thickening is identified. No bowel dilatation is identified.  Appendix is visualized and is normal. Patient  status post hysterectomy. There is a right hip femoral neck screw. No osteolytic or osteoblastic lesions are identified. Facet joint spondylosis is present with the lumbar spine.    Impression: 1. Large amount of stool is identified within the rectum with some associated edematous fat stranding identified within the posterior perirectal fat. The findings can be seen with fecal impaction and stercoral colitis. 2. Status post hysterectomy. 3. Status post cholecystectomy. 4. Atelectatic changes identified along the detailed above. 5. Small hiatal hernia. 6. Atrophic pancreas.    CT Head WO Contrast    Result Date: 2/9/2023  HEAD CT SCAN WITHOUT CONTRAST   HISTORY: AMS, hx of stroke 1 month ago COMPARISON:  Head CT dated June 1621 TECHNIQUE: Noncontrast CT images of the head were obtained. Images were reformatted in the coronal and sagittal planes. Up-to-date CT equipment and radiation dose reduction techniques were employed. FINDINGS: There is low attenuation in sulcal effacement identified within the posterior inferior aspect of the left parietal lobe consistent with subacute infarct. There are associated small areas of hemorrhagic transformation within the infarct. No herniation is identified. There is prominence of the ventricles, cistern and sulci. There is a remote lacunar infarct identified within the head of the left caudate nucleus.    Impression: 1. Subacute infarct is identified with the posterior inferior aspect left parietal lobe with some small areas of acute hemorrhagic conversion. 2. Mild atrophy. 3. Remote lacunar infarct is identified with the head of the  left caudate nucleus.    XR CHEST PORTABLE    Result Date: 2/9/2023  CHEST 1 VIEW   HISTORY: low O2 saturation   COMPARISON: Chest x-ray dated 11-21 FINDINGS: Portable AP radiograph of the chest was obtained. The heart and mediastinum are within normal limits for size and configuration. No infiltrate, effusion or pneumothorax is identified. Lung  volumes are decreased. Calcified granulomas project over the lung bases. Degenerative changes are identified at the right glenohumeral joint.    Impression: 1.  No evidence of acute cardiopulmonary disease.    CT Outside Films    Result Date: 2/10/2023  This procedure was auto-finalized with no dictation required.    CT Outside Films    Result Date: 2/10/2023  This procedure was auto-finalized with no dictation required.      Results for orders placed during the hospital encounter of 10/11/17    Adult Transthoracic Echo Complete W/ Cont if Necessary Per Protocol    Interpretation Summary  · Left ventricular systolic function is normal. Estimated EF = 70%. No regional wall motion abnormalities are noted.  · Left ventricular diastolic dysfunction (grade I) consistent with impaired relaxation.  · Left atrial cavity size is mildly dilated.  · The cardiac valves are functionally normal.      Assessment & Plan   Assessment & Plan     Active Hospital Problems    Diagnosis  POA   • **Hemorrhagic stroke (HCC) [I61.9]  Yes       Altered mental status  Recent Left MCA CVA  - hemorrhagic conversion noted on OSH CT  - hold aspirin and eliquis  - statin  - speech evaluation -- previously on modified diet  - PT/OT    Abdominal pain  - CT Abdomen Pelvis shows constipation, some concern for fecal impaction/stercoral colitis.  No current leukocytosis or fever  - scheduled laxatives  - suppository PRN    UTI  - continue rocephin (1st dose in OSH ED)    Atrial fibrillation  - currently NSR  - eliquis held    HTN  - goal SBP <140    DMII  - SSI    Dementia      DVT prophylaxis:  mechanical      CODE STATUS:    There are no questions and answers to display.       Expected Discharge        Crispin Anders MD  02/10/23

## 2023-02-10 NOTE — THERAPY EVALUATION
Patient Name: Cheri Cruz  : 1940    MRN: [id number]                              Today's Date: 2/10/2023       Admit Date: 2/10/2023    Visit Dx:     ICD-10-CM ICD-9-CM   1. Aphasia  R47.01 784.3     Patient Active Problem List   Diagnosis   • Hyperlipidemia LDL goal <70   • Type 2 diabetes mellitus without complication, without long-term current use of insulin (HCC)   • GERD (gastroesophageal reflux disease)   • Essential hypertension   • Abnormal EKG   • Osteoarthritis   • Anxiety   • Palpitations   • Vertigo   • History of stroke, left parietal   • Hemorrhagic stroke (HCC)   • TIA (transient ischemic attack)     Past Medical History:   Diagnosis Date   • Abnormal heart rhythm    • Anxiety    • Arrhythmia    • Arthritis    • Atrial fibrillation (HCC)    • Dementia (HCC)    • Diabetes mellitus (HCC)    • Hyperlipidemia    • Hypertension    • Kidney disorder    • Stroke (HCC)    • Uterus cancer (HCC)      Past Surgical History:   Procedure Laterality Date   • CATARACT EXTRACTION, BILATERAL        General Information     Row Name 02/10/23 0842          Physical Therapy Time and Intention    Document Type evaluation  -MB     Mode of Treatment physical therapy  -MB     Row Name 02/10/23 0842          General Information    Patient Profile Reviewed yes  -MB     Prior Level of Function --  TBD, pt. limited historian w/ dementia. No family present at bedside.  -MB     Existing Precautions/Restrictions fall;oxygen therapy device and L/min  dementia  -MB     Barriers to Rehab medically complex;previous functional deficit;cognitive status  -MB     Row Name 02/10/23 0842          Living Environment    People in Home spouse;child(sandoval), adult  per chart  -MB     Row Name 02/10/23 0842          Home Main Entrance    Number of Stairs, Main Entrance --  TBD  -MB     Row Name 02/10/23 0842          Cognition    Orientation Status (Cognition) oriented to;person;disoriented to;place;situation;time  -MB     Row Name  02/10/23 0842          Safety Issues, Functional Mobility    Safety Issues Affecting Function (Mobility) ability to follow commands;awareness of need for assistance;impulsivity;insight into deficits/self-awareness;judgment;problem-solving;safety precaution awareness;safety precautions follow-through/compliance;sequencing abilities  -MB     Impairments Affecting Function (Mobility) balance;cognition;endurance/activity tolerance;strength;coordination;postural/trunk control  -MB     Cognitive Impairments, Mobility Safety/Performance awareness, need for assistance;impulsivity;insight into deficits/self-awareness;judgment;problem-solving/reasoning;safety precaution awareness;safety precaution follow-through;sequencing abilities;attention  -MB           User Key  (r) = Recorded By, (t) = Taken By, (c) = Cosigned By    Initials Name Provider Type    Karen Santana, PT Physical Therapist               Mobility     Row Name 02/10/23 1150          Bed Mobility    Bed Mobility supine-sit;sit-supine  -MB     Supine-Sit [place] (Bed Mobility) dependent (less than 25% patient effort);2 person assist;verbal cues;nonverbal cues (demo/gesture)  -MB     Sit-Supine [place] (Bed Mobility) dependent (less than 25% patient effort);2 person assist;verbal cues;nonverbal cues (demo/gesture)  -MB     Assistive Device (Bed Mobility) head of bed elevated;draw sheet  -MB     Comment, (Bed Mobility) Pt. spontaneously moving in bed, but required dep A x 2 to manage BLEs and support trunk. Pt. demo signficant L lateral lean in sitting.  -MB     Row Name 02/10/23 1150          Transfers    Comment, (Transfers) Attempted STS from EOB w/ minimal hip clearance and poor command following. Deferred stand pivot to chair d/t cognitive status.  -MB     Row Name 02/10/23 1150          Bed-Chair Transfer    Bed-Chair [place] (Transfers) unable to assess  -MB     Row Name 02/10/23 1150          Sit-Stand Transfer    Sit-Stand  [place] (Transfers) dependent (less than 25% patient effort);2 person assist;verbal cues;nonverbal cues (demo/gesture)  -MB     Assistive Device (Sit-Stand Transfers) other (see comments)  BUE support  -MB     Row Name 02/10/23 1150          Gait/Stairs (Locomotion)    [place] Level (Gait) unable to assess  -MB     Comment, (Gait/Stairs) Not safe to attempt d/t weakness and cognitive status.  -MB           User Key  (r) = Recorded By, (t) = Taken By, (c) = Cosigned By    Initials Name Provider Type    MB Karen Nichols, PT Physical Therapist               Obj/Interventions     Row Name 02/10/23 1431          Range of Motion Comprehensive    General Range of Motion bilateral lower extremity ROM WFL  -MB     Row Name 02/10/23 1431          Strength Comprehensive (MMT)    General Manual Muscle Testing (MMT) Assessment lower extremity strength deficits identified;upper extremity strength deficits identified  -MB     Comment, General Manual Muscle Testing (MMT) Assessment Difficult to formally test 2/2 cognitive status. Pt. able to move BLEs spontaneously against gravity.  -MB     Row Name 02/10/23 1431          Motor Skills    Therapeutic Exercise hip;knee;ankle  -MB     Row Name 02/10/23 1431          Hip (Therapeutic Exercise)    Hip (Therapeutic Exercise) PROM (passive range of motion)  -MB     Hip PROM (Therapeutic Exercise) bilateral;flexion;aBduction  -MB     Row Name 02/10/23 1431          Knee (Therapeutic Exercise)    Knee (Therapeutic Exercise) PROM (passive range of motion);AAROM (active assistive range of motion)  -MB     Knee AAROM (Therapeutic Exercise) bilateral;flexion;extension  -MB     Knee PROM (Therapeutic Exercise) bilateral;flexion;extension  -MB     Row Name 02/10/23 1431          Balance    Static Sitting Balance maximum assist;dependent  -MB     Position, Sitting Balance supported;sitting edge of bed  -MB     Static Standing Balance dependent;2-person assist  -MB      Position/Device Used, Standing Balance supported;other (see comments)  BUE support  -MB     Balance Interventions standing;sit to stand;weight shifting activity  -MB     Row Name 02/10/23 1431          Sensory Assessment (Somatosensory)    Sensory Assessment (Somatosensory) unable/difficult to assess  -MB           User Key  (r) = Recorded By, (t) = Taken By, (c) = Cosigned By    Initials Name Provider Type    Karen Santana, PT Physical Therapist               Goals/Plan     Row Name 02/10/23 1439          Bed Mobility Goal 1 (PT)    Activity/Assistive Device (Bed Mobility Goal 1, PT) sit to supine/supine to sit  -MB     [place] Level/Cues Needed (Bed Mobility Goal 1, PT) moderate assist (50-74% patient effort)  -MB     Time Frame (Bed Mobility Goal 1, PT) 10 days  -MB     Row Name 02/10/23 1439          Transfer Goal 1 (PT)    Activity/Assistive Device (Transfer Goal 1, PT) sit-to-stand/stand-to-sit;bed-to-chair/chair-to-bed;walker, rolling  -MB     [place] Level/Cues Needed (Transfer Goal 1, PT) moderate assist (50-74% patient effort)  -MB     Time Frame (Transfer Goal 1, PT) 10 days  -MB     Row Name 02/10/23 1439          Gait Training Goal 1 (PT)    Activity/Assistive Device (Gait Training Goal 1, PT) --  Establish gait goal when patient able to participate in gait assessment.  -MB     Row Name 02/10/23 1439          Therapy Assessment/Plan (PT)    Planned Therapy Interventions (PT) balance training;bed mobility training;gait training;home exercise program;patient/family education;postural re-education;strengthening;transfer training  -MB           User Key  (r) = Recorded By, (t) = Taken By, (c) = Cosigned By    Initials Name Provider Type    Karen Santana, PT Physical Therapist               Clinical Impression     Row Name 02/10/23 1433          Pain Scale: FACES Pre/Post-Treatment    Pain: FACES Scale, Pretreatment 0-->no hurt  -MB     Posttreatment Pain Rating 0-->no hurt  -MB      Row Name 02/10/23 1433          Plan of Care Review    Plan of Care Reviewed With patient  -MB     Progress no change  -MB     Outcome Evaluation Patient presents w/ impaired balance, generalized weakness, decreased safety awareness, impaired cognition, and functional decline. She required dep A x  2 for all mobility and is below her functional baseline. Skilled IPPT warranted to improve pt's safety and independence w/ functional mobility. Recommend SNF rehab when medically appropriate.  -MB     Row Name 02/10/23 1433          Therapy Assessment/Plan (PT)    Patient/Family Therapy Goals Statement (PT) Pt. did not state.  -MB     Rehab Potential (PT) fair, will monitor progress closely  -MB     Criteria for Skilled Interventions Met (PT) yes;meets criteria;skilled treatment is necessary  -MB     Therapy Frequency (PT) daily  -MB     Row Name 02/10/23 1433          Vital Signs    Pre Systolic BP Rehab 148  -MB     Pre Treatment Diastolic BP 57  -MB     Pretreatment Heart Rate (beats/min) 70  -MB     Pre SpO2 (%) 96  -MB     O2 Delivery Pre Treatment nasal cannula  -MB     O2 Delivery Intra Treatment nasal cannula  -MB     Post SpO2 (%) 94  -MB     O2 Delivery Post Treatment nasal cannula  -MB     Pre Patient Position Supine  -MB     Intra Patient Position Standing  -MB     Post Patient Position Supine  -MB     Row Name 02/10/23 1433          Positioning and Restraints    Pre-Treatment Position in bed  -MB     Post Treatment Position bed  -MB     In Bed notified nsg;supine;call light within reach;encouraged to call for assist;exit alarm on;SCD pump applied;side rails up x3;heels elevated;RUE elevated;LUE elevated  -MB           User Key  (r) = Recorded By, (t) = Taken By, (c) = Cosigned By    Initials Name Provider Type    Karen Santana, PT Physical Therapist               Outcome Measures     Row Name 02/10/23 144          How much help from another person do you currently need...    Turning from your  back to your side while in flat bed without using bedrails? 2  -MB     Moving from lying on back to sitting on the side of a flat bed without bedrails? 1  -MB     Moving to and from a bed to a chair (including a wheelchair)? 1  -MB     Standing up from a chair using your arms (e.g., wheelchair, bedside chair)? 1  -MB     Climbing 3-5 steps with a railing? 1  -MB     To walk in hospital room? 1  -MB     AM-PAC 6 Clicks Score (PT) 7  -MB     Highest level of mobility 2 --> Bed activities/dependent transfer  -MB     Row Name 02/10/23 0857          Modified [place] Scale    Pre-Stroke Modified [place] Scale 6 - Unable to determine (UTD) from the medical record documentation  -TA     Modified [place] Scale 5 - Severe disability.  Bedridden, incontinent, and requiring constant nursing care and attention.  -TA     Row Name 02/10/23 1440 02/10/23 0857       Functional Assessment    Outcome Measure Options AM-PAC 6 Clicks Basic Mobility (PT)  -MB AM-PAC 6 Clicks Daily Activity (OT);Modified Steven  -TA          User Key  (r) = Recorded By, (t) = Taken By, (c) = Cosigned By    Initials Name Provider Type    TA Westley Sotomayor, OT Occupational Therapist    Karen Santana, PT Physical Therapist                             Physical Therapy Education     Title: PT OT SLP Therapies (In Progress)     Topic: Physical Therapy (In Progress)     Point: Mobility training (In Progress)     Learning Progress Summary           Patient Nonacceptance, E,D, NR by MB at 2/10/2023 1441                   Point: Home exercise program (In Progress)     Learning Progress Summary           Patient Nonacceptance, E,D, NR by MB at 2/10/2023 1441                   Point: Body mechanics (In Progress)     Learning Progress Summary           Patient Nonacceptance, E,D, NR by MB at 2/10/2023 1441                   Point: Precautions (In Progress)     Learning Progress Summary           Patient Nonacceptance, E,D, NR by MB at 2/10/2023 1441                                User Key     Initials Effective Dates Name Provider Type Discipline    MB 06/16/21 -  Karen Nichols, PT Physical Therapist PT              PT Recommendation and Plan  Planned Therapy Interventions (PT): balance training, bed mobility training, gait training, home exercise program, patient/family education, postural re-education, strengthening, transfer training  Plan of Care Reviewed With: patient  Progress: no change  Outcome Evaluation: Patient presents w/ impaired balance, generalized weakness, decreased safety awareness, impaired cognition, and functional decline. She required dep A x  2 for all mobility and is below her functional baseline. Skilled IPPT warranted to improve pt's safety and independence w/ functional mobility. Recommend SNF rehab when medically appropriate.     Time Calculation:    PT Charges     Row Name 02/10/23 1441             Time Calculation    Start Time 0842  -MB      PT Received On 02/10/23  -MB      PT Goal Re-Cert Due Date 02/20/23  -MB         Untimed Charges    PT Eval/Re-eval Minutes 50  -MB         Total Minutes    Untimed Charges Total Minutes 50  -MB       Total Minutes 50  -MB            User Key  (r) = Recorded By, (t) = Taken By, (c) = Cosigned By    Initials Name Provider Type    Karen Santana, PT Physical Therapist              Therapy Charges for Today     Code Description Service Date Service Provider Modifiers Qty    [id number] HC PT EVAL MOD COMPLEXITY 4 2/10/2023 Karen Nichols, PT GP 1          PT G-Codes  Outcome Measure Options: AM-PAC 6 Clicks Basic Mobility (PT)  AM-PAC 6 Clicks Score (PT): 7  AM-PAC 6 Clicks Score (OT): 7  Modified Steven Scale: 5 - Severe disability.  Bedridden, incontinent, and requiring constant nursing care and attention.  PT Discharge Summary  Anticipated Discharge Disposition (PT): skilled nursing facility    Karen Nichols PT  2/10/2023

## 2023-02-10 NOTE — CONSULTS
Consult received for diabetes education per stroke protocol. A1c was 8.3% last month and 6.9% this month. She arrived from an inpatient rehab facility with plans to return to [facility].   Patient does not qualify for the follow up stroke class based on the exclusion criteria of discharge to an acute care facility.

## 2023-02-10 NOTE — ED NOTES
I did check on pt. I spoke with her daughter, advised that we currently have an emergent situation that dr Gemma Girard is working on. I apologized for the wait. She stated understanding. Pt is in a position of comfort. Light turned down as requested.      Jimmy Arora RN  02/09/23 1227

## 2023-02-10 NOTE — CONSULTS
Stroke Consult Note    Patient Name: Cheri Cruz   MRN: [id number]  Age: 82 y.o.  Sex: female  : 1940    Primary Care Physician: Lorrie Canada APRN  Referring Physician:  Dr. Fitzpatrick, [facility] STROKE TEAM CALLED:  2325 EST       [facility]  TIME PATIENT SEEN: 0208 EST    Handedness: Unknown  Race:      Chief Complaint/Reason for Consultation: CT findings of ICH within recent left parietal ischemic stroke    HPI: Mrs. Cruz is an 82 year old female with known medical diagnoses of essential hypertension, hyperlipidemia, diabetes mellitus type 2, atrial fibrillation, remote PE (on chronic Eliquis therapy), dementia, remote COVID-19 infection (1/3/2023), anxiety and recent left parietal stroke (admitted to [facility] 1/10/2023-2023) who arrives to our facility as a transfer from [facility].  Dr. Fitzpatrick reports that the patient was at a inpatient rehabilitation center and was noted to have increasing confusion, abdominal pain, difficulty with speech, and right upper extremity weakness therefore she was brought to the emergency department for further evaluation.  CT of the head without contrast was performed and revealed a small hemorrhagic conversion.  He is unsure when the patient's last dose of Eliquis was however due to the small size of ICH, reversal of Eliquis is not indicated at this time.  Per report the patient is pleasantly confused, has Warnicke's aphasia, mild right upper extremity weakness and mild inflammation of her right upper extremity.  Blood pressure is currently 148/98.  She will be given a dose of IV blood pressure medication prior to transfer to keep SBP <140.    On arrival to our facility the patient remains neurologically stable.  She is alert and conversant but pleasantly confused.  It is difficulty to obtain HPI and ROS secondary to receptive aphasia and inconsistent answers to  questions.  Patient is able to perform exam with visual cues.  /70.     Last Known Normal Date/Time: 2 days ago EST     Review of Systems   Unable to perform ROS: Other   Patient with Wernicke's aphasia    Past Medical History:   Diagnosis Date   • Abnormal heart rhythm    • Anxiety    • Arrhythmia    • Arthritis    • Diabetes mellitus (HCC)    • Hyperlipidemia    • Hypertension    • Kidney disorder    • Stroke (HCC)    • Uterus cancer (HCC)      Past Surgical History:   Procedure Laterality Date   • CATARACT EXTRACTION, BILATERAL       Family History   Problem Relation Age of Onset   • Heart disease Mother    • Cancer Mother    • Congenital heart disease Mother    • Cancer Father    • Cancer Brother    • Alcohol abuse Brother      Social History     Socioeconomic History   • Marital status:    Tobacco Use   • Smoking status: Never   • Smokeless tobacco: Never   Vaping Use   • Vaping Use: Never used   Substance and Sexual Activity   • Alcohol use: No   • Drug use: No   • Sexual activity: Defer     Allergies   Allergen Reactions   • Penicillins    • Sulfa Antibiotics    • Tetracyclines & Related Unknown (See Comments)   • Valium [Diazepam] Anxiety     Prior to Admission medications    Medication Sig Start Date End Date Taking? Authorizing Provider   apixaban (ELIQUIS) 5 MG tablet tablet Take 5 mg by mouth 2 (Two) Times a Day.    Enrique Recio MD   aspirin 81 MG EC tablet Take 1 tablet by mouth Daily. 1/29/23   Adam Gaspar MD   busPIRone (BUSPAR) 7.5 MG tablet Take 7.5 mg by mouth 2 (Two) Times a Day.    Enrique Recio MD   donepezil (ARICEPT) 10 MG tablet Take 10 mg by mouth Every Night.    Enrique Recio MD   DULoxetine (CYMBALTA) 60 MG capsule Take 60 mg by mouth Daily.    Enrique Recio MD   Ergocalciferol (VITAMIN D2 PO) Take 1.25 mg by mouth 1 (One) Time Per Week.    Enrique Recio MD   gabapentin (NEURONTIN) 300 MG capsule Take 1 capsule by mouth 2 (Two) Times  a Day. 1/28/23   Adam Gaspar MD   HYDROcodone-acetaminophen (NORCO)  MG per tablet Take 1 tablet by mouth Every 6 (Six) Hours As Needed for Moderate Pain. 1/28/23   Adam Gaspar MD   Insulin Lispro (humaLOG) 100 UNIT/ML injection Inject  under the skin into the appropriate area as directed 3 (Three) Times a Day Before Meals.    Enrique Recio MD   levothyroxine (SYNTHROID, LEVOTHROID) 25 MCG tablet Take 25 mcg by mouth Daily.    Enrique Recio MD   meclizine (ANTIVERT) 25 MG tablet TAKE 1 TABLET THREE TIMES DAILY AS NEEDED FOR DIZZINESS  Patient taking differently: 3 (Three) Times a Day. 2/26/20   Zena Blankenship APRN   metoprolol tartrate (LOPRESSOR) 25 MG tablet Take 1 tablet by mouth Every 12 (Twelve) Hours. 1/28/23   Adam Gaspar MD   omeprazole (priLOSEC) 40 MG capsule Take 40 mg by mouth Daily.    Enrique Recio MD   oxybutynin (DITROPAN) 5 MG tablet Take 5 mg by mouth 2 (Two) Times a Day.    Enrique Recio MD   QUEtiapine (SEROquel) 50 MG tablet Take 1 tablet by mouth Daily. 1/28/23   Adam Gaspar MD   rosuvastatin (CRESTOR) 40 MG tablet Take 1 tablet by mouth Every Night. 1/28/23   Adam Gaspar MD   sennosides-docusate (PERICOLACE) 8.6-50 MG per tablet Take 2 tablets by mouth 2 (Two) Times a Day. 1/28/23   Adam Gaspar MD            Neurological Exam  Mental Status  Alert. Oriented only to person and time. Mild dysarthria present. Expressive aphasia and receptive aphasia present.    Cranial Nerves  CN II: Visual fields full to confrontation.  CN III, IV, VI: Extraocular movements intact bilaterally. Pupils equal round and reactive to light bilaterally.  CN V: Facial sensation is normal.  CN VII: Full and symmetric facial movement.  CN VIII: Hearing intact bilaterally.  CN IX, X: Palate elevates symmetrically  CN XII: Tongue midline without atrophy or fasciculations.    Motor  Decreased muscle bulk throughout. No fasciculations present. Decreased muscle  tone.  Patient is extremities equally bilaterally with generalized weakness.    Sensory  Light touch is normal in upper and lower extremities.     Coordination    No obvious dysmetria.    Gait    Not observed.      Physical Exam  Vitals reviewed.   Constitutional:       General: She is not in acute distress.     Appearance: She is obese. She is not ill-appearing.   HENT:      Head: Normocephalic.      Mouth/Throat:      Mouth: Mucous membranes are dry.   Eyes:      Extraocular Movements: Extraocular movements intact.      Pupils: Pupils are equal, round, and reactive to light.   Cardiovascular:      Rate and Rhythm: Normal rate and regular rhythm.   Pulmonary:      Effort: Pulmonary effort is normal. No respiratory distress.      Comments: On 2 L nasal cannula  Skin:     General: Skin is warm and dry.      Coloration: Skin is pale.      Findings: Bruising and lesion present.   Neurological:      Mental Status: She is alert. She is disoriented.      Cranial Nerves: Dysarthria present. No cranial nerve deficit.      Sensory: No sensory deficit.      Motor: Weakness present.   Psychiatric:         Attention and Perception: Attention normal.         Mood and Affect: Mood normal.         Speech: Speech is slurred.         Behavior: Behavior normal. Behavior is cooperative.         Cognition and Memory: Cognition is impaired. Memory is impaired.         Acute Stroke Data    Thrombolytic Inclusion / Exclusion Criteria    Time: 2/9/2023 01:35 EST  Person Administering Scale: EVELINA Dunham    Inclusion Criteria  [x]   18 years of age or greater   []   Onset of symptoms < 4.5 hours before beginning treatment (stroke onset = time patient was last seen well or without symptoms).   []   Diagnosis of acute ischemic stroke causing measurable disabling deficit (Complete Hemianopia, Any Aphasia, Visual or Sensory Extinction, Any weakness limiting sustained effort against gravity)   []   Any remaining deficit  considered potentially disabling in view of patient and practitioner   Exclusion criteria (Do not proceed with Alteplase if any are checked under exclusion criteria)  []   Onset unknown or GREATER than 4.5 hours   [x]   ICH on CT/MRI   []   CT demonstrates hypodensity representing acute or subacute infarct   []   Significant head trauma or prior stroke in the previous 3 months   []   Symptoms suggestive of subarachnoid hemorrhage   []   History of un-ruptured intracranial aneurysm GREATER than 10 mm   []   Recent intracranial or intraspinal surgery within the last 3 months   []   Arterial puncture at a non-compressible site in the previous 7 days   []   Active internal bleeding   []   Acute bleeding tendency   []   Platelet count LESS than 100,000 for known hematological diseases such as leukemia, thrombocytopenia or chronic cirrhosis   []   Current use of anticoagulant with INR GREATER than 1.7 or PT GREATER than 15 seconds, aPTT GREATER than 40 seconds   []   Heparin received within 48 hours, resulting in abnormally elevated aPTT GREATER than upper limit of normal   [x]   Current use of direct thrombin inhibitors or direct factor Xa inhibitors in the past 48 hours   []   Elevated blood pressure refractory to treatment (systolic GREATER than 185 mm/Hg or diastolic  GREATER than 110 mm/Hg   []   Suspected infective endocarditis and aortic arch dissection   []   Current use of therapeutic treatment dose of low-molecular-weight heparin (LMWH) within the previous 24 hours   []   Structural GI malignancy or bleed   Relative exclusion for all patients  []   Only minor non-disabling symptoms   []   Pregnancy   []   Seizure at onset with postictal residual neurological impairments   []   Major surgery or previous trauma within past 14 days   []   History of previous spontaneous ICH, intracranial neoplasm, or AV malformation   []   Postpartum (within previous 14 days)   []   Recent GI or urinary tract hemorrhage (within  previous 21 days)   []   Recent acute MI (within previous 3 months)   []   History of un-ruptured intracranial aneurysm LESS than 10 mm   []   History of ruptured intracranial aneurysm   []   Blood glucose LESS than 50 mg/dL (2.7 mmol/L)   []   Dural puncture within the last 7 days   []   Known GREATER than 10 cerebral microbleeds   Additional exclusions for patients with symptoms onset between 3 and 4.5 hours.  [x]   Age > 80.   []   On any anticoagulants regardless of INR  >>> Warfarin (Coumadin), Heparin, Enoxaparin (Lovenox), fondaparinux (Arixtra), bivalirudin (Angiomax), Argatroban, dabigatran (Pradaxa), rivaroxaban (Xarelto), or apixaban (Eliquis)   []   Severe stroke (NIHSS > 25).   []   History of BOTH diabetes and previous ischemic stroke.   []   The risks and benefits have been discussed with the patient or family related to the administration of IV thrombolytic therapy for stroke symptoms.   []   I have discussed and reviewed the patient's case and imaging with the attending prior to IV thrombolytic therapy.   N/A Time IV thrombolytic administered       Hospital Meds:  Scheduled-   Infusions- No current facility-administered medications for this encounter.     PRNs-     Functional Status Prior to Current Stroke/Rooks Score: 2-3    NIH Stroke Scale  Time: 0210 EST  Person Administering Scale: EVELINA Dunham    Interval: baseline  1a. Level of Consciousness: 0-->Alert, keenly responsive  1b. LOC Questions: 2-->Answers neither question correctly  1c. LOC Commands: 0-->Performs both tasks correctly  2. Best Gaze: 0-->Normal  3. Visual: 0-->No visual loss  4. Facial Palsy: 0-->Normal symmetrical movements  5a. Motor Arm, Left: 0-->No drift, limb holds 90 (or 45) degrees for full 10 secs  5b. Motor Arm, Right: 0-->No drift, limb holds 90 (or 45) degrees for full 10 secs  6a. Motor Leg, Left: 0-->No drift, leg holds 30 degree position for full 5 secs  6b. Motor Leg, Right: 0-->No drift, leg  holds 30 degree position for full 5 secs  7. Limb Ataxia: 0-->Absent  8. Sensory: 0-->Normal, no sensory loss  9. Best Language: 1-->Mild-to-moderate aphasia, some obvious loss of fluency or facility of comprehension, without significant limitation on ideas expressed or form of expression. Reduction of speech and/or comprehension, however, makes conversation. . . (see row details)  10. Dysarthria: 1-->Mild-to-moderate dysarthria, patient slurs at least some words and, at worst, can be understood with some difficulty  11. Extinction and Inattention (formerly Neglect): 0-->No abnormality    Total (NIH Stroke Scale): 4      Results Reviewed:  I have personally reviewed current lab, radiology, and data and agree with results.    CT head from OSH (2/9) report reviewed; subacute left parietal lobe infarct with small areas of acute hemorrhagic conversion.  Remote left head of caudate stroke.    LDL (1/11) 121  A1c (1/10) 8.30    Results for orders placed during the hospital encounter of 10/11/17    Adult Transthoracic Echo Complete W/ Cont if Necessary Per Protocol    Interpretation Summary  · Left ventricular systolic function is normal. Estimated EF = 70%. No regional wall motion abnormalities are noted.  · Left ventricular diastolic dysfunction (grade I) consistent with impaired relaxation.  · Left atrial cavity size is mildly dilated.  · The cardiac valves are functionally normal.       Assessment/Plan:    This is an 82 year old female with known medical diagnoses of essential hypertension, hyperlipidemia, diabetes mellitus type 2, atrial fibrillation, remote PE (on chronic Eliquis therapy), dementia, remote COVID-19 infection (1/3/2023), anxiety and recent left parietal stroke (admitted to [facility] 1/10/2023-01/28/2023) who arrives to our facility as a transfer from [facility] for CT findings of small ICH consistent with hemorrhagic transformation of left parietal stroke.  She was  transferred to our facility for higher level of care and close neurological monitoring.    Antiplatelet PTA: ASA 81 mg  Anticoagulant PTA: Eliquis 5 mg twice daily        1. Intracerebral hemorrhage, hemorrhagic conversion of remote left parietal stroke secondary to Eliquis and aspirin use  -Hemorrhagic stroke order set has been initiated  -ICH score 0  -Nursing to have disc from OSH uploaded into [facility]  -Repeat CT head in a.m. 0600  -N.p.o. until SLP evaluation in a.m. given mild dysarthria (may be patient's baseline)  -Hold all antiplatelet and anticoagulation medications  -Activity as tolerated  -PT/OT/SLP evaluation in a.m.  -A1c and lipid panel performed during January admission to [facility]; no need to repeat  -TTE in AM; last completed in 2019    2. Paroxysmal atrial fibrillation  -Patient currently in normal sinus rhythm  -Hold Eliquis at this time given ICH  -Rate control per hospitalist  -Cardiology consult in AM to discuss other treatment options other than oral anticoagulation given patient's hemorrhagic conversion    3. Essential hypertension  -Strict blood pressure monitoring, please keep SBP <140  -Labetalol 10mg IV once PRN SBP >140  -Nicardipine for SBP >140  -Hold home antihypertensive medications at this time    4. Hyperlipidemia  -LDL (1/11) 121, goal <70; no need to repeat this admission  -Patient on Crestor 40 mg prior to arrival, will continue    5. Diabetes mellitus type 2  -A1c (1/10) 8.30; no need to repeat this admission  -Diabetes educator to see  -Management per hospitalist  -Maintain euglycemia, goal blood glucose <140    6. Dementia  -Continue Aricept 10mg nightly  -Continue Seroquel 50 mg nightly    7.  UTI  -OSH gave patient 1g IV Rocephin prior to transfer  -Management per hospitalist    Plan of care was discussed with primary team.  Stroke neurology will continue to follow.  Please call with any questions or concerns.  Thank you for this consult.    Mary Mosqueda  Rosas, APRN  February 10, 2023  0243 EST

## 2023-02-10 NOTE — PLAN OF CARE
Problem: Adult Inpatient Plan of Care  Goal: Plan of Care Review  Recent Flowsheet Documentation  Taken 2/10/2023 0857 by Westley Sotomayor OT  Plan of Care Reviewed With: patient  Outcome Evaluation:   VSS   Pt presents with fxl decline from PLOF, deficits in ADL performance, fxl mobility, occupational endurance. Pt limited by decreased cognition, lethargy, weakness, decreased balance, resistance to movement. Pt required dependent x 2 for all bed mobility, attempt at STS, UBD, post toilet hygiene, anticipate dependent for LBD. Pt will benefit from skilled OT services to address deficits, facilitate increased fxl I. Recommend SNF for rehab at discharge.   Goal Outcome Evaluation:  Plan of Care Reviewed With: patient           Outcome Evaluation: VSS; Pt presents with fxl decline from PLOF, deficits in ADL performance, fxl mobility, occupational endurance. Pt limited by decreased cognition, lethargy, weakness, decreased balance, resistance to movement. Pt required dependent x 2 for all bed mobility, attempt at STS, UBD, post toilet hygiene, anticipate dependent for LBD. Pt will benefit from skilled OT services to address deficits, facilitate increased fxl I. Recommend SNF for rehab at discharge.

## 2023-02-10 NOTE — ED NOTES
Report given to [facility]. Patient left via stretcher without incident to ambulance.      Omar Pozo RN  02/10/23 [id number]

## 2023-02-10 NOTE — PLAN OF CARE
Goal Outcome Evaluation:      SLP evaluation completed. Will continue to address dysphagia and communication. Please see note for further details and recommendations.

## 2023-02-10 NOTE — PROGRESS NOTES
Neurology Note    Patient:  Cheri Cruz    YOB: 1940    REFERRING PHYSICIAN:  Crispin Anders MD    CHIEF COMPLAINT:    AMS, RUE weakness    HISTORY OF PRESENT ILLNESS:   The patient is stable overnight, NIHSS 4, SBP 90s-140s, afebrile, getting IV Rocephin.    Past Medical History:  Past Medical History:   Diagnosis Date   • Abnormal heart rhythm    • Anxiety    • Arrhythmia    • Arthritis    • Atrial fibrillation (HCC)    • Dementia (HCC)    • Diabetes mellitus (HCC)    • Hyperlipidemia    • Hypertension    • Kidney disorder    • Stroke (HCC)    • Uterus cancer (HCC)        Past Surgical History:  Past Surgical History:   Procedure Laterality Date   • CATARACT EXTRACTION, BILATERAL         Social History:   Social History     Socioeconomic History   • Marital status:    Tobacco Use   • Smoking status: Never   • Smokeless tobacco: Never   Vaping Use   • Vaping Use: Never used   Substance and Sexual Activity   • Alcohol use: No   • Drug use: No   • Sexual activity: Defer        Family History:   Family History   Problem Relation Age of Onset   • Heart disease Mother    • Cancer Mother    • Congenital heart disease Mother    • Cancer Father    • Cancer Brother    • Alcohol abuse Brother        Medications Prior to Admission:    Prior to Admission medications    Medication Sig Start Date End Date Taking? Authorizing Provider   apixaban (ELIQUIS) 5 MG tablet tablet Take 5 mg by mouth 2 (Two) Times a Day.    Enrique Recio MD   aspirin 81 MG EC tablet Take 1 tablet by mouth Daily. 1/29/23   Adam Gaspar MD   busPIRone (BUSPAR) 7.5 MG tablet Take 7.5 mg by mouth 2 (Two) Times a Day.    Enrique Recio MD   donepezil (ARICEPT) 10 MG tablet Take 10 mg by mouth Every Night.    Enrique Recio MD   DULoxetine (CYMBALTA) 60 MG capsule Take 60 mg by mouth Daily.    Enrique Recio MD   Ergocalciferol (VITAMIN D2 PO) Take 1.25 mg by mouth 1 (One) Time Per Week.    Provider  MD Enrique   gabapentin (NEURONTIN) 300 MG capsule Take 1 capsule by mouth 2 (Two) Times a Day. 1/28/23   Adam Gaspar MD   HYDROcodone-acetaminophen (NORCO)  MG per tablet Take 1 tablet by mouth Every 6 (Six) Hours As Needed for Moderate Pain. 1/28/23   Adam Gaspar MD   Insulin Lispro (humaLOG) 100 UNIT/ML injection Inject  under the skin into the appropriate area as directed 3 (Three) Times a Day Before Meals.    ProviderEnrique MD   levothyroxine (SYNTHROID, LEVOTHROID) 25 MCG tablet Take 25 mcg by mouth Daily.    ProviderEnrique MD   meclizine (ANTIVERT) 25 MG tablet TAKE 1 TABLET THREE TIMES DAILY AS NEEDED FOR DIZZINESS  Patient taking differently: 3 (Three) Times a Day. 2/26/20   Zena Blankenship APRN   metoprolol tartrate (LOPRESSOR) 25 MG tablet Take 1 tablet by mouth Every 12 (Twelve) Hours. 1/28/23   Adam Gaspar MD   omeprazole (priLOSEC) 40 MG capsule Take 40 mg by mouth Daily.    ProviderEnrique MD   oxybutynin (DITROPAN) 5 MG tablet Take 5 mg by mouth 2 (Two) Times a Day.    ProviderEnrique MD   QUEtiapine (SEROquel) 50 MG tablet Take 1 tablet by mouth Daily. 1/28/23   Adam Gaspar MD   rosuvastatin (CRESTOR) 40 MG tablet Take 1 tablet by mouth Every Night. 1/28/23   Adam Gaspar MD   sennosides-docusate (PERICOLACE) 8.6-50 MG per tablet Take 2 tablets by mouth 2 (Two) Times a Day. 1/28/23   Adam Gaspar MD       Allergies:  Penicillins, Sulfa antibiotics, Tetracyclines & related, and Valium [diazepam]      Review of system  Review of Systems   Unable to perform ROS: Mental status change       Vitals:    02/10/23 1100   BP: 106/51   Pulse: 75   Resp: 18   Temp: 98.3 °F (36.8 °C)   SpO2: 95%       Physical exam  Physical Exam  Eyes:      Extraocular Movements: Extraocular movements intact.      Pupils: Pupils are equal, round, and reactive to light.   Cardiovascular:      Rate and Rhythm: Normal rate and regular rhythm.   Pulmonary:      Effort: Pulmonary  effort is normal.   Neurological:      Sensory: Sensation is intact.      Deep Tendon Reflexes: Babinski sign present on the right side. Babinski sign absent on the left side.      Comments: Asleep, alerts to voice, oriented to self, able to name and repeat, follows verbal commands, no facial droop, VFf, moves limbs against gravity.           Lab Results   Component Value Date    WBC 7.4 02/09/2023    HGB 10.7 (L) 02/09/2023    HCT 32.6 (L) 02/09/2023    .5 (H) 02/09/2023     02/09/2023     Lab Results   Component Value Date    GLUCOSE 197 (H) 01/27/2023    BUN 18 01/27/2023    CREATININE 1.37 (H) 01/27/2023    EGFRIFNONA 42 (L) 11/29/2021    EGFRIFAFRI 50 (L) 11/29/2021    BCR 13.1 01/27/2023    CO2 25.0 01/27/2023    CALCIUM 8.6 01/27/2023    ALBUMIN 3.0 (L) 01/27/2023    AST 15 01/27/2023    ALT <5 01/27/2023     Contains abnormal data Urinalysis, Microscopic Only -  Order: [id number]   Status: Final result      Next appt: None             Component  Ref Range & Units 1 d ago  (2/9/23) 1 yr ago  (11/2/21) 2 yr ago  (10/8/20) 2 yr ago  (4/13/20) 3 yr ago  (4/22/19)   Mucus, UA  None Seen /LPF 2+ Abnormal   Rare Abnormal     Rare Abnormal  R    WBC, UA  0 - 5 /HPF >100 Abnormal   0-2  None seen  0-2  10-20 Abnormal     RBC, UA  0 - 4 /HPF see below Abnormal   None seen  0-2  None seen  None seen R    Comment: Present but obscured.   Bacteria, UA  None Seen /HPF 3+ Abnormal              ECG 12 Lead Stroke Evaluation (Order [id number])  Order-Level Documents:    Scan on 2/10/2023 0438 by [facility], [place]: ECG 12-LEAD         Author: -- Service: -- Author Type: --   Filed:  Date of Service:  Creation Time:    Status: (Other)      Test Reason : Stroke Evaluation  Blood Pressure :   */*   mmHG  Vent. Rate :  69 BPM     Atrial Rate :  69 BPM     P-R Int : 170 ms          QRS Dur :  78 ms      QT Int : 420 ms       P-R-T Axes :  25 -19  54 degrees     QTc Int : 450 ms     Normal sinus rhythm  Moderate  voltage criteria for LVH, may be normal variant  Inferior infarct , age undetermined  Abnormal ECG  No previous ECGs available  Confirmed by SAMANTHA TRAYLOR ([id number]) on 2/10/2023 4:42:55 PM     Referred By: MD           Confirmed By: SAMANTHA TRAYLOR        Signed    Electronically signed by Samantha Traylor MD on 2/10/23 at 1642 EST         Radiological Studies:  CT Head Without Contrast    Result Date: 2/10/2023  CT HEAD WO CONTRAST Date of Exam: 2/10/2023 6:38 AM EST Indication: Stroke, follow up f/up hemorrhagic conversion. Comparison: Outside CT head one day prior Technique: Axial CT images were obtained of the head without contrast administration.  Reconstructed coronal and sagittal images were also obtained. Automated exposure control and iterative construction methods were used. Findings: Redemonstrated prominent left posterior temporal and inferior parietal acute infarct with intermixed areas of small hyperdensity compatible with minimal hemorrhage. There is no evidence of interval increased hemorrhage or worsening edema or mass effect. There is no midline shift or significant ventricular effacement. No new ischemia, hemorrhage or mass effects present elsewhere. Generalized age-related changes are again noted. The calvarium is intact. The orbits are normal. The paranasal sinuses are grossly clear.     Impression: Stable appearance of findings noted on outside CT comparison, demonstrating an acute left posterior temporal and inferior parietal infarct with minimal amount of intervening hemorrhagic conversion. There is no evidence of interval increased hemorrhage or  worsening mass effect. Electronically Signed: Luis Manuel Myers  2/10/2023 8:46 AM EST  Workstation ID: [id number]    CT Head Without Contrast    Result Date: 1/16/2023  PROCEDURE: CT HEAD WO CONTRAST-  HISTORY: Mental status change, unknown cause; I63.9-Cerebral infarction, unspecified  COMPARISON: 01/13/2023.  TECHNIQUE: Multiple axial CT images were  performed from the foramen magnum to the vertex without enhancement.  FINDINGS: Exam is limited by patient motion artifact. There is a stable area of decreased attenuation in the left posterior parietal region with obliteration of the associated sulci consistent with CVA. There is no evidence of acute hemorrhage.  There is mild mass effect on the occipital horn of the left lateral ventricle, similar to the prior exam. There is no subdural hematoma.  There is no hydrocephalus. The paranasal sinuses are clear.      CVA in the left posterior parietal region appears similar to the prior exam.    1686.68 mGy.cm   This study was performed with techniques to keep radiation doses as low as reasonably achievable (ALARA). Individualized dose reduction techniques using automated exposure control or adjustment of mA and/or kV according to the patient size were employed.     Images were reviewed, interpreted, and dictated by Dr. Mare Kwong MD Transcribed by Ramila Rodríguez PA-C.  This report was signed and finalized on 1/16/2023 3:33 PM by Mare Kwong MD.    CT Head Without Contrast    Result Date: 1/13/2023  FINAL REPORT TECHNIQUE: Multiple axial CT images were performed from the foramen magnum to the vertex. This study was performed with techniques to keep radiation doses as low as reasonably achievable (ALARA). Individualized dose reduction techniques using automated exposure control or adjustment of mA and/or kV according to the patient's size were employed. CLINICAL HISTORY: Repeat interval scan to rule out hemorrhagic conversion of the large left MCA FINDINGS: There is a large region of low density enhancement in the left parietal and temporal lobes compatible with subacute infarction. There is no evidence of hemorrhagic conversion.  There is no sign of mass effect or midline shift.  No acute intracranial hemorrhage or large acute cortical infarct.  The brain volume is normal for patient's age.  Ventricles are normal  in size and configuration.  No midline shift.  The basal cisterns are patent.  No skull fracture.  The visualized paranasal sinuses and mastoid air cells are clear.     Large subacute left cerebral infarct.  No evidence of hemorrhagic conversion. Authenticated and Electronically Signed by Theodore Carrillo MD on 01/13/2023 10:43:23 PM    XR Outside Films    Result Date: 2/10/2023  This procedure was auto-finalized with no dictation required.    CT ABDOMEN PELVIS WO CONTRAST Additional Contrast? None    Result Date: 2/9/2023  CT ABDOMEN AND PELVIS: INDICATION: abd pain, lower half TECHNIQUE: Noncontrast CT images of the abdomen and pelvis were obtained. Up-to-date CT equipment and radiation dose reduction techniques were employed. COMPARISON: NONE. FINDINGS:  There are atelectatic changes identified within the medial aspect of the right upper lobe as well as the medial aspect the right lower lobe. There is some groundglass atelectasis identified at the left lung base. No pleural effusion is identified. There is calcification of mitral annulus. There is atherosclerotic calcification left anterior descending coronary artery. Patient status post a cholecystectomy. No biliary dilatation is identified. There is small hiatal hernia. The liver appears unremarkable. There is moderate-severe atrophy of the pancreas. There are calcified granulomas present within normal size spleen. Adrenal glands are normal. No hydronephrosis or nephrolithiasis is identified. There is mild renal atrophy. There is extensive calcification of the abdominal aorta. No aneurysm is identified. Large amount of stool is identified within the rectum, sigmoid colon and descending colon. There is some associated edema identified within the posterior perirectal fat.. No abnormal bowel wall thickening is identified. No bowel dilatation is identified.  Appendix is visualized and is normal. Patient status post hysterectomy. There is a right hip femoral neck  screw. No osteolytic or osteoblastic lesions are identified. Facet joint spondylosis is present with the lumbar spine.    1. Large amount of stool is identified within the rectum with some associated edematous fat stranding identified within the posterior perirectal fat. The findings can be seen with fecal impaction and stercoral colitis. 2. Status post hysterectomy. 3. Status post cholecystectomy. 4. Atelectatic changes identified along the detailed above. 5. Small hiatal hernia. 6. Atrophic pancreas.    CT Head WO Contrast    Result Date: 2/9/2023  HEAD CT SCAN WITHOUT CONTRAST   HISTORY: AMS, hx of stroke 1 month ago COMPARISON:  Head CT dated June 1621 TECHNIQUE: Noncontrast CT images of the head were obtained. Images were reformatted in the coronal and sagittal planes. Up-to-date CT equipment and radiation dose reduction techniques were employed. FINDINGS: There is low attenuation in sulcal effacement identified within the posterior inferior aspect of the left parietal lobe consistent with subacute infarct. There are associated small areas of hemorrhagic transformation within the infarct. No herniation is identified. There is prominence of the ventricles, cistern and sulci. There is a remote lacunar infarct identified within the head of the left caudate nucleus.    1. Subacute infarct is identified with the posterior inferior aspect left parietal lobe with some small areas of acute hemorrhagic conversion. 2. Mild atrophy. 3. Remote lacunar infarct is identified with the head of the  left caudate nucleus.    XR CHEST PORTABLE    Result Date: 2/9/2023  CHEST 1 VIEW   HISTORY: low O2 saturation   COMPARISON: Chest x-ray dated 11-21 FINDINGS: Portable AP radiograph of the chest was obtained. The heart and mediastinum are within normal limits for size and configuration. No infiltrate, effusion or pneumothorax is identified. Lung volumes are decreased. Calcified granulomas project over the lung bases. Degenerative  changes are identified at the right glenohumeral joint.    1.  No evidence of acute cardiopulmonary disease.    CT Outside Films    Result Date: 2/10/2023  This procedure was auto-finalized with no dictation required.    CT Outside Films    Result Date: 2/10/2023  This procedure was auto-finalized with no dictation required.        During this visit the following were done:  Labs Reviewed [x]    Labs Ordered []    Radiology Reports Reviewed [x]    Radiology Ordered []    EKG, echo, and/or stress test reviewed [x]    EEG results reviewed  []    EEG reviewed and interpreted per myself   []    Discussed case with neurointerventionalist or neuroradiologist []    Referring Provider Records Reviewed []    ER Records Reviewed []    Hospital Records Reviewed []    History Obtained From Family []    Radiological images view and Interpreted per myself [x]    Case Discussed with referring provider []     Decision to obtain and request outside records  []        Assessment and Plan     Subacute left MCA CVA 22 h/o A.fib. Minor hemorrhagic conversion on Eliquis and aspirin. Currently in NSR. Suspect contribution to AMS/TME from UTI and fecal impaction.   - Observation on telemetry.   - BP<140/90.   - Hold aspirin and Eliquis.   - F/U CT head in am.   - Okay to resume aspirin if F/U CT is stable.   - Hold Eliquis for one week.   - ST, PT, OT.              Electronically signed by Dario Arnett MD on 2/10/2023 at 12:36 EST

## 2023-02-10 NOTE — CASE MANAGEMENT/SOCIAL WORK
Continued Stay Note  [facility]     Patient Name: Cheri Cruz  MRN: [id number]  Today's Date: 2/10/2023    Admit Date: 2/10/2023    Plan: swing bed   Discharge Plan     Row Name 02/10/23 1522       Plan    Plan swing bed    Plan Comments I spoke with patient's daughter in regards to discharge planning. She would like for her to go to [facility] swing bed as opposed to [facility]. Patient lives in [place] and has her daughter's assistance in the home. Patient is on home oxygen. She has a walker and a cane. Her PCP is [facility] [place]. She has no advanced directives. I will send records to [facility] and have Izzy review them on Monday.    Final Discharge Disposition Code 61 - hospital-based swing bed               Discharge Codes    No documentation.               Expected Discharge Date and Time     Expected Discharge Date Expected Discharge Time    Feb 15, 2023             Freda Encarnacion RN

## 2023-02-11 ENCOUNTER — APPOINTMENT (OUTPATIENT)
Dept: CT IMAGING | Facility: HOSPITAL | Age: 83
DRG: 64 | End: 2023-02-11
Payer: MEDICARE

## 2023-02-11 LAB
ANION GAP SERPL CALCULATED.3IONS-SCNC: 11 MMOL/L (ref 5–15)
BASOPHILS # BLD AUTO: 0.02 10*3/MM3 (ref 0–0.2)
BASOPHILS NFR BLD AUTO: 0.4 % (ref 0–1.5)
BH CV ECHO LEFT VENTRICLE BASAL CAVITARY GRADIENT: 71 MMHG
BH CV ECHO MEAS - AO MAX PG: 11.7 MMHG
BH CV ECHO MEAS - AO MEAN PG: 6 MMHG
BH CV ECHO MEAS - AO ROOT DIAM: 2.9 CM
BH CV ECHO MEAS - AO V2 MAX: 171 CM/SEC
BH CV ECHO MEAS - AO V2 VTI: 39.7 CM
BH CV ECHO MEAS - AVA(I,D): 5.3 CM2
BH CV ECHO MEAS - EDV(CUBED): 42.9 ML
BH CV ECHO MEAS - EDV(MOD-SP2): 52.2 ML
BH CV ECHO MEAS - EDV(MOD-SP4): 59.2 ML
BH CV ECHO MEAS - EF(MOD-BP): 70.6 %
BH CV ECHO MEAS - EF(MOD-SP2): 73.6 %
BH CV ECHO MEAS - EF(MOD-SP4): 72.3 %
BH CV ECHO MEAS - ESV(CUBED): 4.9 ML
BH CV ECHO MEAS - ESV(MOD-SP2): 13.8 ML
BH CV ECHO MEAS - ESV(MOD-SP4): 16.4 ML
BH CV ECHO MEAS - FS: 51.4 %
BH CV ECHO MEAS - IVS/LVPW: 1 CM
BH CV ECHO MEAS - IVSD: 1.1 CM
BH CV ECHO MEAS - LAT PEAK E' VEL: 9.8 CM/SEC
BH CV ECHO MEAS - LV DIASTOLIC VOL/BSA (35-75): 28.6 CM2
BH CV ECHO MEAS - LV MASS(C)D: 119 GRAMS
BH CV ECHO MEAS - LV MAX PG: 70.5 MMHG
BH CV ECHO MEAS - LV MEAN PG: 39.5 MMHG
BH CV ECHO MEAS - LV SYSTOLIC VOL/BSA (12-30): 7.9 CM2
BH CV ECHO MEAS - LV V1 MAX: 419 CM/SEC
BH CV ECHO MEAS - LV V1 VTI: 61.1 CM
BH CV ECHO MEAS - LVIDD: 3.5 CM
BH CV ECHO MEAS - LVIDS: 1.7 CM
BH CV ECHO MEAS - LVOT AREA: 3.5 CM2
BH CV ECHO MEAS - LVOT DIAM: 2.1 CM
BH CV ECHO MEAS - LVPWD: 1.1 CM
BH CV ECHO MEAS - MED PEAK E' VEL: 8.4 CM/SEC
BH CV ECHO MEAS - MV A MAX VEL: 180 CM/SEC
BH CV ECHO MEAS - MV DEC SLOPE: 248 CM/SEC2
BH CV ECHO MEAS - MV DEC TIME: 0.42 MSEC
BH CV ECHO MEAS - MV E MAX VEL: 103 CM/SEC
BH CV ECHO MEAS - MV E/A: 0.57
BH CV ECHO MEAS - MV MAX PG: 13.2 MMHG
BH CV ECHO MEAS - MV MEAN PG: 5 MMHG
BH CV ECHO MEAS - MV V2 VTI: 52.7 CM
BH CV ECHO MEAS - MVA(VTI): 4 CM2
BH CV ECHO MEAS - PA ACC TIME: 0.14 SEC
BH CV ECHO MEAS - PA PR(ACCEL): 16.4 MMHG
BH CV ECHO MEAS - PA V2 MAX: 151 CM/SEC
BH CV ECHO MEAS - RAP SYSTOLE: 8 MMHG
BH CV ECHO MEAS - RVSP: 30 MMHG
BH CV ECHO MEAS - SI(MOD-SP2): 18.6 ML/M2
BH CV ECHO MEAS - SI(MOD-SP4): 20.7 ML/M2
BH CV ECHO MEAS - SV(LVOT): 211.5 ML
BH CV ECHO MEAS - SV(MOD-SP2): 38.4 ML
BH CV ECHO MEAS - SV(MOD-SP4): 42.8 ML
BH CV ECHO MEAS - TR MAX PG: 22.6 MMHG
BH CV ECHO MEAS - TR MAX VEL: 237.5 CM/SEC
BH CV ECHO MEASUREMENTS AVERAGE E/E' RATIO: 11.32
BH CV XLRA - RV BASE: 3 CM
BH CV XLRA - RV LENGTH: 5.9 CM
BH CV XLRA - RV MID: 2.6 CM
BH CV XLRA - TDI S': 16.3 CM/SEC
BLOOD CULTURE, ROUTINE: NORMAL
BUN SERPL-MCNC: 14 MG/DL (ref 8–23)
BUN/CREAT SERPL: 13 (ref 7–25)
CALCIUM SPEC-SCNC: 8.5 MG/DL (ref 8.6–10.5)
CHLORIDE SERPL-SCNC: 103 MMOL/L (ref 98–107)
CO2 SERPL-SCNC: 24 MMOL/L (ref 22–29)
CREAT SERPL-MCNC: 1.08 MG/DL (ref 0.57–1)
DEPRECATED RDW RBC AUTO: 57.5 FL (ref 37–54)
EGFRCR SERPLBLD CKD-EPI 2021: 51.7 ML/MIN/1.73
EOSINOPHIL # BLD AUTO: 0.16 10*3/MM3 (ref 0–0.4)
EOSINOPHIL NFR BLD AUTO: 3.2 % (ref 0.3–6.2)
ERYTHROCYTE [DISTWIDTH] IN BLOOD BY AUTOMATED COUNT: 14.6 % (ref 12.3–15.4)
GLUCOSE BLDC GLUCOMTR-MCNC: 170 MG/DL (ref 70–130)
GLUCOSE BLDC GLUCOMTR-MCNC: 174 MG/DL (ref 70–130)
GLUCOSE BLDC GLUCOMTR-MCNC: 197 MG/DL (ref 70–130)
GLUCOSE BLDC GLUCOMTR-MCNC: 229 MG/DL (ref 70–130)
GLUCOSE SERPL-MCNC: 197 MG/DL (ref 65–99)
HCT VFR BLD AUTO: 35 % (ref 34–46.6)
HGB BLD-MCNC: 10.9 G/DL (ref 12–15.9)
IMM GRANULOCYTES # BLD AUTO: 0.02 10*3/MM3 (ref 0–0.05)
IMM GRANULOCYTES NFR BLD AUTO: 0.4 % (ref 0–0.5)
LEFT ATRIUM VOLUME INDEX: 17.3 ML/M2
LYMPHOCYTES # BLD AUTO: 1.47 10*3/MM3 (ref 0.7–3.1)
LYMPHOCYTES NFR BLD AUTO: 29.5 % (ref 19.6–45.3)
MAXIMAL PREDICTED HEART RATE: 139 BPM
MCH RBC QN AUTO: 32.9 PG (ref 26.6–33)
MCHC RBC AUTO-ENTMCNC: 31.1 G/DL (ref 31.5–35.7)
MCV RBC AUTO: 105.7 FL (ref 79–97)
MONOCYTES # BLD AUTO: 0.48 10*3/MM3 (ref 0.1–0.9)
MONOCYTES NFR BLD AUTO: 9.6 % (ref 5–12)
NEUTROPHILS NFR BLD AUTO: 2.84 10*3/MM3 (ref 1.7–7)
NEUTROPHILS NFR BLD AUTO: 56.9 % (ref 42.7–76)
NRBC BLD AUTO-RTO: 0 /100 WBC (ref 0–0.2)
PLATELET # BLD AUTO: 163 10*3/MM3 (ref 140–450)
PMV BLD AUTO: 11.4 FL (ref 6–12)
POTASSIUM SERPL-SCNC: 4.4 MMOL/L (ref 3.5–5.2)
RBC # BLD AUTO: 3.31 10*6/MM3 (ref 3.77–5.28)
SODIUM SERPL-SCNC: 138 MMOL/L (ref 136–145)
STRESS TARGET HR: 118 BPM
WBC NRBC COR # BLD: 4.99 10*3/MM3 (ref 3.4–10.8)

## 2023-02-11 PROCEDURE — 99233 SBSQ HOSP IP/OBS HIGH 50: CPT | Performed by: FAMILY MEDICINE

## 2023-02-11 PROCEDURE — 25010000002 CEFTRIAXONE PER 250 MG: Performed by: INTERNAL MEDICINE

## 2023-02-11 PROCEDURE — 70450 CT HEAD/BRAIN W/O DYE: CPT

## 2023-02-11 PROCEDURE — 99222 1ST HOSP IP/OBS MODERATE 55: CPT | Performed by: INTERNAL MEDICINE

## 2023-02-11 PROCEDURE — 82962 GLUCOSE BLOOD TEST: CPT

## 2023-02-11 PROCEDURE — 85025 COMPLETE CBC W/AUTO DIFF WBC: CPT | Performed by: STUDENT IN AN ORGANIZED HEALTH CARE EDUCATION/TRAINING PROGRAM

## 2023-02-11 PROCEDURE — 25010000002 ONDANSETRON PER 1 MG: Performed by: INTERNAL MEDICINE

## 2023-02-11 PROCEDURE — 99232 SBSQ HOSP IP/OBS MODERATE 35: CPT | Performed by: CLINICAL NURSE SPECIALIST

## 2023-02-11 PROCEDURE — 80048 BASIC METABOLIC PNL TOTAL CA: CPT | Performed by: STUDENT IN AN ORGANIZED HEALTH CARE EDUCATION/TRAINING PROGRAM

## 2023-02-11 RX ORDER — SODIUM CHLORIDE 9 MG/ML
50 INJECTION, SOLUTION INTRAVENOUS CONTINUOUS
Status: DISCONTINUED | OUTPATIENT
Start: 2023-02-11 | End: 2023-02-12

## 2023-02-11 RX ORDER — ASPIRIN 81 MG/1
81 TABLET, CHEWABLE ORAL DAILY
Status: DISCONTINUED | OUTPATIENT
Start: 2023-02-11 | End: 2023-02-16 | Stop reason: HOSPADM

## 2023-02-11 RX ORDER — QUETIAPINE FUMARATE 25 MG/1
50 TABLET, FILM COATED ORAL NIGHTLY PRN
Status: DISCONTINUED | OUTPATIENT
Start: 2023-02-11 | End: 2023-02-16 | Stop reason: HOSPADM

## 2023-02-11 RX ADMIN — HYDROCODONE BITARTRATE AND ACETAMINOPHEN 1 TABLET: 10; 325 TABLET ORAL at 20:43

## 2023-02-11 RX ADMIN — BUSPIRONE HYDROCHLORIDE 7.5 MG: 5 TABLET ORAL at 18:12

## 2023-02-11 RX ADMIN — Medication 10 ML: at 20:44

## 2023-02-11 RX ADMIN — SODIUM CHLORIDE 50 ML/HR: 9 INJECTION, SOLUTION INTRAVENOUS at 12:04

## 2023-02-11 RX ADMIN — HYDROCODONE BITARTRATE AND ACETAMINOPHEN 1 TABLET: 10; 325 TABLET ORAL at 12:04

## 2023-02-11 RX ADMIN — DONEPEZIL HYDROCHLORIDE 10 MG: 10 TABLET, FILM COATED ORAL at 20:43

## 2023-02-11 RX ADMIN — GABAPENTIN 300 MG: 300 CAPSULE ORAL at 20:43

## 2023-02-11 RX ADMIN — CEFTRIAXONE 1 G: 1 INJECTION, POWDER, FOR SOLUTION INTRAMUSCULAR; INTRAVENOUS at 20:43

## 2023-02-11 RX ADMIN — ROSUVASTATIN 40 MG: 20 TABLET, FILM COATED ORAL at 20:43

## 2023-02-11 RX ADMIN — SENNOSIDES AND DOCUSATE SODIUM 2 TABLET: 8.6; 5 TABLET ORAL at 20:43

## 2023-02-11 RX ADMIN — ASPIRIN 81 MG 81 MG: 81 TABLET ORAL at 12:04

## 2023-02-11 RX ADMIN — ONDANSETRON 4 MG: 2 INJECTION INTRAMUSCULAR; INTRAVENOUS at 14:53

## 2023-02-11 NOTE — PROGRESS NOTES
Neurology Note    Patient:  Cheri Cruz    YOB: 1940    REFERRING PHYSICIAN:  Crispin Anders MD    CHIEF COMPLAINT:    AMS, RUE weakness    HISTORY OF PRESENT ILLNESS:     Patient alert, awake, no family at bedside.  No new neurologic symptoms noted.       Past Medical History:  Past Medical History:   Diagnosis Date   • Abnormal heart rhythm    • Anxiety    • Arrhythmia    • Arthritis    • Atrial fibrillation (HCC)    • Dementia (HCC)    • Diabetes mellitus (HCC)    • Hyperlipidemia    • Hypertension    • Kidney disorder    • Stroke (HCC)    • Uterus cancer (HCC)        Past Surgical History:  Past Surgical History:   Procedure Laterality Date   • CATARACT EXTRACTION, BILATERAL         Social History:   Social History     Socioeconomic History   • Marital status:    Tobacco Use   • Smoking status: Never   • Smokeless tobacco: Never   Vaping Use   • Vaping Use: Never used   Substance and Sexual Activity   • Alcohol use: No   • Drug use: No   • Sexual activity: Defer        Family History:   Family History   Problem Relation Age of Onset   • Heart disease Mother    • Cancer Mother    • Congenital heart disease Mother    • Cancer Father    • Cancer Brother    • Alcohol abuse Brother        Medications Prior to Admission:    Prior to Admission medications    Medication Sig Start Date End Date Taking? Authorizing Provider   apixaban (ELIQUIS) 5 MG tablet tablet Take 5 mg by mouth 2 (Two) Times a Day.    ProviderEnrique MD   aspirin 81 MG EC tablet Take 1 tablet by mouth Daily. 1/29/23   Adam Gaspar MD   busPIRone (BUSPAR) 7.5 MG tablet Take 7.5 mg by mouth 2 (Two) Times a Day.    ProviderEnrique MD   donepezil (ARICEPT) 10 MG tablet Take 10 mg by mouth Every Night.    Enrique Recio MD   DULoxetine (CYMBALTA) 60 MG capsule Take 60 mg by mouth Daily.    Enrique Recio MD   Ergocalciferol (VITAMIN D2 PO) Take 1.25 mg by mouth 1 (One) Time Per Week.    Provider  MD Enrique   gabapentin (NEURONTIN) 300 MG capsule Take 1 capsule by mouth 2 (Two) Times a Day. 1/28/23   Adam Gaspar MD   HYDROcodone-acetaminophen (NORCO)  MG per tablet Take 1 tablet by mouth Every 6 (Six) Hours As Needed for Moderate Pain. 1/28/23   Adam Gaspar MD   Insulin Lispro (humaLOG) 100 UNIT/ML injection Inject  under the skin into the appropriate area as directed 3 (Three) Times a Day Before Meals.    ProviderEnrique MD   levothyroxine (SYNTHROID, LEVOTHROID) 25 MCG tablet Take 25 mcg by mouth Daily.    ProviderEnrique MD   meclizine (ANTIVERT) 25 MG tablet TAKE 1 TABLET THREE TIMES DAILY AS NEEDED FOR DIZZINESS  Patient taking differently: 3 (Three) Times a Day. 2/26/20   Zena Blankenship APRN   metoprolol tartrate (LOPRESSOR) 25 MG tablet Take 1 tablet by mouth Every 12 (Twelve) Hours. 1/28/23   Adam Gaspar MD   omeprazole (priLOSEC) 40 MG capsule Take 40 mg by mouth Daily.    ProviderEnrique MD   oxybutynin (DITROPAN) 5 MG tablet Take 5 mg by mouth 2 (Two) Times a Day.    ProviderEnrique MD   QUEtiapine (SEROquel) 50 MG tablet Take 1 tablet by mouth Daily. 1/28/23   Adam Gaspar MD   rosuvastatin (CRESTOR) 40 MG tablet Take 1 tablet by mouth Every Night. 1/28/23   Adam Gaspar MD   sennosides-docusate (PERICOLACE) 8.6-50 MG per tablet Take 2 tablets by mouth 2 (Two) Times a Day. 1/28/23   Adam Gaspar MD       Allergies:  Penicillins, Sulfa antibiotics, Tetracyclines & related, and Valium [diazepam]      Review of system  Review of Systems   Unable to perform ROS: Other (Aphasia)       Vitals:    02/11/23 0700   BP: 140/47   Pulse: 78   Resp: 18   Temp: 98.3 °F (36.8 °C)   SpO2: 100%       Physical exam  Physical Exam  Vitals reviewed.   Constitutional:       Appearance: She is obese.   HENT:      Head: Normocephalic.   Eyes:      Extraocular Movements: Extraocular movements intact.      Pupils: Pupils are equal, round, and reactive to light.    Pulmonary:      Effort: Pulmonary effort is normal.   Neurological:      Mental Status: She is alert.      Cranial Nerves: Cranial nerve deficit present.      Sensory: Sensation is intact.      Motor: Weakness present.      Deep Tendon Reflexes: Babinski sign present on the right side. Babinski sign absent on the left side.      Comments: Awake, oriented to self, able to name and repeat, follows verbal commands, no facial droop, VFf, moves limbs against gravity.   Psychiatric:         Mood and Affect: Mood normal.           Lab Results   Component Value Date    WBC 7.58 02/10/2023    HGB 11.9 (L) 02/10/2023    HCT 37.3 02/10/2023    .8 (H) 02/10/2023     (L) 02/10/2023     Lab Results   Component Value Date    GLUCOSE 170 (H) 02/10/2023    BUN 20 02/10/2023    CREATININE 1.38 (H) 02/10/2023    EGFRIFNONA 42 (L) 11/29/2021    EGFRIFAFRI 50 (L) 11/29/2021    BCR 14.5 02/10/2023    CO2 25.0 02/10/2023    CALCIUM 8.6 02/10/2023    ALBUMIN 3.5 02/10/2023    AST 21 02/10/2023    ALT 5 02/10/2023     Contains abnormal data Urinalysis, Microscopic Only -  Order: [id number]   Status: Final result      Next appt: None             Component  Ref Range & Units 1 d ago  (2/9/23) 1 yr ago  (11/2/21) 2 yr ago  (10/8/20) 2 yr ago  (4/13/20) 3 yr ago  (4/22/19)   Mucus, UA  None Seen /LPF 2+ Abnormal   Rare Abnormal     Rare Abnormal  R    WBC, UA  0 - 5 /HPF >100 Abnormal   0-2  None seen  0-2  10-20 Abnormal     RBC, UA  0 - 4 /HPF see below Abnormal   None seen  0-2  None seen  None seen R    Comment: Present but obscured.   Bacteria, UA  None Seen /HPF 3+ Abnormal              ECG 12 Lead Stroke Evaluation (Order [id number])  Order-Level Documents:    Scan on 2/10/2023 0438 by [place], [place]: ECG 12-LEAD         Author: -- Service: -- Author Type: --   Filed:  Date of Service:  Creation Time:    Status: (Other)      Test Reason : Stroke Evaluation  Blood Pressure :   */*   mmHG  Vent. Rate :  69 BPM     Atrial  Rate :  69 BPM     P-R Int : 170 ms          QRS Dur :  78 ms      QT Int : 420 ms       P-R-T Axes :  25 -19  54 degrees     QTc Int : 450 ms     Normal sinus rhythm  Moderate voltage criteria for LVH, may be normal variant  Inferior infarct , age undetermined  Abnormal ECG  No previous ECGs available  Confirmed by SAMANTHA TRAYLOR ([id number]) on 2/10/2023 4:42:55 PM     Referred By: MD           Confirmed By: SAMANTHA TRAYLOR        Signed    Electronically signed by Samantha Traylor MD on 2/10/23 at 1642 EST         Radiological Studies:  CT Head Without Contrast    Result Date: 2/10/2023  CT HEAD WO CONTRAST Date of Exam: 2/10/2023 6:38 AM EST Indication: Stroke, follow up f/up hemorrhagic conversion. Comparison: Outside CT head one day prior Technique: Axial CT images were obtained of the head without contrast administration.  Reconstructed coronal and sagittal images were also obtained. Automated exposure control and iterative construction methods were used. Findings: Redemonstrated prominent left posterior temporal and inferior parietal acute infarct with intermixed areas of small hyperdensity compatible with minimal hemorrhage. There is no evidence of interval increased hemorrhage or worsening edema or mass effect. There is no midline shift or significant ventricular effacement. No new ischemia, hemorrhage or mass effects present elsewhere. Generalized age-related changes are again noted. The calvarium is intact. The orbits are normal. The paranasal sinuses are grossly clear.     Impression: Stable appearance of findings noted on outside CT comparison, demonstrating an acute left posterior temporal and inferior parietal infarct with minimal amount of intervening hemorrhagic conversion. There is no evidence of interval increased hemorrhage or  worsening mass effect. Electronically Signed: Luis Manuel Myers  2/10/2023 8:46 AM EST  Workstation ID: [id number]    CT Head Without Contrast    Result Date: 1/16/2023  PROCEDURE: CT  HEAD WO CONTRAST-  HISTORY: Mental status change, unknown cause; I63.9-Cerebral infarction, unspecified  COMPARISON: 01/13/2023.  TECHNIQUE: Multiple axial CT images were performed from the foramen magnum to the vertex without enhancement.  FINDINGS: Exam is limited by patient motion artifact. There is a stable area of decreased attenuation in the left posterior parietal region with obliteration of the associated sulci consistent with CVA. There is no evidence of acute hemorrhage.  There is mild mass effect on the occipital horn of the left lateral ventricle, similar to the prior exam. There is no subdural hematoma.  There is no hydrocephalus. The paranasal sinuses are clear.      CVA in the left posterior parietal region appears similar to the prior exam.    1686.68 mGy.cm   This study was performed with techniques to keep radiation doses as low as reasonably achievable (ALARA). Individualized dose reduction techniques using automated exposure control or adjustment of mA and/or kV according to the patient size were employed.     Images were reviewed, interpreted, and dictated by Dr. Mare Kwong MD Transcribed by Ramila Rodríguez PA-C.  This report was signed and finalized on 1/16/2023 3:33 PM by Mare Kwong MD.    CT Head Without Contrast    Result Date: 1/13/2023  FINAL REPORT TECHNIQUE: Multiple axial CT images were performed from the foramen magnum to the vertex. This study was performed with techniques to keep radiation doses as low as reasonably achievable (ALARA). Individualized dose reduction techniques using automated exposure control or adjustment of mA and/or kV according to the patient's size were employed. CLINICAL HISTORY: Repeat interval scan to rule out hemorrhagic conversion of the large left MCA FINDINGS: There is a large region of low density enhancement in the left parietal and temporal lobes compatible with subacute infarction. There is no evidence of hemorrhagic conversion.  There is no sign  of mass effect or midline shift.  No acute intracranial hemorrhage or large acute cortical infarct.  The brain volume is normal for patient's age.  Ventricles are normal in size and configuration.  No midline shift.  The basal cisterns are patent.  No skull fracture.  The visualized paranasal sinuses and mastoid air cells are clear.     Large subacute left cerebral infarct.  No evidence of hemorrhagic conversion. Authenticated and Electronically Signed by Theodore Carrillo MD on 01/13/2023 10:43:23 PM    XR Outside Films    Result Date: 2/10/2023  This procedure was auto-finalized with no dictation required.    CT ABDOMEN PELVIS WO CONTRAST Additional Contrast? None    Result Date: 2/9/2023  CT ABDOMEN AND PELVIS: INDICATION: abd pain, lower half TECHNIQUE: Noncontrast CT images of the abdomen and pelvis were obtained. Up-to-date CT equipment and radiation dose reduction techniques were employed. COMPARISON: NONE. FINDINGS:  There are atelectatic changes identified within the medial aspect of the right upper lobe as well as the medial aspect the right lower lobe. There is some groundglass atelectasis identified at the left lung base. No pleural effusion is identified. There is calcification of mitral annulus. There is atherosclerotic calcification left anterior descending coronary artery. Patient status post a cholecystectomy. No biliary dilatation is identified. There is small hiatal hernia. The liver appears unremarkable. There is moderate-severe atrophy of the pancreas. There are calcified granulomas present within normal size spleen. Adrenal glands are normal. No hydronephrosis or nephrolithiasis is identified. There is mild renal atrophy. There is extensive calcification of the abdominal aorta. No aneurysm is identified. Large amount of stool is identified within the rectum, sigmoid colon and descending colon. There is some associated edema identified within the posterior perirectal fat.. No abnormal bowel wall  thickening is identified. No bowel dilatation is identified.  Appendix is visualized and is normal. Patient status post hysterectomy. There is a right hip femoral neck screw. No osteolytic or osteoblastic lesions are identified. Facet joint spondylosis is present with the lumbar spine.    1. Large amount of stool is identified within the rectum with some associated edematous fat stranding identified within the posterior perirectal fat. The findings can be seen with fecal impaction and stercoral colitis. 2. Status post hysterectomy. 3. Status post cholecystectomy. 4. Atelectatic changes identified along the detailed above. 5. Small hiatal hernia. 6. Atrophic pancreas.    CT Head WO Contrast    Result Date: 2/9/2023  HEAD CT SCAN WITHOUT CONTRAST   HISTORY: AMS, hx of stroke 1 month ago COMPARISON:  Head CT dated June 1621 TECHNIQUE: Noncontrast CT images of the head were obtained. Images were reformatted in the coronal and sagittal planes. Up-to-date CT equipment and radiation dose reduction techniques were employed. FINDINGS: There is low attenuation in sulcal effacement identified within the posterior inferior aspect of the left parietal lobe consistent with subacute infarct. There are associated small areas of hemorrhagic transformation within the infarct. No herniation is identified. There is prominence of the ventricles, cistern and sulci. There is a remote lacunar infarct identified within the head of the left caudate nucleus.    1. Subacute infarct is identified with the posterior inferior aspect left parietal lobe with some small areas of acute hemorrhagic conversion. 2. Mild atrophy. 3. Remote lacunar infarct is identified with the head of the  left caudate nucleus.    XR CHEST PORTABLE    Result Date: 2/9/2023  CHEST 1 VIEW   HISTORY: low O2 saturation   COMPARISON: Chest x-ray dated 11-21 FINDINGS: Portable AP radiograph of the chest was obtained. The heart and mediastinum are within normal limits for size  and configuration. No infiltrate, effusion or pneumothorax is identified. Lung volumes are decreased. Calcified granulomas project over the lung bases. Degenerative changes are identified at the right glenohumeral joint.    1.  No evidence of acute cardiopulmonary disease.    CT Outside Films    Result Date: 2/10/2023  This procedure was auto-finalized with no dictation required.    CT Outside Films    Result Date: 2/10/2023  This procedure was auto-finalized with no dictation required.    2/11/2023-head CT stable evolving left MCA infarct, no expansion of hematoma, no new areas of hemorrhage    During this visit the following were done:  Labs Reviewed [x]    Labs Ordered []    Radiology Reports Reviewed [x]    Radiology Ordered []    EKG, echo, and/or stress test reviewed [x]    EEG results reviewed  []    EEG reviewed and interpreted per myself   []    Discussed case with neurointerventionalist or neuroradiologist []    Referring Provider Records Reviewed []    ER Records Reviewed []    Hospital Records Reviewed []    History Obtained From Family []    Radiological images view and Interpreted per myself [x]    Case Discussed with referring provider []     Decision to obtain and request outside records  []        Assessment and Plan     Subacute left MCA CVA 22 h/o A.fib. Minor hemorrhagic conversion on Eliquis and aspirin. Currently in NSR. Suspect contribution to AMS/TME from UTI and fecal impaction.   - Observation on telemetry.   - BP<140/90.   - F/U CT head stable this a.m., no new areas of hemorrhage or expansion of hematoma, okay to start aspirin 81 mg daily   - Hold Eliquis for one week.   - ST, PT, OT continue treatment plan, recommending SNF at rehab    Stroke neurology will continue to follow patient.  Please call with any questions or concerns.              Electronically signed by EVELINA Beasley on 2/11/2023 at 09:03 EST

## 2023-02-11 NOTE — PROGRESS NOTES
"    [facility] Medicine Services  PROGRESS NOTE    Patient Name: Cheri Cruz  : 1940  MRN: [id number]    Date of Admission: 2/10/2023  Primary Care Physician: Lorrie Canada APRN    Subjective   Subjective     CC:  F/U stroke     HPI:  Patient seen and examined. Opens her eyes. Otherwise, doesn't follow commands, doesn't tell me her name or answer orientation questions. No family at BS.     ROS:  UTO    Objective   Objective     Vital Signs:   Temp:  [98.3 °F (36.8 °C)-99.1 °F (37.3 °C)] 98.3 °F (36.8 °C)  Heart Rate:  [] 78  Resp:  [16-18] 18  BP: (106-143)/(36-92) 140/47  Flow (L/min):  [2] 2     Physical Exam:  Constitutional: No acute distress, will awaken but doesn't answer orientation questions or follow commands, keeps saying \"yes\"   HENT: NCAT, mucous membranes moist  Respiratory: Clear to auscultation bilaterally, respiratory effort normal 2L NC  Cardiovascular: RRR, no murmurs, rubs, or gallops  Gastrointestinal: Positive bowel sounds, soft, nontender, nondistended  Musculoskeletal: No bilateral ankle edema  Psychiatric: unable to evaluate   Neurologic: Doesn't follow commands, opens eyes to voice, doesn't answer orientation questions   Skin: No rashes    Results Reviewed:  LAB RESULTS:      Lab 02/10/23  1200 23   WBC 7.58 7.4   HEMOGLOBIN 11.9* 10.7*   HEMATOCRIT 37.3 32.6*   PLATELETS 136* 195   NEUTROS ABS 4.75 4.2   IMMATURE GRANS (ABS) 0.04 0.0   LYMPHS ABS 1.85 2.3   MONOS ABS 0.69 0.6   EOS ABS 0.21 0.3   .8* 102.5*         Lab 02/10/23  1200   SODIUM 140   POTASSIUM 4.9   CHLORIDE 101   CO2 25.0   ANION GAP 14.0   BUN 20   CREATININE 1.38*   EGFR 38.5*   GLUCOSE 170*   CALCIUM 8.6   TSH 2.330         Lab 02/10/23  1200 23   TOTAL PROTEIN 5.9*  --    ALBUMIN 3.5  --    GLOBULIN 2.4  --    ALT (SGPT) 5  --    AST (SGOT) 21  --    BILIRUBIN 0.3  --    ALK PHOS 62  --    LIPASE  --  7.0         Lab 23  9520   PROBNP 669 "   TROPONIN I <0.30                 Lab 02/09/23  1840   PH, ARTERIAL 7.377   PCO2, ARTERIAL 45.9*   O2 SATURATION ART 96.6   FIO2 0.28   HCO3 ART 26.3*     Brief Urine Lab Results  (Last result in the past 365 days)      Color   Clarity   Blood   Leuk Est   Nitrite   Protein   CREAT   Urine HCG        02/09/23 1925 Yellow   CLOUDY   MODERATE   LARGE   POSITIVE                   Microbiology Results Abnormal     None          CT Head Without Contrast    Result Date: 2/11/2023  CT HEAD WO CONTRAST Date of Exam: 2/11/2023 5:21 AM EST Indication: Cerebrovascular accident with intracranial hemorrhage, follow-up. Comparison: 2/10/2023. Technique: Axial CT images were obtained of the head without contrast administration.  Reconstructed coronal and sagittal images were also obtained. Automated exposure control and iterative construction methods were used. Findings: There is a wedge-shaped area of decreased density in the posterior left frontal and left parietal lobe consistent with a subacute infarct. There is stable areas of parenchymal hemorrhage within the infarct similar to yesterday. There is no evidence of expansion of the hematoma. There are no new areas of acute hemorrhage. The patient has mild volume loss with prominence of the sulci, fissures, ventricles, and basal cisterns. There is no mass effect or midline shift. There are atherosclerotic vascular calcifications in the distal vertebral arteries and cavernous carotid arteries. There is thinning of the lenses suggestive of previous cataract surgery. There is a small mucous retention cyst in the left maxillary sinus. The mastoid sinuses are clear. The calvarium is unremarkable.     Impression: Impression: 1. Stable evolving subacute left MCA distribution infarct with the hemorrhagic transformation. 2. There is no expansion of the hematoma. There are no new areas of hemorrhage. 3. Mild volume loss secondary to cerebral atrophy. Electronically Signed: Bennie Casiano   2/11/2023 10:06 AM EST  Workstation ID: [id number]    CT Head Without Contrast    Result Date: 2/10/2023  CT HEAD WO CONTRAST Date of Exam: 2/10/2023 6:38 AM EST Indication: Stroke, follow up f/up hemorrhagic conversion. Comparison: Outside CT head one day prior Technique: Axial CT images were obtained of the head without contrast administration.  Reconstructed coronal and sagittal images were also obtained. Automated exposure control and iterative construction methods were used. Findings: Redemonstrated prominent left posterior temporal and inferior parietal acute infarct with intermixed areas of small hyperdensity compatible with minimal hemorrhage. There is no evidence of interval increased hemorrhage or worsening edema or mass effect. There is no midline shift or significant ventricular effacement. No new ischemia, hemorrhage or mass effects present elsewhere. Generalized age-related changes are again noted. The calvarium is intact. The orbits are normal. The paranasal sinuses are grossly clear.     Impression: Impression: Stable appearance of findings noted on outside CT comparison, demonstrating an acute left posterior temporal and inferior parietal infarct with minimal amount of intervening hemorrhagic conversion. There is no evidence of interval increased hemorrhage or  worsening mass effect. Electronically Signed: Luis Manuel Myers  2/10/2023 8:46 AM EST  Workstation ID: [id number]    XR Outside Films    Result Date: 2/10/2023  This procedure was auto-finalized with no dictation required.    CT ABDOMEN PELVIS WO CONTRAST Additional Contrast? None    Result Date: 2/9/2023  CT ABDOMEN AND PELVIS: INDICATION: abd pain, lower half TECHNIQUE: Noncontrast CT images of the abdomen and pelvis were obtained. Up-to-date CT equipment and radiation dose reduction techniques were employed. COMPARISON: NONE. FINDINGS:  There are atelectatic changes identified within the medial aspect of the right upper lobe as well as the medial  aspect the right lower lobe. There is some groundglass atelectasis identified at the left lung base. No pleural effusion is identified. There is calcification of mitral annulus. There is atherosclerotic calcification left anterior descending coronary artery. Patient status post a cholecystectomy. No biliary dilatation is identified. There is small hiatal hernia. The liver appears unremarkable. There is moderate-severe atrophy of the pancreas. There are calcified granulomas present within normal size spleen. Adrenal glands are normal. No hydronephrosis or nephrolithiasis is identified. There is mild renal atrophy. There is extensive calcification of the abdominal aorta. No aneurysm is identified. Large amount of stool is identified within the rectum, sigmoid colon and descending colon. There is some associated edema identified within the posterior perirectal fat.. No abnormal bowel wall thickening is identified. No bowel dilatation is identified.  Appendix is visualized and is normal. Patient status post hysterectomy. There is a right hip femoral neck screw. No osteolytic or osteoblastic lesions are identified. Facet joint spondylosis is present with the lumbar spine.    Impression: 1. Large amount of stool is identified within the rectum with some associated edematous fat stranding identified within the posterior perirectal fat. The findings can be seen with fecal impaction and stercoral colitis. 2. Status post hysterectomy. 3. Status post cholecystectomy. 4. Atelectatic changes identified along the detailed above. 5. Small hiatal hernia. 6. Atrophic pancreas.    CT Head WO Contrast    Result Date: 2/9/2023  HEAD CT SCAN WITHOUT CONTRAST   HISTORY: AMS, hx of stroke 1 month ago COMPARISON:  Head CT dated June 1621 TECHNIQUE: Noncontrast CT images of the head were obtained. Images were reformatted in the coronal and sagittal planes. Up-to-date CT equipment and radiation dose reduction techniques were employed.  FINDINGS: There is low attenuation in sulcal effacement identified within the posterior inferior aspect of the left parietal lobe consistent with subacute infarct. There are associated small areas of hemorrhagic transformation within the infarct. No herniation is identified. There is prominence of the ventricles, cistern and sulci. There is a remote lacunar infarct identified within the head of the left caudate nucleus.    Impression: 1. Subacute infarct is identified with the posterior inferior aspect left parietal lobe with some small areas of acute hemorrhagic conversion. 2. Mild atrophy. 3. Remote lacunar infarct is identified with the head of the  left caudate nucleus.    XR CHEST PORTABLE    Result Date: 2/9/2023  CHEST 1 VIEW   HISTORY: low O2 saturation   COMPARISON: Chest x-ray dated 11-21 FINDINGS: Portable AP radiograph of the chest was obtained. The heart and mediastinum are within normal limits for size and configuration. No infiltrate, effusion or pneumothorax is identified. Lung volumes are decreased. Calcified granulomas project over the lung bases. Degenerative changes are identified at the right glenohumeral joint.    Impression: 1.  No evidence of acute cardiopulmonary disease.    CT Outside Films    Result Date: 2/10/2023  This procedure was auto-finalized with no dictation required.    CT Outside Films    Result Date: 2/10/2023  This procedure was auto-finalized with no dictation required.      Results for orders placed during the hospital encounter of 10/11/17    Adult Transthoracic Echo Complete W/ Cont if Necessary Per Protocol    Interpretation Summary  · Left ventricular systolic function is normal. Estimated EF = 70%. No regional wall motion abnormalities are noted.  · Left ventricular diastolic dysfunction (grade I) consistent with impaired relaxation.  · Left atrial cavity size is mildly dilated.  · The cardiac valves are functionally normal.      I have reviewed the  medications:  Scheduled Meds:busPIRone, 7.5 mg, Oral, BID  cefTRIAXone, 1 g, Intravenous, Q24H  donepezil, 10 mg, Oral, Nightly  DULoxetine, 60 mg, Oral, Daily  gabapentin, 300 mg, Oral, BID  insulin lispro, 0-7 Units, Subcutaneous, Q6H  levothyroxine, 25 mcg, Oral, Daily  pantoprazole, 40 mg, Oral, Q AM  senna-docusate sodium, 2 tablet, Oral, BID   And  polyethylene glycol, 17 g, Oral, Daily  QUEtiapine, 50 mg, Oral, Nightly  rosuvastatin, 40 mg, Oral, Nightly  sodium chloride, 10 mL, Intravenous, Q12H      Continuous Infusions:niCARdipine, 5-15 mg/hr, Last Rate: Stopped (02/10/23 1012)      PRN Meds:.•  acetaminophen **OR** acetaminophen **OR** acetaminophen  •  senna-docusate sodium **AND** polyethylene glycol **AND** bisacodyl **AND** bisacodyl  •  dextrose  •  dextrose  •  glucagon (human recombinant)  •  HYDROcodone-acetaminophen  •  ondansetron **OR** ondansetron  •  sodium chloride  •  sodium chloride    Assessment & Plan   Assessment & Plan     Active Hospital Problems    Diagnosis  POA   • **Hemorrhagic stroke (HCC) [I61.9]  Yes   • TIA (transient ischemic attack) [G45.9]  Yes      Resolved Hospital Problems   No resolved problems to display.        Brief Hospital Course to date:  Cheri Cruz is a 82 y.o. female with history of HTN, HLD, DMII, atrial fibrillation, PE, Dementia, anxiety and recent left MCA infarct with residual aphasia presents from rehab with increased confusion, RUE weakness, speech difficulty and abdominal pain.  CT at the OSH showed hemorrhagic conversion in the area of her recent stroke.  Patient transferred to [facility] for further evaluation    This patient's problems and plans were partially entered by my partner and updated as appropriate by me 02/11/23.  All problems are new to me today     Altered mental status  Recent Left MCA CVA with Hemorrhagic Conversion   A fib  - Stroke Neuro following  -CT head this AM with no worsening of hemorrhage, no new areas of hemorrhage   -Holing  Eliquis x 1 week per Neuro  -Will Resume ASA once Neuro see's patient today   -Cardiology consulted for AC recs  -TTE report pending  -PT/OT recommend SNF     Mod-severe Aphasia, mild dysarthria  Dysphagia   -SLP following   -NPO  -Received D5 NS x10 hours, start NS at 50cc/hr      Abdominal pain -> Resolved   - CT Abdomen Pelvis shows constipation, some concern for fecal impaction/stercoral colitis.  No current leukocytosis or fever  - scheduled laxatives  - suppository PRN     UTI  - continue rocephin (1st dose in OSH ED)  - Will attempt to obtain cultures results from [facility]     Atrial fibrillation  - currently NSR  - eliquis held per above      HTN  - goal SBP <140  - PRN nicardipine drip     DMII  - SSI  - diabetes educator has seen      CKD  -Cr seems to be around baseline. CTM     Dementia    Expected Discharge Location and Transportation: SNF  Expected Discharge   Expected Discharge Date and Time     Expected Discharge Date Expected Discharge Time    Feb 15, 2023            DVT prophylaxis:  Mechanical DVT prophylaxis orders are present.     AM-PAC 6 Clicks Score (PT): 7 (02/10/23 1440)    CODE STATUS:   There are no questions and answers to display.       Marcie Angel, DO  02/11/23        \

## 2023-02-11 NOTE — SIGNIFICANT NOTE
02/11/23 1510   SLP Deferred Reason   SLP Deferred Reason Patient/family declined evaluation  (RN repeated swallow screen and pt passed. Dtr states pt ate well - she fed her lunch. SLP will cont with S/L tx per plan.)

## 2023-02-11 NOTE — PLAN OF CARE
Goal Outcome Evaluation:                   Patient resting well, she now is on a regular diet, NIH of 4, vital signs stable, she is still confused, family at bedside, she is having multiple bowel movements. Will continue to monitor

## 2023-02-11 NOTE — PLAN OF CARE
Problem: Adult Inpatient Plan of Care  Goal: Plan of Care Review  Outcome: Ongoing, Progressing  Flowsheets (Taken 2/11/2023 0329)  Progress: no change  Plan of Care Reviewed With: patient  Goal: Absence of Hospital-Acquired Illness or Injury  Outcome: Ongoing, Progressing  Intervention: Identify and Manage Fall Risk  Recent Flowsheet Documentation  Taken 2/11/2023 0220 by Niecy Lyon RN  Safety Promotion/Fall Prevention:   activity supervised   assistive device/personal items within reach   clutter free environment maintained   elopement precautions   fall prevention program maintained   gait belt   lighting adjusted   mobility aid in reach   muscle strengthening facilitated   nonskid shoes/slippers when out of bed   room organization consistent   safety round/check completed   toileting scheduled  Taken 2/11/2023 0030 by Niecy Lyon RN  Safety Promotion/Fall Prevention:   activity supervised   assistive device/personal items within reach   clutter free environment maintained   elopement precautions   fall prevention program maintained   gait belt   lighting adjusted   mobility aid in reach   muscle strengthening facilitated   nonskid shoes/slippers when out of bed   room organization consistent   safety round/check completed   toileting scheduled  Taken 2/10/2023 2205 by Niecy Lyon, RN  Safety Promotion/Fall Prevention:   activity supervised   assistive device/personal items within reach   clutter free environment maintained   elopement precautions   fall prevention program maintained   gait belt   lighting adjusted   mobility aid in reach   muscle strengthening facilitated   nonskid shoes/slippers when out of bed   room organization consistent   safety round/check completed   toileting scheduled  Taken 2/10/2023 1950 by Niecy Lyon, RN  Safety Promotion/Fall Prevention:   activity supervised   assistive device/personal items within reach   clutter free environment maintained   elopement  precautions   fall prevention program maintained   gait belt   lighting adjusted   mobility aid in reach   muscle strengthening facilitated   nonskid shoes/slippers when out of bed   room organization consistent   safety round/check completed   toileting scheduled  Intervention: Prevent Skin Injury  Recent Flowsheet Documentation  Taken 2/11/2023 0220 by Niecy Lyon RN  Skin Protection:   adhesive use limited   incontinence pads utilized   tubing/devices free from skin contact  Taken 2/11/2023 0030 by Niecy Lyon RN  Skin Protection:   adhesive use limited   incontinence pads utilized   tubing/devices free from skin contact  Taken 2/10/2023 2205 by Niecy Lyon RN  Skin Protection:   adhesive use limited   incontinence pads utilized   tubing/devices free from skin contact  Taken 2/10/2023 1950 by Niecy Lyon RN  Skin Protection:   adhesive use limited   incontinence pads utilized   tubing/devices free from skin contact  Intervention: Prevent and Manage VTE (Venous Thromboembolism) Risk  Recent Flowsheet Documentation  Taken 2/11/2023 0030 by Niecy Lyon RN  VTE Prevention/Management:   bilateral   sequential compression devices on  Taken 2/10/2023 2205 by Niecy Lyon RN  VTE Prevention/Management:   bilateral   sequential compression devices on  Taken 2/10/2023 1950 by Niecy Lyon RN  VTE Prevention/Management:   bilateral   sequential compression devices on  Goal: Optimal Comfort and Wellbeing  Outcome: Ongoing, Progressing  Intervention: Provide Person-Centered Care  Recent Flowsheet Documentation  Taken 2/10/2023 1950 by Niecy Lyon RN  Trust Relationship/Rapport:   care explained   choices provided   emotional support provided   empathic listening provided   questions answered   questions encouraged   reassurance provided   thoughts/feelings acknowledged  Goal: Readiness for Transition of Care  Outcome: Ongoing, Progressing   Goal Outcome Evaluation:  Plan of Care Reviewed With:  patient        Progress: no change

## 2023-02-11 NOTE — CONSULTS
Date of Hospital Visit: 23  Encounter Provider: Mirlea Boles PA-C  Place of Service: [facility]  Patient Name: Cheri Cruz  :1940  Referral Provider: Crispin Anders MD  Primary Care Provider: Lorrie Canada APRN    Chief complaint/Reason for Consultation: Atrial fibrillation on anticoagulation with hemorrhagic conversion    Problem List:  1. Hyperlipidemia  2. Hypertension  3. Palpitations  4. Pulmonary emobli  1. Admitted to [facility] 2020 with probable bilateral pulmonary emboli  2. CTA chest pulmonary embolus protocol 20: acute emboli centrally in the segmental and subsegmental arteries of the right upper lung and possibly the right lower lobe. Signs of RV strain.  Limited exam.   3. US Duplex bilateral lower extremities, 20: no DVT noted.  4. Echo 20: EF 60%, no RV strain. Trace MR. Mild TR.   5. 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 Cholelithiasis    History of Present Illness:  Patient is an 82-year-old female with past medical history significant for atrial fibrillation, pulmonary embolism, hypertension, dyslipidemia, type 2 diabetes, and dementia and recent left MCA infarct with residual aphasia who presented from her rehab facility with increased confusion, and right upper extremity weakness.  CT head at an outside hospital showed hemorrhagic conversion in the area of her recent stroke and she was transferred to [facility] for higher level of care.  We are asked to see her in consultation for alternative anticoagulation in the setting of atrial fibrillation given recent hemorrhagic conversion.    Past Medical History:   Diagnosis Date   • Abnormal heart rhythm    • Anxiety    • Arrhythmia    • Arthritis    • Atrial fibrillation (HCC)    • Dementia (HCC)    • Diabetes mellitus (HCC)    • Hyperlipidemia    • Hypertension    • Kidney disorder    • Stroke (HCC)    • Uterus cancer (HCC)        Past Surgical  History:   Procedure Laterality Date   • CATARACT EXTRACTION, BILATERAL         Medications Prior to Admission   Medication Sig Dispense Refill Last Dose   • apixaban (ELIQUIS) 5 MG tablet tablet Take 5 mg by mouth 2 (Two) Times a Day.      • aspirin 81 MG EC tablet Take 1 tablet by mouth Daily.      • busPIRone (BUSPAR) 7.5 MG tablet Take 7.5 mg by mouth 2 (Two) Times a Day.      • donepezil (ARICEPT) 10 MG tablet Take 10 mg by mouth Every Night.      • DULoxetine (CYMBALTA) 60 MG capsule Take 60 mg by mouth Daily.      • Ergocalciferol (VITAMIN D2 PO) Take 1.25 mg by mouth 1 (One) Time Per Week.      • gabapentin (NEURONTIN) 300 MG capsule Take 1 capsule by mouth 2 (Two) Times a Day. 20 capsule 0    • HYDROcodone-acetaminophen (NORCO)  MG per tablet Take 1 tablet by mouth Every 6 (Six) Hours As Needed for Moderate Pain. 20 tablet 0    • Insulin Lispro (humaLOG) 100 UNIT/ML injection Inject  under the skin into the appropriate area as directed 3 (Three) Times a Day Before Meals.      • levothyroxine (SYNTHROID, LEVOTHROID) 25 MCG tablet Take 25 mcg by mouth Daily.      • meclizine (ANTIVERT) 25 MG tablet TAKE 1 TABLET THREE TIMES DAILY AS NEEDED FOR DIZZINESS (Patient taking differently: 3 (Three) Times a Day.) 90 tablet 0    • metoprolol tartrate (LOPRESSOR) 25 MG tablet Take 1 tablet by mouth Every 12 (Twelve) Hours.      • omeprazole (priLOSEC) 40 MG capsule Take 40 mg by mouth Daily.      • oxybutynin (DITROPAN) 5 MG tablet Take 5 mg by mouth 2 (Two) Times a Day.      • QUEtiapine (SEROquel) 50 MG tablet Take 1 tablet by mouth Daily.      • rosuvastatin (CRESTOR) 40 MG tablet Take 1 tablet by mouth Every Night. 90 tablet     • sennosides-docusate (PERICOLACE) 8.6-50 MG per tablet Take 2 tablets by mouth 2 (Two) Times a Day.          Social History     Socioeconomic History   • Marital status:    Tobacco Use   • Smoking status: Never   • Smokeless tobacco: Never   Vaping Use   • Vaping Use: Never  used   Substance and Sexual Activity   • Alcohol use: No   • Drug use: No   • Sexual activity: Defer       Family History   Problem Relation Age of Onset   • Heart disease Mother    • Cancer Mother    • Congenital heart disease Mother    • Cancer Father    • Cancer Brother    • Alcohol abuse Brother        REVIEW OF SYSTEMS:     12 point ROS was performed and is Negative except as outlined in HPI     Objective:     Vitals:    02/11/23 0014 02/11/23 0100 02/11/23 0200 02/11/23 0700   BP: 124/40 (!) 112/36 137/59 140/47   BP Location:    Right arm   Patient Position:    Lying   Pulse: 106 88 83 78   Resp:    18   Temp:    98.3 °F (36.8 °C)   TempSrc:    Oral   SpO2:    100%   Weight:         Body mass index is 33.05 kg/m².  Flowsheet Rows    Flowsheet Row First Filed Value   Admission Height --  [pt unable to state] Documented at 02/10/2023 0203   Admission Weight 95.7 kg (211 lb) Documented at 02/10/2023 0203          Physical Exam   General: No acute distress, well developed and well nourished.    Skin: Skin is warm and dry. No obvious cyanosis, erythema or pallor.   HEENT: Atraumatic, normocephalic, no conjunctival pallor, no scleral icterus.   Neck: Supple, no JVD. Normal carotid upstrokes, no bruits.    Chest:No respiratory distress. No chest wall tenderness. Breath sounds are normal. No wheezes, rhonchi or rales.  Cardiovascular: Normal S1 and S2, no murmur, gallop or rub. PMI is not displaced.    Pulses:Radial and pedal pulses are 2+ and symmetric.    Abdomen: Soft, nontender, normal bowel sounds.   Musculoskeletal/Extremities:  No clubbing, cyanosis or edema. No gross deformity.   Neurological: awake and alert, oriented only to person  Psychiatric: Normal mood and affect.Speech and behavior is normal.    Lab Review:                Results from last 7 days   Lab Units 02/10/23  1200   SODIUM mmol/L 140   POTASSIUM mmol/L 4.9   CHLORIDE mmol/L 101   CO2 mmol/L 25.0   BUN mg/dL 20   CREATININE mg/dL 1.38*    GLUCOSE mg/dL 170*   CALCIUM mg/dL 8.6     Results from last 7 days   Lab Units 02/09/23  1910   TROPONIN I ng/mL <0.30     Results from last 7 days   Lab Units 02/10/23  1200   WBC 10*3/mm3 7.58   HEMOGLOBIN g/dL 11.9*   HEMATOCRIT % 37.3   PLATELETS 10*3/mm3 136*                   Imaging Results (Last 24 Hours)     Procedure Component Value Units Date/Time    CT Head Without Contrast [[id number]] Collected: 02/11/23 1002     Updated: 02/11/23 1008    Narrative:      CT HEAD WO CONTRAST    Date of Exam: 2/11/2023 5:21 AM EST    Indication: Cerebrovascular accident with intracranial hemorrhage, follow-up.    Comparison: 2/10/2023.    Technique: Axial CT images were obtained of the head without contrast administration.  Reconstructed coronal and sagittal images were also obtained. Automated exposure control and iterative construction methods were used.    Findings:  There is a wedge-shaped area of decreased density in the posterior left frontal and left parietal lobe consistent with a subacute infarct. There is stable areas of parenchymal hemorrhage within the infarct similar to yesterday. There is no evidence of   expansion of the hematoma. There are no new areas of acute hemorrhage. The patient has mild volume loss with prominence of the sulci, fissures, ventricles, and basal cisterns. There is no mass effect or midline shift. There are atherosclerotic vascular   calcifications in the distal vertebral arteries and cavernous carotid arteries. There is thinning of the lenses suggestive of previous cataract surgery. There is a small mucous retention cyst in the left maxillary sinus. The mastoid sinuses are clear.   The calvarium is unremarkable.      Impression:      Impression:    1. Stable evolving subacute left MCA distribution infarct with the hemorrhagic transformation.  2. There is no expansion of the hematoma. There are no new areas of hemorrhage.  3. Mild volume loss secondary to cerebral  atrophy.    Electronically Signed: Bennie Casiano    2/11/2023 10:06 AM EST    Workstation ID: [id number]           EKG:Normal sinus rhythm with no acute ST-T changes    Results for orders placed during the hospital encounter of 02/10/23    Adult Transthoracic Echo Complete W/ Cont if Necessary Per Protocol    Interpretation Summary  •  Left ventricular systolic function is hyperdynamic (EF > 70%). Calculated left ventricular EF = 70.6% Left ventricular ejection fraction appears to be greater than 70%. The left ventricular cavity is small in size. Left ventricular wall thickness is consistent with mild concentric hypertrophy. All left ventricular wall segments contract normally. Left ventricular intracavitary gradient noted to be 71 mmHg. Left ventricular diastolic function is consistent with (grade I) impaired relaxation. Normal left atrial pressure.  •  The aortic valve is abnormal in structure. There is mild calcification of the aortic valve mainly affecting the right coronary cusp(s). The aortic valve appears trileaflet. No aortic valve regurgitation is present. Gradient noted through the LV and LVOT    Compared to TTE report from  Dec 2019, hyperdynamic LV with intracavitary gradient is not a new finding but peak gradient noted on this exam is higher than previously described.     Assessment:   1. Recent left MCA CVA now with hemorrhagic conversion  2. Paroxysmal atrial fibrillation per H&P  3. Chronic anticoagulation with apixaban, now on hold due to hemorrhagic conversion  4. History of hypertension, blood pressure goals per neurology  5. UTI, on antibiotics    Plan:   1. Agree with holding apixaban for now due to hemorrhagic conversion.  2. If neurology deems she will be a candidate for short-term anticoagulation, we can see her as an outpatient for consideration of left atrial appendage occlusion however given her age and functional status the risk will likely outweigh the potential benefits. I discussed with  the patient's family that there will be risk of stroke or recurrent bleeding with either treatment strategy   3. Resume Aspirin 81mg daily, per neurology.  4. Thank you for the consult, we will follow-up Monday and be available to see on an as-needed basis.    JEAN PAUL Parker MD  02/11/23 15:09 EST

## 2023-02-12 LAB
BACTERIA UR QL AUTO: ABNORMAL /HPF
BILIRUB UR QL STRIP: NEGATIVE
CLARITY UR: CLEAR
COLOR UR: YELLOW
GLUCOSE BLDC GLUCOMTR-MCNC: 146 MG/DL (ref 70–130)
GLUCOSE BLDC GLUCOMTR-MCNC: 159 MG/DL (ref 70–130)
GLUCOSE BLDC GLUCOMTR-MCNC: 161 MG/DL (ref 70–130)
GLUCOSE BLDC GLUCOMTR-MCNC: 176 MG/DL (ref 70–130)
GLUCOSE UR STRIP-MCNC: ABNORMAL MG/DL
HGB UR QL STRIP.AUTO: ABNORMAL
HYALINE CASTS UR QL AUTO: ABNORMAL /LPF
KETONES UR QL STRIP: ABNORMAL
LEUKOCYTE ESTERASE UR QL STRIP.AUTO: ABNORMAL
NITRITE UR QL STRIP: NEGATIVE
ORGANISM: ABNORMAL
PH UR STRIP.AUTO: 5.5 [PH] (ref 5–8)
PROT UR QL STRIP: ABNORMAL
RBC # UR STRIP: ABNORMAL /HPF
REF LAB TEST METHOD: ABNORMAL
SP GR UR STRIP: 1.02 (ref 1–1.03)
SQUAMOUS #/AREA URNS HPF: ABNORMAL /HPF
URINE CULTURE, ROUTINE: ABNORMAL
UROBILINOGEN UR QL STRIP: ABNORMAL
WBC # UR STRIP: ABNORMAL /HPF

## 2023-02-12 PROCEDURE — 81001 URINALYSIS AUTO W/SCOPE: CPT | Performed by: FAMILY MEDICINE

## 2023-02-12 PROCEDURE — 87077 CULTURE AEROBIC IDENTIFY: CPT | Performed by: FAMILY MEDICINE

## 2023-02-12 PROCEDURE — 87186 SC STD MICRODIL/AGAR DIL: CPT | Performed by: FAMILY MEDICINE

## 2023-02-12 PROCEDURE — 82962 GLUCOSE BLOOD TEST: CPT

## 2023-02-12 PROCEDURE — 99232 SBSQ HOSP IP/OBS MODERATE 35: CPT | Performed by: FAMILY MEDICINE

## 2023-02-12 PROCEDURE — 87086 URINE CULTURE/COLONY COUNT: CPT | Performed by: FAMILY MEDICINE

## 2023-02-12 PROCEDURE — 99232 SBSQ HOSP IP/OBS MODERATE 35: CPT | Performed by: CLINICAL NURSE SPECIALIST

## 2023-02-12 PROCEDURE — 99232 SBSQ HOSP IP/OBS MODERATE 35: CPT | Performed by: INTERNAL MEDICINE

## 2023-02-12 RX ORDER — LISINOPRIL 10 MG/1
10 TABLET ORAL
Status: DISCONTINUED | OUTPATIENT
Start: 2023-02-12 | End: 2023-02-13

## 2023-02-12 RX ADMIN — LISINOPRIL 10 MG: 10 TABLET ORAL at 10:01

## 2023-02-12 RX ADMIN — ROSUVASTATIN 40 MG: 20 TABLET, FILM COATED ORAL at 20:04

## 2023-02-12 RX ADMIN — GABAPENTIN 300 MG: 300 CAPSULE ORAL at 08:23

## 2023-02-12 RX ADMIN — HYDROCODONE BITARTRATE AND ACETAMINOPHEN 1 TABLET: 10; 325 TABLET ORAL at 15:28

## 2023-02-12 RX ADMIN — DICLOFENAC 2 G: 10 GEL TOPICAL at 22:26

## 2023-02-12 RX ADMIN — LEVOTHYROXINE SODIUM 25 MCG: 25 TABLET ORAL at 07:31

## 2023-02-12 RX ADMIN — POLYETHYLENE GLYCOL 3350 17 G: 17 POWDER, FOR SOLUTION ORAL at 08:23

## 2023-02-12 RX ADMIN — BUSPIRONE HYDROCHLORIDE 7.5 MG: 5 TABLET ORAL at 08:23

## 2023-02-12 RX ADMIN — BUSPIRONE HYDROCHLORIDE 7.5 MG: 5 TABLET ORAL at 17:21

## 2023-02-12 RX ADMIN — DULOXETINE HYDROCHLORIDE 60 MG: 60 CAPSULE, DELAYED RELEASE ORAL at 08:23

## 2023-02-12 RX ADMIN — GABAPENTIN 300 MG: 300 CAPSULE ORAL at 20:04

## 2023-02-12 RX ADMIN — SENNOSIDES AND DOCUSATE SODIUM 2 TABLET: 8.6; 5 TABLET ORAL at 08:23

## 2023-02-12 RX ADMIN — PANTOPRAZOLE SODIUM 40 MG: 40 TABLET, DELAYED RELEASE ORAL at 07:31

## 2023-02-12 RX ADMIN — DONEPEZIL HYDROCHLORIDE 10 MG: 10 TABLET, FILM COATED ORAL at 20:04

## 2023-02-12 RX ADMIN — SENNOSIDES AND DOCUSATE SODIUM 2 TABLET: 8.6; 5 TABLET ORAL at 20:04

## 2023-02-12 RX ADMIN — ASPIRIN 81 MG 81 MG: 81 TABLET ORAL at 08:23

## 2023-02-12 NOTE — PROGRESS NOTES
Neurology Note    Patient:  Cheri Cruz    YOB: 1940    REFERRING PHYSICIAN:  Crispin Anders MD    CHIEF COMPLAINT:    AMS, RUE weakness    HISTORY OF PRESENT ILLNESS:     Patient resting quietly, wakes easily to voice.  Continues to have orientation problems.  No family is at the bedside this morning.  Low-dose aspirin was started yesterday, no new neurologic symptoms noted at this time.         Past Medical History:  Past Medical History:   Diagnosis Date   • Abnormal heart rhythm    • Anxiety    • Arrhythmia    • Arthritis    • Atrial fibrillation (HCC)    • Dementia (HCC)    • Diabetes mellitus (HCC)    • Hyperlipidemia    • Hypertension    • Kidney disorder    • Stroke (HCC)    • Uterus cancer (HCC)        Past Surgical History:  Past Surgical History:   Procedure Laterality Date   • CATARACT EXTRACTION, BILATERAL         Social History:   Social History     Socioeconomic History   • Marital status:    Tobacco Use   • Smoking status: Never   • Smokeless tobacco: Never   Vaping Use   • Vaping Use: Never used   Substance and Sexual Activity   • Alcohol use: No   • Drug use: No   • Sexual activity: Defer        Family History:   Family History   Problem Relation Age of Onset   • Heart disease Mother    • Cancer Mother    • Congenital heart disease Mother    • Cancer Father    • Cancer Brother    • Alcohol abuse Brother        Medications Prior to Admission:    Prior to Admission medications    Medication Sig Start Date End Date Taking? Authorizing Provider   apixaban (ELIQUIS) 5 MG tablet tablet Take 5 mg by mouth 2 (Two) Times a Day.    ProviderEnrique MD   aspirin 81 MG EC tablet Take 1 tablet by mouth Daily. 1/29/23   Adam Gaspar MD   busPIRone (BUSPAR) 7.5 MG tablet Take 7.5 mg by mouth 2 (Two) Times a Day.    ProviderEnrique MD   donepezil (ARICEPT) 10 MG tablet Take 10 mg by mouth Every Night.    Enrique Recio MD   DULoxetine (CYMBALTA) 60 MG capsule Take  60 mg by mouth Daily.    Enrique Recio MD   Ergocalciferol (VITAMIN D2 PO) Take 1.25 mg by mouth 1 (One) Time Per Week.    Enrique Recio MD   gabapentin (NEURONTIN) 300 MG capsule Take 1 capsule by mouth 2 (Two) Times a Day. 1/28/23   Adam Gaspar MD   HYDROcodone-acetaminophen (NORCO)  MG per tablet Take 1 tablet by mouth Every 6 (Six) Hours As Needed for Moderate Pain. 1/28/23   Adam Gaspar MD   Insulin Lispro (humaLOG) 100 UNIT/ML injection Inject  under the skin into the appropriate area as directed 3 (Three) Times a Day Before Meals.    Enrique Recio MD   levothyroxine (SYNTHROID, LEVOTHROID) 25 MCG tablet Take 25 mcg by mouth Daily.    Enrique Recio MD   meclizine (ANTIVERT) 25 MG tablet TAKE 1 TABLET THREE TIMES DAILY AS NEEDED FOR DIZZINESS  Patient taking differently: 3 (Three) Times a Day. 2/26/20   Zena Blankenship APRN   metoprolol tartrate (LOPRESSOR) 25 MG tablet Take 1 tablet by mouth Every 12 (Twelve) Hours. 1/28/23   Adam Gaspar MD   omeprazole (priLOSEC) 40 MG capsule Take 40 mg by mouth Daily.    Enrique Recio MD   oxybutynin (DITROPAN) 5 MG tablet Take 5 mg by mouth 2 (Two) Times a Day.    Enrique Recio MD   QUEtiapine (SEROquel) 50 MG tablet Take 1 tablet by mouth Daily. 1/28/23   Adam Gaspar MD   rosuvastatin (CRESTOR) 40 MG tablet Take 1 tablet by mouth Every Night. 1/28/23   Adam Gaspar MD   sennosides-docusate (PERICOLACE) 8.6-50 MG per tablet Take 2 tablets by mouth 2 (Two) Times a Day. 1/28/23   Adam Gaspar MD       Allergies:  Penicillins, Sulfa antibiotics, Tetracyclines & related, and Valium [diazepam]      Review of system  Review of Systems   Unable to perform ROS: Dementia       Vitals:    02/12/23 0706   BP: 156/88   Pulse: 72   Resp: 18   Temp: 98 °F (36.7 °C)   SpO2: 92%       Physical exam  Physical Exam  Vitals reviewed.   Constitutional:       Appearance: She is obese.   HENT:      Head: Normocephalic.    Eyes:      Extraocular Movements: Extraocular movements intact.      Pupils: Pupils are equal, round, and reactive to light.   Pulmonary:      Effort: Pulmonary effort is normal.   Neurological:      Mental Status: She is alert.      Cranial Nerves: Cranial nerve deficit present.      Sensory: Sensation is intact.      Motor: Weakness present.      Deep Tendon Reflexes: Babinski sign present on the right side. Babinski sign absent on the left side.      Comments: Awake, oriented to self, able to name and repeat, follows verbal commands, no facial droop, VFf, moves limbs against gravity.   Psychiatric:         Mood and Affect: Mood normal.           Lab Results   Component Value Date    WBC 4.99 02/11/2023    HGB 10.9 (L) 02/11/2023    HCT 35.0 02/11/2023    .7 (H) 02/11/2023     02/11/2023     Lab Results   Component Value Date    GLUCOSE 197 (H) 02/11/2023    BUN 14 02/11/2023    CREATININE 1.08 (H) 02/11/2023    EGFRIFNONA 42 (L) 11/29/2021    EGFRIFAFRI 50 (L) 11/29/2021    BCR 13.0 02/11/2023    CO2 24.0 02/11/2023    CALCIUM 8.5 (L) 02/11/2023    ALBUMIN 3.5 02/10/2023    AST 21 02/10/2023    ALT 5 02/10/2023     Contains abnormal data Urinalysis, Microscopic Only -  Order: [id number]   Status: Final result      Next appt: None             Component  Ref Range & Units 1 d ago  (2/9/23) 1 yr ago  (11/2/21) 2 yr ago  (10/8/20) 2 yr ago  (4/13/20) 3 yr ago  (4/22/19)   Mucus, UA  None Seen /LPF 2+ Abnormal   Rare Abnormal     Rare Abnormal  R    WBC, UA  0 - 5 /HPF >100 Abnormal   0-2  None seen  0-2  10-20 Abnormal     RBC, UA  0 - 4 /HPF see below Abnormal   None seen  0-2  None seen  None seen R    Comment: Present but obscured.   Bacteria, UA  None Seen /HPF 3+ Abnormal              ECG 12 Lead Stroke Evaluation (Order [id number])  Order-Level Documents:    Scan on 2/10/2023 0438 by [employer], [place]: ECG 12-LEAD         Author: -- Service: -- Author Type: --   Filed:  Date of Service:   Creation Time:    Status: (Other)      Test Reason : Stroke Evaluation  Blood Pressure :   */*   mmHG  Vent. Rate :  69 BPM     Atrial Rate :  69 BPM     P-R Int : 170 ms          QRS Dur :  78 ms      QT Int : 420 ms       P-R-T Axes :  25 -19  54 degrees     QTc Int : 450 ms     Normal sinus rhythm  Moderate voltage criteria for LVH, may be normal variant  Inferior infarct , age undetermined  Abnormal ECG  No previous ECGs available  Confirmed by SAMANTHA TRAYLOR ([id number]) on 2/10/2023 4:42:55 PM     Referred By: MD           Confirmed By: SAMANTHA TRAYLOR        Signed    Electronically signed by Samantha Traylor MD on 2/10/23 at 1642 EST         Radiological Studies:  CT Head Without Contrast    Result Date: 2/10/2023  CT HEAD WO CONTRAST Date of Exam: 2/10/2023 6:38 AM EST Indication: Stroke, follow up f/up hemorrhagic conversion. Comparison: Outside CT head one day prior Technique: Axial CT images were obtained of the head without contrast administration.  Reconstructed coronal and sagittal images were also obtained. Automated exposure control and iterative construction methods were used. Findings: Redemonstrated prominent left posterior temporal and inferior parietal acute infarct with intermixed areas of small hyperdensity compatible with minimal hemorrhage. There is no evidence of interval increased hemorrhage or worsening edema or mass effect. There is no midline shift or significant ventricular effacement. No new ischemia, hemorrhage or mass effects present elsewhere. Generalized age-related changes are again noted. The calvarium is intact. The orbits are normal. The paranasal sinuses are grossly clear.     Impression: Stable appearance of findings noted on outside CT comparison, demonstrating an acute left posterior temporal and inferior parietal infarct with minimal amount of intervening hemorrhagic conversion. There is no evidence of interval increased hemorrhage or  worsening mass effect. Electronically  Signed: Luis Manuel Myers  2/10/2023 8:46 AM EST  Workstation ID: [id number]    CT Head Without Contrast    Result Date: 1/16/2023  PROCEDURE: CT HEAD WO CONTRAST-  HISTORY: Mental status change, unknown cause; I63.9-Cerebral infarction, unspecified  COMPARISON: 01/13/2023.  TECHNIQUE: Multiple axial CT images were performed from the foramen magnum to the vertex without enhancement.  FINDINGS: Exam is limited by patient motion artifact. There is a stable area of decreased attenuation in the left posterior parietal region with obliteration of the associated sulci consistent with CVA. There is no evidence of acute hemorrhage.  There is mild mass effect on the occipital horn of the left lateral ventricle, similar to the prior exam. There is no subdural hematoma.  There is no hydrocephalus. The paranasal sinuses are clear.      CVA in the left posterior parietal region appears similar to the prior exam.    1686.68 mGy.cm   This study was performed with techniques to keep radiation doses as low as reasonably achievable (ALARA). Individualized dose reduction techniques using automated exposure control or adjustment of mA and/or kV according to the patient size were employed.     Images were reviewed, interpreted, and dictated by Dr. Mare Kwong MD Transcribed by Ramila Rodríguez PA-C.  This report was signed and finalized on 1/16/2023 3:33 PM by Mare Kwong MD.    CT Head Without Contrast    Result Date: 1/13/2023  FINAL REPORT TECHNIQUE: Multiple axial CT images were performed from the foramen magnum to the vertex. This study was performed with techniques to keep radiation doses as low as reasonably achievable (ALARA). Individualized dose reduction techniques using automated exposure control or adjustment of mA and/or kV according to the patient's size were employed. CLINICAL HISTORY: Repeat interval scan to rule out hemorrhagic conversion of the large left MCA FINDINGS: There is a large region of low density enhancement in  the left parietal and temporal lobes compatible with subacute infarction. There is no evidence of hemorrhagic conversion.  There is no sign of mass effect or midline shift.  No acute intracranial hemorrhage or large acute cortical infarct.  The brain volume is normal for patient's age.  Ventricles are normal in size and configuration.  No midline shift.  The basal cisterns are patent.  No skull fracture.  The visualized paranasal sinuses and mastoid air cells are clear.     Large subacute left cerebral infarct.  No evidence of hemorrhagic conversion. Authenticated and Electronically Signed by Theodore Carrillo MD on 01/13/2023 10:43:23 PM    XR Outside Films    Result Date: 2/10/2023  This procedure was auto-finalized with no dictation required.    CT ABDOMEN PELVIS WO CONTRAST Additional Contrast? None    Result Date: 2/9/2023  CT ABDOMEN AND PELVIS: INDICATION: abd pain, lower half TECHNIQUE: Noncontrast CT images of the abdomen and pelvis were obtained. Up-to-date CT equipment and radiation dose reduction techniques were employed. COMPARISON: NONE. FINDINGS:  There are atelectatic changes identified within the medial aspect of the right upper lobe as well as the medial aspect the right lower lobe. There is some groundglass atelectasis identified at the left lung base. No pleural effusion is identified. There is calcification of mitral annulus. There is atherosclerotic calcification left anterior descending coronary artery. Patient status post a cholecystectomy. No biliary dilatation is identified. There is small hiatal hernia. The liver appears unremarkable. There is moderate-severe atrophy of the pancreas. There are calcified granulomas present within normal size spleen. Adrenal glands are normal. No hydronephrosis or nephrolithiasis is identified. There is mild renal atrophy. There is extensive calcification of the abdominal aorta. No aneurysm is identified. Large amount of stool is identified within the rectum,  sigmoid colon and descending colon. There is some associated edema identified within the posterior perirectal fat.. No abnormal bowel wall thickening is identified. No bowel dilatation is identified.  Appendix is visualized and is normal. Patient status post hysterectomy. There is a right hip femoral neck screw. No osteolytic or osteoblastic lesions are identified. Facet joint spondylosis is present with the lumbar spine.    1. Large amount of stool is identified within the rectum with some associated edematous fat stranding identified within the posterior perirectal fat. The findings can be seen with fecal impaction and stercoral colitis. 2. Status post hysterectomy. 3. Status post cholecystectomy. 4. Atelectatic changes identified along the detailed above. 5. Small hiatal hernia. 6. Atrophic pancreas.    CT Head WO Contrast    Result Date: 2/9/2023  HEAD CT SCAN WITHOUT CONTRAST   HISTORY: AMS, hx of stroke 1 month ago COMPARISON:  Head CT dated June 1621 TECHNIQUE: Noncontrast CT images of the head were obtained. Images were reformatted in the coronal and sagittal planes. Up-to-date CT equipment and radiation dose reduction techniques were employed. FINDINGS: There is low attenuation in sulcal effacement identified within the posterior inferior aspect of the left parietal lobe consistent with subacute infarct. There are associated small areas of hemorrhagic transformation within the infarct. No herniation is identified. There is prominence of the ventricles, cistern and sulci. There is a remote lacunar infarct identified within the head of the left caudate nucleus.    1. Subacute infarct is identified with the posterior inferior aspect left parietal lobe with some small areas of acute hemorrhagic conversion. 2. Mild atrophy. 3. Remote lacunar infarct is identified with the head of the  left caudate nucleus.    XR CHEST PORTABLE    Result Date: 2/9/2023  CHEST 1 VIEW   HISTORY: low O2 saturation   COMPARISON:  Chest x-ray dated 11-21 FINDINGS: Portable AP radiograph of the chest was obtained. The heart and mediastinum are within normal limits for size and configuration. No infiltrate, effusion or pneumothorax is identified. Lung volumes are decreased. Calcified granulomas project over the lung bases. Degenerative changes are identified at the right glenohumeral joint.    1.  No evidence of acute cardiopulmonary disease.    CT Outside Films    Result Date: 2/10/2023  This procedure was auto-finalized with no dictation required.    CT Outside Films    Result Date: 2/10/2023  This procedure was auto-finalized with no dictation required.    2/11/2023-head CT stable evolving left MCA infarct, no expansion of hematoma, no new areas of hemorrhage    During this visit the following were done:  Labs Reviewed [x]    Labs Ordered []    Radiology Reports Reviewed [x]    Radiology Ordered []    EKG, echo, and/or stress test reviewed [x]    EEG results reviewed  []    EEG reviewed and interpreted per myself   []    Discussed case with neurointerventionalist or neuroradiologist []    Referring Provider Records Reviewed []    ER Records Reviewed []    Hospital Records Reviewed []    History Obtained From Family []    Radiological images view and Interpreted per myself [x]    Case Discussed with referring provider []     Decision to obtain and request outside records  []          Assessment and Plan     Subacute left MCA CVA 22 h/o A.fib. Minor hemorrhagic conversion on Eliquis and aspirin. Currently in NSR. Suspect contribution to AMS/TME from UTI and fecal impaction.      - BP<140/90.   - F/U CT head stable yesterday a.m., therefore aspirin 81 mg daily was started   - Plan to continue to hold Eliquis for 1 week, restart on 2/17/2023  -Appreciate cardiology's input   - ST, PT, OT continue treatment plan, recommending SNF at rehab-appears an appropriate plan    No further inpatient neurologic work-up is needed. Will see patient in stroke  clinic in 2-4 weeks to ensure she is on correct medication and tolerating well.  We will sign off, please call with any questions or concerns.                Electronically signed by EVELINA Beasley on 2/12/2023 at 08:59 EST

## 2023-02-12 NOTE — PLAN OF CARE
Goal Outcome Evaluation:  Plan of Care Reviewed With: patient        Progress: no change  Outcome Evaluation: VSS, disoriented x4, new IV was placed, bowel movement last night. No issues during the night.

## 2023-02-12 NOTE — PLAN OF CARE
Goal Outcome Evaluation:                     Patient resting well, multiple bowel movements today, she is still very confused, pain controlled with meds, tolerating food, vital signs stable, will continue to monitor

## 2023-02-12 NOTE — PROGRESS NOTES
[facility] Cardiology  Inpatient Progress Note      Chief Complaint/Reason for consult:  AC for Afib / hx of PE with hemorrhagic conversion of stroke         Subjective  No acute events overnight, breathing comfortably on RA    Review of Systems:   Negative for chest pain, shortness of breath, palpitations, fevers, chills, abdominal pain, nausea, vomiting       Vital Sign Min/Max for last 24 hours  Temp  Min: 97.6 °F (36.4 °C)  Max: 98.5 °F (36.9 °C)   BP  Min: 147/51  Max: 177/72   Pulse  Min: 70  Max: 91   Resp  Min: 18  Max: 18   SpO2  Min: 92 %  Max: 97 %   No data recorded      Intake/Output Summary (Last 24 hours) at 2023 1104  Last data filed at 2023 0930  Gross per 24 hour   Intake 75 ml   Output 750 ml   Net -675 ml           Gen: well developed, elderly WF laying in bed, comfortable appearing  HEENT: MMM, sclerae anicteric, conjunctivae normal  CV: regular rate, regular rhythm, no murmurs or rubs, normal S1, S2. 2+ radial and DP pulses  Pulm: RA, normal work of breathing, no wheezes, rales, rhonchi  Abd: soft, nontender, nondistended,  positive bowel sounds  Ext: normal bulk for age, normal tone, no dependent edema  Neuro: aphasia    Tele:  SR, alarms for artifact, no afib seen    Results Review (reviewed the patient's recent labs in the electronic medical record):     EK/10 - NSR, LVH     CXR:    CT Head Without Contrast    Result Date: 2023  Impression: 1. Stable evolving subacute left MCA distribution infarct with the hemorrhagic transformation. 2. There is no expansion of the hematoma. There are no new areas of hemorrhage. 3. Mild volume loss secondary to cerebral atrophy. Electronically Signed: Bennie Casiano  2023 10:06 AM EST  Workstation ID: [id number]    Results for orders placed during the hospital encounter of 02/10/23    2/11/23 - Adult Transthoracic Echo  •  Left ventricular systolic function is hyperdynamic (EF > 70%). Calculated left ventricular EF =  70.6% Left ventricular ejection fraction appears to be greater than 70%. The left ventricular cavity is small in size. Left ventricular wall thickness is consistent with mild concentric hypertrophy. All left ventricular wall segments contract normally. Left ventricular intracavitary gradient noted to be 71 mmHg. Left ventricular diastolic function is consistent with (grade I) impaired relaxation. Normal left atrial pressure.  •  The aortic valve is abnormal in structure. There is mild calcification of the aortic valve mainly affecting the right coronary cusp(s). The aortic valve appears trileaflet. No aortic valve regurgitation is present. Gradient noted through the LV and LVOT. Compared to TTE report from  Dec 2019, hyperdynamic LV with intracavitary gradient is not a new finding but peak gradient noted on this exam is higher than previously described.     Lab Results   Component Value Date    WBC 4.99 02/11/2023    HGB 10.9 (L) 02/11/2023    HCT 35.0 02/11/2023    .7 (H) 02/11/2023     02/11/2023     Lab Results   Component Value Date    GLUCOSE 197 (H) 02/11/2023    CALCIUM 8.5 (L) 02/11/2023     02/11/2023    K 4.4 02/11/2023    CO2 24.0 02/11/2023     02/11/2023    BUN 14 02/11/2023    CREATININE 1.08 (H) 02/11/2023    EGFRIFAFRI 50 (L) 11/29/2021    EGFRIFNONA 42 (L) 11/29/2021    BCR 13.0 02/11/2023    ANIONGAP 11.0 02/11/2023     Assessment     Stroke with hemorrhagic conversion  Hx of PE on apixaban   - holding apixaban   - After discussion with the patient's family today they are unsure if she's ever been diagnosed with Afib. Review of the available ECG doesn't show any Afib and there hasn't been any seen on telemetry.   - will continue telemetry while admitted and consider d/c with monitor          JEAN PAUL Parker MD  2/12/2023  11:04 EST

## 2023-02-12 NOTE — PROGRESS NOTES
[facility] Medicine Services  PROGRESS NOTE    Patient Name: Cheri Cruz  : 1940  MRN: [id number]    Date of Admission: 2/10/2023  Primary Care Physician: Lorrie Canada APRN    Subjective   Subjective     CC:  F/U stroke     HPI:  Patient seen and examined. Much more awake and alert today but remains disoriented. Does tell me she doesn't feel well, her stomach is upset.     ROS:  +nausea     Objective   Objective     Vital Signs:   Temp:  [97.6 °F (36.4 °C)-98.4 °F (36.9 °C)] 98 °F (36.7 °C)  Heart Rate:  [70-91] 72  Resp:  [18] 18  BP: (147-177)/(51-88) 156/88     Physical Exam:  Constitutional: No acute distress, more awake and alert today   HENT: NCAT, mucous membranes moist  Respiratory: Clear to auscultation bilaterally, respiratory effort normal RA  Cardiovascular: RRR, no murmurs, rubs, or gallops  Gastrointestinal: Positive bowel sounds, soft, nontender, nondistended  Musculoskeletal: No bilateral ankle edema  Psychiatric: Flat affect   Neurologic: Unable to answer orientation questions appropriately   Skin: No rashes    Results Reviewed:  LAB RESULTS:      Lab 23  1220 02/10/23  1200 23   WBC 4.99 7.58 7.4   HEMOGLOBIN 10.9* 11.9* 10.7*   HEMATOCRIT 35.0 37.3 32.6*   PLATELETS 163 136* 195   NEUTROS ABS 2.84 4.75 4.2   IMMATURE GRANS (ABS) 0.02 0.04 0.0   LYMPHS ABS 1.47 1.85 2.3   MONOS ABS 0.48 0.69 0.6   EOS ABS 0.16 0.21 0.3   .7* 104.8* 102.5*         Lab 23  1220 02/10/23  1200   SODIUM 138 140   POTASSIUM 4.4 4.9   CHLORIDE 103 101   CO2 24.0 25.0   ANION GAP 11.0 14.0   BUN 14 20   CREATININE 1.08* 1.38*   EGFR 51.7* 38.5*   GLUCOSE 197* 170*   CALCIUM 8.5* 8.6   TSH  --  2.330         Lab 02/10/23  1200 23   TOTAL PROTEIN 5.9*  --    ALBUMIN 3.5  --    GLOBULIN 2.4  --    ALT (SGPT) 5  --    AST (SGOT) 21  --    BILIRUBIN 0.3  --    ALK PHOS 62  --    LIPASE  --  7.0         Lab 23   [facility] 669    TROPONIN I <0.30                 Lab 02/09/23  1840   PH, ARTERIAL 7.377   PCO2, ARTERIAL 45.9*   O2 SATURATION ART 96.6   FIO2 0.28   HCO3 ART 26.3*     Brief Urine Lab Results  (Last result in the past 365 days)      Color   Clarity   Blood   Leuk Est   Nitrite   Protein   CREAT   Urine HCG        02/09/23 1925 Yellow   CLOUDY   MODERATE   LARGE   POSITIVE                   Microbiology Results Abnormal     None          Adult Transthoracic Echo Complete W/ Cont if Necessary Per Protocol    Result Date: 2/11/2023  •  Left ventricular systolic function is hyperdynamic (EF > 70%). Calculated left ventricular EF = 70.6% Left ventricular ejection fraction appears to be greater than 70%. The left ventricular cavity is small in size. Left ventricular wall thickness is consistent with mild concentric hypertrophy. All left ventricular wall segments contract normally. Left ventricular intracavitary gradient noted to be 71 mmHg. Left ventricular diastolic function is consistent with (grade I) impaired relaxation. Normal left atrial pressure. •  The aortic valve is abnormal in structure. There is mild calcification of the aortic valve mainly affecting the right coronary cusp(s). The aortic valve appears trileaflet. No aortic valve regurgitation is present. Gradient noted through the LV and LVOT Compared to TTE report from  Dec 2019, hyperdynamic LV with intracavitary gradient is not a new finding but peak gradient noted on this exam is higher than previously described.     CT Head Without Contrast    Result Date: 2/11/2023  CT HEAD WO CONTRAST Date of Exam: 2/11/2023 5:21 AM EST Indication: Cerebrovascular accident with intracranial hemorrhage, follow-up. Comparison: 2/10/2023. Technique: Axial CT images were obtained of the head without contrast administration.  Reconstructed coronal and sagittal images were also obtained. Automated exposure control and iterative construction methods were used. Findings: There is a  wedge-shaped area of decreased density in the posterior left frontal and left parietal lobe consistent with a subacute infarct. There is stable areas of parenchymal hemorrhage within the infarct similar to yesterday. There is no evidence of expansion of the hematoma. There are no new areas of acute hemorrhage. The patient has mild volume loss with prominence of the sulci, fissures, ventricles, and basal cisterns. There is no mass effect or midline shift. There are atherosclerotic vascular calcifications in the distal vertebral arteries and cavernous carotid arteries. There is thinning of the lenses suggestive of previous cataract surgery. There is a small mucous retention cyst in the left maxillary sinus. The mastoid sinuses are clear. The calvarium is unremarkable.     Impression: Impression: 1. Stable evolving subacute left MCA distribution infarct with the hemorrhagic transformation. 2. There is no expansion of the hematoma. There are no new areas of hemorrhage. 3. Mild volume loss secondary to cerebral atrophy. Electronically Signed: Bennie Casiano  2/11/2023 10:06 AM EST  Workstation ID: [id number]      Results for orders placed during the hospital encounter of 02/10/23    Adult Transthoracic Echo Complete W/ Cont if Necessary Per Protocol    Interpretation Summary  •  Left ventricular systolic function is hyperdynamic (EF > 70%). Calculated left ventricular EF = 70.6% Left ventricular ejection fraction appears to be greater than 70%. The left ventricular cavity is small in size. Left ventricular wall thickness is consistent with mild concentric hypertrophy. All left ventricular wall segments contract normally. Left ventricular intracavitary gradient noted to be 71 mmHg. Left ventricular diastolic function is consistent with (grade I) impaired relaxation. Normal left atrial pressure.  •  The aortic valve is abnormal in structure. There is mild calcification of the aortic valve mainly affecting the right coronary  cusp(s). The aortic valve appears trileaflet. No aortic valve regurgitation is present. Gradient noted through the LV and LVOT    Compared to TTE report from  Dec 2019, hyperdynamic LV with intracavitary gradient is not a new finding but peak gradient noted on this exam is higher than previously described.      I have reviewed the medications:  Scheduled Meds:aspirin, 81 mg, Oral, Daily  busPIRone, 7.5 mg, Oral, BID  donepezil, 10 mg, Oral, Nightly  DULoxetine, 60 mg, Oral, Daily  gabapentin, 300 mg, Oral, BID  insulin lispro, 0-7 Units, Subcutaneous, Q6H  levothyroxine, 25 mcg, Oral, Daily  pantoprazole, 40 mg, Oral, Q AM  senna-docusate sodium, 2 tablet, Oral, BID   And  polyethylene glycol, 17 g, Oral, Daily  rosuvastatin, 40 mg, Oral, Nightly  sodium chloride, 10 mL, Intravenous, Q12H      Continuous Infusions:   PRN Meds:.•  acetaminophen **OR** acetaminophen **OR** acetaminophen  •  senna-docusate sodium **AND** polyethylene glycol **AND** bisacodyl **AND** bisacodyl  •  dextrose  •  dextrose  •  glucagon (human recombinant)  •  HYDROcodone-acetaminophen  •  ondansetron **OR** ondansetron  •  QUEtiapine  •  sodium chloride  •  sodium chloride    Assessment & Plan   Assessment & Plan     Active Hospital Problems    Diagnosis  POA   • **Hemorrhagic stroke (HCC) [I61.9]  Yes   • TIA (transient ischemic attack) [G45.9]  Yes      Resolved Hospital Problems   No resolved problems to display.        Brief Hospital Course to date:  Cheri Cruz is a 82 y.o. female with history of HTN, HLD, DMII, atrial fibrillation, PE, Dementia, anxiety and recent left MCA infarct with residual aphasia presents from rehab with increased confusion, RUE weakness, speech difficulty and abdominal pain.  CT at the OSH showed hemorrhagic conversion in the area of her recent stroke.  Patient transferred to [facility] for further evaluation    This patient's problems and plans were partially entered by my partner and updated as appropriate by  "me 02/12/23.     Altered mental status  Recent Left MCA CVA with Hemorrhagic Conversion   - Stroke Neuro following  -CT head 2/11 with no worsening of hemorrhage, no new areas of hemorrhage   -Holing Eliquis x 1 week per Neuro  -Continue ASA 81mg daily   -Cardiology consulted for AC recs: plan to have patient follow-up outpatient for consideration of Left Atrial Appendage Occlusion  -TTE reviewed: Hyperdynamic LV  -PT/OT recommend SNF     ?Hx A fib (data deficit)  Hx PE October 2020  -In review of chart, A fib not documented until this admit. Not on prior EKGs, none on tele this admit  -Likely that patient is on Eliquis for Hx PE and not A fib  -Continue to monitor on tele  -Discussed with Cardiology, Dr Parker     Mod-severe Aphasia, mild dysarthria  Dysphagia   -SLP following   -Tolerating diet      Abdominal pain -> Resolved   - CT Abdomen Pelvis shows constipation, some concern for fecal impaction/stercoral colitis.  No current leukocytosis or fever  - Bowel Regimen      UTI  - S/P Rocephin x 3 doses   - Discussed with pharmacy who called Micro lab at [facility]. They are waiting for report, currently just \"plated\"   -Repeat UA here, Cx pending      HTN  - goal SBP <140  - Add Lisinopril 10mg daily. Monitor Cr    - PRN nicardipine drip     DMII  - SSI  - diabetes educator has seen      CKD  -Cr seems to be around baseline. CTM     Dementia    Expected Discharge Location and Transportation: SNF  Expected Discharge   Expected Discharge Date and Time     Expected Discharge Date Expected Discharge Time    Feb 15, 2023            DVT prophylaxis:  Mechanical DVT prophylaxis orders are present.     AM-PAC 6 Clicks Score (PT): 7 (02/10/23 1440)    CODE STATUS:   There are no questions and answers to display.       Marcie Angel, DO  02/12/23        \    "

## 2023-02-13 PROBLEM — I48.0 PAROXYSMAL ATRIAL FIBRILLATION: Status: ACTIVE | Noted: 2023-02-13

## 2023-02-13 PROBLEM — Z86.711 HISTORY OF PULMONARY EMBOLUS (PE): Status: ACTIVE | Noted: 2023-02-13

## 2023-02-13 LAB
GLUCOSE BLDC GLUCOMTR-MCNC: 141 MG/DL (ref 70–130)
GLUCOSE BLDC GLUCOMTR-MCNC: 155 MG/DL (ref 70–130)
GLUCOSE BLDC GLUCOMTR-MCNC: 162 MG/DL (ref 70–130)
GLUCOSE BLDC GLUCOMTR-MCNC: 201 MG/DL (ref 70–130)
GLUCOSE BLDC GLUCOMTR-MCNC: 218 MG/DL (ref 70–130)
ORGANISM: ABNORMAL
URINE CULTURE, ROUTINE: ABNORMAL

## 2023-02-13 PROCEDURE — 97530 THERAPEUTIC ACTIVITIES: CPT

## 2023-02-13 PROCEDURE — 82962 GLUCOSE BLOOD TEST: CPT

## 2023-02-13 PROCEDURE — 97535 SELF CARE MNGMENT TRAINING: CPT

## 2023-02-13 PROCEDURE — 63710000001 ONDANSETRON PER 8 MG: Performed by: INTERNAL MEDICINE

## 2023-02-13 PROCEDURE — 99232 SBSQ HOSP IP/OBS MODERATE 35: CPT | Performed by: NURSE PRACTITIONER

## 2023-02-13 PROCEDURE — 99232 SBSQ HOSP IP/OBS MODERATE 35: CPT | Performed by: FAMILY MEDICINE

## 2023-02-13 PROCEDURE — 63710000001 INSULIN LISPRO (HUMAN) PER 5 UNITS: Performed by: INTERNAL MEDICINE

## 2023-02-13 RX ORDER — GRANULES FOR ORAL 3 G/1
3 POWDER ORAL ONCE
Status: COMPLETED | OUTPATIENT
Start: 2023-02-13 | End: 2023-02-13

## 2023-02-13 RX ORDER — LISINOPRIL 20 MG/1
20 TABLET ORAL
Status: DISCONTINUED | OUTPATIENT
Start: 2023-02-14 | End: 2023-02-14

## 2023-02-13 RX ADMIN — LEVOTHYROXINE SODIUM 25 MCG: 25 TABLET ORAL at 06:02

## 2023-02-13 RX ADMIN — INSULIN LISPRO 3 UNITS: 100 INJECTION, SOLUTION INTRAVENOUS; SUBCUTANEOUS at 11:48

## 2023-02-13 RX ADMIN — GABAPENTIN 300 MG: 300 CAPSULE ORAL at 08:59

## 2023-02-13 RX ADMIN — Medication 10 ML: at 01:08

## 2023-02-13 RX ADMIN — DONEPEZIL HYDROCHLORIDE 10 MG: 10 TABLET, FILM COATED ORAL at 21:33

## 2023-02-13 RX ADMIN — SENNOSIDES AND DOCUSATE SODIUM 2 TABLET: 8.6; 5 TABLET ORAL at 09:00

## 2023-02-13 RX ADMIN — ONDANSETRON HYDROCHLORIDE 4 MG: 4 TABLET, FILM COATED ORAL at 01:06

## 2023-02-13 RX ADMIN — LISINOPRIL 10 MG: 10 TABLET ORAL at 09:00

## 2023-02-13 RX ADMIN — Medication 10 ML: at 09:00

## 2023-02-13 RX ADMIN — BUSPIRONE HYDROCHLORIDE 7.5 MG: 5 TABLET ORAL at 17:58

## 2023-02-13 RX ADMIN — HYDROCODONE BITARTRATE AND ACETAMINOPHEN 1 TABLET: 10; 325 TABLET ORAL at 17:58

## 2023-02-13 RX ADMIN — ASPIRIN 81 MG 81 MG: 81 TABLET ORAL at 08:59

## 2023-02-13 RX ADMIN — BUSPIRONE HYDROCHLORIDE 7.5 MG: 5 TABLET ORAL at 08:59

## 2023-02-13 RX ADMIN — DULOXETINE HYDROCHLORIDE 60 MG: 60 CAPSULE, DELAYED RELEASE ORAL at 08:59

## 2023-02-13 RX ADMIN — ONDANSETRON HYDROCHLORIDE 4 MG: 4 TABLET, FILM COATED ORAL at 08:59

## 2023-02-13 RX ADMIN — INSULIN LISPRO 3 UNITS: 100 INJECTION, SOLUTION INTRAVENOUS; SUBCUTANEOUS at 18:07

## 2023-02-13 RX ADMIN — GRANULES FOR ORAL SOLUTION 3 G: 3 POWDER ORAL at 11:44

## 2023-02-13 RX ADMIN — GABAPENTIN 300 MG: 300 CAPSULE ORAL at 21:33

## 2023-02-13 RX ADMIN — PANTOPRAZOLE SODIUM 40 MG: 40 TABLET, DELAYED RELEASE ORAL at 06:02

## 2023-02-13 RX ADMIN — INSULIN LISPRO 2 UNITS: 100 INJECTION, SOLUTION INTRAVENOUS; SUBCUTANEOUS at 01:06

## 2023-02-13 RX ADMIN — ACETAMINOPHEN 325MG 650 MG: 325 TABLET ORAL at 21:33

## 2023-02-13 RX ADMIN — ONDANSETRON HYDROCHLORIDE 4 MG: 4 TABLET, FILM COATED ORAL at 19:12

## 2023-02-13 RX ADMIN — DICLOFENAC 2 G: 10 GEL TOPICAL at 21:34

## 2023-02-13 RX ADMIN — DICLOFENAC 2 G: 10 GEL TOPICAL at 09:02

## 2023-02-13 RX ADMIN — ROSUVASTATIN 40 MG: 20 TABLET, FILM COATED ORAL at 21:33

## 2023-02-13 NOTE — PLAN OF CARE
Goal Outcome Evaluation:  Plan of Care Reviewed With: patient        Progress: improving  Outcome Evaluation: Patient demonstrating improvements in functional mobility this date. Better command following this date. Requiring less physical assistance for bed mobility and transfers. Unable to attain full safe standing position to initiate gait however. Patient will continue to benefit from skilled IP PT services in order to address impairments for return to PLOF. Cont IP PT POC.

## 2023-02-13 NOTE — PLAN OF CARE
Goal Outcome Evaluation:  Plan of Care Reviewed With: patient        Progress: improving  Outcome Evaluation: Pt continues to present below baseline for transfers, mobility, balance and increased need for assist w/ ADLs. Improvement w/ bed mobility noted requiring ModA for rolling and Wilbert for supine to sit w/ v/c, t/c and extended time. Pt continues to require DEP assist for LB dressing. Continue per current POC.

## 2023-02-13 NOTE — THERAPY TREATMENT NOTE
Patient Name: Cheri Cruz  : 1940    MRN: [id number]                              Today's Date: 2023       Admit Date: 2/10/2023    Visit Dx:     ICD-10-CM ICD-9-CM   1. Aphasia  R47.01 784.3     Patient Active Problem List   Diagnosis   • Hyperlipidemia LDL goal <70   • Type 2 diabetes mellitus without complication, without long-term current use of insulin (HCC)   • GERD (gastroesophageal reflux disease)   • Essential hypertension   • Abnormal EKG   • Osteoarthritis   • Anxiety   • Palpitations   • Vertigo   • History of ischemic left MCA stroke   • Hemorrhagic stroke (HCC)   • TIA (transient ischemic attack)   • History of pulmonary embolus (PE)     Past Medical History:   Diagnosis Date   • Abnormal heart rhythm    • Anxiety    • Arrhythmia    • Arthritis    • Atrial fibrillation (HCC)    • Dementia (HCC)    • Diabetes mellitus (HCC)    • Hyperlipidemia    • Hypertension    • Kidney disorder    • Stroke (HCC)    • Uterus cancer (HCC)      Past Surgical History:   Procedure Laterality Date   • CATARACT EXTRACTION, BILATERAL        General Information     Row Name 23 1326          OT Time and Intention    Document Type therapy note (daily note)  -MR     Mode of Treatment occupational therapy  -MR     Row Name 23 1326          General Information    Patient Profile Reviewed yes  -MR     Existing Precautions/Restrictions fall;oxygen therapy device and L/min;other (see comments)  confusion  -MR     Barriers to Rehab medically complex;previous functional deficit;cognitive status  -MR     Row Name 23 1326          Cognition    Orientation Status (Cognition) oriented to;person;disoriented to;place;situation;time  -MR     Row Name 23 1326          Safety Issues, Functional Mobility    Safety Issues Affecting Function (Mobility) safety precaution awareness;safety precautions follow-through/compliance;insight into deficits/self-awareness;ability to follow commands;awareness of need  for assistance  -MR     Impairments Affecting Function (Mobility) balance;cognition;endurance/activity tolerance;strength  -MR     Cognitive Impairments, Mobility Safety/Performance safety precaution awareness;safety precaution follow-through;impulsivity;awareness, need for assistance  -MR           User Key  (r) = Recorded By, (t) = Taken By, (c) = Cosigned By    Initials Name Provider Type    iVvian Gardiner, OT Occupational Therapist                 Mobility/ADL's     Row Name 02/13/23 1409          Bed Mobility    Bed Mobility supine-sit;rolling right;rolling left  -MR     Rolling Left [place] (Bed Mobility) moderate assist (50% patient effort);verbal cues;nonverbal cues (demo/gesture)  -MR     Rolling Right [place] (Bed Mobility) moderate assist (50% patient effort);verbal cues;nonverbal cues (demo/gesture)  -MR     Supine-Sit [place] (Bed Mobility) minimum assist (75% patient effort);verbal cues;nonverbal cues (demo/gesture)  -MR     Bed Mobility, Safety Issues decreased use of arms for pushing/pulling;impaired trunk control for bed mobility;cognitive deficits limit understanding  -MR     Assistive Device (Bed Mobility) head of bed elevated;draw sheet  -MR     Comment, (Bed Mobility) ModA for rolling, assist provided for sequencing. Wilbert for supine to sit w/ v/c, t/c and extended time.  -MR     Row Name 02/13/23 1409          Transfers    Transfers bed-chair transfer;sit-stand transfer  -MR     Row Name 02/13/23 1409          Bed-Chair Transfer    Bed-Chair Pershing (Transfers) maximum assist (25% patient effort);2 person assist;verbal cues;nonverbal cues (demo/gesture)  -MR     Assistive Device (Bed-Chair Transfers) other (see comments)  BUE support  -MR     Row Name 02/13/23 1409          Sit-Stand Transfer    Sit-Stand Pershing (Transfers) maximum assist (25% patient effort);2 person assist;verbal cues;nonverbal cues (demo/gesture)  -MR     Assistive Device (Sit-Stand Transfers)  walker, front-wheeled  -MR     Comment, (Sit-Stand Transfer) Pt unable to come to full upright standing despite verbal and tactile cueing.  -MR     Row Name 02/13/23 1409          Activities of Daily Living    BADL Assessment/Intervention upper body dressing;lower body dressing;grooming;toileting  -MR     Row Name 02/13/23 1409          Upper Body Dressing Assessment/Training    [place] Level (Upper Body Dressing) don;other (see comments);moderate assist (50% patient effort)  hospital gown  -MR     Position (Upper Body Dressing) edge of bed sitting  -MR     Row Name 02/13/23 1409          Toileting Assessment/Training    [place] Level (Toileting) perform perineal hygiene;dependent (less than 25% patient effort);change pad/brief  -MR     Position (Toileting) supine  -MR     Row Name 02/13/23 1409          Lower Body Dressing Assessment/Training    [place] Level (Lower Body Dressing) don;socks;dependent (less than 25% patient effort)  -MR     Position (Lower Body Dressing) supine  -MR     Row Name 02/13/23 1409          Grooming Assessment/Training    [place] Level (Grooming) wash face, hands;maximum assist (25% patient effort)  -MR     Position (Grooming) supine  -MR           User Key  (r) = Recorded By, (t) = Taken By, (c) = Cosigned By    Initials Name Provider Type    Vivian Gardiner OT Occupational Therapist               Obj/Interventions     Row Name 02/13/23 1415          Balance    Balance Assessment sitting static balance;sitting dynamic balance;standing static balance;standing dynamic balance  -MR     Static Sitting Balance moderate assist  -MR     Dynamic Sitting Balance moderate assist  -MR     Position, Sitting Balance unsupported;sitting edge of bed  -MR     Static Standing Balance maximum assist;2-person assist;verbal cues;non-verbal cues (demo/gesture)  -MR     Dynamic Standing Balance maximum assist;2-person assist;verbal cues;non-verbal cues (demo/gesture)  -MR      Position/Device Used, Standing Balance supported;walker, front-wheeled  -MR     Balance Interventions sitting;standing;sit to stand;static;supported;dynamic;minimal challenge;occupation based/functional task  -MR           User Key  (r) = Recorded By, (t) = Taken By, (c) = Cosigned By    Initials Name Provider Type    Vivian Gardiner, OT Occupational Therapist               Goals/Plan    No documentation.                Clinical Impression     Row Name 02/13/23 1416          Pain Assessment    Pain Intervention(s) Ambulation/increased activity;Repositioned;Nursing Notified  -MR     Row Name 02/13/23 1416          Pain Scale: FACES Pre/Post-Treatment    Pain: FACES Scale, Pretreatment 0-->no hurt  -MR     Posttreatment Pain Rating 0-->no hurt  -MR     Row Name 02/13/23 1416          Plan of Care Review    Plan of Care Reviewed With patient  -MR     Progress improving  -MR     Outcome Evaluation Pt continues to present below baseline for transfers, mobility, balance and increased need for assist w/ ADLs. Improvement w/ bed mobility noted requiring ModA for rolling and Wilbert for supine to sit w/ v/c, t/c and extended time. Pt continues to require DEP assist for LB dressing. Continue per current POC.  -MR     Row Name 02/13/23 1416          Vital Signs    Pre Systolic BP Rehab 129  -MR     Pre Treatment Diastolic BP 62  -MR     Post Systolic BP Rehab 124  -MR     Post Treatment Diastolic BP 76  -MR     Pretreatment Heart Rate (beats/min) 88  -MR     Posttreatment Heart Rate (beats/min) 95  -MR     Pre SpO2 (%) 95  -MR     O2 Delivery Pre Treatment room air  -MR     O2 Delivery Intra Treatment room air  -MR     Post SpO2 (%) 96  -MR     O2 Delivery Post Treatment room air  -MR     Pre Patient Position Supine  -MR     Intra Patient Position Standing  -MR     Post Patient Position Sitting  -MR     Row Name 02/13/23 1416          Positioning and Restraints    Pre-Treatment Position in bed  -MR     Post Treatment Position  chair  -MR     In Chair notified nsg;reclined;sitting;call light within reach;encouraged to call for assist;exit alarm on;legs elevated;waffle cushion;on mechanical lift sling;heels elevated  -MR           User Key  (r) = Recorded By, (t) = Taken By, (c) = Cosigned By    Initials Name Provider Type    MR Vivian Hendrickson, OT Occupational Therapist               Outcome Measures     Row Name 02/13/23 1420          How much help from another is currently needed...    Putting on and taking off regular lower body clothing? 1  -MR     Bathing (including washing, rinsing, and drying) 1  -MR     Toileting (which includes using toilet bed pan or urinal) 1  -MR     Putting on and taking off regular upper body clothing 2  -MR     Taking care of personal grooming (such as brushing teeth) 2  -MR     Eating meals 2  -MR     AM-PAC 6 Clicks Score (OT) 9  -MR     Row Name 02/13/23 1414 02/13/23 0845       How much help from another person do you currently need...    Turning from your back to your side while in flat bed without using bedrails? 2  -LO 2  -OD    Moving from lying on back to sitting on the side of a flat bed without bedrails? 2  -LO 1  -OD    Moving to and from a bed to a chair (including a wheelchair)? 2  -LO 1  -OD    Standing up from a chair using your arms (e.g., wheelchair, bedside chair)? 2  -LO 1  -OD    Climbing 3-5 steps with a railing? 1  -LO 1  -OD    To walk in hospital room? 1  -LO 1  -OD    AM-PAC 6 Clicks Score (PT) 10  -LO 7  -OD    Highest level of mobility 4 --> Transferred to chair/commode  -LO 2 --> Bed activities/dependent transfer  -OD    Row Name 02/13/23 1420 02/13/23 1414       Functional Assessment    Outcome Measure Options AM-PAC 6 Clicks Daily Activity (OT)  -MR AM-PAC 6 Clicks Basic Mobility (PT)  -LO          User Key  (r) = Recorded By, (t) = Taken By, (c) = Cosigned By    Initials Name Provider Type    OD Marcie Nichols, RN Registered Nurse    Elmira Owens, PT Physical Therapist     Vivian Gardiner, OT Occupational Therapist                Occupational Therapy Education     Title: PT OT SLP Therapies (Done)     Topic: Occupational Therapy (Done)     Point: ADL training (Done)     Description:   Instruct learner(s) on proper safety adaptation and remediation techniques during self care or transfers.   Instruct in proper use of assistive devices.              Learning Progress Summary           Patient Acceptance, E, VU by MR at 2/13/2023 1420    Acceptance, E, VU by LG at 2/12/2023 1731    Acceptance, E, VU by LG at 2/11/2023 1614    Acceptance, E, NR by TA at 2/10/2023 0941                   Point: Home exercise program (Done)     Description:   Instruct learner(s) on appropriate technique for monitoring, assisting and/or progressing therapeutic exercises/activities.              Learning Progress Summary           Patient Acceptance, E, VU by MR at 2/13/2023 1420    Acceptance, E, VU by LG at 2/12/2023 1731    Acceptance, E, VU by LG at 2/11/2023 1614    Acceptance, E, NR by TA at 2/10/2023 0941                   Point: Precautions (Done)     Description:   Instruct learner(s) on prescribed precautions during self-care and functional transfers.              Learning Progress Summary           Patient Acceptance, E, VU by MR at 2/13/2023 1420    Acceptance, E, VU by LG at 2/12/2023 1731    Acceptance, E, VU by LG at 2/11/2023 1614    Acceptance, E, NR by TA at 2/10/2023 0941                   Point: Body mechanics (Done)     Description:   Instruct learner(s) on proper positioning and spine alignment during self-care, functional mobility activities and/or exercises.              Learning Progress Summary           Patient Acceptance, E, VU by MR at 2/13/2023 1420    Acceptance, E, VU by LG at 2/12/2023 1731    Acceptance, E, VU by LG at 2/11/2023 1614    Acceptance, E, NR by TA at 2/10/2023 0941                               User Key     Initials Effective Dates Name Provider Type  Discipline    TA 06/16/21 -  Westley Sotomayor OT Occupational Therapist OT    LG 11/16/18 -  Ced Valdez RN Registered Nurse Nurse    MR 09/22/22 -  Vivian Hendrickson OT Occupational Therapist OT              OT Recommendation and Plan     Plan of Care Review  Plan of Care Reviewed With: patient  Progress: improving  Outcome Evaluation: Pt continues to present below baseline for transfers, mobility, balance and increased need for assist w/ ADLs. Improvement w/ bed mobility noted requiring ModA for rolling and Wilbert for supine to sit w/ v/c, t/c and extended time. Pt continues to require DEP assist for LB dressing. Continue per current POC.     Time Calculation:    Time Calculation- OT     Row Name 02/13/23 1421             Time Calculation- OT    OT Start Time 1326  -MR      OT Received On 02/13/23  -MR         Timed Charges    56338 - OT Therapeutic Activity Minutes 3  -MR      93922 - OT Self Care/Mgmt Minutes 10  -MR         Total Minutes    Timed Charges Total Minutes 13  -MR       Total Minutes 13  -MR            User Key  (r) = Recorded By, (t) = Taken By, (c) = Cosigned By    Initials Name Provider Type    MR Vivian Hendrickson OT Occupational Therapist              Therapy Charges for Today     Code Description Service Date Service Provider Modifiers Qty    [id number] HC OT SELF CARE/MGMT/TRAIN EA 15 MIN 2/13/2023 Vivian Hendrickson OT GO 1               Vivian Hendrickson OT  2/13/2023

## 2023-02-13 NOTE — PROGRESS NOTES
[facility] Medicine Services  PROGRESS NOTE    Patient Name: Cheri Cruz  : 1940  MRN: [id number]    Date of Admission: 2/10/2023  Primary Care Physician: Lorrie Canada APRN    Subjective   Subjective     CC:  F/U stroke     HPI:  Patient seen and examined. No issues overnight. Ate some breakfast.     ROS:  UTO reliable ROS     Objective   Objective     Vital Signs:   Temp:  [97.7 °F (36.5 °C)-98.6 °F (37 °C)] 98.6 °F (37 °C)  Heart Rate:  [65-91] 85  Resp:  [18] 18  BP: (110-172)/(45-73) 137/55  Flow (L/min):  [2] 2     Physical Exam:  Constitutional: No acute distress, resting comfortably   HENT: NCAT, mucous membranes moist  Respiratory: Clear to auscultation bilaterally, respiratory effort normal 2L NC  Cardiovascular: RRR, no murmurs, rubs, or gallops  Gastrointestinal: Positive bowel sounds, soft, nontender, nondistended  Musculoskeletal: No bilateral ankle edema  Psychiatric: Flat affect   Neurologic: Unable to answer orientation questions appropriately   Skin: No rashes    Results Reviewed:  LAB RESULTS:      Lab 23  1220 02/10/23  1200 23   WBC 4.99 7.58 7.4   HEMOGLOBIN 10.9* 11.9* 10.7*   HEMATOCRIT 35.0 37.3 32.6*   PLATELETS 163 136* 195   NEUTROS ABS 2.84 4.75 4.2   IMMATURE GRANS (ABS) 0.02 0.04 0.0   LYMPHS ABS 1.47 1.85 2.3   MONOS ABS 0.48 0.69 0.6   EOS ABS 0.16 0.21 0.3   .7* 104.8* 102.5*         Lab 23  1220 02/10/23  1200   SODIUM 138 140   POTASSIUM 4.4 4.9   CHLORIDE 103 101   CO2 24.0 25.0   ANION GAP 11.0 14.0   BUN 14 20   CREATININE 1.08* 1.38*   EGFR 51.7* 38.5*   GLUCOSE 197* 170*   CALCIUM 8.5* 8.6   TSH  --  2.330         Lab 02/10/23  1200 23   TOTAL PROTEIN 5.9*  --    ALBUMIN 3.5  --    GLOBULIN 2.4  --    ALT (SGPT) 5  --    AST (SGOT) 21  --    BILIRUBIN 0.3  --    ALK PHOS 62  --    LIPASE  --  7.0         Lab 23  1910   PROBNP 669   TROPONIN I <0.30                 Lab 23  1840    PH, ARTERIAL 7.377   PCO2, ARTERIAL 45.9*   O2 SATURATION ART 96.6   FIO2 0.28   HCO3 ART 26.3*     Brief Urine Lab Results  (Last result in the past 365 days)      Color   Clarity   Blood   Leuk Est   Nitrite   Protein   CREAT   Urine HCG        02/12/23 1002 Yellow   Clear   Trace   Small (1+)   Negative   Trace                 Microbiology Results Abnormal     None          No radiology results from the last 24 hrs    Results for orders placed during the hospital encounter of 02/10/23    Adult Transthoracic Echo Complete W/ Cont if Necessary Per Protocol    Interpretation Summary  •  Left ventricular systolic function is hyperdynamic (EF > 70%). Calculated left ventricular EF = 70.6% Left ventricular ejection fraction appears to be greater than 70%. The left ventricular cavity is small in size. Left ventricular wall thickness is consistent with mild concentric hypertrophy. All left ventricular wall segments contract normally. Left ventricular intracavitary gradient noted to be 71 mmHg. Left ventricular diastolic function is consistent with (grade I) impaired relaxation. Normal left atrial pressure.  •  The aortic valve is abnormal in structure. There is mild calcification of the aortic valve mainly affecting the right coronary cusp(s). The aortic valve appears trileaflet. No aortic valve regurgitation is present. Gradient noted through the LV and LVOT    Compared to TTE report from  Dec 2019, hyperdynamic LV with intracavitary gradient is not a new finding but peak gradient noted on this exam is higher than previously described.      I have reviewed the medications:  Scheduled Meds:aspirin, 81 mg, Oral, Daily  busPIRone, 7.5 mg, Oral, BID  Diclofenac Sodium, 2 g, Topical, BID  donepezil, 10 mg, Oral, Nightly  DULoxetine, 60 mg, Oral, Daily  gabapentin, 300 mg, Oral, BID  insulin lispro, 0-7 Units, Subcutaneous, Q6H  levothyroxine, 25 mcg, Oral, Daily  lisinopril, 10 mg, Oral, Q24H  pantoprazole, 40 mg, Oral,  Q AM  senna-docusate sodium, 2 tablet, Oral, BID   And  polyethylene glycol, 17 g, Oral, Daily  rosuvastatin, 40 mg, Oral, Nightly  sodium chloride, 10 mL, Intravenous, Q12H      Continuous Infusions:   PRN Meds:.•  acetaminophen **OR** acetaminophen **OR** acetaminophen  •  senna-docusate sodium **AND** polyethylene glycol **AND** bisacodyl **AND** bisacodyl  •  dextrose  •  dextrose  •  glucagon (human recombinant)  •  HYDROcodone-acetaminophen  •  ondansetron **OR** ondansetron  •  QUEtiapine  •  sodium chloride  •  sodium chloride    Assessment & Plan   Assessment & Plan     Active Hospital Problems    Diagnosis  POA   • **Hemorrhagic stroke (HCC) [I61.9]  Yes   • History of pulmonary embolus (PE) [Z86.711]  Yes   • History of ischemic left MCA stroke [Z86.73]  Not Applicable   • Hyperlipidemia LDL goal <70 [E78.5]  Yes   • Essential hypertension [I10]  Yes   • Palpitations [R00.2]  Yes   • Type 2 diabetes mellitus without complication, without long-term current use of insulin (HCC) [E11.9]  Yes      Resolved Hospital Problems   No resolved problems to display.        Brief Hospital Course to date:  Cheri Cruz is a 82 y.o. female with history of HTN, HLD, DMII, atrial fibrillation, PE, Dementia, anxiety and recent left MCA infarct with residual aphasia presents from rehab with increased confusion, RUE weakness, speech difficulty and abdominal pain.  CT at the OSH showed hemorrhagic conversion in the area of her recent stroke.  Patient transferred to [facility] for further evaluation    This patient's problems and plans were partially entered by my partner and updated as appropriate by me 02/13/23.     Altered mental status  Recent Left MCA CVA with Hemorrhagic Conversion   -Stroke Neuro was following now signed off. Follow-up in stroke clinic 2-4 weeks   -CT head 2/11 with no worsening of hemorrhage, no new areas of hemorrhage   -Cyrusing Eliquis x 1 week per Neuro: restart on 2/17/2023  -Continue ASA 81mg daily    -Cardiology following, plan to continue ASA, hold Eliquis until 2/17 and at that time can DC ASA   -30 day heart monitor at DC   -Patient to follow-up with cardiology in 6 weeks after discharge  -TTE reviewed: Hyperdynamic LV  -PT/OT recommend SNF     ?Hx A fib (data deficit)  Hx PE October 2020  -In review of chart, A fib not documented until this admit. Not on prior EKGs, none on tele this admit  -Likely that patient is on Eliquis for Hx PE and not A fib  -Continue to monitor on tele  -Discussed with Cardiology, Dr Parker     Mod-severe Aphasia, mild dysarthria  Dysphagia   -SLP following   -Tolerating diet      Abdominal pain -> Resolved   - CT Abdomen Pelvis shows constipation, some concern for fecal impaction/stercoral colitis.  No current leukocytosis or fever  - Bowel Regimen      UTI  - S/P Rocephin x 3 doses. Cx at [facility] with Proteus, sensitive to Rocephin   -Repeat Urine Cx here with Enterococcus. Will give 1x dose Fosfomycin      HTN  - goal SBP <140  - increase Lisinopril to 20mg daily   - PRN nicardipine drip     DMII  - SSI  - diabetes educator has seen      CKD  -Cr seems to be around baseline. CTM     Dementia    Expected Discharge Location and Transportation: [facility]   Expected Discharge   Expected Discharge Date and Time     Expected Discharge Date Expected Discharge Time    Feb 15, 2023            DVT prophylaxis:  Mechanical DVT prophylaxis orders are present.     AM-PAC 6 Clicks Score (PT): 7 (02/13/23 0893)    CODE STATUS:   There are no questions and answers to display.       Marcie Angel,   02/13/23

## 2023-02-13 NOTE — PLAN OF CARE
Goal Outcome Evaluation:         Pt. Confused and alert, VSS, FSBS 161, 141 this shift, on RA, nausea x 1 this shift.

## 2023-02-13 NOTE — THERAPY TREATMENT NOTE
Patient Name: Cheri Cruz  : 1940    MRN: [id number]                              Today's Date: 2023       Admit Date: 2/10/2023    Visit Dx:     ICD-10-CM ICD-9-CM   1. Aphasia  R47.01 784.3     Patient Active Problem List   Diagnosis   • Hyperlipidemia LDL goal <70   • Type 2 diabetes mellitus without complication, without long-term current use of insulin (HCC)   • GERD (gastroesophageal reflux disease)   • Essential hypertension   • Abnormal EKG   • Osteoarthritis   • Anxiety   • Palpitations   • Vertigo   • History of ischemic left MCA stroke   • Hemorrhagic stroke (HCC)   • TIA (transient ischemic attack)   • History of pulmonary embolus (PE)     Past Medical History:   Diagnosis Date   • Abnormal heart rhythm    • Anxiety    • Arrhythmia    • Arthritis    • Atrial fibrillation (HCC)    • Dementia (HCC)    • Diabetes mellitus (HCC)    • Hyperlipidemia    • Hypertension    • Kidney disorder    • Stroke (HCC)    • Uterus cancer (HCC)      Past Surgical History:   Procedure Laterality Date   • CATARACT EXTRACTION, BILATERAL        General Information     Row Name 23 1406          Physical Therapy Time and Intention    Document Type therapy note (daily note)  -LO     Mode of Treatment physical therapy  -     Row Name 23 1406          General Information    Patient Profile Reviewed yes  -LO     Existing Precautions/Restrictions fall;oxygen therapy device and L/min;other (see comments)  confusion  -     Row Name 23 1406          Cognition    Orientation Status (Cognition) oriented to;person;disoriented to;place;situation;time  -LO     Row Name 23 1406          Safety Issues, Functional Mobility    Impairments Affecting Function (Mobility) balance;cognition;endurance/activity tolerance;strength  -LO           User Key  (r) = Recorded By, (t) = Taken By, (c) = Cosigned By    Initials Name Provider Type    LO Elmira Gillis PT Physical Therapist               Mobility     Row  Name 02/13/23 1407          Bed Mobility    Bed Mobility supine-sit;sit-supine  -LO     Rolling Left [place] (Bed Mobility) minimum assist (75% patient effort)  -LO     Rolling Right [place] (Bed Mobility) minimum assist (75% patient effort);verbal cues  -LO     Scooting/Bridging Crock[place] (Bed Mobility) verbal cues;moderate assist (50% patient effort)  -LO     Supine-Sit [place] (Bed Mobility) verbal cues;moderate assist (50% patient effort)  -LO     Sit-Supine [place] (Bed Mobility) minimum assist (75% patient effort);verbal cues  -LO     Assistive Device (Bed Mobility) head of bed elevated;draw sheet  -LO     Comment, (Bed Mobility) Patient requires vc for reaching for bed rails and for BLE advancement to EOB. physical assist at trunk at well.  -LO     Row Name 02/13/23 1407          Transfers    Comment, (Transfers) EOB>recliner; SPT . Unable to obtain full upright standing  -LO     Row Name 02/13/23 1407          Bed-Chair Transfer    Bed-Chair Crockett (Transfers) maximum assist (25% patient effort);2 person assist;verbal cues;nonverbal cues (demo/gesture)  -LO     Row Name 02/13/23 1407          Sit-Stand Transfer    Sit-Stand [place] (Transfers) maximum assist (25% patient effort);2 person assist;verbal cues;nonverbal cues (demo/gesture)  -LO     Assistive Device (Sit-Stand Transfers) walker, front-wheeled  -LO     Comment, (Sit-Stand Transfer) unable to reach full standing  -LO     Row Name 02/13/23 1407          Gait/Stairs (Locomotion)    Crockett Level (Gait) unable to assess  -LO           User Key  (r) = Recorded By, (t) = Taken By, (c) = Cosigned By    Initials Name Provider Type    Elmira Owens, PT Physical Therapist               Obj/Interventions     Row Name 02/13/23 1410          Balance    Balance Assessment sitting static balance;sitting dynamic balance;standing static balance;standing dynamic balance  -LO     Static Sitting Balance moderate assist  -LO      Dynamic Sitting Balance moderate assist  -LO     Position, Sitting Balance unsupported;sitting edge of bed  -LO     Static Standing Balance maximum assist;2-person assist;verbal cues;non-verbal cues (demo/gesture)  -LO     Position/Device Used, Standing Balance supported;walker, rolling  -LO     Comment, Balance FWW and maxA x2 for safety  -LO           User Key  (r) = Recorded By, (t) = Taken By, (c) = Cosigned By    Initials Name Provider Type    Elmira Owens, PT Physical Therapist               Goals/Plan    No documentation.                Clinical Impression     Row Name 02/13/23 1411          Plan of Care Review    Plan of Care Reviewed With patient  -LO     Progress improving  -LO     Outcome Evaluation Patient demonstrating improvements in functional mobility this date. Better command following this date. Requiring less physical assistance for bed mobility and transfers. Unable to attain full safe standing position to initiate gait however. Patient will continue to benefit from skilled IP PT services in order to address impairments for return to PLOF. Cont IP PT POC.  -LO     Row Name 02/13/23 1411          Therapy Assessment/Plan (PT)    Rehab Potential (PT) fair, will monitor progress closely  -LO     Criteria for Skilled Interventions Met (PT) yes;meets criteria;skilled treatment is necessary  -LO     Therapy Frequency (PT) daily  -LO     Row Name 02/13/23 1411          Vital Signs    Pre Systolic BP Rehab 129  -LO     Pre Treatment Diastolic BP 62  -LO     Post Systolic BP Rehab 124  -LO     Post Treatment Diastolic BP 76  -LO     Pretreatment Heart Rate (beats/min) 88  -LO     Posttreatment Heart Rate (beats/min) 95  -LO     Pre SpO2 (%) 96  -LO     O2 Delivery Pre Treatment room air  -LO     O2 Delivery Intra Treatment room air  -LO     Post SpO2 (%) 95  -LO     O2 Delivery Post Treatment room air  -LO     Pre Patient Position Supine  -LO     Intra Patient Position Standing  -LO     Post Patient  Position Sitting  -LO     Row Name 02/13/23 1411          Positioning and Restraints    Pre-Treatment Position in bed  -LO     Post Treatment Position chair  -LO     In Chair notified nsg;reclined;call light within reach;encouraged to call for assist;exit alarm on;waffle cushion  -LO           User Key  (r) = Recorded By, (t) = Taken By, (c) = Cosigned By    Initials Name Provider Type    Elmira Owens, PT Physical Therapist               Outcome Measures     Row Name 02/13/23 1414 02/13/23 0845       How much help from another person do you currently need...    Turning from your back to your side while in flat bed without using bedrails? 2  -LO 2  -OD    Moving from lying on back to sitting on the side of a flat bed without bedrails? 2  -LO 1  -OD    Moving to and from a bed to a chair (including a wheelchair)? 2  -LO 1  -OD    Standing up from a chair using your arms (e.g., wheelchair, bedside chair)? 2  -LO 1  -OD    Climbing 3-5 steps with a railing? 1  -LO 1  -OD    To walk in hospital room? 1  -LO 1  -OD    AM-PAC 6 Clicks Score (PT) 10  -LO 7  -OD    Highest level of mobility 4 --> Transferred to chair/commode  -LO 2 --> Bed activities/dependent transfer  -OD    Row Name 02/13/23 1414          Functional Assessment    Outcome Measure Options AM-PAC 6 Clicks Basic Mobility (PT)  -LO           User Key  (r) = Recorded By, (t) = Taken By, (c) = Cosigned By    Initials Name Provider Type    Marcie Camarena RN Registered Nurse    Elmira Owens, PT Physical Therapist                             Physical Therapy Education     Title: PT OT SLP Therapies (Done)     Topic: Physical Therapy (Done)     Point: Mobility training (Done)     Learning Progress Summary           Patient Acceptance, E, VU,NR by ANGELICA at 2/13/2023 1328    Comment: PT POC    Acceptance, E, VU by ANDRESSA at 2/12/2023 1731    Acceptance, E, VU by ANDRESSA at 2/11/2023 1614    Nonacceptance, E,D, NR by MB at 2/10/2023 1441                   Point: Home  exercise program (Done)     Learning Progress Summary           Patient Acceptance, E, VU,NR by  at 2/13/2023 1328    Comment: PT POC    Acceptance, E, VU by LG at 2/12/2023 1731    Acceptance, E, VU by LG at 2/11/2023 1614    Nonacceptance, E,D, NR by MB at 2/10/2023 1441                   Point: Body mechanics (Done)     Learning Progress Summary           Patient Acceptance, E, VU,NR by  at 2/13/2023 1328    Comment: PT POC    Acceptance, E, VU by LG at 2/12/2023 1731    Acceptance, E, VU by LG at 2/11/2023 1614    Nonacceptance, E,D, NR by MB at 2/10/2023 1441                   Point: Precautions (Done)     Learning Progress Summary           Patient Acceptance, E, VU,NR by  at 2/13/2023 1328    Comment: PT POC    Acceptance, E, VU by  at 2/12/2023 1731    Acceptance, E, VU by  at 2/11/2023 1614    Nonacceptance, E,D, NR by MB at 2/10/2023 1441                               User Key     Initials Effective Dates Name Provider Type Discipline    MB 06/16/21 -  Karen Nichols, PT Physical Therapist PT     11/16/18 -  Ced Valdez, RN Registered Nurse Nurse     06/16/21 -  Elmira Gillis, BILL Physical Therapist PT              PT Recommendation and Plan     Plan of Care Reviewed With: patient  Progress: improving  Outcome Evaluation: Patient demonstrating improvements in functional mobility this date. Better command following this date. Requiring less physical assistance for bed mobility and transfers. Unable to attain full safe standing position to initiate gait however. Patient will continue to benefit from skilled IP PT services in order to address impairments for return to PLOF. Cont IP PT POC.     Time Calculation:    PT Charges     Row Name 02/13/23 1328             Time Calculation    Start Time 1328  -LO      PT Received On 02/13/23  -      PT Goal Re-Cert Due Date 02/20/23  -         Timed Charges    70431 -  PT Neuromuscular Reeducation Minutes 8  -LO      57289 - PT Therapeutic  Activity Minutes 10  -LO         Total Minutes    Timed Charges Total Minutes 18  -LO       Total Minutes 18  -LO            User Key  (r) = Recorded By, (t) = Taken By, (c) = Cosigned By    Initials Name Provider Type    Elmira Owens, PT Physical Therapist              Therapy Charges for Today     Code Description Service Date Service Provider Modifiers Qty    [id number]  PT THERAPEUTIC ACT EA 15 MIN 2/13/2023 Elmira Gillis, PT  1          PT G-Codes  Outcome Measure Options: AM-PAC 6 Clicks Basic Mobility (PT)  AM-PAC 6 Clicks Score (PT): 10  AM-PAC 6 Clicks Score (OT): 7  Modified Denver Scale: 5 - Severe disability.  Bedridden, incontinent, and requiring constant nursing care and attention.  PT Discharge Summary  Anticipated Discharge Disposition (PT): skilled nursing facility    Elmira Gillis, BILL  2/13/2023

## 2023-02-13 NOTE — PROGRESS NOTES
Clinical Nutrition     Nutrition Support Assessment  Reason for Visit: Reduced oral intake      Patient Name: Cheri Cruz  YOB: 1940  MRN: [id number]  Date of Encounter: 02/13/23 11:46 EST  Admission date: 2/10/2023    Comments:    Nutrition Assessment   Admission Diagnosis:  TIA (transient ischemic attack) [G45.9]  Hemorrhagic stroke (HCC) [I61.9]      Problem List:    Hemorrhagic stroke (HCC)    Hyperlipidemia LDL goal <70    Type 2 diabetes mellitus without complication, without long-term current use of insulin (HCC)    Essential hypertension    Palpitations    History of ischemic left MCA stroke    History of pulmonary embolus (PE)        PMH:   She  has a past medical history of Abnormal heart rhythm, Anxiety, Arrhythmia, Arthritis, Atrial fibrillation (HCC), Dementia (HCC), Diabetes mellitus (HCC), Hyperlipidemia, Hypertension, Kidney disorder, Stroke (HCC), and Uterus cancer (HCC).    PSH:  She  has a past surgical history that includes Cataract extraction, bilateral.      Applicable Nutrition Concerns:   Skin:  Oral:  GI:      Applicable Interval History:         Reported/Observed/Food/Nutrition Related History:     RD attempted to speak w/pt; pt confused and not appropriate for interview. RD unable to reach family for nutrition hx at this time. Pt w/poor po intakes (31% x 4 meals). RD to provide ONS and monitor for pt acceptance. Wt hx reviewed, no significant wt changes noted.     Labs    Labs Reviewed: Yes     Results from last 7 days   Lab Units 02/11/23  1220 02/10/23  1200   GLUCOSE mg/dL 197* 170*   BUN mg/dL 14 20   CREATININE mg/dL 1.08* 1.38*   SODIUM mmol/L 138 140   CHLORIDE mmol/L 103 101   POTASSIUM mmol/L 4.4 4.9   ALT (SGPT) U/L  --  5       Results from last 7 days   Lab Units 02/10/23  1200   ALBUMIN g/dL 3.5       Results from last 7 days   Lab Units 02/13/23  1135 02/13/23  0545 02/13/23  0050 02/12/23  1751 02/12/23  0706 02/12/23  0545   GLUCOSE  mg/dL 201* 141* 162* 176* 161* 159*     Lab Results   Lab Value Date/Time    HGBA1C 6.9 (H) 02/02/2023 0700    HGBA1C 8.30 (H) 01/10/2023 1039    HGBA1C 7.5 (H) 12/06/2022 1400         Results from last 7 days   Lab Units 02/09/23  1910   PROBNP pg/mL 669         Medications    Medications Reviewed: Yes  Pertinent: insulin, protonix, miralax, colace  Infusion:  PRN: zofran, dulcolax      Intake/Ouptut 24 hrs (0701 - 0700)   I&O's Reviewed: Yes   LBM 2/12    Anthropometrics     Flowsheet Rows    Flowsheet Row First Filed Value   Admission Height --  [pt unable to state] Documented at 02/10/2023 0203   Admission Weight 95.7 kg (211 lb) Documented at 02/10/2023 0203            Height: Height:  (pt unable to state)  Last Filed Weight: Weight: 95.7 kg (211 lb) (02/10/23 0203)  Method: Weight Method: Bed scale  BMI:  33  BMI classification: Obese Class I: 30-34.9kg/m2  IBW:  135lbs    UBW: 200-210lbs per EMR  Weight change:   Last 15 Recorded Weights  View Complete Flowsheet  Weight Weight (kg) Weight (lbs) Weight Method   2/10/2023 95.709 kg 211 lb Bed scale   1/27/2023 98.2 kg 216 lb 7.9 oz Bed scale   1/26/2023 96.7 kg 213 lb 3 oz Bed scale   1/25/2023 95.3 kg 210 lb 1.6 oz Bed scale   1/24/2023 93.3 kg 205 lb 11 oz Bed scale   1/23/2023 92.9 kg 204 lb 12.9 oz Bed scale   1/22/2023 91.5 kg 201 lb 11.5 oz -   1/21/2023 91.9 kg 202 lb 9.6 oz Bed scale   1/20/2023 91.2 kg 201 lb 1 oz Bed scale   1/19/2023 90.7 kg 199 lb 15.3 oz Bed scale   1/16/2023 93.9 kg 207 lb 0.2 oz Bed scale   1/15/2023 96.7 kg 213 lb 3 oz -   1/14/2023 96.7 kg 213 lb 3 oz -   1/13/2023 95.3 kg 210 lb 1.6 oz -   1/12/2023 93.7 kg 206 lb 9.1 oz Bed scale         Nutrition Focused Physical Exam     Date: 2/13    Unable to perform exam due to: Pt unable to participate at time of visit, Defer pending indication    Current Nutrition Prescription     PO: Diet: Cardiac Diets, Diabetic Diets; Healthy Heart (2-3 Na+); Consistent Carbohydrate; Texture:  Regular Texture (IDDSI 7); Fluid Consistency: Thin (IDDSI 0)  Oral Nutrition Supplement:   Intake: 2 Days: 31% x 4 meals      Nutrition Diagnosis   Date: 2/13 Updated:   Problem Inadequate energy intake   Etiology AMS   Signs/Symptoms avg po intake <50%    Status: ongoing    Goal:   General: Nutrition to support treatment  PO: Increase intake  EN/PN: N/A    Nutrition Intervention      Follow treatment progress, Care plan reviewed, Encourage intake, Supplement provided    Boost GC TID    Monitoring/Evaluation:   Per protocol, PO intake, Supplement intake    Obtain nutrition hx from family as able    Anais Canela RD  Time Spent: 30

## 2023-02-13 NOTE — CASE MANAGEMENT/SOCIAL WORK
Continued Stay Note  [facility]     Patient Name: Cheri Cruz  MRN: [id number]  Today's Date: 2/13/2023    Admit Date: 2/10/2023    Plan: Anushka   Discharge Plan     Row Name 02/13/23 1356       Plan    Plan [facility]    Plan Comments Izzy at [facility] is reviewing her records at this time. I updated her daughter over the phone. She tells me she was at another nursing home prior to being admittied to [facility]. She may be in her co-pay days.    Final Discharge Disposition Code 61 - hospital-based swing bed               Discharge Codes    No documentation.               Expected Discharge Date and Time     Expected Discharge Date Expected Discharge Time    Feb 15, 2023             Freda Encarnacion RN

## 2023-02-13 NOTE — DISCHARGE PLACEMENT REQUEST
"Deya Vera Cro (82 y.o. Female)     Thanks  Freda AGUDELO, RN, [facility]  [phone]    Date of Birth   1940    Social Security Number       Address   [address] [place], [place] [address]    Home Phone   [phone]    MRN   [id number]       Orthodox   [facility]    Marital Status                               Admission Date   2/10/23    Admission Type   Urgent    Admitting Provider   Marcie Angel DO    Attending Provider   Marcie Angel DO    Department, Room/Bed   [facility] 3G, S353/1       Discharge Date       Discharge Disposition       Discharge Destination                               Attending Provider: Marcie Angel DO    Allergies: Penicillins, Sulfa Antibiotics, Tetracyclines & Related, Valium [Diazepam]    Isolation: None   Infection: COVID (History) (02/10/23)   Code Status: Prior    Ht: 170.2 cm (67\")   Wt: 95.7 kg (211 lb)    Admission Cmt: None   Principal Problem: Hemorrhagic stroke (HCC) [I61.9]                 Active Insurance as of 2/10/2023     Primary Coverage     Payor Plan Insurance Group Employer/Plan Group    HUMANA MEDICARE REPLACEMENT HUMANA MEDICARE REPLACEMENT [id number]     Payor Plan Address Payor Plan Phone Number Payor Plan Fax Number Effective Dates    [address] [address] [phone]  2/1/2023 - None Entered    [facility] [address]       Subscriber Name Subscriber Birth Date Member ID       DEYA VERA CRO 1940 [id number]           Secondary Coverage     Payor Plan Insurance Group Employer/Plan Group    HUMANA MEDICARE REPLACEMENT HUMANA MEDICARE REPLACEMENT [id number]     Payor Plan Address Payor Plan Phone Number Payor Plan Fax Number Effective Dates    [address] [address] [phone]  1/1/2023 - None Entered    [facility] [address]       Subscriber Name Subscriber Birth Date Member ID       DEYA VERA CRO 1940 [id number]                 Emergency Contacts      (Rel.) Home Phone Work Phone Mobile Phone    " Radha Vera (Daughter) [phone] -- [phone]    VEITA MADDEN (Daughter) [phone] -- --    OMEGA VERA (Son) [phone] -- [phone]    AnthonyVanessa (Spouse) [phone] -- --            Insurance Information                HUMANA MEDICARE REPLACEMENT/HUMANA MEDICARE REPLACEMENT Phone: [phone]    Subscriber: Cheri Vera Cro Subscriber#: [id number]    Group#: [id number] Precert#: [id number]        HUMANA MEDICARE REPLACEMENT/HUMANA MEDICARE REPLACEMENT Phone: [phone]    Subscriber: Cheri Vera Cro Subscriber#: [id number]    Group#: [id number] Precert#: --             Westley Sotomayor OT   Occupational Therapist  Specialty:  Occupational Therapy  Plan of Care      Signed  Date of Service:  02/10/23 0857  Creation Time:  02/10/23 0941     Signed             Problem: Adult Inpatient Plan of Care  Goal: Plan of Care Review  Recent Flowsheet Documentation  Taken 2/10/2023 0857 by Westley Sotomayor OT  Plan of Care Reviewed With: patient  Outcome Evaluation:  • VSS  • Pt presents with fxl decline from PLOF, deficits in ADL performance, fxl mobility, occupational endurance. Pt limited by decreased cognition, lethargy, weakness, decreased balance, resistance to movement. Pt required dependent x 2 for all bed mobility, attempt at STS, UBD, post toilet hygiene, anticipate dependent for LBD. Pt will benefit from skilled OT services to address deficits, facilitate increased fxl I. Recommend SNF for rehab at discharge.   Goal Outcome Evaluation:  Plan of Care Reviewed With: patient  Outcome Evaluation: VSS; Pt presents with fxl decline from PLOF, deficits in ADL performance, fxl mobility, occupational endurance. Pt limited by decreased cognition, lethargy, weakness, decreased balance, resistance to movement. Pt required dependent x 2 for all bed mobility, attempt at STS, UBD, post toilet hygiene, anticipate dependent for LBD. Pt will benefit from skilled OT services to address deficits,  facilitate increased fxl I. Recommend SNF for rehab at discharge.                   Jazlyn Rivas, MS, CFY-SLP   Speech and Language Pathologist  Speech Therapy  Plan of Care      Signed  Date of Service:  02/10/23 1330  Creation Time:  02/10/23 1330     Signed          Goal Outcome Evaluation:   SLP evaluation completed. Will continue to address dysphagia and communication. Please see note for further details and recommendations.                        History & Physical      Crispin Anders MD at 02/10/23 0411              [facility] Medicine Services  HISTORY AND PHYSICAL    Patient Name: Cheri Cruz  : 1940  MRN: [id number]  Primary Care Physician: Lorrie Canada APRN  Date of admission: 2/10/2023      Subjective    Subjective     Chief Complaint:  confusion    HPI:  Cheri Cruz is a 82 y.o. female with history of HTN, HLD, DMII, atrial fibrillation, PE, Dementia, anxiety and recent left MCA infarct with residual aphasia presents from rehab with increased confusion, RUE weakness, speech difficulty and abdominal pain.  CT at the OSH showed hemorrhagic conversion in the area of her recent stroke.  Patient transferred to [facility] for further evaluation      Review of Systems   Unable to obtain due to aphasia    Personal History     Past Medical History:   Diagnosis Date   • Abnormal heart rhythm    • Anxiety    • Arrhythmia    • Arthritis    • Atrial fibrillation (HCC)    • Dementia (HCC)    • Diabetes mellitus (HCC)    • Hyperlipidemia    • Hypertension    • Kidney disorder    • Stroke (HCC)    • Uterus cancer (HCC)              Past Surgical History:   Procedure Laterality Date   • CATARACT EXTRACTION, BILATERAL         Family History:  family history includes Alcohol abuse in her brother; Cancer in her brother, father, and mother; Congenital heart disease in her mother; Heart disease in her mother. Otherwise pertinent FHx was reviewed and unremarkable.     Social  History:  reports that she has never smoked. She has never used smokeless tobacco. She reports that she does not drink alcohol and does not use drugs.  Social History     Social History Narrative   • Not on file       Medications:  Available home medication information reviewed.  DULoxetine, Ergocalciferol, HYDROcodone-acetaminophen, Insulin Lispro, QUEtiapine, apixaban, aspirin, busPIRone, donepezil, gabapentin, levothyroxine, meclizine, metoprolol tartrate, omeprazole, oxybutynin, rosuvastatin, and sennosides-docusate      Allergies   Allergen Reactions   • Penicillins    • Sulfa Antibiotics    • Tetracyclines & Related Unknown (See Comments)   • Valium [Diazepam] Anxiety       Objective    Objective     Vital Signs:   Temp:  [98.5 °F (36.9 °C)] 98.5 °F (36.9 °C)  Heart Rate:  [64-74] 69  Resp:  [16] 16  BP: ()/(37-70) 97/46  Flow (L/min):  [2] 2  Total (NIH Stroke Scale): 4    Physical Exam   Constitutional - non toxic, in bed  HEENT-NCAT, mucous membranes moist  CV-RRR  Resp-CTAB, no wheezes, rhonchi or rales  Abd-soft, nontender, nondistended, normoactive bowel sounds  Ext-No lower extremity cyanosis, clubbing or edema bilaterally  Neuro-alert but confused, speech nonsensical but clear, follows some commands, moves all extremities  Psych-normal affect   Skin- No rash on exposed UE or LE bilaterally      Result Review:  I have personally reviewed the results from the time of this admission to 2/10/2023 04:11 EST and agree with these findings:  []  Laboratory list / accordion  []  Microbiology  []  Radiology  []  EKG/Telemetry   []  Cardiology/Vascular   []  Pathology  []  Old records  []  Other:  Most notable findings include:         LAB RESULTS:      Lab 02/09/23 1910   WBC 7.4   HEMOGLOBIN 10.7*   HEMATOCRIT 32.6*   PLATELETS 195   NEUTROS ABS 4.2   IMMATURE GRANS (ABS) 0.0   LYMPHS ABS 2.3   MONOS ABS 0.6   EOS ABS 0.3   .5*             Lab 02/09/23 1910   LIPASE 7.0         Lab 02/09/23 1910    PROBNP 669   TROPONIN I <0.30                 Lab 02/09/23  1840   PH, ARTERIAL 7.377   PCO2, ARTERIAL 45.9*   O2 SATURATION ART 96.6   FIO2 0.28   HCO3 ART 26.3*     UA    Urinalysis 1/10/23 1/31/23 2/9/23 2/9/23      1925 1925   Specific Gravity, UA 1.018 1.020  1.020   Ketones, UA Trace (A)      Blood, UA Negative Negative  MODERATE (A)   Leukocytes, UA Negative Negative  LARGE (A)   Nitrite, UA Negative Negative  POSITIVE (A)   RBC, UA   see below (A)    Bacteria, UA   3+ (A)    (A) Abnormal value       Comments are available for some flowsheets but are not being displayed.             Microbiology Results (last 10 days)     Procedure Component Value - Date/Time    Influenza Antigen, Rapid - , [[id number]] Collected: 02/09/23 1910    Lab Status: Final result Specimen: Nasopharyngeal Swab Updated: 02/10/23 0158     Rapid Influenza A Ag Negative     Rapid Influenza B Ag Negative          XR Outside Films    Result Date: 2/10/2023  This procedure was auto-finalized with no dictation required.    CT ABDOMEN PELVIS WO CONTRAST Additional Contrast? None    Result Date: 2/9/2023  CT ABDOMEN AND PELVIS: INDICATION: abd pain, lower half TECHNIQUE: Noncontrast CT images of the abdomen and pelvis were obtained. Up-to-date CT equipment and radiation dose reduction techniques were employed. COMPARISON: NONE. FINDINGS:  There are atelectatic changes identified within the medial aspect of the right upper lobe as well as the medial aspect the right lower lobe. There is some groundglass atelectasis identified at the left lung base. No pleural effusion is identified. There is calcification of mitral annulus. There is atherosclerotic calcification left anterior descending coronary artery. Patient status post a cholecystectomy. No biliary dilatation is identified. There is small hiatal hernia. The liver appears unremarkable. There is moderate-severe atrophy of the pancreas. There are calcified granulomas present within normal size  spleen. Adrenal glands are normal. No hydronephrosis or nephrolithiasis is identified. There is mild renal atrophy. There is extensive calcification of the abdominal aorta. No aneurysm is identified. Large amount of stool is identified within the rectum, sigmoid colon and descending colon. There is some associated edema identified within the posterior perirectal fat.. No abnormal bowel wall thickening is identified. No bowel dilatation is identified.  Appendix is visualized and is normal. Patient status post hysterectomy. There is a right hip femoral neck screw. No osteolytic or osteoblastic lesions are identified. Facet joint spondylosis is present with the lumbar spine.    Impression: 1. Large amount of stool is identified within the rectum with some associated edematous fat stranding identified within the posterior perirectal fat. The findings can be seen with fecal impaction and stercoral colitis. 2. Status post hysterectomy. 3. Status post cholecystectomy. 4. Atelectatic changes identified along the detailed above. 5. Small hiatal hernia. 6. Atrophic pancreas.    CT Head WO Contrast    Result Date: 2/9/2023  HEAD CT SCAN WITHOUT CONTRAST   HISTORY: AMS, hx of stroke 1 month ago COMPARISON:  Head CT dated June 1621 TECHNIQUE: Noncontrast CT images of the head were obtained. Images were reformatted in the coronal and sagittal planes. Up-to-date CT equipment and radiation dose reduction techniques were employed. FINDINGS: There is low attenuation in sulcal effacement identified within the posterior inferior aspect of the left parietal lobe consistent with subacute infarct. There are associated small areas of hemorrhagic transformation within the infarct. No herniation is identified. There is prominence of the ventricles, cistern and sulci. There is a remote lacunar infarct identified within the head of the left caudate nucleus.    Impression: 1. Subacute infarct is identified with the posterior inferior aspect  left parietal lobe with some small areas of acute hemorrhagic conversion. 2. Mild atrophy. 3. Remote lacunar infarct is identified with the head of the  left caudate nucleus.    XR CHEST PORTABLE    Result Date: 2/9/2023  CHEST 1 VIEW   HISTORY: low O2 saturation   COMPARISON: Chest x-ray dated 11-21 FINDINGS: Portable AP radiograph of the chest was obtained. The heart and mediastinum are within normal limits for size and configuration. No infiltrate, effusion or pneumothorax is identified. Lung volumes are decreased. Calcified granulomas project over the lung bases. Degenerative changes are identified at the right glenohumeral joint.    Impression: 1.  No evidence of acute cardiopulmonary disease.    CT Outside Films    Result Date: 2/10/2023  This procedure was auto-finalized with no dictation required.    CT Outside Films    Result Date: 2/10/2023  This procedure was auto-finalized with no dictation required.      Results for orders placed during the hospital encounter of 10/11/17    Adult Transthoracic Echo Complete W/ Cont if Necessary Per Protocol    Interpretation Summary  · Left ventricular systolic function is normal. Estimated EF = 70%. No regional wall motion abnormalities are noted.  · Left ventricular diastolic dysfunction (grade I) consistent with impaired relaxation.  · Left atrial cavity size is mildly dilated.  · The cardiac valves are functionally normal.      Assessment & Plan   Assessment & Plan     Active Hospital Problems    Diagnosis  POA   • **Hemorrhagic stroke (HCC) [I61.9]  Yes       Altered mental status  Recent Left MCA CVA  - hemorrhagic conversion noted on OSH CT  - hold aspirin and eliquis  - statin  - speech evaluation -- previously on modified diet  - PT/OT    Abdominal pain  - CT Abdomen Pelvis shows constipation, some concern for fecal impaction/stercoral colitis.  No current leukocytosis or fever  - scheduled laxatives  - suppository PRN    UTI  - continue rocephin (1st dose in  [facility] ED)    Atrial fibrillation  - currently NSR  - eliquis held    HTN  - goal SBP <140    DMII  - SSI    Dementia      DVT prophylaxis:  mechanical      CODE STATUS:    There are no questions and answers to display.       Expected Discharge        Crispin Anders MD  02/10/23      Electronically signed by Crispin Anders MD at 02/10/23 0440         Current Facility-Administered Medications   Medication Dose Route Frequency Provider Last Rate Last Admin   • acetaminophen (TYLENOL) tablet 650 mg  650 mg Oral Q4H PRN Crispin Anders MD        Or   • acetaminophen (TYLENOL) 160 MG/5ML solution 650 mg  650 mg Oral Q4H PRN Crispin Anders MD        Or   • acetaminophen (TYLENOL) suppository 650 mg  650 mg Rectal Q4H PRN Crispin Anders MD       • aspirin chewable tablet 81 mg  81 mg Oral Daily Pricilla Churchill APRN   81 mg at 02/13/23 0859   • sennosides-docusate (PERICOLACE) 8.6-50 MG per tablet 2 tablet  2 tablet Oral BID Crispin Anders MD   2 tablet at 02/13/23 0900    And   • polyethylene glycol (MIRALAX) packet 17 g  17 g Oral Daily Crispin Anders MD   17 g at 02/12/23 0823    And   • bisacodyl (DULCOLAX) EC tablet 5 mg  5 mg Oral Daily PRN Crispin Anders MD        And   • bisacodyl (DULCOLAX) suppository 10 mg  10 mg Rectal Daily PRN Crispin Anders MD   10 mg at 02/10/23 0438   • busPIRone (BUSPAR) tablet 7.5 mg  7.5 mg Oral BID Crispin Anders MD   7.5 mg at 02/13/23 0859   • dextrose (D50W) (25 g/50 mL) IV injection 25 g  25 g Intravenous Q15 Min PRN Crispin Anders MD       • dextrose (GLUTOSE) oral gel 15 g  15 g Oral Q15 Min PRN Crispin Anders MD       • Diclofenac Sodium (VOLTAREN) 1 % gel 2 g  2 g Topical BID Marcie Angel DO   2 g at 02/13/23 0902   • donepezil (ARICEPT) tablet 10 mg  10 mg Oral Nightly Mary Pillai APRN   10 mg at 02/12/23 2004   • DULoxetine (CYMBALTA) DR capsule 60 mg  60 mg Oral Daily Crispin Anders MD   60 mg at 02/13/23 0859   • gabapentin  (NEURONTIN) capsule 300 mg  300 mg Oral BID Crispin Anders MD   300 mg at 23 0859   • glucagon (GLUCAGEN) injection 1 mg  1 mg Intramuscular Q15 Min PRN Crispin Anders MD       • HYDROcodone-acetaminophen (NORCO)  MG per tablet 1 tablet  1 tablet Oral Q6H PRN Crispin Anders MD   1 tablet at 23 1528   • Insulin Lispro (humaLOG) injection 0-7 Units  0-7 Units Subcutaneous Q6H Crispin Anders MD   3 Units at 23 1148   • levothyroxine (SYNTHROID, LEVOTHROID) tablet 25 mcg  25 mcg Oral Daily Crispin Anders MD   25 mcg at 23 0602   • [START ON 2023] lisinopril (PRINIVIL,ZESTRIL) tablet 20 mg  20 mg Oral Q24H Marcie Angel DO       • ondansetron (ZOFRAN) tablet 4 mg  4 mg Oral Q6H PRN Crispin Anders MD   4 mg at 23 0859    Or   • ondansetron (ZOFRAN) injection 4 mg  4 mg Intravenous Q6H PRN Crispin Anders MD   4 mg at 23 1453   • pantoprazole (PROTONIX) EC tablet 40 mg  40 mg Oral Q AM Crispin Anders MD   40 mg at 23 0602   • QUEtiapine (SEROquel) tablet 50 mg  50 mg Oral Nightly PRN Marcie Angel DO       • rosuvastatin (CRESTOR) tablet 40 mg  40 mg Oral Nightly Mary Pillai APRN   40 mg at 23   • sodium chloride 0.9 % flush 10 mL  10 mL Intravenous Q12H Mary Pillai APRN   10 mL at 23 0900   • sodium chloride 0.9 % flush 10 mL  10 mL Intravenous PRN Mary Pillai APRN       • sodium chloride 0.9 % infusion 40 mL  40 mL Intravenous PRN Mary Pillai APRN            Physician Progress Notes (most recent note)      Marcie Angel DO at 23 0944              [facility] Medicine Services  PROGRESS NOTE    Patient Name: Cheri Cruz  : 1940  MRN: [id number]    Date of Admission: 2/10/2023  Primary Care Physician: Lorrie Canada APRN    Subjective   Subjective     CC:  F/U stroke     HPI:  Patient seen and examined. No issues overnight.  Ate some breakfast.     ROS:  UTO reliable ROS     Objective   Objective     Vital Signs:   Temp:  [97.7 °F (36.5 °C)-98.6 °F (37 °C)] 98.6 °F (37 °C)  Heart Rate:  [65-91] 85  Resp:  [18] 18  BP: (110-172)/(45-73) 137/55  Flow (L/min):  [2] 2     Physical Exam:  Constitutional: No acute distress, resting comfortably   HENT: NCAT, mucous membranes moist  Respiratory: Clear to auscultation bilaterally, respiratory effort normal 2L NC  Cardiovascular: RRR, no murmurs, rubs, or gallops  Gastrointestinal: Positive bowel sounds, soft, nontender, nondistended  Musculoskeletal: No bilateral ankle edema  Psychiatric: Flat affect   Neurologic: Unable to answer orientation questions appropriately   Skin: No rashes    Results Reviewed:  LAB RESULTS:      Lab 02/11/23  1220 02/10/23  1200 02/09/23 1910   WBC 4.99 7.58 7.4   HEMOGLOBIN 10.9* 11.9* 10.7*   HEMATOCRIT 35.0 37.3 32.6*   PLATELETS 163 136* 195   NEUTROS ABS 2.84 4.75 4.2   IMMATURE GRANS (ABS) 0.02 0.04 0.0   LYMPHS ABS 1.47 1.85 2.3   MONOS ABS 0.48 0.69 0.6   EOS ABS 0.16 0.21 0.3   .7* 104.8* 102.5*         Lab 02/11/23  1220 02/10/23  1200   SODIUM 138 140   POTASSIUM 4.4 4.9   CHLORIDE 103 101   CO2 24.0 25.0   ANION GAP 11.0 14.0   BUN 14 20   CREATININE 1.08* 1.38*   EGFR 51.7* 38.5*   GLUCOSE 197* 170*   CALCIUM 8.5* 8.6   TSH  --  2.330         Lab 02/10/23  1200 02/09/23 1910   TOTAL PROTEIN 5.9*  --    ALBUMIN 3.5  --    GLOBULIN 2.4  --    ALT (SGPT) 5  --    AST (SGOT) 21  --    BILIRUBIN 0.3  --    ALK PHOS 62  --    LIPASE  --  7.0         Lab 02/09/23 1910   PROBNP 669   TROPONIN I <0.30                 Lab 02/09/23  1840   PH, ARTERIAL 7.377   PCO2, ARTERIAL 45.9*   O2 SATURATION ART 96.6   FIO2 0.28   HCO3 ART 26.3*     Brief Urine Lab Results  (Last result in the past 365 days)      Color   Clarity   Blood   Leuk Est   Nitrite   Protein   CREAT   Urine HCG        02/12/23 1002 Yellow   Clear   Trace   Small (1+)   Negative   Trace                  Microbiology Results Abnormal     None          No radiology results from the last 24 hrs    Results for orders placed during the hospital encounter of 02/10/23    Adult Transthoracic Echo Complete W/ Cont if Necessary Per Protocol    Interpretation Summary  •  Left ventricular systolic function is hyperdynamic (EF > 70%). Calculated left ventricular EF = 70.6% Left ventricular ejection fraction appears to be greater than 70%. The left ventricular cavity is small in size. Left ventricular wall thickness is consistent with mild concentric hypertrophy. All left ventricular wall segments contract normally. Left ventricular intracavitary gradient noted to be 71 mmHg. Left ventricular diastolic function is consistent with (grade I) impaired relaxation. Normal left atrial pressure.  •  The aortic valve is abnormal in structure. There is mild calcification of the aortic valve mainly affecting the right coronary cusp(s). The aortic valve appears trileaflet. No aortic valve regurgitation is present. Gradient noted through the LV and LVOT    Compared to TTE report from  Dec 2019, hyperdynamic LV with intracavitary gradient is not a new finding but peak gradient noted on this exam is higher than previously described.      I have reviewed the medications:  Scheduled Meds:aspirin, 81 mg, Oral, Daily  busPIRone, 7.5 mg, Oral, BID  Diclofenac Sodium, 2 g, Topical, BID  donepezil, 10 mg, Oral, Nightly  DULoxetine, 60 mg, Oral, Daily  gabapentin, 300 mg, Oral, BID  insulin lispro, 0-7 Units, Subcutaneous, Q6H  levothyroxine, 25 mcg, Oral, Daily  lisinopril, 10 mg, Oral, Q24H  pantoprazole, 40 mg, Oral, Q AM  senna-docusate sodium, 2 tablet, Oral, BID   And  polyethylene glycol, 17 g, Oral, Daily  rosuvastatin, 40 mg, Oral, Nightly  sodium chloride, 10 mL, Intravenous, Q12H      Continuous Infusions:   PRN Meds:.•  acetaminophen **OR** acetaminophen **OR** acetaminophen  •  senna-docusate sodium **AND** polyethylene glycol  **AND** bisacodyl **AND** bisacodyl  •  dextrose  •  dextrose  •  glucagon (human recombinant)  •  HYDROcodone-acetaminophen  •  ondansetron **OR** ondansetron  •  QUEtiapine  •  sodium chloride  •  sodium chloride    Assessment & Plan   Assessment & Plan     Active Hospital Problems    Diagnosis  POA   • **Hemorrhagic stroke (HCC) [I61.9]  Yes   • History of pulmonary embolus (PE) [Z86.711]  Yes   • History of ischemic left MCA stroke [Z86.73]  Not Applicable   • Hyperlipidemia LDL goal <70 [E78.5]  Yes   • Essential hypertension [I10]  Yes   • Palpitations [R00.2]  Yes   • Type 2 diabetes mellitus without complication, without long-term current use of insulin (HCC) [E11.9]  Yes      Resolved Hospital Problems   No resolved problems to display.        Brief Hospital Course to date:  Cheri Cruz is a 82 y.o. female with history of HTN, HLD, DMII, atrial fibrillation, PE, Dementia, anxiety and recent left MCA infarct with residual aphasia presents from rehab with increased confusion, RUE weakness, speech difficulty and abdominal pain.  CT at the OSH showed hemorrhagic conversion in the area of her recent stroke.  Patient transferred to [facility] for further evaluation    This patient's problems and plans were partially entered by my partner and updated as appropriate by me 02/13/23.     Altered mental status  Recent Left MCA CVA with Hemorrhagic Conversion   -Stroke Neuro was following now signed off. Follow-up in stroke clinic 2-4 weeks   -CT head 2/11 with no worsening of hemorrhage, no new areas of hemorrhage   -Holing Eliquis x 1 week per Neuro: restart on 2/17/2023  -Continue ASA 81mg daily   -Cardiology following, plan to continue ASA, hold Eliquis until 2/17 and at that time can DC ASA   -30 day heart monitor at DC   -Patient to follow-up with cardiology in 6 weeks after discharge  -TTE reviewed: Hyperdynamic LV  -PT/OT recommend SNF     ?Hx A fib (data deficit)  Hx PE October 2020  -In review of chart, A fib  not documented until this admit. Not on prior EKGs, none on tele this admit  -Likely that patient is on Eliquis for Hx PE and not A fib  -Continue to monitor on tele  -Discussed with Cardiology, Dr Parker     Mod-severe Aphasia, mild dysarthria  Dysphagia   -SLP following   -Tolerating diet      Abdominal pain -> Resolved   - CT Abdomen Pelvis shows constipation, some concern for fecal impaction/stercoral colitis.  No current leukocytosis or fever  - Bowel Regimen      UTI  - S/P Rocephin x 3 doses. Cx at [facility] with Proteus, sensitive to Rocephin   -Repeat Urine Cx here with Enterococcus. Will give 1x dose Fosfomycin      HTN  - goal SBP <140  - increase Lisinopril to 20mg daily   - PRN nicardipine drip     DMII  - SSI  - diabetes educator has seen      CKD  -Cr seems to be around baseline. CTM     Dementia    Expected Discharge Location and Transportation: [facility], [facility]   Expected Discharge   Expected Discharge Date and Time     Expected Discharge Date Expected Discharge Time    Feb 15, 2023            DVT prophylaxis:  Mechanical DVT prophylaxis orders are present.     AM-PAC 6 Clicks Score (PT): 7 (23 0845)    CODE STATUS:   There are no questions and answers to display.       Marcie Angel DO  23          Electronically signed by Marcie Angel DO at 23 1051          Physical Therapy Notes (most recent note)      Karen Nichols, PT at 02/10/23 0842  Version 1 of 1         Patient Name: Cheri Curz  : 1940    MRN: [id number]                              Today's Date: 2/10/2023       Admit Date: 2/10/2023    Visit Dx:     ICD-10-CM ICD-9-CM   1. Aphasia  R47.01 784.3     Patient Active Problem List   Diagnosis   • Hyperlipidemia LDL goal <70   • Type 2 diabetes mellitus without complication, without long-term current use of insulin (HCC)   • GERD (gastroesophageal reflux disease)   • Essential hypertension   • Abnormal EKG   • Osteoarthritis   • Anxiety   •  Palpitations   • Vertigo   • History of stroke, left parietal   • Hemorrhagic stroke (HCC)   • TIA (transient ischemic attack)     Past Medical History:   Diagnosis Date   • Abnormal heart rhythm    • Anxiety    • Arrhythmia    • Arthritis    • Atrial fibrillation (HCC)    • Dementia (HCC)    • Diabetes mellitus (HCC)    • Hyperlipidemia    • Hypertension    • Kidney disorder    • Stroke (HCC)    • Uterus cancer (HCC)      Past Surgical History:   Procedure Laterality Date   • CATARACT EXTRACTION, BILATERAL        General Information     Row Name 02/10/23 0842          Physical Therapy Time and Intention    Document Type evaluation  -MB     Mode of Treatment physical therapy  -MB     Row Name 02/10/23 [facility]          General Information    Patient Profile Reviewed yes  -MB     Prior Level of Function --  TBD, pt. limited historian w/ dementia. No family present at bedside.  -MB     Existing Precautions/Restrictions fall;oxygen therapy device and L/min  dementia  -MB     Barriers to Rehab medically complex;previous functional deficit;cognitive status  -MB     Row Name 02/10/23 0842          Living Environment    People in Home spouse;child(sandoval), adult  per chart  -MB     Row Name 02/10/23 08          Home Main Entrance    Number of Stairs, Main Entrance --  TBD  -MB     Row Name 02/10/23 08          Cognition    Orientation Status (Cognition) oriented to;person;disoriented to;place;situation;time  -MB     Row Name 02/10/23 0842          Safety Issues, Functional Mobility    Safety Issues Affecting Function (Mobility) ability to follow commands;awareness of need for assistance;impulsivity;insight into deficits/self-awareness;judgment;problem-solving;safety precaution awareness;safety precautions follow-through/compliance;sequencing abilities  -MB     Impairments Affecting Function (Mobility) balance;cognition;endurance/activity tolerance;strength;coordination;postural/trunk control  -MB     Cognitive Impairments,  Mobility Safety/Performance awareness, need for assistance;impulsivity;insight into deficits/self-awareness;judgment;problem-solving/reasoning;safety precaution awareness;safety precaution follow-through;sequencing abilities;attention  -MB           User Key  (r) = Recorded By, (t) = Taken By, (c) = Cosigned By    Initials Name Provider Type    MB Karen Nichols, PT Physical Therapist               Mobility     Row Name 02/10/23 1150          Bed Mobility    Bed Mobility supine-sit;sit-supine  -MB     Supine-Sit [place] (Bed Mobility) dependent (less than 25% patient effort);2 person assist;verbal cues;nonverbal cues (demo/gesture)  -MB     Sit-Supine [place] (Bed Mobility) dependent (less than 25% patient effort);2 person assist;verbal cues;nonverbal cues (demo/gesture)  -MB     Assistive Device (Bed Mobility) head of bed elevated;draw sheet  -MB     Comment, (Bed Mobility) Pt. spontaneously moving in bed, but required dep A x 2 to manage BLEs and support trunk. Pt. demo signficant L lateral lean in sitting.  -MB     Row Name 02/10/23 1150          Transfers    Comment, (Transfers) Attempted STS from EOB w/ minimal hip clearance and poor command following. Deferred stand pivot to chair d/t cognitive status.  -MB     Row Name 02/10/23 1150          Bed-Chair Transfer    Bed-Chair [place] (Transfers) unable to assess  -MB     Row Name 02/10/23 1150          Sit-Stand Transfer    Sit-Stand [place] (Transfers) dependent (less than 25% patient effort);2 person assist;verbal cues;nonverbal cues (demo/gesture)  -MB     Assistive Device (Sit-Stand Transfers) other (see comments)  BUE support  -MB     Row Name 02/10/23 1150          Gait/Stairs (Locomotion)    [place] Level (Gait) unable to assess  -MB     Comment, (Gait/Stairs) Not safe to attempt d/t weakness and cognitive status.  -MB           User Key  (r) = Recorded By, (t) = Taken By, (c) = Cosigned By    Initials Name Provider Type     Karen Santana, PT Physical Therapist               Obj/Interventions     Row Name 02/10/23 1431          Range of Motion Comprehensive    General Range of Motion bilateral lower extremity ROM WFL  -MB     Row Name 02/10/23 1431          Strength Comprehensive (MMT)    General Manual Muscle Testing (MMT) Assessment lower extremity strength deficits identified;upper extremity strength deficits identified  -MB     Comment, General Manual Muscle Testing (MMT) Assessment Difficult to formally test 2/2 cognitive status. Pt. able to move BLEs spontaneously against gravity.  -MB     Row Name 02/10/23 1431          Motor Skills    Therapeutic Exercise hip;knee;ankle  -MB     Row Name 02/10/23 1431          Hip (Therapeutic Exercise)    Hip (Therapeutic Exercise) PROM (passive range of motion)  -MB     Hip PROM (Therapeutic Exercise) bilateral;flexion;aBduction  -MB     Row Name 02/10/23 1431          Knee (Therapeutic Exercise)    Knee (Therapeutic Exercise) PROM (passive range of motion);AAROM (active assistive range of motion)  -MB     Knee AAROM (Therapeutic Exercise) bilateral;flexion;extension  -MB     Knee PROM (Therapeutic Exercise) bilateral;flexion;extension  -MB     Row Name 02/10/23 1431          Balance    Static Sitting Balance maximum assist;dependent  -MB     Position, Sitting Balance supported;sitting edge of bed  -MB     Static Standing Balance dependent;2-person assist  -MB     Position/Device Used, Standing Balance supported;other (see comments)  BUE support  -MB     Balance Interventions standing;sit to stand;weight shifting activity  -MB     Row Name 02/10/23 1431          Sensory Assessment (Somatosensory)    Sensory Assessment (Somatosensory) unable/difficult to assess  -MB           User Key  (r) = Recorded By, (t) = Taken By, (c) = Cosigned By    Initials Name Provider Type    Karen Santana, PT Physical Therapist               Goals/Plan     Row Name 02/10/23 1439          Bed  Mobility Goal 1 (PT)    Activity/Assistive Device (Bed Mobility Goal 1, PT) sit to supine/supine to sit  -MB     [place] Level/Cues Needed (Bed Mobility Goal 1, PT) moderate assist (50-74% patient effort)  -MB     Time Frame (Bed Mobility Goal 1, PT) 10 days  -MB     Row Name 02/10/23 1439          Transfer Goal 1 (PT)    Activity/Assistive Device (Transfer Goal 1, PT) sit-to-stand/stand-to-sit;bed-to-chair/chair-to-bed;walker, rolling  -MB     [place] Level/Cues Needed (Transfer Goal 1, PT) moderate assist (50-74% patient effort)  -MB     Time Frame (Transfer Goal 1, PT) 10 days  -MB     Row Name 02/10/23 1439          Gait Training Goal 1 (PT)    Activity/Assistive Device (Gait Training Goal 1, PT) --  Establish gait goal when patient able to participate in gait assessment.  -MB     Row Name 02/10/23 1431          Therapy Assessment/Plan (PT)    Planned Therapy Interventions (PT) balance training;bed mobility training;gait training;home exercise program;patient/family education;postural re-education;strengthening;transfer training  -MB           User Key  (r) = Recorded By, (t) = Taken By, (c) = Cosigned By    Initials Name Provider Type    Karen Santana, PT Physical Therapist               Clinical Impression     Row Name 02/10/23 1433          Pain Scale: FACES Pre/Post-Treatment    Pain: FACES Scale, Pretreatment 0-->no hurt  -MB     Posttreatment Pain Rating 0-->no hurt  -MB     Row Name 02/10/23 1437          Plan of Care Review    Plan of Care Reviewed With patient  -MB     Progress no change  -MB     Outcome Evaluation Patient presents w/ impaired balance, generalized weakness, decreased safety awareness, impaired cognition, and functional decline. She required dep A x  2 for all mobility and is below her functional baseline. Skilled IPPT warranted to improve pt's safety and independence w/ functional mobility. Recommend SNF rehab when medically appropriate.  -MB     Row Name 02/10/23  1433          Therapy Assessment/Plan (PT)    Patient/Family Therapy Goals Statement (PT) Pt. did not state.  -MB     Rehab Potential (PT) fair, will monitor progress closely  -MB     Criteria for Skilled Interventions Met (PT) yes;meets criteria;skilled treatment is necessary  -MB     Therapy Frequency (PT) daily  -MB     Row Name 02/10/23 1433          Vital Signs    Pre Systolic BP Rehab 148  -MB     Pre Treatment Diastolic BP 57  -MB     Pretreatment Heart Rate (beats/min) 70  -MB     Pre SpO2 (%) 96  -MB     O2 Delivery Pre Treatment nasal cannula  -MB     O2 Delivery Intra Treatment nasal cannula  -MB     Post SpO2 (%) 94  -MB     O2 Delivery Post Treatment nasal cannula  -MB     Pre Patient Position Supine  -MB     Intra Patient Position Standing  -MB     Post Patient Position Supine  -MB     Row Name 02/10/23 1433          Positioning and Restraints    Pre-Treatment Position in bed  -MB     Post Treatment Position bed  -MB     In Bed notified nsg;supine;call light within reach;encouraged to call for assist;exit alarm on;SCD pump applied;side rails up x3;heels elevated;RUE elevated;LUE elevated  -MB           User Key  (r) = Recorded By, (t) = Taken By, (c) = Cosigned By    Initials Name Provider Type    Karen Santana, PT Physical Therapist               Outcome Measures     Row Name 02/10/23 1440          How much help from another person do you currently need...    Turning from your back to your side while in flat bed without using bedrails? 2  -MB     Moving from lying on back to sitting on the side of a flat bed without bedrails? 1  -MB     Moving to and from a bed to a chair (including a wheelchair)? 1  -MB     Standing up from a chair using your arms (e.g., wheelchair, bedside chair)? 1  -MB     Climbing 3-5 steps with a railing? 1  -MB     To walk in hospital room? 1  -MB     AM-PAC 6 Clicks Score (PT) 7  -MB     Highest level of mobility 2 --> Bed activities/dependent transfer  -MB     Row  Name 02/10/23 0857          Modified [place] Scale    Pre-Stroke Modified [place] Scale 6 - Unable to determine (UTD) from the medical record documentation  -TA     Modified [place] Scale 5 - Severe disability.  Bedridden, incontinent, and requiring constant nursing care and attention.  -TA     Row Name 02/10/23 1440 02/10/23 0857       Functional Assessment    Outcome Measure Options AM-PAC 6 Clicks Basic Mobility (PT)  -MB AM-PAC 6 Clicks Daily Activity (OT);Modified [place]  -TA          User Key  (r) = Recorded By, (t) = Taken By, (c) = Cosigned By    Initials Name Provider Type    TA Westley Sotomayor, OT Occupational Therapist    Karen Santana, PT Physical Therapist                             Physical Therapy Education     Title: PT OT SLP Therapies (In Progress)     Topic: Physical Therapy (In Progress)     Point: Mobility training (In Progress)     Learning Progress Summary           Patient Nonacceptance, E,D, NR by MB at 2/10/2023 1441                   Point: Home exercise program (In Progress)     Learning Progress Summary           Patient Nonacceptance, E,D, NR by MB at 2/10/2023 1441                   Point: Body mechanics (In Progress)     Learning Progress Summary           Patient Nonacceptance, E,D, NR by MB at 2/10/2023 1441                   Point: Precautions (In Progress)     Learning Progress Summary           Patient Nonacceptance, E,D, NR by MB at 2/10/2023 1441                               User Key     Initials Effective Dates Name Provider Type Discipline    MB 06/16/21 -  Karen Nichols, PT Physical Therapist PT              PT Recommendation and Plan  Planned Therapy Interventions (PT): balance training, bed mobility training, gait training, home exercise program, patient/family education, postural re-education, strengthening, transfer training  Plan of Care Reviewed With: patient  Progress: no change  Outcome Evaluation: Patient presents w/ impaired balance, generalized  weakness, decreased safety awareness, impaired cognition, and functional decline. She required dep A x  2 for all mobility and is below her functional baseline. Skilled IPPT warranted to improve pt's safety and independence w/ functional mobility. Recommend SNF rehab when medically appropriate.     Time Calculation:    PT Charges     Row Name 02/10/23 1441             Time Calculation    Start Time 0842  -MB      PT Received On 02/10/23  -MB      PT Goal Re-Cert Due Date 02/20/23  -MB         Untimed Charges    PT Eval/Re-eval Minutes 50  -MB         Total Minutes    Untimed Charges Total Minutes 50  -MB       Total Minutes 50  -MB            User Key  (r) = Recorded By, (t) = Taken By, (c) = Cosigned By    Initials Name Provider Type    Karen Santana, PT Physical Therapist              Therapy Charges for Today     Code Description Service Date Service Provider Modifiers Qty    [id number] HC PT EVAL MOD COMPLEXITY 4 2/10/2023 Karen Nichols, PT GP 1          PT G-Codes  Outcome Measure Options: AM-PAC 6 Clicks Basic Mobility (PT)  AM-PAC 6 Clicks Score (PT): 7  AM-PAC 6 Clicks Score (OT): 7  Modified [place] Scale: 5 - Severe disability.  Bedridden, incontinent, and requiring constant nursing care and attention.  PT Discharge Summary  Anticipated Discharge Disposition (PT): skilled nursing facility    Karen Nichols PT  2/10/2023      Electronically signed by Karen Nichols PT at 02/10/23 144

## 2023-02-13 NOTE — PROGRESS NOTES
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  No significant valvular abnormality     • Type 2 diabetes mellitus without complication, without long-term current use of insulin (HCC) [E11.9]  Yes     Priority: High   • History of pulmonary embolus (PE) [Z86.711]  Yes     Priority: Medium     · Admitted to [facility] 9/2020 for probable bilateral pulmonary emboli   · CTA of the chest (9/29/2020): Acute emboli centrally in the segmental and subsegmental arteries of right upper lung and possibly right lower lung.  Signs of RV strain   · Bilateral duplex of lower extremities (9/30/2020): No DVTs  · Echo (9/29/2020): LVEF 60%.  No RV strain.  No significant valvular abnormality     • History of ischemic left MCA stroke [Z86.73]  Not Applicable     Priority: Medium     · MRI (1/10/2023): Acute left MCA CVA      • Hyperlipidemia LDL goal <70 [E78.5]  Yes     Priority: Medium     · Statin therapy indicated given diabetic status     • Essential hypertension [I10]  Yes     Priority: Medium            Patient has not been seen by cardiology in the office since 2018 at which time she was being seen for palpitations.  She had echocardiogram back then that was benign.  She was seen again by cardiology while hospitalized at [facility] for pulmonary embolism for which she was started on Eliquis.  She was hospitalized at [facility] last month for an acute left MCA stroke.  She was ultimately discharged back on  Eliquis and to rehab facility.  She was readmitted earlier this month for a hemorrhagic conversion stroke.  Cardiology was consulted regarding risks and benefits of anticoagulation.  There was an assumption she was taking anticoagulation for atrial fibrillation but I cannot find any documented history of atrial fibrillation or any occurrence of atrial fibrillation during her hospitalizations last month and this month.  No events noted overnight.  The patient has aphasia and dysarthria.  Unable to gauge her orientation.  She is unable to really answer my questions.  No family is at bedside.  She is currently in normal sinus rhythm on telemetry.           Vital Sign Min/Max for last 24 hours  Temp  Min: 97.7 °F (36.5 °C)  Max: 98.6 °F (37 °C)   BP  Min: 110/69  Max: 172/73   Pulse  Min: 65  Max: 91   Resp  Min: 18  Max: 18   SpO2  Min: 94 %  Max: 100 %   Flow (L/min)  Min: 2  Max: 2      Intake/Output Summary (Last 24 hours) at 2/13/2023 0909  Last data filed at 2/13/2023 0900  Gross per 24 hour   Intake 225 ml   Output 400 ml   Net -175 ml           Physical Exam  Constitutional:       General: She is awake.   Cardiovascular:      Rate and Rhythm: Normal rate and regular rhythm.   Pulmonary:      Effort: Pulmonary effort is normal.      Breath sounds: Normal breath sounds.   Skin:     General: Skin is warm and dry.   Neurological:      Mental Status: She is alert.   Psychiatric:         Behavior: Behavior is cooperative.         Cognition and Memory: Cognition is impaired. Memory is impaired.         Tele: Normal sinus rhythm    Results Review (reviewed the patient's recent labs in the electronic medical record):     EKG (2/10/2023): Normal sinus rhythm    ECHO (2/11/2023): LVEF 70%.  Mild calcification aortic valve.     Results from last 7 days   Lab Units 02/11/23  1220 02/10/23  1200   SODIUM mmol/L 138 140   POTASSIUM mmol/L 4.4 4.9   CHLORIDE mmol/L 103 101   BUN mg/dL 14 20   CREATININE mg/dL 1.08* 1.38*      Results from last 7 days   Lab Units 02/09/23  1910   TROPONIN I ng/mL <0.30     Results from last 7 days   Lab Units 02/11/23  1220 02/10/23  1200 02/09/23  1910   WBC 10*3/mm3 4.99 7.58 7.4   HEMOGLOBIN g/dL 10.9* 11.9* 10.7*   HEMATOCRIT % 35.0 37.3 32.6*   PLATELETS 10*3/mm3 163 136* 195       Lab Results   Component Value Date    HGBA1C 6.9 (H) 02/02/2023       Lab Results   Component Value Date    CHOL 189 01/11/2023    CHLPL 88 02/02/2023    TRIG 104 02/02/2023    HDL 29 (L) 02/02/2023    LDL 38 02/02/2023            Hemorrhagic stroke/recent left MCA CVA  · Aspirin 81 mg daily  · Eliquis on hold, according to neurology can restart 2/17/2023    History of pulmonary embolism  · History of PE in 2020 for which she was started on Eliquis at that time  · Unclear if PE was provoked.  No family currently at bedside to clarify    Questionable atrial fibrillation  · No documented history of atrial fibrillation    Hypertension  · Lisinopril 10 mg daily  · Current /55    Hyperlipidemia  · Crestor 40 mg daily               · Stay on aspirin monotherapy and continue holding Eliquis until 2/17/2023 per neurology recommendations.  After that time she can resume Eliquis and discontinue aspirin.  · 30-day monitor at discharge.  No documented history of A-fib by cardiology  · Please call cardiology with any further questions  · Patient to follow-up with cardiology in 6 weeks after discharge    Electronically signed by EVELINA Garcia, 02/13/23, 9:09 AM EST.

## 2023-02-14 LAB
BLOOD CULTURE, ROUTINE: NORMAL
GLUCOSE BLDC GLUCOMTR-MCNC: 151 MG/DL (ref 70–130)
GLUCOSE BLDC GLUCOMTR-MCNC: 166 MG/DL (ref 70–130)
GLUCOSE BLDC GLUCOMTR-MCNC: 171 MG/DL (ref 70–130)
GLUCOSE BLDC GLUCOMTR-MCNC: 176 MG/DL (ref 70–130)

## 2023-02-14 PROCEDURE — 97535 SELF CARE MNGMENT TRAINING: CPT | Performed by: OCCUPATIONAL THERAPIST

## 2023-02-14 PROCEDURE — 99231 SBSQ HOSP IP/OBS SF/LOW 25: CPT | Performed by: FAMILY MEDICINE

## 2023-02-14 PROCEDURE — 97530 THERAPEUTIC ACTIVITIES: CPT

## 2023-02-14 PROCEDURE — 63710000001 INSULIN LISPRO (HUMAN) PER 5 UNITS: Performed by: INTERNAL MEDICINE

## 2023-02-14 PROCEDURE — 82962 GLUCOSE BLOOD TEST: CPT

## 2023-02-14 PROCEDURE — 92507 TX SP LANG VOICE COMM INDIV: CPT

## 2023-02-14 RX ADMIN — ASPIRIN 81 MG 81 MG: 81 TABLET ORAL at 08:34

## 2023-02-14 RX ADMIN — LEVOTHYROXINE SODIUM 25 MCG: 25 TABLET ORAL at 06:03

## 2023-02-14 RX ADMIN — BUSPIRONE HYDROCHLORIDE 7.5 MG: 5 TABLET ORAL at 18:11

## 2023-02-14 RX ADMIN — DULOXETINE HYDROCHLORIDE 60 MG: 60 CAPSULE, DELAYED RELEASE ORAL at 08:34

## 2023-02-14 RX ADMIN — DICLOFENAC 2 G: 10 GEL TOPICAL at 08:35

## 2023-02-14 RX ADMIN — BUSPIRONE HYDROCHLORIDE 7.5 MG: 5 TABLET ORAL at 08:34

## 2023-02-14 RX ADMIN — DONEPEZIL HYDROCHLORIDE 10 MG: 10 TABLET, FILM COATED ORAL at 20:06

## 2023-02-14 RX ADMIN — POLYETHYLENE GLYCOL 3350 17 G: 17 POWDER, FOR SOLUTION ORAL at 08:34

## 2023-02-14 RX ADMIN — DICLOFENAC 2 G: 10 GEL TOPICAL at 20:07

## 2023-02-14 RX ADMIN — LISINOPRIL 20 MG: 20 TABLET ORAL at 08:34

## 2023-02-14 RX ADMIN — INSULIN LISPRO 2 UNITS: 100 INJECTION, SOLUTION INTRAVENOUS; SUBCUTANEOUS at 12:15

## 2023-02-14 RX ADMIN — GABAPENTIN 300 MG: 300 CAPSULE ORAL at 08:34

## 2023-02-14 RX ADMIN — ACETAMINOPHEN 325MG 650 MG: 325 TABLET ORAL at 06:03

## 2023-02-14 RX ADMIN — ROSUVASTATIN 40 MG: 20 TABLET, FILM COATED ORAL at 20:06

## 2023-02-14 RX ADMIN — SENNOSIDES AND DOCUSATE SODIUM 2 TABLET: 8.6; 5 TABLET ORAL at 20:06

## 2023-02-14 RX ADMIN — INSULIN LISPRO 2 UNITS: 100 INJECTION, SOLUTION INTRAVENOUS; SUBCUTANEOUS at 18:11

## 2023-02-14 RX ADMIN — HYDROCODONE BITARTRATE AND ACETAMINOPHEN 1 TABLET: 10; 325 TABLET ORAL at 20:06

## 2023-02-14 RX ADMIN — GABAPENTIN 300 MG: 300 CAPSULE ORAL at 20:06

## 2023-02-14 RX ADMIN — SENNOSIDES AND DOCUSATE SODIUM 2 TABLET: 8.6; 5 TABLET ORAL at 08:34

## 2023-02-14 RX ADMIN — PANTOPRAZOLE SODIUM 40 MG: 40 TABLET, DELAYED RELEASE ORAL at 06:03

## 2023-02-14 NOTE — PLAN OF CARE
Patient with No changes overnight. VSS. 2L O2 NC while sleeping. No complaint of pain. NIHSS 4. Awaiting Rehab placement.

## 2023-02-14 NOTE — PROGRESS NOTES
[facility] Medicine Services  PROGRESS NOTE    Patient Name: Cheri Cruz  : 1940  MRN: [id number]    Date of Admission: 2/10/2023  Primary Care Physician: Lorrie Canada APRN    Subjective   Subjective     CC:  F/U stroke     HPI:  Patient seen and examined. No issues overnight. Sleeping.     ROS:  UTO reliable ROS     Objective   Objective     Vital Signs:   Temp:  [98.1 °F (36.7 °C)-98.6 °F (37 °C)] 98.6 °F (37 °C)  Heart Rate:  [66-88] 66  Resp:  [18-20] 20  BP: (119-161)/(40-72) 142/66  Flow (L/min):  [2] 2     Physical Exam:  Constitutional: No acute distress, resting comfortably   HENT: NCAT, mucous membranes moist  Respiratory: Clear to auscultation bilaterally, respiratory effort normal 2L NC  Cardiovascular: RRR, no murmurs, rubs, or gallops  Gastrointestinal: Positive bowel sounds, soft, nontender, nondistended  Musculoskeletal: No bilateral ankle edema  Psychiatric: Flat affect   Neurologic: Unable to answer orientation questions appropriately   Skin: No rashes    Results Reviewed:  LAB RESULTS:      Lab 23  1220 02/10/23  1200 23   WBC 4.99 7.58 7.4   HEMOGLOBIN 10.9* 11.9* 10.7*   HEMATOCRIT 35.0 37.3 32.6*   PLATELETS 163 136* 195   NEUTROS ABS 2.84 4.75 4.2   IMMATURE GRANS (ABS) 0.02 0.04 0.0   LYMPHS ABS 1.47 1.85 2.3   MONOS ABS 0.48 0.69 0.6   EOS ABS 0.16 0.21 0.3   .7* 104.8* 102.5*         Lab 23  1220 02/10/23  1200   SODIUM 138 140   POTASSIUM 4.4 4.9   CHLORIDE 103 101   CO2 24.0 25.0   ANION GAP 11.0 14.0   BUN 14 20   CREATININE 1.08* 1.38*   EGFR 51.7* 38.5*   GLUCOSE 197* 170*   CALCIUM 8.5* 8.6   TSH  --  2.330         Lab 02/10/23  1200 23   TOTAL PROTEIN 5.9*  --    ALBUMIN 3.5  --    GLOBULIN 2.4  --    ALT (SGPT) 5  --    AST (SGOT) 21  --    BILIRUBIN 0.3  --    ALK PHOS 62  --    LIPASE  --  7.0         Lab 23  1910   PROBNP 669   TROPONIN I <0.30                 Lab 23  1840   PH,  ARTERIAL 7.377   PCO2, ARTERIAL 45.9*   O2 SATURATION ART 96.6   FIO2 0.28   HCO3 ART 26.3*     Brief Urine Lab Results  (Last result in the past 365 days)      Color   Clarity   Blood   Leuk Est   Nitrite   Protein   CREAT   Urine HCG        02/12/23 1002 Yellow   Clear   Trace   Small (1+)   Negative   Trace                 Microbiology Results Abnormal     None          No radiology results from the last 24 hrs    Results for orders placed during the hospital encounter of 02/10/23    Adult Transthoracic Echo Complete W/ Cont if Necessary Per Protocol    Interpretation Summary  •  Left ventricular systolic function is hyperdynamic (EF > 70%). Calculated left ventricular EF = 70.6% Left ventricular ejection fraction appears to be greater than 70%. The left ventricular cavity is small in size. Left ventricular wall thickness is consistent with mild concentric hypertrophy. All left ventricular wall segments contract normally. Left ventricular intracavitary gradient noted to be 71 mmHg. Left ventricular diastolic function is consistent with (grade I) impaired relaxation. Normal left atrial pressure.  •  The aortic valve is abnormal in structure. There is mild calcification of the aortic valve mainly affecting the right coronary cusp(s). The aortic valve appears trileaflet. No aortic valve regurgitation is present. Gradient noted through the LV and LVOT    Compared to TTE report from  Dec 2019, hyperdynamic LV with intracavitary gradient is not a new finding but peak gradient noted on this exam is higher than previously described.      I have reviewed the medications:  Scheduled Meds:aspirin, 81 mg, Oral, Daily  busPIRone, 7.5 mg, Oral, BID  Diclofenac Sodium, 2 g, Topical, BID  donepezil, 10 mg, Oral, Nightly  DULoxetine, 60 mg, Oral, Daily  gabapentin, 300 mg, Oral, BID  insulin lispro, 0-7 Units, Subcutaneous, Q6H  levothyroxine, 25 mcg, Oral, Daily  lisinopril, 20 mg, Oral, Q24H  pantoprazole, 40 mg, Oral, Q  AM  senna-docusate sodium, 2 tablet, Oral, BID   And  polyethylene glycol, 17 g, Oral, Daily  rosuvastatin, 40 mg, Oral, Nightly  sodium chloride, 10 mL, Intravenous, Q12H      Continuous Infusions:   PRN Meds:.•  acetaminophen **OR** acetaminophen **OR** acetaminophen  •  senna-docusate sodium **AND** polyethylene glycol **AND** bisacodyl **AND** bisacodyl  •  dextrose  •  dextrose  •  glucagon (human recombinant)  •  HYDROcodone-acetaminophen  •  ondansetron **OR** ondansetron  •  QUEtiapine  •  sodium chloride  •  sodium chloride    Assessment & Plan   Assessment & Plan     Active Hospital Problems    Diagnosis  POA   • **Hemorrhagic stroke (HCC) [I61.9]  Yes   • History of pulmonary embolus (PE) [Z86.711]  Yes   • History of ischemic left MCA stroke [Z86.73]  Not Applicable   • Hyperlipidemia LDL goal <70 [E78.5]  Yes   • Essential hypertension [I10]  Yes   • Palpitations [R00.2]  Yes   • Type 2 diabetes mellitus without complication, without long-term current use of insulin (HCC) [E11.9]  Yes      Resolved Hospital Problems   No resolved problems to display.        Brief Hospital Course to date:  Cheri Cruz is a 82 y.o. female with history of HTN, HLD, DMII, atrial fibrillation, PE, Dementia, anxiety and recent left MCA infarct with residual aphasia presents from rehab with increased confusion, RUE weakness, speech difficulty and abdominal pain.  CT at the OSH showed hemorrhagic conversion in the area of her recent stroke.  Patient transferred to [facility] for further evaluation    This patient's problems and plans were partially entered by my partner and updated as appropriate by me 02/14/23.     Altered mental status  Recent Left MCA CVA with Hemorrhagic Conversion   -Stroke Neuro was following now signed off. Follow-up in stroke clinic 2-4 weeks   -CT head 2/11 with no worsening of hemorrhage, no new areas of hemorrhage   -Holing Eliquis x 1 week per Neuro: restart on 2/17/2023  -Continue ASA 81mg daily    -Cardiology following, plan to continue ASA, hold Eliquis until 2/17 and at that time can DC ASA   -30 day heart monitor at DC   -Patient to follow-up with cardiology in 6 weeks after discharge  -TTE reviewed: Hyperdynamic LV  -PT/OT recommend SNF     ?Hx A fib (data deficit)  Hx PE October 2020  -In review of chart, A fib not documented until this admit. Not on prior EKGs, none on tele this admit  -Likely that patient is on Eliquis for Hx PE and not A fib  -Continue to monitor on tele  -Discussed with Cardiology, Dr Parker     Mod-severe Aphasia, mild dysarthria  Dysphagia   -SLP following   -Tolerating diet      Abdominal pain -> Resolved   - CT Abdomen Pelvis shows constipation, some concern for fecal impaction/stercoral colitis.  No current leukocytosis or fever  - Bowel Regimen      UTI  - S/P Rocephin x 3 doses. Cx at [facility] with Proteus, sensitive to Rocephin   -Repeat Urine Cx here with Enterococcus. S/P 1x dose Fosfomycin      HTN  - goal SBP <140  - increase Lisinopril to 30mg daily   - PRN nicardipine drip     DMII  - SSI  - diabetes educator has seen      CKD  -Cr seems to be around baseline. CTM     Dementia    Expected Discharge Location and Transportation: [facility] [place] [facility] [place]   Expected Discharge   Expected Discharge Date and Time     Expected Discharge Date Expected Discharge Time    Feb 15, 2023            DVT prophylaxis:  Mechanical DVT prophylaxis orders are present.     AM-PAC 6 Clicks Score (PT): 10 (02/13/23 3385)    CODE STATUS:   There are no questions and answers to display.       Marcie Angel,   02/14/23

## 2023-02-14 NOTE — THERAPY TREATMENT NOTE
Patient Name: Cheri Cruz  : 1940    MRN: [id number]                              Today's Date: 2023       Admit Date: 2/10/2023    Visit Dx:     ICD-10-CM ICD-9-CM   1. Aphasia  R47.01 784.3     Patient Active Problem List   Diagnosis   • Hyperlipidemia LDL goal <70   • Type 2 diabetes mellitus without complication, without long-term current use of insulin (HCC)   • GERD (gastroesophageal reflux disease)   • Essential hypertension   • Abnormal EKG   • Osteoarthritis   • Anxiety   • Palpitations   • Vertigo   • History of ischemic left MCA stroke   • Hemorrhagic stroke (HCC)   • TIA (transient ischemic attack)   • History of pulmonary embolus (PE)     Past Medical History:   Diagnosis Date   • Abnormal heart rhythm    • Anxiety    • Arrhythmia    • Arthritis    • Atrial fibrillation (HCC)    • Dementia (HCC)    • Diabetes mellitus (HCC)    • Hyperlipidemia    • Hypertension    • Kidney disorder    • Stroke (HCC)    • Uterus cancer (HCC)      Past Surgical History:   Procedure Laterality Date   • CATARACT EXTRACTION, BILATERAL        General Information     Row Name 23 1636          Physical Therapy Time and Intention    Document Type therapy note (daily note)  -     Mode of Treatment physical therapy;co-treatment  -     Row Name 23 1636          General Information    Patient Profile Reviewed yes  -HP     Prior Level of Function --  pt questionable historian. Per CM note from 2023 at [facility], pt is total care with rented vicky lift and nonambulatory at baseline. Care provided by dtr.  -     Existing Precautions/Restrictions fall;oxygen therapy device and L/min;other (see comments)  dementia, aphasia  -     Row Name 23 1632          Cognition    Orientation Status (Cognition) oriented to;person;disoriented to;place;situation;time  -     Row Name 23 1630          Safety Issues, Functional Mobility    Impairments Affecting Function (Mobility)  balance;cognition;endurance/activity tolerance;strength;postural/trunk control;range of motion (ROM)  -           User Key  (r) = Recorded By, (t) = Taken By, (c) = Cosigned By    Initials Name Provider Type    Kathleen Baird PT Physical Therapist               Mobility     Row Name 02/14/23 1642          Bed Mobility    Bed Mobility rolling left;rolling right  -     Rolling Left [place] (Bed Mobility) moderate assist (50% patient effort);2 person assist  -HP     Rolling Right [place] (Bed Mobility) moderate assist (50% patient effort);2 person assist  -     Assistive Device (Bed Mobility) draw sheet;bed rails  -     Comment, (Bed Mobility) VC for sequencing. Pt able to participate and follow commands. Assist provided at hip to complete roll for sling placement  -     Row Name 02/14/23 1642          Transfers    Comment, (Transfers) Dep Lift to chair. Pt attempted STS from chair but unable to clear hips from seat. Pt reported knee and back pain and reported she does not  a long time.  -     Row Name 02/14/23 1642          Bed-Chair Transfer    Bed-Chair [place] (Transfers) dependent (less than 25% patient effort)  -     Assistive Device (Bed-Chair Transfers) lift device  -     Row Name 02/14/23 1642          Sit-Stand Transfer    Sit-Stand [place] (Transfers) 2 person assist;verbal cues;nonverbal cues (demo/gesture);dependent (less than 25% patient effort)  -           User Key  (r) = Recorded By, (t) = Taken By, (c) = Cosigned By    Initials Name Provider Type    Kathleen Baird PT Physical Therapist               Obj/Interventions     Row Name 02/14/23 1644          Balance    Balance Assessment sitting static balance;sitting dynamic balance;sit to stand dynamic balance  -     Static Sitting Balance minimal assist  -     Dynamic Sitting Balance moderate assist  -     Position, Sitting Balance sitting in chair  -     Balance Interventions  sitting;occupation based/functional task  -     Comment, Balance able to sit up without back support in chair x5 minutes with Min A  -HP           User Key  (r) = Recorded By, (t) = Taken By, (c) = Cosigned By    Initials Name Provider Type    Kathleen Baird PT Physical Therapist               Goals/Plan    No documentation.                Clinical Impression     Row Name 02/14/23 1645          Pain    Pretreatment Pain Rating 0/10 - no pain  -HP     Posttreatment Pain Rating 0/10 - no pain  -     Row Name 02/14/23 1645          Plan of Care Review    Plan of Care Reviewed With patient  -HP     Progress no change  -     Outcome Evaluation Pt able to follow commands and participate today in therapy. Pt performed bed mobility with Mod A x2. Dep lift transfer to chair. Unable to complete STS at this time secondary to elevated knee and back pain per pt. Pt continues to present with decreaesed balance and generalized weakness limiting her functional mobility. Recommend d/c to SNF for best functional outcome.  -     Row Name 02/14/23 1647          Vital Signs    Pre Systolic BP Rehab --  VSS  -HP     Pre Patient Position Supine  -HP     Intra Patient Position Sitting  -HP     Post Patient Position Sitting  -HP     Row Name 02/14/23 1640          Positioning and Restraints    Pre-Treatment Position in bed  -HP     Post Treatment Position chair  -HP     In Chair notified nsg;reclined;sitting;call light within reach;encouraged to call for assist;exit alarm on;with family/caregiver;legs elevated;on mechanical lift sling;waffle cushion;compression device  -           User Key  (r) = Recorded By, (t) = Taken By, (c) = Cosigned By    Initials Name Provider Type    Kathleen Baird PT Physical Therapist               Outcome Measures     Row Name 02/14/23 5290          How much help from another person do you currently need...    Turning from your back to your side while in flat bed without using bedrails? 2  -HP      Moving from lying on back to sitting on the side of a flat bed without bedrails? 2  -HP     Moving to and from a bed to a chair (including a wheelchair)? 1  -HP     Standing up from a chair using your arms (e.g., wheelchair, bedside chair)? 1  -HP     Climbing 3-5 steps with a railing? 1  -HP     To walk in hospital room? 1  -HP     AM-PAC 6 Clicks Score (PT) 8  -HP     Highest level of mobility 3 --> Sat at edge of bed  -HP     Row Name 02/14/23 1648 02/14/23 1634       Functional Assessment    Outcome Measure Options AM-PAC 6 Clicks Basic Mobility (PT)  -HP AM-PAC 6 Clicks Daily Activity (OT)  -AR          User Key  (r) = Recorded By, (t) = Taken By, (c) = Cosigned By    Initials Name Provider Type    Marjorie Rubio, OT Occupational Therapist    Kathleen Baird, PT Physical Therapist                             Physical Therapy Education     Title: PT OT SLP Therapies (In Progress)     Topic: Physical Therapy (In Progress)     Point: Mobility training (In Progress)     Learning Progress Summary           Patient Acceptance, E,D, NR by HP at 2/14/2023 1648    Acceptance, E, VU,NR by  at 2/13/2023 1328    Comment: PT POC    Acceptance, E, VU by LG at 2/12/2023 1731    Acceptance, E, VU by LG at 2/11/2023 1614    Nonacceptance, E,D, NR by MB at 2/10/2023 1441                   Point: Home exercise program (Done)     Learning Progress Summary           Patient Acceptance, E, VU,NR by LO at 2/13/2023 1328    Comment: PT POC    Acceptance, E, VU by LG at 2/12/2023 1731    Acceptance, E, VU by LG at 2/11/2023 1614    Nonacceptance, E,D, NR by MB at 2/10/2023 1441                   Point: Body mechanics (In Progress)     Learning Progress Summary           Patient Acceptance, E,D, NR by HP at 2/14/2023 1648    Acceptance, E, VU,NR by LO at 2/13/2023 1328    Comment: PT POC    Acceptance, E, VU by LG at 2/12/2023 1731    Acceptance, E, VU by LG at 2/11/2023 1614    Nonacceptance, E,D, NR by MB at 2/10/2023  1441                   Point: Precautions (In Progress)     Learning Progress Summary           Patient Acceptance, E,D, NR by  at 2/14/2023 1648    Acceptance, E, VU,NR by  at 2/13/2023 1328    Comment: PT POC    Acceptance, E, VU by  at 2/12/2023 1731    Acceptance, E, VU by  at 2/11/2023 1614    Nonacceptance, E,D, NR by MB at 2/10/2023 1441                               User Key     Initials Effective Dates Name Provider Type Discipline    MB 06/16/21 -  Karen Nichols, PT Physical Therapist PT     11/16/18 -  Ced Valdez RN Registered Nurse Nurse    ANGELICA 06/16/21 -  Elmira Gillis PT Physical Therapist PT     06/01/21 -  Kathleen Orosco PT Physical Therapist PT              PT Recommendation and Plan     Plan of Care Reviewed With: patient  Progress: no change  Outcome Evaluation: Pt able to follow commands and participate today in therapy. Pt performed bed mobility with Mod A x2. Dep lift transfer to chair. Unable to complete STS at this time secondary to elevated knee and back pain per pt. Pt continues to present with decreaesed balance and generalized weakness limiting her functional mobility. Recommend d/c to SNF for best functional outcome.     Time Calculation:    PT Charges     Row Name 02/14/23 1448             Time Calculation    Start Time 1448  -HP      PT Received On 02/14/23  -HP         Timed Charges    66549 - PT Therapeutic Activity Minutes 10  -HP         Total Minutes    Timed Charges Total Minutes 10  -HP       Total Minutes 10  -HP            User Key  (r) = Recorded By, (t) = Taken By, (c) = Cosigned By    Initials Name Provider Type     Kathleen Orosco PT Physical Therapist              Therapy Charges for Today     Code Description Service Date Service Provider Modifiers Qty    [id number] HC PT THERAPEUTIC ACT EA 15 MIN 2/14/2023 Kathleen Orosco, PT GP 1          PT G-Codes  Outcome Measure Options: AM-PAC 6 Clicks Basic Mobility (PT)  AM-PAC 6 Clicks Score (PT):  8  AM-PAC 6 Clicks Score (OT): 9  Modified Steven Scale: 5 - Severe disability.  Bedridden, incontinent, and requiring constant nursing care and attention.       Kathleen Orosco, PT  2/14/2023

## 2023-02-14 NOTE — DISCHARGE PLACEMENT REQUEST
"Deya Vera Cro (82 y.o. Female)     From  Freda Shashank AGUDELO, RN, [facility]  [phone]    Date of Birth   1940    Social Security Number       Address   [address] [place], [place] [address]    Home Phone   [phone]    MRN   [id number]       Voodoo   [facility]    Marital Status                               Admission Date   2/10/23    Admission Type   Urgent    Admitting Provider   Marcie Angel DO    Attending Provider   Marcie Angel DO    Department, Room/Bed   [facility] 3G, S353/1       Discharge Date       Discharge Disposition       Discharge Destination                               Attending Provider: Marcie Angel DO    Allergies: Penicillins, Sulfa Antibiotics, Tetracyclines & Related, Valium [Diazepam]    Isolation: None   Infection: COVID (History) (02/10/23)   Code Status: Prior    Ht: 170.2 cm (67\")   Wt: 95.7 kg (211 lb)    Admission Cmt: None   Principal Problem: Hemorrhagic stroke (HCC) [I61.9]                 Active Insurance as of 2/10/2023     Primary Coverage     Payor Plan Insurance Group Employer/Plan Group    HUMANA MEDICARE REPLACEMENT HUMANA MEDICARE REPLACEMENT [id number]     Payor Plan Address Payor Plan Phone Number Payor Plan Fax Number Effective Dates    [address] [address] [phone]  2/1/2023 - None Entered    Formerly [facility] [address]       Subscriber Name Subscriber Birth Date Member ID       DEYA VERA CRO 1940 [id number]           Secondary Coverage     Payor Plan Insurance Group Employer/Plan Group    HUMANA MEDICARE REPLACEMENT HUMANA MEDICARE REPLACEMENT [id number]     Payor Plan Address Payor Plan Phone Number Payor Plan Fax Number Effective Dates    [address] [address] [phone]  1/1/2023 - None Entered    Formerly [facility] [address]       Subscriber Name Subscriber Birth Date Member ID       DEYA VERA CRO 1940 [id number]                 Emergency Contacts      (Rel.) Home Phone Work Phone Mobile Phone    " Radha Cruz (Daughter) [phone] -- [phone]    EVITA MADDEN (Daughter) [phone] -- --    OMEGA CRUZ (Son) [phone] -- [phone]    Vanessa Cruz (Spouse) [phone] -- --            Insurance Information                HUMANA MEDICARE REPLACEMENT/HUMANA MEDICARE REPLACEMENT Phone: [phone]    Subscriber: Cheri Cruz Cro Subscriber#: [id number]    Group#: [id number] Precert#: [id number]        HUMANA MEDICARE REPLACEMENT/HUMANA MEDICARE REPLACEMENT Phone: [phone]    Subscriber: Cheri Crzu Cro Subscriber#: [id number]    Group#: [id number] Precert#: --             History & Physical      Crispin Anders MD at 02/10/23 0411              [facility] Medicine Services  HISTORY AND PHYSICAL    Patient Name: Cheri Cruz  : 1940  MRN: [id number]  Primary Care Physician: Lorrie Canada APRN  Date of admission: 2/10/2023      Subjective    Subjective     Chief Complaint:  confusion    HPI:  Cheri Cruz is a 82 y.o. female with history of HTN, HLD, DMII, atrial fibrillation, PE, Dementia, anxiety and recent left MCA infarct with residual aphasia presents from rehab with increased confusion, RUE weakness, speech difficulty and abdominal pain.  CT at the OSH showed hemorrhagic conversion in the area of her recent stroke.  Patient transferred to [facility] for further evaluation      Review of Systems   Unable to obtain due to aphasia    Personal History     Past Medical History:   Diagnosis Date   • Abnormal heart rhythm    • Anxiety    • Arrhythmia    • Arthritis    • Atrial fibrillation (HCC)    • Dementia (HCC)    • Diabetes mellitus (HCC)    • Hyperlipidemia    • Hypertension    • Kidney disorder    • Stroke (HCC)    • Uterus cancer (HCC)              Past Surgical History:   Procedure Laterality Date   • CATARACT EXTRACTION, BILATERAL         Family History:  family history includes Alcohol abuse in her brother; Cancer in her brother, father, and  mother; Congenital heart disease in her mother; Heart disease in her mother. Otherwise pertinent FHx was reviewed and unremarkable.     Social History:  reports that she has never smoked. She has never used smokeless tobacco. She reports that she does not drink alcohol and does not use drugs.  Social History     Social History Narrative   • Not on file       Medications:  Available home medication information reviewed.  DULoxetine, Ergocalciferol, HYDROcodone-acetaminophen, Insulin Lispro, QUEtiapine, apixaban, aspirin, busPIRone, donepezil, gabapentin, levothyroxine, meclizine, metoprolol tartrate, omeprazole, oxybutynin, rosuvastatin, and sennosides-docusate      Allergies   Allergen Reactions   • Penicillins    • Sulfa Antibiotics    • Tetracyclines & Related Unknown (See Comments)   • Valium [Diazepam] Anxiety       Objective    Objective     Vital Signs:   Temp:  [98.5 °F (36.9 °C)] 98.5 °F (36.9 °C)  Heart Rate:  [64-74] 69  Resp:  [16] 16  BP: ()/(37-70) 97/46  Flow (L/min):  [2] 2  Total (NIH Stroke Scale): 4    Physical Exam   Constitutional - non toxic, in bed  HEENT-NCAT, mucous membranes moist  CV-RRR  Resp-CTAB, no wheezes, rhonchi or rales  Abd-soft, nontender, nondistended, normoactive bowel sounds  Ext-No lower extremity cyanosis, clubbing or edema bilaterally  Neuro-alert but confused, speech nonsensical but clear, follows some commands, moves all extremities  Psych-normal affect   Skin- No rash on exposed UE or LE bilaterally      Result Review:  I have personally reviewed the results from the time of this admission to 2/10/2023 04:11 EST and agree with these findings:  []  Laboratory list / accordion  []  Microbiology  []  Radiology  []  EKG/Telemetry   []  Cardiology/Vascular   []  Pathology  []  Old records  []  Other:  Most notable findings include:         LAB RESULTS:      Lab 02/09/23  1910   WBC 7.4   HEMOGLOBIN 10.7*   HEMATOCRIT 32.6*   PLATELETS 195   NEUTROS ABS 4.2   IMMATURE  GRANS (ABS) 0.0   LYMPHS ABS 2.3   MONOS ABS 0.6   EOS ABS 0.3   .5*             Lab 02/09/23 1910   LIPASE 7.0         Lab 02/09/23 1910   PROBNP 669   TROPONIN I <0.30                 Lab 02/09/23  1840   PH, ARTERIAL 7.377   PCO2, ARTERIAL 45.9*   O2 SATURATION ART 96.6   FIO2 0.28   HCO3 ART 26.3*     UA    Urinalysis 1/10/23 1/31/23 2/9/23 2/9/23      1925 1925   Specific Gravity, UA 1.018 1.020  1.020   Ketones, UA Trace (A)      Blood, UA Negative Negative  MODERATE (A)   Leukocytes, UA Negative Negative  LARGE (A)   Nitrite, UA Negative Negative  POSITIVE (A)   RBC, UA   see below (A)    Bacteria, UA   3+ (A)    (A) Abnormal value       Comments are available for some flowsheets but are not being displayed.             Microbiology Results (last 10 days)     Procedure Component Value - Date/Time    Influenza Antigen, Rapid - , [[id number]] Collected: 02/09/23 1910    Lab Status: Final result Specimen: Nasopharyngeal Swab Updated: 02/10/23 0158     Rapid Influenza A Ag Negative     Rapid Influenza B Ag Negative          XR Outside Films    Result Date: 2/10/2023  This procedure was auto-finalized with no dictation required.    CT ABDOMEN PELVIS WO CONTRAST Additional Contrast? None    Result Date: 2/9/2023  CT ABDOMEN AND PELVIS: INDICATION: abd pain, lower half TECHNIQUE: Noncontrast CT images of the abdomen and pelvis were obtained. Up-to-date CT equipment and radiation dose reduction techniques were employed. COMPARISON: NONE. FINDINGS:  There are atelectatic changes identified within the medial aspect of the right upper lobe as well as the medial aspect the right lower lobe. There is some groundglass atelectasis identified at the left lung base. No pleural effusion is identified. There is calcification of mitral annulus. There is atherosclerotic calcification left anterior descending coronary artery. Patient status post a cholecystectomy. No biliary dilatation is identified. There is small hiatal  hernia. The liver appears unremarkable. There is moderate-severe atrophy of the pancreas. There are calcified granulomas present within normal size spleen. Adrenal glands are normal. No hydronephrosis or nephrolithiasis is identified. There is mild renal atrophy. There is extensive calcification of the abdominal aorta. No aneurysm is identified. Large amount of stool is identified within the rectum, sigmoid colon and descending colon. There is some associated edema identified within the posterior perirectal fat.. No abnormal bowel wall thickening is identified. No bowel dilatation is identified.  Appendix is visualized and is normal. Patient status post hysterectomy. There is a right hip femoral neck screw. No osteolytic or osteoblastic lesions are identified. Facet joint spondylosis is present with the lumbar spine.    Impression: 1. Large amount of stool is identified within the rectum with some associated edematous fat stranding identified within the posterior perirectal fat. The findings can be seen with fecal impaction and stercoral colitis. 2. Status post hysterectomy. 3. Status post cholecystectomy. 4. Atelectatic changes identified along the detailed above. 5. Small hiatal hernia. 6. Atrophic pancreas.    CT Head WO Contrast    Result Date: 2/9/2023  HEAD CT SCAN WITHOUT CONTRAST   HISTORY: AMS, hx of stroke 1 month ago COMPARISON:  Head CT dated June 1621 TECHNIQUE: Noncontrast CT images of the head were obtained. Images were reformatted in the coronal and sagittal planes. Up-to-date CT equipment and radiation dose reduction techniques were employed. FINDINGS: There is low attenuation in sulcal effacement identified within the posterior inferior aspect of the left parietal lobe consistent with subacute infarct. There are associated small areas of hemorrhagic transformation within the infarct. No herniation is identified. There is prominence of the ventricles, cistern and sulci. There is a remote lacunar  infarct identified within the head of the left caudate nucleus.    Impression: 1. Subacute infarct is identified with the posterior inferior aspect left parietal lobe with some small areas of acute hemorrhagic conversion. 2. Mild atrophy. 3. Remote lacunar infarct is identified with the head of the  left caudate nucleus.    XR CHEST PORTABLE    Result Date: 2/9/2023  CHEST 1 VIEW   HISTORY: low O2 saturation   COMPARISON: Chest x-ray dated 11-21 FINDINGS: Portable AP radiograph of the chest was obtained. The heart and mediastinum are within normal limits for size and configuration. No infiltrate, effusion or pneumothorax is identified. Lung volumes are decreased. Calcified granulomas project over the lung bases. Degenerative changes are identified at the right glenohumeral joint.    Impression: 1.  No evidence of acute cardiopulmonary disease.    CT Outside Films    Result Date: 2/10/2023  This procedure was auto-finalized with no dictation required.    CT Outside Films    Result Date: 2/10/2023  This procedure was auto-finalized with no dictation required.      Results for orders placed during the hospital encounter of 10/11/17    Adult Transthoracic Echo Complete W/ Cont if Necessary Per Protocol    Interpretation Summary  · Left ventricular systolic function is normal. Estimated EF = 70%. No regional wall motion abnormalities are noted.  · Left ventricular diastolic dysfunction (grade I) consistent with impaired relaxation.  · Left atrial cavity size is mildly dilated.  · The cardiac valves are functionally normal.      Assessment & Plan   Assessment & Plan     Active Hospital Problems    Diagnosis  POA   • **Hemorrhagic stroke (HCC) [I61.9]  Yes       Altered mental status  Recent Left MCA CVA  - hemorrhagic conversion noted on OSH CT  - hold aspirin and eliquis  - statin  - speech evaluation -- previously on modified diet  - PT/OT    Abdominal pain  - CT Abdomen Pelvis shows constipation, some concern for fecal  impaction/stercoral colitis.  No current leukocytosis or fever  - scheduled laxatives  - suppository PRN    UTI  - continue rocephin (1st dose in OSH ED)    Atrial fibrillation  - currently NSR  - eliquis held    HTN  - goal SBP <140    DMII  - SSI    Dementia      DVT prophylaxis:  mechanical      CODE STATUS:    There are no questions and answers to display.       Expected Discharge        Crispin Anders MD  02/10/23      Electronically signed by Crispin Anders MD at 02/10/23 0440         Current Facility-Administered Medications   Medication Dose Route Frequency Provider Last Rate Last Admin   • acetaminophen (TYLENOL) tablet 650 mg  650 mg Oral Q4H PRN Crispin Anders MD   650 mg at 02/14/23 0603    Or   • acetaminophen (TYLENOL) 160 MG/5ML solution 650 mg  650 mg Oral Q4H PRN Crispin Anders MD        Or   • acetaminophen (TYLENOL) suppository 650 mg  650 mg Rectal Q4H PRN Crispin Anders MD       • aspirin chewable tablet 81 mg  81 mg Oral Daily Pricilla Churchill APRN   81 mg at 02/14/23 0834   • sennosides-docusate (PERICOLACE) 8.6-50 MG per tablet 2 tablet  2 tablet Oral BID Crispin Anders MD   2 tablet at 02/14/23 0834    And   • polyethylene glycol (MIRALAX) packet 17 g  17 g Oral Daily Crispin Anders MD   17 g at 02/14/23 0834    And   • bisacodyl (DULCOLAX) EC tablet 5 mg  5 mg Oral Daily PRN Crispin Anders MD        And   • bisacodyl (DULCOLAX) suppository 10 mg  10 mg Rectal Daily PRN Crispin Anders MD   10 mg at 02/10/23 0438   • busPIRone (BUSPAR) tablet 7.5 mg  7.5 mg Oral BID Crispin Anders MD   7.5 mg at 02/14/23 0834   • dextrose (D50W) (25 g/50 mL) IV injection 25 g  25 g Intravenous Q15 Min PRN Crispin Anders MD       • dextrose (GLUTOSE) oral gel 15 g  15 g Oral Q15 Min PRN Crispin Anders MD       • Diclofenac Sodium (VOLTAREN) 1 % gel 2 g  2 g Topical BID Marcie Angel DO   2 g at 02/14/23 0821   • donepezil (ARICEPT) tablet 10 mg  10 mg Oral Nightly Rosas  EVELINA Byrd   10 mg at 23   • DULoxetine (CYMBALTA) DR capsule 60 mg  60 mg Oral Daily Crispin Anders MD   60 mg at 23 0834   • gabapentin (NEURONTIN) capsule 300 mg  300 mg Oral BID Crispin Anders MD   300 mg at 23 0834   • glucagon (GLUCAGEN) injection 1 mg  1 mg Intramuscular Q15 Min PRN Crispin Anders MD       • HYDROcodone-acetaminophen (NORCO)  MG per tablet 1 tablet  1 tablet Oral Q6H PRN Crispin Anders MD   1 tablet at 23 1758   • Insulin Lispro (humaLOG) injection 0-7 Units  0-7 Units Subcutaneous Q6H Crispin Anders MD   2 Units at 23 1215   • levothyroxine (SYNTHROID, LEVOTHROID) tablet 25 mcg  25 mcg Oral Daily Crispin Anders MD   25 mcg at 23 0603   • [START ON 2/15/2023] lisinopril (PRINIVIL,ZESTRIL) tablet 30 mg  30 mg Oral Q24H Marcie Angel, DO       • ondansetron (ZOFRAN) tablet 4 mg  4 mg Oral Q6H PRN Crispin Anders MD   4 mg at 23 1912    Or   • ondansetron (ZOFRAN) injection 4 mg  4 mg Intravenous Q6H PRN Crispin Andesr MD   4 mg at 23 1453   • pantoprazole (PROTONIX) EC tablet 40 mg  40 mg Oral Q AM Crispin Anders MD   40 mg at 23 0603   • QUEtiapine (SEROquel) tablet 50 mg  50 mg Oral Nightly PRN Marcie Angel DO       • rosuvastatin (CRESTOR) tablet 40 mg  40 mg Oral Nightly Mary Pillai APRN   40 mg at 23   • sodium chloride 0.9 % flush 10 mL  10 mL Intravenous Q12H Mary Pillai APRN   10 mL at 23 0900   • sodium chloride 0.9 % flush 10 mL  10 mL Intravenous PRN Mary Pillai APRN       • sodium chloride 0.9 % infusion 40 mL  40 mL Intravenous PRN Mary Pillai APRN            Physician Progress Notes (most recent note)      Marcie Angel DO at 23 0901              [facility] Medicine Services  PROGRESS NOTE    Patient Name: Cheri Cruz  : 1940  MRN: [id number]    Date of  Admission: 2/10/2023  Primary Care Physician: Lorrie Canada APRN    Subjective   Subjective     CC:  F/U stroke     HPI:  Patient seen and examined. No issues overnight. Sleeping.     ROS:  UTO reliable ROS     Objective   Objective     Vital Signs:   Temp:  [98.1 °F (36.7 °C)-98.6 °F (37 °C)] 98.6 °F (37 °C)  Heart Rate:  [66-88] 66  Resp:  [18-20] 20  BP: (119-161)/(40-72) 142/66  Flow (L/min):  [2] 2     Physical Exam:  Constitutional: No acute distress, resting comfortably   HENT: NCAT, mucous membranes moist  Respiratory: Clear to auscultation bilaterally, respiratory effort normal 2L NC  Cardiovascular: RRR, no murmurs, rubs, or gallops  Gastrointestinal: Positive bowel sounds, soft, nontender, nondistended  Musculoskeletal: No bilateral ankle edema  Psychiatric: Flat affect   Neurologic: Unable to answer orientation questions appropriately   Skin: No rashes    Results Reviewed:  LAB RESULTS:      Lab 02/11/23  1220 02/10/23  1200 02/09/23 1910   WBC 4.99 7.58 7.4   HEMOGLOBIN 10.9* 11.9* 10.7*   HEMATOCRIT 35.0 37.3 32.6*   PLATELETS 163 136* 195   NEUTROS ABS 2.84 4.75 4.2   IMMATURE GRANS (ABS) 0.02 0.04 0.0   LYMPHS ABS 1.47 1.85 2.3   MONOS ABS 0.48 0.69 0.6   EOS ABS 0.16 0.21 0.3   .7* 104.8* 102.5*         Lab 02/11/23  1220 02/10/23  1200   SODIUM 138 140   POTASSIUM 4.4 4.9   CHLORIDE 103 101   CO2 24.0 25.0   ANION GAP 11.0 14.0   BUN 14 20   CREATININE 1.08* 1.38*   EGFR 51.7* 38.5*   GLUCOSE 197* 170*   CALCIUM 8.5* 8.6   TSH  --  2.330         Lab 02/10/23  1200 02/09/23 1910   TOTAL PROTEIN 5.9*  --    ALBUMIN 3.5  --    GLOBULIN 2.4  --    ALT (SGPT) 5  --    AST (SGOT) 21  --    BILIRUBIN 0.3  --    ALK PHOS 62  --    LIPASE  --  7.0         Lab 02/09/23  1910   PROBNP 669   TROPONIN I <0.30                 Lab 02/09/23  1840   PH, ARTERIAL 7.377   PCO2, ARTERIAL 45.9*   O2 SATURATION ART 96.6   FIO2 0.28   HCO3 ART 26.3*     Brief Urine Lab Results  (Last result in the past 365  days)      Color   Clarity   Blood   Leuk Est   Nitrite   Protein   CREAT   Urine HCG        02/12/23 1002 Yellow   Clear   Trace   Small (1+)   Negative   Trace                 Microbiology Results Abnormal     None          No radiology results from the last 24 hrs    Results for orders placed during the hospital encounter of 02/10/23    Adult Transthoracic Echo Complete W/ Cont if Necessary Per Protocol    Interpretation Summary  •  Left ventricular systolic function is hyperdynamic (EF > 70%). Calculated left ventricular EF = 70.6% Left ventricular ejection fraction appears to be greater than 70%. The left ventricular cavity is small in size. Left ventricular wall thickness is consistent with mild concentric hypertrophy. All left ventricular wall segments contract normally. Left ventricular intracavitary gradient noted to be 71 mmHg. Left ventricular diastolic function is consistent with (grade I) impaired relaxation. Normal left atrial pressure.  •  The aortic valve is abnormal in structure. There is mild calcification of the aortic valve mainly affecting the right coronary cusp(s). The aortic valve appears trileaflet. No aortic valve regurgitation is present. Gradient noted through the LV and LVOT    Compared to TTE report from  Dec 2019, hyperdynamic LV with intracavitary gradient is not a new finding but peak gradient noted on this exam is higher than previously described.      I have reviewed the medications:  Scheduled Meds:aspirin, 81 mg, Oral, Daily  busPIRone, 7.5 mg, Oral, BID  Diclofenac Sodium, 2 g, Topical, BID  donepezil, 10 mg, Oral, Nightly  DULoxetine, 60 mg, Oral, Daily  gabapentin, 300 mg, Oral, BID  insulin lispro, 0-7 Units, Subcutaneous, Q6H  levothyroxine, 25 mcg, Oral, Daily  lisinopril, 20 mg, Oral, Q24H  pantoprazole, 40 mg, Oral, Q AM  senna-docusate sodium, 2 tablet, Oral, BID   And  polyethylene glycol, 17 g, Oral, Daily  rosuvastatin, 40 mg, Oral, Nightly  sodium chloride, 10 mL,  Intravenous, Q12H      Continuous Infusions:   PRN Meds:.•  acetaminophen **OR** acetaminophen **OR** acetaminophen  •  senna-docusate sodium **AND** polyethylene glycol **AND** bisacodyl **AND** bisacodyl  •  dextrose  •  dextrose  •  glucagon (human recombinant)  •  HYDROcodone-acetaminophen  •  ondansetron **OR** ondansetron  •  QUEtiapine  •  sodium chloride  •  sodium chloride    Assessment & Plan   Assessment & Plan     Active Hospital Problems    Diagnosis  POA   • **Hemorrhagic stroke (HCC) [I61.9]  Yes   • History of pulmonary embolus (PE) [Z86.711]  Yes   • History of ischemic left MCA stroke [Z86.73]  Not Applicable   • Hyperlipidemia LDL goal <70 [E78.5]  Yes   • Essential hypertension [I10]  Yes   • Palpitations [R00.2]  Yes   • Type 2 diabetes mellitus without complication, without long-term current use of insulin (HCC) [E11.9]  Yes      Resolved Hospital Problems   No resolved problems to display.        Brief Hospital Course to date:  Cheri Cruz is a 82 y.o. female with history of HTN, HLD, DMII, atrial fibrillation, PE, Dementia, anxiety and recent left MCA infarct with residual aphasia presents from rehab with increased confusion, RUE weakness, speech difficulty and abdominal pain.  CT at the OSH showed hemorrhagic conversion in the area of her recent stroke.  Patient transferred to [facility] for further evaluation    This patient's problems and plans were partially entered by my partner and updated as appropriate by me 02/14/23.     Altered mental status  Recent Left MCA CVA with Hemorrhagic Conversion   -Stroke Neuro was following now signed off. Follow-up in stroke clinic 2-4 weeks   -CT head 2/11 with no worsening of hemorrhage, no new areas of hemorrhage   -Holing Eliquis x 1 week per Neuro: restart on 2/17/2023  -Continue ASA 81mg daily   -Cardiology following, plan to continue ASA, hold Eliquis until 2/17 and at that time can DC ASA   -30 day heart monitor at DC   -Patient to follow-up with  cardiology in 6 weeks after discharge  -TTE reviewed: Hyperdynamic LV  -PT/OT recommend SNF     ?Hx A fib (data deficit)  Hx PE 2020  -In review of chart, A fib not documented until this admit. Not on prior EKGs, none on tele this admit  -Likely that patient is on Eliquis for Hx PE and not A fib  -Continue to monitor on tele  -Discussed with Cardiology, Dr Parker     Mod-severe Aphasia, mild dysarthria  Dysphagia   -SLP following   -Tolerating diet      Abdominal pain -> Resolved   - CT Abdomen Pelvis shows constipation, some concern for fecal impaction/stercoral colitis.  No current leukocytosis or fever  - Bowel Regimen      UTI  - S/P Rocephin x 3 doses. Cx at [facility] with Proteus, sensitive to Rocephin   -Repeat Urine Cx here with Enterococcus. S/P 1x dose Fosfomycin      HTN  - goal SBP <140  - increase Lisinopril to 30mg daily   - PRN nicardipine drip     DMII  - SSI  - diabetes educator has seen      CKD  -Cr seems to be around baseline. CTM     Dementia    Expected Discharge Location and Transportation: [facility], [facility]   Expected Discharge   Expected Discharge Date and Time     Expected Discharge Date Expected Discharge Time    Feb 15, 2023            DVT prophylaxis:  Mechanical DVT prophylaxis orders are present.     AM-PAC 6 Clicks Score (PT): 10 (23 1416)    CODE STATUS:   There are no questions and answers to display.       Marcie Angel DO  23          Electronically signed by Marcie Angel DO at 23 0903          Physical Therapy Notes (most recent note)      Elmira Gillis, PT at 23 1328  Version 1 of 1         Patient Name: Cheri Cruz  : 1940    MRN: [id number]                              Today's Date: 2023       Admit Date: 2/10/2023    Visit Dx:     ICD-10-CM ICD-9-CM   1. Aphasia  R47.01 784.3     Patient Active Problem List   Diagnosis   • Hyperlipidemia LDL goal <70   • Type 2 diabetes mellitus without complication, without  long-term current use of insulin (HCC)   • GERD (gastroesophageal reflux disease)   • Essential hypertension   • Abnormal EKG   • Osteoarthritis   • Anxiety   • Palpitations   • Vertigo   • History of ischemic left MCA stroke   • Hemorrhagic stroke (HCC)   • TIA (transient ischemic attack)   • History of pulmonary embolus (PE)     Past Medical History:   Diagnosis Date   • Abnormal heart rhythm    • Anxiety    • Arrhythmia    • Arthritis    • Atrial fibrillation (HCC)    • Dementia (HCC)    • Diabetes mellitus (HCC)    • Hyperlipidemia    • Hypertension    • Kidney disorder    • Stroke (HCC)    • Uterus cancer (HCC)      Past Surgical History:   Procedure Laterality Date   • CATARACT EXTRACTION, BILATERAL        General Information     Row Name 02/13/23 1406          Physical Therapy Time and Intention    Document Type therapy note (daily note)  -LO     Mode of Treatment physical therapy  -     Row Name 02/13/23 1406          General Information    Patient Profile Reviewed yes  -LO     Existing Precautions/Restrictions fall;oxygen therapy device and L/min;other (see comments)  confusion  -     Row Name 02/13/23 1406          Cognition    Orientation Status (Cognition) oriented to;person;disoriented to;place;situation;time  -     Row Name 02/13/23 1406          Safety Issues, Functional Mobility    Impairments Affecting Function (Mobility) balance;cognition;endurance/activity tolerance;strength  -LO           User Key  (r) = Recorded By, (t) = Taken By, (c) = Cosigned By    Initials Name Provider Type    Elmira Owens PT Physical Therapist               Mobility     Row Name 02/13/23 1407          Bed Mobility    Bed Mobility supine-sit;sit-supine  -LO     Rolling Left [place] (Bed Mobility) minimum assist (75% patient effort)  -LO     Rolling Right [place] (Bed Mobility) minimum assist (75% patient effort);verbal cues  -LO     Scooting/Bridging [place] (Bed Mobility) verbal cues;moderate  assist (50% patient effort)  -LO     Supine-Sit [place] (Bed Mobility) verbal cues;moderate assist (50% patient effort)  -LO     Sit-Supine [place] (Bed Mobility) minimum assist (75% patient effort);verbal cues  -LO     Assistive Device (Bed Mobility) head of bed elevated;draw sheet  -LO     Comment, (Bed Mobility) Patient requires vc for reaching for bed rails and for BLE advancement to EOB. physical assist at trunk at well.  -LO     Row Name 02/13/23 1407          Transfers    Comment, (Transfers) EOB>recliner; SPT . Unable to obtain full upright standing  -LO     Row Name 02/13/23 1407          Bed-Chair Transfer    Bed-Chair [place] (Transfers) maximum assist (25% patient effort);2 person assist;verbal cues;nonverbal cues (demo/gesture)  -LO     Row Name 02/13/23 1407          Sit-Stand Transfer    Sit-Stand [place] (Transfers) maximum assist (25% patient effort);2 person assist;verbal cues;nonverbal cues (demo/gesture)  -LO     Assistive Device (Sit-Stand Transfers) walker, front-wheeled  -LO     Comment, (Sit-Stand Transfer) unable to reach full standing  -LO     Row Name 02/13/23 1407          Gait/Stairs (Locomotion)    [place] Level (Gait) unable to assess  -LO           User Key  (r) = Recorded By, (t) = Taken By, (c) = Cosigned By    Initials Name Provider Type    Elmira Owens PT Physical Therapist               Obj/Interventions     Row Name 02/13/23 1410          Balance    Balance Assessment sitting static balance;sitting dynamic balance;standing static balance;standing dynamic balance  -LO     Static Sitting Balance moderate assist  -LO     Dynamic Sitting Balance moderate assist  -LO     Position, Sitting Balance unsupported;sitting edge of bed  -LO     Static Standing Balance maximum assist;2-person assist;verbal cues;non-verbal cues (demo/gesture)  -LO     Position/Device Used, Standing Balance supported;walker, rolling  -LO     Comment, Balance FWW and maxA x2 for  safety  -LO           User Key  (r) = Recorded By, (t) = Taken By, (c) = Cosigned By    Initials Name Provider Type    LO Elmira Gillis, PT Physical Therapist               Goals/Plan    No documentation.                Clinical Impression     Row Name 02/13/23 1411          Plan of Care Review    Plan of Care Reviewed With patient  -LO     Progress improving  -LO     Outcome Evaluation Patient demonstrating improvements in functional mobility this date. Better command following this date. Requiring less physical assistance for bed mobility and transfers. Unable to attain full safe standing position to initiate gait however. Patient will continue to benefit from skilled IP PT services in order to address impairments for return to PLOF. Cont IP PT POC.  -LO     Row Name 02/13/23 1411          Therapy Assessment/Plan (PT)    Rehab Potential (PT) fair, will monitor progress closely  -LO     Criteria for Skilled Interventions Met (PT) yes;meets criteria;skilled treatment is necessary  -LO     Therapy Frequency (PT) daily  -LO     Row Name 02/13/23 1411          Vital Signs    Pre Systolic BP Rehab 129  -LO     Pre Treatment Diastolic BP 62  -LO     Post Systolic BP Rehab 124  -LO     Post Treatment Diastolic BP 76  -LO     Pretreatment Heart Rate (beats/min) 88  -LO     Posttreatment Heart Rate (beats/min) 95  -LO     Pre SpO2 (%) 96  -LO     O2 Delivery Pre Treatment room air  -LO     O2 Delivery Intra Treatment room air  -LO     Post SpO2 (%) 95  -LO     O2 Delivery Post Treatment room air  -LO     Pre Patient Position Supine  -LO     Intra Patient Position Standing  -LO     Post Patient Position Sitting  -LO     Row Name 02/13/23 1411          Positioning and Restraints    Pre-Treatment Position in bed  -LO     Post Treatment Position chair  -LO     In Chair notified nsg;reclined;call light within reach;encouraged to call for assist;exit alarm on;waffle cushion  -LO           User Key  (r) = Recorded By, (t) = Taken  By, (c) = Cosigned By    Initials Name Provider Type    Elmira Owens, PT Physical Therapist               Outcome Measures     Row Name 02/13/23 1414 02/13/23 0845       How much help from another person do you currently need...    Turning from your back to your side while in flat bed without using bedrails? 2  -LO 2  -OD    Moving from lying on back to sitting on the side of a flat bed without bedrails? 2  -LO 1  -OD    Moving to and from a bed to a chair (including a wheelchair)? 2  -LO 1  -OD    Standing up from a chair using your arms (e.g., wheelchair, bedside chair)? 2  -LO 1  -OD    Climbing 3-5 steps with a railing? 1  -LO 1  -OD    To walk in hospital room? 1  -LO 1  -OD    AM-PAC 6 Clicks Score (PT) 10  -LO 7  -OD    Highest level of mobility 4 --> Transferred to chair/commode  -LO 2 --> Bed activities/dependent transfer  -OD    Row Name 02/13/23 1414          Functional Assessment    Outcome Measure Options AM-PAC 6 Clicks Basic Mobility (PT)  -LO           User Key  (r) = Recorded By, (t) = Taken By, (c) = Cosigned By    Initials Name Provider Type    Marcie Camarena, RN Registered Nurse    Elmira Owens, PT Physical Therapist                             Physical Therapy Education     Title: PT OT SLP Therapies (Done)     Topic: Physical Therapy (Done)     Point: Mobility training (Done)     Learning Progress Summary           Patient Acceptance, E, VU,NR by ANGELICA at 2/13/2023 1328    Comment: PT POC    Acceptance, E, VU by LG at 2/12/2023 1731    Acceptance, E, VU by LG at 2/11/2023 1614    Nonacceptance, E,D, NR by MB at 2/10/2023 1441                   Point: Home exercise program (Done)     Learning Progress Summary           Patient Acceptance, E, VU,NR by ANGELICA at 2/13/2023 1328    Comment: PT POC    Acceptance, E, VU by LG at 2/12/2023 1731    Acceptance, E, VU by LG at 2/11/2023 1614    Nonacceptance, E,D, NR by MB at 2/10/2023 1441                   Point: Body mechanics (Done)     Learning  Progress Summary           Patient Acceptance, E, VU,NR by  at 2/13/2023 1328    Comment: PT POC    Acceptance, E, VU by  at 2/12/2023 1731    Acceptance, E, VU by  at 2/11/2023 1614    Nonacceptance, E,D, NR by MB at 2/10/2023 1441                   Point: Precautions (Done)     Learning Progress Summary           Patient Acceptance, E, VU,NR by  at 2/13/2023 1328    Comment: PT POC    Acceptance, E, VU by  at 2/12/2023 1731    Acceptance, E, VU by  at 2/11/2023 1614    Nonacceptance, E,D, NR by MB at 2/10/2023 1441                               User Key     Initials Effective Dates Name Provider Type Discipline    MB 06/16/21 -  Karen Nichols, PT Physical Therapist PT     11/16/18 -  Ced Valdez RN Registered Nurse Nurse     06/16/21 -  Elmira Gillis, BILL Physical Therapist PT              PT Recommendation and Plan     Plan of Care Reviewed With: patient  Progress: improving  Outcome Evaluation: Patient demonstrating improvements in functional mobility this date. Better command following this date. Requiring less physical assistance for bed mobility and transfers. Unable to attain full safe standing position to initiate gait however. Patient will continue to benefit from skilled IP PT services in order to address impairments for return to PLOF. Cont IP PT POC.     Time Calculation:    PT Charges     Row Name 02/13/23 1328             Time Calculation    Start Time 1328  -LO      PT Received On 02/13/23  -LO      PT Goal Re-Cert Due Date 02/20/23  -LO         Timed Charges    13146 -  PT Neuromuscular Reeducation Minutes 8  -LO      71726 - PT Therapeutic Activity Minutes 10  -LO         Total Minutes    Timed Charges Total Minutes 18  -LO       Total Minutes 18  -LO            User Key  (r) = Recorded By, (t) = Taken By, (c) = Cosigned By    Initials Name Provider Type    Elmira Owens, BILL Physical Therapist              Therapy Charges for Today     Code Description Service Date  Service Provider Modifiers Qty    [id number]  PT THERAPEUTIC ACT EA 15 MIN 2023 Elmira Gillis, PT  1          PT G-Codes  Outcome Measure Options: AM-PAC 6 Clicks Basic Mobility (PT)  AM-PAC 6 Clicks Score (PT): 10  AM-PAC 6 Clicks Score (OT): 7  Modified Steven Scale: 5 - Severe disability.  Bedridden, incontinent, and requiring constant nursing care and attention.  PT Discharge Summary  Anticipated Discharge Disposition (PT): skilled nursing facility    Elmira Gillis, PT  2023      Electronically signed by Elmira Gillis, PT at 23 1416          Occupational Therapy Notes (most recent note)      Vivian Hendrickson, OT at 23 1326          Patient Name: Cheri Cruz  : 1940    MRN: [id number]                              Today's Date: 2023       Admit Date: 2/10/2023    Visit Dx:     ICD-10-CM ICD-9-CM   1. Aphasia  R47.01 784.3     Patient Active Problem List   Diagnosis   • Hyperlipidemia LDL goal <70   • Type 2 diabetes mellitus without complication, without long-term current use of insulin (HCC)   • GERD (gastroesophageal reflux disease)   • Essential hypertension   • Abnormal EKG   • Osteoarthritis   • Anxiety   • Palpitations   • Vertigo   • History of ischemic left MCA stroke   • Hemorrhagic stroke (HCC)   • TIA (transient ischemic attack)   • History of pulmonary embolus (PE)     Past Medical History:   Diagnosis Date   • Abnormal heart rhythm    • Anxiety    • Arrhythmia    • Arthritis    • Atrial fibrillation (HCC)    • Dementia (HCC)    • Diabetes mellitus (HCC)    • Hyperlipidemia    • Hypertension    • Kidney disorder    • Stroke (HCC)    • Uterus cancer (HCC)      Past Surgical History:   Procedure Laterality Date   • CATARACT EXTRACTION, BILATERAL        General Information     Row Name 23 1326          OT Time and Intention    Document Type therapy note (daily note)  -MR     Mode of Treatment occupational therapy  -MR     Row Name 23 1326          General  Information    Patient Profile Reviewed yes  -MR     Existing Precautions/Restrictions fall;oxygen therapy device and L/min;other (see comments)  confusion  -MR     Barriers to Rehab medically complex;previous functional deficit;cognitive status  -MR     Row Name 02/13/23 1326          Cognition    Orientation Status (Cognition) oriented to;person;disoriented to;place;situation;time  -MR     Row Name 02/13/23 1326          Safety Issues, Functional Mobility    Safety Issues Affecting Function (Mobility) safety precaution awareness;safety precautions follow-through/compliance;insight into deficits/self-awareness;ability to follow commands;awareness of need for assistance  -MR     Impairments Affecting Function (Mobility) balance;cognition;endurance/activity tolerance;strength  -MR     Cognitive Impairments, Mobility Safety/Performance safety precaution awareness;safety precaution follow-through;impulsivity;awareness, need for assistance  -MR           User Key  (r) = Recorded By, (t) = Taken By, (c) = Cosigned By    Initials Name Provider Type    MR Vivian Hendrickson OT Occupational Therapist                 Mobility/ADL's     Row Name 02/13/23 6329          Bed Mobility    Bed Mobility supine-sit;rolling right;rolling left  -MR     Rolling Left [place] (Bed Mobility) moderate assist (50% patient effort);verbal cues;nonverbal cues (demo/gesture)  -MR     Rolling Right [place] (Bed Mobility) moderate assist (50% patient effort);verbal cues;nonverbal cues (demo/gesture)  -MR     Supine-Sit [place] (Bed Mobility) minimum assist (75% patient effort);verbal cues;nonverbal cues (demo/gesture)  -MR     Bed Mobility, Safety Issues decreased use of arms for pushing/pulling;impaired trunk control for bed mobility;cognitive deficits limit understanding  -MR     Assistive Device (Bed Mobility) head of bed elevated;draw sheet  -MR     Comment, (Bed Mobility) ModA for rolling, assist provided for sequencing. [place] for  supine to sit w/ v/c, t/c and extended time.  -MR     Row Name 02/13/23 1409          Transfers    Transfers bed-chair transfer;sit-stand transfer  -MR     Row Name 02/13/23 1409          Bed-Chair Transfer    Bed-Chair [place] (Transfers) maximum assist (25% patient effort);2 person assist;verbal cues;nonverbal cues (demo/gesture)  -MR     Assistive Device (Bed-Chair Transfers) other (see comments)  BUE support  -MR     Row Name 02/13/23 1409          Sit-Stand Transfer    Sit-Stand [place] (Transfers) maximum assist (25% patient effort);2 person assist;verbal cues;nonverbal cues (demo/gesture)  -MR     Assistive Device (Sit-Stand Transfers) walker, front-wheeled  -MR     Comment, (Sit-Stand Transfer) Pt unable to come to full upright standing despite verbal and tactile cueing.  -MR     Row Name 02/13/23 1409          Activities of Daily Living    BADL Assessment/Intervention upper body dressing;lower body dressing;grooming;toileting  -MR     Row Name 02/13/23 1409          Upper Body Dressing Assessment/Training    [place] Level (Upper Body Dressing) don;other (see comments);moderate assist (50% patient effort)  hospital gown  -MR     Position (Upper Body Dressing) edge of bed sitting  -MR     Row Name 02/13/23 1409          Toileting Assessment/Training    [place] Level (Toileting) perform perineal hygiene;dependent (less than 25% patient effort);change pad/brief  -MR     Position (Toileting) supine  -MR     Row Name 02/13/23 1409          Lower Body Dressing Assessment/Training    [place] Level (Lower Body Dressing) don;socks;dependent (less than 25% patient effort)  -MR     Position (Lower Body Dressing) supine  -MR     Row Name 02/13/23 1409          Grooming Assessment/Training    [place] Level (Grooming) wash face, hands;maximum assist (25% patient effort)  -MR     Position (Grooming) supine  -MR           User Key  (r) = Recorded By, (t) = Taken By, (c) = Cosigned By     Initials Name Provider Type    Vivian Gardiner, OT Occupational Therapist               Obj/Interventions     Row Name 02/13/23 1415          Balance    Balance Assessment sitting static balance;sitting dynamic balance;standing static balance;standing dynamic balance  -MR     Static Sitting Balance moderate assist  -MR     Dynamic Sitting Balance moderate assist  -MR     Position, Sitting Balance unsupported;sitting edge of bed  -MR     Static Standing Balance maximum assist;2-person assist;verbal cues;non-verbal cues (demo/gesture)  -MR     Dynamic Standing Balance maximum assist;2-person assist;verbal cues;non-verbal cues (demo/gesture)  -MR     Position/Device Used, Standing Balance supported;walker, front-wheeled  -MR     Balance Interventions sitting;standing;sit to stand;static;supported;dynamic;minimal challenge;occupation based/functional task  -MR           User Key  (r) = Recorded By, (t) = Taken By, (c) = Cosigned By    Initials Name Provider Type    Vivian Gardiner, OT Occupational Therapist               Goals/Plan    No documentation.                Clinical Impression     Row Name 02/13/23 1416          Pain Assessment    Pain Intervention(s) Ambulation/increased activity;Repositioned;Nursing Notified  -MR     Row Name 02/13/23 1416          Pain Scale: FACES Pre/Post-Treatment    Pain: FACES Scale, Pretreatment 0-->no hurt  -MR     Posttreatment Pain Rating 0-->no hurt  -MR     Row Name 02/13/23 1416          Plan of Care Review    Plan of Care Reviewed With patient  -MR     Progress improving  -MR     Outcome Evaluation Pt continues to present below baseline for transfers, mobility, balance and increased need for assist w/ ADLs. Improvement w/ bed mobility noted requiring ModA for rolling and [place] for supine to sit w/ v/c, t/c and extended time. Pt continues to require DEP assist for LB dressing. Continue per current POC.  -MR     Row Name 02/13/23 1416          Vital Signs    Pre Systolic BP  Rehab 129  -MR     Pre Treatment Diastolic BP 62  -MR     Post Systolic BP Rehab 124  -MR     Post Treatment Diastolic BP 76  -MR     Pretreatment Heart Rate (beats/min) 88  -MR     Posttreatment Heart Rate (beats/min) 95  -MR     Pre SpO2 (%) 95  -MR     O2 Delivery Pre Treatment room air  -MR     O2 Delivery Intra Treatment room air  -MR     Post SpO2 (%) 96  -MR     O2 Delivery Post Treatment room air  -MR     Pre Patient Position Supine  -MR     Intra Patient Position Standing  -MR     Post Patient Position Sitting  -MR     Row Name 02/13/23 1416          Positioning and Restraints    Pre-Treatment Position in bed  -MR     Post Treatment Position chair  -MR     In Chair notified nsg;reclined;sitting;call light within reach;encouraged to call for assist;exit alarm on;legs elevated;waffle cushion;on mechanical lift sling;heels elevated  -MR           User Key  (r) = Recorded By, (t) = Taken By, (c) = Cosigned By    Initials Name Provider Type    Vivian Gardiner, OT Occupational Therapist               Outcome Measures     Row Name 02/13/23 1420          How much help from another is currently needed...    Putting on and taking off regular lower body clothing? 1  -MR     Bathing (including washing, rinsing, and drying) 1  -MR     Toileting (which includes using toilet bed pan or urinal) 1  -MR     Putting on and taking off regular upper body clothing 2  -MR     Taking care of personal grooming (such as brushing teeth) 2  -MR     Eating meals 2  -MR     AM-PAC 6 Clicks Score (OT) 9  -MR     Row Name 02/13/23 1414 02/13/23 0845       How much help from another person do you currently need...    Turning from your back to your side while in flat bed without using bedrails? 2  -LO 2  -OD    Moving from lying on back to sitting on the side of a flat bed without bedrails? 2  -LO 1  -OD    Moving to and from a bed to a chair (including a wheelchair)? 2  -LO 1  -OD    Standing up from a chair using your arms (e.g.,  wheelchair, bedside chair)? 2  -LO 1  -OD    Climbing 3-5 steps with a railing? 1  -LO 1  -OD    To walk in hospital room? 1  -LO 1  -OD    AM-PAC 6 Clicks Score (PT) 10  -LO 7  -OD    Highest level of mobility 4 --> Transferred to chair/commode  -LO 2 --> Bed activities/dependent transfer  -OD    Row Name 02/13/23 1420 02/13/23 1414       Functional Assessment    Outcome Measure Options AM-PAC 6 Clicks Daily Activity (OT)  -MR AM-PAC 6 Clicks Basic Mobility (PT)  -LO          User Key  (r) = Recorded By, (t) = Taken By, (c) = Cosigned By    Initials Name Provider Type    OD Marcie Nichols, RN Registered Nurse    Elmira Owens, PT Physical Therapist    MR Vivian Hendrickson, OT Occupational Therapist                Occupational Therapy Education     Title: PT OT SLP Therapies (Done)     Topic: Occupational Therapy (Done)     Point: ADL training (Done)     Description:   Instruct learner(s) on proper safety adaptation and remediation techniques during self care or transfers.   Instruct in proper use of assistive devices.              Learning Progress Summary           Patient Acceptance, E, VU by MR at 2/13/2023 1420    Acceptance, E, VU by LG at 2/12/2023 1731    Acceptance, E, VU by LG at 2/11/2023 1614    Acceptance, E, NR by TA at 2/10/2023 0941                   Point: Home exercise program (Done)     Description:   Instruct learner(s) on appropriate technique for monitoring, assisting and/or progressing therapeutic exercises/activities.              Learning Progress Summary           Patient Acceptance, E, VU by MR at 2/13/2023 1420    Acceptance, E, VU by LG at 2/12/2023 1731    Acceptance, E, VU by LG at 2/11/2023 1614    Acceptance, E, NR by TA at 2/10/2023 0941                   Point: Precautions (Done)     Description:   Instruct learner(s) on prescribed precautions during self-care and functional transfers.              Learning Progress Summary           Patient Acceptance, E, VU by MR at 2/13/2023  1420    Acceptance, E, VU by LG at 2/12/2023 1731    Acceptance, E, VU by LG at 2/11/2023 1614    Acceptance, E, NR by TA at 2/10/2023 0941                   Point: Body mechanics (Done)     Description:   Instruct learner(s) on proper positioning and spine alignment during self-care, functional mobility activities and/or exercises.              Learning Progress Summary           Patient Acceptance, E, VU by MR at 2/13/2023 1420    Acceptance, E, VU by LG at 2/12/2023 1731    Acceptance, E, VU by LG at 2/11/2023 1614    Acceptance, E, NR by TA at 2/10/2023 0941                               User Key     Initials Effective Dates Name Provider Type Discipline     06/16/21 -  Westley Sotomayor OT Occupational Therapist OT     11/16/18 -  Ced Valdez RN Registered Nurse Nurse     09/22/22 -  Vivian Hendrickson OT Occupational Therapist OT              OT Recommendation and Plan     Plan of Care Review  Plan of Care Reviewed With: patient  Progress: improving  Outcome Evaluation: Pt continues to present below baseline for transfers, mobility, balance and increased need for assist w/ ADLs. Improvement w/ bed mobility noted requiring ModA for rolling and [place] for supine to sit w/ v/c, t/c and extended time. Pt continues to require DEP assist for LB dressing. Continue per current POC.     Time Calculation:    Time Calculation- OT     Row Name 02/13/23 1421             Time Calculation- OT    OT Start Time 1326  -MR      OT Received On 02/13/23  -MR         Timed Charges    01845 - OT Therapeutic Activity Minutes 3  -MR      60489 - OT Self Care/Mgmt Minutes 10  -MR         Total Minutes    Timed Charges Total Minutes 13  -MR       Total Minutes 13  -MR            User Key  (r) = Recorded By, (t) = Taken By, (c) = Cosigned By    Initials Name Provider Type    MR Vivian Hendrickson OT Occupational Therapist              Therapy Charges for Today     Code Description Service Date Service Provider Modifiers Qty     [id number]  OT SELF CARE/MGMT/TRAIN EA 15 MIN 2/13/2023 Vivian Hendrickson OT GO 1               Vivian Hendrickson OT  2/13/2023    Electronically signed by Vivian Hendrickson OT at 02/13/23 2478

## 2023-02-14 NOTE — THERAPY TREATMENT NOTE
Acute Care - Speech Language Pathology Treatment Note  [facility]     Patient Name: Cheri Cruz  : 1940  MRN: [id number]  Today's Date: 2023               Admit Date: 2/10/2023     Visit Dx:    ICD-10-CM ICD-9-CM   1. Aphasia  R47.01 784.3     Patient Active Problem List   Diagnosis   • Hyperlipidemia LDL goal <70   • Type 2 diabetes mellitus without complication, without long-term current use of insulin (HCC)   • GERD (gastroesophageal reflux disease)   • Essential hypertension   • Abnormal EKG   • Osteoarthritis   • Anxiety   • Palpitations   • Vertigo   • History of ischemic left MCA stroke   • Hemorrhagic stroke (HCC)   • TIA (transient ischemic attack)   • History of pulmonary embolus (PE)     Past Medical History:   Diagnosis Date   • Abnormal heart rhythm    • Anxiety    • Arrhythmia    • Arthritis    • Atrial fibrillation (HCC)    • Dementia (HCC)    • Diabetes mellitus (HCC)    • Hyperlipidemia    • Hypertension    • Kidney disorder    • Stroke (HCC)    • Uterus cancer (HCC)      Past Surgical History:   Procedure Laterality Date   • CATARACT EXTRACTION, BILATERAL         SLP Recommendation and Plan                                                     Plan of Care Reviewed With: patient (23 1623)      SLP EVALUATION (last 72 hours)     SLP SLC Evaluation     Row Name 23 1600                   Communication Assessment/Intervention    Document Type therapy note (daily note)  -SM        Subjective Information no complaints  -SM        Patient Observations alert  -SM        Patient Effort good  -SM           Pain Scale: FACES Pre/Post-Treatment    Pain: FACES Scale, Pretreatment 0-->no hurt  -SM        Posttreatment Pain Rating 0-->no hurt  -SM              User Key  (r) = Recorded By, (t) = Taken By, (c) = Cosigned By    Initials Name Effective Dates    Kira Cordero MS CCC-SLP 23 -                    EDUCATION  The patient has been educated in the following  areas:     Cognitive Impairment Communication Impairment.           SLP GOALS     Row Name 02/14/23 1600             Patient will demonstrate functional speech skills for return to discharge environment    Rankin Independently  -SM      Time frame by discharge  -SM      Progress/Outcomes continuing progress toward goal  -SM         Patient will demonstrate functional language skills for return to discharge environment     [place]in Independently  -SM      Time frame by discharge  -SM      Progress/Outcomes continuing progress toward goal;other (see comment)  -SM      Comments Independent may not be feasible goal for pt. Will f/u to discuss baseline and GOC/POC options when family present. With today's SLP attempts, questions and interactions seemed to only overtsimulate and cause anxious state. Decreased hearing also impacting. Will discuss/attempt amplfied listening device next session.  -SM         Word Retrieval Skills Goal 1 (SLP)    Improve Word Retrieval Skills By Goal 1 (SLP) completing automatic speech task, alphabet;completing automatic speech task, counting;completing automatic speech task, days of the week;completing automatic speech task, months;completing automatic speech task, Pledge of Allegiance;completing automatic speech task, sing “Happy Birthday”;repeating words;repeating phrases;80%;with moderate cues (50-74%)  -SM      Time Frame (Word Retrieval Goal 1, SLP) short term goal (STG)  -SM      Progress (Word Retrieval Skills Goal 1, SLP) 30%;with minimal cues (75-90%)  -SM      Progress/Outcomes (Word Retrieval Goal 1, SLP) continuing progress toward goal  -SM      Comment (Word Retrieval Goal 1, SLP) Confused language. Difficult to target 2' cognitive deficits (baseline?)  -SM         Phonation Goal 1 (SLP)    Improve Phonation By Goal 1 (SLP) using loud speech;80%;with moderate cues (50-74%)  -SM      Time Frame (Phonation Goal 1, SLP) short term goal (STG)  -SM      Progress/Outcomes  (Phonation Goal 1, SLP) goal met  -SM      Comment (Phonation Goal 1, SLP) Speech with approrpiate volume  -SM         Articulation Goal 1 (SLP)    Improve Articulation Goal 1 (SLP) by over-articulating at word level;80%;with moderate cues (50-74%)  -SM      Time Frame (Articulation Goal 1, SLP) short term goal (STG)  -SM      Progress/Outcomes (Articulation Goal 1, SLP) goal partially met  -SM      Comment (Articulation Goal 1, SLP) Speech intelligibility good. Will f/u for ? back to baseline speech  -SM            User Key  (r) = Recorded By, (t) = Taken By, (c) = Cosigned By    Initials Name Provider Type    Kira Cordero MS CCC-SLP Speech and Language Pathologist                        Time Calculation:      Time Calculation- SLP     Row Name 02/14/23 1623             Time Calculation- SLP    SLP Start Time 1600  -SM      SLP Received On 02/14/23  -SM         Untimed Charges    11238-NQ Treatment/ST Modification Prosth Aug Alter  25  -SM         Total Minutes    Untimed Charges Total Minutes 25  -SM       Total Minutes 25  -SM            User Key  (r) = Recorded By, (t) = Taken By, (c) = Cosigned By    Initials Name Provider Type    Kira Cordero MS CCC-SLP Speech and Language Pathologist                Therapy Charges for Today     Code Description Service Date Service Provider Modifiers Qty    21507198720 HC ST TREATMENT SPEECH 2 2/14/2023 Kira Chavez MS CCC-SLP GN 1                     Kira Chavez MS CCC-SLP  2/14/2023

## 2023-02-14 NOTE — THERAPY TREATMENT NOTE
Patient Name: Cheri Cruz  : 1940    MRN: [id number]                              Today's Date: 2023       Admit Date: 2/10/2023    Visit Dx:     ICD-10-CM ICD-9-CM   1. Rolando  R47.01 784.3     Patient Active Problem List   Diagnosis   • Hyperlipidemia LDL goal <70   • Type 2 diabetes mellitus without complication, without long-term current use of insulin (HCC)   • GERD (gastroesophageal reflux disease)   • Essential hypertension   • Abnormal EKG   • Osteoarthritis   • Anxiety   • Palpitations   • Vertigo   • History of ischemic left MCA stroke   • Hemorrhagic stroke (HCC)   • TIA (transient ischemic attack)   • History of pulmonary embolus (PE)     Past Medical History:   Diagnosis Date   • Abnormal heart rhythm    • Anxiety    • Arrhythmia    • Arthritis    • Atrial fibrillation (HCC)    • Dementia (HCC)    • Diabetes mellitus (HCC)    • Hyperlipidemia    • Hypertension    • Kidney disorder    • Stroke (HCC)    • Uterus cancer (HCC)      Past Surgical History:   Procedure Laterality Date   • CATARACT EXTRACTION, BILATERAL        General Information     Row Name 23 1628          OT Time and Intention    Document Type therapy note (daily note)  -AR     Mode of Treatment occupational therapy;co-treatment  -AR     Row Name 23 1628          General Information    Existing Precautions/Restrictions fall;oxygen therapy device and L/min;other (see comments)  dementia, aphasia  -AR     Row Name 23 1628          Cognition    Orientation Status (Cognition) oriented to;person;disoriented to;place;situation;time  -AR     Row Name 23 1628          Safety Issues, Functional Mobility    Safety Issues Affecting Function (Mobility) insight into deficits/self-awareness;judgment;problem-solving;safety precaution awareness;safety precautions follow-through/compliance;sequencing abilities  -AR     Impairments Affecting Function (Mobility) balance;cognition;endurance/activity  tolerance;strength;postural/trunk control;range of motion (ROM)  -AR     Cognitive Impairments, Mobility Safety/Performance attention;insight into deficits/self-awareness;judgment;problem-solving/reasoning;safety precaution awareness;safety precaution follow-through;sequencing abilities  -AR           User Key  (r) = Recorded By, (t) = Taken By, (c) = Cosigned By    Initials Name Provider Type    Marjorie Rubio OT Occupational Therapist                 Mobility/ADL's     Row Name 02/14/23 1629          Bed Mobility    Bed Mobility supine-sit;rolling right;rolling left  -AR     Rolling Left Christian (Bed Mobility) maximum assist (25% patient effort);verbal cues  -AR     Rolling Right Christian (Bed Mobility) maximum assist (25% patient effort);verbal cues  -AR     Scooting/Bridging Christian (Bed Mobility) maximum assist (25% patient effort);2 person assist;verbal cues  -AR     Comment, (Bed Mobility) Rolled R/L midline for placement of sling  -AR     Row Name 02/14/23 1629          Transfers    Transfers bed-chair transfer;sit-stand transfer;stand-sit transfer  -AR     Comment, (Transfers) Pt assisted to chair via overhead lift. She attempted sit-to-stand transition from chair, pt unable to clear buttocks from chair.  -AR     Row Name 02/14/23 1629          Bed-Chair Transfer    Bed-Chair Christian (Transfers) dependent (less than 25% patient effort)  -AR     Assistive Device (Bed-Chair Transfers) lift device  -AR     Row Name 02/14/23 1629          Sit-Stand Transfer    Sit-Stand Christian (Transfers) 2 person assist;verbal cues;nonverbal cues (demo/gesture);dependent (less than 25% patient effort)  -AR     Assistive Device (Sit-Stand Transfers) other (see comments)  -AR     Row Name 02/14/23 1629          Stand-Sit Transfer    Stand-Sit Christian (Transfers) dependent (less than 25% patient effort);2 person assist;verbal cues  -AR     Assistive Device (Stand-Sit Transfers) other (see  comments)  -AR     Row Name 02/14/23 1629          Activities of Daily Living    BADL Assessment/Intervention upper body dressing  -AR     Row Name 02/14/23 1629          Upper Body Dressing Assessment/Training    [place] Level (Upper Body Dressing) don;pajama/robe;moderate assist (50% patient effort)  -AR     Position (Upper Body Dressing) supported sitting  -AR     Comment, (Upper Body Dressing) clothing apraxia noted  -AR     Row Name 02/14/23 1629          Lower Body Dressing Assessment/Training    [place] Level (Lower Body Dressing) don;socks;dependent (less than 25% patient effort)  -AR     Position (Lower Body Dressing) supine  -AR           User Key  (r) = Recorded By, (t) = Taken By, (c) = Cosigned By    Initials Name Provider Type    Marjorie Rubio OT Occupational Therapist               Obj/Interventions     Row Name 02/14/23 1632          Sensory Assessment (Somatosensory)    Sensory Assessment (Somatosensory) unable/difficult to assess  -AR     Row Name 02/14/23 1632          Balance    Static Sitting Balance moderate assist  -AR     Dynamic Sitting Balance maximum assist  -AR     Position, Sitting Balance unsupported;sitting in chair  -AR     Static Standing Balance dependent;2-person assist  -AR     Position/Device Used, Standing Balance supported  -AR           User Key  (r) = Recorded By, (t) = Taken By, (c) = Cosigned By    Initials Name Provider Type    Marjorie Rubio OT Occupational Therapist               Goals/Plan    No documentation.                Clinical Impression     Row Name 02/14/23 1632          Pain Scale: FACES Pre/Post-Treatment    Pain: FACES Scale, Pretreatment 0-->no hurt  -AR     Posttreatment Pain Rating 0-->no hurt  -AR     Row Name 02/14/23 1632          Plan of Care Review    Plan of Care Reviewed With patient  -AR     Progress declining  -AR     Outcome Evaluation Pt oriented to self and required max assist bed mobility, dependent lift to chair  and mod assist UB dressing. Pt limited with aphasia, generalized weakness, impaired balance and performs below baseline status, recommend SNF.  -AR     Row Name 02/14/23 1632          Therapy Plan Review/Discharge Plan (OT)    Anticipated Discharge Disposition (OT) skilled nursing facility  -AR     Row Name 02/14/23 1632          Vital Signs    Pre Patient Position Supine  -AR     Intra Patient Position Standing  -AR     Post Patient Position Sitting  -AR     Row Name 02/14/23 1632          Positioning and Restraints    Pre-Treatment Position in bed  -AR     Post Treatment Position chair  -AR     In Chair reclined;call light within reach;encouraged to call for assist;exit alarm on;compression device;waffle cushion;on mechanical lift sling;legs elevated  -AR           User Key  (r) = Recorded By, (t) = Taken By, (c) = Cosigned By    Initials Name Provider Type    Marjorie Rubio OT Occupational Therapist               Outcome Measures     Row Name 02/14/23 1634          How much help from another is currently needed...    Putting on and taking off regular lower body clothing? 1  -AR     Bathing (including washing, rinsing, and drying) 1  -AR     Toileting (which includes using toilet bed pan or urinal) 1  -AR     Putting on and taking off regular upper body clothing 2  -AR     Taking care of personal grooming (such as brushing teeth) 2  -AR     Eating meals 2  -AR     AM-PAC 6 Clicks Score (OT) 9  -AR     Row Name 02/14/23 1634          Functional Assessment    Outcome Measure Options AM-PAC 6 Clicks Daily Activity (OT)  -AR           User Key  (r) = Recorded By, (t) = Taken By, (c) = Cosigned By    Initials Name Provider Type    Marjorie Rubio OT Occupational Therapist                Occupational Therapy Education     Title: PT OT SLP Therapies (In Progress)     Topic: Occupational Therapy (In Progress)     Point: ADL training (In Progress)     Description:   Instruct learner(s) on proper safety  adaptation and remediation techniques during self care or transfers.   Instruct in proper use of assistive devices.              Learning Progress Summary           Patient Acceptance, E, NR by AR at 2/14/2023 1634    Acceptance, E, VU by MR at 2/13/2023 1420    Acceptance, E, VU by LG at 2/12/2023 1731    Acceptance, E, VU by LG at 2/11/2023 1614    Acceptance, E, NR by TA at 2/10/2023 0941                   Point: Home exercise program (In Progress)     Description:   Instruct learner(s) on appropriate technique for monitoring, assisting and/or progressing therapeutic exercises/activities.              Learning Progress Summary           Patient Acceptance, E, NR by AR at 2/14/2023 1634    Acceptance, E, VU by MR at 2/13/2023 1420    Acceptance, E, VU by LG at 2/12/2023 1731    Acceptance, E, VU by LG at 2/11/2023 1614    Acceptance, E, NR by TA at 2/10/2023 0941                   Point: Precautions (In Progress)     Description:   Instruct learner(s) on prescribed precautions during self-care and functional transfers.              Learning Progress Summary           Patient Acceptance, E, NR by AR at 2/14/2023 1634    Acceptance, E, VU by MR at 2/13/2023 1420    Acceptance, E, VU by LG at 2/12/2023 1731    Acceptance, E, VU by LG at 2/11/2023 1614    Acceptance, E, NR by TA at 2/10/2023 0941                   Point: Body mechanics (In Progress)     Description:   Instruct learner(s) on proper positioning and spine alignment during self-care, functional mobility activities and/or exercises.              Learning Progress Summary           Patient Acceptance, E, NR by AR at 2/14/2023 1634    Acceptance, E, VU by MR at 2/13/2023 1420    Acceptance, E, VU by LG at 2/12/2023 1731    Acceptance, E, VU by LG at 2/11/2023 1614    Acceptance, E, NR by TA at 2/10/2023 0941                               User Key     Initials Effective Dates Name Provider Type Discipline    AR 02/03/23 -  Marjorie Raymundo OT Occupational  Therapist OT    TA 06/16/21 -  Westley Sotomayor OT Occupational Therapist OT    LG 11/16/18 -  Ced Valdez, RN Registered Nurse Nurse    MR 09/22/22 -  Vivian Hendrickson OT Occupational Therapist OT              OT Recommendation and Plan     Plan of Care Review  Plan of Care Reviewed With: patient  Progress: declining  Outcome Evaluation: Pt oriented to self and required max assist bed mobility, dependent lift to chair and mod assist UB dressing. Pt limited with aphasia, generalized weakness, impaired balance and performs below baseline status, recommend SNF.     Time Calculation:    Time Calculation- OT     Row Name 02/14/23 1635             Time Calculation- OT    OT Start Time 1435  -AR      OT Received On 02/14/23  -AR      OT Goal Re-Cert Due Date 02/20/23  -AR         Timed Charges    26483 - OT Self Care/Mgmt Minutes 17  -AR         Total Minutes    Timed Charges Total Minutes 17  -AR       Total Minutes 17  -AR            User Key  (r) = Recorded By, (t) = Taken By, (c) = Cosigned By    Initials Name Provider Type    Marjorie Rubio OT Occupational Therapist              Therapy Charges for Today     Code Description Service Date Service Provider Modifiers Qty    27792619851 HC OT SELF CARE/MGMT/TRAIN EA 15 MIN 2/14/2023 Marjorie Raymundo OT GO 1               Marjorie Raymundo OT  2/14/2023

## 2023-02-14 NOTE — PLAN OF CARE
Goal Outcome Evaluation:  Plan of Care Reviewed With: patient        Progress: declining  Outcome Evaluation: Pt oriented to self and required max assist bed mobility, dependent lift to chair and mod assist UB dressing. Pt limited with aphasia, generalized weakness, impaired balance and performs below baseline status, recommend SNF.

## 2023-02-14 NOTE — PLAN OF CARE
Goal Outcome Evaluation:              Outcome Evaluation: Patient VSS, RA, NC humidified when patient dropped below parameters and required oxygen support when falling asleep. Patient tolerated therapies and medications well. Patient mood and affect discussed with family, they state that is her normal mood at home of being pessemistic. Will continue to monitor and follow patient and physician plan of care per md order.

## 2023-02-14 NOTE — CASE MANAGEMENT/SOCIAL WORK
Continued Stay Note  [facility]     Patient Name: Cheri Cruz  MRN: [id number]  Today's Date: 2/14/2023    Admit Date: 2/10/2023    Plan: formerly [facility]   Discharge Plan     Row Name 02/14/23 1242       Plan    Plan formerly [facility]    Plan Comments Medical records faxed to Lea @ [facility]. Patient was there prior to being sent to local hospital. I included rehab notes for insurance approval. Lea at [facility] is working on insurance precertification    Final Discharge Disposition Code 03 - skilled nursing facility (SNF)               Discharge Codes    No documentation.               Expected Discharge Date and Time     Expected Discharge Date Expected Discharge Time    Feb 15, 2023             Freda Encarnacion RN

## 2023-02-14 NOTE — PLAN OF CARE
Goal Outcome Evaluation:  Plan of Care Reviewed With: patient            SLP treatment completed. Will continue to follow. Please see note for further details and recommendations.

## 2023-02-14 NOTE — PLAN OF CARE
Goal Outcome Evaluation:  Plan of Care Reviewed With: patient        Progress: no change  Outcome Evaluation: Pt able to follow commands and participate today in therapy. Pt performed bed mobility with Mod A x2. Dep lift transfer to chair. Unable to complete STS at this time secondary to elevated knee and back pain per pt. Pt continues to present with decreaesed balance and generalized weakness limiting her functional mobility. Recommend d/c to SNF for best functional outcome.

## 2023-02-15 LAB
GLUCOSE BLDC GLUCOMTR-MCNC: 108 MG/DL (ref 70–130)
GLUCOSE BLDC GLUCOMTR-MCNC: 150 MG/DL (ref 70–130)
GLUCOSE BLDC GLUCOMTR-MCNC: 166 MG/DL (ref 70–130)
GLUCOSE BLDC GLUCOMTR-MCNC: 188 MG/DL (ref 70–130)

## 2023-02-15 PROCEDURE — 97530 THERAPEUTIC ACTIVITIES: CPT

## 2023-02-15 PROCEDURE — 99231 SBSQ HOSP IP/OBS SF/LOW 25: CPT | Performed by: FAMILY MEDICINE

## 2023-02-15 PROCEDURE — 97535 SELF CARE MNGMENT TRAINING: CPT | Performed by: OCCUPATIONAL THERAPIST

## 2023-02-15 PROCEDURE — 63710000001 ONDANSETRON PER 8 MG: Performed by: INTERNAL MEDICINE

## 2023-02-15 PROCEDURE — 63710000001 INSULIN LISPRO (HUMAN) PER 5 UNITS: Performed by: INTERNAL MEDICINE

## 2023-02-15 PROCEDURE — 82962 GLUCOSE BLOOD TEST: CPT

## 2023-02-15 RX ORDER — LISINOPRIL 40 MG/1
40 TABLET ORAL
Status: DISCONTINUED | OUTPATIENT
Start: 2023-02-16 | End: 2023-02-16 | Stop reason: HOSPADM

## 2023-02-15 RX ORDER — FLUTICASONE PROPIONATE 50 MCG
2 SPRAY, SUSPENSION (ML) NASAL DAILY
Status: DISCONTINUED | OUTPATIENT
Start: 2023-02-15 | End: 2023-02-16 | Stop reason: HOSPADM

## 2023-02-15 RX ADMIN — DONEPEZIL HYDROCHLORIDE 10 MG: 10 TABLET, FILM COATED ORAL at 20:19

## 2023-02-15 RX ADMIN — DICLOFENAC 2 G: 10 GEL TOPICAL at 10:25

## 2023-02-15 RX ADMIN — ACETAMINOPHEN 325MG 650 MG: 325 TABLET ORAL at 12:50

## 2023-02-15 RX ADMIN — FLUTICASONE PROPIONATE 2 SPRAY: 50 SPRAY, METERED NASAL at 21:03

## 2023-02-15 RX ADMIN — DICLOFENAC 2 G: 10 GEL TOPICAL at 20:21

## 2023-02-15 RX ADMIN — ROSUVASTATIN 40 MG: 20 TABLET, FILM COATED ORAL at 20:19

## 2023-02-15 RX ADMIN — INSULIN LISPRO 2 UNITS: 100 INJECTION, SOLUTION INTRAVENOUS; SUBCUTANEOUS at 12:50

## 2023-02-15 RX ADMIN — LISINOPRIL 30 MG: 20 TABLET ORAL at 10:22

## 2023-02-15 RX ADMIN — DULOXETINE HYDROCHLORIDE 60 MG: 60 CAPSULE, DELAYED RELEASE ORAL at 10:22

## 2023-02-15 RX ADMIN — BUSPIRONE HYDROCHLORIDE 7.5 MG: 5 TABLET ORAL at 10:21

## 2023-02-15 RX ADMIN — HYDROCODONE BITARTRATE AND ACETAMINOPHEN 1 TABLET: 10; 325 TABLET ORAL at 20:19

## 2023-02-15 RX ADMIN — ONDANSETRON HYDROCHLORIDE 4 MG: 4 TABLET, FILM COATED ORAL at 18:49

## 2023-02-15 RX ADMIN — ASPIRIN 81 MG 81 MG: 81 TABLET ORAL at 10:22

## 2023-02-15 RX ADMIN — LEVOTHYROXINE SODIUM 25 MCG: 25 TABLET ORAL at 05:31

## 2023-02-15 RX ADMIN — SENNOSIDES AND DOCUSATE SODIUM 2 TABLET: 8.6; 5 TABLET ORAL at 10:21

## 2023-02-15 RX ADMIN — BUSPIRONE HYDROCHLORIDE 7.5 MG: 5 TABLET ORAL at 17:16

## 2023-02-15 RX ADMIN — PANTOPRAZOLE SODIUM 40 MG: 40 TABLET, DELAYED RELEASE ORAL at 05:31

## 2023-02-15 RX ADMIN — INSULIN LISPRO 2 UNITS: 100 INJECTION, SOLUTION INTRAVENOUS; SUBCUTANEOUS at 18:49

## 2023-02-15 RX ADMIN — INSULIN LISPRO 2 UNITS: 100 INJECTION, SOLUTION INTRAVENOUS; SUBCUTANEOUS at 00:07

## 2023-02-15 RX ADMIN — GABAPENTIN 300 MG: 300 CAPSULE ORAL at 20:19

## 2023-02-15 RX ADMIN — ONDANSETRON HYDROCHLORIDE 4 MG: 4 TABLET, FILM COATED ORAL at 12:50

## 2023-02-15 RX ADMIN — POLYETHYLENE GLYCOL 3350 17 G: 17 POWDER, FOR SOLUTION ORAL at 10:21

## 2023-02-15 RX ADMIN — GABAPENTIN 300 MG: 300 CAPSULE ORAL at 10:21

## 2023-02-15 NOTE — PROGRESS NOTES
[facility] Medicine Services  PROGRESS NOTE    Patient Name: Cheri Cruz  : 1940  MRN: [id number]    Date of Admission: 2/10/2023  Primary Care Physician: Lorrie Canada APRN    Subjective   Subjective     CC:  F/U stroke     HPI:  Patient seen and examined. No issues overnight. Easily awakens. No complaints this morning.     ROS:  UTO reliable ROS     Objective   Objective     Vital Signs:   Temp:  [97.9 °F (36.6 °C)-98.3 °F (36.8 °C)] 97.9 °F (36.6 °C)  Heart Rate:  [66-90] 66  Resp:  [18-20] 18  BP: (128-148)/(40-66) 141/40  Flow (L/min):  [2] 2     Physical Exam:  Constitutional: No acute distress, easily awakened, alert   HENT: NCAT, mucous membranes moist  Respiratory: Clear to auscultation bilaterally, respiratory effort normal 2L NC  Cardiovascular: RRR, no murmurs, rubs, or gallops  Gastrointestinal: Positive bowel sounds, soft, nontender, nondistended  Musculoskeletal: No bilateral ankle edema  Psychiatric: Flat affect   Neurologic: Unable to answer orientation questions appropriately, follows commands   Skin: No rashes    Results Reviewed:  LAB RESULTS:      Lab 23  1220 02/10/23  1200 23  1910   WBC 4.99 7.58 7.4   HEMOGLOBIN 10.9* 11.9* 10.7*   HEMATOCRIT 35.0 37.3 32.6*   PLATELETS 163 136* 195   NEUTROS ABS 2.84 4.75 4.2   IMMATURE GRANS (ABS) 0.02 0.04 0.0   LYMPHS ABS 1.47 1.85 2.3   MONOS ABS 0.48 0.69 0.6   EOS ABS 0.16 0.21 0.3   .7* 104.8* 102.5*         Lab 23  1220 02/10/23  1200   SODIUM 138 140   POTASSIUM 4.4 4.9   CHLORIDE 103 101   CO2 24.0 25.0   ANION GAP 11.0 14.0   BUN 14 20   CREATININE 1.08* 1.38*   EGFR 51.7* 38.5*   GLUCOSE 197* 170*   CALCIUM 8.5* 8.6   TSH  --  2.330         Lab 02/10/23  1200 23   TOTAL PROTEIN 5.9*  --    ALBUMIN 3.5  --    GLOBULIN 2.4  --    ALT (SGPT) 5  --    AST (SGOT) 21  --    BILIRUBIN 0.3  --    ALK PHOS 62  --    LIPASE  --  7.0         Lab 23   [facility] 106    TROPONIN I <0.30                 Lab 02/09/23  1840   PH, ARTERIAL 7.377   PCO2, ARTERIAL 45.9*   O2 SATURATION ART 96.6   FIO2 0.28   HCO3 ART 26.3*     Brief Urine Lab Results  (Last result in the past 365 days)      Color   Clarity   Blood   Leuk Est   Nitrite   Protein   CREAT   Urine HCG        02/12/23 1002 Yellow   Clear   Trace   Small (1+)   Negative   Trace                 Microbiology Results Abnormal     None          No radiology results from the last 24 hrs    Results for orders placed during the hospital encounter of 02/10/23    Adult Transthoracic Echo Complete W/ Cont if Necessary Per Protocol    Interpretation Summary  •  Left ventricular systolic function is hyperdynamic (EF > 70%). Calculated left ventricular EF = 70.6% Left ventricular ejection fraction appears to be greater than 70%. The left ventricular cavity is small in size. Left ventricular wall thickness is consistent with mild concentric hypertrophy. All left ventricular wall segments contract normally. Left ventricular intracavitary gradient noted to be 71 mmHg. Left ventricular diastolic function is consistent with (grade I) impaired relaxation. Normal left atrial pressure.  •  The aortic valve is abnormal in structure. There is mild calcification of the aortic valve mainly affecting the right coronary cusp(s). The aortic valve appears trileaflet. No aortic valve regurgitation is present. Gradient noted through the LV and LVOT    Compared to TTE report from  Dec 2019, hyperdynamic LV with intracavitary gradient is not a new finding but peak gradient noted on this exam is higher than previously described.      I have reviewed the medications:  Scheduled Meds:aspirin, 81 mg, Oral, Daily  busPIRone, 7.5 mg, Oral, BID  Diclofenac Sodium, 2 g, Topical, BID  donepezil, 10 mg, Oral, Nightly  DULoxetine, 60 mg, Oral, Daily  gabapentin, 300 mg, Oral, BID  insulin lispro, 0-7 Units, Subcutaneous, Q6H  levothyroxine, 25 mcg, Oral,  Daily  [START ON 2/16/2023] lisinopril, 40 mg, Oral, Q24H  pantoprazole, 40 mg, Oral, Q AM  senna-docusate sodium, 2 tablet, Oral, BID   And  polyethylene glycol, 17 g, Oral, Daily  rosuvastatin, 40 mg, Oral, Nightly  sodium chloride, 10 mL, Intravenous, Q12H      Continuous Infusions:   PRN Meds:.•  acetaminophen **OR** acetaminophen **OR** acetaminophen  •  senna-docusate sodium **AND** polyethylene glycol **AND** bisacodyl **AND** bisacodyl  •  dextrose  •  dextrose  •  glucagon (human recombinant)  •  HYDROcodone-acetaminophen  •  ondansetron **OR** ondansetron  •  QUEtiapine  •  sodium chloride  •  sodium chloride    Assessment & Plan   Assessment & Plan     Active Hospital Problems    Diagnosis  POA   • **Hemorrhagic stroke (HCC) [I61.9]  Yes   • History of pulmonary embolus (PE) [Z86.711]  Yes   • History of ischemic left MCA stroke [Z86.73]  Not Applicable   • Hyperlipidemia LDL goal <70 [E78.5]  Yes   • Essential hypertension [I10]  Yes   • Palpitations [R00.2]  Yes   • Type 2 diabetes mellitus without complication, without long-term current use of insulin (HCC) [E11.9]  Yes      Resolved Hospital Problems   No resolved problems to display.        Brief Hospital Course to date:  Cheri Cruz is a 82 y.o. female with history of HTN, HLD, DMII, atrial fibrillation, PE, Dementia, anxiety and recent left MCA infarct with residual aphasia presents from rehab with increased confusion, RUE weakness, speech difficulty and abdominal pain.  CT at the OSH showed hemorrhagic conversion in the area of her recent stroke.  Patient transferred to [facility] for further evaluation    This patient's problems and plans were partially entered by my partner and updated as appropriate by me 02/15/23.     Altered mental status  Recent Left MCA CVA with Hemorrhagic Conversion   -Stroke Neuro was following now signed off. Follow-up in stroke clinic 2-4 weeks   -CT head 2/11 with no worsening of hemorrhage, no new areas of hemorrhage    -Holing Eliquis x 1 week per Neuro: restart on 2/17/2023  -Continue ASA 81mg daily   -Cardiology following, plan to continue ASA, hold Eliquis until 2/17 and at that time can DC ASA   -30 day heart monitor at DC   -Patient to follow-up with cardiology in 6 weeks after discharge  -TTE reviewed: Hyperdynamic LV  -PT/OT recommend SNF     ?Hx A fib (data deficit)  Hx PE October 2020  -In review of chart, A fib not documented until this admit. Not on prior EKGs, none on tele this admit  -Likely that patient is on Eliquis for Hx PE and not A fib  -Continue to monitor on tele  -Discussed with Cardiology, Dr Parker     Mod-severe Aphasia, mild dysarthria  Dysphagia   -SLP following   -Tolerating diet      Abdominal pain -> Resolved   - CT Abdomen Pelvis shows constipation, some concern for fecal impaction/stercoral colitis.  No current leukocytosis or fever  - Bowel Regimen      UTI  - S/P Rocephin x 3 doses. Cx at [facility] with Proteus, sensitive to Rocephin   -Repeat Urine Cx here with Enterococcus. S/P 1x dose Fosfomycin      HTN  - goal SBP <140  - increase Lisinopril to 40mg daily   - PRN nicardipine drip     DMII  - SSI  - diabetes educator has seen      CKD  -Cr seems to be around baseline. CTM     Dementia    Expected Discharge Location and Transportation: [facility], [facility] and [place] vs swing bed   Expected Discharge   Expected Discharge Date and Time     Expected Discharge Date Expected Discharge Time    Feb 16, 2023            DVT prophylaxis:  Mechanical DVT prophylaxis orders are present.     AM-PAC 6 Clicks Score (PT): 8 (02/15/23 0800)    CODE STATUS:   There are no questions and answers to display.       Marcie Angel, DO  02/15/23

## 2023-02-15 NOTE — DISCHARGE PLACEMENT REQUEST
"Deya Vera Cro (82 y.o. Female)         Date of Birth   1940    Social Security Number       Address   [address] [place] [id number]    Home Phone   [phone]    MRN   [id number]       [facility]    Marital Status                               Admission Date   2/10/23    Admission Type   Urgent    Admitting Provider   Marcie Angel DO    Attending Provider   Marcie Angel DO    Department, Room/Bed   [address], S353/1       Discharge Date       Discharge Disposition       Discharge Destination                               Attending Provider: Marcie Angel DO    Allergies: Penicillins, Sulfa Antibiotics, Tetracyclines & Related, Valium [Diazepam]    Isolation: None   Infection: COVID (History) (02/10/23), COVID Screen (preop/placement) (02/15/23)   Code Status: Prior    Ht: 170.2 cm (67\")   Wt: 95.7 kg (211 lb)    Admission Cmt: None   Principal Problem: Hemorrhagic stroke (HCC) [I61.9]                 Active Insurance as of 2/10/2023     Primary Coverage     Payor Plan Insurance Group Employer/Plan Group    HUMANA MEDICARE REPLACEMENT HUMANA MEDICARE REPLACEMENT [id number]     Payor Plan Address Payor Plan Phone Number Payor Plan Fax Number Effective Dates    [address] [address] [phone]  2/1/2023 - None Entered    [facility] [address]       Subscriber Name Subscriber Birth Date Member ID       DEYA VERA CRO 1940 [id number]           Secondary Coverage     Payor Plan Insurance Group Employer/Plan Group    HUMANA MEDICARE REPLACEMENT HUMANA MEDICARE REPLACEMENT [id number]     Payor Plan Address Payor Plan Phone Number Payor Plan Fax Number Effective Dates    [address] [address] [phone]  1/1/2023 - None Entered    [facility] [address]       Subscriber Name Subscriber Birth Date Member ID       DEYA VERA CRO 1940 [id number]                 Emergency Contacts      (Rel.) Home Phone Work Phone Mobile Phone    " Radha Cruz (Daughter) [phone] -- [phone]    EVITA MADDEN (Daughter) [phone] -- --    OMEGA CRUZ (Son) [phone] -- [phone]    Vanessa Cruz (Spouse) [phone] -- --            Insurance Information                HUMANA MEDICARE REPLACEMENT/HUMANA MEDICARE REPLACEMENT Phone: [phone]    Subscriber: Cruz Cheri Ej Subscriber#: [id number]    Group#: [id number] Precert#: [id number]        HUMANA MEDICARE REPLACEMENT/HUMANA MEDICARE REPLACEMENT Phone: [phone]    Subscriber: Cheri Curz Cro Subscriber#: [id number]    Group#: [id number] Precert#: --             Marjorie Raymundo OT   Occupational Therapist  Physical Therapy  Plan of Care      Signed  Date of Service:  02/14/23 1635  Creation Time:  02/14/23 1635     Signed          Goal Outcome Evaluation:  Plan of Care Reviewed With: patient  Progress: declining  Outcome Evaluation: Pt oriented to self and required max assist bed mobility, dependent lift to chair and mod assist UB dressing. Pt limited with aphasia, generalized weakness, impaired balance and performs below baseline status, recommend SNF.                  Kira Chavez MS CCC-SLP   Speech and Language Pathologist  Speech Therapy  Plan of Care      Signed  Date of Service:  02/14/23 1623  Creation Time:  02/14/23 1623     Signed          Goal Outcome Evaluation:  Plan of Care Reviewed With: patient   SLP treatment completed. Will continue to follow. Please see note for further details and recommendations.                    Kathleen Orosco, PT   Physical Therapist  Physical Therapy  Plan of Care      Signed  Date of Service:  02/14/23 1448  Creation Time:  02/14/23 1648     Signed          Goal Outcome Evaluation:  Plan of Care Reviewed With: patient  Progress: no change  Outcome Evaluation: Pt able to follow commands and participate today in therapy. Pt performed bed mobility with Mod A x2. Dep lift transfer to chair. Unable to complete STS at this time  secondary to elevated knee and back pain per pt. Pt continues to present with decreaesed balance and generalized weakness limiting her functional mobility. Recommend d/c to SNF for best functional outcome.                     History & Physical      Crispin Anders MD at 02/10/23 0411              [facility] Medicine Services  HISTORY AND PHYSICAL    Patient Name: Cheri Cruz  : 1940  MRN: [id number]  Primary Care Physician: Lorrie Canada APRN  Date of admission: 2/10/2023      Subjective    Subjective     Chief Complaint:  confusion    HPI:  Cheri Cruz is a 82 y.o. female with history of HTN, HLD, DMII, atrial fibrillation, PE, Dementia, anxiety and recent left MCA infarct with residual aphasia presents from rehab with increased confusion, RUE weakness, speech difficulty and abdominal pain.  CT at the OSH showed hemorrhagic conversion in the area of her recent stroke.  Patient transferred to [facility] for further evaluation      Review of Systems   Unable to obtain due to aphasia    Personal History     Past Medical History:   Diagnosis Date   • Abnormal heart rhythm    • Anxiety    • Arrhythmia    • Arthritis    • Atrial fibrillation (HCC)    • Dementia (HCC)    • Diabetes mellitus (HCC)    • Hyperlipidemia    • Hypertension    • Kidney disorder    • Stroke (HCC)    • Uterus cancer (HCC)              Past Surgical History:   Procedure Laterality Date   • CATARACT EXTRACTION, BILATERAL         Family History:  family history includes Alcohol abuse in her brother; Cancer in her brother, father, and mother; Congenital heart disease in her mother; Heart disease in her mother. Otherwise pertinent FHx was reviewed and unremarkable.     Social History:  reports that she has never smoked. She has never used smokeless tobacco. She reports that she does not drink alcohol and does not use drugs.  Social History     Social History Narrative   • Not on file       Medications:  Available  home medication information reviewed.  DULoxetine, Ergocalciferol, HYDROcodone-acetaminophen, Insulin Lispro, QUEtiapine, apixaban, aspirin, busPIRone, donepezil, gabapentin, levothyroxine, meclizine, metoprolol tartrate, omeprazole, oxybutynin, rosuvastatin, and sennosides-docusate      Allergies   Allergen Reactions   • Penicillins    • Sulfa Antibiotics    • Tetracyclines & Related Unknown (See Comments)   • Valium [Diazepam] Anxiety       Objective    Objective     Vital Signs:   Temp:  [98.5 °F (36.9 °C)] 98.5 °F (36.9 °C)  Heart Rate:  [64-74] 69  Resp:  [16] 16  BP: ()/(37-70) 97/46  Flow (L/min):  [2] 2  Total (NIH Stroke Scale): 4    Physical Exam   Constitutional - non toxic, in bed  HEENT-NCAT, mucous membranes moist  CV-RRR  Resp-CTAB, no wheezes, rhonchi or rales  Abd-soft, nontender, nondistended, normoactive bowel sounds  Ext-No lower extremity cyanosis, clubbing or edema bilaterally  Neuro-alert but confused, speech nonsensical but clear, follows some commands, moves all extremities  Psych-normal affect   Skin- No rash on exposed UE or LE bilaterally      Result Review:  I have personally reviewed the results from the time of this admission to 2/10/2023 04:11 EST and agree with these findings:  []  Laboratory list / accordion  []  Microbiology  []  Radiology  []  EKG/Telemetry   []  Cardiology/Vascular   []  Pathology  []  Old records  []  Other:  Most notable findings include:         LAB RESULTS:      Lab 02/09/23 1910   WBC 7.4   HEMOGLOBIN 10.7*   HEMATOCRIT 32.6*   PLATELETS 195   NEUTROS ABS 4.2   IMMATURE GRANS (ABS) 0.0   LYMPHS ABS 2.3   MONOS ABS 0.6   EOS ABS 0.3   .5*             Lab 02/09/23 1910   LIPASE 7.0         Lab 02/09/23 1910   PROBNP 669   TROPONIN I <0.30                 Lab 02/09/23  1840   PH, ARTERIAL 7.377   PCO2, ARTERIAL 45.9*   O2 SATURATION ART 96.6   FIO2 0.28   HCO3 ART 26.3*     UA    Urinalysis 1/10/23 1/31/23 2/9/23 2/9/23      1925 1925   Specific  Gravity, UA 1.018 1.020  1.020   Ketones, UA Trace (A)      Blood, UA Negative Negative  MODERATE (A)   Leukocytes, UA Negative Negative  LARGE (A)   Nitrite, UA Negative Negative  POSITIVE (A)   RBC, UA   see below (A)    Bacteria, UA   3+ (A)    (A) Abnormal value       Comments are available for some flowsheets but are not being displayed.             Microbiology Results (last 10 days)     Procedure Component Value - Date/Time    Influenza Antigen, Rapid - , [[id number]] Collected: 02/09/23 1910    Lab Status: Final result Specimen: Nasopharyngeal Swab Updated: 02/10/23 0158     Rapid Influenza A Ag Negative     Rapid Influenza B Ag Negative          XR Outside Films    Result Date: 2/10/2023  This procedure was auto-finalized with no dictation required.    CT ABDOMEN PELVIS WO CONTRAST Additional Contrast? None    Result Date: 2/9/2023  CT ABDOMEN AND PELVIS: INDICATION: abd pain, lower half TECHNIQUE: Noncontrast CT images of the abdomen and pelvis were obtained. Up-to-date CT equipment and radiation dose reduction techniques were employed. COMPARISON: NONE. FINDINGS:  There are atelectatic changes identified within the medial aspect of the right upper lobe as well as the medial aspect the right lower lobe. There is some groundglass atelectasis identified at the left lung base. No pleural effusion is identified. There is calcification of mitral annulus. There is atherosclerotic calcification left anterior descending coronary artery. Patient status post a cholecystectomy. No biliary dilatation is identified. There is small hiatal hernia. The liver appears unremarkable. There is moderate-severe atrophy of the pancreas. There are calcified granulomas present within normal size spleen. Adrenal glands are normal. No hydronephrosis or nephrolithiasis is identified. There is mild renal atrophy. There is extensive calcification of the abdominal aorta. No aneurysm is identified. Large amount of stool is identified  within the rectum, sigmoid colon and descending colon. There is some associated edema identified within the posterior perirectal fat.. No abnormal bowel wall thickening is identified. No bowel dilatation is identified.  Appendix is visualized and is normal. Patient status post hysterectomy. There is a right hip femoral neck screw. No osteolytic or osteoblastic lesions are identified. Facet joint spondylosis is present with the lumbar spine.    Impression: 1. Large amount of stool is identified within the rectum with some associated edematous fat stranding identified within the posterior perirectal fat. The findings can be seen with fecal impaction and stercoral colitis. 2. Status post hysterectomy. 3. Status post cholecystectomy. 4. Atelectatic changes identified along the detailed above. 5. Small hiatal hernia. 6. Atrophic pancreas.    CT Head WO Contrast    Result Date: 2/9/2023  HEAD CT SCAN WITHOUT CONTRAST   HISTORY: AMS, hx of stroke 1 month ago COMPARISON:  Head CT dated June 1621 TECHNIQUE: Noncontrast CT images of the head were obtained. Images were reformatted in the coronal and sagittal planes. Up-to-date CT equipment and radiation dose reduction techniques were employed. FINDINGS: There is low attenuation in sulcal effacement identified within the posterior inferior aspect of the left parietal lobe consistent with subacute infarct. There are associated small areas of hemorrhagic transformation within the infarct. No herniation is identified. There is prominence of the ventricles, cistern and sulci. There is a remote lacunar infarct identified within the head of the left caudate nucleus.    Impression: 1. Subacute infarct is identified with the posterior inferior aspect left parietal lobe with some small areas of acute hemorrhagic conversion. 2. Mild atrophy. 3. Remote lacunar infarct is identified with the head of the  left caudate nucleus.    XR CHEST PORTABLE    Result Date: 2/9/2023  CHEST 1 VIEW    HISTORY: low O2 saturation   COMPARISON: Chest x-ray dated 11-21 FINDINGS: Portable AP radiograph of the chest was obtained. The heart and mediastinum are within normal limits for size and configuration. No infiltrate, effusion or pneumothorax is identified. Lung volumes are decreased. Calcified granulomas project over the lung bases. Degenerative changes are identified at the right glenohumeral joint.    Impression: 1.  No evidence of acute cardiopulmonary disease.    CT Outside Films    Result Date: 2/10/2023  This procedure was auto-finalized with no dictation required.    CT Outside Films    Result Date: 2/10/2023  This procedure was auto-finalized with no dictation required.      Results for orders placed during the hospital encounter of 10/11/17    Adult Transthoracic Echo Complete W/ Cont if Necessary Per Protocol    Interpretation Summary  · Left ventricular systolic function is normal. Estimated EF = 70%. No regional wall motion abnormalities are noted.  · Left ventricular diastolic dysfunction (grade I) consistent with impaired relaxation.  · Left atrial cavity size is mildly dilated.  · The cardiac valves are functionally normal.      Assessment & Plan   Assessment & Plan     Active Hospital Problems    Diagnosis  POA   • **Hemorrhagic stroke (HCC) [I61.9]  Yes       Altered mental status  Recent Left MCA CVA  - hemorrhagic conversion noted on OSH CT  - hold aspirin and eliquis  - statin  - speech evaluation -- previously on modified diet  - PT/OT    Abdominal pain  - CT Abdomen Pelvis shows constipation, some concern for fecal impaction/stercoral colitis.  No current leukocytosis or fever  - scheduled laxatives  - suppository PRN    UTI  - continue rocephin (1st dose in OSH ED)    Atrial fibrillation  - currently NSR  - eliquis held    HTN  - goal SBP <140    DMII  - SSI    Dementia      DVT prophylaxis:  mechanical      CODE STATUS:    There are no questions and answers to display.       Expected  Discharge        Crispin Anders MD  02/10/23      Electronically signed by Crispin Anders MD at 02/10/23 0440         Current Facility-Administered Medications   Medication Dose Route Frequency Provider Last Rate Last Admin   • acetaminophen (TYLENOL) tablet 650 mg  650 mg Oral Q4H PRN Crispin Anders MD   650 mg at 02/14/23 0603    Or   • acetaminophen (TYLENOL) 160 MG/5ML solution 650 mg  650 mg Oral Q4H PRN Crispin Anders MD        Or   • acetaminophen (TYLENOL) suppository 650 mg  650 mg Rectal Q4H PRN Crispin Anders MD       • aspirin chewable tablet 81 mg  81 mg Oral Daily Pricilla Churchill APRN   81 mg at 02/14/23 0834   • sennosides-docusate (PERICOLACE) 8.6-50 MG per tablet 2 tablet  2 tablet Oral BID Crispin Anders MD   2 tablet at 02/14/23 2006    And   • polyethylene glycol (MIRALAX) packet 17 g  17 g Oral Daily Crispin Anders MD   17 g at 02/14/23 0834    And   • bisacodyl (DULCOLAX) EC tablet 5 mg  5 mg Oral Daily PRN Crispin Anders MD        And   • bisacodyl (DULCOLAX) suppository 10 mg  10 mg Rectal Daily PRN Crispin Anders MD   10 mg at 02/10/23 0438   • busPIRone (BUSPAR) tablet 7.5 mg  7.5 mg Oral BID Crispin Anders MD   7.5 mg at 02/14/23 1811   • dextrose (D50W) (25 g/50 mL) IV injection 25 g  25 g Intravenous Q15 Min PRN Crispin Anders MD       • dextrose (GLUTOSE) oral gel 15 g  15 g Oral Q15 Min PRN Crispin Anders MD       • Diclofenac Sodium (VOLTAREN) 1 % gel 2 g  2 g Topical BID Marcie Angel DO   2 g at 02/14/23 2007   • donepezil (ARICEPT) tablet 10 mg  10 mg Oral Nightly Mary Pillai APRN   10 mg at 02/14/23 2006   • DULoxetine (CYMBALTA) DR capsule 60 mg  60 mg Oral Daily Crispin Anders MD   60 mg at 02/14/23 0834   • gabapentin (NEURONTIN) capsule 300 mg  300 mg Oral BID Crispin Anders MD   300 mg at 02/14/23 2006   • glucagon (GLUCAGEN) injection 1 mg  1 mg Intramuscular Q15 Min PRN Crispin Anders MD       • HYDROcodone-acetaminophen  (NORCO)  MG per tablet 1 tablet  1 tablet Oral Q6H PRN Crispin Anders MD   1 tablet at 23   • Insulin Lispro (humaLOG) injection 0-7 Units  0-7 Units Subcutaneous Q6H Crispin Anders MD   2 Units at 02/15/23 0007   • levothyroxine (SYNTHROID, LEVOTHROID) tablet 25 mcg  25 mcg Oral Daily Crispin Anders MD   25 mcg at 02/15/23 0531   • lisinopril (PRINIVIL,ZESTRIL) tablet 30 mg  30 mg Oral Q24H Marcie Angel DO       • ondansetron (ZOFRAN) tablet 4 mg  4 mg Oral Q6H PRN Crispin Anders MD   4 mg at 23 1912    Or   • ondansetron (ZOFRAN) injection 4 mg  4 mg Intravenous Q6H PRN Crispin Anders MD   4 mg at 23 1453   • pantoprazole (PROTONIX) EC tablet 40 mg  40 mg Oral Q AM Crispin Anders MD   40 mg at 02/15/23 0531   • QUEtiapine (SEROquel) tablet 50 mg  50 mg Oral Nightly PRN Marcie Angel DO       • rosuvastatin (CRESTOR) tablet 40 mg  40 mg Oral Nightly Mary Pillai APRN   40 mg at 23   • sodium chloride 0.9 % flush 10 mL  10 mL Intravenous Q12H Mary Pillai APRN   10 mL at 23 0900   • sodium chloride 0.9 % flush 10 mL  10 mL Intravenous PRN Mary Pillai APRN       • sodium chloride 0.9 % infusion 40 mL  40 mL Intravenous PRN Mary Pillai APRN            Physician Progress Notes (most recent note)      Marcie Angel DO at 23 0901              [facility] Medicine Services  PROGRESS NOTE    Patient Name: Cheri Cruz  : 1940  MRN: [id number]    Date of Admission: 2/10/2023  Primary Care Physician: Lorrie Canada APRN    Subjective   Subjective     CC:  F/U stroke     HPI:  Patient seen and examined. No issues overnight. Sleeping.     ROS:  UTO reliable ROS     Objective   Objective     Vital Signs:   Temp:  [98.1 °F (36.7 °C)-98.6 °F (37 °C)] 98.6 °F (37 °C)  Heart Rate:  [66-88] 66  Resp:  [18-20] 20  BP: (119-161)/(40-72) 142/66  Flow (L/min):  [2]  2     Physical Exam:  Constitutional: No acute distress, resting comfortably   HENT: NCAT, mucous membranes moist  Respiratory: Clear to auscultation bilaterally, respiratory effort normal 2L NC  Cardiovascular: RRR, no murmurs, rubs, or gallops  Gastrointestinal: Positive bowel sounds, soft, nontender, nondistended  Musculoskeletal: No bilateral ankle edema  Psychiatric: Flat affect   Neurologic: Unable to answer orientation questions appropriately   Skin: No rashes    Results Reviewed:  LAB RESULTS:      Lab 02/11/23  1220 02/10/23  1200 02/09/23  1910   WBC 4.99 7.58 7.4   HEMOGLOBIN 10.9* 11.9* 10.7*   HEMATOCRIT 35.0 37.3 32.6*   PLATELETS 163 136* 195   NEUTROS ABS 2.84 4.75 4.2   IMMATURE GRANS (ABS) 0.02 0.04 0.0   LYMPHS ABS 1.47 1.85 2.3   MONOS ABS 0.48 0.69 0.6   EOS ABS 0.16 0.21 0.3   .7* 104.8* 102.5*         Lab 02/11/23  1220 02/10/23  1200   SODIUM 138 140   POTASSIUM 4.4 4.9   CHLORIDE 103 101   CO2 24.0 25.0   ANION GAP 11.0 14.0   BUN 14 20   CREATININE 1.08* 1.38*   EGFR 51.7* 38.5*   GLUCOSE 197* 170*   CALCIUM 8.5* 8.6   TSH  --  2.330         Lab 02/10/23  1200 02/09/23  1910   TOTAL PROTEIN 5.9*  --    ALBUMIN 3.5  --    GLOBULIN 2.4  --    ALT (SGPT) 5  --    AST (SGOT) 21  --    BILIRUBIN 0.3  --    ALK PHOS 62  --    LIPASE  --  7.0         Lab 02/09/23  1910   PROBNP 669   TROPONIN I <0.30                 Lab 02/09/23  1840   PH, ARTERIAL 7.377   PCO2, ARTERIAL 45.9*   O2 SATURATION ART 96.6   FIO2 0.28   HCO3 ART 26.3*     Brief Urine Lab Results  (Last result in the past 365 days)      Color   Clarity   Blood   Leuk Est   Nitrite   Protein   CREAT   Urine HCG        02/12/23 1002 Yellow   Clear   Trace   Small (1+)   Negative   Trace                 Microbiology Results Abnormal     None          No radiology results from the last 24 hrs    Results for orders placed during the hospital encounter of 02/10/23    Adult Transthoracic Echo Complete W/ Cont if Necessary Per  Protocol    Interpretation Summary  •  Left ventricular systolic function is hyperdynamic (EF > 70%). Calculated left ventricular EF = 70.6% Left ventricular ejection fraction appears to be greater than 70%. The left ventricular cavity is small in size. Left ventricular wall thickness is consistent with mild concentric hypertrophy. All left ventricular wall segments contract normally. Left ventricular intracavitary gradient noted to be 71 mmHg. Left ventricular diastolic function is consistent with (grade I) impaired relaxation. Normal left atrial pressure.  •  The aortic valve is abnormal in structure. There is mild calcification of the aortic valve mainly affecting the right coronary cusp(s). The aortic valve appears trileaflet. No aortic valve regurgitation is present. Gradient noted through the LV and LVOT    Compared to TTE report from  Dec 2019, hyperdynamic LV with intracavitary gradient is not a new finding but peak gradient noted on this exam is higher than previously described.      I have reviewed the medications:  Scheduled Meds:aspirin, 81 mg, Oral, Daily  busPIRone, 7.5 mg, Oral, BID  Diclofenac Sodium, 2 g, Topical, BID  donepezil, 10 mg, Oral, Nightly  DULoxetine, 60 mg, Oral, Daily  gabapentin, 300 mg, Oral, BID  insulin lispro, 0-7 Units, Subcutaneous, Q6H  levothyroxine, 25 mcg, Oral, Daily  lisinopril, 20 mg, Oral, Q24H  pantoprazole, 40 mg, Oral, Q AM  senna-docusate sodium, 2 tablet, Oral, BID   And  polyethylene glycol, 17 g, Oral, Daily  rosuvastatin, 40 mg, Oral, Nightly  sodium chloride, 10 mL, Intravenous, Q12H      Continuous Infusions:   PRN Meds:.•  acetaminophen **OR** acetaminophen **OR** acetaminophen  •  senna-docusate sodium **AND** polyethylene glycol **AND** bisacodyl **AND** bisacodyl  •  dextrose  •  dextrose  •  glucagon (human recombinant)  •  HYDROcodone-acetaminophen  •  ondansetron **OR** ondansetron  •  QUEtiapine  •  sodium chloride  •  sodium chloride    Assessment &  Plan   Assessment & Plan     Active Hospital Problems    Diagnosis  POA   • **Hemorrhagic stroke (HCC) [I61.9]  Yes   • History of pulmonary embolus (PE) [Z86.711]  Yes   • History of ischemic left MCA stroke [Z86.73]  Not Applicable   • Hyperlipidemia LDL goal <70 [E78.5]  Yes   • Essential hypertension [I10]  Yes   • Palpitations [R00.2]  Yes   • Type 2 diabetes mellitus without complication, without long-term current use of insulin (HCC) [E11.9]  Yes      Resolved Hospital Problems   No resolved problems to display.        Brief Hospital Course to date:  Cheri Cruz is a 82 y.o. female with history of HTN, HLD, DMII, atrial fibrillation, PE, Dementia, anxiety and recent left MCA infarct with residual aphasia presents from rehab with increased confusion, RUE weakness, speech difficulty and abdominal pain.  CT at the OSH showed hemorrhagic conversion in the area of her recent stroke.  Patient transferred to [facility] for further evaluation    This patient's problems and plans were partially entered by my partner and updated as appropriate by me 02/14/23.     Altered mental status  Recent Left MCA CVA with Hemorrhagic Conversion   -Stroke Neuro was following now signed off. Follow-up in stroke clinic 2-4 weeks   -CT head 2/11 with no worsening of hemorrhage, no new areas of hemorrhage   -Holing Eliquis x 1 week per Neuro: restart on 2/17/2023  -Continue ASA 81mg daily   -Cardiology following, plan to continue ASA, hold Eliquis until 2/17 and at that time can DC ASA   -30 day heart monitor at DC   -Patient to follow-up with cardiology in 6 weeks after discharge  -TTE reviewed: Hyperdynamic LV  -PT/OT recommend SNF     ?Hx A fib (data deficit)  Hx PE October 2020  -In review of chart, A fib not documented until this admit. Not on prior EKGs, none on tele this admit  -Likely that patient is on Eliquis for Hx PE and not A fib  -Continue to monitor on tele  -Discussed with Cardiology, Dr Parker     Mod-severe Aphasia, mild  dysarthria  Dysphagia   -SLP following   -Tolerating diet      Abdominal pain -> Resolved   - CT Abdomen Pelvis shows constipation, some concern for fecal impaction/stercoral colitis.  No current leukocytosis or fever  - Bowel Regimen      UTI  - S/P Rocephin x 3 doses. Cx at [facility] with Proteus, sensitive to Rocephin   -Repeat Urine Cx here with Enterococcus. S/P 1x dose Fosfomycin      HTN  - goal SBP <140  - increase Lisinopril to 30mg daily   - PRN nicardipine drip     DMII  - SSI  - diabetes educator has seen      CKD  -Cr seems to be around baseline. CTM     Dementia    Expected Discharge Location and Transportation: [facility], [facility] and [place]   Expected Discharge   Expected Discharge Date and Time     Expected Discharge Date Expected Discharge Time    Feb 15, 2023            DVT prophylaxis:  Mechanical DVT prophylaxis orders are present.     AM-PAC 6 Clicks Score (PT): 10 (02/13/23 2515)    CODE STATUS:   There are no questions and answers to display.       Marcie Angel DO  02/14/23          Electronically signed by Marcie Angel DO at 02/14/23 0934

## 2023-02-15 NOTE — CONSULTS
Order noted for diabetes education per stroke protocol. A1c 6.9% on Lispro. GCS 14, inappropriate for education. To be go to SNF at discharge per chart review. Please re consult if needed. Thank you.

## 2023-02-15 NOTE — THERAPY TREATMENT NOTE
Patient Name: Cheri Cruz  : 1940    MRN: [id number]                              Today's Date: 2/15/2023       Admit Date: 2/10/2023    Visit Dx:     ICD-10-CM ICD-9-CM   1. Rolando  R47.01 784.3     Patient Active Problem List   Diagnosis   • Hyperlipidemia LDL goal <70   • Type 2 diabetes mellitus without complication, without long-term current use of insulin (HCC)   • GERD (gastroesophageal reflux disease)   • Essential hypertension   • Abnormal EKG   • Osteoarthritis   • Anxiety   • Palpitations   • Vertigo   • History of ischemic left MCA stroke   • Hemorrhagic stroke (HCC)   • TIA (transient ischemic attack)   • History of pulmonary embolus (PE)     Past Medical History:   Diagnosis Date   • Abnormal heart rhythm    • Anxiety    • Arrhythmia    • Arthritis    • Atrial fibrillation (HCC)    • Dementia (HCC)    • Diabetes mellitus (HCC)    • Hyperlipidemia    • Hypertension    • Kidney disorder    • Stroke (HCC)    • Uterus cancer (HCC)      Past Surgical History:   Procedure Laterality Date   • CATARACT EXTRACTION, BILATERAL        General Information     Row Name 02/15/23 1458          Physical Therapy Time and Intention    Document Type therapy note (daily note) (P)   -ZL     Mode of Treatment physical therapy (P)   -ZL     Row Name 02/15/23 1458          General Information    Patient Profile Reviewed yes (P)   -ZL     Existing Precautions/Restrictions fall;oxygen therapy device and L/min;other (see comments) (P)   dementia, aphasia  -ZL     Barriers to Rehab medically complex;cognitive status;previous functional deficit;ineffective coping (P)   -ZL     Row Name 02/15/23 1458          Cognition    Orientation Status (Cognition) oriented to;person;disoriented to;place;situation;time (P)   -ZL     Row Name 02/15/23 1458          Safety Issues, Functional Mobility    Safety Issues Affecting Function (Mobility) awareness of need for assistance;insight into  deficits/self-awareness;judgment;problem-solving;safety precaution awareness;safety precautions follow-through/compliance;sequencing abilities (P)   -ZL     Impairments Affecting Function (Mobility) balance;cognition;endurance/activity tolerance;strength;postural/trunk control;range of motion (ROM) (P)   -ZL     Cognitive Impairments, Mobility Safety/Performance awareness, need for assistance;insight into deficits/self-awareness;judgment;problem-solving/reasoning;safety precaution awareness;safety precaution follow-through;sequencing abilities (P)   -ZL     Comment, Safety Issues/Impairments (Mobility) excessive posterior lean when sitting EOB requiring Mod A x 1 and VC/TC to maintain upright posture (P)   -ZL           User Key  (r) = Recorded By, (t) = Taken By, (c) = Cosigned By    Initials Name Provider Type    Corine Mendoza, PT Student PT Student               Mobility     Row Name 02/15/23 1500          Bed Mobility    Bed Mobility supine-sit;sit-supine;scooting/bridging (P)   -ZL     Rolling Right [place] (Bed Mobility) not tested (P)   -ZL     Scooting/Bridging [place] (Bed Mobility) moderate assist (50% patient effort);2 person assist (P)   -ZL     Supine-Sit [place] (Bed Mobility) minimum assist (75% patient effort);verbal cues;nonverbal cues (demo/gesture) (P)   -ZL     Sit-Supine [place] (Bed Mobility) moderate assist (50% patient effort);2 person assist;verbal cues;nonverbal cues (demo/gesture) (P)   -ZL     Assistive Device (Bed Mobility) draw sheet;bed rails (P)   -ZL     Comment, (Bed Mobility) VC for sequencing, advancing hips to EOB, and for hand placement to maintain balance. (P)   -ZL     Row Name 02/15/23 1500          Transfers    Comment, (Transfers) pt deferred d/t increased fatigue and weakness (P)   -ZL     Row Name 02/15/23 1500          Bed-Chair Transfer    Bed-Chair [place] (Transfers) not tested (P)   -ZL     Row Name 02/15/23 1500          Sit-Stand  Transfer    Sit-Stand [place] (Transfers) not tested (P)   -ZL     Row Name 02/15/23 1500          Gait/Stairs (Locomotion)    [place] Level (Gait) unable to assess (P)   -ZL     Comment, (Gait/Stairs) Deferred d/t weakness, fatigue, and altered cognition (P)   -ZL           User Key  (r) = Recorded By, (t) = Taken By, (c) = Cosigned By    Initials Name Provider Type    Corine Mendoza, PT Student PT Student               Obj/Interventions     Row Name 02/15/23 1501          Motor Skills    Motor Skills functional endurance (P)   -ZL     Functional Endurance Pt tolerated seated ther ex at EOB ~ 3 minutes before returning to supine (P)   -ZL     Therapeutic Exercise hip;knee;ankle (P)   -ZL     Row Name 02/15/23 1501          Hip (Therapeutic Exercise)    Hip (Therapeutic Exercise) strengthening exercise (P)   -ZL     Hip Strengthening (Therapeutic Exercise) bilateral;aBduction;aDduction;10 repetitions (P)   -ZL     Row Name 02/15/23 1501          Knee (Therapeutic Exercise)    Knee (Therapeutic Exercise) strengthening exercise;isometric exercises (P)   -ZL     Knee Isometrics (Therapeutic Exercise) bilateral;quad sets;10 repetitions (P)   -ZL     Knee Strengthening (Therapeutic Exercise) bilateral;SLR (straight leg raise);LAQ (long arc quad);10 repetitions (P)   -ZL     Row Name 02/15/23 1501          Ankle (Therapeutic Exercise)    Ankle (Therapeutic Exercise) AROM (active range of motion) (P)   -ZL     Ankle AROM (Therapeutic Exercise) bilateral;dorsiflexion;plantarflexion;10 repetitions (P)   -ZL     Row Name 02/15/23 1501          Balance    Balance Assessment sitting static balance;sitting dynamic balance (P)   -ZL     Static Sitting Balance moderate assist;1-person assist (P)   -ZL     Dynamic Sitting Balance moderate assist;1-person assist (P)   -ZL     Position, Sitting Balance sitting edge of bed (P)   -ZL     Sit to Stand Dynamic Balance not tested (P)   -ZL     Static Standing Balance not  "tested (P)   -ZL     Dynamic Standing Balance not tested (P)   -ZL     Balance Interventions sitting;supported;static;dynamic (P)   -ZL     Comment, Balance Excessive posterior lean noted when sitting EOB - Mod A x 1 + VC/TC required to maintian upright posture (P)   -ZL           User Key  (r) = Recorded By, (t) = Taken By, (c) = Cosigned By    Initials Name Provider Type    Corine Mendoza, PT Student PT Student               Goals/Plan     Row Name 02/15/23 1510          Bed Mobility Goal 1 (PT)    Activity/Assistive Device (Bed Mobility Goal 1, PT) sit to supine/supine to sit (P)   -ZL     [place] Level/Cues Needed (Bed Mobility Goal 1, PT) minimum assist (75% or more patient effort) (P)   -ZL     Time Frame (Bed Mobility Goal 1, PT) long term goal (LTG);10 days (P)   -ZL     Progress/Outcomes (Bed Mobility Goal 1, PT) goal revised this date;goal ongoing (P)   -ZL     Row Name 02/15/23 1510          Transfer Goal 1 (PT)    Progress/Outcome (Transfer Goal 1, PT) goal ongoing (P)   -ZL           User Key  (r) = Recorded By, (t) = Taken By, (c) = Cosigned By    Initials Name Provider Type    Corine Mendoza, PT Student PT Student               Clinical Impression     Row Name 02/15/23 1500          Pain    Pain Intervention(s) Repositioned;Ambulation/increased activity (P)   -ZL     Additional Documentation Pain Scale: FACES Pre/Post-Treatment (Group) (P)   -ZL     Row Name 02/15/23 1504          Pain Scale: FACES Pre/Post-Treatment    Pain: FACES Scale, Pretreatment 6-->hurts even more (P)   -ZL     Posttreatment Pain Rating 6-->hurts even more (P)   -ZL     Pain Location generalized (P)   -ZL     Pre/Posttreatment Pain Comment Pt reports increased global body pain \"in joints and the bones\" (P)   -ZL     Row Name 02/15/23 1508          Plan of Care Review    Plan of Care Reviewed With patient (P)   -ZL     Progress no change (P)   -ZL     Outcome Evaluation Pt completed bed mobility Mod A x 2. She tolerated " seated ther ex at EOB ~ 3 minutes before returning to supine. Good effort given during strengthening ther ex today. Pt remains below baseline d/t decreased postural control and functional endurance. Recommend SNF at discharge. (P)   -ZL     Row Name 02/15/23 1504          Therapy Assessment/Plan (PT)    Rehab Potential (PT) fair, will monitor progress closely (P)   -ZL     Criteria for Skilled Interventions Met (PT) yes;meets criteria;skilled treatment is necessary (P)   -ZL     Therapy Frequency (PT) daily (P)   -ZL     Row Name 02/15/23 1504          Vital Signs    Pre Systolic BP Rehab -- (P)   VSS  -ZL     Pre Patient Position Supine (P)   -ZL     Intra Patient Position Sitting (P)   -ZL     Post Patient Position Supine (P)   -ZL     Row Name 02/15/23 1504          Positioning and Restraints    Pre-Treatment Position in bed (P)   -ZL     Post Treatment Position bed (P)   -ZL     In Bed notified nsg;supine;call light within reach;encouraged to call for assist;with nsg;side rails up x3 (P)   -ZL           User Key  (r) = Recorded By, (t) = Taken By, (c) = Cosigned By    Initials Name Provider Type    Corine Mendoza, PT Student PT Student               Outcome Measures     Row Name 02/15/23 1510 02/15/23 0800       How much help from another person do you currently need...    Turning from your back to your side while in flat bed without using bedrails? 3 (P)   -ZL 2  -MW    Moving from lying on back to sitting on the side of a flat bed without bedrails? 2 (P)   -ZL 2  -MW    Moving to and from a bed to a chair (including a wheelchair)? 1 (P)   -ZL 1  -MW    Standing up from a chair using your arms (e.g., wheelchair, bedside chair)? 1 (P)   -ZL 1  -MW    Climbing 3-5 steps with a railing? 1 (P)   -ZL 1  -MW    To walk in hospital room? 1 (P)   -ZL 1  -MW    AM-PAC 6 Clicks Score (PT) 9 (P)   -ZL 8  -MW    Highest level of mobility 3 --> Sat at edge of bed (P)   -ZL 3 --> Sat at edge of bed  -    Row Name  02/15/23 1510          Functional Assessment    Outcome Measure Options AM-PAC 6 Clicks Basic Mobility (PT) (P)   -ZL           User Key  (r) = Recorded By, (t) = Taken By, (c) = Cosigned By    Initials Name Provider Type    Alisha Galvan, RN Registered Nurse    Corine Mendoza, PT Student PT Student                             Physical Therapy Education     Title: PT OT SLP Therapies (In Progress)     Topic: Physical Therapy (In Progress)     Point: Mobility training (In Progress)     Learning Progress Summary           Patient Acceptance, E,TB, NR by ZL at 2/15/2023 1511    Comment: Reviewed proper bed mobility sequencing. Educated pt on benefits of increased activity for global body systems and pain control. PT POC reviewed with pt.    Acceptance, E,D, NR by HP at 2/14/2023 1648    Acceptance, E, VU,NR by LO at 2/13/2023 1328    Comment: PT POC    Acceptance, E, VU by LG at 2/12/2023 1731    Acceptance, E, VU by LG at 2/11/2023 1614    Nonacceptance, E,D, NR by MB at 2/10/2023 1441                   Point: Home exercise program (In Progress)     Learning Progress Summary           Patient Acceptance, E,TB, NR by ZL at 2/15/2023 1511    Comment: Reviewed proper bed mobility sequencing. Educated pt on benefits of increased activity for global body systems and pain control. PT POC reviewed with pt.    Acceptance, E, VU,NR by LO at 2/13/2023 1328    Comment: PT POC    Acceptance, E, VU by LG at 2/12/2023 1731    Acceptance, E, VU by LG at 2/11/2023 1614    Nonacceptance, E,D, NR by MB at 2/10/2023 1441                   Point: Body mechanics (In Progress)     Learning Progress Summary           Patient Acceptance, E,TB, NR by ZL at 2/15/2023 1511    Comment: Reviewed proper bed mobility sequencing. Educated pt on benefits of increased activity for global body systems and pain control. PT POC reviewed with pt.    Acceptance, E,D, NR by HP at 2/14/2023 1648    Acceptance, E, VU,NR by LO at 2/13/2023 1328     Comment: PT POC    Acceptance, E, VU by LG at 2/12/2023 1731    Acceptance, E, VU by LG at 2/11/2023 1614    Nonacceptance, E,D, NR by MB at 2/10/2023 1441                   Point: Precautions (In Progress)     Learning Progress Summary           Patient Acceptance, E,TB, NR by ZL at 2/15/2023 1511    Comment: Reviewed proper bed mobility sequencing. Educated pt on benefits of increased activity for global body systems and pain control. PT POC reviewed with pt.    Acceptance, E,D, NR by  at 2/14/2023 1648    Acceptance, E, VU,NR by  at 2/13/2023 1328    Comment: PT POC    Acceptance, E, VU by LG at 2/12/2023 1731    Acceptance, E, VU by  at 2/11/2023 1614    Nonacceptance, E,D, NR by MB at 2/10/2023 1441                               User Key     Initials Effective Dates Name Provider Type Discipline    MB 06/16/21 -  Karen Nichols, PT Physical Therapist PT     11/16/18 -  Ced Valdez, RN Registered Nurse Nurse     06/16/21 -  Elmira Gillis, PT Physical Therapist PT     06/01/21 -  Kathleen Orosco, PT Physical Therapist PT     11/17/22 -  Corine Worley, PT Student PT Student PT              PT Recommendation and Plan     Plan of Care Reviewed With: (P) patient  Progress: (P) no change  Outcome Evaluation: (P) Pt completed bed mobility Mod A x 2. She tolerated seated ther ex at EOB ~ 3 minutes before returning to supine. Good effort given during strengthening ther ex today. Pt remains below baseline d/t decreased postural control and functional endurance. Recommend SNF at discharge.     Time Calculation:    PT Charges     Row Name 02/15/23 1511             Time Calculation    Start Time 1421 (P)   -ZL      Stop Time 1435 (P)   -ZL      Time Calculation (min) 14 min (P)   -ZL      PT Received On 02/15/23 (P)   -ZL         Timed Charges    97370 - PT Therapeutic Exercise Minutes 5 (P)   -ZL      97491 - PT Therapeutic Activity Minutes 9 (P)   -ZL         Total Minutes    Timed Charges Total  Minutes 14 (P)   -ZL       Total Minutes 14 (P)   -ZL            User Key  (r) = Recorded By, (t) = Taken By, (c) = Cosigned By    Initials Name Provider Type    Corine Mendoza, PT Student PT Student              Therapy Charges for Today     Code Description Service Date Service Provider Modifiers Qty    [id number]  PT THERAPEUTIC ACT EA 15 MIN 2/15/2023 Corine Worley, PT Student GP 1          PT G-Codes  Outcome Measure Options: (P) AM-PAC 6 Clicks Basic Mobility (PT)  AM-PAC 6 Clicks Score (PT): (P) 9  AM-PAC 6 Clicks Score (OT): 9  Modified Steven Scale: 5 - Severe disability.  Bedridden, incontinent, and requiring constant nursing care and attention.  PT Discharge Summary  Anticipated Discharge Disposition (PT): (P) skilled nursing facility    Corine Worley PT Student  2/15/2023

## 2023-02-15 NOTE — CASE MANAGEMENT/SOCIAL WORK
Continued Stay Note  [facility]     Patient Name: Cheri Cruz  MRN: [id number]  Today's Date: 2/15/2023    Admit Date: 2/10/2023    Plan: Rehab   Discharge Plan     Row Name 02/15/23 1558       Plan    Plan Rehab    Plan Comments I spoke with patient's daughter Janice. She is hoping to get her mother into the swing bed unit at [facility] as she tells me she needs closer observation then what the [facility] can offer. I have reached out to [place] with the swing beds. She tells me better served with [facility] facility. She will reach out to Janice, though and discuss it with her. I have also given Jennifer with [facility] the referral for acute. She has submitted to her physician. I have updated [facility] as they have accepted patient. Janice tells me family plans to have family conference tonight.     Final Discharge Disposition Code 30 - still a patient               Discharge Codes    No documentation.               Expected Discharge Date and Time     Expected Discharge Date Expected Discharge Time    Feb 16, 2023             Freda Encarnacion RN

## 2023-02-15 NOTE — PLAN OF CARE
Goal Outcome Evaluation:  Plan of Care Reviewed With: (P) patient        Progress: (P) no change  Outcome Evaluation: (P) Pt completed bed mobility Mod A x 2. She tolerated seated ther ex at EOB ~ 3 minutes before returning to supine. Good effort given during strengthening ther ex today. Pt remains below baseline d/t decreased postural control and functional endurance. Recommend SNF at discharge.

## 2023-02-15 NOTE — PROGRESS NOTES
Clinical Nutrition     Nutrition Support Assessment  Reason for Visit: Follow-up protocol, Reduced oral intake      Patient Name: Chrei Cruz  YOB: 1940  MRN: [id number]  Date of Encounter: 02/15/23 13:39 EST  Admission date: 2/10/2023    Comments:    Nutrition Assessment   Admission Diagnosis:  TIA (transient ischemic attack) [G45.9]  Hemorrhagic stroke (HCC) [I61.9]      Problem List:    Hemorrhagic stroke (HCC)    Hyperlipidemia LDL goal <70    Type 2 diabetes mellitus without complication, without long-term current use of insulin (HCC)    Essential hypertension    Palpitations    History of ischemic left MCA stroke    History of pulmonary embolus (PE)        PMH:   She  has a past medical history of Abnormal heart rhythm, Anxiety, Arrhythmia, Arthritis, Atrial fibrillation (HCC), Dementia (HCC), Diabetes mellitus (HCC), Hyperlipidemia, Hypertension, Kidney disorder, Stroke (HCC), and Uterus cancer (HCC).    PSH:  She  has a past surgical history that includes Cataract extraction, bilateral.      Applicable Nutrition Concerns:   Skin:  Oral:  GI:      Applicable Interval History:         Reported/Observed/Food/Nutrition Related History:     2/15  Pt continues w/poor po intake. RD spoke w/pt who appears more alert today but pt unable to answer nutrition questions appropriately. RN states pt oriented to self and confused. RN states pt needs feeding assistance and ate only a few bites at breakfast and lunch today. RN states she modified diet to pureed 2/2 to pt complaining about choking on food. RN notes pt was mainly spitting food out. (Pt has been followed by SLP).    2/13  RD attempted to speak w/pt; pt confused and not appropriate for interview. RD unable to reach family for nutrition hx at this time. Pt w/poor po intakes (31% x 4 meals). RD to provide ONS and monitor for pt acceptance. Wt hx reviewed, no significant wt changes noted.     Labs    Labs Reviewed: Yes      Results from last 7 days   Lab Units 02/11/23  1220 02/10/23  1200   GLUCOSE mg/dL 197* 170*   BUN mg/dL 14 20   CREATININE mg/dL 1.08* 1.38*   SODIUM mmol/L 138 140   CHLORIDE mmol/L 103 101   POTASSIUM mmol/L 4.4 4.9   ALT (SGPT) U/L  --  5       Results from last 7 days   Lab Units 02/10/23  1200   ALBUMIN g/dL 3.5       Results from last 7 days   Lab Units 02/15/23  1118 02/15/23  0555 02/14/23  2344 02/14/23  1802 02/14/23  1124 02/14/23  0610   GLUCOSE mg/dL 166* 108 166* 176* 171* 151*     Lab Results   Lab Value Date/Time    HGBA1C 6.9 (H) 02/02/2023 0700    HGBA1C 8.30 (H) 01/10/2023 1039    HGBA1C 7.5 (H) 12/06/2022 1400         Results from last 7 days   Lab Units 02/09/23  1910   PROBNP pg/mL 669         Medications    Medications Reviewed: Yes  Pertinent: insulin, protonix, miralax, colace  Infusion:  PRN: zofran, dulcolax      Intake/Ouptut 24 hrs (0701 - 0700)   I&O's Reviewed: Yes   LBM 2/14    Anthropometrics     Flowsheet Rows    Flowsheet Row First Filed Value   Admission Height --  [pt unable to state] Documented at 02/10/2023 0203   Admission Weight 95.7 kg (211 lb) Documented at 02/10/2023 0203            Height: Height:  (pt unable to state)  Last Filed Weight: Weight: 95.7 kg (211 lb) (02/10/23 0203)  Method: Weight Method: Bed scale  BMI:  33  BMI classification: Obese Class I: 30-34.9kg/m2  IBW:  135lbs    UBW: 200-210lbs per EMR  Weight change:   Last 15 Recorded Weights  View Complete Flowsheet  Weight Weight (kg) Weight (lbs) Weight Method   2/10/2023 95.709 kg 211 lb Bed scale   1/27/2023 98.2 kg 216 lb 7.9 oz Bed scale   1/26/2023 96.7 kg 213 lb 3 oz Bed scale   1/25/2023 95.3 kg 210 lb 1.6 oz Bed scale   1/24/2023 93.3 kg 205 lb 11 oz Bed scale   1/23/2023 92.9 kg 204 lb 12.9 oz Bed scale   1/22/2023 91.5 kg 201 lb 11.5 oz -   1/21/2023 91.9 kg 202 lb 9.6 oz Bed scale   1/20/2023 91.2 kg 201 lb 1 oz Bed scale   1/19/2023 90.7 kg 199 lb 15.3 oz Bed scale   1/16/2023 93.9 kg 207 lb  0.2 oz Bed scale   1/15/2023 96.7 kg 213 lb 3 oz -   1/14/2023 96.7 kg 213 lb 3 oz -   1/13/2023 95.3 kg 210 lb 1.6 oz -   1/12/2023 93.7 kg 206 lb 9.1 oz Bed scale         Nutrition Focused Physical Exam     Date: 2/13    Unable to perform exam due to: Pt unable to participate at time of visit, Defer pending indication    Current Nutrition Prescription     PO: Diet: Cardiac Diets, Diabetic Diets; Healthy Heart (2-3 Na+); Consistent Carbohydrate; Texture: Pureed (NDD 1); Fluid Consistency: Thin (IDDSI 0)  Oral Nutrition Supplement:   Intake: 2 Days: 18% x 4 meals      Nutrition Diagnosis   Date: 2/13 Updated:   Problem Inadequate energy intake   Etiology AMS   Signs/Symptoms avg po intake <50%    Status: ongoing    Goal:   General: Nutrition to support treatment  PO: Increase intake  EN/PN: N/A    Nutrition Intervention      Follow treatment progress, Care plan reviewed, Encourage intake, Supplement provided    Boost GC TID    Assistance w/meals; encourage po intake    Monitoring/Evaluation:   Per protocol, PO intake, Supplement intake    Obtain nutrition hx from family as able    Anais Canela RD  Time Spent: 30

## 2023-02-15 NOTE — PLAN OF CARE
Goal Outcome Evaluation:  Plan of Care Reviewed With: patient        Progress: no change  Outcome Evaluation: Pt completed bed mobility with mod assist x2 and transfer to chair via dependent lift. She completed grooming task with supervision. Pt performing below baseline status, recommend SNF.

## 2023-02-15 NOTE — THERAPY TREATMENT NOTE
Patient Name: Cheri Cruz  : 1940    MRN: [id number]                              Today's Date: 2/15/2023       Admit Date: 2/10/2023    Visit Dx:     ICD-10-CM ICD-9-CM   1. Rolando  R47.01 784.3     Patient Active Problem List   Diagnosis   • Hyperlipidemia LDL goal <70   • Type 2 diabetes mellitus without complication, without long-term current use of insulin (HCC)   • GERD (gastroesophageal reflux disease)   • Essential hypertension   • Abnormal EKG   • Osteoarthritis   • Anxiety   • Palpitations   • Vertigo   • History of ischemic left MCA stroke   • Hemorrhagic stroke (HCC)   • TIA (transient ischemic attack)   • History of pulmonary embolus (PE)     Past Medical History:   Diagnosis Date   • Abnormal heart rhythm    • Anxiety    • Arrhythmia    • Arthritis    • Atrial fibrillation (HCC)    • Dementia (HCC)    • Diabetes mellitus (HCC)    • Hyperlipidemia    • Hypertension    • Kidney disorder    • Stroke (HCC)    • Uterus cancer (HCC)      Past Surgical History:   Procedure Laterality Date   • CATARACT EXTRACTION, BILATERAL        General Information     Row Name 02/15/23 1738          OT Time and Intention    Document Type therapy note (daily note)  -AR     Mode of Treatment individual therapy;occupational therapy  -AR     Row Name 02/15/23 1738          General Information    Existing Precautions/Restrictions fall;oxygen therapy device and L/min;other (see comments)  dementia, aphasia  -AR     Row Name 02/15/23 1738          Cognition    Orientation Status (Cognition) oriented to;person;disoriented to;place;situation;time  -AR     Row Name 02/15/23 1738          Safety Issues, Functional Mobility    Safety Issues Affecting Function (Mobility) judgment;problem-solving;safety precaution awareness;safety precautions follow-through/compliance;sequencing abilities  -AR     Impairments Affecting Function (Mobility) balance;cognition;endurance/activity tolerance;strength;postural/trunk control;range  of motion (ROM)  -AR           User Key  (r) = Recorded By, (t) = Taken By, (c) = Cosigned By    Initials Name Provider Type    Marjorie Rubio OT Occupational Therapist                 Mobility/ADL's     Row Name 02/15/23 1739          Bed Mobility    Bed Mobility rolling left;rolling right  -AR     Rolling Left [place] (Bed Mobility) moderate assist (50% patient effort);2 person assist  -AR     Rolling Right [place] (Bed Mobility) moderate assist (50% patient effort);2 person assist;verbal cues  -AR     Row Name 02/15/23 1739          Transfers    Transfers bed-chair transfer  -AR     Row Name 02/15/23 1739          Bed-Chair Transfer    Bed-Chair [place] (Transfers) dependent (less than 25% patient effort)  -AR     Assistive Device (Bed-Chair Transfers) lift device  -AR     Row Name 02/15/23 1739          Activities of Daily Living    BADL Assessment/Intervention grooming;lower body dressing  -AR     Row Name 02/15/23 1739          Lower Body Dressing Assessment/Training    [place] Level (Lower Body Dressing) don;socks;dependent (less than 25% patient effort)  -AR     Position (Lower Body Dressing) supine  -AR     Row Name 02/15/23 1739          Grooming Assessment/Training    [place] Level (Grooming) wash face, hands;supervision;set up  -AR     Position (Grooming) supine  -AR           User Key  (r) = Recorded By, (t) = Taken By, (c) = Cosigned By    Initials Name Provider Type    Marjorie Rubio OT Occupational Therapist               Obj/Interventions     Row Name 02/15/23 1740          Balance    Static Sitting Balance moderate assist  -AR     Position, Sitting Balance supported  -AR           User Key  (r) = Recorded By, (t) = Taken By, (c) = Cosigned By    Initials Name Provider Type    Marjorie Rubio, OT Occupational Therapist               Goals/Plan    No documentation.                Clinical Impression     Row Name 02/15/23 1741          Pain Assessment     Pretreatment Pain Rating 0/10 - no pain  -AR     Posttreatment Pain Rating 0/10 - no pain  -AR     Row Name 02/15/23 1741          Plan of Care Review    Plan of Care Reviewed With patient  -AR     Progress no change  -AR     Outcome Evaluation Pt completed bed mobility with mod assist x2 and transfer to chair via dependent lift. She completed grooming task with supervision. Pt performing below baseline status, recommend SNF.  -AR     Row Name 02/15/23 1741          Therapy Plan Review/Discharge Plan (OT)    Anticipated Discharge Disposition (OT) skilled nursing facility  -AR     Row Name 02/15/23 1741          Vital Signs    Pre Patient Position Supine  -AR     Intra Patient Position Sitting  -AR     Post Patient Position Sitting  -AR     Row Name 02/15/23 1741          Positioning and Restraints    Pre-Treatment Position in bed  -AR     Post Treatment Position chair  -AR     In Chair notified nsg;reclined;call light within reach;encouraged to call for assist;exit alarm on;waffle cushion;on mechanical lift sling;legs elevated;heels elevated  -AR           User Key  (r) = Recorded By, (t) = Taken By, (c) = Cosigned By    Initials Name Provider Type    Marjorie Rubio, OT Occupational Therapist               Outcome Measures     Row Name 02/15/23 1744          How much help from another is currently needed...    Putting on and taking off regular lower body clothing? 1  -AR     Bathing (including washing, rinsing, and drying) 2  -AR     Toileting (which includes using toilet bed pan or urinal) 2  -AR     Putting on and taking off regular upper body clothing 2  -AR     Taking care of personal grooming (such as brushing teeth) 3  -AR     Eating meals 3  -AR     AM-PAC 6 Clicks Score (OT) 13  -AR     Row Name 02/15/23 1510 02/15/23 0800       How much help from another person do you currently need...    Turning from your back to your side while in flat bed without using bedrails? 3  -SS (r) ZL (t) SS (c) 2  -MW     Moving from lying on back to sitting on the side of a flat bed without bedrails? 2  -SS (r) ZL (t) SS (c) 2  -MW    Moving to and from a bed to a chair (including a wheelchair)? 1  -SS (r) ZL (t) SS (c) 1  -MW    Standing up from a chair using your arms (e.g., wheelchair, bedside chair)? 1  -SS (r) ZL (t) SS (c) 1  -MW    Climbing 3-5 steps with a railing? 1  -SS (r) ZL (t) SS (c) 1  -MW    To walk in hospital room? 1  -SS (r) ZL (t) SS (c) 1  -MW    AM-PAC 6 Clicks Score (PT) 9  -SS (r) ZL (t) 8  -MW    Highest level of mobility 3 --> Sat at edge of bed  -SS (r) ZL (t) 3 --> Sat at edge of bed  -MW    Row Name 02/15/23 1744 02/15/23 1510       Functional Assessment    Outcome Measure Options AM-PAC 6 Clicks Daily Activity (OT)  -AR AM-PAC 6 Clicks Basic Mobility (PT)  -SS (r) ZL (t) SS (c)          User Key  (r) = Recorded By, (t) = Taken By, (c) = Cosigned By    Initials Name Provider Type    Marjorie Rubio, OT Occupational Therapist    Alisha Galvan, RN Registered Nurse    Freda Wayne, PT Physical Therapist    Corine Mendoza, PT Student PT Student                Occupational Therapy Education     Title: PT OT SLP Therapies (In Progress)     Topic: Occupational Therapy (In Progress)     Point: ADL training (Done)     Description:   Instruct learner(s) on proper safety adaptation and remediation techniques during self care or transfers.   Instruct in proper use of assistive devices.              Learning Progress Summary           Patient Acceptance, E, VU,NR by AR at 2/15/2023 1744    Acceptance, E, NR by AR at 2/14/2023 1634    Acceptance, E, VU by MR at 2/13/2023 1420    Acceptance, E, VU by LG at 2/12/2023 1731    Acceptance, E, VU by LG at 2/11/2023 1614    Acceptance, E, NR by TA at 2/10/2023 0941                   Point: Home exercise program (In Progress)     Description:   Instruct learner(s) on appropriate technique for monitoring, assisting and/or progressing therapeutic  exercises/activities.              Learning Progress Summary           Patient Acceptance, E, NR by AR at 2/14/2023 1634    Acceptance, E, VU by MR at 2/13/2023 1420    Acceptance, E, VU by LG at 2/12/2023 1731    Acceptance, E, VU by LG at 2/11/2023 1614    Acceptance, E, NR by TA at 2/10/2023 0941                   Point: Precautions (Done)     Description:   Instruct learner(s) on prescribed precautions during self-care and functional transfers.              Learning Progress Summary           Patient Acceptance, E, VU,NR by AR at 2/15/2023 1744    Acceptance, E, NR by AR at 2/14/2023 1634    Acceptance, E, VU by MR at 2/13/2023 1420    Acceptance, E, VU by LG at 2/12/2023 1731    Acceptance, E, VU by LG at 2/11/2023 1614    Acceptance, E, NR by TA at 2/10/2023 0941                   Point: Body mechanics (Done)     Description:   Instruct learner(s) on proper positioning and spine alignment during self-care, functional mobility activities and/or exercises.              Learning Progress Summary           Patient Acceptance, E, VU,NR by AR at 2/15/2023 1744    Acceptance, E, NR by AR at 2/14/2023 1634    Acceptance, E, VU by MR at 2/13/2023 1420    Acceptance, E, VU by LG at 2/12/2023 1731    Acceptance, E, VU by LG at 2/11/2023 1614    Acceptance, E, NR by TA at 2/10/2023 0941                               User Key     Initials Effective Dates Name Provider Type Discipline    AR 02/03/23 -  Marjorie Raymundo OT Occupational Therapist OT    TA 06/16/21 -  Westley Sotomayor OT Occupational Therapist OT    LG 11/16/18 -  Ced Valdez RN Registered Nurse Nurse    MR 09/22/22 -  Vivian Hendrickson OT Occupational Therapist OT              OT Recommendation and Plan     Plan of Care Review  Plan of Care Reviewed With: patient  Progress: no change  Outcome Evaluation: Pt completed bed mobility with mod assist x2 and transfer to chair via dependent lift. She completed grooming task with supervision. Pt  performing below baseline status, recommend SNF.     Time Calculation:    Time Calculation- OT     Row Name 02/15/23 1744             Time Calculation- OT    OT Start Time 1510  -AR      OT Received On 02/15/23  -AR      OT Goal Re-Cert Due Date 02/20/23  -AR         Timed Charges    08244 - OT Self Care/Mgmt Minutes 24  -AR         Total Minutes    Timed Charges Total Minutes 24  -AR       Total Minutes 24  -AR            User Key  (r) = Recorded By, (t) = Taken By, (c) = Cosigned By    Initials Name Provider Type    Marjorie Rubio OT Occupational Therapist              Therapy Charges for Today     Code Description Service Date Service Provider Modifiers Qty    46001642694 HC OT SELF CARE/MGMT/TRAIN EA 15 MIN 2/14/2023 Marjorie Raymundo OT GO 1    28319510739 HC OT SELF CARE/MGMT/TRAIN EA 15 MIN 2/15/2023 Marjorie Raymundo OT GO 2    71731706737 HC OT THER SUPP EA 15 MIN 2/15/2023 Marjorie Raymundo OT GO 2               Marjorie Raymundo OT  2/15/2023

## 2023-02-16 VITALS
HEART RATE: 75 BPM | SYSTOLIC BLOOD PRESSURE: 134 MMHG | RESPIRATION RATE: 18 BRPM | DIASTOLIC BLOOD PRESSURE: 47 MMHG | BODY MASS INDEX: 33.05 KG/M2 | WEIGHT: 211 LBS | TEMPERATURE: 97.5 F | OXYGEN SATURATION: 95 %

## 2023-02-16 DIAGNOSIS — R00.2 PALPITATIONS: Primary | ICD-10-CM

## 2023-02-16 DIAGNOSIS — I48.0 PAROXYSMAL ATRIAL FIBRILLATION: ICD-10-CM

## 2023-02-16 LAB
BACTERIA SPEC AEROBE CULT: ABNORMAL
GLUCOSE BLDC GLUCOMTR-MCNC: 144 MG/DL (ref 70–130)
GLUCOSE BLDC GLUCOMTR-MCNC: 202 MG/DL (ref 70–130)

## 2023-02-16 PROCEDURE — 82962 GLUCOSE BLOOD TEST: CPT

## 2023-02-16 PROCEDURE — 63710000001 INSULIN LISPRO (HUMAN) PER 5 UNITS: Performed by: INTERNAL MEDICINE

## 2023-02-16 PROCEDURE — 99239 HOSP IP/OBS DSCHRG MGMT >30: CPT | Performed by: FAMILY MEDICINE

## 2023-02-16 RX ORDER — LISINOPRIL 40 MG/1
40 TABLET ORAL
Qty: 30 TABLET | Refills: 1 | Status: ON HOLD | OUTPATIENT
Start: 2023-02-17 | End: 2023-04-18

## 2023-02-16 RX ORDER — AMOXICILLIN 250 MG
2 CAPSULE ORAL 2 TIMES DAILY
Status: ON HOLD
Start: 2023-02-16

## 2023-02-16 RX ADMIN — FLUTICASONE PROPIONATE 2 SPRAY: 50 SPRAY, METERED NASAL at 09:47

## 2023-02-16 RX ADMIN — LEVOTHYROXINE SODIUM 25 MCG: 25 TABLET ORAL at 05:53

## 2023-02-16 RX ADMIN — LISINOPRIL 40 MG: 40 TABLET ORAL at 09:46

## 2023-02-16 RX ADMIN — DULOXETINE HYDROCHLORIDE 60 MG: 60 CAPSULE, DELAYED RELEASE ORAL at 09:46

## 2023-02-16 RX ADMIN — DICLOFENAC 2 G: 10 GEL TOPICAL at 09:47

## 2023-02-16 RX ADMIN — BUSPIRONE HYDROCHLORIDE 7.5 MG: 5 TABLET ORAL at 09:46

## 2023-02-16 RX ADMIN — ASPIRIN 81 MG 81 MG: 81 TABLET ORAL at 09:46

## 2023-02-16 RX ADMIN — PANTOPRAZOLE SODIUM 40 MG: 40 TABLET, DELAYED RELEASE ORAL at 05:53

## 2023-02-16 RX ADMIN — GABAPENTIN 300 MG: 300 CAPSULE ORAL at 09:46

## 2023-02-16 RX ADMIN — INSULIN LISPRO 3 UNITS: 100 INJECTION, SOLUTION INTRAVENOUS; SUBCUTANEOUS at 11:43

## 2023-02-16 NOTE — DISCHARGE PLACEMENT REQUEST
"Deya Vera Cro (82 y.o. Female)     Date of Birth   1940    Social Security Number       Address   [address] [place] [id number]    Home Phone   [phone]    MRN   [id number]       [facility]    Marital Status                               Admission Date   2/10/23    Admission Type   Urgent    Admitting Provider   Bina Kwon MD    Attending Provider   Bina Kwon MD    Department, Room/Bed   [facility] 3G, S353/1       Discharge Date       Discharge Disposition   Skilled Nursing Facility (DC - External)    Discharge Destination                               Attending Provider: Bina Kwon MD    Allergies: Penicillins, Sulfa Antibiotics, Tetracyclines & Related, Valium [Diazepam]    Isolation: None   Infection: COVID (History) (02/10/23), COVID Screen (preop/placement) (02/15/23)   Code Status: CPR    Ht: 170.2 cm (67\")   Wt: 95.7 kg (211 lb)    Admission Cmt: None   Principal Problem: Hemorrhagic stroke (HCC) [I61.9]                 Active Insurance as of 2/10/2023     Primary Coverage     Payor Plan Insurance Group Employer/Plan Group    HUMANA MEDICARE REPLACEMENT HUMANA MEDICARE REPLACEMENT [id number]     Payor Plan Address Payor Plan Phone Number Payor Plan Fax Number Effective Dates    [address] [address] [phone]  2/1/2023 - None Entered    [facility] [id number]       Subscriber Name Subscriber Birth Date Member ID       DEYA VERA CRO 1940 [id number]           Secondary Coverage     Payor Plan Insurance Group Employer/Plan Group    HUMANA MEDICARE REPLACEMENT HUMANA MEDICARE REPLACEMENT [id number]     Payor Plan Address Payor Plan Phone Number Payor Plan Fax Number Effective Dates    [address] [address] [phone]  1/1/2023 - None Entered    [facility] [id number]       Subscriber Name Subscriber Birth Date Member ID       DEYA VERA CRO 1940 [id number]                 Emergency Contacts      (Rel.) Home Phone " Work Phone Mobile Phone    Radha Cruz (Daughter) [phone] -- [phone]    EVITA MADDEN (Daughter) [phone] -- --    OMEGA CRUZ (Son) [phone] -- [phone]    Vanessa Cruz (Spouse) [phone] -- --            Insurance Information                HUMANA MEDICARE REPLACEMENT/HUMANA MEDICARE REPLACEMENT Phone: [phone]    Subscriber: Cheri Cruz Cro Subscriber#: [id number]    Group#: [id number] Precert#: [id number]        HUMANA MEDICARE REPLACEMENT/HUMANA MEDICARE REPLACEMENT Phone: [phone]    Subscriber: Cheri Cruz Cro Subscriber#: [id number]    Group#: [id number] Precert#: --             Discharge Summary      Bina Kwon MD at [address] [place] [address] Medicine Services  DISCHARGE SUMMARY    Patient Name: Cheri Cruz  : 1940  MRN: [id number]    Date of Admission: 2/10/2023  1:58 AM  Date of Discharge:  23    Primary Care Physician: Lorrie Canada APRN    Consults     Date and Time Order Name Status Description    2023 12:33 AM Inpatient Cardiology Consult Completed     2/10/2023  3:06 AM Inpatient Cardiology Consult Completed           Hospital Course     Presenting Problem:   TIA (transient ischemic attack) [G45.9]  Hemorrhagic stroke (HCC) [I61.9]    Active Hospital Problems    Diagnosis  POA   • **Hemorrhagic stroke (HCC) [I61.9]  Yes   • History of pulmonary embolus (PE) [Z86.711]  Yes   • History of ischemic left MCA stroke [Z86.73]  Not Applicable   • Hyperlipidemia LDL goal <70 [E78.5]  Yes   • Essential hypertension [I10]  Yes   • Palpitations [R00.2]  Yes   • Type 2 diabetes mellitus without complication, without long-term current use of insulin (HCC) [E11.9]  Yes      Resolved Hospital Problems   No resolved problems to display.          Hospital Course:  Cheri Cruz is a 82 y.o. female with history of HTN, HLD, DMII, atrial fibrillation, PE, Dementia, anxiety and recent left MCA infarct with  residual aphasia presents from rehab with increased confusion, RUE weakness, speech difficulty and abdominal pain.  CT at the OSH showed hemorrhagic conversion in the area of her recent stroke.  Patient transferred to [facility] for further evaluation     This patient's problems and plans were partially entered by my partner and updated as appropriate by me 02/16/23.     Altered mental status  Recent Left MCA CVA with Hemorrhagic Conversion   -Stroke Neuro was following now signed off. Follow-up in stroke clinic 2-4 weeks   -CT head 2/11 with no worsening of hemorrhage, no new areas of hemorrhage   -Holing Eliquis x 1 week per Neuro: restart on 2/17/2023  -Cardiology consulted, plan to continue ASA only through 2/16, hold Eliquis until 2/17 and at that time can DC ASA   -30 day heart monitor at DC   -Patient to follow-up with cardiology in 6 weeks after discharge  -TTE reviewed: Hyperdynamic LV  -PT/OT recommend SNF      ?Hx A fib (data deficit)  Hx PE October 2020  -In review of chart, A fib not documented until this admit. Not on prior EKGs, none on tele this admit  -Likely that patient is on Eliquis for Hx PE and not A fib  -Continue to monitor on tele  -Dr Angel discussed with Cardiology, Dr Parker      Mod-severe Aphasia, mild dysarthria  Dysphagia   -SLP was consulted  -Tolerating diet      Abdominal pain -> Resolved   - CT Abdomen Pelvis shows constipation, some concern for fecal impaction/stercoral colitis.  No current leukocytosis or fever  - Bowel Regimen as needed     UTI  - S/P Rocephin x 3 doses. Cx at [facility] with Proteus, sensitive to Rocephin   -Repeat Urine Cx here with Enterococcus. S/P 1x dose Fosfomycin      HTN  - goal SBP <140  - increased Lisinopril to 40mg daily      DMII  - SSI  - diabetes educator has seen      CKD  -Cr seems to be around baseline. CTM     Dementia  - baseline    Discharge Follow Up Recommendations for outpatient labs/diagnostics:  Follow-up with PCP in 1 to 2 weeks for med  changes  Follow-up with cardiology in 6 weeks to review 30-day monitor  Follow-up with stroke clinic in 2 to 4 weeks      Day of Discharge     HPI:   Patient without new complaints today.  She remains confused with her baseline dementia.  She is tolerating p.o.  She states her legs are restless.  This is chronic.  Going to facility today.    Review of Systems  Gen- No fevers, chills  CV- No chest pain, palpitations  Resp- No cough, dyspnea  GI- No N/V/D, abd pain    Vital Signs:   Temp:  [97.3 °F (36.3 °C)-98.4 °F (36.9 °C)] 97.5 °F (36.4 °C)  Heart Rate:  [73-78] 75  Resp:  [18-20] 18  BP: ()/(45-68) 134/47  Flow (L/min):  [2] 2      Physical Exam:  Constitutional: No acute distress, awake, alert  HENT: NCAT, mucous membranes moist  Respiratory: Clear to auscultation bilaterally, respiratory effort normal   Cardiovascular: RRR  Gastrointestinal: Positive bowel sounds, soft, nontender, nondistended  Musculoskeletal: No bilateral ankle edema  Psychiatric: Appropriate affect, cooperative  Neurologic: Oriented to person, strength symmetric in all extremities, Cranial Nerves grossly intact to confrontation, speech clear  Skin: No rashes      Pertinent  and/or Most Recent Results     LAB RESULTS:      Lab 02/11/23  1220 02/10/23  1200 02/09/23  1910   WBC 4.99 7.58 7.4   HEMOGLOBIN 10.9* 11.9* 10.7*   HEMATOCRIT 35.0 37.3 32.6*   PLATELETS 163 136* 195   NEUTROS ABS 2.84 4.75 4.2   IMMATURE GRANS (ABS) 0.02 0.04 0.0   LYMPHS ABS 1.47 1.85 2.3   MONOS ABS 0.48 0.69 0.6   EOS ABS 0.16 0.21 0.3   .7* 104.8* 102.5*         Lab 02/11/23  1220 02/10/23  1200   SODIUM 138 140   POTASSIUM 4.4 4.9   CHLORIDE 103 101   CO2 24.0 25.0   ANION GAP 11.0 14.0   BUN 14 20   CREATININE 1.08* 1.38*   EGFR 51.7* 38.5*   GLUCOSE 197* 170*   CALCIUM 8.5* 8.6   TSH  --  2.330         Lab 02/10/23  1200 02/09/23  1910   TOTAL PROTEIN 5.9*  --    ALBUMIN 3.5  --    GLOBULIN 2.4  --    ALT (SGPT) 5  --    AST (SGOT) 21  --     BILIRUBIN 0.3  --    ALK PHOS 62  --    LIPASE  --  7.0         Lab 02/09/23 1910   PROBNP 669   TROPONIN I <0.30                 Lab 02/09/23  1840   PH, ARTERIAL 7.377   PCO2, ARTERIAL 45.9*   O2 SATURATION ART 96.6   FIO2 0.28   HCO3 ART 26.3*     Brief Urine Lab Results  (Last result in the past 365 days)      Color   Clarity   Blood   Leuk Est   Nitrite   Protein   CREAT   Urine HCG        02/12/23 1002 Yellow   Clear   Trace   Small (1+)   Negative   Trace               Microbiology Results (last 10 days)     Procedure Component Value - Date/Time    Urine Culture - Urine, Straight Cath [[id number]]  (Abnormal)  (Susceptibility) Collected: 02/12/23 1002    Lab Status: Final result Specimen: Urine from Straight Cath Updated: 02/16/23 1001     Urine Culture >100,000 CFU/mL Enterococcus faecium     Comment:          Narrative:      Colonization of the urinary tract without infection is common. Treatment is discouraged unless the patient is symptomatic, pregnant, or undergoing an invasive urologic procedure.    Susceptibility      Enterococcus faecium      LORNA      Ampicillin Resistant     Levofloxacin Resistant     Nitrofurantoin Susceptible      Tetracycline Resistant     Vancomycin Susceptible                           Influenza Antigen, Rapid - , [[id number]] Collected: 02/09/23 1910    Lab Status: Final result Specimen: Nasopharyngeal Swab Updated: 02/10/23 0158     Rapid Influenza A Ag Negative     Rapid Influenza B Ag Negative          Adult Transthoracic Echo Complete W/ Cont if Necessary Per Protocol    Result Date: 2/11/2023  •  Left ventricular systolic function is hyperdynamic (EF > 70%). Calculated left ventricular EF = 70.6% Left ventricular ejection fraction appears to be greater than 70%. The left ventricular cavity is small in size. Left ventricular wall thickness is consistent with mild concentric hypertrophy. All left ventricular wall segments contract normally. Left ventricular intracavitary  gradient noted to be 71 mmHg. Left ventricular diastolic function is consistent with (grade I) impaired relaxation. Normal left atrial pressure. •  The aortic valve is abnormal in structure. There is mild calcification of the aortic valve mainly affecting the right coronary cusp(s). The aortic valve appears trileaflet. No aortic valve regurgitation is present. Gradient noted through the LV and LVOT Compared to TTE report from  Dec 2019, hyperdynamic LV with intracavitary gradient is not a new finding but peak gradient noted on this exam is higher than previously described.     CT Head Without Contrast    Result Date: 2/11/2023  CT HEAD WO CONTRAST Date of Exam: 2/11/2023 5:21 AM EST Indication: Cerebrovascular accident with intracranial hemorrhage, follow-up. Comparison: 2/10/2023. Technique: Axial CT images were obtained of the head without contrast administration.  Reconstructed coronal and sagittal images were also obtained. Automated exposure control and iterative construction methods were used. Findings: There is a wedge-shaped area of decreased density in the posterior left frontal and left parietal lobe consistent with a subacute infarct. There is stable areas of parenchymal hemorrhage within the infarct similar to yesterday. There is no evidence of expansion of the hematoma. There are no new areas of acute hemorrhage. The patient has mild volume loss with prominence of the sulci, fissures, ventricles, and basal cisterns. There is no mass effect or midline shift. There are atherosclerotic vascular calcifications in the distal vertebral arteries and cavernous carotid arteries. There is thinning of the lenses suggestive of previous cataract surgery. There is a small mucous retention cyst in the left maxillary sinus. The mastoid sinuses are clear. The calvarium is unremarkable.     Impression: 1. Stable evolving subacute left MCA distribution infarct with the hemorrhagic transformation. 2. There is no  expansion of the hematoma. There are no new areas of hemorrhage. 3. Mild volume loss secondary to cerebral atrophy. Electronically Signed: Bennie Oh  2/11/2023 10:06 AM EST  Workstation ID: [id number]    CT Head Without Contrast    Result Date: 2/10/2023  CT HEAD WO CONTRAST Date of Exam: 2/10/2023 6:38 AM EST Indication: Stroke, follow up f/up hemorrhagic conversion. Comparison: Outside CT head one day prior Technique: Axial CT images were obtained of the head without contrast administration.  Reconstructed coronal and sagittal images were also obtained. Automated exposure control and iterative construction methods were used. Findings: Redemonstrated prominent left posterior temporal and inferior parietal acute infarct with intermixed areas of small hyperdensity compatible with minimal hemorrhage. There is no evidence of interval increased hemorrhage or worsening edema or mass effect. There is no midline shift or significant ventricular effacement. No new ischemia, hemorrhage or mass effects present elsewhere. Generalized age-related changes are again noted. The calvarium is intact. The orbits are normal. The paranasal sinuses are grossly clear.     Impression: Stable appearance of findings noted on outside CT comparison, demonstrating an acute left posterior temporal and inferior parietal infarct with minimal amount of intervening hemorrhagic conversion. There is no evidence of interval increased hemorrhage or  worsening mass effect. Electronically Signed: Luis Manuel Myers  2/10/2023 8:46 AM EST  Workstation ID: [id number]    XR Outside Films    Result Date: 2/10/2023  This procedure was auto-finalized with no dictation required.    CT ABDOMEN PELVIS WO CONTRAST Additional Contrast? None    Result Date: 2/9/2023  CT ABDOMEN AND PELVIS: INDICATION: abd pain, lower half TECHNIQUE: Noncontrast CT images of the abdomen and pelvis were obtained. Up-to-date CT equipment and radiation dose reduction techniques were employed.  COMPARISON: NONE. FINDINGS:  There are atelectatic changes identified within the medial aspect of the right upper lobe as well as the medial aspect the right lower lobe. There is some groundglass atelectasis identified at the left lung base. No pleural effusion is identified. There is calcification of mitral annulus. There is atherosclerotic calcification left anterior descending coronary artery. Patient status post a cholecystectomy. No biliary dilatation is identified. There is small hiatal hernia. The liver appears unremarkable. There is moderate-severe atrophy of the pancreas. There are calcified granulomas present within normal size spleen. Adrenal glands are normal. No hydronephrosis or nephrolithiasis is identified. There is mild renal atrophy. There is extensive calcification of the abdominal aorta. No aneurysm is identified. Large amount of stool is identified within the rectum, sigmoid colon and descending colon. There is some associated edema identified within the posterior perirectal fat.. No abnormal bowel wall thickening is identified. No bowel dilatation is identified.  Appendix is visualized and is normal. Patient status post hysterectomy. There is a right hip femoral neck screw. No osteolytic or osteoblastic lesions are identified. Facet joint spondylosis is present with the lumbar spine.    1. Large amount of stool is identified within the rectum with some associated edematous fat stranding identified within the posterior perirectal fat. The findings can be seen with fecal impaction and stercoral colitis. 2. Status post hysterectomy. 3. Status post cholecystectomy. 4. Atelectatic changes identified along the detailed above. 5. Small hiatal hernia. 6. Atrophic pancreas.    CT Head WO Contrast    Result Date: 2/9/2023  HEAD CT SCAN WITHOUT CONTRAST   HISTORY: AMS, hx of stroke 1 month ago COMPARISON:  Head CT dated June 1621 TECHNIQUE: Noncontrast CT images of the head were obtained. Images were  reformatted in the coronal and sagittal planes. Up-to-date CT equipment and radiation dose reduction techniques were employed. FINDINGS: There is low attenuation in sulcal effacement identified within the posterior inferior aspect of the left parietal lobe consistent with subacute infarct. There are associated small areas of hemorrhagic transformation within the infarct. No herniation is identified. There is prominence of the ventricles, cistern and sulci. There is a remote lacunar infarct identified within the head of the left caudate nucleus.    1. Subacute infarct is identified with the posterior inferior aspect left parietal lobe with some small areas of acute hemorrhagic conversion. 2. Mild atrophy. 3. Remote lacunar infarct is identified with the head of the  left caudate nucleus.    XR CHEST PORTABLE    Result Date: 2/9/2023  CHEST 1 VIEW   HISTORY: low O2 saturation   COMPARISON: Chest x-ray dated 11-21 FINDINGS: Portable AP radiograph of the chest was obtained. The heart and mediastinum are within normal limits for size and configuration. No infiltrate, effusion or pneumothorax is identified. Lung volumes are decreased. Calcified granulomas project over the lung bases. Degenerative changes are identified at the right glenohumeral joint.    1.  No evidence of acute cardiopulmonary disease.    CT Outside Films    Result Date: 2/10/2023  This procedure was auto-finalized with no dictation required.    CT Outside Films    Result Date: 2/10/2023  This procedure was auto-finalized with no dictation required.              Results for orders placed during the hospital encounter of 02/10/23    Adult Transthoracic Echo Complete W/ Cont if Necessary Per Protocol    Interpretation Summary  •  Left ventricular systolic function is hyperdynamic (EF > 70%). Calculated left ventricular EF = 70.6% Left ventricular ejection fraction appears to be greater than 70%. The left ventricular cavity is small in size. Left  ventricular wall thickness is consistent with mild concentric hypertrophy. All left ventricular wall segments contract normally. Left ventricular intracavitary gradient noted to be 71 mmHg. Left ventricular diastolic function is consistent with (grade I) impaired relaxation. Normal left atrial pressure.  •  The aortic valve is abnormal in structure. There is mild calcification of the aortic valve mainly affecting the right coronary cusp(s). The aortic valve appears trileaflet. No aortic valve regurgitation is present. Gradient noted through the LV and LVOT    Compared to TTE report from  Dec 2019, hyperdynamic LV with intracavitary gradient is not a new finding but peak gradient noted on this exam is higher than previously described.      Plan for Follow-up of Pending Labs/Results: nothing pending    Discharge Details        Discharge Medications      New Medications      Instructions Start Date   lisinopril 40 MG tablet  Commonly known as: PRINIVIL,ZESTRIL   40 mg, Oral, Every 24 Hours Scheduled   Start Date: February 17, 2023        Changes to Medications      Instructions Start Date   meclizine 25 MG tablet  Commonly known as: ANTIVERT  What changed:   · how much to take  · how to take this  · when to take this   TAKE 1 TABLET THREE TIMES DAILY AS NEEDED FOR DIZZINESS         Continue These Medications      Instructions Start Date   apixaban 5 MG tablet tablet  Commonly known as: ELIQUIS   5 mg, Oral, 2 Times Daily      busPIRone 7.5 MG tablet  Commonly known as: BUSPAR   7.5 mg, Oral, 2 Times Daily      donepezil 10 MG tablet  Commonly known as: ARICEPT   10 mg, Oral, Nightly      DULoxetine 60 MG capsule  Commonly known as: CYMBALTA   60 mg, Oral, Daily      gabapentin 300 MG capsule  Commonly known as: NEURONTIN   300 mg, Oral, 2 Times Daily      HYDROcodone-acetaminophen  MG per tablet  Commonly known as: NORCO   1 tablet, Oral, Every 6 Hours PRN      Insulin Lispro 100 UNIT/ML injection  Commonly  known as: humaLOG   Subcutaneous, 3 Times Daily Before Meals      levothyroxine 25 MCG tablet  Commonly known as: SYNTHROID, LEVOTHROID   25 mcg, Oral, Daily      omeprazole 40 MG capsule  Commonly known as: priLOSEC   40 mg, Oral, Daily      QUEtiapine 50 MG tablet  Commonly known as: SEROquel   50 mg, Oral, Every 24 Hours      rosuvastatin 40 MG tablet  Commonly known as: CRESTOR   40 mg, Oral, Nightly      sennosides-docusate 8.6-50 MG per tablet  Commonly known as: PERICOLACE   2 tablets, Oral, 2 Times Daily         Stop These Medications    aspirin 81 MG EC tablet     metoprolol tartrate 25 MG tablet  Commonly known as: LOPRESSOR     oxybutynin 5 MG tablet  Commonly known as: DITROPAN     VITAMIN D2 PO            Allergies   Allergen Reactions   • Penicillins    • Sulfa Antibiotics    • Tetracyclines & Related Unknown (See Comments)   • Valium [Diazepam] Anxiety         Discharge Disposition:  Skilled Nursing Facility (DC - External)    Diet:  Hospital:  Diet Order   Procedures   • Diet: Cardiac Diets, Diabetic Diets; Healthy Heart (2-3 Na+); Consistent Carbohydrate; Texture: Pureed (NDD 1); Fluid Consistency: Thin (IDDSI 0)       Activity:  Activity Instructions     Activity as Tolerated            Restrictions or Other Recommendations:  none       CODE STATUS:    Code Status and Medical Interventions:   Ordered at: 02/15/23 1500     Level Of Support Discussed With:    Patient     Code Status (Patient has no pulse and is not breathing):    CPR (Attempt to Resuscitate)     Medical Interventions (Patient has pulse or is breathing):    Full Support     Release to patient:    Routine Release       No future appointments.    Additional Instructions for the Follow-ups that You Need to Schedule     Discharge Follow-up with PCP   As directed       Currently Documented PCP:    Lorrie Canada APRN    PCP Phone Number:    [phone]     Follow Up Details: 2 weeks for med list changes                     Bina TOM  MD Doyle  02/16/23      Time Spent on Discharge:  I spent  42  minutes on this discharge activity which included: face-to-face encounter with the patient, reviewing the data in the system, coordination of the care with the nursing staff as well as consultants, documentation, and entering orders.            Electronically signed by Bina Kwon MD at 02/16/23 9233

## 2023-02-16 NOTE — PLAN OF CARE
VSS, 2L intermittently of O2 while sleeping, NSR on tele, NIH 5, baseline confusion. Pt tolerating pureed diet, states she's had trouble swallowing for two years. Neuro, cardiology, hospitalist following. CM working on placement.       Goal Outcome Evaluation:

## 2023-02-16 NOTE — CASE MANAGEMENT/SOCIAL WORK
Case Management Discharge Note      Final Note: For today. Plan to transfer to [facility], Nursing to call report to [phone]. Ambulance time pending at time of this note. I have discussed plans today with her daughter Janice and she is in discussion with patient's spouse. Their plans are for her to return to [facility]. They declined the referral to [facility]. They spoke personally with [facility] to find out why the swing bed could offer a bed.     Patient has a 2 pm [facility] stretcher transport.  I will let family and facility know of the time.    Selected Continued Care - Admitted Since 2/10/2023     Destination Coordination complete.    Service Provider Selected Services Address Phone Fax Patient Preferred    [facility] [address] [address] [phone] --          Durable Medical Equipment    No services have been selected for the patient.              Dialysis/Infusion    No services have been selected for the patient.              Home Medical Care    No services have been selected for the patient.              Therapy    No services have been selected for the patient.              Community Resources    No services have been selected for the patient.              Community & DME    No services have been selected for the patient.                Selected Continued Care - Prior Encounters Includes continued care and service providers with selected services from prior encounters from 11/12/2022 to 2/16/2023    Discharged on 1/28/2023 Admission date: 1/10/2023 - Discharge disposition: Skilled Nursing Facility (DC - External)    Destination     Service Provider Selected Services Address Phone Fax Patient Preferred    [facility] [address] [address] [phone] --          Durable Medical Equipment      Service Provider Selected Services Address Phone Fax Patient Preferred    [facility] [address], [facility] [address] [phone] --       Internal Comment last updated by Tiffany White, APRN 1/11/2023 1519    Existing O2 patient. Has concentrator and portable tanks                                    Final Discharge Disposition Code: 03 - skilled nursing facility (SNF)

## 2023-02-16 NOTE — DISCHARGE SUMMARY
[facility] Medicine Services  DISCHARGE SUMMARY    Patient Name: Cheri Cruz  : 1940  MRN: [id number]    Date of Admission: 2/10/2023  1:58 AM  Date of Discharge:  23    Primary Care Physician: Lorrie Canada APRN    Consults     Date and Time Order Name Status Description    2023 12:33 AM Inpatient Cardiology Consult Completed     2/10/2023  3:06 AM Inpatient Cardiology Consult Completed           Hospital Course     Presenting Problem:   TIA (transient ischemic attack) [G45.9]  Hemorrhagic stroke (HCC) [I61.9]    Active Hospital Problems    Diagnosis  POA   • **Hemorrhagic stroke (HCC) [I61.9]  Yes   • History of pulmonary embolus (PE) [Z86.711]  Yes   • History of ischemic left MCA stroke [Z86.73]  Not Applicable   • Hyperlipidemia LDL goal <70 [E78.5]  Yes   • Essential hypertension [I10]  Yes   • Palpitations [R00.2]  Yes   • Type 2 diabetes mellitus without complication, without long-term current use of insulin (HCC) [E11.9]  Yes      Resolved Hospital Problems   No resolved problems to display.          Hospital Course:  Cheri Cruz is a 82 y.o. female with history of HTN, HLD, DMII, atrial fibrillation, PE, Dementia, anxiety and recent left MCA infarct with residual aphasia presents from rehab with increased confusion, RUE weakness, speech difficulty and abdominal pain.  CT at the OSH showed hemorrhagic conversion in the area of her recent stroke.  Patient transferred to [facility] for further evaluation     This patient's problems and plans were partially entered by my partner and updated as appropriate by me 23.     Altered mental status  Recent Left MCA CVA with Hemorrhagic Conversion   -Stroke Neuro was following now signed off. Follow-up in stroke clinic 2-4 weeks   -CT head  with no worsening of hemorrhage, no new areas of hemorrhage   -Holing Eliquis x 1 week per Neuro: restart on 2023  -Cardiology consulted, plan to continue ASA only  through 2/16, hold Eliquis until 2/17 and at that time can DC ASA   -30 day heart monitor at DC   -Patient to follow-up with cardiology in 6 weeks after discharge  -TTE reviewed: Hyperdynamic LV  -PT/OT recommend SNF      ?Hx A fib (data deficit)  Hx PE October 2020  -In review of chart, A fib not documented until this admit. Not on prior EKGs, none on tele this admit  -Likely that patient is on Eliquis for Hx PE and not A fib  -Continue to monitor on tele  -Dr Angel discussed with Cardiology, Dr Parker      Mod-severe Aphasia, mild dysarthria  Dysphagia   -SLP was consulted  -Tolerating diet      Abdominal pain -> Resolved   - CT Abdomen Pelvis shows constipation, some concern for fecal impaction/stercoral colitis.  No current leukocytosis or fever  - Bowel Regimen as needed     UTI  - S/P Rocephin x 3 doses. Cx at [facility] with Proteus, sensitive to Rocephin   -Repeat Urine Cx here with Enterococcus. S/P 1x dose Fosfomycin      HTN  - goal SBP <140  - increased Lisinopril to 40mg daily      DMII  - SSI  - diabetes educator has seen      CKD  -Cr seems to be around baseline. CTM     Dementia  - baseline    Discharge Follow Up Recommendations for outpatient labs/diagnostics:  Follow-up with PCP in 1 to 2 weeks for med changes  Follow-up with cardiology in 6 weeks to review 30-day monitor  Follow-up with stroke clinic in 2 to 4 weeks      Day of Discharge     HPI:   Patient without new complaints today.  She remains confused with her baseline dementia.  She is tolerating p.o.  She states her legs are restless.  This is chronic.  Going to facility today.    Review of Systems  Gen- No fevers, chills  CV- No chest pain, palpitations  Resp- No cough, dyspnea  GI- No N/V/D, abd pain    Vital Signs:   Temp:  [97.3 °F (36.3 °C)-98.4 °F (36.9 °C)] 97.5 °F (36.4 °C)  Heart Rate:  [73-78] 75  Resp:  [18-20] 18  BP: ()/(45-68) 134/47  Flow (L/min):  [2] 2      Physical Exam:  Constitutional: No acute distress, awake,  alert  HENT: NCAT, mucous membranes moist  Respiratory: Clear to auscultation bilaterally, respiratory effort normal   Cardiovascular: RRR  Gastrointestinal: Positive bowel sounds, soft, nontender, nondistended  Musculoskeletal: No bilateral ankle edema  Psychiatric: Appropriate affect, cooperative  Neurologic: Oriented to person, strength symmetric in all extremities, Cranial Nerves grossly intact to confrontation, speech clear  Skin: No rashes      Pertinent  and/or Most Recent Results     LAB RESULTS:      Lab 02/11/23  1220 02/10/23  1200 02/09/23 1910   WBC 4.99 7.58 7.4   HEMOGLOBIN 10.9* 11.9* 10.7*   HEMATOCRIT 35.0 37.3 32.6*   PLATELETS 163 136* 195   NEUTROS ABS 2.84 4.75 4.2   IMMATURE GRANS (ABS) 0.02 0.04 0.0   LYMPHS ABS 1.47 1.85 2.3   MONOS ABS 0.48 0.69 0.6   EOS ABS 0.16 0.21 0.3   .7* 104.8* 102.5*         Lab 02/11/23  1220 02/10/23  1200   SODIUM 138 140   POTASSIUM 4.4 4.9   CHLORIDE 103 101   CO2 24.0 25.0   ANION GAP 11.0 14.0   BUN 14 20   CREATININE 1.08* 1.38*   EGFR 51.7* 38.5*   GLUCOSE 197* 170*   CALCIUM 8.5* 8.6   TSH  --  2.330         Lab 02/10/23  1200 02/09/23 1910   TOTAL PROTEIN 5.9*  --    ALBUMIN 3.5  --    GLOBULIN 2.4  --    ALT (SGPT) 5  --    AST (SGOT) 21  --    BILIRUBIN 0.3  --    ALK PHOS 62  --    LIPASE  --  7.0         Lab 02/09/23 1910   PROBNP 669   TROPONIN I <0.30                 Lab 02/09/23  1840   PH, ARTERIAL 7.377   PCO2, ARTERIAL 45.9*   O2 SATURATION ART 96.6   FIO2 0.28   HCO3 ART 26.3*     Brief Urine Lab Results  (Last result in the past 365 days)      Color   Clarity   Blood   Leuk Est   Nitrite   Protein   CREAT   Urine HCG        02/12/23 1002 Yellow   Clear   Trace   Small (1+)   Negative   Trace               Microbiology Results (last 10 days)     Procedure Component Value - Date/Time    Urine Culture - Urine, Straight Cath [[id number]]  (Abnormal)  (Susceptibility) Collected: 02/12/23 1002    Lab Status: Final result Specimen: Urine  from Straight Cath Updated: 02/16/23 1001     Urine Culture >100,000 CFU/mL Enterococcus faecium     Comment:          Narrative:      Colonization of the urinary tract without infection is common. Treatment is discouraged unless the patient is symptomatic, pregnant, or undergoing an invasive urologic procedure.    Susceptibility      Enterococcus faecium      LORNA      Ampicillin Resistant     Levofloxacin Resistant     Nitrofurantoin Susceptible      Tetracycline Resistant     Vancomycin Susceptible                           Influenza Antigen, Rapid - , [[id number]] Collected: 02/09/23 1910    Lab Status: Final result Specimen: Nasopharyngeal Swab Updated: 02/10/23 0158     Rapid Influenza A Ag Negative     Rapid Influenza B Ag Negative          Adult Transthoracic Echo Complete W/ Cont if Necessary Per Protocol    Result Date: 2/11/2023  •  Left ventricular systolic function is hyperdynamic (EF > 70%). Calculated left ventricular EF = 70.6% Left ventricular ejection fraction appears to be greater than 70%. The left ventricular cavity is small in size. Left ventricular wall thickness is consistent with mild concentric hypertrophy. All left ventricular wall segments contract normally. Left ventricular intracavitary gradient noted to be 71 mmHg. Left ventricular diastolic function is consistent with (grade I) impaired relaxation. Normal left atrial pressure. •  The aortic valve is abnormal in structure. There is mild calcification of the aortic valve mainly affecting the right coronary cusp(s). The aortic valve appears trileaflet. No aortic valve regurgitation is present. Gradient noted through the LV and LVOT Compared to TTE report from  Dec 2019, hyperdynamic LV with intracavitary gradient is not a new finding but peak gradient noted on this exam is higher than previously described.     CT Head Without Contrast    Result Date: 2/11/2023  CT HEAD WO CONTRAST Date of Exam: 2/11/2023 5:21 AM EST Indication:  Cerebrovascular accident with intracranial hemorrhage, follow-up. Comparison: 2/10/2023. Technique: Axial CT images were obtained of the head without contrast administration.  Reconstructed coronal and sagittal images were also obtained. Automated exposure control and iterative construction methods were used. Findings: There is a wedge-shaped area of decreased density in the posterior left frontal and left parietal lobe consistent with a subacute infarct. There is stable areas of parenchymal hemorrhage within the infarct similar to yesterday. There is no evidence of expansion of the hematoma. There are no new areas of acute hemorrhage. The patient has mild volume loss with prominence of the sulci, fissures, ventricles, and basal cisterns. There is no mass effect or midline shift. There are atherosclerotic vascular calcifications in the distal vertebral arteries and cavernous carotid arteries. There is thinning of the lenses suggestive of previous cataract surgery. There is a small mucous retention cyst in the left maxillary sinus. The mastoid sinuses are clear. The calvarium is unremarkable.     Impression: 1. Stable evolving subacute left MCA distribution infarct with the hemorrhagic transformation. 2. There is no expansion of the hematoma. There are no new areas of hemorrhage. 3. Mild volume loss secondary to cerebral atrophy. Electronically Signed: Bennie Casiano  2/11/2023 10:06 AM EST  Workstation ID: [id number]    CT Head Without Contrast    Result Date: 2/10/2023  CT HEAD WO CONTRAST Date of Exam: 2/10/2023 6:38 AM EST Indication: Stroke, follow up f/up hemorrhagic conversion. Comparison: Outside CT head one day prior Technique: Axial CT images were obtained of the head without contrast administration.  Reconstructed coronal and sagittal images were also obtained. Automated exposure control and iterative construction methods were used. Findings: Redemonstrated prominent left posterior temporal and inferior parietal  acute infarct with intermixed areas of small hyperdensity compatible with minimal hemorrhage. There is no evidence of interval increased hemorrhage or worsening edema or mass effect. There is no midline shift or significant ventricular effacement. No new ischemia, hemorrhage or mass effects present elsewhere. Generalized age-related changes are again noted. The calvarium is intact. The orbits are normal. The paranasal sinuses are grossly clear.     Impression: Stable appearance of findings noted on outside CT comparison, demonstrating an acute left posterior temporal and inferior parietal infarct with minimal amount of intervening hemorrhagic conversion. There is no evidence of interval increased hemorrhage or  worsening mass effect. Electronically Signed: Luis Manuel Myers  2/10/2023 8:46 AM EST  Workstation ID: [id number]    XR Outside Films    Result Date: 2/10/2023  This procedure was auto-finalized with no dictation required.    CT ABDOMEN PELVIS WO CONTRAST Additional Contrast? None    Result Date: 2/9/2023  CT ABDOMEN AND PELVIS: INDICATION: abd pain, lower half TECHNIQUE: Noncontrast CT images of the abdomen and pelvis were obtained. Up-to-date CT equipment and radiation dose reduction techniques were employed. COMPARISON: NONE. FINDINGS:  There are atelectatic changes identified within the medial aspect of the right upper lobe as well as the medial aspect the right lower lobe. There is some groundglass atelectasis identified at the left lung base. No pleural effusion is identified. There is calcification of mitral annulus. There is atherosclerotic calcification left anterior descending coronary artery. Patient status post a cholecystectomy. No biliary dilatation is identified. There is small hiatal hernia. The liver appears unremarkable. There is moderate-severe atrophy of the pancreas. There are calcified granulomas present within normal size spleen. Adrenal glands are normal. No hydronephrosis or  nephrolithiasis is identified. There is mild renal atrophy. There is extensive calcification of the abdominal aorta. No aneurysm is identified. Large amount of stool is identified within the rectum, sigmoid colon and descending colon. There is some associated edema identified within the posterior perirectal fat.. No abnormal bowel wall thickening is identified. No bowel dilatation is identified.  Appendix is visualized and is normal. Patient status post hysterectomy. There is a right hip femoral neck screw. No osteolytic or osteoblastic lesions are identified. Facet joint spondylosis is present with the lumbar spine.    1. Large amount of stool is identified within the rectum with some associated edematous fat stranding identified within the posterior perirectal fat. The findings can be seen with fecal impaction and stercoral colitis. 2. Status post hysterectomy. 3. Status post cholecystectomy. 4. Atelectatic changes identified along the detailed above. 5. Small hiatal hernia. 6. Atrophic pancreas.    CT Head WO Contrast    Result Date: 2/9/2023  HEAD CT SCAN WITHOUT CONTRAST   HISTORY: AMS, hx of stroke 1 month ago COMPARISON:  Head CT dated June 1621 TECHNIQUE: Noncontrast CT images of the head were obtained. Images were reformatted in the coronal and sagittal planes. Up-to-date CT equipment and radiation dose reduction techniques were employed. FINDINGS: There is low attenuation in sulcal effacement identified within the posterior inferior aspect of the left parietal lobe consistent with subacute infarct. There are associated small areas of hemorrhagic transformation within the infarct. No herniation is identified. There is prominence of the ventricles, cistern and sulci. There is a remote lacunar infarct identified within the head of the left caudate nucleus.    1. Subacute infarct is identified with the posterior inferior aspect left parietal lobe with some small areas of acute hemorrhagic conversion. 2. Mild  atrophy. 3. Remote lacunar infarct is identified with the head of the  left caudate nucleus.    XR CHEST PORTABLE    Result Date: 2/9/2023  CHEST 1 VIEW   HISTORY: low O2 saturation   COMPARISON: Chest x-ray dated 11-21 FINDINGS: Portable AP radiograph of the chest was obtained. The heart and mediastinum are within normal limits for size and configuration. No infiltrate, effusion or pneumothorax is identified. Lung volumes are decreased. Calcified granulomas project over the lung bases. Degenerative changes are identified at the right glenohumeral joint.    1.  No evidence of acute cardiopulmonary disease.    CT Outside Films    Result Date: 2/10/2023  This procedure was auto-finalized with no dictation required.    CT Outside Films    Result Date: 2/10/2023  This procedure was auto-finalized with no dictation required.              Results for orders placed during the hospital encounter of 02/10/23    Adult Transthoracic Echo Complete W/ Cont if Necessary Per Protocol    Interpretation Summary  •  Left ventricular systolic function is hyperdynamic (EF > 70%). Calculated left ventricular EF = 70.6% Left ventricular ejection fraction appears to be greater than 70%. The left ventricular cavity is small in size. Left ventricular wall thickness is consistent with mild concentric hypertrophy. All left ventricular wall segments contract normally. Left ventricular intracavitary gradient noted to be 71 mmHg. Left ventricular diastolic function is consistent with (grade I) impaired relaxation. Normal left atrial pressure.  •  The aortic valve is abnormal in structure. There is mild calcification of the aortic valve mainly affecting the right coronary cusp(s). The aortic valve appears trileaflet. No aortic valve regurgitation is present. Gradient noted through the LV and LVOT    Compared to TTE report from  Dec 2019, hyperdynamic LV with intracavitary gradient is not a new finding but peak gradient noted on this exam is  higher than previously described.      Plan for Follow-up of Pending Labs/Results: nothing pending    Discharge Details        Discharge Medications      New Medications      Instructions Start Date   lisinopril 40 MG tablet  Commonly known as: PRINIVIL,ZESTRIL   40 mg, Oral, Every 24 Hours Scheduled   Start Date: February 17, 2023        Changes to Medications      Instructions Start Date   meclizine 25 MG tablet  Commonly known as: ANTIVERT  What changed:   · how much to take  · how to take this  · when to take this   TAKE 1 TABLET THREE TIMES DAILY AS NEEDED FOR DIZZINESS         Continue These Medications      Instructions Start Date   apixaban 5 MG tablet tablet  Commonly known as: ELIQUIS   5 mg, Oral, 2 Times Daily      busPIRone 7.5 MG tablet  Commonly known as: BUSPAR   7.5 mg, Oral, 2 Times Daily      donepezil 10 MG tablet  Commonly known as: ARICEPT   10 mg, Oral, Nightly      DULoxetine 60 MG capsule  Commonly known as: CYMBALTA   60 mg, Oral, Daily      gabapentin 300 MG capsule  Commonly known as: NEURONTIN   300 mg, Oral, 2 Times Daily      HYDROcodone-acetaminophen  MG per tablet  Commonly known as: NORCO   1 tablet, Oral, Every 6 Hours PRN      Insulin Lispro 100 UNIT/ML injection  Commonly known as: humaLOG   Subcutaneous, 3 Times Daily Before Meals      levothyroxine 25 MCG tablet  Commonly known as: SYNTHROID, LEVOTHROID   25 mcg, Oral, Daily      omeprazole 40 MG capsule  Commonly known as: priLOSEC   40 mg, Oral, Daily      QUEtiapine 50 MG tablet  Commonly known as: SEROquel   50 mg, Oral, Every 24 Hours      rosuvastatin 40 MG tablet  Commonly known as: CRESTOR   40 mg, Oral, Nightly      sennosides-docusate 8.6-50 MG per tablet  Commonly known as: PERICOLACE   2 tablets, Oral, 2 Times Daily         Stop These Medications    aspirin 81 MG EC tablet     metoprolol tartrate 25 MG tablet  Commonly known as: LOPRESSOR     oxybutynin 5 MG tablet  Commonly known as: DITROPAN     VITAMIN D2  PO            Allergies   Allergen Reactions   • Penicillins    • Sulfa Antibiotics    • Tetracyclines & Related Unknown (See Comments)   • Valium [Diazepam] Anxiety         Discharge Disposition:  Skilled Nursing Facility (DC - External)    Diet:  Hospital:  Diet Order   Procedures   • Diet: Cardiac Diets, Diabetic Diets; Healthy Heart (2-3 Na+); Consistent Carbohydrate; Texture: Pureed (NDD 1); Fluid Consistency: Thin (IDDSI 0)       Activity:  Activity Instructions     Activity as Tolerated            Restrictions or Other Recommendations:  none       CODE STATUS:    Code Status and Medical Interventions:   Ordered at: 02/15/23 1500     Level Of Support Discussed With:    Patient     Code Status (Patient has no pulse and is not breathing):    CPR (Attempt to Resuscitate)     Medical Interventions (Patient has pulse or is breathing):    Full Support     Release to patient:    Routine Release       No future appointments.    Additional Instructions for the Follow-ups that You Need to Schedule     Discharge Follow-up with PCP   As directed       Currently Documented PCP:    Lorrie Canada APRN    PCP Phone Number:    [phone]     Follow Up Details: 2 weeks for med list changes                     Bina Kwon MD  02/16/23      Time Spent on Discharge:  I spent  42  minutes on this discharge activity which included: face-to-face encounter with the patient, reviewing the data in the system, coordination of the care with the nursing staff as well as consultants, documentation, and entering orders.

## 2023-02-16 NOTE — PLAN OF CARE
"Goal Outcome Evaluation:  Plan of Care Reviewed With: patient           Outcome Evaluation: NIH 6 this am, pt alert to self only, very anxious, continuously asks to be\"knocked out\", also states she feels something stuck in her throat and has been for the past 2 years. Diet changed to puree d/t pt spitting food out (very soft food included), fels like its choking her. NSR on tele, remains on RA, VSS, slight drop in BP this evening. large BM today, worked with therapy , up in chair via lift, cont to monitor  "

## 2023-02-24 ENCOUNTER — TELEPHONE (OUTPATIENT)
Dept: FAMILY MEDICINE CLINIC | Facility: CLINIC | Age: 83
End: 2023-02-24
Payer: MEDICARE

## 2023-02-24 NOTE — TELEPHONE ENCOUNTER
[facility] NURSE [facility] CALLED AND IS REQUESTING A ONE TIME DOSE FOR PATIENTS PAIN MEDICATION- HYDROCODONE 10-325MG. STATES PATIENT SPIT OUT MEDICATION AND IS NOT DUE TO HAVE ANOTHER DOSE TIL 7PM. NURSING HOME STATES THEY HAVE MEDICATION IN HOUSE.

## 2023-03-01 ENCOUNTER — HOSPITAL ENCOUNTER (EMERGENCY)
Facility: HOSPITAL | Age: 83
Discharge: HOME | End: 2023-03-01
Attending: EMERGENCY MEDICINE
Payer: MEDICARE

## 2023-03-01 ENCOUNTER — APPOINTMENT (OUTPATIENT)
Dept: GENERAL RADIOLOGY | Facility: HOSPITAL | Age: 83
End: 2023-03-01
Payer: MEDICARE

## 2023-03-01 ENCOUNTER — APPOINTMENT (OUTPATIENT)
Dept: CT IMAGING | Facility: HOSPITAL | Age: 83
End: 2023-03-01
Payer: MEDICARE

## 2023-03-01 VITALS
DIASTOLIC BLOOD PRESSURE: 52 MMHG | RESPIRATION RATE: 13 BRPM | HEART RATE: 61 BPM | OXYGEN SATURATION: 100 % | SYSTOLIC BLOOD PRESSURE: 143 MMHG | WEIGHT: 225 LBS | TEMPERATURE: 97.7 F | BODY MASS INDEX: 37.44 KG/M2

## 2023-03-01 DIAGNOSIS — N39.0 URINARY TRACT INFECTION ASSOCIATED WITH INDWELLING URETHRAL CATHETER, INITIAL ENCOUNTER (HCC): Primary | ICD-10-CM

## 2023-03-01 DIAGNOSIS — T83.511A URINARY TRACT INFECTION ASSOCIATED WITH INDWELLING URETHRAL CATHETER, INITIAL ENCOUNTER (HCC): Primary | ICD-10-CM

## 2023-03-01 LAB
ANION GAP SERPL CALCULATED.3IONS-SCNC: 8 MMOL/L (ref 3–16)
BACTERIA: ABNORMAL /HPF
BASOPHILS ABSOLUTE: 0 K/UL (ref 0–0.1)
BASOPHILS RELATIVE PERCENT: 0.6 %
BILIRUBIN URINE: NEGATIVE
BLOOD, URINE: NEGATIVE
BUN BLDV-MCNC: 21 MG/DL (ref 6–20)
CALCIUM SERPL-MCNC: 9 MG/DL (ref 8.5–10.5)
CHLORIDE BLD-SCNC: 97 MMOL/L (ref 98–107)
CHP ED QC CHECK: YES
CLARITY: CLEAR
CO2: 30 MMOL/L (ref 20–30)
COLOR: YELLOW
CREAT SERPL-MCNC: 1.4 MG/DL (ref 0.4–1.2)
EOSINOPHILS ABSOLUTE: 0.3 K/UL (ref 0–0.4)
EOSINOPHILS RELATIVE PERCENT: 4.6 %
EPITHELIAL CELLS, UA: ABNORMAL /HPF (ref 0–5)
GFR SERPL CREATININE-BSD FRML MDRD: 38 ML/MIN/{1.73_M2}
GLUCOSE BLD-MCNC: 208 MG/DL
GLUCOSE BLD-MCNC: 224 MG/DL (ref 74–106)
GLUCOSE URINE: 100 MG/DL
HCT VFR BLD CALC: 35.4 % (ref 37–47)
HEMOGLOBIN: 11.4 G/DL (ref 11.5–16.5)
IMMATURE GRANULOCYTES #: 0 K/UL
IMMATURE GRANULOCYTES %: 0.4 % (ref 0–5)
INR BLD: 0.96 (ref 0.87–1.11)
KETONES, URINE: NEGATIVE MG/DL
LEUKOCYTE ESTERASE, URINE: NEGATIVE
LYMPHOCYTES ABSOLUTE: 1.9 K/UL (ref 1.5–4)
LYMPHOCYTES RELATIVE PERCENT: 27.9 %
MCH RBC QN AUTO: 33 PG (ref 27–32)
MCHC RBC AUTO-ENTMCNC: 32.2 G/DL (ref 31–35)
MCV RBC AUTO: 102.6 FL (ref 80–100)
MICROSCOPIC EXAMINATION: YES
MONOCYTES ABSOLUTE: 0.5 K/UL (ref 0.2–0.8)
MONOCYTES RELATIVE PERCENT: 7 %
MUCUS: ABNORMAL /LPF
NEUTROPHILS ABSOLUTE: 4 K/UL (ref 2–7.5)
NEUTROPHILS RELATIVE PERCENT: 59.5 %
NITRITE, URINE: NEGATIVE
PDW BLD-RTO: 13.5 % (ref 11–16)
PH UA: 7 (ref 5–8)
PLATELET # BLD: 179 K/UL (ref 150–400)
PMV BLD AUTO: 11.7 FL (ref 6–10)
POTASSIUM SERPL-SCNC: 5.2 MMOL/L (ref 3.4–5.1)
PROTEIN UA: ABNORMAL MG/DL
PROTHROMBIN TIME: 12.8 SEC (ref 12–14.4)
RBC # BLD: 3.45 M/UL (ref 3.8–5.8)
RBC UA: ABNORMAL /HPF (ref 0–4)
SODIUM BLD-SCNC: 135 MMOL/L (ref 136–145)
SPECIFIC GRAVITY UA: 1.01 (ref 1–1.03)
TROPONIN: <0.3 NG/ML
URINE REFLEX TO CULTURE: ABNORMAL
URINE TYPE: ABNORMAL
UROBILINOGEN, URINE: 0.2 E.U./DL
WBC # BLD: 6.7 K/UL (ref 4–11)
WBC UA: ABNORMAL /HPF (ref 0–5)

## 2023-03-01 PROCEDURE — 71045 X-RAY EXAM CHEST 1 VIEW: CPT

## 2023-03-01 PROCEDURE — 70450 CT HEAD/BRAIN W/O DYE: CPT

## 2023-03-01 PROCEDURE — 80048 BASIC METABOLIC PNL TOTAL CA: CPT

## 2023-03-01 PROCEDURE — 84484 ASSAY OF TROPONIN QUANT: CPT

## 2023-03-01 PROCEDURE — 85610 PROTHROMBIN TIME: CPT

## 2023-03-01 PROCEDURE — 85025 COMPLETE CBC W/AUTO DIFF WBC: CPT

## 2023-03-01 PROCEDURE — 2580000003 HC RX 258: Performed by: EMERGENCY MEDICINE

## 2023-03-01 PROCEDURE — 36415 COLL VENOUS BLD VENIPUNCTURE: CPT

## 2023-03-01 PROCEDURE — 81001 URINALYSIS AUTO W/SCOPE: CPT

## 2023-03-01 PROCEDURE — 99285 EMERGENCY DEPT VISIT HI MDM: CPT

## 2023-03-01 PROCEDURE — 6370000000 HC RX 637 (ALT 250 FOR IP): Performed by: EMERGENCY MEDICINE

## 2023-03-01 RX ORDER — LISINOPRIL 40 MG/1
40 TABLET ORAL DAILY
COMMUNITY

## 2023-03-01 RX ORDER — CIPROFLOXACIN 500 MG/1
250 TABLET, FILM COATED ORAL 2 TIMES DAILY
Qty: 10 TABLET | Refills: 0 | Status: SHIPPED | OUTPATIENT
Start: 2023-03-01 | End: 2023-03-01

## 2023-03-01 RX ORDER — CIPROFLOXACIN 500 MG/1
500 TABLET, FILM COATED ORAL ONCE
Status: COMPLETED | OUTPATIENT
Start: 2023-03-01 | End: 2023-03-01

## 2023-03-01 RX ORDER — NITROFURANTOIN MACROCRYSTALS 100 MG/1
100 CAPSULE ORAL NIGHTLY
COMMUNITY
End: 2023-03-01 | Stop reason: ALTCHOICE

## 2023-03-01 RX ORDER — GABAPENTIN 300 MG/1
300 CAPSULE ORAL 2 TIMES DAILY
COMMUNITY

## 2023-03-01 RX ORDER — SODIUM POLYSTYRENE SULFONATE 15 G/60ML
30 SUSPENSION ORAL; RECTAL ONCE
Status: COMPLETED | OUTPATIENT
Start: 2023-03-01 | End: 2023-03-01

## 2023-03-01 RX ORDER — MECLIZINE HYDROCHLORIDE 25 MG/1
25 TABLET ORAL 3 TIMES DAILY PRN
COMMUNITY

## 2023-03-01 RX ORDER — 0.9 % SODIUM CHLORIDE 0.9 %
1000 INTRAVENOUS SOLUTION INTRAVENOUS ONCE
Status: COMPLETED | OUTPATIENT
Start: 2023-03-01 | End: 2023-03-01

## 2023-03-01 RX ADMIN — CIPROFLOXACIN HYDROCHLORIDE 500 MG: 500 TABLET, FILM COATED ORAL at 16:39

## 2023-03-01 RX ADMIN — SODIUM CHLORIDE 1000 ML: 9 INJECTION, SOLUTION INTRAVENOUS at 15:51

## 2023-03-01 RX ADMIN — SODIUM POLYSTYRENE SULFONATE 30 G: 15 SUSPENSION ORAL; RECTAL at 16:32

## 2023-03-01 ASSESSMENT — PAIN - FUNCTIONAL ASSESSMENT: PAIN_FUNCTIONAL_ASSESSMENT: NONE - DENIES PAIN

## 2023-03-01 NOTE — ED NOTES
Received reports from, Nurse at [address] [facility]. Per reports,Family requested that pt be sent for AMS, similar to symptoms she had on 2/9/23, when diagnosed and transferred for hemorraghic stroke, no surgical intervention at that time, Blood thinners were stopped and order to keep SBP<140. Per nurse report, pt bp and stayed <140. Reports pt is A/O x self at baseline with history of CVA and dementia. Advises that pt is at her baseline at this time. Total assist, incontinent of bowel and bladder. Pt uses 2LNC PRN at home. Reports was admitted to NH on 1/28/23, Transferred out on 2/9 and readmitted to NH on 2/16    Code status: full    Pt has received Sterling around 1300 PTA. V/S at nursing home:      BP: 120/64   HR: 64 BPM      SpO2 95% on 2 LNC. GCS 14    @ lunch time, received 3 units of her regular insulin. Temp 97.7    Dr. Aron Blankenship made aware of pt complaints and history, Stroke alert to be activated.         Merry Kirk RN  03/01/23 0583

## 2023-03-01 NOTE — ED NOTES
Patient off unit via [facility] to transport patient back to [facility] and [address] at this time. PIV removed. No further needs noted.       Nayla Merino, RN  03/01/23 9687

## 2023-03-01 NOTE — ED NOTES
Report given to Nurse at [facility], made aware pt is coming back with written prescription for Cipro, and stopping current antibiotic.         Merry Kirk RN  03/01/23 4601

## 2023-03-01 NOTE — ED PROVIDER NOTES
Eb Robledo [address]      Pt Name: Ravi Alcala  MRN: [id number]  YOB: 1940  Date of evaluation: 3/1/2023  Provider: Lyla Morales MD    CHIEF COMPLAINT       Chief Complaint   Patient presents with    Cerebrovascular Accident         HISTORY OF PRESENT ILLNESS  (Location/Symptom, Timing/Onset, Context/Setting, Quality, Duration, Modifying Factors, Severity.)   Ravi Alcala is a 81 y.o. female who presents to the emergency department brought by EMS from the nursing home per EMS the nursing home and the family the patient had been diagnosed with a hemorrhagic stroke on  of this year was sent to [facility] where her blood thinners were stopped and blood pressure controlled she was sent back to the nursing home and had been stable until according to the nursing home nurses in the last for 5 days her mental status has deteriorated she is less alert less oriented responding less appropriately. The family states that that is just gotten worse today than it had been so the patient was sent for evaluation of possible recurrent stroke as this is the same symptoms that she had when she had her hemorrhagic stroke last month. Nursing notes were reviewed.     REVIEW OFSYSTEMS    (2-9 systems for level 4, 10 or more for level 5)   ROS:  Unobtainable secondary to patient's mental status    PAST MEDICAL HISTORY     Past Medical History:   Diagnosis Date    Allergic rhinitis     Anxiety     Chronic back pain     Depression     Double vision with both eyes open     Pt states she can see ok with one eye shut    Fall     Hyperlipidemia     Hypertension     Osteoarthritis     Stroke ([facility] [address]     Type II or unspecified type diabetes mellitus without mention of complication, not stated as uncontrolled          SURGICAL HISTORY       Past Surgical History:   Procedure Laterality Date     SECTION      COLONOSCOPY      FEMUR FRACTURE SURGERY Right 2019    fixed at [facility] per daughter    HYSTERECTOMY (CERVIX STATUS UNKNOWN)      TOTAL    SHOULDER SURGERY      RIGHT X 2         CURRENT MEDICATIONS       Current Discharge Medication List        CONTINUE these medications which have NOT CHANGED    Details   gabapentin (NEURONTIN) 300 MG capsule Take 300 mg by mouth in the morning and 300 mg in the evening. lisinopril (PRINIVIL;ZESTRIL) 40 MG tablet Take 40 mg by mouth daily      meclizine (ANTIVERT) 25 MG tablet Take 25 mg by mouth 3 times daily as needed      magnesium hydroxide (MILK OF MAGNESIA) 400 MG/5ML suspension Take 30 mLs by mouth as needed for Constipation      nitrofurantoin (MACRODANTIN) 100 MG capsule Take 100 mg by mouth nightly      sennosides-docusate sodium (SENOKOT-S) 8.6-50 MG tablet Take 2 tablets by mouth in the morning and 2 tablets in the evening. donepezil (ARICEPT) 10 MG tablet Take 10 mg by mouth nightly      levothyroxine (SYNTHROID) 25 MCG tablet Take 25 mcg by mouth Daily      rosuvastatin (CRESTOR) 40 MG tablet Take 40 mg by mouth every evening      HYDROcodone-acetaminophen (NORCO)  MG per tablet Take 1 tablet by mouth every 6 hours as needed for Pain. insulin lispro (HUMALOG) 100 UNIT/ML injection vial Inject 13 Units into the skin 3 times daily (with meals)  Qty: 10 mL, Refills: 2      LORazepam (ATIVAN) 0.5 MG tablet Take 0.5 mg by mouth 2 times daily as needed for Anxiety.       busPIRone (BUSPAR) 7.5 MG tablet Take 7.5 mg by mouth 2 times daily      DULoxetine (CYMBALTA) 60 MG extended release capsule Take 60 mg by mouth daily       oxybutynin (DITROPAN) 5 MG tablet Take 5 mg by mouth 2 times daily      omeprazole (PRILOSEC) 20 MG delayed release capsule Take 2 capsules by mouth daily  Qty: 60 capsule, Refills: 0             ALLERGIES     Latex, Penicillins, Sulfa antibiotics, Tetracyclines & related, and Diazepam    FAMILY HISTORY       Family History   Problem Relation Age of Onset    Heart Disease Mother CHF,CAD    Cancer Maternal Aunt     High Blood Pressure Sister           SOCIAL HISTORY       Social History     Socioeconomic History    Marital status:      Spouse name: None    Number of children: None    Years of education: None    Highest education level: None   Tobacco Use    Smoking status: Never    Smokeless tobacco: Never   Vaping Use    Vaping Use: Never used   Substance and Sexual Activity    Alcohol use: No    Drug use: No    Sexual activity: Yes     Partners: Male         PHYSICAL EXAM    (up to 7 for level 4, 8 or more for level 5)     ED Triage Vitals   BP Temp Temp src Pulse Resp SpO2 Height Weight   -- -- -- -- -- -- -- --       Physical Exam  General :Patient is awake, alert, oriented to 1, in no acute distress, nontoxic appearing  HEENT: Pupils are equally round and reactive to light, EOMI, conjunctivae clear. Oral mucosa is moist, no exudate. Uvula is midline  Neck: Neck is supple, full range of motion, trachea midline  Cardiac: Heart regular rate, rhythm, no murmurs, rubs, or gallops  Lungs: Lungs are clear to auscultation, there is no wheezing, rhonchi, or rales. There is no use of accessory muscles. Chest wall: There is no tenderness to palpation over the chest wall or over ribs  Abdomen: Abdomen is soft, nontender, nondistended. There is no firm or pulsatile masses, no rebound rigidity or guarding. Musculoskeletal: No noted deformities of extremities neuro: Patient is continuously looking to her left have to force her head to the right to look at her in that manner she does respond to painful stimuli on the right side but does not appear to know that it is her right side i.e. she seems to be neglecting the side. Pupils are equal reactive to light extraocular muscles are intact to the degree that we can observe her moving her eyes remainder of cranial nerves appear to be intact to the best that we can observe without her following commands.   She is responsive to painful stimuli in her right arm but not her leg she does move her right arm but not her leg left lower side sensation and motor function both appear to be intact. Patient does not follow commands or answer questions appropriately. Dermatology: Skin is warm and dry  Psych: Patient is confused and disoriented    DIAGNOSTIC RESULTS     EKG: All EKG's are interpreted by the Emergency Department Physician who either signs or Co-signs this chart in the [address] a cardiologist.    The EKG interpreted by me shows sinus rhythm rate of 57 no acute ST changes    RADIOLOGY:   Non-plain film images such as CT, Ultrasound and MRI are read by the radiologist. Plain radiographic images are visualized and preliminarily interpreted by the emergency physician with the below findings:      [] Radiologist's Report Reviewed:  XR CHEST PORTABLE   Final Result   Left mid lung atelectasis similar to prior studies. CT Head WO Contrast   Final Result      1. Redemonstrated subacute to chronic left parietal lobe infarct. Smaller area of hyperdensity associated with the previously described hemorrhagic conversion. No new hemorrhage or mass effect. 2.  Chronic small vessel ischemia and chronic basal ganglia lacunar infarcts.             ED BEDSIDE ULTRASOUND:   Performed by ED Physician - none    LABS:    I have reviewed and interpreted all of the currently available lab results from this visit (ifapplicable):  Results for orders placed or performed during the hospital encounter of 87/21/47   Basic Metabolic Panel   Result Value Ref Range    Sodium 135 (L) 136 - 145 mmol/L    Potassium 5.2 (H) 3.4 - 5.1 mmol/L    Chloride 97 (L) 98 - 107 mmol/L    CO2 30 20 - 30 mmol/L    Anion Gap 8 3 - 16    Glucose 224 (H) 74 - 106 mg/dL    BUN 21 (H) 6 - 20 mg/dL    Creatinine 1.4 (H) 0.4 - 1.2 mg/dL    Est, Glom Filt Rate 38 (L) >59    Calcium 9.0 8.5 - 10.5 mg/dL   CBC with Auto Differential   Result Value Ref Range    WBC 6.7 4.0 - 11.0 K/uL    RBC 3.45 (L) 3.80 - 5.80 M/uL    Hemoglobin 11.4 (L) 11.5 - 16.5 g/dL    Hematocrit 35.4 (L) 37.0 - 47.0 %    .6 (H) 80.0 - 100.0 fL    MCH 33.0 (H) 27.0 - 32.0 pg    MCHC 32.2 31.0 - 35.0 g/dL    RDW 13.5 11.0 - 16.0 %    Platelets 848 289 - 831 K/uL    MPV 11.7 (H) 6.0 - 10.0 fL    Neutrophils % 59.5 %    Immature Granulocytes % 0.4 0.0 - 5.0 %    Lymphocytes % 27.9 %    Monocytes % 7.0 %    Eosinophils % 4.6 %    Basophils % 0.6 %    Neutrophils Absolute 4.0 2.0 - 7.5 K/uL    Immature Granulocytes # 0.0 K/uL    Lymphocytes Absolute 1.9 1.5 - 4.0 K/uL    Monocytes Absolute 0.5 0.2 - 0.8 K/uL    Eosinophils Absolute 0.3 0.0 - 0.4 K/uL    Basophils Absolute 0.0 0.0 - 0.1 K/uL   Protime-INR   Result Value Ref Range    Protime 12.8 12.0 - 14.4 sec    INR 0.96 0.87 - 1.11   Troponin   Result Value Ref Range    Troponin <0.30 <0.30 ng/mL   Urinalysis with Reflex to Culture    Specimen: Urine   Result Value Ref Range    Color, UA Yellow Straw/Yellow    Clarity, UA Clear Clear    Glucose, Ur 100 (A) Negative mg/dL    Bilirubin Urine Negative Negative    Ketones, Urine Negative Negative mg/dL    Specific Gravity, UA 1.015 1.005 - 1.030    Blood, Urine Negative Negative    pH, UA 7.0 5.0 - 8.0    Protein, UA TRACE (A) Negative mg/dL    Urobilinogen, Urine 0.2 <2.0 E.U./dL    Nitrite, Urine Negative Negative    Leukocyte Esterase, Urine Negative Negative    Microscopic Examination YES     Urine Type Catheter     Urine Reflex to Culture Not Indicated    Microscopic Urinalysis   Result Value Ref Range    Mucus, UA Rare (A) None Seen /LPF    WBC, UA 6-9 (A) 0 - 5 /HPF    RBC, UA 3-4 0 - 4 /HPF    Epithelial Cells, UA 2-5 0 - 5 /HPF    Bacteria, UA Rare (A) None Seen /HPF   POCT Glucose   Result Value Ref Range    Glucose 208 mg/dL    QC OK? yes         All other labs were within normal range or not returned as of this dictation.     EMERGENCY DEPARTMENT COURSE and DIFFERENTIAL DIAGNOSIS/MDM:   Vitals:    Vitals:    03/01/23 1502 03/01/23 1515 03/01/23 1530 03/01/23 1545   BP:  (!) 120/90 131/61 93/65   Pulse: 57 58 56 60   Resp: 18 14 17 17   Temp: 97.7 °F (36.5 °C)      SpO2: 94%  96% 97%   Weight: 225 lb (102.1 kg)          MEDICATIONS ADMINISTERED IN ED:  Medications   0.9 % sodium chloride bolus (1,000 mLs IntraVENous New Bag 3/1/23 1551)   sodium polystyrene (KAYEXALATE) 15 GM/60ML suspension 30 g (30 g Oral Given 3/1/23 1632)   ciprofloxacin (CIPRO) tablet 500 mg (500 mg Oral Given 3/1/23 1639)       Patient has been stable while here her urine shows a mild UTI still apparently she has been on a relatively chronic dose of Macrobid since this seems to have been broken through the night ahead and switch her to Cipro for the UTI. Her electrolytes are abnormal sodium 135 potassium 5.2 chloride 97 glucose 224 BUN 21 creatinine 1.4 she has been given a liter of normal saline for mild dehydration as well as Kayexalate 30 g for her elevated potassium I have also discussed the high potassium with her daughter and she is not on supplemental potassium but her daughter states she has been eating a lot of bananas so I told him that should keep her away from any foods high in potassium such as bananas and oranges. Chest x-ray was negative and her head CT showed no acute changes and partial resolution of the previous hemorrhage. The patient will follow-up with their PCP in 1-2 days for reevaluation. If the patient or family members have anyfurther concerns or any worsening symptoms they will return to the ED for reevaluation. Is this patient to be included in the SEP-1 Core Measure due to severe sepsis or septic shock?    No   Exclusion criteria - the patient is NOT to be included for SEP-1 Core Measure due to:  May have criteria for sepsis, but does not meet criteria for severe sepsis or septic shock          CONSULTS:  None    PROCEDURES:  Procedures    CRITICAL CARE TIME    Total Critical Care time was 0 minutes, excluding separately reportable procedures. There was a high probability of clinically significant/life threatening deterioration in the patient's condition which required my urgent intervention. FINAL IMPRESSION      1. Urinary tract infection associated with indwelling urethral catheter, initial encounter [facility]) New Problem         DISPOSITION/PLAN   DISPOSITION Decision To Discharge 03/01/2023 04:44:17 PM  Stable discharge to home    PATIENT REFERRED TO:  Jerica Parnell, APRN - [address]    Schedule an appointment as soon as possible for a visit in 2 days      DISCHARGE MEDICATIONS:  Current Discharge Medication List          Comment: Please note this report has been produced using speech recognition software and may contain errorsrelated to that system including errors in grammar, punctuation, and spelling, as well as words and phrases that may be inappropriate. If there are any questions or concerns please feel free to contact the dictating providerfor clarification.     Mikey Gross MD  Attending Emergency Physician               Mikey Gross MD  03/01/23 3010

## 2023-03-18 ENCOUNTER — APPOINTMENT (OUTPATIENT)
Dept: GENERAL RADIOLOGY | Facility: HOSPITAL | Age: 83
End: 2023-03-18
Payer: MEDICARE

## 2023-03-18 ENCOUNTER — HOSPITAL ENCOUNTER (OUTPATIENT)
Facility: HOSPITAL | Age: 83
Setting detail: OBSERVATION
Discharge: HOME | End: 2023-03-19
Attending: EMERGENCY MEDICINE | Admitting: INTERNAL MEDICINE
Payer: MEDICARE

## 2023-03-18 ENCOUNTER — APPOINTMENT (OUTPATIENT)
Dept: CT IMAGING | Facility: HOSPITAL | Age: 83
End: 2023-03-18
Payer: MEDICARE

## 2023-03-18 DIAGNOSIS — F03.92 HALLUCINATIONS DUE TO LATE ONSET DEMENTIA (HCC): ICD-10-CM

## 2023-03-18 DIAGNOSIS — J18.9 PNEUMONIA OF RIGHT MIDDLE LOBE DUE TO INFECTIOUS ORGANISM: Primary | ICD-10-CM

## 2023-03-18 PROBLEM — R41.82 AMS (ALTERED MENTAL STATUS): Status: ACTIVE | Noted: 2023-03-18

## 2023-03-18 LAB
ALBUMIN SERPL-MCNC: 3.6 G/DL (ref 3.4–4.8)
ALBUMIN/GLOB SERPL: 1.3 {RATIO} (ref 0.8–2)
ALP SERPL-CCNC: 57 U/L (ref 25–100)
ALT SERPL-CCNC: <5 U/L (ref 4–36)
ANION GAP SERPL CALCULATED.3IONS-SCNC: 10 MMOL/L (ref 3–16)
AST SERPL-CCNC: 14 U/L (ref 8–33)
BASOPHILS # BLD: 0 K/UL (ref 0–0.1)
BASOPHILS NFR BLD: 0.4 %
BILIRUB SERPL-MCNC: 0.3 MG/DL (ref 0.3–1.2)
BILIRUB UR QL STRIP.AUTO: NEGATIVE
BUN SERPL-MCNC: 23 MG/DL (ref 6–20)
CALCIUM SERPL-MCNC: 8.4 MG/DL (ref 8.5–10.5)
CHLORIDE SERPL-SCNC: 99 MMOL/L (ref 98–107)
CLARITY UR: CLEAR
CO2 SERPL-SCNC: 27 MMOL/L (ref 20–30)
COLOR UR: YELLOW
CREAT SERPL-MCNC: 1.3 MG/DL (ref 0.4–1.2)
EOSINOPHIL # BLD: 0.4 K/UL (ref 0–0.4)
EOSINOPHIL NFR BLD: 5.3 %
ERYTHROCYTE [DISTWIDTH] IN BLOOD BY AUTOMATED COUNT: 13.1 % (ref 11–16)
FOLATE SERPL-MCNC: 10.26 NG/ML
GFR SERPLBLD CREATININE-BSD FMLA CKD-EPI: 41 ML/MIN/{1.73_M2}
GLOBULIN SER CALC-MCNC: 2.7 G/DL
GLUCOSE BLD-MCNC: 163 MG/DL (ref 74–106)
GLUCOSE BLD-MCNC: 253 MG/DL
GLUCOSE BLD-MCNC: 253 MG/DL (ref 74–106)
GLUCOSE SERPL-MCNC: 277 MG/DL (ref 74–106)
GLUCOSE UR STRIP.AUTO-MCNC: 500 MG/DL
HBA1C MFR BLD: 5.9 %
HCT VFR BLD AUTO: 36.6 % (ref 37–47)
HGB BLD-MCNC: 11.8 G/DL (ref 11.5–16.5)
HGB UR QL STRIP.AUTO: NEGATIVE
IMM GRANULOCYTES # BLD: 0 K/UL
IMM GRANULOCYTES NFR BLD: 0.3 % (ref 0–5)
KETONES UR STRIP.AUTO-MCNC: NEGATIVE MG/DL
LACTATE BLDV-SCNC: 1.8 MMOL/L (ref 0.4–2)
LEUKOCYTE ESTERASE UR QL STRIP.AUTO: NEGATIVE
LYMPHOCYTES # BLD: 2.7 K/UL (ref 1.5–4)
LYMPHOCYTES NFR BLD: 37.6 %
MCH RBC QN AUTO: 32.5 PG (ref 27–32)
MCHC RBC AUTO-ENTMCNC: 32.2 G/DL (ref 31–35)
MCV RBC AUTO: 100.8 FL (ref 80–100)
MONOCYTES # BLD: 0.6 K/UL (ref 0.2–0.8)
MONOCYTES NFR BLD: 9 %
NEUTROPHILS # BLD: 3.4 K/UL (ref 2–7.5)
NEUTS SEG NFR BLD: 47.4 %
NITRITE UR QL STRIP.AUTO: NEGATIVE
PERFORMED ON: ABNORMAL
PERFORMED ON: ABNORMAL
PH UR STRIP.AUTO: 5 [PH] (ref 5–8)
PLATELET # BLD AUTO: 147 K/UL (ref 150–400)
PMV BLD AUTO: 12.3 FL (ref 6–10)
POTASSIUM SERPL-SCNC: 4.3 MMOL/L (ref 3.4–5.1)
PROT SERPL-MCNC: 6.3 G/DL (ref 6.4–8.3)
PROT UR STRIP.AUTO-MCNC: NEGATIVE MG/DL
RBC # BLD AUTO: 3.63 M/UL (ref 3.8–5.8)
SARS-COV-2 RDRP RESP QL NAA+PROBE: NOT DETECTED
SODIUM SERPL-SCNC: 136 MMOL/L (ref 136–145)
SP GR UR STRIP.AUTO: 1.01 (ref 1–1.03)
TROPONIN I SERPL-MCNC: <0.3 NG/ML
TSH SERPL DL<=0.005 MIU/L-ACNC: 5.42 UIU/ML (ref 0.27–4.2)
UA COMPLETE W REFLEX CULTURE PNL UR: ABNORMAL
UA DIPSTICK W REFLEX MICRO PNL UR: ABNORMAL
URN SPEC COLLECT METH UR: ABNORMAL
UROBILINOGEN UR STRIP-ACNC: 0.2 E.U./DL
VIT B12 SERPL-MCNC: 711 PG/ML (ref 211–911)
WBC # BLD AUTO: 7.1 K/UL (ref 4–11)

## 2023-03-18 PROCEDURE — 6360000002 HC RX W HCPCS: Performed by: PHYSICIAN ASSISTANT

## 2023-03-18 PROCEDURE — 96367 TX/PROPH/DG ADDL SEQ IV INF: CPT

## 2023-03-18 PROCEDURE — G0378 HOSPITAL OBSERVATION PER HR: HCPCS

## 2023-03-18 PROCEDURE — 87040 BLOOD CULTURE FOR BACTERIA: CPT

## 2023-03-18 PROCEDURE — 83036 HEMOGLOBIN GLYCOSYLATED A1C: CPT

## 2023-03-18 PROCEDURE — 80053 COMPREHEN METABOLIC PANEL: CPT

## 2023-03-18 PROCEDURE — 96366 THER/PROPH/DIAG IV INF ADDON: CPT

## 2023-03-18 PROCEDURE — 2580000003 HC RX 258: Performed by: EMERGENCY MEDICINE

## 2023-03-18 PROCEDURE — 6370000000 HC RX 637 (ALT 250 FOR IP): Performed by: EMERGENCY MEDICINE

## 2023-03-18 PROCEDURE — 87635 SARS-COV-2 COVID-19 AMP PRB: CPT

## 2023-03-18 PROCEDURE — 85025 COMPLETE CBC W/AUTO DIFF WBC: CPT

## 2023-03-18 PROCEDURE — 51701 INSERT BLADDER CATHETER: CPT

## 2023-03-18 PROCEDURE — 84484 ASSAY OF TROPONIN QUANT: CPT

## 2023-03-18 PROCEDURE — 6370000000 HC RX 637 (ALT 250 FOR IP): Performed by: PHYSICIAN ASSISTANT

## 2023-03-18 PROCEDURE — 96365 THER/PROPH/DIAG IV INF INIT: CPT

## 2023-03-18 PROCEDURE — 81003 URINALYSIS AUTO W/O SCOPE: CPT

## 2023-03-18 PROCEDURE — 84443 ASSAY THYROID STIM HORMONE: CPT

## 2023-03-18 PROCEDURE — 2580000003 HC RX 258: Performed by: PHYSICIAN ASSISTANT

## 2023-03-18 PROCEDURE — 6360000002 HC RX W HCPCS: Performed by: EMERGENCY MEDICINE

## 2023-03-18 PROCEDURE — 93005 ELECTROCARDIOGRAM TRACING: CPT

## 2023-03-18 PROCEDURE — 82607 VITAMIN B-12: CPT

## 2023-03-18 PROCEDURE — 99285 EMERGENCY DEPT VISIT HI MDM: CPT

## 2023-03-18 PROCEDURE — 71045 X-RAY EXAM CHEST 1 VIEW: CPT

## 2023-03-18 PROCEDURE — 36415 COLL VENOUS BLD VENIPUNCTURE: CPT

## 2023-03-18 PROCEDURE — 83605 ASSAY OF LACTIC ACID: CPT

## 2023-03-18 PROCEDURE — 70450 CT HEAD/BRAIN W/O DYE: CPT

## 2023-03-18 PROCEDURE — 82746 ASSAY OF FOLIC ACID SERUM: CPT

## 2023-03-18 RX ORDER — BUSPIRONE HYDROCHLORIDE 5 MG/1
7.5 TABLET ORAL 2 TIMES DAILY
Status: DISCONTINUED | OUTPATIENT
Start: 2023-03-18 | End: 2023-03-19 | Stop reason: HOSPADM

## 2023-03-18 RX ORDER — ONDANSETRON 2 MG/ML
4 INJECTION INTRAMUSCULAR; INTRAVENOUS EVERY 6 HOURS PRN
Status: DISCONTINUED | OUTPATIENT
Start: 2023-03-18 | End: 2023-03-19 | Stop reason: HOSPADM

## 2023-03-18 RX ORDER — AZITHROMYCIN 250 MG/1
500 TABLET, FILM COATED ORAL DAILY
Status: DISCONTINUED | OUTPATIENT
Start: 2023-03-18 | End: 2023-03-19 | Stop reason: HOSPADM

## 2023-03-18 RX ORDER — LORAZEPAM 0.5 MG/1
0.5 TABLET ORAL 2 TIMES DAILY
Status: DISCONTINUED | OUTPATIENT
Start: 2023-03-18 | End: 2023-03-19 | Stop reason: HOSPADM

## 2023-03-18 RX ORDER — LEVOFLOXACIN 5 MG/ML
750 INJECTION, SOLUTION INTRAVENOUS ONCE
Status: COMPLETED | OUTPATIENT
Start: 2023-03-18 | End: 2023-03-18

## 2023-03-18 RX ORDER — ASPIRIN 81 MG/1
81 TABLET ORAL DAILY
Status: DISCONTINUED | OUTPATIENT
Start: 2023-03-18 | End: 2023-03-19

## 2023-03-18 RX ORDER — POLYETHYLENE GLYCOL 3350 17 G/17G
17 POWDER, FOR SOLUTION ORAL DAILY PRN
Status: DISCONTINUED | OUTPATIENT
Start: 2023-03-18 | End: 2023-03-19 | Stop reason: HOSPADM

## 2023-03-18 RX ORDER — ROSUVASTATIN CALCIUM 20 MG/1
40 TABLET, COATED ORAL EVERY EVENING
Status: DISCONTINUED | OUTPATIENT
Start: 2023-03-18 | End: 2023-03-19 | Stop reason: HOSPADM

## 2023-03-18 RX ORDER — ACETAMINOPHEN 650 MG/1
650 SUPPOSITORY RECTAL EVERY 6 HOURS PRN
Status: DISCONTINUED | OUTPATIENT
Start: 2023-03-18 | End: 2023-03-19 | Stop reason: HOSPADM

## 2023-03-18 RX ORDER — ONDANSETRON 4 MG/1
4 TABLET, ORALLY DISINTEGRATING ORAL EVERY 8 HOURS PRN
Status: DISCONTINUED | OUTPATIENT
Start: 2023-03-18 | End: 2023-03-19 | Stop reason: HOSPADM

## 2023-03-18 RX ORDER — FLUTICASONE PROPIONATE 50 MCG
1 SPRAY, SUSPENSION (ML) NASAL DAILY
Status: DISCONTINUED | OUTPATIENT
Start: 2023-03-18 | End: 2023-03-19 | Stop reason: HOSPADM

## 2023-03-18 RX ORDER — ASPIRIN 81 MG/1
81 TABLET, CHEWABLE ORAL ONCE
Status: COMPLETED | OUTPATIENT
Start: 2023-03-18 | End: 2023-03-18

## 2023-03-18 RX ORDER — BUSPIRONE HYDROCHLORIDE 5 MG/1
7.5 TABLET ORAL ONCE
Status: DISCONTINUED | OUTPATIENT
Start: 2023-03-18 | End: 2023-03-18

## 2023-03-18 RX ORDER — SENNA AND DOCUSATE SODIUM 50; 8.6 MG/1; MG/1
1 TABLET, FILM COATED ORAL EVERY OTHER DAY
Status: DISCONTINUED | OUTPATIENT
Start: 2023-03-18 | End: 2023-03-19 | Stop reason: HOSPADM

## 2023-03-18 RX ORDER — LEVOTHYROXINE SODIUM 0.03 MG/1
25 TABLET ORAL DAILY
Status: DISCONTINUED | OUTPATIENT
Start: 2023-03-18 | End: 2023-03-19 | Stop reason: HOSPADM

## 2023-03-18 RX ORDER — ERGOCALCIFEROL 1.25 MG/1
50000 CAPSULE ORAL WEEKLY
COMMUNITY

## 2023-03-18 RX ORDER — NYSTATIN 100000 U/G
CREAM TOPICAL PRN
COMMUNITY

## 2023-03-18 RX ORDER — DULOXETIN HYDROCHLORIDE 30 MG/1
60 CAPSULE, DELAYED RELEASE ORAL DAILY
Status: DISCONTINUED | OUTPATIENT
Start: 2023-03-18 | End: 2023-03-19 | Stop reason: HOSPADM

## 2023-03-18 RX ORDER — PROPRANOLOL HYDROCHLORIDE 40 MG/1
40 TABLET ORAL 2 TIMES DAILY
Status: ON HOLD | COMMUNITY
End: 2023-03-19 | Stop reason: SDUPTHER

## 2023-03-18 RX ORDER — INSULIN GLARGINE 100 [IU]/ML
15 INJECTION, SOLUTION SUBCUTANEOUS NIGHTLY
Status: DISCONTINUED | OUTPATIENT
Start: 2023-03-18 | End: 2023-03-19 | Stop reason: HOSPADM

## 2023-03-18 RX ORDER — NITROFURANTOIN MACROCRYSTALS 100 MG/1
100 CAPSULE ORAL NIGHTLY
COMMUNITY
Start: 2023-03-15

## 2023-03-18 RX ORDER — INSULIN LISPRO 100 [IU]/ML
0-4 INJECTION, SOLUTION INTRAVENOUS; SUBCUTANEOUS
Status: DISCONTINUED | OUTPATIENT
Start: 2023-03-18 | End: 2023-03-19 | Stop reason: HOSPADM

## 2023-03-18 RX ORDER — HYDROCODONE BITARTRATE AND ACETAMINOPHEN 10; 325 MG/1; MG/1
1 TABLET ORAL EVERY 6 HOURS PRN
Status: DISCONTINUED | OUTPATIENT
Start: 2023-03-18 | End: 2023-03-19 | Stop reason: HOSPADM

## 2023-03-18 RX ORDER — PROPRANOLOL HYDROCHLORIDE 20 MG/1
20 TABLET ORAL 2 TIMES DAILY
Status: DISCONTINUED | OUTPATIENT
Start: 2023-03-18 | End: 2023-03-19 | Stop reason: HOSPADM

## 2023-03-18 RX ORDER — DONEPEZIL HYDROCHLORIDE 5 MG/1
10 TABLET, FILM COATED ORAL NIGHTLY
Status: DISCONTINUED | OUTPATIENT
Start: 2023-03-18 | End: 2023-03-19 | Stop reason: HOSPADM

## 2023-03-18 RX ORDER — 0.9 % SODIUM CHLORIDE 0.9 %
1000 INTRAVENOUS SOLUTION INTRAVENOUS ONCE
Status: COMPLETED | OUTPATIENT
Start: 2023-03-18 | End: 2023-03-18

## 2023-03-18 RX ORDER — GABAPENTIN 300 MG/1
300 CAPSULE ORAL 3 TIMES DAILY
Status: DISCONTINUED | OUTPATIENT
Start: 2023-03-18 | End: 2023-03-19 | Stop reason: HOSPADM

## 2023-03-18 RX ORDER — INSULIN LISPRO 100 [IU]/ML
0-4 INJECTION, SOLUTION INTRAVENOUS; SUBCUTANEOUS NIGHTLY
Status: DISCONTINUED | OUTPATIENT
Start: 2023-03-18 | End: 2023-03-19 | Stop reason: HOSPADM

## 2023-03-18 RX ORDER — OXYBUTYNIN CHLORIDE 5 MG/1
5 TABLET ORAL 2 TIMES DAILY
Status: DISCONTINUED | OUTPATIENT
Start: 2023-03-18 | End: 2023-03-18

## 2023-03-18 RX ORDER — PANTOPRAZOLE SODIUM 40 MG/1
40 TABLET, DELAYED RELEASE ORAL
Status: DISCONTINUED | OUTPATIENT
Start: 2023-03-19 | End: 2023-03-19 | Stop reason: HOSPADM

## 2023-03-18 RX ORDER — ASPIRIN 81 MG/1
81 TABLET ORAL DAILY
Status: ON HOLD | COMMUNITY
End: 2023-03-19 | Stop reason: HOSPADM

## 2023-03-18 RX ORDER — ACETAMINOPHEN 325 MG/1
650 TABLET ORAL EVERY 6 HOURS PRN
Status: DISCONTINUED | OUTPATIENT
Start: 2023-03-18 | End: 2023-03-19 | Stop reason: HOSPADM

## 2023-03-18 RX ORDER — DEXTROSE MONOHYDRATE 100 MG/ML
INJECTION, SOLUTION INTRAVENOUS CONTINUOUS PRN
Status: DISCONTINUED | OUTPATIENT
Start: 2023-03-18 | End: 2023-03-19 | Stop reason: HOSPADM

## 2023-03-18 RX ORDER — LORAZEPAM 0.5 MG/1
0.5 TABLET ORAL ONCE
Status: COMPLETED | OUTPATIENT
Start: 2023-03-18 | End: 2023-03-18

## 2023-03-18 RX ORDER — NITROFURANTOIN MACROCRYSTALS 50 MG/1
100 CAPSULE ORAL NIGHTLY
Status: DISCONTINUED | OUTPATIENT
Start: 2023-03-18 | End: 2023-03-19 | Stop reason: HOSPADM

## 2023-03-18 RX ADMIN — INSULIN LISPRO 5 UNITS: 100 INJECTION, SOLUTION INTRAVENOUS; SUBCUTANEOUS at 17:44

## 2023-03-18 RX ADMIN — DONEPEZIL HYDROCHLORIDE 10 MG: 5 TABLET ORAL at 19:56

## 2023-03-18 RX ADMIN — PROPRANOLOL HYDROCHLORIDE 20 MG: 20 TABLET ORAL at 19:56

## 2023-03-18 RX ADMIN — ASPIRIN 81 MG: 81 TABLET, CHEWABLE ORAL at 11:18

## 2023-03-18 RX ADMIN — LORAZEPAM 0.5 MG: 0.5 TABLET ORAL at 19:56

## 2023-03-18 RX ADMIN — SENNOSIDES AND DOCUSATE SODIUM 1 TABLET: 50; 8.6 TABLET ORAL at 16:29

## 2023-03-18 RX ADMIN — INSULIN LISPRO 1 UNITS: 100 INJECTION, SOLUTION INTRAVENOUS; SUBCUTANEOUS at 17:43

## 2023-03-18 RX ADMIN — ROSUVASTATIN 40 MG: 20 TABLET, FILM COATED ORAL at 16:29

## 2023-03-18 RX ADMIN — GABAPENTIN 300 MG: 300 CAPSULE ORAL at 19:56

## 2023-03-18 RX ADMIN — CEFTRIAXONE 1000 MG: 1 INJECTION, POWDER, FOR SOLUTION INTRAMUSCULAR; INTRAVENOUS at 16:47

## 2023-03-18 RX ADMIN — ASPIRIN 81 MG: 81 TABLET, COATED ORAL at 16:29

## 2023-03-18 RX ADMIN — AZITHROMYCIN MONOHYDRATE 500 MG: 250 TABLET ORAL at 16:29

## 2023-03-18 RX ADMIN — FLUTICASONE PROPIONATE 1 SPRAY: 50 SPRAY, METERED NASAL at 16:29

## 2023-03-18 RX ADMIN — GABAPENTIN 300 MG: 300 CAPSULE ORAL at 16:29

## 2023-03-18 RX ADMIN — BUSPIRONE HYDROCHLORIDE 7.5 MG: 5 TABLET ORAL at 19:56

## 2023-03-18 RX ADMIN — DULOXETINE HYDROCHLORIDE 60 MG: 30 CAPSULE, DELAYED RELEASE ORAL at 16:29

## 2023-03-18 RX ADMIN — SODIUM CHLORIDE 1000 ML: 9 INJECTION, SOLUTION INTRAVENOUS at 11:13

## 2023-03-18 RX ADMIN — LEVOFLOXACIN 750 MG: 5 INJECTION, SOLUTION INTRAVENOUS at 11:18

## 2023-03-18 RX ADMIN — INSULIN GLARGINE 15 UNITS: 100 INJECTION, SOLUTION SUBCUTANEOUS at 19:59

## 2023-03-18 RX ADMIN — NITROFURANTOIN MACROCRYSTALS 100 MG: 50 CAPSULE ORAL at 19:56

## 2023-03-18 RX ADMIN — LORAZEPAM 0.5 MG: 0.5 TABLET ORAL at 14:47

## 2023-03-18 RX ADMIN — HYDROCODONE BITARTRATE AND ACETAMINOPHEN 1 TABLET: 10; 325 TABLET ORAL at 19:56

## 2023-03-18 RX ADMIN — LEVOTHYROXINE SODIUM 25 MCG: 25 TABLET ORAL at 16:29

## 2023-03-18 ASSESSMENT — PAIN SCALES - GENERAL: PAINLEVEL_OUTOF10: 6

## 2023-03-18 NOTE — ED TRIAGE NOTES
Pt to ED with [facility] for altered mental status. Per pts family she has been having increasing confusion and hallucinations over the last few days. Pt is pleasantly confused but is having active hallucinations at this time.

## 2023-03-18 NOTE — ED NOTES
Straight cath performed per md order. Pt in nad. Cleaned, changed diaper, bed linens changed. Pt positioned for comfort.      Eleonora Contreras RN  03/18/23 8703

## 2023-03-18 NOTE — ED PROVIDER NOTES
37.6 %    Monocytes % 9.0 %    Eosinophils % 5.3 %    Basophils % 0.4 %    Neutrophils Absolute 3.4 2.0 - 7.5 K/uL    Immature Granulocytes # 0.0 K/uL    Lymphocytes Absolute 2.7 1.5 - 4.0 K/uL    Monocytes Absolute 0.6 0.2 - 0.8 K/uL    Eosinophils Absolute 0.4 0.0 - 0.4 K/uL    Basophils Absolute 0.0 0.0 - 0.1 K/uL   Comprehensive Metabolic Panel   Result Value Ref Range    Sodium 136 136 - 145 mmol/L    Potassium 4.3 3.4 - 5.1 mmol/L    Chloride 99 98 - 107 mmol/L    CO2 27 20 - 30 mmol/L    Anion Gap 10 3 - 16    Glucose 277 (H) 74 - 106 mg/dL    BUN 23 (H) 6 - 20 mg/dL    Creatinine 1.3 (H) 0.4 - 1.2 mg/dL    Est, Glom Filt Rate 41 (L) >59    Calcium 8.4 (L) 8.5 - 10.5 mg/dL    Total Protein 6.3 (L) 6.4 - 8.3 g/dL    Albumin 3.6 3.4 - 4.8 g/dL    Albumin/Globulin Ratio 1.3 0.8 - 2.0    Total Bilirubin 0.3 0.3 - 1.2 mg/dL    Alkaline Phosphatase 57 25 - 100 U/L    ALT <5 4 - 36 U/L    AST 14 8 - 33 U/L    Globulin 2.7 Not Established g/dL   Troponin   Result Value Ref Range    Troponin <0.30 <0.30 ng/mL   Lactic Acid Now and in 2 Hours   Result Value Ref Range    Lactic Acid 1.8 0.4 - 2.0 mmol/L   POCT Glucose   Result Value Ref Range    Glucose 253 mg/dL   POCT Glucose   Result Value Ref Range    POC Glucose 253 (H) 74 - 106 mg/dl    Performed on ACCU-CHEK         All other labs were within normal range or not returned as of this dictation.     EMERGENCY DEPARTMENT COURSE and DIFFERENTIAL DIAGNOSIS/MDM:   Vitals:    Vitals:    03/18/23 0852 03/18/23 0938 03/18/23 1016 03/18/23 1116   BP: 121/70  (!) 121/49 (!) 139/52   Pulse: 67  71 72   Resp: 18  17 21   Temp:  98.2 °F (36.8 °C)     TempSrc:  Oral     SpO2: 97%  95% 96%   Weight: 225 lb (102.1 kg)      Height: 5' 5\" (1.651 m)          MEDICATIONS ADMINISTERED IN ED:  Medications   0.9 % sodium chloride bolus (1,000 mLs IntraVENous New Bag 3/18/23 1113)   levoFLOXacin (LEVAQUIN) 750 MG/150ML infusion 750 mg (750 mg IntraVENous New Bag 3/18/23 1118)   aspirin

## 2023-03-18 NOTE — ED NOTES
Spoke with Dr Glenn Medeiros and updated her on patient's status, verbal orders given.      Sharon Lee RN  03/18/23 0857

## 2023-03-18 NOTE — ED NOTES
Called [facility] to request a consult with Neuro or who is on call with Neuro, concerning this patient.  Spoke with Kathi Rosario  03/18/23 0146

## 2023-03-19 VITALS
OXYGEN SATURATION: 96 % | DIASTOLIC BLOOD PRESSURE: 63 MMHG | HEIGHT: 65 IN | TEMPERATURE: 98 F | RESPIRATION RATE: 18 BRPM | SYSTOLIC BLOOD PRESSURE: 100 MMHG | WEIGHT: 225 LBS | HEART RATE: 64 BPM | BODY MASS INDEX: 37.49 KG/M2

## 2023-03-19 PROBLEM — H70.002 ACUTE MASTOIDITIS OF LEFT SIDE: Status: ACTIVE | Noted: 2023-03-19

## 2023-03-19 PROBLEM — F03.90 DEMENTIA (HCC): Status: ACTIVE | Noted: 2023-03-19

## 2023-03-19 LAB
ALBUMIN SERPL-MCNC: 3.4 G/DL (ref 3.4–4.8)
ALBUMIN/GLOB SERPL: 1.1 {RATIO} (ref 0.8–2)
ALP SERPL-CCNC: 57 U/L (ref 25–100)
ALT SERPL-CCNC: <5 U/L (ref 4–36)
ANION GAP SERPL CALCULATED.3IONS-SCNC: 8 MMOL/L (ref 3–16)
AST SERPL-CCNC: 17 U/L (ref 8–33)
BASOPHILS # BLD: 0 K/UL (ref 0–0.1)
BASOPHILS NFR BLD: 0.6 %
BILIRUB SERPL-MCNC: <0.2 MG/DL (ref 0.3–1.2)
BUN SERPL-MCNC: 17 MG/DL (ref 6–20)
CALCIUM SERPL-MCNC: 8.9 MG/DL (ref 8.5–10.5)
CHLORIDE SERPL-SCNC: 102 MMOL/L (ref 98–107)
CO2 SERPL-SCNC: 29 MMOL/L (ref 20–30)
CREAT SERPL-MCNC: 1.2 MG/DL (ref 0.4–1.2)
EOSINOPHIL # BLD: 0.3 K/UL (ref 0–0.4)
EOSINOPHIL NFR BLD: 4.7 %
ERYTHROCYTE [DISTWIDTH] IN BLOOD BY AUTOMATED COUNT: 12.9 % (ref 11–16)
GFR SERPLBLD CREATININE-BSD FMLA CKD-EPI: 45 ML/MIN/{1.73_M2}
GLOBULIN SER CALC-MCNC: 3 G/DL
GLUCOSE BLD-MCNC: 136 MG/DL (ref 74–106)
GLUCOSE BLD-MCNC: 147 MG/DL (ref 74–106)
GLUCOSE SERPL-MCNC: 133 MG/DL (ref 74–106)
HCT VFR BLD AUTO: 38.9 % (ref 37–47)
HGB BLD-MCNC: 12.3 G/DL (ref 11.5–16.5)
IMM GRANULOCYTES # BLD: 0 K/UL
IMM GRANULOCYTES NFR BLD: 0.3 % (ref 0–5)
LYMPHOCYTES # BLD: 2.6 K/UL (ref 1.5–4)
LYMPHOCYTES NFR BLD: 40 %
MCH RBC QN AUTO: 32.6 PG (ref 27–32)
MCHC RBC AUTO-ENTMCNC: 31.6 G/DL (ref 31–35)
MCV RBC AUTO: 103.2 FL (ref 80–100)
MONOCYTES # BLD: 0.7 K/UL (ref 0.2–0.8)
MONOCYTES NFR BLD: 10.5 %
NEUTROPHILS # BLD: 2.9 K/UL (ref 2–7.5)
NEUTS SEG NFR BLD: 43.9 %
PERFORMED ON: ABNORMAL
PERFORMED ON: ABNORMAL
PLATELET # BLD AUTO: 154 K/UL (ref 150–400)
PMV BLD AUTO: 11.8 FL (ref 6–10)
POTASSIUM SERPL-SCNC: 4 MMOL/L (ref 3.4–5.1)
PROT SERPL-MCNC: 6.4 G/DL (ref 6.4–8.3)
RBC # BLD AUTO: 3.77 M/UL (ref 3.8–5.8)
SODIUM SERPL-SCNC: 139 MMOL/L (ref 136–145)
WBC # BLD AUTO: 6.6 K/UL (ref 4–11)

## 2023-03-19 PROCEDURE — 36415 COLL VENOUS BLD VENIPUNCTURE: CPT

## 2023-03-19 PROCEDURE — 85025 COMPLETE CBC W/AUTO DIFF WBC: CPT

## 2023-03-19 PROCEDURE — 2580000003 HC RX 258: Performed by: PHYSICIAN ASSISTANT

## 2023-03-19 PROCEDURE — G0378 HOSPITAL OBSERVATION PER HR: HCPCS

## 2023-03-19 PROCEDURE — 99235 HOSP IP/OBS SAME DATE MOD 70: CPT | Performed by: INTERNAL MEDICINE

## 2023-03-19 PROCEDURE — 96376 TX/PRO/DX INJ SAME DRUG ADON: CPT

## 2023-03-19 PROCEDURE — 80053 COMPREHEN METABOLIC PANEL: CPT

## 2023-03-19 PROCEDURE — 6370000000 HC RX 637 (ALT 250 FOR IP): Performed by: PHYSICIAN ASSISTANT

## 2023-03-19 PROCEDURE — 6360000002 HC RX W HCPCS: Performed by: PHYSICIAN ASSISTANT

## 2023-03-19 RX ORDER — FLUTICASONE PROPIONATE 50 MCG
1 SPRAY, SUSPENSION (ML) NASAL DAILY
Qty: 16 G | Refills: 3 | Status: SHIPPED | OUTPATIENT
Start: 2023-03-20

## 2023-03-19 RX ORDER — AZITHROMYCIN 500 MG/1
500 TABLET, FILM COATED ORAL DAILY
Qty: 1 TABLET | Refills: 0 | Status: SHIPPED | OUTPATIENT
Start: 2023-03-20 | End: 2023-03-21

## 2023-03-19 RX ORDER — CEFDINIR 300 MG/1
300 CAPSULE ORAL 2 TIMES DAILY
Qty: 8 CAPSULE | Refills: 0 | Status: SHIPPED | OUTPATIENT
Start: 2023-03-19 | End: 2023-03-23

## 2023-03-19 RX ORDER — PROPRANOLOL HYDROCHLORIDE 40 MG/1
20 TABLET ORAL 2 TIMES DAILY
Qty: 90 TABLET | Refills: 3 | Status: SHIPPED
Start: 2023-03-19

## 2023-03-19 RX ADMIN — PROPRANOLOL HYDROCHLORIDE 20 MG: 20 TABLET ORAL at 09:23

## 2023-03-19 RX ADMIN — GABAPENTIN 300 MG: 300 CAPSULE ORAL at 09:23

## 2023-03-19 RX ADMIN — LEVOTHYROXINE SODIUM 25 MCG: 25 TABLET ORAL at 06:02

## 2023-03-19 RX ADMIN — LORAZEPAM 0.5 MG: 0.5 TABLET ORAL at 09:23

## 2023-03-19 RX ADMIN — HYDROCODONE BITARTRATE AND ACETAMINOPHEN 1 TABLET: 10; 325 TABLET ORAL at 09:23

## 2023-03-19 RX ADMIN — PANTOPRAZOLE SODIUM 40 MG: 40 TABLET, DELAYED RELEASE ORAL at 06:02

## 2023-03-19 RX ADMIN — INSULIN LISPRO 5 UNITS: 100 INJECTION, SOLUTION INTRAVENOUS; SUBCUTANEOUS at 09:24

## 2023-03-19 RX ADMIN — CEFTRIAXONE 1000 MG: 1 INJECTION, POWDER, FOR SOLUTION INTRAMUSCULAR; INTRAVENOUS at 12:13

## 2023-03-19 RX ADMIN — AZITHROMYCIN MONOHYDRATE 500 MG: 250 TABLET ORAL at 09:23

## 2023-03-19 RX ADMIN — APIXABAN 5 MG: 5 TABLET, FILM COATED ORAL at 12:10

## 2023-03-19 RX ADMIN — DULOXETINE HYDROCHLORIDE 60 MG: 30 CAPSULE, DELAYED RELEASE ORAL at 09:23

## 2023-03-19 RX ADMIN — FLUTICASONE PROPIONATE 1 SPRAY: 50 SPRAY, METERED NASAL at 09:24

## 2023-03-19 RX ADMIN — ASPIRIN 81 MG: 81 TABLET, COATED ORAL at 09:23

## 2023-03-19 RX ADMIN — BUSPIRONE HYDROCHLORIDE 7.5 MG: 5 TABLET ORAL at 09:23

## 2023-03-19 NOTE — DISCHARGE INSTRUCTIONS
Disposition: home with 24 hour care from family     Discharged Condition: Stable    Activity: activity as tolerated    Diet: diabetic diet    Follow Up: Primary Care Provider in one week. Follow up with cardiology and neurology as previously scheduled. Check your blood pressure everyday and write down readings to take to PCP.      Per neurology stop aspirin and restart eliquis

## 2023-03-19 NOTE — FLOWSHEET NOTE
03/19/23 0800   Assessment   Charting Type Shift assessment   Psychosocial   Psychosocial (WDL) X   Patient Behaviors Restless   Neurological   Neuro (WDL) X   Level of Consciousness 0   Cognition Follows commands; Impulsive;Poor judgement;Poor safety awareness   R Pupil Shape Round   L Pupil Shape Round   R Hand  Weak   L Hand  Weak   Orientation Level Oriented to person;Disoriented to place; Disoriented to time;Disoriented to situation   Speech Delayed responses   R Pupil Reaction Brisk   L Pupil Reaction Brisk   [place] Coma Scale   Eye Opening 4   Best Verbal Response 4   Best Motor Response 6   Giovanny Coma Scale Score 14   HEENT (Head, Ears, Eyes, Nose, & Throat)   HEENT (WDL) WDL   Respiratory   Respiratory (WDL) WDL   Respiratory Interventions Cough & deep breathe;H. O.B. elevated   Cardiac   Cardiac (WDL) WDL   Gastrointestinal   Abdominal (WDL) WDL   Abdomen Inspection Soft;Obese   Genitourinary   Genitourinary (WDL) X   Urine Assessment   Urinary Status Incontinent briefs   Urinary Incontinence Present   Urine Color CHAVA   Urine Appearance CHAVA   Urine Odor CHAVA   Peripheral Vascular   Peripheral Vascular (WDL) WDL   Skin Integumentary    Skin Integumentary (WDL) X   Skin Color Pale   Skin Condition/Temp Warm;Dry   Skin Integrity Abrasion   Location rt 2nd toe   Multiple Skin Integrity Sites No   Care Plan - Skin/Tissue Integrity Goals   Skin Integrity Remains Intact Monitor for areas of redness and/or skin breakdown;Assess vascular access sites hourly   Musculoskeletal   RUE Weakness   LUE Weakness   RL Extremity Weakness   LL Extremity Weakness   Care Plan - Discharge Planning Goals   Discharge to home or other facility with appropriate resources Identify barriers to discharge with patient and caregiver

## 2023-03-19 NOTE — PROGRESS NOTES
Orders to dc noted, iv dcd, daughter verbalizes understanding of dc instructions, home with family, transport by ems, nad noted.

## 2023-03-19 NOTE — PROGRESS NOTES
Patient medication list from [facility] Kris discharge on 2/16/2023      New Medications   Instructions Start Date   lisinopril 40 MG tablet  Commonly known as: PRINIVIL,ZESTRIL  40 mg, Oral, Every 24 Hours Scheduled  Start Date: February 17, 2023      Changes to Medications   Instructions Start Date   meclizine 25 MG tablet  Commonly known as: ANTIVERT  What changed:   · how much to take  · how to take this  · when to take this  TAKE 1 TABLET THREE TIMES DAILY AS NEEDED FOR DIZZINESS      Continue These Medications   Instructions Start Date   apixaban 5 MG tablet tablet  Commonly known as: ELIQUIS  5 mg, Oral, 2 Times Daily    busPIRone 7.5 MG tablet  Commonly known as: BUSPAR  7.5 mg, Oral, 2 Times Daily    donepezil 10 MG tablet  Commonly known as: ARICEPT  10 mg, Oral, Nightly    DULoxetine 60 MG capsule  Commonly known as: CYMBALTA  60 mg, Oral, Daily    gabapentin 300 MG capsule  Commonly known as: NEURONTIN  300 mg, Oral, 2 Times Daily    HYDROcodone-acetaminophen  MG per tablet  Commonly known as: NORCO  1 tablet, Oral, Every 6 Hours PRN    Insulin Lispro 100 UNIT/ML injection  Commonly known as: humaLOG  Subcutaneous, 3 Times Daily Before Meals    levothyroxine 25 MCG tablet  Commonly known as: SYNTHROID, LEVOTHROID  25 mcg, Oral, Daily    omeprazole 40 MG capsule  Commonly known as: priLOSEC  40 mg, Oral, Daily    QUEtiapine 50 MG tablet  Commonly known as: SEROquel  50 mg, Oral, Every 24 Hours    rosuvastatin 40 MG tablet  Commonly known as: CRESTOR  40 mg, Oral, Nightly    sennosides-docusate 8.6-50 MG per tablet  Commonly known as: PERICOLACE  2 tablets, Oral, 2 Times Daily      Stop These Medications   aspirin 81 MG EC tablet    metoprolol tartrate 25 MG tablet  Commonly known as: LOPRESSOR    oxybutynin 5 MG tablet  Commonly known as: DITROPAN    VITAMIN D2 PO

## 2023-03-19 NOTE — FLOWSHEET NOTE
03/18/23 2000   Assessment   Charting Type Admission   Psychosocial   Psychosocial (WDL) X   Patient Behaviors Restless   Neurological   Neuro (WDL) X   Level of Consciousness 0   Cognition Follows commands; Impulsive;Poor judgement;Poor safety awareness   R Pupil Shape Round   L Pupil Shape Round   R Hand  Weak   L Hand  Weak   Orientation Level Oriented to person;Disoriented to place; Disoriented to time;Disoriented to situation   Speech Delayed responses   R Pupil Reaction Brisk   L Pupil Reaction Brisk   Giovanny Coma Scale   Eye Opening 4   Best Verbal Response 4   Best Motor Response 6   Giovanny Coma Scale Score 14   HEENT (Head, Ears, Eyes, Nose, & Throat)   HEENT (WDL) WDL   Respiratory   Respiratory (WDL) WDL   Cardiac   Cardiac (WDL) WDL   Gastrointestinal   Abdominal (WDL) WDL   Abdomen Inspection Soft;Obese   Genitourinary   Genitourinary (WDL) X   Urine Assessment   Urine Color CHAVA   Urine Appearance CHAVA   Urine Odor CHAVA   Peripheral Vascular   Peripheral Vascular (WDL) WDL   Skin Integumentary    Skin Integumentary (WDL) X   Skin Color Pale   Skin Condition/Temp Dry; Warm   Musculoskeletal   Musculoskeletal (WDL) X   RUE Weakness   LUE Weakness   RL Extremity Weakness   LL Extremity Weakness

## 2023-03-19 NOTE — H&P
related, and Diazepam    Medications Prior to Admission:    Prior to Admission medications    Medication Sig Start Date End Date Taking? Authorizing Provider   propranolol (INDERAL) 40 MG tablet Take 40 mg by mouth 2 times daily 20mg in the am and 40mg in the evening. Yes Historical Provider, MD   aspirin EC 81 MG EC tablet Take 81 mg by mouth daily   Yes Historical Provider, MD   nystatin (MYCOSTATIN) 129524 UNIT/GM cream Apply topically as needed for Dry Skin Apply topically 2 times daily. Yes Historical Provider, MD   Menthol-Zinc Oxide (CALMOSEPTINE EX) Apply topically   Yes Historical Provider, MD   vitamin D (ERGOCALCIFEROL) 1.25 MG (54696 UT) CAPS capsule Take 50,000 Units by mouth once a week On Thursday   Yes Historical Provider, MD   Insulin Detemir (LEVEMIR SC) Inject 65 Units into the skin Has not been taking. Yes Historical Provider, MD   nitrofurantoin (MACRODANTIN) 100 MG capsule Take 100 mg by mouth at bedtime 3/15/23   Historical Provider, MD   gabapentin (NEURONTIN) 300 MG capsule Take 300 mg by mouth 3 times daily. Historical Provider, MD   lisinopril (PRINIVIL;ZESTRIL) 40 MG tablet Take 40 mg by mouth daily    Historical Provider, MD   meclizine (ANTIVERT) 25 MG tablet Take 25 mg by mouth 3 times daily as needed    Historical Provider, MD   sennosides-docusate sodium (SENOKOT-S) 8.6-50 MG tablet Take 1 tablet by mouth every other day    Historical Provider, MD   donepezil (ARICEPT) 10 MG tablet Take 10 mg by mouth nightly    Historical Provider, MD   levothyroxine (SYNTHROID) 25 MCG tablet Take 25 mcg by mouth Daily    Historical Provider, MD   rosuvastatin (CRESTOR) 40 MG tablet Take 40 mg by mouth every evening    Historical Provider, MD   HYDROcodone-acetaminophen (NORCO)  MG per tablet Take 1 tablet by mouth every 6 hours as needed for Pain.     Historical Provider, MD   insulin lispro (HUMALOG) 100 UNIT/ML injection vial Inject 13 Units into the skin 3 times daily (with

## 2023-03-19 NOTE — PLAN OF CARE
Problem: Discharge Planning  Goal: Discharge to home or other facility with appropriate resources  3/19/2023 1121 by Ruperto Parker RN  Outcome: Adequate for Discharge  3/19/2023 1120 by Ruperto Parker RN  Outcome: Progressing  Flowsheets (Taken 3/19/2023 0800)  Discharge to home or other facility with appropriate resources: Identify barriers to discharge with patient and caregiver     Problem: Safety - Adult  Goal: Free from fall injury  3/19/2023 1121 by Ruperto Parker RN  Outcome: Adequate for Discharge  3/19/2023 1120 by Ruperto Parker RN  Outcome: Progressing     Problem: ABCDS Injury Assessment  Goal: Absence of physical injury  3/19/2023 1121 by Ruperto Parker RN  Outcome: Adequate for Discharge  3/19/2023 1120 by Ruperto Parker RN  Outcome: Progressing  3/18/2023 2130 by Lieutenant Osmar LPN  Outcome: Progressing     Problem: Skin/Tissue Integrity  Goal: Absence of new skin breakdown  Description: 1. Monitor for areas of redness and/or skin breakdown  2. Assess vascular access sites hourly  3. Every 4-6 hours minimum:  Change oxygen saturation probe site  4. Every 4-6 hours:  If on nasal continuous positive airway pressure, respiratory therapy assess nares and determine need for appliance change or resting period. 3/19/2023 1121 by Ruperto Parker RN  Outcome: Adequate for Discharge  3/19/2023 1120 by Ruperto Parker RN  Outcome: Progressing     Problem: Confusion  Goal: Confusion, delirium, dementia, or psychosis is improved or at baseline  Description: INTERVENTIONS:  1. Assess for possible contributors to thought disturbance, including medications, impaired vision or hearing, underlying metabolic abnormalities, dehydration, psychiatric diagnoses, and notify attending LIP  2. [place] high risk fall precautions, as indicated  3. Provide frequent short contacts to provide reality reorientation, refocusing and direction  4.  Decrease environmental stimuli, including noise as
Problem: Discharge Planning  Goal: Discharge to home or other facility with appropriate resources  Outcome: Progressing  Flowsheets (Taken 3/19/2023 0800)  Discharge to home or other facility with appropriate resources: Identify barriers to discharge with patient and caregiver     Problem: Safety - Adult  Goal: Free from fall injury  Outcome: Progressing     Problem: ABCDS Injury Assessment  Goal: Absence of physical injury  3/19/2023 1120 by Cyn Beasley RN  Outcome: Progressing  3/18/2023 2130 by Corinne Becket, LPN  Outcome: Progressing     Problem: Skin/Tissue Integrity  Goal: Absence of new skin breakdown  Description: 1. Monitor for areas of redness and/or skin breakdown  2. Assess vascular access sites hourly  3. Every 4-6 hours minimum:  Change oxygen saturation probe site  4. Every 4-6 hours:  If on nasal continuous positive airway pressure, respiratory therapy assess nares and determine need for appliance change or resting period. Outcome: Progressing     Problem: Confusion  Goal: Confusion, delirium, dementia, or psychosis is improved or at baseline  Description: INTERVENTIONS:  1. Assess for possible contributors to thought disturbance, including medications, impaired vision or hearing, underlying metabolic abnormalities, dehydration, psychiatric diagnoses, and notify attending LIP  2. [place] high risk fall precautions, as indicated  3. Provide frequent short contacts to provide reality reorientation, refocusing and direction  4. Decrease environmental stimuli, including noise as appropriate  5. Monitor and intervene to maintain adequate nutrition, hydration, elimination, sleep and activity  6. If unable to ensure safety without constant attention obtain sitter and review sitter guidelines with assigned personnel  7.  Initiate Psychosocial CNS and Spiritual Care consult, as indicated  Outcome: Progressing
thought disturbance, including medications, impaired vision or hearing, underlying metabolic abnormalities, dehydration, psychiatric diagnoses, notify [place] high risk fall precautions, as indicated   Provide frequent short contacts to provide reality reorientation, refocusing and direction   Decrease environmental stimuli, including noise as appropriate   Monitor and intervene to maintain adequate nutrition, hydration, elimination, sleep and activity

## 2023-03-19 NOTE — DISCHARGE SUMMARY
doesn't develop focal deficit, administering a dose of aspirin, and treating infection with antibiotics. Per Dr. Stephanie Sanchez this imaging could just be sequela after recent strokes. She was admitted for observation overnight without acute issue. I reviewed her dsicharge summary from [facility] which recommended she stop aspirin and restart previously ordered eliquis 5 mg BID on 2/17 but family states she has not been taking eliquis. I called and discussed the case with neuro Dr. Stephanie Sanchez today and he recommends stopping aspirin and resuming eliquis as recommended at that discharge and following up with neuro as scheduled. Will discharge with antibiotic for possible pneumonia and acute mastoiditis. She is on room air. Afebrile. I discussed planned discharge with her daughter Kasey Ferrer over the phone now that she has been observed 24 hours per neuro recs and that I had already talked with neuro again today. She will be transported home by ambulance. Disposition: home with 24 hour care from family     Discharged Condition: Stable    Activity: activity as tolerated    Diet: diabetic diet    Follow Up: Primary Care Provider in one week. Follow up with cardiology and neurology as previously scheduled. Check your blood pressure everyday and write down readings to take to PCP.      Per neurology stop aspirin and restart eliquis       Discharge Medications:      Current Discharge Medication List             Details   apixaban (ELIQUIS) 5 MG TABS tablet Take 1 tablet by mouth 2 times daily  Qty: 60 tablet, Refills: 0      azithromycin (ZITHROMAX) 500 MG tablet Take 1 tablet by mouth daily for 1 dose  Qty: 1 tablet, Refills: 0      cefdinir (OMNICEF) 300 MG capsule Take 1 capsule by mouth 2 times daily for 4 days  Qty: 8 capsule, Refills: 0      fluticasone (FLONASE) 50 MCG/ACT nasal spray 1 spray by Each Nostril route daily  Qty: 16 g, Refills: 3                Details   propranolol (INDERAL) 40 MG tablet Take 0.5 tablets by

## 2023-03-22 LAB — BACTERIA BLD CULT: NORMAL

## 2023-03-31 ENCOUNTER — APPOINTMENT (OUTPATIENT)
Dept: GENERAL RADIOLOGY | Facility: HOSPITAL | Age: 83
End: 2023-03-31
Payer: MEDICARE

## 2023-03-31 ENCOUNTER — HOSPITAL ENCOUNTER (OUTPATIENT)
Facility: HOSPITAL | Age: 83
Setting detail: OBSERVATION
Discharge: HOME | End: 2023-04-12
Attending: EMERGENCY MEDICINE | Admitting: HOSPITALIST
Payer: MEDICARE

## 2023-03-31 ENCOUNTER — APPOINTMENT (OUTPATIENT)
Dept: CT IMAGING | Facility: HOSPITAL | Age: 83
End: 2023-03-31
Payer: MEDICARE

## 2023-03-31 DIAGNOSIS — R41.82 ALTERED MENTAL STATUS, UNSPECIFIED ALTERED MENTAL STATUS TYPE: Primary | ICD-10-CM

## 2023-03-31 DIAGNOSIS — R73.9 HYPERGLYCEMIA: ICD-10-CM

## 2023-03-31 DIAGNOSIS — R79.89 ELEVATED SERUM CREATININE: ICD-10-CM

## 2023-03-31 DIAGNOSIS — R77.8 ELEVATED TROPONIN: ICD-10-CM

## 2023-03-31 DIAGNOSIS — R40.0 SOMNOLENCE: ICD-10-CM

## 2023-03-31 DIAGNOSIS — R46.89 COMBATIVE BEHAVIOR: ICD-10-CM

## 2023-03-31 PROBLEM — R41.0 CONFUSION: Status: ACTIVE | Noted: 2023-03-31

## 2023-03-31 LAB
ALBUMIN SERPL-MCNC: 3.5 G/DL (ref 3.5–5.2)
ALBUMIN/GLOB SERPL: 1.2 G/DL
ALP SERPL-CCNC: 54 U/L (ref 39–117)
ALT SERPL W P-5'-P-CCNC: 5 U/L (ref 1–33)
AMMONIA BLD-SCNC: <10 UMOL/L (ref 11–51)
AMORPH URATE CRY URNS QL MICRO: ABNORMAL /HPF
AMPHET+METHAMPHET UR QL: NEGATIVE
AMPHETAMINES UR QL: NEGATIVE
ANION GAP SERPL CALCULATED.3IONS-SCNC: 9 MMOL/L (ref 5–15)
AST SERPL-CCNC: 17 U/L (ref 1–32)
B PARAPERT DNA SPEC QL NAA+PROBE: NOT DETECTED
B PERT DNA SPEC QL NAA+PROBE: NOT DETECTED
BACTERIA UR QL AUTO: ABNORMAL /HPF
BARBITURATES UR QL SCN: NEGATIVE
BASOPHILS # BLD AUTO: 0.03 10*3/MM3 (ref 0–0.2)
BASOPHILS NFR BLD AUTO: 0.4 % (ref 0–1.5)
BENZODIAZ UR QL SCN: POSITIVE
BILIRUB SERPL-MCNC: 0.2 MG/DL (ref 0–1.2)
BILIRUB UR QL STRIP: NEGATIVE
BUN SERPL-MCNC: 33 MG/DL (ref 8–23)
BUN/CREAT SERPL: 20 (ref 7–25)
BUPRENORPHINE SERPL-MCNC: NEGATIVE NG/ML
C PNEUM DNA NPH QL NAA+NON-PROBE: NOT DETECTED
CALCIUM SPEC-SCNC: 9.3 MG/DL (ref 8.6–10.5)
CANNABINOIDS SERPL QL: NEGATIVE
CHLORIDE SERPL-SCNC: 101 MMOL/L (ref 98–107)
CLARITY UR: ABNORMAL
CO2 SERPL-SCNC: 30 MMOL/L (ref 22–29)
COCAINE UR QL: NEGATIVE
COLOR UR: YELLOW
CREAT SERPL-MCNC: 1.65 MG/DL (ref 0.57–1)
D-LACTATE SERPL-SCNC: 1.1 MMOL/L (ref 0.5–2)
DEPRECATED RDW RBC AUTO: 47.7 FL (ref 37–54)
EGFRCR SERPLBLD CKD-EPI 2021: 31.1 ML/MIN/1.73
EOSINOPHIL # BLD AUTO: 0.23 10*3/MM3 (ref 0–0.4)
EOSINOPHIL NFR BLD AUTO: 3.3 % (ref 0.3–6.2)
ERYTHROCYTE [DISTWIDTH] IN BLOOD BY AUTOMATED COUNT: 12.6 % (ref 12.3–15.4)
ETHANOL BLD-MCNC: <10 MG/DL (ref 0–10)
FLUAV SUBTYP SPEC NAA+PROBE: NOT DETECTED
FLUBV RNA ISLT QL NAA+PROBE: NOT DETECTED
GLOBULIN UR ELPH-MCNC: 3 GM/DL
GLUCOSE SERPL-MCNC: 288 MG/DL (ref 65–99)
GLUCOSE UR STRIP-MCNC: ABNORMAL MG/DL
GRAN CASTS URNS QL MICRO: ABNORMAL /LPF
HADV DNA SPEC NAA+PROBE: NOT DETECTED
HCOV 229E RNA SPEC QL NAA+PROBE: NOT DETECTED
HCOV HKU1 RNA SPEC QL NAA+PROBE: NOT DETECTED
HCOV NL63 RNA SPEC QL NAA+PROBE: NOT DETECTED
HCOV OC43 RNA SPEC QL NAA+PROBE: NOT DETECTED
HCT VFR BLD AUTO: 35.1 % (ref 34–46.6)
HGB BLD-MCNC: 10.9 G/DL (ref 12–15.9)
HGB UR QL STRIP.AUTO: NEGATIVE
HMPV RNA NPH QL NAA+NON-PROBE: NOT DETECTED
HOLD SPECIMEN: NORMAL
HPIV1 RNA ISLT QL NAA+PROBE: NOT DETECTED
HPIV2 RNA SPEC QL NAA+PROBE: NOT DETECTED
HPIV3 RNA NPH QL NAA+PROBE: NOT DETECTED
HPIV4 P GENE NPH QL NAA+PROBE: NOT DETECTED
HYALINE CASTS UR QL AUTO: ABNORMAL /LPF
IMM GRANULOCYTES # BLD AUTO: 0.01 10*3/MM3 (ref 0–0.05)
IMM GRANULOCYTES NFR BLD AUTO: 0.1 % (ref 0–0.5)
KETONES UR QL STRIP: ABNORMAL
LEUKOCYTE ESTERASE UR QL STRIP.AUTO: ABNORMAL
LYMPHOCYTES # BLD AUTO: 2.17 10*3/MM3 (ref 0.7–3.1)
LYMPHOCYTES NFR BLD AUTO: 31.4 % (ref 19.6–45.3)
M PNEUMO IGG SER IA-ACNC: NOT DETECTED
MAGNESIUM SERPL-MCNC: 1.6 MG/DL (ref 1.6–2.4)
MCH RBC QN AUTO: 32.2 PG (ref 26.6–33)
MCHC RBC AUTO-ENTMCNC: 31.1 G/DL (ref 31.5–35.7)
MCV RBC AUTO: 103.5 FL (ref 79–97)
METHADONE UR QL SCN: NEGATIVE
MONOCYTES # BLD AUTO: 0.45 10*3/MM3 (ref 0.1–0.9)
MONOCYTES NFR BLD AUTO: 6.5 % (ref 5–12)
NEUTROPHILS NFR BLD AUTO: 4.02 10*3/MM3 (ref 1.7–7)
NEUTROPHILS NFR BLD AUTO: 58.3 % (ref 42.7–76)
NITRITE UR QL STRIP: NEGATIVE
NRBC BLD AUTO-RTO: 0 /100 WBC (ref 0–0.2)
OPIATES UR QL: POSITIVE
OXYCODONE UR QL SCN: NEGATIVE
PCP UR QL SCN: NEGATIVE
PH UR STRIP.AUTO: <=5 [PH] (ref 5–8)
PLATELET # BLD AUTO: 165 10*3/MM3 (ref 140–450)
PMV BLD AUTO: 12.4 FL (ref 6–12)
POTASSIUM SERPL-SCNC: 5 MMOL/L (ref 3.5–5.2)
PROCALCITONIN SERPL-MCNC: 0.06 NG/ML (ref 0–0.25)
PROPOXYPH UR QL: NEGATIVE
PROT SERPL-MCNC: 6.5 G/DL (ref 6–8.5)
PROT UR QL STRIP: ABNORMAL
RBC # BLD AUTO: 3.39 10*6/MM3 (ref 3.77–5.28)
RBC # UR STRIP: ABNORMAL /HPF
REF LAB TEST METHOD: ABNORMAL
RHINOVIRUS RNA SPEC NAA+PROBE: NOT DETECTED
RSV RNA NPH QL NAA+NON-PROBE: NOT DETECTED
SARS-COV-2 RNA NPH QL NAA+NON-PROBE: NOT DETECTED
SODIUM SERPL-SCNC: 140 MMOL/L (ref 136–145)
SP GR UR STRIP: 1.03 (ref 1–1.03)
SQUAMOUS #/AREA URNS HPF: ABNORMAL /HPF
TRICYCLICS UR QL SCN: NEGATIVE
TROPONIN T SERPL HS-MCNC: 45 NG/L
TSH SERPL DL<=0.05 MIU/L-ACNC: 1.81 UIU/ML (ref 0.27–4.2)
UROBILINOGEN UR QL STRIP: ABNORMAL
WBC # UR STRIP: ABNORMAL /HPF
WBC NRBC COR # BLD: 6.91 10*3/MM3 (ref 3.4–10.8)
WHOLE BLOOD HOLD COAG: NORMAL
WHOLE BLOOD HOLD SPECIMEN: NORMAL

## 2023-03-31 PROCEDURE — G0378 HOSPITAL OBSERVATION PER HR: HCPCS

## 2023-03-31 PROCEDURE — 82533 TOTAL CORTISOL: CPT | Performed by: INTERNAL MEDICINE

## 2023-03-31 PROCEDURE — 71045 X-RAY EXAM CHEST 1 VIEW: CPT

## 2023-03-31 PROCEDURE — 82077 ASSAY SPEC XCP UR&BREATH IA: CPT | Performed by: EMERGENCY MEDICINE

## 2023-03-31 PROCEDURE — 84443 ASSAY THYROID STIM HORMONE: CPT | Performed by: INTERNAL MEDICINE

## 2023-03-31 PROCEDURE — 87086 URINE CULTURE/COLONY COUNT: CPT | Performed by: INTERNAL MEDICINE

## 2023-03-31 PROCEDURE — 74018 RADEX ABDOMEN 1 VIEW: CPT

## 2023-03-31 PROCEDURE — 99285 EMERGENCY DEPT VISIT HI MDM: CPT

## 2023-03-31 PROCEDURE — 81001 URINALYSIS AUTO W/SCOPE: CPT | Performed by: EMERGENCY MEDICINE

## 2023-03-31 PROCEDURE — 70450 CT HEAD/BRAIN W/O DYE: CPT

## 2023-03-31 PROCEDURE — 82140 ASSAY OF AMMONIA: CPT | Performed by: EMERGENCY MEDICINE

## 2023-03-31 PROCEDURE — 93005 ELECTROCARDIOGRAM TRACING: CPT | Performed by: EMERGENCY MEDICINE

## 2023-03-31 PROCEDURE — 0202U NFCT DS 22 TRGT SARS-COV-2: CPT | Performed by: EMERGENCY MEDICINE

## 2023-03-31 PROCEDURE — 25010000002 VANCOMYCIN 10 G RECONSTITUTED SOLUTION

## 2023-03-31 PROCEDURE — 99223 1ST HOSP IP/OBS HIGH 75: CPT | Performed by: INTERNAL MEDICINE

## 2023-03-31 PROCEDURE — 85025 COMPLETE CBC W/AUTO DIFF WBC: CPT | Performed by: EMERGENCY MEDICINE

## 2023-03-31 PROCEDURE — 63710000001 ONDANSETRON PER 8 MG: Performed by: INTERNAL MEDICINE

## 2023-03-31 PROCEDURE — 84484 ASSAY OF TROPONIN QUANT: CPT | Performed by: EMERGENCY MEDICINE

## 2023-03-31 PROCEDURE — 80053 COMPREHEN METABOLIC PANEL: CPT | Performed by: EMERGENCY MEDICINE

## 2023-03-31 PROCEDURE — 80306 DRUG TEST PRSMV INSTRMNT: CPT | Performed by: EMERGENCY MEDICINE

## 2023-03-31 PROCEDURE — 83735 ASSAY OF MAGNESIUM: CPT | Performed by: EMERGENCY MEDICINE

## 2023-03-31 PROCEDURE — 36415 COLL VENOUS BLD VENIPUNCTURE: CPT

## 2023-03-31 PROCEDURE — 83605 ASSAY OF LACTIC ACID: CPT | Performed by: EMERGENCY MEDICINE

## 2023-03-31 PROCEDURE — 93005 ELECTROCARDIOGRAM TRACING: CPT

## 2023-03-31 PROCEDURE — 84145 PROCALCITONIN (PCT): CPT | Performed by: EMERGENCY MEDICINE

## 2023-03-31 RX ORDER — LEVOTHYROXINE SODIUM 0.03 MG/1
25 TABLET ORAL
Status: DISCONTINUED | OUTPATIENT
Start: 2023-04-01 | End: 2023-04-12 | Stop reason: HOSPADM

## 2023-03-31 RX ORDER — AMOXICILLIN 250 MG
2 CAPSULE ORAL 2 TIMES DAILY
Status: DISCONTINUED | OUTPATIENT
Start: 2023-04-01 | End: 2023-04-01

## 2023-03-31 RX ORDER — INSULIN LISPRO 100 [IU]/ML
0-7 INJECTION, SOLUTION INTRAVENOUS; SUBCUTANEOUS
Status: DISCONTINUED | OUTPATIENT
Start: 2023-04-01 | End: 2023-04-05

## 2023-03-31 RX ORDER — QUETIAPINE FUMARATE 25 MG/1
50 TABLET, FILM COATED ORAL DAILY
Status: DISCONTINUED | OUTPATIENT
Start: 2023-04-01 | End: 2023-04-12 | Stop reason: HOSPADM

## 2023-03-31 RX ORDER — VANCOMYCIN 2 GRAM/500 ML IN 0.9 % SODIUM CHLORIDE INTRAVENOUS
20 ONCE
Status: COMPLETED | OUTPATIENT
Start: 2023-03-31 | End: 2023-04-01

## 2023-03-31 RX ORDER — SODIUM CHLORIDE 0.9 % (FLUSH) 0.9 %
10 SYRINGE (ML) INJECTION AS NEEDED
Status: DISCONTINUED | OUTPATIENT
Start: 2023-03-31 | End: 2023-04-12 | Stop reason: HOSPADM

## 2023-03-31 RX ORDER — GABAPENTIN 300 MG/1
300 CAPSULE ORAL 2 TIMES DAILY
Status: DISCONTINUED | OUTPATIENT
Start: 2023-04-01 | End: 2023-04-12 | Stop reason: HOSPADM

## 2023-03-31 RX ORDER — DONEPEZIL HYDROCHLORIDE 10 MG/1
10 TABLET, FILM COATED ORAL NIGHTLY
Status: DISCONTINUED | OUTPATIENT
Start: 2023-04-01 | End: 2023-04-12 | Stop reason: HOSPADM

## 2023-03-31 RX ORDER — DULOXETIN HYDROCHLORIDE 60 MG/1
60 CAPSULE, DELAYED RELEASE ORAL DAILY
Status: DISCONTINUED | OUTPATIENT
Start: 2023-04-01 | End: 2023-04-12 | Stop reason: HOSPADM

## 2023-03-31 RX ORDER — ONDANSETRON 4 MG/1
4 TABLET, FILM COATED ORAL EVERY 6 HOURS PRN
Status: DISCONTINUED | OUTPATIENT
Start: 2023-03-31 | End: 2023-04-12 | Stop reason: HOSPADM

## 2023-03-31 RX ORDER — BISACODYL 10 MG
10 SUPPOSITORY, RECTAL RECTAL ONCE
Status: COMPLETED | OUTPATIENT
Start: 2023-03-31 | End: 2023-04-01

## 2023-03-31 RX ORDER — QUETIAPINE FUMARATE 25 MG/1
12.5 TABLET, FILM COATED ORAL NIGHTLY
Status: DISCONTINUED | OUTPATIENT
Start: 2023-04-01 | End: 2023-04-04

## 2023-03-31 RX ORDER — LORAZEPAM 2 MG/ML
1 INJECTION INTRAMUSCULAR EVERY 4 HOURS PRN
Status: DISPENSED | OUTPATIENT
Start: 2023-03-31 | End: 2023-04-07

## 2023-03-31 RX ORDER — HYDROCODONE BITARTRATE AND ACETAMINOPHEN 5; 325 MG/1; MG/1
1 TABLET ORAL EVERY 4 HOURS PRN
Status: DISCONTINUED | OUTPATIENT
Start: 2023-03-31 | End: 2023-04-07

## 2023-03-31 RX ORDER — ROSUVASTATIN CALCIUM 20 MG/1
40 TABLET, COATED ORAL NIGHTLY
Status: DISCONTINUED | OUTPATIENT
Start: 2023-04-01 | End: 2023-04-12 | Stop reason: HOSPADM

## 2023-03-31 RX ADMIN — ONDANSETRON HYDROCHLORIDE 4 MG: 4 TABLET, FILM COATED ORAL at 22:14

## 2023-03-31 RX ADMIN — SODIUM CHLORIDE 500 ML: 9 INJECTION, SOLUTION INTRAVENOUS at 20:26

## 2023-03-31 RX ADMIN — SODIUM CHLORIDE 500 ML: 9 INJECTION, SOLUTION INTRAVENOUS at 19:09

## 2023-03-31 RX ADMIN — VANCOMYCIN 2 GRAM/500 ML IN 0.9 % SODIUM CHLORIDE INTRAVENOUS 2000 MG: at 22:08

## 2023-03-31 RX ADMIN — HYDROCODONE BITARTRATE AND ACETAMINOPHEN 1 TABLET: 5; 325 TABLET ORAL at 22:05

## 2023-03-31 RX ADMIN — SODIUM CHLORIDE 1000 ML: 9 INJECTION, SOLUTION INTRAVENOUS at 21:07

## 2023-03-31 NOTE — LETTER
EMS Transport Request  For use at [facility], [place], [place], and [place] only   Patient Name: Cheri Cruz : 1940   Weight:95.3 kg (210 lb 1.6 oz) [facility] BLS/ALS: BLS/ALS: BLS   Insurance: [employer] MEDICARE REPLACEMENT Auth End Date: 4/15/2023   Pre-Cert #: D/C Summary complete:    Destination: Home FOR SABRINA: 24-hour notice needed and given, How many stairs none, Will the patient be on the main level yes, Is there a ramp available none, Can the patient stand and pivot no, Address [address] [place] [address], and Name/contact number for who will be present Radha Cruz [phone]   Contact Precautions: None   Equipment (O2, Fluids, etc.): None   Arrive By Date/Time: 2023 Stretcher/WC: Stretcher   CM Requesting: Leighton Luong RN Ext: [phone]   Notes/Medical Necessity: confusion, impaired trunk mobility     ______________________________________________________________________    *Only 2 patient bags OR 1 carry-on size bag are permitted.  Wheelchairs and walkers CANNOT transported with the patient. Acknowledge: Yes

## 2023-03-31 NOTE — Clinical Note
Level of Care: Telemetry [5]   Diagnosis: Confusion [193061]   Admitting Physician: MANJINDER NELSON [[id number]]   Attending Physician: MANJINDER NELSON [[id number]]

## 2023-04-01 PROBLEM — T83.511A UTI (URINARY TRACT INFECTION) DUE TO URINARY INDWELLING CATHETER: Status: ACTIVE | Noted: 2023-04-01

## 2023-04-01 PROBLEM — E03.9 ACQUIRED HYPOTHYROIDISM: Status: ACTIVE | Noted: 2023-04-01

## 2023-04-01 PROBLEM — D64.9 SYMPTOMATIC ANEMIA: Status: ACTIVE | Noted: 2023-04-01

## 2023-04-01 PROBLEM — I95.9 HYPOTENSION: Status: ACTIVE | Noted: 2023-04-01

## 2023-04-01 PROBLEM — N39.0 UTI (URINARY TRACT INFECTION) DUE TO URINARY INDWELLING CATHETER: Status: ACTIVE | Noted: 2023-04-01

## 2023-04-01 PROBLEM — K59.00 CONSTIPATION: Status: ACTIVE | Noted: 2023-04-01

## 2023-04-01 PROBLEM — G93.41 METABOLIC ENCEPHALOPATHY: Status: ACTIVE | Noted: 2023-04-01

## 2023-04-01 LAB
ANION GAP SERPL CALCULATED.3IONS-SCNC: 11 MMOL/L (ref 5–15)
BACTERIA SPEC AEROBE CULT: NO GROWTH
BUN SERPL-MCNC: 26 MG/DL (ref 8–23)
BUN/CREAT SERPL: 19.3 (ref 7–25)
CALCIUM SPEC-SCNC: 8.2 MG/DL (ref 8.6–10.5)
CHLORIDE SERPL-SCNC: 107 MMOL/L (ref 98–107)
CO2 SERPL-SCNC: 18 MMOL/L (ref 22–29)
CORTIS SERPL-MCNC: 7.9 MCG/DL
CREAT SERPL-MCNC: 1.35 MG/DL (ref 0.57–1)
D-LACTATE SERPL-SCNC: 1.5 MMOL/L (ref 0.5–2)
EGFRCR SERPLBLD CKD-EPI 2021: 39.6 ML/MIN/1.73
GEN 5 2HR TROPONIN T REFLEX: 39 NG/L
GLUCOSE BLDC GLUCOMTR-MCNC: 236 MG/DL (ref 70–130)
GLUCOSE BLDC GLUCOMTR-MCNC: 251 MG/DL (ref 70–130)
GLUCOSE BLDC GLUCOMTR-MCNC: 252 MG/DL (ref 70–130)
GLUCOSE BLDC GLUCOMTR-MCNC: 289 MG/DL (ref 70–130)
GLUCOSE BLDC GLUCOMTR-MCNC: 303 MG/DL (ref 70–130)
GLUCOSE SERPL-MCNC: 217 MG/DL (ref 65–99)
HCT VFR BLD AUTO: 34.5 % (ref 34–46.6)
HGB BLD-MCNC: 10.6 G/DL (ref 12–15.9)
POTASSIUM SERPL-SCNC: 4.8 MMOL/L (ref 3.5–5.2)
SODIUM SERPL-SCNC: 136 MMOL/L (ref 136–145)
TROPONIN T DELTA: 0 NG/L
TROPONIN T SERPL HS-MCNC: 39 NG/L
VANCOMYCIN SERPL-MCNC: 18.3 MCG/ML (ref 5–40)

## 2023-04-01 PROCEDURE — 63710000001 INSULIN LISPRO (HUMAN) PER 5 UNITS: Performed by: INTERNAL MEDICINE

## 2023-04-01 PROCEDURE — 82962 GLUCOSE BLOOD TEST: CPT

## 2023-04-01 PROCEDURE — 96374 THER/PROPH/DIAG INJ IV PUSH: CPT

## 2023-04-01 PROCEDURE — 80202 ASSAY OF VANCOMYCIN: CPT

## 2023-04-01 PROCEDURE — 85014 HEMATOCRIT: CPT | Performed by: INTERNAL MEDICINE

## 2023-04-01 PROCEDURE — G0378 HOSPITAL OBSERVATION PER HR: HCPCS

## 2023-04-01 PROCEDURE — 85018 HEMOGLOBIN: CPT | Performed by: INTERNAL MEDICINE

## 2023-04-01 PROCEDURE — 99232 SBSQ HOSP IP/OBS MODERATE 35: CPT | Performed by: INTERNAL MEDICINE

## 2023-04-01 PROCEDURE — 83605 ASSAY OF LACTIC ACID: CPT | Performed by: INTERNAL MEDICINE

## 2023-04-01 PROCEDURE — 25010000002 CEFTRIAXONE PER 250 MG: Performed by: INTERNAL MEDICINE

## 2023-04-01 PROCEDURE — 80048 BASIC METABOLIC PNL TOTAL CA: CPT | Performed by: INTERNAL MEDICINE

## 2023-04-01 PROCEDURE — 96361 HYDRATE IV INFUSION ADD-ON: CPT

## 2023-04-01 PROCEDURE — 84484 ASSAY OF TROPONIN QUANT: CPT | Performed by: INTERNAL MEDICINE

## 2023-04-01 RX ORDER — BISACODYL 5 MG/1
5 TABLET, DELAYED RELEASE ORAL DAILY PRN
Status: DISCONTINUED | OUTPATIENT
Start: 2023-04-01 | End: 2023-04-12 | Stop reason: HOSPADM

## 2023-04-01 RX ORDER — POLYETHYLENE GLYCOL 3350 17 G/17G
17 POWDER, FOR SOLUTION ORAL DAILY PRN
Status: DISCONTINUED | OUTPATIENT
Start: 2023-04-01 | End: 2023-04-12 | Stop reason: HOSPADM

## 2023-04-01 RX ORDER — NYSTATIN 100000 [USP'U]/G
POWDER TOPICAL EVERY 12 HOURS SCHEDULED
Status: DISCONTINUED | OUTPATIENT
Start: 2023-04-01 | End: 2023-04-12 | Stop reason: HOSPADM

## 2023-04-01 RX ORDER — SODIUM CHLORIDE 9 MG/ML
75 INJECTION, SOLUTION INTRAVENOUS CONTINUOUS
Status: DISCONTINUED | OUTPATIENT
Start: 2023-04-01 | End: 2023-04-04

## 2023-04-01 RX ORDER — AMOXICILLIN 250 MG
2 CAPSULE ORAL 2 TIMES DAILY
Status: DISCONTINUED | OUTPATIENT
Start: 2023-04-01 | End: 2023-04-12 | Stop reason: HOSPADM

## 2023-04-01 RX ORDER — BISACODYL 10 MG
10 SUPPOSITORY, RECTAL RECTAL DAILY PRN
Status: DISCONTINUED | OUTPATIENT
Start: 2023-04-01 | End: 2023-04-12 | Stop reason: HOSPADM

## 2023-04-01 RX ORDER — CASTOR OIL AND BALSAM, PERU 788; 87 MG/G; MG/G
1 OINTMENT TOPICAL EVERY 12 HOURS SCHEDULED
Status: DISCONTINUED | OUTPATIENT
Start: 2023-04-01 | End: 2023-04-12 | Stop reason: HOSPADM

## 2023-04-01 RX ORDER — PANTOPRAZOLE SODIUM 40 MG/10ML
40 INJECTION, POWDER, LYOPHILIZED, FOR SOLUTION INTRAVENOUS DAILY
Status: DISCONTINUED | OUTPATIENT
Start: 2023-04-01 | End: 2023-04-02

## 2023-04-01 RX ADMIN — BISACODYL 10 MG: 10 SUPPOSITORY RECTAL at 01:15

## 2023-04-01 RX ADMIN — SODIUM CHLORIDE 100 ML/HR: 9 INJECTION, SOLUTION INTRAVENOUS at 17:57

## 2023-04-01 RX ADMIN — DONEPEZIL HYDROCHLORIDE 10 MG: 10 TABLET ORAL at 00:57

## 2023-04-01 RX ADMIN — ROSUVASTATIN 40 MG: 20 TABLET, FILM COATED ORAL at 20:19

## 2023-04-01 RX ADMIN — CASTOR OIL AND BALSAM, PERU 1 APPLICATION: 788; 87 OINTMENT TOPICAL at 20:20

## 2023-04-01 RX ADMIN — INSULIN LISPRO 3 UNITS: 100 INJECTION, SOLUTION INTRAVENOUS; SUBCUTANEOUS at 08:13

## 2023-04-01 RX ADMIN — DULOXETINE 60 MG: 60 CAPSULE, DELAYED RELEASE ORAL at 08:13

## 2023-04-01 RX ADMIN — SENNOSIDES AND DOCUSATE SODIUM 2 TABLET: 50; 8.6 TABLET ORAL at 08:18

## 2023-04-01 RX ADMIN — INSULIN LISPRO 4 UNITS: 100 INJECTION, SOLUTION INTRAVENOUS; SUBCUTANEOUS at 16:45

## 2023-04-01 RX ADMIN — PANTOPRAZOLE SODIUM 40 MG: 40 INJECTION, POWDER, LYOPHILIZED, FOR SOLUTION INTRAVENOUS at 08:13

## 2023-04-01 RX ADMIN — SENNOSIDES AND DOCUSATE SODIUM 2 TABLET: 50; 8.6 TABLET ORAL at 00:57

## 2023-04-01 RX ADMIN — QUETIAPINE FUMARATE 12.5 MG: 25 TABLET ORAL at 00:57

## 2023-04-01 RX ADMIN — ROSUVASTATIN 40 MG: 20 TABLET, FILM COATED ORAL at 01:01

## 2023-04-01 RX ADMIN — NYSTATIN: 100000 POWDER TOPICAL at 13:45

## 2023-04-01 RX ADMIN — GABAPENTIN 300 MG: 300 CAPSULE ORAL at 00:57

## 2023-04-01 RX ADMIN — QUETIAPINE FUMARATE 50 MG: 25 TABLET ORAL at 08:13

## 2023-04-01 RX ADMIN — SENNOSIDES AND DOCUSATE SODIUM 2 TABLET: 50; 8.6 TABLET ORAL at 20:19

## 2023-04-01 RX ADMIN — DONEPEZIL HYDROCHLORIDE 10 MG: 10 TABLET ORAL at 20:19

## 2023-04-01 RX ADMIN — Medication 10 ML: at 08:13

## 2023-04-01 RX ADMIN — LEVOTHYROXINE SODIUM 25 MCG: 25 TABLET ORAL at 08:13

## 2023-04-01 RX ADMIN — SODIUM CHLORIDE 1000 ML: 9 INJECTION, SOLUTION INTRAVENOUS at 03:28

## 2023-04-01 RX ADMIN — QUETIAPINE FUMARATE 12.5 MG: 25 TABLET ORAL at 20:19

## 2023-04-01 RX ADMIN — CASTOR OIL AND BALSAM, PERU 1 APPLICATION: 788; 87 OINTMENT TOPICAL at 13:45

## 2023-04-01 RX ADMIN — NYSTATIN: 100000 POWDER TOPICAL at 20:20

## 2023-04-01 RX ADMIN — GABAPENTIN 300 MG: 300 CAPSULE ORAL at 20:19

## 2023-04-01 RX ADMIN — SODIUM CHLORIDE 1 G: 900 INJECTION INTRAVENOUS at 01:01

## 2023-04-01 RX ADMIN — GABAPENTIN 300 MG: 300 CAPSULE ORAL at 08:17

## 2023-04-01 RX ADMIN — INSULIN LISPRO 5 UNITS: 100 INJECTION, SOLUTION INTRAVENOUS; SUBCUTANEOUS at 11:55

## 2023-04-01 NOTE — PLAN OF CARE
Goal Outcome Evaluation:   Nursing reports that family stated the patient is bed bound. Skilled PT not appropriate. Will sign off.

## 2023-04-01 NOTE — NURSING NOTE
"Reason for Wound, Ostomy and Continence (WOC) Nursing Consultation: \"Pressure injury to coccyx\"    Patient in bed, appears to be confused, not making much sense conversationally but did help me roll onto her side to assess wound as well has clean incontinent stool.  She however was beginning to push back after little bit of time and seemed confused as to why I was trying to clean up her stool.  Family/support person not present.      Wounds noted by WOC:  1.Wound Assessment  Wound Type: Pressure Injury Deep Tissue Pressure Injury (DTPI)--present on admission  Location: Right coccyx  Measurements: 1 x 1.5 x 0  Wound Bed: non-blanchable and purple  Periwound Skin: blanchable, non-blanchable, pink and redness --midline coccyx linear ITD skin peeling blanchable  Drainage Characteristics/Odor: none  Drainage Amount: none  Pain: Yes   Care provided: Cleanse with pH balance no rinse foam soap and blue wipes, thick layer of Z guard applied, cleanse incontinent stool, new dry flow applied, turned to the other side with wedge  Notes: We will order Venelex to be applied to this wound as well as over top Z guard.  Will not order mist therapy as patient was somewhat noncompliant with laying on side and was confused.  Also the wound is not entirely too large.  Wound Image:       2.Wound Assessment  Wound Type: Neuropathic   Small 0.4 x 0.4 dry scab  Second anterior toe right foot  Painted with Betadine and offloaded with heel boots      There was notation of a ankle pressure injury in flowsheet and it was charted as a stage I.  As of now I cannot find it, which means it has likely resolved.  Excellent work by nursing staff offloading of ankle and heels, her heels and ankles present with no discoloration.    Recommendation(s) for management of wound:   -Refer to wound care orders for specific instructions on how to treat/manage wound.  -Will order Low Air Loss specialty bed, will order Venelex ointment, please continue to turn " with wedge every 2 hour as well as keep heels offloaded  -Practice pressure injury prevention protocol.    Most recent Donovan Scale score:  Sensory Perception: 3-->slightly limited  Moisture: 2-->very moist  Activity: 1-->bedfast  Mobility: 2-->very limited  Nutrition: 3-->adequate  Friction and Shear: 1-->problem  Donovan Score: 12 (04/01/23 0822)   Specialty support surface: Foam Mattress with Waffle Overlay   --will order Low Air Loss please make sure Low Air Loss specialty bed is placed in the setting of alternating pressure as well as Low Air Loss on the main screen.    Pressure Injury Prevention Protocol (initiate for Donovan Score of 18 or less):   *Keep skin dry, turn q 2 hr, keep heels elevated and offloaded with offloading heel boots.    *Apply z-guard to bottom and bony prominences daily and as needed with incontinence episodes.  *Follow C.A.R.E protocol if medical devices (Bipap, red, Ng tube, etc) are being used.     WOC Team will continue to follow.  Please re-consult if the wound(s) worsens.

## 2023-04-01 NOTE — H&P
[facility] Medicine Services  HISTORY AND PHYSICAL    Patient Name: Cheri Cruz  : 1940  MRN: [id number]  Primary Care Physician: Lorrie Canada APRN    Subjective   Subjective     Chief Complaint:confusion    HPI:  Cheri Cruz is a 82 y.o. female who  has a past medical history of  Anxiety, Arthritis, Atrial fibrillation, Dementia , Diabetes mellitus , Hyperlipidemia, Hypertension, Stroke earlier this year with subsequent right side weakness and increasing speech difficulties, as well as Uterus cancer (HCC)who  presented to the ED from home for increasing confusion. Per the family patient is chronically bed ridden, but eats well and can chat. Over the last 2 days she has had decreased appetite, no BM, and increasing agitation. They deny fever, cough or other concern. They care for her at home.   They reports she was in terrible shape when she came home from the hospital but they have   Taken good care of her skin breakdown. They report alternating constipation and diarrhea.    Review of Systems   Patient denies weight loss, headaches, changes in vision, fever, chills, sore throat, shortness of breath, cough, nausea or vomiting, diarrhea, abdominal pain or distension, change in urine output, joint pain, rash, itching or bleeding.    Otherwise complete ROS is negative except as mentioned in the HPI.    Personal History     Past Medical History:   Diagnosis Date   • Abnormal heart rhythm    • Anxiety    • Arrhythmia    • Arthritis    • Atrial fibrillation (HCC)    • Dementia (HCC)    • Diabetes mellitus (HCC)    • Hyperlipidemia    • Hypertension    • Kidney disorder    • Stroke (HCC)    • Uterus cancer (HCC)        Past Surgical History:   Procedure Laterality Date   • CATARACT EXTRACTION, BILATERAL         Family History: family history includes Alcohol abuse in her brother; Cancer in her brother, father, and mother; Congenital heart disease in her mother; Heart disease  in her mother.     Social History:  reports that she has never smoked. She has never used smokeless tobacco. She reports that she does not drink alcohol and does not use drugs.  Her  is still living, her daughters help care for her at home. She has been bedridden for some time due to her sever osteoarthritis and inability to ambulate.    Medications:  DULoxetine, HYDROcodone-acetaminophen, Insulin Lispro, QUEtiapine, apixaban, busPIRone, donepezil, gabapentin, levothyroxine, lisinopril, meclizine, omeprazole, rosuvastatin, and sennosides-docusate    Allergies   Allergen Reactions   • Penicillins    • Sulfa Antibiotics    • Tetracyclines & Related Unknown (See Comments)   • Valium [Diazepam] Anxiety       Objective   Objective     Vital Signs:   Temp:  [97.6 °F (36.4 °C)] 97.6 °F (36.4 °C)  Heart Rate:  [55-62] 60  Resp:  [14-16] 16  BP: ()/() 130/64  Flow (L/min):  [3] 3  Chronically on oxygen for the last year  Constitutional: Awake, alert, interactive and pleasant, talk gibberish but does interact, she seems to see things in the room  Eyes: clear sclerae, no conjunctival injection  HENT: NCAT, mucous membranes moist  Neck: no masses or lymphadenopathy, trachea midline  Respiratory: good breath sounds bilaterally, respirations unlabored  Cardiovascular: RRR, no murmurs appreciated, palpable peripheral pulses  Abdomen:  soft, no HSM or masses palpable, not tender or distended  Musculoskeletal: No peripheral edema, clubbing or cyanosis, legs are thin  Neurologic: Oriented x 3,                      Slight decreased in sterngth in her right arm,hand                     Cranial Nerves grossly intact, speech clear  Skin: No rashes or jaundice, she has minimal redness in her groin  Psychiatric: cooperative but restless      Result Review:  I have personally reviewed the results from the time of this admission   to 3/31/2023 23:55 EDT and agree with these findings:  [x]  Laboratory  [x]   Microbiology  [x]  Radiology  []  EKG/Telemetry   []  Cardiology/Vascular   []  Pathology  [x]  Old records  []  Other:  Most notable findings include: macrocytosis, old infarct on CT, drop in hemoglobin, increased stool on KUB      LAB RESULTS:      Lab 03/31/23 1833   WBC 6.91   HEMOGLOBIN 10.9*   HEMATOCRIT 35.1   PLATELETS 165   NEUTROS ABS 4.02   IMMATURE GRANS (ABS) 0.01   LYMPHS ABS 2.17   MONOS ABS 0.45   EOS ABS 0.23   .5*   PROCALCITONIN 0.06   LACTATE 1.1         Lab 03/31/23  1833   SODIUM 140   POTASSIUM 5.0   CHLORIDE 101   CO2 30.0*   ANION GAP 9.0   BUN 33*   CREATININE 1.65*   EGFR 31.1*   GLUCOSE 288*   CALCIUM 9.3   MAGNESIUM 1.6   TSH 1.810         Lab 03/31/23  1833   TOTAL PROTEIN 6.5   ALBUMIN 3.5   GLOBULIN 3.0   ALT (SGPT) 5   AST (SGOT) 17   BILIRUBIN 0.2   ALK PHOS 54         Lab 03/31/23  1833   HSTROP T 45*                 Brief Urine Lab Results  (Last result in the past 365 days)      Color   Clarity   Blood   Leuk Est   Nitrite   Protein   CREAT   Urine HCG        03/31/23 1621 Yellow   Cloudy   Negative   Trace   Negative   30 mg/dL (1+)               COVID19   Date Value Ref Range Status   03/31/2023 Not Detected Not Detected - Ref. Range Final       CT Head Without Contrast    Result Date: 3/31/2023  CT HEAD WO CONTRAST Date of Exam: 3/31/2023 7:13 PM EDT Indication: ams, poorly responsive, confusion. Comparison: CT 2/11/2023, 2/10/2023 Technique: Axial CT images were obtained of the head without contrast administration.  Reconstructed coronal and sagittal images were also obtained. Automated exposure control and iterative construction methods were used. Findings: There is no evidence of hemorrhage. There is no mass effect or midline shift. Encephalomalacia is present within the left parietal lobe extending to the left temporal lobe, similar as compared to the previous study related to previous infarct. Mild areas  of hyperdensity are present which may represent previous  blood products versus calcifications, also stable as compared to the previous study. No new areas of hypodensity identified. There is no extracerebral collection. Ventricles are normal in size and configuration for patient's stated age.  Posterior fossa is within normal limits. Calvarium and skull base appear intact.  Visualized sinuses show no air fluid levels. Visualized orbits are unremarkable.     Impression: Impression: 1. No acute intracranial process. 2. Stable encephalomalacia present within the left parietotemporal region related to previous infarct, unchanged as compared to the previous study. Redemonstration of a small amount of hyperdensity present within the area of infarction likely related to calcification and sequela of previous small hemorrhage, stable as compared to the previous study. , Electronically Signed: Luli Mane  3/31/2023 7:29 PM EDT  Workstation ID: [id number]    XR Chest 1 View    Result Date: 3/31/2023  XR CHEST 1 VW Date of Exam: 3/31/2023 3:58 PM EDT Indication: Weak/Dizzy/AMS triage protocol. Comparison: 1/10/2023 Findings: Lung volumes remain diminished with some basilar atelectasis seen on the left. There is no new focal opacity otherwise. There is no effusion or pneumothorax. Unchanged heart and mediastinal contours.     Impression: Impression: Redemonstrated hypoventilatory appearance of the chest, including prominent left basilar atelectasis. Electronically Signed: Luis Manuel Myers  3/31/2023 4:11 PM EDT  Workstation ID: [id number]    XR Abdomen KUB    Result Date: 3/31/2023  XR ABDOMEN KUB Date of Exam: 3/31/2023 9:41 PM EDT Indication: abd pain, eval for constipation. Comparison: None available. Findings: The bowel gas pattern is nonspecific and nonobstructive. Moderate colonic stool burden. No dilated small bowel loops. No gross pneumoperitoneum within limits of technique. Right femoral intramedullary no fixation noted.     Impression: Impression: 1. Nonspecific bowel gas pattern.  2. Moderate stool in the colon. Electronically Signed: Alvaro Tariq  3/31/2023 10:04 PM EDT  Workstation ID: [id number]      Results for orders placed during the hospital encounter of 02/10/23    Adult Transthoracic Echo Complete W/ Cont if Necessary Per Protocol    Interpretation Summary  •  Left ventricular systolic function is hyperdynamic (EF > 70%). Calculated left ventricular EF = 70.6% Left ventricular ejection fraction appears to be greater than 70%. The left ventricular cavity is small in size. Left ventricular wall thickness is consistent with mild concentric hypertrophy. All left ventricular wall segments contract normally. Left ventricular intracavitary gradient noted to be 71 mmHg. Left ventricular diastolic function is consistent with (grade I) impaired relaxation. Normal left atrial pressure.  •  The aortic valve is abnormal in structure. There is mild calcification of the aortic valve mainly affecting the right coronary cusp(s). The aortic valve appears trileaflet. No aortic valve regurgitation is present. Gradient noted through the LV and LVOT    Compared to TTE report from  Dec 2019, hyperdynamic LV with intracavitary gradient is not a new finding but peak gradient noted on this exam is higher than previously described.      The patient qualifies to receive the vaccine, but they have not yet received it.    Assessment & Plan   Assessment / Plan       Confusion    Hypotension    Type 2 diabetes mellitus without complication, without long-term current use of insulin (HCC)    Osteoarthritis    Anxiety    History of ischemic left MCA stroke    History of pulmonary embolus (PE)    Constipation    UTI (urinary tract infection) due to urinary indwelling catheter (HCC)    Metabolic encephalopathy    Symptomatic anemia    Acquired hypothyroidism      Assessment & Plan:  Cheri Cruz is a 82 y.o. female who  has a past medical history of  Anxiety, Arthritis, Atrial fibrillation, Dementia , Diabetes  mellitus , Hyperlipidemia, Hypertension, Stroke earlier this year with subsequent right side weakness and increasing speech difficulties, as well as Uterus cancer (HCC)who  presented to the ED from home for increasing confusion.    Confusion  Vascular dementia  Recent Stroke  -cont seroquel  -consider EEG for atypical seizure as her symptoms due wax and wane  -lortab per family helps the most due to her chronic OA pain  -cont aricept    Hypotension  -hydrate  -hold BP meds    UTI  -recent urine culture grew enterococcus 2/23  -will give rocephin a dose of vanc and follow culture results  -need good bowel habits    Constipation  -check for impaction, KUB reviewed  -start more aggressive bowel regimen for now    ANEMIA  -hgb has dropped  -cont to trend, check stool for blood  -start PPI  -HOLD eliquis  --GI consult if proves to be dropping    T2DM  -SS insulin    Chronic pain  -on lortab at home    Hypothryoidism  -cont synthroid-- not sure TSH has settled yet    DVT prophylaxis:mechanical  No DVT prophylaxis order currently exists.    CODE STATUS:FULL CODe per family     Expected Discharge 4/3/23      Electronically signed by Ramila Packer MD 03/31/23 23:55 EDT

## 2023-04-01 NOTE — PLAN OF CARE
Problem: Behavioral Health Comorbidity  Goal: Maintenance of Behavioral Health Symptom Control  Outcome: Ongoing, Progressing  Intervention: Maintain Behavioral Health Symptom Control  Recent Flowsheet Documentation  Taken 4/1/2023 0822 by Anne Kennedy RN  Medication Review/Management: medications reviewed     Problem: COPD (Chronic Obstructive Pulmonary Disease) Comorbidity  Goal: Maintenance of COPD Symptom Control  Outcome: Ongoing, Progressing  Intervention: Maintain COPD-Symptom Control  Recent Flowsheet Documentation  Taken 4/1/2023 0822 by Anne Kennedy RN  Medication Review/Management: medications reviewed     Problem: Diabetes Comorbidity  Goal: Blood Glucose Level Within Targeted Range  Outcome: Ongoing, Progressing     Problem: Heart Failure Comorbidity  Goal: Maintenance of Heart Failure Symptom Control  Outcome: Ongoing, Progressing  Intervention: Maintain Heart Failure-Management  Recent Flowsheet Documentation  Taken 4/1/2023 0822 by Anne Kennedy RN  Medication Review/Management: medications reviewed     Problem: Hypertension Comorbidity  Goal: Blood Pressure in Desired Range  Outcome: Ongoing, Progressing  Intervention: Maintain Blood Pressure Management  Recent Flowsheet Documentation  Taken 4/1/2023 0822 by Anne Kennedy RN  Medication Review/Management: medications reviewed     Problem: Osteoarthritis Comorbidity  Goal: Maintenance of Osteoarthritis Symptom Control  Outcome: Ongoing, Progressing  Intervention: Maintain Osteoarthritis Symptom Control  Recent Flowsheet Documentation  Taken 4/1/2023 1650 by Anne Kennedy RN  Activity Management: activity adjusted per tolerance  Taken 4/1/2023 1412 by Anne Kennedy RN  Activity Management: activity adjusted per tolerance  Taken 4/1/2023 1220 by Anne Kennedy RN  Activity Management: activity adjusted per tolerance  Taken 4/1/2023 1016 by Anne Kennedy RN  Activity Management: activity adjusted per tolerance  Taken 4/1/2023  0822 by Anne Kennedy RN  Activity Management: activity adjusted per tolerance  Medication Review/Management: medications reviewed     Problem: Pain Chronic (Persistent) (Comorbidity Management)  Goal: Acceptable Pain Control and Functional Ability  Outcome: Ongoing, Progressing  Intervention: Manage Persistent Pain  Recent Flowsheet Documentation  Taken 4/1/2023 0822 by Anne Kennedy RN  Medication Review/Management: medications reviewed  Intervention: Optimize Psychosocial Wellbeing  Recent Flowsheet Documentation  Taken 4/1/2023 0822 by Anne Kennedy RN  Diversional Activities: television  Family/Support System Care:   self-care encouraged   support provided     Problem: Skin Injury Risk Increased  Goal: Skin Health and Integrity  Outcome: Ongoing, Progressing  Intervention: Optimize Skin Protection  Recent Flowsheet Documentation  Taken 4/1/2023 1650 by Anne Kennedy RN  Pressure Reduction Techniques: heels elevated off bed  Head of Bed (HOB) Positioning: HOB elevated  Pressure Reduction Devices: specialty bed utilized  Skin Protection:   adhesive use limited   incontinence pads utilized  Taken 4/1/2023 1412 by Anne Kennedy RN  Head of Bed (HOB) Positioning: HOB elevated  Taken 4/1/2023 1220 by Anne Kennedy RN  Head of Bed (HOB) Positioning: HOB elevated  Taken 4/1/2023 1016 by Anne Kennedy RN  Head of Bed (HOB) Positioning: HOB elevated  Taken 4/1/2023 0822 by Anne Kennedy RN  Pressure Reduction Techniques:   heels elevated off bed   weight shift assistance provided  Head of Bed (HOB) Positioning: HOB at 15 degrees  Pressure Reduction Devices:   pressure-redistributing mattress utilized   heel offloading device utilized  Skin Protection:   adhesive use limited   incontinence pads utilized     Problem: Fall Injury Risk  Goal: Absence of Fall and Fall-Related Injury  Outcome: Ongoing, Progressing  Intervention: Identify and Manage Contributors  Recent Flowsheet Documentation  Taken  4/1/2023 1650 by Anne Kennedy RN  Self-Care Promotion: independence encouraged  Taken 4/1/2023 1412 by Anne Kennedy RN  Self-Care Promotion: independence encouraged  Taken 4/1/2023 1220 by Anne Kennedy RN  Self-Care Promotion: independence encouraged  Taken 4/1/2023 1016 by Anne Kennedy RN  Self-Care Promotion: independence encouraged  Taken 4/1/2023 0822 by Anne Kennedy RN  Medication Review/Management: medications reviewed  Self-Care Promotion: independence encouraged  Intervention: Promote Injury-Free Environment  Recent Flowsheet Documentation  Taken 4/1/2023 1650 by Anne Kennedy RN  Safety Promotion/Fall Prevention:   activity supervised   assistive device/personal items within reach   safety round/check completed  Taken 4/1/2023 1412 by Anne Kennedy RN  Safety Promotion/Fall Prevention:   activity supervised   assistive device/personal items within reach   safety round/check completed  Taken 4/1/2023 1220 by Anne Kennedy RN  Safety Promotion/Fall Prevention:   activity supervised   assistive device/personal items within reach   safety round/check completed  Taken 4/1/2023 1016 by Anne Kennedy RN  Safety Promotion/Fall Prevention:   activity supervised   assistive device/personal items within reach   safety round/check completed  Taken 4/1/2023 0822 by Anne Kennedy RN  Safety Promotion/Fall Prevention:   activity supervised   assistive device/personal items within reach   clutter free environment maintained   fall prevention program maintained   lighting adjusted   muscle strengthening facilitated   nonskid shoes/slippers when out of bed   room organization consistent   safety round/check completed     Problem: Adult Inpatient Plan of Care  Goal: Plan of Care Review  Outcome: Ongoing, Progressing  Flowsheets (Taken 4/1/2023 1706)  Progress: no change  Plan of Care Reviewed With:   patient   caregiver  Goal: Patient-Specific Goal (Individualized)  Outcome: Ongoing,  Progressing  Goal: Absence of Hospital-Acquired Illness or Injury  Outcome: Ongoing, Progressing  Intervention: Identify and Manage Fall Risk  Recent Flowsheet Documentation  Taken 4/1/2023 1650 by Anne Kennedy RN  Safety Promotion/Fall Prevention:   activity supervised   assistive device/personal items within reach   safety round/check completed  Taken 4/1/2023 1412 by Anne Kennedy RN  Safety Promotion/Fall Prevention:   activity supervised   assistive device/personal items within reach   safety round/check completed  Taken 4/1/2023 1220 by Anne Kennedy RN  Safety Promotion/Fall Prevention:   activity supervised   assistive device/personal items within reach   safety round/check completed  Taken 4/1/2023 1016 by Anne Kennedy RN  Safety Promotion/Fall Prevention:   activity supervised   assistive device/personal items within reach   safety round/check completed  Taken 4/1/2023 0822 by Anne Kennedy RN  Safety Promotion/Fall Prevention:   activity supervised   assistive device/personal items within reach   clutter free environment maintained   fall prevention program maintained   lighting adjusted   muscle strengthening facilitated   nonskid shoes/slippers when out of bed   room organization consistent   safety round/check completed  Intervention: Prevent Skin Injury  Recent Flowsheet Documentation  Taken 4/1/2023 1650 by Anne Kennedy RN  Body Position: supine, legs elevated  Skin Protection:   adhesive use limited   incontinence pads utilized  Taken 4/1/2023 1412 by Anne Kennedy RN  Body Position: tilted  Taken 4/1/2023 1220 by Anne Kennedy RN  Body Position:   tilted   right  Taken 4/1/2023 1016 by Anne Kennedy RN  Body Position:   tilted   right  Taken 4/1/2023 0822 by Anne Kennedy RN  Body Position: tilted  Skin Protection:   adhesive use limited   incontinence pads utilized  Intervention: Prevent and Manage VTE (Venous Thromboembolism) Risk  Recent Flowsheet  Documentation  Taken 4/1/2023 1650 by Anne Kennedy RN  Activity Management: activity adjusted per tolerance  VTE Prevention/Management: patient refused intervention  Range of Motion: active ROM (range of motion) encouraged  Taken 4/1/2023 1412 by Anne Kennedy RN  Activity Management: activity adjusted per tolerance  VTE Prevention/Management: patient refused intervention  Taken 4/1/2023 1220 by Anne Kennedy RN  Activity Management: activity adjusted per tolerance  VTE Prevention/Management: patient refused intervention  Range of Motion: active ROM (range of motion) encouraged  Taken 4/1/2023 1016 by Anne Kennedy RN  Activity Management: activity adjusted per tolerance  VTE Prevention/Management: patient refused intervention  Taken 4/1/2023 0822 by Anne Kennedy RN  Activity Management: activity adjusted per tolerance  VTE Prevention/Management: patient refused intervention  Range of Motion: active ROM (range of motion) encouraged  Intervention: Prevent Infection  Recent Flowsheet Documentation  Taken 4/1/2023 1650 by Anne Kennedy RN  Infection Prevention:   hand hygiene promoted   rest/sleep promoted  Taken 4/1/2023 1412 by Anne Kennedy RN  Infection Prevention:   hand hygiene promoted   rest/sleep promoted  Taken 4/1/2023 1220 by Anne Kennedy RN  Infection Prevention: hand hygiene promoted  Taken 4/1/2023 1016 by Anne Kennedy RN  Infection Prevention:   hand hygiene promoted   rest/sleep promoted  Taken 4/1/2023 0822 by Anne Kennedy RN  Infection Prevention:   hand hygiene promoted   rest/sleep promoted  Goal: Optimal Comfort and Wellbeing  Outcome: Ongoing, Progressing  Intervention: Provide Person-Centered Care  Recent Flowsheet Documentation  Taken 4/1/2023 1650 by Anne Kennedy RN  Trust Relationship/Rapport: care explained  Taken 4/1/2023 1412 by Anne Kennedy RN  Trust Relationship/Rapport: care explained  Taken 4/1/2023 1220 by Anne Kennedy RN  Trust  Relationship/Rapport: care explained  Taken 4/1/2023 1016 by Anne Kennedy RN  Trust Relationship/Rapport: care explained  Taken 4/1/2023 0822 by Anne Kennedy RN  Trust Relationship/Rapport:   care explained   choices provided   questions answered   questions encouraged   reassurance provided  Goal: Readiness for Transition of Care  Outcome: Ongoing, Progressing   Goal Outcome Evaluation:  Plan of Care Reviewed With: patient, caregiver      Pt currently in bed resting quietly. No complaints of pain or discomfort at this time. SKUDS refused. Fluids encouraged and at bedside. Specialty bed utilized. Pt disoriented x4. Vitals WNL on 1L NC. Pt turned q2 while in bed. Wound care consulted. Pt assisted with feeding. No other observations at this time. Will continue to monitor, call bell in reach.  Progress: no change

## 2023-04-01 NOTE — PROGRESS NOTES
Psychiatric Medicine Services  PROGRESS NOTE    Patient Name: Cheri Cruz  : 1940  MRN: [id number]    Date of Admission: 3/31/2023  Primary Care Physician: Lorrie Canada APRN    Subjective   Subjective     CC:  confusion    HPI:  Resting in bed in no acute distress and complains of right lower quadrant discomfort.    ROS:  No more detail could be obtained because of mental status.    Objective   Objective     Vital Signs:   Temp:  [97.4 °F (36.3 °C)-98.2 °F (36.8 °C)] 98 °F (36.7 °C)  Heart Rate:  [55-74] 74  Resp:  [16-20] 20  BP: ()/() 119/48  Flow (L/min):  [1-2] 1     Physical Exam:  Constitutional: No acute distress  HENT: NCAT, mucous membranes moist  Respiratory: Clear to auscultation bilaterally, respiratory effort normal   Cardiovascular: RRR, no murmurs, rubs, or gallops  Gastrointestinal: Positive bowel sounds, soft, mild right lower quadrant tenderness, nondistended  Musculoskeletal: No bilateral ankle edema  Psychiatric: Appropriate affect, cooperative  Neurologic: Awake, alert, disoriented, follows commands, speech clear  Skin: No rashes    Results Reviewed:  LAB RESULTS:      Lab 23  0420 23  0031 23  1833   WBC  --   --  6.91   HEMOGLOBIN  --  10.6* 10.9*   HEMATOCRIT  --  34.5 35.1   PLATELETS  --   --  165   NEUTROS ABS  --   --  4.02   IMMATURE GRANS (ABS)  --   --  0.01   LYMPHS ABS  --   --  2.17   MONOS ABS  --   --  0.45   EOS ABS  --   --  0.23   MCV  --   --  103.5*   PROCALCITONIN  --   --  0.06   LACTATE 1.5  --  1.1         Lab 23  0726 23  1833   SODIUM 136 140   POTASSIUM 4.8 5.0   CHLORIDE 107 101   CO2 18.0* 30.0*   ANION GAP 11.0 9.0   BUN 26* 33*   CREATININE 1.35* 1.65*   EGFR 39.6* 31.1*   GLUCOSE 217* 288*   CALCIUM 8.2* 9.3   MAGNESIUM  --  1.6   TSH  --  1.810         Lab 23  1833   TOTAL PROTEIN 6.5   ALBUMIN 3.5   GLOBULIN 3.0   ALT (SGPT) 5   AST (SGOT) 17   BILIRUBIN 0.2   ALK PHOS 54          Lab 04/01/23  0726 04/01/23  0420 03/31/23  1833   HSTROP T 39* 39* 45*                 Brief Urine Lab Results  (Last result in the past 365 days)      Color   Clarity   Blood   Leuk Est   Nitrite   Protein   CREAT   Urine HCG        03/31/23 1621 Yellow   Cloudy   Negative   Trace   Negative   30 mg/dL (1+)                 Microbiology Results Abnormal     Procedure Component Value - Date/Time    Urine Culture - Urine, Straight Cath [[id number]]  (Normal) Collected: 03/31/23 1621    Lab Status: Preliminary result Specimen: Urine from Straight Cath Updated: 04/01/23 1122     Urine Culture No growth    COVID PRE-OP / PRE-PROCEDURE SCREENING ORDER (NO ISOLATION) - Swab, Nasopharynx [[id number]]  (Normal) Collected: 03/31/23 1759    Lab Status: Final result Specimen: Swab from Nasopharynx Updated: 03/31/23 1854    Narrative:      The following orders were created for panel order COVID PRE-OP / PRE-PROCEDURE SCREENING ORDER (NO ISOLATION) - Swab, Nasopharynx.  Procedure                               Abnormality         Status                     ---------                               -----------         ------                     Respiratory Panel PCR w/...[[id number]]  Normal              Final result                 Please view results for these tests on the individual orders.    Respiratory Panel PCR w/COVID-19(SARS-CoV-2) TOMMY/SABRINA/DYLLAN/PAD/COR/MAD/KATHLEEN In-House, NP Swab in UTM/VTM, 3-4 HR TAT - Swab, Nasopharynx [[id number]]  (Normal) Collected: 03/31/23 1759    Lab Status: Final result Specimen: Swab from Nasopharynx Updated: 03/31/23 1854     ADENOVIRUS, PCR Not Detected     Coronavirus 229E Not Detected     Coronavirus HKU1 Not Detected     Coronavirus NL63 Not Detected     Coronavirus OC43 Not Detected     COVID19 Not Detected     Human Metapneumovirus Not Detected     Human Rhinovirus/Enterovirus Not Detected     Influenza A PCR Not Detected     Influenza B PCR Not Detected     Parainfluenza Virus 1 Not  Detected     Parainfluenza Virus 2 Not Detected     Parainfluenza Virus 3 Not Detected     Parainfluenza Virus 4 Not Detected     RSV, PCR Not Detected     Bordetella pertussis pcr Not Detected     Bordetella parapertussis PCR Not Detected     Chlamydophila pneumoniae PCR Not Detected     Mycoplasma pneumo by PCR Not Detected    Narrative:      In the setting of a positive respiratory panel with a viral infection PLUS a negative procalcitonin without other underlying concern for bacterial infection, consider observing off antibiotics or discontinuation of antibiotics and continue supportive care. If the respiratory panel is positive for atypical bacterial infection (Bordetella pertussis, Chlamydophila pneumoniae, or Mycoplasma pneumoniae), consider antibiotic de-escalation to target atypical bacterial infection.          CT Head Without Contrast    Result Date: 3/31/2023  CT HEAD WO CONTRAST Date of Exam: 3/31/2023 7:13 PM EDT Indication: ams, poorly responsive, confusion. Comparison: CT 2/11/2023, 2/10/2023 Technique: Axial CT images were obtained of the head without contrast administration.  Reconstructed coronal and sagittal images were also obtained. Automated exposure control and iterative construction methods were used. Findings: There is no evidence of hemorrhage. There is no mass effect or midline shift. Encephalomalacia is present within the left parietal lobe extending to the left temporal lobe, similar as compared to the previous study related to previous infarct. Mild areas  of hyperdensity are present which may represent previous blood products versus calcifications, also stable as compared to the previous study. No new areas of hypodensity identified. There is no extracerebral collection. Ventricles are normal in size and configuration for patient's stated age.  Posterior fossa is within normal limits. Calvarium and skull base appear intact.  Visualized sinuses show no air fluid levels. Visualized  orbits are unremarkable.     Impression: Impression: 1. No acute intracranial process. 2. Stable encephalomalacia present within the left parietotemporal region related to previous infarct, unchanged as compared to the previous study. Redemonstration of a small amount of hyperdensity present within the area of infarction likely related to calcification and sequela of previous small hemorrhage, stable as compared to the previous study. , Electronically Signed: Luli Mane  3/31/2023 7:29 PM EDT  Workstation ID: [id number]    XR Chest 1 View    Result Date: 3/31/2023  XR CHEST 1 VW Date of Exam: 3/31/2023 3:58 PM EDT Indication: Weak/Dizzy/AMS triage protocol. Comparison: 1/10/2023 Findings: Lung volumes remain diminished with some basilar atelectasis seen on the left. There is no new focal opacity otherwise. There is no effusion or pneumothorax. Unchanged heart and mediastinal contours.     Impression: Impression: Redemonstrated hypoventilatory appearance of the chest, including prominent left basilar atelectasis. Electronically Signed: Luis Manuel Myers  3/31/2023 4:11 PM EDT  Workstation ID: [id number]    XR Abdomen KUB    Result Date: 3/31/2023  XR ABDOMEN KUB Date of Exam: 3/31/2023 9:41 PM EDT Indication: abd pain, eval for constipation. Comparison: None available. Findings: The bowel gas pattern is nonspecific and nonobstructive. Moderate colonic stool burden. No dilated small bowel loops. No gross pneumoperitoneum within limits of technique. Right femoral intramedullary no fixation noted.     Impression: Impression: 1. Nonspecific bowel gas pattern. 2. Moderate stool in the colon. Electronically Signed: Alvaro Tariq  3/31/2023 10:04 PM EDT  Workstation ID: [id number]      Results for orders placed during the hospital encounter of 02/10/23    Adult Transthoracic Echo Complete W/ Cont if Necessary Per Protocol    Interpretation Summary  •  Left ventricular systolic function is hyperdynamic (EF > 70%). Calculated left  ventricular EF = 70.6% Left ventricular ejection fraction appears to be greater than 70%. The left ventricular cavity is small in size. Left ventricular wall thickness is consistent with mild concentric hypertrophy. All left ventricular wall segments contract normally. Left ventricular intracavitary gradient noted to be 71 mmHg. Left ventricular diastolic function is consistent with (grade I) impaired relaxation. Normal left atrial pressure.  •  The aortic valve is abnormal in structure. There is mild calcification of the aortic valve mainly affecting the right coronary cusp(s). The aortic valve appears trileaflet. No aortic valve regurgitation is present. Gradient noted through the LV and LVOT    Compared to TTE report from  Dec 2019, hyperdynamic LV with intracavitary gradient is not a new finding but peak gradient noted on this exam is higher than previously described.      Current medications:  Scheduled Meds:castor oil-balsam peru, 1 application, Topical, Q12H  [START ON 4/2/2023] cefTRIAXone, 2 g, Intravenous, Q24H  donepezil, 10 mg, Oral, Nightly  DULoxetine, 60 mg, Oral, Daily  gabapentin, 300 mg, Oral, BID  insulin lispro, 0-7 Units, Subcutaneous, TID With Meals  levothyroxine, 25 mcg, Oral, Q AM  nystatin, , Topical, Q12H  pantoprazole, 40 mg, Intravenous, Daily  QUEtiapine, 12.5 mg, Oral, Nightly  QUEtiapine, 50 mg, Oral, Daily  rosuvastatin, 40 mg, Oral, Nightly  senna-docusate sodium, 2 tablet, Oral, BID      Continuous Infusions:sodium chloride, 100 mL/hr      PRN Meds:.•  senna-docusate sodium **AND** polyethylene glycol **AND** bisacodyl **AND** bisacodyl  •  HYDROcodone-acetaminophen  •  LORazepam  •  ondansetron  •  sodium chloride    Assessment & Plan   Assessment & Plan     Active Hospital Problems    Diagnosis  POA   • **Confusion [R41.0]  Yes   • Hypotension [I95.9]  Yes   • Constipation [K59.00]  Yes   • UTI (urinary tract infection) due to urinary indwelling catheter [T83.511A, N39.0]  Yes    • Metabolic encephalopathy [G93.41]  Yes   • Symptomatic anemia [D64.9]  Yes   • Acquired hypothyroidism [E03.9]  Yes   • History of pulmonary embolus (PE) [Z86.711]  Yes   • History of ischemic left MCA stroke [Z86.73]  Not Applicable   • Osteoarthritis [M19.90]  Yes   • Type 2 diabetes mellitus without complication, without long-term current use of insulin (HCC) [E11.9]  Yes   • Anxiety [F41.9]  Yes      Resolved Hospital Problems   No resolved problems to display.        Brief Hospital Course to date:  Cheri Cruz is a 82 y.o. female  past medical history of  Anxiety, Arthritis, Atrial fibrillation, Dementia , Diabetes mellitus , Hyperlipidemia, Hypertension, Stroke earlier this year with subsequent right side weakness and increasing speech difficulties, as well as Uterus cancer (HCC)who  presented to the ED from home for increasing confusion.     Confusion  Vascular dementia  Recent Stroke  -cont seroquel  -Clinically seems to be at baseline     Hypotension  -Continue IV fluid  -hold BP meds  -If necessary we will start on midodrine     UTI ruled out.  -UA is pretty bland there is only trace leukocyte Estrace and nitrite is negative.  Urine culture is also negative       Constipation     ANEMIA  -hgb has dropped, monitor for now     T2DM  -SS insulin     Chronic pain  -on lortab at home     Hypothryoidism  -cont synthroid-- not sure TSH has settled yet            Expected Discharge Location and Transportation: To be determined  Expected Discharge to be determined       DVT prophylaxis:  Mechanical DVT prophylaxis orders are present.     AM-PAC 6 Clicks Score (PT): 6 (03/31/23 3589)    CODE STATUS:   Code Status and Medical Interventions:   Ordered at: 04/01/23 0023     Code Status (Patient has no pulse and is not breathing):    CPR (Attempt to Resuscitate)     Medical Interventions (Patient has pulse or is breathing):    Full       Maxime Browne MD  04/01/23

## 2023-04-01 NOTE — PROGRESS NOTES
"Pharmacy Consult-Vancomycin Dosing  Cheri Cruz is a  82 y.o. female receiving vancomycin therapy.     Indication:UTI  Consulting Provider: hospitalist  ID Consult:     Goal Trough: 15-20    Current Antimicrobial Therapy  Anti-Infectives (From admission, onward)      Ordered     Dose/Rate Route Frequency Start Stop    03/31/23 2112  vancomycin (dosing per levels)        Ordering Provider: William Irwin, PharmD     Does not apply Daily 04/01/23 0900 04/04/23 0859    03/31/23 2112  vancomycin IVPB 2000 mg in 0.9% Sodium Chloride (premix) 500 mL        Ordering Provider: William Irwin, PharmD    20 mg/kg × 95.3 kg  over 120 Minutes Intravenous Once 03/31/23 2114 03/31/23 2110  Pharmacy to dose vancomycin        Ordering Provider: Ramila Packer MD     Does not apply Continuous PRN 03/31/23 2110 04/03/23 2109            Allergies  Allergies as of 03/31/2023 - Reviewed 03/31/2023   Allergen Reaction Noted    Penicillins  10/11/2017    Sulfa antibiotics  10/11/2017    Tetracyclines & related Unknown (See Comments) 12/28/2010    Valium [diazepam] Anxiety 09/23/2019       Labs    Results from last 7 days   Lab Units 03/31/23  1833   BUN mg/dL 33*   CREATININE mg/dL 1.65*       Results from last 7 days   Lab Units 03/31/23  1833   WBC 10*3/mm3 6.91       Evaluation of Dosing     Last Dose Received in the ED/Outside Facility: none  Is Patient on Dialysis or Renal Replacement: no    Ht - 170.2 cm (67\")  Wt - 95.3 kg (210 lb)    Estimated Creatinine Clearance: 31.7 mL/min (A) (by C-G formula based on SCr of 1.65 mg/dL (H)).    Intake & Output (last 3 days)       None            Microbiology and Radiology  Microbiology Results (last 10 days)       Procedure Component Value - Date/Time    COVID PRE-OP / PRE-PROCEDURE SCREENING ORDER (NO ISOLATION) - Swab, Nasopharynx [[id number]]  (Normal) Collected: 03/31/23 1759    Lab Status: Final result Specimen: Swab from Nasopharynx Updated: 03/31/23 8188    Narrative:   "    The following orders were created for panel order COVID PRE-OP / PRE-PROCEDURE SCREENING ORDER (NO ISOLATION) - Swab, Nasopharynx.  Procedure                               Abnormality         Status                     ---------                               -----------         ------                     Respiratory Panel PCR w/...[[id number]]  Normal              Final result                 Please view results for these tests on the individual orders.    Respiratory Panel PCR w/COVID-19(SARS-CoV-2) TOMMY/SABRINA/DYLLAN/PAD/COR/MAD/KATHLEEN In-House, NP Swab in UTM/VTM, 3-4 HR TAT - Swab, Nasopharynx [[id number]]  (Normal) Collected: 03/31/23 9238    Lab Status: Final result Specimen: Swab from Nasopharynx Updated: 03/31/23 8465     ADENOVIRUS, PCR Not Detected     Coronavirus 229E Not Detected     Coronavirus HKU1 Not Detected     Coronavirus NL63 Not Detected     Coronavirus OC43 Not Detected     COVID19 Not Detected     Human Metapneumovirus Not Detected     Human Rhinovirus/Enterovirus Not Detected     Influenza A PCR Not Detected     Influenza B PCR Not Detected     Parainfluenza Virus 1 Not Detected     Parainfluenza Virus 2 Not Detected     Parainfluenza Virus 3 Not Detected     Parainfluenza Virus 4 Not Detected     RSV, PCR Not Detected     Bordetella pertussis pcr Not Detected     Bordetella parapertussis PCR Not Detected     Chlamydophila pneumoniae PCR Not Detected     Mycoplasma pneumo by PCR Not Detected    Narrative:      In the setting of a positive respiratory panel with a viral infection PLUS a negative procalcitonin without other underlying concern for bacterial infection, consider observing off antibiotics or discontinuation of antibiotics and continue supportive care. If the respiratory panel is positive for atypical bacterial infection (Bordetella pertussis, Chlamydophila pneumoniae, or Mycoplasma pneumoniae), consider antibiotic de-escalation to target atypical bacterial infection.            Vancomycin  Levels:                        Assessment/Plan:  The patient will be started on a bolus of 20mg/kg for a dose of 2000mg . Given the patient's current renal status, will dose per random levels and obtain a random level with morning labs before ordering another dose.  Due to infection severity, will target a trough of 15-20.    Pharmacy will continue to follow the patient’s culture results and clinical progress daily.    William Irwin, MeraD

## 2023-04-01 NOTE — PLAN OF CARE
Goal Outcome Evaluation:  Plan of Care Reviewed With: patient Alert to self only. Hypotensive this shift with fluid bolus given.Antibiotics given per orders. DTI present on admission, positioned for comfort by staff sing skin interventions. AM labs for further evaluation.

## 2023-04-02 LAB
BASOPHILS # BLD AUTO: 0.03 10*3/MM3 (ref 0–0.2)
BASOPHILS NFR BLD AUTO: 0.5 % (ref 0–1.5)
DEPRECATED RDW RBC AUTO: 49 FL (ref 37–54)
EOSINOPHIL # BLD AUTO: 0.23 10*3/MM3 (ref 0–0.4)
EOSINOPHIL NFR BLD AUTO: 4.2 % (ref 0.3–6.2)
ERYTHROCYTE [DISTWIDTH] IN BLOOD BY AUTOMATED COUNT: 12.6 % (ref 12.3–15.4)
GLUCOSE BLDC GLUCOMTR-MCNC: 212 MG/DL (ref 70–130)
GLUCOSE BLDC GLUCOMTR-MCNC: 219 MG/DL (ref 70–130)
GLUCOSE BLDC GLUCOMTR-MCNC: 223 MG/DL (ref 70–130)
HCT VFR BLD AUTO: 32.5 % (ref 34–46.6)
HGB BLD-MCNC: 9.8 G/DL (ref 12–15.9)
IMM GRANULOCYTES # BLD AUTO: 0.07 10*3/MM3 (ref 0–0.05)
IMM GRANULOCYTES NFR BLD AUTO: 1.3 % (ref 0–0.5)
LYMPHOCYTES # BLD AUTO: 2.04 10*3/MM3 (ref 0.7–3.1)
LYMPHOCYTES NFR BLD AUTO: 37.2 % (ref 19.6–45.3)
MCH RBC QN AUTO: 31.7 PG (ref 26.6–33)
MCHC RBC AUTO-ENTMCNC: 30.2 G/DL (ref 31.5–35.7)
MCV RBC AUTO: 105.2 FL (ref 79–97)
MONOCYTES # BLD AUTO: 0.55 10*3/MM3 (ref 0.1–0.9)
MONOCYTES NFR BLD AUTO: 10 % (ref 5–12)
NEUTROPHILS NFR BLD AUTO: 2.56 10*3/MM3 (ref 1.7–7)
NEUTROPHILS NFR BLD AUTO: 46.8 % (ref 42.7–76)
NRBC BLD AUTO-RTO: 0 /100 WBC (ref 0–0.2)
PLATELET # BLD AUTO: 117 10*3/MM3 (ref 140–450)
PMV BLD AUTO: 12.5 FL (ref 6–12)
RBC # BLD AUTO: 3.09 10*6/MM3 (ref 3.77–5.28)
WBC NRBC COR # BLD: 5.48 10*3/MM3 (ref 3.4–10.8)

## 2023-04-02 PROCEDURE — 99232 SBSQ HOSP IP/OBS MODERATE 35: CPT | Performed by: INTERNAL MEDICINE

## 2023-04-02 PROCEDURE — 63710000001 INSULIN LISPRO (HUMAN) PER 5 UNITS: Performed by: INTERNAL MEDICINE

## 2023-04-02 PROCEDURE — G0378 HOSPITAL OBSERVATION PER HR: HCPCS

## 2023-04-02 PROCEDURE — 25010000002 LORAZEPAM PER 2 MG: Performed by: INTERNAL MEDICINE

## 2023-04-02 PROCEDURE — 96376 TX/PRO/DX INJ SAME DRUG ADON: CPT

## 2023-04-02 PROCEDURE — 85025 COMPLETE CBC W/AUTO DIFF WBC: CPT | Performed by: INTERNAL MEDICINE

## 2023-04-02 PROCEDURE — 96375 TX/PRO/DX INJ NEW DRUG ADDON: CPT

## 2023-04-02 PROCEDURE — 96361 HYDRATE IV INFUSION ADD-ON: CPT

## 2023-04-02 PROCEDURE — 82962 GLUCOSE BLOOD TEST: CPT

## 2023-04-02 RX ORDER — PANTOPRAZOLE SODIUM 40 MG/1
40 TABLET, DELAYED RELEASE ORAL
Status: DISCONTINUED | OUTPATIENT
Start: 2023-04-03 | End: 2023-04-12 | Stop reason: HOSPADM

## 2023-04-02 RX ADMIN — Medication 10 ML: at 09:42

## 2023-04-02 RX ADMIN — INSULIN LISPRO 3 UNITS: 100 INJECTION, SOLUTION INTRAVENOUS; SUBCUTANEOUS at 17:51

## 2023-04-02 RX ADMIN — SODIUM CHLORIDE 100 ML/HR: 9 INJECTION, SOLUTION INTRAVENOUS at 05:01

## 2023-04-02 RX ADMIN — SENNOSIDES AND DOCUSATE SODIUM 2 TABLET: 50; 8.6 TABLET ORAL at 09:42

## 2023-04-02 RX ADMIN — LEVOTHYROXINE SODIUM 25 MCG: 25 TABLET ORAL at 05:00

## 2023-04-02 RX ADMIN — PANTOPRAZOLE SODIUM 40 MG: 40 INJECTION, POWDER, LYOPHILIZED, FOR SOLUTION INTRAVENOUS at 09:42

## 2023-04-02 RX ADMIN — ROSUVASTATIN 40 MG: 20 TABLET, FILM COATED ORAL at 21:01

## 2023-04-02 RX ADMIN — CASTOR OIL AND BALSAM, PERU 1 APPLICATION: 788; 87 OINTMENT TOPICAL at 09:42

## 2023-04-02 RX ADMIN — GABAPENTIN 300 MG: 300 CAPSULE ORAL at 21:01

## 2023-04-02 RX ADMIN — NYSTATIN: 100000 POWDER TOPICAL at 21:02

## 2023-04-02 RX ADMIN — SENNOSIDES AND DOCUSATE SODIUM 2 TABLET: 50; 8.6 TABLET ORAL at 21:04

## 2023-04-02 RX ADMIN — CASTOR OIL AND BALSAM, PERU 1 APPLICATION: 788; 87 OINTMENT TOPICAL at 21:02

## 2023-04-02 RX ADMIN — QUETIAPINE FUMARATE 12.5 MG: 25 TABLET ORAL at 21:01

## 2023-04-02 RX ADMIN — GABAPENTIN 300 MG: 300 CAPSULE ORAL at 09:42

## 2023-04-02 RX ADMIN — NYSTATIN: 100000 POWDER TOPICAL at 09:43

## 2023-04-02 RX ADMIN — LORAZEPAM 1 MG: 2 INJECTION INTRAMUSCULAR; INTRAVENOUS at 17:06

## 2023-04-02 RX ADMIN — INSULIN LISPRO 3 UNITS: 100 INJECTION, SOLUTION INTRAVENOUS; SUBCUTANEOUS at 09:42

## 2023-04-02 RX ADMIN — DONEPEZIL HYDROCHLORIDE 10 MG: 10 TABLET ORAL at 21:01

## 2023-04-02 RX ADMIN — INSULIN LISPRO 3 UNITS: 100 INJECTION, SOLUTION INTRAVENOUS; SUBCUTANEOUS at 12:09

## 2023-04-02 RX ADMIN — QUETIAPINE FUMARATE 50 MG: 25 TABLET ORAL at 09:42

## 2023-04-02 RX ADMIN — DULOXETINE 60 MG: 60 CAPSULE, DELAYED RELEASE ORAL at 09:42

## 2023-04-02 NOTE — PROGRESS NOTES
Clinical Nutrition     Nutrition Support Assessment  Reason for Visit: Difficulty chewing/swallowing      Patient Name: Cheri Cruz  YOB: 1940  MRN: [id number]  Date of Encounter: 04/02/23 12:52 EDT  Admission date: 3/31/2023    Comments:    Textures adjusted to soft to chew, chopped meat per family recommendation. Of note, pt is on semi-pureed diet at home and was on pureed diet at last admit. Discussed w/RN who will consult SLP prn.    Boost GC TID, pt is total feed.    Nutrition Assessment   Admission Diagnosis:  Confusion [R41.0]      Problem List:    Confusion    Type 2 diabetes mellitus without complication, without long-term current use of insulin (HCC)    Osteoarthritis    Anxiety    History of ischemic left MCA stroke    History of pulmonary embolus (PE)    Hypotension    Constipation    UTI (urinary tract infection) due to urinary indwelling catheter    Metabolic encephalopathy    Symptomatic anemia    Acquired hypothyroidism        PMH:   She  has a past medical history of Abnormal heart rhythm, Anxiety, Arrhythmia, Arthritis, Atrial fibrillation, Dementia, Diabetes mellitus, Hyperlipidemia, Hypertension, Kidney disorder, Stroke, and Uterus cancer.    PSH:  She  has a past surgical history that includes Cataract extraction, bilateral.      Applicable Nutrition Concerns:   Skin: ankle and coccyx PIs  Oral:  GI:      Applicable Interval History:         Reported/Observed/Food/Nutrition Related History:     Pt sleeping at time of RD visit, noticed untouched lunch tray at bedside. RN states pt w/confusion. Pt is a total feed and RN has not noticed difficulty w/current diet textures. RD spoke w/pt's dtr Janice who states pt is on a semi-pureed diet at home 2/2 pt only has lower teeth. Of note, pt was on pureed textures at last visit in February. Dtr states pt had poor po intake ~3 days PTA otherwise was eating at baseline. Dtr states pt would like Boost GC TID. AVERY  "attempted to reach pt's other dtr for additional/daytime  nutrition information but unable to reach at this time.       Labs    Labs Reviewed: Yes     Results from last 7 days   Lab Units 04/01/23  0726 03/31/23  1833   GLUCOSE mg/dL 217* 288*   BUN mg/dL 26* 33*   CREATININE mg/dL 1.35* 1.65*   SODIUM mmol/L 136 140   CHLORIDE mmol/L 107 101   POTASSIUM mmol/L 4.8 5.0   MAGNESIUM mg/dL  --  1.6   ALT (SGPT) U/L  --  5       Results from last 7 days   Lab Units 03/31/23  1833   ALBUMIN g/dL 3.5       Results from last 7 days   Lab Units 04/02/23  1119 04/02/23  0809 04/01/23  1615 04/01/23  1139 04/01/23  0600 04/01/23  0313   GLUCOSE mg/dL 223* 212* 289* 303* 236* 251*     Lab Results   Lab Value Date/Time    HGBA1C 5.9 03/18/2023 0910    HGBA1C 6.9 (H) 02/02/2023 0700                 Medications    Medications Reviewed: Yes  Pertinent: protonix, pericolace, synthroid  Infusion: NS @ 100ml/hr  PRN:       Intake/Ouptut 24 hrs (0701 - 0700)   I&O's Reviewed: Yes   +BM 4/1    Anthropometrics     Flowsheet Rows    Flowsheet Row First Filed Value   Admission Height 170.2 cm (67\") Documented at 03/31/2023 1550   Admission Weight 95.3 kg (210 lb) Documented at 03/31/2023 1549            Height: Height: 170.2 cm (67\")  Last Filed Weight: Weight: 95.3 kg (210 lb) (03/31/23 1549)  Method: Weight Method: Estimated  BMI:  33  BMI classification: Obese Class I: 30-34.9kg/m2  UBW: 211lbs (2/2023)  Weight change: EDGAR    Nutrition Focused Physical Exam     Date: 4/2    Unable to perform exam due to: Potential for patient discomfort    No visible signs of muscle wasting or fat loss noted.    Current Nutrition Prescription     PO: Diet: Regular/House Diet; Texture: Soft to Chew (NDD 3); Soft to Chew: Chopped Meat; Fluid Consistency: Thin (IDDSI 0)  Oral Nutrition Supplement:   Intake: 2 Days:56% x 4 meals      Nutrition Diagnosis   Date: 4/2 Updated:   Problem Biting/chewing difficulty   Etiology Pt only has lower teeth "   Signs/Symptoms Takes semi-pureed diet/soft diet at home per dtr report   Status:     Date:  4/2 Updated:  Problem Predicted suboptimal energy intake   Etiology Abdominal pain per dtr report   Signs/Symptoms Po intake <25% x 3 days PTA per dtr report, did not eat lunch today   Status:     Goal:   General: Nutrition to support treatment  PO: Increase intake, Modify texture/consistency  EN/PN: N/A    Nutrition Intervention      Follow treatment progress, Care plan reviewed, Encourage intake, Supplement provided    Boost GC TID-likes any flavor    Will adjust textures to soft to chew chopped per dtr request    Monitoring/Evaluation:   Per protocol, PO intake, Supplement intake, Weight, POC/GOC, Swallow function    Requested measured wt from nsg-per CM pt is bed bound    Anais Canela RD  Time Spent: 35

## 2023-04-02 NOTE — CASE MANAGEMENT/SOCIAL WORK
Continued Stay Note   Jessamine     Patient Name: Cheri Cruz  MRN: [id number]  Today's Date: 4/2/2023    Admit Date: 3/31/2023    Plan: home with family or LTC   Discharge Plan     Row Name 04/02/23 1131       Plan    Plan home with family or LTC    Patient/Family in Agreement with Plan yes    Plan Comments CM spoke with pts daughter who reports she cares for pt 24/7 at her home. Pt is bed bound and they have a hospital bed, vicky lift, home O2, and a neb machine. At this time HH is not following. Pt is followed by her PCP and has drug coverage. At this time pts daughter has asked for 24 hours to think about possible long term care vs pt returning home. CM to follow    Final Discharge Disposition Code 01 - home or self-care               Discharge Codes    No documentation.                     Kristin Forrester RN

## 2023-04-02 NOTE — PLAN OF CARE
Goal Outcome Evaluation:  Plan of Care Reviewed With: patient   Alert to self. VSS SR on tele. Rested quietly most of shift. Positioned in speciality bed by staff using skin interventions. No verbalizing or outward signs of discomfort. Will need CM to follow pt.

## 2023-04-02 NOTE — PROGRESS NOTES
[facility] Medicine Services  PROGRESS NOTE    Patient Name: Cheri Cruz  : 1940  MRN: [id number]    Date of Admission: 3/31/2023  Primary Care Physician: Lorrie Canada APRN    Subjective   Subjective     CC: f/u AMS    HPI: Lying in bed comfortably. No family in room. No issues overnight. No concerns from nursing.    ROS:  UTO due to advanced dementia     Objective   Objective     Vital Signs:   Temp:  [97.7 °F (36.5 °C)-98.1 °F (36.7 °C)] 98 °F (36.7 °C)  Heart Rate:  [67-86] 67  Resp:  [16-20] 16  BP: ()/(25-68) 127/47  Flow (L/min):  [1] 1     Physical Exam:  Constitutional: No acute distress, sleeping, elderly female  HENT: NCAT, mucous membranes moist  Respiratory: Clear to auscultation bilaterally, respiratory effort normal   Cardiovascular: RRR, no murmurs, rubs, or gallops  Gastrointestinal: Positive bowel sounds, soft, nontender, nondistended  Musculoskeletal: No bilateral ankle edema  Psychiatric: EDGAR  Neurologic: EDGAR  Skin: No rashes    Results Reviewed:  LAB RESULTS:      Lab 23  0512 23  0420 23  0031 23  1833   WBC 5.48  --   --  6.91   HEMOGLOBIN 9.8*  --  10.6* 10.9*   HEMATOCRIT 32.5*  --  34.5 35.1   PLATELETS 117*  --   --  165   NEUTROS ABS 2.56  --   --  4.02   IMMATURE GRANS (ABS) 0.07*  --   --  0.01   LYMPHS ABS 2.04  --   --  2.17   MONOS ABS 0.55  --   --  0.45   EOS ABS 0.23  --   --  0.23   .2*  --   --  103.5*   PROCALCITONIN  --   --   --  0.06   LACTATE  --  1.5  --  1.1         Lab 23  0726 23  1833   SODIUM 136 140   POTASSIUM 4.8 5.0   CHLORIDE 107 101   CO2 18.0* 30.0*   ANION GAP 11.0 9.0   BUN 26* 33*   CREATININE 1.35* 1.65*   EGFR 39.6* 31.1*   GLUCOSE 217* 288*   CALCIUM 8.2* 9.3   MAGNESIUM  --  1.6   TSH  --  1.810         Lab 23  1833   TOTAL PROTEIN 6.5   ALBUMIN 3.5   GLOBULIN 3.0   ALT (SGPT) 5   AST (SGOT) 17   BILIRUBIN 0.2   ALK PHOS 54         Lab 23  0733  04/01/23  0420 03/31/23  1833   HSTROP T 39* 39* 45*                 Brief Urine Lab Results  (Last result in the past 365 days)      Color   Clarity   Blood   Leuk Est   Nitrite   Protein   CREAT   Urine HCG        03/31/23 1621 Yellow   Cloudy   Negative   Trace   Negative   30 mg/dL (1+)                 Microbiology Results Abnormal     Procedure Component Value - Date/Time    Urine Culture - Urine, Straight Cath [[id number]]  (Normal) Collected: 03/31/23 1621    Lab Status: Final result Specimen: Urine from Straight Cath Updated: 04/01/23 2102     Urine Culture No growth    COVID PRE-OP / PRE-PROCEDURE SCREENING ORDER (NO ISOLATION) - Swab, Nasopharynx [[id number]]  (Normal) Collected: 03/31/23 1759    Lab Status: Final result Specimen: Swab from Nasopharynx Updated: 03/31/23 1854    Narrative:      The following orders were created for panel order COVID PRE-OP / PRE-PROCEDURE SCREENING ORDER (NO ISOLATION) - Swab, Nasopharynx.  Procedure                               Abnormality         Status                     ---------                               -----------         ------                     Respiratory Panel PCR w/...[[id number]]  Normal              Final result                 Please view results for these tests on the individual orders.    Respiratory Panel PCR w/COVID-19(SARS-CoV-2) TOMMY/SABRINA/DYLLAN/PAD/COR/MAD/KATHLEEN In-House, NP Swab in UTM/VTM, 3-4 HR TAT - Swab, Nasopharynx [[id number]]  (Normal) Collected: 03/31/23 1759    Lab Status: Final result Specimen: Swab from Nasopharynx Updated: 03/31/23 1854     ADENOVIRUS, PCR Not Detected     Coronavirus 229E Not Detected     Coronavirus HKU1 Not Detected     Coronavirus NL63 Not Detected     Coronavirus OC43 Not Detected     COVID19 Not Detected     Human Metapneumovirus Not Detected     Human Rhinovirus/Enterovirus Not Detected     Influenza A PCR Not Detected     Influenza B PCR Not Detected     Parainfluenza Virus 1 Not Detected     Parainfluenza Virus 2  Not Detected     Parainfluenza Virus 3 Not Detected     Parainfluenza Virus 4 Not Detected     RSV, PCR Not Detected     Bordetella pertussis pcr Not Detected     Bordetella parapertussis PCR Not Detected     Chlamydophila pneumoniae PCR Not Detected     Mycoplasma pneumo by PCR Not Detected    Narrative:      In the setting of a positive respiratory panel with a viral infection PLUS a negative procalcitonin without other underlying concern for bacterial infection, consider observing off antibiotics or discontinuation of antibiotics and continue supportive care. If the respiratory panel is positive for atypical bacterial infection (Bordetella pertussis, Chlamydophila pneumoniae, or Mycoplasma pneumoniae), consider antibiotic de-escalation to target atypical bacterial infection.          CT Head Without Contrast    Result Date: 3/31/2023  CT HEAD WO CONTRAST Date of Exam: 3/31/2023 7:13 PM EDT Indication: ams, poorly responsive, confusion. Comparison: CT 2/11/2023, 2/10/2023 Technique: Axial CT images were obtained of the head without contrast administration.  Reconstructed coronal and sagittal images were also obtained. Automated exposure control and iterative construction methods were used. Findings: There is no evidence of hemorrhage. There is no mass effect or midline shift. Encephalomalacia is present within the left parietal lobe extending to the left temporal lobe, similar as compared to the previous study related to previous infarct. Mild areas  of hyperdensity are present which may represent previous blood products versus calcifications, also stable as compared to the previous study. No new areas of hypodensity identified. There is no extracerebral collection. Ventricles are normal in size and configuration for patient's stated age.  Posterior fossa is within normal limits. Calvarium and skull base appear intact.  Visualized sinuses show no air fluid levels. Visualized orbits are unremarkable.      Impression: Impression: 1. No acute intracranial process. 2. Stable encephalomalacia present within the left parietotemporal region related to previous infarct, unchanged as compared to the previous study. Redemonstration of a small amount of hyperdensity present within the area of infarction likely related to calcification and sequela of previous small hemorrhage, stable as compared to the previous study. , Electronically Signed: Luli Mane  3/31/2023 7:29 PM EDT  Workstation ID: [id number]    XR Chest 1 View    Result Date: 3/31/2023  XR CHEST 1 VW Date of Exam: 3/31/2023 3:58 PM EDT Indication: Weak/Dizzy/AMS triage protocol. Comparison: 1/10/2023 Findings: Lung volumes remain diminished with some basilar atelectasis seen on the left. There is no new focal opacity otherwise. There is no effusion or pneumothorax. Unchanged heart and mediastinal contours.     Impression: Impression: Redemonstrated hypoventilatory appearance of the chest, including prominent left basilar atelectasis. Electronically Signed: Luis Manuel Myers  3/31/2023 4:11 PM EDT  Workstation ID: [id number]    XR Abdomen KUB    Result Date: 3/31/2023  XR ABDOMEN KUB Date of Exam: 3/31/2023 9:41 PM EDT Indication: abd pain, eval for constipation. Comparison: None available. Findings: The bowel gas pattern is nonspecific and nonobstructive. Moderate colonic stool burden. No dilated small bowel loops. No gross pneumoperitoneum within limits of technique. Right femoral intramedullary no fixation noted.     Impression: Impression: 1. Nonspecific bowel gas pattern. 2. Moderate stool in the colon. Electronically Signed: Alvaro Tariq  3/31/2023 10:04 PM EDT  Workstation ID: [id number]      Results for orders placed during the hospital encounter of 02/10/23    Adult Transthoracic Echo Complete W/ Cont if Necessary Per Protocol    Interpretation Summary  •  Left ventricular systolic function is hyperdynamic (EF > 70%). Calculated left ventricular EF = 70.6% Left  ventricular ejection fraction appears to be greater than 70%. The left ventricular cavity is small in size. Left ventricular wall thickness is consistent with mild concentric hypertrophy. All left ventricular wall segments contract normally. Left ventricular intracavitary gradient noted to be 71 mmHg. Left ventricular diastolic function is consistent with (grade I) impaired relaxation. Normal left atrial pressure.  •  The aortic valve is abnormal in structure. There is mild calcification of the aortic valve mainly affecting the right coronary cusp(s). The aortic valve appears trileaflet. No aortic valve regurgitation is present. Gradient noted through the LV and LVOT    Compared to TTE report from  Dec 2019, hyperdynamic LV with intracavitary gradient is not a new finding but peak gradient noted on this exam is higher than previously described.      Current medications:  Scheduled Meds:castor oil-balsam peru, 1 application, Topical, Q12H  donepezil, 10 mg, Oral, Nightly  DULoxetine, 60 mg, Oral, Daily  gabapentin, 300 mg, Oral, BID  insulin lispro, 0-7 Units, Subcutaneous, TID With Meals  levothyroxine, 25 mcg, Oral, Q AM  nystatin, , Topical, Q12H  pantoprazole, 40 mg, Intravenous, Daily  QUEtiapine, 12.5 mg, Oral, Nightly  QUEtiapine, 50 mg, Oral, Daily  rosuvastatin, 40 mg, Oral, Nightly  senna-docusate sodium, 2 tablet, Oral, BID      Continuous Infusions:sodium chloride, 100 mL/hr, Last Rate: 100 mL/hr (04/02/23 0804)      PRN Meds:.•  senna-docusate sodium **AND** polyethylene glycol **AND** bisacodyl **AND** bisacodyl  •  HYDROcodone-acetaminophen  •  LORazepam  •  ondansetron  •  sodium chloride    Assessment & Plan   Assessment & Plan     Active Hospital Problems    Diagnosis  POA   • **Confusion [R41.0]  Yes   • Hypotension [I95.9]  Yes   • Constipation [K59.00]  Yes   • UTI (urinary tract infection) due to urinary indwelling catheter [T83.511A, N39.0]  Yes   • Metabolic encephalopathy [G93.41]  Yes   •  Symptomatic anemia [D64.9]  Yes   • Acquired hypothyroidism [E03.9]  Yes   • History of pulmonary embolus (PE) [Z86.711]  Yes   • History of ischemic left MCA stroke [Z86.73]  Not Applicable   • Osteoarthritis [M19.90]  Yes   • Type 2 diabetes mellitus without complication, without long-term current use of insulin (HCC) [E11.9]  Yes   • Anxiety [F41.9]  Yes      Resolved Hospital Problems   No resolved problems to display.        Brief Hospital Course to date:  Cheri Cruz is a 82 y.o. female  past medical history of  Anxiety, Arthritis, Atrial fibrillation, Dementia , Diabetes mellitus , Hyperlipidemia, Hypertension, Stroke earlier this year with subsequent right side weakness and increasing speech difficulties, as well as Uterus cancer (HCC)who  presented to the ED from home for increasing confusion.     Acute Confusion  Vascular dementia  Recent Stroke  -Suspect due to hypotension as below which likely caused global hypoperfusion. Clinically seems to be at baseline. I suspect she has been eating less since her CVA and no longer required antihypertensives.     Hypotension  -BP better today, will stop IVF and observe. If necessary we will start on midodrine.     UTI ruled out.  -UA is pretty bland there is only trace leukocyte Estrace and nitrite is negative.  Urine culture is also negative     Constipation     ANEMIA  -Essentially stable. CTM.     T2DM  -SS insulin     Chronic pain  -on lortab at home     Hypothryoidism  -cont synthroid-- not sure TSH has settled yet    I spoke with her daughter Radha via phone and updated on improvement. Plan on observing off IVF. Home in 24-48.    Expected Discharge Location and Transportation:   Expected Discharge        DVT prophylaxis:  Mechanical DVT prophylaxis orders are present.     AM-PAC 6 Clicks Score (PT): 6 (03/31/23 5385)    CODE STATUS:   Code Status and Medical Interventions:   Ordered at: 04/01/23 0023     Code Status (Patient has no pulse and is not  breathing):    CPR (Attempt to Resuscitate)     Medical Interventions (Patient has pulse or is breathing):    Full       Yolette Rodriguez II, DO  04/02/23

## 2023-04-03 LAB
GLUCOSE BLDC GLUCOMTR-MCNC: 193 MG/DL (ref 70–130)
GLUCOSE BLDC GLUCOMTR-MCNC: 255 MG/DL (ref 70–130)
GLUCOSE BLDC GLUCOMTR-MCNC: 295 MG/DL (ref 70–130)
QT INTERVAL: 452 MS
QTC INTERVAL: 432 MS

## 2023-04-03 PROCEDURE — 96376 TX/PRO/DX INJ SAME DRUG ADON: CPT

## 2023-04-03 PROCEDURE — 82962 GLUCOSE BLOOD TEST: CPT

## 2023-04-03 PROCEDURE — 99232 SBSQ HOSP IP/OBS MODERATE 35: CPT | Performed by: INTERNAL MEDICINE

## 2023-04-03 PROCEDURE — 63710000001 INSULIN LISPRO (HUMAN) PER 5 UNITS: Performed by: INTERNAL MEDICINE

## 2023-04-03 PROCEDURE — G0378 HOSPITAL OBSERVATION PER HR: HCPCS

## 2023-04-03 PROCEDURE — 25010000002 LORAZEPAM PER 2 MG: Performed by: INTERNAL MEDICINE

## 2023-04-03 RX ADMIN — SODIUM CHLORIDE 100 ML/HR: 9 INJECTION, SOLUTION INTRAVENOUS at 00:27

## 2023-04-03 RX ADMIN — LEVOTHYROXINE SODIUM 25 MCG: 25 TABLET ORAL at 06:20

## 2023-04-03 RX ADMIN — CASTOR OIL AND BALSAM, PERU 1 APPLICATION: 788; 87 OINTMENT TOPICAL at 08:15

## 2023-04-03 RX ADMIN — QUETIAPINE FUMARATE 50 MG: 25 TABLET ORAL at 08:15

## 2023-04-03 RX ADMIN — DULOXETINE 60 MG: 60 CAPSULE, DELAYED RELEASE ORAL at 08:15

## 2023-04-03 RX ADMIN — HYDROCODONE BITARTRATE AND ACETAMINOPHEN 1 TABLET: 5; 325 TABLET ORAL at 02:21

## 2023-04-03 RX ADMIN — ROSUVASTATIN 40 MG: 20 TABLET, FILM COATED ORAL at 20:26

## 2023-04-03 RX ADMIN — GABAPENTIN 300 MG: 300 CAPSULE ORAL at 08:16

## 2023-04-03 RX ADMIN — INSULIN LISPRO 4 UNITS: 100 INJECTION, SOLUTION INTRAVENOUS; SUBCUTANEOUS at 17:22

## 2023-04-03 RX ADMIN — NYSTATIN: 100000 POWDER TOPICAL at 20:32

## 2023-04-03 RX ADMIN — LORAZEPAM 1 MG: 2 INJECTION INTRAMUSCULAR; INTRAVENOUS at 23:43

## 2023-04-03 RX ADMIN — GABAPENTIN 300 MG: 300 CAPSULE ORAL at 20:26

## 2023-04-03 RX ADMIN — APIXABAN 5 MG: 5 TABLET, FILM COATED ORAL at 20:26

## 2023-04-03 RX ADMIN — DONEPEZIL HYDROCHLORIDE 10 MG: 10 TABLET ORAL at 20:26

## 2023-04-03 RX ADMIN — CASTOR OIL AND BALSAM, PERU 1 APPLICATION: 788; 87 OINTMENT TOPICAL at 20:33

## 2023-04-03 RX ADMIN — QUETIAPINE FUMARATE 12.5 MG: 25 TABLET ORAL at 20:25

## 2023-04-03 RX ADMIN — NYSTATIN: 100000 POWDER TOPICAL at 08:15

## 2023-04-03 RX ADMIN — INSULIN LISPRO 4 UNITS: 100 INJECTION, SOLUTION INTRAVENOUS; SUBCUTANEOUS at 12:09

## 2023-04-03 RX ADMIN — HYDROCODONE BITARTRATE AND ACETAMINOPHEN 1 TABLET: 5; 325 TABLET ORAL at 20:26

## 2023-04-03 RX ADMIN — PANTOPRAZOLE SODIUM 40 MG: 40 TABLET, DELAYED RELEASE ORAL at 06:20

## 2023-04-03 RX ADMIN — SENNOSIDES AND DOCUSATE SODIUM 2 TABLET: 50; 8.6 TABLET ORAL at 08:16

## 2023-04-03 RX ADMIN — INSULIN LISPRO 2 UNITS: 100 INJECTION, SOLUTION INTRAVENOUS; SUBCUTANEOUS at 08:16

## 2023-04-03 NOTE — PLAN OF CARE
Problem: Adult Inpatient Plan of Care  Goal: Plan of Care Review  Outcome: Ongoing, Progressing  Goal: Optimal Comfort and Wellbeing  Outcome: Ongoing, Progressing  Intervention: Provide Person-Centered Care  Recent Flowsheet Documentation  Taken 4/3/2023 0800 by Adelita Horn RN  Trust Relationship/Rapport:   care explained   choices provided   Goal Outcome Evaluation:

## 2023-04-03 NOTE — DISCHARGE PLACEMENT REQUEST
"Deya Cruz Cro (82 y.o. Female)     Date of Birth   1940    Social Security Number       Address   [address] [id number]    Home Phone   [phone]    MRN   [id number]       [facility]    Marital Status                               Admission Date   3/31/23    Admission Type   Emergency    Admitting Provider   Yolette Rodriguez II, DO    Attending Provider   Yolette Rodriguez II, DO    Department, Room/Bed   [facility] 3G, S357/1       Discharge Date       Discharge Disposition       Discharge Destination                               Attending Provider: Yolette Rodriguez II, DO    Allergies: Penicillins, Sulfa Antibiotics, Tetracyclines & Related, Valium [Diazepam]    Isolation: None   Infection: None   Code Status: CPR    Ht: 170.2 cm (67\")   Wt: 95.3 kg (210 lb 1.6 oz)    Admission Cmt: None   Principal Problem: Confusion [R41.0]                 Active Insurance as of 3/31/2023     Primary Coverage     Payor Plan Insurance Group Employer/Plan Group    HUMANA MEDICARE REPLACEMENT HUMANA MEDICARE REPLACEMENT [id number]     Payor Plan Address Payor Plan Phone Number Payor Plan Fax Number Effective Dates    [address] [address] [phone]  2023 - None Entered    [facility] [id number]       Subscriber Name Subscriber Birth Date Member ID       DEYA CRUZ CRO 1940 [id number]                 Emergency Contacts      (Rel.) Home Phone Work Phone Mobile Phone    Radha Cruz (Daughter) [phone] -- [phone]    EVITA MADDEN (Daughter) [phone] -- --    ANTHONYOMEGA (Son) [phone] -- [phone]    AnthonyVanessa Real (Spouse) [phone] -- --               History & Physical      Ramila Packer MD at [facility]              [facility] Medicine Services  HISTORY AND PHYSICAL    Patient Name: Deya Cruz  : 1940  MRN: [id number]  Primary Care Physician: Lorrie Canada, " APRN    Subjective    Subjective     Chief Complaint:confusion    HPI:  Cheri Cruz is a 82 y.o. female who  has a past medical history of  Anxiety, Arthritis, Atrial fibrillation, Dementia , Diabetes mellitus , Hyperlipidemia, Hypertension, Stroke earlier this year with subsequent right side weakness and increasing speech difficulties, as well as Uterus cancer (HCC)who  presented to the ED from home for increasing confusion. Per the family patient is chronically bed ridden, but eats well and can chat. Over the last 2 days she has had decreased appetite, no BM, and increasing agitation. They deny fever, cough or other concern. They care for her at home.   They reports she was in terrible shape when she came home from the hospital but they have   Taken good care of her skin breakdown. They report alternating constipation and diarrhea.    Review of Systems   Patient denies weight loss, headaches, changes in vision, fever, chills, sore throat, shortness of breath, cough, nausea or vomiting, diarrhea, abdominal pain or distension, change in urine output, joint pain, rash, itching or bleeding.    Otherwise complete ROS is negative except as mentioned in the HPI.    Personal History     Past Medical History:   Diagnosis Date   • Abnormal heart rhythm    • Anxiety    • Arrhythmia    • Arthritis    • Atrial fibrillation (HCC)    • Dementia (HCC)    • Diabetes mellitus (HCC)    • Hyperlipidemia    • Hypertension    • Kidney disorder    • Stroke (HCC)    • Uterus cancer (HCC)        Past Surgical History:   Procedure Laterality Date   • CATARACT EXTRACTION, BILATERAL         Family History: family history includes Alcohol abuse in her brother; Cancer in her brother, father, and mother; Congenital heart disease in her mother; Heart disease in her mother.     Social History:  reports that she has never smoked. She has never used smokeless tobacco. She reports that she does not drink alcohol and does not use drugs.  Her   is still living, her daughters help care for her at home. She has been bedridden for some time due to her sever osteoarthritis and inability to ambulate.    Medications:  DULoxetine, HYDROcodone-acetaminophen, Insulin Lispro, QUEtiapine, apixaban, busPIRone, donepezil, gabapentin, levothyroxine, lisinopril, meclizine, omeprazole, rosuvastatin, and sennosides-docusate    Allergies   Allergen Reactions   • Penicillins    • Sulfa Antibiotics    • Tetracyclines & Related Unknown (See Comments)   • Valium [Diazepam] Anxiety       Objective    Objective     Vital Signs:   Temp:  [97.6 °F (36.4 °C)] 97.6 °F (36.4 °C)  Heart Rate:  [55-62] 60  Resp:  [14-16] 16  BP: ()/() 130/64  Flow (L/min):  [3] 3  Chronically on oxygen for the last year  Constitutional: Awake, alert, interactive and pleasant, talk gibberish but does interact, she seems to see things in the room  Eyes: clear sclerae, no conjunctival injection  HENT: NCAT, mucous membranes moist  Neck: no masses or lymphadenopathy, trachea midline  Respiratory: good breath sounds bilaterally, respirations unlabored  Cardiovascular: RRR, no murmurs appreciated, palpable peripheral pulses  Abdomen:  soft, no HSM or masses palpable, not tender or distended  Musculoskeletal: No peripheral edema, clubbing or cyanosis, legs are thin  Neurologic: Oriented x 3,                      Slight decreased in sterngth in her right arm,hand                     Cranial Nerves grossly intact, speech clear  Skin: No rashes or jaundice, she has minimal redness in her groin  Psychiatric: cooperative but restless      Result Review:  I have personally reviewed the results from the time of this admission   to 3/31/2023 23:55 EDT and agree with these findings:  [x]  Laboratory  [x]  Microbiology  [x]  Radiology  []  EKG/Telemetry   []  Cardiology/Vascular   []  Pathology  [x]  Old records  []  Other:  Most notable findings include: macrocytosis, old infarct on CT, drop in  hemoglobin, increased stool on KUB      LAB RESULTS:      Lab 03/31/23  1833   WBC 6.91   HEMOGLOBIN 10.9*   HEMATOCRIT 35.1   PLATELETS 165   NEUTROS ABS 4.02   IMMATURE GRANS (ABS) 0.01   LYMPHS ABS 2.17   MONOS ABS 0.45   EOS ABS 0.23   .5*   PROCALCITONIN 0.06   LACTATE 1.1         Lab 03/31/23  1833   SODIUM 140   POTASSIUM 5.0   CHLORIDE 101   CO2 30.0*   ANION GAP 9.0   BUN 33*   CREATININE 1.65*   EGFR 31.1*   GLUCOSE 288*   CALCIUM 9.3   MAGNESIUM 1.6   TSH 1.810         Lab 03/31/23  1833   TOTAL PROTEIN 6.5   ALBUMIN 3.5   GLOBULIN 3.0   ALT (SGPT) 5   AST (SGOT) 17   BILIRUBIN 0.2   ALK PHOS 54         Lab 03/31/23  1833   HSTROP T 45*                 Brief Urine Lab Results  (Last result in the past 365 days)      Color   Clarity   Blood   Leuk Est   Nitrite   Protein   CREAT   Urine HCG        03/31/23 1621 Yellow   Cloudy   Negative   Trace   Negative   30 mg/dL (1+)               COVID19   Date Value Ref Range Status   03/31/2023 Not Detected Not Detected - Ref. Range Final       CT Head Without Contrast    Result Date: 3/31/2023  CT HEAD WO CONTRAST Date of Exam: 3/31/2023 7:13 PM EDT Indication: ams, poorly responsive, confusion. Comparison: CT 2/11/2023, 2/10/2023 Technique: Axial CT images were obtained of the head without contrast administration.  Reconstructed coronal and sagittal images were also obtained. Automated exposure control and iterative construction methods were used. Findings: There is no evidence of hemorrhage. There is no mass effect or midline shift. Encephalomalacia is present within the left parietal lobe extending to the left temporal lobe, similar as compared to the previous study related to previous infarct. Mild areas  of hyperdensity are present which may represent previous blood products versus calcifications, also stable as compared to the previous study. No new areas of hypodensity identified. There is no extracerebral collection. Ventricles are normal in size  and configuration for patient's stated age.  Posterior fossa is within normal limits. Calvarium and skull base appear intact.  Visualized sinuses show no air fluid levels. Visualized orbits are unremarkable.     Impression: Impression: 1. No acute intracranial process. 2. Stable encephalomalacia present within the left parietotemporal region related to previous infarct, unchanged as compared to the previous study. Redemonstration of a small amount of hyperdensity present within the area of infarction likely related to calcification and sequela of previous small hemorrhage, stable as compared to the previous study. , Electronically Signed: Luli Mane  3/31/2023 7:29 PM EDT  Workstation ID: [id number]    XR Chest 1 View    Result Date: 3/31/2023  XR CHEST 1 VW Date of Exam: 3/31/2023 3:58 PM EDT Indication: Weak/Dizzy/AMS triage protocol. Comparison: 1/10/2023 Findings: Lung volumes remain diminished with some basilar atelectasis seen on the left. There is no new focal opacity otherwise. There is no effusion or pneumothorax. Unchanged heart and mediastinal contours.     Impression: Impression: Redemonstrated hypoventilatory appearance of the chest, including prominent left basilar atelectasis. Electronically Signed: Luis Manuel Myers  3/31/2023 4:11 PM EDT  Workstation ID: [id number]    XR Abdomen KUB    Result Date: 3/31/2023  XR ABDOMEN KUB Date of Exam: 3/31/2023 9:41 PM EDT Indication: abd pain, eval for constipation. Comparison: None available. Findings: The bowel gas pattern is nonspecific and nonobstructive. Moderate colonic stool burden. No dilated small bowel loops. No gross pneumoperitoneum within limits of technique. Right femoral intramedullary no fixation noted.     Impression: Impression: 1. Nonspecific bowel gas pattern. 2. Moderate stool in the colon. Electronically Signed: Alvaro Tariq  3/31/2023 10:04 PM EDT  Workstation ID: [id number]      Results for orders placed during the hospital encounter of  02/10/23    Adult Transthoracic Echo Complete W/ Cont if Necessary Per Protocol    Interpretation Summary  •  Left ventricular systolic function is hyperdynamic (EF > 70%). Calculated left ventricular EF = 70.6% Left ventricular ejection fraction appears to be greater than 70%. The left ventricular cavity is small in size. Left ventricular wall thickness is consistent with mild concentric hypertrophy. All left ventricular wall segments contract normally. Left ventricular intracavitary gradient noted to be 71 mmHg. Left ventricular diastolic function is consistent with (grade I) impaired relaxation. Normal left atrial pressure.  •  The aortic valve is abnormal in structure. There is mild calcification of the aortic valve mainly affecting the right coronary cusp(s). The aortic valve appears trileaflet. No aortic valve regurgitation is present. Gradient noted through the LV and LVOT    Compared to TTE report from  Dec 2019, hyperdynamic LV with intracavitary gradient is not a new finding but peak gradient noted on this exam is higher than previously described.      The patient qualifies to receive the vaccine, but they have not yet received it.    Assessment & Plan   Assessment / Plan       Confusion    Hypotension    Type 2 diabetes mellitus without complication, without long-term current use of insulin ([facility])    Osteoarthritis    Anxiety    History of ischemic left MCA stroke    History of pulmonary embolus (PE)    Constipation    UTI (urinary tract infection) due to urinary indwelling catheter ([facility])    Metabolic encephalopathy    Symptomatic anemia    Acquired hypothyroidism      Assessment & Plan:  Cheri Cruz is a 82 y.o. female who  has a past medical history of  Anxiety, Arthritis, Atrial fibrillation, Dementia , Diabetes mellitus , Hyperlipidemia, Hypertension, Stroke earlier this year with subsequent right side weakness and increasing speech difficulties, as well as Uterus cancer (HCC)who  presented to  the ED from home for increasing confusion.    Confusion  Vascular dementia  Recent Stroke  -cont seroquel  -consider EEG for atypical seizure as her symptoms due wax and wane  -lortab per family helps the most due to her chronic OA pain  -cont aricept    Hypotension  -hydrate  -hold BP meds    UTI  -recent urine culture grew enterococcus 2/23  -will give rocephin a dose of vanc and follow culture results  -need good bowel habits    Constipation  -check for impaction, KUB reviewed  -start more aggressive bowel regimen for now    ANEMIA  -hgb has dropped  -cont to trend, check stool for blood  -start PPI  -HOLD eliquis  --GI consult if proves to be dropping    T2DM  -SS insulin    Chronic pain  -on lortab at home    Hypothryoidism  -cont synthroid-- not sure TSH has settled yet    DVT prophylaxis:mechanical  No DVT prophylaxis order currently exists.    CODE STATUS:FULL CODe per family     Expected Discharge 4/3/23      Electronically signed by Ramila Packer MD 03/31/23 23:55 EDT            Electronically signed by Ramila Packer MD at 04/01/23 0024

## 2023-04-03 NOTE — CASE MANAGEMENT/SOCIAL WORK
Continued Stay Note   Yan     Patient Name: Cheri Cruz  MRN: [id number]  Today's Date: 4/3/2023    Admit Date: 3/31/2023    Plan: Ongoing   Discharge Plan     Row Name 04/03/23 1429       Plan    Plan Ongoing    Patient/Family in Agreement with Plan yes    Plan Comments Spoke with Radha, Mrs. Cruz's daughter, via telephone today. She would like for her mother to go to [facility]. I have called Cely, admissions liaison with [facility], and she states that they have no swing beds open at this time. I will speak with Radha to get more places in which to refer her mother. Case management will continue to follow.    Final Discharge Disposition Code 30 - still a patient               Discharge Codes    No documentation.               Expected Discharge Date and Time     Expected Discharge Date Expected Discharge Time    Apr 4, 2023             Leighton Luong RN

## 2023-04-03 NOTE — PROGRESS NOTES
[facility] Medicine Services  PROGRESS NOTE    Patient Name: Cheri Cruz  : 1940  MRN: [id number]    Date of Admission: 3/31/2023  Primary Care Physician: Lorrie Canada APRN    Subjective   Subjective     CC: f/u AMS    HPI: Up in bed with nursing in room. Complaining about being miserable from being here and being stuck for labs.    ROS:  Gen- No fevers, chills  CV- No chest pain, palpitations  Resp- No cough, dyspnea  GI- No N/V/D, abd pain     Objective   Objective     Vital Signs:   Temp:  [97.7 °F (36.5 °C)-98.3 °F (36.8 °C)] 97.7 °F (36.5 °C)  Heart Rate:  [63-94] 85  Resp:  [16-18] 18  BP: (108-153)/(39-64) 149/62  Flow (L/min):  [1] 1     Physical Exam:  Constitutional: No acute distress, awake, alert, elderly, frail  HENT: NCAT, mucous membranes moist  Respiratory: Clear to auscultation bilaterally, respiratory effort normal   Cardiovascular: RRR, no murmurs, rubs, or gallops  Gastrointestinal: Positive bowel sounds, soft, nontender, nondistended, obese  Musculoskeletal: No bilateral ankle edema  Psychiatric: Appropriate affect, cooperative  Neurologic: Oriented x 3, strength symmetric in all extremities, Cranial Nerves grossly intact to confrontation, speech clear  Skin: No rashes    Results Reviewed:  LAB RESULTS:      Lab 23  0512 23  0420 23  0031 23  1833   WBC 5.48  --   --  6.91   HEMOGLOBIN 9.8*  --  10.6* 10.9*   HEMATOCRIT 32.5*  --  34.5 35.1   PLATELETS 117*  --   --  165   NEUTROS ABS 2.56  --   --  4.02   IMMATURE GRANS (ABS) 0.07*  --   --  0.01   LYMPHS ABS 2.04  --   --  2.17   MONOS ABS 0.55  --   --  0.45   EOS ABS 0.23  --   --  0.23   .2*  --   --  103.5*   PROCALCITONIN  --   --   --  0.06   LACTATE  --  1.5  --  1.1         Lab 23  0726 23  1833   SODIUM 136 140   POTASSIUM 4.8 5.0   CHLORIDE 107 101   CO2 18.0* 30.0*   ANION GAP 11.0 9.0   BUN 26* 33*   CREATININE 1.35* 1.65*   EGFR 39.6* 31.1*    GLUCOSE 217* 288*   CALCIUM 8.2* 9.3   MAGNESIUM  --  1.6   TSH  --  1.810         Lab 03/31/23  1833   TOTAL PROTEIN 6.5   ALBUMIN 3.5   GLOBULIN 3.0   ALT (SGPT) 5   AST (SGOT) 17   BILIRUBIN 0.2   ALK PHOS 54         Lab 04/01/23  0726 04/01/23  0420 03/31/23  1833   HSTROP T 39* 39* 45*                 Brief Urine Lab Results  (Last result in the past 365 days)      Color   Clarity   Blood   Leuk Est   Nitrite   Protein   CREAT   Urine HCG        03/31/23 1621 Yellow   Cloudy   Negative   Trace   Negative   30 mg/dL (1+)                 Microbiology Results Abnormal     Procedure Component Value - Date/Time    Urine Culture - Urine, Straight Cath [[id number]]  (Normal) Collected: 03/31/23 1621    Lab Status: Final result Specimen: Urine from Straight Cath Updated: 04/01/23 2102     Urine Culture No growth    COVID PRE-OP / PRE-PROCEDURE SCREENING ORDER (NO ISOLATION) - Swab, Nasopharynx [[id number]]  (Normal) Collected: 03/31/23 1759    Lab Status: Final result Specimen: Swab from Nasopharynx Updated: 03/31/23 1854    Narrative:      The following orders were created for panel order COVID PRE-OP / PRE-PROCEDURE SCREENING ORDER (NO ISOLATION) - Swab, Nasopharynx.  Procedure                               Abnormality         Status                     ---------                               -----------         ------                     Respiratory Panel PCR w/...[[id number]]  Normal              Final result                 Please view results for these tests on the individual orders.    Respiratory Panel PCR w/COVID-19(SARS-CoV-2) TOMMY/SABRINA/DYLLAN/PAD/COR/MAD/KATHLEEN In-House, NP Swab in UTM/VTM, 3-4 HR TAT - Swab, Nasopharynx [[id number]]  (Normal) Collected: 03/31/23 1759    Lab Status: Final result Specimen: Swab from Nasopharynx Updated: 03/31/23 1854     ADENOVIRUS, PCR Not Detected     Coronavirus 229E Not Detected     Coronavirus HKU1 Not Detected     Coronavirus NL63 Not Detected     Coronavirus OC43 Not Detected      COVID19 Not Detected     Human Metapneumovirus Not Detected     Human Rhinovirus/Enterovirus Not Detected     Influenza A PCR Not Detected     Influenza B PCR Not Detected     Parainfluenza Virus 1 Not Detected     Parainfluenza Virus 2 Not Detected     Parainfluenza Virus 3 Not Detected     Parainfluenza Virus 4 Not Detected     RSV, PCR Not Detected     Bordetella pertussis pcr Not Detected     Bordetella parapertussis PCR Not Detected     Chlamydophila pneumoniae PCR Not Detected     Mycoplasma pneumo by PCR Not Detected    Narrative:      In the setting of a positive respiratory panel with a viral infection PLUS a negative procalcitonin without other underlying concern for bacterial infection, consider observing off antibiotics or discontinuation of antibiotics and continue supportive care. If the respiratory panel is positive for atypical bacterial infection (Bordetella pertussis, Chlamydophila pneumoniae, or Mycoplasma pneumoniae), consider antibiotic de-escalation to target atypical bacterial infection.          No radiology results from the last 24 hrs    Results for orders placed during the hospital encounter of 02/10/23    Adult Transthoracic Echo Complete W/ Cont if Necessary Per Protocol    Interpretation Summary  •  Left ventricular systolic function is hyperdynamic (EF > 70%). Calculated left ventricular EF = 70.6% Left ventricular ejection fraction appears to be greater than 70%. The left ventricular cavity is small in size. Left ventricular wall thickness is consistent with mild concentric hypertrophy. All left ventricular wall segments contract normally. Left ventricular intracavitary gradient noted to be 71 mmHg. Left ventricular diastolic function is consistent with (grade I) impaired relaxation. Normal left atrial pressure.  •  The aortic valve is abnormal in structure. There is mild calcification of the aortic valve mainly affecting the right coronary cusp(s). The aortic valve appears  trileaflet. No aortic valve regurgitation is present. Gradient noted through the LV and LVOT    Compared to TTE report from [facility] Dec 2019, hyperdynamic LV with intracavitary gradient is not a new finding but peak gradient noted on this exam is higher than previously described.      Current medications:  Scheduled Meds:castor oil-balsam peru, 1 application, Topical, Q12H  donepezil, 10 mg, Oral, Nightly  DULoxetine, 60 mg, Oral, Daily  gabapentin, 300 mg, Oral, BID  insulin lispro, 0-7 Units, Subcutaneous, TID With Meals  levothyroxine, 25 mcg, Oral, Q AM  nystatin, , Topical, Q12H  pantoprazole, 40 mg, Oral, Q AM  QUEtiapine, 12.5 mg, Oral, Nightly  QUEtiapine, 50 mg, Oral, Daily  rosuvastatin, 40 mg, Oral, Nightly  senna-docusate sodium, 2 tablet, Oral, BID      Continuous Infusions:sodium chloride, 100 mL/hr, Last Rate: 100 mL/hr (04/03/23 0027)      PRN Meds:.•  senna-docusate sodium **AND** polyethylene glycol **AND** bisacodyl **AND** bisacodyl  •  HYDROcodone-acetaminophen  •  LORazepam  •  ondansetron  •  sodium chloride    Assessment & Plan   Assessment & Plan     Active Hospital Problems    Diagnosis  POA   • **Confusion [R41.0]  Yes   • Hypotension [I95.9]  Yes   • Constipation [K59.00]  Yes   • UTI (urinary tract infection) due to urinary indwelling catheter [T83.511A, N39.0]  Yes   • Metabolic encephalopathy [G93.41]  Yes   • Symptomatic anemia [D64.9]  Yes   • Acquired hypothyroidism [E03.9]  Yes   • History of pulmonary embolus (PE) [Z86.711]  Yes   • History of ischemic left MCA stroke [Z86.73]  Not Applicable   • Osteoarthritis [M19.90]  Yes   • Type 2 diabetes mellitus without complication, without long-term current use of insulin (HCC) [E11.9]  Yes   • Anxiety [F41.9]  Yes      Resolved Hospital Problems   No resolved problems to display.        Brief Hospital Course to date:  Cheri Cruz is a 82 y.o. female  past medical history of  Anxiety, Arthritis, Atrial fibrillation, Dementia , Diabetes  mellitus , Hyperlipidemia, Hypertension, Stroke earlier this year with subsequent right side weakness and increasing speech difficulties, as well as Uterus cancer (HCC)who  presented to the ED from home for increasing confusion.     Acute Confusion  Vascular dementia  Recent Stroke  -Suspect AMS due to hypotension as below which likely caused global hypoperfusion. Clinically seems to be at baseline.     Hypotension  -BP better today, will stop IVF and observe. If necessary we will start on midodrine.  -Will continue to hold all antihypertensives at discharge.     UTI ruled out.  -UA is pretty bland there is only trace leukocyte Estrace and nitrite is negative.  Urine culture is also negative     Constipation     ANEMIA  -Essentially stable. CTM.     T2DM  -SS insulin     Chronic pain  -Lortab     Hypothryoidism  -Cont synthroid-- not sure TSH has settled yet     I spoke with her daughter Radha via phone and updated on improvement. Plan on observing off IVF. Home tomorrow.    Expected Discharge Location and Transportation:   Expected Discharge   Expected Discharge Date and Time     Expected Discharge Date Expected Discharge Time    Apr 4, 2023            DVT prophylaxis:  Mechanical DVT prophylaxis orders are present.     AM-PAC 6 Clicks Score (PT): 6 (04/02/23 0800)    CODE STATUS:   Code Status and Medical Interventions:   Ordered at: 04/01/23 0023     Code Status (Patient has no pulse and is not breathing):    CPR (Attempt to Resuscitate)     Medical Interventions (Patient has pulse or is breathing):    Full       Yolette Rodriguez II, DO  04/03/23

## 2023-04-03 NOTE — PLAN OF CARE
Goal Outcome Evaluation:  Plan of Care Reviewed With: patientAlert to self. VSS SR on tele. Rested quietly most of shift. Positioned in speciality bed by staff using skin interventions. Pain controlled with PO meds. CM following.

## 2023-04-04 LAB
GLUCOSE BLDC GLUCOMTR-MCNC: 202 MG/DL (ref 70–130)
GLUCOSE BLDC GLUCOMTR-MCNC: 230 MG/DL (ref 70–130)
GLUCOSE BLDC GLUCOMTR-MCNC: 280 MG/DL (ref 70–130)

## 2023-04-04 PROCEDURE — 82962 GLUCOSE BLOOD TEST: CPT

## 2023-04-04 PROCEDURE — 99232 SBSQ HOSP IP/OBS MODERATE 35: CPT | Performed by: NURSE PRACTITIONER

## 2023-04-04 PROCEDURE — 63710000001 INSULIN LISPRO (HUMAN) PER 5 UNITS: Performed by: NURSE PRACTITIONER

## 2023-04-04 PROCEDURE — 63710000001 INSULIN LISPRO (HUMAN) PER 5 UNITS: Performed by: INTERNAL MEDICINE

## 2023-04-04 PROCEDURE — G0378 HOSPITAL OBSERVATION PER HR: HCPCS

## 2023-04-04 RX ORDER — LORAZEPAM 0.5 MG/1
0.5 TABLET ORAL EVERY 12 HOURS
Status: DISCONTINUED | OUTPATIENT
Start: 2023-04-04 | End: 2023-04-12 | Stop reason: HOSPADM

## 2023-04-04 RX ORDER — INSULIN LISPRO 100 [IU]/ML
2 INJECTION, SOLUTION INTRAVENOUS; SUBCUTANEOUS
Status: DISCONTINUED | OUTPATIENT
Start: 2023-04-04 | End: 2023-04-05

## 2023-04-04 RX ORDER — LORAZEPAM 0.5 MG/1
0.5 TABLET ORAL EVERY 12 HOURS
COMMUNITY

## 2023-04-04 RX ADMIN — SENNOSIDES AND DOCUSATE SODIUM 2 TABLET: 50; 8.6 TABLET ORAL at 09:00

## 2023-04-04 RX ADMIN — APIXABAN 5 MG: 5 TABLET, FILM COATED ORAL at 08:59

## 2023-04-04 RX ADMIN — DONEPEZIL HYDROCHLORIDE 10 MG: 10 TABLET ORAL at 21:02

## 2023-04-04 RX ADMIN — INSULIN LISPRO 3 UNITS: 100 INJECTION, SOLUTION INTRAVENOUS; SUBCUTANEOUS at 18:18

## 2023-04-04 RX ADMIN — CASTOR OIL AND BALSAM, PERU 1 APPLICATION: 788; 87 OINTMENT TOPICAL at 21:04

## 2023-04-04 RX ADMIN — INSULIN LISPRO 3 UNITS: 100 INJECTION, SOLUTION INTRAVENOUS; SUBCUTANEOUS at 08:59

## 2023-04-04 RX ADMIN — LEVOTHYROXINE SODIUM 25 MCG: 25 TABLET ORAL at 06:27

## 2023-04-04 RX ADMIN — GABAPENTIN 300 MG: 300 CAPSULE ORAL at 21:02

## 2023-04-04 RX ADMIN — DULOXETINE 60 MG: 60 CAPSULE, DELAYED RELEASE ORAL at 09:00

## 2023-04-04 RX ADMIN — QUETIAPINE FUMARATE 50 MG: 25 TABLET ORAL at 09:01

## 2023-04-04 RX ADMIN — APIXABAN 5 MG: 5 TABLET, FILM COATED ORAL at 21:02

## 2023-04-04 RX ADMIN — INSULIN LISPRO 2 UNITS: 100 INJECTION, SOLUTION INTRAVENOUS; SUBCUTANEOUS at 18:17

## 2023-04-04 RX ADMIN — HYDROCODONE BITARTRATE AND ACETAMINOPHEN 1 TABLET: 5; 325 TABLET ORAL at 19:28

## 2023-04-04 RX ADMIN — CASTOR OIL AND BALSAM, PERU 1 APPLICATION: 788; 87 OINTMENT TOPICAL at 09:02

## 2023-04-04 RX ADMIN — ROSUVASTATIN 40 MG: 20 TABLET, FILM COATED ORAL at 21:02

## 2023-04-04 RX ADMIN — LORAZEPAM 0.5 MG: 0.5 TABLET ORAL at 21:02

## 2023-04-04 RX ADMIN — INSULIN LISPRO 4 UNITS: 100 INJECTION, SOLUTION INTRAVENOUS; SUBCUTANEOUS at 11:40

## 2023-04-04 RX ADMIN — NYSTATIN: 100000 POWDER TOPICAL at 09:02

## 2023-04-04 RX ADMIN — PANTOPRAZOLE SODIUM 40 MG: 40 TABLET, DELAYED RELEASE ORAL at 06:27

## 2023-04-04 RX ADMIN — GABAPENTIN 300 MG: 300 CAPSULE ORAL at 09:00

## 2023-04-04 RX ADMIN — INSULIN LISPRO 2 UNITS: 100 INJECTION, SOLUTION INTRAVENOUS; SUBCUTANEOUS at 11:40

## 2023-04-04 RX ADMIN — NYSTATIN: 100000 POWDER TOPICAL at 21:04

## 2023-04-04 NOTE — PLAN OF CARE
Goal Outcome Evaluation:               Pt VSS, No adventitious findings noted upon primary assessment. Patient turned Q2, 2LPM NC, A&Ox1, tolerated all meds and movements well. Will continue to monitor and follow patient and physician plan of care per md order.

## 2023-04-04 NOTE — PROGRESS NOTES
[facility] Medicine Services  PROGRESS NOTE    Patient Name: Cheri Cruz  : 1940  MRN: [id number]    Date of Admission: 3/31/2023  Primary Care Physician: Lorrie Canada APRN    Subjective   Subjective     CC:  F/u AMS     HPI:  Patient is resting in bed in NAD. She is pleasantly confused. No acute events overnight per nursing.      ROS:  Gen- No fevers, chills  CV- No chest pain, palpitations  Resp- No cough, dyspnea  GI- No N/V/D, abd pain         Objective   Objective     Vital Signs:   Temp:  [97.8 °F (36.6 °C)-98.9 °F (37.2 °C)] 97.8 °F (36.6 °C)  Heart Rate:  [75-98] 75  Resp:  [18-20] 18  BP: (128-167)/(57-67) 143/63  Flow (L/min):  [1-2] 2     Physical Exam:  Constitutional: No acute distress, awake, alert  HENT: NCAT, mucous membranes moist  Respiratory: Clear to auscultation bilaterally, respiratory effort normal 2L 100%  Cardiovascular: RRR, no murmurs, rubs, or gallops  Gastrointestinal: Positive bowel sounds, soft, nontender, nondistended  Musculoskeletal: No bilateral ankle edema  Psychiatric: Appropriate affect, cooperative  Neurologic: Oriented x 1, strength symmetric in all extremities, Cranial Nerves grossly intact to confrontation, speech clear  Skin: No rashes, pale, bruises on diamante arms       Results Reviewed:  LAB RESULTS:      Lab 23  0512 23  0420 23  0031 23  1833   WBC 5.48  --   --  6.91   HEMOGLOBIN 9.8*  --  10.6* 10.9*   HEMATOCRIT 32.5*  --  34.5 35.1   PLATELETS 117*  --   --  165   NEUTROS ABS 2.56  --   --  4.02   IMMATURE GRANS (ABS) 0.07*  --   --  0.01   LYMPHS ABS 2.04  --   --  2.17   MONOS ABS 0.55  --   --  0.45   EOS ABS 0.23  --   --  0.23   .2*  --   --  103.5*   PROCALCITONIN  --   --   --  0.06   LACTATE  --  1.5  --  1.1         Lab 23  0726 23  1833   SODIUM 136 140   POTASSIUM 4.8 5.0   CHLORIDE 107 101   CO2 18.0* 30.0*   ANION GAP 11.0 9.0   BUN 26* 33*   CREATININE 1.35* 1.65*    EGFR 39.6* 31.1*   GLUCOSE 217* 288*   CALCIUM 8.2* 9.3   MAGNESIUM  --  1.6   TSH  --  1.810         Lab 03/31/23  1833   TOTAL PROTEIN 6.5   ALBUMIN 3.5   GLOBULIN 3.0   ALT (SGPT) 5   AST (SGOT) 17   BILIRUBIN 0.2   ALK PHOS 54         Lab 04/01/23  0726 04/01/23  0420 03/31/23  1833   HSTROP T 39* 39* 45*                 Brief Urine Lab Results  (Last result in the past 365 days)      Color   Clarity   Blood   Leuk Est   Nitrite   Protein   CREAT   Urine HCG        03/31/23 1621 Yellow   Cloudy   Negative   Trace   Negative   30 mg/dL (1+)                 Microbiology Results Abnormal     Procedure Component Value - Date/Time    Urine Culture - Urine, Straight Cath [[id number]]  (Normal) Collected: 03/31/23 1621    Lab Status: Final result Specimen: Urine from Straight Cath Updated: 04/01/23 2102     Urine Culture No growth    COVID PRE-OP / PRE-PROCEDURE SCREENING ORDER (NO ISOLATION) - Swab, Nasopharynx [[id number]]  (Normal) Collected: 03/31/23 1759    Lab Status: Final result Specimen: Swab from Nasopharynx Updated: 03/31/23 1854    Narrative:      The following orders were created for panel order COVID PRE-OP / PRE-PROCEDURE SCREENING ORDER (NO ISOLATION) - Swab, Nasopharynx.  Procedure                               Abnormality         Status                     ---------                               -----------         ------                     Respiratory Panel PCR w/...[[id number]]  Normal              Final result                 Please view results for these tests on the individual orders.    Respiratory Panel PCR w/COVID-19(SARS-CoV-2) TOMMY/SABRINA/DYLLAN/PAD/COR/MAD/KATHLEEN In-House, NP Swab in UTM/VTM, 3-4 HR TAT - Swab, Nasopharynx [[id number]]  (Normal) Collected: 03/31/23 1759    Lab Status: Final result Specimen: Swab from Nasopharynx Updated: 03/31/23 1854     ADENOVIRUS, PCR Not Detected     Coronavirus 229E Not Detected     Coronavirus HKU1 Not Detected     Coronavirus NL63 Not Detected     Coronavirus  OC43 Not Detected     COVID19 Not Detected     Human Metapneumovirus Not Detected     Human Rhinovirus/Enterovirus Not Detected     Influenza A PCR Not Detected     Influenza B PCR Not Detected     Parainfluenza Virus 1 Not Detected     Parainfluenza Virus 2 Not Detected     Parainfluenza Virus 3 Not Detected     Parainfluenza Virus 4 Not Detected     RSV, PCR Not Detected     Bordetella pertussis pcr Not Detected     Bordetella parapertussis PCR Not Detected     Chlamydophila pneumoniae PCR Not Detected     Mycoplasma pneumo by PCR Not Detected    Narrative:      In the setting of a positive respiratory panel with a viral infection PLUS a negative procalcitonin without other underlying concern for bacterial infection, consider observing off antibiotics or discontinuation of antibiotics and continue supportive care. If the respiratory panel is positive for atypical bacterial infection (Bordetella pertussis, Chlamydophila pneumoniae, or Mycoplasma pneumoniae), consider antibiotic de-escalation to target atypical bacterial infection.          No radiology results from the last 24 hrs    Results for orders placed during the hospital encounter of 02/10/23    Adult Transthoracic Echo Complete W/ Cont if Necessary Per Protocol    Interpretation Summary  •  Left ventricular systolic function is hyperdynamic (EF > 70%). Calculated left ventricular EF = 70.6% Left ventricular ejection fraction appears to be greater than 70%. The left ventricular cavity is small in size. Left ventricular wall thickness is consistent with mild concentric hypertrophy. All left ventricular wall segments contract normally. Left ventricular intracavitary gradient noted to be 71 mmHg. Left ventricular diastolic function is consistent with (grade I) impaired relaxation. Normal left atrial pressure.  •  The aortic valve is abnormal in structure. There is mild calcification of the aortic valve mainly affecting the right coronary cusp(s). The aortic  valve appears trileaflet. No aortic valve regurgitation is present. Gradient noted through the LV and LVOT    Compared to TTE report from [facility] Dec 2019, hyperdynamic LV with intracavitary gradient is not a new finding but peak gradient noted on this exam is higher than previously described.      Current medications:  Scheduled Meds:apixaban, 5 mg, Oral, Q12H  castor oil-balsam peru, 1 application, Topical, Q12H  donepezil, 10 mg, Oral, Nightly  DULoxetine, 60 mg, Oral, Daily  gabapentin, 300 mg, Oral, BID  insulin lispro, 0-7 Units, Subcutaneous, TID With Meals  levothyroxine, 25 mcg, Oral, Q AM  nystatin, , Topical, Q12H  pantoprazole, 40 mg, Oral, Q AM  QUEtiapine, 12.5 mg, Oral, Nightly  QUEtiapine, 50 mg, Oral, Daily  rosuvastatin, 40 mg, Oral, Nightly  senna-docusate sodium, 2 tablet, Oral, BID      Continuous Infusions:sodium chloride, 100 mL/hr, Last Rate: 100 mL/hr (04/03/23 0027)      PRN Meds:.•  senna-docusate sodium **AND** polyethylene glycol **AND** bisacodyl **AND** bisacodyl  •  HYDROcodone-acetaminophen  •  LORazepam  •  ondansetron  •  sodium chloride    Assessment & Plan   Assessment & Plan     Active Hospital Problems    Diagnosis  POA   • **Confusion [R41.0]  Yes   • Hypotension [I95.9]  Yes   • Constipation [K59.00]  Yes   • UTI (urinary tract infection) due to urinary indwelling catheter [T83.511A, N39.0]  Yes   • Metabolic encephalopathy [G93.41]  Yes   • Symptomatic anemia [D64.9]  Yes   • Acquired hypothyroidism [E03.9]  Yes   • History of pulmonary embolus (PE) [Z86.711]  Yes   • History of ischemic left MCA stroke [Z86.73]  Not Applicable   • Osteoarthritis [M19.90]  Yes   • Type 2 diabetes mellitus without complication, without long-term current use of insulin (HCC) [E11.9]  Yes   • Anxiety [F41.9]  Yes      Resolved Hospital Problems   No resolved problems to display.        Brief Hospital Course to date:  Cheri Cruz is a 82 y.o. female  past medical history of  Anxiety, Arthritis,  Atrial fibrillation, Dementia , Diabetes mellitus , Hyperlipidemia, Hypertension, Stroke earlier this year with subsequent right side weakness and increasing speech difficulties, as well as Uterus cancer (HCC)who  presented to the ED from home for increasing confusion.    Plan was partially entered by my partner and I have reviewed and updated as appropriate on 4/4/23     Acute Confusion  Vascular dementia  Recent Stroke  -Suspect AMS due to hypotension as below which likely caused global hypoperfusion. Clinically seems to be at baseline.  -- CT head no acute process   -- cont Seroquel and ativan per home med req      Hypotension  -BP better stopped IVF 4/3 and observe. If necessary we will start on midodrine.  -Will continue to hold all antihypertensives at discharge.     UTI ruled out.  -UA is pretty bland there is only trace leukocyte Estrace and nitrite is negative.  Urine culture is also negative     Constipation     ANEMIA  -Essentially stable. CTM.     T2DM  -- still running high   -SS insulin add bolus with meals 2 units  -- pt was on reg diet will add NCS to diet      Chronic pain  -Lortab prn, per family she takes lotab scheduled at home but will leave prn for now until kidney function improves      Hypothryoidism  -Cont synthroid-- not sure TSH has settled yet     Expected Discharge Location and Transportation: rehab   Expected Discharge 4/5  Expected Discharge Date and Time     Expected Discharge Date Expected Discharge Time    Apr 5, 2023            DVT prophylaxis:  Medical and mechanical DVT prophylaxis orders are present.     AM-PAC 6 Clicks Score (PT): 6 (04/02/23 0800)    CODE STATUS:   Code Status and Medical Interventions:   Ordered at: 04/01/23 0023     Code Status (Patient has no pulse and is not breathing):    CPR (Attempt to Resuscitate)     Medical Interventions (Patient has pulse or is breathing):    Full       Kristin Pillai, APRN  04/04/23

## 2023-04-05 LAB
ANION GAP SERPL CALCULATED.3IONS-SCNC: 10 MMOL/L (ref 5–15)
BUN SERPL-MCNC: 12 MG/DL (ref 8–23)
BUN/CREAT SERPL: 9.4 (ref 7–25)
CALCIUM SPEC-SCNC: 8.5 MG/DL (ref 8.6–10.5)
CHLORIDE SERPL-SCNC: 105 MMOL/L (ref 98–107)
CO2 SERPL-SCNC: 24 MMOL/L (ref 22–29)
CREAT SERPL-MCNC: 1.28 MG/DL (ref 0.57–1)
EGFRCR SERPLBLD CKD-EPI 2021: 42.2 ML/MIN/1.73
GLUCOSE BLDC GLUCOMTR-MCNC: 224 MG/DL (ref 70–130)
GLUCOSE BLDC GLUCOMTR-MCNC: 244 MG/DL (ref 70–130)
GLUCOSE BLDC GLUCOMTR-MCNC: 250 MG/DL (ref 70–130)
GLUCOSE BLDC GLUCOMTR-MCNC: 272 MG/DL (ref 70–130)
GLUCOSE SERPL-MCNC: 260 MG/DL (ref 65–99)
POTASSIUM SERPL-SCNC: 3.7 MMOL/L (ref 3.5–5.2)
SODIUM SERPL-SCNC: 139 MMOL/L (ref 136–145)

## 2023-04-05 PROCEDURE — 63710000001 INSULIN LISPRO (HUMAN) PER 5 UNITS: Performed by: NURSE PRACTITIONER

## 2023-04-05 PROCEDURE — 63710000001 INSULIN DETEMIR PER 5 UNITS: Performed by: NURSE PRACTITIONER

## 2023-04-05 PROCEDURE — G0378 HOSPITAL OBSERVATION PER HR: HCPCS

## 2023-04-05 PROCEDURE — 82962 GLUCOSE BLOOD TEST: CPT

## 2023-04-05 PROCEDURE — 99232 SBSQ HOSP IP/OBS MODERATE 35: CPT | Performed by: NURSE PRACTITIONER

## 2023-04-05 PROCEDURE — 63710000001 INSULIN LISPRO (HUMAN) PER 5 UNITS: Performed by: INTERNAL MEDICINE

## 2023-04-05 PROCEDURE — 80048 BASIC METABOLIC PNL TOTAL CA: CPT | Performed by: NURSE PRACTITIONER

## 2023-04-05 RX ORDER — INSULIN LISPRO 100 [IU]/ML
3 INJECTION, SOLUTION INTRAVENOUS; SUBCUTANEOUS
Status: DISCONTINUED | OUTPATIENT
Start: 2023-04-05 | End: 2023-04-06

## 2023-04-05 RX ORDER — INSULIN LISPRO 100 [IU]/ML
0-7 INJECTION, SOLUTION INTRAVENOUS; SUBCUTANEOUS
Status: DISCONTINUED | OUTPATIENT
Start: 2023-04-05 | End: 2023-04-07

## 2023-04-05 RX ADMIN — HYDROCODONE BITARTRATE AND ACETAMINOPHEN 1 TABLET: 5; 325 TABLET ORAL at 02:27

## 2023-04-05 RX ADMIN — INSULIN LISPRO 6 UNITS: 100 INJECTION, SOLUTION INTRAVENOUS; SUBCUTANEOUS at 09:58

## 2023-04-05 RX ADMIN — LEVOTHYROXINE SODIUM 25 MCG: 25 TABLET ORAL at 05:26

## 2023-04-05 RX ADMIN — INSULIN LISPRO 3 UNITS: 100 INJECTION, SOLUTION INTRAVENOUS; SUBCUTANEOUS at 12:54

## 2023-04-05 RX ADMIN — QUETIAPINE FUMARATE 50 MG: 25 TABLET ORAL at 09:58

## 2023-04-05 RX ADMIN — INSULIN LISPRO 4 UNITS: 100 INJECTION, SOLUTION INTRAVENOUS; SUBCUTANEOUS at 20:42

## 2023-04-05 RX ADMIN — INSULIN LISPRO 3 UNITS: 100 INJECTION, SOLUTION INTRAVENOUS; SUBCUTANEOUS at 18:14

## 2023-04-05 RX ADMIN — ROSUVASTATIN 40 MG: 20 TABLET, FILM COATED ORAL at 20:30

## 2023-04-05 RX ADMIN — NYSTATIN 1 APPLICATION: 100000 POWDER TOPICAL at 09:59

## 2023-04-05 RX ADMIN — INSULIN DETEMIR 5 UNITS: 100 INJECTION, SOLUTION SUBCUTANEOUS at 12:54

## 2023-04-05 RX ADMIN — INSULIN DETEMIR 5 UNITS: 100 INJECTION, SOLUTION SUBCUTANEOUS at 20:30

## 2023-04-05 RX ADMIN — CASTOR OIL AND BALSAM, PERU 1 APPLICATION: 788; 87 OINTMENT TOPICAL at 20:32

## 2023-04-05 RX ADMIN — INSULIN LISPRO 3 UNITS: 100 INJECTION, SOLUTION INTRAVENOUS; SUBCUTANEOUS at 12:55

## 2023-04-05 RX ADMIN — APIXABAN 5 MG: 5 TABLET, FILM COATED ORAL at 09:58

## 2023-04-05 RX ADMIN — CASTOR OIL AND BALSAM, PERU 1 APPLICATION: 788; 87 OINTMENT TOPICAL at 09:59

## 2023-04-05 RX ADMIN — APIXABAN 5 MG: 5 TABLET, FILM COATED ORAL at 20:30

## 2023-04-05 RX ADMIN — PANTOPRAZOLE SODIUM 40 MG: 40 TABLET, DELAYED RELEASE ORAL at 05:26

## 2023-04-05 RX ADMIN — DONEPEZIL HYDROCHLORIDE 10 MG: 10 TABLET ORAL at 20:30

## 2023-04-05 RX ADMIN — GABAPENTIN 300 MG: 300 CAPSULE ORAL at 20:30

## 2023-04-05 RX ADMIN — LORAZEPAM 0.5 MG: 0.5 TABLET ORAL at 20:30

## 2023-04-05 RX ADMIN — SENNOSIDES AND DOCUSATE SODIUM 2 TABLET: 50; 8.6 TABLET ORAL at 09:57

## 2023-04-05 RX ADMIN — DULOXETINE 60 MG: 60 CAPSULE, DELAYED RELEASE ORAL at 09:58

## 2023-04-05 RX ADMIN — GABAPENTIN 300 MG: 300 CAPSULE ORAL at 09:58

## 2023-04-05 RX ADMIN — NYSTATIN: 100000 POWDER TOPICAL at 20:32

## 2023-04-05 RX ADMIN — INSULIN LISPRO 2 UNITS: 100 INJECTION, SOLUTION INTRAVENOUS; SUBCUTANEOUS at 09:58

## 2023-04-05 RX ADMIN — LORAZEPAM 0.5 MG: 0.5 TABLET ORAL at 09:58

## 2023-04-05 NOTE — PLAN OF CARE
Goal Outcome Evaluation:               Pt VSS, No adventitious findings noted upon primary assessment. A&Ox1-2, family contacted and updated on patient's condition. Patient tolerated all medications, movements, turns, and discussions well without any incidents. Will continue to monitor and follow patient and physician plan of care per md order.

## 2023-04-05 NOTE — CASE MANAGEMENT/SOCIAL WORK
"Continued Stay Note  [facility]     Patient Name: Cheri Cruz  MRN: [id number]  Today's Date: 4/5/2023    Admit Date: 3/31/2023    Plan: Ongoing   Discharge Plan     Row Name 04/05/23 1147       Plan    Plan Ongoing    Patient/Family in Agreement with Plan yes    Plan Comments Ms. Cruz is not working with therapy as she is not rehab appropriate. I received a phone call from [facility] and they will not take Mrs. Cruz back due to her being \" hospice appropriate\". I have tried to call her daughter, Radha, will no response and was unable to leave a voicemail. I will attempt to call other family. She will likely need to go home with family at the time of discharge. Case management will continue to follow.    Final Discharge Disposition Code 30 - still a patient               Discharge Codes    No documentation.               Expected Discharge Date and Time     Expected Discharge Date Expected Discharge Time    Apr 5, 2023             Leighton Luong RN    "

## 2023-04-05 NOTE — PROGRESS NOTES
[facility] Medicine Services  PROGRESS NOTE    Patient Name: Cheri Cruz  : 1940  MRN: [id number]    Date of Admission: 3/31/2023  Primary Care Physician: Lorrie Canada APRN    Subjective   Subjective     CC:  F/u AMS     HPI:  Patient is resting in bed in NAD. She is pleasantly confused. No acute events overnight per nursing.  No change from yesterday     ROS:  Unable to reliably obtain          Objective   Objective     Vital Signs:   Temp:  [97.7 °F (36.5 °C)-98 °F (36.7 °C)] 98 °F (36.7 °C)  Heart Rate:  [] 90  Resp:  [16-18] 18  BP: (133-151)/(52-98) 151/98  Flow (L/min):  [2] 2     Physical Exam:  Constitutional: No acute distress, awake, alert  HENT: NCAT, mucous membranes moist  Respiratory: Clear to auscultation bilaterally, respiratory effort normal 2L 97%  Cardiovascular: RRR, no murmurs, rubs, or gallops  Gastrointestinal: Positive bowel sounds, soft, nontender, nondistended  Musculoskeletal: No bilateral ankle edema  Psychiatric: Appropriate affect, cooperative  Neurologic: Oriented x 1, strength symmetric in all extremities, Cranial Nerves grossly intact to confrontation, speech clear  Skin: No rashes, pale, bruises on diamante arms       Results Reviewed:  LAB RESULTS:      Lab 23  0512 23  0420 23  0031 23  1833   WBC 5.48  --   --  6.91   HEMOGLOBIN 9.8*  --  10.6* 10.9*   HEMATOCRIT 32.5*  --  34.5 35.1   PLATELETS 117*  --   --  165   NEUTROS ABS 2.56  --   --  4.02   IMMATURE GRANS (ABS) 0.07*  --   --  0.01   LYMPHS ABS 2.04  --   --  2.17   MONOS ABS 0.55  --   --  0.45   EOS ABS 0.23  --   --  0.23   .2*  --   --  103.5*   PROCALCITONIN  --   --   --  0.06   LACTATE  --  1.5  --  1.1         Lab 23  0352 23  0726 23  1833   SODIUM 139 136 140   POTASSIUM 3.7 4.8 5.0   CHLORIDE 105 107 101   CO2 24.0 18.0* 30.0*   ANION GAP 10.0 11.0 9.0   BUN 12 26* 33*   CREATININE 1.28* 1.35* 1.65*   EGFR 42.2* 39.6*  31.1*   GLUCOSE 260* 217* 288*   CALCIUM 8.5* 8.2* 9.3   MAGNESIUM  --   --  1.6   TSH  --   --  1.810         Lab 03/31/23  1833   TOTAL PROTEIN 6.5   ALBUMIN 3.5   GLOBULIN 3.0   ALT (SGPT) 5   AST (SGOT) 17   BILIRUBIN 0.2   ALK PHOS 54         Lab 04/01/23  0726 04/01/23  0420 03/31/23  1833   HSTROP T 39* 39* 45*                 Brief Urine Lab Results  (Last result in the past 365 days)      Color   Clarity   Blood   Leuk Est   Nitrite   Protein   CREAT   Urine HCG        03/31/23 1621 Yellow   Cloudy   Negative   Trace   Negative   30 mg/dL (1+)                 Microbiology Results Abnormal     Procedure Component Value - Date/Time    Urine Culture - Urine, Straight Cath [[id number]]  (Normal) Collected: 03/31/23 1621    Lab Status: Final result Specimen: Urine from Straight Cath Updated: 04/01/23 2102     Urine Culture No growth    COVID PRE-OP / PRE-PROCEDURE SCREENING ORDER (NO ISOLATION) - Swab, Nasopharynx [[id number]]  (Normal) Collected: 03/31/23 1759    Lab Status: Final result Specimen: Swab from Nasopharynx Updated: 03/31/23 1854    Narrative:      The following orders were created for panel order COVID PRE-OP / PRE-PROCEDURE SCREENING ORDER (NO ISOLATION) - Swab, Nasopharynx.  Procedure                               Abnormality         Status                     ---------                               -----------         ------                     Respiratory Panel PCR w/...[[id number]]  Normal              Final result                 Please view results for these tests on the individual orders.    Respiratory Panel PCR w/COVID-19(SARS-CoV-2) TOMMY/SABRINA/DYLLAN/PAD/COR/MAD/KATHLEEN In-House, NP Swab in UTM/VTM, 3-4 HR TAT - Swab, Nasopharynx [[id number]]  (Normal) Collected: 03/31/23 1759    Lab Status: Final result Specimen: Swab from Nasopharynx Updated: 03/31/23 1854     ADENOVIRUS, PCR Not Detected     Coronavirus 229E Not Detected     Coronavirus HKU1 Not Detected     Coronavirus NL63 Not Detected      Coronavirus OC43 Not Detected     COVID19 Not Detected     Human Metapneumovirus Not Detected     Human Rhinovirus/Enterovirus Not Detected     Influenza A PCR Not Detected     Influenza B PCR Not Detected     Parainfluenza Virus 1 Not Detected     Parainfluenza Virus 2 Not Detected     Parainfluenza Virus 3 Not Detected     Parainfluenza Virus 4 Not Detected     RSV, PCR Not Detected     Bordetella pertussis pcr Not Detected     Bordetella parapertussis PCR Not Detected     Chlamydophila pneumoniae PCR Not Detected     Mycoplasma pneumo by PCR Not Detected    Narrative:      In the setting of a positive respiratory panel with a viral infection PLUS a negative procalcitonin without other underlying concern for bacterial infection, consider observing off antibiotics or discontinuation of antibiotics and continue supportive care. If the respiratory panel is positive for atypical bacterial infection (Bordetella pertussis, Chlamydophila pneumoniae, or Mycoplasma pneumoniae), consider antibiotic de-escalation to target atypical bacterial infection.          No radiology results from the last 24 hrs    Results for orders placed during the hospital encounter of 02/10/23    Adult Transthoracic Echo Complete W/ Cont if Necessary Per Protocol    Interpretation Summary  •  Left ventricular systolic function is hyperdynamic (EF > 70%). Calculated left ventricular EF = 70.6% Left ventricular ejection fraction appears to be greater than 70%. The left ventricular cavity is small in size. Left ventricular wall thickness is consistent with mild concentric hypertrophy. All left ventricular wall segments contract normally. Left ventricular intracavitary gradient noted to be 71 mmHg. Left ventricular diastolic function is consistent with (grade I) impaired relaxation. Normal left atrial pressure.  •  The aortic valve is abnormal in structure. There is mild calcification of the aortic valve mainly affecting the right coronary cusp(s).  The aortic valve appears trileaflet. No aortic valve regurgitation is present. Gradient noted through the LV and LVOT    Compared to TTE report from [facility] Dec 2019, hyperdynamic LV with intracavitary gradient is not a new finding but peak gradient noted on this exam is higher than previously described.      Current medications:  Scheduled Meds:apixaban, 5 mg, Oral, Q12H  castor oil-balsam peru, 1 application, Topical, Q12H  donepezil, 10 mg, Oral, Nightly  DULoxetine, 60 mg, Oral, Daily  gabapentin, 300 mg, Oral, BID  insulin detemir, 5 Units, Subcutaneous, Q12H  insulin lispro, 0-7 Units, Subcutaneous, 4x Daily With Meals & Nightly  Insulin Lispro, 2 Units, Subcutaneous, TID With Meals  levothyroxine, 25 mcg, Oral, Q AM  LORazepam, 0.5 mg, Oral, Q12H  nystatin, , Topical, Q12H  pantoprazole, 40 mg, Oral, Q AM  QUEtiapine, 50 mg, Oral, Daily  rosuvastatin, 40 mg, Oral, Nightly  senna-docusate sodium, 2 tablet, Oral, BID      Continuous Infusions:   PRN Meds:.•  senna-docusate sodium **AND** polyethylene glycol **AND** bisacodyl **AND** bisacodyl  •  HYDROcodone-acetaminophen  •  LORazepam  •  ondansetron  •  sodium chloride    Assessment & Plan   Assessment & Plan     Active Hospital Problems    Diagnosis  POA   • **Confusion [R41.0]  Yes   • Hypotension [I95.9]  Yes   • Constipation [K59.00]  Yes   • UTI (urinary tract infection) due to urinary indwelling catheter [T83.511A, N39.0]  Yes   • Metabolic encephalopathy [G93.41]  Yes   • Symptomatic anemia [D64.9]  Yes   • Acquired hypothyroidism [E03.9]  Yes   • History of pulmonary embolus (PE) [Z86.711]  Yes   • History of ischemic left MCA stroke [Z86.73]  Not Applicable   • Osteoarthritis [M19.90]  Yes   • Type 2 diabetes mellitus without complication, without long-term current use of insulin (HCC) [E11.9]  Yes   • Anxiety [F41.9]  Yes      Resolved Hospital Problems   No resolved problems to display.        Brief Hospital Course to date:  Cheri Cruz is a 82  y.o. female  past medical history of  Anxiety, Arthritis, Atrial fibrillation, Dementia , Diabetes mellitus , Hyperlipidemia, Hypertension, Stroke earlier this year with subsequent right side weakness and increasing speech difficulties, as well as Uterus cancer (HCC)who  presented to the ED from home for increasing confusion.    Plan was partially entered by my partner and I have reviewed and updated as appropriate on 4/5/23     Acute Confusion  Vascular dementia  Recent Stroke  -Suspect AMS due to hypotension as below which likely caused global hypoperfusion. Clinically seems to be at baseline.  -- CT head no acute process   -- cont Seroquel and ativan per home med req      Hypotension  -BP better stopped IVF 4/3 and observe. If necessary we will start on midodrine.  -Will continue to hold all antihypertensives at discharge.    CKD  -- baseline 1.2-1.3     UTI ruled out.  -UA is pretty bland there is only trace leukocyte Estrace and nitrite is negative.  Urine culture is also negative     Constipation     ANEMIA  -Essentially stable. CTM.     T2DM  -- still running high   -SS insulin, bolus with meals 3 units, add levemir 5 units bid  -- pt was on reg diet will add NCS to diet      Chronic pain  -Lortab prn, per family she takes lotab scheduled at home but will leave prn for now until kidney function improves      Hypothryoidism  -Cont synthroid-- not sure TSH has settled yet     Expected Discharge Location and Transportation: rehab if accepted or home   Expected Discharge 4/6  Expected Discharge Date and Time     Expected Discharge Date Expected Discharge Time    Apr 5, 2023            DVT prophylaxis:  Medical and mechanical DVT prophylaxis orders are present.     AM-PAC 6 Clicks Score (PT): 6 (04/02/23 0800)    CODE STATUS:   Code Status and Medical Interventions:   Ordered at: 04/01/23 0023     Code Status (Patient has no pulse and is not breathing):    CPR (Attempt to Resuscitate)     Medical Interventions  (Patient has pulse or is breathing):    Full       Kristin Pillai, APRN  04/05/23

## 2023-04-06 LAB
GLUCOSE BLDC GLUCOMTR-MCNC: 110 MG/DL (ref 70–130)
GLUCOSE BLDC GLUCOMTR-MCNC: 205 MG/DL (ref 70–130)
GLUCOSE BLDC GLUCOMTR-MCNC: 288 MG/DL (ref 70–130)

## 2023-04-06 PROCEDURE — G0378 HOSPITAL OBSERVATION PER HR: HCPCS

## 2023-04-06 PROCEDURE — 99232 SBSQ HOSP IP/OBS MODERATE 35: CPT | Performed by: NURSE PRACTITIONER

## 2023-04-06 PROCEDURE — 63710000001 INSULIN DETEMIR PER 5 UNITS: Performed by: NURSE PRACTITIONER

## 2023-04-06 PROCEDURE — 63710000001 INSULIN LISPRO (HUMAN) PER 5 UNITS: Performed by: NURSE PRACTITIONER

## 2023-04-06 PROCEDURE — 82962 GLUCOSE BLOOD TEST: CPT

## 2023-04-06 RX ORDER — INSULIN LISPRO 100 [IU]/ML
5 INJECTION, SOLUTION INTRAVENOUS; SUBCUTANEOUS
Status: DISCONTINUED | OUTPATIENT
Start: 2023-04-06 | End: 2023-04-11

## 2023-04-06 RX ORDER — INSULIN LISPRO 100 [IU]/ML
5 INJECTION, SOLUTION INTRAVENOUS; SUBCUTANEOUS
Status: DISCONTINUED | OUTPATIENT
Start: 2023-04-06 | End: 2023-04-06

## 2023-04-06 RX ADMIN — LORAZEPAM 0.5 MG: 0.5 TABLET ORAL at 20:03

## 2023-04-06 RX ADMIN — CASTOR OIL AND BALSAM, PERU 1 APPLICATION: 788; 87 OINTMENT TOPICAL at 20:04

## 2023-04-06 RX ADMIN — HYDROCODONE BITARTRATE AND ACETAMINOPHEN 1 TABLET: 5; 325 TABLET ORAL at 20:03

## 2023-04-06 RX ADMIN — GABAPENTIN 300 MG: 300 CAPSULE ORAL at 08:16

## 2023-04-06 RX ADMIN — LORAZEPAM 0.5 MG: 0.5 TABLET ORAL at 08:16

## 2023-04-06 RX ADMIN — QUETIAPINE FUMARATE 50 MG: 25 TABLET ORAL at 08:16

## 2023-04-06 RX ADMIN — INSULIN LISPRO 3 UNITS: 100 INJECTION, SOLUTION INTRAVENOUS; SUBCUTANEOUS at 08:16

## 2023-04-06 RX ADMIN — CASTOR OIL AND BALSAM, PERU 1 APPLICATION: 788; 87 OINTMENT TOPICAL at 08:16

## 2023-04-06 RX ADMIN — SENNOSIDES AND DOCUSATE SODIUM 2 TABLET: 50; 8.6 TABLET ORAL at 20:02

## 2023-04-06 RX ADMIN — INSULIN LISPRO 4 UNITS: 100 INJECTION, SOLUTION INTRAVENOUS; SUBCUTANEOUS at 12:26

## 2023-04-06 RX ADMIN — PANTOPRAZOLE SODIUM 40 MG: 40 TABLET, DELAYED RELEASE ORAL at 05:00

## 2023-04-06 RX ADMIN — NYSTATIN: 100000 POWDER TOPICAL at 08:16

## 2023-04-06 RX ADMIN — DULOXETINE 60 MG: 60 CAPSULE, DELAYED RELEASE ORAL at 08:16

## 2023-04-06 RX ADMIN — SENNOSIDES AND DOCUSATE SODIUM 2 TABLET: 50; 8.6 TABLET ORAL at 08:16

## 2023-04-06 RX ADMIN — NYSTATIN: 100000 POWDER TOPICAL at 20:05

## 2023-04-06 RX ADMIN — ROSUVASTATIN 40 MG: 20 TABLET, FILM COATED ORAL at 20:03

## 2023-04-06 RX ADMIN — LEVOTHYROXINE SODIUM 25 MCG: 25 TABLET ORAL at 05:00

## 2023-04-06 RX ADMIN — DONEPEZIL HYDROCHLORIDE 10 MG: 10 TABLET ORAL at 20:03

## 2023-04-06 RX ADMIN — APIXABAN 5 MG: 5 TABLET, FILM COATED ORAL at 20:03

## 2023-04-06 RX ADMIN — GABAPENTIN 300 MG: 300 CAPSULE ORAL at 20:03

## 2023-04-06 RX ADMIN — HYDROCODONE BITARTRATE AND ACETAMINOPHEN 1 TABLET: 5; 325 TABLET ORAL at 15:57

## 2023-04-06 RX ADMIN — APIXABAN 5 MG: 5 TABLET, FILM COATED ORAL at 08:16

## 2023-04-06 RX ADMIN — INSULIN DETEMIR 7 UNITS: 100 INJECTION, SOLUTION SUBCUTANEOUS at 08:16

## 2023-04-06 RX ADMIN — INSULIN LISPRO 5 UNITS: 100 INJECTION, SOLUTION INTRAVENOUS; SUBCUTANEOUS at 12:26

## 2023-04-06 NOTE — CASE MANAGEMENT/SOCIAL WORK
Continued Stay Note   [facility]     Patient Name: Cheri Cruz  MRN: [id number]  Today's Date: 4/6/2023    Admit Date: 3/31/2023    Plan: Ongoing   Discharge Plan     Row Name 04/06/23 1419       Plan    Plan Ongoing    Patient/Family in Agreement with Plan yes    Plan Comments Long conversations yesterday and today with Radha and her sister about their mother, Mrs. Cruz. They are adamant that their mother is not bed-bound at baseline and want therapy to come work with her. Upon chart review, the only therapy note in the chart is from 4/1 and states that per the RN the patient is bed bound and not appropriate for rehab. PT and OT have been reordered to assess Ms. Cruz for discharge placement assessments. I will await these assessments and update the family once they have been placed. Case management will continue to follow.    Final Discharge Disposition Code 30 - still a patient               Discharge Codes    No documentation.               Expected Discharge Date and Time     Expected Discharge Date Expected Discharge Time    Apr 7, 2023             Leighton Luong RN

## 2023-04-06 NOTE — PROGRESS NOTES
[facility] Medicine Services  PROGRESS NOTE    Patient Name: Cheri Cruz  : 1940  MRN: [id number]    Date of Admission: 3/31/2023  Primary Care Physician: Lorrie Canada APRN    Subjective   Subjective     CC:  F/u AMS     HPI:  Patient is resting in bed in NAD. She is pleasantly confused. No acute events overnight per nursing.  No change from yesterday, updated daughter and  by phone today.     ROS:  Unable to reliably obtain          Objective   Objective     Vital Signs:   Temp:  [98 °F (36.7 °C)-98.3 °F (36.8 °C)] 98 °F (36.7 °C)  Heart Rate:  [] 92  Resp:  [18] 18  BP: (145-162)/() 158/72     Physical Exam:  Constitutional: No acute distress, awake, alert  HENT: NCAT, mucous membranes moist  Respiratory: Clear to auscultation bilaterally, respiratory effort normal 2L 97%  Cardiovascular: RRR, no murmurs, rubs, or gallops  Gastrointestinal: Positive bowel sounds, soft, nontender, nondistended  Musculoskeletal: No bilateral ankle edema  Psychiatric: Appropriate affect, cooperative  Neurologic: Oriented x 1, strength symmetric in all extremities, Cranial Nerves grossly intact to confrontation, speech clear  Skin: No rashes, pale, bruises on diamante arms       Results Reviewed:  LAB RESULTS:      Lab 23  0512 23  0420 23  0031 23  1833   WBC 5.48  --   --  6.91   HEMOGLOBIN 9.8*  --  10.6* 10.9*   HEMATOCRIT 32.5*  --  34.5 35.1   PLATELETS 117*  --   --  165   NEUTROS ABS 2.56  --   --  4.02   IMMATURE GRANS (ABS) 0.07*  --   --  0.01   LYMPHS ABS 2.04  --   --  2.17   MONOS ABS 0.55  --   --  0.45   EOS ABS 0.23  --   --  0.23   .2*  --   --  103.5*   PROCALCITONIN  --   --   --  0.06   LACTATE  --  1.5  --  1.1         Lab 23  0352 23  0726 23  1833   SODIUM 139 136 140   POTASSIUM 3.7 4.8 5.0   CHLORIDE 105 107 101   CO2 24.0 18.0* 30.0*   ANION GAP 10.0 11.0 9.0   BUN 12 26* 33*   CREATININE 1.28* 1.35*  1.65*   EGFR 42.2* 39.6* 31.1*   GLUCOSE 260* 217* 288*   CALCIUM 8.5* 8.2* 9.3   MAGNESIUM  --   --  1.6   TSH  --   --  1.810         Lab 03/31/23  1833   TOTAL PROTEIN 6.5   ALBUMIN 3.5   GLOBULIN 3.0   ALT (SGPT) 5   AST (SGOT) 17   BILIRUBIN 0.2   ALK PHOS 54         Lab 04/01/23  0726 04/01/23  0420 03/31/23  1833   HSTROP T 39* 39* 45*                 Brief Urine Lab Results  (Last result in the past 365 days)      Color   Clarity   Blood   Leuk Est   Nitrite   Protein   CREAT   Urine HCG        03/31/23 1621 Yellow   Cloudy   Negative   Trace   Negative   30 mg/dL (1+)                 Microbiology Results Abnormal     Procedure Component Value - Date/Time    Urine Culture - Urine, Straight Cath [[id number]]  (Normal) Collected: 03/31/23 1621    Lab Status: Final result Specimen: Urine from Straight Cath Updated: 04/01/23 2102     Urine Culture No growth    COVID PRE-OP / PRE-PROCEDURE SCREENING ORDER (NO ISOLATION) - Swab, Nasopharynx [[id number]]  (Normal) Collected: 03/31/23 1759    Lab Status: Final result Specimen: Swab from Nasopharynx Updated: 03/31/23 1854    Narrative:      The following orders were created for panel order COVID PRE-OP / PRE-PROCEDURE SCREENING ORDER (NO ISOLATION) - Swab, Nasopharynx.  Procedure                               Abnormality         Status                     ---------                               -----------         ------                     Respiratory Panel PCR w/...[[id number]]  Normal              Final result                 Please view results for these tests on the individual orders.    Respiratory Panel PCR w/COVID-19(SARS-CoV-2) TOMMY/SABRINA/DYLLAN/PAD/COR/MAD/KATHLEEN In-House, NP Swab in UTM/VTM, 3-4 HR TAT - Swab, Nasopharynx [[id number]]  (Normal) Collected: 03/31/23 1759    Lab Status: Final result Specimen: Swab from Nasopharynx Updated: 03/31/23 1854     ADENOVIRUS, PCR Not Detected     Coronavirus 229E Not Detected     Coronavirus HKU1 Not Detected     Coronavirus  NL63 Not Detected     Coronavirus OC43 Not Detected     COVID19 Not Detected     Human Metapneumovirus Not Detected     Human Rhinovirus/Enterovirus Not Detected     Influenza A PCR Not Detected     Influenza B PCR Not Detected     Parainfluenza Virus 1 Not Detected     Parainfluenza Virus 2 Not Detected     Parainfluenza Virus 3 Not Detected     Parainfluenza Virus 4 Not Detected     RSV, PCR Not Detected     Bordetella pertussis pcr Not Detected     Bordetella parapertussis PCR Not Detected     Chlamydophila pneumoniae PCR Not Detected     Mycoplasma pneumo by PCR Not Detected    Narrative:      In the setting of a positive respiratory panel with a viral infection PLUS a negative procalcitonin without other underlying concern for bacterial infection, consider observing off antibiotics or discontinuation of antibiotics and continue supportive care. If the respiratory panel is positive for atypical bacterial infection (Bordetella pertussis, Chlamydophila pneumoniae, or Mycoplasma pneumoniae), consider antibiotic de-escalation to target atypical bacterial infection.          No radiology results from the last 24 hrs    Results for orders placed during the hospital encounter of 02/10/23    Adult Transthoracic Echo Complete W/ Cont if Necessary Per Protocol    Interpretation Summary  •  Left ventricular systolic function is hyperdynamic (EF > 70%). Calculated left ventricular EF = 70.6% Left ventricular ejection fraction appears to be greater than 70%. The left ventricular cavity is small in size. Left ventricular wall thickness is consistent with mild concentric hypertrophy. All left ventricular wall segments contract normally. Left ventricular intracavitary gradient noted to be 71 mmHg. Left ventricular diastolic function is consistent with (grade I) impaired relaxation. Normal left atrial pressure.  •  The aortic valve is abnormal in structure. There is mild calcification of the aortic valve mainly affecting the  right coronary cusp(s). The aortic valve appears trileaflet. No aortic valve regurgitation is present. Gradient noted through the LV and LVOT    Compared to TTE report from [facility] Dec 2019, hyperdynamic LV with intracavitary gradient is not a new finding but peak gradient noted on this exam is higher than previously described.      Current medications:  Scheduled Meds:apixaban, 5 mg, Oral, Q12H  castor oil-balsam peru, 1 application, Topical, Q12H  donepezil, 10 mg, Oral, Nightly  DULoxetine, 60 mg, Oral, Daily  gabapentin, 300 mg, Oral, BID  insulin detemir, 7 Units, Subcutaneous, Q12H  insulin lispro, 0-7 Units, Subcutaneous, 4x Daily With Meals & Nightly  Insulin Lispro, 3 Units, Subcutaneous, TID With Meals  levothyroxine, 25 mcg, Oral, Q AM  LORazepam, 0.5 mg, Oral, Q12H  nystatin, , Topical, Q12H  pantoprazole, 40 mg, Oral, Q AM  QUEtiapine, 50 mg, Oral, Daily  rosuvastatin, 40 mg, Oral, Nightly  senna-docusate sodium, 2 tablet, Oral, BID      Continuous Infusions:   PRN Meds:.•  senna-docusate sodium **AND** polyethylene glycol **AND** bisacodyl **AND** bisacodyl  •  HYDROcodone-acetaminophen  •  LORazepam  •  ondansetron  •  sodium chloride    Assessment & Plan   Assessment & Plan     Active Hospital Problems    Diagnosis  POA   • **Confusion [R41.0]  Yes   • Hypotension [I95.9]  Yes   • Constipation [K59.00]  Yes   • UTI (urinary tract infection) due to urinary indwelling catheter [T83.511A, N39.0]  Yes   • Metabolic encephalopathy [G93.41]  Yes   • Symptomatic anemia [D64.9]  Yes   • Acquired hypothyroidism [E03.9]  Yes   • History of pulmonary embolus (PE) [Z86.711]  Yes   • History of ischemic left MCA stroke [Z86.73]  Not Applicable   • Osteoarthritis [M19.90]  Yes   • Type 2 diabetes mellitus without complication, without long-term current use of insulin (HCC) [E11.9]  Yes   • Anxiety [F41.9]  Yes      Resolved Hospital Problems   No resolved problems to display.        Brief Hospital Course to  date:  Cheri rCuz is a 82 y.o. female  past medical history of  Anxiety, Arthritis, Atrial fibrillation, Dementia , Diabetes mellitus , Hyperlipidemia, Hypertension, Stroke earlier this year with subsequent right side weakness and increasing speech difficulties, as well as Uterus cancer (HCC)who  presented to the ED from home for increasing confusion.    Plan was partially entered by my partner and I have reviewed and updated as appropriate on 4/6/23     Acute Confusion  Vascular dementia  Recent Stroke  -Suspect AMS due to hypotension as below which likely caused global hypoperfusion. Clinically seems to be at baseline.  -- CT head no acute process   -- cont Seroquel and ativan per home med req      Hypotension  -BP better stopped IVF 4/3 and observe. If necessary we will start on midodrine.  -Will continue to hold all antihypertensives at discharge.    CKD  -- baseline 1.2-1.3  --  Hold ACE     UTI ruled out.  -UA is pretty bland there is only trace leukocyte Estrace and nitrite is negative.  Urine culture is also negative     Constipation     ANEMIA  -Essentially stable. CTM.     T2DM  -- still running high   -SS insulin, bolus with meals 5 units, add levemir 7 units bid  -- pt was on reg diet will add NCS to diet      Chronic pain  -Lortab prn, per family she takes lotab scheduled at home but will leave prn for now until kidney function improves      Hypothryoidism  -Cont synthroid-- not sure TSH has settled yet     Expected Discharge Location and Transportation: rehab if accepted or home   Expected Discharge  Expected Discharge Date and Time     Expected Discharge Date Expected Discharge Time    Apr 7, 2023            DVT prophylaxis:  Medical and mechanical DVT prophylaxis orders are present.     AM-PAC 6 Clicks Score (PT): 6 (04/02/23 0800)    CODE STATUS:   Code Status and Medical Interventions:   Ordered at: 04/01/23 0023     Code Status (Patient has no pulse and is not breathing):    CPR (Attempt to  Resuscitate)     Medical Interventions (Patient has pulse or is breathing):    Full       Kristin Pillai, APRN  04/06/23

## 2023-04-07 LAB
ANION GAP SERPL CALCULATED.3IONS-SCNC: 10 MMOL/L (ref 5–15)
BUN SERPL-MCNC: 14 MG/DL (ref 8–23)
BUN/CREAT SERPL: 10.9 (ref 7–25)
CALCIUM SPEC-SCNC: 8.8 MG/DL (ref 8.6–10.5)
CHLORIDE SERPL-SCNC: 105 MMOL/L (ref 98–107)
CO2 SERPL-SCNC: 27 MMOL/L (ref 22–29)
CREAT SERPL-MCNC: 1.29 MG/DL (ref 0.57–1)
EGFRCR SERPLBLD CKD-EPI 2021: 41.8 ML/MIN/1.73
GLUCOSE BLDC GLUCOMTR-MCNC: 214 MG/DL (ref 70–130)
GLUCOSE BLDC GLUCOMTR-MCNC: 247 MG/DL (ref 70–130)
GLUCOSE BLDC GLUCOMTR-MCNC: 254 MG/DL (ref 70–130)
GLUCOSE BLDC GLUCOMTR-MCNC: 300 MG/DL (ref 70–130)
GLUCOSE SERPL-MCNC: 190 MG/DL (ref 65–99)
POTASSIUM SERPL-SCNC: 3.6 MMOL/L (ref 3.5–5.2)
SODIUM SERPL-SCNC: 142 MMOL/L (ref 136–145)

## 2023-04-07 PROCEDURE — 63710000001 INSULIN LISPRO (HUMAN) PER 5 UNITS: Performed by: NURSE PRACTITIONER

## 2023-04-07 PROCEDURE — 63710000001 INSULIN DETEMIR PER 5 UNITS: Performed by: NURSE PRACTITIONER

## 2023-04-07 PROCEDURE — G0378 HOSPITAL OBSERVATION PER HR: HCPCS

## 2023-04-07 PROCEDURE — 97162 PT EVAL MOD COMPLEX 30 MIN: CPT

## 2023-04-07 PROCEDURE — 80048 BASIC METABOLIC PNL TOTAL CA: CPT

## 2023-04-07 PROCEDURE — 97166 OT EVAL MOD COMPLEX 45 MIN: CPT

## 2023-04-07 PROCEDURE — 99232 SBSQ HOSP IP/OBS MODERATE 35: CPT | Performed by: NURSE PRACTITIONER

## 2023-04-07 PROCEDURE — 82962 GLUCOSE BLOOD TEST: CPT

## 2023-04-07 RX ORDER — HYDROCODONE BITARTRATE AND ACETAMINOPHEN 5; 325 MG/1; MG/1
1 TABLET ORAL EVERY 6 HOURS PRN
Status: DISCONTINUED | OUTPATIENT
Start: 2023-04-07 | End: 2023-04-12 | Stop reason: HOSPADM

## 2023-04-07 RX ORDER — HYDROCODONE BITARTRATE AND ACETAMINOPHEN 5; 325 MG/1; MG/1
1 TABLET ORAL EVERY 12 HOURS
Status: DISCONTINUED | OUTPATIENT
Start: 2023-04-07 | End: 2023-04-11

## 2023-04-07 RX ORDER — INSULIN LISPRO 100 [IU]/ML
0-9 INJECTION, SOLUTION INTRAVENOUS; SUBCUTANEOUS
Status: DISCONTINUED | OUTPATIENT
Start: 2023-04-07 | End: 2023-04-12 | Stop reason: HOSPADM

## 2023-04-07 RX ORDER — INSULIN LISPRO 100 [IU]/ML
0-7 INJECTION, SOLUTION INTRAVENOUS; SUBCUTANEOUS NIGHTLY
Status: DISCONTINUED | OUTPATIENT
Start: 2023-04-07 | End: 2023-04-08

## 2023-04-07 RX ADMIN — CASTOR OIL AND BALSAM, PERU 1 APPLICATION: 788; 87 OINTMENT TOPICAL at 08:59

## 2023-04-07 RX ADMIN — APIXABAN 5 MG: 5 TABLET, FILM COATED ORAL at 21:20

## 2023-04-07 RX ADMIN — INSULIN LISPRO 7 UNITS: 100 INJECTION, SOLUTION INTRAVENOUS; SUBCUTANEOUS at 11:44

## 2023-04-07 RX ADMIN — LORAZEPAM 0.5 MG: 0.5 TABLET ORAL at 21:20

## 2023-04-07 RX ADMIN — INSULIN LISPRO 4 UNITS: 100 INJECTION, SOLUTION INTRAVENOUS; SUBCUTANEOUS at 21:18

## 2023-04-07 RX ADMIN — INSULIN LISPRO 5 UNITS: 100 INJECTION, SOLUTION INTRAVENOUS; SUBCUTANEOUS at 08:57

## 2023-04-07 RX ADMIN — INSULIN DETEMIR 7 UNITS: 100 INJECTION, SOLUTION SUBCUTANEOUS at 08:57

## 2023-04-07 RX ADMIN — GABAPENTIN 300 MG: 300 CAPSULE ORAL at 21:19

## 2023-04-07 RX ADMIN — ROSUVASTATIN 40 MG: 20 TABLET, FILM COATED ORAL at 21:20

## 2023-04-07 RX ADMIN — INSULIN LISPRO 3 UNITS: 100 INJECTION, SOLUTION INTRAVENOUS; SUBCUTANEOUS at 08:59

## 2023-04-07 RX ADMIN — CASTOR OIL AND BALSAM, PERU 1 APPLICATION: 788; 87 OINTMENT TOPICAL at 21:20

## 2023-04-07 RX ADMIN — QUETIAPINE FUMARATE 50 MG: 25 TABLET ORAL at 08:58

## 2023-04-07 RX ADMIN — Medication 10 ML: at 08:58

## 2023-04-07 RX ADMIN — NYSTATIN: 100000 POWDER TOPICAL at 08:59

## 2023-04-07 RX ADMIN — INSULIN LISPRO 5 UNITS: 100 INJECTION, SOLUTION INTRAVENOUS; SUBCUTANEOUS at 17:04

## 2023-04-07 RX ADMIN — SENNOSIDES AND DOCUSATE SODIUM 2 TABLET: 50; 8.6 TABLET ORAL at 08:58

## 2023-04-07 RX ADMIN — HYDROCODONE BITARTRATE AND ACETAMINOPHEN 1 TABLET: 5; 325 TABLET ORAL at 08:58

## 2023-04-07 RX ADMIN — HYDROCODONE BITARTRATE AND ACETAMINOPHEN 1 TABLET: 5; 325 TABLET ORAL at 21:19

## 2023-04-07 RX ADMIN — INSULIN DETEMIR 10 UNITS: 100 INJECTION, SOLUTION SUBCUTANEOUS at 21:18

## 2023-04-07 RX ADMIN — APIXABAN 5 MG: 5 TABLET, FILM COATED ORAL at 08:58

## 2023-04-07 RX ADMIN — NYSTATIN: 100000 POWDER TOPICAL at 21:20

## 2023-04-07 RX ADMIN — DULOXETINE 60 MG: 60 CAPSULE, DELAYED RELEASE ORAL at 08:58

## 2023-04-07 RX ADMIN — INSULIN LISPRO 6 UNITS: 100 INJECTION, SOLUTION INTRAVENOUS; SUBCUTANEOUS at 17:04

## 2023-04-07 RX ADMIN — INSULIN LISPRO 5 UNITS: 100 INJECTION, SOLUTION INTRAVENOUS; SUBCUTANEOUS at 11:44

## 2023-04-07 RX ADMIN — GABAPENTIN 300 MG: 300 CAPSULE ORAL at 08:58

## 2023-04-07 RX ADMIN — LORAZEPAM 0.5 MG: 0.5 TABLET ORAL at 08:58

## 2023-04-07 RX ADMIN — DONEPEZIL HYDROCHLORIDE 10 MG: 10 TABLET ORAL at 21:20

## 2023-04-07 NOTE — PLAN OF CARE
Goal Outcome Evaluation:  Plan of Care Reviewed With: patient        Progress: no change  Outcome Evaluation: Pt pleasantly confused but cooperative. Pt presenting below baseline w/ transfers, bed mobility and balance limiting independence w/ ADLs. Pt requiring significant assist this session. IP OT warranted to address deficits. Recommend SNF at d/c for best functional outcome.

## 2023-04-07 NOTE — PLAN OF CARE
Problem: Behavioral Health Comorbidity  Goal: Maintenance of Behavioral Health Symptom Control  Outcome: Ongoing, Progressing  Intervention: Maintain Behavioral Health Symptom Control  Recent Flowsheet Documentation  Taken 4/7/2023 0835 by Anne Kennedy RN  Medication Review/Management: medications reviewed     Problem: COPD (Chronic Obstructive Pulmonary Disease) Comorbidity  Goal: Maintenance of COPD Symptom Control  Outcome: Ongoing, Progressing  Intervention: Maintain COPD-Symptom Control  Recent Flowsheet Documentation  Taken 4/7/2023 0835 by Anne Kennedy RN  Medication Review/Management: medications reviewed     Problem: Diabetes Comorbidity  Goal: Blood Glucose Level Within Targeted Range  Outcome: Ongoing, Progressing     Problem: Heart Failure Comorbidity  Goal: Maintenance of Heart Failure Symptom Control  Outcome: Ongoing, Progressing  Intervention: Maintain Heart Failure-Management  Recent Flowsheet Documentation  Taken 4/7/2023 0835 by Anne Kennedy RN  Medication Review/Management: medications reviewed     Problem: Hypertension Comorbidity  Goal: Blood Pressure in Desired Range  Outcome: Ongoing, Progressing  Intervention: Maintain Blood Pressure Management  Recent Flowsheet Documentation  Taken 4/7/2023 0835 by Anne Kennedy RN  Medication Review/Management: medications reviewed     Problem: Osteoarthritis Comorbidity  Goal: Maintenance of Osteoarthritis Symptom Control  Outcome: Ongoing, Progressing  Intervention: Maintain Osteoarthritis Symptom Control  Recent Flowsheet Documentation  Taken 4/7/2023 1829 by Anne Kennedy RN  Activity Management: activity encouraged  Taken 4/7/2023 1626 by Anne Kennedy RN  Activity Management: activity encouraged  Taken 4/7/2023 1403 by Anne Kennedy RN  Activity Management: activity encouraged  Taken 4/7/2023 1206 by Anne Kennedy RN  Activity Management: back to bed  Taken 4/7/2023 1029 by Anne Kennedy RN  Activity Management: up in  chair  Taken 4/7/2023 0835 by Anne Kennedy RN  Activity Management:   activity encouraged   up in chair  Medication Review/Management: medications reviewed     Problem: Pain Chronic (Persistent) (Comorbidity Management)  Goal: Acceptable Pain Control and Functional Ability  Outcome: Ongoing, Progressing  Intervention: Manage Persistent Pain  Recent Flowsheet Documentation  Taken 4/7/2023 0835 by Anne Kennedy RN  Medication Review/Management: medications reviewed  Intervention: Develop Pain Management Plan  Recent Flowsheet Documentation  Taken 4/7/2023 0835 by Anne Kennedy RN  Pain Management Interventions:   see MAR   position adjusted  Intervention: Optimize Psychosocial Wellbeing  Recent Flowsheet Documentation  Taken 4/7/2023 0835 by Anne Kennedy RN  Diversional Activities: television  Family/Support System Care:   self-care encouraged   support provided     Problem: Skin Injury Risk Increased  Goal: Skin Health and Integrity  Outcome: Ongoing, Progressing  Intervention: Optimize Skin Protection  Recent Flowsheet Documentation  Taken 4/7/2023 1829 by Anne Kennedy RN  Head of Bed (HOB) Positioning: HOB elevated  Taken 4/7/2023 1626 by Anne Kennedy RN  Head of Bed (HOB) Positioning: HOB elevated  Taken 4/7/2023 1403 by Anne Kennedy RN  Head of Bed (HOB) Positioning: HOB elevated  Taken 4/7/2023 1206 by Anne Kennedy RN  Head of Bed (HOB) Positioning: HOB elevated  Taken 4/7/2023 1029 by Anne Kennedy RN  Head of Bed (HOB) Positioning: HOB elevated  Taken 4/7/2023 0835 by Anne Kennedy RN  Pressure Reduction Techniques: frequent weight shift encouraged  Head of Bed (HOB) Positioning: HOB elevated  Pressure Reduction Devices: specialty bed utilized  Skin Protection:   adhesive use limited   incontinence pads utilized     Problem: Fall Injury Risk  Goal: Absence of Fall and Fall-Related Injury  Outcome: Ongoing, Progressing  Intervention: Identify and Manage Contributors  Recent  Flowsheet Documentation  Taken 4/7/2023 1829 by Anne Kennedy RN  Self-Care Promotion: independence encouraged  Taken 4/7/2023 1626 by Anne Kennedy RN  Self-Care Promotion: independence encouraged  Taken 4/7/2023 1403 by Anne Kennedy RN  Self-Care Promotion: independence encouraged  Taken 4/7/2023 1206 by Anne Kennedy RN  Self-Care Promotion: independence encouraged  Taken 4/7/2023 1029 by Anne Kennedy RN  Self-Care Promotion: independence encouraged  Taken 4/7/2023 0835 by Anne Kennedy RN  Medication Review/Management: medications reviewed  Self-Care Promotion: independence encouraged  Intervention: Promote Injury-Free Environment  Recent Flowsheet Documentation  Taken 4/7/2023 1829 by Anne Kennedy RN  Safety Promotion/Fall Prevention:   activity supervised   assistive device/personal items within reach   safety round/check completed  Taken 4/7/2023 1626 by Anne Kennedy RN  Safety Promotion/Fall Prevention:   activity supervised   assistive device/personal items within reach   safety round/check completed  Taken 4/7/2023 1403 by Anne Kennedy RN  Safety Promotion/Fall Prevention:   activity supervised   assistive device/personal items within reach   safety round/check completed  Taken 4/7/2023 1206 by Anne Kennedy RN  Safety Promotion/Fall Prevention:   activity supervised   assistive device/personal items within reach   safety round/check completed  Taken 4/7/2023 1029 by Anne Kennedy RN  Safety Promotion/Fall Prevention:   activity supervised   assistive device/personal items within reach   safety round/check completed  Taken 4/7/2023 0835 by Anne Kennedy RN  Safety Promotion/Fall Prevention:   activity supervised   assistive device/personal items within reach   clutter free environment maintained   fall prevention program maintained   lighting adjusted   muscle strengthening facilitated   nonskid shoes/slippers when out of bed   room organization consistent   safety  round/check completed     Problem: Adult Inpatient Plan of Care  Goal: Plan of Care Review  Outcome: Ongoing, Progressing  Flowsheets (Taken 4/7/2023 1831)  Progress: improving  Plan of Care Reviewed With: patient  Goal: Patient-Specific Goal (Individualized)  Outcome: Ongoing, Progressing  Goal: Absence of Hospital-Acquired Illness or Injury  Outcome: Ongoing, Progressing  Intervention: Identify and Manage Fall Risk  Recent Flowsheet Documentation  Taken 4/7/2023 1829 by Anne Kennedy RN  Safety Promotion/Fall Prevention:   activity supervised   assistive device/personal items within reach   safety round/check completed  Taken 4/7/2023 1626 by Anne Kennedy RN  Safety Promotion/Fall Prevention:   activity supervised   assistive device/personal items within reach   safety round/check completed  Taken 4/7/2023 1403 by Anne Kennedy RN  Safety Promotion/Fall Prevention:   activity supervised   assistive device/personal items within reach   safety round/check completed  Taken 4/7/2023 1206 by Anne Kennedy RN  Safety Promotion/Fall Prevention:   activity supervised   assistive device/personal items within reach   safety round/check completed  Taken 4/7/2023 1029 by Anne Kennedy RN  Safety Promotion/Fall Prevention:   activity supervised   assistive device/personal items within reach   safety round/check completed  Taken 4/7/2023 0835 by Anne Kennedy RN  Safety Promotion/Fall Prevention:   activity supervised   assistive device/personal items within reach   clutter free environment maintained   fall prevention program maintained   lighting adjusted   muscle strengthening facilitated   nonskid shoes/slippers when out of bed   room organization consistent   safety round/check completed  Intervention: Prevent Skin Injury  Recent Flowsheet Documentation  Taken 4/7/2023 1829 by Anne Kennedy RN  Body Position: tilted  Taken 4/7/2023 1626 by Anne Kennedy RN  Body Position:   tilted   weight  shifting  Taken 4/7/2023 1403 by Anne Kennedy RN  Body Position: tilted  Taken 4/7/2023 1206 by Anne Kennedy RN  Body Position:   tilted   right  Taken 4/7/2023 1029 by Anne Kennedy RN  Body Position: supine, legs elevated  Taken 4/7/2023 0835 by Anne Kennedy RN  Body Position: supine, legs elevated  Skin Protection:   adhesive use limited   incontinence pads utilized  Intervention: Prevent and Manage VTE (Venous Thromboembolism) Risk  Recent Flowsheet Documentation  Taken 4/7/2023 1829 by Anne Kennedy RN  Activity Management: activity encouraged  VTE Prevention/Management:   bilateral   sequential compression devices on  Taken 4/7/2023 1626 by Anne Kennedy RN  Activity Management: activity encouraged  VTE Prevention/Management:   bilateral   sequential compression devices on  Taken 4/7/2023 1403 by Anne Kennedy RN  Activity Management: activity encouraged  VTE Prevention/Management:   bilateral   sequential compression devices on  Taken 4/7/2023 1206 by Anne Kennedy RN  Activity Management: back to bed  VTE Prevention/Management:   bilateral   sequential compression devices on  Taken 4/7/2023 1029 by Anne Kennedy RN  Activity Management: up in chair  VTE Prevention/Management:   bilateral   sequential compression devices off  Taken 4/7/2023 0835 by Anne Kennedy RN  Activity Management:   activity encouraged   up in chair  VTE Prevention/Management:   bilateral   sequential compression devices on  Range of Motion: active ROM (range of motion) encouraged  Intervention: Prevent Infection  Recent Flowsheet Documentation  Taken 4/7/2023 1829 by Anne Kennedy RN  Infection Prevention:   hand hygiene promoted   rest/sleep promoted  Taken 4/7/2023 1626 by Anne Kennedy RN  Infection Prevention:   hand hygiene promoted   rest/sleep promoted  Taken 4/7/2023 1403 by Anne Kennedy RN  Infection Prevention:   hand hygiene promoted   rest/sleep promoted  Taken 4/7/2023 1206 by  Anne Kennedy RN  Infection Prevention:   hand hygiene promoted   rest/sleep promoted  Taken 4/7/2023 1029 by Anne Kennedy RN  Infection Prevention:   hand hygiene promoted   rest/sleep promoted  Taken 4/7/2023 0835 by Anne Kennedy RN  Infection Prevention:   hand hygiene promoted   rest/sleep promoted  Goal: Optimal Comfort and Wellbeing  Outcome: Ongoing, Progressing  Intervention: Monitor Pain and Promote Comfort  Recent Flowsheet Documentation  Taken 4/7/2023 0835 by Anne Kennedy RN  Pain Management Interventions:   see MAR   position adjusted  Intervention: Provide Person-Centered Care  Recent Flowsheet Documentation  Taken 4/7/2023 1829 by Anne Kennedy RN  Trust Relationship/Rapport: care explained  Taken 4/7/2023 1626 by Anne Kennedy RN  Trust Relationship/Rapport: care explained  Taken 4/7/2023 1403 by Anne Kennedy RN  Trust Relationship/Rapport: care explained  Taken 4/7/2023 1206 by Anne Kennedy RN  Trust Relationship/Rapport: care explained  Taken 4/7/2023 1029 by Anne Kennedy RN  Trust Relationship/Rapport: care explained  Taken 4/7/2023 0835 by Anne Kennedy RN  Trust Relationship/Rapport:   care explained   choices provided   questions answered   questions encouraged   reassurance provided  Goal: Readiness for Transition of Care  Outcome: Ongoing, Progressing   Goal Outcome Evaluation:  Plan of Care Reviewed With: patient      Pt currently in bed resting quietly. Up earlier this shift in chair, tolerated well. Vitals WNL. Pt turned while in SB q2. Coccyx with barrier cream applied. Pillow boots applied. No other observations at this time. Will continue to monitor, call bell in reach.  Progress: improving

## 2023-04-07 NOTE — THERAPY EVALUATION
Patient Name: Cheri Cruz  : 1940    MRN: [id number]                              Today's Date: 2023       Admit Date: 3/31/2023    Visit Dx:     ICD-10-CM ICD-9-CM   1. Altered mental status, unspecified altered mental status type  R41.82 780.97   2. Combative behavior  R46.89 780.99   3. Somnolence  R40.0 780.09   4. Hyperglycemia  R73.9 790.29   5. Elevated serum creatinine  R79.89 790.99   6. Elevated troponin  R77.8 790.6     Patient Active Problem List   Diagnosis   • Hyperlipidemia LDL goal <70   • Type 2 diabetes mellitus without complication, without long-term current use of insulin (HCC)   • GERD (gastroesophageal reflux disease)   • Essential hypertension   • Abnormal EKG   • Osteoarthritis   • Anxiety   • Palpitations   • Vertigo   • History of ischemic left MCA stroke   • Hemorrhagic stroke   • History of pulmonary embolus (PE)   • Confusion   • Hypotension   • Constipation   • UTI (urinary tract infection) due to urinary indwelling catheter   • Metabolic encephalopathy   • Symptomatic anemia   • Acquired hypothyroidism     Past Medical History:   Diagnosis Date   • Abnormal heart rhythm    • Anxiety    • Arrhythmia    • Arthritis    • Atrial fibrillation    • Dementia    • Diabetes mellitus    • Hyperlipidemia    • Hypertension    • Kidney disorder    • Stroke    • Uterus cancer      Past Surgical History:   Procedure Laterality Date   • CATARACT EXTRACTION, BILATERAL        General Information     Row Name 23 0945          OT Time and Intention    Document Type evaluation  -MR     Mode of Treatment occupational therapy  -MR     Row Name 23 0945          General Information    Patient Profile Reviewed yes  -MR     Prior Level of Function mod assist:;bed mobility;max assist:;ADL's  PTA pt was living w/ daughter who assisted  care. Has a hospital bed, vicky lift and w/c.  -MR     Existing Precautions/Restrictions fall;other (see comments)  [place]; Dementia (pleasantly  confused)  -MR     Barriers to Rehab medically complex;previous functional deficit;cognitive status  -MR     Row Name 04/07/23 0945          Living Environment    People in Home child(sandoval), adult  -MR     Row Name 04/07/23 0945          Cognition    Orientation Status (Cognition) oriented to;person  -MR     Row Name 04/07/23 0945          Safety Issues, Functional Mobility    Safety Issues Affecting Function (Mobility) insight into deficits/self-awareness  -MR     Impairments Affecting Function (Mobility) balance;cognition;motor control;postural/trunk control;pain;visual/perceptual;strength  -MR     Cognitive Impairments, Mobility Safety/Performance insight into deficits/self-awareness;judgment;safety precaution awareness;safety precaution follow-through;sequencing abilities  -MR           User Key  (r) = Recorded By, (t) = Taken By, (c) = Cosigned By    Initials Name Provider Type    MR Emeka Vivian, OT Occupational Therapist                 Mobility/ADL's     Row Name 04/07/23 1101          Bed Mobility    Bed Mobility rolling right;rolling left;scooting/bridging;sit-supine;supine-sit  -MR     Rolling Left [place] (Bed Mobility) 2 person assist;moderate assist (50% patient effort)  -MR     Rolling Right [place] (Bed Mobility) 2 person assist;moderate assist (50% patient effort)  -MR     Scooting/Bridging [place] (Bed Mobility) 2 person assist;maximum assist (25% patient effort)  -MR     Supine-Sit [place] (Bed Mobility) 2 person assist;moderate assist (50% patient effort)  -MR     Sit-Supine [place] (Bed Mobility) 2 person assist;moderate assist (50% patient effort)  -MR     Assistive Device (Bed Mobility) head of bed elevated;bed rails  -MR     Comment, (Bed Mobility) Pt demonstrating good effort w/ advancing to EOB. Verbal and tactile for sequencing and hand placement.  -MR     Row Name 04/07/23 1101          Transfers    Transfers sit-stand transfer;bed-chair transfer  -MR      Comment, (Transfers) STS from EOB to upright w/ BUE support. Pt unable to come to full upright standing, pt fearful of falling in standing.  -MR     Row Name 04/07/23 1101          Bed-Chair Transfer    Bed-Chair [place] (Transfers) dependent (less than 25% patient effort);2 person assist  -MR     Assistive Device (Bed-Chair Transfers) lift device  -MR     Row Name 04/07/23 1101          Sit-Stand Transfer    Sit-Stand [place] (Transfers) dependent (less than 25% patient effort);2 person assist  -MR     Assistive Device (Sit-Stand Transfers) other (see comments)  BUE support  -MR     Row Name 04/07/23 1101          Functional Mobility    Functional Mobility- Ind. Level not tested  -MR     Row Name 04/07/23 1101          Activities of Daily Living    BADL Assessment/Intervention upper body dressing;lower body dressing;toileting  -MR     Row Name 04/07/23 1101          Upper Body Dressing Assessment/Training    [place] Level (Upper Body Dressing) other (see comments);maximum assist (25% patient effort)  adjusting and tying hospital gown  -MR     Position (Upper Body Dressing) sitting up in bed  -MR     Row Name 04/07/23 1101          Lower Body Dressing Assessment/Training    [place] Level (Lower Body Dressing) don;socks;dependent (less than 25% patient effort)  -MR     Position (Lower Body Dressing) supine  -MR     Row Name 04/07/23 1101          Toileting Assessment/Training    [place] Level (Toileting) toileting skills;perform perineal hygiene;change pad/brief;dependent (less than 25% patient effort)  -MR     Position (Toileting) supine  -MR     Comment, (Toileting) DEP for toileting hygiene after episode of incontinence in bed.  -MR           User Key  (r) = Recorded By, (t) = Taken By, (c) = Cosigned By    Initials Name Provider Type    Vivian Gardiner, OT Occupational Therapist               Obj/Interventions     Row Name 04/07/23 1108          Sensory Assessment (Somatosensory)     Sensory Assessment (Somatosensory) UE sensation intact  -MR     Row Name 04/07/23 1108          Vision Assessment/Intervention    Visual Impairment/Limitations WFL;corrective lenses full-time  -MR     Row Name 04/07/23 1108          Range of Motion Comprehensive    General Range of Motion bilateral upper extremity ROM WFL  -MR     Row Name 04/07/23 1108          Strength Comprehensive (MMT)    Comment, General Manual Muscle Testing (MMT) Assessment BUE grossly 3+/5 in all functional planes.  -MR     Row Name 04/07/23 1108          Balance    Balance Assessment sitting static balance;sitting dynamic balance  -MR     Static Sitting Balance standby assist  -MR     Dynamic Sitting Balance contact guard  -MR     Position, Sitting Balance unsupported;sitting edge of bed  -MR     Balance Interventions sitting;static;dynamic;occupation based/functional task  -MR     Comment, Balance No overt LOB noted w/ sitting at EOB.  -MR           User Key  (r) = Recorded By, (t) = Taken By, (c) = Cosigned By    Initials Name Provider Type    MR Vivian Hendrickson, OT Occupational Therapist               Goals/Plan     Row Name 04/07/23 1122          Bed Mobility Goal 1 (OT)    Activity/Assistive Device (Bed Mobility Goal 1, OT) rolling to left;rolling to right;sit to supine;supine to sit  -MR     [place] Level/Cues Needed (Bed Mobility Goal 1, OT) minimum assist (75% or more patient effort);tactile cues required;verbal cues required  -MR     Time Frame (Bed Mobility Goal 1, OT) long term goal (LTG);by discharge  -MR     Progress/Outcomes (Bed Mobility Goal 1, OT) new goal  -MR     Row Name 04/07/23 1122          Transfer Goal 1 (OT)    Activity/Assistive Device (Transfer Goal 1, OT) bed-to-chair/chair-to-bed  -MR     [place] Level/Cues Needed (Transfer Goal 1, OT) moderate assist (50-74% patient effort);tactile cues required;verbal cues required  -MR     Time Frame (Transfer Goal 1, OT) long term goal (LTG);by discharge  -MR      Progress/Outcome (Transfer Goal 1, OT) new goal  -MR     Row Name 04/07/23 1122          Dressing Goal 1 (OT)    Activity/Device (Dressing Goal 1, OT) upper body dressing  -MR     [place]/Cues Needed (Dressing Goal 1, OT) minimum assist (75% or more patient effort);tactile cues required;verbal cues required  -MR     Time Frame (Dressing Goal 1, OT) long term goal (LTG);by discharge  -MR     Progress/Outcome (Dressing Goal 1, OT) new goal  -MR     Row Name 04/07/23 1122          Grooming Goal 1 (OT)    Activity/Device (Grooming Goal 1, OT) grooming skills, all;other (see comments)  sitting unsupported  -MR     [place] (Grooming Goal 1, OT) contact guard required;tactile cues required;verbal cues required  -MR     Time Frame (Grooming Goal 1, OT) long term goal (LTG);by discharge  -MR     Progress/Outcome (Grooming Goal 1, OT) new goal  -MR     Row Name 04/07/23 1122          Therapy Assessment/Plan (OT)    Planned Therapy Interventions (OT) activity tolerance training;adaptive equipment training;BADL retraining;functional balance retraining;IADL retraining;occupation/activity based interventions;patient/caregiver education/training;transfer/mobility retraining;strengthening exercise  -MR           User Key  (r) = Recorded By, (t) = Taken By, (c) = Cosigned By    Initials Name Provider Type    Vivian Gardiner, OT Occupational Therapist               Clinical Impression     Row Name 04/07/23 1115          Pain Assessment    Pretreatment Pain Rating 0/10 - no pain  -MR     Posttreatment Pain Rating 0/10 - no pain  -MR     Pain Intervention(s) Ambulation/increased activity;Repositioned  -MR     Row Name 04/07/23 1115          Plan of Care Review    Plan of Care Reviewed With patient  -MR     Progress no change  -MR     Outcome Evaluation Pt pleasantly confused but cooperative. Pt presenting below baseline w/ transfers, bed mobility and balance limiting independence w/ ADLs. Pt requiring significant assist  this session. IP OT warranted to address deficits. Recommend SNF at d/c for best functional outcome.  -MR     Row Name 04/07/23 1115          Therapy Assessment/Plan (OT)    Patient/Family Therapy Goal Statement (OT) Return to PLOF  -MR     Rehab Potential (OT) good, to achieve stated therapy goals  -MR     Criteria for Skilled Therapeutic Interventions Met (OT) yes;meets criteria;skilled treatment is necessary  -MR     Therapy Frequency (OT) daily  -MR     Row Name 04/07/23 1115          Therapy Plan Review/Discharge Plan (OT)    Anticipated Discharge Disposition (OT) skilled nursing facility  -MR     Row Name 04/07/23 1115          Vital Signs    Pre Systolic BP Rehab 120  -MR     Pre Treatment Diastolic BP 74  -MR     Pre SpO2 (%) 92  -MR     O2 Delivery Pre Treatment room air  -MR     Intra SpO2 (%) 87  -MR     O2 Delivery Intra Treatment room air  -MR     Post SpO2 (%) 90  -MR     O2 Delivery Post Treatment nasal cannula  -MR     Pre Patient Position Supine  -MR     Intra Patient Position Standing  -MR     Post Patient Position Sitting  -MR     Row Name 04/07/23 1115          Positioning and Restraints    Pre-Treatment Position in bed  -MR     Post Treatment Position chair  -MR     In Chair notified nsg;reclined;sitting;encouraged to call for assist;exit alarm on;waffle cushion;on mechanical lift sling;legs elevated;heels elevated  -MR           User Key  (r) = Recorded By, (t) = Taken By, (c) = Cosigned By    Initials Name Provider Type    Vivian Gardiner, OT Occupational Therapist               Outcome Measures     Row Name 04/07/23 1124          How much help from another is currently needed...    Putting on and taking off regular lower body clothing? 1  -MR     Bathing (including washing, rinsing, and drying) 1  -MR     Toileting (which includes using toilet bed pan or urinal) 1  -MR     Putting on and taking off regular upper body clothing 2  -MR     Taking care of personal grooming (such as brushing  teeth) 2  -MR     Eating meals 3  -MR     AM-PAC 6 Clicks Score (OT) 10  -     Row Name 04/07/23 0943          How much help from another person do you currently need...    Turning from your back to your side while in flat bed without using bedrails? 2  -SC     Moving from lying on back to sitting on the side of a flat bed without bedrails? 2  -SC     Moving to and from a bed to a chair (including a wheelchair)? 1  -SC     Standing up from a chair using your arms (e.g., wheelchair, bedside chair)? 1  -SC     Climbing 3-5 steps with a railing? 1  -SC     To walk in hospital room? 1  -SC     AM-PAC 6 Clicks Score (PT) 8  -SC     Highest level of mobility 3 --> Sat at edge of bed  -SC     Row Name 04/07/23 1124 04/07/23 0943       Functional Assessment    Outcome Measure Options AM-PAC 6 Clicks Daily Activity (OT)  - AM-PAC 6 Clicks Basic Mobility (PT)  -SC          User Key  (r) = Recorded By, (t) = Taken By, (c) = Cosigned By    Initials Name Provider Type    SC [place] Franco, PT Physical Therapist    MR Vivian Hendricksno, OT Occupational Therapist                Occupational Therapy Education     Title: PT OT SLP Therapies (In Progress)     Topic: Occupational Therapy (Done)     Point: ADL training (Done)     Description:   Instruct learner(s) on proper safety adaptation and remediation techniques during self care or transfers.   Instruct in proper use of assistive devices.              Learning Progress Summary           Patient Acceptance, E, VU,NR by MR at 4/7/2023 1124                   Point: Home exercise program (Done)     Description:   Instruct learner(s) on appropriate technique for monitoring, assisting and/or progressing therapeutic exercises/activities.              Learning Progress Summary           Patient Acceptance, E, VU,NR by MR at 4/7/2023 1124                   Point: Precautions (Done)     Description:   Instruct learner(s) on prescribed precautions during self-care and functional  transfers.              Learning Progress Summary           Patient Acceptance, E, VU,NR by MR at 4/7/2023 1124                   Point: Body mechanics (Done)     Description:   Instruct learner(s) on proper positioning and spine alignment during self-care, functional mobility activities and/or exercises.              Learning Progress Summary           Patient Acceptance, E, VU,NR by MR at 4/7/2023 1124                               User Key     Initials Effective Dates Name Provider Type Discipline    MR 09/22/22 -  Vivian Hendrickson OT Occupational Therapist OT              OT Recommendation and Plan  Planned Therapy Interventions (OT): activity tolerance training, adaptive equipment training, BADL retraining, functional balance retraining, IADL retraining, occupation/activity based interventions, patient/caregiver education/training, transfer/mobility retraining, strengthening exercise  Therapy Frequency (OT): daily  Plan of Care Review  Plan of Care Reviewed With: patient  Progress: no change  Outcome Evaluation: Pt pleasantly confused but cooperative. Pt presenting below baseline w/ transfers, bed mobility and balance limiting independence w/ ADLs. Pt requiring significant assist this session. IP OT warranted to address deficits. Recommend SNF at d/c for best functional outcome.     Time Calculation:    Time Calculation- OT     Row Name 04/07/23 1126             Time Calculation- OT    OT Start Time 0852  -MR      OT Received On 04/07/23  -MR      OT Goal Re-Cert Due Date 04/17/23  -MR         Untimed Charges    OT Eval/Re-eval Minutes 46  -MR         Total Minutes    Untimed Charges Total Minutes 46  -MR       Total Minutes 46  -MR            User Key  (r) = Recorded By, (t) = Taken By, (c) = Cosigned By    Initials Name Provider Type    MR Vivian Hendrickson OT Occupational Therapist              Therapy Charges for Today     Code Description Service Date Service Provider Modifiers Qty    [id number]  OT EVAL  MOD COMPLEXITY 4 4/7/2023 Vivian Hendrickson, OT GO 1               Vivian Hendrickson, OT  4/7/2023

## 2023-04-07 NOTE — CASE MANAGEMENT/SOCIAL WORK
Discharge Planning Assessment  [facility]     Patient Name: Cheri Cruz  MRN: [id number]  Today's Date: 4/7/2023    Admit Date: 3/31/2023    Plan: Ongoing   Discharge Needs Assessment    No documentation.                Discharge Plan     Row Name 04/07/23 1210       Plan    Plan Ongoing    Patient/Family in Agreement with Plan yes    Plan Comments Therapy has seen Mrs. Cruz and are recommending rehab. I have sent referrals to [place], [place], [place], [place], [place], and [facility]. I have updated family via telephone. Will await a bed offer. Case management will continue to follow.    Final Discharge Disposition Code 30 - still a patient              Continued Care and Services - Admitted Since 3/31/2023     Destination     Service Provider Request Status Selected Services Address Phone Fax Patient Preferred    [facility] TCU Pending - Request Sent N/A [address] [facility] [address] [phone] --    [facility]ALTH & REHAB-SIGN[facility] Pending - Request Sent N/A [address] [facility] [address] [phone] --    [facility] Pending - Request Sent N/A [address] [address] [phone] --    [facility] CARE & REHAB CTR - SIGNATURE Pending - Request Sent N/A [address] [facility] [address] [phone] --    [facility] REHABILITATION Pending - Request Sent N/A [address] [facility] [id number] [phone] --    [facility] REH[facility] [place] Pending - Request Sent N/A [address] [facility] [phone] --    [facility] Pending - Request Sent N/A [address] [facility] [address] [phone] --    [facility] Pending - Request Sent N/A [address] [facility] [address] [phone] --     [facility] Pending - Request Sent N/A [address] [place] [id number] [phone] --    [facility] Pending - Request Sent N/A [address] [facility] [address] [phone] --    [facility] SWING BED UNIT Declined  Bed not available N/A [address] [facility] [address] [phone] --    [facility] Declined  Bed not available N/A [address] [facility] [address] [phone] --                 Expected Discharge Date and Time     Expected Discharge Date Expected Discharge Time    Apr 7, 2023          Demographic Summary    No documentation.                Functional Status    No documentation.                Psychosocial    No documentation.                Abuse/Neglect    No documentation.                Legal    No documentation.                Substance Abuse    No documentation.                Patient Forms    No documentation.                   Leighton Luong RN

## 2023-04-07 NOTE — PLAN OF CARE
Goal Outcome Evaluation:  Plan of Care Reviewed With: patient           Outcome Evaluation: Patient was very plesent and  was able to participate in therapeutic actvities. She was too weak to stand or pivot . I am not sure if this is her baseline. Her family was not present. I recommend SNF if family intrested. Aparently they have alot of home equiptment to be able to care for  her at home, if so she can get Home Health PT.

## 2023-04-07 NOTE — THERAPY EVALUATION
Patient Name: Cheri Cruz  : 1940    MRN: [id number]                              Today's Date: 2023       Admit Date: 3/31/2023    Visit Dx:     ICD-10-CM ICD-9-CM   1. Altered mental status, unspecified altered mental status type  R41.82 780.97   2. Combative behavior  R46.89 780.99   3. Somnolence  R40.0 780.09   4. Hyperglycemia  R73.9 790.29   5. Elevated serum creatinine  R79.89 790.99   6. Elevated troponin  R77.8 790.6     Patient Active Problem List   Diagnosis   • Hyperlipidemia LDL goal <70   • Type 2 diabetes mellitus without complication, without long-term current use of insulin (HCC)   • GERD (gastroesophageal reflux disease)   • Essential hypertension   • Abnormal EKG   • Osteoarthritis   • Anxiety   • Palpitations   • Vertigo   • History of ischemic left MCA stroke   • Hemorrhagic stroke   • History of pulmonary embolus (PE)   • Confusion   • Hypotension   • Constipation   • UTI (urinary tract infection) due to urinary indwelling catheter   • Metabolic encephalopathy   • Symptomatic anemia   • Acquired hypothyroidism     Past Medical History:   Diagnosis Date   • Abnormal heart rhythm    • Anxiety    • Arrhythmia    • Arthritis    • Atrial fibrillation    • Dementia    • Diabetes mellitus    • Hyperlipidemia    • Hypertension    • Kidney disorder    • Stroke    • Uterus cancer      Past Surgical History:   Procedure Laterality Date   • CATARACT EXTRACTION, BILATERAL        General Information     Row Name 2328          Physical Therapy Time and Intention    Document Type evaluation  -SC     Mode of Treatment physical therapy  -SC     Row Name 23 0928          General Information    Patient Profile Reviewed yes  -SC     Prior Level of Function --  unsure of complete mobility- family not present for questioning and patient unable to say  -SC     Existing Precautions/Restrictions fall  -SC     Barriers to Rehab medically complex;previous functional deficit;cognitive  status  -SC     Row Name 04/07/23 0928          Living Environment    People in Home child(sandoval), adult  -SC     Row Name 04/07/23 0928          Home Main Entrance    Number of Stairs, Main Entrance --  unknown  -SC     Row Name 04/07/23 0928          Stairs Within Home, Primary    Number of Stairs, Within Home, Primary --  unknown  -SC     Row Name 04/07/23 0928          Cognition    Orientation Status (Cognition) oriented to;person  -SC     Row Name 04/07/23 0928          Safety Issues, Functional Mobility    Safety Issues Affecting Function (Mobility) insight into deficits/self-awareness  -SC     Impairments Affecting Function (Mobility) balance;cognition;motor control;postural/trunk control;pain;visual/perceptual;strength  -SC     Cognitive Impairments, Mobility Safety/Performance insight into deficits/self-awareness;judgment;problem-solving/reasoning;sequencing abilities  -SC     Comment, Safety Issues/Impairments (Mobility) alert, following commands, participating, disoriented  -SC           User Key  (r) = Recorded By, (t) = Taken By, (c) = Cosigned By    Initials Name Provider Type    SC Modesto Franco PT Physical Therapist               Mobility     Row Name 04/07/23 0931          Bed Mobility    Bed Mobility rolling right;rolling left;scooting/bridging;sit-supine;supine-sit  -SC     Rolling Left [place] (Bed Mobility) 2 person assist;moderate assist (50% patient effort)  -SC     Rolling Right [place] (Bed Mobility) 2 person assist;moderate assist (50% patient effort)  -SC     Scooting/Bridging [place] (Bed Mobility) 2 person assist;maximum assist (25% patient effort)  -SC     Supine-Sit [place] (Bed Mobility) 2 person assist;moderate assist (50% patient effort)  -SC     Sit-Supine [place] (Bed Mobility) 2 person assist;moderate assist (50% patient effort)  -SC     Comment, (Bed Mobility) Able to get to edge of bed with cues to use hand rail. Needed assist to get trunk upright.  Time taken to sit on edge of bed. Tactile cues for midline. Patient able to sit on EOb without loss of balance  -[facility] Name 04/07/23 0931          Transfers    Comment, (Transfers) Attempted STS from EOB. Patient able to bear weight on leg, but unable to clear her bottom off surface  -[facility] Name 04/07/23 0931          Bed-Chair Transfer    Bed-Chair [place] (Transfers) dependent (less than 25% patient effort)  -SC     Assistive Device (Bed-Chair Transfers) lift device  -SC     Comment, (Bed-Chair Transfer) Patient unable to stand and pivot, so she was  to recliner  -[facility] Name 04/07/23 0931          Sit-Stand Transfer    Sit-Stand [place] (Transfers) dependent (less than 25% patient effort);2 person assist  -SC     Row Name 04/07/23 0931          Gait/Stairs (Locomotion)    Comment, (Gait/Stairs) non ambulatory  -SC           User Key  (r) = Recorded By, (t) = Taken By, (c) = Cosigned By    Initials Name Provider Type    SC Modesto Franco PT Physical Therapist               Obj/Interventions     [facility] Name 04/07/23 0936          Range of Motion Comprehensive    General Range of Motion lower extremity range of motion deficits identified  -SC     Comment, General Range of Motion R LE knee stiffness noted with lack of full extension . other jts WFL  -[facility] Name 04/07/23 0936          Strength Comprehensive (MMT)    General Manual Muscle Testing (MMT) Assessment upper extremity strength deficits identified;lower extremity strength deficits identified  -SC     Comment, General Manual Muscle Testing (MMT) Assessment R LE -3/5, L LE 3+/5  -[facility] Name 04/07/23 0936          Balance    Balance Assessment sitting static balance;sitting dynamic balance  -SC     Static Sitting Balance 1-person assist;standby assist  -SC     Dynamic Sitting Balance 1-person assist;minimal assist  -SC     Comment, Balance able to sit on edge of bed without falling over  -[facility] Name 04/07/23 0936           Sensory Assessment (Somatosensory)    Sensory Assessment (Somatosensory) sensation intact  -SC           User Key  (r) = Recorded By, (t) = Taken By, (c) = Cosigned By    Initials Name Provider Type    SC Modesto Franco, PT Physical Therapist               Goals/Plan     Row Name 04/07/23 0943          Bed Mobility Goal 1 (PT)    Activity/Assistive Device (Bed Mobility Goal 1, PT) bed mobility activities, all  -SC     [place] Level/Cues Needed (Bed Mobility Goal 1, PT) verbal cues required;minimum assist (75% or more patient effort)  -SC     Time Frame (Bed Mobility Goal 1, PT) long term goal (LTG);10 days  -SC     Row Name 04/07/23 0943          Transfer Goal 1 (PT)    Activity/Assistive Device (Transfer Goal 1, PT) sit-to-stand/stand-to-sit;walker, rolling  -SC     [place] Level/Cues Needed (Transfer Goal 1, PT) moderate assist (50-74% patient effort)  -SC     Time Frame (Transfer Goal 1, PT) long term goal (LTG);10 days  -SC           User Key  (r) = Recorded By, (t) = Taken By, (c) = Cosigned By    Initials Name Provider Type    SC Modesto Franco, PT Physical Therapist               Clinical Impression     Row Name 04/07/23 0938          Pain    Additional Documentation Pain Scale: FACES Pre/Post-Treatment (Group)  -[facility] Name 04/07/23 0938          Pain Scale: FACES Pre/Post-Treatment    Pain: FACES Scale, Pretreatment 0-->no hurt  -SC     Posttreatment Pain Rating 2-->hurts little bit  -SC     Pain Location - back  -SC     Row Name 04/07/23 0938          Plan of Care Review    Plan of Care Reviewed With patient  -SC     Outcome Evaluation Patient was very plesent and  was able to participate in therapeutic actvities. She was too weak to stand or pivot . I am not sure if this is her baseline. Her family was not present. I recommend SNF if family intrested. Aparently they have alot of home equiptment to be able to care for  her at home, if so she can get Home Health PT.  -SC     Row Name  04/07/23 0938          Therapy Assessment/Plan (PT)    Patient/Family Therapy Goals Statement (PT) go home  -SC     Rehab Potential (PT) fair, will monitor progress closely  -SC     Criteria for Skilled Interventions Met (PT) yes;meets criteria  -SC     Therapy Frequency (PT) daily  -SC     Row Name 04/07/23 0938          Vital Signs    Pre Systolic BP Rehab 120  -SC     Pre Treatment Diastolic BP 74  -SC     Row Name 04/07/23 0938          Positioning and Restraints    Pre-Treatment Position in bed  -SC     Post Treatment Position chair  -SC     In Chair notified nsg;reclined;sitting;call light within reach;encouraged to call for assist  -SC           User Key  (r) = Recorded By, (t) = Taken By, (c) = Cosigned By    Initials Name Provider Type    Modesto Hernandez PT Physical Therapist               Outcome Measures     Row Name 04/07/23 0943          How much help from another person do you currently need...    Turning from your back to your side while in flat bed without using bedrails? 2  -SC     Moving from lying on back to sitting on the side of a flat bed without bedrails? 2  -SC     Moving to and from a bed to a chair (including a wheelchair)? 1  -SC     Standing up from a chair using your arms (e.g., wheelchair, bedside chair)? 1  -SC     Climbing 3-5 steps with a railing? 1  -SC     To walk in hospital room? 1  -SC     AM-PAC 6 Clicks Score (PT) 8  -SC     Highest level of mobility 3 --> Sat at edge of bed  -SC     Row Name 04/07/23 0943          Functional Assessment    Outcome Measure Options AM-PAC 6 Clicks Basic Mobility (PT)  -SC           User Key  (r) = Recorded By, (t) = Taken By, (c) = Cosigned By    Initials Name Provider Type    Modesto Hernandez PT Physical Therapist                             Physical Therapy Education     Title: PT OT SLP Therapies (In Progress)     Topic: Physical Therapy (In Progress)     Point: Mobility training (In Progress)     Learning Progress Summary            Patient To, GETACHEW, NR by SC at 4/7/2023 0944    Comment: reviewed benefits of activity                   Point: Home exercise program (In Progress)     Learning Progress Summary           Patient GETACHEW Ariza, NR by SC at 4/7/2023 0944    Comment: reviewed benefits of activity                   Point: Body mechanics (In Progress)     Learning Progress Summary           Patient GETACHEW Ariza, NR by SC at 4/7/2023 0944    Comment: reviewed benefits of activity                   Point: Precautions (In Progress)     Learning Progress Summary           Patient GETACHEW Ariza, NR by SC at 4/7/2023 0944    Comment: reviewed benefits of activity                               User Key     Initials Effective Dates Name Provider Type Discipline    SC 02/03/23 -  Modesto Franco PT Physical Therapist PT              PT Recommendation and Plan     Plan of Care Reviewed With: patient  Outcome Evaluation: Patient was very plesent and  was able to participate in therapeutic actvities. She was too weak to stand or pivot . I am not sure if this is her baseline. Her family was not present. I recommend SNF if family intrested. Aparently they have alot of home equiptment to be able to care for  her at home, if so she can get Home Health PT.     Time Calculation:    PT Charges     Row Name 04/07/23 0900             Time Calculation    Start Time 0900  -SC      PT Received On 04/07/23  -SC      PT Goal Re-Cert Due Date 04/17/23  -SC         Untimed Charges    PT Eval/Re-eval Minutes 50  -SC         Total Minutes    Untimed Charges Total Minutes 50  -SC       Total Minutes 50  -SC            User Key  (r) = Recorded By, (t) = Taken By, (c) = Cosigned By    Initials Name Provider Type    SC Modesto Franco PT Physical Therapist              Therapy Charges for Today     Code Description Service Date Service Provider Modifiers Qty    [id number] HC PT EVAL MOD COMPLEXITY 4 4/7/2023 Modesto Franco PT GP 1          PT G-Codes  Outcome Measure Options:  AM-PAC 6 Clicks Basic Mobility (PT)  AM-PAC 6 Clicks Score (PT): 8  PT Discharge Summary  Anticipated Discharge Disposition (PT): skilled nursing facility    Modesto Franco, PT  4/7/2023

## 2023-04-07 NOTE — PROGRESS NOTES
Clinical Nutrition     Nutrition Support Assessment  Reason for Visit: Follow-up protocol      Patient Name: Cheri Cruz  YOB: 1940  MRN: [id number]  Date of Encounter: 04/07/23 13:54 EDT  Admission date: 3/31/2023    Comments:    Nutrition Assessment   Admission Diagnosis:  Confusion [R41.0]      Problem List:    Confusion    Type 2 diabetes mellitus without complication, without long-term current use of insulin (HCC)    Osteoarthritis    Anxiety    History of ischemic left MCA stroke    History of pulmonary embolus (PE)    Hypotension    Constipation    UTI (urinary tract infection) due to urinary indwelling catheter    Metabolic encephalopathy    Symptomatic anemia    Acquired hypothyroidism        PMH:   She  has a past medical history of Abnormal heart rhythm, Anxiety, Arrhythmia, Arthritis, Atrial fibrillation, Dementia, Diabetes mellitus, Hyperlipidemia, Hypertension, Kidney disorder, Stroke, and Uterus cancer.    PSH:  She  has a past surgical history that includes Cataract extraction, bilateral.      Applicable Nutrition Concerns:   Skin: ankle and coccyx PIs  Oral:  GI:      Applicable Interval History:         Reported/Observed/Food/Nutrition Related History:     4/7  Pt answered nutrition questions appropriately at time of visit-pt states appetite is improving. Nsg at bedside also reports pt is drinking Boost GC sent BID.    4/2  Pt sleeping at time of RD visit, noticed untouched lunch tray at bedside. RN states pt w/confusion. Pt is a total feed and RN has not noticed difficulty w/current diet textures. RD spoke w/pt's dtr Janice who states pt is on a semi-pureed diet at home 2/2 pt only has lower teeth. Of note, pt was on pureed textures at last visit in February. Dtr states pt had poor po intake ~3 days PTA otherwise was eating at baseline. Dtr states pt would like Boost GC TID. RD attempted to reach pt's other dtr for additional/daytime  nutrition information but  "unable to reach at this time.       Labs    Labs Reviewed: Yes     Results from last 7 days   Lab Units 04/07/23  0339 04/05/23  0352 04/01/23  0726 03/31/23  1833   GLUCOSE mg/dL 190* 260* 217* 288*   BUN mg/dL 14 12 26* 33*   CREATININE mg/dL 1.29* 1.28* 1.35* 1.65*   SODIUM mmol/L 142 139 136 140   CHLORIDE mmol/L 105 105 107 101   POTASSIUM mmol/L 3.6 3.7 4.8 5.0   MAGNESIUM mg/dL  --   --   --  1.6   ALT (SGPT) U/L  --   --   --  5       Results from last 7 days   Lab Units 03/31/23  1833   ALBUMIN g/dL 3.5       Results from last 7 days   Lab Units 04/07/23  1100 04/07/23  0714 04/06/23  1600 04/06/23  1055 04/06/23  0656 04/05/23  2028   GLUCOSE mg/dL 300* 214* 110 288* 205* 250*     Lab Results   Lab Value Date/Time    HGBA1C 5.9 03/18/2023 0910    HGBA1C 6.9 (H) 02/02/2023 0700                 Medications    Medications Reviewed: Yes  Pertinent: protonix, pericolace, synthroid, insulin  Infusion:   PRN:       Intake/Ouptut 24 hrs (0701 - 0700)   I&O's Reviewed: Yes   +BM 4/7    Anthropometrics     Flowsheet Rows    Flowsheet Row First Filed Value   Admission Height 170.2 cm (67\") Documented at 03/31/2023 1550   Admission Weight 95.3 kg (210 lb) Documented at 03/31/2023 1549            Height: Height: 170.2 cm (67\")  Last Filed Weight: Weight: 95.3 kg (210 lb 1.6 oz) (04/02/23 1300)  Method: Weight Method: Estimated  BMI: BMI (Calculated): 32.933  BMI classification: Obese Class I: 30-34.9kg/m2  UBW: 211lbs (2/2023)  Weight change: EDGAR    Nutrition Focused Physical Exam     Date: 4/2    Unable to perform exam due to: Potential for patient discomfort    No visible signs of muscle wasting or fat loss noted.    Current Nutrition Prescription     PO: Diet: Regular/House Diet, Diabetic Diets; Consistent Carbohydrate; Texture: Soft to Chew (NDD 3); Soft to Chew: Chopped Meat; Fluid Consistency: Thin (IDDSI 0)  Oral Nutrition Supplement:   Intake: 2 Days: 43% x 11 meals      Nutrition Diagnosis   Date: 4/2 Updated: "   Problem Biting/chewing difficulty   Etiology Pt only has lower teeth   Signs/Symptoms Takes semi-pureed diet/soft diet at home per dtr report   Status:     Date:  4/2 Updated:  Problem Predicted suboptimal energy intake   Etiology Abdominal pain per dtr report   Signs/Symptoms Po intake <25% x 3 days PTA per dtr report, did not eat lunch today   Status: improving    Goal:   General: Nutrition to support treatment  PO: Increase intake, Modify texture/consistency  EN/PN: N/A    Nutrition Intervention      Follow treatment progress, Care plan reviewed, Encourage intake, Supplement provided    Boost GC TID-likes any flavor    Will adjust textures to soft to chew chopped per dtr request    Monitoring/Evaluation:   Per protocol, PO intake, Supplement intake, Weight, POC/GOC, Swallow function    Requested measured wt from nsg-per CM pt is bed bound    Anais Canela RD  Time Spent: 20

## 2023-04-07 NOTE — PROGRESS NOTES
Psychiatric Medicine Services  PROGRESS NOTE    Patient Name: Cheri Cruz  : 1940  MRN: [id number]    Date of Admission: 3/31/2023  Primary Care Physician: Lorrie Canada APRN    Subjective   Subjective     CC:  F/u AMS     HPI:  Patient is up in chair. She talks a lot about her parents and gets said and states she wants to see them. Had Bm today     ROS:  Unable to reliably obtain          Objective   Objective     Vital Signs:   Temp:  [97.5 °F (36.4 °C)-98.3 °F (36.8 °C)] 97.5 °F (36.4 °C)  Heart Rate:  [80-96] 96  Resp:  [16-19] 18  BP: (108-169)/(50-85) 127/69     Physical Exam:  Constitutional: No acute distress, awake, alert  HENT: NCAT, mucous membranes moist  Respiratory: Clear to auscultation bilaterally, respiratory effort normal room air  93%  Cardiovascular: RRR, no murmurs, rubs, or gallops  Gastrointestinal: Positive bowel sounds, soft, nontender, nondistended  Musculoskeletal: No bilateral ankle edema  Psychiatric: Appropriate affect, cooperative  Neurologic: Oriented x 1, strength symmetric in all extremities, Cranial Nerves grossly intact to confrontation, speech clear  Skin: No rashes, pale, bruises on diamante arms       Results Reviewed:  LAB RESULTS:      Lab 23  0512 23  0420 23  0031 23  1833   WBC 5.48  --   --  6.91   HEMOGLOBIN 9.8*  --  10.6* 10.9*   HEMATOCRIT 32.5*  --  34.5 35.1   PLATELETS 117*  --   --  165   NEUTROS ABS 2.56  --   --  4.02   IMMATURE GRANS (ABS) 0.07*  --   --  0.01   LYMPHS ABS 2.04  --   --  2.17   MONOS ABS 0.55  --   --  0.45   EOS ABS 0.23  --   --  0.23   .2*  --   --  103.5*   PROCALCITONIN  --   --   --  0.06   LACTATE  --  1.5  --  1.1         Lab 23  0339 23  0352 23  0726 23  1833   SODIUM 142 139 136 140   POTASSIUM 3.6 3.7 4.8 5.0   CHLORIDE 105 105 107 101   CO2 27.0 24.0 18.0* 30.0*   ANION GAP 10.0 10.0 11.0 9.0   BUN 14 12 26* 33*   CREATININE 1.29* 1.28*  1.35* 1.65*   EGFR 41.8* 42.2* 39.6* 31.1*   GLUCOSE 190* 260* 217* 288*   CALCIUM 8.8 8.5* 8.2* 9.3   MAGNESIUM  --   --   --  1.6   TSH  --   --   --  1.810         Lab 03/31/23  1833   TOTAL PROTEIN 6.5   ALBUMIN 3.5   GLOBULIN 3.0   ALT (SGPT) 5   AST (SGOT) 17   BILIRUBIN 0.2   ALK PHOS 54         Lab 04/01/23  0726 04/01/23  0420 03/31/23  1833   HSTROP T 39* 39* 45*                 Brief Urine Lab Results  (Last result in the past 365 days)      Color   Clarity   Blood   Leuk Est   Nitrite   Protein   CREAT   Urine HCG        03/31/23 1621 Yellow   Cloudy   Negative   Trace   Negative   30 mg/dL (1+)                 Microbiology Results Abnormal     Procedure Component Value - Date/Time    Urine Culture - Urine, Straight Cath [[id number]]  (Normal) Collected: 03/31/23 1621    Lab Status: Final result Specimen: Urine from Straight Cath Updated: 04/01/23 2102     Urine Culture No growth    COVID PRE-OP / PRE-PROCEDURE SCREENING ORDER (NO ISOLATION) - Swab, Nasopharynx [[id number]]  (Normal) Collected: 03/31/23 1759    Lab Status: Final result Specimen: Swab from Nasopharynx Updated: 03/31/23 1854    Narrative:      The following orders were created for panel order COVID PRE-OP / PRE-PROCEDURE SCREENING ORDER (NO ISOLATION) - Swab, Nasopharynx.  Procedure                               Abnormality         Status                     ---------                               -----------         ------                     Respiratory Panel PCR w/...[[id number]]  Normal              Final result                 Please view results for these tests on the individual orders.    Respiratory Panel PCR w/COVID-19(SARS-CoV-2) TOMMY/SABRINA/DYLLAN/PAD/COR/MAD/KATHLEEN In-House, NP Swab in UTM/[facility], 3-4 HR TAT - Swab, Nasopharynx [[id number]]  (Normal) Collected: 03/31/23 1759    Lab Status: Final result Specimen: Swab from Nasopharynx Updated: 03/31/23 1854     ADENOVIRUS, PCR Not Detected     Coronavirus 229E Not Detected     Coronavirus HKU1  Not Detected     Coronavirus NL63 Not Detected     Coronavirus OC43 Not Detected     COVID19 Not Detected     Human Metapneumovirus Not Detected     Human Rhinovirus/Enterovirus Not Detected     Influenza A PCR Not Detected     Influenza B PCR Not Detected     Parainfluenza Virus 1 Not Detected     Parainfluenza Virus 2 Not Detected     Parainfluenza Virus 3 Not Detected     Parainfluenza Virus 4 Not Detected     RSV, PCR Not Detected     Bordetella pertussis pcr Not Detected     Bordetella parapertussis PCR Not Detected     Chlamydophila pneumoniae PCR Not Detected     Mycoplasma pneumo by PCR Not Detected    Narrative:      In the setting of a positive respiratory panel with a viral infection PLUS a negative procalcitonin without other underlying concern for bacterial infection, consider observing off antibiotics or discontinuation of antibiotics and continue supportive care. If the respiratory panel is positive for atypical bacterial infection (Bordetella pertussis, Chlamydophila pneumoniae, or Mycoplasma pneumoniae), consider antibiotic de-escalation to target atypical bacterial infection.          No radiology results from the last 24 hrs    Results for orders placed during the hospital encounter of 02/10/23    Adult Transthoracic Echo Complete W/ Cont if Necessary Per Protocol    Interpretation Summary  •  Left ventricular systolic function is hyperdynamic (EF > 70%). Calculated left ventricular EF = 70.6% Left ventricular ejection fraction appears to be greater than 70%. The left ventricular cavity is small in size. Left ventricular wall thickness is consistent with mild concentric hypertrophy. All left ventricular wall segments contract normally. Left ventricular intracavitary gradient noted to be 71 mmHg. Left ventricular diastolic function is consistent with (grade I) impaired relaxation. Normal left atrial pressure.  •  The aortic valve is abnormal in structure. There is mild calcification of the aortic  valve mainly affecting the right coronary cusp(s). The aortic valve appears trileaflet. No aortic valve regurgitation is present. Gradient noted through the LV and LVOT    Compared to TTE report from  Dec 2019, hyperdynamic LV with intracavitary gradient is not a new finding but peak gradient noted on this exam is higher than previously described.      Current medications:  Scheduled Meds:apixaban, 5 mg, Oral, Q12H  castor oil-balsam peru, 1 application, Topical, Q12H  donepezil, 10 mg, Oral, Nightly  DULoxetine, 60 mg, Oral, Daily  gabapentin, 300 mg, Oral, BID  insulin detemir, 7 Units, Subcutaneous, Q12H  insulin lispro, 0-7 Units, Subcutaneous, 4x Daily With Meals & Nightly  Insulin Lispro, 5 Units, Subcutaneous, TID With Meals  levothyroxine, 25 mcg, Oral, Q AM  LORazepam, 0.5 mg, Oral, Q12H  nystatin, , Topical, Q12H  pantoprazole, 40 mg, Oral, Q AM  QUEtiapine, 50 mg, Oral, Daily  rosuvastatin, 40 mg, Oral, Nightly  senna-docusate sodium, 2 tablet, Oral, BID      Continuous Infusions:   PRN Meds:.•  senna-docusate sodium **AND** polyethylene glycol **AND** bisacodyl **AND** bisacodyl  •  HYDROcodone-acetaminophen  •  LORazepam  •  ondansetron  •  sodium chloride    Assessment & Plan   Assessment & Plan     Active Hospital Problems    Diagnosis  POA   • **Confusion [R41.0]  Yes   • Hypotension [I95.9]  Yes   • Constipation [K59.00]  Yes   • UTI (urinary tract infection) due to urinary indwelling catheter [T83.511A, N39.0]  Yes   • Metabolic encephalopathy [G93.41]  Yes   • Symptomatic anemia [D64.9]  Yes   • Acquired hypothyroidism [E03.9]  Yes   • History of pulmonary embolus (PE) [Z86.711]  Yes   • History of ischemic left MCA stroke [Z86.73]  Not Applicable   • Osteoarthritis [M19.90]  Yes   • Type 2 diabetes mellitus without complication, without long-term current use of insulin (HCC) [E11.9]  Yes   • Anxiety [F41.9]  Yes      Resolved Hospital Problems   No resolved problems to display.        Brief  Hospital Course to date:  Cheri Cruz is a 82 y.o. female  past medical history of  Anxiety, Arthritis, Atrial fibrillation, Dementia , Diabetes mellitus , Hyperlipidemia, Hypertension, Stroke earlier this year with subsequent right side weakness and increasing speech difficulties, as well as Uterus cancer (HCC)who  presented to the ED from home for increasing confusion.    Plan was partially entered by my partner and I have reviewed and updated as appropriate on 4/7/23     Acute Confusion  Vascular dementia  Recent Stroke  -Suspect AMS due to hypotension as below which likely caused global hypoperfusion. Clinically seems to be at baseline.  -- CT head no acute process   -- cont Seroquel and ativan per home med req   -- PT OT consult, family would like rehab prior to returning home      Hypotension  -BP better stopped IVF 4/3 and observe.   -Will continue to hold all antihypertensives at discharge.    CKD  -- baseline 1.2-1.3  --  Hold ACE     UTI ruled out.  -UA is pretty bland there is only trace leukocyte Estrace and nitrite is negative.  Urine culture is also negative     Constipation     ANEMIA  -Essentially stable. CTM.     T2DM  -- still running high   increase to mod SS insulin, bolus with meals 5 units, increase levemir 10 units bid  -- pt was on reg diet will add NCS to diet      Chronic pain  -Lortab prn, per family she takes lotab 10 mg scheduled at home every 6 hours. Will change to 5 mg  q 12 scheduled and see how she does      Hypothryoidism  -Cont synthroid-- not sure TSH has settled yet     Expected Discharge Location and Transportation: rehab if accepted or home   Expected Discharge  Expected Discharge Date and Time     Expected Discharge Date Expected Discharge Time    Apr 7, 2023            DVT prophylaxis:  Medical and mechanical DVT prophylaxis orders are present.     AM-PAC 6 Clicks Score (PT): 8 (04/07/23 0943)    CODE STATUS:   Code Status and Medical Interventions:   Ordered at:  04/01/23 0023     Code Status (Patient has no pulse and is not breathing):    CPR (Attempt to Resuscitate)     Medical Interventions (Patient has pulse or is breathing):    Full       Kristin Pillai, APRN  04/07/23

## 2023-04-08 LAB
GLUCOSE BLDC GLUCOMTR-MCNC: 152 MG/DL (ref 70–130)
GLUCOSE BLDC GLUCOMTR-MCNC: 175 MG/DL (ref 70–130)
GLUCOSE BLDC GLUCOMTR-MCNC: 191 MG/DL (ref 70–130)
GLUCOSE BLDC GLUCOMTR-MCNC: 206 MG/DL (ref 70–130)

## 2023-04-08 PROCEDURE — 99232 SBSQ HOSP IP/OBS MODERATE 35: CPT | Performed by: INTERNAL MEDICINE

## 2023-04-08 PROCEDURE — 63710000001 INSULIN LISPRO (HUMAN) PER 5 UNITS: Performed by: NURSE PRACTITIONER

## 2023-04-08 PROCEDURE — G0378 HOSPITAL OBSERVATION PER HR: HCPCS

## 2023-04-08 PROCEDURE — 82962 GLUCOSE BLOOD TEST: CPT

## 2023-04-08 PROCEDURE — 63710000001 INSULIN DETEMIR PER 5 UNITS: Performed by: NURSE PRACTITIONER

## 2023-04-08 RX ADMIN — LORAZEPAM 0.5 MG: 0.5 TABLET ORAL at 20:38

## 2023-04-08 RX ADMIN — INSULIN LISPRO 4 UNITS: 100 INJECTION, SOLUTION INTRAVENOUS; SUBCUTANEOUS at 12:19

## 2023-04-08 RX ADMIN — Medication 10 ML: at 08:00

## 2023-04-08 RX ADMIN — LORAZEPAM 0.5 MG: 0.5 TABLET ORAL at 08:00

## 2023-04-08 RX ADMIN — ROSUVASTATIN 40 MG: 20 TABLET, FILM COATED ORAL at 20:37

## 2023-04-08 RX ADMIN — CASTOR OIL AND BALSAM, PERU 1 APPLICATION: 788; 87 OINTMENT TOPICAL at 20:38

## 2023-04-08 RX ADMIN — NYSTATIN: 100000 POWDER TOPICAL at 08:01

## 2023-04-08 RX ADMIN — INSULIN LISPRO 2 UNITS: 100 INJECTION, SOLUTION INTRAVENOUS; SUBCUTANEOUS at 16:29

## 2023-04-08 RX ADMIN — INSULIN LISPRO 5 UNITS: 100 INJECTION, SOLUTION INTRAVENOUS; SUBCUTANEOUS at 12:19

## 2023-04-08 RX ADMIN — QUETIAPINE FUMARATE 50 MG: 25 TABLET ORAL at 07:59

## 2023-04-08 RX ADMIN — INSULIN DETEMIR 10 UNITS: 100 INJECTION, SOLUTION SUBCUTANEOUS at 20:37

## 2023-04-08 RX ADMIN — APIXABAN 5 MG: 5 TABLET, FILM COATED ORAL at 08:00

## 2023-04-08 RX ADMIN — HYDROCODONE BITARTRATE AND ACETAMINOPHEN 1 TABLET: 5; 325 TABLET ORAL at 08:00

## 2023-04-08 RX ADMIN — DONEPEZIL HYDROCHLORIDE 10 MG: 10 TABLET ORAL at 20:38

## 2023-04-08 RX ADMIN — SENNOSIDES AND DOCUSATE SODIUM 2 TABLET: 50; 8.6 TABLET ORAL at 20:38

## 2023-04-08 RX ADMIN — GABAPENTIN 300 MG: 300 CAPSULE ORAL at 08:00

## 2023-04-08 RX ADMIN — SENNOSIDES AND DOCUSATE SODIUM 2 TABLET: 50; 8.6 TABLET ORAL at 08:00

## 2023-04-08 RX ADMIN — INSULIN LISPRO 2 UNITS: 100 INJECTION, SOLUTION INTRAVENOUS; SUBCUTANEOUS at 08:00

## 2023-04-08 RX ADMIN — PANTOPRAZOLE SODIUM 40 MG: 40 TABLET, DELAYED RELEASE ORAL at 05:10

## 2023-04-08 RX ADMIN — INSULIN DETEMIR 10 UNITS: 100 INJECTION, SOLUTION SUBCUTANEOUS at 07:59

## 2023-04-08 RX ADMIN — LEVOTHYROXINE SODIUM 25 MCG: 25 TABLET ORAL at 05:10

## 2023-04-08 RX ADMIN — GABAPENTIN 300 MG: 300 CAPSULE ORAL at 20:38

## 2023-04-08 RX ADMIN — HYDROCODONE BITARTRATE AND ACETAMINOPHEN 1 TABLET: 5; 325 TABLET ORAL at 20:38

## 2023-04-08 RX ADMIN — CASTOR OIL AND BALSAM, PERU 1 APPLICATION: 788; 87 OINTMENT TOPICAL at 08:01

## 2023-04-08 RX ADMIN — NYSTATIN: 100000 POWDER TOPICAL at 20:39

## 2023-04-08 RX ADMIN — INSULIN LISPRO 5 UNITS: 100 INJECTION, SOLUTION INTRAVENOUS; SUBCUTANEOUS at 07:59

## 2023-04-08 RX ADMIN — APIXABAN 5 MG: 5 TABLET, FILM COATED ORAL at 20:38

## 2023-04-08 RX ADMIN — DULOXETINE 60 MG: 60 CAPSULE, DELAYED RELEASE ORAL at 08:00

## 2023-04-08 RX ADMIN — INSULIN LISPRO 5 UNITS: 100 INJECTION, SOLUTION INTRAVENOUS; SUBCUTANEOUS at 16:29

## 2023-04-08 NOTE — PROGRESS NOTES
[facility] Medicine Services  PROGRESS NOTE    Patient Name: Cheri Cruz  : 1940  MRN: [id number]    Date of Admission: 3/31/2023  Primary Care Physician: Lorrie Canada APRN    Subjective   Subjective     CC:  AMS     HPI:  No acute events. States she is ok. Denies pain.     ROS:  Difficult to obtain due to mental status. Denies pain     Objective   Objective     Vital Signs:   Temp:  [98 °F (36.7 °C)-99.1 °F (37.3 °C)] 98.9 °F (37.2 °C)  Heart Rate:  [91-98] 95  Resp:  [16] 16  BP: (131-160)/(55-68) 132/65     Physical Exam:  Constitutional: No acute distress, awake, alert; chronically ill appearing   HENT: NCAT, mucous membranes moist  Respiratory: Clear to auscultation bilaterally, respiratory effort normal   Cardiovascular: RRR, no murmurs, rubs, or gallops  Gastrointestinal: Positive bowel sounds, soft, nontender, nondistended  Musculoskeletal: trace bilateral ankle edema  Psychiatric: cooperative  Neurologic: Oriented x 2, strength symmetric in all extremities, Cranial Nerves grossly intact to confrontation, speech clear  Skin: No rashes      Results Reviewed:  LAB RESULTS:      Lab 23  0512   WBC 5.48   HEMOGLOBIN 9.8*   HEMATOCRIT 32.5*   PLATELETS 117*   NEUTROS ABS 2.56   IMMATURE GRANS (ABS) 0.07*   LYMPHS ABS 2.04   MONOS ABS 0.55   EOS ABS 0.23   .2*         Lab 23  0339 23  0352   SODIUM 142 139   POTASSIUM 3.6 3.7   CHLORIDE 105 105   CO2 27.0 24.0   ANION GAP 10.0 10.0   BUN 14 12   CREATININE 1.29* 1.28*   EGFR 41.8* 42.2*   GLUCOSE 190* 260*   CALCIUM 8.8 8.5*                         Brief Urine Lab Results  (Last result in the past 365 days)      Color   Clarity   Blood   Leuk Est   Nitrite   Protein   CREAT   Urine HCG        23 1621 Yellow   Cloudy   Negative   Trace   Negative   30 mg/dL (1+)                 Microbiology Results Abnormal     Procedure Component Value - Date/Time    Urine Culture - Urine, Straight Cath  [[id number]]  (Normal) Collected: 03/31/23 1621    Lab Status: Final result Specimen: Urine from Straight Cath Updated: 04/01/23 2102     Urine Culture No growth    COVID PRE-OP / PRE-PROCEDURE SCREENING ORDER (NO ISOLATION) - Swab, Nasopharynx [[id number]]  (Normal) Collected: 03/31/23 1759    Lab Status: Final result Specimen: Swab from Nasopharynx Updated: 03/31/23 1854    Narrative:      The following orders were created for panel order COVID PRE-OP / PRE-PROCEDURE SCREENING ORDER (NO ISOLATION) - Swab, Nasopharynx.  Procedure                               Abnormality         Status                     ---------                               -----------         ------                     Respiratory Panel PCR w/...[[id number]]  Normal              Final result                 Please view results for these tests on the individual orders.    Respiratory Panel PCR w/COVID-19(SARS-CoV-2) TOMMY/SABRINA/DYLLAN/PAD/COR/MAD/KATHLEEN In-House, NP Swab in UTM/VTM, 3-4 HR TAT - Swab, Nasopharynx [[id number]]  (Normal) Collected: 03/31/23 1759    Lab Status: Final result Specimen: Swab from Nasopharynx Updated: 03/31/23 1854     ADENOVIRUS, PCR Not Detected     Coronavirus 229E Not Detected     Coronavirus HKU1 Not Detected     Coronavirus NL63 Not Detected     Coronavirus OC43 Not Detected     COVID19 Not Detected     Human Metapneumovirus Not Detected     Human Rhinovirus/Enterovirus Not Detected     Influenza A PCR Not Detected     Influenza B PCR Not Detected     Parainfluenza Virus 1 Not Detected     Parainfluenza Virus 2 Not Detected     Parainfluenza Virus 3 Not Detected     Parainfluenza Virus 4 Not Detected     RSV, PCR Not Detected     Bordetella pertussis pcr Not Detected     Bordetella parapertussis PCR Not Detected     Chlamydophila pneumoniae PCR Not Detected     Mycoplasma pneumo by PCR Not Detected    Narrative:      In the setting of a positive respiratory panel with a viral infection PLUS a negative procalcitonin without  other underlying concern for bacterial infection, consider observing off antibiotics or discontinuation of antibiotics and continue supportive care. If the respiratory panel is positive for atypical bacterial infection (Bordetella pertussis, Chlamydophila pneumoniae, or Mycoplasma pneumoniae), consider antibiotic de-escalation to target atypical bacterial infection.          No radiology results from the last 24 hrs    Results for orders placed during the hospital encounter of 02/10/23    Adult Transthoracic Echo Complete W/ Cont if Necessary Per Protocol    Interpretation Summary  •  Left ventricular systolic function is hyperdynamic (EF > 70%). Calculated left ventricular EF = 70.6% Left ventricular ejection fraction appears to be greater than 70%. The left ventricular cavity is small in size. Left ventricular wall thickness is consistent with mild concentric hypertrophy. All left ventricular wall segments contract normally. Left ventricular intracavitary gradient noted to be 71 mmHg. Left ventricular diastolic function is consistent with (grade I) impaired relaxation. Normal left atrial pressure.  •  The aortic valve is abnormal in structure. There is mild calcification of the aortic valve mainly affecting the right coronary cusp(s). The aortic valve appears trileaflet. No aortic valve regurgitation is present. Gradient noted through the LV and LVOT    Compared to TTE report from  Dec 2019, hyperdynamic LV with intracavitary gradient is not a new finding but peak gradient noted on this exam is higher than previously described.      Current medications:  Scheduled Meds:apixaban, 5 mg, Oral, Q12H  castor oil-balsam peru, 1 application, Topical, Q12H  donepezil, 10 mg, Oral, Nightly  DULoxetine, 60 mg, Oral, Daily  gabapentin, 300 mg, Oral, BID  HYDROcodone-acetaminophen, 1 tablet, Oral, Q12H  insulin detemir, 10 Units, Subcutaneous, Q12H  insulin lispro, 0-7 Units, Subcutaneous, Nightly  insulin lispro, 0-9  Units, Subcutaneous, TID AC  Insulin Lispro, 5 Units, Subcutaneous, TID With Meals  levothyroxine, 25 mcg, Oral, Q AM  LORazepam, 0.5 mg, Oral, Q12H  nystatin, , Topical, Q12H  pantoprazole, 40 mg, Oral, Q AM  QUEtiapine, 50 mg, Oral, Daily  rosuvastatin, 40 mg, Oral, Nightly  senna-docusate sodium, 2 tablet, Oral, BID      Continuous Infusions:   PRN Meds:.•  senna-docusate sodium **AND** polyethylene glycol **AND** bisacodyl **AND** bisacodyl  •  HYDROcodone-acetaminophen  •  ondansetron  •  sodium chloride    Assessment & Plan   Assessment & Plan     Active Hospital Problems    Diagnosis  POA   • **Confusion [R41.0]  Yes   • Hypotension [I95.9]  Yes   • Constipation [K59.00]  Yes   • UTI (urinary tract infection) due to urinary indwelling catheter [T83.511A, N39.0]  Yes   • Metabolic encephalopathy [G93.41]  Yes   • Symptomatic anemia [D64.9]  Yes   • Acquired hypothyroidism [E03.9]  Yes   • History of pulmonary embolus (PE) [Z86.711]  Yes   • History of ischemic left MCA stroke [Z86.73]  Not Applicable   • Osteoarthritis [M19.90]  Yes   • Type 2 diabetes mellitus without complication, without long-term current use of insulin (HCC) [E11.9]  Yes   • Anxiety [F41.9]  Yes      Resolved Hospital Problems   No resolved problems to display.        Brief Hospital Course to date:  Cheri Cruz is a 82 y.o. female  past medical history of  Anxiety, Arthritis, Atrial fibrillation, Dementia , Diabetes mellitus , Hyperlipidemia, Hypertension, Stroke earlier this year with subsequent right side weakness and increasing speech difficulties, as well as Uterus cancer (HCC)who  presented to the ED from home for increasing confusion.     This patient's problems and plans were partially entered by my partner and updated as appropriate by me 04/08/23.     Acute Confusion  Vascular dementia  Recent Stroke  -Suspect AMS due to hypotension as below which likely caused global hypoperfusion. Clinically seems to be at baseline.  -- CT  head no acute process   -- cont Seroquel and ativan -- home meds   -- PT OT with rehab recs      Hypotension  - BP improved. Continue to hold home BP medications      CKD 3  -- baseline 1.2-1.3  --  Hold ACE     UTI ruled out.  -UA is pretty bland there is only trace leukocyte Estrace and nitrite is negative.  Urine culture is also negative     Constipation     ANEMIA  -Essentially stable. Monitor.      T2DM  - improved with mod SS insulin, bolus with meals 5 units, increase levemir 10 units bid     Chronic pain  -Lortab prn, per family she takes lotab 10 mg scheduled at home every 6 hours. Will change to 5 mg  q 12 scheduled. Currently with pain control      Hypothryoidism  -Cont synthroid-- TSH in 4-6 weeks      Expected Discharge Location and Transportation: rehab referrals   Expected Discharge   Expected Discharge Date and Time     Expected Discharge Date Expected Discharge Time    Apr 7, 2023            DVT prophylaxis:  Medical and mechanical DVT prophylaxis orders are present.     AM-PAC 6 Clicks Score (PT): 8 (04/07/23 0943)    CODE STATUS:   Code Status and Medical Interventions:   Ordered at: 04/01/23 0023     Code Status (Patient has no pulse and is not breathing):    CPR (Attempt to Resuscitate)     Medical Interventions (Patient has pulse or is breathing):    Full       Emy Diaz,   04/08/23

## 2023-04-09 LAB
GLUCOSE BLDC GLUCOMTR-MCNC: 151 MG/DL (ref 70–130)
GLUCOSE BLDC GLUCOMTR-MCNC: 175 MG/DL (ref 70–130)
GLUCOSE BLDC GLUCOMTR-MCNC: 253 MG/DL (ref 70–130)
GLUCOSE BLDC GLUCOMTR-MCNC: 275 MG/DL (ref 70–130)

## 2023-04-09 PROCEDURE — 63710000001 INSULIN DETEMIR PER 5 UNITS: Performed by: NURSE PRACTITIONER

## 2023-04-09 PROCEDURE — 99232 SBSQ HOSP IP/OBS MODERATE 35: CPT | Performed by: INTERNAL MEDICINE

## 2023-04-09 PROCEDURE — G0378 HOSPITAL OBSERVATION PER HR: HCPCS

## 2023-04-09 PROCEDURE — 63710000001 INSULIN LISPRO (HUMAN) PER 5 UNITS: Performed by: NURSE PRACTITIONER

## 2023-04-09 PROCEDURE — 82962 GLUCOSE BLOOD TEST: CPT

## 2023-04-09 RX ADMIN — HYDROCODONE BITARTRATE AND ACETAMINOPHEN 1 TABLET: 5; 325 TABLET ORAL at 20:06

## 2023-04-09 RX ADMIN — APIXABAN 5 MG: 5 TABLET, FILM COATED ORAL at 20:06

## 2023-04-09 RX ADMIN — INSULIN LISPRO 6 UNITS: 100 INJECTION, SOLUTION INTRAVENOUS; SUBCUTANEOUS at 17:28

## 2023-04-09 RX ADMIN — INSULIN LISPRO 2 UNITS: 100 INJECTION, SOLUTION INTRAVENOUS; SUBCUTANEOUS at 08:43

## 2023-04-09 RX ADMIN — INSULIN LISPRO 5 UNITS: 100 INJECTION, SOLUTION INTRAVENOUS; SUBCUTANEOUS at 11:58

## 2023-04-09 RX ADMIN — CASTOR OIL AND BALSAM, PERU 1 APPLICATION: 788; 87 OINTMENT TOPICAL at 20:10

## 2023-04-09 RX ADMIN — NYSTATIN: 100000 POWDER TOPICAL at 20:09

## 2023-04-09 RX ADMIN — INSULIN LISPRO 5 UNITS: 100 INJECTION, SOLUTION INTRAVENOUS; SUBCUTANEOUS at 17:28

## 2023-04-09 RX ADMIN — QUETIAPINE FUMARATE 50 MG: 25 TABLET ORAL at 08:42

## 2023-04-09 RX ADMIN — LORAZEPAM 0.5 MG: 0.5 TABLET ORAL at 08:42

## 2023-04-09 RX ADMIN — GABAPENTIN 300 MG: 300 CAPSULE ORAL at 20:06

## 2023-04-09 RX ADMIN — ROSUVASTATIN 40 MG: 20 TABLET, FILM COATED ORAL at 20:06

## 2023-04-09 RX ADMIN — SENNOSIDES AND DOCUSATE SODIUM 2 TABLET: 50; 8.6 TABLET ORAL at 08:42

## 2023-04-09 RX ADMIN — INSULIN LISPRO 5 UNITS: 100 INJECTION, SOLUTION INTRAVENOUS; SUBCUTANEOUS at 08:42

## 2023-04-09 RX ADMIN — LEVOTHYROXINE SODIUM 25 MCG: 25 TABLET ORAL at 05:12

## 2023-04-09 RX ADMIN — HYDROCODONE BITARTRATE AND ACETAMINOPHEN 1 TABLET: 5; 325 TABLET ORAL at 08:42

## 2023-04-09 RX ADMIN — CASTOR OIL AND BALSAM, PERU 1 APPLICATION: 788; 87 OINTMENT TOPICAL at 08:43

## 2023-04-09 RX ADMIN — DONEPEZIL HYDROCHLORIDE 10 MG: 10 TABLET ORAL at 20:06

## 2023-04-09 RX ADMIN — PANTOPRAZOLE SODIUM 40 MG: 40 TABLET, DELAYED RELEASE ORAL at 05:12

## 2023-04-09 RX ADMIN — GABAPENTIN 300 MG: 300 CAPSULE ORAL at 08:42

## 2023-04-09 RX ADMIN — NYSTATIN: 100000 POWDER TOPICAL at 08:43

## 2023-04-09 RX ADMIN — DULOXETINE 60 MG: 60 CAPSULE, DELAYED RELEASE ORAL at 08:42

## 2023-04-09 RX ADMIN — INSULIN DETEMIR 10 UNITS: 100 INJECTION, SOLUTION SUBCUTANEOUS at 20:07

## 2023-04-09 RX ADMIN — APIXABAN 5 MG: 5 TABLET, FILM COATED ORAL at 08:42

## 2023-04-09 RX ADMIN — LORAZEPAM 0.5 MG: 0.5 TABLET ORAL at 20:06

## 2023-04-09 RX ADMIN — Medication 10 ML: at 08:42

## 2023-04-09 RX ADMIN — INSULIN DETEMIR 10 UNITS: 100 INJECTION, SOLUTION SUBCUTANEOUS at 08:42

## 2023-04-09 RX ADMIN — INSULIN LISPRO 2 UNITS: 100 INJECTION, SOLUTION INTRAVENOUS; SUBCUTANEOUS at 11:58

## 2023-04-09 NOTE — PLAN OF CARE
Problem: Behavioral Health Comorbidity  Goal: Maintenance of Behavioral Health Symptom Control  Outcome: Ongoing, Progressing  Intervention: Maintain Behavioral Health Symptom Control  Recent Flowsheet Documentation  Taken 4/9/2023 0800 by Anne Kennedy RN  Medication Review/Management: medications reviewed     Problem: COPD (Chronic Obstructive Pulmonary Disease) Comorbidity  Goal: Maintenance of COPD Symptom Control  Outcome: Ongoing, Progressing  Intervention: Maintain COPD-Symptom Control  Recent Flowsheet Documentation  Taken 4/9/2023 0800 by Anne Kennedy RN  Medication Review/Management: medications reviewed     Problem: Diabetes Comorbidity  Goal: Blood Glucose Level Within Targeted Range  Outcome: Ongoing, Progressing     Problem: Heart Failure Comorbidity  Goal: Maintenance of Heart Failure Symptom Control  Outcome: Ongoing, Progressing  Intervention: Maintain Heart Failure-Management  Recent Flowsheet Documentation  Taken 4/9/2023 0800 by Anne Kennedy RN  Medication Review/Management: medications reviewed     Problem: Hypertension Comorbidity  Goal: Blood Pressure in Desired Range  Outcome: Ongoing, Progressing  Intervention: Maintain Blood Pressure Management  Recent Flowsheet Documentation  Taken 4/9/2023 0800 by Anne Kennedy RN  Medication Review/Management: medications reviewed     Problem: Osteoarthritis Comorbidity  Goal: Maintenance of Osteoarthritis Symptom Control  Outcome: Ongoing, Progressing  Intervention: Maintain Osteoarthritis Symptom Control  Recent Flowsheet Documentation  Taken 4/9/2023 1616 by Anne Kennedy RN  Activity Management: activity encouraged  Taken 4/9/2023 1442 by Anne Kennedy RN  Activity Management: activity encouraged  Taken 4/9/2023 1400 by Anne Kennedy RN  Activity Management: back to bed  Assistive Device Utilized:   gait belt   walker  Taken 4/9/2023 1220 by Anne Kennedy RN  Activity Management: up in chair  Taken 4/9/2023 1030 by Marcia  Anne CHILEL RN  Activity Management: up in chair  Assistive Device Utilized:   gait belt   walker  Taken 4/9/2023 1008 by Anne Kennedy RN  Activity Management: activity encouraged  Taken 4/9/2023 0800 by Anne Kennedy RN  Activity Management: activity encouraged  Medication Review/Management: medications reviewed     Problem: Pain Chronic (Persistent) (Comorbidity Management)  Goal: Acceptable Pain Control and Functional Ability  Outcome: Ongoing, Progressing  Intervention: Manage Persistent Pain  Recent Flowsheet Documentation  Taken 4/9/2023 0800 by Anne Kennedy RN  Medication Review/Management: medications reviewed  Intervention: Develop Pain Management Plan  Recent Flowsheet Documentation  Taken 4/9/2023 0800 by Anne Kennedy RN  Pain Management Interventions: see MAR  Intervention: Optimize Psychosocial Wellbeing  Recent Flowsheet Documentation  Taken 4/9/2023 0800 by Anne Kennedy RN  Diversional Activities: television  Family/Support System Care:   self-care encouraged   support provided     Problem: Skin Injury Risk Increased  Goal: Skin Health and Integrity  Outcome: Ongoing, Progressing  Intervention: Optimize Skin Protection  Recent Flowsheet Documentation  Taken 4/9/2023 1616 by Anne Kennedy RN  Head of Bed (HOB) Positioning: HOB elevated  Taken 4/9/2023 1442 by Anne Kennedy RN  Head of Bed (HOB) Positioning: HOB elevated  Taken 4/9/2023 1220 by Anne Kennedy RN  Head of Bed (HOB) Positioning: HOB elevated  Taken 4/9/2023 1008 by Anne Kennedy RN  Head of Bed (HOB) Positioning: HOB elevated  Taken 4/9/2023 0800 by Anne Kennedy RN  Pressure Reduction Techniques:   weight shift assistance provided   positioned off wounds   heels elevated off bed   rest period provided between sit times  Head of Bed (HOB) Positioning: HOB elevated  Pressure Reduction Devices:   specialty bed utilized   heel offloading device utilized   positioning supports utilized  Skin Protection:    adhesive use limited   incontinence pads utilized     Problem: Fall Injury Risk  Goal: Absence of Fall and Fall-Related Injury  Outcome: Ongoing, Progressing  Intervention: Identify and Manage Contributors  Recent Flowsheet Documentation  Taken 4/9/2023 1616 by Anne Kennedy RN  Self-Care Promotion: independence encouraged  Taken 4/9/2023 1442 by Anne Kennedy RN  Self-Care Promotion: independence encouraged  Taken 4/9/2023 1220 by Anne Kennedy RN  Self-Care Promotion: independence encouraged  Taken 4/9/2023 1008 by Anne Kennedy RN  Self-Care Promotion: independence encouraged  Taken 4/9/2023 0800 by Anne Kennedy RN  Medication Review/Management: medications reviewed  Self-Care Promotion: independence encouraged  Intervention: Promote Injury-Free Environment  Recent Flowsheet Documentation  Taken 4/9/2023 1616 by Anne Kennedy RN  Safety Promotion/Fall Prevention:   activity supervised   assistive device/personal items within reach   safety round/check completed  Taken 4/9/2023 1442 by Anne Kennedy RN  Safety Promotion/Fall Prevention:   activity supervised   assistive device/personal items within reach   safety round/check completed  Taken 4/9/2023 1220 by Anne Kennedy RN  Safety Promotion/Fall Prevention:   activity supervised   assistive device/personal items within reach   safety round/check completed  Taken 4/9/2023 1008 by Anne Kennedy RN  Safety Promotion/Fall Prevention:   activity supervised   assistive device/personal items within reach   safety round/check completed  Taken 4/9/2023 0800 by Anne Kennedy RN  Safety Promotion/Fall Prevention:   activity supervised   assistive device/personal items within reach   clutter free environment maintained   fall prevention program maintained   lighting adjusted   muscle strengthening facilitated   nonskid shoes/slippers when out of bed   safety round/check completed   room organization consistent     Problem: Adult Inpatient Plan  of Care  Goal: Plan of Care Review  Outcome: Ongoing, Progressing  Flowsheets (Taken 4/9/2023 1750)  Progress: improving  Plan of Care Reviewed With: patient  Goal: Patient-Specific Goal (Individualized)  Outcome: Ongoing, Progressing  Goal: Absence of Hospital-Acquired Illness or Injury  Outcome: Ongoing, Progressing  Intervention: Identify and Manage Fall Risk  Recent Flowsheet Documentation  Taken 4/9/2023 1616 by Anne Kennedy RN  Safety Promotion/Fall Prevention:   activity supervised   assistive device/personal items within reach   safety round/check completed  Taken 4/9/2023 1442 by Anne Kennedy RN  Safety Promotion/Fall Prevention:   activity supervised   assistive device/personal items within reach   safety round/check completed  Taken 4/9/2023 1220 by Anne Kennedy RN  Safety Promotion/Fall Prevention:   activity supervised   assistive device/personal items within reach   safety round/check completed  Taken 4/9/2023 1008 by Anne Kennedy RN  Safety Promotion/Fall Prevention:   activity supervised   assistive device/personal items within reach   safety round/check completed  Taken 4/9/2023 0800 by Anne Kennedy RN  Safety Promotion/Fall Prevention:   activity supervised   assistive device/personal items within reach   clutter free environment maintained   fall prevention program maintained   lighting adjusted   muscle strengthening facilitated   nonskid shoes/slippers when out of bed   safety round/check completed   room organization consistent  Intervention: Prevent Skin Injury  Recent Flowsheet Documentation  Taken 4/9/2023 1616 by Anne Kennedy RN  Body Position: tilted  Taken 4/9/2023 1442 by Anne Kennedy RN  Body Position:   tilted   left  Taken 4/9/2023 1220 by Anne Kennedy RN  Body Position: supine, legs elevated  Taken 4/9/2023 1008 by Anne Kennedy RN  Body Position: tilted  Taken 4/9/2023 0800 by Anne Kennedy RN  Body Position:   tilted   left  Skin Protection:    adhesive use limited   incontinence pads utilized  Intervention: Prevent and Manage VTE (Venous Thromboembolism) Risk  Recent Flowsheet Documentation  Taken 4/9/2023 1616 by Anne Kennedy RN  Activity Management: activity encouraged  VTE Prevention/Management:   bilateral   sequential compression devices on  Range of Motion: active ROM (range of motion) encouraged  Taken 4/9/2023 1442 by Anne Kennedy RN  Activity Management: activity encouraged  VTE Prevention/Management:   bilateral   sequential compression devices on  Taken 4/9/2023 1400 by Anne Kennedy RN  Activity Management: back to bed  Taken 4/9/2023 1220 by Anne Kennedy RN  Activity Management: up in chair  VTE Prevention/Management:   bilateral   sequential compression devices off  Range of Motion: active ROM (range of motion) encouraged  Taken 4/9/2023 1030 by Anne Kennedy RN  Activity Management: up in chair  Taken 4/9/2023 1008 by Anne Kennedy RN  Activity Management: activity encouraged  VTE Prevention/Management:   bilateral   sequential compression devices on  Taken 4/9/2023 0800 by Anne Kennedy RN  Activity Management: activity encouraged  VTE Prevention/Management:   bilateral   sequential compression devices on  Range of Motion: active ROM (range of motion) encouraged  Intervention: Prevent Infection  Recent Flowsheet Documentation  Taken 4/9/2023 1616 by Anne Kennedy RN  Infection Prevention:   hand hygiene promoted   rest/sleep promoted  Taken 4/9/2023 1442 by Anne Kennedy RN  Infection Prevention:   hand hygiene promoted   rest/sleep promoted  Taken 4/9/2023 1220 by Anne Kennedy RN  Infection Prevention:   hand hygiene promoted   rest/sleep promoted  Taken 4/9/2023 1008 by Anne Kennedy RN  Infection Prevention:   hand hygiene promoted   rest/sleep promoted  Taken 4/9/2023 0800 by Anne Kennedy RN  Infection Prevention:   hand hygiene promoted   rest/sleep promoted  Goal: Optimal Comfort and  Wellbeing  Outcome: Ongoing, Progressing  Intervention: Monitor Pain and Promote Comfort  Recent Flowsheet Documentation  Taken 4/9/2023 0800 by Anne Kennedy RN  Pain Management Interventions: see MAR  Intervention: Provide Person-Centered Care  Recent Flowsheet Documentation  Taken 4/9/2023 1616 by Anne Kennedy RN  Trust Relationship/Rapport: care explained  Taken 4/9/2023 1442 by Anne Kennedy RN  Trust Relationship/Rapport: care explained  Taken 4/9/2023 1220 by Anne Kennedy RN  Trust Relationship/Rapport: care explained  Taken 4/9/2023 1008 by Anne Kennedy RN  Trust Relationship/Rapport: care explained  Taken 4/9/2023 0800 by Anne Kennedy RN  Trust Relationship/Rapport:   care explained   choices provided   questions answered   questions encouraged   reassurance provided  Goal: Readiness for Transition of Care  Outcome: Ongoing, Progressing   Goal Outcome Evaluation:  Plan of Care Reviewed With: patient      Pt currently in bed resting quietly. No complaints of pain or discomfort at this time. Pt up to recliner for few hours today. Family at bedside multiple hours as well. Vitals WNL on RA. Specialty bed utilized. Turned q2 while in bed. Referral sent out to multiple rehab. Potentially discharge this week. No other observations at this time. Will continue to monitor, call bell in reach.  Progress: improving

## 2023-04-09 NOTE — PROGRESS NOTES
[facility] Medicine Services  PROGRESS NOTE    Patient Name: Cheri Cruz  : 1940  MRN: [id number]    Date of Admission: 3/31/2023  Primary Care Physician: Lorrie Canada APRN    Subjective   Subjective     CC:  AMS     HPI:  No acute events. Up in the chair. States she wants to go home.     ROS:  Gen- No fevers, chills  CV- No chest pain, palpitations  Resp- No cough, dyspnea  GI- No N/V/D, abd pain     Objective   Objective     Vital Signs:   Temp:  [97.1 °F (36.2 °C)-98.2 °F (36.8 °C)] 98 °F (36.7 °C)  Heart Rate:  [76-83] 79  Resp:  [16] 16  BP: (114-155)/(53-79) 114/79     Physical Exam:  Constitutional: No acute distress, awake, alert; chronically ill appearing   HENT: NCAT, mucous membranes moist  Respiratory: Clear to auscultation bilaterally, respiratory effort normal   Cardiovascular: RRR, II/VI murmur   Gastrointestinal: Positive bowel sounds, soft, nontender, nondistended  Musculoskeletal: No bilateral ankle edema  Psychiatric: Appropriate affect, cooperative  Neurologic: Oriented x 2, strength symmetric in all extremities, Cranial Nerves grossly intact to confrontation, speech clear  Skin: No rashes    Results Reviewed:  LAB RESULTS:          Lab 23  0339 23  0352   SODIUM 142 139   POTASSIUM 3.6 3.7   CHLORIDE 105 105   CO2 27.0 24.0   ANION GAP 10.0 10.0   BUN 14 12   CREATININE 1.29* 1.28*   EGFR 41.8* 42.2*   GLUCOSE 190* 260*   CALCIUM 8.8 8.5*                         Brief Urine Lab Results  (Last result in the past 365 days)      Color   Clarity   Blood   Leuk Est   Nitrite   Protein   CREAT   Urine HCG        23 1621 Yellow   Cloudy   Negative   Trace   Negative   30 mg/dL (1+)                 Microbiology Results Abnormal     Procedure Component Value - Date/Time    Urine Culture - Urine, Straight Cath [[id number]]  (Normal) Collected: 23 1621    Lab Status: Final result Specimen: Urine from Straight Cath Updated: 23      Urine Culture No growth    COVID PRE-OP / PRE-PROCEDURE SCREENING ORDER (NO ISOLATION) - Swab, Nasopharynx [[id number]]  (Normal) Collected: 03/31/23 1759    Lab Status: Final result Specimen: Swab from Nasopharynx Updated: 03/31/23 1854    Narrative:      The following orders were created for panel order COVID PRE-OP / PRE-PROCEDURE SCREENING ORDER (NO ISOLATION) - Swab, Nasopharynx.  Procedure                               Abnormality         Status                     ---------                               -----------         ------                     Respiratory Panel PCR w/...[[id number]]  Normal              Final result                 Please view results for these tests on the individual orders.    Respiratory Panel PCR w/COVID-19(SARS-CoV-2) TOMMY/SABRINA/DYLLAN/PAD/COR/MAD/KATHLEEN In-House, NP Swab in UTM/VTM, 3-4 HR TAT - Swab, Nasopharynx [[id number]]  (Normal) Collected: 03/31/23 1759    Lab Status: Final result Specimen: Swab from Nasopharynx Updated: 03/31/23 1854     ADENOVIRUS, PCR Not Detected     Coronavirus 229E Not Detected     Coronavirus HKU1 Not Detected     Coronavirus NL63 Not Detected     Coronavirus OC43 Not Detected     COVID19 Not Detected     Human Metapneumovirus Not Detected     Human Rhinovirus/Enterovirus Not Detected     Influenza A PCR Not Detected     Influenza B PCR Not Detected     Parainfluenza Virus 1 Not Detected     Parainfluenza Virus 2 Not Detected     Parainfluenza Virus 3 Not Detected     Parainfluenza Virus 4 Not Detected     RSV, PCR Not Detected     Bordetella pertussis pcr Not Detected     Bordetella parapertussis PCR Not Detected     Chlamydophila pneumoniae PCR Not Detected     Mycoplasma pneumo by PCR Not Detected    Narrative:      In the setting of a positive respiratory panel with a viral infection PLUS a negative procalcitonin without other underlying concern for bacterial infection, consider observing off antibiotics or discontinuation of antibiotics and continue  supportive care. If the respiratory panel is positive for atypical bacterial infection (Bordetella pertussis, Chlamydophila pneumoniae, or Mycoplasma pneumoniae), consider antibiotic de-escalation to target atypical bacterial infection.          No radiology results from the last 24 hrs    Results for orders placed during the hospital encounter of 02/10/23    Adult Transthoracic Echo Complete W/ Cont if Necessary Per Protocol    Interpretation Summary  •  Left ventricular systolic function is hyperdynamic (EF > 70%). Calculated left ventricular EF = 70.6% Left ventricular ejection fraction appears to be greater than 70%. The left ventricular cavity is small in size. Left ventricular wall thickness is consistent with mild concentric hypertrophy. All left ventricular wall segments contract normally. Left ventricular intracavitary gradient noted to be 71 mmHg. Left ventricular diastolic function is consistent with (grade I) impaired relaxation. Normal left atrial pressure.  •  The aortic valve is abnormal in structure. There is mild calcification of the aortic valve mainly affecting the right coronary cusp(s). The aortic valve appears trileaflet. No aortic valve regurgitation is present. Gradient noted through the LV and LVOT    Compared to TTE report from  Dec 2019, hyperdynamic LV with intracavitary gradient is not a new finding but peak gradient noted on this exam is higher than previously described.      Current medications:  Scheduled Meds:apixaban, 5 mg, Oral, Q12H  castor oil-balsam peru, 1 application, Topical, Q12H  donepezil, 10 mg, Oral, Nightly  DULoxetine, 60 mg, Oral, Daily  gabapentin, 300 mg, Oral, BID  HYDROcodone-acetaminophen, 1 tablet, Oral, Q12H  insulin detemir, 10 Units, Subcutaneous, Q12H  insulin lispro, 0-9 Units, Subcutaneous, TID AC  Insulin Lispro, 5 Units, Subcutaneous, TID With Meals  levothyroxine, 25 mcg, Oral, Q AM  LORazepam, 0.5 mg, Oral, Q12H  nystatin, , Topical,  Q12H  pantoprazole, 40 mg, Oral, Q AM  QUEtiapine, 50 mg, Oral, Daily  rosuvastatin, 40 mg, Oral, Nightly  senna-docusate sodium, 2 tablet, Oral, BID      Continuous Infusions:   PRN Meds:.•  senna-docusate sodium **AND** polyethylene glycol **AND** bisacodyl **AND** bisacodyl  •  HYDROcodone-acetaminophen  •  ondansetron  •  sodium chloride    Assessment & Plan   Assessment & Plan     Active Hospital Problems    Diagnosis  POA   • **Confusion [R41.0]  Yes   • Hypotension [I95.9]  Yes   • Constipation [K59.00]  Yes   • UTI (urinary tract infection) due to urinary indwelling catheter [T83.511A, N39.0]  Yes   • Metabolic encephalopathy [G93.41]  Yes   • Symptomatic anemia [D64.9]  Yes   • Acquired hypothyroidism [E03.9]  Yes   • History of pulmonary embolus (PE) [Z86.711]  Yes   • History of ischemic left MCA stroke [Z86.73]  Not Applicable   • Osteoarthritis [M19.90]  Yes   • Type 2 diabetes mellitus without complication, without long-term current use of insulin (HCC) [E11.9]  Yes   • Anxiety [F41.9]  Yes      Resolved Hospital Problems   No resolved problems to display.        Brief Hospital Course to date:  Cheri Cruz is a 82 y.o. female  past medical history of  Anxiety, Arthritis, Atrial fibrillation, Dementia , Diabetes mellitus , Hyperlipidemia, Hypertension, Stroke earlier this year with subsequent right side weakness and increasing speech difficulties, as well as Uterus cancer (HCC)who  presented to the ED from home for increasing confusion.     This patient's problems and plans were partially entered by my partner and updated as appropriate by me 04/09/23.      Acute Confusion  Vascular dementia  Recent Stroke  -Suspect AMS due to hypotension as below which likely caused global hypoperfusion.   -- CT head no acute process   -- cont Seroquel and ativan -- home meds   -- PT OT with rehab recs   - Mentation stable      Hypotension  - BP improved. Continue to hold home BP medications      CKD 3  --  baseline 1.2-1.3  --  Hold ACE. Remains at baseline.      UTI ruled out.  -UA bland there is only trace leukocyte Estrace and nitrite is negative.  Urine culture is also negative.      Constipation     Anemia   - Remained stable inpatient.       T2DM  - Improved with mod SS insulin, bolus with meals 5 units, increase levemir 10 units bid     Chronic pain  -Lortab prn, per family she takes lotab 10 mg scheduled at home every 6 hours. Will change to 5 mg q 12 scheduled. Currently with pain control. Continue to monitor      Hypothryoidism  -Cont synthroid-- TSH in 4-6 weeks     Expected Discharge Location and Transportation: Rehab referrals   Expected Discharge   Expected Discharge Date and Time     Expected Discharge Date Expected Discharge Time    Apr 7, 2023            DVT prophylaxis:  Medical and mechanical DVT prophylaxis orders are present.     AM-PAC 6 Clicks Score (PT): 8 (04/07/23 0943)    CODE STATUS:   Code Status and Medical Interventions:   Ordered at: 04/01/23 0023     Code Status (Patient has no pulse and is not breathing):    CPR (Attempt to Resuscitate)     Medical Interventions (Patient has pulse or is breathing):    Full       Emy Diaz,   04/09/23

## 2023-04-10 LAB
ANION GAP SERPL CALCULATED.3IONS-SCNC: 9 MMOL/L (ref 5–15)
BUN SERPL-MCNC: 16 MG/DL (ref 8–23)
BUN/CREAT SERPL: 11.1 (ref 7–25)
CALCIUM SPEC-SCNC: 8.4 MG/DL (ref 8.6–10.5)
CHLORIDE SERPL-SCNC: 103 MMOL/L (ref 98–107)
CO2 SERPL-SCNC: 30 MMOL/L (ref 22–29)
CREAT SERPL-MCNC: 1.44 MG/DL (ref 0.57–1)
DEPRECATED RDW RBC AUTO: 48.5 FL (ref 37–54)
EGFRCR SERPLBLD CKD-EPI 2021: 36.6 ML/MIN/1.73
ERYTHROCYTE [DISTWIDTH] IN BLOOD BY AUTOMATED COUNT: 13.1 % (ref 12.3–15.4)
GLUCOSE BLDC GLUCOMTR-MCNC: 123 MG/DL (ref 70–130)
GLUCOSE BLDC GLUCOMTR-MCNC: 177 MG/DL (ref 70–130)
GLUCOSE BLDC GLUCOMTR-MCNC: 196 MG/DL (ref 70–130)
GLUCOSE BLDC GLUCOMTR-MCNC: 216 MG/DL (ref 70–130)
GLUCOSE BLDC GLUCOMTR-MCNC: 223 MG/DL (ref 70–130)
GLUCOSE SERPL-MCNC: 214 MG/DL (ref 65–99)
HCT VFR BLD AUTO: 29.1 % (ref 34–46.6)
HGB BLD-MCNC: 9.2 G/DL (ref 12–15.9)
MCH RBC QN AUTO: 32.2 PG (ref 26.6–33)
MCHC RBC AUTO-ENTMCNC: 31.6 G/DL (ref 31.5–35.7)
MCV RBC AUTO: 101.7 FL (ref 79–97)
PLATELET # BLD AUTO: 139 10*3/MM3 (ref 140–450)
PMV BLD AUTO: 12.2 FL (ref 6–12)
POTASSIUM SERPL-SCNC: 3.8 MMOL/L (ref 3.5–5.2)
RBC # BLD AUTO: 2.86 10*6/MM3 (ref 3.77–5.28)
SODIUM SERPL-SCNC: 142 MMOL/L (ref 136–145)
WBC NRBC COR # BLD: 6.27 10*3/MM3 (ref 3.4–10.8)

## 2023-04-10 PROCEDURE — 82962 GLUCOSE BLOOD TEST: CPT

## 2023-04-10 PROCEDURE — 80048 BASIC METABOLIC PNL TOTAL CA: CPT | Performed by: INTERNAL MEDICINE

## 2023-04-10 PROCEDURE — 63710000001 INSULIN LISPRO (HUMAN) PER 5 UNITS: Performed by: NURSE PRACTITIONER

## 2023-04-10 PROCEDURE — G0378 HOSPITAL OBSERVATION PER HR: HCPCS

## 2023-04-10 PROCEDURE — 99232 SBSQ HOSP IP/OBS MODERATE 35: CPT | Performed by: HOSPITALIST

## 2023-04-10 PROCEDURE — 85027 COMPLETE CBC AUTOMATED: CPT | Performed by: INTERNAL MEDICINE

## 2023-04-10 PROCEDURE — 63710000001 INSULIN DETEMIR PER 5 UNITS: Performed by: NURSE PRACTITIONER

## 2023-04-10 RX ADMIN — INSULIN LISPRO 4 UNITS: 100 INJECTION, SOLUTION INTRAVENOUS; SUBCUTANEOUS at 08:27

## 2023-04-10 RX ADMIN — SENNOSIDES AND DOCUSATE SODIUM 2 TABLET: 50; 8.6 TABLET ORAL at 08:28

## 2023-04-10 RX ADMIN — INSULIN LISPRO 5 UNITS: 100 INJECTION, SOLUTION INTRAVENOUS; SUBCUTANEOUS at 12:52

## 2023-04-10 RX ADMIN — NYSTATIN: 100000 POWDER TOPICAL at 08:31

## 2023-04-10 RX ADMIN — ROSUVASTATIN 40 MG: 20 TABLET, FILM COATED ORAL at 20:40

## 2023-04-10 RX ADMIN — LORAZEPAM 0.5 MG: 0.5 TABLET ORAL at 20:41

## 2023-04-10 RX ADMIN — QUETIAPINE FUMARATE 50 MG: 25 TABLET ORAL at 08:28

## 2023-04-10 RX ADMIN — GABAPENTIN 300 MG: 300 CAPSULE ORAL at 08:28

## 2023-04-10 RX ADMIN — INSULIN LISPRO 5 UNITS: 100 INJECTION, SOLUTION INTRAVENOUS; SUBCUTANEOUS at 17:03

## 2023-04-10 RX ADMIN — CASTOR OIL AND BALSAM, PERU 1 APPLICATION: 788; 87 OINTMENT TOPICAL at 20:42

## 2023-04-10 RX ADMIN — INSULIN DETEMIR 10 UNITS: 100 INJECTION, SOLUTION SUBCUTANEOUS at 20:41

## 2023-04-10 RX ADMIN — DULOXETINE 60 MG: 60 CAPSULE, DELAYED RELEASE ORAL at 08:28

## 2023-04-10 RX ADMIN — APIXABAN 5 MG: 5 TABLET, FILM COATED ORAL at 08:28

## 2023-04-10 RX ADMIN — APIXABAN 5 MG: 5 TABLET, FILM COATED ORAL at 20:39

## 2023-04-10 RX ADMIN — GABAPENTIN 300 MG: 300 CAPSULE ORAL at 20:39

## 2023-04-10 RX ADMIN — Medication 10 ML: at 08:32

## 2023-04-10 RX ADMIN — HYDROCODONE BITARTRATE AND ACETAMINOPHEN 1 TABLET: 5; 325 TABLET ORAL at 08:28

## 2023-04-10 RX ADMIN — INSULIN LISPRO 5 UNITS: 100 INJECTION, SOLUTION INTRAVENOUS; SUBCUTANEOUS at 08:27

## 2023-04-10 RX ADMIN — INSULIN LISPRO 4 UNITS: 100 INJECTION, SOLUTION INTRAVENOUS; SUBCUTANEOUS at 12:52

## 2023-04-10 RX ADMIN — DONEPEZIL HYDROCHLORIDE 10 MG: 10 TABLET ORAL at 20:39

## 2023-04-10 RX ADMIN — HYDROCODONE BITARTRATE AND ACETAMINOPHEN 1 TABLET: 5; 325 TABLET ORAL at 20:40

## 2023-04-10 RX ADMIN — NYSTATIN: 100000 POWDER TOPICAL at 20:42

## 2023-04-10 RX ADMIN — INSULIN DETEMIR 10 UNITS: 100 INJECTION, SOLUTION SUBCUTANEOUS at 08:27

## 2023-04-10 RX ADMIN — LEVOTHYROXINE SODIUM 25 MCG: 25 TABLET ORAL at 05:17

## 2023-04-10 RX ADMIN — CASTOR OIL AND BALSAM, PERU 1 APPLICATION: 788; 87 OINTMENT TOPICAL at 08:31

## 2023-04-10 RX ADMIN — PANTOPRAZOLE SODIUM 40 MG: 40 TABLET, DELAYED RELEASE ORAL at 05:17

## 2023-04-10 RX ADMIN — LORAZEPAM 0.5 MG: 0.5 TABLET ORAL at 08:28

## 2023-04-10 NOTE — PLAN OF CARE
Goal Outcome Evaluation:  Plan of Care Reviewed With: patient           Outcome Evaluation: Remains sinus on the monitor. Specialty bed in use . Glucoses 216 and 223.Intermittent napping noted today. Awakens easily. Heel boots on. Waiting a discahrge plan when ready. Will monitor.

## 2023-04-10 NOTE — PROGRESS NOTES
[facility] Medicine Services  PROGRESS NOTE    Patient Name: Cheri Cruz  : 1940  MRN: [id number]    Date of Admission: 3/31/2023  Primary Care Physician: Lorrie Canada APRN    Subjective   Subjective     CC:   encephalopathy    HPI:   Sleepy today.  No no new issues reported.  No family in room    ROS:  Unable to assess    Objective   Objective     Vital Signs:   Temp:  [98.1 °F (36.7 °C)-98.7 °F (37.1 °C)] 98.5 °F (36.9 °C)  Heart Rate:  [78-90] 79  Resp:  [16-18] 16  BP: (100-140)/(43-66) 122/55  Flow (L/min):  [2] 2     Physical Exam:  Constitutional: sleepy   HENT: NCAT, dry   Respiratory: Clear to auscultation bilaterally, respiratory effort normal   Cardiovascular: RRR, II/VI murmur   Gastrointestinal: Positive bowel sounds, soft, nontender, nondistended  Musculoskeletal: No bilateral ankle edema  Psychiatric: Appropriate affect, cooperative  Neurologic: Oriented x 2, strength symmetric in all extremities, Cranial Nerves grossly intact to confrontation, speech clear  Skin: No rashes    Results Reviewed:  LAB RESULTS:      Lab 04/10/23  0512   WBC 6.27   HEMOGLOBIN 9.2*   HEMATOCRIT 29.1*   PLATELETS 139*   .7*         Lab 04/10/23  0512 23  0339 23  0352   SODIUM 142 142 139   POTASSIUM 3.8 3.6 3.7   CHLORIDE 103 105 105   CO2 30.0* 27.0 24.0   ANION GAP 9.0 10.0 10.0   BUN 16 14 12   CREATININE 1.44* 1.29* 1.28*   EGFR 36.6* 41.8* 42.2*   GLUCOSE 214* 190* 260*   CALCIUM 8.4* 8.8 8.5*                         Brief Urine Lab Results  (Last result in the past 365 days)      Color   Clarity   Blood   Leuk Est   Nitrite   Protein   CREAT   Urine HCG        23 162 Yellow   Cloudy   Negative   Trace   Negative   30 mg/dL (1+)                 Microbiology Results Abnormal     Procedure Component Value - Date/Time    Urine Culture - Urine, Straight Cath [[id number]]  (Normal) Collected: 23 162    Lab Status: Final result Specimen: Urine from  Straight Cath Updated: 04/01/23 2102     Urine Culture No growth    COVID PRE-OP / PRE-PROCEDURE SCREENING ORDER (NO ISOLATION) - Swab, Nasopharynx [[id number]]  (Normal) Collected: 03/31/23 1759    Lab Status: Final result Specimen: Swab from Nasopharynx Updated: 03/31/23 1854    Narrative:      The following orders were created for panel order COVID PRE-OP / PRE-PROCEDURE SCREENING ORDER (NO ISOLATION) - Swab, Nasopharynx.  Procedure                               Abnormality         Status                     ---------                               -----------         ------                     Respiratory Panel PCR w/...[[id number]]  Normal              Final result                 Please view results for these tests on the individual orders.    Respiratory Panel PCR w/COVID-19(SARS-CoV-2) TOMMY/SABRINA/DYLLAN/PAD/COR/MAD/KATHLEEN In-House, NP Swab in UTM/VTM, 3-4 HR TAT - Swab, Nasopharynx [[id number]]  (Normal) Collected: 03/31/23 1759    Lab Status: Final result Specimen: Swab from Nasopharynx Updated: 03/31/23 1854     ADENOVIRUS, PCR Not Detected     Coronavirus 229E Not Detected     Coronavirus HKU1 Not Detected     Coronavirus NL63 Not Detected     Coronavirus OC43 Not Detected     COVID19 Not Detected     Human Metapneumovirus Not Detected     Human Rhinovirus/Enterovirus Not Detected     Influenza A PCR Not Detected     Influenza B PCR Not Detected     Parainfluenza Virus 1 Not Detected     Parainfluenza Virus 2 Not Detected     Parainfluenza Virus 3 Not Detected     Parainfluenza Virus 4 Not Detected     RSV, PCR Not Detected     Bordetella pertussis pcr Not Detected     Bordetella parapertussis PCR Not Detected     Chlamydophila pneumoniae PCR Not Detected     Mycoplasma pneumo by PCR Not Detected    Narrative:      In the setting of a positive respiratory panel with a viral infection PLUS a negative procalcitonin without other underlying concern for bacterial infection, consider observing off antibiotics or  discontinuation of antibiotics and continue supportive care. If the respiratory panel is positive for atypical bacterial infection (Bordetella pertussis, Chlamydophila pneumoniae, or Mycoplasma pneumoniae), consider antibiotic de-escalation to target atypical bacterial infection.          No radiology results from the last 24 hrs    Results for orders placed during the hospital encounter of 02/10/23    Adult Transthoracic Echo Complete W/ Cont if Necessary Per Protocol    Interpretation Summary  •  Left ventricular systolic function is hyperdynamic (EF > 70%). Calculated left ventricular EF = 70.6% Left ventricular ejection fraction appears to be greater than 70%. The left ventricular cavity is small in size. Left ventricular wall thickness is consistent with mild concentric hypertrophy. All left ventricular wall segments contract normally. Left ventricular intracavitary gradient noted to be 71 mmHg. Left ventricular diastolic function is consistent with (grade I) impaired relaxation. Normal left atrial pressure.  •  The aortic valve is abnormal in structure. There is mild calcification of the aortic valve mainly affecting the right coronary cusp(s). The aortic valve appears trileaflet. No aortic valve regurgitation is present. Gradient noted through the LV and LVOT    Compared to TTE report from  Dec 2019, hyperdynamic LV with intracavitary gradient is not a new finding but peak gradient noted on this exam is higher than previously described.      Current medications:  Scheduled Meds:apixaban, 5 mg, Oral, Q12H  castor oil-balsam peru, 1 application, Topical, Q12H  donepezil, 10 mg, Oral, Nightly  DULoxetine, 60 mg, Oral, Daily  gabapentin, 300 mg, Oral, BID  HYDROcodone-acetaminophen, 1 tablet, Oral, Q12H  insulin detemir, 10 Units, Subcutaneous, Q12H  insulin lispro, 0-9 Units, Subcutaneous, TID AC  Insulin Lispro, 5 Units, Subcutaneous, TID With Meals  levothyroxine, 25 mcg, Oral, Q AM  LORazepam, 0.5 mg, Oral,  Q12H  nystatin, , Topical, Q12H  pantoprazole, 40 mg, Oral, Q AM  QUEtiapine, 50 mg, Oral, Daily  rosuvastatin, 40 mg, Oral, Nightly  senna-docusate sodium, 2 tablet, Oral, BID      Continuous Infusions:   PRN Meds:.•  senna-docusate sodium **AND** polyethylene glycol **AND** bisacodyl **AND** bisacodyl  •  HYDROcodone-acetaminophen  •  ondansetron  •  sodium chloride    Assessment & Plan   Assessment & Plan     Active Hospital Problems    Diagnosis  POA   • **Confusion [R41.0]  Yes   • Hypotension [I95.9]  Yes   • Constipation [K59.00]  Yes   • UTI (urinary tract infection) due to urinary indwelling catheter [T83.511A, N39.0]  Yes   • Metabolic encephalopathy [G93.41]  Yes   • Symptomatic anemia [D64.9]  Yes   • Acquired hypothyroidism [E03.9]  Yes   • History of pulmonary embolus (PE) [Z86.711]  Yes   • History of ischemic left MCA stroke [Z86.73]  Not Applicable   • Osteoarthritis [M19.90]  Yes   • Type 2 diabetes mellitus without complication, without long-term current use of insulin (HCC) [E11.9]  Yes   • Anxiety [F41.9]  Yes      Resolved Hospital Problems   No resolved problems to display.        Brief Hospital Course to date:  Cheri Cruz is a 82 y.o. female  past medical history of  Anxiety, Arthritis, Atrial fibrillation, Dementia , Diabetes mellitus , Hyperlipidemia, Hypertension, Stroke earlier this year with subsequent right side weakness and increasing speech difficulties, as well as Uterus cancer (HCC)who  presented to the ED from home for increasing confusion.     This patient's problems and plans were partially entered by my partner and updated as appropriate by me 04/10/23.      Acute Confusion  Vascular dementia  Recent Stroke  -- CT head no acute process   -- cont Seroquel and ativan -- home meds   -- on chronic opiate therapy  -- Rehabilitation recommended  --Appears to have some CO2 retention  --Seroquel 50 mg daily    Hypotension  - BP improved. Continue to hold home BP medications       CKD 3  -- baseline 1.2-1.3  --  Hold ACE. Remains at baseline.      UTI ruled out.  -UA bland there is only trace leukocyte Estrace and nitrite is negative.  Urine culture is also negative.      Constipation     Anemia   - Remained stable inpatient.       T2DM  - Improved with mod SS insulin, bolus with meals 5 units, increase levemir 10 units bid     Chronic pain  - on [place] / Lortab for pain    - per family she takes lotab 10 mg scheduled at home every 6 hours     Hypothryoidism  -- Levothyroxine 25 mcg oral daily  -- Reassess TSH in 8 weeks    Expected Discharge Location and Transportation: Rehab referrals   Expected Discharge   Expected Discharge Date and Time     Expected Discharge Date Expected Discharge Time    Apr 14, 2023            DVT prophylaxis:  Medical and mechanical DVT prophylaxis orders are present.     AM-PAC 6 Clicks Score (PT): 8 (04/10/23 0830)    CODE STATUS:   Code Status and Medical Interventions:   Ordered at: 04/01/23 0023     Code Status (Patient has no pulse and is not breathing):    CPR (Attempt to Resuscitate)     Medical Interventions (Patient has pulse or is breathing):    Full       Chepe Benavides MD  04/10/23

## 2023-04-10 NOTE — CASE MANAGEMENT/SOCIAL WORK
Continued Stay Note  [facility]     Patient Name: Cheri Cruz  MRN: [id number]  Today's Date: 4/10/2023    Admit Date: 3/31/2023    Plan: Ongoing   Discharge Plan     Row Name 04/10/23 1443       Plan    Plan Ongoing    Patient/Family in Agreement with Plan yes    Plan Comments [facility] has offered Mrs. Cruz a skilled rehab bed however, she is in her copay days and this will be close to $200/day. I have called Radha, Mrs. Cruz's daughter, and she is going to call her sister and make a decision on paying the copay or taking her mother home. Case management will continue to follow.    Final Discharge Disposition Code 30 - still a patient               Discharge Codes    No documentation.               Expected Discharge Date and Time     Expected Discharge Date Expected Discharge Time    Apr 14, 2023             Leighton Luong RN

## 2023-04-11 LAB
GLUCOSE BLDC GLUCOMTR-MCNC: 161 MG/DL (ref 70–130)
GLUCOSE BLDC GLUCOMTR-MCNC: 187 MG/DL (ref 70–130)
GLUCOSE BLDC GLUCOMTR-MCNC: 220 MG/DL (ref 70–130)
GLUCOSE BLDC GLUCOMTR-MCNC: 235 MG/DL (ref 70–130)

## 2023-04-11 PROCEDURE — G0378 HOSPITAL OBSERVATION PER HR: HCPCS

## 2023-04-11 PROCEDURE — 82962 GLUCOSE BLOOD TEST: CPT

## 2023-04-11 PROCEDURE — 63710000001 INSULIN DETEMIR PER 5 UNITS: Performed by: HOSPITALIST

## 2023-04-11 PROCEDURE — 97530 THERAPEUTIC ACTIVITIES: CPT

## 2023-04-11 PROCEDURE — 63710000001 INSULIN DETEMIR PER 5 UNITS: Performed by: NURSE PRACTITIONER

## 2023-04-11 PROCEDURE — 63710000001 INSULIN LISPRO (HUMAN) PER 5 UNITS: Performed by: NURSE PRACTITIONER

## 2023-04-11 PROCEDURE — 99232 SBSQ HOSP IP/OBS MODERATE 35: CPT | Performed by: HOSPITALIST

## 2023-04-11 PROCEDURE — 97110 THERAPEUTIC EXERCISES: CPT

## 2023-04-11 RX ORDER — INSULIN LISPRO 100 [IU]/ML
7 INJECTION, SOLUTION INTRAVENOUS; SUBCUTANEOUS
Status: DISCONTINUED | OUTPATIENT
Start: 2023-04-12 | End: 2023-04-12 | Stop reason: HOSPADM

## 2023-04-11 RX ADMIN — DONEPEZIL HYDROCHLORIDE 10 MG: 10 TABLET ORAL at 21:34

## 2023-04-11 RX ADMIN — PANTOPRAZOLE SODIUM 40 MG: 40 TABLET, DELAYED RELEASE ORAL at 05:15

## 2023-04-11 RX ADMIN — NYSTATIN: 100000 POWDER TOPICAL at 08:22

## 2023-04-11 RX ADMIN — INSULIN LISPRO 4 UNITS: 100 INJECTION, SOLUTION INTRAVENOUS; SUBCUTANEOUS at 11:57

## 2023-04-11 RX ADMIN — POLYETHYLENE GLYCOL 3350 17 G: 17 POWDER, FOR SOLUTION ORAL at 13:56

## 2023-04-11 RX ADMIN — INSULIN LISPRO 5 UNITS: 100 INJECTION, SOLUTION INTRAVENOUS; SUBCUTANEOUS at 17:10

## 2023-04-11 RX ADMIN — HYDROCODONE BITARTRATE AND ACETAMINOPHEN 1 TABLET: 5; 325 TABLET ORAL at 08:20

## 2023-04-11 RX ADMIN — INSULIN LISPRO 5 UNITS: 100 INJECTION, SOLUTION INTRAVENOUS; SUBCUTANEOUS at 11:56

## 2023-04-11 RX ADMIN — CASTOR OIL AND BALSAM, PERU 1 APPLICATION: 788; 87 OINTMENT TOPICAL at 21:36

## 2023-04-11 RX ADMIN — NYSTATIN: 100000 POWDER TOPICAL at 21:36

## 2023-04-11 RX ADMIN — LORAZEPAM 0.5 MG: 0.5 TABLET ORAL at 08:21

## 2023-04-11 RX ADMIN — DULOXETINE 60 MG: 60 CAPSULE, DELAYED RELEASE ORAL at 08:20

## 2023-04-11 RX ADMIN — GABAPENTIN 300 MG: 300 CAPSULE ORAL at 21:34

## 2023-04-11 RX ADMIN — INSULIN DETEMIR 12 UNITS: 100 INJECTION, SOLUTION SUBCUTANEOUS at 21:34

## 2023-04-11 RX ADMIN — SENNOSIDES AND DOCUSATE SODIUM 2 TABLET: 50; 8.6 TABLET ORAL at 21:35

## 2023-04-11 RX ADMIN — ROSUVASTATIN 40 MG: 20 TABLET, FILM COATED ORAL at 21:34

## 2023-04-11 RX ADMIN — GABAPENTIN 300 MG: 300 CAPSULE ORAL at 08:21

## 2023-04-11 RX ADMIN — APIXABAN 5 MG: 5 TABLET, FILM COATED ORAL at 21:35

## 2023-04-11 RX ADMIN — CASTOR OIL AND BALSAM, PERU 1 APPLICATION: 788; 87 OINTMENT TOPICAL at 08:22

## 2023-04-11 RX ADMIN — QUETIAPINE FUMARATE 50 MG: 25 TABLET ORAL at 08:21

## 2023-04-11 RX ADMIN — BISACODYL 5 MG: 5 TABLET, COATED ORAL at 17:52

## 2023-04-11 RX ADMIN — INSULIN DETEMIR 10 UNITS: 100 INJECTION, SOLUTION SUBCUTANEOUS at 08:21

## 2023-04-11 RX ADMIN — APIXABAN 5 MG: 5 TABLET, FILM COATED ORAL at 08:21

## 2023-04-11 RX ADMIN — SENNOSIDES AND DOCUSATE SODIUM 2 TABLET: 50; 8.6 TABLET ORAL at 08:21

## 2023-04-11 RX ADMIN — LORAZEPAM 0.5 MG: 0.5 TABLET ORAL at 21:34

## 2023-04-11 RX ADMIN — INSULIN LISPRO 2 UNITS: 100 INJECTION, SOLUTION INTRAVENOUS; SUBCUTANEOUS at 17:11

## 2023-04-11 RX ADMIN — LEVOTHYROXINE SODIUM 25 MCG: 25 TABLET ORAL at 05:15

## 2023-04-11 RX ADMIN — INSULIN LISPRO 5 UNITS: 100 INJECTION, SOLUTION INTRAVENOUS; SUBCUTANEOUS at 08:21

## 2023-04-11 RX ADMIN — INSULIN LISPRO 4 UNITS: 100 INJECTION, SOLUTION INTRAVENOUS; SUBCUTANEOUS at 08:21

## 2023-04-11 NOTE — PLAN OF CARE
Goal Outcome Evaluation:  Plan of Care Reviewed With: patient        Progress: no change  Outcome Evaluation: Pt remains confused to time place and situation. Glucoses 220, 235, and 187. Remains sinus on the monitor. Wound care completed today as ordered. Anticipate discharge in the morning to home by ambulance at 0900.  Up to recliner and tolerates well . Tolerates diet without nausea. Required assistance with eating. Will monitor.

## 2023-04-11 NOTE — PROGRESS NOTES
[facility] Medicine Services  PROGRESS NOTE    Patient Name: Cheri Cruz  : 1940  MRN: [id number]    Date of Admission: 3/31/2023  Primary Care Physician: Lorrie Canada APRN    Subjective   Subjective     CC:   AMS    HPI:    Sleepy on Scheduled [place].  No family in room.   Possible home tomorrow    ROS:    Unable to assess    Objective   Objective     Vital Signs:   Temp:  [98 °F (36.7 °C)-98.2 °F (36.8 °C)] 98 °F (36.7 °C)  Heart Rate:  [78-84] 83  Resp:  [16-18] 16  BP: (110-136)/(44-69) 127/69  Flow (L/min):  [2] 2     Physical Exam:  Constitutional: sleepy   HENT: NCAT, dry   Respiratory: Clear to auscultation bilaterally, respiratory effort normal   Cardiovascular: RRR, II/VI murmur   Gastrointestinal: Positive bowel sounds, soft, nontender, nondistended  Musculoskeletal: No bilateral ankle edema  Psychiatric: Appropriate affect, cooperative  Neurologic: Oriented x 2, generalized weakness, weak phonation  Skin: dry, + skin tenting    Results Reviewed:  LAB RESULTS:      Lab 04/10/23  0512   WBC 6.27   HEMOGLOBIN 9.2*   HEMATOCRIT 29.1*   PLATELETS 139*   .7*         Lab 04/10/23  0512 23  0339 23  0352   SODIUM 142 142 139   POTASSIUM 3.8 3.6 3.7   CHLORIDE 103 105 105   CO2 30.0* 27.0 24.0   ANION GAP 9.0 10.0 10.0   BUN 16 14 12   CREATININE 1.44* 1.29* 1.28*   EGFR 36.6* 41.8* 42.2*   GLUCOSE 214* 190* 260*   CALCIUM 8.4* 8.8 8.5*     Brief Urine Lab Results  (Last result in the past 365 days)      Color   Clarity   Blood   Leuk Est   Nitrite   Protein   CREAT   Urine HCG        23 162 Yellow   Cloudy   Negative   Trace   Negative   30 mg/dL (1+)                 Microbiology Results Abnormal     Procedure Component Value - Date/Time    Urine Culture - Urine, Straight Cath [[id number]]  (Normal) Collected: 23 162    Lab Status: Final result Specimen: Urine from Straight Cath Updated: 23     Urine Culture No growth    COVID  PRE-OP / PRE-PROCEDURE SCREENING ORDER (NO ISOLATION) - Swab, Nasopharynx [[id number]]  (Normal) Collected: 03/31/23 1759    Lab Status: Final result Specimen: Swab from Nasopharynx Updated: 03/31/23 1854    Narrative:      The following orders were created for panel order COVID PRE-OP / PRE-PROCEDURE SCREENING ORDER (NO ISOLATION) - Swab, Nasopharynx.  Procedure                               Abnormality         Status                     ---------                               -----------         ------                     Respiratory Panel PCR w/...[[id number]]  Normal              Final result                 Please view results for these tests on the individual orders.    Respiratory Panel PCR w/COVID-19(SARS-CoV-2) TOMMY/SABRINA/DYLLAN/PAD/COR/MAD/KATHLEEN In-House, NP Swab in UTM/VTM, 3-4 HR TAT - Swab, Nasopharynx [[id number]]  (Normal) Collected: 03/31/23 1759    Lab Status: Final result Specimen: Swab from Nasopharynx Updated: 03/31/23 1854     ADENOVIRUS, PCR Not Detected     Coronavirus 229E Not Detected     Coronavirus HKU1 Not Detected     Coronavirus NL63 Not Detected     Coronavirus OC43 Not Detected     COVID19 Not Detected     Human Metapneumovirus Not Detected     Human Rhinovirus/Enterovirus Not Detected     Influenza A PCR Not Detected     Influenza B PCR Not Detected     Parainfluenza Virus 1 Not Detected     Parainfluenza Virus 2 Not Detected     Parainfluenza Virus 3 Not Detected     Parainfluenza Virus 4 Not Detected     RSV, PCR Not Detected     Bordetella pertussis pcr Not Detected     Bordetella parapertussis PCR Not Detected     Chlamydophila pneumoniae PCR Not Detected     Mycoplasma pneumo by PCR Not Detected    Narrative:      In the setting of a positive respiratory panel with a viral infection PLUS a negative procalcitonin without other underlying concern for bacterial infection, consider observing off antibiotics or discontinuation of antibiotics and continue supportive care. If the respiratory  panel is positive for atypical bacterial infection (Bordetella pertussis, Chlamydophila pneumoniae, or Mycoplasma pneumoniae), consider antibiotic de-escalation to target atypical bacterial infection.          No radiology results from the last 24 hrs    Results for orders placed during the hospital encounter of 02/10/23    Adult Transthoracic Echo Complete W/ Cont if Necessary Per Protocol    Interpretation Summary  •  Left ventricular systolic function is hyperdynamic (EF > 70%). Calculated left ventricular EF = 70.6% Left ventricular ejection fraction appears to be greater than 70%. The left ventricular cavity is small in size. Left ventricular wall thickness is consistent with mild concentric hypertrophy. All left ventricular wall segments contract normally. Left ventricular intracavitary gradient noted to be 71 mmHg. Left ventricular diastolic function is consistent with (grade I) impaired relaxation. Normal left atrial pressure.  •  The aortic valve is abnormal in structure. There is mild calcification of the aortic valve mainly affecting the right coronary cusp(s). The aortic valve appears trileaflet. No aortic valve regurgitation is present. Gradient noted through the LV and LVOT    Compared to TTE report from  Dec 2019, hyperdynamic LV with intracavitary gradient is not a new finding but peak gradient noted on this exam is higher than previously described.      Current medications:  Scheduled Meds:apixaban, 5 mg, Oral, Q12H  castor oil-balsam peru, 1 application, Topical, Q12H  donepezil, 10 mg, Oral, Nightly  DULoxetine, 60 mg, Oral, Daily  gabapentin, 300 mg, Oral, BID  HYDROcodone-acetaminophen, 1 tablet, Oral, Q12H  insulin detemir, 10 Units, Subcutaneous, Q12H  insulin lispro, 0-9 Units, Subcutaneous, TID AC  Insulin Lispro, 5 Units, Subcutaneous, TID With Meals  levothyroxine, 25 mcg, Oral, Q AM  LORazepam, 0.5 mg, Oral, Q12H  nystatin, , Topical, Q12H  pantoprazole, 40 mg, Oral, Q AM  QUEtiapine,  50 mg, Oral, Daily  rosuvastatin, 40 mg, Oral, Nightly  senna-docusate sodium, 2 tablet, Oral, BID      Continuous Infusions:   PRN Meds:.•  senna-docusate sodium **AND** polyethylene glycol **AND** bisacodyl **AND** bisacodyl  •  HYDROcodone-acetaminophen  •  ondansetron  •  sodium chloride    Assessment & Plan   Assessment & Plan     Active Hospital Problems    Diagnosis  POA   • **Confusion [R41.0]  Yes   • Hypotension [I95.9]  Yes   • Constipation [K59.00]  Yes   • UTI (urinary tract infection) due to urinary indwelling catheter [T83.511A, N39.0]  Yes   • Metabolic encephalopathy [G93.41]  Yes   • Symptomatic anemia [D64.9]  Yes   • Acquired hypothyroidism [E03.9]  Yes   • History of pulmonary embolus (PE) [Z86.711]  Yes   • History of ischemic left MCA stroke [Z86.73]  Not Applicable   • Osteoarthritis [M19.90]  Yes   • Type 2 diabetes mellitus without complication, without long-term current use of insulin (HCC) [E11.9]  Yes   • Anxiety [F41.9]  Yes      Resolved Hospital Problems   No resolved problems to display.        Brief Hospital Course to date:  Cheri Cruz is a 82 y.o. female  past medical history of  Anxiety, Arthritis, Atrial fibrillation, Dementia , Diabetes mellitus , Hyperlipidemia, Hypertension, Stroke earlier this year with subsequent right side weakness and increasing speech difficulties, as well as Uterus cancer (HCC)who  presented to the ED from home for increasing confusion.     This patient's problems and plans were partially entered by my partner and updated as appropriate by me 04/11/23.      Acute Confusion  Vascular dementia  Recent Stroke  -- CT head no acute process   -- cont Seroquel and ativan -- home meds   -- on chronic opiate therapy  -- likely has some central apnea  --Appears to have some CO2 retention  --Seroquel 50 mg daily  --Stop Scheduled [place] today    Hypotension  - BP improved  - currently off home lisinopril  - consider discharge off anti-hypertensive  agents     CKD 3  -- baseline 1.2-1.3  -- holding ace-inhibitor     UTI ruled out.  -UA bland there is only trace leukocyte Estrace and nitrite is negative.  Urine culture is also negative.      Constipation     Anemia   - Remained stable inpatient.       T2DM  - adjust Long Acting Insulin today  - adjust mealtime insulin today     Chronic pain  - on [place] / Lortab for pain    - per family she takes lotab 10 mg scheduled at home every 6 hours     Hypothryoidism  -- Levothyroxine 25 mcg oral daily  -- Reassess TSH in 8 weeks    Expected Discharge Location and Transportation:    Home by Ambulance - 9 am  Expected Discharge   Expected Discharge Date and Time     Expected Discharge Date Expected Discharge Time    Apr 12, 2023            DVT prophylaxis:  Medical and mechanical DVT prophylaxis orders are present.     AM-PAC 6 Clicks Score (PT): 7 (04/11/23 3250)    CODE STATUS:   Code Status and Medical Interventions:   Ordered at: 04/01/23 0023     Code Status (Patient has no pulse and is not breathing):    CPR (Attempt to Resuscitate)     Medical Interventions (Patient has pulse or is breathing):    Full       Chepe Benavides MD  04/11/23

## 2023-04-11 NOTE — THERAPY TREATMENT NOTE
Patient Name: Cheri Cruz  : 1940    MRN: [id number]                              Today's Date: 2023       Admit Date: 3/31/2023    Visit Dx:     ICD-10-CM ICD-9-CM   1. Altered mental status, unspecified altered mental status type  R41.82 780.97   2. Combative behavior  R46.89 780.99   3. Somnolence  R40.0 780.09   4. Hyperglycemia  R73.9 790.29   5. Elevated serum creatinine  R79.89 790.99   6. Elevated troponin  R77.8 790.6     Patient Active Problem List   Diagnosis   • Hyperlipidemia LDL goal <70   • Type 2 diabetes mellitus without complication, without long-term current use of insulin (HCC)   • GERD (gastroesophageal reflux disease)   • Essential hypertension   • Abnormal EKG   • Osteoarthritis   • Anxiety   • Palpitations   • Vertigo   • History of ischemic left MCA stroke   • Hemorrhagic stroke   • History of pulmonary embolus (PE)   • Confusion   • Hypotension   • Constipation   • UTI (urinary tract infection) due to urinary indwelling catheter   • Metabolic encephalopathy   • Symptomatic anemia   • Acquired hypothyroidism     Past Medical History:   Diagnosis Date   • Abnormal heart rhythm    • Anxiety    • Arrhythmia    • Arthritis    • Atrial fibrillation    • Dementia    • Diabetes mellitus    • Hyperlipidemia    • Hypertension    • Kidney disorder    • Stroke    • Uterus cancer      Past Surgical History:   Procedure Laterality Date   • CATARACT EXTRACTION, BILATERAL        General Information     Row Name 23 1408          Physical Therapy Time and Intention    Document Type therapy note (daily note)  -AE     Mode of Treatment physical therapy  -AE     Row Name 23 1408          General Information    Patient Profile Reviewed yes  -AE     Existing Precautions/Restrictions fall;other (see comments)  [place]; Dementia (pleasantly confused)  -AE     Barriers to Rehab medically complex;previous functional deficit;cognitive status  -AE     Row Name 23 1401          Cognition     Orientation Status (Cognition) oriented to;person;verbal cues/prompts needed for orientation;place  -AE     Row Name 04/11/23 1408          Safety Issues, Functional Mobility    Safety Issues Affecting Function (Mobility) awareness of need for assistance;insight into deficits/self-awareness;safety precaution awareness  -AE     Impairments Affecting Function (Mobility) balance;cognition;motor control;postural/trunk control;pain;visual/perceptual;strength  -AE     Cognitive Impairments, Mobility Safety/Performance awareness, need for assistance;safety precaution awareness;safety precaution follow-through;sequencing abilities;insight into deficits/self-awareness;problem-solving/reasoning  -AE           User Key  (r) = Recorded By, (t) = Taken By, (c) = Cosigned By    Initials Name Provider Type    AE [place], PT Physical Therapist               Mobility     Row Name 04/11/23 1410          Bed Mobility    Bed Mobility rolling left;rolling right  -AE     Rolling Left [place] (Bed Mobility) maximum assist (25% patient effort);2 person assist;verbal cues  -AE     Rolling Right [place] (Bed Mobility) maximum assist (25% patient effort);2 person assist;verbal cues  -AE     Comment, (Bed Mobility) VCs for hand placement and sequencing. Pt able to assist with rolling and grasping onto bed rails for lift sling placement.  -AE     Row Name 04/11/23 1410          Transfers    Comment, (Transfers) Dep A lift to chair with lift device. Pt refused to complete STS from chair.  -AE     Row Name 04/11/23 1410          Bed-Chair Transfer    Bed-Chair [place] (Transfers) dependent (less than 25% patient effort);2 person assist;verbal cues  -AE     Assistive Device (Bed-Chair Transfers) lift device  -AE     Row Name 04/11/23 1410          Gait/Stairs (Locomotion)    Comment, (Gait/Stairs) Non-ambulatory  -AE           User Key  (r) = Recorded By, (t) = Taken By, (c) = Cosigned By    Initials Name Provider Type     AE [place], PT Physical Therapist               Obj/Interventions     Row Name 04/11/23 1417          Motor Skills    Therapeutic Exercise hip;knee;ankle  -AE     Row Name 04/11/23 1417          Hip (Therapeutic Exercise)    Hip (Therapeutic Exercise) AAROM (active assistive range of motion)  -AE     Hip AAROM (Therapeutic Exercise) bilateral;flexion;extension;aBduction;10 repetitions;sitting  -AE     Row Name 04/11/23 1417          Knee (Therapeutic Exercise)    Knee (Therapeutic Exercise) AAROM (active assistive range of motion)  -AE     Knee AAROM (Therapeutic Exercise) bilateral;flexion;extension;sitting;10 repetitions  -AE     Row Name 04/11/23 1417          Ankle (Therapeutic Exercise)    Ankle (Therapeutic Exercise) AROM (active range of motion)  -AE     Ankle AROM (Therapeutic Exercise) bilateral;dorsiflexion;plantarflexion;10 repetitions;sitting  -AE     Row Name 04/11/23 1417          Balance    Balance Assessment sitting static balance;sitting dynamic balance  -AE     Dynamic Sitting Balance minimal assist;2-person assist;verbal cues  -AE     Position, Sitting Balance supported;sitting in chair  -AE     Sit to Stand Dynamic Balance unable to assess  -AE           User Key  (r) = Recorded By, (t) = Taken By, (c) = Cosigned By    Initials Name Provider Type    AE [place], PT Physical Therapist               Goals/Plan    No documentation.                Clinical Impression     Row Name 04/11/23 1419          Pain    Pretreatment Pain Rating 0/10 - no pain  -AE     Posttreatment Pain Rating 0/10 - no pain  -AE     Row Name 04/11/23 1419          Plan of Care Review    Plan of Care Reviewed With patient  -AE     Progress no change  -AE     Outcome Evaluation Pt continues to present with confusion, decreased functional mobility, and decreased independence. Pt completed rolling in bed with max A x2 for lift sling placement. Attempted to complete STS from chair this session however pt  refused, agreeable to ther ex while sitting in chair. Continue to progress per pt tolerance.  -AE     Row Name 04/11/23 1419          Vital Signs    Pre Systolic BP Rehab 116  -AE     Pre Treatment Diastolic BP 46  -AE     Pretreatment Heart Rate (beats/min) 73  -AE     Posttreatment Heart Rate (beats/min) 75  -AE     Pre SpO2 (%) 100  -AE     O2 Delivery Pre Treatment nasal cannula  -AE     O2 Delivery Intra Treatment nasal cannula  -AE     Post SpO2 (%) 98  -AE     O2 Delivery Post Treatment nasal cannula  -AE     Pre Patient Position Supine  -AE     Intra Patient Position Side Lying  -AE     Post Patient Position Sitting  -AE     Row Name 04/11/23 1419          Positioning and Restraints    Pre-Treatment Position in bed  -AE     Post Treatment Position chair  -AE     In Chair notified nsg;reclined;call light within reach;exit alarm on;encouraged to call for assist;waffle cushion;on mechanical lift sling;legs elevated;waffle boot/both;compression device  -AE           User Key  (r) = Recorded By, (t) = Taken By, (c) = Cosigned By    Initials Name Provider Type    AE [place], PT Physical Therapist               Outcome Measures     Row Name 04/11/23 1427 04/11/23 0820       How much help from another person do you currently need...    Turning from your back to your side while in flat bed without using bedrails? 2  -AE 2  -MK    Moving from lying on back to sitting on the side of a flat bed without bedrails? 1  -AE 2  -MK    Moving to and from a bed to a chair (including a wheelchair)? 1  -AE 1  -MK    Standing up from a chair using your arms (e.g., wheelchair, bedside chair)? 1  -AE 1  -MK    Climbing 3-5 steps with a railing? 1  -AE 1  -MK    To walk in hospital room? 1  -AE 1  -MK    AM-PAC 6 Clicks Score (PT) 7  -AE 8  -MK    Highest level of mobility 2 --> Bed activities/dependent transfer  -AE 3 --> Sat at edge of bed  -MK    Row Name 04/11/23 1427          Functional Assessment    Outcome Measure  Options AM-PAC 6 Clicks Basic Mobility (PT)  -AE           User Key  (r) = Recorded By, (t) = Taken By, (c) = Cosigned By    Initials Name Provider Type    Darlyn Burks, RN Registered Nurse    AE [place], PT Physical Therapist                             Physical Therapy Education     Title: PT OT SLP Therapies (In Progress)     Topic: Physical Therapy (In Progress)     Point: Mobility training (In Progress)     Learning Progress Summary           Patient Acceptance, E, NR by AE at 4/11/2023 1131    Eager, E, NR by SC at 4/7/2023 0944    Comment: reviewed benefits of activity                   Point: Home exercise program (In Progress)     Learning Progress Summary           Patient Acceptance, E, NR by AE at 4/11/2023 1131    Eager, E, NR by SC at 4/7/2023 0944    Comment: reviewed benefits of activity                   Point: Body mechanics (In Progress)     Learning Progress Summary           Patient Acceptance, E, NR by AE at 4/11/2023 1131    Eager, E, NR by SC at 4/7/2023 0944    Comment: reviewed benefits of activity                   Point: Precautions (In Progress)     Learning Progress Summary           Patient Acceptance, E, NR by AE at 4/11/2023 1131    Eager, E, NR by SC at 4/7/2023 0944    Comment: reviewed benefits of activity                               User Key     Initials Effective Dates Name Provider Type Discipline    SC 02/03/23 -  [place] Franco, PT Physical Therapist PT    AE 09/21/21 -  German Groves PT Physical Therapist PT              PT Recommendation and Plan     Plan of Care Reviewed With: patient  Progress: no change  Outcome Evaluation: Pt continues to present with confusion, decreased functional mobility, and decreased independence. Pt completed rolling in bed with max A x2 for lift sling placement. Attempted to complete STS from chair this session however pt refused, agreeable to ther ex while sitting in chair. Continue to progress per pt tolerance.      Time Calculation:    PT Charges     Row Name 04/11/23 1429             Time Calculation    Start Time 1131  -AE      PT Received On 04/11/23  -AE      PT Goal Re-Cert Due Date 04/17/23  -AE         Time Calculation- PT    Total Timed Code Minutes- PT 30 minute(s)  -AE         Timed Charges    35884 - PT Therapeutic Exercise Minutes 10  -AE      41316 - PT Therapeutic Activity Minutes 20  -AE         Total Minutes    Timed Charges Total Minutes 30  -AE       Total Minutes 30  -AE            User Key  (r) = Recorded By, (t) = Taken By, (c) = Cosigned By    Initials Name Provider Type    AE Greman Groves PT Physical Therapist              Therapy Charges for Today     Code Description Service Date Service Provider Modifiers Qty    01544567243 HC PT THER PROC EA 15 MIN 4/11/2023 German Groves, PT GP 1    08942190889 HC PT THERAPEUTIC ACT EA 15 MIN 4/11/2023 German Groves, PT GP 1    80017977850 HC PT THER SUPP EA 15 MIN 4/11/2023 [place]ves, PT GP 2          PT G-Codes  Outcome Measure Options: AM-PAC 6 Clicks Basic Mobility (PT)  AM-PAC 6 Clicks Score (PT): 7  AM-PAC 6 Clicks Score (OT): 10  PT Discharge Summary  Anticipated Discharge Disposition (PT): skilled nursing facility    German Groves PT  4/11/2023

## 2023-04-11 NOTE — CASE MANAGEMENT/SOCIAL WORK
Case Management Discharge Note      Final Note: Ms. Cruz will be discharged home tomorrow. [facility] will transport her via stretcher at 0900. Her daughter, Radha, has been updated by phone and is in agreement with this plan. PCS is on the chartlet. No additional discharge needs identified.         Selected Continued Care - Admitted Since 3/31/2023     Destination    No services have been selected for the patient.              Durable Medical Equipment    No services have been selected for the patient.              Dialysis/Infusion    No services have been selected for the patient.              Home Medical Care    No services have been selected for the patient.              Therapy    No services have been selected for the patient.              Community Resources    No services have been selected for the patient.              Community & DME    No services have been selected for the patient.                        Transportation Services  Ambulance: [facility]    Final Discharge Disposition Code: 30 - still a patient

## 2023-04-11 NOTE — PLAN OF CARE
Goal Outcome Evaluation:  Plan of Care Reviewed With: patient        Progress: no change  Outcome Evaluation: Pt continues to present with confusion, decreased functional mobility, and decreased independence. Pt completed rolling in bed with max A x2 for lift sling placement. Attempted to complete STS from chair this session however pt refused, agreeable to ther ex while sitting in chair. Continue to progress per pt tolerance.

## 2023-04-12 ENCOUNTER — READMISSION MANAGEMENT (OUTPATIENT)
Dept: CALL CENTER | Facility: HOSPITAL | Age: 83
End: 2023-04-12
Payer: MEDICARE

## 2023-04-12 ENCOUNTER — APPOINTMENT (OUTPATIENT)
Dept: OTHER | Facility: HOSPITAL | Age: 83
End: 2023-04-12
Payer: MEDICARE

## 2023-04-12 VITALS
SYSTOLIC BLOOD PRESSURE: 143 MMHG | BODY MASS INDEX: 32.98 KG/M2 | DIASTOLIC BLOOD PRESSURE: 80 MMHG | TEMPERATURE: 98.4 F | WEIGHT: 210.1 LBS | RESPIRATION RATE: 18 BRPM | HEIGHT: 67 IN | OXYGEN SATURATION: 90 % | HEART RATE: 82 BPM

## 2023-04-12 LAB — GLUCOSE BLDC GLUCOMTR-MCNC: 189 MG/DL (ref 70–130)

## 2023-04-12 PROCEDURE — 82962 GLUCOSE BLOOD TEST: CPT

## 2023-04-12 PROCEDURE — 63710000001 INSULIN LISPRO (HUMAN) PER 5 UNITS: Performed by: NURSE PRACTITIONER

## 2023-04-12 PROCEDURE — 63710000001 INSULIN LISPRO (HUMAN) PER 5 UNITS: Performed by: HOSPITALIST

## 2023-04-12 PROCEDURE — 63710000001 INSULIN DETEMIR PER 5 UNITS: Performed by: HOSPITALIST

## 2023-04-12 PROCEDURE — G0378 HOSPITAL OBSERVATION PER HR: HCPCS

## 2023-04-12 RX ORDER — TERAZOSIN 2 MG/1
2 CAPSULE ORAL NIGHTLY
Qty: 30 CAPSULE | Refills: 0 | Status: SHIPPED | OUTPATIENT
Start: 2023-04-12 | End: 2023-05-12

## 2023-04-12 RX ORDER — QUETIAPINE FUMARATE 50 MG/1
50 TABLET, FILM COATED ORAL NIGHTLY
Qty: 14 TABLET | Refills: 0 | Status: SHIPPED | OUTPATIENT
Start: 2023-04-12 | End: 2023-04-26

## 2023-04-12 RX ADMIN — INSULIN LISPRO 7 UNITS: 100 INJECTION, SOLUTION INTRAVENOUS; SUBCUTANEOUS at 08:16

## 2023-04-12 RX ADMIN — CASTOR OIL AND BALSAM, PERU 1 APPLICATION: 788; 87 OINTMENT TOPICAL at 08:18

## 2023-04-12 RX ADMIN — QUETIAPINE FUMARATE 50 MG: 25 TABLET ORAL at 08:17

## 2023-04-12 RX ADMIN — INSULIN LISPRO 2 UNITS: 100 INJECTION, SOLUTION INTRAVENOUS; SUBCUTANEOUS at 08:17

## 2023-04-12 RX ADMIN — APIXABAN 5 MG: 5 TABLET, FILM COATED ORAL at 08:17

## 2023-04-12 RX ADMIN — LORAZEPAM 0.5 MG: 0.5 TABLET ORAL at 08:17

## 2023-04-12 RX ADMIN — SENNOSIDES AND DOCUSATE SODIUM 2 TABLET: 50; 8.6 TABLET ORAL at 08:17

## 2023-04-12 RX ADMIN — INSULIN DETEMIR 12 UNITS: 100 INJECTION, SOLUTION SUBCUTANEOUS at 08:16

## 2023-04-12 RX ADMIN — LEVOTHYROXINE SODIUM 25 MCG: 25 TABLET ORAL at 06:07

## 2023-04-12 RX ADMIN — NYSTATIN: 100000 POWDER TOPICAL at 08:18

## 2023-04-12 RX ADMIN — GABAPENTIN 300 MG: 300 CAPSULE ORAL at 08:17

## 2023-04-12 RX ADMIN — PANTOPRAZOLE SODIUM 40 MG: 40 TABLET, DELAYED RELEASE ORAL at 06:07

## 2023-04-12 RX ADMIN — DULOXETINE 60 MG: 60 CAPSULE, DELAYED RELEASE ORAL at 08:17

## 2023-04-12 NOTE — DISCHARGE SUMMARY
[facility] Medicine Services  DISCHARGE SUMMARY    Patient Name: Cheri Cruz  : 1940  MRN: [id number]    Date of Admission: 3/31/2023  3:50 PM  Date of Discharge:   2023 (Wednesday)  Primary Care Physician: Lorrie Canada APRN    Consults     No orders found from 3/2/2023 to 2023.          Hospital Course     Presenting Problem:   Confusion [R41.0]    Active Hospital Problems    Diagnosis  POA   • **Confusion [R41.0]  Yes   • Hypotension [I95.9]  Yes   • Constipation [K59.00]  Yes   • UTI (urinary tract infection) due to urinary indwelling catheter [T83.511A, N39.0]  Yes   • Metabolic encephalopathy [G93.41]  Yes   • Symptomatic anemia [D64.9]  Yes   • Acquired hypothyroidism [E03.9]  Yes   • History of pulmonary embolus (PE) [Z86.711]  Yes   • History of ischemic left MCA stroke [Z86.73]  Not Applicable   • Osteoarthritis [M19.90]  Yes   • Type 2 diabetes mellitus without complication, without long-term current use of insulin (HCC) [E11.9]  Yes   • Anxiety [F41.9]  Yes      Resolved Hospital Problems   No resolved problems to display.      Mild CO2 retention    Hospital Course:  Cheri Cruz is a 82 y.o. female with past medical history of diabetes, hyperlipidemia, hypertension, stroke, atrial fibrillation, dementia, obesity, bed bound status, chronic anxiety, chronic pain, speech difficulties, uterus cancer, arthritis, chronic anemia who presented to the ER with altered mental status on Friday afternoon.      The family reported she had a stroke in  and it resulted in Right Sided Weakness.  She has been declining since and has become less responsive, with intermittent episodes of agitation and combativeness.  This brought her to the ER on Friday afternoon.    She was admitted to the hospital medicine service for AMS, combative behavior and somnolence.  The family reported alternating constipation and diarrhea.   She had some low blood pressure 93/49  and was given fluids (Bolus Normal Saline - 1 L) and her blood pressure medication was held.  She was continued on Seroquel, Aricept, and long standing lortab which helps most due to her Chronic OA pain.    She was continued on IV fluids and BP medications held.  She was treated for Deep Tissue Pressure injury which was present on admission likely from Bed bound status.    Initial plans were home with family vs LTC.  Family asked for 24 hours to think about possible long term care vs return home.    Home was discussed on April 3 and April 4 per notes.  Then Rehab was planned for April 5.  She was not working with therapy on 4/5 and not a rehab candidate.    Therapy recommended Rehab.  More referrals were sent out.   [facility] offered a skilled rehab bed however there was copay concerns.    Ultimately, she was discharged home by Ambulance at 9 am on Wednesday.      Discharge Follow Up Recommendations for outpatient labs/diagnostics:   follow up with Primary Care Provider    Day of Discharge     HPI:    She is more alert and wanting to go home today.  No family in room    Review of Systems    Unable to assess    Vital Signs:   Temp:  [97.4 °F (36.3 °C)-98.4 °F (36.9 °C)] 98.4 °F (36.9 °C)  Heart Rate:  [76-83] 82  Resp:  [16-18] 18  BP: (116-170)/(44-95) 170/95  Flow (L/min):  [2] 2    Physical Exam:    Constitutional: awake, no acute distress  HENT: NCAT, dry tongue  Respiratory: Clear to auscultation bilaterally, weak inspiratory effort  Cardiovascular: RRR, s1 and s2  Gastrointestinal: Positive bowel sounds, soft, nontender, obese abdomen    Pertinent  and/or Most Recent Results     LAB RESULTS:      Lab 04/10/23  0512   WBC 6.27   HEMOGLOBIN 9.2*   HEMATOCRIT 29.1*   PLATELETS 139*   .7*         Lab 04/10/23  0512 04/07/23  0339   SODIUM 142 142   POTASSIUM 3.8 3.6   CHLORIDE 103 105   CO2 30.0* 27.0   ANION GAP 9.0 10.0   BUN 16 14   CREATININE 1.44* 1.29*   EGFR 36.6* 41.8*   GLUCOSE 214* 190*   CALCIUM  8.4* 8.8                         Brief Urine Lab Results  (Last result in the past 365 days)      Color   Clarity   Blood   Leuk Est   Nitrite   Protein   CREAT   Urine HCG        03/31/23 1621 Yellow   Cloudy   Negative   Trace   Negative   30 mg/dL (1+)               Microbiology Results (last 10 days)     ** No results found for the last 240 hours. **          Cardiac Event Monitor    Result Date: 4/10/2023  •  Please see scanned document for interpretation and raw data               Results for orders placed during the hospital encounter of 02/10/23    Adult Transthoracic Echo Complete W/ Cont if Necessary Per Protocol    Interpretation Summary  •  Left ventricular systolic function is hyperdynamic (EF > 70%). Calculated left ventricular EF = 70.6% Left ventricular ejection fraction appears to be greater than 70%. The left ventricular cavity is small in size. Left ventricular wall thickness is consistent with mild concentric hypertrophy. All left ventricular wall segments contract normally. Left ventricular intracavitary gradient noted to be 71 mmHg. Left ventricular diastolic function is consistent with (grade I) impaired relaxation. Normal left atrial pressure.  •  The aortic valve is abnormal in structure. There is mild calcification of the aortic valve mainly affecting the right coronary cusp(s). The aortic valve appears trileaflet. No aortic valve regurgitation is present. Gradient noted through the LV and LVOT    Compared to TTE report from  Dec 2019, hyperdynamic LV with intracavitary gradient is not a new finding but peak gradient noted on this exam is higher than previously described.      Discharge Details        Discharge Medications      New Medications      Instructions Start Date   terazosin 2 MG capsule  Commonly known as: HYTRIN   2 mg, Oral, Nightly         Changes to Medications      Instructions Start Date   meclizine 25 MG tablet  Commonly known as: ANTIVERT  What changed:   · how much to  take  · how to take this  · when to take this   TAKE 1 TABLET THREE TIMES DAILY AS NEEDED FOR DIZZINESS         Continue These Medications      Instructions Start Date   apixaban 5 MG tablet tablet  Commonly known as: ELIQUIS   5 mg, Oral, 2 Times Daily      busPIRone 7.5 MG tablet  Commonly known as: BUSPAR   7.5 mg, Oral, 2 Times Daily      donepezil 10 MG tablet  Commonly known as: ARICEPT   10 mg, Oral, Nightly      DULoxetine 60 MG capsule  Commonly known as: CYMBALTA   60 mg, Oral, Daily      gabapentin 300 MG capsule  Commonly known as: NEURONTIN   300 mg, Oral, 2 Times Daily      Insulin Lispro 100 UNIT/ML injection  Commonly known as: humaLOG   Subcutaneous, 3 Times Daily Before Meals      levothyroxine 25 MCG tablet  Commonly known as: SYNTHROID, LEVOTHROID   25 mcg, Oral, Daily      LORazepam 0.5 MG tablet  Commonly known as: ATIVAN   0.5 mg, Oral, Every 12 Hours, Once in AM once in PM      omeprazole 40 MG capsule  Commonly known as: priLOSEC   40 mg, Oral, Daily      QUEtiapine 50 MG tablet  Commonly known as: SEROquel   50 mg, Oral, Every 24 Hours      rosuvastatin 40 MG tablet  Commonly known as: CRESTOR   40 mg, Oral, Nightly      sennosides-docusate 8.6-50 MG per tablet  Commonly known as: PERICOLACE   2 tablets, Oral, 2 Times Daily         Stop These Medications    lisinopril 40 MG tablet  Commonly known as: PRINIVIL,ZESTRIL        ASK your doctor about these medications      Instructions Start Date   HYDROcodone-acetaminophen  MG per tablet  Commonly known as: NORCO   1 tablet, Oral, Every 6 Hours PRN           Allergies   Allergen Reactions   • Penicillins Unknown - Low Severity     Tolerates ceftriaxones   • Sulfa Antibiotics    • Tetracyclines & Related Unknown (See Comments)   • Valium [Diazepam] Anxiety       Discharge Disposition:  Home or Self Care - Home by Ambulance - [facility] - transport via stretcher at 9:00 am    Diet:  Hospital:  Diet Order   Procedures   • Diet:  Regular/House Diet, Diabetic Diets; Consistent Carbohydrate; Texture: Soft to Chew (NDD 3); Soft to Chew: Chopped Meat; Fluid Consistency: Thin (IDDSI 0)       Activity:  Activity Instructions    Activity as instructed by your physician         Restrictions or Other Recommendations:  Follow up with primary care provider       CODE STATUS:    Code Status and Medical Interventions:   Ordered at: 04/01/23 0023     Code Status (Patient has no pulse and is not breathing):    CPR (Attempt to Resuscitate)     Medical Interventions (Patient has pulse or is breathing):    Full       Future Appointments   Date Time Provider Department Center   4/12/2023 10:00 AM EMS 1 [facility]   4/21/2023  1:30 PM [facility]       Additional Instructions for the Follow-ups that You Need to Schedule     Discharge Follow-up with PCP   As directed       Currently Documented PCP:    Lorrie Canada APRN    PCP Phone Number:    [phone]     Follow Up Details: Primary Care Physician - 1 week - blood pressure / prescription medications               Chepe Benavides MD  04/12/23    Time Spent on Discharge:  I spent  35  minutes on this discharge activity which included: face-to-face encounter with the patient, reviewing the data in the system, coordination of the care with the nursing staff as well as consultants, documentation, and entering orders.

## 2023-04-12 NOTE — PLAN OF CARE
Goal Outcome Evaluation:  Plan of Care Reviewed With: daughter        Progress: no change  Outcome Evaluation: Patient discharged home to daughter and transported via EMS stretcher. I spoke with Daughter Radha to go over discharge paperwork. Medications and follow up appointments were verbalized to daughter, and AVS was provided to EMS to give to daughter. Belongings were gathered and sent home with patient and included heel boots, waffle mattress, personal pillow, positioning wedge, stuffed bunny, and other items that were in her room.

## 2023-04-13 ENCOUNTER — READMISSION MANAGEMENT (OUTPATIENT)
Dept: CALL CENTER | Facility: HOSPITAL | Age: 83
End: 2023-04-13
Payer: MEDICARE

## 2023-04-13 NOTE — OUTREACH NOTE
Medical Week 1 Survey    Flowsheet Row Responses   [facility] patient discharged from? [place]   Does the patient have one of the following disease processes/diagnoses(primary or secondary)? Other   Week 1 attempt successful? Yes   Call start time 1505   Call end time 1528   Discharge diagnosis Confusion   Person spoke with today (if not patient) and relationship yumi Garcia   Meds reviewed with patient/caregiver? Yes   Is the patient having any side effects they believe may be caused by any medication additions or changes? No   Does the patient have all medications ordered at discharge? Yes   Is the patient taking all medications as directed (includes completed medication regime)? Yes   Medication comments REviewed dosing of Seroquel--noted that pt has new prescription sent over for 50mg nightly. Pt had been taking during the night and dtr is transitioning today to nightltime dosing.  Dtr has questions about timing of other medications for pt--noted on multiple sedating medications, encouraged to discuss with her provider to see if she has recommendations.  Discussed that some of medications ordered are prn only--dtr is aware.   Does the patient have a primary care provider?  Yes   Does the patient have an appointment with their PCP within 7 days of discharge? Greater than 7 days   Comments regarding PCP Home visit 4/29/23   Nursing Interventions Verified appointment date/time/provider   Has the patient kept scheduled appointments due by today? N/A   Has home health visited the patient within 72 hours of discharge? N/A   Psychosocial issues? No   Did the patient receive a copy of their discharge instructions? Yes   Nursing interventions Reviewed instructions with patient   What is the patient's perception of their health status since discharge? Improving  [Dtr is caring for pt, tolerting a pureed diet,, reports /70, sats 97% on RA.  Reports some skin exorciation to buttocks that dtr is treating,   discussed pressure relief, repositioning. Dtr is applying cream to area. Conversation about meds as noted.]   Is the patient/caregiver able to teach back signs and symptoms related to disease process for when to call PCP? Yes   Is the patient/caregiver able to teach back signs and symptoms related to disease process for when to call 911? Yes   Week 1 call completed? Yes          HUNTER WYLIE - Registered Nurse

## 2023-04-13 NOTE — OUTREACH NOTE
Medical Week 1 Survey    Flowsheet Row Responses   [facility] patient discharged from? [place]   Does the patient have one of the following disease processes/diagnoses(primary or secondary)? Other   Week 1 attempt successful? Yes   Call start time 1505   Call end time 1528   Discharge diagnosis Confusion   Person spoke with today (if not patient) and relationship yumi Garcia   Meds reviewed with patient/caregiver? Yes   Is the patient having any side effects they believe may be caused by any medication additions or changes? No   Does the patient have all medications ordered at discharge? Yes   Is the patient taking all medications as directed (includes completed medication regime)? Yes   Medication comments REviewed dosing of Seroquel--noted that pt has new prescription sent over for 50mg nightly. Pt had been taking during the day and dtr is transitioning today to nightltime dosing.  Dtr has questions about timing of other medications for pt--noted on multiple sedating medications, encouraged to discuss with her provider to see if she has recommendations.  Discussed that some of medications ordered are prn only--dtr is aware.   Does the patient have a primary care provider?  Yes   Does the patient have an appointment with their PCP within 7 days of discharge? Greater than 7 days   Comments regarding PCP Home visit 4/29/23   Nursing Interventions Verified appointment date/time/provider   Has the patient kept scheduled appointments due by today? N/A   Has home health visited the patient within 72 hours of discharge? N/A   Psychosocial issues? No   Did the patient receive a copy of their discharge instructions? Yes   Nursing interventions Reviewed instructions with patient   What is the patient's perception of their health status since discharge? Improving  [Dtr is caring for pt, tolerting a pureed diet,, reports /70, sats 97% on RA.  Reports some skin exorciation to buttocks that dtr is treating,   discussed pressure relief, repositioning. Dtr is applying cream to area. Conversation about meds as noted.]   Is the patient/caregiver able to teach back signs and symptoms related to disease process for when to call PCP? Yes   Is the patient/caregiver able to teach back signs and symptoms related to disease process for when to call 911? Yes   Week 1 call completed? Yes          HUNTER WYLIE - Registered Nurse

## 2023-04-13 NOTE — OUTREACH NOTE
Prep Survey    Flowsheet Row Responses   [facility] discharged from? [place]   Is LACE score < 7 ? No   Eligibility Readm Mgmt   Discharge diagnosis Confusion   Does the patient have one of the following disease processes/diagnoses(primary or secondary)? Other   Does the patient have Home health ordered? No   Is there a DME ordered? No   Prep survey completed? Yes          Gracie ZAYAS - Registered Nurse

## 2023-04-29 ENCOUNTER — APPOINTMENT (OUTPATIENT)
Dept: CT IMAGING | Facility: HOSPITAL | Age: 83
End: 2023-04-29
Payer: MEDICARE

## 2023-04-29 ENCOUNTER — HOSPITAL ENCOUNTER (EMERGENCY)
Facility: HOSPITAL | Age: 83
Discharge: HOME | End: 2023-04-30
Attending: EMERGENCY MEDICINE | Admitting: EMERGENCY MEDICINE
Payer: MEDICARE

## 2023-04-29 ENCOUNTER — APPOINTMENT (OUTPATIENT)
Dept: GENERAL RADIOLOGY | Facility: HOSPITAL | Age: 83
End: 2023-04-29
Payer: MEDICARE

## 2023-04-29 DIAGNOSIS — M54.2 NECK PAIN: ICD-10-CM

## 2023-04-29 DIAGNOSIS — E86.0 MILD DEHYDRATION: ICD-10-CM

## 2023-04-29 DIAGNOSIS — M62.838 MUSCLE SPASMS OF NECK: Primary | ICD-10-CM

## 2023-04-29 LAB
ALBUMIN SERPL-MCNC: 3.2 G/DL (ref 3.5–5.2)
ALBUMIN/GLOB SERPL: 1.1 G/DL
ALP SERPL-CCNC: 58 U/L (ref 39–117)
ALT SERPL W P-5'-P-CCNC: <5 U/L (ref 1–33)
ANION GAP SERPL CALCULATED.3IONS-SCNC: 8 MMOL/L (ref 5–15)
AST SERPL-CCNC: 13 U/L (ref 1–32)
BASOPHILS # BLD AUTO: 0.02 10*3/MM3 (ref 0–0.2)
BASOPHILS NFR BLD AUTO: 0.3 % (ref 0–1.5)
BILIRUB SERPL-MCNC: 0.2 MG/DL (ref 0–1.2)
BILIRUB UR QL STRIP: NEGATIVE
BUN SERPL-MCNC: 23 MG/DL (ref 8–23)
BUN/CREAT SERPL: 15 (ref 7–25)
CALCIUM SPEC-SCNC: 8.8 MG/DL (ref 8.6–10.5)
CHLORIDE SERPL-SCNC: 102 MMOL/L (ref 98–107)
CLARITY UR: CLEAR
CO2 SERPL-SCNC: 27 MMOL/L (ref 22–29)
COLOR UR: YELLOW
CREAT SERPL-MCNC: 1.53 MG/DL (ref 0.57–1)
DEPRECATED RDW RBC AUTO: 51.4 FL (ref 37–54)
EGFRCR SERPLBLD CKD-EPI 2021: 34 ML/MIN/1.73
EOSINOPHIL # BLD AUTO: 0.2 10*3/MM3 (ref 0–0.4)
EOSINOPHIL NFR BLD AUTO: 3.3 % (ref 0.3–6.2)
ERYTHROCYTE [DISTWIDTH] IN BLOOD BY AUTOMATED COUNT: 13.7 % (ref 12.3–15.4)
FLUAV SUBTYP SPEC NAA+PROBE: NOT DETECTED
FLUBV RNA ISLT QL NAA+PROBE: NOT DETECTED
GEN 5 2HR TROPONIN T REFLEX: 36 NG/L
GLOBULIN UR ELPH-MCNC: 2.9 GM/DL
GLUCOSE SERPL-MCNC: 272 MG/DL (ref 65–99)
GLUCOSE UR STRIP-MCNC: ABNORMAL MG/DL
HCT VFR BLD AUTO: 32.1 % (ref 34–46.6)
HGB BLD-MCNC: 10 G/DL (ref 12–15.9)
HGB UR QL STRIP.AUTO: NEGATIVE
HOLD SPECIMEN: NORMAL
HOLD SPECIMEN: NORMAL
IMM GRANULOCYTES # BLD AUTO: 0.02 10*3/MM3 (ref 0–0.05)
IMM GRANULOCYTES NFR BLD AUTO: 0.3 % (ref 0–0.5)
KETONES UR QL STRIP: NEGATIVE
LEUKOCYTE ESTERASE UR QL STRIP.AUTO: NEGATIVE
LIPASE SERPL-CCNC: 12 U/L (ref 13–60)
LYMPHOCYTES # BLD AUTO: 1.93 10*3/MM3 (ref 0.7–3.1)
LYMPHOCYTES NFR BLD AUTO: 32.2 % (ref 19.6–45.3)
MCH RBC QN AUTO: 32.3 PG (ref 26.6–33)
MCHC RBC AUTO-ENTMCNC: 31.2 G/DL (ref 31.5–35.7)
MCV RBC AUTO: 103.5 FL (ref 79–97)
MONOCYTES # BLD AUTO: 0.48 10*3/MM3 (ref 0.1–0.9)
MONOCYTES NFR BLD AUTO: 8 % (ref 5–12)
NEUTROPHILS NFR BLD AUTO: 3.35 10*3/MM3 (ref 1.7–7)
NEUTROPHILS NFR BLD AUTO: 55.9 % (ref 42.7–76)
NITRITE UR QL STRIP: NEGATIVE
NRBC BLD AUTO-RTO: 0 /100 WBC (ref 0–0.2)
NT-PROBNP SERPL-MCNC: 249.1 PG/ML (ref 0–1800)
PH UR STRIP.AUTO: 6 [PH] (ref 5–8)
PLATELET # BLD AUTO: 124 10*3/MM3 (ref 140–450)
PMV BLD AUTO: 11.4 FL (ref 6–12)
POTASSIUM SERPL-SCNC: 5.1 MMOL/L (ref 3.5–5.2)
PROT SERPL-MCNC: 6.1 G/DL (ref 6–8.5)
PROT UR QL STRIP: ABNORMAL
RBC # BLD AUTO: 3.1 10*6/MM3 (ref 3.77–5.28)
RBC MORPH BLD: NORMAL
SARS-COV-2 RNA RESP QL NAA+PROBE: NOT DETECTED
SMALL PLATELETS BLD QL SMEAR: NORMAL
SODIUM SERPL-SCNC: 137 MMOL/L (ref 136–145)
SP GR UR STRIP: 1.02 (ref 1–1.03)
TROPONIN T DELTA: 2 NG/L
TROPONIN T SERPL HS-MCNC: 34 NG/L
UROBILINOGEN UR QL STRIP: ABNORMAL
WBC MORPH BLD: NORMAL
WBC NRBC COR # BLD: 6 10*3/MM3 (ref 3.4–10.8)
WHOLE BLOOD HOLD COAG: NORMAL
WHOLE BLOOD HOLD SPECIMEN: NORMAL

## 2023-04-29 PROCEDURE — 85025 COMPLETE CBC W/AUTO DIFF WBC: CPT | Performed by: EMERGENCY MEDICINE

## 2023-04-29 PROCEDURE — 85007 BL SMEAR W/DIFF WBC COUNT: CPT | Performed by: EMERGENCY MEDICINE

## 2023-04-29 PROCEDURE — 70450 CT HEAD/BRAIN W/O DYE: CPT

## 2023-04-29 PROCEDURE — 80053 COMPREHEN METABOLIC PANEL: CPT | Performed by: EMERGENCY MEDICINE

## 2023-04-29 PROCEDURE — 83690 ASSAY OF LIPASE: CPT | Performed by: EMERGENCY MEDICINE

## 2023-04-29 PROCEDURE — 81003 URINALYSIS AUTO W/O SCOPE: CPT | Performed by: EMERGENCY MEDICINE

## 2023-04-29 PROCEDURE — 93005 ELECTROCARDIOGRAM TRACING: CPT | Performed by: EMERGENCY MEDICINE

## 2023-04-29 PROCEDURE — 36415 COLL VENOUS BLD VENIPUNCTURE: CPT

## 2023-04-29 PROCEDURE — 87636 SARSCOV2 & INF A&B AMP PRB: CPT | Performed by: EMERGENCY MEDICINE

## 2023-04-29 PROCEDURE — 84484 ASSAY OF TROPONIN QUANT: CPT | Performed by: EMERGENCY MEDICINE

## 2023-04-29 PROCEDURE — 99284 EMERGENCY DEPT VISIT MOD MDM: CPT

## 2023-04-29 PROCEDURE — 71045 X-RAY EXAM CHEST 1 VIEW: CPT

## 2023-04-29 PROCEDURE — 83880 ASSAY OF NATRIURETIC PEPTIDE: CPT | Performed by: EMERGENCY MEDICINE

## 2023-04-29 RX ORDER — SODIUM CHLORIDE 0.9 % (FLUSH) 0.9 %
10 SYRINGE (ML) INJECTION AS NEEDED
Status: DISCONTINUED | OUTPATIENT
Start: 2023-04-29 | End: 2023-04-30 | Stop reason: HOSPADM

## 2023-04-29 RX ORDER — ASPIRIN 81 MG/1
324 TABLET, CHEWABLE ORAL ONCE
Status: DISCONTINUED | OUTPATIENT
Start: 2023-04-29 | End: 2023-04-30 | Stop reason: HOSPADM

## 2023-04-29 NOTE — ED PROVIDER NOTES
Subjective   History of Present Illness  82-year-old female brought in by EMS for evaluation of chest pain, neck pain, and confusion.  Reportedly she has intermittently been having pain to the left upper chest with radiation into the left neck for the last 2 days.  Reportedly she also has had slightly increased confusion.  No fever.  She denies dysuria, frequency, urgency.  No reported change in bowel function clued no diarrhea or blood in the stool.  No new cough or shortness of breath.  No definitive sick contacts.  She feels as if she cannot get her words out the way she normally would states she is more confused.  However her speech actually is clear but limited.  She is very hard of hearing therefore obtaining history is somewhat difficult.  No new medications.  No other acute complaints        Review of Systems   Constitutional: Positive for fatigue. Negative for chills and fever.   HENT: Negative for congestion, ear pain, postnasal drip, sinus pressure and sore throat.    Eyes: Negative for pain, redness and visual disturbance.   Respiratory: Negative for cough, chest tightness and shortness of breath.    Cardiovascular: Positive for chest pain. Negative for palpitations and leg swelling.   Gastrointestinal: Negative for abdominal pain, anal bleeding, blood in stool, diarrhea, nausea and vomiting.   Endocrine: Negative for polydipsia and polyuria.   Genitourinary: Negative for difficulty urinating, dysuria, frequency and urgency.   Musculoskeletal: Negative for arthralgias, back pain and neck pain.   Skin: Negative for pallor and rash.   Allergic/Immunologic: Negative for environmental allergies and immunocompromised state.   Neurological: Negative for dizziness, weakness and headaches.   Hematological: Negative for adenopathy.   Psychiatric/Behavioral: Positive for decreased concentration. Negative for confusion, self-injury and suicidal ideas. The patient is not nervous/anxious.    All other systems  reviewed and are negative.      Past Medical History:   Diagnosis Date   • Abnormal heart rhythm    • Anxiety    • Arrhythmia    • Arthritis    • Atrial fibrillation    • Dementia    • Diabetes mellitus    • Hyperlipidemia    • Hypertension    • Kidney disorder    • Stroke    • Uterus cancer        Allergies   Allergen Reactions   • Penicillins Unknown - Low Severity     Tolerates ceftriaxones   • Sulfa Antibiotics    • Tetracyclines & Related Unknown (See Comments)   • Valium [Diazepam] Anxiety       Past Surgical History:   Procedure Laterality Date   • CATARACT EXTRACTION, BILATERAL         Family History   Problem Relation Age of Onset   • Heart disease Mother    • Cancer Mother    • Congenital heart disease Mother    • Cancer Father    • Cancer Brother    • Alcohol abuse Brother        Social History     Socioeconomic History   • Marital status:    Tobacco Use   • Smoking status: Never   • Smokeless tobacco: Never   Vaping Use   • Vaping Use: Never used   Substance and Sexual Activity   • Alcohol use: No   • Drug use: No   • Sexual activity: Defer           Objective   Physical Exam  Vitals and nursing note reviewed.   Constitutional:       General: She is not in acute distress.     Appearance: Normal appearance. She is well-developed. She is not toxic-appearing or diaphoretic.   HENT:      Head: Normocephalic and atraumatic.      Right Ear: External ear normal.      Left Ear: External ear normal.      Nose: Nose normal.   Eyes:      General: Lids are normal.      Pupils: Pupils are equal, round, and reactive to light.   Neck:      Trachea: No tracheal deviation.   Cardiovascular:      Rate and Rhythm: Normal rate and regular rhythm.      Pulses: No decreased pulses.      Heart sounds: Normal heart sounds. No murmur heard.    No friction rub. No gallop.   Pulmonary:      Effort: Pulmonary effort is normal. No respiratory distress.      Breath sounds: Normal breath sounds. No decreased breath sounds,  wheezing, rhonchi or rales.   Abdominal:      General: Bowel sounds are normal.      Palpations: Abdomen is soft.      Tenderness: There is no abdominal tenderness. There is no guarding or rebound.   Musculoskeletal:         General: No deformity. Normal range of motion.      Cervical back: Normal range of motion and neck supple.   Lymphadenopathy:      Cervical: No cervical adenopathy.   Skin:     General: Skin is warm and dry.      Findings: No rash.   Neurological:      Mental Status: She is alert and oriented to person, place, and time.      Cranial Nerves: No cranial nerve deficit.      Sensory: No sensory deficit.   Psychiatric:         Speech: Speech normal.         Behavior: Behavior normal.         Thought Content: Thought content normal.         Judgment: Judgment normal.         Procedures           ED Course                                           Medical Decision Making  Differential diagnosis includes muscle spasm of the neck, chest wall pain, pleurisy, costochondritis, acute coronary syndrome, stroke, intracranial abnormality, urinary tract infection, viral illness, other unspecified etiology.    Lab evaluation shows negative COVID and flu test.  Urine does not show infection.    Lab evaluation shows normal white blood cell count baseline H&H baseline elevated blood sugar and slightly more elevated creatinine.  IV fluids have been administered.  The patient has had no nausea or vomiting and has tolerated oral intake while she has been here.    Her troponin is unchanged relative to her previous baseline with no significant uptrending.    Chest x-ray shows no acute abnormalities.    CT scan of the head without contrast shows no acute abnormalities.    The patient has reproducible pain to palpation over the left sternocleidomastoid and this is also reproduced whenever she rotates her head to the right.  She seemed to be primarily holding her head towards the left and I feel this is accounting for  sternocleidomastoid muscle spasms.    I have advised her to apply heat for 20 minutes at a time and perform regular stretching to help relax the muscle.    She also has an extremely mild decubitus ulcer stage I.  The daughter is doing a great job taking care of this and I have advised her to continue her current treatment plan.    Discharged home appearing well.    Mild dehydration: acute illness or injury  Muscle spasms of neck: acute illness or injury  Neck pain: acute illness or injury  Amount and/or Complexity of Data Reviewed  Independent Historian: friend  External Data Reviewed: labs, radiology, ECG and notes.  Labs: ordered. Decision-making details documented in ED Course.  Radiology: ordered and independent interpretation performed. Decision-making details documented in ED Course.  ECG/medicine tests: ordered and independent interpretation performed. Decision-making details documented in ED Course.          Final diagnoses:   Muscle spasms of neck   Neck pain   Mild dehydration       ED Disposition  ED Disposition     ED Disposition   Discharge    Condition   Stable    Comment   --             Lorrie Canada, APRN  [address] [facility] [address]  [phone]    In 1 week           Medication List      Changed    HYDROcodone-acetaminophen  MG per tablet  Commonly known as: NORCO  Take 1 tablet by mouth Every 6 (Six) Hours As Needed for Moderate Pain.  What changed: when to take this     meclizine 25 MG tablet  Commonly known as: ANTIVERT  TAKE 1 TABLET THREE TIMES DAILY AS NEEDED FOR DIZZINESS  What changed:   · how much to take  · how to take this  · when to take this             Brenda Joya MD  04/30/23 [id number]

## 2023-04-30 VITALS
DIASTOLIC BLOOD PRESSURE: 63 MMHG | RESPIRATION RATE: 15 BRPM | WEIGHT: 210 LBS | OXYGEN SATURATION: 96 % | BODY MASS INDEX: 32.96 KG/M2 | HEIGHT: 67 IN | SYSTOLIC BLOOD PRESSURE: 144 MMHG | TEMPERATURE: 98.1 F | HEART RATE: 60 BPM

## 2023-04-30 LAB — HOLD SPECIMEN: NORMAL

## 2023-04-30 RX ADMIN — SODIUM CHLORIDE 1000 ML: 9 INJECTION, SOLUTION INTRAVENOUS at 01:37

## 2023-04-30 NOTE — DISCHARGE INSTRUCTIONS
Apply heat to left neck for 20 minutes at a time and perform stretching to relax the neck muscles.     Take tylenol as needed for pain.     Follow-up with PCP for recheck in 1 week.

## 2023-05-01 LAB
QT INTERVAL: 428 MS
QT INTERVAL: 450 MS
QTC INTERVAL: 448 MS
QTC INTERVAL: 464 MS

## 2023-11-24 NOTE — PROGRESS NOTES
Physical Therapy    Facility/Department: [facility] MED SURG  Initial Assessment    NAME: Chris Owens  : 1940  MRN: [id number]    Date of Service: 2021    Discharge Recommendations:  Continue to assess pending progress        Assessment   Body structures, Functions, Activity limitations: Decreased high-level IADLs;Decreased endurance;Decreased functional mobility   Assessment: Hypoxia; general weakness and immobility; appears to be close to baseline at this point; will benefit from PT to help improve safety and functional mobility. Treatment Diagnosis: Hypoxia; general weakness and immobility  Prognosis: Good  Decision Making: Low Complexity  REQUIRES PT FOLLOW UP: Yes  Activity Tolerance  Activity Tolerance: Patient Tolerated treatment well       Patient Diagnosis(es): The primary encounter diagnosis was Dyspnea and respiratory abnormalities. Diagnoses of Hyperkalemia and Respiratory failure, unspecified chronicity, unspecified whether with hypoxia or hypercapnia ([address] were also pertinent to this visit. has a past medical history of Allergic rhinitis, Anxiety, Chronic back pain, Depression, Double vision with both eyes open, Fall, Hyperlipidemia, Hypertension, Osteoarthritis, and Type II or unspecified type diabetes mellitus without mention of complication, not stated as uncontrolled. has a past surgical history that includes Hysterectomy;  section; Colonoscopy; and shoulder surgery.     Restrictions  Restrictions/Precautions  Restrictions/Precautions: Fall Risk, General Precautions  Required Braces or Orthoses?: No  Vision/Hearing  Vision: Impaired  Vision Exceptions: Wears glasses at all times  Hearing: Within functional limits     Subjective  General  Chart Reviewed: Yes  Patient assessed for rehabilitation services?: Yes  Response To Previous Treatment: Not applicable  Family / Caregiver Present: Yes  Referring Practitioner: Savanna Strickland MD  Diagnosis: Respiratory deficiency; general weakness  Follows Commands: Within Functional Limits  Subjective  Subjective: Patient admiited due to decline in O2 sats; has a \"bad right knee and right arm\"; baseline function is stand-pivot transfers with assistance; is not ambulatory; daughters stay with her and her .   Pain Screening  Patient Currently in Pain: Yes  Pain Assessment  Pain Level: 3  Pain Type: Chronic pain  Pain Location: Back  Vital Signs  Patient Currently in Pain: Yes       Orientation  Orientation  Overall Orientation Status: Within Functional Limits  Social/Functional History  Social/Functional History  Lives With: Spouse  Type of Home: House  Home Layout: One level  Home Access: Level entry  Bathroom Shower/Tub:  (sponge bath)  Bathroom Toilet: Bedside commode  Home Equipment: Rolling walker, Wheelchair-manual, Oxygen  Receives Help From: Family  ADL Assistance: Independent  Homemaking Assistance: Independent  Transfer Assistance: Needs assistance  Active : No  Cognition        Objective     Observation/Palpation  Posture: Fair  Observation: lying in bed, NAD, O2 @ 2 liters, obese    AROM RLE (degrees)  RLE AROM: WFL  AROM LLE (degrees)  LLE AROM : WFL  AROM RUE (degrees)  RUE AROM : WFL  AROM LUE (degrees)  LUE AROM : WFL  Strength RLE  Strength RLE: WFL  Comment: MMG's grossly 4/5  Strength LLE  Strength LLE: WFL  Comment: MMG's grossly 4/5  Strength RUE  Comment: See OT for details  Tone RLE  RLE Tone: Normotonic  Tone LLE  LLE Tone: Normotonic  Motor Control  Gross Motor?: WFL  Sensation  Overall Sensation Status: WFL  Bed mobility  Rolling to Right: Minimal assistance  Supine to Sit: Minimal assistance  Sit to Supine: Minimal assistance  Scooting: Contact guard assistance  Transfers  Sit to Stand: Contact guard assistance  Stand to sit: Contact guard assistance  Ambulation  Ambulation?: Yes  Ambulation 1  Surface: level tile  Device: Rolling Walker  Assistance: Contact guard assistance  Distance: sidestepping x 1 foot none

## 2023-12-12 ENCOUNTER — HOSPITAL ENCOUNTER (OUTPATIENT)
Facility: HOSPITAL | Age: 83
Discharge: HOME | End: 2023-12-12
Payer: MEDICARE

## 2023-12-12 PROCEDURE — 87086 URINE CULTURE/COLONY COUNT: CPT

## 2023-12-14 LAB — BACTERIA UR CULT: NORMAL

## 2023-12-17 PROBLEM — T17.908A ASPIRATION INTO AIRWAY, INITIAL ENCOUNTER: Status: ACTIVE | Noted: 2023-12-17

## 2023-12-18 PROBLEM — J69.0 ASPIRATION PNEUMONIA OF RIGHT UPPER LOBE (HCC): Status: ACTIVE | Noted: 2023-12-18

## 2024-03-25 ENCOUNTER — HOSPITAL ENCOUNTER (OUTPATIENT)
Facility: HOSPITAL | Age: 84
Setting detail: OBSERVATION
LOS: 1 days | Discharge: HOME | End: 2024-03-27
Attending: HOSPITALIST | Admitting: INTERNAL MEDICINE
Payer: MEDICARE

## 2024-03-25 DIAGNOSIS — N30.00 ACUTE CYSTITIS WITHOUT HEMATURIA: Primary | ICD-10-CM

## 2024-03-25 DIAGNOSIS — R41.82 ALTERED MENTAL STATUS, UNSPECIFIED ALTERED MENTAL STATUS TYPE: ICD-10-CM

## 2024-03-25 PROBLEM — N39.0 UTI (URINARY TRACT INFECTION): Status: ACTIVE | Noted: 2024-03-25

## 2024-03-25 LAB
ALBUMIN SERPL-MCNC: 3.8 G/DL (ref 3.4–4.8)
ALBUMIN SERPL-MCNC: 3.9 G/DL (ref 3.4–4.8)
ALBUMIN/GLOB SERPL: 1.4 {RATIO} (ref 0.8–2)
ALBUMIN/GLOB SERPL: 1.4 {RATIO} (ref 0.8–2)
ALP SERPL-CCNC: 55 U/L (ref 25–100)
ALP SERPL-CCNC: 56 U/L (ref 25–100)
ALT SERPL-CCNC: <5 U/L (ref 4–36)
ALT SERPL-CCNC: <5 U/L (ref 4–36)
ANION GAP SERPL CALCULATED.3IONS-SCNC: 7 MMOL/L (ref 3–16)
ANION GAP SERPL CALCULATED.3IONS-SCNC: 8 MMOL/L (ref 3–16)
AST SERPL-CCNC: 14 U/L (ref 8–33)
AST SERPL-CCNC: 17 U/L (ref 8–33)
BACTERIA URNS QL MICRO: ABNORMAL /HPF
BASOPHILS # BLD: 0 K/UL (ref 0–0.1)
BASOPHILS # BLD: 0 K/UL (ref 0–0.1)
BASOPHILS NFR BLD: 0.5 %
BASOPHILS NFR BLD: 0.5 %
BILIRUB SERPL-MCNC: <0.2 MG/DL (ref 0.3–1.2)
BILIRUB SERPL-MCNC: <0.2 MG/DL (ref 0.3–1.2)
BILIRUB UR QL STRIP.AUTO: NEGATIVE
BUN SERPL-MCNC: 31 MG/DL (ref 6–20)
BUN SERPL-MCNC: 31 MG/DL (ref 6–20)
CALCIUM SERPL-MCNC: 9.1 MG/DL (ref 8.5–10.5)
CALCIUM SERPL-MCNC: 9.4 MG/DL (ref 8.5–10.5)
CHLORIDE SERPL-SCNC: 100 MMOL/L (ref 98–107)
CHLORIDE SERPL-SCNC: 101 MMOL/L (ref 98–107)
CLARITY UR: CLEAR
CO2 SERPL-SCNC: 29 MMOL/L (ref 20–30)
CO2 SERPL-SCNC: 30 MMOL/L (ref 20–30)
COLOR UR: YELLOW
CREAT SERPL-MCNC: 1.6 MG/DL (ref 0.4–1.2)
CREAT SERPL-MCNC: 1.8 MG/DL (ref 0.4–1.2)
EOSINOPHIL # BLD: 0.2 K/UL (ref 0–0.4)
EOSINOPHIL # BLD: 0.2 K/UL (ref 0–0.4)
EOSINOPHIL NFR BLD: 3.6 %
EOSINOPHIL NFR BLD: 3.8 %
EPI CELLS #/AREA URNS HPF: ABNORMAL /HPF (ref 0–5)
ERYTHROCYTE [DISTWIDTH] IN BLOOD BY AUTOMATED COUNT: 13.2 % (ref 11–16)
ERYTHROCYTE [DISTWIDTH] IN BLOOD BY AUTOMATED COUNT: 13.3 % (ref 11–16)
GFR SERPLBLD CREATININE-BSD FMLA CKD-EPI: 28 ML/MIN/{1.73_M2}
GFR SERPLBLD CREATININE-BSD FMLA CKD-EPI: 32 ML/MIN/{1.73_M2}
GLOBULIN SER CALC-MCNC: 2.8 G/DL
GLOBULIN SER CALC-MCNC: 2.8 G/DL
GLUCOSE BLD-MCNC: 210 MG/DL (ref 74–106)
GLUCOSE BLD-MCNC: 213 MG/DL (ref 74–106)
GLUCOSE BLD-MCNC: 232 MG/DL (ref 74–106)
GLUCOSE BLD-MCNC: 243 MG/DL (ref 74–106)
GLUCOSE BLD-MCNC: 290 MG/DL (ref 74–106)
GLUCOSE SERPL-MCNC: 245 MG/DL (ref 74–106)
GLUCOSE SERPL-MCNC: 290 MG/DL (ref 74–106)
GLUCOSE UR STRIP.AUTO-MCNC: 500 MG/DL
HBA1C MFR BLD: 10.6 %
HCT VFR BLD AUTO: 34.3 % (ref 37–47)
HCT VFR BLD AUTO: 36 % (ref 37–47)
HGB BLD-MCNC: 10.6 G/DL (ref 11.5–16.5)
HGB BLD-MCNC: 11.4 G/DL (ref 11.5–16.5)
HGB UR QL STRIP.AUTO: NEGATIVE
IMM GRANULOCYTES # BLD: 0 K/UL
IMM GRANULOCYTES # BLD: 0 K/UL
IMM GRANULOCYTES NFR BLD: 0.3 % (ref 0–5)
IMM GRANULOCYTES NFR BLD: 0.3 % (ref 0–5)
KETONES UR STRIP.AUTO-MCNC: ABNORMAL MG/DL
LACTATE BLDV-SCNC: 1.8 MMOL/L (ref 0.4–1.9)
LEUKOCYTE ESTERASE UR QL STRIP.AUTO: ABNORMAL
LYMPHOCYTES # BLD: 2.3 K/UL (ref 1.5–4)
LYMPHOCYTES # BLD: 2.4 K/UL (ref 1.5–4)
LYMPHOCYTES NFR BLD: 36.3 %
LYMPHOCYTES NFR BLD: 38.6 %
MAGNESIUM SERPL-MCNC: 1.7 MG/DL (ref 1.7–2.4)
MCH RBC QN AUTO: 31.5 PG (ref 27–32)
MCH RBC QN AUTO: 32.6 PG (ref 27–32)
MCHC RBC AUTO-ENTMCNC: 30.9 G/DL (ref 31–35)
MCHC RBC AUTO-ENTMCNC: 31.7 G/DL (ref 31–35)
MCV RBC AUTO: 102.1 FL (ref 80–100)
MCV RBC AUTO: 102.9 FL (ref 80–100)
MONOCYTES # BLD: 0.5 K/UL (ref 0.2–0.8)
MONOCYTES # BLD: 0.6 K/UL (ref 0.2–0.8)
MONOCYTES NFR BLD: 7.8 %
MONOCYTES NFR BLD: 9.2 %
NEUTROPHILS # BLD: 3 K/UL (ref 2–7.5)
NEUTROPHILS # BLD: 3.2 K/UL (ref 2–7.5)
NEUTS SEG NFR BLD: 47.6 %
NEUTS SEG NFR BLD: 51.5 %
NITRITE UR QL STRIP.AUTO: NEGATIVE
PERFORMED ON: ABNORMAL
PH UR STRIP.AUTO: 5.5 [PH] (ref 5–8)
PLATELET # BLD AUTO: 113 K/UL (ref 150–400)
PLATELET # BLD AUTO: 136 K/UL (ref 150–400)
PMV BLD AUTO: 11.7 FL (ref 6–10)
PMV BLD AUTO: 11.8 FL (ref 6–10)
POTASSIUM SERPL-SCNC: 4.8 MMOL/L (ref 3.4–5.1)
POTASSIUM SERPL-SCNC: 5.3 MMOL/L (ref 3.4–5.1)
PROT SERPL-MCNC: 6.6 G/DL (ref 6.4–8.3)
PROT SERPL-MCNC: 6.7 G/DL (ref 6.4–8.3)
PROT UR STRIP.AUTO-MCNC: 30 MG/DL
RBC # BLD AUTO: 3.36 M/UL (ref 3.8–5.8)
RBC # BLD AUTO: 3.5 M/UL (ref 3.8–5.8)
RBC #/AREA URNS HPF: ABNORMAL /HPF (ref 0–4)
SARS-COV-2 RDRP RESP QL NAA+PROBE: NOT DETECTED
SODIUM SERPL-SCNC: 137 MMOL/L (ref 136–145)
SODIUM SERPL-SCNC: 138 MMOL/L (ref 136–145)
SP GR UR STRIP.AUTO: 1.01 (ref 1–1.03)
UA COMPLETE W REFLEX CULTURE PNL UR: YES
UA DIPSTICK W REFLEX MICRO PNL UR: YES
URN SPEC COLLECT METH UR: ABNORMAL
UROBILINOGEN UR STRIP-ACNC: 0.2 E.U./DL
WBC # BLD AUTO: 6.2 K/UL (ref 4–11)
WBC # BLD AUTO: 6.3 K/UL (ref 4–11)
WBC #/AREA URNS HPF: ABNORMAL /HPF (ref 0–5)
YEAST URNS QL MICRO: PRESENT /HPF

## 2024-03-25 PROCEDURE — 87077 CULTURE AEROBIC IDENTIFY: CPT

## 2024-03-25 PROCEDURE — 2700000000 HC OXYGEN THERAPY PER DAY

## 2024-03-25 PROCEDURE — 97530 THERAPEUTIC ACTIVITIES: CPT

## 2024-03-25 PROCEDURE — 97166 OT EVAL MOD COMPLEX 45 MIN: CPT

## 2024-03-25 PROCEDURE — 6360000002 HC RX W HCPCS: Performed by: HOSPITALIST

## 2024-03-25 PROCEDURE — 87150 DNA/RNA AMPLIFIED PROBE: CPT

## 2024-03-25 PROCEDURE — 83605 ASSAY OF LACTIC ACID: CPT

## 2024-03-25 PROCEDURE — 99222 1ST HOSP IP/OBS MODERATE 55: CPT | Performed by: INTERNAL MEDICINE

## 2024-03-25 PROCEDURE — 83036 HEMOGLOBIN GLYCOSYLATED A1C: CPT

## 2024-03-25 PROCEDURE — 6370000000 HC RX 637 (ALT 250 FOR IP): Performed by: HOSPITALIST

## 2024-03-25 PROCEDURE — 87040 BLOOD CULTURE FOR BACTERIA: CPT

## 2024-03-25 PROCEDURE — 80053 COMPREHEN METABOLIC PANEL: CPT

## 2024-03-25 PROCEDURE — 87086 URINE CULTURE/COLONY COUNT: CPT

## 2024-03-25 PROCEDURE — 1200000000 HC SEMI PRIVATE

## 2024-03-25 PROCEDURE — 87186 SC STD MICRODIL/AGAR DIL: CPT

## 2024-03-25 PROCEDURE — 83735 ASSAY OF MAGNESIUM: CPT

## 2024-03-25 PROCEDURE — 51798 US URINE CAPACITY MEASURE: CPT

## 2024-03-25 PROCEDURE — 6370000000 HC RX 637 (ALT 250 FOR IP): Performed by: NURSE PRACTITIONER

## 2024-03-25 PROCEDURE — 85025 COMPLETE CBC W/AUTO DIFF WBC: CPT

## 2024-03-25 PROCEDURE — 6370000000 HC RX 637 (ALT 250 FOR IP): Performed by: PHYSICIAN ASSISTANT

## 2024-03-25 PROCEDURE — 99285 EMERGENCY DEPT VISIT HI MDM: CPT

## 2024-03-25 PROCEDURE — 2580000003 HC RX 258: Performed by: HOSPITALIST

## 2024-03-25 PROCEDURE — 81001 URINALYSIS AUTO W/SCOPE: CPT

## 2024-03-25 PROCEDURE — 87635 SARS-COV-2 COVID-19 AMP PRB: CPT

## 2024-03-25 PROCEDURE — 97161 PT EVAL LOW COMPLEX 20 MIN: CPT

## 2024-03-25 PROCEDURE — 93005 ELECTROCARDIOGRAM TRACING: CPT

## 2024-03-25 PROCEDURE — 92610 EVALUATE SWALLOWING FUNCTION: CPT

## 2024-03-25 PROCEDURE — 36415 COLL VENOUS BLD VENIPUNCTURE: CPT

## 2024-03-25 PROCEDURE — 6360000002 HC RX W HCPCS: Performed by: NURSE PRACTITIONER

## 2024-03-25 RX ORDER — INSULIN LISPRO 100 [IU]/ML
13 INJECTION, SOLUTION INTRAVENOUS; SUBCUTANEOUS
Status: DISCONTINUED | OUTPATIENT
Start: 2024-03-25 | End: 2024-03-26

## 2024-03-25 RX ORDER — 0.9 % SODIUM CHLORIDE 0.9 %
500 INTRAVENOUS SOLUTION INTRAVENOUS ONCE
Status: COMPLETED | OUTPATIENT
Start: 2024-03-25 | End: 2024-03-25

## 2024-03-25 RX ORDER — FLUCONAZOLE 100 MG/1
200 TABLET ORAL DAILY
Status: COMPLETED | OUTPATIENT
Start: 2024-03-25 | End: 2024-03-26

## 2024-03-25 RX ORDER — DONEPEZIL HYDROCHLORIDE 5 MG/1
10 TABLET, FILM COATED ORAL NIGHTLY
Status: DISCONTINUED | OUTPATIENT
Start: 2024-03-25 | End: 2024-03-27 | Stop reason: HOSPADM

## 2024-03-25 RX ORDER — INSULIN LISPRO 100 [IU]/ML
0-4 INJECTION, SOLUTION INTRAVENOUS; SUBCUTANEOUS
Status: DISCONTINUED | OUTPATIENT
Start: 2024-03-25 | End: 2024-03-27 | Stop reason: HOSPADM

## 2024-03-25 RX ORDER — QUETIAPINE FUMARATE 25 MG/1
50 TABLET, FILM COATED ORAL NIGHTLY
Status: DISCONTINUED | OUTPATIENT
Start: 2024-03-25 | End: 2024-03-27 | Stop reason: HOSPADM

## 2024-03-25 RX ORDER — BUSPIRONE HYDROCHLORIDE 5 MG/1
7.5 TABLET ORAL 2 TIMES DAILY PRN
Status: DISCONTINUED | OUTPATIENT
Start: 2024-03-25 | End: 2024-03-27 | Stop reason: HOSPADM

## 2024-03-25 RX ORDER — SENNA AND DOCUSATE SODIUM 50; 8.6 MG/1; MG/1
1 TABLET, FILM COATED ORAL EVERY OTHER DAY
Status: DISCONTINUED | OUTPATIENT
Start: 2024-03-25 | End: 2024-03-27 | Stop reason: HOSPADM

## 2024-03-25 RX ORDER — DOXAZOSIN MESYLATE 1 MG/1
2 TABLET ORAL DAILY
Status: DISCONTINUED | OUTPATIENT
Start: 2024-03-25 | End: 2024-03-27 | Stop reason: HOSPADM

## 2024-03-25 RX ORDER — ROSUVASTATIN CALCIUM 20 MG/1
40 TABLET, COATED ORAL EVERY EVENING
Status: DISCONTINUED | OUTPATIENT
Start: 2024-03-25 | End: 2024-03-27 | Stop reason: HOSPADM

## 2024-03-25 RX ORDER — HYDROCODONE BITARTRATE AND ACETAMINOPHEN 10; 325 MG/1; MG/1
1 TABLET ORAL EVERY 6 HOURS PRN
Status: DISCONTINUED | OUTPATIENT
Start: 2024-03-25 | End: 2024-03-27 | Stop reason: HOSPADM

## 2024-03-25 RX ORDER — LEVOFLOXACIN 5 MG/ML
500 INJECTION, SOLUTION INTRAVENOUS ONCE
Status: COMPLETED | OUTPATIENT
Start: 2024-03-25 | End: 2024-03-25

## 2024-03-25 RX ORDER — ONDANSETRON 2 MG/ML
4 INJECTION INTRAMUSCULAR; INTRAVENOUS EVERY 6 HOURS PRN
Status: DISCONTINUED | OUTPATIENT
Start: 2024-03-25 | End: 2024-03-27 | Stop reason: HOSPADM

## 2024-03-25 RX ORDER — IBUPROFEN 600 MG/1
1 TABLET ORAL PRN
Status: DISCONTINUED | OUTPATIENT
Start: 2024-03-25 | End: 2024-03-27 | Stop reason: HOSPADM

## 2024-03-25 RX ORDER — ACETAMINOPHEN 325 MG/1
650 TABLET ORAL EVERY 4 HOURS PRN
Status: DISCONTINUED | OUTPATIENT
Start: 2024-03-25 | End: 2024-03-27 | Stop reason: HOSPADM

## 2024-03-25 RX ORDER — MECLIZINE HYDROCHLORIDE 25 MG/1
25 TABLET ORAL 3 TIMES DAILY PRN
Status: DISCONTINUED | OUTPATIENT
Start: 2024-03-25 | End: 2024-03-27 | Stop reason: HOSPADM

## 2024-03-25 RX ORDER — GABAPENTIN 300 MG/1
600 CAPSULE ORAL 3 TIMES DAILY
Status: DISCONTINUED | OUTPATIENT
Start: 2024-03-25 | End: 2024-03-27 | Stop reason: HOSPADM

## 2024-03-25 RX ORDER — PANTOPRAZOLE SODIUM 40 MG/1
40 TABLET, DELAYED RELEASE ORAL
Status: DISCONTINUED | OUTPATIENT
Start: 2024-03-25 | End: 2024-03-27 | Stop reason: HOSPADM

## 2024-03-25 RX ORDER — INSULIN GLARGINE 100 [IU]/ML
60 INJECTION, SOLUTION SUBCUTANEOUS NIGHTLY
Status: DISCONTINUED | OUTPATIENT
Start: 2024-03-25 | End: 2024-03-26

## 2024-03-25 RX ORDER — LORAZEPAM 0.5 MG/1
1 TABLET ORAL 2 TIMES DAILY PRN
Status: DISCONTINUED | OUTPATIENT
Start: 2024-03-25 | End: 2024-03-27 | Stop reason: HOSPADM

## 2024-03-25 RX ORDER — DULOXETIN HYDROCHLORIDE 30 MG/1
60 CAPSULE, DELAYED RELEASE ORAL DAILY
Status: DISCONTINUED | OUTPATIENT
Start: 2024-03-25 | End: 2024-03-27 | Stop reason: HOSPADM

## 2024-03-25 RX ORDER — INSULIN LISPRO 100 [IU]/ML
0-4 INJECTION, SOLUTION INTRAVENOUS; SUBCUTANEOUS NIGHTLY
Status: DISCONTINUED | OUTPATIENT
Start: 2024-03-25 | End: 2024-03-27 | Stop reason: HOSPADM

## 2024-03-25 RX ORDER — DEXTROSE MONOHYDRATE 100 MG/ML
INJECTION, SOLUTION INTRAVENOUS CONTINUOUS PRN
Status: DISCONTINUED | OUTPATIENT
Start: 2024-03-25 | End: 2024-03-27 | Stop reason: HOSPADM

## 2024-03-25 RX ORDER — FLUCONAZOLE 100 MG/1
200 TABLET ORAL ONCE
Status: COMPLETED | OUTPATIENT
Start: 2024-03-25 | End: 2024-03-25

## 2024-03-25 RX ADMIN — FLUCONAZOLE 200 MG: 100 TABLET ORAL at 02:50

## 2024-03-25 RX ADMIN — LORAZEPAM 1 MG: 0.5 TABLET ORAL at 16:57

## 2024-03-25 RX ADMIN — APIXABAN 2.5 MG: 2.5 TABLET, FILM COATED ORAL at 08:14

## 2024-03-25 RX ADMIN — MECLIZINE HYDROCHLORIDE 25 MG: 25 TABLET ORAL at 17:31

## 2024-03-25 RX ADMIN — GABAPENTIN 600 MG: 300 CAPSULE ORAL at 08:15

## 2024-03-25 RX ADMIN — DULOXETINE HYDROCHLORIDE 60 MG: 30 CAPSULE, DELAYED RELEASE ORAL at 08:14

## 2024-03-25 RX ADMIN — INSULIN LISPRO 1 UNITS: 100 INJECTION, SOLUTION INTRAVENOUS; SUBCUTANEOUS at 13:52

## 2024-03-25 RX ADMIN — ONDANSETRON 4 MG: 2 INJECTION INTRAMUSCULAR; INTRAVENOUS at 21:55

## 2024-03-25 RX ADMIN — SENNOSIDES AND DOCUSATE SODIUM 1 TABLET: 8.6; 5 TABLET ORAL at 08:15

## 2024-03-25 RX ADMIN — SODIUM CHLORIDE 500 ML: 9 INJECTION, SOLUTION INTRAVENOUS at 02:39

## 2024-03-25 RX ADMIN — GABAPENTIN 600 MG: 300 CAPSULE ORAL at 21:57

## 2024-03-25 RX ADMIN — PANTOPRAZOLE SODIUM 40 MG: 40 TABLET, DELAYED RELEASE ORAL at 08:21

## 2024-03-25 RX ADMIN — DONEPEZIL HYDROCHLORIDE 10 MG: 5 TABLET, FILM COATED ORAL at 21:57

## 2024-03-25 RX ADMIN — INSULIN LISPRO 1 UNITS: 100 INJECTION, SOLUTION INTRAVENOUS; SUBCUTANEOUS at 17:27

## 2024-03-25 RX ADMIN — ROSUVASTATIN CALCIUM 40 MG: 20 TABLET, COATED ORAL at 17:32

## 2024-03-25 RX ADMIN — LEVOFLOXACIN 500 MG: 5 INJECTION, SOLUTION INTRAVENOUS at 02:49

## 2024-03-25 RX ADMIN — DOXAZOSIN 2 MG: 1 TABLET ORAL at 08:14

## 2024-03-25 RX ADMIN — FLUCONAZOLE 200 MG: 100 TABLET ORAL at 08:21

## 2024-03-25 RX ADMIN — INSULIN LISPRO 13 UNITS: 100 INJECTION, SOLUTION INTRAVENOUS; SUBCUTANEOUS at 11:25

## 2024-03-25 RX ADMIN — QUETIAPINE FUMARATE 50 MG: 25 TABLET ORAL at 21:57

## 2024-03-25 RX ADMIN — HYDROCODONE BITARTRATE AND ACETAMINOPHEN 1 TABLET: 10; 325 TABLET ORAL at 21:57

## 2024-03-25 RX ADMIN — GABAPENTIN 600 MG: 300 CAPSULE ORAL at 13:52

## 2024-03-25 RX ADMIN — APIXABAN 2.5 MG: 2.5 TABLET, FILM COATED ORAL at 21:56

## 2024-03-25 RX ADMIN — INSULIN LISPRO 13 UNITS: 100 INJECTION, SOLUTION INTRAVENOUS; SUBCUTANEOUS at 08:10

## 2024-03-25 RX ADMIN — HYDROCODONE BITARTRATE AND ACETAMINOPHEN 1 TABLET: 10; 325 TABLET ORAL at 08:13

## 2024-03-25 RX ADMIN — HYDROCODONE BITARTRATE AND ACETAMINOPHEN 1 TABLET: 10; 325 TABLET ORAL at 13:51

## 2024-03-25 RX ADMIN — INSULIN LISPRO 13 UNITS: 100 INJECTION, SOLUTION INTRAVENOUS; SUBCUTANEOUS at 17:28

## 2024-03-25 RX ADMIN — INSULIN GLARGINE 60 UNITS: 100 INJECTION, SOLUTION SUBCUTANEOUS at 22:00

## 2024-03-25 ASSESSMENT — PAIN DESCRIPTION - ORIENTATION: ORIENTATION: LOWER

## 2024-03-25 ASSESSMENT — PAIN SCALES - GENERAL
PAINLEVEL_OUTOF10: 5
PAINLEVEL_OUTOF10: 5

## 2024-03-25 ASSESSMENT — LIFESTYLE VARIABLES
HOW MANY STANDARD DRINKS CONTAINING ALCOHOL DO YOU HAVE ON A TYPICAL DAY: PATIENT DOES NOT DRINK
HOW OFTEN DO YOU HAVE A DRINK CONTAINING ALCOHOL: NEVER
HOW OFTEN DO YOU HAVE A DRINK CONTAINING ALCOHOL: NEVER

## 2024-03-25 ASSESSMENT — PAIN DESCRIPTION - LOCATION: LOCATION: BACK

## 2024-03-25 ASSESSMENT — PAIN - FUNCTIONAL ASSESSMENT: PAIN_FUNCTIONAL_ASSESSMENT: NONE - DENIES PAIN

## 2024-03-25 NOTE — PROGRESS NOTES
Occupational Therapy  Facility/Department: [facility] MED SURG  Occupational Therapy Initial Assessment    Name: Clara Butcher  : 1940  MRN: [id number]  Date of Service: 3/25/2024    Discharge Recommendations:  24 hour supervision or assist, Long Term Care without OT  OT Equipment Recommendations  Other: Susana lift     Patient Diagnosis(es): The primary encounter diagnosis was Acute cystitis without hematuria. A diagnosis of Altered mental status, unspecified altered mental status type was also pertinent to this visit.  Past Medical History:  has a past medical history of Allergic rhinitis, Anxiety, Chronic back pain, Depression, Double vision with both eyes open, Fall, Hyperlipidemia, Hypertension, Osteoarthritis, Stroke (HCC), and Type II or unspecified type diabetes mellitus without mention of complication, not stated as uncontrolled.  Past Surgical History:  has a past surgical history that includes Hysterectomy;  section; Colonoscopy; shoulder surgery; and Femur fracture surgery (Right, 2019).    Assessment   Performance deficits / Impairments: Decreased functional mobility ;Decreased ADL status;Decreased ROM;Decreased strength  Assessment: Pt received in bed on this date. Pt presents on 1.5L 02. Pt is alert to person and place but not time. Pt has history of CVA. Pt has difficulty with expressive aphasia but able to correct herself or state when she is incorrectly verbalizing a statement. Pt rolls in bed with MOD verbal cuing and CGA. Pt transferred to sitting at EOB with max assist. Pt required min<>CGA to scoot at EOB. Pt sat at EOB with CGA once BLE were placed on the floor. Pt required DEP to don LB clothing. Pt able to manage beverage with SBA. Pt sat at EOB ~15 minutes and then reported fatigue with sitting requesting to return to supine position. Pt required max to transferred to supine. Pt was positioned for comfort. Pt with limited AROM in BUE. RUE limited ~30 degrees and LUE limited ~80

## 2024-03-25 NOTE — ED PROVIDER NOTES
[facility]  EMERGENCY DEPARTMENT ENCOUNTER        Pt Name: Clara Butcher  MRN: [id number]  Birthdate 1940  Date of evaluation: 3/25/2024  Provider: Dennis Gaming DO  PCP: Amarilys Louis APRN - CNP  Note Started: 1:15 AM EDT 3/25/24    CHIEF COMPLAINT       Chief Complaint   Patient presents with    Fatigue    Dysuria       HISTORY OF PRESENT ILLNESS: 1 or more Elements     History from : Patient and EMS    Limitations to history : None    Clara Butcher is a 83 y.o. female who presents to emergency department for \"not feeling well\".  EMS was called out to the residency for patient being unresponsive it was around a little after midnight when the call went out.  EMS arrived on scene they state the patient was awake but she would close arise she was answering questions appropriately.  Upon arrival here the patient is alert and oriented x 4 she states she just does not feel very well that she has completely negative review of systems except for it hurting and burning when she urinates.  Patient does have history of cerebrovascular accidents.  However upon arrival here the patient not having any difficulty with speaking.  Again she is answering questions appropriately.  She does have a little hard time sometimes understanding what you are asking after wearing a mask because she cannot see her lips get a look closer to her ears.  Again denies any headache out of ordinary.  Denies any earache.  Denies any sore throat.  Denies any runny nose.  Denies any cough.  Denies chest pain or shortness of breath.  Patient is chronically on 3.5 L nasal cannula at home at all times denies nausea vomiting or diarrhea.  Denies any abdominal pain.  Denies body aches.  Positive for burning or dysuria with urination.  Denies fevers chills.  Unaware of any sick contacts.  Past medical history sniffer allergic rhinitis, anxiety, chronic back pain, depression, double vision in both eyes open, fall,  little after midnight when the call went out.  EMS arrived on scene they state the patient was awake but she would close arise she was answering questions appropriately.  Upon arrival here the patient is alert and oriented x 4 she states she just does not feel very well that she has completely negative review of systems except for it hurting and burning when she urinates.  Patient does have history of cerebrovascular accidents.  However upon arrival here the patient not having any difficulty with speaking.  Again she is answering questions appropriately.  She does have a little hard time sometimes understanding what you are asking after wearing a mask because she cannot see her lips get a look closer to her ears.  Again denies any headache out of ordinary.  Denies any earache.  Denies any sore throat.  Denies any runny nose.  Denies any cough.  Denies chest pain or shortness of breath.  Patient is chronically on 3.5 L nasal cannula at home at all times denies nausea vomiting or diarrhea.  Denies any abdominal pain.  Denies body aches.  Positive for burning or dysuria with urination.  Denies fevers chills.  Unaware of any sick contacts.  Past medical history sniffer allergic rhinitis, anxiety, chronic back pain, depression, double vision in both eyes open, fall, hyperlipidemia, hypertension, osteoarthritis, stroke, diabetes mellitus type 2.    After initial evaluation examination I did have conversation with the patient about the upcoming plan, treatment possible disposition which they are agreeable to the consultation.  Advised we give her a small fluid bolus normal saline.  She will have CBC, CMP, UA, lactic acid sepsis protocol, blood cultures x 2, fingerstick glucose and a rapid COVID screen performed.  Only complaint the patient has is actually dysuria with urination.  This was also one of the concerns of family per EMS was that she could possibly have a urinary    Rapid COVID screen was negative    Blood work

## 2024-03-25 NOTE — PROGRESS NOTES
SLP ALL NOTES  Facility/Department: [facility] SURG   CLINICAL BEDSIDE SWALLOW EVALUATION    NAME: Clara Butcher  : 1940  MRN: [id number]    ADMISSION DATE: 3/25/2024  ADMITTING DIAGNOSIS: has Type 2 diabetes mellitus with stage 3 chronic kidney disease, with long-term current use of insulin ([facility]); Hyperlipidemia; Benign essential HTN; Declining functional status; Anxiety and depression; Aphthous ulcer; Chronic back pain; Peripheral neuropathy; Closed fracture of right upper extremity; Dysphagia; CKD (chronic kidney disease) stage 3, GFR 30-59 ml/min ([facility]); Acute cystitis without hematuria; Anemia; History of cholecystectomy; Closed fracture of right femur ([facility]); History of femur fracture; Hyperkalemia; Pulmonary embolism on right ([facility]); Transaminitis; Pneumonia of right lung due to infectious organism; Altered mental status; History of pulmonary embolism; Generalized weakness; Chronic respiratory failure ([facility]); Hypomagnesemia; History of stroke; Recurrent UTI; AMS (altered mental status); Dementia ([facility]); Acute mastoiditis of left side; Aspiration into airway, initial encounter; Aspiration pneumonia of right upper lobe ([facility]); and UTI (urinary tract infection) on their problem list.  ONSET DATE: 2024    Recent Chest Xray/CT of Chest: (N/A)    Date of Eval: 3/25/2024  Evaluating Therapist: DANNA Casey    Current Diet level:  Current Diet : Easy to chew  Current Liquid Diet : Thin    Primary Complaint  Patient with no complaints.    Pain:  Pain Assessment  Pain Assessment: 0-10  Pain Level: 5  Pain Location: Back  Pain Orientation: Lower    Reason for Referral  Clara Butcher was referred for a bedside swallow evaluation to assess the efficiency of her swallow function, identify signs and symptoms of aspiration and make recommendations regarding safe dietary consistencies, effective compensatory strategies, and safe eating environment.    Impression  Dysphagia Diagnosis: Mild oral stage

## 2024-03-25 NOTE — PROGRESS NOTES
Medication Reconciliation completed with fill history from [facility] and I spoke with patient's Daughter, Kiana.   -Patient receives Levemir and Humalog from [facility]. Daughter confirmed doses.  -Patient is breaking Eliquis 5 mg in half for 2.5 mg bid  -not taking Fluconazole 150 mg  -changed Senna Doc 8.6-50 mg every other day to only as needed.  -Daughter stated patient is still using Donepezil 10 mg qhs but had not seen a fill for recently.

## 2024-03-25 NOTE — ED NOTES
Pt daughter , Kiana, wanted to have the other daughter, Shira, come back and I told her that they can only switch out one more time. The daughter stated they had never done them that way before. The  is also at the bedside, he is blind.

## 2024-03-25 NOTE — H&P
History and Physical    Patient:  Clara Butcher    CHIEF COMPLAINT:    AMS    HISTORY OF PRESENT ILLNESS:   The patient is a 83 y.o. female with PMH of PE, stroke on eliquis, HTN, DM II, CKD 3, chronic pain, HLD and others who presents due to AMS from home. ER workup revealed UTI. She received dose of IV levaquin in the ER and PO diflucan and was admitted. Patient is sitting up in bed this morning alert and oriented to self, place, situation and answering questions appropriately. She states her family felt like she wasn't acting right or like herself so they thought she should come to the ER. She does admit to some burning with urination but otherwise has no complaint. She is bedbound at baseline with history of stroke and multiple chronic conditions and illnesses.    Past Medical History:      Diagnosis Date    Allergic rhinitis     Anxiety     Chronic back pain     Depression     Double vision with both eyes open     Pt states she can see ok with one eye shut    Fall     Hyperlipidemia     Hypertension     Osteoarthritis     Stroke (HCC)     Type II or unspecified type diabetes mellitus without mention of complication, not stated as uncontrolled        Past Surgical History:      Procedure Laterality Date     SECTION      COLONOSCOPY      FEMUR FRACTURE SURGERY Right 2019    fixed at  per daughter    HYSTERECTOMY (CERVIX STATUS UNKNOWN)      TOTAL    SHOULDER SURGERY      RIGHT X 2       Medications Prior to Admission:    Prior to Admission medications    Medication Sig Start Date End Date Taking? Authorizing Provider   apixaban (ELIQUIS) 2.5 MG TABS tablet Take 1 tablet by mouth 2 times daily 23   Tia Segovia PA   diclofenac sodium (VOLTAREN) 1 % GEL Apply 2 g topically 4 times daily as needed for Pain 23   Tia Segovia PA   miconazole (MICOTIN) 2 % powder Apply topically 2 times daily. 23   Tia Segovia PA   rosuvastatin (CRESTOR) 40 MG tablet Take 1 tablet by mouth  every evening 12/20/23   Tia Segovia PA   terazosin (HYTRIN) 2 MG capsule Take 1 capsule by mouth nightly    Hayden Limon MD   fluconazole (DIFLUCAN) 150 MG tablet Take 1 tablet by mouth daily For 3 days    Hayden Limon MD   QUEtiapine (SEROQUEL) 50 MG tablet Take 1 tablet by mouth nightly    Hayden Limon MD   acetaminophen (TYLENOL) 500 MG tablet Take 1 tablet by mouth 2 times daily as needed for Pain    Hayden Limon MD   nitrofurantoin (MACRODANTIN) 100 MG capsule Take 1 capsule by mouth at bedtime 3/15/23   Hayden Limon MD   nystatin (MYCOSTATIN) 740617 UNIT/GM cream Apply topically as needed for Dry Skin Apply topically 2 times daily.  Patient not taking: Reported on 12/18/2023    Hayden Limon MD   Menthol-Zinc Oxide (CALMOSEPTINE EX) Apply topically to affected area daily as needed    Hayden Limon MD   insulin detemir (LEVEMIR) 100 UNIT/ML injection vial Inject 65 Units into the skin at bedtime    Hayden Limon MD   gabapentin (NEURONTIN) 600 MG tablet Take 1 tablet by mouth 3 times daily.    Hayden Limon MD   meclizine (ANTIVERT) 25 MG tablet Take 1 tablet by mouth 3 times daily as needed    Hayden Limon MD   sennosides-docusate sodium (SENOKOT-S) 8.6-50 MG tablet Take 1 tablet by mouth every other day    Hayden Limon MD   donepezil (ARICEPT) 10 MG tablet Take 1 tablet by mouth nightly    Hayden Limon MD   HYDROcodone-acetaminophen (NORCO)  MG per tablet Take 1 tablet by mouth every 4 hours as needed for Pain.    Hayden Limon MD   insulin lispro (HUMALOG) 100 UNIT/ML injection vial Inject 13 Units into the skin 3 times daily (with meals) 11/4/21   Kassandra Hernandez APRN - CNP   LORazepam (ATIVAN) 1 MG tablet Take 1 tablet by mouth 2 times daily as needed for Anxiety.    Hayden Limon MD   busPIRone (BUSPAR) 7.5 MG tablet Take 1 tablet by mouth 2 times daily    [place]

## 2024-03-25 NOTE — PLAN OF CARE
Problem: Discharge Planning  Goal: Discharge to home or other facility with appropriate resources  Outcome: Progressing  Flowsheets  Taken 3/25/2024 0411  Discharge to home or other facility with appropriate resources:   Identify barriers to discharge with patient and caregiver   Identify discharge learning needs (meds, wound care, etc)   Refer to discharge planning if patient needs post-hospital services based on physician order or complex needs related to functional status, cognitive ability or social support system   Arrange for needed discharge resources and transportation as appropriate  Taken 3/25/2024 0404  Discharge to home or other facility with appropriate resources:   Identify barriers to discharge with patient and caregiver   Refer to discharge planning if patient needs post-hospital services based on physician order or complex needs related to functional status, cognitive ability or social support system  Taken 3/25/2024 0403  Discharge to home or other facility with appropriate resources:   Identify barriers to discharge with patient and caregiver   Identify discharge learning needs (meds, wound care, etc)   Refer to discharge planning if patient needs post-hospital services based on physician order or complex needs related to functional status, cognitive ability or social support system   Arrange for needed discharge resources and transportation as appropriate     Problem: Safety - Adult  Goal: Free from fall injury  Outcome: Progressing     Problem: Skin/Tissue Integrity  Goal: Absence of new skin breakdown  Description: 1.  Monitor for areas of redness and/or skin breakdown  2.  Assess vascular access sites hourly  3.  Every 4-6 hours minimum:  Change oxygen saturation probe site  4.  Every 4-6 hours:  If on nasal continuous positive airway pressure, respiratory therapy assess nares and determine need for appliance change or resting period.  Outcome: Progressing     Problem: Neurosensory -  Adult  Goal: Achieves stable or improved neurological status  Outcome: Progressing     Problem: Skin/Tissue Integrity - Adult  Goal: Skin integrity remains intact  Outcome: Progressing  Flowsheets (Taken 3/25/2024 0404)  Skin Integrity Remains Intact: Monitor for areas of redness and/or skin breakdown     Problem: Genitourinary - Adult  Goal: Absence of urinary retention  Outcome: Progressing     Problem: Infection - Adult  Goal: Absence of infection at discharge  Outcome: Progressing     Problem: Metabolic/Fluid and Electrolytes - Adult  Goal: Electrolytes maintained within normal limits  Outcome: Progressing     Problem: Hematologic - Adult  Goal: Maintains hematologic stability  Outcome: Progressing

## 2024-03-25 NOTE — ACP (ADVANCE CARE PLANNING)
Advance Care Planning     General Advance Care Planning (ACP) Conversation    Date of Conversation: 3/25/2024  Conducted with: Patient with Decision Making Capacity   Healthcare Decision Maker: Named in Advance Directive or Healthcare Power of  (name) per staff on admit    Healthcare Decision Maker:    Primary Decision Maker: Kiana Landry - Child - [phone]    Secondary Decision Maker: Shira Butcher - Child - [phone]    Supplemental (Other) Decision Maker: Rina Butcher - Child - [phone]    Supplemental (Other) Decision Maker: Rina Butcher Dorian - Spouse - [phone]  Click here to complete Healthcare Decision Makers including selection of the Healthcare Decision Maker Relationship (ie \"Primary\").   Today we documented Decision Maker(s) consistent with ACP documents on file.    Content/Action Overview:  Has ACP document(s) on file - reflects the patient's care preferences  Reviewed DNR/DNI and patient elects Full Code (Attempt Resuscitation)    Length of Voluntary ACP Conversation in minutes:  <16 minutes (Non-Billable)

## 2024-03-25 NOTE — CARE COORDINATION
Lives With: Spouse, Daughter  Type of Home: House  Home Layout: One level  Home Access: Ramped entrance  Home Equipment: Wheelchair-manual, Alert Button, Cane, Hospital bed, Oxygen, Walker, rolling  Has the patient had two or more falls in the past year or any fall with injury in the past year?: No  Receives Help From: Family  ADL Assistance: Needs assistance (Daughter gives patient a sponge bath, Pt voids in brief and family changes, daughter dresses patient, daughter completes grooming/hygiene, Daugher feeds patient)  Bath: Dependent/Total  Dressing: Dependent/Total  Grooming: Dependent/Total  Feeding: Dependent/Total  Homemaking Responsibilities: No  Ambulation Assistance: Non-ambulatory  Transfer Assistance: Needs assistance  Active : No  Additional Comments: Pt is bedbound she reports she occasionally sits on EOB. Pt states she hasn't been able to stand for greater than a year     Lives with family.  Has cody lift, bed, O2 ([employer]), WC, alert button, cane, RW.  Has 24 hour care from her daughters. She is bedbound and requires total A for all self-care and ADL.

## 2024-03-25 NOTE — ED NOTES
Per family, pt BGL was getting low so she only got 35 of her prescribed 65 units of long acting insulin because she goes low. I asked how low it was and she stated 299.  Kiana, daughter also stated that the pt shook for 15 minutes, afraid she had a seizure.

## 2024-03-25 NOTE — PROGRESS NOTES
Physical Therapy  Facility/Department: [facility] MED SURG  Physical Therapy Initial Assessment/Discharge Summary    Name: Clara Butcher  : 1940  MRN: [id number]  Date of Service: 3/25/2024    Discharge Recommendations:  24 hour supervision or assist          Patient Diagnosis(es): The primary encounter diagnosis was Acute cystitis without hematuria. A diagnosis of Altered mental status, unspecified altered mental status type was also pertinent to this visit.  Past Medical History:  has a past medical history of Allergic rhinitis, Anxiety, Chronic back pain, Depression, Double vision with both eyes open, Fall, Hyperlipidemia, Hypertension, Osteoarthritis, Stroke (HCC), and Type II or unspecified type diabetes mellitus without mention of complication, not stated as uncontrolled.  Past Surgical History:  has a past surgical history that includes Hysterectomy;  section; Colonoscopy; shoulder surgery; and Femur fracture surgery (Right, 2019).    Assessment   Assessment: Limited PT eval completed on this patient admitted to hospital with primary diagnosis of acute cystitis without hematuria. She is received lying in bed on 1.5 LPM O2 per n/c. NAD and no c/o pain. Pleasant and cooperative. Word finding difficulty d/t expressive aphasia. Patient able to roll right and left with v/c and CGA. Sup<>sit with max A x 2. Able to tolerate sitting EOB x 10 minutes with SBA. Quick fatigue noted so patient returned to lying in bed. Patient presents at her baseline functional level and does not require continued skilled PT intervention at this time.  Therapy Prognosis: Poor  Decision Making: Low Complexity  Requires PT Follow-Up: No  Activity Tolerance  Activity Tolerance: Patient tolerated evaluation without incident;Patient limited by endurance;Patient limited by fatigue;Treatment limited secondary to medical complications     Plan   Physical Therapy Plan  General Plan: Discharge with evaluation only  Safety  Devices  Type of Devices: Bed alarm in place, Call light within reach, Left in bed     Restrictions  Restrictions/Precautions  Restrictions/Precautions: Fall Risk, General Precautions  Required Braces or Orthoses?: No     Subjective   Pain: No c/o pain today.  General  Patient assessed for rehabilitation services?: Yes  Family / Caregiver Present: No  Follows Commands: Within Functional Limits         Social/Functional History  Social/Functional History  Lives With: Spouse, Daughter  Type of Home: House  Home Layout: One level  Home Access: Ramped entrance  Home Equipment: Wheelchair-manual, Alert Button, Cane, Hospital bed, Oxygen, Walker, rolling  Has the patient had two or more falls in the past year or any fall with injury in the past year?: No  Receives Help From: Family  ADL Assistance: Needs assistance (Daughter gives patient a sponge bath, Pt voids in brief and family changes, daughter dresses patient, daughter completes grooming/hygiene, Daugher feeds patient)  Bath: Dependent/Total  Dressing: Dependent/Total  Grooming: Dependent/Total  Feeding: Dependent/Total  Homemaking Responsibilities: No  Ambulation Assistance: Non-ambulatory  Transfer Assistance: Needs assistance  Active : No  Additional Comments: Pt is bedbound she reports she occasionally sits on EOB. Pt states she hasn't been able to stand for greater than a year  Vision/Hearing  Vision  Vision: Impaired (Does not currently have glasses.)  Hearing  Hearing: Within functional limits    Cognition   Orientation  Orientation Level: Disoriented to time;Oriented to place;Oriented to person  Cognition  Cognition Comment: decreased ability to communicate needs, decreased safety awareness.     Objective   Pulse: 66  Heart Rate Source: Monitor  BP: 137/68  BP Location: Left upper arm  BP Method: Automatic  Patient Position: Semi fowlers  MAP (Calculated): 91  Respirations: 18  SpO2: 98 %  O2 Device: Nasal cannula  Temp: 98.1 °F (36.7 °C)  Comment: 1.5

## 2024-03-25 NOTE — PLAN OF CARE
Problem: Genitourinary - Adult  Goal: Absence of urinary retention  3/25/2024 0948 by Estefany Arndt RN  Outcome: Progressing  3/25/2024 0413 by Becky Abdalla RN  Outcome: Progressing

## 2024-03-25 NOTE — FLOWSHEET NOTE
03/25/24 0404   Assessment   Charting Type Admission   Psychosocial   Psychosocial (WDL) X   Patient Behaviors   (forgetful, unable to answer questions)   Neurological   Neuro (WDL) X   Level of Consciousness 0   Orientation Level Oriented to person;Disoriented to place;Disoriented to time;Disoriented to situation   Cognition Follows commands;Poor judgement;Poor safety awareness   Speech Delayed responses   R Hand  Moderate   L Hand  Moderate   Cough Reflex Present   [place] Coma Scale   Eye Opening 4   Best Verbal Response 5   Best Motor Response 6   [place] Coma Scale Score 15   NIHSS Stroke Scale   NIHSS Stroke Scale Assessed No   HEENT (Head, Ears, Eyes, Nose, & Throat)   HEENT (WDL) X   Right Eye Double vision   Left Eye Double Vision   Right Ear Impaired hearing   Left Ear Impaired hearing   Teeth Missing teeth   Respiratory   Respiratory (WDL) WDL   Respiratory Interventions H.O.B. elevated   Cardiac   Cardiac (WDL) X   Gastrointestinal   Abdomen Inspection Rounded   Last BM (including prior to admit) 03/23/24   RUQ Bowel Sounds Active   LUQ Bowel Sounds Active   RLQ Bowel Sounds Active   LLQ Bowel Sounds Active   Genitourinary   Genitourinary (WDL) X   Urine Assessment   Urinary Status Voiding;External catheter   Urinary Incontinence Present   Peripheral Vascular   Peripheral Vascular (WDL) WDL   Edema None   Skin Integumentary    Skin Integumentary (WDL) WDL   Care Plan - Skin/Tissue Integrity Goals   Skin Integrity Remains Intact Monitor for areas of redness and/or skin breakdown   Musculoskeletal   Musculoskeletal (WDL) X   RUE Weakness   LUE Weakness   RL Extremity Weakness;Unsteady;Other (comment)  (non-ambulatory)   LL Extremity Weakness;Other (comment)  (non ambulatory)   Care Plan - Discharge Planning Goals   Discharge to home or other facility with appropriate resources Identify barriers to discharge with patient and caregiver;Refer to discharge planning if patient needs post-hospital

## 2024-03-25 NOTE — ED TRIAGE NOTES
[facility] responded to a call of pt unresponsive, pt woke up for [facility] and V/S were WNL. Pt had no complaints to EMS.   I asked pt if there was anything wrong and she said she just felt a little bad.

## 2024-03-25 NOTE — PROGRESS NOTES
4 Eyes Skin Assessment     NAME:  Clara Butcher  YOB: 1940  MEDICAL RECORD NUMBER:  [id number]    The patient is being assessed for  Admission    I agree that at least one RN has performed a thorough Head to Toe Skin Assessment on the patient. ALL assessment sites listed below have been assessed.      Areas assessed by both nurses:    Head, Face, Ears, Shoulders, Back, Chest, Arms, Elbows, Hands, Sacrum. Buttock, Coccyx, Ischium, and Legs. Feet and Heels        Does the Patient have a Wound? Yes wound(s) were present on assessment. LDA wound assessment was Initiated and completed by RN       Joe Prevention initiated by RN: Yes  Wound Care Orders initiated by RN: Yes    Pressure Injury (Stage 3,4, Unstageable, DTI, NWPT, and Complex wounds) if present, place Wound referral order by RN under : No    New Ostomies, if present place, Ostomy referral order under : No     Nurse 1 eSignature: Electronically signed by Becky Abdalla RN on 3/25/24 at 4:21 AM EDT    **SHARE this note so that the co-signing nurse can place an eSignature**    Nurse 2 eSignature: Electronically signed by Yelena Adams RN on 3/25/24 at 4:24 AM EDT

## 2024-03-25 NOTE — ED NOTES
I asked the daughter how low has the pt BGL gotten she said 77 but when she is 89 she doesn't really do well.

## 2024-03-25 NOTE — FLOWSHEET NOTE
03/25/24 0884   Assessment   Charting Type Shift assessment   Psychosocial   Psychosocial (WDL) X   Patient Behaviors   (forgetful, unable to answer questions)   Neurological   Neuro (WDL) X   Level of Consciousness 0   Orientation Level Oriented to person;Disoriented to time;Oriented to place;Oriented to situation   Cognition Follows commands;Poor judgement;Poor safety awareness   Speech Clear   R Hand  Moderate   L Hand  Moderate   Cough Reflex Present   Swallow Screening   Is the patient unable to remain alert for testing? No   Is the patient on a modified diet (thickened liquids) due to pre-existing dysphagia? No   Is there presence of existing enteral tube feeding via the stomach or nose? No   Is there presence of head-of-bed restrictions (less than 30 degrees)? No   Is there presence of tracheotomy tube? No   Is the patient ordered nothing-by-mouth status? No   3 oz Water Swallow Screen Pass   [place] Coma Scale   Eye Opening 4   Best Verbal Response 5   Best Motor Response 6   [place] Coma Scale Score 15   HEENT (Head, Ears, Eyes, Nose, & Throat)   HEENT (WDL) X   Right Eye Double vision   Left Eye Double Vision   Right Ear Impaired hearing   Left Ear Impaired hearing   Teeth Missing teeth   Respiratory   Respiratory (WDL) WDL   Cardiac   Cardiac (WDL) X   Gastrointestinal   Abdomen Inspection Rounded   RUQ Bowel Sounds Active   LUQ Bowel Sounds Active   RLQ Bowel Sounds Active   LLQ Bowel Sounds Active   Genitourinary   Genitourinary (WDL) X   Suprapubic Tenderness No   Dysuria (Pain/Burning w/Urination) No   Difficulty Urinating/Starting Stream No   Urine Assessment   Urinary Status Voiding;External catheter   Urinary Incontinence Present   Peripheral Vascular   Peripheral Vascular (WDL) WDL   Edema None   Skin Integumentary    Skin Integumentary (WDL) WDL   Musculoskeletal   Musculoskeletal (WDL) X   RUE Weakness   LUE Weakness   RL Extremity Weakness;Unsteady;Other (comment)  (non-ambulatory)

## 2024-03-26 LAB
ALBUMIN SERPL-MCNC: 3.5 G/DL (ref 3.4–4.8)
ALBUMIN/GLOB SERPL: 1.5 {RATIO} (ref 0.8–2)
ALP SERPL-CCNC: 50 U/L (ref 25–100)
ALT SERPL-CCNC: <5 U/L (ref 4–36)
ANION GAP SERPL CALCULATED.3IONS-SCNC: 7 MMOL/L (ref 3–16)
AST SERPL-CCNC: 12 U/L (ref 8–33)
BASOPHILS # BLD: 0 K/UL (ref 0–0.1)
BASOPHILS NFR BLD: 0.5 %
BILIRUB SERPL-MCNC: 0.3 MG/DL (ref 0.3–1.2)
BUN SERPL-MCNC: 35 MG/DL (ref 6–20)
CALCIUM SERPL-MCNC: 8.8 MG/DL (ref 8.5–10.5)
CHLORIDE SERPL-SCNC: 101 MMOL/L (ref 98–107)
CO2 SERPL-SCNC: 30 MMOL/L (ref 20–30)
CREAT SERPL-MCNC: 1.6 MG/DL (ref 0.4–1.2)
EOSINOPHIL # BLD: 0.3 K/UL (ref 0–0.4)
EOSINOPHIL NFR BLD: 3.7 %
ERYTHROCYTE [DISTWIDTH] IN BLOOD BY AUTOMATED COUNT: 13.6 % (ref 11–16)
GFR SERPLBLD CREATININE-BSD FMLA CKD-EPI: 32 ML/MIN/{1.73_M2}
GLOBULIN SER CALC-MCNC: 2.4 G/DL
GLUCOSE BLD-MCNC: 168 MG/DL (ref 74–106)
GLUCOSE BLD-MCNC: 197 MG/DL (ref 74–106)
GLUCOSE BLD-MCNC: 225 MG/DL (ref 74–106)
GLUCOSE BLD-MCNC: 238 MG/DL (ref 74–106)
GLUCOSE SERPL-MCNC: 245 MG/DL (ref 74–106)
HCT VFR BLD AUTO: 31.2 % (ref 37–47)
HGB BLD-MCNC: 9.6 G/DL (ref 11.5–16.5)
IMM GRANULOCYTES # BLD: 0 K/UL
IMM GRANULOCYTES NFR BLD: 0.2 % (ref 0–5)
LYMPHOCYTES # BLD: 2.8 K/UL (ref 1.5–4)
LYMPHOCYTES NFR BLD: 32.6 %
MAGNESIUM SERPL-MCNC: 1.7 MG/DL (ref 1.7–2.4)
MCH RBC QN AUTO: 32.2 PG (ref 27–32)
MCHC RBC AUTO-ENTMCNC: 30.8 G/DL (ref 31–35)
MCV RBC AUTO: 104.7 FL (ref 80–100)
MONOCYTES # BLD: 0.9 K/UL (ref 0.2–0.8)
MONOCYTES NFR BLD: 10.5 %
NEUTROPHILS # BLD: 4.5 K/UL (ref 2–7.5)
NEUTS SEG NFR BLD: 52.5 %
PERFORMED ON: ABNORMAL
PLATELET # BLD AUTO: 128 K/UL (ref 150–400)
PMV BLD AUTO: 11.9 FL (ref 6–10)
POTASSIUM SERPL-SCNC: 5.5 MMOL/L (ref 3.4–5.1)
POTASSIUM SERPL-SCNC: 5.8 MMOL/L (ref 3.4–5.1)
PROT SERPL-MCNC: 5.9 G/DL (ref 6.4–8.3)
RBC # BLD AUTO: 2.98 M/UL (ref 3.8–5.8)
REPORT: NORMAL
SODIUM SERPL-SCNC: 138 MMOL/L (ref 136–145)
WBC # BLD AUTO: 8.5 K/UL (ref 4–11)

## 2024-03-26 PROCEDURE — 85025 COMPLETE CBC W/AUTO DIFF WBC: CPT

## 2024-03-26 PROCEDURE — G0378 HOSPITAL OBSERVATION PER HR: HCPCS

## 2024-03-26 PROCEDURE — 84132 ASSAY OF SERUM POTASSIUM: CPT

## 2024-03-26 PROCEDURE — 2700000000 HC OXYGEN THERAPY PER DAY

## 2024-03-26 PROCEDURE — 80053 COMPREHEN METABOLIC PANEL: CPT

## 2024-03-26 PROCEDURE — 94761 N-INVAS EAR/PLS OXIMETRY MLT: CPT

## 2024-03-26 PROCEDURE — 36415 COLL VENOUS BLD VENIPUNCTURE: CPT

## 2024-03-26 PROCEDURE — 6370000000 HC RX 637 (ALT 250 FOR IP): Performed by: NURSE PRACTITIONER

## 2024-03-26 PROCEDURE — 6360000002 HC RX W HCPCS: Performed by: PHYSICIAN ASSISTANT

## 2024-03-26 PROCEDURE — 1200000000 HC SEMI PRIVATE

## 2024-03-26 PROCEDURE — 83735 ASSAY OF MAGNESIUM: CPT

## 2024-03-26 PROCEDURE — 87040 BLOOD CULTURE FOR BACTERIA: CPT

## 2024-03-26 PROCEDURE — 6370000000 HC RX 637 (ALT 250 FOR IP): Performed by: PHYSICIAN ASSISTANT

## 2024-03-26 PROCEDURE — 2580000003 HC RX 258: Performed by: PHYSICIAN ASSISTANT

## 2024-03-26 RX ORDER — CEFDINIR 300 MG/1
300 CAPSULE ORAL 2 TIMES DAILY
Qty: 6 CAPSULE | Refills: 0 | Status: SHIPPED | OUTPATIENT
Start: 2024-03-27 | End: 2024-03-30

## 2024-03-26 RX ORDER — INSULIN LISPRO 100 [IU]/ML
15 INJECTION, SOLUTION INTRAVENOUS; SUBCUTANEOUS
Status: DISCONTINUED | OUTPATIENT
Start: 2024-03-26 | End: 2024-03-27 | Stop reason: HOSPADM

## 2024-03-26 RX ORDER — SODIUM POLYSTYRENE SULFONATE 15 G/60ML
15 SUSPENSION ORAL; RECTAL ONCE
Status: COMPLETED | OUTPATIENT
Start: 2024-03-26 | End: 2024-03-26

## 2024-03-26 RX ORDER — INSULIN GLARGINE 100 [IU]/ML
65 INJECTION, SOLUTION SUBCUTANEOUS NIGHTLY
Status: DISCONTINUED | OUTPATIENT
Start: 2024-03-26 | End: 2024-03-27 | Stop reason: HOSPADM

## 2024-03-26 RX ORDER — INSULIN LISPRO 100 [IU]/ML
5 INJECTION, SOLUTION INTRAVENOUS; SUBCUTANEOUS ONCE
Status: DISCONTINUED | OUTPATIENT
Start: 2024-03-26 | End: 2024-03-26 | Stop reason: CLARIF

## 2024-03-26 RX ADMIN — QUETIAPINE FUMARATE 50 MG: 25 TABLET ORAL at 20:59

## 2024-03-26 RX ADMIN — INSULIN LISPRO 13 UNITS: 100 INJECTION, SOLUTION INTRAVENOUS; SUBCUTANEOUS at 09:35

## 2024-03-26 RX ADMIN — DULOXETINE HYDROCHLORIDE 60 MG: 30 CAPSULE, DELAYED RELEASE ORAL at 09:19

## 2024-03-26 RX ADMIN — ROSUVASTATIN CALCIUM 40 MG: 20 TABLET, COATED ORAL at 17:05

## 2024-03-26 RX ADMIN — SODIUM POLYSTYRENE SULFONATE 15 G: 15 SUSPENSION ORAL; RECTAL at 10:15

## 2024-03-26 RX ADMIN — FLUCONAZOLE 200 MG: 100 TABLET ORAL at 09:28

## 2024-03-26 RX ADMIN — INSULIN LISPRO 15 UNITS: 100 INJECTION, SOLUTION INTRAVENOUS; SUBCUTANEOUS at 12:06

## 2024-03-26 RX ADMIN — INSULIN LISPRO 1 UNITS: 100 INJECTION, SOLUTION INTRAVENOUS; SUBCUTANEOUS at 09:33

## 2024-03-26 RX ADMIN — INSULIN LISPRO 1 UNITS: 100 INJECTION, SOLUTION INTRAVENOUS; SUBCUTANEOUS at 12:03

## 2024-03-26 RX ADMIN — HYDROCODONE BITARTRATE AND ACETAMINOPHEN 1 TABLET: 10; 325 TABLET ORAL at 16:37

## 2024-03-26 RX ADMIN — GABAPENTIN 600 MG: 300 CAPSULE ORAL at 09:27

## 2024-03-26 RX ADMIN — GABAPENTIN 600 MG: 300 CAPSULE ORAL at 20:59

## 2024-03-26 RX ADMIN — DONEPEZIL HYDROCHLORIDE 10 MG: 5 TABLET, FILM COATED ORAL at 20:59

## 2024-03-26 RX ADMIN — INSULIN LISPRO 5 UNITS: 100 INJECTION, SOLUTION INTRAVENOUS; SUBCUTANEOUS at 17:14

## 2024-03-26 RX ADMIN — APIXABAN 2.5 MG: 2.5 TABLET, FILM COATED ORAL at 10:15

## 2024-03-26 RX ADMIN — GABAPENTIN 600 MG: 300 CAPSULE ORAL at 14:39

## 2024-03-26 RX ADMIN — LORAZEPAM 1 MG: 0.5 TABLET ORAL at 09:21

## 2024-03-26 RX ADMIN — INSULIN GLARGINE 65 UNITS: 100 INJECTION, SOLUTION SUBCUTANEOUS at 21:05

## 2024-03-26 RX ADMIN — PANTOPRAZOLE SODIUM 40 MG: 40 TABLET, DELAYED RELEASE ORAL at 06:15

## 2024-03-26 RX ADMIN — CEFTRIAXONE 1000 MG: 1 INJECTION, POWDER, FOR SOLUTION INTRAMUSCULAR; INTRAVENOUS at 09:40

## 2024-03-26 RX ADMIN — APIXABAN 2.5 MG: 2.5 TABLET, FILM COATED ORAL at 21:00

## 2024-03-26 ASSESSMENT — PAIN SCALES - GENERAL: PAINLEVEL_OUTOF10: 0

## 2024-03-26 NOTE — CARE COORDINATION
Family refuses DC today.  Daughter updated that insurance may not cover stay for 3/26/24.  Daughter, Shira, verbalized understanding.  Plan to DC to home tomorrow via EMS at 1 PM.  Daughter states she will have someone there at the house.     03/26/24 1322   Avoidable Days   Patient/Family       Refuses Discharge

## 2024-03-26 NOTE — FLOWSHEET NOTE
03/26/24 0815   Assessment   Charting Type Shift assessment   Psychosocial   Psychosocial (WDL) X   Patient Behaviors   (forgetful, unable to answer questions)   Neurological   Neuro (WDL) X   Level of Consciousness 0   Orientation Level Oriented to person;Disoriented to time;Oriented to place;Oriented to situation   Cognition Follows commands;Poor judgement;Poor safety awareness   Speech Clear   [place] Coma Scale   Eye Opening 4   Best Verbal Response 4   Best Motor Response 6   [place] Coma Scale Score 14   HEENT (Head, Ears, Eyes, Nose, & Throat)   HEENT (WDL) X   Right Eye Double vision   Left Eye Double Vision   Right Ear Impaired hearing   Left Ear Impaired hearing   Teeth Missing teeth   Respiratory   Respiratory (WDL) WDL   Respiratory Interventions Cough & deep breathe;H.O.B. elevated   Cardiac   Cardiac (WDL) X   Gastrointestinal   Abdomen Inspection Rounded   RUQ Bowel Sounds Active   LUQ Bowel Sounds Active   RLQ Bowel Sounds Active   LLQ Bowel Sounds Active   Genitourinary   Genitourinary (WDL) X   Suprapubic Tenderness No   Dysuria (Pain/Burning w/Urination) No   Peripheral Vascular   Peripheral Vascular (WDL) WDL   Edema None   Skin Integumentary    Skin Integumentary (WDL) X   Skin Color Pale   Skin Integrity Redness   Location buttocks   Care Plan - Skin/Tissue Integrity Goals   Skin Integrity Remains Intact Monitor for areas of redness and/or skin breakdown   Musculoskeletal   Musculoskeletal (WDL) X   RUE Weakness   LUE Weakness   RL Extremity Weakness;Unsteady;Other (comment)  (non-ambulatory)   LL Extremity Weakness;Other (comment)  (non ambulatory)   Care Plan - Discharge Planning Goals   Discharge to home or other facility with appropriate resources Identify barriers to discharge with patient and caregiver;Identify discharge learning needs (meds, wound care, etc)     Pt is alert and oriented.  Pt is resting in bed and appears to have no acute distress. Pt currently on 1.5 L NC. Pt's lung

## 2024-03-26 NOTE — PLAN OF CARE
Problem: Safety - Adult  Goal: Free from fall injury  Outcome: Progressing     Problem: Skin/Tissue Integrity  Goal: Absence of new skin breakdown  Description: 1.  Monitor for areas of redness and/or skin breakdown  2.  Assess vascular access sites hourly  3.  Every 4-6 hours minimum:  Change oxygen saturation probe site  4.  Every 4-6 hours:  If on nasal continuous positive airway pressure, respiratory therapy assess nares and determine need for appliance change or resting period.  Outcome: Progressing     Problem: Skin/Tissue Integrity - Adult  Goal: Skin integrity remains intact  Outcome: Progressing     Problem: Genitourinary - Adult  Goal: Absence of urinary retention  3/25/2024 0948 by Estefany Arndt RN  Outcome: Progressing

## 2024-03-26 NOTE — DISCHARGE INSTRUCTIONS
Disposition: home with 24 hour care and HH   Discharged Condition: Stable  Activity: activity as tolerated  Diet: cardiac diet and diabetic diet, soft and bite sized   Follow Up: Primary Care Provider in 1 week. Would benefit from follow up labs at PCP visit to monitor renal function and potassium. Defer to pcp.

## 2024-03-26 NOTE — PROGRESS NOTES
Glucose 168 reported to KEATON Weber. Per Tia give 5 units instead of 15 units as previously ordered.

## 2024-03-26 NOTE — FLOWSHEET NOTE
03/25/24 2130   Assessment   Charting Type Shift assessment   Psychosocial   Psychosocial (WDL) X   Patient Behaviors   (forgetful, unable to answer questions)   Neurological   Neuro (WDL) X   Level of Consciousness 0   Orientation Level Oriented to person;Disoriented to time;Oriented to place;Oriented to situation   Cognition Follows commands;Poor judgement;Poor safety awareness   Speech Clear   R Hand  Moderate   L Hand  Moderate   Cough Reflex Present   [place] Coma Scale   Eye Opening 4   Best Verbal Response 5   Best Motor Response 6   [place] Coma Scale Score 15   HEENT (Head, Ears, Eyes, Nose, & Throat)   HEENT (WDL) X   Right Eye Double vision   Left Eye Double Vision   Right Ear Impaired hearing   Left Ear Impaired hearing   Teeth Missing teeth   Respiratory   Respiratory (WDL) WDL   Respiratory Interventions H.O.B. elevated;Cough & deep breathe   Cardiac   Cardiac (WDL) X   Gastrointestinal   Abdomen Inspection Rounded   Last BM (including prior to admit) 03/23/24   RUQ Bowel Sounds Active   LUQ Bowel Sounds Active   RLQ Bowel Sounds Active   LLQ Bowel Sounds Active   Genitourinary   Genitourinary (WDL) X   Suprapubic Tenderness No   Dysuria (Pain/Burning w/Urination) No   Urine Assessment   Urinary Status External catheter   Urinary Incontinence Present   Peripheral Vascular   Peripheral Vascular (WDL) WDL   Edema None   Skin Integumentary    Skin Integumentary (WDL) X   Skin Color Pale   Skin Integrity Redness   Location Buttocks   Musculoskeletal   Musculoskeletal (WDL) X   RUE Weakness   LUE Weakness   RL Extremity Weakness;Unsteady;Other (comment)  (non-ambulatory)   LL Extremity Weakness;Other (comment)  (non ambulatory)     Awake watching tv upon entry. Complaint of nausea. Medication administered for nausea. Tolerated PO meds well in applesauce. Daughter called to check on patient. Says patient was complaining of nausea all day but she didn't think much of it and says pt needs to rest

## 2024-03-26 NOTE — DISCHARGE SUMMARY
Discharge Summary      Patient ID: Clara Butcher       Patient's PCP: Amarilys Louis APRN - CNP    Admit Date: 3/25/2024     Discharge Date:   3/26/2024    Admitting Provider: Mandy Dutta MD    Discharging Provider: KEATON Tom     Reason for this admission:   UTI and AMS    Discharge Diagnoses:     Active Hospital Problems    Diagnosis Date Noted    AMS (altered mental status) [R41.82] 03/18/2023    History of pulmonary embolism [Z86.711] 06/16/2021    History of stroke [Z86.73] 06/16/2021    Hyperkalemia [E87.5] 04/20/2020    Acute cystitis without hematuria [N30.00] 01/05/2020    Benign essential HTN [I10] 10/14/2011    Type 2 diabetes mellitus with stage 3 chronic kidney disease, with long-term current use of insulin (HCC) [E11.22, N18.30, Z79.4] 07/05/2011       Procedures:  No orders to display         Consults:   None  PT/OT    Briefly:   Clara Butcher is an 82 yo female with PMH of stroke, dementia, HTN, uncontrolled DM II, CKD 3, recurrent UTI, PE, and others who was admitted due to AMS and UTI.     Hospital Course:      Active Hospital Problems    Diagnosis Date Noted    AMS (altered mental status) [R41.82]  Suspect related to UTI. Patient oriented to self, place, situation on 3/25. Does have intermittent confusion and dementia chronically.   03/18/2023    History of pulmonary embolism [Z86.711]  Continue anticoagulation    06/16/2021    History of stroke [Z86.73]  Continue current regimen. No new focal deficits.    06/16/2021    Hyperkalemia [E87.5]  Mild at 5.5 on 3/26. Kayexalate ordered. Consider repeat labs as outpatient defer to pcp. Med list reviewed.    04/20/2020    Acute cystitis without hematuria [N30.00]  UA indicative of UTI and also positive yeast. Received levaquin in ER and started on rocephin at time of admission. Reviewed previous urine cultures. Discharge with omnicef to complete 5 day course. She can resume her prophylactic macrobid dose when this course is complete.  Value Date/Time    WBC 8.5 03/26/2024 05:04 AM    HGB 9.6 03/26/2024 05:04 AM    HCT 31.2 03/26/2024 05:04 AM     03/26/2024 05:04 AM          Lab Results   Component Value Date/Time     03/26/2024 05:05 AM    K 5.5 03/26/2024 09:30 AM    K 5.3 03/25/2024 01:35 AM     03/26/2024 05:05 AM    CO2 30 03/26/2024 05:05 AM    BUN 35 03/26/2024 05:05 AM    CREATININE 1.6 03/26/2024 05:05 AM          Lab Results   Component Value Date/Time    ALKPHOS 50 03/26/2024 05:05 AM    ALT <5 03/26/2024 05:05 AM    AST 12 03/26/2024 05:05 AM    PROT 5.9 03/26/2024 05:05 AM    BILITOT 0.3 03/26/2024 05:05 AM    LABALBU 3.5 03/26/2024 05:05 AM         Discharge Medications:      Current Discharge Medication List             Details   cefdinir (OMNICEF) 300 MG capsule Take 1 capsule by mouth 2 times daily for 3 days  Qty: 6 capsule, Refills: 0                Details   apixaban (ELIQUIS) 2.5 MG TABS tablet Take 1 tablet by mouth 2 times daily  Qty: 60 tablet, Refills: 0      diclofenac sodium (VOLTAREN) 1 % GEL Apply 2 g topically 4 times daily as needed for Pain  Qty: 100 g, Refills: 0      miconazole (MICOTIN) 2 % powder Apply topically 2 times daily.  Qty: 45 g, Refills: 1      rosuvastatin (CRESTOR) 40 MG tablet Take 1 tablet by mouth every evening  Qty: 30 tablet, Refills: 0      terazosin (HYTRIN) 2 MG capsule Take 1 capsule by mouth nightly      QUEtiapine (SEROQUEL) 50 MG tablet Take 1 tablet by mouth nightly      acetaminophen (TYLENOL) 500 MG tablet Take 1 tablet by mouth 2 times daily as needed for Pain      nitrofurantoin (MACRODANTIN) 100 MG capsule Take 1 capsule by mouth at bedtime      Menthol-Zinc Oxide (CALMOSEPTINE EX) Apply topically to affected area daily as needed      insulin detemir (LEVEMIR) 100 UNIT/ML injection vial Inject 65 Units into the skin at bedtime      gabapentin (NEURONTIN) 600 MG tablet Take 1 tablet by mouth 3 times daily.      meclizine (ANTIVERT) 25 MG tablet Take 1 tablet by

## 2024-03-26 NOTE — PLAN OF CARE
Problem: Discharge Planning  Goal: Discharge to home or other facility with appropriate resources  Outcome: Progressing  Flowsheets (Taken 3/26/2024 0815)  Discharge to home or other facility with appropriate resources:   Identify barriers to discharge with patient and caregiver   Identify discharge learning needs (meds, wound care, etc)     Problem: Safety - Adult  Goal: Free from fall injury  3/26/2024 0937 by Johanna Espinoza RN  Outcome: Progressing  3/25/2024 2316 by Jame Kimball RN  Outcome: Progressing     Problem: Skin/Tissue Integrity  Goal: Absence of new skin breakdown  Description: 1.  Monitor for areas of redness and/or skin breakdown  2.  Assess vascular access sites hourly  3.  Every 4-6 hours minimum:  Change oxygen saturation probe site  4.  Every 4-6 hours:  If on nasal continuous positive airway pressure, respiratory therapy assess nares and determine need for appliance change or resting period.  3/26/2024 0937 by Johanna Espinoza RN  Outcome: Progressing  3/25/2024 2316 by Jame Kimball RN  Outcome: Progressing     Problem: Neurosensory - Adult  Goal: Achieves stable or improved neurological status  Outcome: Progressing     Problem: Skin/Tissue Integrity - Adult  Goal: Skin integrity remains intact  3/26/2024 0937 by Johanna Espinoza RN  Outcome: Progressing  Flowsheets (Taken 3/26/2024 0815)  Skin Integrity Remains Intact: Monitor for areas of redness and/or skin breakdown  3/25/2024 2316 by Jame Kimball RN  Outcome: Progressing     Problem: Genitourinary - Adult  Goal: Absence of urinary retention  Outcome: Progressing     Problem: Infection - Adult  Goal: Absence of infection at discharge  Outcome: Progressing     Problem: Metabolic/Fluid and Electrolytes - Adult  Goal: Electrolytes maintained within normal limits  Outcome: Progressing     Problem: Hematologic - Adult  Goal: Maintains hematologic stability  Outcome: Progressing     Problem: Pain  Goal: Verbalizes/displays adequate comfort

## 2024-03-27 VITALS
WEIGHT: 204.5 LBS | OXYGEN SATURATION: 94 % | RESPIRATION RATE: 18 BRPM | HEART RATE: 72 BPM | SYSTOLIC BLOOD PRESSURE: 156 MMHG | BODY MASS INDEX: 34.07 KG/M2 | TEMPERATURE: 98.4 F | DIASTOLIC BLOOD PRESSURE: 77 MMHG | HEIGHT: 65 IN

## 2024-03-27 LAB
BACTERIA UR CULT: ABNORMAL
BACTERIA UR CULT: ABNORMAL
GLUCOSE BLD-MCNC: 246 MG/DL (ref 74–106)
GLUCOSE BLD-MCNC: 252 MG/DL (ref 74–106)
GLUCOSE BLD-MCNC: 288 MG/DL (ref 74–106)
ORGANISM: ABNORMAL
PERFORMED ON: ABNORMAL

## 2024-03-27 PROCEDURE — 2700000000 HC OXYGEN THERAPY PER DAY

## 2024-03-27 PROCEDURE — 6370000000 HC RX 637 (ALT 250 FOR IP): Performed by: PHYSICIAN ASSISTANT

## 2024-03-27 PROCEDURE — 6370000000 HC RX 637 (ALT 250 FOR IP): Performed by: NURSE PRACTITIONER

## 2024-03-27 PROCEDURE — G0378 HOSPITAL OBSERVATION PER HR: HCPCS

## 2024-03-27 PROCEDURE — 94761 N-INVAS EAR/PLS OXIMETRY MLT: CPT

## 2024-03-27 PROCEDURE — 99238 HOSP IP/OBS DSCHRG MGMT 30/<: CPT | Performed by: INTERNAL MEDICINE

## 2024-03-27 RX ADMIN — DOXAZOSIN 2 MG: 1 TABLET ORAL at 09:58

## 2024-03-27 RX ADMIN — APIXABAN 2.5 MG: 2.5 TABLET, FILM COATED ORAL at 09:59

## 2024-03-27 RX ADMIN — DULOXETINE HYDROCHLORIDE 60 MG: 30 CAPSULE, DELAYED RELEASE ORAL at 09:59

## 2024-03-27 RX ADMIN — INSULIN LISPRO 1 UNITS: 100 INJECTION, SOLUTION INTRAVENOUS; SUBCUTANEOUS at 12:26

## 2024-03-27 RX ADMIN — PANTOPRAZOLE SODIUM 40 MG: 40 TABLET, DELAYED RELEASE ORAL at 06:25

## 2024-03-27 RX ADMIN — INSULIN LISPRO 15 UNITS: 100 INJECTION, SOLUTION INTRAVENOUS; SUBCUTANEOUS at 10:02

## 2024-03-27 RX ADMIN — GABAPENTIN 600 MG: 300 CAPSULE ORAL at 09:58

## 2024-03-27 RX ADMIN — SENNOSIDES AND DOCUSATE SODIUM 1 TABLET: 8.6; 5 TABLET ORAL at 10:11

## 2024-03-27 RX ADMIN — LORAZEPAM 1 MG: 0.5 TABLET ORAL at 03:53

## 2024-03-27 RX ADMIN — INSULIN LISPRO 15 UNITS: 100 INJECTION, SOLUTION INTRAVENOUS; SUBCUTANEOUS at 12:26

## 2024-03-27 RX ADMIN — INSULIN LISPRO 2 UNITS: 100 INJECTION, SOLUTION INTRAVENOUS; SUBCUTANEOUS at 10:02

## 2024-03-27 RX ADMIN — HYDROCODONE BITARTRATE AND ACETAMINOPHEN 1 TABLET: 10; 325 TABLET ORAL at 03:53

## 2024-03-27 NOTE — PLAN OF CARE
Problem: Discharge Planning  Goal: Discharge to home or other facility with appropriate resources  3/27/2024 1255 by Johanna Espinoza RN  Outcome: Adequate for Discharge  3/27/2024 0844 by Johanna Espinoza RN  Outcome: Progressing  Flowsheets (Taken 3/27/2024 0830)  Discharge to home or other facility with appropriate resources:   Identify barriers to discharge with patient and caregiver   Identify discharge learning needs (meds, wound care, etc)     Problem: Safety - Adult  Goal: Free from fall injury  3/27/2024 1255 by Johanna Espinoza RN  Outcome: Adequate for Discharge  3/27/2024 0844 by Johanna Espinoza RN  Outcome: Progressing     Problem: Skin/Tissue Integrity  Goal: Absence of new skin breakdown  Description: 1.  Monitor for areas of redness and/or skin breakdown  2.  Assess vascular access sites hourly  3.  Every 4-6 hours minimum:  Change oxygen saturation probe site  4.  Every 4-6 hours:  If on nasal continuous positive airway pressure, respiratory therapy assess nares and determine need for appliance change or resting period.  3/27/2024 1255 by Johanna Espinoza RN  Outcome: Adequate for Discharge  3/27/2024 0844 by Johanna Espinoza RN  Outcome: Progressing     Problem: Neurosensory - Adult  Goal: Achieves stable or improved neurological status  3/27/2024 1255 by Johanna Espinoza RN  Outcome: Adequate for Discharge  3/27/2024 0844 by Johanna Espinoza RN  Outcome: Progressing     Problem: Skin/Tissue Integrity - Adult  Goal: Skin integrity remains intact  3/27/2024 1255 by Johanna Espinoza RN  Outcome: Adequate for Discharge  3/27/2024 0844 by Johanna Espinoza RN  Outcome: Progressing  Flowsheets (Taken 3/27/2024 0830)  Skin Integrity Remains Intact: Monitor for areas of redness and/or skin breakdown     Problem: Genitourinary - Adult  Goal: Absence of urinary retention  3/27/2024 1255 by Johanna Espinoza RN  Outcome: Adequate for Discharge  3/27/2024 0844 by Johanna Espinoza RN  Outcome: Progressing     Problem: Infection -  Adult  Goal: Absence of infection at discharge  3/27/2024 1255 by Johanna Espinoza RN  Outcome: Adequate for Discharge  3/27/2024 0844 by Johanna Espinoza RN  Outcome: Progressing     Problem: Metabolic/Fluid and Electrolytes - Adult  Goal: Electrolytes maintained within normal limits  3/27/2024 1255 by Johanna Espinoza RN  Outcome: Adequate for Discharge  3/27/2024 0844 by Johanna Espinoza RN  Outcome: Progressing     Problem: Hematologic - Adult  Goal: Maintains hematologic stability  3/27/2024 1255 by Johanna Espinoza RN  Outcome: Adequate for Discharge  3/27/2024 0844 by Johanna Espinoza RN  Outcome: Progressing     Problem: Pain  Goal: Verbalizes/displays adequate comfort level or baseline comfort level  3/27/2024 1255 by Johanna Espinoza RN  Outcome: Adequate for Discharge  3/27/2024 0844 by Johanna Espinoza RN  Outcome: Progressing

## 2024-03-27 NOTE — PROGRESS NOTES
Patient was discharged home yesterday and medically stable/cleared. Patient's family refused to accept her back home yesterday due to social issues. She boarded here overnight and family agreed to accept her today. She remains stable and sitting up in bed in no distress. Medication was delivered to her bedside yesterday. Urine culture reviewed. EMS transport arranged since she is nonambulatory. Of note, blood culture positive staph epidermidis suspected contaminant she remains stable.    Tia Segovia PA-C

## 2024-03-27 NOTE — PLAN OF CARE
Problem: Discharge Planning  Goal: Discharge to home or other facility with appropriate resources  3/26/2024 0937 by Johanna Espinoza RN  Outcome: Progressing  Flowsheets (Taken 3/26/2024 0815)  Discharge to home or other facility with appropriate resources:   Identify barriers to discharge with patient and caregiver   Identify discharge learning needs (meds, wound care, etc)     Problem: Safety - Adult  Goal: Free from fall injury  3/26/2024 2223 by Jame Kimball RN  Outcome: Progressing  3/26/2024 0937 by Johanna Espinoza RN  Outcome: Progressing     Problem: Skin/Tissue Integrity  Goal: Absence of new skin breakdown  Description: 1.  Monitor for areas of redness and/or skin breakdown  2.  Assess vascular access sites hourly  3.  Every 4-6 hours minimum:  Change oxygen saturation probe site  4.  Every 4-6 hours:  If on nasal continuous positive airway pressure, respiratory therapy assess nares and determine need for appliance change or resting period.  3/26/2024 2223 by Jame Kimball RN  Outcome: Progressing  3/26/2024 0937 by Johanna Espinoza RN  Outcome: Progressing     Problem: Neurosensory - Adult  Goal: Achieves stable or improved neurological status  3/26/2024 0937 by Johanna Espinoza RN  Outcome: Progressing     Problem: Skin/Tissue Integrity - Adult  Goal: Skin integrity remains intact  3/26/2024 0937 by Johanna Espinoza RN  Outcome: Progressing  Flowsheets (Taken 3/26/2024 0815)  Skin Integrity Remains Intact: Monitor for areas of redness and/or skin breakdown     Problem: Genitourinary - Adult  Goal: Absence of urinary retention  3/26/2024 0937 by Johanna Espinoza RN  Outcome: Progressing     Problem: Infection - Adult  Goal: Absence of infection at discharge  3/26/2024 0937 by Johanna Espinoza RN  Outcome: Progressing     Problem: Metabolic/Fluid and Electrolytes - Adult  Goal: Electrolytes maintained within normal limits  3/26/2024 0937 by Johanna Espinoza RN  Outcome: Progressing     Problem: Hematologic -

## 2024-03-27 NOTE — PROGRESS NOTES
Secure message to  on- call with report of Staph epidermidis found in blood cultures. New orders entered.

## 2024-03-27 NOTE — FLOWSHEET NOTE
03/27/24 0830   Assessment   Charting Type Shift assessment   Psychosocial   Psychosocial (WDL) X   Patient Behaviors Verbal  (forgetful, unable to answer questions)   Neurological   Neuro (WDL) X   Level of Consciousness 0   Orientation Level Oriented to person;Disoriented to time;Oriented to place;Oriented to situation   Cognition Follows commands;Poor judgement;Poor safety awareness   [place] Coma Scale   Eye Opening 4   Best Verbal Response 4   Best Motor Response 6   [place] Coma Scale Score 14   HEENT (Head, Ears, Eyes, Nose, & Throat)   HEENT (WDL) X   Right Eye Double vision   Left Eye Double Vision   Right Ear Impaired hearing   Left Ear Impaired hearing   Teeth Missing teeth   Respiratory   Respiratory (WDL) WDL   Respiratory Interventions Cough & deep breathe;H.O.B. elevated   Cardiac   Cardiac (WDL) X   Gastrointestinal   Abdomen Inspection Rounded   RUQ Bowel Sounds Active   LUQ Bowel Sounds Active   RLQ Bowel Sounds Active   LLQ Bowel Sounds Active   Genitourinary   Genitourinary (WDL) X   Suprapubic Tenderness No   Dysuria (Pain/Burning w/Urination) No   Urine Assessment   Urinary Status External catheter   Urinary Incontinence Present   Peripheral Vascular   Peripheral Vascular (WDL) WDL   Edema None   Skin Integumentary    Skin Integumentary (WDL) X   Skin Color Pale   Skin Integrity Redness   Location buttocks   Care Plan - Skin/Tissue Integrity Goals   Skin Integrity Remains Intact Monitor for areas of redness and/or skin breakdown   Musculoskeletal   Musculoskeletal (WDL) X   RUE Weakness   LUE Weakness   RL Extremity Weakness;Unsteady;Other (comment)  (non-ambulatory)   LL Extremity Weakness;Other (comment)  (non ambulatory)   Care Plan - Discharge Planning Goals   Discharge to home or other facility with appropriate resources Identify barriers to discharge with patient and caregiver;Identify discharge learning needs (meds, wound care, etc)     Pt is alert and oriented.  Pt is resting in bed  and appears to have no acute distress. Pt currently on 1.5 L NC. Pt's lung sounds are clear and diminished. Pt encouraged to cough and deep breathe. Pt call bell and bedside table within reach.

## 2024-03-27 NOTE — PLAN OF CARE
Problem: Discharge Planning  Goal: Discharge to home or other facility with appropriate resources  Outcome: Progressing  Flowsheets (Taken 3/27/2024 0830)  Discharge to home or other facility with appropriate resources:   Identify barriers to discharge with patient and caregiver   Identify discharge learning needs (meds, wound care, etc)     Problem: Safety - Adult  Goal: Free from fall injury  3/27/2024 0844 by Johanna Espinoza, RN  Outcome: Progressing  3/26/2024 2223 by Jame Kimball, RN  Outcome: Progressing     Problem: Skin/Tissue Integrity  Goal: Absence of new skin breakdown  Description: 1.  Monitor for areas of redness and/or skin breakdown  2.  Assess vascular access sites hourly  3.  Every 4-6 hours minimum:  Change oxygen saturation probe site  4.  Every 4-6 hours:  If on nasal continuous positive airway pressure, respiratory therapy assess nares and determine need for appliance change or resting period.  3/27/2024 0844 by Johanna Espinoza, RN  Outcome: Progressing  3/26/2024 2223 by Jame Kimball, RN  Outcome: Progressing

## 2024-03-27 NOTE — PROGRESS NOTES
Pt is stable and discharged at this time. IV was removed without complication, tip intact.  Pt's daughter educated on new medications, next dose time and on pharmacy  or delivery. Pt's daughter educated on follow up appointments. Pt verbalized understanding and had no further questions. Pt taken by stretcher to parking lot and left via [facility].

## 2024-03-28 NOTE — CARE COORDINATION
Spoke with Kiana Landry, daughter, who said patient is not better and has been miserable since going home. She said she feels the patient has had \"another episode\" and should not have been discharged. She added her sister stayed awake with their mother all of the previous night. Prior to discharge, her medicines were explained to the family and patient has everything needed to stay at home. The patient has a follow-up with MIRNA Cheng, on 4/2/2024 at 9:45 AM. She had no other questions.

## 2024-03-29 LAB
BACTERIA BLD CULT ORG #2: ABNORMAL
BACTERIA BLD CULT: NORMAL
ORGANISM: ABNORMAL

## 2024-03-31 LAB
BACTERIA BLD CULT ORG #2: NORMAL
BACTERIA BLD CULT: NORMAL

## 2024-04-25 PROBLEM — N39.0 UTI (URINARY TRACT INFECTION): Status: RESOLVED | Noted: 2024-03-25 | Resolved: 2024-04-25

## 2024-07-14 ENCOUNTER — HOSPITAL ENCOUNTER (EMERGENCY)
Facility: HOSPITAL | Age: 84
Discharge: OTHER INSTITUTION | End: 2024-07-14
Attending: STUDENT IN AN ORGANIZED HEALTH CARE EDUCATION/TRAINING PROGRAM
Payer: MEDICARE

## 2024-07-14 ENCOUNTER — HOSPITAL ENCOUNTER (OUTPATIENT)
Facility: HOSPITAL | Age: 84
LOS: 1 days | Discharge: HOME HEALTH | End: 2024-08-09
Attending: HOSPITALIST | Admitting: STUDENT IN AN ORGANIZED HEALTH CARE EDUCATION/TRAINING PROGRAM
Payer: MEDICARE

## 2024-07-14 ENCOUNTER — APPOINTMENT (OUTPATIENT)
Dept: CT IMAGING | Facility: HOSPITAL | Age: 84
End: 2024-07-14
Payer: MEDICARE

## 2024-07-14 ENCOUNTER — APPOINTMENT (OUTPATIENT)
Dept: GENERAL RADIOLOGY | Facility: HOSPITAL | Age: 84
End: 2024-07-14
Payer: MEDICARE

## 2024-07-14 VITALS
RESPIRATION RATE: 20 BRPM | WEIGHT: 210.1 LBS | HEIGHT: 67 IN | DIASTOLIC BLOOD PRESSURE: 54 MMHG | SYSTOLIC BLOOD PRESSURE: 125 MMHG | HEART RATE: 64 BPM | TEMPERATURE: 98.1 F | OXYGEN SATURATION: 99 % | BODY MASS INDEX: 32.98 KG/M2

## 2024-07-14 DIAGNOSIS — F41.9 ANXIETY: ICD-10-CM

## 2024-07-14 DIAGNOSIS — R41.82 ALTERED MENTAL STATUS, UNSPECIFIED ALTERED MENTAL STATUS TYPE: ICD-10-CM

## 2024-07-14 DIAGNOSIS — B37.49 YEAST UTI: ICD-10-CM

## 2024-07-14 DIAGNOSIS — E11.65 HYPERGLYCEMIA DUE TO DIABETES MELLITUS: ICD-10-CM

## 2024-07-14 DIAGNOSIS — E11.9 TYPE 2 DIABETES MELLITUS WITHOUT COMPLICATION, WITHOUT LONG-TERM CURRENT USE OF INSULIN: ICD-10-CM

## 2024-07-14 DIAGNOSIS — R93.0 ABNORMAL CT SCAN, HEAD: Primary | ICD-10-CM

## 2024-07-14 DIAGNOSIS — Z86.73 HISTORY OF ISCHEMIC LEFT MCA STROKE: ICD-10-CM

## 2024-07-14 DIAGNOSIS — I63.9 CEREBROVASCULAR ACCIDENT (CVA), UNSPECIFIED MECHANISM: ICD-10-CM

## 2024-07-14 DIAGNOSIS — R41.0 DISORIENTATION: ICD-10-CM

## 2024-07-14 DIAGNOSIS — R41.841 COGNITIVE COMMUNICATION DEFICIT: Primary | ICD-10-CM

## 2024-07-14 DIAGNOSIS — N17.9 ACUTE RENAL FAILURE SUPERIMPOSED ON CHRONIC KIDNEY DISEASE, UNSPECIFIED ACUTE RENAL FAILURE TYPE, UNSPECIFIED CKD STAGE: ICD-10-CM

## 2024-07-14 DIAGNOSIS — N18.9 ACUTE RENAL FAILURE SUPERIMPOSED ON CHRONIC KIDNEY DISEASE, UNSPECIFIED ACUTE RENAL FAILURE TYPE, UNSPECIFIED CKD STAGE: ICD-10-CM

## 2024-07-14 LAB
ALBUMIN SERPL-MCNC: 3.9 G/DL (ref 3.5–5.2)
ALBUMIN/GLOB SERPL: 1.3 G/DL
ALP SERPL-CCNC: 68 U/L (ref 39–117)
ALT SERPL W P-5'-P-CCNC: 5 U/L (ref 1–33)
ANION GAP SERPL CALCULATED.3IONS-SCNC: 8.2 MMOL/L (ref 5–15)
AST SERPL-CCNC: 13 U/L (ref 1–32)
BACTERIA UR QL AUTO: ABNORMAL /HPF
BACTERIA UR QL AUTO: ABNORMAL /HPF
BASOPHILS # BLD AUTO: 0.03 10*3/MM3 (ref 0–0.2)
BASOPHILS NFR BLD AUTO: 0.4 % (ref 0–1.5)
BILIRUB SERPL-MCNC: 0.2 MG/DL (ref 0–1.2)
BILIRUB UR QL STRIP: NEGATIVE
BILIRUB UR QL STRIP: NEGATIVE
BUN SERPL-MCNC: 35 MG/DL (ref 8–23)
BUN/CREAT SERPL: 17 (ref 7–25)
CALCIUM SPEC-SCNC: 9.2 MG/DL (ref 8.6–10.5)
CHLORIDE SERPL-SCNC: 101 MMOL/L (ref 98–107)
CLARITY UR: ABNORMAL
CLARITY UR: ABNORMAL
CO2 SERPL-SCNC: 27.8 MMOL/L (ref 22–29)
COLOR UR: YELLOW
COLOR UR: YELLOW
CREAT SERPL-MCNC: 2.06 MG/DL (ref 0.57–1)
D DIMER PPP FEU-MCNC: 0.31 MCGFEU/ML (ref 0–0.82)
D-LACTATE SERPL-SCNC: 1.4 MMOL/L (ref 0.5–2)
DEPRECATED RDW RBC AUTO: 50.3 FL (ref 37–54)
EGFRCR SERPLBLD CKD-EPI 2021: 23.7 ML/MIN/1.73
EOSINOPHIL # BLD AUTO: 0.14 10*3/MM3 (ref 0–0.4)
EOSINOPHIL NFR BLD AUTO: 1.8 % (ref 0.3–6.2)
ERYTHROCYTE [DISTWIDTH] IN BLOOD BY AUTOMATED COUNT: 13.2 % (ref 12.3–15.4)
FLUAV RNA RESP QL NAA+PROBE: NOT DETECTED
FLUBV RNA RESP QL NAA+PROBE: NOT DETECTED
GEN 5 2HR TROPONIN T REFLEX: 37 NG/L
GLOBULIN UR ELPH-MCNC: 3.1 GM/DL
GLUCOSE BLDC GLUCOMTR-MCNC: 365 MG/DL (ref 70–130)
GLUCOSE BLDC GLUCOMTR-MCNC: 399 MG/DL (ref 70–130)
GLUCOSE BLDC GLUCOMTR-MCNC: 411 MG/DL (ref 70–130)
GLUCOSE SERPL-MCNC: 436 MG/DL (ref 65–99)
GLUCOSE UR STRIP-MCNC: ABNORMAL MG/DL
GLUCOSE UR STRIP-MCNC: ABNORMAL MG/DL
HCT VFR BLD AUTO: 35.2 % (ref 34–46.6)
HGB BLD-MCNC: 11 G/DL (ref 12–15.9)
HGB UR QL STRIP.AUTO: ABNORMAL
HGB UR QL STRIP.AUTO: ABNORMAL
HOLD SPECIMEN: NORMAL
HOLD SPECIMEN: NORMAL
HYALINE CASTS UR QL AUTO: ABNORMAL /LPF
HYALINE CASTS UR QL AUTO: ABNORMAL /LPF
IMM GRANULOCYTES # BLD AUTO: 0.02 10*3/MM3 (ref 0–0.05)
IMM GRANULOCYTES NFR BLD AUTO: 0.3 % (ref 0–0.5)
KETONES UR QL STRIP: NEGATIVE
KETONES UR QL STRIP: NEGATIVE
LEUKOCYTE ESTERASE UR QL STRIP.AUTO: ABNORMAL
LEUKOCYTE ESTERASE UR QL STRIP.AUTO: ABNORMAL
LYMPHOCYTES # BLD AUTO: 1.52 10*3/MM3 (ref 0.7–3.1)
LYMPHOCYTES NFR BLD AUTO: 19.3 % (ref 19.6–45.3)
MAGNESIUM SERPL-MCNC: 1.6 MG/DL (ref 1.6–2.4)
MCH RBC QN AUTO: 32.3 PG (ref 26.6–33)
MCHC RBC AUTO-ENTMCNC: 31.3 G/DL (ref 31.5–35.7)
MCV RBC AUTO: 103.2 FL (ref 79–97)
MONOCYTES # BLD AUTO: 0.44 10*3/MM3 (ref 0.1–0.9)
MONOCYTES NFR BLD AUTO: 5.6 % (ref 5–12)
NEUTROPHILS NFR BLD AUTO: 5.72 10*3/MM3 (ref 1.7–7)
NEUTROPHILS NFR BLD AUTO: 72.6 % (ref 42.7–76)
NITRITE UR QL STRIP: NEGATIVE
NITRITE UR QL STRIP: NEGATIVE
NRBC BLD AUTO-RTO: 0 /100 WBC (ref 0–0.2)
PH UR STRIP.AUTO: <=5 [PH] (ref 5–8)
PH UR STRIP.AUTO: <=5 [PH] (ref 5–8)
PLATELET # BLD AUTO: 139 10*3/MM3 (ref 140–450)
PMV BLD AUTO: 12.6 FL (ref 6–12)
POTASSIUM SERPL-SCNC: 5.6 MMOL/L (ref 3.5–5.2)
PROT SERPL-MCNC: 7 G/DL (ref 6–8.5)
PROT UR QL STRIP: ABNORMAL
PROT UR QL STRIP: NEGATIVE
RBC # BLD AUTO: 3.41 10*6/MM3 (ref 3.77–5.28)
RBC # UR STRIP: ABNORMAL /HPF
RBC # UR STRIP: ABNORMAL /HPF
REF LAB TEST METHOD: ABNORMAL
REF LAB TEST METHOD: ABNORMAL
SARS-COV-2 RNA RESP QL NAA+PROBE: NOT DETECTED
SODIUM SERPL-SCNC: 137 MMOL/L (ref 136–145)
SP GR UR STRIP: 1.02 (ref 1–1.03)
SP GR UR STRIP: >=1.03 (ref 1–1.03)
SQUAMOUS #/AREA URNS HPF: ABNORMAL /HPF
SQUAMOUS #/AREA URNS HPF: ABNORMAL /HPF
T4 FREE SERPL-MCNC: 0.89 NG/DL (ref 0.92–1.68)
TRANS CELLS #/AREA URNS HPF: ABNORMAL /HPF
TROPONIN T DELTA: 1 NG/L
TROPONIN T SERPL HS-MCNC: 36 NG/L
TROPONIN T SERPL HS-MCNC: 37 NG/L
TSH SERPL DL<=0.05 MIU/L-ACNC: 2.38 UIU/ML (ref 0.27–4.2)
UROBILINOGEN UR QL STRIP: ABNORMAL
UROBILINOGEN UR QL STRIP: ABNORMAL
WBC # UR STRIP: ABNORMAL /HPF
WBC # UR STRIP: ABNORMAL /HPF
WBC CLUMPS # UR AUTO: ABNORMAL /HPF
WBC NRBC COR # BLD AUTO: 7.87 10*3/MM3 (ref 3.4–10.8)
WHOLE BLOOD HOLD COAG: NORMAL
WHOLE BLOOD HOLD SPECIMEN: NORMAL
YEAST URNS QL MICRO: ABNORMAL /HPF
YEAST URNS QL MICRO: ABNORMAL /HPF

## 2024-07-14 PROCEDURE — 71275 CT ANGIOGRAPHY CHEST: CPT

## 2024-07-14 PROCEDURE — 25810000003 SODIUM CHLORIDE 0.9 % SOLUTION: Performed by: INTERNAL MEDICINE

## 2024-07-14 PROCEDURE — 71045 X-RAY EXAM CHEST 1 VIEW: CPT

## 2024-07-14 PROCEDURE — 87040 BLOOD CULTURE FOR BACTERIA: CPT | Performed by: INTERNAL MEDICINE

## 2024-07-14 PROCEDURE — 82607 VITAMIN B-12: CPT | Performed by: INTERNAL MEDICINE

## 2024-07-14 PROCEDURE — 81001 URINALYSIS AUTO W/SCOPE: CPT | Performed by: INTERNAL MEDICINE

## 2024-07-14 PROCEDURE — 99285 EMERGENCY DEPT VISIT HI MDM: CPT

## 2024-07-14 PROCEDURE — 74018 RADEX ABDOMEN 1 VIEW: CPT

## 2024-07-14 PROCEDURE — 82948 REAGENT STRIP/BLOOD GLUCOSE: CPT | Performed by: PHYSICIAN ASSISTANT

## 2024-07-14 PROCEDURE — 99222 1ST HOSP IP/OBS MODERATE 55: CPT | Performed by: INTERNAL MEDICINE

## 2024-07-14 PROCEDURE — 70450 CT HEAD/BRAIN W/O DYE: CPT

## 2024-07-14 PROCEDURE — 63710000001 INSULIN LISPRO (HUMAN) PER 5 UNITS: Performed by: INTERNAL MEDICINE

## 2024-07-14 PROCEDURE — 83605 ASSAY OF LACTIC ACID: CPT | Performed by: INTERNAL MEDICINE

## 2024-07-14 PROCEDURE — 63710000001 INSULIN GLARGINE PER 5 UNITS: Performed by: INTERNAL MEDICINE

## 2024-07-14 PROCEDURE — 82746 ASSAY OF FOLIC ACID SERUM: CPT | Performed by: INTERNAL MEDICINE

## 2024-07-14 PROCEDURE — P9612 CATHETERIZE FOR URINE SPEC: HCPCS

## 2024-07-14 PROCEDURE — 80053 COMPREHEN METABOLIC PANEL: CPT | Performed by: EMERGENCY MEDICINE

## 2024-07-14 PROCEDURE — 87086 URINE CULTURE/COLONY COUNT: CPT | Performed by: INTERNAL MEDICINE

## 2024-07-14 PROCEDURE — 84484 ASSAY OF TROPONIN QUANT: CPT | Performed by: INTERNAL MEDICINE

## 2024-07-14 PROCEDURE — 87636 SARSCOV2 & INF A&B AMP PRB: CPT | Performed by: PHYSICIAN ASSISTANT

## 2024-07-14 PROCEDURE — 87106 FUNGI IDENTIFICATION YEAST: CPT | Performed by: PHYSICIAN ASSISTANT

## 2024-07-14 PROCEDURE — 25810000003 SODIUM CHLORIDE 0.9 % SOLUTION: Performed by: PHYSICIAN ASSISTANT

## 2024-07-14 PROCEDURE — 81001 URINALYSIS AUTO W/SCOPE: CPT | Performed by: PHYSICIAN ASSISTANT

## 2024-07-14 PROCEDURE — 83735 ASSAY OF MAGNESIUM: CPT | Performed by: EMERGENCY MEDICINE

## 2024-07-14 PROCEDURE — 84439 ASSAY OF FREE THYROXINE: CPT | Performed by: PHYSICIAN ASSISTANT

## 2024-07-14 PROCEDURE — 84484 ASSAY OF TROPONIN QUANT: CPT | Performed by: EMERGENCY MEDICINE

## 2024-07-14 PROCEDURE — 25010000002 CEFTRIAXONE PER 250 MG: Performed by: INTERNAL MEDICINE

## 2024-07-14 PROCEDURE — 84443 ASSAY THYROID STIM HORMONE: CPT | Performed by: PHYSICIAN ASSISTANT

## 2024-07-14 PROCEDURE — 82948 REAGENT STRIP/BLOOD GLUCOSE: CPT

## 2024-07-14 PROCEDURE — 85025 COMPLETE CBC W/AUTO DIFF WBC: CPT | Performed by: EMERGENCY MEDICINE

## 2024-07-14 PROCEDURE — 99214 OFFICE O/P EST MOD 30 MIN: CPT

## 2024-07-14 PROCEDURE — 85379 FIBRIN DEGRADATION QUANT: CPT | Performed by: INTERNAL MEDICINE

## 2024-07-14 PROCEDURE — 25510000001 IOPAMIDOL PER 1 ML: Performed by: INTERNAL MEDICINE

## 2024-07-14 PROCEDURE — 93005 ELECTROCARDIOGRAM TRACING: CPT | Performed by: EMERGENCY MEDICINE

## 2024-07-14 PROCEDURE — 87086 URINE CULTURE/COLONY COUNT: CPT | Performed by: PHYSICIAN ASSISTANT

## 2024-07-14 RX ORDER — ONDANSETRON 2 MG/ML
4 INJECTION INTRAMUSCULAR; INTRAVENOUS EVERY 6 HOURS PRN
Status: DISCONTINUED | OUTPATIENT
Start: 2024-07-14 | End: 2024-08-09 | Stop reason: HOSPADM

## 2024-07-14 RX ORDER — ATORVASTATIN CALCIUM 40 MG/1
80 TABLET, FILM COATED ORAL NIGHTLY
Status: DISCONTINUED | OUTPATIENT
Start: 2024-07-14 | End: 2024-08-09 | Stop reason: HOSPADM

## 2024-07-14 RX ORDER — SODIUM CHLORIDE 0.9 % (FLUSH) 0.9 %
10 SYRINGE (ML) INJECTION AS NEEDED
Status: DISCONTINUED | OUTPATIENT
Start: 2024-07-14 | End: 2024-08-09 | Stop reason: HOSPADM

## 2024-07-14 RX ORDER — PANTOPRAZOLE SODIUM 40 MG/1
40 TABLET, DELAYED RELEASE ORAL
Status: DISCONTINUED | OUTPATIENT
Start: 2024-07-15 | End: 2024-07-18

## 2024-07-14 RX ORDER — SODIUM CHLORIDE 0.9 % (FLUSH) 0.9 %
10 SYRINGE (ML) INJECTION EVERY 12 HOURS SCHEDULED
Status: DISCONTINUED | OUTPATIENT
Start: 2024-07-14 | End: 2024-08-09 | Stop reason: HOSPADM

## 2024-07-14 RX ORDER — NICOTINE POLACRILEX 4 MG
15 LOZENGE BUCCAL
Status: DISCONTINUED | OUTPATIENT
Start: 2024-07-14 | End: 2024-08-09 | Stop reason: HOSPADM

## 2024-07-14 RX ORDER — FLUCONAZOLE 150 MG/1
150 TABLET ORAL ONCE
Status: COMPLETED | OUTPATIENT
Start: 2024-07-14 | End: 2024-07-14

## 2024-07-14 RX ORDER — HYDROCODONE BITARTRATE AND ACETAMINOPHEN 10; 325 MG/1; MG/1
1 TABLET ORAL EVERY 6 HOURS PRN
Status: DISCONTINUED | OUTPATIENT
Start: 2024-07-14 | End: 2024-08-09 | Stop reason: HOSPADM

## 2024-07-14 RX ORDER — POLYETHYLENE GLYCOL 3350 17 G/17G
17 POWDER, FOR SOLUTION ORAL DAILY PRN
Status: DISCONTINUED | OUTPATIENT
Start: 2024-07-14 | End: 2024-07-17

## 2024-07-14 RX ORDER — TERAZOSIN 2 MG/1
2 CAPSULE ORAL NIGHTLY
Status: DISCONTINUED | OUTPATIENT
Start: 2024-07-14 | End: 2024-07-23

## 2024-07-14 RX ORDER — ONDANSETRON 4 MG/1
4 TABLET, ORALLY DISINTEGRATING ORAL EVERY 6 HOURS PRN
Status: DISCONTINUED | OUTPATIENT
Start: 2024-07-14 | End: 2024-08-09 | Stop reason: HOSPADM

## 2024-07-14 RX ORDER — SODIUM CHLORIDE 0.9 % (FLUSH) 0.9 %
10 SYRINGE (ML) INJECTION AS NEEDED
Status: DISCONTINUED | OUTPATIENT
Start: 2024-07-14 | End: 2024-07-14 | Stop reason: HOSPADM

## 2024-07-14 RX ORDER — LEVOTHYROXINE SODIUM 0.03 MG/1
25 TABLET ORAL DAILY
Status: DISCONTINUED | OUTPATIENT
Start: 2024-07-14 | End: 2024-08-09 | Stop reason: HOSPADM

## 2024-07-14 RX ORDER — GABAPENTIN 300 MG/1
300 CAPSULE ORAL 2 TIMES DAILY
Status: DISCONTINUED | OUTPATIENT
Start: 2024-07-14 | End: 2024-07-19

## 2024-07-14 RX ORDER — DONEPEZIL HYDROCHLORIDE 10 MG/1
10 TABLET, FILM COATED ORAL NIGHTLY
Status: DISCONTINUED | OUTPATIENT
Start: 2024-07-14 | End: 2024-08-09 | Stop reason: HOSPADM

## 2024-07-14 RX ORDER — MECLIZINE HYDROCHLORIDE 25 MG/1
25 TABLET ORAL 3 TIMES DAILY PRN
Status: DISCONTINUED | OUTPATIENT
Start: 2024-07-14 | End: 2024-08-06 | Stop reason: SDUPTHER

## 2024-07-14 RX ORDER — AMOXICILLIN 250 MG
2 CAPSULE ORAL 2 TIMES DAILY PRN
Status: DISCONTINUED | OUTPATIENT
Start: 2024-07-14 | End: 2024-07-17

## 2024-07-14 RX ORDER — ACETAMINOPHEN 650 MG/1
650 SUPPOSITORY RECTAL EVERY 4 HOURS PRN
Status: DISCONTINUED | OUTPATIENT
Start: 2024-07-14 | End: 2024-08-09 | Stop reason: HOSPADM

## 2024-07-14 RX ORDER — SODIUM CHLORIDE 9 MG/ML
40 INJECTION, SOLUTION INTRAVENOUS AS NEEDED
Status: DISCONTINUED | OUTPATIENT
Start: 2024-07-14 | End: 2024-08-09 | Stop reason: HOSPADM

## 2024-07-14 RX ORDER — INSULIN LISPRO 100 [IU]/ML
2-9 INJECTION, SOLUTION INTRAVENOUS; SUBCUTANEOUS
Status: DISCONTINUED | OUTPATIENT
Start: 2024-07-14 | End: 2024-07-18

## 2024-07-14 RX ORDER — IBUPROFEN 600 MG/1
1 TABLET ORAL
Status: DISCONTINUED | OUTPATIENT
Start: 2024-07-14 | End: 2024-08-09 | Stop reason: HOSPADM

## 2024-07-14 RX ORDER — LORAZEPAM 1 MG/1
1 TABLET ORAL
Status: COMPLETED | OUTPATIENT
Start: 2024-07-15 | End: 2024-07-15

## 2024-07-14 RX ORDER — BUSPIRONE HYDROCHLORIDE 15 MG/1
7.5 TABLET ORAL 2 TIMES DAILY
Status: DISCONTINUED | OUTPATIENT
Start: 2024-07-14 | End: 2024-08-09 | Stop reason: HOSPADM

## 2024-07-14 RX ORDER — SODIUM CHLORIDE 9 MG/ML
75 INJECTION, SOLUTION INTRAVENOUS CONTINUOUS
Status: ACTIVE | OUTPATIENT
Start: 2024-07-14 | End: 2024-07-15

## 2024-07-14 RX ORDER — BISACODYL 5 MG/1
5 TABLET, DELAYED RELEASE ORAL DAILY PRN
Status: DISCONTINUED | OUTPATIENT
Start: 2024-07-14 | End: 2024-07-17

## 2024-07-14 RX ORDER — ACETAMINOPHEN 160 MG/5ML
650 SOLUTION ORAL EVERY 4 HOURS PRN
Status: DISCONTINUED | OUTPATIENT
Start: 2024-07-14 | End: 2024-08-09 | Stop reason: HOSPADM

## 2024-07-14 RX ORDER — BISACODYL 10 MG
10 SUPPOSITORY, RECTAL RECTAL DAILY PRN
Status: DISCONTINUED | OUTPATIENT
Start: 2024-07-14 | End: 2024-07-17

## 2024-07-14 RX ORDER — QUETIAPINE FUMARATE 25 MG/1
50 TABLET, FILM COATED ORAL NIGHTLY
Status: DISCONTINUED | OUTPATIENT
Start: 2024-07-14 | End: 2024-08-09 | Stop reason: HOSPADM

## 2024-07-14 RX ORDER — ACETAMINOPHEN 325 MG/1
650 TABLET ORAL EVERY 4 HOURS PRN
Status: DISCONTINUED | OUTPATIENT
Start: 2024-07-14 | End: 2024-08-09 | Stop reason: HOSPADM

## 2024-07-14 RX ORDER — DEXTROSE MONOHYDRATE 25 G/50ML
25 INJECTION, SOLUTION INTRAVENOUS
Status: DISCONTINUED | OUTPATIENT
Start: 2024-07-14 | End: 2024-08-09 | Stop reason: HOSPADM

## 2024-07-14 RX ORDER — LORAZEPAM 0.5 MG/1
0.5 TABLET ORAL EVERY 12 HOURS
Status: DISCONTINUED | OUTPATIENT
Start: 2024-07-14 | End: 2024-07-18

## 2024-07-14 RX ORDER — ACETAMINOPHEN 500 MG
1000 TABLET ORAL ONCE
Status: COMPLETED | OUTPATIENT
Start: 2024-07-14 | End: 2024-07-14

## 2024-07-14 RX ADMIN — SODIUM CHLORIDE 75 ML/HR: 9 INJECTION, SOLUTION INTRAVENOUS at 22:37

## 2024-07-14 RX ADMIN — ATORVASTATIN CALCIUM 80 MG: 40 TABLET, FILM COATED ORAL at 22:40

## 2024-07-14 RX ADMIN — SODIUM CHLORIDE 1000 ML: 9 INJECTION, SOLUTION INTRAVENOUS at 16:10

## 2024-07-14 RX ADMIN — ACETAMINOPHEN 1000 MG: 500 TABLET, FILM COATED ORAL at 16:06

## 2024-07-14 RX ADMIN — QUETIAPINE FUMARATE 50 MG: 25 TABLET ORAL at 22:40

## 2024-07-14 RX ADMIN — INSULIN LISPRO 6 UNITS: 100 INJECTION, SOLUTION INTRAVENOUS; SUBCUTANEOUS at 22:39

## 2024-07-14 RX ADMIN — IOPAMIDOL 90 ML: 755 INJECTION, SOLUTION INTRAVENOUS at 21:05

## 2024-07-14 RX ADMIN — SODIUM ZIRCONIUM CYCLOSILICATE 10 G: 10 POWDER, FOR SUSPENSION ORAL at 22:39

## 2024-07-14 RX ADMIN — INSULIN GLARGINE 20 UNITS: 100 INJECTION, SOLUTION SUBCUTANEOUS at 22:39

## 2024-07-14 RX ADMIN — DONEPEZIL HYDROCHLORIDE 10 MG: 10 TABLET, FILM COATED ORAL at 22:40

## 2024-07-14 RX ADMIN — FLUCONAZOLE 150 MG: 150 TABLET ORAL at 17:30

## 2024-07-14 RX ADMIN — SODIUM CHLORIDE 1000 ML: 9 INJECTION, SOLUTION INTRAVENOUS at 21:00

## 2024-07-14 RX ADMIN — BUSPIRONE HYDROCHLORIDE 7.5 MG: 15 TABLET ORAL at 22:40

## 2024-07-14 RX ADMIN — LEVOTHYROXINE SODIUM 25 MCG: 25 TABLET ORAL at 22:40

## 2024-07-14 RX ADMIN — HYDROCODONE BITARTRATE AND ACETAMINOPHEN 1 TABLET: 10; 325 TABLET ORAL at 20:20

## 2024-07-14 RX ADMIN — SODIUM CHLORIDE 2000 MG: 900 INJECTION INTRAVENOUS at 22:38

## 2024-07-14 RX ADMIN — TERAZOSIN HYDROCHLORIDE ANHYDROUS 2 MG: 2 CAPSULE ORAL at 22:40

## 2024-07-14 NOTE — LETTER
EMS Transport Request  For use at [facility], [place], [place], [place], and [place] only   Patient Name: Cheri Cruz : 1940   Weight:93.1 kg (205 lb 4 oz) Pick-up Location: [facility] BLS/ALS: BLS/ALS: BLS   Insurance: [employer] MEDICARE REPLACEMENT Auth End Date:    Pre-Cert #: D/C Summary complete:    Destination: Home Will the patient be on the main level 0, Is there a ramp available no, Can the patient stand and pivot No, Address [address] [place] [address], and Name/contact number for who will be present Radha Cruz Daughter [phone] or [phone]   Contact Precautions: None   Equipment (O2, Fluids, etc.): None   Arrive By Date/Time: 24 Late or early 24 Stretcher/WC: Stretcher   CM Requesting: MARCO Moreno (Kay) Ext: [phone]   Notes/Medical Necessity: Gravel driveway leading to the walk way      ______________________________________________________________________    *Only 2 patient bags OR 1 carry-on size bag are permitted.  Wheelchairs and walkers CANNOT transported with the patient. Acknowledge: Yes

## 2024-07-14 NOTE — ED NOTES
At thistime I called [facility] for transport of pt to [facility]

## 2024-07-14 NOTE — CONSULTS
Stroke Consult Note    Patient Name: Cheri Cruz      MRN: [id number]  Age: 83 y.o.     Sex: female     : 1940  Primary Care Physician: Lorrie Canada APRN  Referring Physician:  Nhung Alvarez PA-C  Handedness: Unknown  Race:   Chief Complaint/Reason for Consultation: Altered mental status, concern for hemorrhagic conversion on OSH CTh    HPI  Last Known Normal Date/Time: Unclear time, morning of 2024    This patient is an 82-year-old female with past medical history significant for left posterior parietal stroke (2023), hypertension, hyperlipidemia, type 2 diabetes, atrial fibrillation (on Eliquis), remote pulmonary embolism, and dementia who initially presented to [facility] ED with concern for altered mental status and bodyaches.  Initial ED workup concerning for chronic UTI and acute on chronic renal failure.  CTh showed a hyperattenuation concerning for hemorrhagic conversion of prior stroke versus calcification.  Patient was transferred to [facility] telemetry floor for higher level of care and continued monitoring.    Patient was examined at bedside on arrival to [facility].  On my exam, patient is alert but oriented only to self, answers all other questions incorrectly.  Speech is fluent with no evidence of dysarthria.  NIH 1 for confusion, no other focal neurologic deficits appreciated.  Of note, family at bedside reports the patient had an episode of hypoxia with saturation to the 60s this morning which initially prompted them to seek care at [facility] ED.  Additionally, family states patient's current confusion is her baseline for this hour of the day.  Family states patient suffers routine  affect secondary to dementia.  Patient will be admitted to the hospital medicine service for continued monitoring and ongoing management.    Review of Systems   Unable to perform ROS: Mental status change      Objective  Neurological Exam  Mental Status  Alert. Oriented only  to person. Speech is normal. Language is fluent with no aphasia.    Cranial Nerves  CN II: Visual fields full to confrontation.  CN III, IV, VI: Extraocular movements intact bilaterally. Pupils equal round and reactive to light bilaterally.  CN V:  Right: Facial sensation is normal.  Left: Facial sensation is normal on the left.  CN VII:  Right: There is no facial weakness.  Left: There is no facial weakness.  CN VIII: Hearing grossly intact bilaterally.  CN IX, X: Palate elevates symmetrically  CN XII: Tongue midline without atrophy or fasciculations.    Motor  Normal muscle bulk throughout. Normal muscle tone. Strength is 5/5 throughout all four extremities.    Sensory  Light touch is normal in upper and lower extremities.     Coordination  Right: Finger-to-nose normal. Rapid alternating movement normal.Left: Finger-to-nose normal. Rapid alternating movement normal.    Physical Exam  Constitutional:       General: She is not in acute distress.  HENT:      Head: Normocephalic and atraumatic.      Mouth/Throat:      Mouth: Mucous membranes are moist.   Eyes:      Extraocular Movements: Extraocular movements intact.      Pupils: Pupils are equal, round, and reactive to light.   Cardiovascular:      Rate and Rhythm: Normal rate and regular rhythm.   Pulmonary:      Effort: Pulmonary effort is normal.   Musculoskeletal:         General: No swelling or deformity.   Skin:     General: Skin is warm and dry.   Neurological:      Mental Status: She is alert.      Motor: Motor strength is normal.  Psychiatric:         Mood and Affect: Mood normal.         Speech: Speech normal.         Behavior: Behavior normal.     Temp:  [97.7 °F (36.5 °C)-98.1 °F (36.7 °C)] 97.7 °F (36.5 °C)  Heart Rate:  [64-87] 65  Resp:  [18-20] 18  BP: ()/(46-65) 123/48    Past Medical History:   Diagnosis Date    Abnormal heart rhythm     Anxiety     Arrhythmia     Arthritis     Atrial fibrillation     Dementia     Diabetes mellitus      Hyperlipidemia     Hypertension     Kidney disorder     Stroke     Uterus cancer      Past Surgical History:   Procedure Laterality Date    CATARACT EXTRACTION, BILATERAL       Family History   Problem Relation Age of Onset    Heart disease Mother     Cancer Mother     Congenital heart disease Mother     Cancer Father     Cancer Brother     Alcohol abuse Brother      Social History     Socioeconomic History    Marital status:    Tobacco Use    Smoking status: Never    Smokeless tobacco: Never   Vaping Use    Vaping status: Never Used   Substance and Sexual Activity    Alcohol use: No    Drug use: No    Sexual activity: Defer     Allergies   Allergen Reactions    Penicillins Unknown - Low Severity     Tolerates ceftriaxones    Sulfa Antibiotics     Tetracyclines & Related Unknown (See Comments)    Valium [Diazepam] Anxiety     Prior to Admission medications    Medication Sig Start Date End Date Taking? Authorizing Provider   apixaban (ELIQUIS) 5 MG tablet tablet Take 5 mg by mouth 2 (Two) Times a Day.    Enrique Recio MD   busPIRone (BUSPAR) 7.5 MG tablet Take 7.5 mg by mouth 2 (Two) Times a Day.    Enrique Recio MD   donepezil (ARICEPT) 10 MG tablet Take 10 mg by mouth Every Night.    Enrique Recio MD   DULoxetine (CYMBALTA) 60 MG capsule Take 60 mg by mouth Daily.    Enrique Recio MD   gabapentin (NEURONTIN) 300 MG capsule Take 1 capsule by mouth 2 (Two) Times a Day. 1/28/23   Adam Gsapar MD   HYDROcodone-acetaminophen (NORCO)  MG per tablet Take 1 tablet by mouth Every 6 (Six) Hours As Needed for Moderate Pain.  Patient taking differently: Take 1 tablet by mouth Every 6 (Six) Hours. 1/28/23   Adam Gaspar MD   Insulin Lispro (humaLOG) 100 UNIT/ML injection Inject  under the skin into the appropriate area as directed 3 (Three) Times a Day Before Meals.    Enrique Recio MD   levothyroxine (SYNTHROID, LEVOTHROID) 25 MCG tablet Take 25 mcg by mouth Daily.     ProviderEnrique MD   LORazepam (ATIVAN) 0.5 MG tablet Take 1 tablet by mouth Every 12 (Twelve) Hours. Once in AM once in PM    Enrique Recio MD   meclizine (ANTIVERT) 25 MG tablet TAKE 1 TABLET THREE TIMES DAILY AS NEEDED FOR DIZZINESS  Patient taking differently: 3 (Three) Times a Day. 2/26/20   Zena Blankenship APRN   omeprazole (priLOSEC) 40 MG capsule Take 40 mg by mouth Daily.    Enrique Recio MD   QUEtiapine (SEROquel) 50 MG tablet Take 1 tablet by mouth Every Night for 14 days. 4/12/23 4/26/23  Chepe Benavides MD   rosuvastatin (CRESTOR) 40 MG tablet Take 1 tablet by mouth Every Night. 1/28/23   Adam Gaspar MD   sennosides-docusate (PERICOLACE) 8.6-50 MG per tablet Take 2 tablets by mouth 2 (Two) Times a Day. 2/16/23   Bina Kwon MD   terazosin (HYTRIN) 2 MG capsule Take 1 capsule by mouth Every Night for 30 days. 4/12/23 5/12/23  Chepe Benavides MD     Acute Stroke Data  Alteplase (tPA) Inclusion / Exclusion Criteria  Person Administering Scale: Wayne Elmore PA-C    Inclusion Criteria  [x]   18 years of age or greater   []   Onset of symptoms < 4.5 hours before beginning treatment (stroke onset = time patient was last seen well or without symptoms).   []   Diagnosis of acute ischemic stroke causing measurable disabling deficit (Complete Hemianopia, Any Aphasia, Visual or Sensory Extinction, Any weakness limiting sustained effort against gravity)   []   Any remaining deficit considered potentially disabling in view of patient and practitioner   Exclusion criteria (Do not proceed with Alteplase if any are checked under exclusion criteria)  [x]   Onset unknown or GREATER than 4.5 hours   []   ICH on CT/MRI   []   CT demonstrates hypodensity representing acute or subacute infarct   []   Significant head trauma or prior stroke in the previous 3 months   []   Symptoms suggestive of subarachnoid hemorrhage   []   History of un-ruptured intracranial aneurysm GREATER than 10 mm    []   Recent intracranial or intraspinal surgery within the last 3 months   []   Arterial puncture at a non-compressible site in the previous 7 days   []   Active internal bleeding   []   Acute bleeding tendency   []   Platelet count LESS than 100,000 for known hematological diseases such as leukemia, thrombocytopenia or chronic cirrhosis   []   Current use of anticoagulant with INR GREATER than 1.7 or PT GREATER than 15 seconds, aPTT GREATER than 40 seconds   []   Heparin received within 48 hours, resulting in abnormally elevated aPTT GREATER than upper limit of normal   []   Current use of direct thrombin inhibitors or direct factor Xa inhibitors in the past 48 hours   []   Elevated blood pressure refractory to treatment (systolic GREATER than 185 mm/Hg or diastolic  GREATER than 110 mm/Hg   []   Suspected infective endocarditis and aortic arch dissection   []   Current use of therapeutic treatment dose of low-molecular-weight heparin (LMWH) within the previous 24 hours   []   Structural GI malignancy or bleed   Relative exclusion for all patients  []   Only minor non-disabling symptoms   []   Pregnancy   []   Seizure at onset with postictal residual neurological impairments   []   Major surgery or previous trauma within past 14 days   []   History of previous spontaneous ICH, intracranial neoplasm, or AV malformation   []   Postpartum (within previous 14 days)   []   Recent GI or urinary tract hemorrhage (within previous 21 days)   []   Recent acute MI (within previous 3 months)   []   History of un-ruptured intracranial aneurysm LESS than 10 mm   []   History of ruptured intracranial aneurysm   []   Blood glucose LESS than 50 mg/dL (2.7 mmol/L)   []   Dural puncture within the last 7 days   []   Known GREATER than 10 cerebral microbleeds   Additional exclusions for patients with symptoms onset between 3 and 4.5 hours.  []   Age > 80.   []   On any anticoagulants regardless of INR  >>> Warfarin (Coumadin),  Heparin, Enoxaparin (Lovenox), fondaparinux (Arixtra), bivalirudin (Angiomax), Argatroban, dabigatran (Pradaxa), rivaroxaban (Xarelto), or apixaban (Eliquis)   []   Severe stroke (NIHSS > 25).   []   History of BOTH diabetes and previous ischemic stroke.   []   The risks and benefits have been discussed with the patient or family related to the administration of IV Alteplase for stroke symptoms.   []   I have discussed and reviewed the patient's case and imaging with the attending prior to IV Alteplase.   N/A Time Alteplase administered     MODIFIED DONNELL SCALE (to be assessed for each patient having history of stroke) []Stroke history but not assessed  []0: No symptoms at all  []1: No significant disability despite symptoms  [x]2: Slight disability  []3: Moderate disability  []4: Moderately severe disability  []5: Severe disability  []6: Death      NIH Stroke Scale  Time:   Person Administering Scale: Wayne Elmore PA-C    1a  Level of consciousness: 0=alert; keenly responsive   1b. LOC questions:  1=Performs one task correctly   1c. LOC commands: 0=Performs both tasks correctly   2.  Best Gaze: 0=normal   3.  Visual: 0=No visual loss   4. Facial Palsy: 0=Normal symmetric movement   5a.  Motor left arm: 0=No drift, limb holds 90 (or 45) degrees for full 10 seconds   5b.  Motor right arm: 0=No drift, limb holds 90 (or 45) degrees for full 10 seconds   6a. motor left le=No drift, limb holds 90 (or 45) degrees for full 10 seconds   6b  Motor right le=No drift, limb holds 90 (or 45) degrees for full 10 seconds   7. Limb Ataxia: 0=Absent   8.  Sensory: 0=Normal; no sensory loss   9. Best Language:  0=No aphasia, normal   10. Dysarthria: 0=Normal   11. Extinction and Inattention: 0=No abnormality    Total:    1     Hospital Meds  Scheduled- atorvastatin, 80 mg, Oral, Nightly  busPIRone, 7.5 mg, Oral, BID  donepezil, 10 mg, Oral, Nightly  levothyroxine, 25 mcg, Oral, Daily  [START ON 7/15/2024]  pantoprazole, 40 mg, Oral, Q AM  QUEtiapine, 50 mg, Oral, Nightly  sodium chloride, 10 mL, Intravenous, Q12H  terazosin, 2 mg, Oral, Nightly    Infusions-     PRNs-   HYDROcodone-acetaminophen    sodium chloride    sodium chloride    Results Reviewed  I have personally reviewed current lab, radiology, and data and agree with results.    XR Chest 1 View    Result Date: 7/14/2024  Stable chest..      This report was signed and finalized on 7/14/2024 4:23 PM by Mare Kwong MD.      CT Head Without Contrast    Result Date: 7/14/2024  New cortical high attenuation material at site of previous left posterior parietal CVA compared to April 2023, suspect postischemic cortical calcification but hemorrhage not completely excluded. Recommend brain MRI.     CTDI: 36.09 mGy DLP:604.53 mGy.cm  This report was signed and finalized on 7/14/2024 4:21 PM by Mare Kwong MD.        Significant Labs  Sodium: 137  Creatinine: 2.06  Glucose: 436  WBC: 7.87  Hgb/HCT: 11.0/35.2  Platelets: 139    Assessment and Plan  This patient is an 82-year-old female with risk factors significant for left parietal stroke (1/2023), HTN, HLD, T2DM, AF (on Eliquis), remote PE, and dementia who presented to [facility] ED with concern for altered mental status and bodyaches precipitated by episode of acute hypoxia at home.  OSH CTh with hyperattenuation in the left parietal stroke bed concerning for hemorrhagic conversion versus calcification.  OSH ED workup also concerning for chronic UTI and acute on chronic renal failure.  On arrival to [facility], patient is NIH 1 for confusion.  Family at bedside states she is at baseline mental status.    Antiplatelet PTA: None  Anticoagulant PTA: Eliquis    Altered mental status  -Differentials include toxic/metabolic encephalopathy, low concern for hemorrhagic conversion of prior stroke but cannot exclude  -OSH CTh with hyperattenuation within area of prior left posterior parietal stroke, favor calcification but cannot  exclude hemorrhagic conversion  -MRI brain without contrast pending  -Hold home Eliquis dose tonight, reevaluate tomorrow morning  -Allow permissive hypertension, SBP goal < 180  -NPO pending bedside swallow eval  -TTE pending  -LDL pending, start atorvastatin 80 mg nightly  -A1c pending  -Serial neurochecks per policy, stat Cth for any acute neurological change  -PT/OT/SLP as appropriate    Disposition: Admit to hospital medicine service      Case discussed with the patient, bedside RN, and Dr. Alejandre.  Thank you for the consult. Stroke neurology will continue to follow.       Wayne Elmore PA-C  [facility] Neurology

## 2024-07-14 NOTE — ED NOTES
At this time [facility] arrived to transfer pt to [facility]

## 2024-07-14 NOTE — H&P
[facility] Medicine Services  HISTORY AND PHYSICAL    Patient Name: Cheri Cruz  : 1940  MRN: [id number]  Primary Care Physician: Lorrie Canada APRN  Date of admission: 2024      Subjective   Subjective     Chief Complaint:  Altered mental state    HPI:  Patient with dementia and expressive aphasia.  Cannot contribute much to HPI.  Most of the history is obtained from reviewing her old records and records from [facility].  Attempted to reach her daughter Ms. Garcia 3 times but voicemail is full    Cheri Cruz is a 83 y.o. 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    She presented to [facility] with worsening encephalopathy/altered mental state and generalized bodyaches.  She has baseline dementia but her mental state was significantly below her baseline.  She was found to be hypertensive with systolic in the 90s.  Lab workup with creatinine of 2.0 from her baseline of 1.5.  Potassium 5.6.  Blood sugar 436.  Hemoglobin of 11.0.  Urine analysis with too numerous WBCs.  Chest x-ray clear.  CT head with questionable left parietal tiny hemorrhage versus calcification.  She was transferred to [facility] for neurology evaluation after she was accepted by stroke team.    At the time of the encounter, patient is mildly confused.  She thinks she is in [place].  She is able to tell me the name of her daughter.  Complaining of some lower abdominal pain and burning sensation with a urine otherwise she has no active complaint.      Addendum 8:30 PM:  Managed to reach out to the daughter/Ms. Garcia who lives in provide care to the patient.  Daughter reported to me that her mother  "is bedbound since her stroke in 2023 with minimal activity.  She is incontinent to urine and stool.  She has severe depression.  Over the last few weeks, her memory has been declining as well as her mood.  She became less engaged and more depressed.  Her appetite is good but she eats what she wants to eat.  This morning, her mother started complaining of shortness of breath and kept telling her \" I am going to die\" and when her daughter checked her oxygen saturation, it was in the 60s so she put oxygen on (she is on as needed oxygen at home but does not like to wear it).  Soon after, patient became pale and she could not feel pulse and was about to start CPR when the patient quickly recovered and her oxygen saturation improved to the upper 90s.  At that time, she thought that her mother recovered so she can call 911 immediately.  Later on, her mother kept acting weird and became more lethargic which eventually prompted her to call 911.    Personal History     Past Medical History:   Diagnosis Date    Abnormal heart rhythm     Anxiety     Arrhythmia     Arthritis     Atrial fibrillation     Dementia     Diabetes mellitus     Hyperlipidemia     Hypertension     Kidney disorder     Stroke     Uterus cancer            Past Surgical History:   Procedure Laterality Date    CATARACT EXTRACTION, BILATERAL         Family History: family history includes Alcohol abuse in her brother; Cancer in her brother, father, and mother; Congenital heart disease in her mother; Heart disease in her mother.     Social History:  reports that she has never smoked. She has never used smokeless tobacco. She reports that she does not drink alcohol and does not use drugs.  Social History     Social History Narrative    Not on file       Medications:  Available home medication information reviewed.  DULoxetine, HYDROcodone-acetaminophen, Insulin Lispro, LORazepam, QUEtiapine, apixaban, busPIRone, donepezil, gabapentin, levothyroxine, meclizine, " omeprazole, rosuvastatin, sennosides-docusate, and terazosin    Allergies   Allergen Reactions    Penicillins Unknown - Low Severity     Tolerates ceftriaxones    Sulfa Antibiotics     Tetracyclines & Related Unknown (See Comments)    Valium [Diazepam] Anxiety       Objective   Objective     Vital Signs:   Temp:  [97.7 °F (36.5 °C)-98.1 °F (36.7 °C)] 97.7 °F (36.5 °C)  Heart Rate:  [64-87] 65  Resp:  [18-20] 18  BP: ()/(46-65) 123/48  Flow (L/min):  [2] 2       Physical Exam   General: Pleasantly confused.  Not in distress.  Conversant with expressive aphasia  Head: Atraumatic and normocephalic  Eyes: No Icterus. No pallor  Ears:  Ears appear intact with no abnormalities noted  Throat: No oral lesions, no thrush  Neck: Supple, trachea midline  Lungs: Clear to auscultation bilaterally, equal air entry, no wheezing or crackles  Heart:  Normal S1 and S2, no murmur, no gallop, No JVD, no lower extremity swelling  Abdomen:  Soft, no tenderness, no organomegaly, normal bowel sounds, no organomegaly  Extremities: pulses equal bilaterally  Skin: No bleeding, bruising or rash, normal skin turgor and elasticity  Neurologic: Mild expressive aphasia.  No gross motor deficit  Psych: Alert and oriented x 1    Result Review:  I have personally reviewed the results from the time of this admission to 7/14/2024 19:55 EDT and agree with these findings:  [x]  Laboratory list / accordion  [x]  Microbiology  []  Radiology  [x]  EKG/Telemetry   [x]  Cardiology/Vascular   []  Pathology  [x]  Old records      LAB RESULTS:      Lab 07/14/24  1519   WBC 7.87   HEMOGLOBIN 11.0*   HEMATOCRIT 35.2   PLATELETS 139*   NEUTROS ABS 5.72   IMMATURE GRANS (ABS) 0.02   LYMPHS ABS 1.52   MONOS ABS 0.44   EOS ABS 0.14   .2*         Lab 07/14/24  1519   SODIUM 137   POTASSIUM 5.6*   CHLORIDE 101   CO2 27.8   ANION GAP 8.2   BUN 35*   CREATININE 2.06*   EGFR 23.7*   GLUCOSE 436*   CALCIUM 9.2   MAGNESIUM 1.6   TSH 2.380         Lab  07/14/24  1519   TOTAL PROTEIN 7.0   ALBUMIN 3.9   GLOBULIN 3.1   ALT (SGPT) 5   AST (SGOT) 13   BILIRUBIN 0.2   ALK PHOS 68         Lab 07/14/24  1519   HSTROP T 37*                 UA          12/17/2023    09:48 3/25/2024    01:50 7/14/2024    15:27   Urinalysis   Squamous Epithelial Cells, UA   None Seen    Specific [place], UA 1.020     1.015     >=1.030    Ketones, UA   Negative    Blood, UA Negative     Negative     Trace    Leukocytes, UA Negative     SMALL     Moderate (2+)    Nitrite, UA Negative     Negative     Negative    RBC, UA 0-2      Unable to determine due to loaded field    WBC, UA   11-20    Bacteria, UA Rare      None Seen       Details          This result is from an external source.               Microbiology Results (last 10 days)       Procedure Component Value - Date/Time    COVID-19 and FLU A/B PCR, 1 HR TAT - Swab, Nasopharynx [[id number]]  (Normal) Collected: 07/14/24 1622    Lab Status: Final result Specimen: Swab from Nasopharynx Updated: 07/14/24 1703     COVID19 Not Detected     Influenza A PCR Not Detected     Influenza B PCR Not Detected    Narrative:      Fact sheet for providers: https://www.fda.gov/media/785570/download    Fact sheet for patients: https://www.fda.gov/media/992929/download    Test performed by PCR.            XR Chest 1 View    Result Date: 7/14/2024  PROCEDURE: XR CHEST 1 VW-  HISTORY: Weak/Dizzy/AMS triage protocol, decreased O2 sats. Altered mental status.  COMPARISON: April 29, 2023.  FINDINGS: The heart is stable.. Decreased inspiratory effort noted. There is evidence of old calcified granulomatous disease. Few linear densities are noted bilaterally which are stable. No new area of consolidation seen.. The mediastinum is unremarkable. There is no pneumothorax.  There are no acute osseous abnormalities.      Impression: Stable chest..      This report was signed and finalized on 7/14/2024 4:23 PM by Mare Kwong MD.      CT Head Without Contrast    Result  Date: 7/14/2024  PROCEDURE: CT HEAD WO CONTRAST-  HISTORY: increased AMS, weakness, h/o stroke  COMPARISON: April 29, 2023..  TECHNIQUE: Multiple axial CT images were performed from the foramen magnum to the vertex. Individualized dose reduction techniques using automated exposure control or adjustment of mA and/or kV according to the patient size were employed.  FINDINGS: There is moderate, age-appropriate, stable generalized cerebral atrophy. The ventricles are enlarged. Again noted is the area of encephalomalacia in the left posterior parietal region. Extent is stable from prior exam. High attenuation has developed in the cortex has in the regions of the previous MCA; finding is new compared to the prior exam. Suspect this represents postischemic cortical calcification but hemorrhage is not completely excluded. Recommend brain MRI. There is no evidence of edema or hemorrhage.  No masses are identified. No extra-axial fluid is seen. The paranasal sinuses are unremarkable. Mild small vessel ischemic disease noted.      Impression: New cortical high attenuation material at site of previous left posterior parietal CVA compared to April 2023, suspect postischemic cortical calcification but hemorrhage not completely excluded. Recommend brain MRI.     CTDI: 36.09 mGy DLP:604.53 mGy.cm  This report was signed and finalized on 7/14/2024 4:21 PM by Mare Kwong MD.       Results for orders placed during the hospital encounter of 02/10/23    Adult Transthoracic Echo Complete W/ Cont if Necessary Per Protocol    Interpretation Summary    Left ventricular systolic function is hyperdynamic (EF > 70%). Calculated left ventricular EF = 70.6% Left ventricular ejection fraction appears to be greater than 70%. The left ventricular cavity is small in size. Left ventricular wall thickness is consistent with mild concentric hypertrophy. All left ventricular wall segments contract normally. Left ventricular intracavitary gradient noted  to be 71 mmHg. Left ventricular diastolic function is consistent with (grade I) impaired relaxation. Normal left atrial pressure.    The aortic valve is abnormal in structure. There is mild calcification of the aortic valve mainly affecting the right coronary cusp(s). The aortic valve appears trileaflet. No aortic valve regurgitation is present. Gradient noted through the LV and LVOT    Compared to TTE report from [facility] Dec 2019, hyperdynamic LV with intracavitary gradient is not a new finding but peak gradient noted on this exam is higher than previously described.      Assessment & Plan   Assessment & Plan       * No active hospital problems. *      Summary:  Cheri Cruz is a 83 y.o. 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 for altered mental state and possible hemorrhagic stroke.    Concern for left parietal tiny hemorrhage versus calcification  Old left parietal ischemic stroke  Acute metabolic encephalopathy  Possible UTI  Baseline dementia  Patient with baseline dementia.  Presented to [facility] with cognitive decline and metabolic encephalopathy.  She was hypotensive with systolic in the 90s.  Patient does report urinary symptoms in the form of urgency, frequency and suprapubic tenderness.  UA with too numerous WBCs without bacteriuria.  Will initiate antibiotic therapy with IV Rocephin and repeat the urine analysis and obtain urine culture.  Patient has previous UTI with Enterococcus faecium  CT from [facility] with left parietal tiny hemorrhage versus calcification on top of old ischemic stroke.  Discussed with stroke team and plan to hold Eliquis for now.  Neurology to evaluate  Holding sedatives: Gabapentin, meclizine and lorazepam    Addendum 8:30 PM  Acute hypoxia,  improved  ?  Near syncope  Per patient report, patient became hypoxic this morning with oxygen saturation in the  60s.  At that time, she could not feel pulse and patient became pale.  Quickly recovered after she applied oxygen  Patient is on Eliquis 2.5 mg twice daily for previous history of PE and for A-fib.  With that history mentioned above, I am concerned about PE causing her hypoxia and near syncope  Will proceed with CTA chest.  Benefit outweighs the risk    RADHA on CKD stage III  Dehydration volume depletion  Mild hyperkalemia  IV fluids  Monitor kidney functions with a.m. lab    Type 2 diabetes with uncontrolled hyperglycemia  Check A1c  Insulin glargine and SSI    Essential hypertension  Was hypotensive and [facility].  No blood pressure improved  Not on antihypertensive  medications    Dyslipidemia  Statins    Dementia  Continue donepezil    VTE Prophylaxis:  Mechanical VTE prophylaxis orders are present.          CODE STATUS:    There are no questions and answers to display.       Expected Discharge   Expected Discharge Date: 7/15/2024; Expected Discharge Time:      Otilia Chang MD  07/14/24

## 2024-07-15 ENCOUNTER — APPOINTMENT (OUTPATIENT)
Dept: CARDIOLOGY | Facility: HOSPITAL | Age: 84
End: 2024-07-15
Payer: MEDICARE

## 2024-07-15 ENCOUNTER — APPOINTMENT (OUTPATIENT)
Dept: MRI IMAGING | Facility: HOSPITAL | Age: 84
End: 2024-07-15
Payer: MEDICARE

## 2024-07-15 PROBLEM — I63.9 STROKE: Status: ACTIVE | Noted: 2024-07-15

## 2024-07-15 LAB
ALBUMIN SERPL-MCNC: 3.6 G/DL (ref 3.5–5.2)
ALBUMIN/GLOB SERPL: 1.4 G/DL
ALP SERPL-CCNC: 58 U/L (ref 39–117)
ALT SERPL W P-5'-P-CCNC: 5 U/L (ref 1–33)
ANION GAP SERPL CALCULATED.3IONS-SCNC: 13 MMOL/L (ref 5–15)
AST SERPL-CCNC: 12 U/L (ref 1–32)
BASOPHILS # BLD AUTO: 0.02 10*3/MM3 (ref 0–0.2)
BASOPHILS NFR BLD AUTO: 0.2 % (ref 0–1.5)
BILIRUB SERPL-MCNC: <0.2 MG/DL (ref 0–1.2)
BUN SERPL-MCNC: 26 MG/DL (ref 8–23)
BUN/CREAT SERPL: 17 (ref 7–25)
CALCIUM SPEC-SCNC: 8.2 MG/DL (ref 8.6–10.5)
CHLORIDE SERPL-SCNC: 104 MMOL/L (ref 98–107)
CHOLEST SERPL-MCNC: 106 MG/DL (ref 0–200)
CO2 SERPL-SCNC: 23 MMOL/L (ref 22–29)
CREAT SERPL-MCNC: 1.53 MG/DL (ref 0.57–1)
CRP SERPL-MCNC: <0.3 MG/DL (ref 0–0.5)
DEPRECATED RDW RBC AUTO: 49.6 FL (ref 37–54)
EGFRCR SERPLBLD CKD-EPI 2021: 33.8 ML/MIN/1.73
EOSINOPHIL # BLD AUTO: 0.25 10*3/MM3 (ref 0–0.4)
EOSINOPHIL NFR BLD AUTO: 3 % (ref 0.3–6.2)
ERYTHROCYTE [DISTWIDTH] IN BLOOD BY AUTOMATED COUNT: 13.1 % (ref 12.3–15.4)
FOLATE SERPL-MCNC: 15.2 NG/ML (ref 4.78–24.2)
GLOBULIN UR ELPH-MCNC: 2.5 GM/DL
GLUCOSE BLDC GLUCOMTR-MCNC: 292 MG/DL (ref 70–130)
GLUCOSE BLDC GLUCOMTR-MCNC: 319 MG/DL (ref 70–130)
GLUCOSE BLDC GLUCOMTR-MCNC: 359 MG/DL (ref 70–130)
GLUCOSE BLDC GLUCOMTR-MCNC: 386 MG/DL (ref 70–130)
GLUCOSE BLDC GLUCOMTR-MCNC: 392 MG/DL (ref 70–130)
GLUCOSE SERPL-MCNC: 306 MG/DL (ref 65–99)
HBA1C MFR BLD: 9.8 % (ref 4.8–5.6)
HCT VFR BLD AUTO: 33.5 % (ref 34–46.6)
HDLC SERPL-MCNC: 40 MG/DL (ref 40–60)
HGB BLD-MCNC: 10.3 G/DL (ref 12–15.9)
IMM GRANULOCYTES # BLD AUTO: 0.02 10*3/MM3 (ref 0–0.05)
IMM GRANULOCYTES NFR BLD AUTO: 0.2 % (ref 0–0.5)
LDLC SERPL CALC-MCNC: 43 MG/DL (ref 0–100)
LDLC/HDLC SERPL: 0.99 {RATIO}
LYMPHOCYTES # BLD AUTO: 2.28 10*3/MM3 (ref 0.7–3.1)
LYMPHOCYTES NFR BLD AUTO: 27.5 % (ref 19.6–45.3)
MAGNESIUM SERPL-MCNC: 1.5 MG/DL (ref 1.6–2.4)
MCH RBC QN AUTO: 32 PG (ref 26.6–33)
MCHC RBC AUTO-ENTMCNC: 30.7 G/DL (ref 31.5–35.7)
MCV RBC AUTO: 104 FL (ref 79–97)
MONOCYTES # BLD AUTO: 0.44 10*3/MM3 (ref 0.1–0.9)
MONOCYTES NFR BLD AUTO: 5.3 % (ref 5–12)
NEUTROPHILS NFR BLD AUTO: 5.29 10*3/MM3 (ref 1.7–7)
NEUTROPHILS NFR BLD AUTO: 63.8 % (ref 42.7–76)
NRBC BLD AUTO-RTO: 0.2 /100 WBC (ref 0–0.2)
PHOSPHATE SERPL-MCNC: 4.2 MG/DL (ref 2.5–4.5)
PLATELET # BLD AUTO: 124 10*3/MM3 (ref 140–450)
PMV BLD AUTO: 12.1 FL (ref 6–12)
POTASSIUM SERPL-SCNC: 4.6 MMOL/L (ref 3.5–5.2)
PROT SERPL-MCNC: 6.1 G/DL (ref 6–8.5)
RBC # BLD AUTO: 3.22 10*6/MM3 (ref 3.77–5.28)
SODIUM SERPL-SCNC: 140 MMOL/L (ref 136–145)
TRIGL SERPL-MCNC: 132 MG/DL (ref 0–150)
VIT B12 BLD-MCNC: 550 PG/ML (ref 211–946)
VLDLC SERPL-MCNC: 23 MG/DL (ref 5–40)
WBC NRBC COR # BLD AUTO: 8.3 10*3/MM3 (ref 3.4–10.8)

## 2024-07-15 PROCEDURE — 80061 LIPID PANEL: CPT

## 2024-07-15 PROCEDURE — 83735 ASSAY OF MAGNESIUM: CPT | Performed by: INTERNAL MEDICINE

## 2024-07-15 PROCEDURE — 85025 COMPLETE CBC W/AUTO DIFF WBC: CPT | Performed by: INTERNAL MEDICINE

## 2024-07-15 PROCEDURE — 97162 PT EVAL MOD COMPLEX 30 MIN: CPT

## 2024-07-15 PROCEDURE — A9270 NON-COVERED ITEM OR SERVICE: HCPCS | Performed by: STUDENT IN AN ORGANIZED HEALTH CARE EDUCATION/TRAINING PROGRAM

## 2024-07-15 PROCEDURE — A9270 NON-COVERED ITEM OR SERVICE: HCPCS | Performed by: INTERNAL MEDICINE

## 2024-07-15 PROCEDURE — 83036 HEMOGLOBIN GLYCOSYLATED A1C: CPT

## 2024-07-15 PROCEDURE — 99232 SBSQ HOSP IP/OBS MODERATE 35: CPT | Performed by: INTERNAL MEDICINE

## 2024-07-15 PROCEDURE — 63710000001 INSULIN GLARGINE PER 5 UNITS: Performed by: INTERNAL MEDICINE

## 2024-07-15 PROCEDURE — 63710000001 TERAZOSIN 2 MG CAPSULE: Performed by: INTERNAL MEDICINE

## 2024-07-15 PROCEDURE — 25010000002 CEFTRIAXONE PER 250 MG: Performed by: INTERNAL MEDICINE

## 2024-07-15 PROCEDURE — 25010000002 MAGNESIUM SULFATE 2 GM/50ML SOLUTION: Performed by: INTERNAL MEDICINE

## 2024-07-15 PROCEDURE — 63710000001 HYDROCODONE-ACETAMINOPHEN 10-325 MG TABLET: Performed by: INTERNAL MEDICINE

## 2024-07-15 PROCEDURE — 80053 COMPREHEN METABOLIC PANEL: CPT | Performed by: INTERNAL MEDICINE

## 2024-07-15 PROCEDURE — 92523 SPEECH SOUND LANG COMPREHEN: CPT

## 2024-07-15 PROCEDURE — 82948 REAGENT STRIP/BLOOD GLUCOSE: CPT

## 2024-07-15 PROCEDURE — 25810000003 SODIUM CHLORIDE 0.9 % SOLUTION: Performed by: INTERNAL MEDICINE

## 2024-07-15 PROCEDURE — 70551 MRI BRAIN STEM W/O DYE: CPT

## 2024-07-15 PROCEDURE — 25010000002 CEFTRIAXONE PER 250 MG: Performed by: STUDENT IN AN ORGANIZED HEALTH CARE EDUCATION/TRAINING PROGRAM

## 2024-07-15 PROCEDURE — 63710000001 DONEPEZIL 10 MG TABLET: Performed by: INTERNAL MEDICINE

## 2024-07-15 PROCEDURE — G0378 HOSPITAL OBSERVATION PER HR: HCPCS

## 2024-07-15 PROCEDURE — 86140 C-REACTIVE PROTEIN: CPT | Performed by: INTERNAL MEDICINE

## 2024-07-15 PROCEDURE — 63710000001 QUETIAPINE 25 MG TABLET: Performed by: INTERNAL MEDICINE

## 2024-07-15 PROCEDURE — 97166 OT EVAL MOD COMPLEX 45 MIN: CPT

## 2024-07-15 PROCEDURE — 63710000001 BUSPIRONE 15 MG TABLET: Performed by: INTERNAL MEDICINE

## 2024-07-15 PROCEDURE — A9270 NON-COVERED ITEM OR SERVICE: HCPCS

## 2024-07-15 PROCEDURE — 63710000001 APIXABAN 5 MG TABLET: Performed by: STUDENT IN AN ORGANIZED HEALTH CARE EDUCATION/TRAINING PROGRAM

## 2024-07-15 PROCEDURE — 63710000001 INSULIN LISPRO (HUMAN) PER 5 UNITS: Performed by: INTERNAL MEDICINE

## 2024-07-15 PROCEDURE — 97535 SELF CARE MNGMENT TRAINING: CPT

## 2024-07-15 PROCEDURE — 63710000001 ATORVASTATIN 40 MG TABLET

## 2024-07-15 PROCEDURE — 84100 ASSAY OF PHOSPHORUS: CPT | Performed by: INTERNAL MEDICINE

## 2024-07-15 RX ORDER — MAGNESIUM SULFATE HEPTAHYDRATE 40 MG/ML
2 INJECTION, SOLUTION INTRAVENOUS
Status: COMPLETED | OUTPATIENT
Start: 2024-07-15 | End: 2024-07-15

## 2024-07-15 RX ADMIN — MAGNESIUM SULFATE HEPTAHYDRATE 2 G: 2 INJECTION, SOLUTION INTRAVENOUS at 10:24

## 2024-07-15 RX ADMIN — INSULIN LISPRO 8 UNITS: 100 INJECTION, SOLUTION INTRAVENOUS; SUBCUTANEOUS at 17:20

## 2024-07-15 RX ADMIN — DONEPEZIL HYDROCHLORIDE 10 MG: 10 TABLET, FILM COATED ORAL at 21:22

## 2024-07-15 RX ADMIN — LORAZEPAM 1 MG: 1 TABLET ORAL at 03:52

## 2024-07-15 RX ADMIN — Medication 10 ML: at 21:23

## 2024-07-15 RX ADMIN — INSULIN GLARGINE 25 UNITS: 100 INJECTION, SOLUTION SUBCUTANEOUS at 21:22

## 2024-07-15 RX ADMIN — BUSPIRONE HYDROCHLORIDE 7.5 MG: 15 TABLET ORAL at 09:13

## 2024-07-15 RX ADMIN — HYDROCODONE BITARTRATE AND ACETAMINOPHEN 1 TABLET: 10; 325 TABLET ORAL at 17:52

## 2024-07-15 RX ADMIN — MAGNESIUM SULFATE HEPTAHYDRATE 2 G: 2 INJECTION, SOLUTION INTRAVENOUS at 12:45

## 2024-07-15 RX ADMIN — LEVOTHYROXINE SODIUM 25 MCG: 25 TABLET ORAL at 09:13

## 2024-07-15 RX ADMIN — ATORVASTATIN CALCIUM 80 MG: 40 TABLET, FILM COATED ORAL at 21:22

## 2024-07-15 RX ADMIN — MAGNESIUM SULFATE HEPTAHYDRATE 2 G: 2 INJECTION, SOLUTION INTRAVENOUS at 15:11

## 2024-07-15 RX ADMIN — INSULIN LISPRO 6 UNITS: 100 INJECTION, SOLUTION INTRAVENOUS; SUBCUTANEOUS at 07:49

## 2024-07-15 RX ADMIN — INSULIN LISPRO 7 UNITS: 100 INJECTION, SOLUTION INTRAVENOUS; SUBCUTANEOUS at 21:21

## 2024-07-15 RX ADMIN — Medication 10 ML: at 09:12

## 2024-07-15 RX ADMIN — TERAZOSIN HYDROCHLORIDE ANHYDROUS 2 MG: 2 CAPSULE ORAL at 21:22

## 2024-07-15 RX ADMIN — QUETIAPINE FUMARATE 50 MG: 25 TABLET ORAL at 21:22

## 2024-07-15 RX ADMIN — SODIUM CHLORIDE 75 ML/HR: 9 INJECTION, SOLUTION INTRAVENOUS at 17:52

## 2024-07-15 RX ADMIN — APIXABAN 5 MG: 5 TABLET, FILM COATED ORAL at 21:21

## 2024-07-15 RX ADMIN — SODIUM CHLORIDE 2000 MG: 900 INJECTION INTRAVENOUS at 21:22

## 2024-07-15 RX ADMIN — HYDROCODONE BITARTRATE AND ACETAMINOPHEN 1 TABLET: 10; 325 TABLET ORAL at 21:21

## 2024-07-15 RX ADMIN — PANTOPRAZOLE SODIUM 40 MG: 40 TABLET, DELAYED RELEASE ORAL at 06:53

## 2024-07-15 RX ADMIN — BUSPIRONE HYDROCHLORIDE 7.5 MG: 15 TABLET ORAL at 21:22

## 2024-07-15 RX ADMIN — INSULIN LISPRO 8 UNITS: 100 INJECTION, SOLUTION INTRAVENOUS; SUBCUTANEOUS at 11:49

## 2024-07-15 NOTE — PLAN OF CARE
Problem: Adult Inpatient Plan of Care  Goal: Plan of Care Review  Outcome: Ongoing, Progressing  Flowsheets (Taken 7/15/2024 0523)  Progress: improving  Plan of Care Reviewed With: patient  Goal: Patient-Specific Goal (Individualized)  Outcome: Ongoing, Progressing  Goal: Absence of Hospital-Acquired Illness or Injury  Outcome: Ongoing, Progressing  Intervention: Identify and Manage Fall Risk  Description: Perform standard risk assessment on admission using a validated tool or comprehensive approach appropriate to the patient; reassess fall risk frequently, with change in status or transfer to another level of care.  Communicate fall injury risk to interprofessional healthcare team.  Determine need for increased observation, equipment and environmental modification, such as low bed, signage and supportive, nonskid footwear.  Adjust safety measures to individual developmental age, stage and identified risk factors.  Reinforce the importance of safety and physical activity with patient and family.  Perform regular intentional rounding to assess need for position change, pain assessment and personal needs, including assistance with toileting.  Recent Flowsheet Documentation  Taken 7/14/2024 2030 by Tiffany Neal RN  Safety Promotion/Fall Prevention:   nonskid shoes/slippers when out of bed   safety round/check completed   room organization consistent   lighting adjusted   activity supervised   assistive device/personal items within reach   clutter free environment maintained  Intervention: Prevent Skin Injury  Description: Perform a screening for skin injury risk, such as pressure or moisture associated skin damage on admission and at regular intervals throughout hospital stay.  Keep all areas of skin (especially folds) clean and dry.  Maintain adequate skin hydration.  Relieve and redistribute pressure and protect bony prominences; implement measures based on patient-specific risk factors.  Match turning and  repositioning schedule to clinical condition.  Encourage weight shift frequently; assist with reposition if unable to complete independently.  Float heels off bed; avoid pressure on the Achilles tendon.  Keep skin free from extended contact with medical devices.  Encourage functional activity and mobility, as early as tolerated.  Use aids (e.g., slide boards, mechanical lift) during transfer.  Recent Flowsheet Documentation  Taken 7/14/2024 2030 by Tiffany Neal RN  Body Position: turned  Skin Protection:   incontinence pads utilized   adhesive use limited  Intervention: Prevent and Manage VTE (Venous Thromboembolism) Risk  Description: Assess for VTE (venous thromboembolism) risk.  Encourage and assist with early ambulation.  Initiate and maintain compression or other therapy, as indicated, based on identified risk in accordance with organizational protocol and provider order.  Encourage both active and passive leg exercises while in bed, if unable to ambulate.  Recent Flowsheet Documentation  Taken 7/14/2024 2030 by Tiffany Neal RN  Range of Motion: active ROM (range of motion) encouraged  Intervention: Prevent Infection  Description: Maintain skin and mucous membrane integrity; promote hand, oral and pulmonary hygiene.  Optimize fluid balance, nutrition, sleep and glycemic control to maximize infection resistance.  Identify potential sources of infection early to prevent or mitigate progression of infection (e.g., wound, lines, devices).  Evaluate ongoing need for invasive devices; remove promptly when no longer indicated.  Recent Flowsheet Documentation  Taken 7/14/2024 2030 by Tiffany Neal RN  Infection Prevention:   environmental surveillance performed   rest/sleep promoted   hand hygiene promoted  Goal: Optimal Comfort and Wellbeing  Outcome: Ongoing, Progressing  Intervention: Provide Person-Centered Care  Description: Use a family-focused approach to care.  Develop trust and rapport by  proactively providing information, encouraging questions, addressing concerns and offering reassurance.  Acknowledge emotional response to hospitalization.  Recognize and utilize personal coping strategies.  Honor spiritual and cultural preferences.  Recent Flowsheet Documentation  Taken 7/14/2024 2030 by Tiffany Neal RN  Trust Relationship/Rapport:   care explained   choices provided   emotional support provided   empathic listening provided   questions answered   questions encouraged   reassurance provided   thoughts/feelings acknowledged  Goal: Readiness for Transition of Care  Outcome: Ongoing, Progressing     Problem: Skin Injury Risk Increased  Goal: Skin Health and Integrity  Outcome: Ongoing, Progressing  Intervention: Optimize Skin Protection  Description: Perform a full pressure injury risk assessment, as indicated by screening, upon admission to care unit.  Reassess skin (injury risk, full inspection) frequently (e.g., scheduled interval, with change in condition) to provide optimal early detection and prevention.  Maintain adequate tissue perfusion (e.g., encourage fluid balance; avoid crossing legs, constrictive clothing or devices) to promote tissue oxygenation.  Maintain head of bed at lowest degree of elevation tolerated, considering medical condition and other restrictions.  Avoid positioning onto an area that remains reddened.  Minimize incontinence and moisture (e.g., toileting schedule; moisture-wicking pad, diaper or incontinence collection device; skin moisture barrier).  Cleanse skin promptly and gently when soiled utilizing a pH-balanced cleanser.  Relieve and redistribute pressure (e.g., scheduled position changes, weight shifts, use of support surface, medical device repositioning, protective dressing application, use of positioning device, microclimate control, use of pressure-injury-monitor  Encourage increased activity, such as sitting in a chair at the bedside or early  mobilization, when able to tolerate.  Recent Flowsheet Documentation  Taken 7/14/2024 2030 by Tiffany Neal RN  Pressure Reduction Techniques:   weight shift assistance provided   frequent weight shift encouraged  Head of Bed (HOB) Positioning: HOB elevated  Pressure Reduction Devices: pressure-redistributing mattress utilized  Skin Protection:   incontinence pads utilized   adhesive use limited     Problem: Diabetes Comorbidity  Goal: Blood Glucose Level Within Targeted Range  Outcome: Ongoing, Progressing     Problem: Hypertension Comorbidity  Goal: Blood Pressure in Desired Range  Outcome: Ongoing, Progressing     Problem: Pain Chronic (Persistent) (Comorbidity Management)  Goal: Acceptable Pain Control and Functional Ability  Outcome: Ongoing, Progressing  Intervention: Optimize Psychosocial Wellbeing  Description: Facilitate patient’s self-control over pain by providing pain information and allowing choices in treatment.  Consider and address emotional response to pain.  Explore and promote use of coping strategies; address barriers to successful coping.  Evaluate and assist with psychosocial, cultural and spiritual factors impacting pain.  Modify pain perception by using techniques, such as distraction, mindfulness, guided imagery, meditation or music.  Assess and monitor for signs and symptoms of behavioral health concerns, such as unhealthy substance use, depression and suicidal ideation.  Consider referral for ongoing coping support, such as cognitive behavioral therapy and mindfulness-based stress reduction.  Recent Flowsheet Documentation  Taken 7/14/2024 2030 by Tiffany Neal RN  Supportive Measures: active listening utilized  Diversional Activities: television  Family/Support System Care:   involvement promoted   presence promoted   Goal Outcome Evaluation:  Plan of Care Reviewed With: patient        Progress: improving

## 2024-07-15 NOTE — PLAN OF CARE
Goal Outcome Evaluation:  Plan of Care Reviewed With: patient                  Anticipated Discharge Disposition (SLP): home with 24/7 care    SLP Diagnosis: cognitive-linguistic disorder (07/15/24 2100)

## 2024-07-15 NOTE — CONSULTS
"Diabetes Education  Assessment/Teaching    Patient Name:  Cheri Cruz  YOB: 1940  MRN: [id number]  Admit Date:  7/14/2024      Assessment Date:  7/15/2024  Flowsheet Row Most Recent Value   General Information     Referral From: Other (comment)  [per stroke protocol]   Height 170.2 cm (67\")   Height Method Estimated   Weight 93.1 kg (205 lb 4 oz)   Weight Method Bed scale   Is patient pregnant? no   Pregnancy Assessment    Diabetes History    What type of diabetes do you have? Type 2   Length of Diabetes Diagnosis 10 + years   Current DM knowledge poor   Do you test your blood sugar at home? yes   Frequency of checks 2-3 times per day   Who performs the test? daughter   Have you had low blood sugar? (<70mg/dl) yes   How often do you have low blood sugar? occasionally  [Daughter states that she was dropping into the low 60s weekly, but that has resolved recently.]   Have you had high blood sugar? (>140mg/dl) yes   How often do you have high blood sugar? --  [Daughter states her average BG readings have been in the 300s-400s]   How would you rate your diabetes control? poor   Education Preferences    What areas of diabetes would you like to learn about? avoiding high blood sugar, avoiding low blood sugar, diabetes complications, diet information, medications for diabetes, resources on diabetes, stress/coping skills, testing my blood sugar at home, understanding diabetes   Barriers to Learning changes in sensation   Nutrition Information    Assessment Topics    Healthy Eating - Assessment Needs education   Being Active - Assessment Needs education   Taking Medication - Assessment Needs education   Problem Solving - Assessment Needs education   Reducing Risk - Assessment Needs education   Healthy Coping - Assessment Needs education   Monitoring - Assessment Needs education   DM Goals             Flowsheet Row Most Recent Value   DM Education Needs    Meter Has own   Frequency of Testing 2 times a " "day   Blood Glucose Target Range Target ranges per ADA guidelines   Medication Insulin   Problem Solving Hypoglycemia, Sick days, Hyperglycemia, Signs, Symptoms, Treatment   Reducing Risks A1C testing, Lipids, Blood pressure, Eye exam, Foot care   Physical Activity None   Physical Activity Frequency Never   Healthy Coping Appropriate   Motivation Moderate   Teaching Method Explanation, Discussion, Handouts   Patient Response Needs reinforcement          Total time spent reviewing chart, preparing education/materials, providing education at bedside, and coordinating care approx 45 minutes.    Consult for diabetes education received per stroke protocol. Chart reviewed.  was seen at bedside today. Permission given for visit.     Discussed and reviewed concepts about type 2 diabetes self-management, risk factors, and importance of blood glucose control to reduce complications. Target blood glucose readings and A1c goals per ADA were reviewed. Reviewed with patient current A1c 9.8 and discussed its significance. Signs, symptoms, and treatment of hyperglycemia and hypoglycemia were discussed.     Patient's home medications include: humalog and levemir.     Lifestyle changes such as physical activity with MD approval and healthy eating were encouraged. Encouraged pt to monitor blood sugar at home 3-4  times per day and to call PCP if blood sugar is trending high. Encouraged to keep record of blood glucose readings to take to follow up appointment with PCP. Provided patient with copy of [employer]'s \"What is Diabetes\" handout, \"Blood Glucose Goals\" handout, \"What is A1c\" handout, and [employer]'s \"A Balanced Plate\" handout.     Patient did not know her medications or how often her sugar was checked, what foods she ate, etc. Called Janice, her daughter, and discussed the patient's eating habits or 2-3 meals per day. She has difficulty eating raw vegetables, but can have vegetable soup. Discussed the Plate method with " "her daughter. Daughter states patient drinks mainly diet soda and some water.   Daughter states patient does not currently have a PCP or endocrinologist and is seen only by a home health nurse practitioner as the patient does not walk or get out of bed. Because she cannot get into a car, her medications and dosages have not been adjusted. Message sent to patient's RN regarding a consult to case management. Janice requested I also call daughter Radha. Phone call made but went to voicemail.    Patient does not qualify for the follow up stroke class based on the exclusion criteria of  \"No evidence of acute intracranial findings\" on MRI.  Thank you for this consult.           Electronically signed by:  Sandrita Stephenson RN  07/15/24 11:53 EDT  "

## 2024-07-15 NOTE — THERAPY EVALUATION
Acute Care - Speech Language Pathology Initial Evaluation  [facility]  Cognitive-Communication Evaluation     Patient Name: Cheri Cruz  : 1940  MRN: [id number]  Today's Date: 7/15/2024               Admit Date: 2024     Visit Dx:    ICD-10-CM ICD-9-CM   1. Cognitive communication deficit  R41.841 799.52     Patient Active Problem List   Diagnosis    Hyperlipidemia LDL goal <70    Type 2 diabetes mellitus without complication, without long-term current use of insulin    GERD (gastroesophageal reflux disease)    Essential hypertension    Abnormal EKG    Osteoarthritis    Anxiety    Palpitations    Vertigo    History of ischemic left MCA stroke    Hemorrhagic stroke    History of pulmonary embolus (PE)    Confusion    Hypotension    Constipation    UTI (urinary tract infection) due to urinary indwelling catheter    Metabolic encephalopathy    Symptomatic anemia    Acquired hypothyroidism    AMS (altered mental status)     Past Medical History:   Diagnosis Date    Abnormal heart rhythm     Anxiety     Arrhythmia     Arthritis     Atrial fibrillation     Dementia     Diabetes mellitus     Hyperlipidemia     Hypertension     Kidney disorder     Stroke     Uterus cancer      Past Surgical History:   Procedure Laterality Date    CATARACT EXTRACTION, BILATERAL         SLP Recommendation and Plan  SLP Diagnosis: cognitive-linguistic disorder (07/15/24 1140)              SLC Criteria for Skilled Therapy Interventions Met: yes (07/15/24 114)  Anticipated Discharge Disposition (SLP): home with  care (07/15/24 114)        Therapy Frequency (SLP SLC): 5 days per week (07/15/24 1140)  Predicted Duration Therapy Intervention (Days): 1 week (07/15/24 114)                             Plan of Care Reviewed With: patient (07/15/24 8747)      SLP EVALUATION (Last 72 Hours)       SLP SLC Evaluation       Row Name 07/15/24 114                   Communication Assessment/Intervention    Document Type evaluation   -SM        Subjective Information no complaints  -SM        Patient Observations alert;cooperative  -SM        Patient Effort good  -SM           General Information    Patient Profile Reviewed yes  -SM        Pertinent History Of Current Problem Adm AMS. 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 Dementia  -        Prior Level of Function-Communication cognitive-linguistic impairment  -        Plans/Goals Discussed with patient  -        Barriers to Rehab cognitive status;previous functional deficit  -        Patient's Goals for Discharge patient did not state  -           Pain Scale: FACES Pre/Post-Treatment    Pain: FACES Scale, Pretreatment 2-->hurts little bit  -SM        Posttreatment Pain Rating 2-->hurts little bit  -           Comprehension Assessment/Intervention    Comprehension Assessment/Intervention Auditory Comprehension  -           Auditory Comprehension Assessment/Intervention    Answers Questions (Communication) yes/no;wh questions;personal;simple;mild impairment;mulit-unit;complex;abstract;moderate impairment;severe impairment  -        Able to Follow Commands (Communication) 1-step;mild impairment  -        Narrative Discourse conversational level;mild impairment;moderate impairment  -        Successful Auditory Strategies (Communication) repetition;visual cues  -           Expression Assessment/Intervention    Expression Assessment/Intervention verbal expression  -           Verbal Expression Assessment/Intervention    Spontaneous/Functional Words simple;WFL  -SM        Sentence Formulation simple;WFL  -SM        Conversational Discourse/Fluency mild impairment;other (see comments)  -        Verbal Expression, Comment tenagential and distracted through topic/conversation attempts  -           Motor Speech Assessment/Intervention    Motor Speech Function WFL  -           Cognitive Assessment Intervention- SLP     Orientation Status (Cognition) person;mild impairment;place;time;situation;moderate impairment;severe impairment  -        Attention (Cognitive) selective;WFL;sustained;mild impairment;moderate impairment  -        Problem Solving (Cognitive) simple;moderate impairment  -        Cognition, Comment Oriented to name, not . Engaged in simple/basic contextual interactions with some cues to stay on task. No family present to discuss baseline level and ? acute SLP needs. Will f/u to discuss.  -           SLP Evaluation Clinical Impressions    SLP Diagnosis cognitive-linguistic disorder  -[facility] Criteria for Skilled Therapy Interventions Met yes  -        Functional Impact need frequent supervision  -           Recommendations    Therapy Frequency (SLP SLC) 5 days per week  -        Predicted Duration Therapy Intervention (Days) 1 week  -        Anticipated Discharge Disposition (SLP) home with  care  -                  User Key  (r) = Recorded By, (t) = Taken By, (c) = Cosigned By      Initials Name Effective Dates    Kira Cordero MS CCC-SLP 23 -                        EDUCATION  The patient has been educated in the following areas:     Cognitive Impairment Communication Impairment.           SLP GOALS       Row Name 07/15/24 1140             Patient will demonstrate functional cognitive-linguistic skills for return to discharge environment    [place] with moderate cues  -      Time frame 1 week  -         SLP Diagnostic Treatment     Patient will participate in further assessment in the following areas cognitive-linguistic;clarification of baseline cognitive communication status  -      Time Frame (Diagnostic) 1 week  -                User Key  (r) = Recorded By, (t) = Taken By, (c) = Cosigned By      Initials Name Provider Type    Kira Cordero MS CCC-SLP Speech and Language Pathologist                              Time Calculation:      Time  Calculation- SLP       Row Name 07/15/24 1410             Time Calculation- SLP    SLP Start Time 1140  -SM      SLP Received On 07/15/24  -         Untimed Charges    [id number] Eval Speech and Production w/ Language Minutes 38  -SM         Total Minutes    Untimed Charges Total Minutes 38  -SM       Total Minutes 38  -SM                User Key  (r) = Recorded By, (t) = Taken By, (c) = Cosigned By      Initials Name Provider Type    Kira Cordero MS CCC-SLP Speech and Language Pathologist                    Therapy Charges for Today       Code Description Service Date Service Provider Modifiers Qty    [id number]  ST EVAL SPEECH AND PROD W LANG  3 7/15/2024 Kira Chavez MS CCC-SLP GN 1                       Kira Chavez MS CCC-SLP  7/15/2024

## 2024-07-15 NOTE — PROGRESS NOTES
"    [facility] Medicine Services  PROGRESS NOTE    Patient Name: Cheri Cruz  : 1940  MRN: [id number]    Date of Admission: 2024  Primary Care Physician: Lorrie Canada APRN    Subjective   Subjective     CC:  AMS    HPI:  C/o feeling a little \"lightheaded\"  Confused.  Daughter states that patient is dizzy       Objective   Objective     Vital Signs:   Temp:  [97.1 °F (36.2 °C)-98.1 °F (36.7 °C)] 97.6 °F (36.4 °C)  Heart Rate:  [64-87] 78  Resp:  [16-20] 16  BP: ()/(46-77) 133/46  Flow (L/min):  [2] 2     Physical Exam:  Constitutional: No acute distress, awake, alert, sitting up in chair  HENT: NCAT, mucous membranes moist  Respiratory: Clear to auscultation bilaterally, respiratory effort normal   Cardiovascular: RRR, no murmurs, rubs, or gallops  Gastrointestinal: Positive bowel sounds, soft, nontender, nondistended  Musculoskeletal: No bilateral ankle edema  Psychiatric: Appropriate affect, cooperative  Neurologic: Oriented x 2 (note date), moves all extremities, speech clear  Skin: No rashes      Results Reviewed:  LAB RESULTS:      Lab 07/15/24  0824 07/14/24  2043 07/14/24  2022 07/14/24  1519   WBC 8.30  --   --  7.87   HEMOGLOBIN 10.3*  --   --  11.0*   HEMATOCRIT 33.5*  --   --  35.2   PLATELETS 124*  --   --  139*   NEUTROS ABS 5.29  --   --  5.72   IMMATURE GRANS (ABS) 0.02  --   --  0.02   LYMPHS ABS 2.28  --   --  1.52   MONOS ABS 0.44  --   --  0.44   EOS ABS 0.25  --   --  0.14   .0*  --   --  103.2*   CRP <0.30  --   --   --    LACTATE  --   --  1.4  --    D DIMER QUANT  --  0.31  --   --          Lab 07/15/24  0824 07/14/24  1519   SODIUM 140 137   POTASSIUM 4.6 5.6*   CHLORIDE 104 101   CO2 23.0 27.8   ANION GAP 13.0 8.2   BUN 26* 35*   CREATININE 1.53* 2.06*   EGFR 33.8* 23.7*   GLUCOSE 306* 436*   CALCIUM 8.2* 9.2   MAGNESIUM 1.5* 1.6   PHOSPHORUS 4.2  --    HEMOGLOBIN A1C 9.80*  --    TSH  --  2.380         Lab 07/15/24  0824 " 07/14/24  1519   TOTAL PROTEIN 6.1 7.0   ALBUMIN 3.6 3.9   GLOBULIN 2.5 3.1   ALT (SGPT) 5 5   AST (SGOT) 12 13   BILIRUBIN <0.2 0.2   ALK PHOS 58 68         Lab 07/14/24  2243 07/14/24 2043 07/14/24  1519   HSTROP T 37* 36* 37*         Lab 07/15/24  0824   CHOLESTEROL 106   LDL CHOL 43   HDL CHOL 40   TRIGLYCERIDES 132         Lab 07/14/24 2043   FOLATE 15.20   VITAMIN B 12 550         Brief Urine Lab Results  (Last result in the past 365 days)        Color   Clarity   Blood   Leuk Est   Nitrite   Protein   CREAT   Urine HCG        07/14/24 2001 Yellow   Cloudy   Trace   Small (1+)   Negative   Negative                   Microbiology Results Abnormal       Procedure Component Value - Date/Time    Urine Culture - Urine, Urine, Random Void [[id number]]  (Normal) Collected: 07/14/24 2001    Lab Status: Preliminary result Specimen: Urine, Random Void Updated: 07/15/24 1216     Urine Culture Culture in progress            XR Abdomen KUB    Result Date: 7/15/2024  XR ABDOMEN KUB Date of Exam: 7/14/2024 9:45 PM EDT Indication: for MRI Comparison: 3/31/2023 Findings: Bowel gas pattern is nonobstructive. Multiple granulomatous calcifications are noted in the spleen. Clips are seen in the gallbladder fossa. There is previous nail and screw fixation of the right femur. A few surgical clips are seen in the right pelvis. There are overlying monitoring leads. No evidence of a metallic foreign body or device is seen that would preclude MRI. Incidental note is made of multiple granulomatous calcifications in the lower lungs and left basilar lung scarring.     Impression: Impression: 1. Nonobstructive bowel gas pattern. 2. No evidence of metallic foreign body or device to contraindicate MRI. Right femoral nail noted. Electronically Signed: Jamal Villanueva MD  7/15/2024 7:52 AM EDT  Workstation ID: [id number]    MRI Brain Without Contrast    Result Date: 7/15/2024  MRI BRAIN WO CONTRAST Date of Exam: 7/15/2024 3:52 AM EDT Indication:  Stroke, follow up Stroke R/O.  Comparison: CT 1 day prior. Technique:  Routine multiplanar/multisequence sequence images of the brain were obtained without contrast administration. Findings: Evolving old left parietal lobe infarct noted with some associated cortical laminar necrosis. No new hemorrhage, mass, mass effect or additional territorial ischemia. Midline structures appear normal. Age-related changes are present with mild to moderate  generalized volume loss and mild typical T2 hyperintense subcortical and pontine white matter changes, nonspecific but favored to reflect sequela of chronic microvascular ischemia. The orbits are normal. The paranasal sinuses are clear.     Impression: Impression: Evolving old left parietal lobe infarct including some areas of associated cortical laminar necrosis. No evidence of acute ischemia, hemorrhage, mass or mass effect. Electronically Signed: Luis Manuel Myers MD  7/15/2024 7:33 AM EDT  Workstation ID: [id number]    CT Angiogram Chest Pulmonary Embolism    Result Date: 7/14/2024  CT ANGIOGRAM CHEST PULMONARY EMBOLISM Date of Exam: 7/14/2024 9:01 PM EDT Indication: Pulmonary embolism (PE) suspected, high prob r/o pe. Comparison: 7/14/2024. Technique: Axial CT images were obtained of the chest after the uneventful intravenous administration of intravenous contrast utilizing pulmonary embolism protocol.  Reconstructed coronal and sagittal images were also obtained. Automated exposure control  and iterative construction methods were used. Findings: Pulmonary arteries: Adequate opacification of the pulmonary arteries. No evidence of acute pulmonary embolism. Lungs and Pleura: Subsegmental atelectasis and scarring present, most pronounced within the posterior aspect of the right lower lobe. Granulomatous calcifications are present.. No nodule. No consolidation. No pleural fluid. Mediastinum/Brittni: No mediastinal or hilar lymphadenopathy. Lymph nodes: No axillary or supraclavicular  adenopathy. Cardiovascular: The cardiac chambers are within normal limits. The pericardium is normal. The aorta and its arch branch vessels are unremarkable.   Upper Abdomen: The upper abdominal contents are unremarkable.      Bones and Soft Tissue: No suspicious osseous lesion.     Impression: Impression: 1.No evidence of pulmonary embolism. No acute cardiopulmonary process. 2.Ancillary findings as described above. Electronically Signed: Luli Mane MD  7/14/2024 9:15 PM EDT  Workstation ID: [id number]    XR Chest 1 View    Result Date: 7/14/2024  PROCEDURE: XR CHEST 1 VW-  HISTORY: Weak/Dizzy/AMS triage protocol, decreased O2 sats. Altered mental status.  COMPARISON: April 29, 2023.  FINDINGS: The heart is stable.. Decreased inspiratory effort noted. There is evidence of old calcified granulomatous disease. Few linear densities are noted bilaterally which are stable. No new area of consolidation seen.. The mediastinum is unremarkable. There is no pneumothorax.  There are no acute osseous abnormalities.      Impression: Stable chest..      This report was signed and finalized on 7/14/2024 4:23 PM by Mare Kwong MD.      CT Head Without Contrast    Result Date: 7/14/2024  PROCEDURE: CT HEAD WO CONTRAST-  HISTORY: increased AMS, weakness, h/o stroke  COMPARISON: April 29, 2023..  TECHNIQUE: Multiple axial CT images were performed from the foramen magnum to the vertex. Individualized dose reduction techniques using automated exposure control or adjustment of mA and/or kV according to the patient size were employed.  FINDINGS: There is moderate, age-appropriate, stable generalized cerebral atrophy. The ventricles are enlarged. Again noted is the area of encephalomalacia in the left posterior parietal region. Extent is stable from prior exam. High attenuation has developed in the cortex has in the regions of the previous MCA; finding is new compared to the prior exam. Suspect this represents postischemic cortical  calcification but hemorrhage is not completely excluded. Recommend brain MRI. There is no evidence of edema or hemorrhage.  No masses are identified. No extra-axial fluid is seen. The paranasal sinuses are unremarkable. Mild small vessel ischemic disease noted.      Impression: New cortical high attenuation material at site of previous left posterior parietal CVA compared to April 2023, suspect postischemic cortical calcification but hemorrhage not completely excluded. Recommend brain MRI.     CTDI: 36.09 mGy DLP:604.53 mGy.cm  This report was signed and finalized on 7/14/2024 4:21 PM by Mare Kwong MD.       Results for orders placed during the hospital encounter of 02/10/23    Adult Transthoracic Echo Complete W/ Cont if Necessary Per Protocol    Interpretation Summary    Left ventricular systolic function is hyperdynamic (EF > 70%). Calculated left ventricular EF = 70.6% Left ventricular ejection fraction appears to be greater than 70%. The left ventricular cavity is small in size. Left ventricular wall thickness is consistent with mild concentric hypertrophy. All left ventricular wall segments contract normally. Left ventricular intracavitary gradient noted to be 71 mmHg. Left ventricular diastolic function is consistent with (grade I) impaired relaxation. Normal left atrial pressure.    The aortic valve is abnormal in structure. There is mild calcification of the aortic valve mainly affecting the right coronary cusp(s). The aortic valve appears trileaflet. No aortic valve regurgitation is present. Gradient noted through the LV and LVOT    Compared to TTE report from  Dec 2019, hyperdynamic LV with intracavitary gradient is not a new finding but peak gradient noted on this exam is higher than previously described.      Current medications:  Scheduled Meds:apixaban, 5 mg, Oral, BID  atorvastatin, 80 mg, Oral, Nightly  busPIRone, 7.5 mg, Oral, BID  cefTRIAXone, 2,000 mg, Intravenous, Q24H  donepezil, 10 mg,  Oral, Nightly  [Held by provider] gabapentin, 300 mg, Oral, BID  insulin glargine, 25 Units, Subcutaneous, Nightly  insulin lispro, 2-9 Units, Subcutaneous, 4x Daily AC & at Bedtime  levothyroxine, 25 mcg, Oral, Daily  [Held by provider] LORazepam, 0.5 mg, Oral, Q12H  magnesium sulfate, 2 g, Intravenous, Q2H  pantoprazole, 40 mg, Oral, Q AM  QUEtiapine, 50 mg, Oral, Nightly  sodium chloride, 10 mL, Intravenous, Q12H  sodium chloride, 10 mL, Intravenous, Q12H  terazosin, 2 mg, Oral, Nightly      Continuous Infusions:sodium chloride, 75 mL/hr, Last Rate: 75 mL/hr (07/14/24 7915)      PRN Meds:.  acetaminophen **OR** acetaminophen **OR** acetaminophen    senna-docusate sodium **AND** polyethylene glycol **AND** bisacodyl **AND** bisacodyl    Calcium Replacement - Follow Nurse / BPA Driven Protocol    dextrose    dextrose    glucagon (human recombinant)    HYDROcodone-acetaminophen    Magnesium Standard Dose Replacement - Follow Nurse / BPA Driven Protocol    [Held by provider] meclizine    ondansetron ODT **OR** ondansetron    Phosphorus Replacement - Follow Nurse / BPA Driven Protocol    Potassium Replacement - Follow Nurse / BPA Driven Protocol    sodium chloride    sodium chloride    sodium chloride    sodium chloride    Assessment & Plan   Assessment & Plan     Active Hospital Problems    Diagnosis  POA    **AMS (altered mental status) [R41.82]  Yes      Resolved Hospital Problems   No resolved problems to display.        Brief Hospital Course to date:  Cheri Cruz is a 83 y.o. female  with past medical history of dementia, type 2 diabetes, CKD stage III, essential hypertension, dyslipidemia, old left parietal stroke, PE on anticoagulation with Eliquis, who presented to the hospital as a transfer from [facility] for altered mental state and possible hemorrhagic stroke.     Concern for left parietal tiny hemorrhage versus calcification  Old left parietal ischemic stroke  Acute metabolic  encephalopathy  Possible UTI  Baseline dementia  Patient with baseline dementia.  Presented to [facility] with cognitive decline and metabolic encephalopathy.  She was hypotensive with systolic in the 90s.  Patient does report urinary symptoms in the continue IV Rocephin  Patient has previous UTI with Enterococcus faecium  CT from [facility] with left parietal tiny hemorrhage versus calcification on top of old ischemic stroke.  MRI showed evolving old left parietal lobe CVA with some areas of associated cortical laminar necrosis.  No hemorrhage.  Neurology following and recs that ok to restart eliquis  Holding sedatives: Gabapentin, meclizine and lorazepam     Acute hypoxia, improved  ?  Near syncope  Per report, patient became hypoxic with oxygen saturation in the  60s  Patient is on Eliquis 2.5 mg twice daily for previous history of PE and for A-fib.  With that history mentioned above, I am concerned about PE causing her hypoxia and near syncope  CTA chest showed no PE, no acute  Patient is O2 dependent 2LNC but patient is not compliant with her confusion     RADHA on CKD stage III  Dehydration volume depletion  Mild hyperkalemia  Improving with IV fluids  Baseline creatinine about 1.3     Type 2 diabetes with uncontrolled hyperglycemia  A1c 9.80  Increase Insulin glargine to 25units qhs and SSI     Essential hypertension  Was hypotensive and [facility].  No blood pressure improved  Not on antihypertensive  medications     Dyslipidemia  Statins     Dementia  Continue donepezil    Called and d/w patient's daughter, Radha Cruz, over the phone and updated on status.  She is the one who has been taking care of patient and seems stressed.  Discussed help such as SNF placement or 24 hour caregivers.    Expected Discharge Location and Transportation: lives at home with her daughter  Expected Discharge   Expected Discharge Date: 7/17/2024; Expected Discharge Time:      VTE  Prophylaxis:  Pharmacologic & mechanical VTE prophylaxis orders are present.         AM-PAC 6 Clicks Score (PT): 10 (07/15/24 1020)    CODE STATUS:   Code Status and Medical Interventions:   Ordered at: 07/15/24 1319     Level Of Support Discussed With:    Patient     Code Status (Patient has no pulse and is not breathing):    CPR (Attempt to Resuscitate)     Medical Interventions (Patient has pulse or is breathing):    Full Support       Charles Venegas MD  07/15/24

## 2024-07-15 NOTE — CASE MANAGEMENT/SOCIAL WORK
Discharge Planning Assessment  [facility]     Patient Name: Cheri Cruz  MRN: [id number]  Today's Date: 7/15/2024    Admit Date: 7/14/2024    Plan: IDP   Discharge Needs Assessment       Row Name 07/15/24 1440       Living Environment    People in Home spouse    Name(s) of People in Home Vanessa Cruz Spouse [phone]    Current Living Arrangements home    Potentially Unsafe Housing Conditions none    In the past 12 months has the electric, gas, oil, or water company threatened to shut off services in your home? No    Primary Care Provided by child(sandoval)    Provides Primary Care For no one, unable/limited ability to care for self    Family Caregiver if Needed child(sandoval), adult    Family Caregiver Names Radha Cruz Daughter [phone]  EVITA MADDEN Daughter [phone]    Quality of Family Relationships supportive    Able to Return to Prior Arrangements yes       Resource/Environmental Concerns    Transportation Concerns none       Transportation Needs    In the past 12 months, has lack of transportation kept you from medical appointments or from getting medications? yes    In the past 12 months, has lack of transportation kept you from meetings, work, or from getting things needed for daily living? No       Food Insecurity    Within the past 12 months, you worried that your food would run out before you got the money to buy more. Never true    Within the past 12 months, the food you bought just didn't last and you didn't have money to get more. Never true       Transition Planning    Patient/Family Anticipates Transition to home with help/services    Patient/Family Anticipated Services at Transition home health care    Transportation Anticipated health plan transportation       Discharge Needs Assessment    Readmission Within the Last 30 Days no previous admission in last 30 days    Equipment Currently Used at Home wheelchair;oxygen;hospital bed;lift device    Concerns to be  Addressed discharge planning    Anticipated Changes Related to Illness inability to care for self    Equipment Needed After Discharge hospital bed;lift device    Discharge Facility/Level of Care Needs home with home health    Current Discharge Risk chronically ill;cognitively impaired;dependent with mobility/activities of daily living                   Discharge Plan       Row Name 07/15/24 144       Plan    Plan IDP    Patient/Family in Agreement with Plan yes    Plan Comments I attempted to speak with the patient at the bedside. She is only oriented to her name. I called her daughter, Radha, but did not get an answer. I then called her daughter, Janice, and left a voicemail. Janice called me back. Janice stated that Mrs Cruz lives in her own home with her  in [facility]. Mrs Cruz has been dependent for care since she had a stroke last year. Janice and Radha take turns staying with Mr and Mrs Cruz providing care. She is not current with home or outpatient services. She has an old hospital bed that was given to her that is in need of replacement. She also has an aging wheelchair, lift that does not fit through the doorways in her home so it cannot be used, and O2 through [employer]. Janice confirmed her insurance is [employer] and PCP is Lorrie Canada. Janice stated that they have not been able to get her to any appointments because they do not a vehicle to transport her or any way to get her out of the bed. I discussed arranging home health with home PCP service for them if I am able to find those services in their area. Janice mentioned skilled rehab. I informed Janice that PT and OT worked with the patient and she is bedbound at home as well as here. I cannot guarantee that insurance would approve rehab. She wants to speak with Mr Cruz to discuss if they want to pursue SNF despite our discussion. CM following.    Final Discharge Disposition Code 30 - still a patient                  Continued Care  and Services - Admitted Since 7/14/2024    No active coordination exists for this encounter.       Expected Discharge Date and Time       Expected Discharge Date Expected Discharge Time    Jul 17, 2024            Demographic Summary    No documentation.                  Functional Status       Row Name 07/15/24 1439       Functional Status    Usual Activity Tolerance poor    Current Activity Tolerance poor       Physical Activity    On average, how many days per week do you engage in moderate to strenuous exercise (like a brisk walk)? 0 days    On average, how many minutes do you engage in exercise at this level? 0 min    Number of minutes of exercise per week 0       Assessment of Health Literacy    How often do you have someone help you read hospital materials? Always    How often do you have problems learning about your medical condition because of difficulty understanding written information? Always    How often do you have a problem understanding what is told to you about your medical condition? Always    How confident are you filling out medical forms by yourself? Not at all    Health Literacy Low       Functional Status, IADL    Medications completely dependent    Meal Preparation completely dependent    Housekeeping completely dependent    Laundry completely dependent    Shopping completely dependent       Mental Status Summary    Recent Changes in Mental Status/Cognitive Functioning memory (recent);memory (remote)       Employment/    Employment Status retired                   Psychosocial    No documentation.                  Abuse/Neglect    No documentation.                  Legal    No documentation.                  Substance Abuse    No documentation.                  Patient Forms    No documentation.                     Lizabeth Ortiz RN

## 2024-07-15 NOTE — THERAPY DISCHARGE NOTE
Patient Name: Cheri Cruz  : 1940    MRN: [id number]                              Today's Date: 7/15/2024       Admit Date: 2024    Visit Dx: No diagnosis found.  Patient Active Problem List   Diagnosis    Hyperlipidemia LDL goal <70    Type 2 diabetes mellitus without complication, without long-term current use of insulin    GERD (gastroesophageal reflux disease)    Essential hypertension    Abnormal EKG    Osteoarthritis    Anxiety    Palpitations    Vertigo    History of ischemic left MCA stroke    Hemorrhagic stroke    History of pulmonary embolus (PE)    Confusion    Hypotension    Constipation    UTI (urinary tract infection) due to urinary indwelling catheter    Metabolic encephalopathy    Symptomatic anemia    Acquired hypothyroidism    AMS (altered mental status)     Past Medical History:   Diagnosis Date    Abnormal heart rhythm     Anxiety     Arrhythmia     Arthritis     Atrial fibrillation     Dementia     Diabetes mellitus     Hyperlipidemia     Hypertension     Kidney disorder     Stroke     Uterus cancer      Past Surgical History:   Procedure Laterality Date    CATARACT EXTRACTION, BILATERAL        General Information       Row Name 07/15/24 0934          Physical Therapy Time and Intention    Document Type discharge evaluation/summary (P)   -ANUEL     Mode of Treatment individual therapy;physical therapy (P)   -ANUEL       Row Name 07/15/24 0934          General Information    Patient Profile Reviewed yes (P)   -ANUEL     Prior Level of Function dependent:;all household mobility;community mobility;transfer;w/c or scooter (P)   Pt limited historian 2/2 baseline dementia. Per chart review (PT, ), Pt lives with Dtr who provides assist along with caregiver support. Has a Mark lift but it is too large for their home. Hospital bed, largely bedbound.  -ANUEL     Existing Precautions/Restrictions fall;other (see comments) (P)   BUE tremors (R > L), BLE contractures, non-ambulatory at baseline   -ANUEL     Barriers to Rehab medically complex;previous functional deficit;cognitive status;hearing deficit (P)   -       Row Name 07/15/24 0934          Living Environment    People in Home child(sandoval), adult (P)   -       Row Name 07/15/24 0934          Cognition    Orientation Status (Cognition) oriented to;person;verbal cues/prompts needed for orientation;place;situation;time (P)   -       Row Name 07/15/24 0934          Safety Issues, Functional Mobility    Safety Issues Affecting Function (Mobility) insight into deficits/self-awareness;safety precaution awareness;safety precautions follow-through/compliance;sequencing abilities;problem-solving (P)   -ANUEL     Impairments Affecting Function (Mobility) balance;cognition;endurance/activity tolerance;pain;postural/trunk control;range of motion (ROM);strength (P)   -ANUEL     Cognitive Impairments, Mobility Safety/Performance insight into deficits/self-awareness;safety precaution awareness;safety precaution follow-through;sequencing abilities;problem-solving/reasoning (P)   -ANUEL     Comment, Safety Issues/Impairments (Mobility) Non-ambulatory at [facility], limited BLE ROM. (P)   -ANUEL               User Key  (r) = Recorded By, (t) = Taken By, (c) = Cosigned By      Initials Name Provider Type    Cate Bartlett, PT Student PT Student                   Mobility       Row Name 07/15/24 0939          Bed Mobility    Comment, (Bed Mobility) Pt [facility] upon arrival. (P)   -       Row Name 07/15/24 0939          Sit-Stand Transfer    Sit-Stand [place] (Transfers) not tested (P)   -ANUEL     Comment, (Sit-Stand Transfer) Deferred 2/2 BLE contractures. (P)   -       Row Name 07/15/24 0939          Gait/Stairs (Locomotion)    [place] Level (Gait) not tested (P)   -ANUEL     Patient was able to Ambulate no, other medical factors prevent ambulation (P)   -ANUEL     Reason Patient was unable to Ambulate Non-Ambulatory at Baseline (P)   -ANUEL     [place] Level (Stairs) not  tested (P)   -ANUEL     Comment, (Gait/Stairs) Pt is non-ambulatory at baseline (P)   -               User Key  (r) = Recorded By, (t) = Taken By, (c) = Cosigned By      Initials Name Provider Type    Cate Bartlett, PT Student PT Student                   Obj/Interventions       Row Name 07/15/24 0940          Range of Motion Comprehensive    General Range of Motion lower extremity range of motion deficits identified (P)   -ANUEL     Comment, General Range of Motion [place] knee flexion and ankle plantar flexion contractures (P)   -ANUEL       Row Name 07/15/24 0940          Strength Comprehensive (MMT)    General Manual Muscle Testing (MMT) Assessment lower extremity strength deficits identified (P)   -ANUEL     Comment, General Manual Muscle Testing (MMT) Assessment BLE grossly 2+/5 (P)   -ANUEL       Row Name 07/15/24 0940          Balance    Balance Assessment sitting static balance;sitting dynamic balance (P)   -ANUEL     Static Sitting Balance supervision (P)   -     Dynamic Sitting Balance standby assist (P)   -     Position, Sitting Balance sitting in chair;unsupported (P)   -     Balance Interventions sitting (P)   -ANUEL     Comment, Balance Pt demonstrated slight L lateral lean while sitting in chair. Pt able to pull self toward edge of chair and maintain sitting while using diamante armrests. Reports dizziness upon sitting upright, nursing present in room and aware. (P)   -ANUEL       Row Name 07/15/24 0940          Sensory Assessment (Somatosensory)    Sensory Assessment (Somatosensory) LE sensation intact (P)   -               User Key  (r) = Recorded By, (t) = Taken By, (c) = Cosigned By      Initials Name Provider Type    Cate Bartlett PT Student PT Student                   Goals/Plan    No documentation.                  Clinical Impression       Row Name 07/15/24 0964          Pain    Additional Documentation Pain Scale: FACES Pre/Post-Treatment (Group) (P)   -ANUEL       Row Name 07/15/24 0926          Pain  Scale: FACES Pre/Post-Treatment    Pain: FACES Scale, Pretreatment 0-->no hurt (P)   -ANUEL     Posttreatment Pain Rating 0-->no hurt (P)   -ANUEL       Row Name 07/15/24 0945          Plan of Care Review    Plan of Care Reviewed With patient (P)   -ANUEL     Progress no change (P)   -ANUEL     Outcome Evaluation Pt is dependent for all functional mobility and is non-ambulatory at baseline. Pt is not appropriate for skilled PT interventions. Rec d/c home with assist and 24/7 care when medically appropriate. (P)   -ANUEL       Row Name 07/15/24 0945          Therapy Assessment/Plan (PT)    Patient/Family Therapy Goals Statement (PT) home (P)   -     Criteria for Skilled Interventions Met (PT) no;does not meet criteria for skilled intervention (P)   -ANUEL     Therapy Frequency (PT) evaluation only (P)   -ANUEL       Row Name 07/15/24 0945          Vital Signs    Pre Systolic BP Rehab 134 (P)   -ANUEL     Pre Treatment Diastolic BP 71 (P)   -ANUEL     Post Systolic BP Rehab 115 (P)   -ANUEL     Post Treatment Diastolic BP 59 (P)   -ANUEL     O2 Delivery Pre Treatment room air (P)   -ANUEL     O2 Delivery Intra Treatment room air (P)   -ANUEL     O2 Delivery Post Treatment room air (P)   -ANUEL     Pre Patient Position Sitting (P)   -ANUEL     Intra Patient Position Sitting (P)   -ANUEL     Post Patient Position Sitting (P)   -[facility] Name 07/15/24 0945          Positioning and Restraints    Pre-Treatment Position sitting in chair/recliner (P)   -ANUEL     Post Treatment Position chair (P)   -ANUEL     In Chair reclined;call light within reach;encouraged to call for assist;exit alarm on;with nsg;waffle cushion;on mechanical lift sling;legs elevated (P)   -               User Key  (r) = Recorded By, (t) = Taken By, (c) = Cosigned By      Initials Name Provider Type    Cate Bartlett, PT Student PT Student                   Outcome Measures       Row Name 07/15/24 0952 07/15/24 0800       How much help from another person do you currently need...    Turning from  your back to your side while in flat bed without using bedrails? 2 (P)   -ANUEL 2  -BL    Moving from lying on back to sitting on the side of a flat bed without bedrails? 1 (P)   -ANUEL 2  -BL    Moving to and from a bed to a chair (including a wheelchair)? 1 (P)   -ANUEL 2  -BL    Standing up from a chair using your arms (e.g., wheelchair, bedside chair)? 1 (P)   -ANUEL 2  -BL    Climbing 3-5 steps with a railing? 1 (P)   -ANUEL 1  -BL    To walk in hospital room? 1 (P)   -ANUEL 1  -BL    AM-PAC 6 Clicks Score (PT) 7 (P)   -ANUEL 10  -BL    Highest Level of Mobility Goal 2 --> Bed activities/dependent transfer (P)   -ANUEL 4 --> Transfer to chair/commode  -      Row Name 07/15/24 0952 07/15/24 0934       Modified [place] Scale    Pre-Stroke Modified [place] Scale 6 - Unable to determine (UTD) from the medical record documentation (P)   -ANUEL --    Modified Steven Scale 5 - Severe disability.  Bedridden, incontinent, and requiring constant nursing care and attention. (P)   -ANUEL 5 - Severe disability.  Bedridden, incontinent, and requiring constant nursing care and attention.  -      Row Name 07/15/24 0952 07/15/24 0934       Functional Assessment    Outcome Measure Options AM-PAC 6 Clicks Basic Mobility (PT);Modified Steven (P)   - AM-PAC 6 Clicks Daily Activity (OT);Modified [place]  -              User Key  (r) = Recorded By, (t) = Taken By, (c) = Cosigned By      Initials Name Provider Type    Karlo Croft, OT Occupational Therapist    Wilian Serra, RN Registered Nurse    Cate Bartlett, PT Student PT Student                  Physical Therapy Education       Title: PT OT SLP Therapies (In Progress)       Topic: Physical Therapy (In Progress)       Point: Mobility training (Done)       Learning Progress Summary             Patient Acceptance, ROSE MARY CHILEL,DU by  at 7/15/2024 0968                         Point: Home exercise program (Not Started)       Learner Progress:  Not documented in this visit.              Point: Body  mechanics (Done)       Learning Progress Summary             Patient Acceptance, E, VU,DU by  at 7/15/2024 0955                         Point: Precautions (Done)       Learning Progress Summary             Patient Acceptance, E, VU,DU by  at 7/15/2024 0955                                         User Key       Initials Effective Dates Name Provider Type [facility] 05/07/24 -  Cate Joshi PT Student PT Student PT                  PT Recommendation and Plan     Plan of Care Reviewed With: (P) patient  Progress: (P) no change  Outcome Evaluation: (P) Pt is dependent for all functional mobility and is non-ambulatory at baseline. Pt is not appropriate for skilled PT interventions. Rec d/c home with assist and 24/7 care when medically appropriate.     Time Calculation:   PT Evaluation Complexity  History, PT Evaluation Complexity: (P) 3 or more personal factors and/or comorbidities  Examination of Body Systems (PT Eval Complexity): (P) total of 4 or more elements  Clinical Presentation (PT Evaluation Complexity): (P) evolving  Clinical Decision Making (PT Evaluation Complexity): (P) moderate complexity  Overall Complexity (PT Evaluation Complexity): (P) moderate complexity     PT Charges       Row Name 07/15/24 0955             Time Calculation    Start Time 0915 (P)   -      PT Received On 07/15/24 (P)   -         Untimed Charges    PT Eval/Re-eval Minutes 48 (P)   -         Total Minutes    Untimed Charges Total Minutes 48 (P)   -       Total Minutes 48 (P)   -                User Key  (r) = Recorded By, (t) = Taken By, (c) = Cosigned By      Initials Name Provider Type    ANEUL Cate Joshi PT Student PT Student                  Therapy Charges for Today       Code Description Service Date Service Provider Modifiers Qty    03506855204 HC PT EVAL MOD COMPLEXITY 4 7/15/2024 Cate Joshi, PT Student GP 1            PT G-Codes  Outcome Measure Options: (P) AM-PAC 6 Clicks Basic Mobility (PT),  Modified Steven  AM-PAC 6 Clicks Score (PT): (P) 7  AM-PAC 6 Clicks Score (OT): 13  Modified [place] Scale: (P) 5 - Severe disability.  Bedridden, incontinent, and requiring constant nursing care and attention.    PT Discharge Summary  Anticipated Discharge Disposition (PT): (P) home with assist, home with 24/7 care  Reason for Discharge: (P) At baseline function    Cate Joshi, PT Student  7/15/2024

## 2024-07-15 NOTE — THERAPY EVALUATION
Patient Name: Cheri Cruz  : 1940    MRN: [id number]                              Today's Date: 7/15/2024       Admit Date: 2024    Visit Dx: No diagnosis found.  Patient Active Problem List   Diagnosis    Hyperlipidemia LDL goal <70    Type 2 diabetes mellitus without complication, without long-term current use of insulin    GERD (gastroesophageal reflux disease)    Essential hypertension    Abnormal EKG    Osteoarthritis    Anxiety    Palpitations    Vertigo    History of ischemic left MCA stroke    Hemorrhagic stroke    History of pulmonary embolus (PE)    Confusion    Hypotension    Constipation    UTI (urinary tract infection) due to urinary indwelling catheter    Metabolic encephalopathy    Symptomatic anemia    Acquired hypothyroidism    AMS (altered mental status)     Past Medical History:   Diagnosis Date    Abnormal heart rhythm     Anxiety     Arrhythmia     Arthritis     Atrial fibrillation     Dementia     Diabetes mellitus     Hyperlipidemia     Hypertension     Kidney disorder     Stroke     Uterus cancer      Past Surgical History:   Procedure Laterality Date    CATARACT EXTRACTION, BILATERAL        General Information       Row Name 07/15/24 0901          OT Time and Intention    Document Type evaluation  -CS     Mode of Treatment occupational therapy  -CS       Row Name 07/15/24 0901          General Information    Patient Profile Reviewed yes  -CS     Prior Level of Function min assist:;feeding;mod assist:;grooming;max assist:;dressing;bathing;dependent:;transfer;all household mobility;community mobility;w/c or scooter  Pt limited historian 2/2 baseline dementia. Per chart review (OT, ), Pt lives with Dtr who provides assist along with caregiver support. Hospital bed, Mark lift to recliner/WC, although largely bedbound.  -CS     Existing Precautions/Restrictions fall;other (see comments)  BUE tremors (R > L), BLE contractures, non-ambulatory at baseline  -CS     Barriers to  Rehab medically complex;previous functional deficit;cognitive status;visual deficit  -CS       Row Name 07/15/24 0901          Living Environment    People in Home child(sandoval), adult  -CS       Row Name 07/15/24 0901          Stairs Within Home, Primary    Stairs, Within Home, Primary home layout requires further inquiry  -CS       Row Name 07/15/24 0901          Cognition    Orientation Status (Cognition) oriented to;person;verbal cues/prompts needed for orientation;place;disoriented to;situation;time  -CS       Row Name 07/15/24 0901          Safety Issues, Functional Mobility    Safety Issues Affecting Function (Mobility) insight into deficits/self-awareness;judgment;problem-solving;safety precaution awareness;safety precautions follow-through/compliance;sequencing abilities  -CS     Impairments Affecting Function (Mobility) balance;cognition;coordination;grasp;motor control;motor planning;strength;range of motion (ROM)  -CS     Cognitive Impairments, Mobility Safety/Performance insight into deficits/self-awareness;judgment;problem-solving/reasoning;safety precaution awareness;safety precaution follow-through  -CS     Comment, Safety Issues/Impairments (Mobility) limited BLE ROM, non-ambulatory at baseline, BUE tremors  -CS               User Key  (r) = Recorded By, (t) = Taken By, (c) = Cosigned By      Initials Name Provider Type    CS Karlo Rai OT Occupational Therapist                     Mobility/ADL's       Row Name 07/15/24 0905          Bed Mobility    Bed Mobility rolling right;rolling left  -CS     Rolling Right [place] (Bed Mobility) 2 person assist;nonverbal cues (demo/gesture);maximum assist (25% patient effort);verbal cues  -CS     Scooting/Bridging [place] (Bed Mobility) 2 person assist;verbal cues;nonverbal cues (demo/gesture);maximum assist (25% patient effort)  -CS     Assistive Device (Bed Mobility) draw sheet;bed rails  -CS     Comment, (Bed Mobility) with tactile guidance and  hand placement Pt able to assist in sidelying with grasp on bedrail  -       Row Name 07/15/24 0905          Transfers    Transfers bed-chair transfer  -       Row Name 07/15/24 0905          Bed-Chair Transfer    Bed-Chair [place] (Transfers) nonverbal cues (demo/gesture);verbal cues;dependent (less than 25% patient effort)  -     Assistive Device (Bed-Chair Transfers) lift device  -       Row Name 07/15/24 0905          Functional Mobility    Patient was able to Ambulate no, other medical factors prevent ambulation  -     Reason Patient was unable to Ambulate Non-Ambulatory at Baseline  -       Row Name 07/15/24 0905          Activities of Daily Living    BADL Assessment/Intervention upper body dressing;lower body dressing;grooming;feeding  -CS       Row Name 07/15/24 0905          Upper Body Dressing Assessment/Training    [place] Level (Upper Body Dressing) don;pajama/robe;moderate assist (50% patient effort)  -CS     Position (Upper Body Dressing) supported sitting  -CS       Row Name 07/15/24 0905          Lower Body Dressing Assessment/Training    [place] Level (Lower Body Dressing) don;socks;dependent (less than 25% patient effort)  -CS     Position (Lower Body Dressing) supported sitting  -       Row Name 07/15/24 0905          Grooming Assessment/Training    [place] Level (Grooming) wash face, hands;set up;hair care, combing/brushing;dependent (less than 25% patient effort)  -       Row Name 07/15/24 0905          Self-Feeding Assessment/Training    [place] Level (Feeding) feeding skills;other (see comments)  -     Assistive Devices (Feeding) adapted cup  -CS     Comment, (Feeding) functional plate to mouth movement pattern impacted by tremors, RN educated on need for feeding assist. Provided adapted cup  -CS               User Key  (r) = Recorded By, (t) = Taken By, (c) = Cosigned By      Initials Name Provider Type    CS Karlo Rai, OT Occupational  Therapist                   Obj/Interventions       [facility] Name 07/15/24 0912          Sensory Assessment (Somatosensory)    Sensory Assessment (Somatosensory) UE sensation intact  -     Sensory Assessment responds to light touch bilaterally, formal assessment limited by cognitive deficits  -       Row Name 07/15/24 0912          Vision Assessment/Intervention    Visual Impairment/Limitations peripheral vision impaired right;blurry vision;visual/perceptual impairments present  -     Vision Assessment Comment formal confrontation testing limited by cognitive deficits, Pt able to locate and track therapist throughout room  -[facility] Name 07/15/24 0912          Shoulder (Therapeutic Exercise)    Shoulder (Therapeutic Exercise) AROM (active range of motion)  -     Shoulder AROM (Therapeutic Exercise) bilateral;flexion;extension;aBduction;aDduction;2 sets;5 repetitions  -       Row Name 07/15/24 0912          Elbow/Forearm (Therapeutic Exercise)    Elbow/Forearm (Therapeutic Exercise) AROM (active range of motion)  -     Elbow/Forearm AROM (Therapeutic Exercise) bilateral;flexion;extension;2 sets;5 repetitions  -CS       Row Name 07/15/24 0912          Hand (Therapeutic Exercise)    Hand (Therapeutic Exercise) AROM (active range of motion)  -     Hand AROM/AAROM (Therapeutic Exercise) bilateral;AROM (active range of motion);finger flexion;finger extension;5 repetitions;2 sets  -[facility] Name 07/15/24 0912          Motor Skills    Motor Skills coordination;functional endurance;motor control/coordination interventions;neuro-muscular function  -     Coordination bimanual skills;moderate impairment  -     Functional Endurance moderate  -     Neuromuscular Function bilateral;upper extremity;tremor, resting;tremor, intention  -     Motor Control/Coordination Interventions therapeutic exercise/ROM;occupation/activity based treatment  -     Therapeutic Exercise shoulder;elbow/forearm;hand;other (see  comments)  BUE AROM warm-up prior to bed mobility with cross body reaches  -CS       Row Name 07/15/24 0912          Balance    Balance Assessment sitting static balance  -CS     Static Sitting Balance supervision  -CS     Balance Interventions sitting  -CS     Comment, Balance maintains midline in supported sitting without lateral supports  -CS               User Key  (r) = Recorded By, (t) = Taken By, (c) = Cosigned By      Initials Name Provider Type     Karlo Rai, OT Occupational Therapist                   Goals/Plan       Row Name 07/15/24 0922          Bed Mobility Goal 1 (OT)    Activity/Assistive Device (Bed Mobility Goal 1, OT) rolling to right;rolling to left  -CS     [place] Level/Cues Needed (Bed Mobility Goal 1, OT) moderate assist (50-74% patient effort);tactile cues required;verbal cues required  -CS     Time Frame (Bed Mobility Goal 1, OT) short term goal (STG);5 days  -CS     Strategies/Barriers (Bed Mobility Goal 1, OT) improved sequencing and use of BUE/BLE during lift prep  -CS     Progress/Outcomes (Bed Mobility Goal 1, OT) new goal  -CS       Row Name 07/15/24 0922          Dressing Goal 1 (OT)    Activity/Device (Dressing Goal 1, OT) upper body dressing  -CS     [place]/Cues Needed (Dressing Goal 1, OT) minimum assist (75% or more patient effort)  -CS     Time Frame (Dressing Goal 1, OT) long term goal (LTG);1 week  -CS     Progress/Outcome (Dressing Goal 1, OT) new goal  -CS       Row Name 07/15/24 0922          Self-Feeding Goal 1 (OT)    Activity/Device (Self-Feeding Goal 1, OT) self-feeding skills, all;finger foods;liquids to mouth;scoop food and bring to mouth;adapted cup;built-up handle utensils  -CS     [place] Level/Cues Needed (Self-Feeding Goal 1, OT) modified independence;set-up required  -CS     Time Frame (Self-Feeding Goal 1, OT) long term goal (LTG);1 week  -CS     Progress/Outcomes (Self-Feeding Goal 1, OT) new goal  -       Row Name 07/15/24 0922           Therapy Assessment/Plan (OT)    Planned Therapy Interventions (OT) activity tolerance training;functional balance retraining;occupation/activity based interventions;ROM/therapeutic exercise;strengthening exercise;transfer/mobility retraining;patient/caregiver education/training;neuromuscular control/coordination retraining;adaptive equipment training  -               User Key  (r) = Recorded By, (t) = Taken By, (c) = Cosigned By      Initials Name Provider Type    CS Karlo Rai OT Occupational Therapist                   Clinical Impression       Row Name 07/15/24 0914          Pain Assessment    Additional Documentation Pain Scale: FACES Pre/Post-Treatment (Group)  -CS       Row Name 07/15/24 0914          Pain Scale: FACES Pre/Post-Treatment    Pain: FACES Scale, Pretreatment 0-->no hurt  -CS     Posttreatment Pain Rating 0-->no hurt  -CS       Row Name 07/15/24 0914          Plan of Care Review    Plan of Care Reviewed With patient  -CS     Progress no change  -CS     Outcome Evaluation Pt presents near recent baseline with BUE tremors, cognitive deficits, and limited BLE ROM w/ significant weakness - OT will follow to maintain/improve Ind level for ADL performance. Pt engaged in pre-mobility BUE warm-up, lifted to recliner, completed seated grooming with ModA, and assisted with feeding. Rec return to home with continued family and caregiver support when medically appropriate.  -       Row Name 07/15/24 0914          Therapy Assessment/Plan (OT)    Patient/Family Therapy Goal Statement (OT) Pt wants to return home and maintain level of Ind  -CS     Rehab Potential (OT) good, to achieve stated therapy goals  -CS     Criteria for Skilled Therapeutic Interventions Met (OT) yes;meets criteria;skilled treatment is necessary  -CS     Therapy Frequency (OT) 3 times/wk  -CS     Predicted Duration of Therapy Intervention (OT) 7 days  -       Row Name 07/15/24 0914          Therapy Plan Review/Discharge  Plan (OT)    Anticipated Discharge Disposition (OT) home with 24/7 care  -CS       Row Name 07/15/24 0914          Vital Signs    Pre Systolic BP Rehab 123  RN cleared for eval  -CS     Pre Treatment Diastolic BP 51  -CS     Post Systolic BP Rehab 134  -CS     Post Treatment Diastolic BP 71  -CS     O2 Delivery Pre Treatment room air  -CS     O2 Delivery Intra Treatment room air  -CS     O2 Delivery Post Treatment room air  -CS     Pre Patient Position Supine  -CS     Intra Patient Position Side Lying  -CS     Post Patient Position Sitting  -CS       Row Name 07/15/24 0914          Positioning and Restraints    Pre-Treatment Position in bed  -CS     Post Treatment Position chair  -CS     In Chair notified nsg;reclined;sitting;call light within reach;encouraged to call for assist;exit alarm on;legs elevated;on mechanical lift sling;waffle cushion  -CS               User Key  (r) = Recorded By, (t) = Taken By, (c) = Cosigned By      Initials Name Provider Type    CS Karlo Rai, OT Occupational Therapist                   Outcome Measures       Row Name 07/15/24 0934          How much help from another is currently needed...    Putting on and taking off regular lower body clothing? 1  -CS     Bathing (including washing, rinsing, and drying) 2  -CS     Toileting (which includes using toilet bed pan or urinal) 2  -CS     Putting on and taking off regular upper body clothing 2  -CS     Taking care of personal grooming (such as brushing teeth) 3  -CS     Eating meals 3  -CS     AM-PAC 6 Clicks Score (OT) 13  -CS       Row Name 07/15/24 0800          How much help from another person do you currently need...    Turning from your back to your side while in flat bed without using bedrails? 2  -BL     Moving from lying on back to sitting on the side of a flat bed without bedrails? 2  -BL     Moving to and from a bed to a chair (including a wheelchair)? 2  -BL     Standing up from a chair using your arms (e.g.,  wheelchair, bedside chair)? 2  -BL     Climbing 3-5 steps with a railing? 1  -BL     To walk in hospital room? 1  -BL     AM-PAC 6 Clicks Score (PT) 10  -BL     Highest Level of Mobility Goal 4 --> Transfer to chair/commode  -BL       Row Name 07/15/24 0934          Modified [place] Scale    Modified [place] Scale 5 - Severe disability.  Bedridden, incontinent, and requiring constant nursing care and attention.  -       Row Name 07/15/24 0934          Functional Assessment    Outcome Measure Options AM-PAC 6 Clicks Daily Activity (OT);Modified [place]  -CS               User Key  (r) = Recorded By, (t) = Taken By, (c) = Cosigned By      Initials Name Provider Type     Karlo Rai OT Occupational Therapist    Wilian Serra, RN Registered Nurse                    Occupational Therapy Education       Title: PT OT SLP Therapies (Done)       Topic: Occupational Therapy (Done)       Point: ADL training (Done)       Description:   Instruct learner(s) on proper safety adaptation and remediation techniques during self care or transfers.   Instruct in proper use of assistive devices.                  Learning Progress Summary             Patient Acceptance, E,D, VU,DU by  at 7/15/2024 0936                         Point: Home exercise program (Done)       Description:   Instruct learner(s) on appropriate technique for monitoring, assisting and/or progressing therapeutic exercises/activities.                  Learning Progress Summary             Patient Acceptance, E,D, VU,DU by  at 7/15/2024 0936                         Point: Precautions (Done)       Description:   Instruct learner(s) on prescribed precautions during self-care and functional transfers.                  Learning Progress Summary             Patient Acceptance, E,D, VU,DU by  at 7/15/2024 0936                         Point: Body mechanics (Done)       Description:   Instruct learner(s) on proper positioning and spine alignment during self-care,  functional mobility activities and/or exercises.                  Learning Progress Summary             Patient Acceptance, E,D, VU,DU by  at 7/15/2024 0936                                         User Key       Initials Effective Dates Name Provider Type Discipline     06/16/21 -  Karlo Rai OT Occupational Therapist OT                  OT Recommendation and Plan  Planned Therapy Interventions (OT): activity tolerance training, functional balance retraining, occupation/activity based interventions, ROM/therapeutic exercise, strengthening exercise, transfer/mobility retraining, patient/caregiver education/training, neuromuscular control/coordination retraining, adaptive equipment training  Therapy Frequency (OT): 3 times/wk  Plan of Care Review  Plan of Care Reviewed With: patient  Progress: no change  Outcome Evaluation: Pt presents near recent baseline with BUE tremors, cognitive deficits, and limited BLE ROM w/ significant weakness - OT will follow to maintain/improve Ind level for ADL performance. Pt engaged in pre-mobility BUE warm-up, lifted to recliner, completed seated grooming with ModA, and assisted with feeding. Rec return to home with continued family and caregiver support when medically appropriate.     Time Calculation:   Evaluation Complexity (OT)  Review Occupational Profile/Medical/Therapy History Complexity: expanded/moderate complexity  Assessment, Occupational Performance/Identification of Deficit Complexity: 3-5 performance deficits  Overall Complexity of Evaluation (OT): moderate complexity     Time Calculation- OT       Row Name 07/15/24 0937             Time Calculation- OT    OT Start Time 0826  -CS      OT Received On 07/15/24  -      OT Goal Re-Cert Due Date 07/25/24  -CS         Timed Charges    43195 - OT Therapeutic Exercise Minutes 4  -CS      27530 - OT Self Care/Mgmt Minutes 6  -CS         Untimed Charges    OT Eval/Re-eval Minutes 48  -CS         Total Minutes    Timed  Charges Total Minutes 10  -CS      Untimed Charges Total Minutes 48  -CS       Total Minutes 58  -CS                User Key  (r) = Recorded By, (t) = Taken By, (c) = Cosigned By      Initials Name Provider Type    CS Karlo Rai, OT Occupational Therapist                  Therapy Charges for Today       Code Description Service Date Service Provider Modifiers Qty    [id number]  OT SELF CARE/MGMT/TRAIN EA 15 MIN 7/15/2024 Karlo Rai OT GO 1    [id number]  OT EVAL MOD COMPLEXITY 4 7/15/2024 Karlo Rai OT GO 1                 Karlo Rai OT  7/15/2024

## 2024-07-15 NOTE — PROGRESS NOTES
Patient needs CTA chest to rule out PE.  She is on reduced dose Eliquis 2.5 mg twice daily for history of PE in the past which might not provide the patient with full protection.    Benefit of CTA chest outweigh the risk in the view of her RADHA on CKD    Continue IV fluids and monitor kidney functions    Discussed the plan of care with the daughter

## 2024-07-15 NOTE — PLAN OF CARE
Goal Outcome Evaluation:  Plan of Care Reviewed With: patient        Progress: no change  Outcome Evaluation: Pt presents near recent baseline with BUE tremors, cognitive deficits, and limited BLE ROM w/ significant weakness - OT will follow to maintain/improve Ind level for ADL performance. Pt engaged in pre-mobility BUE warm-up, lifted to recliner, completed seated grooming with ModA, and assisted with feeding. Rec return to home with continued family and caregiver support when medically appropriate.      Anticipated Discharge Disposition (OT): home with 24/7 care

## 2024-07-15 NOTE — PLAN OF CARE
Goal Outcome Evaluation:  Plan of Care Reviewed With: (P) patient        Progress: (P) no change  Outcome Evaluation: (P) Pt is dependent for all functional mobility and is non-ambulatory at baseline. Pt is not appropriate for skilled PT interventions. Rec d/c home with assist and 24/7 care when medically appropriate.      Anticipated Discharge Disposition (PT): (P) home with assist, home with 24/7 care

## 2024-07-16 LAB
ANION GAP SERPL CALCULATED.3IONS-SCNC: 8 MMOL/L (ref 5–15)
BACTERIA SPEC AEROBE CULT: ABNORMAL
BACTERIA SPEC AEROBE CULT: NORMAL
BUN SERPL-MCNC: 25 MG/DL (ref 8–23)
BUN/CREAT SERPL: 17.4 (ref 7–25)
CALCIUM SPEC-SCNC: 8.3 MG/DL (ref 8.6–10.5)
CHLORIDE SERPL-SCNC: 104 MMOL/L (ref 98–107)
CO2 SERPL-SCNC: 25 MMOL/L (ref 22–29)
CREAT SERPL-MCNC: 1.44 MG/DL (ref 0.57–1)
DEPRECATED RDW RBC AUTO: 52 FL (ref 37–54)
EGFRCR SERPLBLD CKD-EPI 2021: 36.4 ML/MIN/1.73
ERYTHROCYTE [DISTWIDTH] IN BLOOD BY AUTOMATED COUNT: 13.2 % (ref 12.3–15.4)
GLUCOSE BLDC GLUCOMTR-MCNC: 204 MG/DL (ref 70–130)
GLUCOSE BLDC GLUCOMTR-MCNC: 213 MG/DL (ref 70–130)
GLUCOSE BLDC GLUCOMTR-MCNC: 324 MG/DL (ref 70–130)
GLUCOSE BLDC GLUCOMTR-MCNC: 347 MG/DL (ref 70–130)
GLUCOSE SERPL-MCNC: 294 MG/DL (ref 65–99)
HCT VFR BLD AUTO: 34.9 % (ref 34–46.6)
HGB BLD-MCNC: 10.6 G/DL (ref 12–15.9)
MAGNESIUM SERPL-MCNC: 2.8 MG/DL (ref 1.6–2.4)
MCH RBC QN AUTO: 32.2 PG (ref 26.6–33)
MCHC RBC AUTO-ENTMCNC: 30.4 G/DL (ref 31.5–35.7)
MCV RBC AUTO: 106.1 FL (ref 79–97)
PLATELET # BLD AUTO: 90 10*3/MM3 (ref 140–450)
PMV BLD AUTO: 12.4 FL (ref 6–12)
POTASSIUM SERPL-SCNC: 5.2 MMOL/L (ref 3.5–5.2)
RBC # BLD AUTO: 3.29 10*6/MM3 (ref 3.77–5.28)
SODIUM SERPL-SCNC: 137 MMOL/L (ref 136–145)
WBC NRBC COR # BLD AUTO: 7.06 10*3/MM3 (ref 3.4–10.8)

## 2024-07-16 PROCEDURE — A9270 NON-COVERED ITEM OR SERVICE: HCPCS | Performed by: INTERNAL MEDICINE

## 2024-07-16 PROCEDURE — A9270 NON-COVERED ITEM OR SERVICE: HCPCS | Performed by: STUDENT IN AN ORGANIZED HEALTH CARE EDUCATION/TRAINING PROGRAM

## 2024-07-16 PROCEDURE — G0378 HOSPITAL OBSERVATION PER HR: HCPCS

## 2024-07-16 PROCEDURE — 80048 BASIC METABOLIC PNL TOTAL CA: CPT | Performed by: INTERNAL MEDICINE

## 2024-07-16 PROCEDURE — 63710000001 APIXABAN 5 MG TABLET: Performed by: STUDENT IN AN ORGANIZED HEALTH CARE EDUCATION/TRAINING PROGRAM

## 2024-07-16 PROCEDURE — 82948 REAGENT STRIP/BLOOD GLUCOSE: CPT

## 2024-07-16 PROCEDURE — 63710000001 QUETIAPINE 25 MG TABLET: Performed by: INTERNAL MEDICINE

## 2024-07-16 PROCEDURE — 63710000001 LEVOTHYROXINE 25 MCG TABLET: Performed by: INTERNAL MEDICINE

## 2024-07-16 PROCEDURE — 85027 COMPLETE CBC AUTOMATED: CPT | Performed by: INTERNAL MEDICINE

## 2024-07-16 PROCEDURE — 63710000001 DONEPEZIL 10 MG TABLET: Performed by: INTERNAL MEDICINE

## 2024-07-16 PROCEDURE — 63710000001 INSULIN GLARGINE PER 5 UNITS: Performed by: INTERNAL MEDICINE

## 2024-07-16 PROCEDURE — 63710000001 PANTOPRAZOLE 40 MG TABLET DELAYED-RELEASE: Performed by: INTERNAL MEDICINE

## 2024-07-16 PROCEDURE — 63710000001 BUSPIRONE 15 MG TABLET: Performed by: INTERNAL MEDICINE

## 2024-07-16 PROCEDURE — 63710000001 ATORVASTATIN 40 MG TABLET

## 2024-07-16 PROCEDURE — 63710000001 INSULIN LISPRO (HUMAN) PER 5 UNITS: Performed by: INTERNAL MEDICINE

## 2024-07-16 PROCEDURE — A9270 NON-COVERED ITEM OR SERVICE: HCPCS

## 2024-07-16 PROCEDURE — 25010000002 CEFTRIAXONE PER 250 MG: Performed by: STUDENT IN AN ORGANIZED HEALTH CARE EDUCATION/TRAINING PROGRAM

## 2024-07-16 PROCEDURE — 63710000001 HYDROCODONE-ACETAMINOPHEN 10-325 MG TABLET: Performed by: INTERNAL MEDICINE

## 2024-07-16 PROCEDURE — 63710000001 ONDANSETRON ODT 4 MG TABLET DISPERSIBLE: Performed by: INTERNAL MEDICINE

## 2024-07-16 PROCEDURE — 99232 SBSQ HOSP IP/OBS MODERATE 35: CPT | Performed by: STUDENT IN AN ORGANIZED HEALTH CARE EDUCATION/TRAINING PROGRAM

## 2024-07-16 PROCEDURE — 63710000001 INSULIN LISPRO (HUMAN) PER 5 UNITS: Performed by: STUDENT IN AN ORGANIZED HEALTH CARE EDUCATION/TRAINING PROGRAM

## 2024-07-16 PROCEDURE — 63710000001 TERAZOSIN 2 MG CAPSULE: Performed by: INTERNAL MEDICINE

## 2024-07-16 RX ORDER — INSULIN LISPRO 100 [IU]/ML
3 INJECTION, SOLUTION INTRAVENOUS; SUBCUTANEOUS
Status: DISCONTINUED | OUTPATIENT
Start: 2024-07-16 | End: 2024-07-17

## 2024-07-16 RX ADMIN — INSULIN LISPRO 7 UNITS: 100 INJECTION, SOLUTION INTRAVENOUS; SUBCUTANEOUS at 11:30

## 2024-07-16 RX ADMIN — ONDANSETRON 4 MG: 4 TABLET, ORALLY DISINTEGRATING ORAL at 17:44

## 2024-07-16 RX ADMIN — APIXABAN 5 MG: 5 TABLET, FILM COATED ORAL at 21:06

## 2024-07-16 RX ADMIN — DONEPEZIL HYDROCHLORIDE 10 MG: 10 TABLET, FILM COATED ORAL at 21:06

## 2024-07-16 RX ADMIN — PANTOPRAZOLE SODIUM 40 MG: 40 TABLET, DELAYED RELEASE ORAL at 05:09

## 2024-07-16 RX ADMIN — LEVOTHYROXINE SODIUM 25 MCG: 25 TABLET ORAL at 08:35

## 2024-07-16 RX ADMIN — INSULIN LISPRO 7 UNITS: 100 INJECTION, SOLUTION INTRAVENOUS; SUBCUTANEOUS at 08:34

## 2024-07-16 RX ADMIN — INSULIN GLARGINE 25 UNITS: 100 INJECTION, SOLUTION SUBCUTANEOUS at 21:05

## 2024-07-16 RX ADMIN — INSULIN LISPRO 3 UNITS: 100 INJECTION, SOLUTION INTRAVENOUS; SUBCUTANEOUS at 17:14

## 2024-07-16 RX ADMIN — SODIUM CHLORIDE 2000 MG: 900 INJECTION INTRAVENOUS at 21:05

## 2024-07-16 RX ADMIN — INSULIN LISPRO 4 UNITS: 100 INJECTION, SOLUTION INTRAVENOUS; SUBCUTANEOUS at 17:13

## 2024-07-16 RX ADMIN — TERAZOSIN HYDROCHLORIDE ANHYDROUS 2 MG: 2 CAPSULE ORAL at 21:06

## 2024-07-16 RX ADMIN — BUSPIRONE HYDROCHLORIDE 7.5 MG: 15 TABLET ORAL at 08:35

## 2024-07-16 RX ADMIN — Medication 10 ML: at 08:35

## 2024-07-16 RX ADMIN — ATORVASTATIN CALCIUM 80 MG: 40 TABLET, FILM COATED ORAL at 21:06

## 2024-07-16 RX ADMIN — Medication 10 ML: at 21:07

## 2024-07-16 RX ADMIN — QUETIAPINE FUMARATE 50 MG: 25 TABLET ORAL at 21:06

## 2024-07-16 RX ADMIN — HYDROCODONE BITARTRATE AND ACETAMINOPHEN 1 TABLET: 10; 325 TABLET ORAL at 21:06

## 2024-07-16 RX ADMIN — APIXABAN 5 MG: 5 TABLET, FILM COATED ORAL at 08:35

## 2024-07-16 RX ADMIN — BUSPIRONE HYDROCHLORIDE 7.5 MG: 15 TABLET ORAL at 21:06

## 2024-07-16 RX ADMIN — INSULIN LISPRO 3 UNITS: 100 INJECTION, SOLUTION INTRAVENOUS; SUBCUTANEOUS at 21:05

## 2024-07-16 NOTE — PLAN OF CARE
Problem: Adult Inpatient Plan of Care  Goal: Plan of Care Review  Outcome: Ongoing, Progressing  Flowsheets (Taken 7/16/2024 0541)  Progress: improving  Plan of Care Reviewed With:   patient   spouse  Goal: Patient-Specific Goal (Individualized)  Outcome: Ongoing, Progressing  Goal: Absence of Hospital-Acquired Illness or Injury  Outcome: Ongoing, Progressing  Intervention: Identify and Manage Fall Risk  Description: Perform standard risk assessment on admission using a validated tool or comprehensive approach appropriate to the patient; reassess fall risk frequently, with change in status or transfer to another level of care.  Communicate fall injury risk to interprofessional healthcare team.  Determine need for increased observation, equipment and environmental modification, such as low bed, signage and supportive, nonskid footwear.  Adjust safety measures to individual developmental age, stage and identified risk factors.  Reinforce the importance of safety and physical activity with patient and family.  Perform regular intentional rounding to assess need for position change, pain assessment and personal needs, including assistance with toileting.  Recent Flowsheet Documentation  Taken 7/16/2024 0400 by Tiffany Neal RN  Safety Promotion/Fall Prevention:   nonskid shoes/slippers when out of bed   safety round/check completed   room organization consistent   lighting adjusted   activity supervised   assistive device/personal items within reach   clutter free environment maintained  Taken 7/16/2024 0230 by Tiffany Neal RN  Safety Promotion/Fall Prevention:   nonskid shoes/slippers when out of bed   safety round/check completed   room organization consistent   lighting adjusted   activity supervised   assistive device/personal items within reach   clutter free environment maintained  Taken 7/16/2024 0030 by Tiffany Neal RN  Safety Promotion/Fall Prevention:   nonskid shoes/slippers when out of  bed   safety round/check completed   room organization consistent   lighting adjusted   activity supervised   assistive device/personal items within reach   clutter free environment maintained  Taken 7/15/2024 2215 by Tiffany Neal RN  Safety Promotion/Fall Prevention:   nonskid shoes/slippers when out of bed   safety round/check completed   room organization consistent   lighting adjusted   activity supervised   assistive device/personal items within reach   clutter free environment maintained  Taken 7/15/2024 2030 by Tiffany Neal RN  Safety Promotion/Fall Prevention:   nonskid shoes/slippers when out of bed   safety round/check completed   room organization consistent   lighting adjusted   activity supervised   assistive device/personal items within reach   clutter free environment maintained  Taken 7/15/2024 1945 by Tiffany Neal RN  Safety Promotion/Fall Prevention:   nonskid shoes/slippers when out of bed   safety round/check completed   room organization consistent   lighting adjusted   activity supervised   assistive device/personal items within reach   clutter free environment maintained  Intervention: Prevent Skin Injury  Description: Perform a screening for skin injury risk, such as pressure or moisture associated skin damage on admission and at regular intervals throughout hospital stay.  Keep all areas of skin (especially folds) clean and dry.  Maintain adequate skin hydration.  Relieve and redistribute pressure and protect bony prominences; implement measures based on patient-specific risk factors.  Match turning and repositioning schedule to clinical condition.  Encourage weight shift frequently; assist with reposition if unable to complete independently.  Float heels off bed; avoid pressure on the Achilles tendon.  Keep skin free from extended contact with medical devices.  Encourage functional activity and mobility, as early as tolerated.  Use aids (e.g., slide boards, mechanical  lift) during transfer.  Recent Flowsheet Documentation  Taken 7/16/2024 0400 by Tiffany Neal RN  Body Position: turned  Taken 7/16/2024 0230 by Tiffany Neal RN  Body Position: turned  Taken 7/16/2024 0030 by Tiffany Neal RN  Body Position: turned  Taken 7/15/2024 2215 by Tiffany Neal RN  Body Position: turned  Taken 7/15/2024 2030 by Tiffany Neal RN  Body Position: turned  Taken 7/15/2024 1945 by Tiffany Neal RN  Body Position: turned  Intervention: Prevent Infection  Description: Maintain skin and mucous membrane integrity; promote hand, oral and pulmonary hygiene.  Optimize fluid balance, nutrition, sleep and glycemic control to maximize infection resistance.  Identify potential sources of infection early to prevent or mitigate progression of infection (e.g., wound, lines, devices).  Evaluate ongoing need for invasive devices; remove promptly when no longer indicated.  Recent Flowsheet Documentation  Taken 7/16/2024 0400 by Tiffany Neal RN  Infection Prevention:   environmental surveillance performed   rest/sleep promoted   hand hygiene promoted  Taken 7/16/2024 0230 by Tiffany Neal RN  Infection Prevention:   environmental surveillance performed   rest/sleep promoted   hand hygiene promoted  Taken 7/16/2024 0030 by Tiffany Neal RN  Infection Prevention:   environmental surveillance performed   rest/sleep promoted   hand hygiene promoted  Taken 7/15/2024 2215 by Tiffany Neal RN  Infection Prevention:   environmental surveillance performed   rest/sleep promoted   hand hygiene promoted  Taken 7/15/2024 2030 by Tiffany Neal RN  Infection Prevention:   environmental surveillance performed   rest/sleep promoted   hand hygiene promoted  Taken 7/15/2024 1945 by Tiffany Neal RN  Infection Prevention:   environmental surveillance performed   rest/sleep promoted   hand hygiene promoted  Goal: Optimal Comfort and Wellbeing  Outcome: Ongoing,  Progressing  Goal: Readiness for Transition of Care  Outcome: Ongoing, Progressing     Problem: Skin Injury Risk Increased  Goal: Skin Health and Integrity  Outcome: Ongoing, Progressing  Intervention: Optimize Skin Protection  Description: Perform a full pressure injury risk assessment, as indicated by screening, upon admission to care unit.  Reassess skin (injury risk, full inspection) frequently (e.g., scheduled interval, with change in condition) to provide optimal early detection and prevention.  Maintain adequate tissue perfusion (e.g., encourage fluid balance; avoid crossing legs, constrictive clothing or devices) to promote tissue oxygenation.  Maintain head of bed at lowest degree of elevation tolerated, considering medical condition and other restrictions.  Avoid positioning onto an area that remains reddened.  Minimize incontinence and moisture (e.g., toileting schedule; moisture-wicking pad, diaper or incontinence collection device; skin moisture barrier).  Cleanse skin promptly and gently when soiled utilizing a pH-balanced cleanser.  Relieve and redistribute pressure (e.g., scheduled position changes, weight shifts, use of support surface, medical device repositioning, protective dressing application, use of positioning device, microclimate control, use of pressure-injury-monitor  Encourage increased activity, such as sitting in a chair at the bedside or early mobilization, when able to tolerate.  Recent Flowsheet Documentation  Taken 7/16/2024 0400 by Tiffany Neal RN  Head of Bed (HOB) Positioning: HOB elevated  Taken 7/16/2024 0230 by Tiffany Neal RN  Head of Bed (HOB) Positioning: HOB elevated  Taken 7/16/2024 0030 by Tiffany Neal RN  Head of Bed (HOB) Positioning: HOB elevated  Taken 7/15/2024 2215 by Tiffany Neal RN  Head of Bed (HOB) Positioning: HOB elevated  Taken 7/15/2024 2030 by Tiffany Neal RN  Head of Bed (HOB) Positioning: HOB elevated  Taken 7/15/2024  1945 by Tiffany Neal RN  Head of Bed (HOB) Positioning: HOB elevated     Problem: Diabetes Comorbidity  Goal: Blood Glucose Level Within Targeted Range  Outcome: Ongoing, Progressing     Problem: Hypertension Comorbidity  Goal: Blood Pressure in Desired Range  Outcome: Ongoing, Progressing     Problem: Pain Chronic (Persistent) (Comorbidity Management)  Goal: Acceptable Pain Control and Functional Ability  Outcome: Ongoing, Progressing     Problem: Fall Injury Risk  Goal: Absence of Fall and Fall-Related Injury  Outcome: Ongoing, Progressing  Intervention: Promote Injury-Free Environment  Description: Provide a safe, barrier-free environment that encourages independent activity.  Keep care area uncluttered and well-lighted.  Determine need for increased observation or monitoring.  Avoid use of devices that minimize mobility, such as restraints or indwelling urinary catheter.  Recent Flowsheet Documentation  Taken 7/16/2024 0400 by Tiffany Neal RN  Safety Promotion/Fall Prevention:   nonskid shoes/slippers when out of bed   safety round/check completed   room organization consistent   lighting adjusted   activity supervised   assistive device/personal items within reach   clutter free environment maintained  Taken 7/16/2024 0230 by Tiffany Neal RN  Safety Promotion/Fall Prevention:   nonskid shoes/slippers when out of bed   safety round/check completed   room organization consistent   lighting adjusted   activity supervised   assistive device/personal items within reach   clutter free environment maintained  Taken 7/16/2024 0030 by Tiffany Neal RN  Safety Promotion/Fall Prevention:   nonskid shoes/slippers when out of bed   safety round/check completed   room organization consistent   lighting adjusted   activity supervised   assistive device/personal items within reach   clutter free environment maintained  Taken 7/15/2024 2215 by Tiffany Neal RN  Safety Promotion/Fall  Prevention:   nonskid shoes/slippers when out of bed   safety round/check completed   room organization consistent   lighting adjusted   activity supervised   assistive device/personal items within reach   clutter free environment maintained  Taken 7/15/2024 2030 by Tiffany Neal RN  Safety Promotion/Fall Prevention:   nonskid shoes/slippers when out of bed   safety round/check completed   room organization consistent   lighting adjusted   activity supervised   assistive device/personal items within reach   clutter free environment maintained  Taken 7/15/2024 1945 by Tiffany Neal RN  Safety Promotion/Fall Prevention:   nonskid shoes/slippers when out of bed   safety round/check completed   room organization consistent   lighting adjusted   activity supervised   assistive device/personal items within reach   clutter free environment maintained   Goal Outcome Evaluation:  Plan of Care Reviewed With: patient, spouse        Progress: improving

## 2024-07-16 NOTE — PROGRESS NOTES
"          Clinical Nutrition Assessment     Patient Name: Cheri Cruz  YOB: 1940  MRN: [id number]  Date of Encounter: 07/15/24 21:02 EDT  Admission date: 7/14/2024  Reason for Visit: Identified at risk by screening criteria, MST score 2+    Assessment   Nutrition Assessment   Admission Diagnosis:  AMS (altered mental status) [R41.82]  Stroke [I63.9]    Problem List:    AMS (altered mental status)    Stroke      PMH:   She  has a past medical history of Abnormal heart rhythm, Anxiety, Arrhythmia, Arthritis, Atrial fibrillation, Dementia, Diabetes mellitus, Hyperlipidemia, Hypertension, Kidney disorder, Stroke, and Uterus cancer.    PSH:  She  has a past surgical history that includes Cataract extraction, bilateral.    Applicable Nutrition History:       Anthropometrics     Height: Height: 170.2 cm (67\")  Last Filed Weight: Weight: 93.1 kg (205 lb 4 oz) (07/14/24 1957)  Method: Weight Method: Bed scale  BMI: BMI (Calculated): 32.1    UBW:  Per EMR wts ranges 199 lbs to 216 lbs in 2023 bt Jan and Apr No recent wts not bed or estimate  Weight change: unable to evaluate at this time    Nutrition Focused Physical Exam    Date: 7/15    Pt does not meet criteria for malnutrition diagnosis, at this time.    Only mild muscle wasting at dorsal hand, clavicle and calf areas and mild orbital fat wasting detected    Subjective   Reported/Observed/Food/Nutrition Related History:     7/15  Pt allows sometimes needs food cut up but eats ok. Note pt req pureed food items last adm s/p stroke. Per RN now requires food cut up.     Current Nutrition Prescription   PO: Diet: Cardiac, Diabetic; Healthy Heart (2-3 Na+); Consistent Carbohydrate; Feeding Assistance - Nursing; Texture: Soft to Chew (NDD 3); Soft to Chew: Chopped Meat; Fluid Consistency: Thin (IDDSI 0)  Oral Nutrition Supplement: Boost Glucose Control daily added  Intake: 1 Day: 38% x 2 meals recorded    Assessment & Plan   Nutrition Diagnosis   Date:  7/15 "           Updated:    Problem Potential sub optimal intake    Etiology Weakness    Signs/Symptoms 38% x 2 meals recorded thus far   Status:       Goal / Objectives:   Nutrition to support treatment and Increase intake      Nutrition Intervention      Follow treatment progress, Care plan reviewed, Advise alternate selection, Menu provided, Supplement provided, Diet adj for texture         Monitoring/Evaluation:   Per protocol, I&O, PO intake, Supplement intake, Pertinent labs, Weight, Symptoms    Radha Gonzalez RD  Time Spent:30 min

## 2024-07-16 NOTE — PROGRESS NOTES
"    [facility] Medicine Services  PROGRESS NOTE    Patient Name: Cheri Cruz  : 1940  MRN: [id number]    Date of Admission: 2024  Primary Care Physician: Lorrie Canada APRN    Subjective   Subjective     CC:  AMS    HPI:  Evaluated patient this morning. Tells me that she \"feels bad\" but unable to describe this any further. Complains of some low back pain as well.      Objective   Objective     Vital Signs:   Temp:  [97.7 °F (36.5 °C)-98.6 °F (37 °C)] 97.8 °F (36.6 °C)  Heart Rate:  [69-88] 74  Resp:  [17-20] 17  BP: (127-171)/(44-90) 130/61  Flow (L/min):  [2] 2     Physical Exam:  Constitutional: Awake, alert, resting comfortably  HENT: Very hard of hearing   Respiratory: Clear to auscultation bilaterally, respiratory effort normal   Cardiovascular: RRR, no murmurs, rubs, or gallops  Gastrointestinal: soft, nontender, nondistended  Musculoskeletal: No bilateral ankle edema  Psychiatric: Appropriate affect, cooperative  Neurologic: Alert, oriented x 1, RLE weaker compared to LLE, speech clear  Skin: No rashes      Results Reviewed:  LAB RESULTS:      Lab 24  0407 07/15/24  0824 24  1519   WBC 7.06 8.30  --   --  7.87   HEMOGLOBIN 10.6* 10.3*  --   --  11.0*   HEMATOCRIT 34.9 33.5*  --   --  35.2   PLATELETS 90* 124*  --   --  139*   NEUTROS ABS  --  5.29  --   --  5.72   IMMATURE GRANS (ABS)  --  0.02  --   --  0.02   LYMPHS ABS  --  2.28  --   --  1.52   MONOS ABS  --  0.44  --   --  0.44   EOS ABS  --  0.25  --   --  0.14   .1* 104.0*  --   --  103.2*   CRP  --  <0.30  --   --   --    LACTATE  --   --   --  1.4  --    D DIMER QUANT  --   --  0.31  --   --          Lab 24  0407 07/15/24  2344 07/15/24  0824 24  1519   SODIUM 137  --  140 137   POTASSIUM 5.2  --  4.6 5.6*   CHLORIDE 104  --  104 101   CO2 25.0  --  23.0 27.8   ANION GAP 8.0  --  13.0 8.2   BUN 25*  --  26* 35*   CREATININE 1.44*  --  1.53* 2.06* "   EGFR 36.4*  --  33.8* 23.7*   GLUCOSE 294*  --  306* 436*   CALCIUM 8.3*  --  8.2* 9.2   MAGNESIUM  --  2.8* 1.5* 1.6   PHOSPHORUS  --   --  4.2  --    HEMOGLOBIN A1C  --   --  9.80*  --    TSH  --   --   --  2.380         Lab 07/15/24  0824 07/14/24  1519   TOTAL PROTEIN 6.1 7.0   ALBUMIN 3.6 3.9   GLOBULIN 2.5 3.1   ALT (SGPT) 5 5   AST (SGOT) 12 13   BILIRUBIN <0.2 0.2   ALK PHOS 58 68         Lab 07/14/24  2243 07/14/24 2043 07/14/24  1519   HSTROP T 37* 36* 37*         Lab 07/15/24  0824   CHOLESTEROL 106   LDL CHOL 43   HDL CHOL 40   TRIGLYCERIDES 132         Lab 07/14/24 2043   FOLATE 15.20   VITAMIN B 12 550         Brief Urine Lab Results  (Last result in the past 365 days)        Color   Clarity   Blood   Leuk Est   Nitrite   Protein   CREAT   Urine HCG        07/14/24 2001 Yellow   Cloudy   Trace   Small (1+)   Negative   Negative                   Microbiology Results Abnormal       Procedure Component Value - Date/Time    Urine Culture - Urine, Urine, Random Void [[id number]] Collected: 07/14/24 2001    Lab Status: Final result Specimen: Urine, Random Void Updated: 07/16/24 1020     Urine Culture 50,000 CFU/mL Normal Urogenital Nickie    Narrative:      Colonization of the urinary tract without infection is common. Treatment is discouraged unless the patient is symptomatic, pregnant, or undergoing an invasive urologic procedure.    Blood Culture - Blood, Wrist, Left [[id number]]  (Normal) Collected: 07/14/24 2022    Lab Status: Preliminary result Specimen: Blood from Wrist, Left Updated: 07/15/24 2100     Blood Culture No growth at 24 hours    Narrative:      Less than seven (7) mL's of blood was collected.  Insufficient quantity may yield false negative results.    Blood Culture - Blood, Hand, Left [[id number]]  (Normal) Collected: 07/14/24 2022    Lab Status: Preliminary result Specimen: Blood from Hand, Left Updated: 07/15/24 2100     Blood Culture No growth at 24 hours    Narrative:      Less than  seven (7) mL's of blood was collected.  Insufficient quantity may yield false negative results.            XR Abdomen KUB    Result Date: 7/15/2024  XR ABDOMEN KUB Date of Exam: 7/14/2024 9:45 PM EDT Indication: for MRI Comparison: 3/31/2023 Findings: Bowel gas pattern is nonobstructive. Multiple granulomatous calcifications are noted in the spleen. Clips are seen in the gallbladder fossa. There is previous nail and screw fixation of the right femur. A few surgical clips are seen in the right pelvis. There are overlying monitoring leads. No evidence of a metallic foreign body or device is seen that would preclude MRI. Incidental note is made of multiple granulomatous calcifications in the lower lungs and left basilar lung scarring.     Impression: Impression: 1. Nonobstructive bowel gas pattern. 2. No evidence of metallic foreign body or device to contraindicate MRI. Right femoral nail noted. Electronically Signed: Jamal Villanueva MD  7/15/2024 7:52 AM EDT  Workstation ID: [id number]    MRI Brain Without Contrast    Result Date: 7/15/2024  MRI BRAIN WO CONTRAST Date of Exam: 7/15/2024 3:52 AM EDT Indication: Stroke, follow up Stroke R/O.  Comparison: CT 1 day prior. Technique:  Routine multiplanar/multisequence sequence images of the brain were obtained without contrast administration. Findings: Evolving old left parietal lobe infarct noted with some associated cortical laminar necrosis. No new hemorrhage, mass, mass effect or additional territorial ischemia. Midline structures appear normal. Age-related changes are present with mild to moderate  generalized volume loss and mild typical T2 hyperintense subcortical and pontine white matter changes, nonspecific but favored to reflect sequela of chronic microvascular ischemia. The orbits are normal. The paranasal sinuses are clear.     Impression: Impression: Evolving old left parietal lobe infarct including some areas of associated cortical laminar necrosis. No evidence of  acute ischemia, hemorrhage, mass or mass effect. Electronically Signed: Luis Manuel Myers MD  7/15/2024 7:33 AM EDT  Workstation ID: [id number]    CT Angiogram Chest Pulmonary Embolism    Result Date: 7/14/2024  CT ANGIOGRAM CHEST PULMONARY EMBOLISM Date of Exam: 7/14/2024 9:01 PM EDT Indication: Pulmonary embolism (PE) suspected, high prob r/o pe. Comparison: 7/14/2024. Technique: Axial CT images were obtained of the chest after the uneventful intravenous administration of intravenous contrast utilizing pulmonary embolism protocol.  Reconstructed coronal and sagittal images were also obtained. Automated exposure control  and iterative construction methods were used. Findings: Pulmonary arteries: Adequate opacification of the pulmonary arteries. No evidence of acute pulmonary embolism. Lungs and Pleura: Subsegmental atelectasis and scarring present, most pronounced within the posterior aspect of the right lower lobe. Granulomatous calcifications are present.. No nodule. No consolidation. No pleural fluid. Mediastinum/Brittni: No mediastinal or hilar lymphadenopathy. Lymph nodes: No axillary or supraclavicular adenopathy. Cardiovascular: The cardiac chambers are within normal limits. The pericardium is normal. The aorta and its arch branch vessels are unremarkable.   Upper Abdomen: The upper abdominal contents are unremarkable.      Bones and Soft Tissue: No suspicious osseous lesion.     Impression: Impression: 1.No evidence of pulmonary embolism. No acute cardiopulmonary process. 2.Ancillary findings as described above. Electronically Signed: Luli Mane MD  7/14/2024 9:15 PM EDT  Workstation ID: [id number]    XR Chest 1 View    Result Date: 7/14/2024  PROCEDURE: XR CHEST 1 VW-  HISTORY: Weak/Dizzy/AMS triage protocol, decreased O2 sats. Altered mental status.  COMPARISON: April 29, 2023.  FINDINGS: The heart is stable.. Decreased inspiratory effort noted. There is evidence of old calcified granulomatous disease. Few linear  densities are noted bilaterally which are stable. No new area of consolidation seen.. The mediastinum is unremarkable. There is no pneumothorax.  There are no acute osseous abnormalities.      Impression: Stable chest..      This report was signed and finalized on 7/14/2024 4:23 PM by Mare Kwong MD.      CT Head Without Contrast    Result Date: 7/14/2024  PROCEDURE: CT HEAD WO CONTRAST-  HISTORY: increased AMS, weakness, h/o stroke  COMPARISON: April 29, 2023..  TECHNIQUE: Multiple axial CT images were performed from the foramen magnum to the vertex. Individualized dose reduction techniques using automated exposure control or adjustment of mA and/or kV according to the patient size were employed.  FINDINGS: There is moderate, age-appropriate, stable generalized cerebral atrophy. The ventricles are enlarged. Again noted is the area of encephalomalacia in the left posterior parietal region. Extent is stable from prior exam. High attenuation has developed in the cortex has in the regions of the previous MCA; finding is new compared to the prior exam. Suspect this represents postischemic cortical calcification but hemorrhage is not completely excluded. Recommend brain MRI. There is no evidence of edema or hemorrhage.  No masses are identified. No extra-axial fluid is seen. The paranasal sinuses are unremarkable. Mild small vessel ischemic disease noted.      Impression: New cortical high attenuation material at site of previous left posterior parietal CVA compared to April 2023, suspect postischemic cortical calcification but hemorrhage not completely excluded. Recommend brain MRI.     CTDI: 36.09 mGy DLP:604.53 mGy.cm  This report was signed and finalized on 7/14/2024 4:21 PM by Mare Kwong MD.       Results for orders placed during the hospital encounter of 02/10/23    Adult Transthoracic Echo Complete W/ Cont if Necessary Per Protocol    Interpretation Summary    Left ventricular systolic function is hyperdynamic  (EF > 70%). Calculated left ventricular EF = 70.6% Left ventricular ejection fraction appears to be greater than 70%. The left ventricular cavity is small in size. Left ventricular wall thickness is consistent with mild concentric hypertrophy. All left ventricular wall segments contract normally. Left ventricular intracavitary gradient noted to be 71 mmHg. Left ventricular diastolic function is consistent with (grade I) impaired relaxation. Normal left atrial pressure.    The aortic valve is abnormal in structure. There is mild calcification of the aortic valve mainly affecting the right coronary cusp(s). The aortic valve appears trileaflet. No aortic valve regurgitation is present. Gradient noted through the LV and LVOT    Compared to TTE report from  Dec 2019, hyperdynamic LV with intracavitary gradient is not a new finding but peak gradient noted on this exam is higher than previously described.      Current medications:  Scheduled Meds:apixaban, 5 mg, Oral, BID  atorvastatin, 80 mg, Oral, Nightly  busPIRone, 7.5 mg, Oral, BID  cefTRIAXone, 2,000 mg, Intravenous, Q24H  donepezil, 10 mg, Oral, Nightly  [Held by provider] gabapentin, 300 mg, Oral, BID  insulin glargine, 25 Units, Subcutaneous, Nightly  insulin lispro, 2-9 Units, Subcutaneous, 4x Daily AC & at Bedtime  levothyroxine, 25 mcg, Oral, Daily  [Held by provider] LORazepam, 0.5 mg, Oral, Q12H  pantoprazole, 40 mg, Oral, Q AM  QUEtiapine, 50 mg, Oral, Nightly  sodium chloride, 10 mL, Intravenous, Q12H  sodium chloride, 10 mL, Intravenous, Q12H  terazosin, 2 mg, Oral, Nightly      Continuous Infusions:     PRN Meds:.  acetaminophen **OR** acetaminophen **OR** acetaminophen    senna-docusate sodium **AND** polyethylene glycol **AND** bisacodyl **AND** bisacodyl    Calcium Replacement - Follow Nurse / BPA Driven Protocol    dextrose    dextrose    glucagon (human recombinant)    HYDROcodone-acetaminophen    Magnesium Standard Dose Replacement - Follow Nurse  / BPA Driven Protocol    [Held by provider] meclizine    ondansetron ODT **OR** ondansetron    Phosphorus Replacement - Follow Nurse / BPA Driven Protocol    Potassium Replacement - Follow Nurse / BPA Driven Protocol    sodium chloride    sodium chloride    sodium chloride    sodium chloride    Assessment & Plan   Assessment & Plan     Active Hospital Problems    Diagnosis  POA    **AMS (altered mental status) [R41.82]  Yes    Stroke [I63.9]  Yes      Resolved Hospital Problems   No resolved problems to display.        Brief Hospital Course to date:  Cheri Cruz is a 83 y.o. 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    This patient's problems and plans were partially entered by my partner and updated as appropriate by me 07/16/24.     Concern for left parietal tiny hemorrhage vs calcification  Evolving old left parietal ischemic stroke  -Presented to [facility] with cognitive decline and metabolic encephalopathy. She was hypotensive with systolic in the 90s.   -CTH from [facility] with left parietal tiny hemorrhage vs calcification on top of old ischemic stroke.   -MRI showed evolving old left parietal lobe CVA with some areas of associated cortical laminar necrosis. No hemorrhage.    -Neurology following, recommended to restart eliquis. Continue atorvastatin.  -PT/OT recommending home with 24/7 care.    Altered mental status   Concern for UTI  Baseline dementia  -Possibly dehydration vs hypoxia? Was hypotensive on presentation to [facility], noncompliant with supplemental oxygen  -COVID/flu negative. Blood cultures with no growth to date. LFTs normal.  -CXR with old calcified granulomatous disease. Few, stable linear densities noted bilaterally.  -Initial  urine culture with yeast, repeat urine culture with normal urogenital kallie  -Will treat with empiric IV ceftriaxone x 3 days. Holding sedatives: gabapentin, meclizine and lorazepam.     Near-syncope  Chronic hypoxemic respiratory failure  -Per report, patient became hypoxic with oxygen saturation in the  60s  -CTA chest showed no PE, nonacute  -Patient is O2 dependent on 2L NC but is not compliant due her baseline dementia.     RADHA on CKD stage III  Volume depletion due to dehydration   Mild hyperkalemia, resolved   -Baseline Cr about 1.3, Cr 2.06 on presentation  -Improved with IV fluids   -Monitor BMP. Encourage good oral intake.      Uncontrolled diabetes mellitus type 2 with hyperglycemia  -A1c 9.80% this admission  -Continue Lantus 25units qhs with moderate dose SSI. Adding on lispro 3u TIDAC. Monitor glucose and adjust insulin as needed.     Essential hypertension  -Was hypotensive at [facility].  -Not on antihypertensive medications.  -Continue to monitor.      Hyperlipidemia  -Continue atorvastatin.    Hypothyroidism  -Continue levothyroxine.     Dementia  Anxiety  Complex disposition  -Continue donepezil, Seroquel, BuSpar.  -My partner called patient's daughter, Radha Cruz, provided clinical update. Discussed need for assistance on discharge, consider SNF placement or 24 hour caregivers.    GERD  -Continue PPI.    Expected Discharge Location and Transportation: lives at home with her daughter  Expected Discharge   Expected Discharge Date: 7/17/2024; Expected Discharge Time:      VTE Prophylaxis:  Pharmacologic & mechanical VTE prophylaxis orders are present.         AM-PAC 6 Clicks Score (PT): 10 (07/16/24 0123)    CODE STATUS:   Code Status and Medical Interventions:   Ordered at: 07/15/24 4082     Level Of Support Discussed With:    Patient     Code Status (Patient has no pulse and is not breathing):    CPR (Attempt to Resuscitate)     Medical Interventions (Patient has pulse or is  breathing):    Full Support       Jeni Lopez, DO  07/16/24

## 2024-07-17 LAB
ANION GAP SERPL CALCULATED.3IONS-SCNC: 9 MMOL/L (ref 5–15)
BUN SERPL-MCNC: 22 MG/DL (ref 8–23)
BUN/CREAT SERPL: 14.9 (ref 7–25)
CALCIUM SPEC-SCNC: 8.7 MG/DL (ref 8.6–10.5)
CHLORIDE SERPL-SCNC: 103 MMOL/L (ref 98–107)
CO2 SERPL-SCNC: 25 MMOL/L (ref 22–29)
CREAT SERPL-MCNC: 1.48 MG/DL (ref 0.57–1)
EGFRCR SERPLBLD CKD-EPI 2021: 35.2 ML/MIN/1.73
GLUCOSE BLDC GLUCOMTR-MCNC: 134 MG/DL (ref 70–130)
GLUCOSE BLDC GLUCOMTR-MCNC: 172 MG/DL (ref 70–130)
GLUCOSE BLDC GLUCOMTR-MCNC: 246 MG/DL (ref 70–130)
GLUCOSE BLDC GLUCOMTR-MCNC: 266 MG/DL (ref 70–130)
GLUCOSE SERPL-MCNC: 257 MG/DL (ref 65–99)
POTASSIUM SERPL-SCNC: 5.8 MMOL/L (ref 3.5–5.2)
SODIUM SERPL-SCNC: 137 MMOL/L (ref 136–145)

## 2024-07-17 PROCEDURE — 63710000001 INSULIN LISPRO (HUMAN) PER 5 UNITS: Performed by: STUDENT IN AN ORGANIZED HEALTH CARE EDUCATION/TRAINING PROGRAM

## 2024-07-17 PROCEDURE — A9270 NON-COVERED ITEM OR SERVICE: HCPCS | Performed by: STUDENT IN AN ORGANIZED HEALTH CARE EDUCATION/TRAINING PROGRAM

## 2024-07-17 PROCEDURE — A9270 NON-COVERED ITEM OR SERVICE: HCPCS | Performed by: INTERNAL MEDICINE

## 2024-07-17 PROCEDURE — 63710000001 DONEPEZIL 10 MG TABLET: Performed by: INTERNAL MEDICINE

## 2024-07-17 PROCEDURE — 92507 TX SP LANG VOICE COMM INDIV: CPT

## 2024-07-17 PROCEDURE — 63710000001 HYDROCODONE-ACETAMINOPHEN 10-325 MG TABLET: Performed by: INTERNAL MEDICINE

## 2024-07-17 PROCEDURE — 63710000001 SODIUM ZIRCONIUM CYCLOSILICATE 10 G PACK: Performed by: STUDENT IN AN ORGANIZED HEALTH CARE EDUCATION/TRAINING PROGRAM

## 2024-07-17 PROCEDURE — 80048 BASIC METABOLIC PNL TOTAL CA: CPT | Performed by: STUDENT IN AN ORGANIZED HEALTH CARE EDUCATION/TRAINING PROGRAM

## 2024-07-17 PROCEDURE — 63710000001 BUSPIRONE 15 MG TABLET: Performed by: INTERNAL MEDICINE

## 2024-07-17 PROCEDURE — 63710000001 INSULIN LISPRO (HUMAN) PER 5 UNITS: Performed by: INTERNAL MEDICINE

## 2024-07-17 PROCEDURE — 63710000001 APIXABAN 5 MG TABLET: Performed by: STUDENT IN AN ORGANIZED HEALTH CARE EDUCATION/TRAINING PROGRAM

## 2024-07-17 PROCEDURE — 63710000001 LEVOTHYROXINE 25 MCG TABLET: Performed by: INTERNAL MEDICINE

## 2024-07-17 PROCEDURE — 63710000001 ATORVASTATIN 40 MG TABLET

## 2024-07-17 PROCEDURE — 63710000001 TERAZOSIN 2 MG CAPSULE: Performed by: INTERNAL MEDICINE

## 2024-07-17 PROCEDURE — 63710000001 INSULIN GLARGINE PER 5 UNITS: Performed by: STUDENT IN AN ORGANIZED HEALTH CARE EDUCATION/TRAINING PROGRAM

## 2024-07-17 PROCEDURE — 82948 REAGENT STRIP/BLOOD GLUCOSE: CPT

## 2024-07-17 PROCEDURE — G0378 HOSPITAL OBSERVATION PER HR: HCPCS

## 2024-07-17 PROCEDURE — 63710000001 SENNOSIDES-DOCUSATE 8.6-50 MG TABLET: Performed by: INTERNAL MEDICINE

## 2024-07-17 PROCEDURE — 63710000001 QUETIAPINE 25 MG TABLET: Performed by: INTERNAL MEDICINE

## 2024-07-17 PROCEDURE — 25010000002 ONDANSETRON PER 1 MG: Performed by: INTERNAL MEDICINE

## 2024-07-17 PROCEDURE — 63710000001 PANTOPRAZOLE 40 MG TABLET DELAYED-RELEASE: Performed by: INTERNAL MEDICINE

## 2024-07-17 PROCEDURE — 63710000001 ONDANSETRON ODT 4 MG TABLET DISPERSIBLE: Performed by: INTERNAL MEDICINE

## 2024-07-17 PROCEDURE — A9270 NON-COVERED ITEM OR SERVICE: HCPCS

## 2024-07-17 PROCEDURE — 99232 SBSQ HOSP IP/OBS MODERATE 35: CPT | Performed by: STUDENT IN AN ORGANIZED HEALTH CARE EDUCATION/TRAINING PROGRAM

## 2024-07-17 RX ORDER — INSULIN LISPRO 100 [IU]/ML
5 INJECTION, SOLUTION INTRAVENOUS; SUBCUTANEOUS
Status: DISCONTINUED | OUTPATIENT
Start: 2024-07-17 | End: 2024-07-22

## 2024-07-17 RX ORDER — BISACODYL 5 MG/1
5 TABLET, DELAYED RELEASE ORAL DAILY PRN
Status: DISCONTINUED | OUTPATIENT
Start: 2024-07-17 | End: 2024-08-09 | Stop reason: HOSPADM

## 2024-07-17 RX ORDER — AMOXICILLIN 250 MG
2 CAPSULE ORAL DAILY
Status: DISCONTINUED | OUTPATIENT
Start: 2024-07-18 | End: 2024-08-09 | Stop reason: HOSPADM

## 2024-07-17 RX ORDER — BISACODYL 10 MG
10 SUPPOSITORY, RECTAL RECTAL DAILY PRN
Status: DISCONTINUED | OUTPATIENT
Start: 2024-07-17 | End: 2024-08-09 | Stop reason: HOSPADM

## 2024-07-17 RX ORDER — POLYETHYLENE GLYCOL 3350 17 G/17G
17 POWDER, FOR SOLUTION ORAL DAILY
Status: DISCONTINUED | OUTPATIENT
Start: 2024-07-18 | End: 2024-08-09 | Stop reason: HOSPADM

## 2024-07-17 RX ORDER — LORAZEPAM 1 MG/1
1 TABLET ORAL 2 TIMES DAILY PRN
Status: DISPENSED | OUTPATIENT
Start: 2024-07-17 | End: 2024-07-22

## 2024-07-17 RX ADMIN — LEVOTHYROXINE SODIUM 25 MCG: 25 TABLET ORAL at 08:19

## 2024-07-17 RX ADMIN — APIXABAN 5 MG: 5 TABLET, FILM COATED ORAL at 08:19

## 2024-07-17 RX ADMIN — Medication 10 ML: at 21:07

## 2024-07-17 RX ADMIN — INSULIN LISPRO 3 UNITS: 100 INJECTION, SOLUTION INTRAVENOUS; SUBCUTANEOUS at 12:06

## 2024-07-17 RX ADMIN — INSULIN LISPRO 4 UNITS: 100 INJECTION, SOLUTION INTRAVENOUS; SUBCUTANEOUS at 12:06

## 2024-07-17 RX ADMIN — SODIUM ZIRCONIUM CYCLOSILICATE 10 G: 10 POWDER, FOR SUSPENSION ORAL at 12:06

## 2024-07-17 RX ADMIN — HYDROCODONE BITARTRATE AND ACETAMINOPHEN 1 TABLET: 10; 325 TABLET ORAL at 23:58

## 2024-07-17 RX ADMIN — ONDANSETRON 4 MG: 4 TABLET, ORALLY DISINTEGRATING ORAL at 08:30

## 2024-07-17 RX ADMIN — INSULIN LISPRO 5 UNITS: 100 INJECTION, SOLUTION INTRAVENOUS; SUBCUTANEOUS at 17:12

## 2024-07-17 RX ADMIN — HYDROCODONE BITARTRATE AND ACETAMINOPHEN 1 TABLET: 10; 325 TABLET ORAL at 08:30

## 2024-07-17 RX ADMIN — TERAZOSIN HYDROCHLORIDE ANHYDROUS 2 MG: 2 CAPSULE ORAL at 21:06

## 2024-07-17 RX ADMIN — BUSPIRONE HYDROCHLORIDE 7.5 MG: 15 TABLET ORAL at 08:19

## 2024-07-17 RX ADMIN — DONEPEZIL HYDROCHLORIDE 10 MG: 10 TABLET, FILM COATED ORAL at 21:07

## 2024-07-17 RX ADMIN — ATORVASTATIN CALCIUM 80 MG: 40 TABLET, FILM COATED ORAL at 21:07

## 2024-07-17 RX ADMIN — ONDANSETRON 4 MG: 2 INJECTION INTRAMUSCULAR; INTRAVENOUS at 01:20

## 2024-07-17 RX ADMIN — INSULIN LISPRO 3 UNITS: 100 INJECTION, SOLUTION INTRAVENOUS; SUBCUTANEOUS at 08:19

## 2024-07-17 RX ADMIN — APIXABAN 5 MG: 5 TABLET, FILM COATED ORAL at 21:07

## 2024-07-17 RX ADMIN — PANTOPRAZOLE SODIUM 40 MG: 40 TABLET, DELAYED RELEASE ORAL at 05:08

## 2024-07-17 RX ADMIN — BUSPIRONE HYDROCHLORIDE 7.5 MG: 15 TABLET ORAL at 21:07

## 2024-07-17 RX ADMIN — Medication 10 ML: at 08:19

## 2024-07-17 RX ADMIN — INSULIN GLARGINE 30 UNITS: 100 INJECTION, SOLUTION SUBCUTANEOUS at 21:07

## 2024-07-17 RX ADMIN — INSULIN LISPRO 6 UNITS: 100 INJECTION, SOLUTION INTRAVENOUS; SUBCUTANEOUS at 08:19

## 2024-07-17 RX ADMIN — INSULIN LISPRO 2 UNITS: 100 INJECTION, SOLUTION INTRAVENOUS; SUBCUTANEOUS at 17:12

## 2024-07-17 RX ADMIN — SENNOSIDES AND DOCUSATE SODIUM 2 TABLET: 50; 8.6 TABLET ORAL at 12:06

## 2024-07-17 RX ADMIN — QUETIAPINE FUMARATE 50 MG: 25 TABLET ORAL at 21:06

## 2024-07-17 NOTE — PROGRESS NOTES
[facility]  PROGRESS NOTE    Patient Name: Cheri Cruz  : 1940  MRN: [id number]    Date of Admission: 2024  Primary Care Physician: Lorrie Canada APRN    Subjective   Subjective     CC:  AMS    HPI:  Evaluated patient this morning, reported nausea. Received Zofran per nursing. Unable to eat breakfast today due to nausea. Discussed patient's case with family (daughters x 2) in the afternoon with case management.       Objective   Objective     Vital Signs:   Temp:  [98 °F (36.7 °C)-98.4 °F (36.9 °C)] 98.1 °F (36.7 °C)  Heart Rate:  [68-87] 68  Resp:  [16-18] 18  BP: (127-144)/(44-68) 143/54  Flow (L/min):  [2] 2     Physical Exam:  Constitutional: Awake, alert, resting comfortably  HENT: Very hard of hearing   Respiratory: Clear to auscultation bilaterally, respiratory effort normal   Cardiovascular: RRR, no murmurs, rubs, or gallops  Gastrointestinal: soft, nontender, nondistended  Musculoskeletal: No bilateral ankle edema  Psychiatric: Appropriate affect, cooperative  Neurologic: Alert, oriented x 1, RLE weaker compared to LLE, speech clear  Skin: No rashes      Results Reviewed:  LAB RESULTS:      Lab 24  0407 07/15/24  0824 24  1519   WBC 7.06 8.30  --   --  7.87   HEMOGLOBIN 10.6* 10.3*  --   --  11.0*   HEMATOCRIT 34.9 33.5*  --   --  35.2   PLATELETS 90* 124*  --   --  139*   NEUTROS ABS  --  5.29  --   --  5.72   IMMATURE GRANS (ABS)  --  0.02  --   --  0.02   LYMPHS ABS  --  2.28  --   --  1.52   MONOS ABS  --  0.44  --   --  0.44   EOS ABS  --  0.25  --   --  0.14   .1* 104.0*  --   --  103.2*   CRP  --  <0.30  --   --   --    LACTATE  --   --   --  1.4  --    D DIMER QUANT  --   --  0.31  --   --          Lab 24  0742 24  0407 07/15/24  2344 07/15/24  0824 24  1519   SODIUM 137 137  --  140 137   POTASSIUM 5.8* 5.2  --  4.6 5.6*   CHLORIDE 103 104  --  104 101   CO2 25.0 25.0   --  23.0 27.8   ANION GAP 9.0 8.0  --  13.0 8.2   BUN 22 25*  --  26* 35*   CREATININE 1.48* 1.44*  --  1.53* 2.06*   EGFR 35.2* 36.4*  --  33.8* 23.7*   GLUCOSE 257* 294*  --  306* 436*   CALCIUM 8.7 8.3*  --  8.2* 9.2   MAGNESIUM  --   --  2.8* 1.5* 1.6   PHOSPHORUS  --   --   --  4.2  --    HEMOGLOBIN A1C  --   --   --  9.80*  --    TSH  --   --   --   --  2.380         Lab 07/15/24  0824 07/14/24  1519   TOTAL PROTEIN 6.1 7.0   ALBUMIN 3.6 3.9   GLOBULIN 2.5 3.1   ALT (SGPT) 5 5   AST (SGOT) 12 13   BILIRUBIN <0.2 0.2   ALK PHOS 58 68         Lab 07/14/24 2243 07/14/24 2043 07/14/24  1519   HSTROP T 37* 36* 37*         Lab 07/15/24  0824   CHOLESTEROL 106   LDL CHOL 43   HDL CHOL 40   TRIGLYCERIDES 132         Lab 07/14/24 2043   FOLATE 15.20   VITAMIN B 12 550         Brief Urine Lab Results  (Last result in the past 365 days)        Color   Clarity   Blood   Leuk Est   Nitrite   Protein   CREAT   Urine HCG        07/14/24 2001 Yellow   Cloudy   Trace   Small (1+)   Negative   Negative                   Microbiology Results Abnormal       Procedure Component Value - Date/Time    Blood Culture - Blood, Wrist, Left [[id number]]  (Normal) Collected: 07/14/24 2022    Lab Status: Preliminary result Specimen: Blood from Wrist, Left Updated: 07/16/24 2100     Blood Culture No growth at 2 days    Narrative:      Less than seven (7) mL's of blood was collected.  Insufficient quantity may yield false negative results.    Blood Culture - Blood, Hand, Left [[id number]]  (Normal) Collected: 07/14/24 2022    Lab Status: Preliminary result Specimen: Blood from Hand, Left Updated: 07/16/24 2100     Blood Culture No growth at 2 days    Narrative:      Less than seven (7) mL's of blood was collected.  Insufficient quantity may yield false negative results.    Urine Culture - Urine, Urine, Random Void [[id number]] Collected: 07/14/24 2001    Lab Status: Final result Specimen: Urine, Random Void Updated: 07/16/24 1020     Urine  Culture 50,000 CFU/mL Normal Urogenital Nickie    Narrative:      Colonization of the urinary tract without infection is common. Treatment is discouraged unless the patient is symptomatic, pregnant, or undergoing an invasive urologic procedure.            No radiology results from the last 24 hrs    Results for orders placed during the hospital encounter of 02/10/23    Adult Transthoracic Echo Complete W/ Cont if Necessary Per Protocol    Interpretation Summary    Left ventricular systolic function is hyperdynamic (EF > 70%). Calculated left ventricular EF = 70.6% Left ventricular ejection fraction appears to be greater than 70%. The left ventricular cavity is small in size. Left ventricular wall thickness is consistent with mild concentric hypertrophy. All left ventricular wall segments contract normally. Left ventricular intracavitary gradient noted to be 71 mmHg. Left ventricular diastolic function is consistent with (grade I) impaired relaxation. Normal left atrial pressure.    The aortic valve is abnormal in structure. There is mild calcification of the aortic valve mainly affecting the right coronary cusp(s). The aortic valve appears trileaflet. No aortic valve regurgitation is present. Gradient noted through the LV and LVOT    Compared to TTE report from  Dec 2019, hyperdynamic LV with intracavitary gradient is not a new finding but peak gradient noted on this exam is higher than previously described.      Current medications:  Scheduled Meds:apixaban, 5 mg, Oral, BID  atorvastatin, 80 mg, Oral, Nightly  busPIRone, 7.5 mg, Oral, BID  donepezil, 10 mg, Oral, Nightly  [Held by provider] gabapentin, 300 mg, Oral, BID  insulin glargine, 30 Units, Subcutaneous, Nightly  insulin lispro, 2-9 Units, Subcutaneous, 4x Daily AC & at Bedtime  Insulin Lispro, 5 Units, Subcutaneous, TID With Meals  levothyroxine, 25 mcg, Oral, Daily  [Held by provider] LORazepam, 0.5 mg, Oral, Q12H  pantoprazole, 40 mg, Oral, Q AM  [START  ON 7/18/2024] senna-docusate sodium, 2 tablet, Oral, Daily   And  [START ON 7/18/2024] polyethylene glycol, 17 g, Oral, Daily  QUEtiapine, 50 mg, Oral, Nightly  sodium chloride, 10 mL, Intravenous, Q12H  sodium chloride, 10 mL, Intravenous, Q12H  terazosin, 2 mg, Oral, Nightly      Continuous Infusions:     PRN Meds:.  acetaminophen **OR** acetaminophen **OR** acetaminophen    [START ON 7/18/2024] senna-docusate sodium **AND** [START ON 7/18/2024] polyethylene glycol **AND** bisacodyl **AND** bisacodyl    Calcium Replacement - Follow Nurse / BPA Driven Protocol    dextrose    dextrose    glucagon (human recombinant)    HYDROcodone-acetaminophen    LORazepam    Magnesium Standard Dose Replacement - Follow Nurse / BPA Driven Protocol    [Held by provider] meclizine    ondansetron ODT **OR** ondansetron    Phosphorus Replacement - Follow Nurse / BPA Driven Protocol    Potassium Replacement - Follow Nurse / BPA Driven Protocol    sodium chloride    sodium chloride    sodium chloride    sodium chloride    Assessment & Plan   Assessment & Plan     Active Hospital Problems    Diagnosis  POA    **AMS (altered mental status) [R41.82]  Yes    Stroke [I63.9]  Yes      Resolved Hospital Problems   No resolved problems to display.        Brief Hospital Course to date:  Cheri Cruz is a 83 y.o. female with past medical history of dementia, type 2 diabetes mellitus, CKD stage III, essential hypertension, dyslipidemia, old left parietal stroke, PE on anticoagulation with Eliquis, who presented to the hospital as a transfer from [facility] for altered mental status and concern for hemorrhagic stroke.    This patient's problems and plans were partially entered by my partner and updated as appropriate by me 07/17/24.     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 She was hypotensive with systolic in the 90s at [facility].   -CTH from [facility] with left  parietal tiny hemorrhage vs calcification on top of old ischemic stroke.   -MRI showed evolving old left parietal lobe CVA with some areas of associated cortical laminar necrosis. No hemorrhage.    -Neurology following, recommended to resume Eliquis. Continue statin. Annual follow up in stroke clinic.   -PT/OT recommending home with 24/7 care.    Altered mental status on advanced dementia  Concern for UTI  -Possibly dehydration vs hypoxia? Was also hypotensive on presentation to [facility], noncompliant with supplemental oxygen at home  -COVID/flu negative. Blood cultures with no growth to date. LFTs normal.  -CXR with old calcified granulomatous disease. Few, stable linear densities noted bilaterally.  -Initial urine culture with yeast, repeat urine culture with normal urogenital kallie  -Treated with empiric IV ceftriaxone x 3 days. Held sedatives initially. Okay to resume PRN lorazepam. Continue donepezil, Seroquel, BuSpar.     Acute on chronic hypoxemic respiratory failure  Near-syncope  -Per report, patient became hypoxic with oxygen saturation in the 60s  -CTA chest showed no PE, nonacute  -Patient is O2 dependent on 2L NC but is not compliant due her baseline dementia.  -Continue supplemental oxygenation, titrate for goal SpO2 greater than 90%.     RADHA on CKD stage III  Volume depletion due to dehydration   Mild hyperkalemia  -Baseline Cr about 1.3, Cr 2.06 on presentation  -Improved with IV fluids   -Monitor BMP. Encourage good oral intake. Lokelma x 1 on 7/17.    Complex disposition  -PT/OT evaluated, felt patient's functional status is at baseline. Recommended home with 24/7 care.  -Again discussed care with both patient's daughters and case management on 7/17. Family leaning towards pursuing long-term care after discharge as they are having difficulty taking care of her on their own.    Nausea  -suspect secondary to constipation  -KUB with nonobstructive bowel gas pattern  -Continue bowel regimen. PRN Zofran for  nausea.      Uncontrolled diabetes mellitus type 2 with hyperglycemia  -A1c 9.80% this admission  -Increase Lantus to 30 units qhs with lispro 5u TIDAC and moderate dose SSI. Monitor glucose and adjust insulin as needed.    Chronic anemia  Macrocytosis  -No evidence of overt GI bleeding this admission  -B12 and folate within normal limits  -Further workup of anemia as outpatient as appropriate.      Essential hypertension  -Was hypotensive at [facility].  -Not on antihypertensive medications.  -Continue to monitor.      Hyperlipidemia  -Continue atorvastatin.    Hypothyroidism  -Continue levothyroxine.    GERD  -Continue PPI.    Expected Discharge Location and Transportation: lives at home with her daughter  Expected Discharge   Expected Discharge Date: 7/19/2024; Expected Discharge Time:      VTE Prophylaxis:  Pharmacologic & mechanical VTE prophylaxis orders are present.         AM-PAC 6 Clicks Score (PT): 7 (07/17/24 1950)    CODE STATUS:   Code Status and Medical Interventions:   Ordered at: 07/15/24 1319     Level Of Support Discussed With:    Patient     Code Status (Patient has no pulse and is not breathing):    CPR (Attempt to Resuscitate)     Medical Interventions (Patient has pulse or is breathing):    Full Support       Jeni Lopez,   07/17/24

## 2024-07-17 NOTE — DISCHARGE PLACEMENT REQUEST
"Deya Cruz Cro (83 y.o. Female)     STR to LTC   To Admissions  From [facility] [phone]      Date of Birth   1940    Social Security Number       Address   [address] [facility] [address]    Home Phone   [phone]    MRN   [id number]       [facility]    Marital Status                               Admission Date   7/14/24    Admission Type   Urgent    Admitting Provider   Jeni Lopez DO    Attending Provider   Jeni Lopez DO    Department, Room/Bed   [facility] 3F, S304/1       Discharge Date       Discharge Disposition       Discharge Destination                                 Attending Provider: Jeni Lopez DO    Allergies: Penicillins, Sulfa Antibiotics, Tetracyclines & Related, Valium [Diazepam]    Isolation: None   Infection: None   Code Status: CPR    Ht: 170.2 cm (67\")   Wt: 93.1 kg (205 lb 4 oz)    Admission Cmt: None   Principal Problem: AMS (altered mental status) [R41.82]                   Active Insurance as of 7/14/2024       Primary Coverage       Payor Plan Insurance Group Employer/Plan Group    HUMANA MEDICARE REPLACEMENT HUMANA MED ADV HMO [id number]       Payor Plan Address Payor Plan Phone Number Payor Plan Fax Number Effective Dates    [address] [address] [phone]  2/1/2023 - None Entered    [facility] [id number]         Subscriber Name Subscriber Birth Date Member ID       DEYA CRUZ CRO 1940 [id number]                     Emergency Contacts        (Rel.) Home Phone Work Phone Mobile Phone    Radha Cruz (Daughter) [phone] -- [phone]    EVITA MADDEN (Daughter) [phone] -- --    OMEGA CRUZ (Son) [phone] -- [phone]    Vanessa Cruz (Spouse) [phone] -- --    Shayne Madden (Other) [phone] -- --                 History & Physical        Otilia Chang MD at 07/14/24 1917              [facility] Medicine " Services  HISTORY AND PHYSICAL    Patient Name: Cheri Cruz  : 1940  MRN: [id number]  Primary Care Physician: Lorrie Canada APRN  Date of admission: 2024      Subjective  Subjective     Chief Complaint:  Altered mental state    HPI:  Patient with dementia and expressive aphasia.  Cannot contribute much to HPI.  Most of the history is obtained from reviewing her old records and records from [facility].  Attempted to reach her daughter Ms. Garcia 3 times but voicemail is full    Cheri Cruz is a 83 y.o. 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    She presented to [facility] with worsening encephalopathy/altered mental state and generalized bodyaches.  She has baseline dementia but her mental state was significantly below her baseline.  She was found to be hypertensive with systolic in the 90s.  Lab workup with creatinine of 2.0 from her baseline of 1.5.  Potassium 5.6.  Blood sugar 436.  Hemoglobin of 11.0.  Urine analysis with too numerous WBCs.  Chest x-ray clear.  CT head with questionable left parietal tiny hemorrhage versus calcification.  She was transferred to [facility] for neurology evaluation after she was accepted by stroke team.    At the time of the encounter, patient is mildly confused.  She thinks she is in [place].  She is able to tell me the name of her daughter.  Complaining of some lower abdominal pain and burning sensation with a urine otherwise she has no active complaint.      Addendum 8:30 PM:  Managed to reach out to the daughter/Ms. Garcia who lives in provide care to the patient.  Daughter reported to me that her mother is bedbound since her stroke in  with  "minimal activity.  She is incontinent to urine and stool.  She has severe depression.  Over the last few weeks, her memory has been declining as well as her mood.  She became less engaged and more depressed.  Her appetite is good but she eats what she wants to eat.  This morning, her mother started complaining of shortness of breath and kept telling her \" I am going to die\" and when her daughter checked her oxygen saturation, it was in the 60s so she put oxygen on (she is on as needed oxygen at home but does not like to wear it).  Soon after, patient became pale and she could not feel pulse and was about to start CPR when the patient quickly recovered and her oxygen saturation improved to the upper 90s.  At that time, she thought that her mother recovered so she can call 911 immediately.  Later on, her mother kept acting weird and became more lethargic which eventually prompted her to call 911.    Personal History     Past Medical History:   Diagnosis Date    Abnormal heart rhythm     Anxiety     Arrhythmia     Arthritis     Atrial fibrillation     Dementia     Diabetes mellitus     Hyperlipidemia     Hypertension     Kidney disorder     Stroke     Uterus cancer            Past Surgical History:   Procedure Laterality Date    CATARACT EXTRACTION, BILATERAL         Family History: family history includes Alcohol abuse in her brother; Cancer in her brother, father, and mother; Congenital heart disease in her mother; Heart disease in her mother.     Social History:  reports that she has never smoked. She has never used smokeless tobacco. She reports that she does not drink alcohol and does not use drugs.  Social History     Social History Narrative    Not on file       Medications:  Available home medication information reviewed.  DULoxetine, HYDROcodone-acetaminophen, Insulin Lispro, LORazepam, QUEtiapine, apixaban, busPIRone, donepezil, gabapentin, levothyroxine, meclizine, omeprazole, rosuvastatin, " sennosides-docusate, and terazosin    Allergies   Allergen Reactions    Penicillins Unknown - Low Severity     Tolerates ceftriaxones    Sulfa Antibiotics     Tetracyclines & Related Unknown (See Comments)    Valium [Diazepam] Anxiety       Objective  Objective     Vital Signs:   Temp:  [97.7 °F (36.5 °C)-98.1 °F (36.7 °C)] 97.7 °F (36.5 °C)  Heart Rate:  [64-87] 65  Resp:  [18-20] 18  BP: ()/(46-65) 123/48  Flow (L/min):  [2] 2       Physical Exam   General: Pleasantly confused.  Not in distress.  Conversant with expressive aphasia  Head: Atraumatic and normocephalic  Eyes: No Icterus. No pallor  Ears:  Ears appear intact with no abnormalities noted  Throat: No oral lesions, no thrush  Neck: Supple, trachea midline  Lungs: Clear to auscultation bilaterally, equal air entry, no wheezing or crackles  Heart:  Normal S1 and S2, no murmur, no gallop, No JVD, no lower extremity swelling  Abdomen:  Soft, no tenderness, no organomegaly, normal bowel sounds, no organomegaly  Extremities: pulses equal bilaterally  Skin: No bleeding, bruising or rash, normal skin turgor and elasticity  Neurologic: Mild expressive aphasia.  No gross motor deficit  Psych: Alert and oriented x 1    Result Review:  I have personally reviewed the results from the time of this admission to 7/14/2024 19:55 EDT and agree with these findings:  [x]  Laboratory list / accordion  [x]  Microbiology  []  Radiology  [x]  EKG/Telemetry   [x]  Cardiology/Vascular   []  Pathology  [x]  Old records      LAB RESULTS:      Lab 07/14/24  1519   WBC 7.87   HEMOGLOBIN 11.0*   HEMATOCRIT 35.2   PLATELETS 139*   NEUTROS ABS 5.72   IMMATURE GRANS (ABS) 0.02   LYMPHS ABS 1.52   MONOS ABS 0.44   EOS ABS 0.14   .2*         Lab 07/14/24  1519   SODIUM 137   POTASSIUM 5.6*   CHLORIDE 101   CO2 27.8   ANION GAP 8.2   BUN 35*   CREATININE 2.06*   EGFR 23.7*   GLUCOSE 436*   CALCIUM 9.2   MAGNESIUM 1.6   TSH 2.380         Lab 07/14/24  1519   TOTAL PROTEIN 7.0    ALBUMIN 3.9   GLOBULIN 3.1   ALT (SGPT) 5   AST (SGOT) 13   BILIRUBIN 0.2   ALK PHOS 68         Lab 07/14/24  1519   HSTROP T 37*                 UA          12/17/2023    09:48 3/25/2024    01:50 7/14/2024    15:27   Urinalysis   Squamous Epithelial Cells, UA   None Seen    Specific [place], UA 1.020     1.015     >=1.030    Ketones, UA   Negative    Blood, UA Negative     Negative     Trace    Leukocytes, UA Negative     SMALL     Moderate (2+)    Nitrite, UA Negative     Negative     Negative    RBC, UA 0-2      Unable to determine due to loaded field    WBC, UA   11-20    Bacteria, UA Rare      None Seen       Details          This result is from an external source.               Microbiology Results (last 10 days)       Procedure Component Value - Date/Time    COVID-19 and FLU A/B PCR, 1 HR TAT - Swab, Nasopharynx [[id number]]  (Normal) Collected: 07/14/24 1622    Lab Status: Final result Specimen: Swab from Nasopharynx Updated: 07/14/24 1703     COVID19 Not Detected     Influenza A PCR Not Detected     Influenza B PCR Not Detected    Narrative:      Fact sheet for providers: https://www.fda.gov/media/989868/download    Fact sheet for patients: https://www.fda.gov/media/758388/download    Test performed by PCR.            XR Chest 1 View    Result Date: 7/14/2024  PROCEDURE: XR CHEST 1 VW-  HISTORY: Weak/Dizzy/AMS triage protocol, decreased O2 sats. Altered mental status.  COMPARISON: April 29, 2023.  FINDINGS: The heart is stable.. Decreased inspiratory effort noted. There is evidence of old calcified granulomatous disease. Few linear densities are noted bilaterally which are stable. No new area of consolidation seen.. The mediastinum is unremarkable. There is no pneumothorax.  There are no acute osseous abnormalities.      Impression: Stable chest..      This report was signed and finalized on 7/14/2024 4:23 PM by Mare Kwong MD.      CT Head Without Contrast    Result Date: 7/14/2024  PROCEDURE: CT HEAD WO  CONTRAST-  HISTORY: increased AMS, weakness, h/o stroke  COMPARISON: April 29, 2023..  TECHNIQUE: Multiple axial CT images were performed from the foramen magnum to the vertex. Individualized dose reduction techniques using automated exposure control or adjustment of mA and/or kV according to the patient size were employed.  FINDINGS: There is moderate, age-appropriate, stable generalized cerebral atrophy. The ventricles are enlarged. Again noted is the area of encephalomalacia in the left posterior parietal region. Extent is stable from prior exam. High attenuation has developed in the cortex has in the regions of the previous MCA; finding is new compared to the prior exam. Suspect this represents postischemic cortical calcification but hemorrhage is not completely excluded. Recommend brain MRI. There is no evidence of edema or hemorrhage.  No masses are identified. No extra-axial fluid is seen. The paranasal sinuses are unremarkable. Mild small vessel ischemic disease noted.      Impression: New cortical high attenuation material at site of previous left posterior parietal CVA compared to April 2023, suspect postischemic cortical calcification but hemorrhage not completely excluded. Recommend brain MRI.     CTDI: 36.09 mGy DLP:604.53 mGy.cm  This report was signed and finalized on 7/14/2024 4:21 PM by Mare Kwong MD.       Results for orders placed during the hospital encounter of 02/10/23    Adult Transthoracic Echo Complete W/ Cont if Necessary Per Protocol    Interpretation Summary    Left ventricular systolic function is hyperdynamic (EF > 70%). Calculated left ventricular EF = 70.6% Left ventricular ejection fraction appears to be greater than 70%. The left ventricular cavity is small in size. Left ventricular wall thickness is consistent with mild concentric hypertrophy. All left ventricular wall segments contract normally. Left ventricular intracavitary gradient noted to be 71 mmHg. Left ventricular  diastolic function is consistent with (grade I) impaired relaxation. Normal left atrial pressure.    The aortic valve is abnormal in structure. There is mild calcification of the aortic valve mainly affecting the right coronary cusp(s). The aortic valve appears trileaflet. No aortic valve regurgitation is present. Gradient noted through the LV and LVOT    Compared to TTE report from [facility] Dec 2019, hyperdynamic LV with intracavitary gradient is not a new finding but peak gradient noted on this exam is higher than previously described.      Assessment & Plan  Assessment & Plan       * No active hospital problems. *      Summary:  Cheri Cruz is a 83 y.o. 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    Concern for left parietal tiny hemorrhage versus calcification  Old left parietal ischemic stroke  Acute metabolic encephalopathy  Possible UTI  Baseline dementia  Patient with baseline dementia.  Presented to [facility] with cognitive decline and metabolic encephalopathy.  She was hypotensive with systolic in the 90s.  Patient does report urinary symptoms in the form of urgency, frequency and suprapubic tenderness.  UA with too numerous WBCs without bacteriuria.  Will initiate antibiotic therapy with IV Rocephin and repeat the urine analysis and obtain urine culture.  Patient has previous UTI with Enterococcus faecium  CT from [facility] with left parietal tiny hemorrhage versus calcification on top of old ischemic stroke.  Discussed with stroke team and plan to hold Eliquis for now.  Neurology to evaluate  Holding sedatives: Gabapentin, meclizine and lorazepam    Addendum 8:30 PM  Acute hypoxia, improved  ?  Near syncope  Per  patient report, patient became hypoxic this morning with oxygen saturation in the  60s.  At that time, she could not feel pulse and patient became pale.  Quickly recovered after she applied oxygen  Patient is on Eliquis 2.5 mg twice daily for previous history of PE and for A-fib.  With that history mentioned above, I am concerned about PE causing her hypoxia and near syncope  Will proceed with CTA chest.  Benefit outweighs the risk    RADHA on CKD stage III  Dehydration volume depletion  Mild hyperkalemia  IV fluids  Monitor kidney functions with a.m. lab    Type 2 diabetes with uncontrolled hyperglycemia  Check A1c  Insulin glargine and SSI    Essential hypertension  Was hypotensive and [facility].  No blood pressure improved  Not on antihypertensive  medications    Dyslipidemia  Statins    Dementia  Continue donepezil    VTE Prophylaxis:  Mechanical VTE prophylaxis orders are present.          CODE STATUS:    There are no questions and answers to display.       Expected Discharge   Expected Discharge Date: 7/15/2024; Expected Discharge Time:      Otilia Chang MD  24     Electronically signed by Otilia Chang MD at 24          Physician Progress Notes (most recent note)        Jeni Lopez DO at 24 1357              [facility] Medicine Services  PROGRESS NOTE    Patient Name: Cheri Cruz  : 1940  MRN: [id number]    Date of Admission: 2024  Primary Care Physician: Lorrie Canada APRN    Subjective   Subjective     CC:  AMS    HPI:  Evaluated patient this morning, reported nausea. Received Zofran per nursing. Unable to eat breakfast today due to nausea. Discussed patient's case with family (daughters x 2) in the afternoon with case management.       Objective   Objective     Vital Signs:   Temp:  [98 °F (36.7 °C)-98.4 °F (36.9 °C)] 98.1 °F (36.7 °C)  Heart Rate:  [68-87] 68  Resp:  [16-18] 18  BP:  (127-144)/(44-68) 143/54  Flow (L/min):  [2] 2     Physical Exam:  Constitutional: Awake, alert, resting comfortably  HENT: Very hard of hearing   Respiratory: Clear to auscultation bilaterally, respiratory effort normal   Cardiovascular: RRR, no murmurs, rubs, or gallops  Gastrointestinal: soft, nontender, nondistended  Musculoskeletal: No bilateral ankle edema  Psychiatric: Appropriate affect, cooperative  Neurologic: Alert, oriented x 1, RLE weaker compared to LLE, speech clear  Skin: No rashes      Results Reviewed:  LAB RESULTS:      Lab 07/16/24  0407 07/15/24  0824 07/14/24  2043 07/14/24  2022 07/14/24  1519   WBC 7.06 8.30  --   --  7.87   HEMOGLOBIN 10.6* 10.3*  --   --  11.0*   HEMATOCRIT 34.9 33.5*  --   --  35.2   PLATELETS 90* 124*  --   --  139*   NEUTROS ABS  --  5.29  --   --  5.72   IMMATURE GRANS (ABS)  --  0.02  --   --  0.02   LYMPHS ABS  --  2.28  --   --  1.52   MONOS ABS  --  0.44  --   --  0.44   EOS ABS  --  0.25  --   --  0.14   .1* 104.0*  --   --  103.2*   CRP  --  <0.30  --   --   --    LACTATE  --   --   --  1.4  --    D DIMER QUANT  --   --  0.31  --   --          Lab 07/17/24  0742 07/16/24  0407 07/15/24  2344 07/15/24  0824 07/14/24  1519   SODIUM 137 137  --  140 137   POTASSIUM 5.8* 5.2  --  4.6 5.6*   CHLORIDE 103 104  --  104 101   CO2 25.0 25.0  --  23.0 27.8   ANION GAP 9.0 8.0  --  13.0 8.2   BUN 22 25*  --  26* 35*   CREATININE 1.48* 1.44*  --  1.53* 2.06*   EGFR 35.2* 36.4*  --  33.8* 23.7*   GLUCOSE 257* 294*  --  306* 436*   CALCIUM 8.7 8.3*  --  8.2* 9.2   MAGNESIUM  --   --  2.8* 1.5* 1.6   PHOSPHORUS  --   --   --  4.2  --    HEMOGLOBIN A1C  --   --   --  9.80*  --    TSH  --   --   --   --  2.380         Lab 07/15/24  0824 07/14/24  1519   TOTAL PROTEIN 6.1 7.0   ALBUMIN 3.6 3.9   GLOBULIN 2.5 3.1   ALT (SGPT) 5 5   AST (SGOT) 12 13   BILIRUBIN <0.2 0.2   ALK PHOS 58 68         Lab 07/14/24  2243 07/14/24  2043 07/14/24  1519   HSTROP T 37* 36* 37*         Lab  07/15/24  0824   CHOLESTEROL 106   LDL CHOL 43   HDL CHOL 40   TRIGLYCERIDES 132         Lab 07/14/24 2043   FOLATE 15.20   VITAMIN B 12 550         Brief Urine Lab Results  (Last result in the past 365 days)        Color   Clarity   Blood   Leuk Est   Nitrite   Protein   CREAT   Urine HCG        07/14/24 2001 Yellow   Cloudy   Trace   Small (1+)   Negative   Negative                   Microbiology Results Abnormal       Procedure Component Value - Date/Time    Blood Culture - Blood, Wrist, Left [[id number]]  (Normal) Collected: 07/14/24 2022    Lab Status: Preliminary result Specimen: Blood from Wrist, Left Updated: 07/16/24 2100     Blood Culture No growth at 2 days    Narrative:      Less than seven (7) mL's of blood was collected.  Insufficient quantity may yield false negative results.    Blood Culture - Blood, Hand, Left [[id number]]  (Normal) Collected: 07/14/24 2022    Lab Status: Preliminary result Specimen: Blood from Hand, Left Updated: 07/16/24 2100     Blood Culture No growth at 2 days    Narrative:      Less than seven (7) mL's of blood was collected.  Insufficient quantity may yield false negative results.    Urine Culture - Urine, Urine, Random Void [[id number]] Collected: 07/14/24 2001    Lab Status: Final result Specimen: Urine, Random Void Updated: 07/16/24 1020     Urine Culture 50,000 CFU/mL Normal Urogenital Nickie    Narrative:      Colonization of the urinary tract without infection is common. Treatment is discouraged unless the patient is symptomatic, pregnant, or undergoing an invasive urologic procedure.            No radiology results from the last 24 hrs    Results for orders placed during the hospital encounter of 02/10/23    Adult Transthoracic Echo Complete W/ Cont if Necessary Per Protocol    Interpretation Summary    Left ventricular systolic function is hyperdynamic (EF > 70%). Calculated left ventricular EF = 70.6% Left ventricular ejection fraction appears to be greater than 70%.  The left ventricular cavity is small in size. Left ventricular wall thickness is consistent with mild concentric hypertrophy. All left ventricular wall segments contract normally. Left ventricular intracavitary gradient noted to be 71 mmHg. Left ventricular diastolic function is consistent with (grade I) impaired relaxation. Normal left atrial pressure.    The aortic valve is abnormal in structure. There is mild calcification of the aortic valve mainly affecting the right coronary cusp(s). The aortic valve appears trileaflet. No aortic valve regurgitation is present. Gradient noted through the LV and LVOT    Compared to TTE report from  Dec 2019, hyperdynamic LV with intracavitary gradient is not a new finding but peak gradient noted on this exam is higher than previously described.      Current medications:  Scheduled Meds:apixaban, 5 mg, Oral, BID  atorvastatin, 80 mg, Oral, Nightly  busPIRone, 7.5 mg, Oral, BID  donepezil, 10 mg, Oral, Nightly  [Held by provider] gabapentin, 300 mg, Oral, BID  insulin glargine, 30 Units, Subcutaneous, Nightly  insulin lispro, 2-9 Units, Subcutaneous, 4x Daily AC & at Bedtime  Insulin Lispro, 5 Units, Subcutaneous, TID With Meals  levothyroxine, 25 mcg, Oral, Daily  [Held by provider] LORazepam, 0.5 mg, Oral, Q12H  pantoprazole, 40 mg, Oral, Q AM  [START ON 7/18/2024] senna-docusate sodium, 2 tablet, Oral, Daily   And  [START ON 7/18/2024] polyethylene glycol, 17 g, Oral, Daily  QUEtiapine, 50 mg, Oral, Nightly  sodium chloride, 10 mL, Intravenous, Q12H  sodium chloride, 10 mL, Intravenous, Q12H  terazosin, 2 mg, Oral, Nightly      Continuous Infusions:     PRN Meds:.  acetaminophen **OR** acetaminophen **OR** acetaminophen    [START ON 7/18/2024] senna-docusate sodium **AND** [START ON 7/18/2024] polyethylene glycol **AND** bisacodyl **AND** bisacodyl    Calcium Replacement - Follow Nurse / BPA Driven Protocol    dextrose    dextrose    glucagon (human recombinant)     HYDROcodone-acetaminophen    LORazepam    Magnesium Standard Dose Replacement - Follow Nurse / BPA Driven Protocol    [Held by provider] meclizine    ondansetron ODT **OR** ondansetron    Phosphorus Replacement - Follow Nurse / BPA Driven Protocol    Potassium Replacement - Follow Nurse / BPA Driven Protocol    sodium chloride    sodium chloride    sodium chloride    sodium chloride    Assessment & Plan   Assessment & Plan     Active Hospital Problems    Diagnosis  POA    **AMS (altered mental status) [R41.82]  Yes    Stroke [I63.9]  Yes      Resolved Hospital Problems   No resolved problems to display.        Brief Hospital Course to date:  Cheri Cruz is a 83 y.o. 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    This patient's problems and plans were partially entered by my partner and updated as appropriate by me 07/17/24.     Evolving old left parietal ischemic stroke  -Presented to [facility] with cognitive decline and metabolic encephalopathy. She was hypotensive with systolic in the 90s at [facility].   -CTH from [facility] with left parietal tiny hemorrhage vs calcification on top of old ischemic stroke.   -MRI showed evolving old left parietal lobe CVA with some areas of associated cortical laminar necrosis. No hemorrhage.    -Neurology following, recommended to restart eliquis. Continue atorvastatin.  -PT/OT recommending home with 24/7 care.    Altered mental status on advanced dementia  Concern for UTI  -Possibly dehydration vs hypoxia? Was also hypotensive on presentation to [facility], noncompliant with supplemental oxygen at home  -COVID/flu negative. Blood cultures with no growth to date. LFTs normal.  -CXR with old calcified granulomatous disease. Few,  stable linear densities noted bilaterally.  -Initial urine culture with yeast, repeat urine culture with normal urogenital kallie  -Treated with empiric IV ceftriaxone x 3 days. Held sedatives initially. Okay to resume PRN lorazepam. Continue donepezil, Seroquel, BuSpar.     Acute on chronic hypoxemic respiratory failure  Near-syncope  -Per report, patient became hypoxic with oxygen saturation in the 60s  -CTA chest showed no PE, nonacute  -Patient is O2 dependent on 2L NC but is not compliant due her baseline dementia.  -Continue supplemental oxygenation, titrate for goal SpO2 greater than 90%.     RADHA on CKD stage III  Volume depletion due to dehydration   Mild hyperkalemia  -Baseline Cr about 1.3, Cr 2.06 on presentation  -Improved with IV fluids   -Monitor BMP. Encourage good oral intake. Lokelma x 1 on 7/17.    Complex disposition  -PT/OT evaluated, felt patient's functional status is at baseline. Recommended home with 24/7 care.  -Again discussed care with both patient's daughters and case management on 7/17. Family leaning towards pursuing long-term care after discharge as they are having difficulty taking care of her on their own.    Nausea  -suspect secondary to constipation  -KUB with nonobstructive bowel gas pattern  -Continue bowel regimen. PRN Zofran for nausea.      Uncontrolled diabetes mellitus type 2 with hyperglycemia  -A1c 9.80% this admission  -Increase Lantus to 30 units qhs with lispro 5u TIDAC and moderate dose SSI. Monitor glucose and adjust insulin as needed.    Chronic anemia  Macrocytosis  -No evidence of overt GI bleeding this admission  -B12 and folate within normal limits  -Further workup of anemia as outpatient as appropriate.      Essential hypertension  -Was hypotensive at [facility].  -Not on antihypertensive medications.  -Continue to monitor.      Hyperlipidemia  -Continue atorvastatin.    Hypothyroidism  -Continue levothyroxine.    GERD  -Continue PPI.    Expected  Discharge Location and Transportation: lives at home with her daughter  Expected Discharge   Expected Discharge Date: 2024; Expected Discharge Time:      VTE Prophylaxis:  Pharmacologic & mechanical VTE prophylaxis orders are present.         AM-PAC 6 Clicks Score (PT): 7 (24 0819)    CODE STATUS:   Code Status and Medical Interventions:   Ordered at: 07/15/24 1319     Level Of Support Discussed With:    Patient     Code Status (Patient has no pulse and is not breathing):    CPR (Attempt to Resuscitate)     Medical Interventions (Patient has pulse or is breathing):    Full Support       Jeni Lopez DO  24        Electronically signed by Jeni Lopez DO at 24 1411          Physical Therapy Notes (most recent note)        Cate Joshi, PT Student at 07/15/24 0915  Version 1 of 1      Attestation signed by Chelsea Lynn PT at 07/15/24 1014    Chelsea Lynn, PT                Patient Name: Cheri Cruz  : 1940    MRN: [id number]                              Today's Date: 7/15/2024       Admit Date: 2024    Visit Dx: No diagnosis found.  Patient Active Problem List   Diagnosis    Hyperlipidemia LDL goal <70    Type 2 diabetes mellitus without complication, without long-term current use of insulin    GERD (gastroesophageal reflux disease)    Essential hypertension    Abnormal EKG    Osteoarthritis    Anxiety    Palpitations    Vertigo    History of ischemic left MCA stroke    Hemorrhagic stroke    History of pulmonary embolus (PE)    Confusion    Hypotension    Constipation    UTI (urinary tract infection) due to urinary indwelling catheter    Metabolic encephalopathy    Symptomatic anemia    Acquired hypothyroidism    AMS (altered mental status)     Past Medical History:   Diagnosis Date    Abnormal heart rhythm     Anxiety     Arrhythmia     Arthritis     Atrial fibrillation     Dementia     Diabetes mellitus     Hyperlipidemia     Hypertension     Kidney  disorder     Stroke     Uterus cancer      Past Surgical History:   Procedure Laterality Date    CATARACT EXTRACTION, BILATERAL        General Information       Row Name 07/15/24 0934          Physical Therapy Time and Intention    Document Type discharge evaluation/summary (P)   -     Mode of Treatment individual therapy;physical therapy (P)   -       Row Name 07/15/24 0934          General Information    Patient Profile Reviewed yes (P)   -ANUEL     Prior Level of Function dependent:;all household mobility;community mobility;transfer;w/c or scooter (P)   Pt limited historian 2/2 baseline dementia. Per chart review (PT, 2023), Pt lives with Dtr who provides assist along with caregiver support. Has a Mark lift but it is too large for their home. Hospital bed, largely bedbound.  -     Existing Precautions/Restrictions fall;other (see comments) (P)   BUE tremors (R > L), BLE contractures, non-ambulatory at baseline  -     Barriers to Rehab medically complex;previous functional deficit;cognitive status;hearing deficit (P)   -       Row Name 07/15/24 0934          Living Environment    People in Home child(sandoval), adult (P)   -       Row Name 07/15/24 0934          Cognition    Orientation Status (Cognition) oriented to;person;verbal cues/prompts needed for orientation;place;situation;time (P)   -       Row Name 07/15/24 0934          Safety Issues, Functional Mobility    Safety Issues Affecting Function (Mobility) insight into deficits/self-awareness;safety precaution awareness;safety precautions follow-through/compliance;sequencing abilities;problem-solving (P)   -     Impairments Affecting Function (Mobility) balance;cognition;endurance/activity tolerance;pain;postural/trunk control;range of motion (ROM);strength (P)   -     Cognitive Impairments, Mobility Safety/Performance insight into deficits/self-awareness;safety precaution awareness;safety precaution follow-through;sequencing  abilities;problem-solving/reasoning (P)   -ANUEL     Comment, Safety Issues/Impairments (Mobility) Non-ambulatory at [facility], limited BLE ROM. (P)   -ANUEL               User Key  (r) = Recorded By, (t) = Taken By, (c) = Cosigned By      Initials Name Provider Type    Cate Bartlett, PT Student PT Student                   Mobility       Row Name 07/15/24 0939          Bed Mobility    Comment, (Bed Mobility) Pt [facility] upon arrival. (P)   -ANUEL       Row Name 07/15/24 0939          Sit-Stand Transfer    Sit-Stand [place] (Transfers) not tested (P)   -ANUEL     Comment, (Sit-Stand Transfer) Deferred 2/2 BLE contractures. (P)   -ANUEL       Row Name 07/15/24 0939          Gait/Stairs (Locomotion)    [place] Level (Gait) not tested (P)   -ANUEL     Patient was able to Ambulate no, other medical factors prevent ambulation (P)   -ANUEL     Reason Patient was unable to Ambulate Non-Ambulatory at Baseline (P)   -ANUEL     [place] Level (Stairs) not tested (P)   -ANUEL     Comment, (Gait/Stairs) Pt is non-ambulatory at baseline (P)   -ANUEL               User Key  (r) = Recorded By, (t) = Taken By, (c) = Cosigned By      Initials Name Provider Type    Cate Bartlett PT Student PT Student                   Obj/Interventions       Row Name 07/15/24 0940          Range of Motion Comprehensive    General Range of Motion lower extremity range of motion deficits identified (P)   -ANUEL     Comment, General Range of Motion [place] knee flexion and ankle plantar flexion contractures (P)   -ANUEL       Row Name 07/15/24 0940          Strength Comprehensive (MMT)    General Manual Muscle Testing (MMT) Assessment lower extremity strength deficits identified (P)   -ANUEL     Comment, General Manual Muscle Testing (MMT) Assessment BLE grossly 2+/5 (P)   -ANUEL       Row Name 07/15/24 0940          Balance    Balance Assessment sitting static balance;sitting dynamic balance (P)   -ANUEL     Static Sitting Balance supervision (P)   -ANUEL     Dynamic Sitting Balance  standby assist (P)   -ANUEL     Position, Sitting Balance sitting in chair;unsupported (P)   -ANUEL     Balance Interventions sitting (P)   -ANUEL     Comment, Balance Pt demonstrated slight L lateral lean while sitting in chair. Pt able to pull self toward edge of chair and maintain sitting while using diamante armrests. Reports dizziness upon sitting upright, nursing present in room and aware. (P)   -ANUEL       Row Name 07/15/24 0919          Sensory Assessment (Somatosensory)    Sensory Assessment (Somatosensory) LE sensation intact (P)   -ANUEL               User Key  (r) = Recorded By, (t) = Taken By, (c) = Cosigned By      Initials Name Provider Type    Cate Bartlett, PT Student PT Student                   Goals/Plan    No documentation.                  Clinical Impression       [facility] Name 07/15/24 0989          Pain    Additional Documentation Pain Scale: FACES Pre/Post-Treatment (Group) (P)   -ANUEL       Row Name 07/15/24 0945          Pain Scale: FACES Pre/Post-Treatment    Pain: FACES Scale, Pretreatment 0-->no hurt (P)   -ANUEL     Posttreatment Pain Rating 0-->no hurt (P)   -ANUEL       Row Name 07/15/24 0934          Plan of Care Review    Plan of Care Reviewed With patient (P)   -ANUEL     Progress no change (P)   -ANUEL     Outcome Evaluation Pt is dependent for all functional mobility and is non-ambulatory at baseline. Pt is not appropriate for skilled PT interventions. Rec d/c home with assist and 24/7 care when medically appropriate. (P)   -[facility] Name 07/15/24 0963          Therapy Assessment/Plan (PT)    Patient/Family Therapy Goals Statement (PT) home (P)   -ANUEL     Criteria for Skilled Interventions Met (PT) no;does not meet criteria for skilled intervention (P)   -ANUEL     Therapy Frequency (PT) evaluation only (P)   -ANUEL       Row Name 07/15/24 0960          Vital Signs    Pre Systolic BP Rehab 134 (P)   -ANUEL     Pre Treatment Diastolic BP 71 (P)   -ANUEL     Post Systolic BP Rehab 115 (P)   -ANUEL     Post Treatment  Diastolic BP 59 (P)   -ANUEL     O2 Delivery Pre Treatment room air (P)   -ANUEL     O2 Delivery Intra Treatment room air (P)   -ANUEL     O2 Delivery Post Treatment room air (P)   -ANUEL     Pre Patient Position Sitting (P)   -ANUEL     Intra Patient Position Sitting (P)   -ANUEL     Post Patient Position Sitting (P)   -ANUEL       Row Name 07/15/24 0945          Positioning and Restraints    Pre-Treatment Position sitting in chair/recliner (P)   -ANUEL     Post Treatment Position chair (P)   -ANUEL     In Chair reclined;call light within reach;encouraged to call for assist;exit alarm on;with nsg;waffle cushion;on mechanical lift sling;legs elevated (P)   -ANUEL               User Key  (r) = Recorded By, (t) = Taken By, (c) = Cosigned By      Initials Name Provider Type    Cate Bartlett, PT Student PT Student                   Outcome Measures       Row Name 07/15/24 0952 07/15/24 0800       How much help from another person do you currently need...    Turning from your back to your side while in flat bed without using bedrails? 2 (P)   -ANUEL 2  -BL    Moving from lying on back to sitting on the side of a flat bed without bedrails? 1 (P)   -ANUEL 2  -BL    Moving to and from a bed to a chair (including a wheelchair)? 1 (P)   -ANUEL 2  -BL    Standing up from a chair using your arms (e.g., wheelchair, bedside chair)? 1 (P)   -ANUEL 2  -BL    Climbing 3-5 steps with a railing? 1 (P)   -ANUEL 1  -BL    To walk in hospital room? 1 (P)   -ANUEL 1  -BL    AM-PAC 6 Clicks Score (PT) 7 (P)   -ANUEL 10  -BL    Highest Level of Mobility Goal 2 --> Bed activities/dependent transfer (P)   -ANUEL 4 --> Transfer to chair/commode  -BL      Row Name 07/15/24 0952 07/15/24 0934       Modified [place] Scale    Pre-Stroke Modified [place] Scale 6 - Unable to determine (UTD) from the medical record documentation (P)   -ANUEL --    Modified [place] Scale 5 - Severe disability.  Bedridden, incontinent, and requiring constant nursing care and attention. (P)   -ANUEL 5 - Severe disability.   Bedridden, incontinent, and requiring constant nursing care and attention.  -      Row Name 07/15/24 0952 07/15/24 0934       Functional Assessment    Outcome Measure Options AM-PAC 6 Clicks Basic Mobility (PT);Modified Steven (P)   -ANUEL AM-PAC 6 Clicks Daily Activity (OT);Modified [place]  -              User Key  (r) = Recorded By, (t) = Taken By, (c) = Cosigned By      Initials Name Provider Type    CS Karlo Rai OT Occupational Therapist    Wilian Serra, RN Registered Nurse    Cate Bartlett, PT Student PT Student                  Physical Therapy Education       Title: PT OT SLP Therapies (In Progress)       Topic: Physical Therapy (In Progress)       Point: Mobility training (Done)       Learning Progress Summary             Patient Acceptance, E, VU,DU by  at 7/15/2024 0955                         Point: Home exercise program (Not Started)       Learner Progress:  Not documented in this visit.              Point: Body mechanics (Done)       Learning Progress Summary             Patient Acceptance, E, VU,DU by  at 7/15/2024 0955                         Point: Precautions (Done)       Learning Progress Summary             Patient Acceptance, E, VU,DU by  at 7/15/2024 0955                                         User Key       Initials Effective Dates Name Provider Type Discipline     05/07/24 -  Cate Joshi, PT Student PT Student PT                  PT Recommendation and Plan     Plan of Care Reviewed With: (P) patient  Progress: (P) no change  Outcome Evaluation: (P) Pt is dependent for all functional mobility and is non-ambulatory at baseline. Pt is not appropriate for skilled PT interventions. Rec d/c home with assist and 24/7 care when medically appropriate.     Time Calculation:   PT Evaluation Complexity  History, PT Evaluation Complexity: (P) 3 or more personal factors and/or comorbidities  Examination of Body Systems (PT Eval Complexity): (P) total of 4 or more  elements  Clinical Presentation (PT Evaluation Complexity): (P) evolving  Clinical Decision Making (PT Evaluation Complexity): (P) moderate complexity  Overall Complexity (PT Evaluation Complexity): (P) moderate complexity     PT Charges       Row Name 07/15/24 0955             Time Calculation    Start Time 0915 (P)   -ANUEL      PT Received On 07/15/24 (P)   -ANUEL         Untimed Charges    PT Eval/Re-eval Minutes 48 (P)   -ANUEL         Total Minutes    Untimed Charges Total Minutes 48 (P)   -ANUEL       Total Minutes 48 (P)   -ANUEL                User Key  (r) = Recorded By, (t) = Taken By, (c) = Cosigned By      Initials Name Provider Type    Cate Bartlett, PT Student PT Student                  Therapy Charges for Today       Code Description Service Date Service Provider Modifiers Qty    69631704742 HC PT EVAL MOD COMPLEXITY 4 7/15/2024 Cate Joshi, PT Student GP 1            PT G-Codes  Outcome Measure Options: (P) AM-PAC 6 Clicks Basic Mobility (PT), Modified Steven  AM-PAC 6 Clicks Score (PT): (P) 7  AM-PAC 6 Clicks Score (OT): 13  Modified [place] Scale: (P) 5 - Severe disability.  Bedridden, incontinent, and requiring constant nursing care and attention.    PT Discharge Summary  Anticipated Discharge Disposition (PT): (P) home with assist, home with  care  Reason for Discharge: (P) At baseline function    Cate Joshi PT Student  7/15/2024         Electronically signed by Chelsea Lynn PT at 07/15/24 1014          Occupational Therapy Notes (most recent note)        Karlo Rai, OT at 07/15/24 0848          Patient Name: Cheri Cruz  : 1940    MRN: [id number]                              Today's Date: 7/15/2024       Admit Date: 2024    Visit Dx: No diagnosis found.  Patient Active Problem List   Diagnosis    Hyperlipidemia LDL goal <70    Type 2 diabetes mellitus without complication, without long-term current use of insulin    GERD (gastroesophageal reflux disease)    Essential  hypertension    Abnormal EKG    Osteoarthritis    Anxiety    Palpitations    Vertigo    History of ischemic left MCA stroke    Hemorrhagic stroke    History of pulmonary embolus (PE)    Confusion    Hypotension    Constipation    UTI (urinary tract infection) due to urinary indwelling catheter    Metabolic encephalopathy    Symptomatic anemia    Acquired hypothyroidism    AMS (altered mental status)     Past Medical History:   Diagnosis Date    Abnormal heart rhythm     Anxiety     Arrhythmia     Arthritis     Atrial fibrillation     Dementia     Diabetes mellitus     Hyperlipidemia     Hypertension     Kidney disorder     Stroke     Uterus cancer      Past Surgical History:   Procedure Laterality Date    CATARACT EXTRACTION, BILATERAL        General Information       Row Name 07/15/24 0901          OT Time and Intention    Document Type evaluation  -CS     Mode of Treatment occupational therapy  -CS       Row Name 07/15/24 0901          General Information    Patient Profile Reviewed yes  -CS     Prior Level of Function min assist:;feeding;mod assist:;grooming;max assist:;dressing;bathing;dependent:;transfer;all household mobility;community mobility;w/c or scooter  Pt limited historian 2/2 baseline dementia. Per chart review (OT, 2023), Pt lives with Dtr who provides assist along with caregiver support. Hospital bed, Mark lift to recliner/WC, although largely bedbound.  -CS     Existing Precautions/Restrictions fall;other (see comments)  BUE tremors (R > L), BLE contractures, non-ambulatory at baseline  -CS     Barriers to Rehab medically complex;previous functional deficit;cognitive status;visual deficit  -CS       Row Name 07/15/24 0901          Living Environment    People in Home child(sandoval), adult  -CS       Row Name 07/15/24 0901          Stairs Within Home, Primary    Stairs, Within Home, Primary home layout requires further inquiry  -CS       Row Name 07/15/24 0901          Cognition    Orientation Status  (Cognition) oriented to;person;verbal cues/prompts needed for orientation;place;disoriented to;situation;time  -       Row Name 07/15/24 0901          Safety Issues, Functional Mobility    Safety Issues Affecting Function (Mobility) insight into deficits/self-awareness;judgment;problem-solving;safety precaution awareness;safety precautions follow-through/compliance;sequencing abilities  -CS     Impairments Affecting Function (Mobility) balance;cognition;coordination;grasp;motor control;motor planning;strength;range of motion (ROM)  -     Cognitive Impairments, Mobility Safety/Performance insight into deficits/self-awareness;judgment;problem-solving/reasoning;safety precaution awareness;safety precaution follow-through  -CS     Comment, Safety Issues/Impairments (Mobility) limited BLE ROM, non-ambulatory at baseline, BUE tremors  -               User Key  (r) = Recorded By, (t) = Taken By, (c) = Cosigned By      Initials Name Provider Type     Karlo Rai OT Occupational Therapist                     Mobility/ADL's       Row Name 07/15/24 0905          Bed Mobility    Bed Mobility rolling right;rolling left  -     Rolling Right [place] (Bed Mobility) 2 person assist;nonverbal cues (demo/gesture);maximum assist (25% patient effort);verbal cues  -     Scooting/Bridging [place] (Bed Mobility) 2 person assist;verbal cues;nonverbal cues (demo/gesture);maximum assist (25% patient effort)  -     Assistive Device (Bed Mobility) draw sheet;bed rails  -     Comment, (Bed Mobility) with tactile guidance and hand placement Pt able to assist in sidelying with grasp on bedrail  -       Row Name 07/15/24 0905          Transfers    Transfers bed-chair transfer  -       Row Name 07/15/24 0905          Bed-Chair Transfer    Bed-Chair [place] (Transfers) nonverbal cues (demo/gesture);verbal cues;dependent (less than 25% patient effort)  -     Assistive Device (Bed-Chair Transfers) lift device   -[facility] Name 07/15/24 0905          Functional Mobility    Patient was able to Ambulate no, other medical factors prevent ambulation  -     Reason Patient was unable to Ambulate Non-Ambulatory at Baseline  -CS       Row Name 07/15/24 0905          Activities of Daily Living    BADL Assessment/Intervention upper body dressing;lower body dressing;grooming;feeding  -CS       Row Name 07/15/24 0905          Upper Body Dressing Assessment/Training    [place] Level (Upper Body Dressing) don;pajama/robe;moderate assist (50% patient effort)  -CS     Position (Upper Body Dressing) supported sitting  -CS       Row Name 07/15/24 0905          Lower Body Dressing Assessment/Training    [place] Level (Lower Body Dressing) don;socks;dependent (less than 25% patient effort)  -CS     Position (Lower Body Dressing) supported sitting  -CS       Row Name 07/15/24 0905          Grooming Assessment/Training    [place] Level (Grooming) wash face, hands;set up;hair care, combing/brushing;dependent (less than 25% patient effort)  -CS       Row Name 07/15/24 0905          Self-Feeding Assessment/Training    [place] Level (Feeding) feeding skills;other (see comments)  -     Assistive Devices (Feeding) adapted cup  -CS     Comment, (Feeding) functional plate to mouth movement pattern impacted by tremors, RN educated on need for feeding assist. Provided adapted cup  -CS               User Key  (r) = Recorded By, (t) = Taken By, (c) = Cosigned By      Initials Name Provider Type    CS Karlo Rai, OT Occupational Therapist                   Obj/Interventions       [facility] Name 07/15/24 0912          Sensory Assessment (Somatosensory)    Sensory Assessment (Somatosensory) UE sensation intact  -     Sensory Assessment responds to light touch bilaterally, formal assessment limited by cognitive deficits  -CS       Row Name 07/15/24 0912          Vision Assessment/Intervention    Visual Impairment/Limitations  peripheral vision impaired right;blurry vision;visual/perceptual impairments present  -     Vision Assessment Comment formal confrontation testing limited by cognitive deficits, Pt able to locate and track therapist throughout room  -       Row Name 07/15/24 0912          Shoulder (Therapeutic Exercise)    Shoulder (Therapeutic Exercise) AROM (active range of motion)  -     Shoulder AROM (Therapeutic Exercise) bilateral;flexion;extension;aBduction;aDduction;2 sets;5 repetitions  -       Row Name 07/15/24 0912          Elbow/Forearm (Therapeutic Exercise)    Elbow/Forearm (Therapeutic Exercise) AROM (active range of motion)  -     Elbow/Forearm AROM (Therapeutic Exercise) bilateral;flexion;extension;2 sets;5 repetitions  -       Row Name 07/15/24 0912          Hand (Therapeutic Exercise)    Hand (Therapeutic Exercise) AROM (active range of motion)  -     Hand AROM/AAROM (Therapeutic Exercise) bilateral;AROM (active range of motion);finger flexion;finger extension;5 repetitions;2 sets  -       Row Name 07/15/24 0912          Motor Skills    Motor Skills coordination;functional endurance;motor control/coordination interventions;neuro-muscular function  -     Coordination bimanual skills;moderate impairment  -     Functional Endurance moderate  -     Neuromuscular Function bilateral;upper extremity;tremor, resting;tremor, intention  -     Motor Control/Coordination Interventions therapeutic exercise/ROM;occupation/activity based treatment  -     Therapeutic Exercise shoulder;elbow/forearm;hand;other (see comments)  BUE AROM warm-up prior to bed mobility with cross body reaches  -       Row Name 07/15/24 0912          Balance    Balance Assessment sitting static balance  -     Static Sitting Balance supervision  -     Balance Interventions sitting  -     Comment, Balance maintains midline in supported sitting without lateral supports  -               User Key  (r) = Recorded By, (t) =  Taken By, (c) = Cosigned By      Initials Name Provider Type    CS Karlo Rai L, OT Occupational Therapist                   Goals/Plan       Row Name 07/15/24 0922          Bed Mobility Goal 1 (OT)    Activity/Assistive Device (Bed Mobility Goal 1, OT) rolling to right;rolling to left  -CS     [place] Level/Cues Needed (Bed Mobility Goal 1, OT) moderate assist (50-74% patient effort);tactile cues required;verbal cues required  -CS     Time Frame (Bed Mobility Goal 1, OT) short term goal (STG);5 days  -CS     Strategies/Barriers (Bed Mobility Goal 1, OT) improved sequencing and use of BUE/BLE during lift prep  -CS     Progress/Outcomes (Bed Mobility Goal 1, OT) new goal  -CS       Row Name 07/15/24 0922          Dressing Goal 1 (OT)    Activity/Device (Dressing Goal 1, OT) upper body dressing  -CS     [place]/Cues Needed (Dressing Goal 1, OT) minimum assist (75% or more patient effort)  -CS     Time Frame (Dressing Goal 1, OT) long term goal (LTG);1 week  -CS     Progress/Outcome (Dressing Goal 1, OT) new goal  -CS       Row Name 07/15/24 0922          Self-Feeding Goal 1 (OT)    Activity/Device (Self-Feeding Goal 1, OT) self-feeding skills, all;finger foods;liquids to mouth;scoop food and bring to mouth;adapted cup;built-up handle utensils  -CS     [place] Level/Cues Needed (Self-Feeding Goal 1, OT) modified independence;set-up required  -CS     Time Frame (Self-Feeding Goal 1, OT) long term goal (LTG);1 week  -CS     Progress/Outcomes (Self-Feeding Goal 1, OT) new goal  -CS       Row Name 07/15/24 0922          Therapy Assessment/Plan (OT)    Planned Therapy Interventions (OT) activity tolerance training;functional balance retraining;occupation/activity based interventions;ROM/therapeutic exercise;strengthening exercise;transfer/mobility retraining;patient/caregiver education/training;neuromuscular control/coordination retraining;adaptive equipment training  -CS               User Key  (r) =  Recorded By, (t) = Taken By, (c) = Cosigned By      Initials Name Provider Type    CS Karlo Rai, OT Occupational Therapist                   Clinical Impression       Row Name 07/15/24 0914          Pain Assessment    Additional Documentation Pain Scale: FACES Pre/Post-Treatment (Group)  -CS       Row Name 07/15/24 0914          Pain Scale: FACES Pre/Post-Treatment    Pain: FACES Scale, Pretreatment 0-->no hurt  -CS     Posttreatment Pain Rating 0-->no hurt  -CS       Row Name 07/15/24 0914          Plan of Care Review    Plan of Care Reviewed With patient  -CS     Progress no change  -CS     Outcome Evaluation Pt presents near recent baseline with BUE tremors, cognitive deficits, and limited BLE ROM w/ significant weakness - OT will follow to maintain/improve Ind level for ADL performance. Pt engaged in pre-mobility BUE warm-up, lifted to recliner, completed seated grooming with ModA, and assisted with feeding. Rec return to home with continued family and caregiver support when medically appropriate.  -       Row Name 07/15/24 0914          Therapy Assessment/Plan (OT)    Patient/Family Therapy Goal Statement (OT) Pt wants to return home and maintain level of Ind  -CS     Rehab Potential (OT) good, to achieve stated therapy goals  -CS     Criteria for Skilled Therapeutic Interventions Met (OT) yes;meets criteria;skilled treatment is necessary  -CS     Therapy Frequency (OT) 3 times/wk  -CS     Predicted Duration of Therapy Intervention (OT) 7 days  -CS       Row Name 07/15/24 0914          Therapy Plan Review/Discharge Plan (OT)    Anticipated Discharge Disposition (OT) home with 24/7 care  -[facility] Name 07/15/24 0914          Vital Signs    Pre Systolic BP Rehab 123  RN cleared for eval  -CS     Pre Treatment Diastolic BP 51  -CS     Post Systolic BP Rehab 134  -CS     Post Treatment Diastolic BP 71  -CS     O2 Delivery Pre Treatment room air  -CS     O2 Delivery Intra Treatment room air  -CS     O2  Delivery Post Treatment room air  -CS     Pre Patient Position Supine  -CS     Intra Patient Position Side Lying  -CS     Post Patient Position Sitting  -CS       Row Name 07/15/24 0914          Positioning and Restraints    Pre-Treatment Position in bed  -CS     Post Treatment Position chair  -CS     In Chair notified nsg;reclined;sitting;call light within reach;encouraged to call for assist;exit alarm on;legs elevated;on mechanical lift sling;waffle cushion  -CS               User Key  (r) = Recorded By, (t) = Taken By, (c) = Cosigned By      Initials Name Provider Type    CS Karlo Rai, OT Occupational Therapist                   Outcome Measures       Row Name 07/15/24 0934          How much help from another is currently needed...    Putting on and taking off regular lower body clothing? 1  -CS     Bathing (including washing, rinsing, and drying) 2  -CS     Toileting (which includes using toilet bed pan or urinal) 2  -CS     Putting on and taking off regular upper body clothing 2  -CS     Taking care of personal grooming (such as brushing teeth) 3  -CS     Eating meals 3  -CS     AM-PAC 6 Clicks Score (OT) 13  -CS       Row Name 07/15/24 0800          How much help from another person do you currently need...    Turning from your back to your side while in flat bed without using bedrails? 2  -BL     Moving from lying on back to sitting on the side of a flat bed without bedrails? 2  -BL     Moving to and from a bed to a chair (including a wheelchair)? 2  -BL     Standing up from a chair using your arms (e.g., wheelchair, bedside chair)? 2  -BL     Climbing 3-5 steps with a railing? 1  -BL     To walk in hospital room? 1  -BL     AM-PAC 6 Clicks Score (PT) 10  -BL     Highest Level of Mobility Goal 4 --> Transfer to chair/commode  -BL       Row Name 07/15/24 0934          Modified Steven Scale    Modified Steven Scale 5 - Severe disability.  Bedridden, incontinent, and requiring constant nursing care and  attention.  -       Row Name 07/15/24 0934          Functional Assessment    Outcome Measure Options AM-PAC 6 Clicks Daily Activity (OT);Modified Steven  -               User Key  (r) = Recorded By, (t) = Taken By, (c) = Cosigned By      Initials Name Provider Type     Karlo Rai OT Occupational Therapist    Wilian Serra, RN Registered Nurse                    Occupational Therapy Education       Title: PT OT SLP Therapies (Done)       Topic: Occupational Therapy (Done)       Point: ADL training (Done)       Description:   Instruct learner(s) on proper safety adaptation and remediation techniques during self care or transfers.   Instruct in proper use of assistive devices.                  Learning Progress Summary             Patient Acceptance, E,D, VU,DU by  at 7/15/2024 0936                         Point: Home exercise program (Done)       Description:   Instruct learner(s) on appropriate technique for monitoring, assisting and/or progressing therapeutic exercises/activities.                  Learning Progress Summary             Patient Acceptance, E,D, VU,DU by  at 7/15/2024 0936                         Point: Precautions (Done)       Description:   Instruct learner(s) on prescribed precautions during self-care and functional transfers.                  Learning Progress Summary             Patient Acceptance, E,D, VU,DU by  at 7/15/2024 0936                         Point: Body mechanics (Done)       Description:   Instruct learner(s) on proper positioning and spine alignment during self-care, functional mobility activities and/or exercises.                  Learning Progress Summary             Patient Acceptance, E,D, VU,DU by  at 7/15/2024 0936                                         User Key       Initials Effective Dates Name Provider Type Discipline     06/16/21 -  Karlo Rai OT Occupational Therapist OT                  OT Recommendation and Plan  Planned Therapy  Interventions (OT): activity tolerance training, functional balance retraining, occupation/activity based interventions, ROM/therapeutic exercise, strengthening exercise, transfer/mobility retraining, patient/caregiver education/training, neuromuscular control/coordination retraining, adaptive equipment training  Therapy Frequency (OT): 3 times/wk  Plan of Care Review  Plan of Care Reviewed With: patient  Progress: no change  Outcome Evaluation: Pt presents near recent baseline with BUE tremors, cognitive deficits, and limited BLE ROM w/ significant weakness - OT will follow to maintain/improve Ind level for ADL performance. Pt engaged in pre-mobility BUE warm-up, lifted to recliner, completed seated grooming with ModA, and assisted with feeding. Rec return to home with continued family and caregiver support when medically appropriate.     Time Calculation:   Evaluation Complexity (OT)  Review Occupational Profile/Medical/Therapy History Complexity: expanded/moderate complexity  Assessment, Occupational Performance/Identification of Deficit Complexity: 3-5 performance deficits  Overall Complexity of Evaluation (OT): moderate complexity     Time Calculation- OT       Row Name 07/15/24 0937             Time Calculation- OT    OT Start Time 0826  -CS      OT Received On 07/15/24  -CS      OT Goal Re-Cert Due Date 07/25/24  -CS         Timed Charges    46503 - OT Therapeutic Exercise Minutes 4  -CS      77610 - OT Self Care/Mgmt Minutes 6  -CS         Untimed Charges    OT Eval/Re-eval Minutes 48  -CS         Total Minutes    Timed Charges Total Minutes 10  -CS      Untimed Charges Total Minutes 48  -CS       Total Minutes 58  -CS                User Key  (r) = Recorded By, (t) = Taken By, (c) = Cosigned By      Initials Name Provider Type    Karlo Croft, OT Occupational Therapist                  Therapy Charges for Today       Code Description Service Date Service Provider Modifiers Qty    [id number]  OT  SELF CARE/MGMT/TRAIN EA 15 MIN 7/15/2024 Karlo Rai, OT GO 1    [id number] HC OT EVAL MOD COMPLEXITY 4 7/15/2024 Karlo Rai OT GO 1                 Karlo Rai OT  7/15/2024    Electronically signed by Karlo Rai OT at 07/15/24 0983

## 2024-07-17 NOTE — THERAPY TREATMENT NOTE
"Acute Care - Speech Language Pathology Treatment Note  [facility]     Patient Name: Cheri Cruz  : 1940  MRN: [id number]  Today's Date: 2024               Admit Date: 2024     Visit Dx:    ICD-10-CM ICD-9-CM   1. Cognitive communication deficit  R41.841 799.52     Patient Active Problem List   Diagnosis    Hyperlipidemia LDL goal <70    Type 2 diabetes mellitus without complication, without long-term current use of insulin    GERD (gastroesophageal reflux disease)    Essential hypertension    Abnormal EKG    Osteoarthritis    Anxiety    Palpitations    Vertigo    History of ischemic left MCA stroke    Hemorrhagic stroke    History of pulmonary embolus (PE)    Confusion    Hypotension    Constipation    UTI (urinary tract infection) due to urinary indwelling catheter    Metabolic encephalopathy    Symptomatic anemia    Acquired hypothyroidism    AMS (altered mental status)    Stroke     Past Medical History:   Diagnosis Date    Abnormal heart rhythm     Anxiety     Arrhythmia     Arthritis     Atrial fibrillation     Dementia     Diabetes mellitus     Hyperlipidemia     Hypertension     Kidney disorder     Stroke     Uterus cancer      Past Surgical History:   Procedure Laterality Date    CATARACT EXTRACTION, BILATERAL         SLP Recommendation and Plan                    Anticipated Discharge Disposition (SLP): home with  care (24 1300)        Therapy Frequency (SLP SLC): 3 days per week (24 1300)  Predicted Duration Therapy Intervention (Days): 1 week (24 1300)        Daily Summary of Progress (SLP): progress toward functional goals is gradual (24 1300)           Treatment Assessment (SLP): cognitive-linguistic disorder (24 1300)  Treatment Assessment Comments (SLP): Pt seen for tx w multiple family members present. Daughters state that pt's cognition has returned to baseline; however, they request additional tx as they believe it will \"help\" pt's " dementia. Pt able to ID all family members present. She is profoundly [place] which may be impacting overall presentation. Two hearing amplification devices provided. Discussed w MD as well (07/17/24 1300)  Plan for Continued Treatment (SLP): continue treatment per plan of care (07/17/24 1300)  Plan of Care Reviewed With: spouse, daughter, family (07/17/24 1431)  Progress: no change (07/17/24 1431)      SLP EVALUATION (Last 72 Hours)       SLP SLC Evaluation       Row Name 07/17/24 1300 07/15/24 1140                Communication Assessment/Intervention    Document Type therapy note (daily note)  -RS evaluation  -SM       Subjective Information no complaints  -RS no complaints  -SM       Patient Observations -- alert;cooperative  -SM       Patient/Family/Caregiver Comments/Observations , 2 daughters, and son in law preseent  -RS --       Patient Effort adequate  -RS good  -SM       Symptoms Noted During/After Treatment none  -RS --          General Information    Patient Profile Reviewed -- yes  -SM       Pertinent History Of Current Problem -- Adm AMS. 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 Dementia  -SM       Prior Level of Function-Communication -- cognitive-linguistic impairment  -SM       Plans/Goals Discussed with -- patient  -SM       Barriers to Rehab -- cognitive status;previous functional deficit  -SM       Patient's Goals for Discharge -- patient did not state  -SM          Pain    Additional Documentation Pain Scale: FACES Pre/Post-Treatment (Group)  -RS --          Pain Scale: FACES Pre/Post-Treatment    Pain: FACES Scale, Pretreatment 0-->no hurt  -RS 2-->hurts little bit  -SM       Posttreatment Pain Rating 0-->no hurt  -RS 2-->hurts little bit  -SM          Comprehension Assessment/Intervention    Comprehension Assessment/Intervention -- Auditory Comprehension  -SM          Auditory Comprehension Assessment/Intervention    Answers Questions  (Communication) -- yes/no;wh questions;personal;simple;mild impairment;mulit-unit;complex;abstract;moderate impairment;severe impairment  -       Able to Follow Commands (Communication) -- 1-step;mild impairment  -       Narrative Discourse -- conversational level;mild impairment;moderate impairment  -       Successful Auditory Strategies (Communication) -- repetition;visual cues  -          Expression Assessment/Intervention    Expression Assessment/Intervention -- verbal expression  -          Verbal Expression Assessment/Intervention    Spontaneous/Functional Words -- simple;WFL  -       Sentence Formulation -- simple;WFL  -       Conversational Discourse/Fluency -- mild impairment;other (see comments)  -       Verbal Expression, Comment -- tenagential and distracted through topic/conversation attempts  -          Motor Speech Assessment/Intervention    Motor Speech Function -- WFL  -          Cognitive Assessment Intervention- SLP    Orientation Status (Cognition) -- person;mild impairment;place;time;situation;moderate impairment;severe impairment  -       Attention (Cognitive) -- selective;WFL;sustained;mild impairment;moderate impairment  -       Problem Solving (Cognitive) -- simple;moderate impairment  -       Cognition, Comment -- Oriented to name, not . Engaged in simple/basic contextual interactions with some cues to stay on task. No family present to discuss baseline level and ? acute SLP needs. Will f/u to discuss.  -          SLP Evaluation Clinical Impressions    SLP Diagnosis -- cognitive-linguistic disorder  -SM       [facility] Criteria for Skilled Therapy Interventions Met -- yes  -       Functional Impact -- need frequent supervision  -          SLP Treatment Clinical Impressions    Treatment Assessment (SLP) cognitive-linguistic disorder  -RS --       Treatment Assessment Comments (SLP) Pt seen for tx w multiple family members present. Daughters state that pt's  "cognition has returned to baseline; however, they request additional tx as they believe it will \"help\" pt's dementia. Pt able to ID all family members present. She is profoundly [place] which may be impacting overall presentation. Two hearing amplification devices provided. Discussed w MD as well  -RS --       Daily Summary of Progress (SLP) progress toward functional goals is gradual  -RS --       Barriers to Overall Progress (SLP) Cognitive status;Baseline deficits  -RS --       Plan for Continued Treatment (SLP) continue treatment per plan of care  -RS --       Care Plan Review care plan/treatment goals reviewed;patient/other agree to care plan  -RS --       Care Plan Review, Other Participant(s) daughter;spouse;other (see comments)  2 daughters and son in law  -RS --          Recommendations    Therapy Frequency (SLP SLC) 3 days per week  -RS 5 days per week  -SM       Predicted Duration Therapy Intervention (Days) 1 week  -RS 1 week  -SM       Anticipated Discharge Disposition (SLP) home with 24/7 care  -RS home with 24/7 care  -SM                 User Key  (r) = Recorded By, (t) = Taken By, (c) = Cosigned By      Initials Name Effective Dates     Kira Chavez MS CCC-SLP 02/03/23 -     RS Nabil Madrid MS CCC-SLP 09/14/23 -                        EDUCATION  The patient has been educated in the following areas:     Cognitive Impairment.           SLP GOALS       Row Name 07/17/24 1300 07/15/24 1140          Patient will demonstrate functional cognitive-linguistic skills for return to discharge environment    [place] with moderate cues  -RS with moderate cues  -SM     Time frame 1 week  -RS 1 week  -        SLP Diagnostic Treatment     Patient will participate in further assessment in the following areas cognitive-linguistic;clarification of baseline cognitive communication status  -RS cognitive-linguistic;clarification of baseline cognitive communication status  -SM     Time Frame (Diagnostic) 1 " week  -RS 1 week  -SM     Comment (Diagnostic) Daughters report that pt is entirely at her baseline though they have reported to RN that she is not. Will add rudimentary goals. Pt able to identify all family members present  -RS --        Comprehend Questions Goal 1 (SLP)    Improve Ability to Comprehend Questions Goal 1 (SLP) simple yes/no questions;complex yes/no questions;simple wh questions;simple general questions;80%;with minimal cues (75-90%)  -RS --     Time Frame (Comprehend Questions Goal 1, SLP) 1 week  -RS --     Progress/Outcomes (Comprehend Questions Goal 1, SLP) new goal  -RS --        Follow Directions Goal 2 (SLP)    Improve Ability to Follow Directions Goal 1 (SLP) 2 step commands;80%;with moderate cues (50-74%)  -RS --     Time Frame (Follow Directions Goal 1, SLP) 1 week  -RS --     Progress/Outcomes (Follow Directions Goal 1, SLP) new goal  -RS --               User Key  (r) = Recorded By, (t) = Taken By, (c) = Cosigned By      Initials Name Provider Type    Kira Cordero MS CCC-SLP Speech and Language Pathologist    Nabil Bowman MS CCC-SLP Speech and Language Pathologist                              Time Calculation:      Time Calculation- SLP       Row Name 07/17/24 1430             Time Calculation- SLP    SLP Start Time 1300  -RS      SLP Received On 07/17/24  -RS         Untimed Charges    [id number] Treatment/ST Modification Prosth Aug Alter  45  -RS         Total Minutes    Untimed Charges Total Minutes 45  -RS       Total Minutes 45  -RS                User Key  (r) = Recorded By, (t) = Taken By, (c) = Cosigned By      Initials Name Provider Type    Nabil Bowman MS CCC-SLP Speech and Language Pathologist                    Therapy Charges for Today       Code Description Service Date Service Provider Modifiers Qty    51810375191 HC ST TREATMENT SPEECH 3 7/17/2024 Nabil Madrid MS CCC-SLP GN 1                       Nabil Madrid MS CCC-SABINO  7/17/2024

## 2024-07-17 NOTE — PLAN OF CARE
Goal Outcome Evaluation:  Plan of Care Reviewed With: spouse, daughter, family        Progress: no change         Anticipated Discharge Disposition (SLP): home with 24/7 care             Treatment Assessment (SLP): cognitive-linguistic disorder (07/17/24 1300)    Plan for Continued Treatment (SLP): continue treatment per plan of care (07/17/24 1300)

## 2024-07-17 NOTE — PLAN OF CARE
Problem: Adult Inpatient Plan of Care  Goal: Plan of Care Review  Outcome: Ongoing, Progressing  Flowsheets (Taken 7/17/2024 0511)  Progress: improving  Plan of Care Reviewed With: patient  Goal: Patient-Specific Goal (Individualized)  Outcome: Ongoing, Progressing  Goal: Absence of Hospital-Acquired Illness or Injury  Outcome: Ongoing, Progressing  Intervention: Identify and Manage Fall Risk  Description: Perform standard risk assessment on admission using a validated tool or comprehensive approach appropriate to the patient; reassess fall risk frequently, with change in status or transfer to another level of care.  Communicate fall injury risk to interprofessional healthcare team.  Determine need for increased observation, equipment and environmental modification, such as low bed, signage and supportive, nonskid footwear.  Adjust safety measures to individual developmental age, stage and identified risk factors.  Reinforce the importance of safety and physical activity with patient and family.  Perform regular intentional rounding to assess need for position change, pain assessment and personal needs, including assistance with toileting.  Recent Flowsheet Documentation  Taken 7/17/2024 0256 by Tiffany Neal RN  Safety Promotion/Fall Prevention:   nonskid shoes/slippers when out of bed   safety round/check completed   room organization consistent   lighting adjusted   activity supervised   assistive device/personal items within reach   clutter free environment maintained  Taken 7/17/2024 0030 by Tiffany Neal RN  Safety Promotion/Fall Prevention:   nonskid shoes/slippers when out of bed   safety round/check completed   room organization consistent   lighting adjusted   activity supervised   assistive device/personal items within reach   clutter free environment maintained  Taken 7/16/2024 2230 by Tiffany Neal RN  Safety Promotion/Fall Prevention:   nonskid shoes/slippers when out of bed    safety round/check completed   room organization consistent   lighting adjusted   activity supervised   assistive device/personal items within reach   clutter free environment maintained  Taken 7/16/2024 2040 by Tiffany Neal RN  Safety Promotion/Fall Prevention:   nonskid shoes/slippers when out of bed   safety round/check completed   room organization consistent   lighting adjusted   activity supervised   assistive device/personal items within reach   clutter free environment maintained  Taken 7/16/2024 1930 by Tiffany Neal RN  Safety Promotion/Fall Prevention:   nonskid shoes/slippers when out of bed   safety round/check completed   room organization consistent   lighting adjusted   activity supervised   assistive device/personal items within reach   clutter free environment maintained  Intervention: Prevent Skin Injury  Description: Perform a screening for skin injury risk, such as pressure or moisture associated skin damage on admission and at regular intervals throughout hospital stay.  Keep all areas of skin (especially folds) clean and dry.  Maintain adequate skin hydration.  Relieve and redistribute pressure and protect bony prominences; implement measures based on patient-specific risk factors.  Match turning and repositioning schedule to clinical condition.  Encourage weight shift frequently; assist with reposition if unable to complete independently.  Float heels off bed; avoid pressure on the Achilles tendon.  Keep skin free from extended contact with medical devices.  Encourage functional activity and mobility, as early as tolerated.  Use aids (e.g., slide boards, mechanical lift) during transfer.  Recent Flowsheet Documentation  Taken 7/17/2024 0256 by Tiffany Neal RN  Body Position: turned  Taken 7/17/2024 0030 by Tiffany Neal RN  Body Position: turned  Taken 7/16/2024 2230 by Tiffany Neal RN  Body Position: turned  Taken 7/16/2024 2040 by Tiffany Neal  RN  Body Position: turned  Taken 7/16/2024 1930 by Tiffany Neal RN  Body Position: turned  Skin Protection:   adhesive use limited   incontinence pads utilized  Intervention: Prevent and Manage VTE (Venous Thromboembolism) Risk  Description: Assess for VTE (venous thromboembolism) risk.  Encourage and assist with early ambulation.  Initiate and maintain compression or other therapy, as indicated, based on identified risk in accordance with organizational protocol and provider order.  Encourage both active and passive leg exercises while in bed, if unable to ambulate.  Recent Flowsheet Documentation  Taken 7/16/2024 1930 by Tiffany Neal RN  VTE Prevention/Management:   bilateral   sequential compression devices off   patient refused intervention  Range of Motion: active ROM (range of motion) encouraged  Intervention: Prevent Infection  Description: Maintain skin and mucous membrane integrity; promote hand, oral and pulmonary hygiene.  Optimize fluid balance, nutrition, sleep and glycemic control to maximize infection resistance.  Identify potential sources of infection early to prevent or mitigate progression of infection (e.g., wound, lines, devices).  Evaluate ongoing need for invasive devices; remove promptly when no longer indicated.  Recent Flowsheet Documentation  Taken 7/17/2024 0256 by Tiffany Neal RN  Infection Prevention:   environmental surveillance performed   rest/sleep promoted   hand hygiene promoted  Taken 7/17/2024 0030 by Tiffany Neal RN  Infection Prevention:   environmental surveillance performed   rest/sleep promoted   hand hygiene promoted  Taken 7/16/2024 2230 by Tiffany Neal RN  Infection Prevention:   environmental surveillance performed   rest/sleep promoted   hand hygiene promoted  Taken 7/16/2024 2040 by Tiffany Neal RN  Infection Prevention:   environmental surveillance performed   rest/sleep promoted   hand hygiene promoted  Taken 7/16/2024 1930  by Tiffany Neal RN  Infection Prevention:   environmental surveillance performed   rest/sleep promoted   hand hygiene promoted  Goal: Optimal Comfort and Wellbeing  Outcome: Ongoing, Progressing  Intervention: Monitor Pain and Promote Comfort  Description: Assess pain level, treatment efficacy and patient response at regular intervals using a consistent pain scale.  Consider the presence and impact of preexisting chronic pain.  Encourage patient and caregiver involvement in pain assessment, interventions and safety measures.  Recent Flowsheet Documentation  Taken 7/16/2024 1930 by Tiffany Neal RN  Pain Management Interventions: see MAR  Intervention: Provide Person-Centered Care  Description: Use a family-focused approach to care.  Develop trust and rapport by proactively providing information, encouraging questions, addressing concerns and offering reassurance.  Acknowledge emotional response to hospitalization.  Recognize and utilize personal coping strategies.  Honor spiritual and cultural preferences.  Recent Flowsheet Documentation  Taken 7/16/2024 1930 by Tiffany Neal RN  Trust Relationship/Rapport:   care explained   choices provided   emotional support provided   empathic listening provided   questions answered   questions encouraged   reassurance provided   thoughts/feelings acknowledged  Goal: Readiness for Transition of Care  Outcome: Ongoing, Progressing     Problem: Skin Injury Risk Increased  Goal: Skin Health and Integrity  Outcome: Ongoing, Progressing  Intervention: Optimize Skin Protection  Description: Perform a full pressure injury risk assessment, as indicated by screening, upon admission to care unit.  Reassess skin (injury risk, full inspection) frequently (e.g., scheduled interval, with change in condition) to provide optimal early detection and prevention.  Maintain adequate tissue perfusion (e.g., encourage fluid balance; avoid crossing legs, constrictive clothing or  devices) to promote tissue oxygenation.  Maintain head of bed at lowest degree of elevation tolerated, considering medical condition and other restrictions.  Avoid positioning onto an area that remains reddened.  Minimize incontinence and moisture (e.g., toileting schedule; moisture-wicking pad, diaper or incontinence collection device; skin moisture barrier).  Cleanse skin promptly and gently when soiled utilizing a pH-balanced cleanser.  Relieve and redistribute pressure (e.g., scheduled position changes, weight shifts, use of support surface, medical device repositioning, protective dressing application, use of positioning device, microclimate control, use of pressure-injury-monitor  Encourage increased activity, such as sitting in a chair at the bedside or early mobilization, when able to tolerate.  Recent Flowsheet Documentation  Taken 7/17/2024 0256 by Tiffany Neal RN  Head of Bed (HOB) Positioning: HOB elevated  Taken 7/17/2024 0030 by Tiffany Neal RN  Head of Bed (HOB) Positioning: HOB elevated  Taken 7/16/2024 2230 by Tiffany Neal RN  Head of Bed (HOB) Positioning: HOB elevated  Taken 7/16/2024 2040 by Tiffany Neal RN  Head of Bed (HOB) Positioning: HOB elevated  Taken 7/16/2024 1930 by Tiffany Neal RN  Pressure Reduction Techniques:   weight shift assistance provided   heels elevated off bed  Head of Bed (HOB) Positioning: HOB elevated  Pressure Reduction Devices:   pressure-redistributing mattress utilized   positioning supports utilized  Skin Protection:   adhesive use limited   incontinence pads utilized     Problem: Diabetes Comorbidity  Goal: Blood Glucose Level Within Targeted Range  Outcome: Ongoing, Progressing     Problem: Hypertension Comorbidity  Goal: Blood Pressure in Desired Range  Outcome: Ongoing, Progressing     Problem: Pain Chronic (Persistent) (Comorbidity Management)  Goal: Acceptable Pain Control and Functional Ability  Outcome: Ongoing,  Progressing  Intervention: Develop Pain Management Plan  Description: Acknowledge patient as the expert in pain self-management.  Use a consistent, validated tool for pain assessment; include function and quality of life.  Evaluate risk for opioid use and dependence.  Set pain management goals; determine acceptable level of discomfort to allow for maximal functioning and quality of life.  Determine aejnuovd-irtrry-upxg pain management plan, including both pharmacologic and nonpharmacologic measures.  Identify and integrate past successful treatment measures, if able.  Encourage patient and caregiver involvement in pain assessment, interventions and safety measures.  Re-evaluate plan regularly.  Recent Flowsheet Documentation  Taken 7/16/2024 1930 by Tiffany Neal RN  Pain Management Interventions: see MAR  Intervention: Optimize Psychosocial Wellbeing  Description: Facilitate patient’s self-control over pain by providing pain information and allowing choices in treatment.  Consider and address emotional response to pain.  Explore and promote use of coping strategies; address barriers to successful coping.  Evaluate and assist with psychosocial, cultural and spiritual factors impacting pain.  Modify pain perception by using techniques, such as distraction, mindfulness, guided imagery, meditation or music.  Assess and monitor for signs and symptoms of behavioral health concerns, such as unhealthy substance use, depression and suicidal ideation.  Consider referral for ongoing coping support, such as cognitive behavioral therapy and mindfulness-based stress reduction.  Recent Flowsheet Documentation  Taken 7/16/2024 1930 by Tiffany Neal RN  Supportive Measures: active listening utilized  Diversional Activities: television  Family/Support System Care:   involvement promoted   presence promoted     Problem: Fall Injury Risk  Goal: Absence of Fall and Fall-Related Injury  Outcome: Ongoing,  Progressing  Intervention: Promote Injury-Free Environment  Description: Provide a safe, barrier-free environment that encourages independent activity.  Keep care area uncluttered and well-lighted.  Determine need for increased observation or monitoring.  Avoid use of devices that minimize mobility, such as restraints or indwelling urinary catheter.  Recent Flowsheet Documentation  Taken 7/17/2024 0256 by Tiffany Neal RN  Safety Promotion/Fall Prevention:   nonskid shoes/slippers when out of bed   safety round/check completed   room organization consistent   lighting adjusted   activity supervised   assistive device/personal items within reach   clutter free environment maintained  Taken 7/17/2024 0030 by Tiffany Neal RN  Safety Promotion/Fall Prevention:   nonskid shoes/slippers when out of bed   safety round/check completed   room organization consistent   lighting adjusted   activity supervised   assistive device/personal items within reach   clutter free environment maintained  Taken 7/16/2024 2230 by Tiffany Neal RN  Safety Promotion/Fall Prevention:   nonskid shoes/slippers when out of bed   safety round/check completed   room organization consistent   lighting adjusted   activity supervised   assistive device/personal items within reach   clutter free environment maintained  Taken 7/16/2024 2040 by Tiffany Neal RN  Safety Promotion/Fall Prevention:   nonskid shoes/slippers when out of bed   safety round/check completed   room organization consistent   lighting adjusted   activity supervised   assistive device/personal items within reach   clutter free environment maintained  Taken 7/16/2024 1930 by Tiffany Neal RN  Safety Promotion/Fall Prevention:   nonskid shoes/slippers when out of bed   safety round/check completed   room organization consistent   lighting adjusted   activity supervised   assistive device/personal items within reach   clutter free environment  maintained   Goal Outcome Evaluation:  Plan of Care Reviewed With: patient        Progress: improving

## 2024-07-17 NOTE — CASE MANAGEMENT/SOCIAL WORK
Continued Stay Note  Psychiatric     Patient Name: Cheri Cruz  MRN: [id number]  Today's Date: 7/17/2024    Admit Date: 7/14/2024    Plan: facility placement   Discharge Plan       Row Name 07/17/24 1418       Plan    Plan facility placement    Patient/Family in Agreement with Plan yes    Plan Comments I had a long conversation with the family and MD at the bedside today. MD explained all of the medical information and answered all questions from the family. I discussed with them the discharge plan. Per daughter, Janice and Radha, patient is not safe to discharge home. They have asked for referrals to [facility] and [facility]. I efaxed the referral to [facility] and called Kait at [facility]. Per family, patient will likely need her long term care through Medicaid. I advised family to seek [place] with an elder law  regarding finances and property. We will attempt to get SNF to LTC at a facility if possible. If we are unable to find a location for the patient and the patient returns home, patient will need home health if possible and medical equipment through [facility] as follows: wheelchair, hospital bed, and lift. I did speak with Samson at [facility] on 7-16-24 about these items. No orders for those items have been arranged as we are trying to place the patient in a facility instead. CM following.    Final Discharge Disposition Code 04 - intermediate care facility                   Discharge Codes    No documentation.                 Expected Discharge Date and Time       Expected Discharge Date Expected Discharge Time    Jul 19, 2024               Lizabeth Ortiz RN

## 2024-07-18 ENCOUNTER — APPOINTMENT (OUTPATIENT)
Dept: GENERAL RADIOLOGY | Facility: HOSPITAL | Age: 84
End: 2024-07-18
Payer: MEDICARE

## 2024-07-18 LAB
ALBUMIN SERPL-MCNC: 3.8 G/DL (ref 3.5–5.2)
ALP SERPL-CCNC: 66 U/L (ref 39–117)
ALT SERPL W P-5'-P-CCNC: 5 U/L (ref 1–33)
ANION GAP SERPL CALCULATED.3IONS-SCNC: 8 MMOL/L (ref 5–15)
ARTERIAL PATENCY WRIST A: ABNORMAL
AST SERPL-CCNC: 16 U/L (ref 1–32)
ATMOSPHERIC PRESS: ABNORMAL MM[HG]
BASE EXCESS BLDA CALC-SCNC: 5.2 MMOL/L (ref 0–2)
BDY SITE: ABNORMAL
BILIRUB CONJ SERPL-MCNC: <0.2 MG/DL (ref 0–0.3)
BILIRUB INDIRECT SERPL-MCNC: NORMAL MG/DL
BILIRUB SERPL-MCNC: 0.2 MG/DL (ref 0–1.2)
BODY TEMPERATURE: 37
BUN SERPL-MCNC: 18 MG/DL (ref 8–23)
BUN/CREAT SERPL: 13.2 (ref 7–25)
CALCIUM SPEC-SCNC: 9.2 MG/DL (ref 8.6–10.5)
CHLORIDE SERPL-SCNC: 100 MMOL/L (ref 98–107)
CO2 BLDA-SCNC: 33 MMOL/L (ref 22–33)
CO2 SERPL-SCNC: 30 MMOL/L (ref 22–29)
COHGB MFR BLD: 1.1 % (ref 0–2)
CREAT SERPL-MCNC: 1.36 MG/DL (ref 0.57–1)
EGFRCR SERPLBLD CKD-EPI 2021: 39 ML/MIN/1.73
EPAP: 0
GLUCOSE BLDC GLUCOMTR-MCNC: 150 MG/DL (ref 70–130)
GLUCOSE BLDC GLUCOMTR-MCNC: 160 MG/DL (ref 70–130)
GLUCOSE BLDC GLUCOMTR-MCNC: 168 MG/DL (ref 70–130)
GLUCOSE BLDC GLUCOMTR-MCNC: 205 MG/DL (ref 70–130)
GLUCOSE BLDC GLUCOMTR-MCNC: 214 MG/DL (ref 70–130)
GLUCOSE SERPL-MCNC: 229 MG/DL (ref 65–99)
HCO3 BLDA-SCNC: 31.4 MMOL/L (ref 20–26)
HCT VFR BLD CALC: 34 % (ref 38–51)
HGB BLDA-MCNC: 11.1 G/DL (ref 14–18)
INHALED O2 CONCENTRATION: 24 %
IPAP: 0
METHGB BLD QL: 0.9 % (ref 0–1.5)
MODALITY: ABNORMAL
OXYHGB MFR BLDV: 94.1 % (ref 94–99)
PAW @ PEAK INSP FLOW SETTING VENT: 0 CMH2O
PCO2 BLDA: 53.1 MM HG (ref 35–45)
PCO2 TEMP ADJ BLD: 53.1 MM HG (ref 35–45)
PH BLDA: 7.38 PH UNITS (ref 7.35–7.45)
PH, TEMP CORRECTED: 7.38 PH UNITS
PO2 BLDA: 82.4 MM HG (ref 83–108)
PO2 TEMP ADJ BLD: 82.4 MM HG (ref 83–108)
POTASSIUM SERPL-SCNC: 5.1 MMOL/L (ref 3.5–5.2)
PROT SERPL-MCNC: 6.5 G/DL (ref 6–8.5)
SODIUM SERPL-SCNC: 138 MMOL/L (ref 136–145)
TOTAL RATE: 0 BREATHS/MINUTE

## 2024-07-18 PROCEDURE — A9270 NON-COVERED ITEM OR SERVICE: HCPCS | Performed by: INTERNAL MEDICINE

## 2024-07-18 PROCEDURE — 36600 WITHDRAWAL OF ARTERIAL BLOOD: CPT

## 2024-07-18 PROCEDURE — A9270 NON-COVERED ITEM OR SERVICE: HCPCS | Performed by: STUDENT IN AN ORGANIZED HEALTH CARE EDUCATION/TRAINING PROGRAM

## 2024-07-18 PROCEDURE — 63710000001 ATORVASTATIN 40 MG TABLET

## 2024-07-18 PROCEDURE — 63710000001 HYDROCODONE-ACETAMINOPHEN 10-325 MG TABLET: Performed by: INTERNAL MEDICINE

## 2024-07-18 PROCEDURE — 63710000001 LEVOTHYROXINE 25 MCG TABLET: Performed by: INTERNAL MEDICINE

## 2024-07-18 PROCEDURE — 63710000001 ACETAMINOPHEN 325 MG TABLET: Performed by: INTERNAL MEDICINE

## 2024-07-18 PROCEDURE — 74018 RADEX ABDOMEN 1 VIEW: CPT

## 2024-07-18 PROCEDURE — 25010000002 ONDANSETRON PER 1 MG: Performed by: INTERNAL MEDICINE

## 2024-07-18 PROCEDURE — 99232 SBSQ HOSP IP/OBS MODERATE 35: CPT | Performed by: STUDENT IN AN ORGANIZED HEALTH CARE EDUCATION/TRAINING PROGRAM

## 2024-07-18 PROCEDURE — G0378 HOSPITAL OBSERVATION PER HR: HCPCS

## 2024-07-18 PROCEDURE — 63710000001 QUETIAPINE 25 MG TABLET: Performed by: INTERNAL MEDICINE

## 2024-07-18 PROCEDURE — 82948 REAGENT STRIP/BLOOD GLUCOSE: CPT

## 2024-07-18 PROCEDURE — 63710000001 INSULIN LISPRO (HUMAN) PER 5 UNITS: Performed by: INTERNAL MEDICINE

## 2024-07-18 PROCEDURE — 63710000001 INSULIN LISPRO (HUMAN) PER 5 UNITS: Performed by: STUDENT IN AN ORGANIZED HEALTH CARE EDUCATION/TRAINING PROGRAM

## 2024-07-18 PROCEDURE — 63710000001 TERAZOSIN 2 MG CAPSULE: Performed by: INTERNAL MEDICINE

## 2024-07-18 PROCEDURE — 63710000001 SENNOSIDES-DOCUSATE 8.6-50 MG TABLET: Performed by: STUDENT IN AN ORGANIZED HEALTH CARE EDUCATION/TRAINING PROGRAM

## 2024-07-18 PROCEDURE — 63710000001 PANTOPRAZOLE 40 MG TABLET DELAYED-RELEASE: Performed by: INTERNAL MEDICINE

## 2024-07-18 PROCEDURE — A9270 NON-COVERED ITEM OR SERVICE: HCPCS

## 2024-07-18 PROCEDURE — 63710000001 PANTOPRAZOLE 40 MG TABLET DELAYED-RELEASE: Performed by: STUDENT IN AN ORGANIZED HEALTH CARE EDUCATION/TRAINING PROGRAM

## 2024-07-18 PROCEDURE — 63710000001 DONEPEZIL 10 MG TABLET: Performed by: INTERNAL MEDICINE

## 2024-07-18 PROCEDURE — 63710000001 POLYETHYLENE GLYCOL 17 G PACK: Performed by: STUDENT IN AN ORGANIZED HEALTH CARE EDUCATION/TRAINING PROGRAM

## 2024-07-18 PROCEDURE — 63710000001 APIXABAN 5 MG TABLET: Performed by: STUDENT IN AN ORGANIZED HEALTH CARE EDUCATION/TRAINING PROGRAM

## 2024-07-18 PROCEDURE — 80076 HEPATIC FUNCTION PANEL: CPT | Performed by: STUDENT IN AN ORGANIZED HEALTH CARE EDUCATION/TRAINING PROGRAM

## 2024-07-18 PROCEDURE — 92507 TX SP LANG VOICE COMM INDIV: CPT

## 2024-07-18 PROCEDURE — 63710000001 LORAZEPAM 1 MG TABLET: Performed by: STUDENT IN AN ORGANIZED HEALTH CARE EDUCATION/TRAINING PROGRAM

## 2024-07-18 PROCEDURE — 63710000001 BUSPIRONE 15 MG TABLET: Performed by: INTERNAL MEDICINE

## 2024-07-18 PROCEDURE — 80048 BASIC METABOLIC PNL TOTAL CA: CPT | Performed by: STUDENT IN AN ORGANIZED HEALTH CARE EDUCATION/TRAINING PROGRAM

## 2024-07-18 PROCEDURE — 63710000001 INSULIN GLARGINE PER 5 UNITS: Performed by: STUDENT IN AN ORGANIZED HEALTH CARE EDUCATION/TRAINING PROGRAM

## 2024-07-18 PROCEDURE — 82805 BLOOD GASES W/O2 SATURATION: CPT

## 2024-07-18 PROCEDURE — 83050 HGB METHEMOGLOBIN QUAN: CPT

## 2024-07-18 PROCEDURE — 82375 ASSAY CARBOXYHB QUANT: CPT

## 2024-07-18 RX ORDER — PANTOPRAZOLE SODIUM 40 MG/1
40 TABLET, DELAYED RELEASE ORAL
Status: DISCONTINUED | OUTPATIENT
Start: 2024-07-18 | End: 2024-08-06

## 2024-07-18 RX ORDER — INSULIN LISPRO 100 [IU]/ML
3-14 INJECTION, SOLUTION INTRAVENOUS; SUBCUTANEOUS
Status: DISCONTINUED | OUTPATIENT
Start: 2024-07-18 | End: 2024-08-09 | Stop reason: HOSPADM

## 2024-07-18 RX ADMIN — POLYETHYLENE GLYCOL 3350 17 G: 17 POWDER, FOR SOLUTION ORAL at 08:28

## 2024-07-18 RX ADMIN — Medication 10 ML: at 21:14

## 2024-07-18 RX ADMIN — LORAZEPAM 1 MG: 1 TABLET ORAL at 01:01

## 2024-07-18 RX ADMIN — APIXABAN 5 MG: 5 TABLET, FILM COATED ORAL at 08:27

## 2024-07-18 RX ADMIN — ACETAMINOPHEN 650 MG: 325 TABLET ORAL at 05:31

## 2024-07-18 RX ADMIN — INSULIN LISPRO 3 UNITS: 100 INJECTION, SOLUTION INTRAVENOUS; SUBCUTANEOUS at 17:49

## 2024-07-18 RX ADMIN — HYDROCODONE BITARTRATE AND ACETAMINOPHEN 1 TABLET: 10; 325 TABLET ORAL at 23:40

## 2024-07-18 RX ADMIN — BUSPIRONE HYDROCHLORIDE 7.5 MG: 15 TABLET ORAL at 21:03

## 2024-07-18 RX ADMIN — ONDANSETRON 4 MG: 2 INJECTION INTRAMUSCULAR; INTRAVENOUS at 20:59

## 2024-07-18 RX ADMIN — INSULIN LISPRO 3 UNITS: 100 INJECTION, SOLUTION INTRAVENOUS; SUBCUTANEOUS at 21:04

## 2024-07-18 RX ADMIN — ONDANSETRON 4 MG: 2 INJECTION INTRAMUSCULAR; INTRAVENOUS at 08:27

## 2024-07-18 RX ADMIN — INSULIN GLARGINE 30 UNITS: 100 INJECTION, SOLUTION SUBCUTANEOUS at 21:05

## 2024-07-18 RX ADMIN — QUETIAPINE FUMARATE 50 MG: 25 TABLET ORAL at 21:02

## 2024-07-18 RX ADMIN — SENNOSIDES AND DOCUSATE SODIUM 2 TABLET: 50; 8.6 TABLET ORAL at 08:27

## 2024-07-18 RX ADMIN — ATORVASTATIN CALCIUM 80 MG: 40 TABLET, FILM COATED ORAL at 21:02

## 2024-07-18 RX ADMIN — INSULIN LISPRO 4 UNITS: 100 INJECTION, SOLUTION INTRAVENOUS; SUBCUTANEOUS at 08:27

## 2024-07-18 RX ADMIN — ONDANSETRON 4 MG: 2 INJECTION INTRAMUSCULAR; INTRAVENOUS at 00:48

## 2024-07-18 RX ADMIN — TERAZOSIN HYDROCHLORIDE ANHYDROUS 2 MG: 2 CAPSULE ORAL at 21:03

## 2024-07-18 RX ADMIN — PANTOPRAZOLE SODIUM 40 MG: 40 TABLET, DELAYED RELEASE ORAL at 17:49

## 2024-07-18 RX ADMIN — INSULIN LISPRO 5 UNITS: 100 INJECTION, SOLUTION INTRAVENOUS; SUBCUTANEOUS at 08:28

## 2024-07-18 RX ADMIN — APIXABAN 5 MG: 5 TABLET, FILM COATED ORAL at 21:03

## 2024-07-18 RX ADMIN — INSULIN LISPRO 5 UNITS: 100 INJECTION, SOLUTION INTRAVENOUS; SUBCUTANEOUS at 17:49

## 2024-07-18 RX ADMIN — Medication 10 ML: at 08:28

## 2024-07-18 RX ADMIN — INSULIN LISPRO 4 UNITS: 100 INJECTION, SOLUTION INTRAVENOUS; SUBCUTANEOUS at 11:39

## 2024-07-18 RX ADMIN — PANTOPRAZOLE SODIUM 40 MG: 40 TABLET, DELAYED RELEASE ORAL at 05:31

## 2024-07-18 RX ADMIN — LEVOTHYROXINE SODIUM 25 MCG: 25 TABLET ORAL at 05:31

## 2024-07-18 RX ADMIN — DONEPEZIL HYDROCHLORIDE 10 MG: 10 TABLET, FILM COATED ORAL at 21:03

## 2024-07-18 RX ADMIN — INSULIN LISPRO 5 UNITS: 100 INJECTION, SOLUTION INTRAVENOUS; SUBCUTANEOUS at 11:39

## 2024-07-18 RX ADMIN — BUSPIRONE HYDROCHLORIDE 7.5 MG: 15 TABLET ORAL at 08:28

## 2024-07-18 NOTE — PLAN OF CARE
Goal Outcome Evaluation:  Plan of Care Reviewed With: patient                  Anticipated Discharge Disposition (SLP): home with 24/7 care             Treatment Assessment (SLP): continued, cognitive-linguistic disorder (07/18/24 1100)  Treatment Assessment Comments (SLP): Limited success with tx attempt as pt not feeling well. Could not sustain attention to any targeted tasks or topics. Do not suspect acute SLP success though will trial again, especially when family present, to help provide further education. (07/18/24 1100)  Plan for Continued Treatment (SLP): continue treatment per plan of care (07/18/24 1100)

## 2024-07-18 NOTE — PLAN OF CARE
Goal Outcome Evaluation:  Plan of Care Reviewed With: patient        Progress: no change  Outcome Evaluation: a/o x 3 (time & situation/wax and weans); VSS, 2L nc.; NIH 3; no neuro changes overnight; pleasant, nausea overnight -PRN given; no acute changes to report; turned q2h; all skin breakdown prevention measures in place; will continue POC           Problem: Adult Inpatient Plan of Care  Goal: Absence of Hospital-Acquired Illness or Injury  Intervention: Identify and Manage Fall Risk  Recent Flowsheet Documentation  Taken 7/18/2024 0400 by Westley Vega RN  Safety Promotion/Fall Prevention:   assistive device/personal items within reach   clutter free environment maintained   activity supervised   fall prevention program maintained   nonskid shoes/slippers when out of bed   room organization consistent   safety round/check completed  Taken 7/18/2024 0200 by Westley Vega RN  Safety Promotion/Fall Prevention:   assistive device/personal items within reach   clutter free environment maintained   activity supervised   fall prevention program maintained   nonskid shoes/slippers when out of bed   room organization consistent   safety round/check completed  Taken 7/18/2024 0000 by Westley Vega RN  Safety Promotion/Fall Prevention:   activity supervised   assistive device/personal items within reach   clutter free environment maintained   fall prevention program maintained   nonskid shoes/slippers when out of bed   room organization consistent   safety round/check completed  Taken 7/17/2024 2200 by Westley Vega, RN  Safety Promotion/Fall Prevention:   assistive device/personal items within reach   clutter free environment maintained   activity supervised   fall prevention program maintained   nonskid shoes/slippers when out of bed   safety round/check completed   room organization consistent  Taken 7/17/2024 2000 by Westley Vega RN  Safety Promotion/Fall Prevention:   activity  supervised   assistive device/personal items within reach   clutter free environment maintained   fall prevention program maintained   nonskid shoes/slippers when out of bed   room organization consistent   safety round/check completed     Problem: Adult Inpatient Plan of Care  Goal: Absence of Hospital-Acquired Illness or Injury  Intervention: Prevent Skin Injury  Recent Flowsheet Documentation  Taken 7/18/2024 0400 by Westley Vega RN  Skin Protection:   adhesive use limited   incontinence pads utilized   transparent dressing maintained   tubing/devices free from skin contact  Taken 7/18/2024 0200 by Westley Vega RN  Body Position:   turned   neutral body alignment   neutral head position   sitting up in bed  Skin Protection:   adhesive use limited   incontinence pads utilized   tubing/devices free from skin contact   transparent dressing maintained   silicone foam dressing in place  Taken 7/18/2024 0000 by Westley Vega RN  Body Position:   supine   neutral body alignment   neutral head position   turned  Skin Protection:   adhesive use limited   incontinence pads utilized   tubing/devices free from skin contact   transparent dressing maintained   silicone foam dressing in place  Taken 7/17/2024 2200 by Westley Vega RN  Body Position:   turned   left   tilted  Skin Protection:   adhesive use limited   incontinence pads utilized   transparent dressing maintained   tubing/devices free from skin contact  Taken 7/17/2024 2000 by Westley Vega RN  Body Position:   turned   right  Skin Protection:   adhesive use limited   incontinence pads utilized   tubing/devices free from skin contact   transparent dressing maintained   silicone foam dressing in place   skin sealant/moisture barrier applied   pulse oximeter probe site changed

## 2024-07-18 NOTE — PROGRESS NOTES
"    [facility] Medicine Services  PROGRESS NOTE    Patient Name: Cheri Cruz  : 1940  MRN: [id number]    Date of Admission: 2024  Primary Care Physician: Lorrie Canada APRN    Subjective   Subjective     CC:  AMS    HPI:  Evaluated patient this morning. Still reporting nausea, reports that she \"feels sick to her stomach \". Did not sleep well overnight. Per nursing had an episode of vomiting earlier this morning.      Objective   Objective     Vital Signs:   Temp:  [98.3 °F (36.8 °C)-98.8 °F (37.1 °C)] 98.5 °F (36.9 °C)  Heart Rate:  [66-82] 68  Resp:  [18] 18  BP: (119-184)/(54-77) 162/54  Flow (L/min):  [2] 2     Physical Exam:  Constitutional: Awake, alert, resting comfortably  HENT: Very hard of hearing   Respiratory: Clear to auscultation bilaterally, respiratory effort normal, requiring low-flow oxygen  Cardiovascular: RRR, no murmurs, rubs, or gallops  Gastrointestinal: soft, nontender, nondistended  Musculoskeletal: No bilateral ankle edema  Psychiatric: Appropriate affect, cooperative  Neurologic: Alert, oriented x 1, RLE weaker compared to LLE, speech clear  Skin: No rashes      Results Reviewed:  LAB RESULTS:      Lab 24  0407 07/15/24  0824 243 24  1519   WBC 7.06 8.30  --   --  7.87   HEMOGLOBIN 10.6* 10.3*  --   --  11.0*   HEMATOCRIT 34.9 33.5*  --   --  35.2   PLATELETS 90* 124*  --   --  139*   NEUTROS ABS  --  5.29  --   --  5.72   IMMATURE GRANS (ABS)  --  0.02  --   --  0.02   LYMPHS ABS  --  2.28  --   --  1.52   MONOS ABS  --  0.44  --   --  0.44   EOS ABS  --  0.25  --   --  0.14   .1* 104.0*  --   --  103.2*   CRP  --  <0.30  --   --   --    LACTATE  --   --   --  1.4  --    D DIMER QUANT  --   --  0.31  --   --          Lab 24  1045 24  0742 24  0407 07/15/24  2344 07/15/24  0824 24  1519   SODIUM 138 137 137  --  140 137   POTASSIUM 5.1 5.8* 5.2  --  4.6 5.6*   CHLORIDE 100 103 " 104  --  104 101   CO2 30.0* 25.0 25.0  --  23.0 27.8   ANION GAP 8.0 9.0 8.0  --  13.0 8.2   BUN 18 22 25*  --  26* 35*   CREATININE 1.36* 1.48* 1.44*  --  1.53* 2.06*   EGFR 39.0* 35.2* 36.4*  --  33.8* 23.7*   GLUCOSE 229* 257* 294*  --  306* 436*   CALCIUM 9.2 8.7 8.3*  --  8.2* 9.2   MAGNESIUM  --   --   --  2.8* 1.5* 1.6   PHOSPHORUS  --   --   --   --  4.2  --    HEMOGLOBIN A1C  --   --   --   --  9.80*  --    TSH  --   --   --   --   --  2.380         Lab 07/15/24  0824 07/14/24  1519   TOTAL PROTEIN 6.1 7.0   ALBUMIN 3.6 3.9   GLOBULIN 2.5 3.1   ALT (SGPT) 5 5   AST (SGOT) 12 13   BILIRUBIN <0.2 0.2   ALK PHOS 58 68         Lab 07/14/24 2243 07/14/24 2043 07/14/24  1519   HSTROP T 37* 36* 37*         Lab 07/15/24  0824   CHOLESTEROL 106   LDL CHOL 43   HDL CHOL 40   TRIGLYCERIDES 132         Lab 07/14/24 2043   FOLATE 15.20   VITAMIN B 12 550         Brief Urine Lab Results  (Last result in the past 365 days)        Color   Clarity   Blood   Leuk Est   Nitrite   Protein   CREAT   Urine HCG        07/14/24 2001 Yellow   Cloudy   Trace   Small (1+)   Negative   Negative                   Microbiology Results Abnormal       Procedure Component Value - Date/Time    Blood Culture - Blood, Wrist, Left [[id number]]  (Normal) Collected: 07/14/24 2022    Lab Status: Preliminary result Specimen: Blood from Wrist, Left Updated: 07/17/24 2101     Blood Culture No growth at 3 days    Narrative:      Less than seven (7) mL's of blood was collected.  Insufficient quantity may yield false negative results.    Blood Culture - Blood, Hand, Left [[id number]]  (Normal) Collected: 07/14/24 2022    Lab Status: Preliminary result Specimen: Blood from Hand, Left Updated: 07/17/24 2101     Blood Culture No growth at 3 days    Narrative:      Less than seven (7) mL's of blood was collected.  Insufficient quantity may yield false negative results.    Urine Culture - Urine, Urine, Random Void [[id number]] Collected: 07/14/24 2001     Lab Status: Final result Specimen: Urine, Random Void Updated: 07/16/24 1020     Urine Culture 50,000 CFU/mL Normal Urogenital Nickie    Narrative:      Colonization of the urinary tract without infection is common. Treatment is discouraged unless the patient is symptomatic, pregnant, or undergoing an invasive urologic procedure.            No radiology results from the last 24 hrs    Results for orders placed during the hospital encounter of 02/10/23    Adult Transthoracic Echo Complete W/ Cont if Necessary Per Protocol    Interpretation Summary    Left ventricular systolic function is hyperdynamic (EF > 70%). Calculated left ventricular EF = 70.6% Left ventricular ejection fraction appears to be greater than 70%. The left ventricular cavity is small in size. Left ventricular wall thickness is consistent with mild concentric hypertrophy. All left ventricular wall segments contract normally. Left ventricular intracavitary gradient noted to be 71 mmHg. Left ventricular diastolic function is consistent with (grade I) impaired relaxation. Normal left atrial pressure.    The aortic valve is abnormal in structure. There is mild calcification of the aortic valve mainly affecting the right coronary cusp(s). The aortic valve appears trileaflet. No aortic valve regurgitation is present. Gradient noted through the LV and LVOT    Compared to TTE report from  Dec 2019, hyperdynamic LV with intracavitary gradient is not a new finding but peak gradient noted on this exam is higher than previously described.      Current medications:  Scheduled Meds:apixaban, 5 mg, Oral, BID  atorvastatin, 80 mg, Oral, Nightly  busPIRone, 7.5 mg, Oral, BID  donepezil, 10 mg, Oral, Nightly  [Held by provider] gabapentin, 300 mg, Oral, BID  insulin glargine, 30 Units, Subcutaneous, Nightly  insulin lispro, 2-9 Units, Subcutaneous, 4x Daily AC & at Bedtime  Insulin Lispro, 5 Units, Subcutaneous, TID With Meals  levothyroxine, 25 mcg, Oral,  Daily  pantoprazole, 40 mg, Oral, BID AC  senna-docusate sodium, 2 tablet, Oral, Daily   And  polyethylene glycol, 17 g, Oral, Daily  QUEtiapine, 50 mg, Oral, Nightly  sodium chloride, 10 mL, Intravenous, Q12H  sodium chloride, 10 mL, Intravenous, Q12H  terazosin, 2 mg, Oral, Nightly      Continuous Infusions:     PRN Meds:.  acetaminophen **OR** acetaminophen **OR** acetaminophen    senna-docusate sodium **AND** polyethylene glycol **AND** bisacodyl **AND** bisacodyl    Calcium Replacement - Follow Nurse / BPA Driven Protocol    dextrose    dextrose    glucagon (human recombinant)    HYDROcodone-acetaminophen    LORazepam    Magnesium Standard Dose Replacement - Follow Nurse / BPA Driven Protocol    [Held by provider] meclizine    ondansetron ODT **OR** ondansetron    Phosphorus Replacement - Follow Nurse / BPA Driven Protocol    Potassium Replacement - Follow Nurse / BPA Driven Protocol    sodium chloride    sodium chloride    sodium chloride    sodium chloride    Assessment & Plan   Assessment & Plan     Active Hospital Problems    Diagnosis  POA    **AMS (altered mental status) [R41.82]  Yes    Stroke [I63.9]  Yes      Resolved Hospital Problems   No resolved problems to display.        Brief Hospital Course to date:  Cheri Cruz is a 83 y.o. female with past medical history of dementia, type 2 diabetes mellitus, CKD stage III, essential hypertension, dyslipidemia, old left parietal stroke, PE on anticoagulation with Eliquis, who presented to the hospital as a transfer from [facility] for altered mental status and concern for hemorrhagic stroke.    This patient's problems and plans were partially entered by my partner and updated as appropriate by me 07/18/24.     Evolving old left parietal ischemic stroke  -Presented to [facility] with cognitive decline and metabolic encephalopathy. She was hypotensive with systolic in the 90s at [facility].   -CTH from [facility] with left parietal tiny hemorrhage vs  calcification on top of old ischemic stroke.   -MRI showed evolving old left parietal lobe CVA with some areas of associated cortical laminar necrosis. No hemorrhage.    -Neurology following, recommended to resume Eliquis. Continue statin. Annual follow up in stroke clinic.   -PT/OT recommending home with 24/7 care.    Altered mental status on advanced dementia  Concern for UTI  -Possibly dehydration vs hypoxia? Was also hypotensive on presentation to [facility], noncompliant with supplemental oxygen at home due to dementia  -COVID/flu negative. Blood cultures with no growth to date. LFTs normal.  -CXR with old calcified granulomatous disease. Few, stable linear densities noted bilaterally.  -Initial urine culture with yeast, repeat urine culture with normal urogenital kallie  -Treated with empiric IV ceftriaxone x 3 days. Held sedatives initially. Okay to resume PRN lorazepam. Continue donepezil, Seroquel, BuSpar.     Acute on chronic hypoxemic respiratory failure  Near-syncope  -Per report, patient became hypoxic with oxygen saturation in the 60s  -CTA chest showed no PE, nonacute  -Patient is O2 dependent on 2L NC but is not compliant due her baseline dementia.  -Continue supplemental oxygenation, titrate for goal SpO2 greater than 90%.     RADHA on CKD stage III  Volume depletion due to dehydration   Mild hyperkalemia  -Baseline Cr about 1.3, Cr 2.06 on presentation  -Improved with IV fluids. S/p Lokelma x 1 on 7/17.  -Monitor BMP daily. Encourage good oral intake.     Complex disposition  -PT/OT evaluated, felt patient's functional status is at baseline. Recommended home with 24/7 care.  -Again discussed care with both patient's daughters and case management on 7/17. Family leaning towards pursuing long-term care after discharge as they are having difficulty taking care of her on their own.    Nausea  -suspect secondary to constipation  -KUB 7/14 with nonobstructive bowel gas pattern.  -Will repeat KUB today. Continue  bowel regimen. Increasing PPI to BID. PRN Zofran for nausea.      Uncontrolled diabetes mellitus type 2 with hyperglycemia  -A1c 9.80% this admission  -Increase Lantus to 30 units qhs with lispro 5u TIDAC and moderate-high dose SSI. Monitor glucose and adjust insulin as needed.    Chronic anemia  Macrocytosis  -No evidence of overt GI bleeding this admission  -B12 and folate within normal limits  -Further workup of anemia as outpatient as appropriate.      Essential hypertension  -Was hypotensive at [facility].  -Not on antihypertensive medications.  -Continue to monitor.      Hyperlipidemia  -Continue atorvastatin.    Hypothyroidism  -Continue levothyroxine.    GERD  -Continue PPI.    Expected Discharge Location and Transportation: lives at home with her daughter  Expected Discharge   Expected Discharge Date: 7/19/2024; Expected Discharge Time:      VTE Prophylaxis:  Pharmacologic & mechanical VTE prophylaxis orders are present.         AM-PAC 6 Clicks Score (PT): 12 (07/18/24 9126)    CODE STATUS:   Code Status and Medical Interventions:   Ordered at: 07/15/24 1319     Level Of Support Discussed With:    Patient     Code Status (Patient has no pulse and is not breathing):    CPR (Attempt to Resuscitate)     Medical Interventions (Patient has pulse or is breathing):    Full Support       Jeni Lopez, DO  07/18/24

## 2024-07-18 NOTE — CASE MANAGEMENT/SOCIAL WORK
Continued Stay Note  [facility]     Patient Name: Cheri Cruz  MRN: [id number]  Today's Date: 7/18/2024    Admit Date: 7/14/2024    Plan: SNF   Discharge Plan       Row Name 07/18/24 1524       Plan    Plan SNF    Patient/Family in Agreement with Plan yes    Plan Comments Spoke with Ms. Cruz's Daughters Janice and Radha by telephone. They are wanting Ms. Cruz to receive rehab and transition to LTC. Patient declined therapy today. Discussed with Daughters the importance of therapy participation for rehab referral. Family is requesting a rehab session on Saturday when they can be present to encourage her to participate. Message sent to rehab to work with patient on Saturday. Family is interested in [facility] and [facility] rehab. Once she is offered a bed, she will need insurance approval for the rehab. CM will continue to follow up.    Final Discharge Disposition Code 03 - skilled nursing facility (SNF)                   Discharge Codes    No documentation.                 Expected Discharge Date and Time       Expected Discharge Date Expected Discharge Time    Jul 19, 2024               Jaye Kuhn RN

## 2024-07-18 NOTE — THERAPY TREATMENT NOTE
Acute Care - Speech Language Pathology Treatment Note  [facility]     Patient Name: Cheri Cruz  : 1940  MRN: [id number]  Today's Date: 2024               Admit Date: 2024     Visit Dx:    ICD-10-CM ICD-9-CM   1. Cognitive communication deficit  R41.841 799.52   2. Disorientation  R41.0 780.99     Patient Active Problem List   Diagnosis    Hyperlipidemia LDL goal <70    Type 2 diabetes mellitus without complication, without long-term current use of insulin    GERD (gastroesophageal reflux disease)    Essential hypertension    Abnormal EKG    Osteoarthritis    Anxiety    Palpitations    Vertigo    History of ischemic left MCA stroke    Hemorrhagic stroke    History of pulmonary embolus (PE)    Confusion    Hypotension    Constipation    UTI (urinary tract infection) due to urinary indwelling catheter    Metabolic encephalopathy    Symptomatic anemia    Acquired hypothyroidism    AMS (altered mental status)    Stroke     Past Medical History:   Diagnosis Date    Abnormal heart rhythm     Anxiety     Arrhythmia     Arthritis     Atrial fibrillation     Dementia     Diabetes mellitus     Hyperlipidemia     Hypertension     Kidney disorder     Stroke     Uterus cancer      Past Surgical History:   Procedure Laterality Date    CATARACT EXTRACTION, BILATERAL         SLP Recommendation and Plan                                         Daily Summary of Progress (SLP): unable to show any progress toward functional goals (24 1100)           Treatment Assessment (SLP): continued, cognitive-linguistic disorder (24 1100)  Treatment Assessment Comments (SLP): Limited success with tx attempt as pt not feeling well. Could not sustain attention to any targeted tasks or topics. Do not suspect acute SLP success though will trial again, especially when family present, to help provide further education. (24 1100)  Plan for Continued Treatment (SLP): continue treatment per plan of care (24  "1100)  Plan of Care Reviewed With: patient (07/18/24 9956)      SLP EVALUATION (Last 72 Hours)       SLP SLC Evaluation       Row Name 07/17/24 1300                   Communication Assessment/Intervention    Document Type therapy note (daily note)  -RS        Subjective Information no complaints  -RS        Patient/Family/Caregiver Comments/Observations , 2 daughters, and son in law preseent  -RS        Patient Effort adequate  -RS        Symptoms Noted During/After Treatment none  -RS           Pain    Additional Documentation Pain Scale: FACES Pre/Post-Treatment (Group)  -RS           Pain Scale: FACES Pre/Post-Treatment    Pain: FACES Scale, Pretreatment 0-->no hurt  -RS        Posttreatment Pain Rating 0-->no hurt  -RS           SLP Treatment Clinical Impressions    Treatment Assessment (SLP) cognitive-linguistic disorder  -RS        Treatment Assessment Comments (SLP) Pt seen for tx w multiple family members present. Daughters state that pt's cognition has returned to baseline; however, they request additional tx as they believe it will \"help\" pt's dementia. Pt able to ID all family members present. She is profoundly [place] which may be impacting overall presentation. Two hearing amplification devices provided. Discussed w MD as well  -RS        Daily Summary of Progress (SLP) progress toward functional goals is gradual  -RS        Barriers to Overall Progress (SLP) Cognitive status;Baseline deficits  -RS        Plan for Continued Treatment (SLP) continue treatment per plan of care  -RS        Care Plan Review care plan/treatment goals reviewed;patient/other agree to care plan  -RS        Care Plan Review, Other Participant(s) daughter;spouse;other (see comments)  2 daughters and son in law  -RS           Recommendations    Therapy Frequency (SLP SLC) 3 days per week  -RS        Predicted Duration Therapy Intervention (Days) 1 week  -RS        Anticipated Discharge Disposition (SLP) home with 24/7 care  -RS "                  User Key  (r) = Recorded By, (t) = Taken By, (c) = Cosigned By      Initials Name Effective Dates    RS Nabil Madrid, MS CCC-SLP 09/14/23 -                        EDUCATION  The patient has been educated in the following areas:     Cognitive Impairment Communication Impairment.           SLP GOALS       Row Name 07/18/24 1100 07/17/24 1300          Patient will demonstrate functional cognitive-linguistic skills for return to discharge environment    [place] with moderate cues  -SM with moderate cues  -RS     Time frame 1 week  -SM 1 week  -RS     Progress/Outcomes continuing progress toward goal  -SM --        SLP Diagnostic Treatment     Patient will participate in further assessment in the following areas cognitive-linguistic;clarification of baseline cognitive communication status  -SM cognitive-linguistic;clarification of baseline cognitive communication status  -RS     Time Frame (Diagnostic) 1 week  -SM 1 week  -RS     Progress/Outcomes (Additional Goal 1, SLP) goal met  -SM --     Comment (Diagnostic) completed last session  -SM Daughters report that pt is entirely at her baseline though they have reported to RN that she is not. Will add rudimentary goals. Pt able to identify all family members present  -RS        Comprehend Questions Goal 1 (SLP)    Improve Ability to Comprehend Questions Goal 1 (SLP) simple yes/no questions;complex yes/no questions;simple wh questions;simple general questions;80%;with minimal cues (75-90%)  -SM simple yes/no questions;complex yes/no questions;simple wh questions;simple general questions;80%;with minimal cues (75-90%)  -RS     Time Frame (Comprehend Questions Goal 1, SLP) 1 week  -SM 1 week  -RS     Progress/Outcomes (Comprehend Questions Goal 1, SLP) continuing progress toward goal  -SM new goal  -RS     Comment (Comprehend Questions Goal 1, SLP) Too fixated on not feeling well to target simple level. Inconsistent accurate response with embbedded  contextual ?s. Decreased hearing also impacting.  -SM --        Follow Directions Goal 2 (SLP)    Improve Ability to Follow Directions Goal 1 (SLP) 2 step commands;80%;with moderate cues (50-74%)  -SM 2 step commands;80%;with moderate cues (50-74%)  -RS     Time Frame (Follow Directions Goal 1, SLP) 1 week  -SM 1 week  -RS     Progress/Outcomes (Follow Directions Goal 1, SLP) goal ongoing  -SM new goal  -RS               User Key  (r) = Recorded By, (t) = Taken By, (c) = Cosigned By      Initials Name Provider Type    Kira Cordero MS CCC-SLP Speech and Language Pathologist    Nabil Bowman MS CCC-SLP Speech and Language Pathologist                              Time Calculation:      Time Calculation- SLP       Row Name 07/18/24 1354             Time Calculation- SLP    SLP Start Time 1100  -SM      SLP Received On 07/18/24  -SM         Untimed Charges    [id number] Treatment/ST Modification Prosth Aug Alter  15  -SM         Total Minutes    Untimed Charges Total Minutes 15  -SM       Total Minutes 15  -SM                User Key  (r) = Recorded By, (t) = Taken By, (c) = Cosigned By      Initials Name Provider Type    Kira Cordero MS CCC-SLP Speech and Language Pathologist                    Therapy Charges for Today       Code Description Service Date Service Provider Modifiers Qty    [id number]  ST TREATMENT SPEECH 1 7/18/2024 Kira Chavez MS CCC-SLP GN 1                       Kira Chavez MS CCC-SLP  7/18/2024

## 2024-07-19 LAB
ANION GAP SERPL CALCULATED.3IONS-SCNC: 12 MMOL/L (ref 5–15)
BACTERIA SPEC AEROBE CULT: NORMAL
BACTERIA SPEC AEROBE CULT: NORMAL
BUN SERPL-MCNC: 19 MG/DL (ref 8–23)
BUN/CREAT SERPL: 16 (ref 7–25)
CALCIUM SPEC-SCNC: 9.3 MG/DL (ref 8.6–10.5)
CHLORIDE SERPL-SCNC: 98 MMOL/L (ref 98–107)
CO2 SERPL-SCNC: 27 MMOL/L (ref 22–29)
CREAT SERPL-MCNC: 1.19 MG/DL (ref 0.57–1)
EGFRCR SERPLBLD CKD-EPI 2021: 45.7 ML/MIN/1.73
GLUCOSE BLDC GLUCOMTR-MCNC: 189 MG/DL (ref 70–130)
GLUCOSE BLDC GLUCOMTR-MCNC: 192 MG/DL (ref 70–130)
GLUCOSE BLDC GLUCOMTR-MCNC: 232 MG/DL (ref 70–130)
GLUCOSE BLDC GLUCOMTR-MCNC: 248 MG/DL (ref 70–130)
GLUCOSE SERPL-MCNC: 202 MG/DL (ref 65–99)
POTASSIUM SERPL-SCNC: 4.7 MMOL/L (ref 3.5–5.2)
SODIUM SERPL-SCNC: 137 MMOL/L (ref 136–145)

## 2024-07-19 PROCEDURE — 63710000001 QUETIAPINE 25 MG TABLET: Performed by: INTERNAL MEDICINE

## 2024-07-19 PROCEDURE — 63710000001 BUSPIRONE 15 MG TABLET: Performed by: INTERNAL MEDICINE

## 2024-07-19 PROCEDURE — A9270 NON-COVERED ITEM OR SERVICE: HCPCS | Performed by: STUDENT IN AN ORGANIZED HEALTH CARE EDUCATION/TRAINING PROGRAM

## 2024-07-19 PROCEDURE — 63710000001 TERAZOSIN 2 MG CAPSULE: Performed by: INTERNAL MEDICINE

## 2024-07-19 PROCEDURE — A9270 NON-COVERED ITEM OR SERVICE: HCPCS | Performed by: INTERNAL MEDICINE

## 2024-07-19 PROCEDURE — 63710000001 LEVOTHYROXINE 25 MCG TABLET: Performed by: INTERNAL MEDICINE

## 2024-07-19 PROCEDURE — 25010000002 ONDANSETRON PER 1 MG: Performed by: INTERNAL MEDICINE

## 2024-07-19 PROCEDURE — 63710000001 INSULIN GLARGINE PER 5 UNITS: Performed by: STUDENT IN AN ORGANIZED HEALTH CARE EDUCATION/TRAINING PROGRAM

## 2024-07-19 PROCEDURE — 63710000001 NYSTATIN 100000 UNIT/GM POWDER 15 G BOTTLE: Performed by: STUDENT IN AN ORGANIZED HEALTH CARE EDUCATION/TRAINING PROGRAM

## 2024-07-19 PROCEDURE — 63710000001 DONEPEZIL 10 MG TABLET: Performed by: INTERNAL MEDICINE

## 2024-07-19 PROCEDURE — G0378 HOSPITAL OBSERVATION PER HR: HCPCS

## 2024-07-19 PROCEDURE — 63710000001 LORAZEPAM 1 MG TABLET: Performed by: STUDENT IN AN ORGANIZED HEALTH CARE EDUCATION/TRAINING PROGRAM

## 2024-07-19 PROCEDURE — 63710000001 INSULIN LISPRO (HUMAN) PER 5 UNITS: Performed by: STUDENT IN AN ORGANIZED HEALTH CARE EDUCATION/TRAINING PROGRAM

## 2024-07-19 PROCEDURE — 99232 SBSQ HOSP IP/OBS MODERATE 35: CPT | Performed by: STUDENT IN AN ORGANIZED HEALTH CARE EDUCATION/TRAINING PROGRAM

## 2024-07-19 PROCEDURE — 63710000001 APIXABAN 5 MG TABLET: Performed by: STUDENT IN AN ORGANIZED HEALTH CARE EDUCATION/TRAINING PROGRAM

## 2024-07-19 PROCEDURE — 63710000001 PANTOPRAZOLE 40 MG TABLET DELAYED-RELEASE: Performed by: STUDENT IN AN ORGANIZED HEALTH CARE EDUCATION/TRAINING PROGRAM

## 2024-07-19 PROCEDURE — A9270 NON-COVERED ITEM OR SERVICE: HCPCS

## 2024-07-19 PROCEDURE — 63710000001 ATORVASTATIN 40 MG TABLET

## 2024-07-19 PROCEDURE — 80048 BASIC METABOLIC PNL TOTAL CA: CPT | Performed by: STUDENT IN AN ORGANIZED HEALTH CARE EDUCATION/TRAINING PROGRAM

## 2024-07-19 PROCEDURE — 82948 REAGENT STRIP/BLOOD GLUCOSE: CPT

## 2024-07-19 RX ORDER — GABAPENTIN 300 MG/1
CAPSULE ORAL
Status: COMPLETED
Start: 2024-07-19 | End: 2024-07-28

## 2024-07-19 RX ORDER — NYSTATIN 100000 [USP'U]/G
POWDER TOPICAL 4 TIMES DAILY
Status: DISCONTINUED | OUTPATIENT
Start: 2024-07-19 | End: 2024-08-09 | Stop reason: HOSPADM

## 2024-07-19 RX ORDER — GABAPENTIN 300 MG/1
300 CAPSULE ORAL DAILY
Status: DISCONTINUED | OUTPATIENT
Start: 2024-07-20 | End: 2024-07-30

## 2024-07-19 RX ORDER — LABETALOL HYDROCHLORIDE 5 MG/ML
10 INJECTION, SOLUTION INTRAVENOUS ONCE
Status: COMPLETED | OUTPATIENT
Start: 2024-07-19 | End: 2024-07-19

## 2024-07-19 RX ADMIN — ONDANSETRON 4 MG: 2 INJECTION INTRAMUSCULAR; INTRAVENOUS at 16:08

## 2024-07-19 RX ADMIN — NYSTATIN: 100000 POWDER TOPICAL at 21:07

## 2024-07-19 RX ADMIN — QUETIAPINE FUMARATE 50 MG: 25 TABLET ORAL at 21:06

## 2024-07-19 RX ADMIN — PANTOPRAZOLE SODIUM 40 MG: 40 TABLET, DELAYED RELEASE ORAL at 21:06

## 2024-07-19 RX ADMIN — INSULIN LISPRO 5 UNITS: 100 INJECTION, SOLUTION INTRAVENOUS; SUBCUTANEOUS at 18:00

## 2024-07-19 RX ADMIN — APIXABAN 5 MG: 5 TABLET, FILM COATED ORAL at 21:07

## 2024-07-19 RX ADMIN — ATORVASTATIN CALCIUM 80 MG: 40 TABLET, FILM COATED ORAL at 21:07

## 2024-07-19 RX ADMIN — BUSPIRONE HYDROCHLORIDE 7.5 MG: 15 TABLET ORAL at 22:12

## 2024-07-19 RX ADMIN — TERAZOSIN HYDROCHLORIDE ANHYDROUS 2 MG: 2 CAPSULE ORAL at 21:07

## 2024-07-19 RX ADMIN — LORAZEPAM 1 MG: 1 TABLET ORAL at 22:12

## 2024-07-19 RX ADMIN — INSULIN LISPRO 5 UNITS: 100 INJECTION, SOLUTION INTRAVENOUS; SUBCUTANEOUS at 21:08

## 2024-07-19 RX ADMIN — ONDANSETRON 4 MG: 2 INJECTION INTRAMUSCULAR; INTRAVENOUS at 22:12

## 2024-07-19 RX ADMIN — DONEPEZIL HYDROCHLORIDE 10 MG: 10 TABLET, FILM COATED ORAL at 21:08

## 2024-07-19 RX ADMIN — INSULIN GLARGINE 30 UNITS: 100 INJECTION, SOLUTION SUBCUTANEOUS at 21:08

## 2024-07-19 RX ADMIN — Medication 10 MG: at 00:53

## 2024-07-19 RX ADMIN — LEVOTHYROXINE SODIUM 25 MCG: 25 TABLET ORAL at 05:32

## 2024-07-19 RX ADMIN — Medication 10 ML: at 21:07

## 2024-07-19 NOTE — PLAN OF CARE
Problem: Adult Inpatient Plan of Care  Goal: Plan of Care Review  Outcome: Ongoing, Progressing  Goal: Patient-Specific Goal (Individualized)  Outcome: Ongoing, Progressing  Goal: Absence of Hospital-Acquired Illness or Injury  Outcome: Ongoing, Progressing  Intervention: Identify and Manage Fall Risk  Recent Flowsheet Documentation  Taken 7/18/2024 2339 by Zoraida Mcintosh RN  Safety Promotion/Fall Prevention:   activity supervised   fall prevention program maintained   safety round/check completed  Taken 7/18/2024 2200 by Zoraida Mcintosh RN  Safety Promotion/Fall Prevention:   safety round/check completed   activity supervised   fall prevention program maintained  Taken 7/18/2024 2045 by Zoraida Mcintosh RN  Safety Promotion/Fall Prevention:   activity supervised   fall prevention program maintained   safety round/check completed  Intervention: Prevent Skin Injury  Recent Flowsheet Documentation  Taken 7/18/2024 2339 by Zoraida Mcintosh RN  Body Position: supine, legs elevated  Skin Protection: adhesive use limited  Taken 7/18/2024 2200 by Zoraida Mcintosh RN  Body Position:   right   turned   side-lying  Skin Protection: adhesive use limited  Taken 7/18/2024 2045 by Zoraida Mcintosh RN  Body Position: supine, legs elevated  Skin Protection: adhesive use limited  Intervention: Prevent and Manage VTE (Venous Thromboembolism) Risk  Recent Flowsheet Documentation  Taken 7/18/2024 2339 by Zoraida Mcintosh RN  Activity Management: activity minimized  Taken 7/18/2024 2200 by Zoraida Mcintosh RN  Activity Management: activity minimized  Taken 7/18/2024 2045 by Zoraida Mcintosh RN  Activity Management: activity minimized  VTE Prevention/Management:   sequential compression devices off   patient refused intervention  Range of Motion: ROM (range of motion) performed  Intervention: Prevent Infection  Recent Flowsheet Documentation  Taken 7/18/2024 2339 by Zoraida Mcintosh  RN  Infection Prevention: environmental surveillance performed  Taken 7/18/2024 2200 by Zoraida Mcintosh RN  Infection Prevention: environmental surveillance performed  Taken 7/18/2024 2045 by Zoraida Mcintosh RN  Infection Prevention: environmental surveillance performed  Goal: Optimal Comfort and Wellbeing  Outcome: Ongoing, Progressing  Intervention: Monitor Pain and Promote Comfort  Recent Flowsheet Documentation  Taken 7/18/2024 2200 by Zoraida Mcintosh RN  Pain Management Interventions:   care clustered   position adjusted   quiet environment facilitated  Taken 7/18/2024 2045 by Zoraida Mcintosh RN  Pain Management Interventions:   care clustered   quiet environment facilitated  Intervention: Provide Person-Centered Care  Recent Flowsheet Documentation  Taken 7/18/2024 2200 by Zoraida Mcintosh RN  Trust Relationship/Rapport:   care explained   choices provided  Taken 7/18/2024 2045 by Zoraida Mcintosh RN  Trust Relationship/Rapport:   care explained   choices provided   questions answered   questions encouraged   reassurance provided  Goal: Readiness for Transition of Care  Outcome: Ongoing, Progressing     Problem: Skin Injury Risk Increased  Goal: Skin Health and Integrity  Outcome: Ongoing, Progressing  Intervention: Optimize Skin Protection  Recent Flowsheet Documentation  Taken 7/18/2024 2339 by Zoraida Mcintosh RN  Pressure Reduction Techniques: frequent weight shift encouraged  Head of Bed (HOB) Positioning: HOB elevated  Pressure Reduction Devices: pressure-redistributing mattress utilized  Skin Protection: adhesive use limited  Taken 7/18/2024 2200 by Zoraida Mcintosh RN  Pressure Reduction Techniques: frequent weight shift encouraged  Head of Bed (HOB) Positioning: HOB elevated  Pressure Reduction Devices: pressure-redistributing mattress utilized  Skin Protection: adhesive use limited  Taken 7/18/2024 2045 by Zoraida Mcintosh RN  Pressure Reduction  Techniques: weight shift assistance provided  Head of Bed (HOB) Positioning: HOB elevated  Pressure Reduction Devices: pressure-redistributing mattress utilized  Skin Protection: adhesive use limited     Problem: Diabetes Comorbidity  Goal: Blood Glucose Level Within Targeted Range  Outcome: Ongoing, Progressing     Problem: Hypertension Comorbidity  Goal: Blood Pressure in Desired Range  Outcome: Ongoing, Progressing  Intervention: Maintain Blood Pressure Management  Recent Flowsheet Documentation  Taken 7/18/2024 2339 by Zoraida Mcintosh RN  Medication Review/Management: medications reviewed     Problem: Pain Chronic (Persistent) (Comorbidity Management)  Goal: Acceptable Pain Control and Functional Ability  Outcome: Ongoing, Progressing  Intervention: Manage Persistent Pain  Recent Flowsheet Documentation  Taken 7/18/2024 2339 by Zoraida Mcintosh RN  Medication Review/Management: medications reviewed  Intervention: Develop Pain Management Plan  Recent Flowsheet Documentation  Taken 7/18/2024 2200 by Zoraida Mcintosh RN  Pain Management Interventions:   care clustered   position adjusted   quiet environment facilitated  Taken 7/18/2024 2045 by Zoraida Mcintosh RN  Pain Management Interventions:   care clustered   quiet environment facilitated  Intervention: Optimize Psychosocial Wellbeing  Recent Flowsheet Documentation  Taken 7/18/2024 2045 by Zoraida Mcintosh RN  Supportive Measures: active listening utilized  Diversional Activities: television  Family/Support System Care:   self-care encouraged   support provided     Problem: Fall Injury Risk  Goal: Absence of Fall and Fall-Related Injury  Outcome: Ongoing, Progressing  Intervention: Identify and Manage Contributors  Recent Flowsheet Documentation  Taken 7/18/2024 2339 by Zoraida Mcintosh RN  Medication Review/Management: medications reviewed  Intervention: Promote Injury-Free Environment  Recent Flowsheet Documentation  Taken  7/18/2024 2339 by Zoraida Mcintosh, RN  Safety Promotion/Fall Prevention:   activity supervised   fall prevention program maintained   safety round/check completed  Taken 7/18/2024 2200 by Zoraida Mcintosh, RN  Safety Promotion/Fall Prevention:   safety round/check completed   activity supervised   fall prevention program maintained  Taken 7/18/2024 2045 by Zoraida Mcintosh, RN  Safety Promotion/Fall Prevention:   activity supervised   fall prevention program maintained   safety round/check completed   Goal Outcome Evaluation:

## 2024-07-19 NOTE — PROGRESS NOTES
[facility]  PROGRESS NOTE    Patient Name: Cheri Cruz  : 1940  MRN: [id number]    Date of Admission: 2024  Primary Care Physician: Lorrie Canada APRN    Subjective   Subjective     CC:  AMS    HPI:  Patient continues to feel nauseous, no abdominal pain today. Had 2 moderate bowel movements yesterday evening. 1 very small bowel movement this morning.      Objective   Objective     Vital Signs:   Temp:  [97.7 °F (36.5 °C)-98.1 °F (36.7 °C)] 97.7 °F (36.5 °C)  Heart Rate:  [57-80] 57  Resp:  [16-18] 18  BP: (133-202)/(53-88) 177/80  Flow (L/min):  [2] 2     Physical Exam:  Constitutional: Awake, alert, resting comfortably  HENT: Very hard of hearing   Respiratory: Clear to auscultation bilaterally, respiratory effort normal, requiring low-flow oxygen  Cardiovascular: Bradycardic, no murmurs, rubs, or gallops  Gastrointestinal: soft, nontender, nondistended  Musculoskeletal: No bilateral ankle edema  Psychiatric: Appropriate affect, cooperative  Neurologic: Alert, oriented x 1, RLE weaker compared to LLE, speech clear, bilateral upper extremity tremors present  Skin: No rashes      Results Reviewed:  LAB RESULTS:      Lab 24  0407 07/15/24  0824 243 24  1519   WBC 7.06 8.30  --   --  7.87   HEMOGLOBIN 10.6* 10.3*  --   --  11.0*   HEMATOCRIT 34.9 33.5*  --   --  35.2   PLATELETS 90* 124*  --   --  139*   NEUTROS ABS  --  5.29  --   --  5.72   IMMATURE GRANS (ABS)  --  0.02  --   --  0.02   LYMPHS ABS  --  2.28  --   --  1.52   MONOS ABS  --  0.44  --   --  0.44   EOS ABS  --  0.25  --   --  0.14   .1* 104.0*  --   --  103.2*   CRP  --  <0.30  --   --   --    LACTATE  --   --   --  1.4  --    D DIMER QUANT  --   --  0.31  --   --          Lab 24  0550 24  1045 24  0742 24  0407 07/15/24  2344 07/15/24  0824 24  1519   SODIUM 137 138 137 137  --  140 137   POTASSIUM 4.7 5.1 5.8* 5.2   --  4.6 5.6*   CHLORIDE 98 100 103 104  --  104 101   CO2 27.0 30.0* 25.0 25.0  --  23.0 27.8   ANION GAP 12.0 8.0 9.0 8.0  --  13.0 8.2   BUN 19 18 22 25*  --  26* 35*   CREATININE 1.19* 1.36* 1.48* 1.44*  --  1.53* 2.06*   EGFR 45.7* 39.0* 35.2* 36.4*  --  33.8* 23.7*   GLUCOSE 202* 229* 257* 294*  --  306* 436*   CALCIUM 9.3 9.2 8.7 8.3*  --  8.2* 9.2   MAGNESIUM  --   --   --   --  2.8* 1.5* 1.6   PHOSPHORUS  --   --   --   --   --  4.2  --    HEMOGLOBIN A1C  --   --   --   --   --  9.80*  --    TSH  --   --   --   --   --   --  2.380         Lab 07/18/24  1045 07/15/24  0824 07/14/24  1519   TOTAL PROTEIN 6.5 6.1 7.0   ALBUMIN 3.8 3.6 3.9   GLOBULIN  --  2.5 3.1   ALT (SGPT) 5 5 5   AST (SGOT) 16 12 13   BILIRUBIN 0.2 <0.2 0.2   BILIRUBIN DIRECT <0.2  --   --    ALK PHOS 66 58 68         Lab 07/14/24  2243 07/14/24 2043 07/14/24  1519   HSTROP T 37* 36* 37*         Lab 07/15/24  0824   CHOLESTEROL 106   LDL CHOL 43   HDL CHOL 40   TRIGLYCERIDES 132         Lab 07/14/24  2043   FOLATE 15.20   VITAMIN B 12 550         Lab 07/18/24  1510   PH, ARTERIAL 7.380   PCO2, ARTERIAL 53.1*   PO2 ART 82.4*   FIO2 24   HCO3 ART 31.4*   BASE EXCESS ART 5.2*   CARBOXYHEMOGLOBIN 1.1     Brief Urine Lab Results  (Last result in the past 365 days)        Color   Clarity   Blood   Leuk Est   Nitrite   Protein   CREAT   Urine HCG        07/14/24 2001 Yellow   Cloudy   Trace   Small (1+)   Negative   Negative                   Microbiology Results Abnormal       Procedure Component Value - Date/Time    Blood Culture - Blood, Wrist, Left [[id number]]  (Normal) Collected: 07/14/24 2022    Lab Status: Preliminary result Specimen: Blood from Wrist, Left Updated: 07/18/24 2100     Blood Culture No growth at 4 days    Narrative:      Less than seven (7) mL's of blood was collected.  Insufficient quantity may yield false negative results.    Blood Culture - Blood, Hand, Left [[id number]]  (Normal) Collected: 07/14/24 2022    Lab Status:  Preliminary result Specimen: Blood from Hand, Left Updated: 07/18/24 2100     Blood Culture No growth at 4 days    Narrative:      Less than seven (7) mL's of blood was collected.  Insufficient quantity may yield false negative results.    Urine Culture - Urine, Urine, Random Void [[id number]] Collected: 07/14/24 2001    Lab Status: Final result Specimen: Urine, Random Void Updated: 07/16/24 1020     Urine Culture 50,000 CFU/mL Normal Urogenital Nickie    Narrative:      Colonization of the urinary tract without infection is common. Treatment is discouraged unless the patient is symptomatic, pregnant, or undergoing an invasive urologic procedure.            XR Abdomen KUB    Result Date: 7/18/2024  XR ABDOMEN KUB Date of Exam: 7/18/2024 12:27 PM EDT Indication: Persistent nausea Comparison: None available. FINDINGS: A nonspecific bowel gas pattern is observed. A moderate stool burden is seen throughout the colon. No definitive pathologic calcifications are seen. No acute osseous abnormalities are noted.     Impression: 1.A nonspecific bowel gas pattern is observed. A moderate stool burden is seen throughout the colon. Electronically Signed: Elier Rangel MD  7/18/2024 3:54 PM EDT  Workstation ID: [id number]     Results for orders placed during the hospital encounter of 02/10/23    Adult Transthoracic Echo Complete W/ Cont if Necessary Per Protocol    Interpretation Summary    Left ventricular systolic function is hyperdynamic (EF > 70%). Calculated left ventricular EF = 70.6% Left ventricular ejection fraction appears to be greater than 70%. The left ventricular cavity is small in size. Left ventricular wall thickness is consistent with mild concentric hypertrophy. All left ventricular wall segments contract normally. Left ventricular intracavitary gradient noted to be 71 mmHg. Left ventricular diastolic function is consistent with (grade I) impaired relaxation. Normal left atrial pressure.    The aortic valve is  abnormal in structure. There is mild calcification of the aortic valve mainly affecting the right coronary cusp(s). The aortic valve appears trileaflet. No aortic valve regurgitation is present. Gradient noted through the LV and LVOT    Compared to TTE report from  Dec 2019, hyperdynamic LV with intracavitary gradient is not a new finding but peak gradient noted on this exam is higher than previously described.      Current medications:  Scheduled Meds:apixaban, 5 mg, Oral, BID  atorvastatin, 80 mg, Oral, Nightly  busPIRone, 7.5 mg, Oral, BID  donepezil, 10 mg, Oral, Nightly  gabapentin, , ,   [START ON 7/20/2024] gabapentin, 300 mg, Oral, Daily  insulin glargine, 30 Units, Subcutaneous, Nightly  insulin lispro, 3-14 Units, Subcutaneous, 4x Daily AC & at Bedtime  Insulin Lispro, 5 Units, Subcutaneous, TID With Meals  levothyroxine, 25 mcg, Oral, Daily  pantoprazole, 40 mg, Oral, BID AC  senna-docusate sodium, 2 tablet, Oral, Daily   And  polyethylene glycol, 17 g, Oral, Daily  QUEtiapine, 50 mg, Oral, Nightly  sodium chloride, 10 mL, Intravenous, Q12H  sodium chloride, 10 mL, Intravenous, Q12H  terazosin, 2 mg, Oral, Nightly      Continuous Infusions:     PRN Meds:.  acetaminophen **OR** acetaminophen **OR** acetaminophen    senna-docusate sodium **AND** polyethylene glycol **AND** bisacodyl **AND** bisacodyl    Calcium Replacement - Follow Nurse / BPA Driven Protocol    dextrose    dextrose    gabapentin    glucagon (human recombinant)    HYDROcodone-acetaminophen    LORazepam    Magnesium Standard Dose Replacement - Follow Nurse / BPA Driven Protocol    [Held by provider] meclizine    ondansetron ODT **OR** ondansetron    Phosphorus Replacement - Follow Nurse / BPA Driven Protocol    Potassium Replacement - Follow Nurse / BPA Driven Protocol    sodium chloride    sodium chloride    sodium chloride    sodium chloride    Assessment & Plan   Assessment & Plan     Active Hospital Problems    Diagnosis  POA    **AMS  (altered mental status) [R41.82]  Yes    Stroke [I63.9]  Yes      Resolved Hospital Problems   No resolved problems to display.        Brief Hospital Course to date:  Cheri Cruz is a 83 y.o. female with past medical history of dementia, type 2 diabetes mellitus, CKD stage III, essential hypertension, dyslipidemia, old left parietal stroke, PE on anticoagulation with Eliquis, who presented to the hospital as a transfer from [facility] for altered mental status and concern for hemorrhagic stroke.    This patient's problems and plans were partially entered by my partner and updated as appropriate by me 07/19/24.     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 She was hypotensive with systolic in the 90s at [facility].   -CTH from [facility] with left parietal tiny hemorrhage vs calcification on top of old ischemic stroke.   -MRI showed evolving old left parietal lobe CVA with some areas of associated cortical laminar necrosis. No hemorrhage.    -Neurology evaluated, recommended to resume Eliquis. Continue statin. Annual follow up in stroke clinic.   -PT/OT recommending home with 24/7 care.    Altered mental status on advanced dementia  Concern for UTI  -Possibly dehydration vs hypoxia? Was also hypotensive on presentation to [facility], noncompliant with supplemental oxygen at home due to dementia  -COVID/flu negative. Blood cultures with no growth to date. LFTs normal.  -CXR with old calcified granulomatous disease. Few, stable linear densities noted bilaterally.  -Initial urine culture with yeast, repeat urine culture with normal urogenital kallie  -Treated with empiric IV ceftriaxone x 3 days. Held sedatives initially. Okay to resume PRN lorazepam. Continue donepezil, Seroquel, BuSpar.     Acute on chronic hypoxemic respiratory failure  Near-syncope  -Per report, patient became hypoxic with oxygen saturation in the 60s  -CTA chest showed no PE,  nonacute  -Patient is O2 dependent on 2L NC but is not compliant due her baseline dementia.  -Continue supplemental oxygenation, titrate for goal SpO2 greater than 90%.     RADHA on CKD stage III  Volume depletion due to dehydration   Mild hyperkalemia  -Baseline Cr about 1.3, Cr 2.06 on presentation  -Improved with IV fluids. S/p Lokelma x 1 on 7/17.  -Monitor BMP daily. Encourage good oral intake.     Complex disposition  -PT/OT evaluated, felt patient's functional status is at baseline. Recommended home with 24/7 care.  -Again discussed care with both patient's daughters and case management on 7/17. Family leaning towards pursuing long-term care after discharge as they are having difficulty taking care of her on their own and do not have finances for full-time caregiver.    Nausea  -suspect secondary to constipation  -KUB 7/14 with nonobstructive bowel gas pattern. Repeat KUB 7/18 with moderate stool burden.  -Continue bowel regimen. Increased PPI to BID. PRN Zofran for nausea.      Uncontrolled diabetes mellitus type 2 with hyperglycemia  -A1c 9.80% this admission  -Increase Lantus to 30 units qhs with lispro 5u TIDAC and moderate-high dose SSI. Monitor glucose and adjust insulin as needed.  -Resumed home gabapentin at low-dose 300 mg daily.    Chronic anemia  Macrocytosis  -No evidence of overt GI bleeding this admission  -B12 and folate within normal limits  -Further workup of anemia as outpatient as appropriate.      Essential hypertension  -Was hypotensive at [facility].  -Not on antihypertensive medications.  -Continue to monitor.      Hyperlipidemia  -Continue atorvastatin.    Hypothyroidism  -Continue levothyroxine.    GERD  -Continue PPI.    Expected Discharge Location and Transportation: Rehab vs LTC  Expected Discharge   Expected Discharge Date: 7/22/2024; Expected Discharge Time:      VTE Prophylaxis:  Pharmacologic & mechanical VTE prophylaxis orders are present.         AM-PAC 6 Clicks  Score (PT): 12 (07/18/24 0828)    CODE STATUS:   Code Status and Medical Interventions:   Ordered at: 07/15/24 1319     Level Of Support Discussed With:    Patient     Code Status (Patient has no pulse and is not breathing):    CPR (Attempt to Resuscitate)     Medical Interventions (Patient has pulse or is breathing):    Full Support       Jeni Lopez, DO  07/19/24

## 2024-07-20 LAB
GLUCOSE BLDC GLUCOMTR-MCNC: 146 MG/DL (ref 70–130)
GLUCOSE BLDC GLUCOMTR-MCNC: 154 MG/DL (ref 70–130)
GLUCOSE BLDC GLUCOMTR-MCNC: 233 MG/DL (ref 70–130)
GLUCOSE BLDC GLUCOMTR-MCNC: 234 MG/DL (ref 70–130)
GLUCOSE BLDC GLUCOMTR-MCNC: 244 MG/DL (ref 70–130)

## 2024-07-20 PROCEDURE — 63710000001 INSULIN GLARGINE PER 5 UNITS: Performed by: STUDENT IN AN ORGANIZED HEALTH CARE EDUCATION/TRAINING PROGRAM

## 2024-07-20 PROCEDURE — 63710000001 TERAZOSIN 2 MG CAPSULE: Performed by: INTERNAL MEDICINE

## 2024-07-20 PROCEDURE — 63710000001 GABAPENTIN 300 MG CAPSULE: Performed by: STUDENT IN AN ORGANIZED HEALTH CARE EDUCATION/TRAINING PROGRAM

## 2024-07-20 PROCEDURE — A9270 NON-COVERED ITEM OR SERVICE: HCPCS | Performed by: STUDENT IN AN ORGANIZED HEALTH CARE EDUCATION/TRAINING PROGRAM

## 2024-07-20 PROCEDURE — G0378 HOSPITAL OBSERVATION PER HR: HCPCS

## 2024-07-20 PROCEDURE — 92507 TX SP LANG VOICE COMM INDIV: CPT

## 2024-07-20 PROCEDURE — 82948 REAGENT STRIP/BLOOD GLUCOSE: CPT

## 2024-07-20 PROCEDURE — 63710000001 HYDROCODONE-ACETAMINOPHEN 10-325 MG TABLET: Performed by: INTERNAL MEDICINE

## 2024-07-20 PROCEDURE — A9270 NON-COVERED ITEM OR SERVICE: HCPCS | Performed by: INTERNAL MEDICINE

## 2024-07-20 PROCEDURE — 63710000001 LORAZEPAM 1 MG TABLET: Performed by: STUDENT IN AN ORGANIZED HEALTH CARE EDUCATION/TRAINING PROGRAM

## 2024-07-20 PROCEDURE — 63710000001 DONEPEZIL 10 MG TABLET: Performed by: INTERNAL MEDICINE

## 2024-07-20 PROCEDURE — A9270 NON-COVERED ITEM OR SERVICE: HCPCS

## 2024-07-20 PROCEDURE — 63710000001 QUETIAPINE 25 MG TABLET: Performed by: INTERNAL MEDICINE

## 2024-07-20 PROCEDURE — 97535 SELF CARE MNGMENT TRAINING: CPT

## 2024-07-20 PROCEDURE — 63710000001 PANTOPRAZOLE 40 MG TABLET DELAYED-RELEASE: Performed by: STUDENT IN AN ORGANIZED HEALTH CARE EDUCATION/TRAINING PROGRAM

## 2024-07-20 PROCEDURE — 99233 SBSQ HOSP IP/OBS HIGH 50: CPT | Performed by: INTERNAL MEDICINE

## 2024-07-20 PROCEDURE — 63710000001 APIXABAN 5 MG TABLET: Performed by: STUDENT IN AN ORGANIZED HEALTH CARE EDUCATION/TRAINING PROGRAM

## 2024-07-20 PROCEDURE — 63710000001 SENNOSIDES-DOCUSATE 8.6-50 MG TABLET: Performed by: STUDENT IN AN ORGANIZED HEALTH CARE EDUCATION/TRAINING PROGRAM

## 2024-07-20 PROCEDURE — 63710000001 LEVOTHYROXINE 25 MCG TABLET: Performed by: INTERNAL MEDICINE

## 2024-07-20 PROCEDURE — 97164 PT RE-EVAL EST PLAN CARE: CPT

## 2024-07-20 PROCEDURE — 97530 THERAPEUTIC ACTIVITIES: CPT

## 2024-07-20 PROCEDURE — 97110 THERAPEUTIC EXERCISES: CPT

## 2024-07-20 PROCEDURE — 63710000001 ONDANSETRON ODT 4 MG TABLET DISPERSIBLE: Performed by: INTERNAL MEDICINE

## 2024-07-20 PROCEDURE — 63710000001 INSULIN LISPRO (HUMAN) PER 5 UNITS: Performed by: STUDENT IN AN ORGANIZED HEALTH CARE EDUCATION/TRAINING PROGRAM

## 2024-07-20 PROCEDURE — 63710000001 POLYETHYLENE GLYCOL 17 G PACK: Performed by: STUDENT IN AN ORGANIZED HEALTH CARE EDUCATION/TRAINING PROGRAM

## 2024-07-20 PROCEDURE — 63710000001 ATORVASTATIN 40 MG TABLET

## 2024-07-20 PROCEDURE — 63710000001 BUSPIRONE 15 MG TABLET: Performed by: INTERNAL MEDICINE

## 2024-07-20 RX ADMIN — INSULIN LISPRO 5 UNITS: 100 INJECTION, SOLUTION INTRAVENOUS; SUBCUTANEOUS at 17:36

## 2024-07-20 RX ADMIN — DONEPEZIL HYDROCHLORIDE 10 MG: 10 TABLET, FILM COATED ORAL at 20:02

## 2024-07-20 RX ADMIN — INSULIN LISPRO 5 UNITS: 100 INJECTION, SOLUTION INTRAVENOUS; SUBCUTANEOUS at 17:37

## 2024-07-20 RX ADMIN — TERAZOSIN HYDROCHLORIDE ANHYDROUS 2 MG: 2 CAPSULE ORAL at 20:02

## 2024-07-20 RX ADMIN — ONDANSETRON 4 MG: 4 TABLET, ORALLY DISINTEGRATING ORAL at 20:01

## 2024-07-20 RX ADMIN — INSULIN LISPRO 5 UNITS: 100 INJECTION, SOLUTION INTRAVENOUS; SUBCUTANEOUS at 09:09

## 2024-07-20 RX ADMIN — APIXABAN 5 MG: 5 TABLET, FILM COATED ORAL at 09:07

## 2024-07-20 RX ADMIN — INSULIN LISPRO 5 UNITS: 100 INJECTION, SOLUTION INTRAVENOUS; SUBCUTANEOUS at 20:02

## 2024-07-20 RX ADMIN — INSULIN LISPRO 5 UNITS: 100 INJECTION, SOLUTION INTRAVENOUS; SUBCUTANEOUS at 12:14

## 2024-07-20 RX ADMIN — Medication 10 ML: at 09:14

## 2024-07-20 RX ADMIN — NYSTATIN: 100000 POWDER TOPICAL at 09:11

## 2024-07-20 RX ADMIN — INSULIN GLARGINE 30 UNITS: 100 INJECTION, SOLUTION SUBCUTANEOUS at 20:02

## 2024-07-20 RX ADMIN — PANTOPRAZOLE SODIUM 40 MG: 40 TABLET, DELAYED RELEASE ORAL at 17:36

## 2024-07-20 RX ADMIN — APIXABAN 5 MG: 5 TABLET, FILM COATED ORAL at 20:03

## 2024-07-20 RX ADMIN — SENNOSIDES AND DOCUSATE SODIUM 2 TABLET: 50; 8.6 TABLET ORAL at 09:07

## 2024-07-20 RX ADMIN — LEVOTHYROXINE SODIUM 25 MCG: 25 TABLET ORAL at 05:18

## 2024-07-20 RX ADMIN — GABAPENTIN 300 MG: 300 CAPSULE ORAL at 09:08

## 2024-07-20 RX ADMIN — QUETIAPINE FUMARATE 50 MG: 25 TABLET ORAL at 20:03

## 2024-07-20 RX ADMIN — LORAZEPAM 1 MG: 1 TABLET ORAL at 13:24

## 2024-07-20 RX ADMIN — NYSTATIN: 100000 POWDER TOPICAL at 12:15

## 2024-07-20 RX ADMIN — ATORVASTATIN CALCIUM 80 MG: 40 TABLET, FILM COATED ORAL at 20:02

## 2024-07-20 RX ADMIN — NYSTATIN: 100000 POWDER TOPICAL at 17:37

## 2024-07-20 RX ADMIN — ONDANSETRON 4 MG: 4 TABLET, ORALLY DISINTEGRATING ORAL at 12:13

## 2024-07-20 RX ADMIN — PANTOPRAZOLE SODIUM 40 MG: 40 TABLET, DELAYED RELEASE ORAL at 09:11

## 2024-07-20 RX ADMIN — INSULIN LISPRO 5 UNITS: 100 INJECTION, SOLUTION INTRAVENOUS; SUBCUTANEOUS at 12:13

## 2024-07-20 RX ADMIN — HYDROCODONE BITARTRATE AND ACETAMINOPHEN 1 TABLET: 10; 325 TABLET ORAL at 20:03

## 2024-07-20 RX ADMIN — NYSTATIN: 100000 POWDER TOPICAL at 20:09

## 2024-07-20 RX ADMIN — Medication 10 ML: at 20:04

## 2024-07-20 RX ADMIN — POLYETHYLENE GLYCOL 3350 17 G: 17 POWDER, FOR SOLUTION ORAL at 09:06

## 2024-07-20 RX ADMIN — BUSPIRONE HYDROCHLORIDE 7.5 MG: 15 TABLET ORAL at 09:09

## 2024-07-20 RX ADMIN — HYDROCODONE BITARTRATE AND ACETAMINOPHEN 1 TABLET: 10; 325 TABLET ORAL at 12:13

## 2024-07-20 NOTE — THERAPY TREATMENT NOTE
Patient Name: Cheri Cruz  : 1940    MRN: [id number]                              Today's Date: 2024       Admit Date: 2024    Visit Dx:     ICD-10-CM ICD-9-CM   1. Cognitive communication deficit  R41.841 799.52   2. Disorientation  R41.0 780.99     Patient Active Problem List   Diagnosis    Hyperlipidemia LDL goal <70    Type 2 diabetes mellitus without complication, without long-term current use of insulin    GERD (gastroesophageal reflux disease)    Essential hypertension    Abnormal EKG    Osteoarthritis    Anxiety    Palpitations    Vertigo    History of ischemic left MCA stroke    Hemorrhagic stroke    History of pulmonary embolus (PE)    Confusion    Hypotension    Constipation    UTI (urinary tract infection) due to urinary indwelling catheter    Metabolic encephalopathy    Symptomatic anemia    Acquired hypothyroidism    AMS (altered mental status)    Stroke     Past Medical History:   Diagnosis Date    Abnormal heart rhythm     Anxiety     Arrhythmia     Arthritis     Atrial fibrillation     Dementia     Diabetes mellitus     Hyperlipidemia     Hypertension     Kidney disorder     Stroke     Uterus cancer      Past Surgical History:   Procedure Laterality Date    CATARACT EXTRACTION, BILATERAL        General Information       Row Name 24 1210          OT Time and Intention    Document Type therapy note (daily note)  -JY     Mode of Treatment occupational therapy  -JY       Row Name 24 1210          General Information    Patient Profile Reviewed yes  -JY     Existing Precautions/Restrictions fall;other (see comments)  BUE tremors R>L, increased muscle tone R side, non- ambulatory at baseline, reports diplopia, dementia at baseline per chart, [place]  -PRAKASH     Barriers to Rehab medically complex;previous functional deficit;cognitive status;hearing deficit;visual deficit  -JY       Row Name 24 1210          Cognition    Orientation Status (Cognition) other (see  "comments);verbal cues/prompts needed for orientation  able to state name and most of  (could not recall year of birth despite options given), stated \"hospital\" in [place] however u/a to state name even w/ options, did not state correct month/year; stated, \"I'm sick\" toward situation  -PRAKASH       Row Name 24 1210          Safety Issues, Functional Mobility    Safety Issues Affecting Function (Mobility) insight into deficits/self-awareness;safety precaution awareness;safety precautions follow-through/compliance;sequencing abilities;problem-solving  -J     Impairments Affecting Function (Mobility) balance;cognition;endurance/activity tolerance;pain;postural/trunk control;range of motion (ROM);strength;muscle tone abnormal  -JJEFF     Cognitive Impairments, Mobility Safety/Performance insight into deficits/self-awareness;safety precaution awareness;safety precaution follow-through;sequencing abilities;problem-solving/reasoning  -J     Comment, Safety Issues/Impairments (Mobility) non ambulatory at baseline, safety issues and impairments noted relevant to bed mobility and fxl t/fs; pt with increased tone at R side  -J               User Key  (r) = Recorded By, (t) = Taken By, (c) = Cosigned By      Initials Name Provider Type    Mirela Park OT Occupational Therapist                     Mobility/ADL's       Row Name 24 1216          Bed Mobility    Bed Mobility rolling right;rolling left;scooting/bridging  -JY     Rolling Left [place] (Bed Mobility) maximum assist (25% patient effort);2 person assist;verbal cues;nonverbal cues (demo/gesture)  -JY     Rolling Right [place] (Bed Mobility) maximum assist (25% patient effort);2 person assist;verbal cues;nonverbal cues (demo/gesture)  -JY     Scooting/Bridging [place] (Bed Mobility) maximum assist (25% patient effort);2 person assist;verbal cues;other (see comments)  max A x 2 for scooting up in bed for improved positioning; pt able to " bridge hips ~ 50% for assistance in linen mgmt and self care  -JY     Comment, (Bed Mobility) pt completed bed mobility for purposes of sling placement in preparation for t/f to recliner w/ lift and self care needs/mgmt  -JY       Row Name 07/20/24 1216          Transfers    Transfers bed-chair transfer  -J     Comment, (Transfers) for safety utilized lift to t/f pt between bed and recliner  -JY       Row Name 07/20/24 1216          Bed-Chair Transfer    Bed-Chair [place] (Transfers) dependent (less than 25% patient effort);2 person assist;verbal cues  -JY     Assistive Device (Bed-Chair Transfers) lift device  -JY       Row Name 07/20/24 1216          Sit-Stand Transfer    Sit-Stand [place] (Transfers) not tested  -JY       Row Name 07/20/24 1216          Functional Mobility    Functional Mobility- Comment pt non ambulatory at baseline  -     Patient was able to Ambulate no, other medical factors prevent ambulation  -     Reason Patient was unable to Ambulate Non-Ambulatory at Baseline  -JY       Row Name 07/20/24 1216          Activities of Daily Living    BADL Assessment/Intervention upper body dressing;lower body dressing;grooming;toileting  -JY       Row Name 07/20/24 1216          Upper Body Dressing Assessment/Training    [place] Level (Upper Body Dressing) don;doff;pajama/robe;moderate assist (50% patient effort);verbal cues  -JY     Position (Upper Body Dressing) supported sitting  -JY       Row Name 07/20/24 1216          Lower Body Dressing Assessment/Training    [place] Level (Lower Body Dressing) doff;don;socks;dependent (less than 25% patient effort);other (see comments)  mgmt of socks for improved fit  -JY     Position (Lower Body Dressing) supine  -JY       Row Name 07/20/24 1216          Grooming Assessment/Training    [place] Level (Grooming) wash face, hands;supervision;set up;verbal cues  -JY     Position (Grooming) supported sitting  -JY     Comment, (Grooming) pt  req'd increased time to respond to direction and grasp wash cloth to wash face; providing tactile cue toward pt with washcloth was effective in pt grasping and w/ v/cs pt able to wash face and hands w/o physical A; pt maintained eyes closed unless cued otherwise impacting return demo  -       Row Name 07/20/24 1216          Toileting Assessment/Training    [place] Level (Toileting) adjust/manage clothing;other (see comments);dependent (less than 25% patient effort)  mgmt of external catheter  -     Position (Toileting) supine;supported sitting  -               User Key  (r) = Recorded By, (t) = Taken By, (c) = Cosigned By      Initials Name Provider Type    Mirela Park OT Occupational Therapist                   Obj/Interventions       Row Name 07/20/24 1318          Shoulder (Therapeutic Exercise)    Shoulder (Therapeutic Exercise) AAROM (active assistive range of motion)  -     Shoulder AAROM (Therapeutic Exercise) left assist right;right assist left;bilateral;flexion;extension;scapular protraction;scapular retraction;supine;10 repetitions  -[facility] Name 07/20/24 1318          Elbow/Forearm (Therapeutic Exercise)    Elbow/Forearm (Therapeutic Exercise) AAROM (active assistive range of motion)  -     Elbow/Forearm AAROM (Therapeutic Exercise) left assist right;right assist left;bilateral;flexion;extension;supination;pronation;supine;10 repetitions  -[facility] Name 07/20/24 1318          Wrist (Therapeutic Exercise)    Wrist (Therapeutic Exercise) AAROM (active assistive range of motion)  -     Wrist AAROM (Therapeutic Exercise) left assist right;right assist left;bilateral;flexion;extension;10 repetitions  -[facility] Name 07/20/24 1318          Hand (Therapeutic Exercise)    Hand (Therapeutic Exercise) AROM (active range of motion)  -     Hand AROM/AAROM (Therapeutic Exercise) bilateral;AROM (active range of motion);finger flexion;finger extension;10 repetitions  -[facility]  Name 07/20/24 1318          Motor Skills    Motor Skills functional endurance  -JY     Functional Endurance impaired activity tolerance toward more dynamic demands; fatigued after rolling in bed and req'd rest breaks between exercises  -JY     Therapeutic Exercise shoulder;elbow/forearm;wrist;hand;other (see comments)  facilitated A/AROM at BUEs to promote joint mobility and prepare for greater integration of UEs in ADLs, related t/fs and bed mobility  -JY       Row Name 07/20/24 1318          Balance    Balance Assessment sitting static balance  -JY     Static Sitting Balance supervision  -JY     Position, Sitting Balance supported;sitting in chair  -JY     Balance Interventions sitting;static;occupation based/functional task  -JY     Comment, Balance no overt LOB during seated supported tasks, tend to turn head and lean slightly to L side  -JY               User Key  (r) = Recorded By, (t) = Taken By, (c) = Cosigned By      Initials Name Provider Type    Mirela Park, OT Occupational Therapist                   Goals/Plan    No documentation.                  Clinical Impression       Row Name 07/20/24 1322          Pain Assessment    Additional Documentation Pain Scale: Word Pre/Post-Treatment (Group)  -PRAKASH       Row Name 07/20/24 1322          Pain Scale: FACES Pre/Post-Treatment    Pain Location --  -JJEFF     Pain Location - --  -JY     Pre/Posttreatment Pain Comment --  -PRAKASH       Row Name 07/20/24 1322          Plan of Care Review    Plan of Care Reviewed With patient  -PRAKASH     Progress improving  -JY     Outcome Evaluation Pt participatory in OT interventions following initial increased motivational cues/support and further cues for opened eyes (pt continues to report double vision). Pt demonstrated need for max A x 2 for rolling L < > R in bed with attempt at flexing knees and reaching for bed rail with greater reach across midline with LUE given increased tone at RUE. Pt demonstrated improved ability to  assist in bridging hips to allow for sling, linen mgmt and self care needs related to toileting. Pt tolerated A/AROM at BUEs progressing from shoulders to hands for joint mobility in prep for greater integration in ADLs, bed mobility and reduce caregiver burden. Pt able to wash face after set up and both tactile and verbal cues and help minimally in managing gown during dressing. Recommend IPOT POC to continue at 3x/wk and recommend SNF at d/c when medically ready.  -PRAKASH       [facility] Name 07/20/24 1322          Therapy Assessment/Plan (OT)    Rehab Potential (OT) good, to achieve stated therapy goals  -JY     Criteria for Skilled Therapeutic Interventions Met (OT) yes;meets criteria;skilled treatment is necessary  -JY     Therapy Frequency (OT) 3 times/wk  -PRAKASH       [facility] Name 07/20/24 1322          Therapy Plan Review/Discharge Plan (OT)    Anticipated Discharge Disposition (OT) skilled nursing facility  -PRAKASH       [facility] Name 07/20/24 1322          Vital Signs    Pre Systolic BP Rehab 122  -JY     Pre Treatment Diastolic BP 67  -JY     Post Systolic BP Rehab 115  -JY     Post Treatment Diastolic BP 77  -JY     Pretreatment Heart Rate (beats/min) 72  -JY     Posttreatment Heart Rate (beats/min) 74  -JY     O2 Delivery Pre Treatment room air  -JY     O2 Delivery Intra Treatment room air  -JY     O2 Delivery Post Treatment room air  -JY     Pre Patient Position Supine  -JY     Intra Patient Position Side Lying  -JY     Post Patient Position Sitting  -JEFF       Row Name 07/20/24 1322          Positioning and Restraints    Pre-Treatment Position in bed  -JY     Post Treatment Position chair  -JY     In Chair notified nsg;reclined;call light within reach;encouraged to call for assist;exit alarm on;with family/caregiver;waffle cushion;on mechanical lift sling;legs elevated  -       Row Name 07/20/24 1322          Pain Scale: Word Pre/Post-Treatment    Pain: Word Scale, Pretreatment 8 - severe pain  -JY     Posttreatment Pain  "Rating 8 - severe pain  -JY     Pain Location generalized  -JY     Pain Location - ear;head;hip  -JY     Pre/Posttreatment Pain Comment pt reported persistent \"severe\" pain at pre and post OT interventions, appeared more in pain during initial movement in bed: RN aware and managing  -JJEFF               User Key  (r) = Recorded By, (t) = Taken By, (c) = Cosigned By      Initials Name Provider Type    Mirela Park OT Occupational Therapist                   Outcome Measures       Row Name 07/20/24 1421          How much help from another is currently needed...    Putting on and taking off regular lower body clothing? 1  -JY     Bathing (including washing, rinsing, and drying) 2  -JY     Toileting (which includes using toilet bed pan or urinal) 1  -JY     Putting on and taking off regular upper body clothing 2  -JY     Taking care of personal grooming (such as brushing teeth) 3  -JY     Eating meals 3  -JY     AM-PAC 6 Clicks Score (OT) 12  -JY       Row Name 07/20/24 1421          Functional Assessment    Outcome Measure Options AM-PAC 6 Clicks Daily Activity (OT)  -JJEFF               User Key  (r) = Recorded By, (t) = Taken By, (c) = Cosigned By      Initials Name Provider Type    Mirela Park OT Occupational Therapist                    Occupational Therapy Education       Title: PT OT SLP Therapies (In Progress)       Topic: Occupational Therapy (In Progress)       Point: ADL training (In Progress)       Description:   Instruct learner(s) on proper safety adaptation and remediation techniques during self care or transfers.   Instruct in proper use of assistive devices.                  Learning Progress Summary             Patient Acceptance, E,D, NR by PRAKASH at 7/20/2024 1020    Acceptance, E,D, VU,DU by  at 7/15/2024 0936   Family Acceptance, E,D, NR by PRAKASH at 7/20/2024 1020                         Point: Home exercise program (In Progress)       Description:   Instruct learner(s) on appropriate technique " for monitoring, assisting and/or progressing therapeutic exercises/activities.                  Learning Progress Summary             Patient Acceptance, E,D, NR by PRAKASH at 7/20/2024 1020    Acceptance, E,D, VU,DU by  at 7/15/2024 0936   Family Acceptance, E,D, NR by PRAKASH at 7/20/2024 1020                         Point: Precautions (In Progress)       Description:   Instruct learner(s) on prescribed precautions during self-care and functional transfers.                  Learning Progress Summary             Patient Acceptance, E,D, NR by PRAKASH at 7/20/2024 1020    Acceptance, E,D, VU,DU by  at 7/15/2024 0936   Family Acceptance, E,D, NR by PRAKASH at 7/20/2024 1020                         Point: Body mechanics (In Progress)       Description:   Instruct learner(s) on proper positioning and spine alignment during self-care, functional mobility activities and/or exercises.                  Learning Progress Summary             Patient Acceptance, E,D, NR by PRAKSAH at 7/20/2024 1020    Acceptance, E,D, VU,DU by  at 7/15/2024 0936   Family Acceptance, E,D, NR by PRAKASH at 7/20/2024 1020                                         User Key       Initials Effective Dates Name Provider Type Discipline     06/16/21 -  Mirela Winston OT Occupational Therapist OT     06/16/21 -  Karlo Rai OT Occupational Therapist OT                  OT Recommendation and Plan  Therapy Frequency (OT): 3 times/wk  Plan of Care Review  Plan of Care Reviewed With: patient  Progress: improving  Outcome Evaluation: Pt participatory in OT interventions following initial increased motivational cues/support and further cues for opened eyes (pt continues to report double vision). Pt demonstrated need for max A x 2 for rolling L < > R in bed with attempt at flexing knees and reaching for bed rail with greater reach across midline with LUE given increased tone at RUE. Pt demonstrated improved ability to assist in bridging hips to allow for sling, linen mgmt  and self care needs related to toileting. Pt tolerated A/AROM at BUEs progressing from shoulders to hands for joint mobility in prep for greater integration in ADLs, bed mobility and reduce caregiver burden. Pt able to wash face after set up and both tactile and verbal cues and help minimally in managing gown during dressing. Recommend IPOT POC to continue at 3x/wk and recommend SNF at d/c when medically ready.     Time Calculation:         Time Calculation- OT       Row Name 07/20/24 1423             Time Calculation- OT    OT Start Time 1020  -JY      OT Received On 07/20/24  -JY      OT Goal Re-Cert Due Date 07/25/24  -JY         Timed Charges    18976 - OT Therapeutic Activity Minutes 14  -JY      48059 - OT Self Care/Mgmt Minutes 10  -JY         Total Minutes    Timed Charges Total Minutes 24  -JY       Total Minutes 24  -JY                User Key  (r) = Recorded By, (t) = Taken By, (c) = Cosigned By      Initials Name Provider Type    Mirela Park OT Occupational Therapist                  Therapy Charges for Today       Code Description Service Date Service Provider Modifiers Qty    [id number]  OT THERAPEUTIC ACT EA 15 MIN 7/20/2024 Mirela Winston OT GO 1    [id number] HC OT SELF CARE/MGMT/TRAIN EA 15 MIN 7/20/2024 Mirela Winston OT GO 1                 Mirela Winston OT  7/20/2024

## 2024-07-20 NOTE — THERAPY RE-EVALUATION
Patient Name: Cheri Cruz  : 1940    MRN: [id number]                              Today's Date: 2024       Admit Date: 2024    Visit Dx:     ICD-10-CM ICD-9-CM   1. Cognitive communication deficit  R41.841 799.52   2. Disorientation  R41.0 780.99     Patient Active Problem List   Diagnosis    Hyperlipidemia LDL goal <70    Type 2 diabetes mellitus without complication, without long-term current use of insulin    GERD (gastroesophageal reflux disease)    Essential hypertension    Abnormal EKG    Osteoarthritis    Anxiety    Palpitations    Vertigo    History of ischemic left MCA stroke    Hemorrhagic stroke    History of pulmonary embolus (PE)    Confusion    Hypotension    Constipation    UTI (urinary tract infection) due to urinary indwelling catheter    Metabolic encephalopathy    Symptomatic anemia    Acquired hypothyroidism    AMS (altered mental status)    Stroke     Past Medical History:   Diagnosis Date    Abnormal heart rhythm     Anxiety     Arrhythmia     Arthritis     Atrial fibrillation     Dementia     Diabetes mellitus     Hyperlipidemia     Hypertension     Kidney disorder     Stroke     Uterus cancer      Past Surgical History:   Procedure Laterality Date    CATARACT EXTRACTION, BILATERAL        General Information       Row Name 24 1404          Physical Therapy Time and Intention    Document Type re-evaluation  -SS     Mode of Treatment physical therapy  -SS       Row Name 24 1404          General Information    Patient Profile Reviewed yes  -SS     Prior Level of Function mod assist:;bed mobility;ADL's  pt. family reports largely bedbound but does facilitate rolling and pericare in the bed at home  -SS     Existing Precautions/Restrictions fall;other (see comments)  BUE tremors R>L, R sided hypertonia, non- ambulatory at baseline, diplopia, [place]  -SS     Barriers to Rehab medically complex;previous functional deficit;cognitive status;visual deficit;hearing deficit   -SS       Row Name 07/20/24 1404          Living Environment    People in Home spouse  -SS       Row Name 07/20/24 1404          Home Main Entrance    Number of Stairs, Main Entrance none  -SS       Row Name 07/20/24 1404          Stairs Within Home, Primary    Number of Stairs, Within Home, Primary none  -SS       Row Name 07/20/24 1404          Cognition    Orientation Status (Cognition) oriented to;person;verbal cues/prompts needed for orientation;place;situation;time  -SS       Row Name 07/20/24 1404          Safety Issues, Functional Mobility    Safety Issues Affecting Function (Mobility) awareness of need for assistance;insight into deficits/self-awareness;judgment;problem-solving;safety precaution awareness;safety precautions follow-through/compliance;sequencing abilities  -SS     Impairments Affecting Function (Mobility) balance;cognition;endurance/activity tolerance;pain;postural/trunk control;range of motion (ROM);strength;muscle tone abnormal  -SS     Cognitive Impairments, Mobility Safety/Performance attention;awareness, need for assistance;insight into deficits/self-awareness;judgment;problem-solving/reasoning;safety precaution awareness;safety precaution follow-through;sequencing abilities  -SS               User Key  (r) = Recorded By, (t) = Taken By, (c) = Cosigned By      Initials Name Provider Type     Freda Leonard PT Physical Therapist                   Mobility       Row Name 07/20/24 1415          Bed Mobility    Bed Mobility rolling right;rolling left;scooting/bridging  -SS     Rolling Left [place] (Bed Mobility) maximum assist (25% patient effort);2 person assist;verbal cues;nonverbal cues (demo/gesture)  -SS     Rolling Right [place] (Bed Mobility) maximum assist (25% patient effort);2 person assist;verbal cues;nonverbal cues (demo/gesture)  -SS     Scooting/Bridging [place] (Bed Mobility) maximum assist (25% patient effort);2 person assist;verbal cues;other (see  comments);nonverbal cues (demo/gesture)  -     Assistive Device (Bed Mobility) draw sheet;bed rails  -     Comment, (Bed Mobility) V/TC for sequencing  -[facility] Name 07/20/24 1415          Bed-Chair Transfer    Bed-Chair [place] (Transfers) dependent (less than 25% patient effort);2 person assist;verbal cues  -     Assistive Device (Bed-Chair Transfers) lift device  -SS       Row Name 07/20/24 1415          Sit-Stand Transfer    Sit-Stand [place] (Transfers) not tested  -SS       Row Name 07/20/24 1415          Gait/Stairs (Locomotion)    [place] Level (Gait) not tested  -     Comment, (Gait/Stairs) non ambulatory at baseline  -               User Key  (r) = Recorded By, (t) = Taken By, (c) = Cosigned By      Initials Name Provider Type     Freda Leonard, PT Physical Therapist                   Obj/Interventions       [facility] Name 07/20/24 1418          Range of Motion Comprehensive    Comment, General Range of Motion RLE hypertonia - able to achieve full ROM passively, AROM limited by 25%; LLE WFL AROM  -SS       Row Name 07/20/24 1418          Strength Comprehensive (MMT)    Comment, General Manual Muscle Testing (MMT) Assessment BLE gross 3+/5 per SLR test, pt. able to bridge in supine w/adequate hip clearance  -SS       Row Name 07/20/24 1418          Motor Skills    Therapeutic Exercise hip;knee;ankle  -SS       Row Name 07/20/24 1418          Hip (Therapeutic Exercise)    Hip (Therapeutic Exercise) AAROM (active assistive range of motion)  -     Hip AAROM (Therapeutic Exercise) bilateral;aBduction;aDduction;flexion;extension;external rotation;internal rotation;10 repetitions  -SS       Row Name 07/20/24 1418          Knee (Therapeutic Exercise)    Knee (Therapeutic Exercise) AAROM (active assistive range of motion)  -     Knee AAROM (Therapeutic Exercise) bilateral;flexion;extension;10 repetitions  -[facility] Name 07/20/24 1418          Ankle (Therapeutic Exercise)    Ankle  (Therapeutic Exercise) AAROM (active assistive range of motion)  -     Ankle AAROM (Therapeutic Exercise) bilateral;dorsiflexion;plantarflexion;10 repetitions  -       Row Name 07/20/24 1418          Balance    Balance Assessment sitting static balance;sitting dynamic balance  -     Static Sitting Balance supervision  -     Dynamic Sitting Balance standby assist  -     Position, Sitting Balance supported;sitting in chair  -     Balance Interventions sitting;supported;static;dynamic  -               User Key  (r) = Recorded By, (t) = Taken By, (c) = Cosigned By      Initials Name Provider Type     Freda Leonard PT Physical Therapist                   Goals/Plan       Row Name 07/20/24 1524          Bed Mobility Goal 1 (PT)    Activity/Assistive Device (Bed Mobility Goal 1, PT) bed mobility activities, all  -     [place] Level/Cues Needed (Bed Mobility Goal 1, PT) moderate assist (50-74% patient effort)  -     Time Frame (Bed Mobility Goal 1, PT) short term goal (STG);5 days  -       Row Name 07/20/24 1524          Transfer Goal 1 (PT)    Activity/Assistive Device (Transfer Goal 1, PT) bed-to-chair/chair-to-bed;walker, rolling  -     [place] Level/Cues Needed (Transfer Goal 1, PT) maximum assist (25-49% patient effort)  -     Time Frame (Transfer Goal 1, PT) long term goal (LTG);10 days  -       Row Name 07/20/24 1524          Therapy Assessment/Plan (PT)    Planned Therapy Interventions (PT) balance training;bed mobility training;home exercise program;neuromuscular re-education;patient/family education;postural re-education;ROM (range of motion);strengthening;stretching;transfer training  -               User Key  (r) = Recorded By, (t) = Taken By, (c) = Cosigned By      Initials Name Provider Type     Freda Leonard PT Physical Therapist                   Clinical Impression       Row Name 07/20/24 1440          Pain    Pain Intervention(s)  Repositioned;Ambulation/increased activity;Elevated  -     Additional Documentation Pain Scale: FACES Pre/Post-Treatment (Group)  -       Row Name 07/20/24 1440          Pain Scale: FACES Pre/Post-Treatment    Pain: FACES Scale, Pretreatment 0-->no hurt  -     Posttreatment Pain Rating 0-->no hurt  -[facility] Name 07/20/24 1440          Plan of Care Review    Plan of Care Reviewed With patient;daughter  -     Outcome Evaluation PT re-eval complete. Pt. presents below baseline function w/generalized weakness and decreased functional endurance affecting her ability to safely participate in functional mobility. She performed bed mobility w/max assist of 2 and transferred to the recliner via mechanical lift device. Pt. would benefit from acute PT services to address stated deficits to decrease caregiver burden and maximize pt. safety and independence.  -[facility] Name 07/20/24 1440          Therapy Assessment/Plan (PT)    Patient/Family Therapy Goals Statement (PT) to get stronger to get home w/family  -     Rehab Potential (PT) fair, will monitor progress closely  -     Criteria for Skilled Interventions Met (PT) yes;meets criteria;skilled treatment is necessary  -     Therapy Frequency (PT) 3 times/wk  -     Predicted Duration of Therapy Intervention (PT) 10 days  -[facility] Name 07/20/24 1440          Vital Signs    Pre Systolic BP Rehab 122  -SS     Pre Treatment Diastolic BP 67  -SS     Post Systolic BP Rehab 115  -SS     Post Treatment Diastolic BP 77  -SS     Pretreatment Heart Rate (beats/min) 72  -SS     Posttreatment Heart Rate (beats/min) 74  -SS     Pre Patient Position Supine  -     Post Patient Position Sitting  -[facility] Name 07/20/24 1440          Positioning and Restraints    Pre-Treatment Position in bed  -SS     Post Treatment Position chair  -SS     In Chair notified nsg;reclined;call light within reach;encouraged to call for assist;exit alarm on;with  family/caregiver;on mechanical lift sling;waffle cushion;legs elevated  -               User Key  (r) = Recorded By, (t) = Taken By, (c) = Cosigned By      Initials Name Provider Type    Freda Wayne, BILL Physical Therapist                   Outcome Measures       Row Name 07/20/24 1525          How much help from another person do you currently need...    Turning from your back to your side while in flat bed without using bedrails? 2  -SS     Moving from lying on back to sitting on the side of a flat bed without bedrails? 2  -SS     Moving to and from a bed to a chair (including a wheelchair)? 1  -SS     Standing up from a chair using your arms (e.g., wheelchair, bedside chair)? 1  -SS     Climbing 3-5 steps with a railing? 1  -SS     To walk in hospital room? 1  -SS     AM-PAC 6 Clicks Score (PT) 8  -SS     Highest Level of Mobility Goal 3 --> Sit at edge of bed  -       Row Name 07/20/24 1525 07/20/24 1421       Functional Assessment    Outcome Measure Options AM-PAC 6 Clicks Basic Mobility (PT)  - AM-PAC 6 Clicks Daily Activity (OT)  -              User Key  (r) = Recorded By, (t) = Taken By, (c) = Cosigned By      Initials Name Provider Type    Mirela Park, OT Occupational Therapist    Freda Wayne, BILL Physical Therapist                                 Physical Therapy Education       Title: PT OT SLP Therapies (In Progress)       Topic: Physical Therapy (Done)       Point: Mobility training (Done)       Learning Progress Summary             Patient Eager, E, ROSE MARY,DU,NR by  at 7/20/2024 1526    Comment: Educated safety/technique w/bed mobility, transfers, HEP, PT POC    Acceptance, E, VU,DU by  at 7/15/2024 0955   Family Eager, E, VU,DU,NR by  at 7/20/2024 1526    Comment: Educated safety/technique w/bed mobility, transfers, HEP, PT POC                         Point: Home exercise program (Done)       Learning Progress Summary             Patient Eager, E, VU,DU,NR by  at 7/20/2024  1526    Comment: Educated safety/technique w/bed mobility, transfers, HEP, PT POC   Family Eager, E, VU,DU,NR by  at 7/20/2024 1526    Comment: Educated safety/technique w/bed mobility, transfers, HEP, PT POC                         Point: Body mechanics (Done)       Learning Progress Summary             Patient Eager, E, VU,DU,NR by  at 7/20/2024 1526    Comment: Educated safety/technique w/bed mobility, transfers, HEP, PT POC    Acceptance, E, VU,DU by  at 7/15/2024 0955   Family Eager, E, VU,DU,NR by  at 7/20/2024 1526    Comment: Educated safety/technique w/bed mobility, transfers, HEP, PT POC                         Point: Precautions (Done)       Learning Progress Summary             Patient Eager, E, VU,DU,NR by  at 7/20/2024 1526    Comment: Educated safety/technique w/bed mobility, transfers, HEP, PT POC    Acceptance, E, VU,DU by  at 7/15/2024 0955   Family Eager, E, VU,DU,NR by  at 7/20/2024 1526    Comment: Educated safety/technique w/bed mobility, transfers, HEP, PT POC                                         User Key       Initials Effective Dates Name Provider Type Discipline     06/01/21 -  Freda Leonard, PT Physical Therapist PT     05/07/24 -  Cate Joshi, PT Student PT Student PT                  PT Recommendation and Plan  Planned Therapy Interventions (PT): balance training, bed mobility training, home exercise program, neuromuscular re-education, patient/family education, postural re-education, ROM (range of motion), strengthening, stretching, transfer training  Plan of Care Reviewed With: patient, daughter  Outcome Evaluation: PT re-eval complete. Pt. presents below baseline function w/generalized weakness and decreased functional endurance affecting her ability to safely participate in functional mobility. She performed bed mobility w/max assist of 2 and transferred to the recliner via mechanical lift device. Pt. would benefit from acute PT services to address stated  deficits to decrease caregiver burden and maximize pt. safety and independence.     Time Calculation:         PT Charges       Row Name 07/20/24 1527             Time Calculation    Start Time 1025  -SS      PT Received On 07/20/24  -SS      PT Goal Re-Cert Due Date 07/30/24  -SS         Timed Charges    43406 - PT Therapeutic Exercise Minutes 8  -SS         Untimed Charges    PT Eval/Re-eval Minutes 20  -SS         Total Minutes    Timed Charges Total Minutes 8  -SS      Untimed Charges Total Minutes 20  -SS       Total Minutes 28  -SS                User Key  (r) = Recorded By, (t) = Taken By, (c) = Cosigned By      Initials Name Provider Type     Freda Leonard, PT Physical Therapist                  Therapy Charges for Today       Code Description Service Date Service Provider Modifiers Qty    47233967741 HC PT THER PROC EA 15 MIN 7/20/2024 Freda Leonard, PT GP 1    [id number] HC PT RE-EVAL ESTABLISHED PLAN 2 7/20/2024 Freda Leonard, PT GP 1            PT G-Codes  Outcome Measure Options: AM-PAC 6 Clicks Basic Mobility (PT)  AM-PAC 6 Clicks Score (PT): 8  AM-PAC 6 Clicks Score (OT): 12  Modified Steven Scale: 5 - Severe disability.  Bedridden, incontinent, and requiring constant nursing care and attention.  PT Discharge Summary  Anticipated Discharge Disposition (PT): home with assist, home with 24/7 care    Freda Leonard PT  7/20/2024

## 2024-07-20 NOTE — PROGRESS NOTES
"    [facility] Medicine Services  PROGRESS NOTE    Patient Name: Cheri Cruz  : 1940  MRN: [id number]    Date of Admission: 2024  Primary Care Physician: Lorrie Canada APRN    Subjective   Subjective     CC:  AMS    HPI:  \"I made a mess, I got my breakfast all over me, I'm sorry.\"  NAD but perturbed by the spilled food.  Ate about half her breakfast.       Objective   Objective     Vital Signs:   Temp:  [97.7 °F (36.5 °C)-98.9 °F (37.2 °C)] 97.7 °F (36.5 °C)  Heart Rate:  [56-81] 59  Resp:  [16-18] 18  BP: (122-177)/(41-80) 122/67  Flow (L/min):  [1-2] 1     Physical Exam:  Constitutional: Awake, alert, conversant in bed   HENT: Very hard of hearing   Respiratory: CTA, not labored   Cardiovascular: RRR   Gastrointestinal: soft, nontender, nondistended  Musculoskeletal: No bilateral ankle edema  Psychiatric: Appropriate affect, cooperative  Neurologic: Alert, oriented x 1, speech clear, Tremors of arms, R>L   Skin: No rashes      Results Reviewed:  LAB RESULTS:      Lab 24  0407 07/15/24  0824 24  1519   WBC 7.06 8.30  --   --  7.87   HEMOGLOBIN 10.6* 10.3*  --   --  11.0*   HEMATOCRIT 34.9 33.5*  --   --  35.2   PLATELETS 90* 124*  --   --  139*   NEUTROS ABS  --  5.29  --   --  5.72   IMMATURE GRANS (ABS)  --  0.02  --   --  0.02   LYMPHS ABS  --  2.28  --   --  1.52   MONOS ABS  --  0.44  --   --  0.44   EOS ABS  --  0.25  --   --  0.14   .1* 104.0*  --   --  103.2*   CRP  --  <0.30  --   --   --    LACTATE  --   --   --  1.4  --    D DIMER QUANT  --   --  0.31  --   --          Lab 24  0550 24  1045 24  0742 24  0407 07/15/24  2344 07/15/24  0824 24  1519   SODIUM 137 138 137 137  --  140 137   POTASSIUM 4.7 5.1 5.8* 5.2  --  4.6 5.6*   CHLORIDE 98 100 103 104  --  104 101   CO2 27.0 30.0* 25.0 25.0  --  23.0 27.8   ANION GAP 12.0 8.0 9.0 8.0  --  13.0 8.2   BUN 19 18 22 25*  --  26* 35* "   CREATININE 1.19* 1.36* 1.48* 1.44*  --  1.53* 2.06*   EGFR 45.7* 39.0* 35.2* 36.4*  --  33.8* 23.7*   GLUCOSE 202* 229* 257* 294*  --  306* 436*   CALCIUM 9.3 9.2 8.7 8.3*  --  8.2* 9.2   MAGNESIUM  --   --   --   --  2.8* 1.5* 1.6   PHOSPHORUS  --   --   --   --   --  4.2  --    HEMOGLOBIN A1C  --   --   --   --   --  9.80*  --    TSH  --   --   --   --   --   --  2.380         Lab 07/18/24  1045 07/15/24  0824 07/14/24  1519   TOTAL PROTEIN 6.5 6.1 7.0   ALBUMIN 3.8 3.6 3.9   GLOBULIN  --  2.5 3.1   ALT (SGPT) 5 5 5   AST (SGOT) 16 12 13   BILIRUBIN 0.2 <0.2 0.2   BILIRUBIN DIRECT <0.2  --   --    ALK PHOS 66 58 68         Lab 07/14/24  2243 07/14/24 2043 07/14/24  1519   HSTROP T 37* 36* 37*         Lab 07/15/24  0824   CHOLESTEROL 106   LDL CHOL 43   HDL CHOL 40   TRIGLYCERIDES 132         Lab 07/14/24  2043   FOLATE 15.20   VITAMIN B 12 550         Lab 07/18/24  1510   PH, ARTERIAL 7.380   PCO2, ARTERIAL 53.1*   PO2 ART 82.4*   FIO2 24   HCO3 ART 31.4*   BASE EXCESS ART 5.2*   CARBOXYHEMOGLOBIN 1.1     Brief Urine Lab Results  (Last result in the past 365 days)        Color   Clarity   Blood   Leuk Est   Nitrite   Protein   CREAT   Urine HCG        07/14/24 2001 Yellow   Cloudy   Trace   Small (1+)   Negative   Negative                   Microbiology Results Abnormal       Procedure Component Value - Date/Time    Blood Culture - Blood, Wrist, Left [[id number]]  (Normal) Collected: 07/14/24 2022    Lab Status: Final result Specimen: Blood from Wrist, Left Updated: 07/19/24 2100     Blood Culture No growth at 5 days    Narrative:      Less than seven (7) mL's of blood was collected.  Insufficient quantity may yield false negative results.    Blood Culture - Blood, Hand, Left [[id number]]  (Normal) Collected: 07/14/24 2022    Lab Status: Final result Specimen: Blood from Hand, Left Updated: 07/19/24 2100     Blood Culture No growth at 5 days    Narrative:      Less than seven (7) mL's of blood was collected.   Insufficient quantity may yield false negative results.    Urine Culture - Urine, Urine, Random Void [[id number]] Collected: 07/14/24 2001    Lab Status: Final result Specimen: Urine, Random Void Updated: 07/16/24 1020     Urine Culture 50,000 CFU/mL Normal Urogenital Nickie    Narrative:      Colonization of the urinary tract without infection is common. Treatment is discouraged unless the patient is symptomatic, pregnant, or undergoing an invasive urologic procedure.            XR Abdomen KUB    Result Date: 7/18/2024  XR ABDOMEN KUB Date of Exam: 7/18/2024 12:27 PM EDT Indication: Persistent nausea Comparison: None available. FINDINGS: A nonspecific bowel gas pattern is observed. A moderate stool burden is seen throughout the colon. No definitive pathologic calcifications are seen. No acute osseous abnormalities are noted.     Impression: 1.A nonspecific bowel gas pattern is observed. A moderate stool burden is seen throughout the colon. Electronically Signed: Elier Rangel MD  7/18/2024 3:54 PM EDT  Workstation ID: [id number]     Results for orders placed during the hospital encounter of 02/10/23    Adult Transthoracic Echo Complete W/ Cont if Necessary Per Protocol    Interpretation Summary    Left ventricular systolic function is hyperdynamic (EF > 70%). Calculated left ventricular EF = 70.6% Left ventricular ejection fraction appears to be greater than 70%. The left ventricular cavity is small in size. Left ventricular wall thickness is consistent with mild concentric hypertrophy. All left ventricular wall segments contract normally. Left ventricular intracavitary gradient noted to be 71 mmHg. Left ventricular diastolic function is consistent with (grade I) impaired relaxation. Normal left atrial pressure.    The aortic valve is abnormal in structure. There is mild calcification of the aortic valve mainly affecting the right coronary cusp(s). The aortic valve appears trileaflet. No aortic valve regurgitation is  present. Gradient noted through the LV and LVOT    Compared to TTE report from [facility] Dec 2019, hyperdynamic LV with intracavitary gradient is not a new finding but peak gradient noted on this exam is higher than previously described.      Current medications:  Scheduled Meds:apixaban, 5 mg, Oral, BID  atorvastatin, 80 mg, Oral, Nightly  busPIRone, 7.5 mg, Oral, BID  donepezil, 10 mg, Oral, Nightly  gabapentin, , ,   gabapentin, 300 mg, Oral, Daily  insulin glargine, 30 Units, Subcutaneous, Nightly  insulin lispro, 3-14 Units, Subcutaneous, 4x Daily AC & at Bedtime  Insulin Lispro, 5 Units, Subcutaneous, TID With Meals  levothyroxine, 25 mcg, Oral, Daily  nystatin, , Topical, 4x Daily  pantoprazole, 40 mg, Oral, BID AC  senna-docusate sodium, 2 tablet, Oral, Daily   And  polyethylene glycol, 17 g, Oral, Daily  QUEtiapine, 50 mg, Oral, Nightly  sodium chloride, 10 mL, Intravenous, Q12H  sodium chloride, 10 mL, Intravenous, Q12H  terazosin, 2 mg, Oral, Nightly      Continuous Infusions:     PRN Meds:.  acetaminophen **OR** acetaminophen **OR** acetaminophen    senna-docusate sodium **AND** polyethylene glycol **AND** bisacodyl **AND** bisacodyl    Calcium Replacement - Follow Nurse / BPA Driven Protocol    dextrose    dextrose    gabapentin    glucagon (human recombinant)    HYDROcodone-acetaminophen    LORazepam    Magnesium Standard Dose Replacement - Follow Nurse / BPA Driven Protocol    [Held by provider] meclizine    ondansetron ODT **OR** ondansetron    Phosphorus Replacement - Follow Nurse / BPA Driven Protocol    Potassium Replacement - Follow Nurse / BPA Driven Protocol    sodium chloride    sodium chloride    sodium chloride    sodium chloride    Assessment & Plan   Assessment & Plan     Active Hospital Problems    Diagnosis  POA    **AMS (altered mental status) [R41.82]  Yes    Stroke [I63.9]  Yes      Resolved Hospital Problems   No resolved problems to display.        Brief Hospital Course to date:  Cheri  Ej Cruz is a 83 y.o. 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    This patient's problems and plans were partially entered by my partner and updated as appropriate by me 07/20/24.    All problems are new to me     Altered mental status on advanced dementia  Hypotension   Concern for UTI  - initial stroke workup negative.  MRI showed old L parietal ischemic stroke; pt seen by Stroke team; apixiban restarted.   -Possibly dehydration vs hypoxia  -COVID/flu negative. Blood cultures with no growth to date. LFTs normal. CXR no acute findings.    -CXR with old calcified gr  -Treated with empiric IV ceftriaxone x 3 days. Held sedatives initially. Okay to resume PRN lorazepam. Continue donepezil, Seroquel, BuSpar.    Acute on chronic hypoxemic respiratory failure  Near-syncope  -Per report, patient became hypoxic with oxygen saturation in the 60s  -CTA chest showed no PE, nonacute  -Patient is O2 dependent on 2L NC but is not compliant due her baseline dementia.  -Continue supplemental oxygenation, titrate for goal SpO2 greater than 90%.      Evolving old left parietal ischemic stroke  -Presented to [facility] with cognitive decline and metabolic encephalopathy. She was hypotensive with systolic in the 90s at [facility].   -CTH from [facility] with left parietal tiny hemorrhage vs calcification on top of old ischemic stroke.   -MRI showed evolving old left parietal lobe CVA with some areas of associated cortical laminar necrosis. No hemorrhage.    -Neurology evaluated, recommended to resume Eliquis. Continue statin. Annual follow up in stroke clinic.   -PT/OT recommending home with 24/7 care.       RADHA on CKD stage III  Volume depletion due to dehydration   Mild  hyperkalemia  -Baseline Cr about 1.3, Cr 2.06 on presentation, now 1.2.    -Improved with IV fluids. S/p Lokelma x 1 on 7/17.    Complex disposition  -PT/OT evaluated, felt patient's functional status is at baseline. Recommended home with 24/7 care.  -partner discussed care with both patient's daughters and case management on 7/17.   - Family leaning towards pursuing long-term care after discharge as they are having difficulty taking care of her on their own and do not have finances for full-time caregiver.    Nausea, constipation   -KUB 7/14 with nonobstructive bowel gas pattern. Repeat KUB 7/18 with moderate stool burden.  - BM 7/19.    -Continue bowel regimen. Increased PPI to BID. PRN Zofran for nausea.      Uncontrolled diabetes mellitus type 2 with hyperglycemia  -A1c 9.80% this admission  -Increase Lantus to 30 units qhs with lispro 5u TIDAC and moderate-high dose SSI. Monitor glucose and adjust insulin as needed.  -Resumed home gabapentin at low-dose 300 mg daily.    Chronic anemia  Macrocytosis  -No evidence of overt GI bleeding this admission  -B12 and folate within normal limits  -Further workup of anemia as outpatient as appropriate.      Essential hypertension  -Was hypotensive at [facility].  -Not on antihypertensive medications.  -Continue to monitor.      Hyperlipidemia -Continue atorvastatin.  Hypothyroidism -Continue levothyroxine.  GERD -Continue PPI.    Expected Discharge Location and Transportation: Rehab vs LTC  Expected Discharge   Expected Discharge Date: 7/22/2024; Expected Discharge Time:      VTE Prophylaxis:  Pharmacologic & mechanical VTE prophylaxis orders are present.         AM-PAC 6 Clicks Score (PT): 12 (07/19/24 2000)    CODE STATUS:   Code Status and Medical Interventions:   Ordered at: 07/15/24 1319     Level Of Support Discussed With:    Patient     Code Status (Patient has no pulse and is not breathing):    CPR (Attempt to Resuscitate)     Medical Interventions  (Patient has pulse or is breathing):    Full Support       Michelle Chávez MD  07/20/24

## 2024-07-20 NOTE — PLAN OF CARE
Goal Outcome Evaluation:  Plan of Care Reviewed With: patient      SLP treatment completed. Will continue to address cog-comm deficits. Please see note for further details and recommendations.        Anticipated Discharge Disposition (SLP): home with  care    SLP Diagnosis: cognitive-linguistic disorder (24)        Treatment Assessment (SLP): continued, cognitive-linguistic disorder (24)  Treatment Assessment Comments (SLP): Pt seen for cognitive-linguistic tx. No family members present. Pt was able to state her name and  (except year) correctly. Unable to state current D/M/Yr. Pt was able to answer simple Y/N questions with ~70% accuracy. Pt then stated she was tired and requested to end session so she could sleep. SLP will continue to f/u. (24)  Plan for Continued Treatment (SLP): continue treatment per plan of care (24)

## 2024-07-20 NOTE — THERAPY TREATMENT NOTE
Acute Care - Speech Language Pathology Treatment Note  [facility]     Patient Name: Cheri Cruz  : 1940  MRN: [id number]  Today's Date: 2024               Admit Date: 2024     Visit Dx:    ICD-10-CM ICD-9-CM   1. Cognitive communication deficit  R41.841 799.52   2. Disorientation  R41.0 780.99     Patient Active Problem List   Diagnosis    Hyperlipidemia LDL goal <70    Type 2 diabetes mellitus without complication, without long-term current use of insulin    GERD (gastroesophageal reflux disease)    Essential hypertension    Abnormal EKG    Osteoarthritis    Anxiety    Palpitations    Vertigo    History of ischemic left MCA stroke    Hemorrhagic stroke    History of pulmonary embolus (PE)    Confusion    Hypotension    Constipation    UTI (urinary tract infection) due to urinary indwelling catheter    Metabolic encephalopathy    Symptomatic anemia    Acquired hypothyroidism    AMS (altered mental status)    Stroke     Past Medical History:   Diagnosis Date    Abnormal heart rhythm     Anxiety     Arrhythmia     Arthritis     Atrial fibrillation     Dementia     Diabetes mellitus     Hyperlipidemia     Hypertension     Kidney disorder     Stroke     Uterus cancer      Past Surgical History:   Procedure Laterality Date    CATARACT EXTRACTION, BILATERAL         SLP Recommendation and Plan  SLP Diagnosis: cognitive-linguistic disorder (24)              SLC Criteria for Skilled Therapy Interventions Met: yes (24)  Anticipated Discharge Disposition (SLP): home with  care (24)        Therapy Frequency (SLP SLC): 3 days per week (24)  Predicted Duration Therapy Intervention (Days): 1 week (24)        Daily Summary of Progress (SLP): unable to show any progress toward functional goals (24)           Treatment Assessment (SLP): continued, cognitive-linguistic disorder (24)  Treatment Assessment Comments (SLP): Pt seen  for cognitive-linguistic tx. No family members present. Pt was able to state her name and  (except year) correctly. Unable to state current D/M/Yr. Pt was able to answer simple Y/N questions with ~70% accuracy. Pt then stated she was tired and requested to end session so she could sleep. SLP will continue to f/u. (24 1410)  Plan for Continued Treatment (SLP): continue treatment per plan of care (24 1410)  Plan of Care Reviewed With: patient (24 1438)      SLP EVALUATION (Last 72 Hours)       SLP SLC Evaluation       Row Name 24 1410                   Communication Assessment/Intervention    Document Type therapy note (daily note)  -        Subjective Information no complaints  -        Patient Observations alert;cooperative;agree to therapy  -        Patient/Family/Caregiver Comments/Observations no family present  -        Patient Effort adequate  -           General Information    Patient Profile Reviewed yes  -KH        Pertinent History Of Current Problem See prev eval  -           Pain    Additional Documentation Pain Scale: FACES Pre/Post-Treatment (Group)  -           Pain Scale: FACES Pre/Post-Treatment    Pain: FACES Scale, Pretreatment 0-->no hurt  -KH        Posttreatment Pain Rating 0-->no hurt  -KH           SLP Evaluation Clinical Impressions    SLP Diagnosis cognitive-linguistic disorder  -[facility] Criteria for Skilled Therapy Interventions Met yes  -        Functional Impact need frequent supervision  -           SLP Treatment Clinical Impressions    Treatment Assessment (SLP) continued;cognitive-linguistic disorder  -        Treatment Assessment Comments (SLP) Pt seen for cognitive-linguistic tx. No family members present. Pt was able to state her name and  (except year) correctly. Unable to state current D/M/Yr. Pt was able to answer simple Y/N questions with ~70% accuracy. Pt then stated she was tired and requested to end session so she could  sleep. SLP will continue to f/u.  -KH        Daily Summary of Progress (SLP) unable to show any progress toward functional goals  -KH        Barriers to Overall Progress (SLP) Cognitive status;Baseline deficits  -KH        Plan for Continued Treatment (SLP) continue treatment per plan of care  -KH        Care Plan Review care plan/treatment goals reviewed  -KH           Recommendations    Therapy Frequency (SLP SLC) 3 days per week  -KH        Predicted Duration Therapy Intervention (Days) 1 week  -KH        Anticipated Discharge Disposition (SLP) home with 24/7 care  -                  User Key  (r) = Recorded By, (t) = Taken By, (c) = Cosigned By      Initials Name Effective Dates    Carol Moeller, MS CCC-SLP 01/15/24 -                        EDUCATION  The patient has been educated in the following areas:     Cognitive Impairment Communication Impairment.           SLP GOALS       Row Name 07/20/24 1410 07/18/24 1100          Patient will demonstrate functional cognitive-linguistic skills for return to discharge environment    Page with moderate cues  -KH with moderate cues  -SM     Time frame 1 week  -KH 1 week  -SM     Progress/Outcomes continuing progress toward goal  -KH continuing progress toward goal  -SM        SLP Diagnostic Treatment     Patient will participate in further assessment in the following areas cognitive-linguistic;clarification of baseline cognitive communication status  -KH cognitive-linguistic;clarification of baseline cognitive communication status  -SM     Time Frame (Diagnostic) 1 week  -KH 1 week  -SM     Progress/Outcomes (Additional Goal 1, SLP) goal met  -KH goal met  -SM     Comment (Diagnostic) completed last session  -KH completed last session  -SM        Comprehend Questions Goal 1 (SLP)    Improve Ability to Comprehend Questions Goal 1 (SLP) simple yes/no questions;complex yes/no questions;simple wh questions;simple general questions;80%;with minimal cues (75-90%)   -KH simple yes/no questions;complex yes/no questions;simple wh questions;simple general questions;80%;with minimal cues (75-90%)  -SM     Time Frame (Comprehend Questions Goal 1, SLP) 1 week  -KH 1 week  -SM     Progress/Outcomes (Comprehend Questions Goal 1, SLP) continuing progress toward goal  -KH continuing progress toward goal  -SM     Comment (Comprehend Questions Goal 1, SLP) Pt able to answer simple Y/N questions with ~70% accuracy.  -KH Too fixated on not feeling well to target simple level. Inconsistent accurate response with embbedded contextual ?s. Decreased hearing also impacting.  -SM        Follow Directions Goal 2 (SLP)    Improve Ability to Follow Directions Goal 1 (SLP) 2 step commands;80%;with moderate cues (50-74%)  -KH 2 step commands;80%;with moderate cues (50-74%)  -SM     Time Frame (Follow Directions Goal 1, SLP) 1 week  -KH 1 week  -SM     Progress/Outcomes (Follow Directions Goal 1, SLP) goal ongoing  -KH goal ongoing  -SM     Comment (Follow Directions Goal 1, SLP) Unable to target. Pt requested to end session to sleep.  -KH --               User Key  (r) = Recorded By, (t) = Taken By, (c) = Cosigned By      Initials Name Provider Type    Kira Cordero MS CCC-SLP Speech and Language Pathologist    Carol Moeller MS CCC-SLP Speech and Language Pathologist                              Time Calculation:      Time Calculation- SLP       Row Name 07/20/24 1439             Time Calculation- SLP    SLP Start Time 1410  -KH      SLP Received On 07/20/24  -KH         Untimed Charges    SLP Treatment ST Treatment/ST Modification Prosth Aug Alter-[id number]  -KH      [id number] Treatment/ST Modification Prosth Aug Alter  15  -KH         Total Minutes    Untimed Charges Total Minutes 15  -KH       Total Minutes 15  -KH                User Key  (r) = Recorded By, (t) = Taken By, (c) = Cosigned By      Initials Name Provider Type    Carol Moeller MS CCC-SLP Speech and Language Pathologist                     Therapy Charges for Today       Code Description Service Date Service Provider Modifiers Qty    [id number]  ST TREATMENT SPEECH 1 7/20/2024 Carol Argueta, MS CCC-SLP GN 1                       Carol Argueta MS CCC-SLP  7/20/2024

## 2024-07-20 NOTE — PLAN OF CARE
Goal Outcome Evaluation:  Plan of Care Reviewed With: patient        Progress: improving  Outcome Evaluation: Pt participatory in OT interventions following initial increased motivational cues/support and further cues for opened eyes (pt continues to report double vision). Pt demonstrated need for max A x 2 for rolling L < > R in bed with attempt at flexing knees and reaching for bed rail with greater reach across midline with LUE given increased tone at RUE. Pt demonstrated improved ability to assist in bridging hips to allow for sling, linen mgmt and self care needs related to toileting. Pt tolerated A/AROM at BUEs progressing from shoulders to hands for joint mobility in prep for greater integration in ADLs, bed mobility and reduce caregiver burden. Pt able to wash face after set up and both tactile and verbal cues and help minimally in managing gown during dressing. Recommend IPOT POC to continue at 3x/wk and recommend SNF at d/c when medically ready.      Anticipated Discharge Disposition (OT): skilled nursing facility

## 2024-07-20 NOTE — PLAN OF CARE
Goal Outcome Evaluation:              Outcome Evaluation: Patient is A&O x2 has continued to complain of being N&V all throughout the day, she has received Zofran per MD PRN medication 2x's today. She has had x2 bowel movements during this shift that has been soft. Noted to have postive bowel sounds in all 4 quads. She has refused all meals, but has consumed water throughout the day. Staff has turned and reposition her often during this shift. Has alarms on and in place with call light within reach.

## 2024-07-20 NOTE — PLAN OF CARE
Goal Outcome Evaluation:  Plan of Care Reviewed With: patient, daughter           Outcome Evaluation: PT re-eval complete. Pt. presents below baseline function w/generalized weakness and decreased functional endurance affecting her ability to safely participate in functional mobility. She performed bed mobility w/max assist of 2 and transferred to the recliner via mechanical lift device. Pt. would benefit from acute PT services to address stated deficits to decrease caregiver burden and maximize pt. safety and independence.      Anticipated Discharge Disposition (PT): home with assist, home with 24/7 care

## 2024-07-21 LAB
GLUCOSE BLDC GLUCOMTR-MCNC: 117 MG/DL (ref 70–130)
GLUCOSE BLDC GLUCOMTR-MCNC: 135 MG/DL (ref 70–130)
GLUCOSE BLDC GLUCOMTR-MCNC: 188 MG/DL (ref 70–130)
GLUCOSE BLDC GLUCOMTR-MCNC: 211 MG/DL (ref 70–130)
GLUCOSE BLDC GLUCOMTR-MCNC: 225 MG/DL (ref 70–130)

## 2024-07-21 PROCEDURE — 63710000001 DONEPEZIL 10 MG TABLET: Performed by: INTERNAL MEDICINE

## 2024-07-21 PROCEDURE — 63710000001 INSULIN GLARGINE PER 5 UNITS: Performed by: STUDENT IN AN ORGANIZED HEALTH CARE EDUCATION/TRAINING PROGRAM

## 2024-07-21 PROCEDURE — 63710000001 ACETAMINOPHEN 325 MG TABLET: Performed by: INTERNAL MEDICINE

## 2024-07-21 PROCEDURE — 63710000001 ATORVASTATIN 40 MG TABLET

## 2024-07-21 PROCEDURE — 82948 REAGENT STRIP/BLOOD GLUCOSE: CPT

## 2024-07-21 PROCEDURE — 63710000001 GABAPENTIN 300 MG CAPSULE: Performed by: STUDENT IN AN ORGANIZED HEALTH CARE EDUCATION/TRAINING PROGRAM

## 2024-07-21 PROCEDURE — A9270 NON-COVERED ITEM OR SERVICE: HCPCS | Performed by: INTERNAL MEDICINE

## 2024-07-21 PROCEDURE — 63710000001 INSULIN LISPRO (HUMAN) PER 5 UNITS: Performed by: STUDENT IN AN ORGANIZED HEALTH CARE EDUCATION/TRAINING PROGRAM

## 2024-07-21 PROCEDURE — 63710000001 TERAZOSIN 2 MG CAPSULE: Performed by: INTERNAL MEDICINE

## 2024-07-21 PROCEDURE — A9270 NON-COVERED ITEM OR SERVICE: HCPCS | Performed by: STUDENT IN AN ORGANIZED HEALTH CARE EDUCATION/TRAINING PROGRAM

## 2024-07-21 PROCEDURE — 63710000001 POLYETHYLENE GLYCOL 17 G PACK: Performed by: STUDENT IN AN ORGANIZED HEALTH CARE EDUCATION/TRAINING PROGRAM

## 2024-07-21 PROCEDURE — 63710000001 PANTOPRAZOLE 40 MG TABLET DELAYED-RELEASE: Performed by: STUDENT IN AN ORGANIZED HEALTH CARE EDUCATION/TRAINING PROGRAM

## 2024-07-21 PROCEDURE — G0378 HOSPITAL OBSERVATION PER HR: HCPCS

## 2024-07-21 PROCEDURE — 63710000001 MECLIZINE 12.5 MG TABLET: Performed by: NURSE PRACTITIONER

## 2024-07-21 PROCEDURE — 63710000001 SENNOSIDES-DOCUSATE 8.6-50 MG TABLET: Performed by: STUDENT IN AN ORGANIZED HEALTH CARE EDUCATION/TRAINING PROGRAM

## 2024-07-21 PROCEDURE — 63710000001 QUETIAPINE 25 MG TABLET: Performed by: INTERNAL MEDICINE

## 2024-07-21 PROCEDURE — 63710000001 ONDANSETRON ODT 4 MG TABLET DISPERSIBLE: Performed by: INTERNAL MEDICINE

## 2024-07-21 PROCEDURE — 63710000001 BUSPIRONE 15 MG TABLET: Performed by: INTERNAL MEDICINE

## 2024-07-21 PROCEDURE — A9270 NON-COVERED ITEM OR SERVICE: HCPCS | Performed by: NURSE PRACTITIONER

## 2024-07-21 PROCEDURE — A9270 NON-COVERED ITEM OR SERVICE: HCPCS

## 2024-07-21 PROCEDURE — 25010000002 ONDANSETRON PER 1 MG: Performed by: INTERNAL MEDICINE

## 2024-07-21 PROCEDURE — 63710000001 HYDROCODONE-ACETAMINOPHEN 10-325 MG TABLET: Performed by: INTERNAL MEDICINE

## 2024-07-21 PROCEDURE — 63710000001 APIXABAN 5 MG TABLET: Performed by: STUDENT IN AN ORGANIZED HEALTH CARE EDUCATION/TRAINING PROGRAM

## 2024-07-21 PROCEDURE — 63710000001 LEVOTHYROXINE 25 MCG TABLET: Performed by: INTERNAL MEDICINE

## 2024-07-21 PROCEDURE — 99232 SBSQ HOSP IP/OBS MODERATE 35: CPT | Performed by: NURSE PRACTITIONER

## 2024-07-21 RX ADMIN — INSULIN LISPRO 5 UNITS: 100 INJECTION, SOLUTION INTRAVENOUS; SUBCUTANEOUS at 12:27

## 2024-07-21 RX ADMIN — ACETAMINOPHEN 650 MG: 325 TABLET ORAL at 20:48

## 2024-07-21 RX ADMIN — APIXABAN 5 MG: 5 TABLET, FILM COATED ORAL at 20:48

## 2024-07-21 RX ADMIN — NYSTATIN: 100000 POWDER TOPICAL at 18:19

## 2024-07-21 RX ADMIN — MECLIZINE 25 MG: 12.5 TABLET ORAL at 13:49

## 2024-07-21 RX ADMIN — BUSPIRONE HYDROCHLORIDE 7.5 MG: 15 TABLET ORAL at 08:34

## 2024-07-21 RX ADMIN — INSULIN LISPRO 5 UNITS: 100 INJECTION, SOLUTION INTRAVENOUS; SUBCUTANEOUS at 18:18

## 2024-07-21 RX ADMIN — GABAPENTIN 300 MG: 300 CAPSULE ORAL at 08:33

## 2024-07-21 RX ADMIN — ATORVASTATIN CALCIUM 80 MG: 40 TABLET, FILM COATED ORAL at 20:47

## 2024-07-21 RX ADMIN — PANTOPRAZOLE SODIUM 40 MG: 40 TABLET, DELAYED RELEASE ORAL at 18:18

## 2024-07-21 RX ADMIN — INSULIN LISPRO 5 UNITS: 100 INJECTION, SOLUTION INTRAVENOUS; SUBCUTANEOUS at 18:19

## 2024-07-21 RX ADMIN — BUSPIRONE HYDROCHLORIDE 7.5 MG: 15 TABLET ORAL at 20:48

## 2024-07-21 RX ADMIN — INSULIN LISPRO 5 UNITS: 100 INJECTION, SOLUTION INTRAVENOUS; SUBCUTANEOUS at 20:47

## 2024-07-21 RX ADMIN — DONEPEZIL HYDROCHLORIDE 10 MG: 10 TABLET, FILM COATED ORAL at 20:48

## 2024-07-21 RX ADMIN — INSULIN GLARGINE 30 UNITS: 100 INJECTION, SOLUTION SUBCUTANEOUS at 20:47

## 2024-07-21 RX ADMIN — ONDANSETRON 4 MG: 4 TABLET, ORALLY DISINTEGRATING ORAL at 20:46

## 2024-07-21 RX ADMIN — LEVOTHYROXINE SODIUM 25 MCG: 25 TABLET ORAL at 05:42

## 2024-07-21 RX ADMIN — ONDANSETRON 4 MG: 2 INJECTION INTRAMUSCULAR; INTRAVENOUS at 03:59

## 2024-07-21 RX ADMIN — POLYETHYLENE GLYCOL 3350 17 G: 17 POWDER, FOR SOLUTION ORAL at 08:34

## 2024-07-21 RX ADMIN — NYSTATIN: 100000 POWDER TOPICAL at 12:28

## 2024-07-21 RX ADMIN — QUETIAPINE FUMARATE 50 MG: 25 TABLET ORAL at 20:48

## 2024-07-21 RX ADMIN — INSULIN LISPRO 5 UNITS: 100 INJECTION, SOLUTION INTRAVENOUS; SUBCUTANEOUS at 08:23

## 2024-07-21 RX ADMIN — NYSTATIN: 100000 POWDER TOPICAL at 08:35

## 2024-07-21 RX ADMIN — PANTOPRAZOLE SODIUM 40 MG: 40 TABLET, DELAYED RELEASE ORAL at 08:33

## 2024-07-21 RX ADMIN — Medication 10 ML: at 21:54

## 2024-07-21 RX ADMIN — APIXABAN 5 MG: 5 TABLET, FILM COATED ORAL at 08:34

## 2024-07-21 RX ADMIN — MECLIZINE 25 MG: 12.5 TABLET ORAL at 22:00

## 2024-07-21 RX ADMIN — TERAZOSIN HYDROCHLORIDE ANHYDROUS 2 MG: 2 CAPSULE ORAL at 21:54

## 2024-07-21 RX ADMIN — Medication 10 ML: at 08:35

## 2024-07-21 RX ADMIN — SENNOSIDES AND DOCUSATE SODIUM 2 TABLET: 50; 8.6 TABLET ORAL at 08:34

## 2024-07-21 RX ADMIN — NYSTATIN: 100000 POWDER TOPICAL at 21:54

## 2024-07-21 RX ADMIN — INSULIN LISPRO 3 UNITS: 100 INJECTION, SOLUTION INTRAVENOUS; SUBCUTANEOUS at 12:27

## 2024-07-21 RX ADMIN — HYDROCODONE BITARTRATE AND ACETAMINOPHEN 1 TABLET: 10; 325 TABLET ORAL at 22:00

## 2024-07-21 NOTE — PROGRESS NOTES
[facility] Medicine Services  PROGRESS NOTE    Patient Name: Cheri Cruz  : 1940  MRN: [id number]    Date of Admission: 2024  Primary Care Physician: Lorrie Canada APRN    Subjective   Subjective     CC:  Follow-up AMS    HPI:  Patient seen resting in bed attempting to eat breakfast in no apparent distress.  No acute events overnight per nursing.  She is currently alert and oriented to self.  No complaints at this time.      Objective   Objective     Vital Signs:   Temp:  [97.3 °F (36.3 °C)-98 °F (36.7 °C)] 97.7 °F (36.5 °C)  Heart Rate:  [65-95] 68  Resp:  [16-18] 18  BP: ()/(30-95) 123/57  Flow (L/min):  [1] 1     Physical Exam:  Constitutional: No acute distress, awake, alert, frail, chronically ill  HENT: NCAT, mucous membranes moist  Respiratory: Clear to auscultation bilaterally, respiratory effort normal on 1 L  Cardiovascular: RRR, no murmurs, cap refill brisk   Gastrointestinal: Positive bowel sounds, soft, nontender, nondistended  Musculoskeletal: No BLE edema   Psychiatric: Flat affect, cooperative  Neurologic: Oriented x 1, moves all extremities, speech clear  Skin: warm, dry, no visible rash       Results Reviewed:  LAB RESULTS:      Lab 24  0407 07/15/24  0824 243 24  1519   WBC 7.06 8.30  --   --  7.87   HEMOGLOBIN 10.6* 10.3*  --   --  11.0*   HEMATOCRIT 34.9 33.5*  --   --  35.2   PLATELETS 90* 124*  --   --  139*   NEUTROS ABS  --  5.29  --   --  5.72   IMMATURE GRANS (ABS)  --  0.02  --   --  0.02   LYMPHS ABS  --  2.28  --   --  1.52   MONOS ABS  --  0.44  --   --  0.44   EOS ABS  --  0.25  --   --  0.14   .1* 104.0*  --   --  103.2*   CRP  --  <0.30  --   --   --    LACTATE  --   --   --  1.4  --    D DIMER QUANT  --   --  0.31  --   --          Lab 24  0550 24  1045 24  0742 24  0407 07/15/24  2344 07/15/24  0824 24  1519   SODIUM 137 138 137 137  --  140 137    POTASSIUM 4.7 5.1 5.8* 5.2  --  4.6 5.6*   CHLORIDE 98 100 103 104  --  104 101   CO2 27.0 30.0* 25.0 25.0  --  23.0 27.8   ANION GAP 12.0 8.0 9.0 8.0  --  13.0 8.2   BUN 19 18 22 25*  --  26* 35*   CREATININE 1.19* 1.36* 1.48* 1.44*  --  1.53* 2.06*   EGFR 45.7* 39.0* 35.2* 36.4*  --  33.8* 23.7*   GLUCOSE 202* 229* 257* 294*  --  306* 436*   CALCIUM 9.3 9.2 8.7 8.3*  --  8.2* 9.2   MAGNESIUM  --   --   --   --  2.8* 1.5* 1.6   PHOSPHORUS  --   --   --   --   --  4.2  --    HEMOGLOBIN A1C  --   --   --   --   --  9.80*  --    TSH  --   --   --   --   --   --  2.380         Lab 07/18/24  1045 07/15/24  0824 07/14/24  1519   TOTAL PROTEIN 6.5 6.1 7.0   ALBUMIN 3.8 3.6 3.9   GLOBULIN  --  2.5 3.1   ALT (SGPT) 5 5 5   AST (SGOT) 16 12 13   BILIRUBIN 0.2 <0.2 0.2   BILIRUBIN DIRECT <0.2  --   --    ALK PHOS 66 58 68         Lab 07/14/24 2243 07/14/24 2043 07/14/24  1519   HSTROP T 37* 36* 37*         Lab 07/15/24  0824   CHOLESTEROL 106   LDL CHOL 43   HDL CHOL 40   TRIGLYCERIDES 132         Lab 07/14/24  2043   FOLATE 15.20   VITAMIN B 12 550         Lab 07/18/24  1510   PH, ARTERIAL 7.380   PCO2, ARTERIAL 53.1*   PO2 ART 82.4*   FIO2 24   HCO3 ART 31.4*   BASE EXCESS ART 5.2*   CARBOXYHEMOGLOBIN 1.1     Brief Urine Lab Results  (Last result in the past 365 days)        Color   Clarity   Blood   Leuk Est   Nitrite   Protein   CREAT   Urine HCG        07/14/24 2001 Yellow   Cloudy   Trace   Small (1+)   Negative   Negative                   Microbiology Results Abnormal       Procedure Component Value - Date/Time    Blood Culture - Blood, Wrist, Left [[id number]]  (Normal) Collected: 07/14/24 2022    Lab Status: Final result Specimen: Blood from Wrist, Left Updated: 07/19/24 2100     Blood Culture No growth at 5 days    Narrative:      Less than seven (7) mL's of blood was collected.  Insufficient quantity may yield false negative results.    Blood Culture - Blood, Hand, Left [[id number]]  (Normal) Collected: 07/14/24  2022    Lab Status: Final result Specimen: Blood from Hand, Left Updated: 07/19/24 2100     Blood Culture No growth at 5 days    Narrative:      Less than seven (7) mL's of blood was collected.  Insufficient quantity may yield false negative results.    Urine Culture - Urine, Urine, Random Void [[id number]] Collected: 07/14/24 2001    Lab Status: Final result Specimen: Urine, Random Void Updated: 07/16/24 1020     Urine Culture 50,000 CFU/mL Normal Urogenital Nickie    Narrative:      Colonization of the urinary tract without infection is common. Treatment is discouraged unless the patient is symptomatic, pregnant, or undergoing an invasive urologic procedure.            No radiology results from the last 24 hrs    Results for orders placed during the hospital encounter of 02/10/23    Adult Transthoracic Echo Complete W/ Cont if Necessary Per Protocol    Interpretation Summary    Left ventricular systolic function is hyperdynamic (EF > 70%). Calculated left ventricular EF = 70.6% Left ventricular ejection fraction appears to be greater than 70%. The left ventricular cavity is small in size. Left ventricular wall thickness is consistent with mild concentric hypertrophy. All left ventricular wall segments contract normally. Left ventricular intracavitary gradient noted to be 71 mmHg. Left ventricular diastolic function is consistent with (grade I) impaired relaxation. Normal left atrial pressure.    The aortic valve is abnormal in structure. There is mild calcification of the aortic valve mainly affecting the right coronary cusp(s). The aortic valve appears trileaflet. No aortic valve regurgitation is present. Gradient noted through the LV and LVOT    Compared to TTE report from  Dec 2019, hyperdynamic LV with intracavitary gradient is not a new finding but peak gradient noted on this exam is higher than previously described.      Current medications:  Scheduled Meds:apixaban, 5 mg, Oral, BID  atorvastatin, 80 mg,  Oral, Nightly  busPIRone, 7.5 mg, Oral, BID  donepezil, 10 mg, Oral, Nightly  gabapentin, , ,   gabapentin, 300 mg, Oral, Daily  insulin glargine, 30 Units, Subcutaneous, Nightly  insulin lispro, 3-14 Units, Subcutaneous, 4x Daily AC & at Bedtime  Insulin Lispro, 5 Units, Subcutaneous, TID With Meals  levothyroxine, 25 mcg, Oral, Daily  nystatin, , Topical, 4x Daily  pantoprazole, 40 mg, Oral, BID AC  senna-docusate sodium, 2 tablet, Oral, Daily   And  polyethylene glycol, 17 g, Oral, Daily  QUEtiapine, 50 mg, Oral, Nightly  sodium chloride, 10 mL, Intravenous, Q12H  sodium chloride, 10 mL, Intravenous, Q12H  terazosin, 2 mg, Oral, Nightly      Continuous Infusions:   PRN Meds:.  acetaminophen **OR** acetaminophen **OR** acetaminophen    senna-docusate sodium **AND** polyethylene glycol **AND** bisacodyl **AND** bisacodyl    Calcium Replacement - Follow Nurse / BPA Driven Protocol    dextrose    dextrose    gabapentin    glucagon (human recombinant)    HYDROcodone-acetaminophen    LORazepam    Magnesium Standard Dose Replacement - Follow Nurse / BPA Driven Protocol    [Held by provider] meclizine    ondansetron ODT **OR** ondansetron    Phosphorus Replacement - Follow Nurse / BPA Driven Protocol    Potassium Replacement - Follow Nurse / BPA Driven Protocol    sodium chloride    sodium chloride    sodium chloride    sodium chloride    Assessment & Plan   Assessment & Plan     Active Hospital Problems    Diagnosis  POA    **AMS (altered mental status) [R41.82]  Yes    Stroke [I63.9]  Yes      Resolved Hospital Problems   No resolved problems to display.        Brief Hospital Course to date:  Cheri Cruz is a 83 y.o. 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  Initial stroke workup was negative.  MRI showed old left parietal ischemic stroke.  Stroke neurology followed and resumed Eliquis.  She received empiric IV ceftriaxone x 3 days due to concern for UTI.     This patient's problems and plans were partially entered by my partner and updated as appropriate by me 07/21/24.     Altered mental status on advanced dementia  Hypotension   Concern for UTI  - initial stroke workup negative.  MRI showed old L parietal ischemic stroke; pt seen by Stroke team; apixiban restarted.   -Possibly dehydration vs hypoxia  -COVID/flu negative. Blood cultures with no growth to date. LFTs normal. CXR no acute findings.    -CXR with old calcified gr  -S/p empiric IV ceftriaxone x 3 days.   -Held sedatives initially, Okay to resume PRN lorazepam.   -Continue donepezil, Seroquel, BuSpar.     Acute on chronic hypoxemic respiratory failure  Near-syncope  -Per report, patient became hypoxic with oxygen saturation in the 60s  -CTA chest showed no PE, nonacute  -Patient is O2 dependent on 2L NC but is not compliant due her baseline dementia.  -Continue supplemental oxygenation, titrate for goal SpO2 greater than 90%.     Evolving old left parietal ischemic stroke  -Presented to [facility] with cognitive decline and metabolic encephalopathy. She was hypotensive with systolic in the 90s at [facility].   -CTH from [facility] with left parietal tiny hemorrhage vs calcification on top of old ischemic stroke.   -MRI showed evolving old left parietal lobe CVA with some areas of associated cortical laminar necrosis. No hemorrhage.    -Neurology evaluated, recommended to resume Eliquis. Continue statin. Annual follow up in stroke clinic.   -PT/OT now recommending SNF      RADHA on CKD stage III  Volume depletion due to dehydration   Mild hyperkalemia  -Baseline Cr about 1.3, Cr 2.06 on presentation, now 1.2.    -Improved with IV fluids. S/p Lokelma x 1 on 7/17.  -AM labs      Complex  disposition  -PT/OT evaluated, felt patient's functional status is at baseline. Recommended home with 24/7 care.  -partner discussed care with both patient's daughters and case management on 7/17.   - Family leaning towards pursuing long-term care after discharge as they are having difficulty taking care of her on their own and do not have finances for full-time caregiver.     Nausea, constipation   -KUB 7/14 with nonobstructive bowel gas pattern. Repeat KUB 7/18 with moderate stool burden.  - BM 7/19.    -Continue bowel regimen. Increased PPI to BID. PRN Zofran for nausea.      Uncontrolled diabetes mellitus type 2 with hyperglycemia  -A1c 9.80% this admission  -Increase Lantus to 30 units qhs with lispro 5u TIDAC and moderate-high dose SSI. Monitor glucose and adjust insulin as needed.  -Resumed home gabapentin at low-dose 300 mg daily.     Chronic anemia  Macrocytosis  -No evidence of overt GI bleeding this admission  -B12 and folate within normal limits  -Further workup of anemia as outpatient as appropriate.      Essential hypertension  -Was hypotensive at [facility].  -Not on antihypertensive medications.  -Continue to monitor.      Hyperlipidemia -Continue atorvastatin.  Hypothyroidism -Continue levothyroxine.  GERD -Continue PPI.     Expected Discharge Location and Transportation: Rehab vs LTC  Expected Discharge   Expected Discharge Date: 7/22/2024; Expected Discharge Time:       VTE Prophylaxis:  Pharmacologic & mechanical VTE prophylaxis orders are present.    AM-PAC 6 Clicks Score (PT): 15 (07/21/24 0000)    CODE STATUS:   Code Status and Medical Interventions:   Ordered at: 07/15/24 1319     Level Of Support Discussed With:    Patient     Code Status (Patient has no pulse and is not breathing):    CPR (Attempt to Resuscitate)     Medical Interventions (Patient has pulse or is breathing):    Full Support       Wayne Teixeira, EVELINA  07/21/24

## 2024-07-21 NOTE — PLAN OF CARE
Goal Outcome Evaluation:              Outcome Evaluation: Patient A&O x2 able to voice wants and needs to staff, has been more alert today and has had decrease in N&V episodes today, she was noted to only complain 1x. She has consumed all her meals without any issues noted. Patient has been up in the chair today and worked with therapy. Family has been here and at bedside today. Has alarms on and in place with call light within reach.

## 2024-07-21 NOTE — PLAN OF CARE
Goal Outcome Evaluation:              Outcome Evaluation: Patient is A&O x2 confused with place and time. She has complained of being dizzy, MD notified and new order for Meclizine this was given to her and was effective. She has had decrease N&V today, consumed % of all meals without any problems noted. Staff has turned and reposition often. Has alarms on and in place with call light within reach.

## 2024-07-22 LAB
ANION GAP SERPL CALCULATED.3IONS-SCNC: 10 MMOL/L (ref 5–15)
BUN SERPL-MCNC: 41 MG/DL (ref 8–23)
BUN/CREAT SERPL: 22.5 (ref 7–25)
CALCIUM SPEC-SCNC: 8 MG/DL (ref 8.6–10.5)
CHLORIDE SERPL-SCNC: 101 MMOL/L (ref 98–107)
CO2 SERPL-SCNC: 28 MMOL/L (ref 22–29)
CREAT SERPL-MCNC: 1.82 MG/DL (ref 0.57–1)
DEPRECATED RDW RBC AUTO: 50.2 FL (ref 37–54)
EGFRCR SERPLBLD CKD-EPI 2021: 27.5 ML/MIN/1.73
ERYTHROCYTE [DISTWIDTH] IN BLOOD BY AUTOMATED COUNT: 13.2 % (ref 12.3–15.4)
GLUCOSE BLDC GLUCOMTR-MCNC: 142 MG/DL (ref 70–130)
GLUCOSE BLDC GLUCOMTR-MCNC: 190 MG/DL (ref 70–130)
GLUCOSE BLDC GLUCOMTR-MCNC: 197 MG/DL (ref 70–130)
GLUCOSE BLDC GLUCOMTR-MCNC: 239 MG/DL (ref 70–130)
GLUCOSE SERPL-MCNC: 194 MG/DL (ref 65–99)
HCT VFR BLD AUTO: 29.8 % (ref 34–46.6)
HGB BLD-MCNC: 9.7 G/DL (ref 12–15.9)
MCH RBC QN AUTO: 33.3 PG (ref 26.6–33)
MCHC RBC AUTO-ENTMCNC: 32.6 G/DL (ref 31.5–35.7)
MCV RBC AUTO: 102.4 FL (ref 79–97)
PLATELET # BLD AUTO: 124 10*3/MM3 (ref 140–450)
PMV BLD AUTO: 12.4 FL (ref 6–12)
POTASSIUM SERPL-SCNC: 4.1 MMOL/L (ref 3.5–5.2)
RBC # BLD AUTO: 2.91 10*6/MM3 (ref 3.77–5.28)
SODIUM SERPL-SCNC: 139 MMOL/L (ref 136–145)
WBC NRBC COR # BLD AUTO: 7.35 10*3/MM3 (ref 3.4–10.8)

## 2024-07-22 PROCEDURE — A9270 NON-COVERED ITEM OR SERVICE: HCPCS | Performed by: STUDENT IN AN ORGANIZED HEALTH CARE EDUCATION/TRAINING PROGRAM

## 2024-07-22 PROCEDURE — 63710000001 SENNOSIDES-DOCUSATE 8.6-50 MG TABLET: Performed by: STUDENT IN AN ORGANIZED HEALTH CARE EDUCATION/TRAINING PROGRAM

## 2024-07-22 PROCEDURE — 63710000001 POLYETHYLENE GLYCOL 17 G PACK: Performed by: STUDENT IN AN ORGANIZED HEALTH CARE EDUCATION/TRAINING PROGRAM

## 2024-07-22 PROCEDURE — 85027 COMPLETE CBC AUTOMATED: CPT | Performed by: NURSE PRACTITIONER

## 2024-07-22 PROCEDURE — A9270 NON-COVERED ITEM OR SERVICE: HCPCS | Performed by: INTERNAL MEDICINE

## 2024-07-22 PROCEDURE — 82948 REAGENT STRIP/BLOOD GLUCOSE: CPT

## 2024-07-22 PROCEDURE — 25810000003 SODIUM CHLORIDE 0.9 % SOLUTION: Performed by: NURSE PRACTITIONER

## 2024-07-22 PROCEDURE — 63710000001 APIXABAN 5 MG TABLET: Performed by: STUDENT IN AN ORGANIZED HEALTH CARE EDUCATION/TRAINING PROGRAM

## 2024-07-22 PROCEDURE — 63710000001 PANTOPRAZOLE 40 MG TABLET DELAYED-RELEASE: Performed by: STUDENT IN AN ORGANIZED HEALTH CARE EDUCATION/TRAINING PROGRAM

## 2024-07-22 PROCEDURE — 63710000001 LEVOTHYROXINE 25 MCG TABLET: Performed by: INTERNAL MEDICINE

## 2024-07-22 PROCEDURE — 99232 SBSQ HOSP IP/OBS MODERATE 35: CPT | Performed by: NURSE PRACTITIONER

## 2024-07-22 PROCEDURE — 63710000001 NYSTATIN 100000 UNIT/GM POWDER 15 G BOTTLE: Performed by: STUDENT IN AN ORGANIZED HEALTH CARE EDUCATION/TRAINING PROGRAM

## 2024-07-22 PROCEDURE — 63710000001 BUSPIRONE 15 MG TABLET: Performed by: INTERNAL MEDICINE

## 2024-07-22 PROCEDURE — 92507 TX SP LANG VOICE COMM INDIV: CPT

## 2024-07-22 PROCEDURE — 63710000001 DONEPEZIL 10 MG TABLET: Performed by: INTERNAL MEDICINE

## 2024-07-22 PROCEDURE — 63710000001 HYDROCODONE-ACETAMINOPHEN 10-325 MG TABLET: Performed by: INTERNAL MEDICINE

## 2024-07-22 PROCEDURE — A9270 NON-COVERED ITEM OR SERVICE: HCPCS

## 2024-07-22 PROCEDURE — 63710000001 ATORVASTATIN 40 MG TABLET

## 2024-07-22 PROCEDURE — 63710000001 INSULIN GLARGINE PER 5 UNITS: Performed by: STUDENT IN AN ORGANIZED HEALTH CARE EDUCATION/TRAINING PROGRAM

## 2024-07-22 PROCEDURE — 63710000001 QUETIAPINE 25 MG TABLET: Performed by: INTERNAL MEDICINE

## 2024-07-22 PROCEDURE — 63710000001 ACETAMINOPHEN 325 MG TABLET: Performed by: INTERNAL MEDICINE

## 2024-07-22 PROCEDURE — 80048 BASIC METABOLIC PNL TOTAL CA: CPT | Performed by: NURSE PRACTITIONER

## 2024-07-22 PROCEDURE — 63710000001 INSULIN LISPRO (HUMAN) PER 5 UNITS: Performed by: STUDENT IN AN ORGANIZED HEALTH CARE EDUCATION/TRAINING PROGRAM

## 2024-07-22 PROCEDURE — A9270 NON-COVERED ITEM OR SERVICE: HCPCS | Performed by: NURSE PRACTITIONER

## 2024-07-22 PROCEDURE — 63710000001 GABAPENTIN 300 MG CAPSULE: Performed by: STUDENT IN AN ORGANIZED HEALTH CARE EDUCATION/TRAINING PROGRAM

## 2024-07-22 PROCEDURE — G0378 HOSPITAL OBSERVATION PER HR: HCPCS

## 2024-07-22 PROCEDURE — 63710000001 INSULIN LISPRO (HUMAN) PER 5 UNITS: Performed by: NURSE PRACTITIONER

## 2024-07-22 PROCEDURE — 63710000001 TERAZOSIN 2 MG CAPSULE: Performed by: INTERNAL MEDICINE

## 2024-07-22 RX ORDER — INSULIN LISPRO 100 [IU]/ML
7 INJECTION, SOLUTION INTRAVENOUS; SUBCUTANEOUS
Status: DISCONTINUED | OUTPATIENT
Start: 2024-07-22 | End: 2024-08-09 | Stop reason: HOSPADM

## 2024-07-22 RX ORDER — SODIUM CHLORIDE 9 MG/ML
75 INJECTION, SOLUTION INTRAVENOUS CONTINUOUS
Status: ACTIVE | OUTPATIENT
Start: 2024-07-22 | End: 2024-07-22

## 2024-07-22 RX ADMIN — INSULIN LISPRO 5 UNITS: 100 INJECTION, SOLUTION INTRAVENOUS; SUBCUTANEOUS at 08:41

## 2024-07-22 RX ADMIN — INSULIN LISPRO 5 UNITS: 100 INJECTION, SOLUTION INTRAVENOUS; SUBCUTANEOUS at 12:34

## 2024-07-22 RX ADMIN — DONEPEZIL HYDROCHLORIDE 10 MG: 10 TABLET, FILM COATED ORAL at 21:09

## 2024-07-22 RX ADMIN — NYSTATIN: 100000 POWDER TOPICAL at 21:10

## 2024-07-22 RX ADMIN — NYSTATIN: 100000 POWDER TOPICAL at 19:03

## 2024-07-22 RX ADMIN — ATORVASTATIN CALCIUM 80 MG: 40 TABLET, FILM COATED ORAL at 21:09

## 2024-07-22 RX ADMIN — INSULIN GLARGINE 30 UNITS: 100 INJECTION, SOLUTION SUBCUTANEOUS at 21:09

## 2024-07-22 RX ADMIN — HYDROCODONE BITARTRATE AND ACETAMINOPHEN 1 TABLET: 10; 325 TABLET ORAL at 06:20

## 2024-07-22 RX ADMIN — APIXABAN 5 MG: 5 TABLET, FILM COATED ORAL at 21:09

## 2024-07-22 RX ADMIN — ACETAMINOPHEN 650 MG: 325 TABLET ORAL at 03:48

## 2024-07-22 RX ADMIN — BUSPIRONE HYDROCHLORIDE 7.5 MG: 15 TABLET ORAL at 21:09

## 2024-07-22 RX ADMIN — QUETIAPINE FUMARATE 50 MG: 25 TABLET ORAL at 21:09

## 2024-07-22 RX ADMIN — INSULIN LISPRO 3 UNITS: 100 INJECTION, SOLUTION INTRAVENOUS; SUBCUTANEOUS at 12:33

## 2024-07-22 RX ADMIN — APIXABAN 5 MG: 5 TABLET, FILM COATED ORAL at 09:37

## 2024-07-22 RX ADMIN — PANTOPRAZOLE SODIUM 40 MG: 40 TABLET, DELAYED RELEASE ORAL at 19:03

## 2024-07-22 RX ADMIN — HYDROCODONE BITARTRATE AND ACETAMINOPHEN 1 TABLET: 10; 325 TABLET ORAL at 13:12

## 2024-07-22 RX ADMIN — SODIUM CHLORIDE 75 ML/HR: 9 INJECTION, SOLUTION INTRAVENOUS at 08:30

## 2024-07-22 RX ADMIN — NYSTATIN: 100000 POWDER TOPICAL at 09:36

## 2024-07-22 RX ADMIN — Medication 10 ML: at 21:10

## 2024-07-22 RX ADMIN — TERAZOSIN HYDROCHLORIDE ANHYDROUS 2 MG: 2 CAPSULE ORAL at 21:09

## 2024-07-22 RX ADMIN — PANTOPRAZOLE SODIUM 40 MG: 40 TABLET, DELAYED RELEASE ORAL at 06:20

## 2024-07-22 RX ADMIN — BUSPIRONE HYDROCHLORIDE 7.5 MG: 15 TABLET ORAL at 09:37

## 2024-07-22 RX ADMIN — INSULIN LISPRO 7 UNITS: 100 INJECTION, SOLUTION INTRAVENOUS; SUBCUTANEOUS at 17:48

## 2024-07-22 RX ADMIN — LEVOTHYROXINE SODIUM 25 MCG: 25 TABLET ORAL at 06:22

## 2024-07-22 RX ADMIN — GABAPENTIN 300 MG: 300 CAPSULE ORAL at 09:39

## 2024-07-22 RX ADMIN — INSULIN LISPRO 5 UNITS: 100 INJECTION, SOLUTION INTRAVENOUS; SUBCUTANEOUS at 17:47

## 2024-07-22 RX ADMIN — POLYETHYLENE GLYCOL 3350 17 G: 17 POWDER, FOR SOLUTION ORAL at 09:40

## 2024-07-22 RX ADMIN — NYSTATIN: 100000 POWDER TOPICAL at 12:34

## 2024-07-22 RX ADMIN — SENNOSIDES AND DOCUSATE SODIUM 2 TABLET: 50; 8.6 TABLET ORAL at 09:37

## 2024-07-22 RX ADMIN — INSULIN LISPRO 3 UNITS: 100 INJECTION, SOLUTION INTRAVENOUS; SUBCUTANEOUS at 08:40

## 2024-07-22 RX ADMIN — Medication 10 ML: at 09:40

## 2024-07-22 RX ADMIN — HYDROCODONE BITARTRATE AND ACETAMINOPHEN 1 TABLET: 10; 325 TABLET ORAL at 21:09

## 2024-07-22 NOTE — DISCHARGE PLACEMENT REQUEST
"Deya Cruz Cro (83 y.o. Female)     CM JAMMIE Oliver, RN     Rehab Referral       [phone]        Date of Birth   1940    Social Security Number       Address   [address] [facility] [address]    Home Phone   [phone]    MRN   [id number]       [facility]   [facility]    Marital Status                               Admission Date   7/14/24    Admission Type   Urgent    Admitting Provider   Michelle Chávez MD    Attending Provider   Michelle Chávez MD    Department, Room/Bed   [facility] 3F, S304/1       Discharge Date       Discharge Disposition       Discharge Destination                                 Attending Provider: Michelle Chávez MD    Allergies: Penicillins, Sulfa Antibiotics, Tetracyclines & Related, Valium [Diazepam]    Isolation: None   Infection: None   Code Status: CPR    Ht: 170.2 cm (67\")   Wt: 93.1 kg (205 lb 4 oz)    Admission Cmt: None   Principal Problem: AMS (altered mental status) [R41.82]                   Active Insurance as of 7/14/2024       Primary Coverage       Payor Plan Insurance Group Employer/Plan Group    HUMANA MEDICARE REPLACEMENT HUMANA MED ADV HMO [id number]       Payor Plan Address Payor Plan Phone Number Payor Plan Fax Number Effective Dates    [address] [address] [phone]  2/1/2023 - None Entered    [facility] [address]         Subscriber Name Subscriber Birth Date Member ID       DEYA CRUZ CRO 1940 [id number]                     Emergency Contacts        (Rel.) Home Phone Work Phone Mobile Phone    Radha Cruz (Daughter) [phone] -- [phone]    EVITA MADDEN (Daughter) [phone] -- --    OMEGA CRUZ (Son) [phone] -- [phone]    Vanessa Cruz (Spouse) [phone] -- --    Shayne Madden (Other) [phone] -- --                 History & Physical        Otilia Chang MD at 07/14/24 1917              [facility] Medicine Services  HISTORY AND PHYSICAL    Patient " Name: Cheri Cruz  : 1940  MRN: [id number]  Primary Care Physician: Lorrie Canada APRN  Date of admission: 2024      Subjective  Subjective     Chief Complaint:  Altered mental state    HPI:  Patient with dementia and expressive aphasia.  Cannot contribute much to HPI.  Most of the history is obtained from reviewing her old records and records from [facility].  Attempted to reach her daughter Ms. Garcia 3 times but voicemail is full    Cheri Cruz is a 83 y.o. 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    She presented to [facility] with worsening encephalopathy/altered mental state and generalized bodyaches.  She has baseline dementia but her mental state was significantly below her baseline.  She was found to be hypertensive with systolic in the 90s.  Lab workup with creatinine of 2.0 from her baseline of 1.5.  Potassium 5.6.  Blood sugar 436.  Hemoglobin of 11.0.  Urine analysis with too numerous WBCs.  Chest x-ray clear.  CT head with questionable left parietal tiny hemorrhage versus calcification.  She was transferred to [facility] for neurology evaluation after she was accepted by stroke team.    At the time of the encounter, patient is mildly confused.  She thinks she is in [place].  She is able to tell me the name of her daughter.  Complaining of some lower abdominal pain and burning sensation with a urine otherwise she has no active complaint.      Addendum 8:30 PM:  Managed to reach out to the daughter/Ms. Garcia who lives in provide care to the patient.  Daughter reported to me that her mother is bedbound since her stroke in  with minimal activity.  She is incontinent to urine  "and stool.  She has severe depression.  Over the last few weeks, her memory has been declining as well as her mood.  She became less engaged and more depressed.  Her appetite is good but she eats what she wants to eat.  This morning, her mother started complaining of shortness of breath and kept telling her \" I am going to die\" and when her daughter checked her oxygen saturation, it was in the 60s so she put oxygen on (she is on as needed oxygen at home but does not like to wear it).  Soon after, patient became pale and she could not feel pulse and was about to start CPR when the patient quickly recovered and her oxygen saturation improved to the upper 90s.  At that time, she thought that her mother recovered so she can call 911 immediately.  Later on, her mother kept acting weird and became more lethargic which eventually prompted her to call 911.    Personal History     Past Medical History:   Diagnosis Date    Abnormal heart rhythm     Anxiety     Arrhythmia     Arthritis     Atrial fibrillation     Dementia     Diabetes mellitus     Hyperlipidemia     Hypertension     Kidney disorder     Stroke     Uterus cancer            Past Surgical History:   Procedure Laterality Date    CATARACT EXTRACTION, BILATERAL         Family History: family history includes Alcohol abuse in her brother; Cancer in her brother, father, and mother; Congenital heart disease in her mother; Heart disease in her mother.     Social History:  reports that she has never smoked. She has never used smokeless tobacco. She reports that she does not drink alcohol and does not use drugs.  Social History     Social History Narrative    Not on file       Medications:  Available home medication information reviewed.  DULoxetine, HYDROcodone-acetaminophen, Insulin Lispro, LORazepam, QUEtiapine, apixaban, busPIRone, donepezil, gabapentin, levothyroxine, meclizine, omeprazole, rosuvastatin, sennosides-docusate, and terazosin    Allergies   Allergen " Reactions    Penicillins Unknown - Low Severity     Tolerates ceftriaxones    Sulfa Antibiotics     Tetracyclines & Related Unknown (See Comments)    Valium [Diazepam] Anxiety       Objective  Objective     Vital Signs:   Temp:  [97.7 °F (36.5 °C)-98.1 °F (36.7 °C)] 97.7 °F (36.5 °C)  Heart Rate:  [64-87] 65  Resp:  [18-20] 18  BP: ()/(46-65) 123/48  Flow (L/min):  [2] 2       Physical Exam   General: Pleasantly confused.  Not in distress.  Conversant with expressive aphasia  Head: Atraumatic and normocephalic  Eyes: No Icterus. No pallor  Ears:  Ears appear intact with no abnormalities noted  Throat: No oral lesions, no thrush  Neck: Supple, trachea midline  Lungs: Clear to auscultation bilaterally, equal air entry, no wheezing or crackles  Heart:  Normal S1 and S2, no murmur, no gallop, No JVD, no lower extremity swelling  Abdomen:  Soft, no tenderness, no organomegaly, normal bowel sounds, no organomegaly  Extremities: pulses equal bilaterally  Skin: No bleeding, bruising or rash, normal skin turgor and elasticity  Neurologic: Mild expressive aphasia.  No gross motor deficit  Psych: Alert and oriented x 1    Result Review:  I have personally reviewed the results from the time of this admission to 7/14/2024 19:55 EDT and agree with these findings:  [x]  Laboratory list / accordion  [x]  Microbiology  []  Radiology  [x]  EKG/Telemetry   [x]  Cardiology/Vascular   []  Pathology  [x]  Old records      LAB RESULTS:      Lab 07/14/24  1519   WBC 7.87   HEMOGLOBIN 11.0*   HEMATOCRIT 35.2   PLATELETS 139*   NEUTROS ABS 5.72   IMMATURE GRANS (ABS) 0.02   LYMPHS ABS 1.52   MONOS ABS 0.44   EOS ABS 0.14   .2*         Lab 07/14/24  1519   SODIUM 137   POTASSIUM 5.6*   CHLORIDE 101   CO2 27.8   ANION GAP 8.2   BUN 35*   CREATININE 2.06*   EGFR 23.7*   GLUCOSE 436*   CALCIUM 9.2   MAGNESIUM 1.6   TSH 2.380         Lab 07/14/24  1519   TOTAL PROTEIN 7.0   ALBUMIN 3.9   GLOBULIN 3.1   ALT (SGPT) 5   AST (SGOT) 13    BILIRUBIN 0.2   ALK PHOS 68         Lab 07/14/24  1519   HSTROP T 37*                 UA          12/17/2023    09:48 3/25/2024    01:50 7/14/2024    15:27   Urinalysis   Squamous Epithelial Cells, UA   None Seen    Specific [place], UA 1.020     1.015     >=1.030    Ketones, UA   Negative    Blood, UA Negative     Negative     Trace    Leukocytes, UA Negative     SMALL     Moderate (2+)    Nitrite, UA Negative     Negative     Negative    RBC, UA 0-2      Unable to determine due to loaded field    WBC, UA   11-20    Bacteria, UA Rare      None Seen       Details          This result is from an external source.               Microbiology Results (last 10 days)       Procedure Component Value - Date/Time    COVID-19 and FLU A/B PCR, 1 HR TAT - Swab, Nasopharynx [[id number]]  (Normal) Collected: 07/14/24 1622    Lab Status: Final result Specimen: Swab from Nasopharynx Updated: 07/14/24 1703     COVID19 Not Detected     Influenza A PCR Not Detected     Influenza B PCR Not Detected    Narrative:      Fact sheet for providers: https://www.fda.gov/media/521232/download    Fact sheet for patients: https://www.fda.gov/media/374500/download    Test performed by PCR.            XR Chest 1 View    Result Date: 7/14/2024  PROCEDURE: XR CHEST 1 VW-  HISTORY: Weak/Dizzy/AMS triage protocol, decreased O2 sats. Altered mental status.  COMPARISON: April 29, 2023.  FINDINGS: The heart is stable.. Decreased inspiratory effort noted. There is evidence of old calcified granulomatous disease. Few linear densities are noted bilaterally which are stable. No new area of consolidation seen.. The mediastinum is unremarkable. There is no pneumothorax.  There are no acute osseous abnormalities.      Impression: Stable chest..      This report was signed and finalized on 7/14/2024 4:23 PM by Mare Kwong MD.      CT Head Without Contrast    Result Date: 7/14/2024  PROCEDURE: CT HEAD WO CONTRAST-  HISTORY: increased AMS, weakness, h/o stroke   COMPARISON: April 29, 2023..  TECHNIQUE: Multiple axial CT images were performed from the foramen magnum to the vertex. Individualized dose reduction techniques using automated exposure control or adjustment of mA and/or kV according to the patient size were employed.  FINDINGS: There is moderate, age-appropriate, stable generalized cerebral atrophy. The ventricles are enlarged. Again noted is the area of encephalomalacia in the left posterior parietal region. Extent is stable from prior exam. High attenuation has developed in the cortex has in the regions of the previous MCA; finding is new compared to the prior exam. Suspect this represents postischemic cortical calcification but hemorrhage is not completely excluded. Recommend brain MRI. There is no evidence of edema or hemorrhage.  No masses are identified. No extra-axial fluid is seen. The paranasal sinuses are unremarkable. Mild small vessel ischemic disease noted.      Impression: New cortical high attenuation material at site of previous left posterior parietal CVA compared to April 2023, suspect postischemic cortical calcification but hemorrhage not completely excluded. Recommend brain MRI.     CTDI: 36.09 mGy DLP:604.53 mGy.cm  This report was signed and finalized on 7/14/2024 4:21 PM by Mare Kwong MD.       Results for orders placed during the hospital encounter of 02/10/23    Adult Transthoracic Echo Complete W/ Cont if Necessary Per Protocol    Interpretation Summary    Left ventricular systolic function is hyperdynamic (EF > 70%). Calculated left ventricular EF = 70.6% Left ventricular ejection fraction appears to be greater than 70%. The left ventricular cavity is small in size. Left ventricular wall thickness is consistent with mild concentric hypertrophy. All left ventricular wall segments contract normally. Left ventricular intracavitary gradient noted to be 71 mmHg. Left ventricular diastolic function is consistent with (grade I) impaired  relaxation. Normal left atrial pressure.    The aortic valve is abnormal in structure. There is mild calcification of the aortic valve mainly affecting the right coronary cusp(s). The aortic valve appears trileaflet. No aortic valve regurgitation is present. Gradient noted through the LV and LVOT    Compared to TTE report from  Dec 2019, hyperdynamic LV with intracavitary gradient is not a new finding but peak gradient noted on this exam is higher than previously described.      Assessment & Plan  Assessment & Plan       * No active hospital problems. *      Summary:  Cheri Cruz is a 83 y.o. 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    Concern for left parietal tiny hemorrhage versus calcification  Old left parietal ischemic stroke  Acute metabolic encephalopathy  Possible UTI  Baseline dementia  Patient with baseline dementia.  Presented to [facility] with cognitive decline and metabolic encephalopathy.  She was hypotensive with systolic in the 90s.  Patient does report urinary symptoms in the form of urgency, frequency and suprapubic tenderness.  UA with too numerous WBCs without bacteriuria.  Will initiate antibiotic therapy with IV Rocephin and repeat the urine analysis and obtain urine culture.  Patient has previous UTI with Enterococcus faecium  CT from [facility] with left parietal tiny hemorrhage versus calcification on top of old ischemic stroke.  Discussed with stroke team and plan to hold Eliquis for now.  Neurology to evaluate  Holding sedatives: Gabapentin, meclizine and lorazepam    Addendum 8:30 PM  Acute hypoxia, improved  ?  Near syncope  Per patient report, patient became hypoxic this morning with  oxygen saturation in the  60s.  At that time, she could not feel pulse and patient became pale.  Quickly recovered after she applied oxygen  Patient is on Eliquis 2.5 mg twice daily for previous history of PE and for A-fib.  With that history mentioned above, I am concerned about PE causing her hypoxia and near syncope  Will proceed with CTA chest.  Benefit outweighs the risk    RADHA on CKD stage III  Dehydration volume depletion  Mild hyperkalemia  IV fluids  Monitor kidney functions with a.m. lab    Type 2 diabetes with uncontrolled hyperglycemia  Check A1c  Insulin glargine and SSI    Essential hypertension  Was hypotensive and [facility].  No blood pressure improved  Not on antihypertensive  medications    Dyslipidemia  Statins    Dementia  Continue donepezil    VTE Prophylaxis:  Mechanical VTE prophylaxis orders are present.          CODE STATUS:    There are no questions and answers to display.       Expected Discharge   Expected Discharge Date: 7/15/2024; Expected Discharge Time:      Otilia Chang MD  24     Electronically signed by Otilia Chang MD at 24          Physician Progress Notes (most recent note)        Wayne Teixeira APRN at 24 0820              [facility] Medicine Services  PROGRESS NOTE    Patient Name: Cheri Cruz  : 1940  MRN: [id number]    Date of Admission: 2024  Primary Care Physician: Lorrie Canada APRN    Subjective   Subjective     CC:  Follow-up AMS    HPI:  Patient seen resting in bed attempting to eat breakfast in no apparent distress.  No acute events overnight per nursing.  She is currently alert and oriented to self.  No complaints at this time.      Objective   Objective     Vital Signs:   Temp:  [97.3 °F (36.3 °C)-98 °F (36.7 °C)] 97.7 °F (36.5 °C)  Heart Rate:  [65-95] 68  Resp:  [16-18] 18  BP: ()/(30-95) 123/57  Flow (L/min):  [1] 1     Physical Exam:  Constitutional: No  acute distress, awake, alert, frail, chronically ill  HENT: NCAT, mucous membranes moist  Respiratory: Clear to auscultation bilaterally, respiratory effort normal on 1 L  Cardiovascular: RRR, no murmurs, cap refill brisk   Gastrointestinal: Positive bowel sounds, soft, nontender, nondistended  Musculoskeletal: No BLE edema   Psychiatric: Flat affect, cooperative  Neurologic: Oriented x 1, moves all extremities, speech clear  Skin: warm, dry, no visible rash       Results Reviewed:  LAB RESULTS:      Lab 07/16/24  0407 07/15/24  0824 07/14/24 2043 07/14/24 2022 07/14/24  1519   WBC 7.06 8.30  --   --  7.87   HEMOGLOBIN 10.6* 10.3*  --   --  11.0*   HEMATOCRIT 34.9 33.5*  --   --  35.2   PLATELETS 90* 124*  --   --  139*   NEUTROS ABS  --  5.29  --   --  5.72   IMMATURE GRANS (ABS)  --  0.02  --   --  0.02   LYMPHS ABS  --  2.28  --   --  1.52   MONOS ABS  --  0.44  --   --  0.44   EOS ABS  --  0.25  --   --  0.14   .1* 104.0*  --   --  103.2*   CRP  --  <0.30  --   --   --    LACTATE  --   --   --  1.4  --    D DIMER QUANT  --   --  0.31  --   --          Lab 07/19/24  0550 07/18/24  1045 07/17/24  0742 07/16/24  0407 07/15/24  2344 07/15/24  0824 07/14/24  1519   SODIUM 137 138 137 137  --  140 137   POTASSIUM 4.7 5.1 5.8* 5.2  --  4.6 5.6*   CHLORIDE 98 100 103 104  --  104 101   CO2 27.0 30.0* 25.0 25.0  --  23.0 27.8   ANION GAP 12.0 8.0 9.0 8.0  --  13.0 8.2   BUN 19 18 22 25*  --  26* 35*   CREATININE 1.19* 1.36* 1.48* 1.44*  --  1.53* 2.06*   EGFR 45.7* 39.0* 35.2* 36.4*  --  33.8* 23.7*   GLUCOSE 202* 229* 257* 294*  --  306* 436*   CALCIUM 9.3 9.2 8.7 8.3*  --  8.2* 9.2   MAGNESIUM  --   --   --   --  2.8* 1.5* 1.6   PHOSPHORUS  --   --   --   --   --  4.2  --    HEMOGLOBIN A1C  --   --   --   --   --  9.80*  --    TSH  --   --   --   --   --   --  2.380         Lab 07/18/24  1045 07/15/24  0824 07/14/24  1519   TOTAL PROTEIN 6.5 6.1 7.0   ALBUMIN 3.8 3.6 3.9   GLOBULIN  --  2.5 3.1   ALT (SGPT) 5  5 5   AST (SGOT) 16 12 13   BILIRUBIN 0.2 <0.2 0.2   BILIRUBIN DIRECT <0.2  --   --    ALK PHOS 66 58 68         Lab 07/14/24  2243 07/14/24 2043 07/14/24  1519   HSTROP T 37* 36* 37*         Lab 07/15/24  0824   CHOLESTEROL 106   LDL CHOL 43   HDL CHOL 40   TRIGLYCERIDES 132         Lab 07/14/24 2043   FOLATE 15.20   VITAMIN B 12 550         Lab 07/18/24  1510   PH, ARTERIAL 7.380   PCO2, ARTERIAL 53.1*   PO2 ART 82.4*   FIO2 24   HCO3 ART 31.4*   BASE EXCESS ART 5.2*   CARBOXYHEMOGLOBIN 1.1     Brief Urine Lab Results  (Last result in the past 365 days)        Color   Clarity   Blood   Leuk Est   Nitrite   Protein   CREAT   Urine HCG        07/14/24 2001 Yellow   Cloudy   Trace   Small (1+)   Negative   Negative                   Microbiology Results Abnormal       Procedure Component Value - Date/Time    Blood Culture - Blood, Wrist, Left [[id number]]  (Normal) Collected: 07/14/24 2022    Lab Status: Final result Specimen: Blood from Wrist, Left Updated: 07/19/24 2100     Blood Culture No growth at 5 days    Narrative:      Less than seven (7) mL's of blood was collected.  Insufficient quantity may yield false negative results.    Blood Culture - Blood, Hand, Left [[id number]]  (Normal) Collected: 07/14/24 2022    Lab Status: Final result Specimen: Blood from Hand, Left Updated: 07/19/24 2100     Blood Culture No growth at 5 days    Narrative:      Less than seven (7) mL's of blood was collected.  Insufficient quantity may yield false negative results.    Urine Culture - Urine, Urine, Random Void [[id number]] Collected: 07/14/24 2001    Lab Status: Final result Specimen: Urine, Random Void Updated: 07/16/24 1020     Urine Culture 50,000 CFU/mL Normal Urogenital Nickie    Narrative:      Colonization of the urinary tract without infection is common. Treatment is discouraged unless the patient is symptomatic, pregnant, or undergoing an invasive urologic procedure.            No radiology results from the last 24  hrs    Results for orders placed during the hospital encounter of 02/10/23    Adult Transthoracic Echo Complete W/ Cont if Necessary Per Protocol    Interpretation Summary    Left ventricular systolic function is hyperdynamic (EF > 70%). Calculated left ventricular EF = 70.6% Left ventricular ejection fraction appears to be greater than 70%. The left ventricular cavity is small in size. Left ventricular wall thickness is consistent with mild concentric hypertrophy. All left ventricular wall segments contract normally. Left ventricular intracavitary gradient noted to be 71 mmHg. Left ventricular diastolic function is consistent with (grade I) impaired relaxation. Normal left atrial pressure.    The aortic valve is abnormal in structure. There is mild calcification of the aortic valve mainly affecting the right coronary cusp(s). The aortic valve appears trileaflet. No aortic valve regurgitation is present. Gradient noted through the LV and LVOT    Compared to TTE report from  Dec 2019, hyperdynamic LV with intracavitary gradient is not a new finding but peak gradient noted on this exam is higher than previously described.      Current medications:  Scheduled Meds:apixaban, 5 mg, Oral, BID  atorvastatin, 80 mg, Oral, Nightly  busPIRone, 7.5 mg, Oral, BID  donepezil, 10 mg, Oral, Nightly  gabapentin, , ,   gabapentin, 300 mg, Oral, Daily  insulin glargine, 30 Units, Subcutaneous, Nightly  insulin lispro, 3-14 Units, Subcutaneous, 4x Daily AC & at Bedtime  Insulin Lispro, 5 Units, Subcutaneous, TID With Meals  levothyroxine, 25 mcg, Oral, Daily  nystatin, , Topical, 4x Daily  pantoprazole, 40 mg, Oral, BID AC  senna-docusate sodium, 2 tablet, Oral, Daily   And  polyethylene glycol, 17 g, Oral, Daily  QUEtiapine, 50 mg, Oral, Nightly  sodium chloride, 10 mL, Intravenous, Q12H  sodium chloride, 10 mL, Intravenous, Q12H  terazosin, 2 mg, Oral, Nightly      Continuous Infusions:   PRN Meds:.  acetaminophen **OR**  acetaminophen **OR** acetaminophen    senna-docusate sodium **AND** polyethylene glycol **AND** bisacodyl **AND** bisacodyl    Calcium Replacement - Follow Nurse / BPA Driven Protocol    dextrose    dextrose    gabapentin    glucagon (human recombinant)    HYDROcodone-acetaminophen    LORazepam    Magnesium Standard Dose Replacement - Follow Nurse / BPA Driven Protocol    [Held by provider] meclizine    ondansetron ODT **OR** ondansetron    Phosphorus Replacement - Follow Nurse / BPA Driven Protocol    Potassium Replacement - Follow Nurse / BPA Driven Protocol    sodium chloride    sodium chloride    sodium chloride    sodium chloride    Assessment & Plan   Assessment & Plan     Active Hospital Problems    Diagnosis  POA    **AMS (altered mental status) [R41.82]  Yes    Stroke [I63.9]  Yes      Resolved Hospital Problems   No resolved problems to display.        Brief Hospital Course to date:  Cheri Cruz is a 83 y.o. 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  Initial stroke workup was negative.  MRI showed old left parietal ischemic stroke.  Stroke neurology followed and resumed Eliquis.  She received empiric IV ceftriaxone x 3 days due to concern for UTI.     This patient's problems and plans were partially entered by my partner and updated as appropriate by me 07/21/24.     Altered mental status on advanced dementia  Hypotension   Concern for UTI  - initial stroke workup negative.  MRI showed old L parietal ischemic stroke; pt seen by Stroke team; apixiban restarted.   -Possibly dehydration vs hypoxia  -COVID/flu negative. Blood cultures with no growth to date. LFTs normal. CXR no acute findings.    -CXR with old  calcified gr  -S/p empiric IV ceftriaxone x 3 days.   -Held sedatives initially, Okay to resume PRN lorazepam.   -Continue donepezil, Seroquel, BuSpar.     Acute on chronic hypoxemic respiratory failure  Near-syncope  -Per report, patient became hypoxic with oxygen saturation in the 60s  -CTA chest showed no PE, nonacute  -Patient is O2 dependent on 2L NC but is not compliant due her baseline dementia.  -Continue supplemental oxygenation, titrate for goal SpO2 greater than 90%.     Evolving old left parietal ischemic stroke  -Presented to [facility] with cognitive decline and metabolic encephalopathy. She was hypotensive with systolic in the 90s at [facility].   -CTH from [facility] with left parietal tiny hemorrhage vs calcification on top of old ischemic stroke.   -MRI showed evolving old left parietal lobe CVA with some areas of associated cortical laminar necrosis. No hemorrhage.    -Neurology evaluated, recommended to resume Eliquis. Continue statin. Annual follow up in stroke clinic.   -PT/OT now recommending SNF      RADHA on CKD stage III  Volume depletion due to dehydration   Mild hyperkalemia  -Baseline Cr about 1.3, Cr 2.06 on presentation, now 1.2.    -Improved with IV fluids. S/p Lokelma x 1 on 7/17.  -AM labs      Complex disposition  -PT/OT evaluated, felt patient's functional status is at baseline. Recommended home with 24/7 care.  -partner discussed care with both patient's daughters and case management on 7/17.   - Family leaning towards pursuing long-term care after discharge as they are having difficulty taking care of her on their own and do not have finances for full-time caregiver.     Nausea, constipation   -KUB 7/14 with nonobstructive bowel gas pattern. Repeat KUB 7/18 with moderate stool burden.  - BM 7/19.    -Continue bowel regimen. Increased PPI to BID. PRN Zofran for nausea.      Uncontrolled diabetes mellitus type 2 with hyperglycemia  -A1c 9.80% this admission  -Increase Lantus to 30 units qhs with lispro  5u TIDAC and moderate-high dose SSI. Monitor glucose and adjust insulin as needed.  -Resumed home gabapentin at low-dose 300 mg daily.     Chronic anemia  Macrocytosis  -No evidence of overt GI bleeding this admission  -B12 and folate within normal limits  -Further workup of anemia as outpatient as appropriate.      Essential hypertension  -Was hypotensive at [facility].  -Not on antihypertensive medications.  -Continue to monitor.      Hyperlipidemia -Continue atorvastatin.  Hypothyroidism -Continue levothyroxine.  GERD -Continue PPI.     Expected Discharge Location and Transportation: Rehab vs LTC  Expected Discharge   Expected Discharge Date: 2024; Expected Discharge Time:       VTE Prophylaxis:  Pharmacologic & mechanical VTE prophylaxis orders are present.    AM-PAC 6 Clicks Score (PT): 15 (24 0000)    CODE STATUS:   Code Status and Medical Interventions:   Ordered at: 07/15/24 1319     Level Of Support Discussed With:    Patient     Code Status (Patient has no pulse and is not breathing):    CPR (Attempt to Resuscitate)     Medical Interventions (Patient has pulse or is breathing):    Full Support       EVELINA Olsen  24      Electronically signed by Wayne Teixeira APRN at 24 0825          Physical Therapy Notes (most recent note)        Freda Leonard, PT at 24 1025  Version 1 of 1         Patient Name: Cheri Cruz  : 1940    MRN: [id number]                              Today's Date: 2024       Admit Date: 2024    Visit Dx:     ICD-10-CM ICD-9-CM   1. Cognitive communication deficit  R41.841 799.52   2. Disorientation  R41.0 780.99     Patient Active Problem List   Diagnosis    Hyperlipidemia LDL goal <70    Type 2 diabetes mellitus without complication, without long-term current use of insulin    GERD (gastroesophageal reflux disease)    Essential hypertension    Abnormal EKG    Osteoarthritis    Anxiety    Palpitations    Vertigo     History of ischemic left MCA stroke    Hemorrhagic stroke    History of pulmonary embolus (PE)    Confusion    Hypotension    Constipation    UTI (urinary tract infection) due to urinary indwelling catheter    Metabolic encephalopathy    Symptomatic anemia    Acquired hypothyroidism    AMS (altered mental status)    Stroke     Past Medical History:   Diagnosis Date    Abnormal heart rhythm     Anxiety     Arrhythmia     Arthritis     Atrial fibrillation     Dementia     Diabetes mellitus     Hyperlipidemia     Hypertension     Kidney disorder     Stroke     Uterus cancer      Past Surgical History:   Procedure Laterality Date    CATARACT EXTRACTION, BILATERAL        General Information       Row Name 07/20/24 1404          Physical Therapy Time and Intention    Document Type re-evaluation  -     Mode of Treatment physical therapy  -       Row Name 07/20/24 1404          General Information    Patient Profile Reviewed yes  -SS     Prior Level of Function mod assist:;bed mobility;ADL's  pt. family reports largely bedbound but does facilitate rolling and pericare in the bed at home  -     Existing Precautions/Restrictions fall;other (see comments)  BUE tremors R>L, R sided hypertonia, non- ambulatory at baseline, diplopia, [place]  -     Barriers to Rehab medically complex;previous functional deficit;cognitive status;visual deficit;hearing deficit  -       Row Name 07/20/24 1404          Living Environment    People in Home spouse  -       Row Name 07/20/24 1404          Home Main Entrance    Number of Stairs, Main Entrance none  -       Row Name 07/20/24 1404          Stairs Within Home, Primary    Number of Stairs, Within Home, Primary none  -       Row Name 07/20/24 1404          Cognition    Orientation Status (Cognition) oriented to;person;verbal cues/prompts needed for orientation;place;situation;time  -       Row Name 07/20/24 1404          Safety Issues, Functional Mobility    Safety Issues  Affecting Function (Mobility) awareness of need for assistance;insight into deficits/self-awareness;judgment;problem-solving;safety precaution awareness;safety precautions follow-through/compliance;sequencing abilities  -     Impairments Affecting Function (Mobility) balance;cognition;endurance/activity tolerance;pain;postural/trunk control;range of motion (ROM);strength;muscle tone abnormal  -     Cognitive Impairments, Mobility Safety/Performance attention;awareness, need for assistance;insight into deficits/self-awareness;judgment;problem-solving/reasoning;safety precaution awareness;safety precaution follow-through;sequencing abilities  -SS               User Key  (r) = Recorded By, (t) = Taken By, (c) = Cosigned By      Initials Name Provider Type     Freda Leonard PT Physical Therapist                   Mobility       Row Name 07/20/24 1415          Bed Mobility    Bed Mobility rolling right;rolling left;scooting/bridging  -     Rolling Left [place] (Bed Mobility) maximum assist (25% patient effort);2 person assist;verbal cues;nonverbal cues (demo/gesture)  -     Rolling Right [place] (Bed Mobility) maximum assist (25% patient effort);2 person assist;verbal cues;nonverbal cues (demo/gesture)  -     Scooting/Bridging [place] (Bed Mobility) maximum assist (25% patient effort);2 person assist;verbal cues;other (see comments);nonverbal cues (demo/gesture)  -     Assistive Device (Bed Mobility) draw sheet;bed rails  -     Comment, (Bed Mobility) V/TC for sequencing  -       Row Name 07/20/24 1415          Bed-Chair Transfer    Bed-Chair [place] (Transfers) dependent (less than 25% patient effort);2 person assist;verbal cues  -     Assistive Device (Bed-Chair Transfers) lift device  -       Row Name 07/20/24 1415          Sit-Stand Transfer    Sit-Stand [place] (Transfers) not tested  -       Row Name 07/20/24 1415          Gait/Stairs (Locomotion)    [place]  Level (Gait) not tested  -     Comment, (Gait/Stairs) non ambulatory at baseline  -               User Key  (r) = Recorded By, (t) = Taken By, (c) = Cosigned By      Initials Name Provider Type     Freda Leonard, PT Physical Therapist                   Obj/Interventions       Row Name 07/20/24 1418          Range of Motion Comprehensive    Comment, General Range of Motion RLE hypertonia - able to achieve full ROM passively, AROM limited by 25%; LLE WFL AROM  -       Row Name 07/20/24 1418          Strength Comprehensive (MMT)    Comment, General Manual Muscle Testing (MMT) Assessment BLE gross 3+/5 per SLR test, pt. able to bridge in supine w/adequate hip clearance  -       Row Name 07/20/24 1418          Motor Skills    Therapeutic Exercise hip;knee;ankle  -[facility] Name 07/20/24 1418          Hip (Therapeutic Exercise)    Hip (Therapeutic Exercise) AAROM (active assistive range of motion)  -     Hip AAROM (Therapeutic Exercise) bilateral;aBduction;aDduction;flexion;extension;external rotation;internal rotation;10 repetitions  -[facility] Name 07/20/24 1418          Knee (Therapeutic Exercise)    Knee (Therapeutic Exercise) AAROM (active assistive range of motion)  -     Knee AAROM (Therapeutic Exercise) bilateral;flexion;extension;10 repetitions  -       Row Name 07/20/24 1418          Ankle (Therapeutic Exercise)    Ankle (Therapeutic Exercise) AAROM (active assistive range of motion)  -     Ankle AAROM (Therapeutic Exercise) bilateral;dorsiflexion;plantarflexion;10 repetitions  -[facility] Name 07/20/24 1418          Balance    Balance Assessment sitting static balance;sitting dynamic balance  -     Static Sitting Balance supervision  -     Dynamic Sitting Balance standby assist  -     Position, Sitting Balance supported;sitting in chair  -     Balance Interventions sitting;supported;static;dynamic  -               User Key  (r) = Recorded By, (t) = Taken By, (c) = Cosigned  By      Initials Name Provider Type    Freda Wayne, PT Physical Therapist                   Goals/Plan       Row Name 07/20/24 1524          Bed Mobility Goal 1 (PT)    Activity/Assistive Device (Bed Mobility Goal 1, PT) bed mobility activities, all  -     [place] Level/Cues Needed (Bed Mobility Goal 1, PT) moderate assist (50-74% patient effort)  -     Time Frame (Bed Mobility Goal 1, PT) short term goal (STG);5 days  -SS       Row Name 07/20/24 1524          Transfer Goal 1 (PT)    Activity/Assistive Device (Transfer Goal 1, PT) bed-to-chair/chair-to-bed;walker, rolling  -     [place] Level/Cues Needed (Transfer Goal 1, PT) maximum assist (25-49% patient effort)  -     Time Frame (Transfer Goal 1, PT) long term goal (LTG);10 days  -SS       Row Name 07/20/24 1524          Therapy Assessment/Plan (PT)    Planned Therapy Interventions (PT) balance training;bed mobility training;home exercise program;neuromuscular re-education;patient/family education;postural re-education;ROM (range of motion);strengthening;stretching;transfer training  -               User Key  (r) = Recorded By, (t) = Taken By, (c) = Cosigned By      Initials Name Provider Type    Freda Wayne PT Physical Therapist                   Clinical Impression       Row Name 07/20/24 1440          Pain    Pain Intervention(s) Repositioned;Ambulation/increased activity;Elevated  -     Additional Documentation Pain Scale: FACES Pre/Post-Treatment (Group)  -SS       Row Name 07/20/24 1440          Pain Scale: FACES Pre/Post-Treatment    Pain: FACES Scale, Pretreatment 0-->no hurt  -     Posttreatment Pain Rating 0-->no hurt  -SS       Row Name 07/20/24 1440          Plan of Care Review    Plan of Care Reviewed With patient;daughter  -     Outcome Evaluation PT re-eval complete. Pt. presents below baseline function w/generalized weakness and decreased functional endurance affecting her ability to safely participate in  functional mobility. She performed bed mobility w/max assist of 2 and transferred to the recliner via mechanical lift device. Pt. would benefit from acute PT services to address stated deficits to decrease caregiver burden and maximize pt. safety and independence.  -       Row Name 07/20/24 1440          Therapy Assessment/Plan (PT)    Patient/Family Therapy Goals Statement (PT) to get stronger to get home w/family  -SS     Rehab Potential (PT) fair, will monitor progress closely  -     Criteria for Skilled Interventions Met (PT) yes;meets criteria;skilled treatment is necessary  -     Therapy Frequency (PT) 3 times/wk  -     Predicted Duration of Therapy Intervention (PT) 10 days  -       Row Name 07/20/24 1440          Vital Signs    Pre Systolic BP Rehab 122  -SS     Pre Treatment Diastolic BP 67  -SS     Post Systolic BP Rehab 115  -SS     Post Treatment Diastolic BP 77  -SS     Pretreatment Heart Rate (beats/min) 72  -SS     Posttreatment Heart Rate (beats/min) 74  -SS     Pre Patient Position Supine  -SS     Post Patient Position Sitting  -       Row Name 07/20/24 1440          Positioning and Restraints    Pre-Treatment Position in bed  -SS     Post Treatment Position chair  -SS     In Chair notified nsg;reclined;call light within reach;encouraged to call for assist;exit alarm on;with family/caregiver;on mechanical lift sling;waffle cushion;legs elevated  -               User Key  (r) = Recorded By, (t) = Taken By, (c) = Cosigned By      Initials Name Provider Type     Freda Leonard, PT Physical Therapist                   Outcome Measures       Row Name 07/20/24 7780          How much help from another person do you currently need...    Turning from your back to your side while in flat bed without using bedrails? 2  -SS     Moving from lying on back to sitting on the side of a flat bed without bedrails? 2  -SS     Moving to and from a bed to a chair (including a wheelchair)? 1  -SS      Standing up from a chair using your arms (e.g., wheelchair, bedside chair)? 1  -SS     Climbing 3-5 steps with a railing? 1  -SS     To walk in hospital room? 1  -SS     AM-PAC 6 Clicks Score (PT) 8  -     Highest Level of Mobility Goal 3 --> Sit at edge of bed  -       Row Name 07/20/24 1525 07/20/24 1421       Functional Assessment    Outcome Measure Options AM-PAC 6 Clicks Basic Mobility (PT)  - AM-PAC 6 Clicks Daily Activity (OT)  -PRAKASH              User Key  (r) = Recorded By, (t) = Taken By, (c) = Cosigned By      Initials Name Provider Type    Mirela Park, OT Occupational Therapist    Freda Wayne, PT Physical Therapist                                 Physical Therapy Education       Title: PT OT SLP Therapies (In Progress)       Topic: Physical Therapy (Done)       Point: Mobility training (Done)       Learning Progress Summary             Patient Eager, E, VU,DU,NR by  at 7/20/2024 1526    Comment: Educated safety/technique w/bed mobility, transfers, HEP, PT POC    Acceptance, E, VU,DU by  at 7/15/2024 0955   Family Eager, E, VU,DU,NR by  at 7/20/2024 1526    Comment: Educated safety/technique w/bed mobility, transfers, HEP, PT POC                         Point: Home exercise program (Done)       Learning Progress Summary             Patient Eager, E, VU,DU,NR by  at 7/20/2024 1526    Comment: Educated safety/technique w/bed mobility, transfers, HEP, PT POC   Family Eager, E, VU,DU,NR by  at 7/20/2024 1526    Comment: Educated safety/technique w/bed mobility, transfers, HEP, PT POC                         Point: Body mechanics (Done)       Learning Progress Summary             Patient Eager, E, VU,DU,NR by  at 7/20/2024 1526    Comment: Educated safety/technique w/bed mobility, transfers, HEP, PT POC    Acceptance, E, VU,DU by  at 7/15/2024 0955   Family Eager, E, VU,DU,NR by  at 7/20/2024 1526    Comment: Educated safety/technique w/bed mobility, transfers, HEP, PT POC                          Point: Precautions (Done)       Learning Progress Summary             Patient Eager, E, VU,DU,NR by  at 7/20/2024 1526    Comment: Educated safety/technique w/bed mobility, transfers, HEP, PT POC    Acceptance, E, VU,DU by  at 7/15/2024 0955   Family Eager, E, VU,DU,NR by  at 7/20/2024 1526    Comment: Educated safety/technique w/bed mobility, transfers, HEP, PT POC                                         User Key       Initials Effective Dates Name Provider Type Discipline     06/01/21 -  Freda Leonard, PT Physical Therapist PT     05/07/24 -  Cate Joshi, PT Student PT Student PT                  PT Recommendation and Plan  Planned Therapy Interventions (PT): balance training, bed mobility training, home exercise program, neuromuscular re-education, patient/family education, postural re-education, ROM (range of motion), strengthening, stretching, transfer training  Plan of Care Reviewed With: patient, daughter  Outcome Evaluation: PT re-eval complete. Pt. presents below baseline function w/generalized weakness and decreased functional endurance affecting her ability to safely participate in functional mobility. She performed bed mobility w/max assist of 2 and transferred to the recliner via mechanical lift device. Pt. would benefit from acute PT services to address stated deficits to decrease caregiver burden and maximize pt. safety and independence.     Time Calculation:         PT Charges       Row Name 07/20/24 1527             Time Calculation    Start Time 1025  -SS      PT Received On 07/20/24  -SS      PT Goal Re-Cert Due Date 07/30/24  -SS         Timed Charges    48732 - PT Therapeutic Exercise Minutes 8  -SS         Untimed Charges    PT Eval/Re-eval Minutes 20  -SS         Total Minutes    Timed Charges Total Minutes 8  -SS      Untimed Charges Total Minutes 20  -SS       Total Minutes 28  -SS                User Key  (r) = Recorded By, (t) = Taken By, (c) = Cosigned  By      Initials Name Provider Type    SS Freda Leonard, PT Physical Therapist                  Therapy Charges for Today       Code Description Service Date Service Provider Modifiers Qty    48470309110 HC PT THER PROC EA 15 MIN 2024 Freda Leonard, PT GP 1    [id number] HC PT RE-EVAL ESTABLISHED PLAN 2 2024 Freda Leonard, PT GP 1            PT G-Codes  Outcome Measure Options: AM-PAC 6 Clicks Basic Mobility (PT)  AM-PAC 6 Clicks Score (PT): 8  AM-PAC 6 Clicks Score (OT): 12  Modified [place] Scale: 5 - Severe disability.  Bedridden, incontinent, and requiring constant nursing care and attention.  PT Discharge Summary  Anticipated Discharge Disposition (PT): home with assist, home with  care    Freda Leonard PT  2024      Electronically signed by Freda Leonard PT at 24 1529          Occupational Therapy Notes (most recent note)        Mirela Winston, OT at 24 1020          Patient Name: Cheri Cruz  : 1940    MRN: [id number]                              Today's Date: 2024       Admit Date: 2024    Visit Dx:     ICD-10-CM ICD-9-CM   1. Cognitive communication deficit  R41.841 799.52   2. Disorientation  R41.0 780.99     Patient Active Problem List   Diagnosis    Hyperlipidemia LDL goal <70    Type 2 diabetes mellitus without complication, without long-term current use of insulin    GERD (gastroesophageal reflux disease)    Essential hypertension    Abnormal EKG    Osteoarthritis    Anxiety    Palpitations    Vertigo    History of ischemic left MCA stroke    Hemorrhagic stroke    History of pulmonary embolus (PE)    Confusion    Hypotension    Constipation    UTI (urinary tract infection) due to urinary indwelling catheter    Metabolic encephalopathy    Symptomatic anemia    Acquired hypothyroidism    AMS (altered mental status)    Stroke     Past Medical History:   Diagnosis Date    Abnormal heart rhythm     Anxiety     Arrhythmia     Arthritis     Atrial  "fibrillation     Dementia     Diabetes mellitus     Hyperlipidemia     Hypertension     Kidney disorder     Stroke     Uterus cancer      Past Surgical History:   Procedure Laterality Date    CATARACT EXTRACTION, BILATERAL        General Information       Row Name 24 1210          OT Time and Intention    Document Type therapy note (daily note)  -JY     Mode of Treatment occupational therapy  -PRAKASH       Row Name 24 1210          General Information    Patient Profile Reviewed yes  -JY     Existing Precautions/Restrictions fall;other (see comments)  BUE tremors R>L, increased muscle tone R side, non- ambulatory at baseline, reports diplopia, dementia at baseline per chart, [place] [facility]  -JY     Barriers to Rehab medically complex;previous functional deficit;cognitive status;hearing deficit;visual deficit  -PRAKASH       Row Name 24 1210          Cognition    Orientation Status (Cognition) other (see comments);verbal cues/prompts needed for orientation  able to state name and most of  (could not recall year of birth despite options given), stated \"hospital\" in [place] however u/a to state name even w/ options, did not state correct month/year; stated, \"I'm sick\" toward situation  -PRAKASH       Row Name 24 1210          Safety Issues, Functional Mobility    Safety Issues Affecting Function (Mobility) insight into deficits/self-awareness;safety precaution awareness;safety precautions follow-through/compliance;sequencing abilities;problem-solving  -JY     Impairments Affecting Function (Mobility) balance;cognition;endurance/activity tolerance;pain;postural/trunk control;range of motion (ROM);strength;muscle tone abnormal  -JY     Cognitive Impairments, Mobility Safety/Performance insight into deficits/self-awareness;safety precaution awareness;safety precaution follow-through;sequencing abilities;problem-solving/reasoning  -JY     Comment, Safety Issues/Impairments (Mobility) non ambulatory at baseline, safety " issues and impairments noted relevant to bed mobility and fxl t/fs; pt with increased tone at R side  -               User Key  (r) = Recorded By, (t) = Taken By, (c) = Cosigned By      Initials Name Provider Type    Mirela Park, OT Occupational Therapist                     Mobility/ADL's       Row Name 07/20/24 1216          Bed Mobility    Bed Mobility rolling right;rolling left;scooting/bridging  -J     Rolling Left [place] (Bed Mobility) maximum assist (25% patient effort);2 person assist;verbal cues;nonverbal cues (demo/gesture)  -JY     Rolling Right [place] (Bed Mobility) maximum assist (25% patient effort);2 person assist;verbal cues;nonverbal cues (demo/gesture)  -JY     Scooting/Bridging [place] (Bed Mobility) maximum assist (25% patient effort);2 person assist;verbal cues;other (see comments)  max A x 2 for scooting up in bed for improved positioning; pt able to bridge hips ~ 50% for assistance in linen mgmt and self care  -     Comment, (Bed Mobility) pt completed bed mobility for purposes of sling placement in preparation for t/f to recliner w/ lift and self care needs/mgmt  -       Row Name 07/20/24 1216          Transfers    Transfers bed-chair transfer  -     Comment, (Transfers) for safety utilized lift to t/f pt between bed and recliner  -       Row Name 07/20/24 1216          Bed-Chair Transfer    Bed-Chair [place] (Transfers) dependent (less than 25% patient effort);2 person assist;verbal cues  -     Assistive Device (Bed-Chair Transfers) lift device  -       Row Name 07/20/24 1216          Sit-Stand Transfer    Sit-Stand [place] (Transfers) not tested  -       Row Name 07/20/24 1216          Functional Mobility    Functional Mobility- Comment pt non ambulatory at baseline  -J     Patient was able to Ambulate no, other medical factors prevent ambulation  -     Reason Patient was unable to Ambulate Non-Ambulatory at Baseline  -       Row Name  07/20/24 1216          Activities of Daily Living    BADL Assessment/Intervention upper body dressing;lower body dressing;grooming;toileting  -JY       Row Name 07/20/24 1216          Upper Body Dressing Assessment/Training    [place] (Upper Body Dressing) don;doff;pajama/robe;moderate assist (50% patient effort);verbal cues  -JY     Position (Upper Body Dressing) supported sitting  -JY       Row Name 07/20/24 1216          Lower Body Dressing Assessment/Training    [place] (Lower Body Dressing) doff;don;socks;dependent (less than 25% patient effort);other (see comments)  mgmt of socks for improved fit  -JY     Position (Lower Body Dressing) supine  -JY       Row Name 07/20/24 1216          Grooming Assessment/Training    [place] (Grooming) wash face, hands;supervision;set up;verbal cues  -JY     Position (Grooming) supported sitting  -JY     Comment, (Grooming) pt req'd increased time to respond to direction and grasp wash cloth to wash face; providing tactile cue toward pt with washcloth was effective in pt grasping and w/ v/cs pt able to wash face and hands w/o physical A; pt maintained eyes closed unless cued otherwise impacting return demo  -JY       Row Name 07/20/24 1216          Toileting Assessment/Training    [place] (Toileting) adjust/manage clothing;other (see comments);dependent (less than 25% patient effort)  mgmt of external catheter  -JY     Position (Toileting) supine;supported sitting  -JY               User Key  (r) = Recorded By, (t) = Taken By, (c) = Cosigned By      Initials Name Provider Type    Mirela Park OT Occupational Therapist                   Obj/Interventions       Row Name 07/20/24 1318          Shoulder (Therapeutic Exercise)    Shoulder (Therapeutic Exercise) AAROM (active assistive range of motion)  -JY     Shoulder AAROM (Therapeutic Exercise) left assist right;right assist left;bilateral;flexion;extension;scapular  protraction;scapular retraction;supine;10 repetitions  -       Row Name 07/20/24 1318          Elbow/Forearm (Therapeutic Exercise)    Elbow/Forearm (Therapeutic Exercise) AAROM (active assistive range of motion)  -     Elbow/Forearm AAROM (Therapeutic Exercise) left assist right;right assist left;bilateral;flexion;extension;supination;pronation;supine;10 repetitions  -       Row Name 07/20/24 1318          Wrist (Therapeutic Exercise)    Wrist (Therapeutic Exercise) AAROM (active assistive range of motion)  -     Wrist AAROM (Therapeutic Exercise) left assist right;right assist left;bilateral;flexion;extension;10 repetitions  -       Row Name 07/20/24 1318          Hand (Therapeutic Exercise)    Hand (Therapeutic Exercise) AROM (active range of motion)  -     Hand AROM/AAROM (Therapeutic Exercise) bilateral;AROM (active range of motion);finger flexion;finger extension;10 repetitions  -       Row Name 07/20/24 1318          Motor Skills    Motor Skills functional endurance  -     Functional Endurance impaired activity tolerance toward more dynamic demands; fatigued after rolling in bed and req'd rest breaks between exercises  -     Therapeutic Exercise shoulder;elbow/forearm;wrist;hand;other (see comments)  facilitated A/AROM at BUEs to promote joint mobility and prepare for greater integration of UEs in ADLs, related t/fs and bed mobility  -       Row Name 07/20/24 1318          Balance    Balance Assessment sitting static balance  -JY     Static Sitting Balance supervision  -J     Position, Sitting Balance supported;sitting in chair  -J     Balance Interventions sitting;static;occupation based/functional task  -     Comment, Balance no overt LOB during seated supported tasks, tend to turn head and lean slightly to L side  -J               User Key  (r) = Recorded By, (t) = Taken By, (c) = Cosigned By      Initials Name Provider Type    Mirela Park OT Occupational Therapist                    Goals/Plan    No documentation.                  Clinical Impression       [place] Name 07/20/24 1322          Pain Assessment    Additional Documentation Pain Scale: Word Pre/Post-Treatment (Group)  -PRAKASH Ridley Name 07/20/24 1322          Pain Scale: FACES Pre/Post-Treatment    Pain Location --  -JY     Pain Location - --  -JY     Pre/Posttreatment Pain Comment --  -PRAKASH Ridley Name 07/20/24 1322          Plan of Care Review    Plan of Care Reviewed With patient  -JJEFF     Progress improving  -JY     Outcome Evaluation Pt participatory in OT interventions following initial increased motivational cues/support and further cues for opened eyes (pt continues to report double vision). Pt demonstrated need for max A x 2 for rolling L < > R in bed with attempt at flexing knees and reaching for bed rail with greater reach across midline with LUE given increased tone at RUE. Pt demonstrated improved ability to assist in bridging hips to allow for sling, linen mgmt and self care needs related to toileting. Pt tolerated A/AROM at BUEs progressing from shoulders to hands for joint mobility in prep for greater integration in ADLs, bed mobility and reduce caregiver burden. Pt able to wash face after set up and both tactile and verbal cues and help minimally in managing gown during dressing. Recommend IPOT POC to continue at 3x/wk and recommend SNF at d/c when medically ready.  -PRAKASH Ridley Name 07/20/24 1322          Therapy Assessment/Plan (OT)    Rehab Potential (OT) good, to achieve stated therapy goals  -JY     Criteria for Skilled Therapeutic Interventions Met (OT) yes;meets criteria;skilled treatment is necessary  -JY     Therapy Frequency (OT) 3 times/wk  -PRAKASH Ridley Name 07/20/24 1322          Therapy Plan Review/Discharge Plan (OT)    Anticipated Discharge Disposition (OT) skilled nursing facility  -PRAKASH Ridley Name 07/20/24 1322          Vital Signs    Pre Systolic BP Rehab 122  -JY     Pre Treatment  "Diastolic BP 67  -JY     Post Systolic BP Rehab 115  -JY     Post Treatment Diastolic BP 77  -JY     Pretreatment Heart Rate (beats/min) 72  -JY     Posttreatment Heart Rate (beats/min) 74  -JY     O2 Delivery Pre Treatment room air  -JY     O2 Delivery Intra Treatment room air  -JY     O2 Delivery Post Treatment room air  -JY     Pre Patient Position Supine  -JY     Intra Patient Position Side Lying  -JY     Post Patient Position Sitting  -JY       Row Name 07/20/24 1322          Positioning and Restraints    Pre-Treatment Position in bed  -JY     Post Treatment Position chair  -JY     In Chair notified nsg;reclined;call light within reach;encouraged to call for assist;exit alarm on;with family/caregiver;waffle cushion;on mechanical lift sling;legs elevated  -JY       Row Name 07/20/24 1322          Pain Scale: Word Pre/Post-Treatment    Pain: Word Scale, Pretreatment 8 - severe pain  -JY     Posttreatment Pain Rating 8 - severe pain  -JY     Pain Location generalized  -JY     Pain Location - ear;head;hip  -JY     Pre/Posttreatment Pain Comment pt reported persistent \"severe\" pain at pre and post OT interventions, appeared more in pain during initial movement in bed: RN aware and managing  -JY               User Key  (r) = Recorded By, (t) = Taken By, (c) = Cosigned By      Initials Name Provider Type    Mirela Park, OT Occupational Therapist                   Outcome Measures       Row Name 07/20/24 1421          How much help from another is currently needed...    Putting on and taking off regular lower body clothing? 1  -JY     Bathing (including washing, rinsing, and drying) 2  -JY     Toileting (which includes using toilet bed pan or urinal) 1  -JY     Putting on and taking off regular upper body clothing 2  -JY     Taking care of personal grooming (such as brushing teeth) 3  -JY     Eating meals 3  -JY     AM-PAC 6 Clicks Score (OT) 12  -JY       Row Name 07/20/24 1421          Functional Assessment    " Outcome Measure Options AM-PAC 6 Clicks Daily Activity (OT)  -PRAKASH               User Key  (r) = Recorded By, (t) = Taken By, (c) = Cosigned By      Initials Name Provider Type    Mirela Park OT Occupational Therapist                    Occupational Therapy Education       Title: PT OT SLP Therapies (In Progress)       Topic: Occupational Therapy (In Progress)       Point: ADL training (In Progress)       Description:   Instruct learner(s) on proper safety adaptation and remediation techniques during self care or transfers.   Instruct in proper use of assistive devices.                  Learning Progress Summary             Patient Acceptance, E,D, NR by PRAKASH at 7/20/2024 1020    Acceptance, E,D, VU,DU by BELINDA at 7/15/2024 0936   Family Acceptance, E,D, NR by PRAKASH at 7/20/2024 1020                         Point: Home exercise program (In Progress)       Description:   Instruct learner(s) on appropriate technique for monitoring, assisting and/or progressing therapeutic exercises/activities.                  Learning Progress Summary             Patient Acceptance, E,D, NR by PRAKASH at 7/20/2024 1020    Acceptance, E,D, VU,DU by BELINDA at 7/15/2024 0936   Family Acceptance, E,D, NR by PRAKASH at 7/20/2024 1020                         Point: Precautions (In Progress)       Description:   Instruct learner(s) on prescribed precautions during self-care and functional transfers.                  Learning Progress Summary             Patient Acceptance, E,D, NR by PRAKASH at 7/20/2024 1020    Acceptance, E,D, VU,DU by BELINDA at 7/15/2024 0936   Family Acceptance, E,D, NR by PRAKASH at 7/20/2024 1020                         Point: Body mechanics (In Progress)       Description:   Instruct learner(s) on proper positioning and spine alignment during self-care, functional mobility activities and/or exercises.                  Learning Progress Summary             Patient Acceptance, E,D, NR by PRAKASH at 7/20/2024 1020    Acceptance, E,D, VU,DU by BELINDA at  7/15/2024 0936   Family Acceptance, E,D, NR by PRAKASH at 7/20/2024 1020                                         User Key       Initials Effective Dates Name Provider Type Discipline    PRAKASH 06/16/21 -  Mirela Winston OT Occupational Therapist OT    CS 06/16/21 -  Karlo Rai OT Occupational Therapist OT                  OT Recommendation and Plan  Therapy Frequency (OT): 3 times/wk  Plan of Care Review  Plan of Care Reviewed With: patient  Progress: improving  Outcome Evaluation: Pt participatory in OT interventions following initial increased motivational cues/support and further cues for opened eyes (pt continues to report double vision). Pt demonstrated need for max A x 2 for rolling L < > R in bed with attempt at flexing knees and reaching for bed rail with greater reach across midline with LUE given increased tone at RUE. Pt demonstrated improved ability to assist in bridging hips to allow for sling, linen mgmt and self care needs related to toileting. Pt tolerated A/AROM at BUEs progressing from shoulders to hands for joint mobility in prep for greater integration in ADLs, bed mobility and reduce caregiver burden. Pt able to wash face after set up and both tactile and verbal cues and help minimally in managing gown during dressing. Recommend IPOT POC to continue at 3x/wk and recommend SNF at d/c when medically ready.     Time Calculation:         Time Calculation- OT       Row Name 07/20/24 1423             Time Calculation- OT    OT Start Time 1020  -JY      OT Received On 07/20/24  -JY      OT Goal Re-Cert Due Date 07/25/24  -JY         Timed Charges    78516 - OT Therapeutic Activity Minutes 14  -JY      85941 - OT Self Care/Mgmt Minutes 10  -JY         Total Minutes    Timed Charges Total Minutes 24  -JY       Total Minutes 24  -JY                User Key  (r) = Recorded By, (t) = Taken By, (c) = Cosigned By      Initials Name Provider Type    Mirela Park OT Occupational Therapist                   Therapy Charges for Today       Code Description Service Date Service Provider Modifiers Qty    [id number]  OT THERAPEUTIC ACT EA 15 MIN 2024 Mirela Winston OT GO 1    [id number]  OT SELF CARE/MGMT/TRAIN EA 15 MIN 2024 Mirela Winston OT GO 1                 Mirela Winston OT  2024    Electronically signed by Mirela Winston OT at 24 1425          Speech Language Pathology Notes (most recent note)        Carol Argueta MS CCC-SLP at 24 1440          Acute Care - Speech Language Pathology Treatment Note   Yan     Patient Name: Cheri Cruz  : 1940  MRN: [id number]  Today's Date: 2024               Admit Date: 2024     Visit Dx:    ICD-10-CM ICD-9-CM   1. Cognitive communication deficit  R41.841 799.52   2. Disorientation  R41.0 780.99     Patient Active Problem List   Diagnosis    Hyperlipidemia LDL goal <70    Type 2 diabetes mellitus without complication, without long-term current use of insulin    GERD (gastroesophageal reflux disease)    Essential hypertension    Abnormal EKG    Osteoarthritis    Anxiety    Palpitations    Vertigo    History of ischemic left MCA stroke    Hemorrhagic stroke    History of pulmonary embolus (PE)    Confusion    Hypotension    Constipation    UTI (urinary tract infection) due to urinary indwelling catheter    Metabolic encephalopathy    Symptomatic anemia    Acquired hypothyroidism    AMS (altered mental status)    Stroke     Past Medical History:   Diagnosis Date    Abnormal heart rhythm     Anxiety     Arrhythmia     Arthritis     Atrial fibrillation     Dementia     Diabetes mellitus     Hyperlipidemia     Hypertension     Kidney disorder     Stroke     Uterus cancer      Past Surgical History:   Procedure Laterality Date    CATARACT EXTRACTION, BILATERAL         SLP Recommendation and Plan  SLP Diagnosis: cognitive-linguistic disorder (24 1410)              SLC Criteria for Skilled Therapy Interventions  Met: yes (24)  Anticipated Discharge Disposition (SLP): home with 24/7 care (24)        Therapy Frequency (SLP SLC): 3 days per week (24)  Predicted Duration Therapy Intervention (Days): 1 week (24)        Daily Summary of Progress (SLP): unable to show any progress toward functional goals (24)           Treatment Assessment (SLP): continued, cognitive-linguistic disorder (24)  Treatment Assessment Comments (SLP): Pt seen for cognitive-linguistic tx. No family members present. Pt was able to state her name and  (except year) correctly. Unable to state current D/M/Yr. Pt was able to answer simple Y/N questions with ~70% accuracy. Pt then stated she was tired and requested to end session so she could sleep. SLP will continue to f/u. (24)  Plan for Continued Treatment (SLP): continue treatment per plan of care (24)  Plan of Care Reviewed With: patient (24)      SLP EVALUATION (Last 72 Hours)       SLP SLC Evaluation       Row Name 24                   Communication Assessment/Intervention    Document Type therapy note (daily note)  -KH        Subjective Information no complaints  -KH        Patient Observations alert;cooperative;agree to therapy  -KH        Patient/Family/Caregiver Comments/Observations no family present  -        Patient Effort adequate  -           General Information    Patient Profile Reviewed yes  -KH        Pertinent History Of Current Problem See prev eval  -KH           Pain    Additional Documentation Pain Scale: FACES Pre/Post-Treatment (Group)  -           Pain Scale: FACES Pre/Post-Treatment    Pain: FACES Scale, Pretreatment 0-->no hurt  -KH        Posttreatment Pain Rating 0-->no hurt  -KH           SLP Evaluation Clinical Impressions    SLP Diagnosis cognitive-linguistic disorder  -        SLC Criteria for Skilled Therapy Interventions Met yes  -KH        Functional  Impact need frequent supervision  -           SLP Treatment Clinical Impressions    Treatment Assessment (SLP) continued;cognitive-linguistic disorder  -        Treatment Assessment Comments (SLP) Pt seen for cognitive-linguistic tx. No family members present. Pt was able to state her name and  (except year) correctly. Unable to state current D/M/Yr. Pt was able to answer simple Y/N questions with ~70% accuracy. Pt then stated she was tired and requested to end session so she could sleep. SLP will continue to f/u.  -        Daily Summary of Progress (SLP) unable to show any progress toward functional goals  -        Barriers to Overall Progress (SLP) Cognitive status;Baseline deficits  -        Plan for Continued Treatment (SLP) continue treatment per plan of care  -        Care Plan Review care plan/treatment goals reviewed  -           Recommendations    Therapy Frequency (SLP SLC) 3 days per week  -        Predicted Duration Therapy Intervention (Days) 1 week  -        Anticipated Discharge Disposition (SLP) home with  care  -                  User Key  (r) = Recorded By, (t) = Taken By, (c) = Cosigned By      Initials Name Effective Dates    Carol Moeller, MS CCC-SLP 01/15/24 -                        EDUCATION  The patient has been educated in the following areas:     Cognitive Impairment Communication Impairment.           SLP GOALS       Row Name 24 1410 24 1100          Patient will demonstrate functional cognitive-linguistic skills for return to discharge environment    [place] with moderate cues  - with moderate cues  -SM     Time frame 1 week  - 1 week  -SM     Progress/Outcomes continuing progress toward goal  - continuing progress toward goal  -SM        SLP Diagnostic Treatment     Patient will participate in further assessment in the following areas cognitive-linguistic;clarification of baseline cognitive communication status  -  cognitive-linguistic;clarification of baseline cognitive communication status  -SM     Time Frame (Diagnostic) 1 week  -KH 1 week  -SM     Progress/Outcomes (Additional Goal 1, SLP) goal met  - goal met  -SM     Comment (Diagnostic) completed last session  -KH completed last session  -SM        Comprehend Questions Goal 1 (SLP)    Improve Ability to Comprehend Questions Goal 1 (SLP) simple yes/no questions;complex yes/no questions;simple wh questions;simple general questions;80%;with minimal cues (75-90%)  -KH simple yes/no questions;complex yes/no questions;simple wh questions;simple general questions;80%;with minimal cues (75-90%)  -     Time Frame (Comprehend Questions Goal 1, SLP) 1 week  -KH 1 week  -SM     Progress/Outcomes (Comprehend Questions Goal 1, SLP) continuing progress toward goal  -KH continuing progress toward goal  -SM     Comment (Comprehend Questions Goal 1, SLP) Pt able to answer simple Y/N questions with ~70% accuracy.  -KH Too fixated on not feeling well to target simple level. Inconsistent accurate response with embbedded contextual ?s. Decreased hearing also impacting.  -SM        Follow Directions Goal 2 (SLP)    Improve Ability to Follow Directions Goal 1 (SLP) 2 step commands;80%;with moderate cues (50-74%)  -KH 2 step commands;80%;with moderate cues (50-74%)  -     Time Frame (Follow Directions Goal 1, SLP) 1 week  -KH 1 week  -SM     Progress/Outcomes (Follow Directions Goal 1, SLP) goal ongoing  - goal ongoing  -SM     Comment (Follow Directions Goal 1, SLP) Unable to target. Pt requested to end session to sleep.  -KH --               User Key  (r) = Recorded By, (t) = Taken By, (c) = Cosigned By      Initials Name Provider Type    Kiar Cordero MS CCC-SLP Speech and Language Pathologist    Carol Moeller MS CCC-SLP Speech and Language Pathologist                              Time Calculation:      Time Calculation- SLP       Row Name 07/20/24 3816              Time Calculation- SLP    SLP Start Time 1410  -KH      SLP Received On 07/20/24  -KH         Untimed Charges    SLP Treatment ST Treatment/ST Modification Prosth Aug Alter-92507  -KH 92507-ST Treatment/ST Modification Prosth Aug Alter  15  -KH         Total Minutes    Untimed Charges Total Minutes 15  -KH       Total Minutes 15  -KH                User Key  (r) = Recorded By, (t) = Taken By, (c) = Cosigned By      Initials Name Provider Type     Carol Argueta MS CCC-SLP Speech and Language Pathologist                    Therapy Charges for Today       Code Description Service Date Service Provider Modifiers Qty    [id number]  ST TREATMENT SPEECH 1 7/20/2024 Carol Argueta MS CCC-SLP GN 1                       MS ROBERT Olivera  7/20/2024    Electronically signed by Carol Argueta MS CCC-SLP at 07/20/24 1444

## 2024-07-22 NOTE — PROGRESS NOTES
[facility] Medicine Services  PROGRESS NOTE    Patient Name: Cheri Cruz  : 1940  MRN: [id number]    Date of Admission: 2024  Primary Care Physician: Lorrie Canada APRN    Subjective   Subjective     CC:  F/u AMS     HPI:  Patient is resting in bed in NAD. She states she did not sleep good. Having some dizziness this am       Objective   Objective     Vital Signs:   Temp:  [97.9 °F (36.6 °C)-98.3 °F (36.8 °C)] 98 °F (36.7 °C)  Heart Rate:  [65-80] 69  Resp:  [16-18] 16  BP: (113-162)/(50-84) 162/84  Flow (L/min):  [1-2] 2     Physical Exam:  Constitutional: No acute distress, awake, alert  HENT: NCAT, mucous membranes moist  Respiratory: Clear to auscultation bilaterally, respiratory effort normal 2L   Cardiovascular: RRR, no murmurs, rubs, or gallops  Gastrointestinal: Positive bowel sounds, soft, nontender, nondistended  Musculoskeletal: No bilateral ankle edema  Psychiatric: Appropriate affect, cooperative  Neurologic: Oriented x 1, ,RUE weaker the LUE. , speech clear, has aphasia at times. Will say the wrong word at times      Results Reviewed:  LAB RESULTS:      Lab 24  0440 24  0407   WBC 7.35 7.06   HEMOGLOBIN 9.7* 10.6*   HEMATOCRIT 29.8* 34.9   PLATELETS 124* 90*   .4* 106.1*         Lab 24  0440 24  0550 24  1045 24  0742 24  0407 07/15/24  2344   SODIUM 139 137 138 137 137  --    POTASSIUM 4.1 4.7 5.1 5.8* 5.2  --    CHLORIDE 101 98 100 103 104  --    CO2 28.0 27.0 30.0* 25.0 25.0  --    ANION GAP 10.0 12.0 8.0 9.0 8.0  --    BUN 41* 19 18 22 25*  --    CREATININE 1.82* 1.19* 1.36* 1.48* 1.44*  --    EGFR 27.5* 45.7* 39.0* 35.2* 36.4*  --    GLUCOSE 194* 202* 229* 257* 294*  --    CALCIUM 8.0* 9.3 9.2 8.7 8.3*  --    MAGNESIUM  --   --   --   --   --  2.8*         Lab 24  1045   TOTAL PROTEIN 6.5   ALBUMIN 3.8   ALT (SGPT) 5   AST (SGOT) 16   BILIRUBIN 0.2   BILIRUBIN DIRECT <0.2   ALK PHOS 66                      Lab 07/18/24  1510   PH, ARTERIAL 7.380   PCO2, ARTERIAL 53.1*   PO2 ART 82.4*   FIO2 24   HCO3 ART 31.4*   BASE EXCESS ART 5.2*   CARBOXYHEMOGLOBIN 1.1     Brief Urine Lab Results  (Last result in the past 365 days)        Color   Clarity   Blood   Leuk Est   Nitrite   Protein   CREAT   Urine HCG        07/14/24 2001 Yellow   Cloudy   Trace   Small (1+)   Negative   Negative                   Microbiology Results Abnormal       Procedure Component Value - Date/Time    Blood Culture - Blood, Wrist, Left [[id number]]  (Normal) Collected: 07/14/24 2022    Lab Status: Final result Specimen: Blood from Wrist, Left Updated: 07/19/24 2100     Blood Culture No growth at 5 days    Narrative:      Less than seven (7) mL's of blood was collected.  Insufficient quantity may yield false negative results.    Blood Culture - Blood, Hand, Left [[id number]]  (Normal) Collected: 07/14/24 2022    Lab Status: Final result Specimen: Blood from Hand, Left Updated: 07/19/24 2100     Blood Culture No growth at 5 days    Narrative:      Less than seven (7) mL's of blood was collected.  Insufficient quantity may yield false negative results.    Urine Culture - Urine, Urine, Random Void [[id number]] Collected: 07/14/24 2001    Lab Status: Final result Specimen: Urine, Random Void Updated: 07/16/24 1020     Urine Culture 50,000 CFU/mL Normal Urogenital Nickie    Narrative:      Colonization of the urinary tract without infection is common. Treatment is discouraged unless the patient is symptomatic, pregnant, or undergoing an invasive urologic procedure.            No radiology results from the last 24 hrs    Results for orders placed during the hospital encounter of 02/10/23    Adult Transthoracic Echo Complete W/ Cont if Necessary Per Protocol    Interpretation Summary    Left ventricular systolic function is hyperdynamic (EF > 70%). Calculated left ventricular EF = 70.6% Left ventricular ejection fraction appears to be greater than  70%. The left ventricular cavity is small in size. Left ventricular wall thickness is consistent with mild concentric hypertrophy. All left ventricular wall segments contract normally. Left ventricular intracavitary gradient noted to be 71 mmHg. Left ventricular diastolic function is consistent with (grade I) impaired relaxation. Normal left atrial pressure.    The aortic valve is abnormal in structure. There is mild calcification of the aortic valve mainly affecting the right coronary cusp(s). The aortic valve appears trileaflet. No aortic valve regurgitation is present. Gradient noted through the LV and LVOT    Compared to TTE report from  Dec 2019, hyperdynamic LV with intracavitary gradient is not a new finding but peak gradient noted on this exam is higher than previously described.      Current medications:  Scheduled Meds:apixaban, 5 mg, Oral, BID  atorvastatin, 80 mg, Oral, Nightly  busPIRone, 7.5 mg, Oral, BID  donepezil, 10 mg, Oral, Nightly  gabapentin, , ,   gabapentin, 300 mg, Oral, Daily  insulin glargine, 30 Units, Subcutaneous, Nightly  insulin lispro, 3-14 Units, Subcutaneous, 4x Daily AC & at Bedtime  Insulin Lispro, 7 Units, Subcutaneous, TID With Meals  levothyroxine, 25 mcg, Oral, Daily  nystatin, , Topical, 4x Daily  pantoprazole, 40 mg, Oral, BID AC  senna-docusate sodium, 2 tablet, Oral, Daily   And  polyethylene glycol, 17 g, Oral, Daily  QUEtiapine, 50 mg, Oral, Nightly  sodium chloride, 10 mL, Intravenous, Q12H  sodium chloride, 10 mL, Intravenous, Q12H  terazosin, 2 mg, Oral, Nightly      Continuous Infusions:sodium chloride, 75 mL/hr, Last Rate: 75 mL/hr (07/22/24 1109)      PRN Meds:.  acetaminophen **OR** acetaminophen **OR** acetaminophen    senna-docusate sodium **AND** polyethylene glycol **AND** bisacodyl **AND** bisacodyl    Calcium Replacement - Follow Nurse / BPA Driven Protocol    dextrose    dextrose    gabapentin    glucagon (human recombinant)     HYDROcodone-acetaminophen    Magnesium Standard Dose Replacement - Follow Nurse / BPA Driven Protocol    meclizine    ondansetron ODT **OR** ondansetron    Phosphorus Replacement - Follow Nurse / BPA Driven Protocol    Potassium Replacement - Follow Nurse / BPA Driven Protocol    sodium chloride    sodium chloride    sodium chloride    sodium chloride    Assessment & Plan   Assessment & Plan     Active Hospital Problems    Diagnosis  POA    **AMS (altered mental status) [R41.82]  Yes    Stroke [I63.9]  Yes      Resolved Hospital Problems   No resolved problems to display.        Brief Hospital Course to date:  Cheri Cruz is a 83 y.o. 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  Initial stroke workup was negative.  MRI showed old left parietal ischemic stroke.  Stroke neurology followed and resumed Eliquis.  She received empiric IV ceftriaxone x 3 days due to concern for UTI.     This patient's problems and plans were partially entered by my partner and updated as appropriate by me 07/22/24.      Altered mental status on advanced dementia  Hypotension   Concern for UTI  - initial stroke workup negative.  MRI showed old L parietal ischemic stroke; pt seen by Stroke team; apixiban restarted.   -Possibly dehydration vs hypoxia  -COVID/flu negative. Blood cultures with no growth to date. LFTs normal. CXR no acute findings.    -CXR with old calcified gr  -S/p empiric IV ceftriaxone x 3 days.   -Held sedatives initially, Okay to resume PRN lorazepam.   -Continue donepezil, Seroquel, BuSpar.     Acute on chronic hypoxemic respiratory failure  Near-syncope  -Per report, patient became hypoxic with oxygen saturation in the  60s  -CTA chest showed no PE, nonacute  -Patient is O2 dependent on 2L NC but is not compliant due her baseline dementia.  -Continue supplemental oxygenation, titrate for goal SpO2 greater than 90%.     Evolving old left parietal ischemic stroke  -Presented to [facility] with cognitive decline and metabolic encephalopathy. She was hypotensive with systolic in the 90s at [facility].   -CTH from [facility] with left parietal tiny hemorrhage vs calcification on top of old ischemic stroke.   -MRI showed evolving old left parietal lobe CVA with some areas of associated cortical laminar necrosis. No hemorrhage.    -Neurology evaluated, recommended to resume Eliquis. Continue statin. Annual follow up in stroke clinic.   -PT/OT now recommending SNF      RADHA on CKD stage III  Volume depletion due to dehydration   Mild hyperkalemia  -Baseline Cr about 1.3, Cr 2.06 on presentation, improved to  1.2.    -Improved with IV fluids. S/p Lokelma x 1 on 7/17.  -- creat up top 1.82, will give 1L of IVF's and encourage oral intake   -AM labs      Complex disposition  -PT/OT evaluated, felt patient's functional status is at baseline. Recommended home with 24/7 care.  -partner discussed care with both patient's daughters and case management on 7/17.   - Family leaning towards pursuing long-term care after discharge as they are having difficulty taking care of her on their own and do not have finances for full-time caregiver.     Nausea, constipation   -KUB 7/14 with nonobstructive bowel gas pattern. Repeat KUB 7/18 with moderate stool burden.  - BM 7/20.    -Continue bowel regimen. Increased PPI to BID. PRN Zofran for nausea.      Uncontrolled diabetes mellitus type 2 with hyperglycemia  -A1c 9.80% this admission  -Increase Lantus to 30 units qhs, increase  lispro 7u TIDAC and moderate-high dose SSI. Monitor glucose and adjust insulin as needed.  -Resumed home gabapentin at low-dose 300 mg daily.     Chronic anemia  Macrocytosis  -No evidence of overt GI  bleeding this admission  -B12 and folate within normal limits  -Further workup of anemia as outpatient as appropriate.      Essential hypertension  -Was hypotensive at [facility].  -Not on antihypertensive medications.  -Continue to monitor.      Hyperlipidemia -Continue atorvastatin.  Hypothyroidism -Continue levothyroxine.  GERD -Continue PPI.       Expected Discharge Location and Transportation: rehab   Expected Discharge   Expected Discharge Date: 7/25/2024; Expected Discharge Time:      VTE Prophylaxis:  Pharmacologic & mechanical VTE prophylaxis orders are present.         AM-PAC 6 Clicks Score (PT): 8 (07/22/24 1006)    CODE STATUS:   Code Status and Medical Interventions:   Ordered at: 07/15/24 1319     Level Of Support Discussed With:    Patient     Code Status (Patient has no pulse and is not breathing):    CPR (Attempt to Resuscitate)     Medical Interventions (Patient has pulse or is breathing):    Full Support       Kristin Pillai, APRN  07/22/24

## 2024-07-22 NOTE — CASE MANAGEMENT/SOCIAL WORK
Continued Stay Note  [facility]     Patient Name: Cheri Cruz  MRN: [id number]  Today's Date: 7/22/2024    Admit Date: 7/14/2024    Plan: SNF   Discharge Plan       Row Name 07/22/24 0349       Plan    Plan SNF    Patient/Family in Agreement with Plan yes    Plan Comments Spoke with Ms. Cruz's Daughter Radha by telephone. Discharge plan is short term rehab. Family is still interested in [facility] and [facility]. Referral faxed to Lupe with [facility] at [phone]. Referral given to Kait with [facility] by telephone. She will need insurance approval for the rehab. CM will continue to follow for discharge needs.    Final Discharge Disposition Code 03 - skilled nursing facility (SNF)                   Discharge Codes    No documentation.                 Expected Discharge Date and Time       Expected Discharge Date Expected Discharge Time    Jul 25, 2024               Jaye Kuhn RN

## 2024-07-23 LAB
ANION GAP SERPL CALCULATED.3IONS-SCNC: 9 MMOL/L (ref 5–15)
BUN SERPL-MCNC: 33 MG/DL (ref 8–23)
BUN/CREAT SERPL: 19.9 (ref 7–25)
CALCIUM SPEC-SCNC: 8.4 MG/DL (ref 8.6–10.5)
CHLORIDE SERPL-SCNC: 106 MMOL/L (ref 98–107)
CO2 SERPL-SCNC: 29 MMOL/L (ref 22–29)
CREAT SERPL-MCNC: 1.66 MG/DL (ref 0.57–1)
EGFRCR SERPLBLD CKD-EPI 2021: 30.7 ML/MIN/1.73
GLUCOSE BLDC GLUCOMTR-MCNC: 153 MG/DL (ref 70–130)
GLUCOSE BLDC GLUCOMTR-MCNC: 154 MG/DL (ref 70–130)
GLUCOSE BLDC GLUCOMTR-MCNC: 160 MG/DL (ref 70–130)
GLUCOSE BLDC GLUCOMTR-MCNC: 162 MG/DL (ref 70–130)
GLUCOSE BLDC GLUCOMTR-MCNC: 166 MG/DL (ref 70–130)
GLUCOSE BLDC GLUCOMTR-MCNC: 173 MG/DL (ref 70–130)
GLUCOSE BLDC GLUCOMTR-MCNC: <10 MG/DL (ref 70–130)
GLUCOSE SERPL-MCNC: 152 MG/DL (ref 65–99)
POTASSIUM SERPL-SCNC: 4.7 MMOL/L (ref 3.5–5.2)
SODIUM SERPL-SCNC: 144 MMOL/L (ref 136–145)

## 2024-07-23 PROCEDURE — A9270 NON-COVERED ITEM OR SERVICE: HCPCS | Performed by: STUDENT IN AN ORGANIZED HEALTH CARE EDUCATION/TRAINING PROGRAM

## 2024-07-23 PROCEDURE — A9270 NON-COVERED ITEM OR SERVICE: HCPCS | Performed by: INTERNAL MEDICINE

## 2024-07-23 PROCEDURE — 80048 BASIC METABOLIC PNL TOTAL CA: CPT | Performed by: NURSE PRACTITIONER

## 2024-07-23 PROCEDURE — A9270 NON-COVERED ITEM OR SERVICE: HCPCS | Performed by: NURSE PRACTITIONER

## 2024-07-23 PROCEDURE — G0378 HOSPITAL OBSERVATION PER HR: HCPCS

## 2024-07-23 PROCEDURE — 63710000001 QUETIAPINE 25 MG TABLET: Performed by: INTERNAL MEDICINE

## 2024-07-23 PROCEDURE — 99232 SBSQ HOSP IP/OBS MODERATE 35: CPT | Performed by: INTERNAL MEDICINE

## 2024-07-23 PROCEDURE — 82948 REAGENT STRIP/BLOOD GLUCOSE: CPT

## 2024-07-23 PROCEDURE — 63710000001 BISACODYL 10 MG SUPPOSITORY: Performed by: STUDENT IN AN ORGANIZED HEALTH CARE EDUCATION/TRAINING PROGRAM

## 2024-07-23 PROCEDURE — 63710000001 INSULIN LISPRO (HUMAN) PER 5 UNITS: Performed by: NURSE PRACTITIONER

## 2024-07-23 PROCEDURE — 63710000001 INSULIN LISPRO (HUMAN) PER 5 UNITS: Performed by: STUDENT IN AN ORGANIZED HEALTH CARE EDUCATION/TRAINING PROGRAM

## 2024-07-23 PROCEDURE — 63710000001 HYDROCODONE-ACETAMINOPHEN 10-325 MG TABLET: Performed by: INTERNAL MEDICINE

## 2024-07-23 PROCEDURE — 63710000001 PANTOPRAZOLE 40 MG TABLET DELAYED-RELEASE: Performed by: STUDENT IN AN ORGANIZED HEALTH CARE EDUCATION/TRAINING PROGRAM

## 2024-07-23 PROCEDURE — 63710000001 LEVOTHYROXINE 25 MCG TABLET: Performed by: INTERNAL MEDICINE

## 2024-07-23 PROCEDURE — 97530 THERAPEUTIC ACTIVITIES: CPT

## 2024-07-23 PROCEDURE — 63710000001 SENNOSIDES-DOCUSATE 8.6-50 MG TABLET: Performed by: STUDENT IN AN ORGANIZED HEALTH CARE EDUCATION/TRAINING PROGRAM

## 2024-07-23 PROCEDURE — 63710000001 ATORVASTATIN 40 MG TABLET

## 2024-07-23 PROCEDURE — 63710000001 INSULIN GLARGINE PER 5 UNITS: Performed by: STUDENT IN AN ORGANIZED HEALTH CARE EDUCATION/TRAINING PROGRAM

## 2024-07-23 PROCEDURE — A9270 NON-COVERED ITEM OR SERVICE: HCPCS

## 2024-07-23 PROCEDURE — 63710000001 POLYETHYLENE GLYCOL 17 G PACK: Performed by: STUDENT IN AN ORGANIZED HEALTH CARE EDUCATION/TRAINING PROGRAM

## 2024-07-23 PROCEDURE — 63710000001 GABAPENTIN 300 MG CAPSULE: Performed by: STUDENT IN AN ORGANIZED HEALTH CARE EDUCATION/TRAINING PROGRAM

## 2024-07-23 PROCEDURE — 63710000001 PRIMIDONE 50 MG TABLET: Performed by: INTERNAL MEDICINE

## 2024-07-23 PROCEDURE — 63710000001 APIXABAN 5 MG TABLET: Performed by: STUDENT IN AN ORGANIZED HEALTH CARE EDUCATION/TRAINING PROGRAM

## 2024-07-23 PROCEDURE — 63710000001 DONEPEZIL 10 MG TABLET: Performed by: INTERNAL MEDICINE

## 2024-07-23 PROCEDURE — 63710000001 BUSPIRONE 15 MG TABLET: Performed by: INTERNAL MEDICINE

## 2024-07-23 RX ORDER — PRIMIDONE 50 MG/1
50 TABLET ORAL EVERY MORNING
Status: DISCONTINUED | OUTPATIENT
Start: 2024-07-23 | End: 2024-07-25

## 2024-07-23 RX ADMIN — NYSTATIN: 100000 POWDER TOPICAL at 17:58

## 2024-07-23 RX ADMIN — INSULIN LISPRO 3 UNITS: 100 INJECTION, SOLUTION INTRAVENOUS; SUBCUTANEOUS at 08:46

## 2024-07-23 RX ADMIN — PRIMIDONE 50 MG: 50 TABLET ORAL at 12:20

## 2024-07-23 RX ADMIN — Medication 10 ML: at 08:47

## 2024-07-23 RX ADMIN — INSULIN LISPRO 3 UNITS: 100 INJECTION, SOLUTION INTRAVENOUS; SUBCUTANEOUS at 20:53

## 2024-07-23 RX ADMIN — NYSTATIN: 100000 POWDER TOPICAL at 20:43

## 2024-07-23 RX ADMIN — INSULIN LISPRO 7 UNITS: 100 INJECTION, SOLUTION INTRAVENOUS; SUBCUTANEOUS at 12:19

## 2024-07-23 RX ADMIN — LEVOTHYROXINE SODIUM 25 MCG: 25 TABLET ORAL at 08:46

## 2024-07-23 RX ADMIN — APIXABAN 5 MG: 5 TABLET, FILM COATED ORAL at 20:43

## 2024-07-23 RX ADMIN — SENNOSIDES AND DOCUSATE SODIUM 2 TABLET: 50; 8.6 TABLET ORAL at 08:46

## 2024-07-23 RX ADMIN — HYDROCODONE BITARTRATE AND ACETAMINOPHEN 1 TABLET: 10; 325 TABLET ORAL at 11:03

## 2024-07-23 RX ADMIN — GABAPENTIN 300 MG: 300 CAPSULE ORAL at 08:45

## 2024-07-23 RX ADMIN — INSULIN GLARGINE 30 UNITS: 100 INJECTION, SOLUTION SUBCUTANEOUS at 20:43

## 2024-07-23 RX ADMIN — Medication 10 MG: at 18:11

## 2024-07-23 RX ADMIN — BUSPIRONE HYDROCHLORIDE 7.5 MG: 15 TABLET ORAL at 10:23

## 2024-07-23 RX ADMIN — QUETIAPINE FUMARATE 50 MG: 25 TABLET ORAL at 20:43

## 2024-07-23 RX ADMIN — INSULIN LISPRO 7 UNITS: 100 INJECTION, SOLUTION INTRAVENOUS; SUBCUTANEOUS at 08:47

## 2024-07-23 RX ADMIN — INSULIN LISPRO 3 UNITS: 100 INJECTION, SOLUTION INTRAVENOUS; SUBCUTANEOUS at 12:19

## 2024-07-23 RX ADMIN — PANTOPRAZOLE SODIUM 40 MG: 40 TABLET, DELAYED RELEASE ORAL at 08:46

## 2024-07-23 RX ADMIN — HYDROCODONE BITARTRATE AND ACETAMINOPHEN 1 TABLET: 10; 325 TABLET ORAL at 18:11

## 2024-07-23 RX ADMIN — PANTOPRAZOLE SODIUM 40 MG: 40 TABLET, DELAYED RELEASE ORAL at 17:58

## 2024-07-23 RX ADMIN — BUSPIRONE HYDROCHLORIDE 7.5 MG: 15 TABLET ORAL at 20:43

## 2024-07-23 RX ADMIN — NYSTATIN: 100000 POWDER TOPICAL at 12:19

## 2024-07-23 RX ADMIN — NYSTATIN: 100000 POWDER TOPICAL at 08:47

## 2024-07-23 RX ADMIN — INSULIN LISPRO 7 UNITS: 100 INJECTION, SOLUTION INTRAVENOUS; SUBCUTANEOUS at 17:07

## 2024-07-23 RX ADMIN — POLYETHYLENE GLYCOL 3350 17 G: 17 POWDER, FOR SOLUTION ORAL at 08:46

## 2024-07-23 RX ADMIN — APIXABAN 5 MG: 5 TABLET, FILM COATED ORAL at 08:49

## 2024-07-23 RX ADMIN — INSULIN LISPRO 3 UNITS: 100 INJECTION, SOLUTION INTRAVENOUS; SUBCUTANEOUS at 17:07

## 2024-07-23 RX ADMIN — ATORVASTATIN CALCIUM 80 MG: 40 TABLET, FILM COATED ORAL at 20:43

## 2024-07-23 RX ADMIN — DONEPEZIL HYDROCHLORIDE 10 MG: 10 TABLET, FILM COATED ORAL at 20:43

## 2024-07-23 NOTE — THERAPY TREATMENT NOTE
Patient Name: Cheri Cruz  : 1940    MRN: [id number]                              Today's Date: 2024       Admit Date: 2024    Visit Dx:     ICD-10-CM ICD-9-CM   1. Cognitive communication deficit  R41.841 799.52   2. Disorientation  R41.0 780.99     Patient Active Problem List   Diagnosis    Hyperlipidemia LDL goal <70    Type 2 diabetes mellitus without complication, without long-term current use of insulin    GERD (gastroesophageal reflux disease)    Essential hypertension    Abnormal EKG    Osteoarthritis    Anxiety    Palpitations    Vertigo    History of ischemic left MCA stroke    Hemorrhagic stroke    History of pulmonary embolus (PE)    Confusion    Hypotension    Constipation    UTI (urinary tract infection) due to urinary indwelling catheter    Metabolic encephalopathy    Symptomatic anemia    Acquired hypothyroidism    AMS (altered mental status)    Stroke     Past Medical History:   Diagnosis Date    Abnormal heart rhythm     Anxiety     Arrhythmia     Arthritis     Atrial fibrillation     Dementia     Diabetes mellitus     Hyperlipidemia     Hypertension     Kidney disorder     Stroke     Uterus cancer      Past Surgical History:   Procedure Laterality Date    CATARACT EXTRACTION, BILATERAL        General Information       Row Name 24 1116          OT Time and Intention    Document Type therapy note (daily note)  -CS     Mode of Treatment occupational therapy;co-treatment  -CS       Row Name 24 1116          General Information    Patient Profile Reviewed yes  -CS     Existing Precautions/Restrictions fall;other (see comments)  BUE tremors R>L, R sided hypertonia, non-ambulatory at baseline, diplopia, [place]  -CS     Barriers to Rehab medically complex;previous functional deficit;cognitive status;hearing deficit;visual deficit;ineffective coping  -CS       Row Name 24 1116          Cognition    Orientation Status (Cognition) oriented to;person;verbal cues/prompts  needed for orientation;place;disoriented to;situation;time  -       Row Name 07/23/24 1116          Safety Issues, Functional Mobility    Safety Issues Affecting Function (Mobility) awareness of need for assistance;insight into deficits/self-awareness;judgment;problem-solving;safety precaution awareness;safety precautions follow-through/compliance;sequencing abilities  -CS     Impairments Affecting Function (Mobility) balance;cognition;endurance/activity tolerance;pain;postural/trunk control;range of motion (ROM);strength;muscle tone abnormal;motor planning  -CS     Cognitive Impairments, Mobility Safety/Performance awareness, need for assistance;insight into deficits/self-awareness;problem-solving/reasoning;safety precaution awareness;safety precaution follow-through;sequencing abilities;judgment  -CS               User Key  (r) = Recorded By, (t) = Taken By, (c) = Cosigned By      Initials Name Provider Type     Karlo Rai OT Occupational Therapist                     Mobility/ADL's       Row Name 07/23/24 1117          Bed Mobility    Bed Mobility supine-sit;scooting/bridging  -     Rolling Left [place] (Bed Mobility) minimum assist (75% patient effort);1 person assist  -     Rolling Right [place] (Bed Mobility) minimum assist (75% patient effort);1 person assist  -CS     Scooting/Bridging [place] (Bed Mobility) maximum assist (25% patient effort);2 person assist;verbal cues;other (see comments);nonverbal cues (demo/gesture)  -     Assistive Device (Bed Mobility) draw sheet;bed rails  -       Row Name 07/23/24 1117          Transfers    Transfers bed-chair transfer  -     Comment, (Transfers) BUE support and Dep assist x 2 for STS attempt, unable to sustain any standing posture due to pain and weakness  -       Row Name 07/23/24 1117          Bed-Chair Transfer    Bed-Chair [place] (Transfers) dependent (less than 25% patient effort);2 person assist;verbal cues  -        Row Name 07/23/24 1117          Functional Mobility    Patient was able to Ambulate no, other medical factors prevent ambulation  -       Row Name 07/23/24 1117          Activities of Daily Living    BADL Assessment/Intervention upper body dressing;lower body dressing;grooming;toileting  -       Row Name 07/23/24 1117          Upper Body Dressing Assessment/Training    [place] Level (Upper Body Dressing) don;pajama/robe;moderate assist (50% patient effort)  -CS     Position (Upper Body Dressing) edge of bed sitting  -       Row Name 07/23/24 [facility]7          Lower Body Dressing Assessment/Training    [place] Level (Lower Body Dressing) doff;don;socks;dependent (less than 25% patient effort);other (see comments)  -CS     Position (Lower Body Dressing) supine  -       Row Name 07/23/24 1117          Grooming Assessment/Training    [place] Level (Grooming) wash face, hands;supervision;set up;verbal cues  -CS     Position (Grooming) supported sitting  -       Row Name 07/23/24 1117          Self-Feeding Assessment/Training    [place] Level (Feeding) feeding skills;other (see comments)  -     Assistive Devices (Feeding) adapted cup  -CS     Comment, (Feeding) continues to utilize AE for feeding  -       Row Name 07/23/24 1117          Toileting Assessment/Training    [place] Level (Toileting) adjust/manage clothing;other (see comments);dependent (less than 25% patient effort)  -               User Key  (r) = Recorded By, (t) = Taken By, (c) = Cosigned By      Initials Name Provider Type    CS Karlo Rai OT Occupational Therapist                   Obj/Interventions       [facility] Name 07/23/24 1127          Shoulder (Therapeutic Exercise)    Shoulder (Therapeutic Exercise) AROM (active range of motion)  -     Shoulder AROM (Therapeutic Exercise) bilateral;scapular elevation;scapular retraction;2 sets;5 repetitions  -       Row Name 07/23/24 1127          Elbow/Forearm  (Therapeutic Exercise)    Elbow/Forearm (Therapeutic Exercise) AROM (active range of motion)  -     Elbow/Forearm AROM (Therapeutic Exercise) bilateral;extension;flexion;5 repetitions  -       Row Name 07/23/24 1127          Balance    Balance Assessment sitting static balance;sitting dynamic balance;standing static balance  -CS     Static Sitting Balance moderate assist  -CS     Dynamic Sitting Balance maximum assist  -CS     Position, Sitting Balance unsupported;sitting edge of bed  -     Static Standing Balance dependent  -CS     Balance Interventions sitting;standing;sit to stand;occupation based/functional task  -               User Key  (r) = Recorded By, (t) = Taken By, (c) = Cosigned By      Initials Name Provider Type     Karlo Rai OT Occupational Therapist                   Goals/Plan    No documentation.                  Clinical Impression       [facility] Name 07/23/24 1128          Pain Assessment    Additional Documentation Pain Scale: FACES Pre/Post-Treatment (Group)  -[facility] Name 07/23/24 1128          Pain Scale: FACES Pre/Post-Treatment    Pain: FACES Scale, Pretreatment 0-->no hurt  -CS     Posttreatment Pain Rating 0-->no hurt  -CS       Row Name 07/23/24 1128          Plan of Care Review    Plan of Care Reviewed With patient  -CS     Progress no change  -     Outcome Evaluation Pt had met bed mobility goal progressing to [place] for rolling in bed, lifted to recliner, attempted STS but unable to bear weight through legs due to pain and weakness, recommend continued use of sling only for transfers. Engaged in BUE AROM, reeducated on benefits of feeding AE use, Will cont IPOT per POC as tolerated.  -       Row Name 07/23/24 1128          Vital Signs    Pre Systolic BP Rehab --  RN cleared for tx  -CS     O2 Delivery Pre Treatment room air  -CS     O2 Delivery Intra Treatment room air  -CS     O2 Delivery Post Treatment room air  -CS     Pre Patient Position Supine  -CS     Intra  Patient Position Standing  -CS     Post Patient Position Sitting  -CS       Row Name 07/23/24 1128          Positioning and Restraints    Pre-Treatment Position in bed  -CS     Post Treatment Position chair  -CS     In Chair notified nsg;reclined;sitting;call light within reach;encouraged to call for assist;legs elevated;waffle cushion;on mechanical lift sling  -CS               User Key  (r) = Recorded By, (t) = Taken By, (c) = Cosigned By      Initials Name Provider Type    CS Karlo Rai OT Occupational Therapist                   Outcome Measures       Row Name 07/23/24 1107 07/23/24 0849       How much help from another person do you currently need...    Turning from your back to your side while in flat bed without using bedrails? 3  -KR 2  -LM    Moving from lying on back to sitting on the side of a flat bed without bedrails? 2  -KR 2  -LM    Moving to and from a bed to a chair (including a wheelchair)? 1  -KR 1  -LM    Standing up from a chair using your arms (e.g., wheelchair, bedside chair)? 1  -KR 1  -LM    Climbing 3-5 steps with a railing? 1  -KR 1  -LM    To walk in hospital room? 1  -KR 1  -LM    AM-PAC 6 Clicks Score (PT) 9  -KR 8  -LM    Highest Level of Mobility Goal 3 --> Sit at edge of bed  -KR 3 --> Sit at edge of bed  -LM              User Key  (r) = Recorded By, (t) = Taken By, (c) = Cosigned By      Initials Name Provider Type    LM Joleen Dickens RN Registered Nurse    Courtney Dennis PT Physical Therapist                    Occupational Therapy Education       Title: PT OT SLP Therapies (In Progress)       Topic: Occupational Therapy (In Progress)       Point: ADL training (In Progress)       Description:   Instruct learner(s) on proper safety adaptation and remediation techniques during self care or transfers.   Instruct in proper use of assistive devices.                  Learning Progress Summary             Patient Acceptance, E,D, NR by PRAKASH at 7/20/2024 1020    Acceptance,  E,D, VU,DU by BELINDA at 7/15/2024 0936   Family Acceptance, E,D, NR by PRAKASH at 7/20/2024 1020                         Point: Home exercise program (In Progress)       Description:   Instruct learner(s) on appropriate technique for monitoring, assisting and/or progressing therapeutic exercises/activities.                  Learning Progress Summary             Patient Acceptance, E,D, NR by PRAKASH at 7/20/2024 1020    Acceptance, E,D, VU,DU by BELINDA at 7/15/2024 0936   Family Acceptance, E,D, NR by PRAKASH at 7/20/2024 1020                         Point: Precautions (In Progress)       Description:   Instruct learner(s) on prescribed precautions during self-care and functional transfers.                  Learning Progress Summary             Patient Acceptance, E,D, NR by PRAKASH at 7/20/2024 1020    Acceptance, E,D, VU,DU by BELINDA at 7/15/2024 0936   Family Acceptance, E,D, NR by PRAKASH at 7/20/2024 1020                         Point: Body mechanics (In Progress)       Description:   Instruct learner(s) on proper positioning and spine alignment during self-care, functional mobility activities and/or exercises.                  Learning Progress Summary             Patient Acceptance, E,D, NR by PRAKASH at 7/20/2024 1020    Acceptance, E,D, VU,DU by BELINDA at 7/15/2024 0936   Family Acceptance, E,D, NR by PRAKASH at 7/20/2024 1020                                         User Key       Initials Effective Dates Name Provider Type Discipline     06/16/21 -  Mirela Winston OT Occupational Therapist OT     06/16/21 -  Karlo Rai OT Occupational Therapist OT                  OT Recommendation and Plan  Planned Therapy Interventions (OT): activity tolerance training, functional balance retraining, occupation/activity based interventions, ROM/therapeutic exercise, strengthening exercise, transfer/mobility retraining, patient/caregiver education/training, neuromuscular control/coordination retraining, adaptive equipment training  Therapy Frequency (OT): 3  times/wk  Plan of Care Review  Plan of Care Reviewed With: patient  Progress: no change  Outcome Evaluation: Pt had met bed mobility goal progressing to [place] for rolling in bed, lifted to recliner, attempted STS but unable to bear weight through legs due to pain and weakness, recommend continued use of sling only for transfers. Engaged in BUE AROM, reeducated on benefits of feeding AE use, Will cont IPOT per POC as tolerated.     Time Calculation:   Evaluation Complexity (OT)  Review Occupational Profile/Medical/Therapy History Complexity: expanded/moderate complexity  Assessment, Occupational Performance/Identification of Deficit Complexity: 3-5 performance deficits  Overall Complexity of Evaluation (OT): moderate complexity     Time Calculation- OT       Row Name 07/23/24 1133             Time Calculation- OT    OT Start Time 1025  -CS      OT Received On 07/23/24  -CS      OT Goal Re-Cert Due Date 07/25/24  -CS         Timed Charges    48366 - OT Therapeutic Exercise Minutes 4  -CS      73143 - OT Therapeutic Activity Minutes 6  -CS         Total Minutes    Timed Charges Total Minutes 10  -CS       Total Minutes 10  -CS                User Key  (r) = Recorded By, (t) = Taken By, (c) = Cosigned By      Initials Name Provider Type    CS Karlo Rai OT Occupational Therapist                  Therapy Charges for Today       Code Description Service Date Service Provider Modifiers Qty    [id number] HC OT THERAPEUTIC ACT EA 15 MIN 7/23/2024 Karlo Rai OT GO 1                 Karlo Rai OT  7/23/2024

## 2024-07-23 NOTE — DISCHARGE PLACEMENT REQUEST
"Deya Cruz Cro (83 y.o. Female)       Date of Birth   1940    Social Security Number       Address   [address] [facility] [address]    Home Phone   [phone]    MRN   [id number]       [facility]    Marital Status                               Admission Date   24    Admission Type   Urgent    Admitting Provider   Michelle Chávez MD    Attending Provider   Michelle Chávez MD    Department, Room/Bed   [facility] 3F, S304/1       Discharge Date       Discharge Disposition       Discharge Destination                                 Attending Provider: Michelle Chávez MD    Allergies: Penicillins, Sulfa Antibiotics, Tetracyclines & Related, Valium [Diazepam]    Isolation: None   Infection: None   Code Status: CPR    Ht: 170.2 cm (67\")   Wt: 93.1 kg (205 lb 4 oz)    Admission Cmt: None   Principal Problem: AMS (altered mental status) [R41.82]                   Active Insurance as of 2024       Primary Coverage       Payor Plan Insurance Group Employer/Plan Group    HUMANA MEDICARE REPLACEMENT HUMANA MED ADV HMO [id number]       Payor Plan Address Payor Plan Phone Number Payor Plan Fax Number Effective Dates    [address] [address] [phone]  2023 - None Entered    [facility] [id number]         Subscriber Name Subscriber Birth Date Member ID       DEYA CRUZ CRO 1940 [id number]                     Emergency Contacts        (Rel.) Home Phone Work Phone Mobile Phone    Radha Cruz (Daughter) [phone] -- [phone]    EVITA MADDEN (Daughter) [phone] -- --    OMEGA CRUZ (Son) [phone] -- [phone]    Vanessa Cruz (Spouse) [phone] -- --    Shayne Madden (Other) [phone] -- --                 History & Physical        Otilia Chang MD at 24 191              [facility] Medicine Services  HISTORY AND PHYSICAL    Patient Name: Deya Cruz  : 1940  MRN: [id number]  Primary Care " Physician: Lorrie Canada APRN  Date of admission: 7/14/2024      Subjective  Subjective     Chief Complaint:  Altered mental state    HPI:  Patient with dementia and expressive aphasia.  Cannot contribute much to HPI.  Most of the history is obtained from reviewing her old records and records from [facility].  Attempted to reach her daughter Ms. Garcia 3 times but voicemail is full    Cheriheriberto Cruz is a 83 y.o. 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    She presented to [facility] with worsening encephalopathy/altered mental state and generalized bodyaches.  She has baseline dementia but her mental state was significantly below her baseline.  She was found to be hypertensive with systolic in the 90s.  Lab workup with creatinine of 2.0 from her baseline of 1.5.  Potassium 5.6.  Blood sugar 436.  Hemoglobin of 11.0.  Urine analysis with too numerous WBCs.  Chest x-ray clear.  CT head with questionable left parietal tiny hemorrhage versus calcification.  She was transferred to [facility] for neurology evaluation after she was accepted by stroke team.    At the time of the encounter, patient is mildly confused.  She thinks she is in [place].  She is able to tell me the name of her daughter.  Complaining of some lower abdominal pain and burning sensation with a urine otherwise she has no active complaint.      Addendum 8:30 PM:  Managed to reach out to the daughter/Ms. Garcia who lives in provide care to the patient.  Daughter reported to me that her mother is bedbound since her stroke in 2023 with minimal activity.  She is incontinent to urine and stool.  She has severe depression.  Over the last few weeks, her  "memory has been declining as well as her mood.  She became less engaged and more depressed.  Her appetite is good but she eats what she wants to eat.  This morning, her mother started complaining of shortness of breath and kept telling her \" I am going to die\" and when her daughter checked her oxygen saturation, it was in the 60s so she put oxygen on (she is on as needed oxygen at home but does not like to wear it).  Soon after, patient became pale and she could not feel pulse and was about to start CPR when the patient quickly recovered and her oxygen saturation improved to the upper 90s.  At that time, she thought that her mother recovered so she can call 911 immediately.  Later on, her mother kept acting weird and became more lethargic which eventually prompted her to call 911.    Personal History     Past Medical History:   Diagnosis Date    Abnormal heart rhythm     Anxiety     Arrhythmia     Arthritis     Atrial fibrillation     Dementia     Diabetes mellitus     Hyperlipidemia     Hypertension     Kidney disorder     Stroke     Uterus cancer            Past Surgical History:   Procedure Laterality Date    CATARACT EXTRACTION, BILATERAL         Family History: family history includes Alcohol abuse in her brother; Cancer in her brother, father, and mother; Congenital heart disease in her mother; Heart disease in her mother.     Social History:  reports that she has never smoked. She has never used smokeless tobacco. She reports that she does not drink alcohol and does not use drugs.  Social History     Social History Narrative    Not on file       Medications:  Available home medication information reviewed.  DULoxetine, HYDROcodone-acetaminophen, Insulin Lispro, LORazepam, QUEtiapine, apixaban, busPIRone, donepezil, gabapentin, levothyroxine, meclizine, omeprazole, rosuvastatin, sennosides-docusate, and terazosin    Allergies   Allergen Reactions    Penicillins Unknown - Low Severity     Tolerates ceftriaxones "    Sulfa Antibiotics     Tetracyclines & Related Unknown (See Comments)    Valium [Diazepam] Anxiety       Objective  Objective     Vital Signs:   Temp:  [97.7 °F (36.5 °C)-98.1 °F (36.7 °C)] 97.7 °F (36.5 °C)  Heart Rate:  [64-87] 65  Resp:  [18-20] 18  BP: ()/(46-65) 123/48  Flow (L/min):  [2] 2       Physical Exam   General: Pleasantly confused.  Not in distress.  Conversant with expressive aphasia  Head: Atraumatic and normocephalic  Eyes: No Icterus. No pallor  Ears:  Ears appear intact with no abnormalities noted  Throat: No oral lesions, no thrush  Neck: Supple, trachea midline  Lungs: Clear to auscultation bilaterally, equal air entry, no wheezing or crackles  Heart:  Normal S1 and S2, no murmur, no gallop, No JVD, no lower extremity swelling  Abdomen:  Soft, no tenderness, no organomegaly, normal bowel sounds, no organomegaly  Extremities: pulses equal bilaterally  Skin: No bleeding, bruising or rash, normal skin turgor and elasticity  Neurologic: Mild expressive aphasia.  No gross motor deficit  Psych: Alert and oriented x 1    Result Review:  I have personally reviewed the results from the time of this admission to 7/14/2024 19:55 EDT and agree with these findings:  [x]  Laboratory list / accordion  [x]  Microbiology  []  Radiology  [x]  EKG/Telemetry   [x]  Cardiology/Vascular   []  Pathology  [x]  Old records      LAB RESULTS:      Lab 07/14/24  1519   WBC 7.87   HEMOGLOBIN 11.0*   HEMATOCRIT 35.2   PLATELETS 139*   NEUTROS ABS 5.72   IMMATURE GRANS (ABS) 0.02   LYMPHS ABS 1.52   MONOS ABS 0.44   EOS ABS 0.14   .2*         Lab 07/14/24  1519   SODIUM 137   POTASSIUM 5.6*   CHLORIDE 101   CO2 27.8   ANION GAP 8.2   BUN 35*   CREATININE 2.06*   EGFR 23.7*   GLUCOSE 436*   CALCIUM 9.2   MAGNESIUM 1.6   TSH 2.380         Lab 07/14/24  1519   TOTAL PROTEIN 7.0   ALBUMIN 3.9   GLOBULIN 3.1   ALT (SGPT) 5   AST (SGOT) 13   BILIRUBIN 0.2   ALK PHOS 68         Lab 07/14/24  1519   HSTROP T 37*                  UA          12/17/2023    09:48 3/25/2024    01:50 7/14/2024    15:27   Urinalysis   Squamous Epithelial Cells, UA   None Seen    Specific Hiko, UA 1.020     1.015     >=1.030    Ketones, UA   Negative    Blood, UA Negative     Negative     Trace    Leukocytes, UA Negative     SMALL     Moderate (2+)    Nitrite, UA Negative     Negative     Negative    RBC, UA 0-2      Unable to determine due to loaded field    WBC, UA   11-20    Bacteria, UA Rare      None Seen       Details          This result is from an external source.               Microbiology Results (last 10 days)       Procedure Component Value - Date/Time    COVID-19 and FLU A/B PCR, 1 HR TAT - Swab, Nasopharynx [[id number]]  (Normal) Collected: 07/14/24 1622    Lab Status: Final result Specimen: Swab from Nasopharynx Updated: 07/14/24 1703     COVID19 Not Detected     Influenza A PCR Not Detected     Influenza B PCR Not Detected    Narrative:      Fact sheet for providers: https://www.fda.gov/media/318821/download    Fact sheet for patients: https://www.fda.gov/media/522696/download    Test performed by PCR.            XR Chest 1 View    Result Date: 7/14/2024  PROCEDURE: XR CHEST 1 VW-  HISTORY: Weak/Dizzy/AMS triage protocol, decreased O2 sats. Altered mental status.  COMPARISON: April 29, 2023.  FINDINGS: The heart is stable.. Decreased inspiratory effort noted. There is evidence of old calcified granulomatous disease. Few linear densities are noted bilaterally which are stable. No new area of consolidation seen.. The mediastinum is unremarkable. There is no pneumothorax.  There are no acute osseous abnormalities.      Impression: Stable chest..      This report was signed and finalized on 7/14/2024 4:23 PM by Mare Kwong MD.      CT Head Without Contrast    Result Date: 7/14/2024  PROCEDURE: CT HEAD WO CONTRAST-  HISTORY: increased AMS, weakness, h/o stroke  COMPARISON: April 29, 2023..  TECHNIQUE: Multiple axial CT images were performed  from the foramen magnum to the vertex. Individualized dose reduction techniques using automated exposure control or adjustment of mA and/or kV according to the patient size were employed.  FINDINGS: There is moderate, age-appropriate, stable generalized cerebral atrophy. The ventricles are enlarged. Again noted is the area of encephalomalacia in the left posterior parietal region. Extent is stable from prior exam. High attenuation has developed in the cortex has in the regions of the previous MCA; finding is new compared to the prior exam. Suspect this represents postischemic cortical calcification but hemorrhage is not completely excluded. Recommend brain MRI. There is no evidence of edema or hemorrhage.  No masses are identified. No extra-axial fluid is seen. The paranasal sinuses are unremarkable. Mild small vessel ischemic disease noted.      Impression: New cortical high attenuation material at site of previous left posterior parietal CVA compared to April 2023, suspect postischemic cortical calcification but hemorrhage not completely excluded. Recommend brain MRI.     CTDI: 36.09 mGy DLP:604.53 mGy.cm  This report was signed and finalized on 7/14/2024 4:21 PM by Mare Kwong MD.       Results for orders placed during the hospital encounter of 02/10/23    Adult Transthoracic Echo Complete W/ Cont if Necessary Per Protocol    Interpretation Summary    Left ventricular systolic function is hyperdynamic (EF > 70%). Calculated left ventricular EF = 70.6% Left ventricular ejection fraction appears to be greater than 70%. The left ventricular cavity is small in size. Left ventricular wall thickness is consistent with mild concentric hypertrophy. All left ventricular wall segments contract normally. Left ventricular intracavitary gradient noted to be 71 mmHg. Left ventricular diastolic function is consistent with (grade I) impaired relaxation. Normal left atrial pressure.    The aortic valve is abnormal in structure.  There is mild calcification of the aortic valve mainly affecting the right coronary cusp(s). The aortic valve appears trileaflet. No aortic valve regurgitation is present. Gradient noted through the LV and LVOT    Compared to TTE report from [facility] Dec 2019, hyperdynamic LV with intracavitary gradient is not a new finding but peak gradient noted on this exam is higher than previously described.      Assessment & Plan  Assessment & Plan       * No active hospital problems. *      Summary:  Cheri Cruz is a 83 y.o. 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    Concern for left parietal tiny hemorrhage versus calcification  Old left parietal ischemic stroke  Acute metabolic encephalopathy  Possible UTI  Baseline dementia  Patient with baseline dementia.  Presented to [facility] with cognitive decline and metabolic encephalopathy.  She was hypotensive with systolic in the 90s.  Patient does report urinary symptoms in the form of urgency, frequency and suprapubic tenderness.  UA with too numerous WBCs without bacteriuria.  Will initiate antibiotic therapy with IV Rocephin and repeat the urine analysis and obtain urine culture.  Patient has previous UTI with Enterococcus faecium  CT from [facility] with left parietal tiny hemorrhage versus calcification on top of old ischemic stroke.  Discussed with stroke team and plan to hold Eliquis for now.  Neurology to evaluate  Holding sedatives: Gabapentin, meclizine and lorazepam    Addendum 8:30 PM  Acute hypoxia, improved  ?  Near syncope  Per patient report, patient became hypoxic this morning with oxygen saturation in the  60s.  At that time, she could not feel pulse and patient became  pale.  Quickly recovered after she applied oxygen  Patient is on Eliquis 2.5 mg twice daily for previous history of PE and for A-fib.  With that history mentioned above, I am concerned about PE causing her hypoxia and near syncope  Will proceed with CTA chest.  Benefit outweighs the risk    RADHA on CKD stage III  Dehydration volume depletion  Mild hyperkalemia  IV fluids  Monitor kidney functions with a.m. lab    Type 2 diabetes with uncontrolled hyperglycemia  Check A1c  Insulin glargine and SSI    Essential hypertension  Was hypotensive and [facility].  No blood pressure improved  Not on antihypertensive  medications    Dyslipidemia  Statins    Dementia  Continue donepezil    VTE Prophylaxis:  Mechanical VTE prophylaxis orders are present.          CODE STATUS:    There are no questions and answers to display.       Expected Discharge   Expected Discharge Date: 7/15/2024; Expected Discharge Time:      Otilia Chang MD  24     Electronically signed by Otilia Chang MD at 24          Physician Progress Notes (most recent note)        Kristin Pillai APRN at 24 0920              [facility] Medicine Services  PROGRESS NOTE    Patient Name: Cheri Cruz  : 1940  MRN: [id number]    Date of Admission: 2024  Primary Care Physician: Lorrie Canada APRN    Subjective   Subjective     CC:  F/u AMS     HPI:  Patient is resting in bed in NAD. She states she did not sleep good. Having some dizziness this am       Objective   Objective     Vital Signs:   Temp:  [97.9 °F (36.6 °C)-98.3 °F (36.8 °C)] 98 °F (36.7 °C)  Heart Rate:  [65-80] 69  Resp:  [16-18] 16  BP: (113-162)/(50-84) 162/84  Flow (L/min):  [1-2] 2     Physical Exam:  Constitutional: No acute distress, awake, alert  HENT: NCAT, mucous membranes moist  Respiratory: Clear to auscultation bilaterally, respiratory effort normal 2L   Cardiovascular: RRR, no murmurs, rubs, or  gallops  Gastrointestinal: Positive bowel sounds, soft, nontender, nondistended  Musculoskeletal: No bilateral ankle edema  Psychiatric: Appropriate affect, cooperative  Neurologic: Oriented x 1, ,RUE weaker the LUE. , speech clear, has aphasia at times. Will say the wrong word at times      Results Reviewed:  LAB RESULTS:      Lab 07/22/24  0440 07/16/24  0407   WBC 7.35 7.06   HEMOGLOBIN 9.7* 10.6*   HEMATOCRIT 29.8* 34.9   PLATELETS 124* 90*   .4* 106.1*         Lab 07/22/24  0440 07/19/24  0550 07/18/24  1045 07/17/24  0742 07/16/24  0407 07/15/24  2344   SODIUM 139 137 138 137 137  --    POTASSIUM 4.1 4.7 5.1 5.8* 5.2  --    CHLORIDE 101 98 100 103 104  --    CO2 28.0 27.0 30.0* 25.0 25.0  --    ANION GAP 10.0 12.0 8.0 9.0 8.0  --    BUN 41* 19 18 22 25*  --    CREATININE 1.82* 1.19* 1.36* 1.48* 1.44*  --    EGFR 27.5* 45.7* 39.0* 35.2* 36.4*  --    GLUCOSE 194* 202* 229* 257* 294*  --    CALCIUM 8.0* 9.3 9.2 8.7 8.3*  --    MAGNESIUM  --   --   --   --   --  2.8*         Lab 07/18/24  1045   TOTAL PROTEIN 6.5   ALBUMIN 3.8   ALT (SGPT) 5   AST (SGOT) 16   BILIRUBIN 0.2   BILIRUBIN DIRECT <0.2   ALK PHOS 66                     Lab 07/18/24  1510   PH, ARTERIAL 7.380   PCO2, ARTERIAL 53.1*   PO2 ART 82.4*   FIO2 24   HCO3 ART 31.4*   BASE EXCESS ART 5.2*   CARBOXYHEMOGLOBIN 1.1     Brief Urine Lab Results  (Last result in the past 365 days)        Color   Clarity   Blood   Leuk Est   Nitrite   Protein   CREAT   Urine HCG        07/14/24 2001 Yellow   Cloudy   Trace   Small (1+)   Negative   Negative                   Microbiology Results Abnormal       Procedure Component Value - Date/Time    Blood Culture - Blood, Wrist, Left [[id number]]  (Normal) Collected: 07/14/24 2022    Lab Status: Final result Specimen: Blood from Wrist, Left Updated: 07/19/24 2100     Blood Culture No growth at 5 days    Narrative:      Less than seven (7) mL's of blood was collected.  Insufficient quantity may yield false  negative results.    Blood Culture - Blood, Hand, Left [[id number]]  (Normal) Collected: 07/14/24 2022    Lab Status: Final result Specimen: Blood from Hand, Left Updated: 07/19/24 2100     Blood Culture No growth at 5 days    Narrative:      Less than seven (7) mL's of blood was collected.  Insufficient quantity may yield false negative results.    Urine Culture - Urine, Urine, Random Void [[id number]] Collected: 07/14/24 2001    Lab Status: Final result Specimen: Urine, Random Void Updated: 07/16/24 1020     Urine Culture 50,000 CFU/mL Normal Urogenital Nickie    Narrative:      Colonization of the urinary tract without infection is common. Treatment is discouraged unless the patient is symptomatic, pregnant, or undergoing an invasive urologic procedure.            No radiology results from the last 24 hrs    Results for orders placed during the hospital encounter of 02/10/23    Adult Transthoracic Echo Complete W/ Cont if Necessary Per Protocol    Interpretation Summary    Left ventricular systolic function is hyperdynamic (EF > 70%). Calculated left ventricular EF = 70.6% Left ventricular ejection fraction appears to be greater than 70%. The left ventricular cavity is small in size. Left ventricular wall thickness is consistent with mild concentric hypertrophy. All left ventricular wall segments contract normally. Left ventricular intracavitary gradient noted to be 71 mmHg. Left ventricular diastolic function is consistent with (grade I) impaired relaxation. Normal left atrial pressure.    The aortic valve is abnormal in structure. There is mild calcification of the aortic valve mainly affecting the right coronary cusp(s). The aortic valve appears trileaflet. No aortic valve regurgitation is present. Gradient noted through the LV and LVOT    Compared to TTE report from  Dec 2019, hyperdynamic LV with intracavitary gradient is not a new finding but peak gradient noted on this exam is higher than previously  described.      Current medications:  Scheduled Meds:apixaban, 5 mg, Oral, BID  atorvastatin, 80 mg, Oral, Nightly  busPIRone, 7.5 mg, Oral, BID  donepezil, 10 mg, Oral, Nightly  gabapentin, , ,   gabapentin, 300 mg, Oral, Daily  insulin glargine, 30 Units, Subcutaneous, Nightly  insulin lispro, 3-14 Units, Subcutaneous, 4x Daily AC & at Bedtime  Insulin Lispro, 7 Units, Subcutaneous, TID With Meals  levothyroxine, 25 mcg, Oral, Daily  nystatin, , Topical, 4x Daily  pantoprazole, 40 mg, Oral, BID AC  senna-docusate sodium, 2 tablet, Oral, Daily   And  polyethylene glycol, 17 g, Oral, Daily  QUEtiapine, 50 mg, Oral, Nightly  sodium chloride, 10 mL, Intravenous, Q12H  sodium chloride, 10 mL, Intravenous, Q12H  terazosin, 2 mg, Oral, Nightly      Continuous Infusions:sodium chloride, 75 mL/hr, Last Rate: 75 mL/hr (07/22/24 1109)      PRN Meds:.  acetaminophen **OR** acetaminophen **OR** acetaminophen    senna-docusate sodium **AND** polyethylene glycol **AND** bisacodyl **AND** bisacodyl    Calcium Replacement - Follow Nurse / BPA Driven Protocol    dextrose    dextrose    gabapentin    glucagon (human recombinant)    HYDROcodone-acetaminophen    Magnesium Standard Dose Replacement - Follow Nurse / BPA Driven Protocol    meclizine    ondansetron ODT **OR** ondansetron    Phosphorus Replacement - Follow Nurse / BPA Driven Protocol    Potassium Replacement - Follow Nurse / BPA Driven Protocol    sodium chloride    sodium chloride    sodium chloride    sodium chloride    Assessment & Plan   Assessment & Plan     Active Hospital Problems    Diagnosis  POA    **AMS (altered mental status) [R41.82]  Yes    Stroke [I63.9]  Yes      Resolved Hospital Problems   No resolved problems to display.        Brief Hospital Course to date:  Cheri Cruz is a 83 y.o. 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  Initial stroke workup was negative.  MRI showed old left parietal ischemic stroke.  Stroke neurology followed and resumed Eliquis.  She received empiric IV ceftriaxone x 3 days due to concern for UTI.     This patient's problems and plans were partially entered by my partner and updated as appropriate by me 07/22/24.      Altered mental status on advanced dementia  Hypotension   Concern for UTI  - initial stroke workup negative.  MRI showed old L parietal ischemic stroke; pt seen by Stroke team; apixiban restarted.   -Possibly dehydration vs hypoxia  -COVID/flu negative. Blood cultures with no growth to date. LFTs normal. CXR no acute findings.    -CXR with old calcified gr  -S/p empiric IV ceftriaxone x 3 days.   -Held sedatives initially, Okay to resume PRN lorazepam.   -Continue donepezil, Seroquel, BuSpar.     Acute on chronic hypoxemic respiratory failure  Near-syncope  -Per report, patient became hypoxic with oxygen saturation in the 60s  -CTA chest showed no PE, nonacute  -Patient is O2 dependent on 2L NC but is not compliant due her baseline dementia.  -Continue supplemental oxygenation, titrate for goal SpO2 greater than 90%.     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 She was hypotensive with systolic in the 90s at [facility].   -CTH from [facility] with left parietal tiny hemorrhage vs calcification on top of old ischemic stroke.   -MRI showed evolving old left parietal lobe CVA with some areas of associated cortical laminar necrosis. No hemorrhage.    -Neurology evaluated, recommended to resume Eliquis. Continue statin. Annual follow up in stroke clinic.   -PT/OT now recommending SNF      RADHA on CKD stage III  Volume depletion due to dehydration   Mild  hyperkalemia  -Baseline Cr about 1.3, Cr 2.06 on presentation, improved to  1.2.    -Improved with IV fluids. S/p Lokelma x 1 on 7/17.  -- creat up top 1.82, will give 1L of IVF's and encourage oral intake   -AM labs      Complex disposition  -PT/OT evaluated, felt patient's functional status is at baseline. Recommended home with 24/7 care.  -partner discussed care with both patient's daughters and case management on 7/17.   - Family leaning towards pursuing long-term care after discharge as they are having difficulty taking care of her on their own and do not have finances for full-time caregiver.     Nausea, constipation   -KUB 7/14 with nonobstructive bowel gas pattern. Repeat KUB 7/18 with moderate stool burden.  - BM 7/20.    -Continue bowel regimen. Increased PPI to BID. PRN Zofran for nausea.      Uncontrolled diabetes mellitus type 2 with hyperglycemia  -A1c 9.80% this admission  -Increase Lantus to 30 units qhs, increase  lispro 7u TIDAC and moderate-high dose SSI. Monitor glucose and adjust insulin as needed.  -Resumed home gabapentin at low-dose 300 mg daily.     Chronic anemia  Macrocytosis  -No evidence of overt GI bleeding this admission  -B12 and folate within normal limits  -Further workup of anemia as outpatient as appropriate.      Essential hypertension  -Was hypotensive at [facility].  -Not on antihypertensive medications.  -Continue to monitor.      Hyperlipidemia -Continue atorvastatin.  Hypothyroidism -Continue levothyroxine.  GERD -Continue PPI.       Expected Discharge Location and Transportation: rehab   Expected Discharge   Expected Discharge Date: 7/25/2024; Expected Discharge Time:      VTE Prophylaxis:  Pharmacologic & mechanical VTE prophylaxis orders are present.         AM-PAC 6 Clicks Score (PT): 8 (07/22/24 1006)    CODE STATUS:   Code Status and Medical Interventions:   Ordered at: 07/15/24 2798     Level Of Support Discussed With:    Patient     Code Status (Patient  has no pulse and is not breathing):    CPR (Attempt to Resuscitate)     Medical Interventions (Patient has pulse or is breathing):    Full Support       EVELINA Valdivia  24        Electronically signed by Kristin Pillai APRN at 24 1311          Physical Therapy Notes (most recent note)        Courtney Jackson, PT at 24 1030  Version 1 of 1         Patient Name: Cheri Cruz  : 1940    MRN: [id number]                              Today's Date: 2024       Admit Date: 2024    Visit Dx:     ICD-10-CM ICD-9-CM   1. Cognitive communication deficit  R41.841 799.52   2. Disorientation  R41.0 780.99     Patient Active Problem List   Diagnosis    Hyperlipidemia LDL goal <70    Type 2 diabetes mellitus without complication, without long-term current use of insulin    GERD (gastroesophageal reflux disease)    Essential hypertension    Abnormal EKG    Osteoarthritis    Anxiety    Palpitations    Vertigo    History of ischemic left MCA stroke    Hemorrhagic stroke    History of pulmonary embolus (PE)    Confusion    Hypotension    Constipation    UTI (urinary tract infection) due to urinary indwelling catheter    Metabolic encephalopathy    Symptomatic anemia    Acquired hypothyroidism    AMS (altered mental status)    Stroke     Past Medical History:   Diagnosis Date    Abnormal heart rhythm     Anxiety     Arrhythmia     Arthritis     Atrial fibrillation     Dementia     Diabetes mellitus     Hyperlipidemia     Hypertension     Kidney disorder     Stroke     Uterus cancer      Past Surgical History:   Procedure Laterality Date    CATARACT EXTRACTION, BILATERAL        General Information       Row Name 24 1100          Physical Therapy Time and Intention    Document Type therapy note (daily note)  -KR     Mode of Treatment co-treatment;physical therapy  -KR       Row Name 24 1100          General Information    Patient Profile Reviewed yes  -KR     Existing  Precautions/Restrictions fall;other (see comments)  BUE tremors R>L, R sided hypertonia, non-ambulatory at baseline, diplopia, [place]  -KR     Barriers to Rehab medically complex;previous functional deficit;cognitive status;visual deficit;hearing deficit;ineffective coping  -KR       Row Name 07/23/24 1100          Cognition    Orientation Status (Cognition) oriented to;person;verbal cues/prompts needed for orientation;place  -KR       Row Name 07/23/24 1100          Safety Issues, Functional Mobility    Safety Issues Affecting Function (Mobility) awareness of need for assistance;insight into deficits/self-awareness;judgment;problem-solving;safety precaution awareness;sequencing abilities;safety precautions follow-through/compliance  -KR     Impairments Affecting Function (Mobility) balance;cognition;endurance/activity tolerance;pain;postural/trunk control;range of motion (ROM);strength;muscle tone abnormal  -KR     Cognitive Impairments, Mobility Safety/Performance attention;awareness, need for assistance;insight into deficits/self-awareness;judgment;problem-solving/reasoning;sequencing abilities;safety precaution follow-through;safety precaution awareness  -KR               User Key  (r) = Recorded By, (t) = Taken By, (c) = Cosigned By      Initials Name Provider Type    KR Courtney Jackson, PT Physical Therapist                   Mobility       Row Name 07/23/24 1102          Bed Mobility    Bed Mobility rolling right;rolling left  -KR     Rolling Left [place] (Bed Mobility) minimum assist (75% patient effort);1 person assist  -KR     Rolling Right [place] (Bed Mobility) minimum assist (75% patient effort);1 person assist  -KR     Assistive Device (Bed Mobility) draw sheet;bed rails  -KR     Comment, (Bed Mobility) cues for hand placement and sequencing  -KR       Row Name 07/23/24 1102          Bed-Chair Transfer    Bed-Chair [place] (Transfers) dependent (less than 25% patient effort);2 person  assist;verbal cues  -KR     Assistive Device (Bed-Chair Transfers) lift device  -KR       Row Name 07/23/24 1102          Sit-Stand Transfer    Sit-Stand [place] (Transfers) 2 person assist;dependent (less than 25% patient effort)  -KR     Assistive Device (Sit-Stand Transfers) other (see comments)  BUE support  -KR     Comment, (Sit-Stand Transfer) 1x from chair. Pt unable to tolerate standing > 5 seconds d/ tB knee and ankle pain.  -KR       Row Name 07/23/24 1102          Gait/Stairs (Locomotion)    [place] Level (Gait) not tested  -KR               User Key  (r) = Recorded By, (t) = Taken By, (c) = Cosigned By      Initials Name Provider Type    Courtney Dennis PT Physical Therapist                   Obj/Interventions       Row Name 07/23/24 1105          Balance    Balance Assessment sitting static balance;sitting dynamic balance;standing static balance  -KR     Static Sitting Balance moderate assist  -KR     Dynamic Sitting Balance maximum assist  -KR     Position, Sitting Balance unsupported;sitting in chair  -KR     Static Standing Balance dependent;2-person assist  -KR     Position/Device Used, Standing Balance supported;other (see comments)  BUE support  -KR     Balance Interventions sitting;standing;sit to stand;supported;static  -KR               User Key  (r) = Recorded By, (t) = Taken By, (c) = Cosigned By      Initials Name Provider Type    Courtney Dennis PT Physical Therapist                   Goals/Plan    No documentation.                  Clinical Impression       Row Name 07/23/24 1105          Pain Scale: FACES Pre/Post-Treatment    Pain: FACES Scale, Pretreatment 0-->no hurt  -KR     Posttreatment Pain Rating 0-->no hurt  -KR     Pre/Posttreatment Pain Comment no pain at baseline, pain with weight bearing in B ankles and knees  -KR       Row Name 07/23/24 1105          Plan of Care Review    Plan of Care Reviewed With patient  -KR     Progress improving  -KR     Outcome  Evaluation Pt required less assist for bed mobility this date. Pt able to sit EOC with BLE in WB for ~5 minutes with modA. Pt will continue to benefit from PT to address ongoing strength, balance, and endurance deficits. Rec SNF transition to LTC upon dc.  -KR       Row Name 07/23/24 1105          Vital Signs    Pre Patient Position Supine  -KR     Intra Patient Position Standing  -KR     Post Patient Position Sitting  -KR       Row Name 07/23/24 1105          Positioning and Restraints    Pre-Treatment Position in bed  -KR     Post Treatment Position chair  -KR     In Chair notified nsg;reclined;call light within reach;encouraged to call for assist;exit alarm on;waffle cushion;on mechanical lift sling;legs elevated;heels elevated;LUE elevated;RUE elevated  -KR               User Key  (r) = Recorded By, (t) = Taken By, (c) = Cosigned By      Initials Name Provider Type    Courtney Dennis PT Physical Therapist                   Outcome Measures       Row Name 07/23/24 1107 07/23/24 0849       How much help from another person do you currently need...    Turning from your back to your side while in flat bed without using bedrails? 3  -KR 2  -LM    Moving from lying on back to sitting on the side of a flat bed without bedrails? 2  -KR 2  -LM    Moving to and from a bed to a chair (including a wheelchair)? 1  -KR 1  -LM    Standing up from a chair using your arms (e.g., wheelchair, bedside chair)? 1  -KR 1  -LM    Climbing 3-5 steps with a railing? 1  -KR 1  -LM    To walk in hospital room? 1  -KR 1  -LM    AM-PAC 6 Clicks Score (PT) 9  -KR 8  -LM    Highest Level of Mobility Goal 3 --> Sit at edge of bed  -KR 3 --> Sit at edge of bed  -LM              User Key  (r) = Recorded By, (t) = Taken By, (c) = Cosigned By      Initials Name Provider Type    Joleen Wolfe, RN Registered Nurse    Courtney Dennis PT Physical Therapist                                 Physical Therapy Education       Title: PT OT SLP  Therapies (In Progress)       Topic: Physical Therapy (In Progress)       Point: Mobility training (In Progress)       Learning Progress Summary             Patient Acceptance, E, NR by KR at 7/23/2024 1107    Eager, E, VU,DU,NR by SS at 7/20/2024 1526    Comment: Educated safety/technique w/bed mobility, transfers, HEP, PT POC    Acceptance, E, VU,DU by ANUEL at 7/15/2024 0955   Family Eager, E, VU,DU,NR by SS at 7/20/2024 1526    Comment: Educated safety/technique w/bed mobility, transfers, HEP, PT POC                         Point: Home exercise program (Done)       Learning Progress Summary             Patient Eager, E, VU,DU,NR by SS at 7/20/2024 1526    Comment: Educated safety/technique w/bed mobility, transfers, HEP, PT POC   Family Eager, E, VU,DU,NR by SS at 7/20/2024 1526    Comment: Educated safety/technique w/bed mobility, transfers, HEP, PT POC                         Point: Body mechanics (In Progress)       Learning Progress Summary             Patient Acceptance, E, NR by KR at 7/23/2024 1107    Eager, E, VU,DU,NR by SS at 7/20/2024 1526    Comment: Educated safety/technique w/bed mobility, transfers, HEP, PT POC    Acceptance, E, VU,DU by ANUEL at 7/15/2024 0955   Family Eager, E, VU,DU,NR by SS at 7/20/2024 1526    Comment: Educated safety/technique w/bed mobility, transfers, HEP, PT POC                         Point: Precautions (In Progress)       Learning Progress Summary             Patient Acceptance, E, NR by KR at 7/23/2024 1107    Eager, E, VU,DU,NR by SS at 7/20/2024 1526    Comment: Educated safety/technique w/bed mobility, transfers, HEP, PT POC    Acceptance, E, VU,DU by ANUEL at 7/15/2024 0955   Family Eager, E, VU,DU,NR by SS at 7/20/2024 1526    Comment: Educated safety/technique w/bed mobility, transfers, HEP, PT POC                                         User Key       Initials Effective Dates Name Provider Type Discipline     06/01/21 -  Freda Leonard, PT Physical Therapist PT    ANDRY  22 -  Courtney Jackson, PT Physical Therapist PT    ANUEL 24 -  Cate Joshi PT Student PT Student PT                  PT Recommendation and Plan     Plan of Care Reviewed With: patient  Progress: improving  Outcome Evaluation: Pt required less assist for bed mobility this date. Pt able to sit EOC with BLE in WB for ~5 minutes with modA. Pt will continue to benefit from PT to address ongoing strength, balance, and endurance deficits. Rec SNF transition to LTC upon dc.     Time Calculation:         PT Charges       Row Name 24 1108             Time Calculation    Start Time 1030  -KR      PT Received On 24  -KR         Timed Charges    72340 - PT Therapeutic Activity Minutes 10  -KR         Total Minutes    Timed Charges Total Minutes 10  -KR       Total Minutes 10  -KR                User Key  (r) = Recorded By, (t) = Taken By, (c) = Cosigned By      Initials Name Provider Type    KR Courtney Jackson PT Physical Therapist                  Therapy Charges for Today       Code Description Service Date Service Provider Modifiers Qty    40332240038 HC PT THERAPEUTIC ACT EA 15 MIN 2024 Courtney Jackson, PT GP 1            PT G-Codes  Outcome Measure Options: AM-PAC 6 Clicks Basic Mobility (PT)  AM-PAC 6 Clicks Score (PT): 9  AM-PAC 6 Clicks Score (OT): 12  Modified Steven Scale: 5 - Severe disability.  Bedridden, incontinent, and requiring constant nursing care and attention.  PT Discharge Summary  Anticipated Discharge Disposition (PT): skilled nursing facility    Courtney Jackson PT  2024      Electronically signed by Courtney Jackson PT at 24 1101          Occupational Therapy Notes (most recent note)        Karlo Rai, OT at 24 1025          Patient Name: Cheri Cruz  : 1940    MRN: [id number]                              Today's Date: 2024       Admit Date: 2024    Visit Dx:     ICD-10-CM ICD-9-CM   1. Cognitive communication deficit  R41.841  799.52   2. Disorientation  R41.0 780.99     Patient Active Problem List   Diagnosis    Hyperlipidemia LDL goal <70    Type 2 diabetes mellitus without complication, without long-term current use of insulin    GERD (gastroesophageal reflux disease)    Essential hypertension    Abnormal EKG    Osteoarthritis    Anxiety    Palpitations    Vertigo    History of ischemic left MCA stroke    Hemorrhagic stroke    History of pulmonary embolus (PE)    Confusion    Hypotension    Constipation    UTI (urinary tract infection) due to urinary indwelling catheter    Metabolic encephalopathy    Symptomatic anemia    Acquired hypothyroidism    AMS (altered mental status)    Stroke     Past Medical History:   Diagnosis Date    Abnormal heart rhythm     Anxiety     Arrhythmia     Arthritis     Atrial fibrillation     Dementia     Diabetes mellitus     Hyperlipidemia     Hypertension     Kidney disorder     Stroke     Uterus cancer      Past Surgical History:   Procedure Laterality Date    CATARACT EXTRACTION, BILATERAL        General Information       Row Name 07/23/24 1116          OT Time and Intention    Document Type therapy note (daily note)  -CS     Mode of Treatment occupational therapy;co-treatment  -CS       Row Name 07/23/24 1116          General Information    Patient Profile Reviewed yes  -CS     Existing Precautions/Restrictions fall;other (see comments)  BUE tremors R>L, R sided hypertonia, non-ambulatory at baseline, diplopia, [place]  -CS     Barriers to Rehab medically complex;previous functional deficit;cognitive status;hearing deficit;visual deficit;ineffective coping  -CS       Row Name 07/23/24 1116          Cognition    Orientation Status (Cognition) oriented to;person;verbal cues/prompts needed for orientation;place;disoriented to;situation;time  -CS       Row Name 07/23/24 1116          Safety Issues, Functional Mobility    Safety Issues Affecting Function (Mobility) awareness of need for assistance;insight into  deficits/self-awareness;judgment;problem-solving;safety precaution awareness;safety precautions follow-through/compliance;sequencing abilities  -CS     Impairments Affecting Function (Mobility) balance;cognition;endurance/activity tolerance;pain;postural/trunk control;range of motion (ROM);strength;muscle tone abnormal;motor planning  -CS     Cognitive Impairments, Mobility Safety/Performance awareness, need for assistance;insight into deficits/self-awareness;problem-solving/reasoning;safety precaution awareness;safety precaution follow-through;sequencing abilities;judgment  -CS               User Key  (r) = Recorded By, (t) = Taken By, (c) = Cosigned By      Initials Name Provider Type    CS Karlo Rai, OT Occupational Therapist                     Mobility/ADL's       Row Name 07/23/24 1117          Bed Mobility    Bed Mobility supine-sit;scooting/bridging  -     Rolling Left [place] (Bed Mobility) minimum assist (75% patient effort);1 person assist  -CS     Rolling Right [place] (Bed Mobility) minimum assist (75% patient effort);1 person assist  -CS     Scooting/Bridging [place] (Bed Mobility) maximum assist (25% patient effort);2 person assist;verbal cues;other (see comments);nonverbal cues (demo/gesture)  -     Assistive Device (Bed Mobility) draw sheet;bed rails  -       Row Name 07/23/24 1117          Transfers    Transfers bed-chair transfer  -     Comment, (Transfers) BUE support and Dep assist x 2 for STS attempt, unable to sustain any standing posture due to pain and weakness  -       Row Name 07/23/24 1117          Bed-Chair Transfer    Bed-Chair [place] (Transfers) dependent (less than 25% patient effort);2 person assist;verbal cues  -       Row Name 07/23/24 1117          Functional Mobility    Patient was able to Ambulate no, other medical factors prevent ambulation  -       Row Name 07/23/24 1117          Activities of Daily Living    BADL  Assessment/Intervention upper body dressing;lower body dressing;grooming;toileting  -CS       Row Name 07/23/24 1117          Upper Body Dressing Assessment/Training    [place] Level (Upper Body Dressing) don;pajama/robe;moderate assist (50% patient effort)  -CS     Position (Upper Body Dressing) edge of bed sitting  -CS       Row Name 07/23/24 1117          Lower Body Dressing Assessment/Training    [place] Level (Lower Body Dressing) doff;don;socks;dependent (less than 25% patient effort);other (see comments)  -CS     Position (Lower Body Dressing) supine  -CS       Row Name 07/23/24 1117          Grooming Assessment/Training    [place] Level (Grooming) wash face, hands;supervision;set up;verbal cues  -CS     Position (Grooming) supported sitting  -CS       Row Name 07/23/24 1117          Self-Feeding Assessment/Training    [place] Level (Feeding) feeding skills;other (see comments)  -     Assistive Devices (Feeding) adapted cup  -CS     Comment, (Feeding) continues to utilize AE for feeding  -CS       Row Name 07/23/24 1117          Toileting Assessment/Training    [place] Level (Toileting) adjust/manage clothing;other (see comments);dependent (less than 25% patient effort)  -CS               User Key  (r) = Recorded By, (t) = Taken By, (c) = Cosigned By      Initials Name Provider Type    CS Karlo Rai, OT Occupational Therapist                   Obj/Interventions       Row Name 07/23/24 1127          Shoulder (Therapeutic Exercise)    Shoulder (Therapeutic Exercise) AROM (active range of motion)  -     Shoulder AROM (Therapeutic Exercise) bilateral;scapular elevation;scapular retraction;2 sets;5 repetitions  -       Row Name 07/23/24 1127          Elbow/Forearm (Therapeutic Exercise)    Elbow/Forearm (Therapeutic Exercise) AROM (active range of motion)  -     Elbow/Forearm AROM (Therapeutic Exercise) bilateral;extension;flexion;5 repetitions  -       Row Name 07/23/24  1127          Balance    Balance Assessment sitting static balance;sitting dynamic balance;standing static balance  -CS     Static Sitting Balance moderate assist  -CS     Dynamic Sitting Balance maximum assist  -CS     Position, Sitting Balance unsupported;sitting edge of bed  -CS     Static Standing Balance dependent  -CS     Balance Interventions sitting;standing;sit to stand;occupation based/functional task  -CS               User Key  (r) = Recorded By, (t) = Taken By, (c) = Cosigned By      Initials Name Provider Type    Karlo Croft OT Occupational Therapist                   Goals/Plan    No documentation.                  Clinical Impression       Row Name 07/23/24 1128          Pain Assessment    Additional Documentation Pain Scale: FACES Pre/Post-Treatment (Group)  -[facility] Name 07/23/24 1128          Pain Scale: FACES Pre/Post-Treatment    Pain: FACES Scale, Pretreatment 0-->no hurt  -CS     Posttreatment Pain Rating 0-->no hurt  -CS       [facility] Name 07/23/24 1128          Plan of Care Review    Plan of Care Reviewed With patient  -CS     Progress no change  -     Outcome Evaluation Pt had met bed mobility goal progressing to [place] for rolling in bed, lifted to recliner, attempted STS but unable to bear weight through legs due to pain and weakness, recommend continued use of sling only for transfers. Engaged in BUE AROM, reeducated on benefits of feeding AE use, Will cont IPOT per POC as tolerated.  -       Row Name 07/23/24 1128          Vital Signs    Pre Systolic BP Rehab --  RN cleared for tx  -CS     O2 Delivery Pre Treatment room air  -CS     O2 Delivery Intra Treatment room air  -CS     O2 Delivery Post Treatment room air  -CS     Pre Patient Position Supine  -CS     Intra Patient Position Standing  -CS     Post Patient Position Sitting  -       Row Name 07/23/24 1128          Positioning and Restraints    Pre-Treatment Position in bed  -CS     Post Treatment Position chair  -CS      In Chair notified nsg;reclined;sitting;call light within reach;encouraged to call for assist;legs elevated;waffle cushion;on mechanical lift sling  -CS               User Key  (r) = Recorded By, (t) = Taken By, (c) = Cosigned By      Initials Name Provider Type    Karlo Croft OT Occupational Therapist                   Outcome Measures       Row Name 07/23/24 1107 07/23/24 0849       How much help from another person do you currently need...    Turning from your back to your side while in flat bed without using bedrails? 3  -KR 2  -LM    Moving from lying on back to sitting on the side of a flat bed without bedrails? 2  -KR 2  -LM    Moving to and from a bed to a chair (including a wheelchair)? 1  -KR 1  -LM    Standing up from a chair using your arms (e.g., wheelchair, bedside chair)? 1  -KR 1  -LM    Climbing 3-5 steps with a railing? 1  -KR 1  -LM    To walk in hospital room? 1  -KR 1  -LM    AM-PAC 6 Clicks Score (PT) 9  -KR 8  -LM    Highest Level of Mobility Goal 3 --> Sit at edge of bed  -KR 3 --> Sit at edge of bed  -LM              User Key  (r) = Recorded By, (t) = Taken By, (c) = Cosigned By      Initials Name Provider Type    Joleen Wolfe, RN Registered Nurse    Courtney Dennis, PT Physical Therapist                    Occupational Therapy Education       Title: PT OT SLP Therapies (In Progress)       Topic: Occupational Therapy (In Progress)       Point: ADL training (In Progress)       Description:   Instruct learner(s) on proper safety adaptation and remediation techniques during self care or transfers.   Instruct in proper use of assistive devices.                  Learning Progress Summary             Patient Acceptance, E,D, NR by PRAKASH at 7/20/2024 1020    Acceptance, E,D, VU,DU by BELINDA at 7/15/2024 0936   Family Acceptance, E,D, NR by PRAKASH at 7/20/2024 1020                         Point: Home exercise program (In Progress)       Description:   Instruct learner(s) on appropriate  technique for monitoring, assisting and/or progressing therapeutic exercises/activities.                  Learning Progress Summary             Patient Acceptance, E,D, NR by PRAKASH at 7/20/2024 1020    Acceptance, E,D, VU,DU by BELINDA at 7/15/2024 0936   Family Acceptance, E,D, NR by PRAKASH at 7/20/2024 1020                         Point: Precautions (In Progress)       Description:   Instruct learner(s) on prescribed precautions during self-care and functional transfers.                  Learning Progress Summary             Patient Acceptance, E,D, NR by PRAKASH at 7/20/2024 1020    Acceptance, E,D, VU,DU by BELINDA at 7/15/2024 0936   Family Acceptance, E,D, NR by PRAKASH at 7/20/2024 1020                         Point: Body mechanics (In Progress)       Description:   Instruct learner(s) on proper positioning and spine alignment during self-care, functional mobility activities and/or exercises.                  Learning Progress Summary             Patient Acceptance, E,D, NR by PRAKASH at 7/20/2024 1020    Acceptance, E,D, VU,DU by BELINDA at 7/15/2024 0936   Family Acceptance, E,D, NR by PRAKASH at 7/20/2024 1020                                         User Key       Initials Effective Dates Name Provider Type Discipline     06/16/21 -  Mirela Winston OT Occupational Therapist OT     06/16/21 -  Karlo Rai OT Occupational Therapist OT                  OT Recommendation and Plan  Planned Therapy Interventions (OT): activity tolerance training, functional balance retraining, occupation/activity based interventions, ROM/therapeutic exercise, strengthening exercise, transfer/mobility retraining, patient/caregiver education/training, neuromuscular control/coordination retraining, adaptive equipment training  Therapy Frequency (OT): 3 times/wk  Plan of Care Review  Plan of Care Reviewed With: patient  Progress: no change  Outcome Evaluation: Pt had met bed mobility goal progressing to Wilbert for rolling in bed, lifted to recliner, attempted STS  but unable to bear weight through legs due to pain and weakness, recommend continued use of sling only for transfers. Engaged in BUE AROM, reeducated on benefits of feeding AE use, Will cont IPOT per POC as tolerated.     Time Calculation:   Evaluation Complexity (OT)  Review Occupational Profile/Medical/Therapy History Complexity: expanded/moderate complexity  Assessment, Occupational Performance/Identification of Deficit Complexity: 3-5 performance deficits  Overall Complexity of Evaluation (OT): moderate complexity     Time Calculation- OT       Row Name 07/23/24 1133             Time Calculation- OT    OT Start Time 1025  -CS      OT Received On 07/23/24  -CS      OT Goal Re-Cert Due Date 07/25/24  -CS         Timed Charges    52573 - OT Therapeutic Exercise Minutes 4  -CS      21122 - OT Therapeutic Activity Minutes 6  -CS         Total Minutes    Timed Charges Total Minutes 10  -CS       Total Minutes 10  -CS                User Key  (r) = Recorded By, (t) = Taken By, (c) = Cosigned By      Initials Name Provider Type    CS Karlo Rai OT Occupational Therapist                  Therapy Charges for Today       Code Description Service Date Service Provider Modifiers Qty    [id number]  OT THERAPEUTIC ACT EA 15 MIN 7/23/2024 Karlo Rai OT GO 1                 Karlo Rai OT  7/23/2024    Electronically signed by Karlo Rai OT at 07/23/24 1136

## 2024-07-23 NOTE — THERAPY TREATMENT NOTE
Patient Name: Cheri Cruz  : 1940    MRN: [id number]                              Today's Date: 2024       Admit Date: 2024    Visit Dx:     ICD-10-CM ICD-9-CM   1. Cognitive communication deficit  R41.841 799.52   2. Disorientation  R41.0 780.99     Patient Active Problem List   Diagnosis    Hyperlipidemia LDL goal <70    Type 2 diabetes mellitus without complication, without long-term current use of insulin    GERD (gastroesophageal reflux disease)    Essential hypertension    Abnormal EKG    Osteoarthritis    Anxiety    Palpitations    Vertigo    History of ischemic left MCA stroke    Hemorrhagic stroke    History of pulmonary embolus (PE)    Confusion    Hypotension    Constipation    UTI (urinary tract infection) due to urinary indwelling catheter    Metabolic encephalopathy    Symptomatic anemia    Acquired hypothyroidism    AMS (altered mental status)    Stroke     Past Medical History:   Diagnosis Date    Abnormal heart rhythm     Anxiety     Arrhythmia     Arthritis     Atrial fibrillation     Dementia     Diabetes mellitus     Hyperlipidemia     Hypertension     Kidney disorder     Stroke     Uterus cancer      Past Surgical History:   Procedure Laterality Date    CATARACT EXTRACTION, BILATERAL        General Information       Row Name 24 1100          Physical Therapy Time and Intention    Document Type therapy note (daily note)  -KR     Mode of Treatment co-treatment;physical therapy  -KR       Row Name 24 1100          General Information    Patient Profile Reviewed yes  -KR     Existing Precautions/Restrictions fall;other (see comments)  BUE tremors R>L, R sided hypertonia, non-ambulatory at baseline, diplopia, [place]  -KR     Barriers to Rehab medically complex;previous functional deficit;cognitive status;visual deficit;hearing deficit;ineffective coping  -KR       Row Name 24 1100          Cognition    Orientation Status (Cognition) oriented to;person;verbal  cues/prompts needed for orientation;place  -KR       Row Name 07/23/24 1100          Safety Issues, Functional Mobility    Safety Issues Affecting Function (Mobility) awareness of need for assistance;insight into deficits/self-awareness;judgment;problem-solving;safety precaution awareness;sequencing abilities;safety precautions follow-through/compliance  -KR     Impairments Affecting Function (Mobility) balance;cognition;endurance/activity tolerance;pain;postural/trunk control;range of motion (ROM);strength;muscle tone abnormal  -KR     Cognitive Impairments, Mobility Safety/Performance attention;awareness, need for assistance;insight into deficits/self-awareness;judgment;problem-solving/reasoning;sequencing abilities;safety precaution follow-through;safety precaution awareness  -KR               User Key  (r) = Recorded By, (t) = Taken By, (c) = Cosigned By      Initials Name Provider Type    KR Courtney Jackson, PT Physical Therapist                   Mobility       Row Name 07/23/24 1102          Bed Mobility    Bed Mobility rolling right;rolling left  -KR     Rolling Left Wirt (Bed Mobility) minimum assist (75% patient effort);1 person assist  -KR     Rolling Right [place] (Bed Mobility) minimum assist (75% patient effort);1 person assist  -KR     Assistive Device (Bed Mobility) draw sheet;bed rails  -KR     Comment, (Bed Mobility) cues for hand placement and sequencing  -KR       Row Name 07/23/24 1102          Bed-Chair Transfer    Bed-Chair [place]irt (Transfers) dependent (less than 25% patient effort);2 person assist;verbal cues  -KR     Assistive Device (Bed-Chair Transfers) lift device  -KR       Row Name 07/23/24 1102          Sit-Stand Transfer    Sit-Stand Wirt (Transfers) 2 person assist;dependent (less than 25% patient effort)  -KR     Assistive Device (Sit-Stand Transfers) other (see comments)  BUE support  -KR     Comment, (Sit-Stand Transfer) 1x from chair. Pt unable to  tolerate standing > 5 seconds d/ tB knee and ankle pain.  -KR       Row Name 07/23/24 1102          Gait/Stairs (Locomotion)    [place] Level (Gait) not tested  -KR               User Key  (r) = Recorded By, (t) = Taken By, (c) = Cosigned By      Initials Name Provider Type    Courtney Dennis PT Physical Therapist                   Obj/Interventions       Row Name 07/23/24 1105          Balance    Balance Assessment sitting static balance;sitting dynamic balance;standing static balance  -KR     Static Sitting Balance moderate assist  -KR     Dynamic Sitting Balance maximum assist  -KR     Position, Sitting Balance unsupported;sitting in chair  -KR     Static Standing Balance dependent;2-person assist  -KR     Position/Device Used, Standing Balance supported;other (see comments)  BUE support  -KR     Balance Interventions sitting;standing;sit to stand;supported;static  -KR               User Key  (r) = Recorded By, (t) = Taken By, (c) = Cosigned By      Initials Name Provider Type    Courtney Dennis PT Physical Therapist                   Goals/Plan    No documentation.                  Clinical Impression       Row Name 07/23/24 1105          Pain Scale: FACES Pre/Post-Treatment    Pain: FACES Scale, Pretreatment 0-->no hurt  -KR     Posttreatment Pain Rating 0-->no hurt  -KR     Pre/Posttreatment Pain Comment no pain at baseline, pain with weight bearing in B ankles and knees  -KR       Row Name 07/23/24 1105          Plan of Care Review    Plan of Care Reviewed With patient  -KR     Progress improving  -KR     Outcome Evaluation Pt required less assist for bed mobility this date. Pt able to sit EOC with BLE in WB for ~5 minutes with modA. Pt will continue to benefit from PT to address ongoing strength, balance, and endurance deficits. Rec SNF transition to LTC upon dc.  -KR       Row Name 07/23/24 1105          Vital Signs    Pre Patient Position Supine  -KR     Intra Patient Position Standing  -KR      Post Patient Position Sitting  -KR       Row Name 07/23/24 1105          Positioning and Restraints    Pre-Treatment Position in bed  -KR     Post Treatment Position chair  -KR     In Chair notified nsg;reclined;call light within reach;encouraged to call for assist;exit alarm on;waffle cushion;on mechanical lift sling;legs elevated;heels elevated;LUE elevated;RUE elevated  -KR               User Key  (r) = Recorded By, (t) = Taken By, (c) = Cosigned By      Initials Name Provider Type    Courtney Dennis, PT Physical Therapist                   Outcome Measures       Row Name 07/23/24 1107 07/23/24 0849       How much help from another person do you currently need...    Turning from your back to your side while in flat bed without using bedrails? 3  -KR 2  -LM    Moving from lying on back to sitting on the side of a flat bed without bedrails? 2  -KR 2  -LM    Moving to and from a bed to a chair (including a wheelchair)? 1  -KR 1  -LM    Standing up from a chair using your arms (e.g., wheelchair, bedside chair)? 1  -KR 1  -LM    Climbing 3-5 steps with a railing? 1  -KR 1  -LM    To walk in hospital room? 1  -KR 1  -LM    AM-PAC 6 Clicks Score (PT) 9  -KR 8  -LM    Highest Level of Mobility Goal 3 --> Sit at edge of bed  -KR 3 --> Sit at edge of bed  -LM              User Key  (r) = Recorded By, (t) = Taken By, (c) = Cosigned By      Initials Name Provider Type    Joleen Wolfe RN Registered Nurse    Courtney Dennis, PT Physical Therapist                                 Physical Therapy Education       Title: PT OT SLP Therapies (In Progress)       Topic: Physical Therapy (In Progress)       Point: Mobility training (In Progress)       Learning Progress Summary             Patient Acceptance, E, NR by ANDRY at 7/23/2024 1107    EagerGETACHEW, RAYMOND BAZZI,NR by YONG at 7/20/2024 1526    Comment: Educated safety/technique w/bed mobility, transfers, HEP, PT POC    Acceptance, E, ROSE MARY,DU by ANUEL at 7/15/2024 0955   Family  Eager, E, VU,DU,NR by SS at 7/20/2024 1526    Comment: Educated safety/technique w/bed mobility, transfers, HEP, PT POC                         Point: Home exercise program (Done)       Learning Progress Summary             Patient Eager, E, VU,DU,NR by SS at 7/20/2024 1526    Comment: Educated safety/technique w/bed mobility, transfers, HEP, PT POC   Family Eager, E, VU,DU,NR by SS at 7/20/2024 1526    Comment: Educated safety/technique w/bed mobility, transfers, HEP, PT POC                         Point: Body mechanics (In Progress)       Learning Progress Summary             Patient Acceptance, E, NR by KR at 7/23/2024 1107    Eager, E, VU,DU,NR by SS at 7/20/2024 1526    Comment: Educated safety/technique w/bed mobility, transfers, HEP, PT POC    Acceptance, E, VU,DU by ANUEL at 7/15/2024 0955   Family Eager, E, VU,DU,NR by  at 7/20/2024 1526    Comment: Educated safety/technique w/bed mobility, transfers, HEP, PT POC                         Point: Precautions (In Progress)       Learning Progress Summary             Patient Acceptance, E, NR by KR at 7/23/2024 1107    Eager, E, VU,DU,NR by SS at 7/20/2024 1526    Comment: Educated safety/technique w/bed mobility, transfers, HEP, PT POC    Acceptance, E, VU,DU by ANUEL at 7/15/2024 0955   Family Eager, E, VU,DU,NR by  at 7/20/2024 1526    Comment: Educated safety/technique w/bed mobility, transfers, HEP, PT POC                                         User Key       Initials Effective Dates Name Provider Type Discipline     06/01/21 -  Freda Leonard, PT Physical Therapist PT    KR 12/30/22 -  Courtney Jackson, PT Physical Therapist PT    ANUEL 05/07/24 -  Cate Joshi, BILL Student PT Student PT                  PT Recommendation and Plan     Plan of Care Reviewed With: patient  Progress: improving  Outcome Evaluation: Pt required less assist for bed mobility this date. Pt able to sit EOC with BLE in WB for ~5 minutes with modA. Pt will continue to benefit from PT  to address ongoing strength, balance, and endurance deficits. Rec SNF transition to LTC upon dc.     Time Calculation:         PT Charges       Row Name 07/23/24 1108             Time Calculation    Start Time 1030  -KR      PT Received On 07/23/24  -KR         Timed Charges    99429 - PT Therapeutic Activity Minutes 10  -KR         Total Minutes    Timed Charges Total Minutes 10  -KR       Total Minutes 10  -KR                User Key  (r) = Recorded By, (t) = Taken By, (c) = Cosigned By      Initials Name Provider Type    KR Courtney Jackson, PT Physical Therapist                  Therapy Charges for Today       Code Description Service Date Service Provider Modifiers Qty    [id number]  PT THERAPEUTIC ACT EA 15 MIN 7/23/2024 Courtney Jackson, PT GP 1            PT G-Codes  Outcome Measure Options: AM-PAC 6 Clicks Basic Mobility (PT)  AM-PAC 6 Clicks Score (PT): 9  AM-PAC 6 Clicks Score (OT): 12  Modified [place] Scale: 5 - Severe disability.  Bedridden, incontinent, and requiring constant nursing care and attention.  PT Discharge Summary  Anticipated Discharge Disposition (PT): skilled nursing facility    Courtney Jackson PT  7/23/2024

## 2024-07-23 NOTE — PLAN OF CARE
Goal Outcome Evaluation:                     Anticipated Discharge Disposition (SLP): home with 24/7 care

## 2024-07-23 NOTE — CASE MANAGEMENT/SOCIAL WORK
Continued Stay Note  [facility]     Patient Name: Cheri Cruz  MRN: [id number]  Today's Date: 7/23/2024    Admit Date: 7/14/2024    Plan: SNF   Discharge Plan       Row Name 07/23/24 1458       Plan    Plan SNF    Patient/Family in Agreement with Plan yes    Plan Comments Spoke with Ms. Cruz's Daughters, Radha and Janice by telephone. [facility] has offered a rehab bed and the family accepted. She will need insurance approval for the rehab. Precert has been submitted. CM will continue to follow up.    Final Discharge Disposition Code 03 - skilled nursing facility (SNF)                   Discharge Codes    No documentation.                 Expected Discharge Date and Time       Expected Discharge Date Expected Discharge Time    Jul 25, 2024               Jaye Kuhn RN

## 2024-07-23 NOTE — PLAN OF CARE
Goal Outcome Evaluation:  Plan of Care Reviewed With: patient        Progress: improving  Outcome Evaluation: Pt required less assist for bed mobility this date. Pt able to sit EOC with BLE in WB for ~5 minutes with modA. Pt will continue to benefit from PT to address ongoing strength, balance, and endurance deficits. Rec SNF transition to LTC upon dc.      Anticipated Discharge Disposition (PT): skilled nursing facility

## 2024-07-23 NOTE — PLAN OF CARE
Goal Outcome Evaluation:  Plan of Care Reviewed With: patient        Progress: no change  Outcome Evaluation: Pt had met bed mobility goal progressing to Wilbert for rolling in bed, lifted to recliner, attempted STS but unable to bear weight through legs due to pain and weakness, recommend continued use of sling only for transfers. Engaged in BUE AROM, reeducated on benefits of feeding AE use, Will cont IPOT per POC as tolerated.      Anticipated Discharge Disposition (OT): home with 24/7 care

## 2024-07-23 NOTE — PROGRESS NOTES
"    [facility] Medicine Services  PROGRESS NOTE    Patient Name: Cheri Cruz  : 1940  MRN: [id number]    Date of Admission: 2024  Primary Care Physician: Lorrie Canada APRN    Subjective   Subjective     CC:  F/u AMS     HPI:  Dozing with cup in her hand.  When I wake her she is upset:  \"I can't do anything, I spill everything.\"  Needs help feeding herself.          Objective   Objective     Vital Signs:   Temp:  [97.7 °F (36.5 °C)-98.9 °F (37.2 °C)] 97.7 °F (36.5 °C)  Heart Rate:  [63-81] 63  Resp:  [16-18] 18  BP: (130-178)/(49-84) 148/58  Flow (L/min):  [2] 2     Physical Exam:  Constitutional: wakes easily, physically NAD.    HENT: NCAT, mucous membranes moist  Respiratory: Clear to auscultation bilaterally, respiratory effort normal 2L   Cardiovascular: RRR,   Gastrointestinal: NT  Musculoskeletal: No bilateral ankle edema  Neurologic: Oriented x 1, ,RUE weaker the LUE. , speech clear, inserts wrong words occas (mild expr aphasia )hand tremors       Results Reviewed:  LAB RESULTS:      Lab 24  0440   WBC 7.35   HEMOGLOBIN 9.7*   HEMATOCRIT 29.8*   PLATELETS 124*   .4*         Lab 24  0454 24  0440 24  0550 24  1045 24  0742   SODIUM 144 139 137 138 137   POTASSIUM 4.7 4.1 4.7 5.1 5.8*   CHLORIDE 106 101 98 100 103   CO2 29.0 28.0 27.0 30.0* 25.0   ANION GAP 9.0 10.0 12.0 8.0 9.0   BUN 33* 41* 19 18 22   CREATININE 1.66* 1.82* 1.19* 1.36* 1.48*   EGFR 30.7* 27.5* 45.7* 39.0* 35.2*   GLUCOSE 152* 194* 202* 229* 257*   CALCIUM 8.4* 8.0* 9.3 9.2 8.7         Lab 24  1045   TOTAL PROTEIN 6.5   ALBUMIN 3.8   ALT (SGPT) 5   AST (SGOT) 16   BILIRUBIN 0.2   BILIRUBIN DIRECT <0.2   ALK PHOS 66                     Lab 24  1510   PH, ARTERIAL 7.380   PCO2, ARTERIAL 53.1*   PO2 ART 82.4*   FIO2 24   HCO3 ART 31.4*   BASE EXCESS ART 5.2*   CARBOXYHEMOGLOBIN 1.1     Brief Urine Lab Results  (Last result in the past 365 days)      "   Color   Clarity   Blood   Leuk Est   Nitrite   Protein   CREAT   Urine HCG        07/14/24 2001 Yellow   Cloudy   Trace   Small (1+)   Negative   Negative                   Microbiology Results Abnormal       Procedure Component Value - Date/Time    Blood Culture - Blood, Wrist, Left [[id number]]  (Normal) Collected: 07/14/24 2022    Lab Status: Final result Specimen: Blood from Wrist, Left Updated: 07/19/24 2100     Blood Culture No growth at 5 days    Narrative:      Less than seven (7) mL's of blood was collected.  Insufficient quantity may yield false negative results.    Blood Culture - Blood, Hand, Left [[id number]]  (Normal) Collected: 07/14/24 2022    Lab Status: Final result Specimen: Blood from Hand, Left Updated: 07/19/24 2100     Blood Culture No growth at 5 days    Narrative:      Less than seven (7) mL's of blood was collected.  Insufficient quantity may yield false negative results.    Urine Culture - Urine, Urine, Random Void [[id number]] Collected: 07/14/24 2001    Lab Status: Final result Specimen: Urine, Random Void Updated: 07/16/24 1020     Urine Culture 50,000 CFU/mL Normal Urogenital Nickie    Narrative:      Colonization of the urinary tract without infection is common. Treatment is discouraged unless the patient is symptomatic, pregnant, or undergoing an invasive urologic procedure.            No radiology results from the last 24 hrs    Results for orders placed during the hospital encounter of 02/10/23    Adult Transthoracic Echo Complete W/ Cont if Necessary Per Protocol    Interpretation Summary    Left ventricular systolic function is hyperdynamic (EF > 70%). Calculated left ventricular EF = 70.6% Left ventricular ejection fraction appears to be greater than 70%. The left ventricular cavity is small in size. Left ventricular wall thickness is consistent with mild concentric hypertrophy. All left ventricular wall segments contract normally. Left ventricular intracavitary gradient noted to  be 71 mmHg. Left ventricular diastolic function is consistent with (grade I) impaired relaxation. Normal left atrial pressure.    The aortic valve is abnormal in structure. There is mild calcification of the aortic valve mainly affecting the right coronary cusp(s). The aortic valve appears trileaflet. No aortic valve regurgitation is present. Gradient noted through the LV and LVOT    Compared to TTE report from  Dec 2019, hyperdynamic LV with intracavitary gradient is not a new finding but peak gradient noted on this exam is higher than previously described.      Current medications:  Scheduled Meds:apixaban, 5 mg, Oral, BID  atorvastatin, 80 mg, Oral, Nightly  busPIRone, 7.5 mg, Oral, BID  donepezil, 10 mg, Oral, Nightly  gabapentin, , ,   gabapentin, 300 mg, Oral, Daily  insulin glargine, 30 Units, Subcutaneous, Nightly  insulin lispro, 3-14 Units, Subcutaneous, 4x Daily AC & at Bedtime  Insulin Lispro, 7 Units, Subcutaneous, TID With Meals  levothyroxine, 25 mcg, Oral, Daily  nystatin, , Topical, 4x Daily  pantoprazole, 40 mg, Oral, BID AC  senna-docusate sodium, 2 tablet, Oral, Daily   And  polyethylene glycol, 17 g, Oral, Daily  QUEtiapine, 50 mg, Oral, Nightly  sodium chloride, 10 mL, Intravenous, Q12H  sodium chloride, 10 mL, Intravenous, Q12H  terazosin, 2 mg, Oral, Nightly      Continuous Infusions:     PRN Meds:.  acetaminophen **OR** acetaminophen **OR** acetaminophen    senna-docusate sodium **AND** polyethylene glycol **AND** bisacodyl **AND** bisacodyl    Calcium Replacement - Follow Nurse / BPA Driven Protocol    dextrose    dextrose    gabapentin    glucagon (human recombinant)    HYDROcodone-acetaminophen    Magnesium Standard Dose Replacement - Follow Nurse / BPA Driven Protocol    meclizine    ondansetron ODT **OR** ondansetron    Phosphorus Replacement - Follow Nurse / BPA Driven Protocol    Potassium Replacement - Follow Nurse / BPA Driven Protocol    sodium chloride    sodium chloride     sodium chloride    sodium chloride    Assessment & Plan   Assessment & Plan     Active Hospital Problems    Diagnosis  POA    **AMS (altered mental status) [R41.82]  Yes    Stroke [I63.9]  Yes      Resolved Hospital Problems   No resolved problems to display.        Brief Hospital Course to date:  Cheri Cruz is a 83 y.o. 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  Initial stroke workup was negative.  MRI showed old left parietal ischemic stroke.  Stroke neurology followed and resumed Eliquis.  She received empiric IV ceftriaxone x 3 days due to concern for UTI.     This patient's problems and plans were partially entered by my partner and updated as appropriate by me 07/22/24.      Altered mental status on advanced dementia  Hypotension   Concern for UTI  - initial stroke workup negative.  MRI showed old L parietal ischemic stroke; pt seen by Stroke team; apixiban restarted.   -Possibly dehydration vs hypoxia  -COVID/flu negative. Blood cultures with no growth to date. LFTs normal. CXR no acute findings.    -CXR with old calcified gr  -S/p empiric IV ceftriaxone x 3 days.   -Held sedatives initially, Okay to resume PRN lorazepam.   -Continue donepezil, Seroquel, BuSpar.      Hand tremors  - distressing to patient.  Try primidone.  Note eliquis/seroquel interaction      Acute on chronic hypoxemic respiratory failure  Near-syncope  -Per report, patient became hypoxic with oxygen saturation in the 60s  -CTA chest showed no PE, nonacute  -Patient is O2 dependent on 2L NC but is not compliant due her baseline dementia.  -Continue supplemental oxygenation, titrate for goal SpO2 greater than 90%.     Evolving old left  parietal ischemic stroke  -Presented to [facility] with cognitive decline and metabolic encephalopathy. She was hypotensive with systolic in the 90s at [facility].   -CTH from [facility] with left parietal tiny hemorrhage vs calcification on top of old ischemic stroke.   -MRI showed evolving old left parietal lobe CVA with some areas of associated cortical laminar necrosis. No hemorrhage.    -Neurology evaluated, recommended to resume Eliquis. Continue statin. Annual follow up in stroke clinic.   -PT/OT now recommending SNF      RADHA on CKD stage III  Volume depletion due to dehydration   Mild hyperkalemia  -Baseline Cr about 1.3, Cr 2.06 on presentation, improved to  1.2.  Then increased to 1.8 and got IV fluids again.    - Meds reviwed  - pt at risk of recurrent dehydration/RADHA.  Will stop hytrin for this reason     Complex disposition  -PT/OT evaluated, felt patient's functional status is at baseline. Recommended home with 24/7 care.  -partner discussed care with both patient's daughters and case management on 7/17.   - Family leaning towards pursuing long-term care after discharge as they are having difficulty taking care of her on their own and do not have finances for full-time caregiver.     Nausea, constipation   -KUB 7/14 with nonobstructive bowel gas pattern. Repeat KUB 7/18 with moderate stool burden.  - BM 7/20.    -Continue bowel regimen. Increased PPI to BID. PRN Zofran for nausea.      Uncontrolled diabetes mellitus type 2 with hyperglycemia  -A1c 9.80% this admission  -Increase Lantus to 30 units qhs, increase  lispro 7u TIDAC and moderate-high dose SSI. Monitor glucose and adjust insulin as needed.  -Resumed home gabapentin at low-dose 300 mg daily.     Chronic anemia  Macrocytosis  -No evidence of overt GI bleeding this admission  -B12 and folate within normal limits  -Further workup of anemia as outpatient as appropriate.      Essential hypertension  -Was hypotensive at [facility].  -Not on  antihypertensive medications.  -Continue to monitor.      Hyperlipidemia -Continue atorvastatin.  Hypothyroidism -Continue levothyroxine.  GERD -Continue PPI.       Expected Discharge Location and Transportation: rehab   Expected Discharge   Expected Discharge Date: 7/25/2024; Expected Discharge Time:      VTE Prophylaxis:  Pharmacologic & mechanical VTE prophylaxis orders are present.         AM-PAC 6 Clicks Score (PT): 8 (07/22/24 2000)    CODE STATUS:   Code Status and Medical Interventions:   Ordered at: 07/15/24 1319     Level Of Support Discussed With:    Patient     Code Status (Patient has no pulse and is not breathing):    CPR (Attempt to Resuscitate)     Medical Interventions (Patient has pulse or is breathing):    Full Support       Michelle Chávez MD  07/23/24

## 2024-07-23 NOTE — THERAPY TREATMENT NOTE
Acute Care - Speech Language Pathology Treatment Note  [facility]     Patient Name: Cheri Cruz  : 1940  MRN: [id number]  Today's Date: 2024               Admit Date: 2024     Visit Dx:    ICD-10-CM ICD-9-CM   1. Cognitive communication deficit  R41.841 799.52   2. Disorientation  R41.0 780.99     Patient Active Problem List   Diagnosis    Hyperlipidemia LDL goal <70    Type 2 diabetes mellitus without complication, without long-term current use of insulin    GERD (gastroesophageal reflux disease)    Essential hypertension    Abnormal EKG    Osteoarthritis    Anxiety    Palpitations    Vertigo    History of ischemic left MCA stroke    Hemorrhagic stroke    History of pulmonary embolus (PE)    Confusion    Hypotension    Constipation    UTI (urinary tract infection) due to urinary indwelling catheter    Metabolic encephalopathy    Symptomatic anemia    Acquired hypothyroidism    AMS (altered mental status)    Stroke     Past Medical History:   Diagnosis Date    Abnormal heart rhythm     Anxiety     Arrhythmia     Arthritis     Atrial fibrillation     Dementia     Diabetes mellitus     Hyperlipidemia     Hypertension     Kidney disorder     Stroke     Uterus cancer      Past Surgical History:   Procedure Laterality Date    CATARACT EXTRACTION, BILATERAL         SLP Recommendation and Plan                    Anticipated Discharge Disposition (SLP): home with  care (24 1640)        Therapy Frequency (SLP SLC): 3 days per week (24 1640)  Predicted Duration Therapy Intervention (Days): 1 week (24 1640)        Daily Summary of Progress (SLP): progress toward functional goals as expected (24 1640)           Treatment Assessment (SLP): continued, cognitive-linguistic disorder (24 1640)  Treatment Assessment Comments (SLP): Cont w/ cognitive lingusitic deficits, suspect hearing deficits and lethargy contributing to overall severity of deficits. SLP will cont to f/u as  appropriate (24 1640)  Plan for Continued Treatment (SLP): continue treatment per plan of care (24 1640)         SLP EVALUATION (Last 72 Hours)       [place] [facility]       Row Name 24 1640 24 0855                Communication Assessment/Intervention    Document Type therapy note (daily note)  - therapy note (daily note)  -       Subjective Information no complaints  - no complaints  -       Patient Observations alert;cooperative  - alert;cooperative;agree to therapy  -       Patient/Family/Caregiver Comments/Observations No family present  - no family present  -       Patient Effort good  - adequate  -       Symptoms Noted During/After Treatment none  - --          General Information    Patient Profile Reviewed -- yes  -       Pertinent History Of Current Problem -- See prev eval  -          Pain    Additional Documentation Pain Scale: FACES Pre/Post-Treatment (Group)  - Pain Scale: FACES Pre/Post-Treatment (Group)  -          Pain Scale: FACES Pre/Post-Treatment    Pain: FACES Scale, Pretreatment 0-->no hurt  - 0-->no hurt  -KH       Posttreatment Pain Rating 0-->no hurt  -MH 0-->no hurt  -          SLP Evaluation Clinical Impressions    SLP Diagnosis -- cognitive-linguistic disorder  -[facility] Criteria for Skilled Therapy Interventions Met -- yes  -       Functional Impact -- need frequent supervision  -          SLP Treatment Clinical Impressions    Treatment Assessment (SLP) continued;cognitive-linguistic disorder  - continued;cognitive-linguistic disorder  -       Treatment Assessment Comments (SLP) Cont w/ cognitive lingusitic deficits, suspect hearing deficits and lethargy contributing to overall severity of deficits. SLP will cont to f/u as appropriate  - Pt seen for cognitive-linguistic tx. No family members present. Pt was able to state her name and  (except year) correctly. Unable to state current D/M/Yr. Pt was able to answer  simple Y/N questions with ~70% accuracy. Pt then stated she was tired and requested to end session so she could sleep. SLP will continue to f/u.  -       Daily Summary of Progress (SLP) progress toward functional goals as expected  - unable to show any progress toward functional goals  -       Barriers to Overall Progress (SLP) -- Cognitive status;Baseline deficits  -       Plan for Continued Treatment (SLP) continue treatment per plan of care  - continue treatment per plan of care  -       Care Plan Review care plan/treatment goals reviewed  - care plan/treatment goals reviewed  -          Recommendations    Therapy Frequency (SLP SLC) 3 days per week  - 3 days per week  -       Predicted Duration Therapy Intervention (Days) 1 week  - 1 week  -       Anticipated Discharge Disposition (SLP) home with 24/7 care  - home with 24/7 care  -KH                 User Key  (r) = Recorded By, (t) = Taken By, (c) = Cosigned By      Initials Name Effective Dates     Carol Argueta MS CCC-SLP 01/15/24 -      Jazlyn Rivas, MS CCC-SLP 05/12/23 -                        EDUCATION  The patient has been educated in the following areas:     Cognitive Impairment Communication Impairment.           SLP GOALS       Row Name 07/22/24 1640 07/20/24 1410          Patient will demonstrate functional cognitive-linguistic skills for return to discharge environment    [place] with moderate cues  - with moderate cues  -     Time frame 1 week  - 1 week  -KH     Progress/Outcomes continuing progress toward goal  - continuing progress toward goal  -        SLP Diagnostic Treatment     Patient will participate in further assessment in the following areas cognitive-linguistic;clarification of baseline cognitive communication status  - cognitive-linguistic;clarification of baseline cognitive communication status  -     Time Frame (Diagnostic) 1 week  - 1 week  -     Progress/Outcomes (Additional  Goal 1, SLP) goal met  - goal met  -     Comment (Diagnostic) -- completed last session  -        Comprehend Questions Goal 1 (SLP)    Improve Ability to Comprehend Questions Goal 1 (SLP) simple yes/no questions;complex yes/no questions;simple wh questions;simple general questions;80%;with minimal cues (75-90%)  - simple yes/no questions;complex yes/no questions;simple wh questions;simple general questions;80%;with minimal cues (75-90%)  -     Time Frame (Comprehend Questions Goal 1, SLP) 1 week  - 1 week  -KH     Progress (Ability to Comprehend Questions Goal 1, SLP) 60%;with minimal cues (75-90%)  - --     Progress/Outcomes (Comprehend Questions Goal 1, SLP) continuing progress toward goal  - continuing progress toward goal  -     Comment (Comprehend Questions Goal 1, SLP) Simple Y/N, suspect hearing deficits impacting as well  - Pt able to answer simple Y/N questions with ~70% accuracy.  -        Follow Directions Goal 2 (SLP)    Improve Ability to Follow Directions Goal 1 (SLP) 2 step commands;80%;with moderate cues (50-74%)  - 2 step commands;80%;with moderate cues (50-74%)  -     Time Frame (Follow Directions Goal 1, SLP) 1 week  - 1 week  -KH     Progress (Ability to Follow Directions Goal 1, SLP) 70%;with minimal cues (75-90%)  - --     Progress/Outcomes (Follow Directions Goal 1, SLP) continuing progress toward goal  - goal ongoing  -     Comment (Follow Directions Goal 1, SLP) -- Unable to target. Pt requested to end session to sleep.  -        Word Retrieval Skills Goal 1 (SLP)    Improve Word Retrieval Skills By Goal 1 (SLP) confrontational naming task;high frequency;responsive naming task;simple;completing open ended structured sentence;answer WH question with one word;completing a divergent task;completing a convergent task;80%;with minimal cues (75-90%)  - --     Time Frame (Word Retrieval Goal 1, SLP) 1 week  - --     Progress/Outcomes (Word Retrieval Goal 1,  SLP) new goal  - --               User Key  (r) = Recorded By, (t) = Taken By, (c) = Cosigned By      Initials Name Provider Type    Carol Moeller, MS CCC-SLP Speech and Language Pathologist    Jazlyn Mercado, MS CCC-SLP Speech and Language Pathologist                              Time Calculation:         Therapy Charges for Today       Code Description Service Date Service Provider Modifiers Qty    [id number]  ST TREATMENT SPEECH 3 7/22/2024 Jazlyn Rivas MS CCC-SLP GN 1                       Jazlyn Rivas MS CCC-ASBINO  7/23/2024

## 2024-07-23 NOTE — NURSING NOTE
Reason for Wound, Ostomy and Continence (WOC) Nursing Consult: Speciality Mattress     Spoke with bedside RN and patient has Isotour mattress/bed, pump #15 requested, informed charge RN and patient's RN.    Sensory Perception: 2-->very limited  Moisture: 3-->occasionally moist  Activity: 2-->chairfast  Mobility: 2-->very limited  Nutrition: 2-->probably inadequate  Friction and Shear: 2-->potential problem  Donovan Score: 13 (07/23/24 0833)     Isotour pump #15 requested.    Please implement pressure injury prevention interventions per protocol for patients with a Donovan scale of 18 or less.    See orders for recommendations.    Thank you for consulting the WOC Nurse.  WOC Team will sign off.    If alteration to skin integrity or change in wound bed presentation, please consult WOC team.

## 2024-07-24 ENCOUNTER — APPOINTMENT (OUTPATIENT)
Dept: GENERAL RADIOLOGY | Facility: HOSPITAL | Age: 84
End: 2024-07-24
Payer: MEDICARE

## 2024-07-24 LAB
GLUCOSE BLDC GLUCOMTR-MCNC: 113 MG/DL (ref 70–130)
GLUCOSE BLDC GLUCOMTR-MCNC: 150 MG/DL (ref 70–130)
GLUCOSE BLDC GLUCOMTR-MCNC: 164 MG/DL (ref 70–130)
GLUCOSE BLDC GLUCOMTR-MCNC: 179 MG/DL (ref 70–130)

## 2024-07-24 PROCEDURE — 63710000001 FLUTICASONE 50 MCG/ACT SUSPENSION 16 G BOTTLE: Performed by: INTERNAL MEDICINE

## 2024-07-24 PROCEDURE — 63710000001 POLYETHYLENE GLYCOL 17 G PACK: Performed by: STUDENT IN AN ORGANIZED HEALTH CARE EDUCATION/TRAINING PROGRAM

## 2024-07-24 PROCEDURE — A9270 NON-COVERED ITEM OR SERVICE: HCPCS | Performed by: STUDENT IN AN ORGANIZED HEALTH CARE EDUCATION/TRAINING PROGRAM

## 2024-07-24 PROCEDURE — 63710000001 INSULIN LISPRO (HUMAN) PER 5 UNITS: Performed by: NURSE PRACTITIONER

## 2024-07-24 PROCEDURE — 63710000001 GABAPENTIN 300 MG CAPSULE: Performed by: STUDENT IN AN ORGANIZED HEALTH CARE EDUCATION/TRAINING PROGRAM

## 2024-07-24 PROCEDURE — 63710000001 APIXABAN 5 MG TABLET: Performed by: STUDENT IN AN ORGANIZED HEALTH CARE EDUCATION/TRAINING PROGRAM

## 2024-07-24 PROCEDURE — 63710000001 INSULIN GLARGINE PER 5 UNITS: Performed by: STUDENT IN AN ORGANIZED HEALTH CARE EDUCATION/TRAINING PROGRAM

## 2024-07-24 PROCEDURE — 63710000001 LORAZEPAM 0.5 MG TABLET: Performed by: INTERNAL MEDICINE

## 2024-07-24 PROCEDURE — 63710000001 DONEPEZIL 10 MG TABLET: Performed by: INTERNAL MEDICINE

## 2024-07-24 PROCEDURE — 63710000001 BISACODYL 5 MG TABLET DELAYED-RELEASE: Performed by: STUDENT IN AN ORGANIZED HEALTH CARE EDUCATION/TRAINING PROGRAM

## 2024-07-24 PROCEDURE — A9270 NON-COVERED ITEM OR SERVICE: HCPCS | Performed by: INTERNAL MEDICINE

## 2024-07-24 PROCEDURE — 73560 X-RAY EXAM OF KNEE 1 OR 2: CPT

## 2024-07-24 PROCEDURE — 63710000001 INSULIN LISPRO (HUMAN) PER 5 UNITS: Performed by: STUDENT IN AN ORGANIZED HEALTH CARE EDUCATION/TRAINING PROGRAM

## 2024-07-24 PROCEDURE — 63710000001 NITROFURANTOIN (MACROCRYSTAL-MONOHYDRATE) 100 MG CAPSULE: Performed by: INTERNAL MEDICINE

## 2024-07-24 PROCEDURE — 63710000001 ATORVASTATIN 40 MG TABLET

## 2024-07-24 PROCEDURE — 63710000001 ACETAMINOPHEN 325 MG TABLET: Performed by: INTERNAL MEDICINE

## 2024-07-24 PROCEDURE — 63710000001 PRIMIDONE 50 MG TABLET: Performed by: INTERNAL MEDICINE

## 2024-07-24 PROCEDURE — 63710000001 DICLOFENAC SODIUM 1 % GEL 50 G TUBE: Performed by: INTERNAL MEDICINE

## 2024-07-24 PROCEDURE — 63710000001 ONDANSETRON ODT 4 MG TABLET DISPERSIBLE: Performed by: INTERNAL MEDICINE

## 2024-07-24 PROCEDURE — G0378 HOSPITAL OBSERVATION PER HR: HCPCS

## 2024-07-24 PROCEDURE — 63710000001 LEVOTHYROXINE 25 MCG TABLET: Performed by: INTERNAL MEDICINE

## 2024-07-24 PROCEDURE — 63710000001 SENNOSIDES-DOCUSATE 8.6-50 MG TABLET: Performed by: STUDENT IN AN ORGANIZED HEALTH CARE EDUCATION/TRAINING PROGRAM

## 2024-07-24 PROCEDURE — A9270 NON-COVERED ITEM OR SERVICE: HCPCS

## 2024-07-24 PROCEDURE — 63710000001 MECLIZINE 12.5 MG TABLET: Performed by: INTERNAL MEDICINE

## 2024-07-24 PROCEDURE — 63710000001 NYSTATIN 100000 UNIT/GM POWDER 15 G BOTTLE: Performed by: STUDENT IN AN ORGANIZED HEALTH CARE EDUCATION/TRAINING PROGRAM

## 2024-07-24 PROCEDURE — 92507 TX SP LANG VOICE COMM INDIV: CPT | Performed by: SPEECH-LANGUAGE PATHOLOGIST

## 2024-07-24 PROCEDURE — 63710000001 BUSPIRONE 15 MG TABLET: Performed by: INTERNAL MEDICINE

## 2024-07-24 PROCEDURE — 82948 REAGENT STRIP/BLOOD GLUCOSE: CPT

## 2024-07-24 PROCEDURE — 63710000001 PANTOPRAZOLE 40 MG TABLET DELAYED-RELEASE: Performed by: STUDENT IN AN ORGANIZED HEALTH CARE EDUCATION/TRAINING PROGRAM

## 2024-07-24 PROCEDURE — 63710000001 QUETIAPINE 25 MG TABLET: Performed by: INTERNAL MEDICINE

## 2024-07-24 PROCEDURE — 63710000001 HYDROCODONE-ACETAMINOPHEN 10-325 MG TABLET: Performed by: INTERNAL MEDICINE

## 2024-07-24 PROCEDURE — A9270 NON-COVERED ITEM OR SERVICE: HCPCS | Performed by: NURSE PRACTITIONER

## 2024-07-24 PROCEDURE — 99232 SBSQ HOSP IP/OBS MODERATE 35: CPT | Performed by: INTERNAL MEDICINE

## 2024-07-24 RX ORDER — LORAZEPAM 0.5 MG/1
0.5 TABLET ORAL EVERY 12 HOURS SCHEDULED
Status: COMPLETED | OUTPATIENT
Start: 2024-07-24 | End: 2024-07-28

## 2024-07-24 RX ORDER — DULOXETIN HYDROCHLORIDE 60 MG/1
60 CAPSULE, DELAYED RELEASE ORAL DAILY
Status: DISCONTINUED | OUTPATIENT
Start: 2024-07-25 | End: 2024-08-09 | Stop reason: HOSPADM

## 2024-07-24 RX ORDER — NITROFURANTOIN 25; 75 MG/1; MG/1
100 CAPSULE ORAL EVERY 24 HOURS
Qty: 30 CAPSULE | Refills: 0 | Status: DISCONTINUED | OUTPATIENT
Start: 2024-07-24 | End: 2024-08-09 | Stop reason: HOSPADM

## 2024-07-24 RX ORDER — FLUTICASONE PROPIONATE 50 MCG
2 SPRAY, SUSPENSION (ML) NASAL DAILY
Status: DISCONTINUED | OUTPATIENT
Start: 2024-07-24 | End: 2024-08-09 | Stop reason: HOSPADM

## 2024-07-24 RX ORDER — MECLIZINE HYDROCHLORIDE 25 MG/1
25 TABLET ORAL 3 TIMES DAILY
Status: DISCONTINUED | OUTPATIENT
Start: 2024-07-24 | End: 2024-08-06

## 2024-07-24 RX ORDER — NITROFURANTOIN 25; 75 MG/1; MG/1
100 CAPSULE ORAL NIGHTLY
COMMUNITY

## 2024-07-24 RX ADMIN — INSULIN GLARGINE 30 UNITS: 100 INJECTION, SOLUTION SUBCUTANEOUS at 20:24

## 2024-07-24 RX ADMIN — DICLOFENAC SODIUM 2 G: 10 GEL TOPICAL at 20:25

## 2024-07-24 RX ADMIN — HYDROCODONE BITARTRATE AND ACETAMINOPHEN 1 TABLET: 10; 325 TABLET ORAL at 04:49

## 2024-07-24 RX ADMIN — INSULIN LISPRO 3 UNITS: 100 INJECTION, SOLUTION INTRAVENOUS; SUBCUTANEOUS at 17:10

## 2024-07-24 RX ADMIN — ACETAMINOPHEN 650 MG: 325 TABLET ORAL at 17:10

## 2024-07-24 RX ADMIN — BUSPIRONE HYDROCHLORIDE 7.5 MG: 15 TABLET ORAL at 20:24

## 2024-07-24 RX ADMIN — DONEPEZIL HYDROCHLORIDE 10 MG: 10 TABLET, FILM COATED ORAL at 20:23

## 2024-07-24 RX ADMIN — ATORVASTATIN CALCIUM 80 MG: 40 TABLET, FILM COATED ORAL at 20:23

## 2024-07-24 RX ADMIN — NYSTATIN: 100000 POWDER TOPICAL at 08:42

## 2024-07-24 RX ADMIN — MECLIZINE HYDROCHLORIDE 25 MG: 25 TABLET ORAL at 11:53

## 2024-07-24 RX ADMIN — MECLIZINE HYDROCHLORIDE 25 MG: 25 TABLET ORAL at 22:18

## 2024-07-24 RX ADMIN — NITROFURANTOIN MONOHYDRATE/MACROCRYSTALS 100 MG: 75; 25 CAPSULE ORAL at 20:23

## 2024-07-24 RX ADMIN — GABAPENTIN 300 MG: 300 CAPSULE ORAL at 08:43

## 2024-07-24 RX ADMIN — DICLOFENAC SODIUM 2 G: 10 GEL TOPICAL at 18:03

## 2024-07-24 RX ADMIN — BUSPIRONE HYDROCHLORIDE 7.5 MG: 15 TABLET ORAL at 08:39

## 2024-07-24 RX ADMIN — INSULIN LISPRO 3 UNITS: 100 INJECTION, SOLUTION INTRAVENOUS; SUBCUTANEOUS at 11:53

## 2024-07-24 RX ADMIN — NYSTATIN: 100000 POWDER TOPICAL at 20:24

## 2024-07-24 RX ADMIN — INSULIN LISPRO 7 UNITS: 100 INJECTION, SOLUTION INTRAVENOUS; SUBCUTANEOUS at 17:10

## 2024-07-24 RX ADMIN — PANTOPRAZOLE SODIUM 40 MG: 40 TABLET, DELAYED RELEASE ORAL at 17:10

## 2024-07-24 RX ADMIN — HYDROCODONE BITARTRATE AND ACETAMINOPHEN 1 TABLET: 10; 325 TABLET ORAL at 18:10

## 2024-07-24 RX ADMIN — BISACODYL 5 MG: 5 TABLET, COATED ORAL at 17:10

## 2024-07-24 RX ADMIN — LORAZEPAM 0.5 MG: 0.5 TABLET ORAL at 20:24

## 2024-07-24 RX ADMIN — NYSTATIN: 100000 POWDER TOPICAL at 17:11

## 2024-07-24 RX ADMIN — PANTOPRAZOLE SODIUM 40 MG: 40 TABLET, DELAYED RELEASE ORAL at 08:48

## 2024-07-24 RX ADMIN — HYDROCODONE BITARTRATE AND ACETAMINOPHEN 1 TABLET: 10; 325 TABLET ORAL at 11:53

## 2024-07-24 RX ADMIN — PRIMIDONE 50 MG: 50 TABLET ORAL at 08:39

## 2024-07-24 RX ADMIN — INSULIN LISPRO 7 UNITS: 100 INJECTION, SOLUTION INTRAVENOUS; SUBCUTANEOUS at 11:53

## 2024-07-24 RX ADMIN — ONDANSETRON 4 MG: 4 TABLET, ORALLY DISINTEGRATING ORAL at 10:31

## 2024-07-24 RX ADMIN — LEVOTHYROXINE SODIUM 25 MCG: 25 TABLET ORAL at 04:48

## 2024-07-24 RX ADMIN — APIXABAN 5 MG: 5 TABLET, FILM COATED ORAL at 08:41

## 2024-07-24 RX ADMIN — FLUTICASONE PROPIONATE 2 SPRAY: 50 SPRAY, METERED NASAL at 18:03

## 2024-07-24 RX ADMIN — QUETIAPINE FUMARATE 50 MG: 25 TABLET ORAL at 20:24

## 2024-07-24 RX ADMIN — APIXABAN 5 MG: 5 TABLET, FILM COATED ORAL at 20:24

## 2024-07-24 RX ADMIN — LORAZEPAM 0.5 MG: 0.5 TABLET ORAL at 10:31

## 2024-07-24 RX ADMIN — ONDANSETRON 4 MG: 4 TABLET, ORALLY DISINTEGRATING ORAL at 17:10

## 2024-07-24 RX ADMIN — NYSTATIN: 100000 POWDER TOPICAL at 12:15

## 2024-07-24 RX ADMIN — INSULIN LISPRO 3 UNITS: 100 INJECTION, SOLUTION INTRAVENOUS; SUBCUTANEOUS at 20:24

## 2024-07-24 RX ADMIN — INSULIN LISPRO 7 UNITS: 100 INJECTION, SOLUTION INTRAVENOUS; SUBCUTANEOUS at 08:40

## 2024-07-24 RX ADMIN — POLYETHYLENE GLYCOL 3350 17 G: 17 POWDER, FOR SOLUTION ORAL at 08:42

## 2024-07-24 RX ADMIN — SENNOSIDES AND DOCUSATE SODIUM 2 TABLET: 50; 8.6 TABLET ORAL at 08:41

## 2024-07-24 RX ADMIN — MECLIZINE HYDROCHLORIDE 25 MG: 25 TABLET ORAL at 17:10

## 2024-07-24 NOTE — THERAPY TREATMENT NOTE
"Acute Care - Speech Language Pathology Treatment Note  [facility]     Patient Name: Cheri Cruz  : 1940  MRN: [id number]  Today's Date: 2024               Admit Date: 2024     Visit Dx:    ICD-10-CM ICD-9-CM   1. Cognitive communication deficit  R41.841 799.52   2. Disorientation  R41.0 780.99     Patient Active Problem List   Diagnosis    Hyperlipidemia LDL goal <70    Type 2 diabetes mellitus without complication, without long-term current use of insulin    GERD (gastroesophageal reflux disease)    Essential hypertension    Abnormal EKG    Osteoarthritis    Anxiety    Palpitations    Vertigo    History of ischemic left MCA stroke    Hemorrhagic stroke    History of pulmonary embolus (PE)    Confusion    Hypotension    Constipation    UTI (urinary tract infection) due to urinary indwelling catheter    Metabolic encephalopathy    Symptomatic anemia    Acquired hypothyroidism    AMS (altered mental status)    Stroke     Past Medical History:   Diagnosis Date    Abnormal heart rhythm     Anxiety     Arrhythmia     Arthritis     Atrial fibrillation     Dementia     Diabetes mellitus     Hyperlipidemia     Hypertension     Kidney disorder     Stroke     Uterus cancer      Past Surgical History:   Procedure Laterality Date    CATARACT EXTRACTION, BILATERAL         SLP Recommendation and Plan                    Anticipated Discharge Disposition (SLP): home with  care (24 1550)        Therapy Frequency (SLP SLC): 3 days per week (24 1550)  Predicted Duration Therapy Intervention (Days): 1 week (24 1550)        Daily Summary of Progress (SLP): progress toward functional goals is gradual (24 1550)           Treatment Assessment (SLP): continued, cognitive-linguistic disorder (24 1550)  Treatment Assessment Comments (SLP): Pt seen for tx this date, difficult for pt to participate 2/2 pt stating \"not feeling well and sick all over\". Addressed all tx goals and provided " "support. Will continue to address deficits as needed (07/24/24 1550)  Plan for Continued Treatment (SLP): continue treatment per plan of care (07/24/24 1550)  Plan of Care Reviewed With: patient (07/24/24 1607)  Progress: no change (07/24/24 1607)      SLP EVALUATION (Last 72 Hours)       SLP SLC Evaluation       Row Name 07/24/24 1550 07/22/24 [facility]                Communication Assessment/Intervention    Document Type therapy note (daily note)  - therapy note (daily note)  -       Subjective Information no complaints  - no complaints  -       Patient Observations alert;cooperative;agree to therapy  - alert;cooperative  -       Patient/Family/Caregiver Comments/Observations no family present  - No family present  -       Patient Effort good  - good  -       Symptoms Noted During/After Treatment fatigue  - none  -          Pain    Additional Documentation -- Pain Scale: FACES Pre/Post-Treatment (Group)  -          Pain Scale: FACES Pre/Post-Treatment    Pain: FACES Scale, Pretreatment 0-->no hurt  - 0-->no hurt  -       Posttreatment Pain Rating 0-->no hurt  - 0-->no hurt  -          SLP Treatment Clinical Impressions    Treatment Assessment (SLP) continued;cognitive-linguistic disorder  - continued;cognitive-linguistic disorder  -       Treatment Assessment Comments (SLP) Pt seen for tx this date, difficult for pt to participate 2/2 pt stating \"not feeling well and sick all over\". Addressed all tx goals and provided support. Will continue to address deficits as needed  -CJ Cont w/ cognitive lingusitic deficits, suspect hearing deficits and lethargy contributing to overall severity of deficits. SLP will cont to f/u as appropriate  -       Daily Summary of Progress (SLP) progress toward functional goals is gradual  - progress toward functional goals as expected  -       Barriers to Overall Progress (SLP) Lethargy  - --       Plan for Continued Treatment (SLP) continue " treatment per plan of care  - continue treatment per plan of care  -       Care Plan Review care plan/treatment goals reviewed  - care plan/treatment goals reviewed  -          Recommendations    Therapy Frequency (SLP SLC) 3 days per week  - 3 days per week  -       Predicted Duration Therapy Intervention (Days) 1 week  - 1 week  -       Anticipated Discharge Disposition (SLP) home with 24/7 care  - home with 24/7 [facility]  -                 User Key  (r) = Recorded By, (t) = Taken By, (c) = Cosigned By      Initials Name Effective Dates     Dipika Up, MS CCC-SLP 06/03/24 -      Jazlyn Rivas, MS CCC-SLP 05/12/23 -                        EDUCATION  The patient has been educated in the following areas:     Cognitive Impairment Communication Impairment.           SLP GOALS       Row Name 07/24/24 1550 07/22/24 1640          Patient will demonstrate functional cognitive-linguistic skills for return to discharge environment    [place] with moderate cues  - with moderate cues  -     Time frame 1 week  - 1 week  -     Progress/Outcomes continuing progress toward goal  - continuing progress toward goal  -        SLP Diagnostic Treatment     Patient will participate in further assessment in the following areas -- cognitive-linguistic;clarification of baseline cognitive communication status  -     Time Frame (Diagnostic) -- 1 week  -     Progress/Outcomes (Additional Goal 1, SLP) -- goal met  -        Comprehend Questions Goal 1 (SLP)    Improve Ability to Comprehend Questions Goal 1 (SLP) simple yes/no questions;complex yes/no questions;simple wh questions;simple general questions;80%;with minimal cues (75-90%)  - simple yes/no questions;complex yes/no questions;simple wh questions;simple general questions;80%;with minimal cues (75-90%)  -     Time Frame (Comprehend Questions Goal 1, SLP) 1 week  -CJ 1 week  -     Progress (Ability to Comprehend Questions Goal 1,  "SLP) 70%;with minimal cues (75-90%)  -CJ 60%;with minimal cues (75-90%)  -     Progress/Outcomes (Comprehend Questions Goal 1, SLP) continuing progress toward goal  -CJ continuing progress toward goal  -MH     Comment (Comprehend Questions Goal 1, SLP) simple but perseverated on not feeling well during session  -CJ Simple Y/N, suspect hearing deficits impacting as well  -        Follow Directions Goal 2 (SLP)    Improve Ability to Follow Directions Goal 1 (SLP) 2 step commands;80%;with moderate cues (50-74%)  -CJ 2 step commands;80%;with moderate cues (50-74%)  -     Time Frame (Follow Directions Goal 1, SLP) 1 week  -CJ 1 week  -MH     Progress (Ability to Follow Directions Goal 1, SLP) 50%;with minimal cues (75-90%)  -CJ 70%;with minimal cues (75-90%)  -     Progress/Outcomes (Follow Directions Goal 1, SLP) progress slower than expected  -CJ continuing progress toward goal  -MH     Comment (Follow Directions Goal 1, SLP) suspect difficulty to complete 2/2 unable to distract from \"being sick\"  - --        Word Retrieval Skills Goal 1 (SLP)    Improve Word Retrieval Skills By Goal 1 (SLP) confrontational naming task;high frequency;responsive naming task;simple;completing open ended structured sentence;answer WH question with one word;completing a divergent task;completing a convergent task;80%;with minimal cues (75-90%)  -CJ confrontational naming task;high frequency;responsive naming task;simple;completing open ended structured sentence;answer WH question with one word;completing a divergent task;completing a convergent task;80%;with minimal cues (75-90%)  -     Time Frame (Word Retrieval Goal 1, SLP) 1 week  -CJ 1 week  -MH     Progress (Word Retrieval Skills Goal 1, SLP) 50%;with moderate cues (50-74%)  - --     Progress/Outcomes (Word Retrieval Goal 1, SLP) continuing progress toward goal  -CJ new goal  -MH     Comment (Word Retrieval Goal 1, SLP) wh questions and naming during contextual tasks in " room 2/2 difficult to redirect  - --               User Key  (r) = Recorded By, (t) = Taken By, (c) = Cosigned By      Initials Name Provider Type    Dipika Alvarado MS CCC-SLP Speech and Language Pathologist     Jazlyn Rivas, MS CCC-SLP Speech and Language Pathologist                              Time Calculation:      Time Calculation- SLP       Row Name 07/24/24 1608             Time Calculation- SLP    SLP Start Time 1550  -      SLP Received On 07/24/24  -         Untimed Charges    28294-VQ Treatment/ST Modification Prosth Aug Alter  23  -CJ         Total Minutes    Untimed Charges Total Minutes 23  -CJ       Total Minutes 23  -CJ                User Key  (r) = Recorded By, (t) = Taken By, (c) = Cosigned By      Initials Name Provider Type    Dipika Alvarado MS CCC-SLP Speech and Language Pathologist                    Therapy Charges for Today       Code Description Service Date Service Provider Modifiers Qty    [id number] HC ST TREATMENT SPEECH 2 7/24/2024 Dipika Up MS CCC-SLP GN 1                       Dipika Up MS CCC-SABINO  7/24/2024

## 2024-07-24 NOTE — PLAN OF CARE
"Goal Outcome Evaluation:  Plan of Care Reviewed With: patient        Progress: no change         Anticipated Discharge Disposition (SLP): home with 24/7 care             Treatment Assessment (SLP): continued, cognitive-linguistic disorder (07/24/24 1550)  Treatment Assessment Comments (SLP): Pt seen for tx this date, difficult for pt to participate 2/2 pt stating \"not feeling well and sick all over\". Addressed all tx goals and provided support. Will continue to address deficits as needed (07/24/24 1550)  Plan for Continued Treatment (SLP): continue treatment per plan of care (07/24/24 1550)    "

## 2024-07-24 NOTE — PROGRESS NOTES
"    [facility]  PROGRESS NOTE    Patient Name: Cheri Cruz  : 1940  MRN: [id number]    Date of Admission: 2024  Primary Care Physician: Lorrie Canada APRN    Subjective   Subjective     CC:  F/u AMS     HPI:dozing; rouses easily and says she feels sick due to vertigo, not much nausea but sense of spinning; states this is common for her.  \"Please, please help.\"        Objective   Objective     Vital Signs:   Temp:  [97.7 °F (36.5 °C)-98.6 °F (37 °C)] 97.9 °F (36.6 °C)  Heart Rate:  [67-95] 67  Resp:  [15-19] 18  BP: (122-172)/(50-74) 159/64  Flow (L/min):  [2] 2     Physical Exam:  Constitutional: wakes easily,  conversant , anxious   HENT: NCAT, mucous membranes moist  Respiratory: Clear to auscultation bilaterally, respiratory effort normal 2L   Cardiovascular: RRR,   Gastrointestinal: NT  Musculoskeletal: No bilateral ankle edema  Neurologic: Oriented x 1, ,RUE weaker the LUE. , speech clear, inserts wrong words occas (mild expr aphasia).  No hand tremors noted       Results Reviewed:  LAB RESULTS:      Lab 24  0440   WBC 7.35   HEMOGLOBIN 9.7*   HEMATOCRIT 29.8*   PLATELETS 124*   .4*         Lab 24  0454 24  0440 24  0550 24  1045   SODIUM 144 139 137 138   POTASSIUM 4.7 4.1 4.7 5.1   CHLORIDE 106 101 98 100   CO2 29.0 28.0 27.0 30.0*   ANION GAP 9.0 10.0 12.0 8.0   BUN 33* 41* 19 18   CREATININE 1.66* 1.82* 1.19* 1.36*   EGFR 30.7* 27.5* 45.7* 39.0*   GLUCOSE 152* 194* 202* 229*   CALCIUM 8.4* 8.0* 9.3 9.2         Lab 24  1045   TOTAL PROTEIN 6.5   ALBUMIN 3.8   ALT (SGPT) 5   AST (SGOT) 16   BILIRUBIN 0.2   BILIRUBIN DIRECT <0.2   ALK PHOS 66                     Lab 07/18/24  1510   PH, ARTERIAL 7.380   PCO2, ARTERIAL 53.1*   PO2 ART 82.4*   FIO2 24   HCO3 ART 31.4*   BASE EXCESS ART 5.2*   CARBOXYHEMOGLOBIN 1.1     Brief Urine Lab Results  (Last result in the past 365 days)        Color   Clarity   Blood   " Leuk Est   Nitrite   Protein   CREAT   Urine HCG        07/14/24 2001 Yellow   Cloudy   Trace   Small (1+)   Negative   Negative                   Microbiology Results Abnormal       Procedure Component Value - Date/Time    Blood Culture - Blood, Wrist, Left [[id number]]  (Normal) Collected: 07/14/24 2022    Lab Status: Final result Specimen: Blood from Wrist, Left Updated: 07/19/24 2100     Blood Culture No growth at 5 days    Narrative:      Less than seven (7) mL's of blood was collected.  Insufficient quantity may yield false negative results.    Blood Culture - Blood, Hand, Left [[id number]]  (Normal) Collected: 07/14/24 2022    Lab Status: Final result Specimen: Blood from Hand, Left Updated: 07/19/24 2100     Blood Culture No growth at 5 days    Narrative:      Less than seven (7) mL's of blood was collected.  Insufficient quantity may yield false negative results.    Urine Culture - Urine, Urine, Random Void [[id number]] Collected: 07/14/24 2001    Lab Status: Final result Specimen: Urine, Random Void Updated: 07/16/24 1020     Urine Culture 50,000 CFU/mL Normal Urogenital Nickie    Narrative:      Colonization of the urinary tract without infection is common. Treatment is discouraged unless the patient is symptomatic, pregnant, or undergoing an invasive urologic procedure.            No radiology results from the last 24 hrs    Results for orders placed during the hospital encounter of 02/10/23    Adult Transthoracic Echo Complete W/ Cont if Necessary Per Protocol    Interpretation Summary    Left ventricular systolic function is hyperdynamic (EF > 70%). Calculated left ventricular EF = 70.6% Left ventricular ejection fraction appears to be greater than 70%. The left ventricular cavity is small in size. Left ventricular wall thickness is consistent with mild concentric hypertrophy. All left ventricular wall segments contract normally. Left ventricular intracavitary gradient noted to be 71 mmHg. Left ventricular  diastolic function is consistent with (grade I) impaired relaxation. Normal left atrial pressure.    The aortic valve is abnormal in structure. There is mild calcification of the aortic valve mainly affecting the right coronary cusp(s). The aortic valve appears trileaflet. No aortic valve regurgitation is present. Gradient noted through the LV and LVOT    Compared to TTE report from  Dec 2019, hyperdynamic LV with intracavitary gradient is not a new finding but peak gradient noted on this exam is higher than previously described.      Current medications:  Scheduled Meds:apixaban, 5 mg, Oral, BID  atorvastatin, 80 mg, Oral, Nightly  busPIRone, 7.5 mg, Oral, BID  donepezil, 10 mg, Oral, Nightly  gabapentin, , ,   gabapentin, 300 mg, Oral, Daily  insulin glargine, 30 Units, Subcutaneous, Nightly  insulin lispro, 3-14 Units, Subcutaneous, 4x Daily AC & at Bedtime  Insulin Lispro, 7 Units, Subcutaneous, TID With Meals  levothyroxine, 25 mcg, Oral, Daily  nystatin, , Topical, 4x Daily  pantoprazole, 40 mg, Oral, BID AC  senna-docusate sodium, 2 tablet, Oral, Daily   And  polyethylene glycol, 17 g, Oral, Daily  primidone, 50 mg, Oral, QAM  QUEtiapine, 50 mg, Oral, Nightly  sodium chloride, 10 mL, Intravenous, Q12H  sodium chloride, 10 mL, Intravenous, Q12H      Continuous Infusions:     PRN Meds:.  acetaminophen **OR** acetaminophen **OR** acetaminophen    senna-docusate sodium **AND** polyethylene glycol **AND** bisacodyl **AND** bisacodyl    Calcium Replacement - Follow Nurse / BPA Driven Protocol    dextrose    dextrose    gabapentin    glucagon (human recombinant)    HYDROcodone-acetaminophen    Magnesium Standard Dose Replacement - Follow Nurse / BPA Driven Protocol    meclizine    ondansetron ODT **OR** ondansetron    Phosphorus Replacement - Follow Nurse / BPA Driven Protocol    Potassium Replacement - Follow Nurse / BPA Driven Protocol    sodium chloride    sodium chloride    sodium chloride    sodium  chloride    Assessment & Plan   Assessment & Plan     Active Hospital Problems    Diagnosis  POA    **AMS (altered mental status) [R41.82]  Yes    Stroke [I63.9]  Yes      Resolved Hospital Problems   No resolved problems to display.        Brief Hospital Course to date:  Cheri Cruz is a 83 y.o. female  with past medical history of dementia, type 2 diabetes mellitus, CKD stage III, essential hypertension, dyslipidemia, old left parietal stroke, PE on anticoagulation with Eliquis, who presented to the hospital as a transfer  from [facility] on 7/14/2024 due to altered mental status and concern for hemorrhagic stroke.  Initial stroke workup was negative.  MRI showed old left parietal ischemic stroke.  Stroke neurology followed and resumed Eliquis.  She received empiric IV ceftriaxone x 3 days due to concern for UTI.     This patient's problems and plans were partially entered by my partner and updated as appropriate by me 07/22/24.      Altered mental status on advanced dementia  Hypotension   Concern for UTI  - initial stroke workup negative.  MRI showed old L parietal ischemic stroke; pt seen by Stroke team; apixiban restarted.   -Possibly dehydration vs hypoxia  -COVID/flu negative. Blood cultures with no growth to date. LFTs normal. CXR no acute findings.    -CXR with old calcified gr  -S/p empiric IV ceftriaxone x 3 days.   -Held sedatives initially, Okay to resume PRN lorazepam.   -Continue donepezil, Seroquel, BuSpar.      Hand tremors  - distressing to patient.  Trying primidone.  Note eliquis/seroquel interaction      Vertigo  - reports this is common for her  - change meclizine to scheduled    Acute on chronic hypoxemic respiratory failure  Near-syncope  -Per report, patient became hypoxic with oxygen saturation in the 60s  -CTA chest showed no PE, nonacute  -Patient is O2 dependent on 2L NC but is not compliant due her baseline dementia.  -Continue supplemental oxygenation, titrate for  goal SpO2 greater than 90%.     Evolving old left parietal ischemic stroke  -Presented to [facility] with cognitive decline and metabolic encephalopathy. She was hypotensive with systolic in the 90s at [facility].   -CTH from [facility] with left parietal tiny hemorrhage vs calcification on top of old ischemic stroke.   -MRI showed evolving old left parietal lobe CVA with some areas of associated cortical laminar necrosis. No hemorrhage.    -Neurology evaluated, recommended to resume Eliquis. Continue statin. Annual follow up in stroke clinic.   -PT/OT now recommending SNF      RADHA on CKD stage III  Volume depletion due to dehydration   Mild hyperkalemia  -Baseline Cr about 1.3, Cr 2.06 on presentation, improved to  1.2.  Then increased to 1.8 and got IV fluids again.    - Meds reviwed  - pt at risk of recurrent dehydration/RADHA.  Will stop hytrin for this reason     Complex disposition  -PT/OT evaluated, felt patient's functional status is at baseline. Recommended home with 24/7 care.  -partner discussed care with both patient's daughters and case management on 7/17.   - Family leaning towards pursuing long-term care after discharge as they are having difficulty taking care of her on their own and do not have finances for full-time caregiver.     Nausea, constipation   -KUB 7/14 with nonobstructive bowel gas pattern. Repeat KUB 7/18 with moderate stool burden.  - BM 7/20.    -Continue bowel regimen. Increased PPI to BID. PRN Zofran for nausea.      Uncontrolled diabetes mellitus type 2 with hyperglycemia  -A1c 9.80% this admission  -Increase Lantus to 30 units qhs, increase  lispro 7u TIDAC and moderate-high dose SSI. Monitor glucose and adjust insulin as needed.  -Resumed home gabapentin at low-dose 300 mg daily.     Chronic anemia  Macrocytosis  -No evidence of overt GI bleeding this admission  -B12 and folate within normal limits  -Further workup of anemia as outpatient as appropriate.      Essential hypertension  -Was hypotensive at  [facility].  -Not on antihypertensive medications.  -Continue to monitor.      Hyperlipidemia -Continue atorvastatin.  Hypothyroidism -Continue levothyroxine.  GERD -Continue PPI.       Expected Discharge Location and Transportation: rehab   Expected Discharge   Expected Discharge Date: 7/25/2024; Expected Discharge Time:      VTE Prophylaxis:  Pharmacologic & mechanical VTE prophylaxis orders are present.         AM-PAC 6 Clicks Score (PT): 8 (07/23/24 2043)    CODE STATUS:   Code Status and Medical Interventions:   Ordered at: 07/15/24 1319     Level Of Support Discussed With:    Patient     Code Status (Patient has no pulse and is not breathing):    CPR (Attempt to Resuscitate)     Medical Interventions (Patient has pulse or is breathing):    Full Support       Michelle Chávez MD  07/24/24

## 2024-07-25 LAB
GLUCOSE BLDC GLUCOMTR-MCNC: 144 MG/DL (ref 70–130)
GLUCOSE BLDC GLUCOMTR-MCNC: 158 MG/DL (ref 70–130)
GLUCOSE BLDC GLUCOMTR-MCNC: 184 MG/DL (ref 70–130)
GLUCOSE BLDC GLUCOMTR-MCNC: 246 MG/DL (ref 70–130)

## 2024-07-25 PROCEDURE — 63710000001 BUSPIRONE 15 MG TABLET: Performed by: INTERNAL MEDICINE

## 2024-07-25 PROCEDURE — 63710000001 PRIMIDONE 50 MG TABLET: Performed by: INTERNAL MEDICINE

## 2024-07-25 PROCEDURE — G0378 HOSPITAL OBSERVATION PER HR: HCPCS

## 2024-07-25 PROCEDURE — 63710000001 INSULIN LISPRO (HUMAN) PER 5 UNITS: Performed by: STUDENT IN AN ORGANIZED HEALTH CARE EDUCATION/TRAINING PROGRAM

## 2024-07-25 PROCEDURE — 63710000001 INSULIN LISPRO (HUMAN) PER 5 UNITS: Performed by: NURSE PRACTITIONER

## 2024-07-25 PROCEDURE — 63710000001 PANTOPRAZOLE 40 MG TABLET DELAYED-RELEASE: Performed by: STUDENT IN AN ORGANIZED HEALTH CARE EDUCATION/TRAINING PROGRAM

## 2024-07-25 PROCEDURE — 82948 REAGENT STRIP/BLOOD GLUCOSE: CPT

## 2024-07-25 PROCEDURE — 63710000001 HYDROCODONE-ACETAMINOPHEN 10-325 MG TABLET: Performed by: INTERNAL MEDICINE

## 2024-07-25 PROCEDURE — 99232 SBSQ HOSP IP/OBS MODERATE 35: CPT | Performed by: INTERNAL MEDICINE

## 2024-07-25 PROCEDURE — A9270 NON-COVERED ITEM OR SERVICE: HCPCS

## 2024-07-25 PROCEDURE — 63710000001 POLYETHYLENE GLYCOL 17 G PACK: Performed by: STUDENT IN AN ORGANIZED HEALTH CARE EDUCATION/TRAINING PROGRAM

## 2024-07-25 PROCEDURE — 63710000001 ATORVASTATIN 40 MG TABLET

## 2024-07-25 PROCEDURE — A9270 NON-COVERED ITEM OR SERVICE: HCPCS | Performed by: INTERNAL MEDICINE

## 2024-07-25 PROCEDURE — 63710000001 LORAZEPAM 0.5 MG TABLET: Performed by: INTERNAL MEDICINE

## 2024-07-25 PROCEDURE — 63710000001 LEVOTHYROXINE 25 MCG TABLET: Performed by: INTERNAL MEDICINE

## 2024-07-25 PROCEDURE — A9270 NON-COVERED ITEM OR SERVICE: HCPCS | Performed by: STUDENT IN AN ORGANIZED HEALTH CARE EDUCATION/TRAINING PROGRAM

## 2024-07-25 PROCEDURE — 63710000001 APIXABAN 5 MG TABLET: Performed by: STUDENT IN AN ORGANIZED HEALTH CARE EDUCATION/TRAINING PROGRAM

## 2024-07-25 PROCEDURE — 63710000001: Performed by: INTERNAL MEDICINE

## 2024-07-25 PROCEDURE — 63710000001 NITROFURANTOIN (MACROCRYSTAL-MONOHYDRATE) 100 MG CAPSULE: Performed by: INTERNAL MEDICINE

## 2024-07-25 PROCEDURE — 92507 TX SP LANG VOICE COMM INDIV: CPT

## 2024-07-25 PROCEDURE — 63710000001 ONDANSETRON ODT 4 MG TABLET DISPERSIBLE: Performed by: INTERNAL MEDICINE

## 2024-07-25 PROCEDURE — 63710000001 DONEPEZIL 10 MG TABLET: Performed by: INTERNAL MEDICINE

## 2024-07-25 PROCEDURE — 63710000001 GABAPENTIN 300 MG CAPSULE: Performed by: STUDENT IN AN ORGANIZED HEALTH CARE EDUCATION/TRAINING PROGRAM

## 2024-07-25 PROCEDURE — 63710000001 MECLIZINE 12.5 MG TABLET: Performed by: INTERNAL MEDICINE

## 2024-07-25 PROCEDURE — 63710000001 INSULIN GLARGINE PER 5 UNITS: Performed by: STUDENT IN AN ORGANIZED HEALTH CARE EDUCATION/TRAINING PROGRAM

## 2024-07-25 PROCEDURE — 63710000001 QUETIAPINE 25 MG TABLET: Performed by: INTERNAL MEDICINE

## 2024-07-25 PROCEDURE — 63710000001 DULOXETINE 60 MG CAPSULE DELAYED-RELEASE PARTICLES: Performed by: INTERNAL MEDICINE

## 2024-07-25 PROCEDURE — A9270 NON-COVERED ITEM OR SERVICE: HCPCS | Performed by: NURSE PRACTITIONER

## 2024-07-25 RX ORDER — MINERAL OIL 100 G/100G
1 OIL RECTAL ONCE
Status: COMPLETED | OUTPATIENT
Start: 2024-07-25 | End: 2024-07-25

## 2024-07-25 RX ADMIN — DULOXETINE HYDROCHLORIDE 60 MG: 60 CAPSULE, DELAYED RELEASE ORAL at 08:49

## 2024-07-25 RX ADMIN — INSULIN LISPRO 3 UNITS: 100 INJECTION, SOLUTION INTRAVENOUS; SUBCUTANEOUS at 21:13

## 2024-07-25 RX ADMIN — DONEPEZIL HYDROCHLORIDE 10 MG: 10 TABLET, FILM COATED ORAL at 21:11

## 2024-07-25 RX ADMIN — INSULIN LISPRO 7 UNITS: 100 INJECTION, SOLUTION INTRAVENOUS; SUBCUTANEOUS at 08:51

## 2024-07-25 RX ADMIN — MINERAL OIL 1 ENEMA: 100 ENEMA RECTAL at 13:06

## 2024-07-25 RX ADMIN — HYDROCODONE BITARTRATE AND ACETAMINOPHEN 1 TABLET: 10; 325 TABLET ORAL at 01:04

## 2024-07-25 RX ADMIN — MECLIZINE HYDROCHLORIDE 25 MG: 25 TABLET ORAL at 14:58

## 2024-07-25 RX ADMIN — HYDROCODONE BITARTRATE AND ACETAMINOPHEN 1 TABLET: 10; 325 TABLET ORAL at 13:01

## 2024-07-25 RX ADMIN — ATORVASTATIN CALCIUM 80 MG: 40 TABLET, FILM COATED ORAL at 21:11

## 2024-07-25 RX ADMIN — NYSTATIN: 100000 POWDER TOPICAL at 17:11

## 2024-07-25 RX ADMIN — BUSPIRONE HYDROCHLORIDE 7.5 MG: 15 TABLET ORAL at 21:11

## 2024-07-25 RX ADMIN — QUETIAPINE FUMARATE 50 MG: 25 TABLET ORAL at 21:11

## 2024-07-25 RX ADMIN — INSULIN LISPRO 7 UNITS: 100 INJECTION, SOLUTION INTRAVENOUS; SUBCUTANEOUS at 17:07

## 2024-07-25 RX ADMIN — FLUTICASONE PROPIONATE 2 SPRAY: 50 SPRAY, METERED NASAL at 08:52

## 2024-07-25 RX ADMIN — INSULIN LISPRO 5 UNITS: 100 INJECTION, SOLUTION INTRAVENOUS; SUBCUTANEOUS at 12:19

## 2024-07-25 RX ADMIN — NITROFURANTOIN MONOHYDRATE/MACROCRYSTALS 100 MG: 75; 25 CAPSULE ORAL at 21:11

## 2024-07-25 RX ADMIN — APIXABAN 5 MG: 5 TABLET, FILM COATED ORAL at 21:11

## 2024-07-25 RX ADMIN — POLYETHYLENE GLYCOL 3350 17 G: 17 POWDER, FOR SOLUTION ORAL at 09:05

## 2024-07-25 RX ADMIN — GABAPENTIN 300 MG: 300 CAPSULE ORAL at 08:50

## 2024-07-25 RX ADMIN — PRIMIDONE 50 MG: 50 TABLET ORAL at 08:51

## 2024-07-25 RX ADMIN — LORAZEPAM 0.5 MG: 0.5 TABLET ORAL at 08:49

## 2024-07-25 RX ADMIN — ONDANSETRON 4 MG: 4 TABLET, ORALLY DISINTEGRATING ORAL at 08:51

## 2024-07-25 RX ADMIN — ONDANSETRON 4 MG: 4 TABLET, ORALLY DISINTEGRATING ORAL at 01:03

## 2024-07-25 RX ADMIN — DICLOFENAC SODIUM 2 G: 10 GEL TOPICAL at 21:13

## 2024-07-25 RX ADMIN — NYSTATIN: 100000 POWDER TOPICAL at 14:57

## 2024-07-25 RX ADMIN — HYDROCODONE BITARTRATE AND ACETAMINOPHEN 1 TABLET: 10; 325 TABLET ORAL at 22:14

## 2024-07-25 RX ADMIN — INSULIN GLARGINE 30 UNITS: 100 INJECTION, SOLUTION SUBCUTANEOUS at 21:13

## 2024-07-25 RX ADMIN — MECLIZINE HYDROCHLORIDE 25 MG: 25 TABLET ORAL at 08:47

## 2024-07-25 RX ADMIN — DICLOFENAC SODIUM 2 G: 10 GEL TOPICAL at 09:04

## 2024-07-25 RX ADMIN — INSULIN LISPRO 3 UNITS: 100 INJECTION, SOLUTION INTRAVENOUS; SUBCUTANEOUS at 17:07

## 2024-07-25 RX ADMIN — ONDANSETRON 4 MG: 4 TABLET, ORALLY DISINTEGRATING ORAL at 18:40

## 2024-07-25 RX ADMIN — LEVOTHYROXINE SODIUM 25 MCG: 25 TABLET ORAL at 08:48

## 2024-07-25 RX ADMIN — PANTOPRAZOLE SODIUM 40 MG: 40 TABLET, DELAYED RELEASE ORAL at 08:48

## 2024-07-25 RX ADMIN — NYSTATIN: 100000 POWDER TOPICAL at 21:13

## 2024-07-25 RX ADMIN — DICLOFENAC SODIUM 2 G: 10 GEL TOPICAL at 12:59

## 2024-07-25 RX ADMIN — APIXABAN 5 MG: 5 TABLET, FILM COATED ORAL at 08:49

## 2024-07-25 RX ADMIN — MECLIZINE HYDROCHLORIDE 25 MG: 25 TABLET ORAL at 21:11

## 2024-07-25 RX ADMIN — DICLOFENAC SODIUM 2 G: 10 GEL TOPICAL at 17:08

## 2024-07-25 RX ADMIN — PANTOPRAZOLE SODIUM 40 MG: 40 TABLET, DELAYED RELEASE ORAL at 17:23

## 2024-07-25 RX ADMIN — LORAZEPAM 0.5 MG: 0.5 TABLET ORAL at 21:11

## 2024-07-25 RX ADMIN — INSULIN LISPRO 7 UNITS: 100 INJECTION, SOLUTION INTRAVENOUS; SUBCUTANEOUS at 12:19

## 2024-07-25 RX ADMIN — NYSTATIN: 100000 POWDER TOPICAL at 09:04

## 2024-07-25 RX ADMIN — BUSPIRONE HYDROCHLORIDE 7.5 MG: 15 TABLET ORAL at 08:51

## 2024-07-25 NOTE — PROGRESS NOTES
[facility] Medicine Services  PROGRESS NOTE    Patient Name: Cheri Cruz  : 1940  MRN: [id number]    Date of Admission: 2024  Primary Care Physician: Lorrie Canada APRN    Subjective   Subjective     CC:  AMS    HPI:  No acute events. States she is ok today. States her symptoms come and go.       Objective   Objective     Vital Signs:   Temp:  [97 °F (36.1 °C)-98.4 °F (36.9 °C)] 98 °F (36.7 °C)  Heart Rate:  [64-74] 67  Resp:  [16-18] 16  BP: (125-171)/(48-69) 168/63  Flow (L/min):  [2] 2     Physical Exam:  Constitutional: frail  HENT: NCAT, mucous membranes moist  Respiratory: Clear to auscultation bilaterally, respiratory effort normal   Cardiovascular: RRR, no murmurs, rubs, or gallops  Gastrointestinal: Positive bowel sounds, soft, nontender, nondistended  Musculoskeletal: No bilateral ankle edema  Neurologic:  Cranial Nerves grossly intact to confrontation, speech clear      Results Reviewed:  LAB RESULTS:      Lab 24  0440   WBC 7.35   HEMOGLOBIN 9.7*   HEMATOCRIT 29.8*   PLATELETS 124*   .4*         Lab 24  0454 24  0440 24  0550   SODIUM 144 139 137   POTASSIUM 4.7 4.1 4.7   CHLORIDE 106 101 98   CO2 29.0 28.0 27.0   ANION GAP 9.0 10.0 12.0   BUN 33* 41* 19   CREATININE 1.66* 1.82* 1.19*   EGFR 30.7* 27.5* 45.7*   GLUCOSE 152* 194* 202*   CALCIUM 8.4* 8.0* 9.3                         Lab 24  1510   PH, ARTERIAL 7.380   PCO2, ARTERIAL 53.1*   PO2 ART 82.4*   FIO2 24   HCO3 ART 31.4*   BASE EXCESS ART 5.2*   CARBOXYHEMOGLOBIN 1.1     Brief Urine Lab Results  (Last result in the past 365 days)        Color   Clarity   Blood   Leuk Est   Nitrite   Protein   CREAT   Urine HCG        24 Yellow   Cloudy   Trace   Small (1+)   Negative   Negative                   Microbiology Results Abnormal       Procedure Component Value - Date/Time    Blood Culture - Blood, Wrist, Left [[id number]]  (Normal) Collected: 24     Lab Status: Final result Specimen: Blood from Wrist, Left Updated: 07/19/24 2100     Blood Culture No growth at 5 days    Narrative:      Less than seven (7) mL's of blood was collected.  Insufficient quantity may yield false negative results.    Blood Culture - Blood, Hand, Left [[id number]]  (Normal) Collected: 07/14/24 2022    Lab Status: Final result Specimen: Blood from Hand, Left Updated: 07/19/24 2100     Blood Culture No growth at 5 days    Narrative:      Less than seven (7) mL's of blood was collected.  Insufficient quantity may yield false negative results.    Urine Culture - Urine, Urine, Random Void [[id number]] Collected: 07/14/24 2001    Lab Status: Final result Specimen: Urine, Random Void Updated: 07/16/24 1020     Urine Culture 50,000 CFU/mL Normal Urogenital Nickie    Narrative:      Colonization of the urinary tract without infection is common. Treatment is discouraged unless the patient is symptomatic, pregnant, or undergoing an invasive urologic procedure.            XR Knee 1 or 2 View Right    Result Date: 7/24/2024  XR KNEE 1 OR 2 VW RIGHT Date of Exam: 7/24/2024 9:43 PM EDT Indication: swelling/pain Comparison: None available. Findings: The bones are diffusely osteopenic. No fracture is visualized. There is tricompartment osteoarthritis. There is no significant fluid in the suprapatellar bursa. Atherosclerotic calcification noted     Impression: Impression: 1. No evidence of fracture or acute abnormality 2. Osteoarthritis Electronically Signed: Doyle Wyman  7/24/2024 10:02 PM EDT  Workstation ID: [id number]     Results for orders placed during the hospital encounter of 02/10/23    Adult Transthoracic Echo Complete W/ Cont if Necessary Per Protocol    Interpretation Summary    Left ventricular systolic function is hyperdynamic (EF > 70%). Calculated left ventricular EF = 70.6% Left ventricular ejection fraction appears to be greater than 70%. The left ventricular cavity is small in size.  Left ventricular wall thickness is consistent with mild concentric hypertrophy. All left ventricular wall segments contract normally. Left ventricular intracavitary gradient noted to be 71 mmHg. Left ventricular diastolic function is consistent with (grade I) impaired relaxation. Normal left atrial pressure.    The aortic valve is abnormal in structure. There is mild calcification of the aortic valve mainly affecting the right coronary cusp(s). The aortic valve appears trileaflet. No aortic valve regurgitation is present. Gradient noted through the LV and LVOT    Compared to TTE report from  Dec 2019, hyperdynamic LV with intracavitary gradient is not a new finding but peak gradient noted on this exam is higher than previously described.      Current medications:  Scheduled Meds:apixaban, 5 mg, Oral, BID  atorvastatin, 80 mg, Oral, Nightly  busPIRone, 7.5 mg, Oral, BID  Diclofenac Sodium, 2 g, Topical, 4x Daily  donepezil, 10 mg, Oral, Nightly  DULoxetine, 60 mg, Oral, Daily  fluticasone, 2 spray, Each Nare, Daily  gabapentin, , ,   gabapentin, 300 mg, Oral, Daily  insulin glargine, 30 Units, Subcutaneous, Nightly  insulin lispro, 3-14 Units, Subcutaneous, 4x Daily AC & at Bedtime  Insulin Lispro, 7 Units, Subcutaneous, TID With Meals  levothyroxine, 25 mcg, Oral, Daily  LORazepam, 0.5 mg, Oral, Q12H  meclizine, 25 mg, Oral, TID  mineral oil, 1 enema, Rectal, Once  nitrofurantoin (macrocrystal-monohydrate), 100 mg, Oral, Q24H  nystatin, , Topical, 4x Daily  pantoprazole, 40 mg, Oral, BID AC  senna-docusate sodium, 2 tablet, Oral, Daily   And  polyethylene glycol, 17 g, Oral, Daily  QUEtiapine, 50 mg, Oral, Nightly  sodium chloride, 10 mL, Intravenous, Q12H  sodium chloride, 10 mL, Intravenous, Q12H      Continuous Infusions:   PRN Meds:.  acetaminophen **OR** acetaminophen **OR** acetaminophen    senna-docusate sodium **AND** polyethylene glycol **AND** bisacodyl **AND** bisacodyl    Calcium Replacement - Follow  Nurse / BPA Driven Protocol    dextrose    dextrose    gabapentin    glucagon (human recombinant)    HYDROcodone-acetaminophen    Magnesium Standard Dose Replacement - Follow Nurse / BPA Driven Protocol    meclizine    ondansetron ODT **OR** ondansetron    Phosphorus Replacement - Follow Nurse / BPA Driven Protocol    Potassium Replacement - Follow Nurse / BPA Driven Protocol    sodium chloride    sodium chloride    sodium chloride    sodium chloride    Assessment & Plan   Assessment & Plan     Active Hospital Problems    Diagnosis  POA    **AMS (altered mental status) [R41.82]  Yes    Stroke [I63.9]  Yes      Resolved Hospital Problems   No resolved problems to display.        Brief Hospital Course to date:  Cheri Cruz is a 83 y.o. 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  Initial stroke workup was negative.  MRI showed old left parietal ischemic stroke.  Stroke neurology followed and resumed Eliquis.  She received empiric IV ceftriaxone x 3 days due to concern for UTI.     Altered mental status on advanced dementia  Hypotension   Concern for UTI  - initial stroke workup negative.  MRI showed old L parietal ischemic stroke; pt seen by Stroke team; apixiban restarted.   -Possibly dehydration vs hypoxia  -COVID/flu negative. Blood cultures with no growth to date. LFTs normal. CXR no acute findings.    -CXR with old calcified gr  -S/p empiric IV ceftriaxone x 3 days.   -Held sedatives initially, Okay to resume PRN lorazepam (home med)   -Continue donepezil, Seroquel, BuSpar.       Hand tremors  - distressing to patient. Monitor.      Vertigo  - reports this is common for her  - change meclizine to scheduled     Acute  on chronic hypoxemic respiratory failure  Near-syncope  -Per report, patient became hypoxic with oxygen saturation in the 60s  -CTA chest showed no PE, no acute process   -Patient is O2 dependent on 2L NC but is not compliant due her baseline dementia.  -Continue supplemental oxygenation, titrate for goal SpO2 greater than 90%.     Evolving old left parietal ischemic stroke  -Presented to [facility] with cognitive decline and metabolic encephalopathy. She was hypotensive with systolic in the 90s at [facility].   -CTH from [facility] with left parietal tiny hemorrhage vs calcification on top of old ischemic stroke.   -MRI showed evolving old left parietal lobe CVA with some areas of associated cortical laminar necrosis. No hemorrhage.    -Neurology evaluated, recommended to resume Eliquis. Continue statin. Annual follow up in stroke clinic.   -PT/OT now recommending SNF      RADHA on CKD stage III  Volume depletion due to dehydration   Mild hyperkalemia  -Baseline Cr about 1.3, Cr 2.06 on presentation, improved to  1.2.  Then increased to 1.8 and got IV fluids again.    - pt at risk of recurrent dehydration/RADHA.  Will stop hytrin for this reason   - AM labs      Complex disposition  -PT/OT evaluated, felt patient's functional status is at baseline. Recommended home with 24/7 care.  -partner discussed care with both patient's daughters and case management on 7/17.   - Family leaning towards pursuing long-term care after discharge as they are having difficulty taking care of her on their own and do not have finances for full-time caregiver.     Nausea, constipation   -KUB 7/14 with nonobstructive bowel gas pattern. Repeat KUB 7/18 with moderate stool burden.  -Continue bowel regimen. Increased PPI to BID. PRN Zofran for nausea.      Uncontrolled diabetes mellitus type 2 with hyperglycemia  -A1c 9.80% this admission  -Increase Lantus to 30 units qhs, increase  lispro 7u TIDAC and moderate-high dose SSI -- improved   -Resumed home gabapentin  at low-dose 300 mg daily.     Chronic anemia  Macrocytosis  -No evidence of overt GI bleeding this admission  -B12 and folate within normal limits  -Further workup of anemia as outpatient as appropriate.      Essential hypertension  -Was hypotensive at [facility].  -Not on antihypertensive medications.  - stable      Hyperlipidemia -Continue atorvastatin.  Hypothyroidism -Continue levothyroxine.  GERD -Continue PPI.     Expected Discharge Location and Transportation: [facility]   Expected Discharge   Expected Discharge Date: 7/25/2024; Expected Discharge Time:      VTE Prophylaxis:  Pharmacologic & mechanical VTE prophylaxis orders are present.         AM-PAC 6 Clicks Score (PT): 8 (07/25/24 0800)    CODE STATUS:   Code Status and Medical Interventions:   Ordered at: 07/15/24 1319     Level Of Support Discussed With:    Patient     Code Status (Patient has no pulse and is not breathing):    CPR (Attempt to Resuscitate)     Medical Interventions (Patient has pulse or is breathing):    Full Support       Emy Diaz DO  07/25/24

## 2024-07-25 NOTE — PROGRESS NOTES
"          Clinical Nutrition Assessment     Patient Name: Cheri Cruz  YOB: 1940  MRN: [id number]  Date of Encounter: 07/25/24 14:52 EDT  Admission date: 7/14/2024  Reason for Visit: Follow-up protocol    Assessment   Nutrition Assessment   Admission Diagnosis:  AMS (altered mental status) [R41.82]  Stroke [I63.9]    Problem List:    AMS (altered mental status)    Type 2 diabetes mellitus without complication, without long-term current use of insulin    GERD (gastroesophageal reflux disease)    Essential hypertension    History of pulmonary embolus (PE)    Acquired hypothyroidism    Stroke      PMH:   She  has a past medical history of Abnormal heart rhythm, Anxiety, Arrhythmia, Arthritis, Atrial fibrillation, Dementia, Diabetes mellitus, Hyperlipidemia, Hypertension, Kidney disorder, Stroke, and Uterus cancer.    PSH:  She  has a past surgical history that includes Cataract extraction, bilateral.    Applicable Nutrition History:       Anthropometrics     Height: Height: 170.2 cm (67\")  Last Filed Weight: Weight: 93.1 kg (205 lb 4 oz) (07/14/24 1957)  Method: Weight Method: Bed scale  BMI: BMI (Calculated): 32.1    UBW:  Per EMR wts ranges 199 lbs to 216 lbs in 2023 bt Jan and Apr No recent wts not bed or estimate  Weight change: unable to evaluate at this time    Nutrition Focused Physical Exam    Date: 7/15    Pt does not meet criteria for malnutrition diagnosis, at this time.    Only mild muscle wasting at dorsal hand, clavicle and calf areas and mild orbital fat wasting detected    Subjective   Reported/Observed/Food/Nutrition Related History:   7/25  Eating well per report. Patient confirms this.  Likes Boost supplement.       7/15  Pt allows sometimes needs food cut up but eats ok. Note pt req pureed food items last adm s/p stroke. Per RN now requires food cut up.     Current Nutrition Prescription   PO: Diet: Cardiac, Diabetic; Healthy Heart (2-3 Na+); Consistent Carbohydrate; Feeding " Assistance - Nursing; Texture: Soft to Chew (NDD 3); Soft to Chew: Chopped Meat; Fluid Consistency: Thin (IDDSI 0)  Oral Nutrition Supplement: Boost Glucose Control daily   Intake: 4 Days: 78% x 8 meals       Assessment & Plan   Nutrition Diagnosis   Date:  7/15            Updated:  7/25  Problem Potential sub optimal intake    Etiology Weakness    Signs/Symptoms 38% x 2 meals recorded thus far   Status: resolved       Goal / Objectives:   Nutrition to support treatment and Maintain intake      Nutrition Intervention      Follow treatment progress, Care plan reviewed, Advise alternate selection, Interview for preferences, Supplement provided        Monitoring/Evaluation:   Per protocol, I&O, PO intake, Supplement intake, Pertinent labs, Weight, Symptoms    Jessica Cheng RD  Time Spent:20 min

## 2024-07-25 NOTE — THERAPY TREATMENT NOTE
Acute Care - Speech Language Pathology Treatment Note  [facility]     Patient Name: Cheri Cruz  : 1940  MRN: [id number]  Today's Date: 2024               Admit Date: 2024     Visit Dx:    ICD-10-CM ICD-9-CM   1. Cognitive communication deficit  R41.841 799.52   2. Disorientation  R41.0 780.99     Patient Active Problem List   Diagnosis    Hyperlipidemia LDL goal <70    Type 2 diabetes mellitus without complication, without long-term current use of insulin    GERD (gastroesophageal reflux disease)    Essential hypertension    Abnormal EKG    Osteoarthritis    Anxiety    Palpitations    Vertigo    History of ischemic left MCA stroke    Hemorrhagic stroke    History of pulmonary embolus (PE)    Confusion    Hypotension    Constipation    UTI (urinary tract infection) due to urinary indwelling catheter    Metabolic encephalopathy    Symptomatic anemia    Acquired hypothyroidism    AMS (altered mental status)    Stroke     Past Medical History:   Diagnosis Date    Abnormal heart rhythm     Anxiety     Arrhythmia     Arthritis     Atrial fibrillation     Dementia     Diabetes mellitus     Hyperlipidemia     Hypertension     Kidney disorder     Stroke     Uterus cancer      Past Surgical History:   Procedure Laterality Date    CATARACT EXTRACTION, BILATERAL         SLP Recommendation and Plan        Anticipated Discharge Disposition (SLP): home with  care (24 1500)        Therapy Frequency (SLP SLC): 3 days per week (24 1500)  Predicted Duration Therapy Intervention (Days): 1 week (24 1500)        Daily Summary of Progress (SLP): progress towards functional goals is fair (24 1500)           Treatment Assessment (SLP): continued, cognitive-linguistic disorder (24 1500)  Treatment Assessment Comments (SLP): Pt seen for tx, no family present. (24 1500)  Plan for Continued Treatment (SLP): continue treatment per plan of care (24 1500)  Plan of Care  "Reviewed With: patient (07/25/24 1605)  Progress: no change (07/25/24 1608)      SLP EVALUATION (Last 72 Hours)       SLP SLC Evaluation       Row Name 07/25/24 1500 07/24/24 1550 07/22/24 [facility]             Communication Assessment/Intervention    Document Type therapy note (daily note)  -RS therapy note (daily note)  -CJ therapy note (daily note)  -      Subjective Information no complaints  -RS no complaints  -CJ no complaints  -      Patient Observations lethargic;cooperative;agree to therapy  -RS alert;cooperative;agree to therapy  -CJ alert;cooperative  -      Patient/Family/Caregiver Comments/Observations no family present  -RS no family present  - No family present  -      Patient Effort good  -RS good  - good  -      Symptoms Noted During/After Treatment fatigue  -RS fatigue  - none  -      Oral Care oral rinse provided  - -- --         Pain    Additional Documentation Pain Scale: FACES Pre/Post-Treatment (Group)  -RS -- Pain Scale: FACES Pre/Post-Treatment (Group)  -         Pain Scale: FACES Pre/Post-Treatment    Pain: FACES Scale, Pretreatment 2-->hurts little bit  -RS 0-->no hurt  - 0-->no hurt  -      Posttreatment Pain Rating 2-->hurts little bit  -RS 0-->no hurt  -CJ 0-->no hurt  -      Pre/Posttreatment Pain Comment pt reports discomfort in her back  -RS -- --         SLP Treatment Clinical Impressions    Treatment Assessment (SLP) continued;cognitive-linguistic disorder  -RS continued;cognitive-linguistic disorder  -CJ continued;cognitive-linguistic disorder  -      Treatment Assessment Comments (SLP) Pt seen for tx, no family present.  -RS Pt seen for tx this date, difficult for pt to participate 2/2 pt stating \"not feeling well and sick all over\". Addressed all tx goals and provided support. Will continue to address deficits as needed  -CJ Cont w/ cognitive lingusitic deficits, suspect hearing deficits and lethargy contributing to overall severity of deficits. SLP will " cont to f/u as appropriate  -      Daily Summary of Progress (SLP) progress towards functional goals is fair  -RS progress toward functional goals is gradual  -CJ progress toward functional goals as expected  -      Barriers to Overall Progress (SLP) Lethargy  -RS Lethargy  - --      Plan for Continued Treatment (SLP) continue treatment per plan of care  -RS continue treatment per plan of care  -CJ continue treatment per plan of care  -      Care Plan Review evaluation/treatment results reviewed  -RS care plan/treatment goals reviewed  -CJ care plan/treatment goals reviewed  -         Recommendations    Therapy Frequency (SLP SLC) 3 days per week  -RS 3 days per week  -CJ 3 days per week  -      Predicted Duration Therapy Intervention (Days) 1 week  -RS 1 week  -CJ 1 week  -      Anticipated Discharge Disposition (SLP) home with 24/7 care  -RS home with 24/7 care  - home with 24/7 [facility]  -                User Key  (r) = Recorded By, (t) = Taken By, (c) = Cosigned By      Initials Name Effective Dates    CJ Dipika Up, MS CCC-SLP 06/03/24 -      Jazlyn Rivas, MS CCC-SLP 05/12/23 -     RS Nabil Madrid MS CCC-SLP 09/14/23 -                        EDUCATION  The patient has been educated in the following areas:     Cognitive Impairment.           SLP GOALS       Row Name 07/25/24 1500 07/24/24 1550 07/22/24 1640       Patient will demonstrate functional cognitive-linguistic skills for return to discharge environment    [place] with moderate cues  -RS with moderate cues  -CJ with moderate cues  -    Time frame 1 week  -RS 1 week  -CJ 1 week  -    Progress/Outcomes continuing progress toward goal  -RS continuing progress toward goal  -CJ continuing progress toward goal  -       SLP Diagnostic Treatment     Patient will participate in further assessment in the following areas -- -- cognitive-linguistic;clarification of baseline cognitive communication status  -    Time Frame  (Diagnostic) -- -- 1 week  -    Progress/Outcomes (Additional Goal 1, SLP) -- -- goal met  -       Comprehend Questions Goal 1 (SLP)    Improve Ability to Comprehend Questions Goal 1 (SLP) simple yes/no questions;complex yes/no questions;simple wh questions;simple general questions;80%;with minimal cues (75-90%)  -RS simple yes/no questions;complex yes/no questions;simple wh questions;simple general questions;80%;with minimal cues (75-90%)  -CJ simple yes/no questions;complex yes/no questions;simple wh questions;simple general questions;80%;with minimal cues (75-90%)  -    Time Frame (Comprehend Questions Goal 1, SLP) 1 week  -RS 1 week  -CJ 1 week  -    Progress (Ability to Comprehend Questions Goal 1, SLP) 70%;with minimal cues (75-90%)  -RS 70%;with minimal cues (75-90%)  -CJ 60%;with minimal cues (75-90%)  -    Progress/Outcomes (Comprehend Questions Goal 1, SLP) continuing progress toward goal  -RS continuing progress toward goal  -CJ continuing progress toward goal  -    Comment (Comprehend Questions Goal 1, SLP) -- simple but perseverated on not feeling well during session  -CJ Simple Y/N, suspect hearing deficits impacting as well  -       Follow Directions Goal 2 (SLP)    Improve Ability to Follow Directions Goal 1 (SLP) 2 step commands;80%;with moderate cues (50-74%)  -RS 2 step commands;80%;with moderate cues (50-74%)  -CJ 2 step commands;80%;with moderate cues (50-74%)  -    Time Frame (Follow Directions Goal 1, SLP) 1 week  -RS 1 week  -CJ 1 week  -    Progress (Ability to Follow Directions Goal 1, SLP) 60%;with minimal cues (75-90%);with moderate cues (50-74%)  -RS 50%;with minimal cues (75-90%)  -CJ 70%;with minimal cues (75-90%)  -    Progress/Outcomes (Follow Directions Goal 1, SLP) progress slower than expected  -RS progress slower than expected  -CJ continuing progress toward goal  -    Comment (Follow Directions Goal 1, SLP) Pt only intermittently able to follow 1/2  "directions  -RS suspect difficulty to complete 2/2 unable to distract from \"being sick\"  -CJ --       Word Retrieval Skills Goal 1 (SLP)    Improve Word Retrieval Skills By Goal 1 (SLP) confrontational naming task;high frequency;responsive naming task;simple;completing open ended structured sentence;answer WH question with one word;completing a divergent task;completing a convergent task;80%;with minimal cues (75-90%)  -RS confrontational naming task;high frequency;responsive naming task;simple;completing open ended structured sentence;answer WH question with one word;completing a divergent task;completing a convergent task;80%;with minimal cues (75-90%)  -CJ confrontational naming task;high frequency;responsive naming task;simple;completing open ended structured sentence;answer WH question with one word;completing a divergent task;completing a convergent task;80%;with minimal cues (75-90%)  -    Time Frame (Word Retrieval Goal 1, SLP) 1 week  -RS 1 week  -CJ 1 week  -    Progress (Word Retrieval Skills Goal 1, SLP) 50%;with minimal cues (75-90%)  -RS 50%;with moderate cues (50-74%)  -CJ --    Progress/Outcomes (Word Retrieval Goal 1, SLP) progress slower than expected  -RS continuing progress toward goal  -CJ new goal  -    Comment (Word Retrieval Goal 1, SLP) -- wh questions and naming during contextual tasks in room 2/2 difficult to redirect  -CJ --              User Key  (r) = Recorded By, (t) = Taken By, (c) = Cosigned By      Initials Name Provider Type    CJ Dipika Up, MS CCC-SLP Speech and Language Pathologist     Jazlyn Rivas, MS CCC-SLP Speech and Language Pathologist    Nabil Bowman MS CCC-SLP Speech and Language Pathologist                              Time Calculation:      Time Calculation- SLP       Row Name 07/25/24 0214             Time Calculation- SLP    SLP Start Time 1530  -RS      SLP Received On 07/25/24  -RS         Untimed Charges    89259-SA Treatment/ST Modification " Prosth Aug Alter  39  -RS         Total Minutes    Untimed Charges Total Minutes 39  -RS       Total Minutes 39  -RS                User Key  (r) = Recorded By, (t) = Taken By, (c) = Cosigned By      Initials Name Provider Type    Nabil Bowman MS CCC-SLP Speech and Language Pathologist                    Therapy Charges for Today       Code Description Service Date Service Provider Modifiers Qty    [id number]  ST TREATMENT SPEECH 3 7/25/2024 Nabil Madrid MS CCC-SLP GN 1                       MS ROBERT Bowen  7/25/2024

## 2024-07-25 NOTE — CASE MANAGEMENT/SOCIAL WORK
Continued Stay Note  [facility]     Patient Name: Cheri Cruz  MRN: [id number]  Today's Date: 7/25/2024    Admit Date: 7/14/2024    Plan: SNF   Discharge Plan       Row Name 07/25/24 1613       Plan    Plan SNF    Patient/Family in Agreement with Plan yes    Plan Comments Spoke with Ms. Cruz's Daughter Janice by telephone. Insurance has denied rehab at [facility]. Discussed with Janice and she would like to do a family appeal. Information given to Janice to complete the appeal process. She plans to complete the appeal in the morning. CM will continue to follow up.    Final Discharge Disposition Code 03 - skilled nursing facility (SNF)                   Discharge Codes    No documentation.                 Expected Discharge Date and Time       Expected Discharge Date Expected Discharge Time    Jul 25, 2024               Jaye Kuhn RN

## 2024-07-25 NOTE — PLAN OF CARE
Goal Outcome Evaluation:  Plan of Care Reviewed With: patient        Progress: no change         Anticipated Discharge Disposition (SLP): home with 24/7 care             Treatment Assessment (SLP): continued, cognitive-linguistic disorder (07/25/24 1500)  Treatment Assessment Comments (SLP): Pt seen for tx, no family present. (07/25/24 1500)  Plan for Continued Treatment (SLP): continue treatment per plan of care (07/25/24 1500)

## 2024-07-26 LAB
ANION GAP SERPL CALCULATED.3IONS-SCNC: 11 MMOL/L (ref 5–15)
BUN SERPL-MCNC: 29 MG/DL (ref 8–23)
BUN/CREAT SERPL: 19.9 (ref 7–25)
CALCIUM SPEC-SCNC: 8.6 MG/DL (ref 8.6–10.5)
CHLORIDE SERPL-SCNC: 98 MMOL/L (ref 98–107)
CO2 SERPL-SCNC: 26 MMOL/L (ref 22–29)
CREAT SERPL-MCNC: 1.46 MG/DL (ref 0.57–1)
DEPRECATED RDW RBC AUTO: 47.8 FL (ref 37–54)
EGFRCR SERPLBLD CKD-EPI 2021: 35.8 ML/MIN/1.73
ERYTHROCYTE [DISTWIDTH] IN BLOOD BY AUTOMATED COUNT: 13 % (ref 12.3–15.4)
GLUCOSE BLDC GLUCOMTR-MCNC: 108 MG/DL (ref 70–130)
GLUCOSE BLDC GLUCOMTR-MCNC: 147 MG/DL (ref 70–130)
GLUCOSE BLDC GLUCOMTR-MCNC: 154 MG/DL (ref 70–130)
GLUCOSE BLDC GLUCOMTR-MCNC: 233 MG/DL (ref 70–130)
GLUCOSE SERPL-MCNC: 103 MG/DL (ref 65–99)
HCT VFR BLD AUTO: 29.4 % (ref 34–46.6)
HGB BLD-MCNC: 9.4 G/DL (ref 12–15.9)
MCH RBC QN AUTO: 32.2 PG (ref 26.6–33)
MCHC RBC AUTO-ENTMCNC: 32 G/DL (ref 31.5–35.7)
MCV RBC AUTO: 100.7 FL (ref 79–97)
PLATELET # BLD AUTO: 125 10*3/MM3 (ref 140–450)
PMV BLD AUTO: 12.1 FL (ref 6–12)
POTASSIUM SERPL-SCNC: 4.8 MMOL/L (ref 3.5–5.2)
RBC # BLD AUTO: 2.92 10*6/MM3 (ref 3.77–5.28)
SODIUM SERPL-SCNC: 135 MMOL/L (ref 136–145)
WBC NRBC COR # BLD AUTO: 6.67 10*3/MM3 (ref 3.4–10.8)

## 2024-07-26 PROCEDURE — 63710000001 NITROFURANTOIN (MACROCRYSTAL-MONOHYDRATE) 100 MG CAPSULE: Performed by: INTERNAL MEDICINE

## 2024-07-26 PROCEDURE — 63710000001 BUSPIRONE 15 MG TABLET: Performed by: INTERNAL MEDICINE

## 2024-07-26 PROCEDURE — A9270 NON-COVERED ITEM OR SERVICE: HCPCS | Performed by: STUDENT IN AN ORGANIZED HEALTH CARE EDUCATION/TRAINING PROGRAM

## 2024-07-26 PROCEDURE — 63710000001 INSULIN LISPRO (HUMAN) PER 5 UNITS: Performed by: NURSE PRACTITIONER

## 2024-07-26 PROCEDURE — A9270 NON-COVERED ITEM OR SERVICE: HCPCS | Performed by: INTERNAL MEDICINE

## 2024-07-26 PROCEDURE — 63710000001 LORAZEPAM 0.5 MG TABLET: Performed by: INTERNAL MEDICINE

## 2024-07-26 PROCEDURE — 63710000001 DULOXETINE 60 MG CAPSULE DELAYED-RELEASE PARTICLES: Performed by: INTERNAL MEDICINE

## 2024-07-26 PROCEDURE — 63710000001 PANTOPRAZOLE 40 MG TABLET DELAYED-RELEASE: Performed by: STUDENT IN AN ORGANIZED HEALTH CARE EDUCATION/TRAINING PROGRAM

## 2024-07-26 PROCEDURE — 63710000001 HYDROCODONE-ACETAMINOPHEN 10-325 MG TABLET: Performed by: INTERNAL MEDICINE

## 2024-07-26 PROCEDURE — A9270 NON-COVERED ITEM OR SERVICE: HCPCS | Performed by: NURSE PRACTITIONER

## 2024-07-26 PROCEDURE — 63710000001 APIXABAN 5 MG TABLET: Performed by: STUDENT IN AN ORGANIZED HEALTH CARE EDUCATION/TRAINING PROGRAM

## 2024-07-26 PROCEDURE — 85027 COMPLETE CBC AUTOMATED: CPT | Performed by: INTERNAL MEDICINE

## 2024-07-26 PROCEDURE — 63710000001 BISACODYL 10 MG SUPPOSITORY: Performed by: STUDENT IN AN ORGANIZED HEALTH CARE EDUCATION/TRAINING PROGRAM

## 2024-07-26 PROCEDURE — 63710000001 DONEPEZIL 10 MG TABLET: Performed by: INTERNAL MEDICINE

## 2024-07-26 PROCEDURE — 63710000001 INSULIN LISPRO (HUMAN) PER 5 UNITS: Performed by: STUDENT IN AN ORGANIZED HEALTH CARE EDUCATION/TRAINING PROGRAM

## 2024-07-26 PROCEDURE — 63710000001 GABAPENTIN 300 MG CAPSULE: Performed by: STUDENT IN AN ORGANIZED HEALTH CARE EDUCATION/TRAINING PROGRAM

## 2024-07-26 PROCEDURE — 63710000001 QUETIAPINE 25 MG TABLET: Performed by: INTERNAL MEDICINE

## 2024-07-26 PROCEDURE — 63710000001 ONDANSETRON ODT 4 MG TABLET DISPERSIBLE: Performed by: INTERNAL MEDICINE

## 2024-07-26 PROCEDURE — 63710000001 INSULIN GLARGINE PER 5 UNITS: Performed by: STUDENT IN AN ORGANIZED HEALTH CARE EDUCATION/TRAINING PROGRAM

## 2024-07-26 PROCEDURE — 80048 BASIC METABOLIC PNL TOTAL CA: CPT | Performed by: INTERNAL MEDICINE

## 2024-07-26 PROCEDURE — 63710000001 ATORVASTATIN 40 MG TABLET

## 2024-07-26 PROCEDURE — 63710000001 LEVOTHYROXINE 25 MCG TABLET: Performed by: INTERNAL MEDICINE

## 2024-07-26 PROCEDURE — G0378 HOSPITAL OBSERVATION PER HR: HCPCS

## 2024-07-26 PROCEDURE — 99231 SBSQ HOSP IP/OBS SF/LOW 25: CPT | Performed by: NURSE PRACTITIONER

## 2024-07-26 PROCEDURE — 63710000001 MECLIZINE 12.5 MG TABLET: Performed by: INTERNAL MEDICINE

## 2024-07-26 PROCEDURE — 63710000001 POLYETHYLENE GLYCOL 17 G PACK: Performed by: STUDENT IN AN ORGANIZED HEALTH CARE EDUCATION/TRAINING PROGRAM

## 2024-07-26 PROCEDURE — 82948 REAGENT STRIP/BLOOD GLUCOSE: CPT

## 2024-07-26 PROCEDURE — 63710000001 SENNOSIDES-DOCUSATE 8.6-50 MG TABLET: Performed by: STUDENT IN AN ORGANIZED HEALTH CARE EDUCATION/TRAINING PROGRAM

## 2024-07-26 PROCEDURE — 63710000001 BISACODYL 5 MG TABLET DELAYED-RELEASE: Performed by: STUDENT IN AN ORGANIZED HEALTH CARE EDUCATION/TRAINING PROGRAM

## 2024-07-26 PROCEDURE — A9270 NON-COVERED ITEM OR SERVICE: HCPCS

## 2024-07-26 RX ADMIN — GABAPENTIN 300 MG: 300 CAPSULE ORAL at 08:08

## 2024-07-26 RX ADMIN — LORAZEPAM 0.5 MG: 0.5 TABLET ORAL at 08:07

## 2024-07-26 RX ADMIN — BUSPIRONE HYDROCHLORIDE 7.5 MG: 15 TABLET ORAL at 08:14

## 2024-07-26 RX ADMIN — DONEPEZIL HYDROCHLORIDE 10 MG: 10 TABLET, FILM COATED ORAL at 22:02

## 2024-07-26 RX ADMIN — POLYETHYLENE GLYCOL 3350 17 G: 17 POWDER, FOR SOLUTION ORAL at 08:08

## 2024-07-26 RX ADMIN — SENNOSIDES AND DOCUSATE SODIUM 2 TABLET: 50; 8.6 TABLET ORAL at 08:07

## 2024-07-26 RX ADMIN — HYDROCODONE BITARTRATE AND ACETAMINOPHEN 1 TABLET: 10; 325 TABLET ORAL at 08:12

## 2024-07-26 RX ADMIN — INSULIN LISPRO 5 UNITS: 100 INJECTION, SOLUTION INTRAVENOUS; SUBCUTANEOUS at 12:18

## 2024-07-26 RX ADMIN — Medication 10 MG: at 18:11

## 2024-07-26 RX ADMIN — BUSPIRONE HYDROCHLORIDE 7.5 MG: 15 TABLET ORAL at 22:02

## 2024-07-26 RX ADMIN — DULOXETINE HYDROCHLORIDE 60 MG: 60 CAPSULE, DELAYED RELEASE ORAL at 08:08

## 2024-07-26 RX ADMIN — PANTOPRAZOLE SODIUM 40 MG: 40 TABLET, DELAYED RELEASE ORAL at 17:01

## 2024-07-26 RX ADMIN — INSULIN GLARGINE 30 UNITS: 100 INJECTION, SOLUTION SUBCUTANEOUS at 22:02

## 2024-07-26 RX ADMIN — ONDANSETRON 4 MG: 4 TABLET, ORALLY DISINTEGRATING ORAL at 13:29

## 2024-07-26 RX ADMIN — LORAZEPAM 0.5 MG: 0.5 TABLET ORAL at 22:02

## 2024-07-26 RX ADMIN — NYSTATIN: 100000 POWDER TOPICAL at 17:02

## 2024-07-26 RX ADMIN — DICLOFENAC SODIUM 2 G: 10 GEL TOPICAL at 17:03

## 2024-07-26 RX ADMIN — FLUTICASONE PROPIONATE 2 SPRAY: 50 SPRAY, METERED NASAL at 08:08

## 2024-07-26 RX ADMIN — INSULIN LISPRO 7 UNITS: 100 INJECTION, SOLUTION INTRAVENOUS; SUBCUTANEOUS at 17:01

## 2024-07-26 RX ADMIN — MECLIZINE HYDROCHLORIDE 25 MG: 25 TABLET ORAL at 08:07

## 2024-07-26 RX ADMIN — INSULIN LISPRO 7 UNITS: 100 INJECTION, SOLUTION INTRAVENOUS; SUBCUTANEOUS at 12:18

## 2024-07-26 RX ADMIN — NYSTATIN: 100000 POWDER TOPICAL at 22:03

## 2024-07-26 RX ADMIN — MECLIZINE HYDROCHLORIDE 25 MG: 25 TABLET ORAL at 22:02

## 2024-07-26 RX ADMIN — ATORVASTATIN CALCIUM 80 MG: 40 TABLET, FILM COATED ORAL at 22:02

## 2024-07-26 RX ADMIN — INSULIN LISPRO 3 UNITS: 100 INJECTION, SOLUTION INTRAVENOUS; SUBCUTANEOUS at 17:02

## 2024-07-26 RX ADMIN — MECLIZINE HYDROCHLORIDE 25 MG: 25 TABLET ORAL at 17:02

## 2024-07-26 RX ADMIN — NITROFURANTOIN MONOHYDRATE/MACROCRYSTALS 100 MG: 75; 25 CAPSULE ORAL at 22:02

## 2024-07-26 RX ADMIN — NYSTATIN: 100000 POWDER TOPICAL at 08:09

## 2024-07-26 RX ADMIN — HYDROCODONE BITARTRATE AND ACETAMINOPHEN 1 TABLET: 10; 325 TABLET ORAL at 22:09

## 2024-07-26 RX ADMIN — APIXABAN 5 MG: 5 TABLET, FILM COATED ORAL at 22:02

## 2024-07-26 RX ADMIN — APIXABAN 5 MG: 5 TABLET, FILM COATED ORAL at 08:08

## 2024-07-26 RX ADMIN — QUETIAPINE FUMARATE 50 MG: 25 TABLET ORAL at 22:02

## 2024-07-26 RX ADMIN — PANTOPRAZOLE SODIUM 40 MG: 40 TABLET, DELAYED RELEASE ORAL at 08:07

## 2024-07-26 RX ADMIN — DICLOFENAC SODIUM 2 G: 10 GEL TOPICAL at 22:04

## 2024-07-26 RX ADMIN — BISACODYL 5 MG: 5 TABLET, COATED ORAL at 12:31

## 2024-07-26 RX ADMIN — NYSTATIN: 100000 POWDER TOPICAL at 12:18

## 2024-07-26 RX ADMIN — LEVOTHYROXINE SODIUM 25 MCG: 25 TABLET ORAL at 06:16

## 2024-07-26 RX ADMIN — DICLOFENAC SODIUM 2 G: 10 GEL TOPICAL at 12:18

## 2024-07-26 RX ADMIN — DICLOFENAC SODIUM 2 G: 10 GEL TOPICAL at 08:08

## 2024-07-26 NOTE — CASE MANAGEMENT/SOCIAL WORK
Continued Stay Note  [facility]     Patient Name: Cheri Cruz  MRN: [id number]  Today's Date: 7/26/2024    Admit Date: 7/14/2024    Plan: [facility]   Discharge Plan       Row Name 07/26/24 1336       Plan    Plan [facility]    Patient/Family in Agreement with Plan yes    Plan Comments Spoke with Ms. Cruz's Daughter Janice by telephone. Awaiting for her to complete the family appeal for the rehab at [facility]. CM faxed clinical notes to [employer] at [phone]. CM will continue to follow up on the status of the claim.    Note update 1539: Janice completed Family expedited appeal. Case number is [id number].     Final Discharge Disposition Code 03 - skilled nursing facility (SNF)                   Discharge Codes    No documentation.                 Expected Discharge Date and Time       Expected Discharge Date Expected Discharge Time    Jul 29, 2024               Jaye Kuhn RN

## 2024-07-26 NOTE — PROGRESS NOTES
[facility] Medicine Services  PROGRESS NOTE    Patient Name: Cheri Cruz  : 1940  MRN: [id number]    Date of Admission: 2024  Primary Care Physician: Lorrie Canada APRN    Subjective   Subjective     CC:  AMS    HPI:  Pt sitting up in bed eating breakfast. Pt reports dizziness and occasional nausea.     Copied text in this note has been reviewed and is accurate as of 2024      Objective   Objective     Vital Signs:   Temp:  [97.7 °F (36.5 °C)-98.2 °F (36.8 °C)] 97.7 °F (36.5 °C)  Heart Rate:  [60-71] 66  Resp:  [16-18] 18  BP: (124-183)/(53-77) 139/63  Flow (L/min):  [2] 2     Physical Exam:  Constitutional: Awake, alert, frail appearing  HENT: NCAT, mucous membranes moist  Respiratory: Clear to auscultation bilaterally, nonlabored   Cardiovascular: RRR, no murmurs, rubs, or gallops  Gastrointestinal: Positive bowel sounds, soft, nontender, nondistended  Musculoskeletal: No bilateral ankle edema  Psychiatric: Appropriate affect, cooperative  Neurologic: MIRELES, speech clear  Skin: No rashes    Results Reviewed:  LAB RESULTS:      Lab 24  0530 24  0440   WBC 6.67 7.35   HEMOGLOBIN 9.4* 9.7*   HEMATOCRIT 29.4* 29.8*   PLATELETS 125* 124*   .7* 102.4*         Lab 24  0530 24  0454 24  0440   SODIUM 135* 144 139   POTASSIUM 4.8 4.7 4.1   CHLORIDE 98 106 101   CO2 26.0 29.0 28.0   ANION GAP 11.0 9.0 10.0   BUN 29* 33* 41*   CREATININE 1.46* 1.66* 1.82*   EGFR 35.8* 30.7* 27.5*   GLUCOSE 103* 152* 194*   CALCIUM 8.6 8.4* 8.0*                           Brief Urine Lab Results  (Last result in the past 365 days)        Color   Clarity   Blood   Leuk Est   Nitrite   Protein   CREAT   Urine HCG        24 Yellow   Cloudy   Trace   Small (1+)   Negative   Negative                   Microbiology Results Abnormal       Procedure Component Value - Date/Time    Blood Culture - Blood, Wrist, Left [[id number]]  (Normal) Collected:  07/14/24 2022    Lab Status: Final result Specimen: Blood from Wrist, Left Updated: 07/19/24 2100     Blood Culture No growth at 5 days    Narrative:      Less than seven (7) mL's of blood was collected.  Insufficient quantity may yield false negative results.    Blood Culture - Blood, Hand, Left [[id number]]  (Normal) Collected: 07/14/24 2022    Lab Status: Final result Specimen: Blood from Hand, Left Updated: 07/19/24 2100     Blood Culture No growth at 5 days    Narrative:      Less than seven (7) mL's of blood was collected.  Insufficient quantity may yield false negative results.    Urine Culture - Urine, Urine, Random Void [[id number]] Collected: 07/14/24 2001    Lab Status: Final result Specimen: Urine, Random Void Updated: 07/16/24 1020     Urine Culture 50,000 CFU/mL Normal Urogenital Nickie    Narrative:      Colonization of the urinary tract without infection is common. Treatment is discouraged unless the patient is symptomatic, pregnant, or undergoing an invasive urologic procedure.            XR Knee 1 or 2 View Right    Result Date: 7/24/2024  XR KNEE 1 OR 2 VW RIGHT Date of Exam: 7/24/2024 9:43 PM EDT Indication: swelling/pain Comparison: None available. Findings: The bones are diffusely osteopenic. No fracture is visualized. There is tricompartment osteoarthritis. There is no significant fluid in the suprapatellar bursa. Atherosclerotic calcification noted     Impression: Impression: 1. No evidence of fracture or acute abnormality 2. Osteoarthritis Electronically Signed: Doyle Wyman  7/24/2024 10:02 PM EDT  Workstation ID: [id number]     Results for orders placed during the hospital encounter of 02/10/23    Adult Transthoracic Echo Complete W/ Cont if Necessary Per Protocol    Interpretation Summary    Left ventricular systolic function is hyperdynamic (EF > 70%). Calculated left ventricular EF = 70.6% Left ventricular ejection fraction appears to be greater than 70%. The left ventricular cavity is  small in size. Left ventricular wall thickness is consistent with mild concentric hypertrophy. All left ventricular wall segments contract normally. Left ventricular intracavitary gradient noted to be 71 mmHg. Left ventricular diastolic function is consistent with (grade I) impaired relaxation. Normal left atrial pressure.    The aortic valve is abnormal in structure. There is mild calcification of the aortic valve mainly affecting the right coronary cusp(s). The aortic valve appears trileaflet. No aortic valve regurgitation is present. Gradient noted through the LV and LVOT    Compared to TTE report from  Dec 2019, hyperdynamic LV with intracavitary gradient is not a new finding but peak gradient noted on this exam is higher than previously described.      Current medications:  Scheduled Meds:apixaban, 5 mg, Oral, BID  atorvastatin, 80 mg, Oral, Nightly  busPIRone, 7.5 mg, Oral, BID  Diclofenac Sodium, 2 g, Topical, 4x Daily  donepezil, 10 mg, Oral, Nightly  DULoxetine, 60 mg, Oral, Daily  fluticasone, 2 spray, Each Nare, Daily  gabapentin, , ,   gabapentin, 300 mg, Oral, Daily  insulin glargine, 30 Units, Subcutaneous, Nightly  insulin lispro, 3-14 Units, Subcutaneous, 4x Daily AC & at Bedtime  Insulin Lispro, 7 Units, Subcutaneous, TID With Meals  levothyroxine, 25 mcg, Oral, Daily  LORazepam, 0.5 mg, Oral, Q12H  meclizine, 25 mg, Oral, TID  nitrofurantoin (macrocrystal-monohydrate), 100 mg, Oral, Q24H  nystatin, , Topical, 4x Daily  pantoprazole, 40 mg, Oral, BID AC  senna-docusate sodium, 2 tablet, Oral, Daily   And  polyethylene glycol, 17 g, Oral, Daily  QUEtiapine, 50 mg, Oral, Nightly  sodium chloride, 10 mL, Intravenous, Q12H  sodium chloride, 10 mL, Intravenous, Q12H      Continuous Infusions:   PRN Meds:.  acetaminophen **OR** acetaminophen **OR** acetaminophen    senna-docusate sodium **AND** polyethylene glycol **AND** bisacodyl **AND** bisacodyl    Calcium Replacement - Follow Nurse / BPA Driven  Protocol    dextrose    dextrose    gabapentin    glucagon (human recombinant)    HYDROcodone-acetaminophen    Magnesium Standard Dose Replacement - Follow Nurse / BPA Driven Protocol    meclizine    ondansetron ODT **OR** ondansetron    Phosphorus Replacement - Follow Nurse / BPA Driven Protocol    Potassium Replacement - Follow Nurse / BPA Driven Protocol    sodium chloride    sodium chloride    sodium chloride    sodium chloride    Assessment & Plan   Assessment & Plan     Active Hospital Problems    Diagnosis  POA    **AMS (altered mental status) [R41.82]  Yes    Stroke [I63.9]  Yes    Acquired hypothyroidism [E03.9]  Yes    History of pulmonary embolus (PE) [Z86.711]  Yes    Essential hypertension [I10]  Yes    GERD (gastroesophageal reflux disease) [K21.9]  Yes    Type 2 diabetes mellitus without complication, without long-term current use of insulin [E11.9]  Yes      Resolved Hospital Problems   No resolved problems to display.        Brief Hospital Course to date:  Cheri Cruz is a 83 y.o. 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  Initial stroke workup was negative.  MRI showed old left parietal ischemic stroke.  Stroke neurology followed and resumed Eliquis.  She received empiric IV ceftriaxone x 3 days due to concern for UTI.     Altered mental status on advanced dementia  Hypotension   Concern for UTI  - initial stroke workup negative.  MRI showed old L parietal ischemic stroke; pt seen by Stroke team; apixiban restarted.   -Possibly dehydration vs hypoxia  -COVID/flu negative. Blood cultures with no growth to date. LFTs normal. CXR no acute findings.    -CXR with old calcified gr  -S/p empiric IV  ceftriaxone x 3 days.   -Held sedatives initially, Okay to resume PRN lorazepam (home med)   -Continue donepezil, Seroquel, BuSpar.       Hand tremors  - distressing to patient. Monitor.      Vertigo  - reports this is common for her  - change meclizine to scheduled     Acute on chronic hypoxemic respiratory failure  Near-syncope  -Per report, patient became hypoxic with oxygen saturation in the 60s  -CTA chest showed no PE, no acute process   -Patient is O2 dependent on 2L NC but is not compliant due her baseline dementia.  -Continue supplemental oxygenation, titrate for goal SpO2 greater than 90%.     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 She was hypotensive with systolic in the 90s at [facility].   -CTH from [facility] with left parietal tiny hemorrhage vs calcification on top of old ischemic stroke.   -MRI showed evolving old left parietal lobe CVA with some areas of associated cortical laminar necrosis. No hemorrhage.    -Neurology evaluated, recommended to resume Eliquis. Continue statin. Annual follow up in stroke clinic.   -PT/OT now recommending SNF      RADHA on CKD stage III  Volume depletion due to dehydration   Mild hyperkalemia  -Baseline Cr about 1.3, Cr 2.06 on presentation, improved to  1.2.  Then increased to 1.8 and got IV fluids again.    - pt at risk of recurrent dehydration/RADHA.  Will stop hytrin for this reason   - AM labs      Complex disposition  -PT/OT evaluated, felt patient's functional status is at baseline. Recommended home with 24/7 care.  -partner discussed care with both patient's daughters and case management on 7/17.   - Family leaning towards pursuing long-term care after discharge as they are having difficulty taking care of her on their own and do not have finances for full-time caregiver.     Nausea, constipation   -KUB 7/14 with nonobstructive bowel gas pattern. Repeat KUB 7/18 with moderate stool burden.  -Continue  bowel regimen. Increased PPI to BID. PRN Zofran for nausea.      Uncontrolled diabetes mellitus type 2 with hyperglycemia  -A1c 9.80% this admission  -Increase Lantus to 30 units qhs, increase  lispro 7u TIDAC and moderate-high dose SSI -- improved   -Resumed home gabapentin at low-dose 300 mg daily.     Chronic anemia  Macrocytosis  -No evidence of overt GI bleeding this admission  -B12 and folate within normal limits  -Further workup of anemia as outpatient as appropriate.      Essential hypertension  -Was hypotensive at [facility].  -Not on antihypertensive medications.  - stable      Hyperlipidemia -Continue atorvastatin.  Hypothyroidism -Continue levothyroxine.  GERD -Continue PPI.     Expected Discharge Location and Transportation: [facility]   Expected Discharge   Expected Discharge Date: 7/29/2024; Expected Discharge Time:      VTE Prophylaxis:  Pharmacologic & mechanical VTE prophylaxis orders are present.         AM-PAC 6 Clicks Score (PT): 8 (07/25/24 2000)    CODE STATUS:   Code Status and Medical Interventions:   Ordered at: 07/15/24 1319     Level Of Support Discussed With:    Patient     Code Status (Patient has no pulse and is not breathing):    CPR (Attempt to Resuscitate)     Medical Interventions (Patient has pulse or is breathing):    Full Support       Lauryn White, APRN  07/26/24

## 2024-07-26 NOTE — DISCHARGE PLACEMENT REQUEST
"Deya Cruz Cro (83 y.o. Female)     CM SANDRA OliverN RN        Family Expedited Appeal       [phone]    AOR Form Faxed Separately        Date of Birth   1940    Social Security Number       Address   [address] [facility] [address]    Home Phone   [phone]    MRN   [id number]       Oriental orthodox   [facility]    Marital Status                               Admission Date   7/14/24    Admission Type   Urgent    Admitting Provider   Emy Diaz DO    Attending Provider   Emy Diaz DO    Department, Room/Bed   [facility] 3F, S304/1       Discharge Date       Discharge Disposition       Discharge Destination                                 Attending Provider: Emy Diaz DO    Allergies: Penicillins, Sulfa Antibiotics, Tetracyclines & Related, Valium [Diazepam]    Isolation: None   Infection: None   Code Status: CPR    Ht: 170.2 cm (67\")   Wt: 93.1 kg (205 lb 4 oz)    Admission Cmt: None   Principal Problem: AMS (altered mental status) [R41.82]                   Active Insurance as of 7/14/2024       Primary Coverage       Payor Plan Insurance Group Employer/Plan Group    HUMANA MEDICARE REPLACEMENT HUMANA MED ADV HMO [id number]       Payor Plan Address Payor Plan Phone Number Payor Plan Fax Number Effective Dates    [address] [address] [phone]  2/1/2023 - None Entered    [facility] [address]         Subscriber Name Subscriber Birth Date Member ID       DEYA CRUZ CRO 1940 [id number]                     Emergency Contacts        (Rel.) Home Phone Work Phone Mobile Phone    Radha Cruz (Daughter) [phone] -- [phone]    EVITA MADDEN (Daughter) [phone] -- --    JODYOMEGA (Son) [phone] -- [phone]    Vanessa Cruz (Spouse) [phone] -- --    Shayne Madden (Other) [phone] -- --                 History & Physical        Otilia Chang MD at 07/14/24 1917              [facility] " Medicine Services  HISTORY AND PHYSICAL    Patient Name: Cheri Cruz  : 1940  MRN: [id number]  Primary Care Physician: Lorrie Canada APRN  Date of admission: 2024      Subjective  Subjective     Chief Complaint:  Altered mental state    HPI:  Patient with dementia and expressive aphasia.  Cannot contribute much to HPI.  Most of the history is obtained from reviewing her old records and records from [facility].  Attempted to reach her daughter Ms. Garcia 3 times but voicemail is full    Cheri Cruz is a 83 y.o. 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    She presented to [facility] with worsening encephalopathy/altered mental state and generalized bodyaches.  She has baseline dementia but her mental state was significantly below her baseline.  She was found to be hypertensive with systolic in the 90s.  Lab workup with creatinine of 2.0 from her baseline of 1.5.  Potassium 5.6.  Blood sugar 436.  Hemoglobin of 11.0.  Urine analysis with too numerous WBCs.  Chest x-ray clear.  CT head with questionable left parietal tiny hemorrhage versus calcification.  She was transferred to [facility] for neurology evaluation after she was accepted by stroke team.    At the time of the encounter, patient is mildly confused.  She thinks she is in [place].  She is able to tell me the name of her daughter.  Complaining of some lower abdominal pain and burning sensation with a urine otherwise she has no active complaint.      Addendum 8:30 PM:  Managed to reach out to the daughter/Ms. Garcia who lives in provide care to the patient.  Daughter reported to me that her mother is bedbound since her stroke in   "with minimal activity.  She is incontinent to urine and stool.  She has severe depression.  Over the last few weeks, her memory has been declining as well as her mood.  She became less engaged and more depressed.  Her appetite is good but she eats what she wants to eat.  This morning, her mother started complaining of shortness of breath and kept telling her \" I am going to die\" and when her daughter checked her oxygen saturation, it was in the 60s so she put oxygen on (she is on as needed oxygen at home but does not like to wear it).  Soon after, patient became pale and she could not feel pulse and was about to start CPR when the patient quickly recovered and her oxygen saturation improved to the upper 90s.  At that time, she thought that her mother recovered so she can call 911 immediately.  Later on, her mother kept acting weird and became more lethargic which eventually prompted her to call 911.    Personal History     Past Medical History:   Diagnosis Date    Abnormal heart rhythm     Anxiety     Arrhythmia     Arthritis     Atrial fibrillation     Dementia     Diabetes mellitus     Hyperlipidemia     Hypertension     Kidney disorder     Stroke     Uterus cancer            Past Surgical History:   Procedure Laterality Date    CATARACT EXTRACTION, BILATERAL         Family History: family history includes Alcohol abuse in her brother; Cancer in her brother, father, and mother; Congenital heart disease in her mother; Heart disease in her mother.     Social History:  reports that she has never smoked. She has never used smokeless tobacco. She reports that she does not drink alcohol and does not use drugs.  Social History     Social History Narrative    Not on file       Medications:  Available home medication information reviewed.  DULoxetine, HYDROcodone-acetaminophen, Insulin Lispro, LORazepam, QUEtiapine, apixaban, busPIRone, donepezil, gabapentin, levothyroxine, meclizine, omeprazole, rosuvastatin, " sennosides-docusate, and terazosin    Allergies   Allergen Reactions    Penicillins Unknown - Low Severity     Tolerates ceftriaxones    Sulfa Antibiotics     Tetracyclines & Related Unknown (See Comments)    Valium [Diazepam] Anxiety       Objective  Objective     Vital Signs:   Temp:  [97.7 °F (36.5 °C)-98.1 °F (36.7 °C)] 97.7 °F (36.5 °C)  Heart Rate:  [64-87] 65  Resp:  [18-20] 18  BP: ()/(46-65) 123/48  Flow (L/min):  [2] 2       Physical Exam   General: Pleasantly confused.  Not in distress.  Conversant with expressive aphasia  Head: Atraumatic and normocephalic  Eyes: No Icterus. No pallor  Ears:  Ears appear intact with no abnormalities noted  Throat: No oral lesions, no thrush  Neck: Supple, trachea midline  Lungs: Clear to auscultation bilaterally, equal air entry, no wheezing or crackles  Heart:  Normal S1 and S2, no murmur, no gallop, No JVD, no lower extremity swelling  Abdomen:  Soft, no tenderness, no organomegaly, normal bowel sounds, no organomegaly  Extremities: pulses equal bilaterally  Skin: No bleeding, bruising or rash, normal skin turgor and elasticity  Neurologic: Mild expressive aphasia.  No gross motor deficit  Psych: Alert and oriented x 1    Result Review:  I have personally reviewed the results from the time of this admission to 7/14/2024 19:55 EDT and agree with these findings:  [x]  Laboratory list / accordion  [x]  Microbiology  []  Radiology  [x]  EKG/Telemetry   [x]  Cardiology/Vascular   []  Pathology  [x]  Old records      LAB RESULTS:      Lab 07/14/24  1519   WBC 7.87   HEMOGLOBIN 11.0*   HEMATOCRIT 35.2   PLATELETS 139*   NEUTROS ABS 5.72   IMMATURE GRANS (ABS) 0.02   LYMPHS ABS 1.52   MONOS ABS 0.44   EOS ABS 0.14   .2*         Lab 07/14/24  1519   SODIUM 137   POTASSIUM 5.6*   CHLORIDE 101   CO2 27.8   ANION GAP 8.2   BUN 35*   CREATININE 2.06*   EGFR 23.7*   GLUCOSE 436*   CALCIUM 9.2   MAGNESIUM 1.6   TSH 2.380         Lab 07/14/24  1519   TOTAL PROTEIN 7.0    ALBUMIN 3.9   GLOBULIN 3.1   ALT (SGPT) 5   AST (SGOT) 13   BILIRUBIN 0.2   ALK PHOS 68         Lab 07/14/24  1519   HSTROP T 37*                 UA          12/17/2023    09:48 3/25/2024    01:50 7/14/2024    15:27   Urinalysis   Squamous Epithelial Cells, UA   None Seen    Specific [place], UA 1.020     1.015     >=1.030    Ketones, UA   Negative    Blood, UA Negative     Negative     Trace    Leukocytes, UA Negative     SMALL     Moderate (2+)    Nitrite, UA Negative     Negative     Negative    RBC, UA 0-2      Unable to determine due to loaded field    WBC, UA   11-20    Bacteria, UA Rare      None Seen       Details          This result is from an external source.               Microbiology Results (last 10 days)       Procedure Component Value - Date/Time    COVID-19 and FLU A/B PCR, 1 HR TAT - Swab, Nasopharynx [[id number]]  (Normal) Collected: 07/14/24 1622    Lab Status: Final result Specimen: Swab from Nasopharynx Updated: 07/14/24 1703     COVID19 Not Detected     Influenza A PCR Not Detected     Influenza B PCR Not Detected    Narrative:      Fact sheet for providers: https://www.fda.gov/media/626492/download    Fact sheet for patients: https://www.fda.gov/media/785678/download    Test performed by PCR.            XR Chest 1 View    Result Date: 7/14/2024  PROCEDURE: XR CHEST 1 VW-  HISTORY: Weak/Dizzy/AMS triage protocol, decreased O2 sats. Altered mental status.  COMPARISON: April 29, 2023.  FINDINGS: The heart is stable.. Decreased inspiratory effort noted. There is evidence of old calcified granulomatous disease. Few linear densities are noted bilaterally which are stable. No new area of consolidation seen.. The mediastinum is unremarkable. There is no pneumothorax.  There are no acute osseous abnormalities.      Impression: Stable chest..      This report was signed and finalized on 7/14/2024 4:23 PM by Maer Kwong MD.      CT Head Without Contrast    Result Date: 7/14/2024  PROCEDURE: CT HEAD WO  CONTRAST-  HISTORY: increased AMS, weakness, h/o stroke  COMPARISON: April 29, 2023..  TECHNIQUE: Multiple axial CT images were performed from the foramen magnum to the vertex. Individualized dose reduction techniques using automated exposure control or adjustment of mA and/or kV according to the patient size were employed.  FINDINGS: There is moderate, age-appropriate, stable generalized cerebral atrophy. The ventricles are enlarged. Again noted is the area of encephalomalacia in the left posterior parietal region. Extent is stable from prior exam. High attenuation has developed in the cortex has in the regions of the previous MCA; finding is new compared to the prior exam. Suspect this represents postischemic cortical calcification but hemorrhage is not completely excluded. Recommend brain MRI. There is no evidence of edema or hemorrhage.  No masses are identified. No extra-axial fluid is seen. The paranasal sinuses are unremarkable. Mild small vessel ischemic disease noted.      Impression: New cortical high attenuation material at site of previous left posterior parietal CVA compared to April 2023, suspect postischemic cortical calcification but hemorrhage not completely excluded. Recommend brain MRI.     CTDI: 36.09 mGy DLP:604.53 mGy.cm  This report was signed and finalized on 7/14/2024 4:21 PM by Mare Kwong MD.       Results for orders placed during the hospital encounter of 02/10/23    Adult Transthoracic Echo Complete W/ Cont if Necessary Per Protocol    Interpretation Summary    Left ventricular systolic function is hyperdynamic (EF > 70%). Calculated left ventricular EF = 70.6% Left ventricular ejection fraction appears to be greater than 70%. The left ventricular cavity is small in size. Left ventricular wall thickness is consistent with mild concentric hypertrophy. All left ventricular wall segments contract normally. Left ventricular intracavitary gradient noted to be 71 mmHg. Left ventricular  diastolic function is consistent with (grade I) impaired relaxation. Normal left atrial pressure.    The aortic valve is abnormal in structure. There is mild calcification of the aortic valve mainly affecting the right coronary cusp(s). The aortic valve appears trileaflet. No aortic valve regurgitation is present. Gradient noted through the LV and LVOT    Compared to TTE report from [facility] Dec 2019, hyperdynamic LV with intracavitary gradient is not a new finding but peak gradient noted on this exam is higher than previously described.      Assessment & Plan  Assessment & Plan       * No active hospital problems. *      Summary:  Cheri Cruz is a 83 y.o. 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    Concern for left parietal tiny hemorrhage versus calcification  Old left parietal ischemic stroke  Acute metabolic encephalopathy  Possible UTI  Baseline dementia  Patient with baseline dementia.  Presented to [facility] with cognitive decline and metabolic encephalopathy.  She was hypotensive with systolic in the 90s.  Patient does report urinary symptoms in the form of urgency, frequency and suprapubic tenderness.  UA with too numerous WBCs without bacteriuria.  Will initiate antibiotic therapy with IV Rocephin and repeat the urine analysis and obtain urine culture.  Patient has previous UTI with Enterococcus faecium  CT from [facility] with left parietal tiny hemorrhage versus calcification on top of old ischemic stroke.  Discussed with stroke team and plan to hold Eliquis for now.  Neurology to evaluate  Holding sedatives: Gabapentin, meclizine and lorazepam    Addendum 8:30 PM  Acute hypoxia, improved  ?  Near syncope  Per  patient report, patient became hypoxic this morning with oxygen saturation in the  60s.  At that time, she could not feel pulse and patient became pale.  Quickly recovered after she applied oxygen  Patient is on Eliquis 2.5 mg twice daily for previous history of PE and for A-fib.  With that history mentioned above, I am concerned about PE causing her hypoxia and near syncope  Will proceed with CTA chest.  Benefit outweighs the risk    RADHA on CKD stage III  Dehydration volume depletion  Mild hyperkalemia  IV fluids  Monitor kidney functions with a.m. lab    Type 2 diabetes with uncontrolled hyperglycemia  Check A1c  Insulin glargine and SSI    Essential hypertension  Was hypotensive and [facility].  No blood pressure improved  Not on antihypertensive  medications    Dyslipidemia  Statins    Dementia  Continue donepezil    VTE Prophylaxis:  Mechanical VTE prophylaxis orders are present.          CODE STATUS:    There are no questions and answers to display.       Expected Discharge   Expected Discharge Date: 7/15/2024; Expected Discharge Time:      Otilia Chang MD  24     Electronically signed by Otilia Chang MD at 24          Physician Progress Notes (most recent note)        Emy Diaz DO at 24 1250              [facility]  PROGRESS NOTE    Patient Name: Cheri Cruz  : 1940  MRN: [id number]    Date of Admission: 2024  Primary Care Physician: Lorrie Canada APRN    Subjective   Subjective     CC:  AMS    HPI:  No acute events. States she is ok today. States her symptoms come and go.       Objective   Objective     Vital Signs:   Temp:  [97 °F (36.1 °C)-98.4 °F (36.9 °C)] 98 °F (36.7 °C)  Heart Rate:  [64-74] 67  Resp:  [16-18] 16  BP: (125-171)/(48-69) 168/63  Flow (L/min):  [2] 2     Physical Exam:  Constitutional: frail  HENT: NCAT, mucous membranes moist  Respiratory: Clear to auscultation  bilaterally, respiratory effort normal   Cardiovascular: RRR, no murmurs, rubs, or gallops  Gastrointestinal: Positive bowel sounds, soft, nontender, nondistended  Musculoskeletal: No bilateral ankle edema  Neurologic:  Cranial Nerves grossly intact to confrontation, speech clear      Results Reviewed:  LAB RESULTS:      Lab 07/22/24  0440   WBC 7.35   HEMOGLOBIN 9.7*   HEMATOCRIT 29.8*   PLATELETS 124*   .4*         Lab 07/23/24  0454 07/22/24  0440 07/19/24  0550   SODIUM 144 139 137   POTASSIUM 4.7 4.1 4.7   CHLORIDE 106 101 98   CO2 29.0 28.0 27.0   ANION GAP 9.0 10.0 12.0   BUN 33* 41* 19   CREATININE 1.66* 1.82* 1.19*   EGFR 30.7* 27.5* 45.7*   GLUCOSE 152* 194* 202*   CALCIUM 8.4* 8.0* 9.3                         Lab 07/18/24  1510   PH, ARTERIAL 7.380   PCO2, ARTERIAL 53.1*   PO2 ART 82.4*   FIO2 24   HCO3 ART 31.4*   BASE EXCESS ART 5.2*   CARBOXYHEMOGLOBIN 1.1     Brief Urine Lab Results  (Last result in the past 365 days)        Color   Clarity   Blood   Leuk Est   Nitrite   Protein   CREAT   Urine HCG        07/14/24 2001 Yellow   Cloudy   Trace   Small (1+)   Negative   Negative                   Microbiology Results Abnormal       Procedure Component Value - Date/Time    Blood Culture - Blood, Wrist, Left [[id number]]  (Normal) Collected: 07/14/24 2022    Lab Status: Final result Specimen: Blood from Wrist, Left Updated: 07/19/24 2100     Blood Culture No growth at 5 days    Narrative:      Less than seven (7) mL's of blood was collected.  Insufficient quantity may yield false negative results.    Blood Culture - Blood, Hand, Left [[id number]]  (Normal) Collected: 07/14/24 2022    Lab Status: Final result Specimen: Blood from Hand, Left Updated: 07/19/24 2100     Blood Culture No growth at 5 days    Narrative:      Less than seven (7) mL's of blood was collected.  Insufficient quantity may yield false negative results.    Urine Culture - Urine, Urine, Random Void [[id number]] Collected: 07/14/24  2001    Lab Status: Final result Specimen: Urine, Random Void Updated: 07/16/24 1020     Urine Culture 50,000 CFU/mL Normal Urogenital Nickie    Narrative:      Colonization of the urinary tract without infection is common. Treatment is discouraged unless the patient is symptomatic, pregnant, or undergoing an invasive urologic procedure.            XR Knee 1 or 2 View Right    Result Date: 7/24/2024  XR KNEE 1 OR 2 VW RIGHT Date of Exam: 7/24/2024 9:43 PM EDT Indication: swelling/pain Comparison: None available. Findings: The bones are diffusely osteopenic. No fracture is visualized. There is tricompartment osteoarthritis. There is no significant fluid in the suprapatellar bursa. Atherosclerotic calcification noted     Impression: Impression: 1. No evidence of fracture or acute abnormality 2. Osteoarthritis Electronically Signed: Doyle Wyman  7/24/2024 10:02 PM EDT  Workstation ID: [id number]     Results for orders placed during the hospital encounter of 02/10/23    Adult Transthoracic Echo Complete W/ Cont if Necessary Per Protocol    Interpretation Summary    Left ventricular systolic function is hyperdynamic (EF > 70%). Calculated left ventricular EF = 70.6% Left ventricular ejection fraction appears to be greater than 70%. The left ventricular cavity is small in size. Left ventricular wall thickness is consistent with mild concentric hypertrophy. All left ventricular wall segments contract normally. Left ventricular intracavitary gradient noted to be 71 mmHg. Left ventricular diastolic function is consistent with (grade I) impaired relaxation. Normal left atrial pressure.    The aortic valve is abnormal in structure. There is mild calcification of the aortic valve mainly affecting the right coronary cusp(s). The aortic valve appears trileaflet. No aortic valve regurgitation is present. Gradient noted through the LV and LVOT    Compared to TTE report from [facility] Dec 2019, hyperdynamic LV with intracavitary gradient is  not a new finding but peak gradient noted on this exam is higher than previously described.      Current medications:  Scheduled Meds:apixaban, 5 mg, Oral, BID  atorvastatin, 80 mg, Oral, Nightly  busPIRone, 7.5 mg, Oral, BID  Diclofenac Sodium, 2 g, Topical, 4x Daily  donepezil, 10 mg, Oral, Nightly  DULoxetine, 60 mg, Oral, Daily  fluticasone, 2 spray, Each Nare, Daily  gabapentin, , ,   gabapentin, 300 mg, Oral, Daily  insulin glargine, 30 Units, Subcutaneous, Nightly  insulin lispro, 3-14 Units, Subcutaneous, 4x Daily AC & at Bedtime  Insulin Lispro, 7 Units, Subcutaneous, TID With Meals  levothyroxine, 25 mcg, Oral, Daily  LORazepam, 0.5 mg, Oral, Q12H  meclizine, 25 mg, Oral, TID  mineral oil, 1 enema, Rectal, Once  nitrofurantoin (macrocrystal-monohydrate), 100 mg, Oral, Q24H  nystatin, , Topical, 4x Daily  pantoprazole, 40 mg, Oral, BID AC  senna-docusate sodium, 2 tablet, Oral, Daily   And  polyethylene glycol, 17 g, Oral, Daily  QUEtiapine, 50 mg, Oral, Nightly  sodium chloride, 10 mL, Intravenous, Q12H  sodium chloride, 10 mL, Intravenous, Q12H      Continuous Infusions:   PRN Meds:.  acetaminophen **OR** acetaminophen **OR** acetaminophen    senna-docusate sodium **AND** polyethylene glycol **AND** bisacodyl **AND** bisacodyl    Calcium Replacement - Follow Nurse / BPA Driven Protocol    dextrose    dextrose    gabapentin    glucagon (human recombinant)    HYDROcodone-acetaminophen    Magnesium Standard Dose Replacement - Follow Nurse / BPA Driven Protocol    meclizine    ondansetron ODT **OR** ondansetron    Phosphorus Replacement - Follow Nurse / BPA Driven Protocol    Potassium Replacement - Follow Nurse / BPA Driven Protocol    sodium chloride    sodium chloride    sodium chloride    sodium chloride    Assessment & Plan   Assessment & Plan     Active Hospital Problems    Diagnosis  POA    **AMS (altered mental status) [R41.82]  Yes    Stroke [I63.9]  Yes      Resolved Hospital Problems   No resolved  problems to display.        Brief Hospital Course to date:  Cheri Cruz is a 83 y.o. 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  Initial stroke workup was negative.  MRI showed old left parietal ischemic stroke.  Stroke neurology followed and resumed Eliquis.  She received empiric IV ceftriaxone x 3 days due to concern for UTI.     Altered mental status on advanced dementia  Hypotension   Concern for UTI  - initial stroke workup negative.  MRI showed old L parietal ischemic stroke; pt seen by Stroke team; apixiban restarted.   -Possibly dehydration vs hypoxia  -COVID/flu negative. Blood cultures with no growth to date. LFTs normal. CXR no acute findings.    -CXR with old calcified gr  -S/p empiric IV ceftriaxone x 3 days.   -Held sedatives initially, Okay to resume PRN lorazepam (home med)   -Continue donepezil, Seroquel, BuSpar.       Hand tremors  - distressing to patient. Monitor.      Vertigo  - reports this is common for her  - change meclizine to scheduled     Acute on chronic hypoxemic respiratory failure  Near-syncope  -Per report, patient became hypoxic with oxygen saturation in the 60s  -CTA chest showed no PE, no acute process   -Patient is O2 dependent on 2L NC but is not compliant due her baseline dementia.  -Continue supplemental oxygenation, titrate for goal SpO2 greater than 90%.     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 She was hypotensive with systolic in the 90s at [facility].   -CTH from [facility] with left parietal tiny hemorrhage vs calcification on top of old ischemic stroke.   -MRI showed evolving old left parietal lobe CVA with some  areas of associated cortical laminar necrosis. No hemorrhage.    -Neurology evaluated, recommended to resume Eliquis. Continue statin. Annual follow up in stroke clinic.   -PT/OT now recommending SNF      RADHA on CKD stage III  Volume depletion due to dehydration   Mild hyperkalemia  -Baseline Cr about 1.3, Cr 2.06 on presentation, improved to  1.2.  Then increased to 1.8 and got IV fluids again.    - pt at risk of recurrent dehydration/RADHA.  Will stop hytrin for this reason   - AM labs      Complex disposition  -PT/OT evaluated, felt patient's functional status is at baseline. Recommended home with 24/7 care.  -partner discussed care with both patient's daughters and case management on 7/17.   - Family leaning towards pursuing long-term care after discharge as they are having difficulty taking care of her on their own and do not have finances for full-time caregiver.     Nausea, constipation   -KUB 7/14 with nonobstructive bowel gas pattern. Repeat KUB 7/18 with moderate stool burden.  -Continue bowel regimen. Increased PPI to BID. PRN Zofran for nausea.      Uncontrolled diabetes mellitus type 2 with hyperglycemia  -A1c 9.80% this admission  -Increase Lantus to 30 units qhs, increase  lispro 7u TIDAC and moderate-high dose SSI -- improved   -Resumed home gabapentin at low-dose 300 mg daily.     Chronic anemia  Macrocytosis  -No evidence of overt GI bleeding this admission  -B12 and folate within normal limits  -Further workup of anemia as outpatient as appropriate.      Essential hypertension  -Was hypotensive at [facility].  -Not on antihypertensive medications.  - stable      Hyperlipidemia -Continue atorvastatin.  Hypothyroidism -Continue levothyroxine.  GERD -Continue PPI.     Expected Discharge Location and Transportation: [facility]   Expected Discharge   Expected Discharge Date: 7/25/2024; Expected Discharge Time:      VTE Prophylaxis:  Pharmacologic & mechanical VTE prophylaxis orders are present.          AM-PAC 6 Clicks Score (PT): 8 (24 0800)    CODE STATUS:   Code Status and Medical Interventions:   Ordered at: 07/15/24 1319     Level Of Support Discussed With:    Patient     Code Status (Patient has no pulse and is not breathing):    CPR (Attempt to Resuscitate)     Medical Interventions (Patient has pulse or is breathing):    Full Support       Emy Diaz DO  24        Electronically signed by Emy Diaz DO at 24 1256          Physical Therapy Notes (most recent note)        Courtney Jackson, PT at 24 1030  Version 1 of 1         Patient Name: Cheri Cruz  : 1940    MRN: [id number]                              Today's Date: 2024       Admit Date: 2024    Visit Dx:     ICD-10-CM ICD-9-CM   1. Cognitive communication deficit  R41.841 799.52   2. Disorientation  R41.0 780.99     Patient Active Problem List   Diagnosis    Hyperlipidemia LDL goal <70    Type 2 diabetes mellitus without complication, without long-term current use of insulin    GERD (gastroesophageal reflux disease)    Essential hypertension    Abnormal EKG    Osteoarthritis    Anxiety    Palpitations    Vertigo    History of ischemic left MCA stroke    Hemorrhagic stroke    History of pulmonary embolus (PE)    Confusion    Hypotension    Constipation    UTI (urinary tract infection) due to urinary indwelling catheter    Metabolic encephalopathy    Symptomatic anemia    Acquired hypothyroidism    AMS (altered mental status)    Stroke     Past Medical History:   Diagnosis Date    Abnormal heart rhythm     Anxiety     Arrhythmia     Arthritis     Atrial fibrillation     Dementia     Diabetes mellitus     Hyperlipidemia     Hypertension     Kidney disorder     Stroke     Uterus cancer      Past Surgical History:   Procedure Laterality Date    CATARACT EXTRACTION, BILATERAL        General Information       Row Name 24 1100          Physical Therapy Time and Intention    Document Type  therapy note (daily note)  -KR     Mode of Treatment co-treatment;physical therapy  -KR       Row Name 07/23/24 1100          General Information    Patient Profile Reviewed yes  -KR     Existing Precautions/Restrictions fall;other (see comments)  BUE tremors R>L, R sided hypertonia, non-ambulatory at baseline, diplopia, [place]  -KR     Barriers to Rehab medically complex;previous functional deficit;cognitive status;visual deficit;hearing deficit;ineffective coping  -KR       Row Name 07/23/24 1100          Cognition    Orientation Status (Cognition) oriented to;person;verbal cues/prompts needed for orientation;place  -KR       Row Name 07/23/24 1100          Safety Issues, Functional Mobility    Safety Issues Affecting Function (Mobility) awareness of need for assistance;insight into deficits/self-awareness;judgment;problem-solving;safety precaution awareness;sequencing abilities;safety precautions follow-through/compliance  -KR     Impairments Affecting Function (Mobility) balance;cognition;endurance/activity tolerance;pain;postural/trunk control;range of motion (ROM);strength;muscle tone abnormal  -KR     Cognitive Impairments, Mobility Safety/Performance attention;awareness, need for assistance;insight into deficits/self-awareness;judgment;problem-solving/reasoning;sequencing abilities;safety precaution follow-through;safety precaution awareness  -KR               User Key  (r) = Recorded By, (t) = Taken By, (c) = Cosigned By      Initials Name Provider Type    KR Courtney Jackson PT Physical Therapist                   Mobility       Row Name 07/23/24 1102          Bed Mobility    Bed Mobility rolling right;rolling left  -KR     Rolling Left [place] (Bed Mobility) minimum assist (75% patient effort);1 person assist  -KR     Rolling Right [place] (Bed Mobility) minimum assist (75% patient effort);1 person assist  -KR     Assistive Device (Bed Mobility) draw sheet;bed rails  -KR     Comment, (Bed Mobility)  cues for hand placement and sequencing  -KR       Row Name 07/23/24 1102          Bed-Chair Transfer    Bed-Chair [place] (Transfers) dependent (less than 25% patient effort);2 person assist;verbal cues  -KR     Assistive Device (Bed-Chair Transfers) lift device  -KR       Row Name 07/23/24 1102          Sit-Stand Transfer    Sit-Stand [place] (Transfers) 2 person assist;dependent (less than 25% patient effort)  -KR     Assistive Device (Sit-Stand Transfers) other (see comments)  BUE support  -KR     Comment, (Sit-Stand Transfer) 1x from chair. Pt unable to tolerate standing > 5 seconds d/ tB knee and ankle pain.  -KR       Row Name 07/23/24 1102          Gait/Stairs (Locomotion)    [place] Level (Gait) not tested  -KR               User Key  (r) = Recorded By, (t) = Taken By, (c) = Cosigned By      Initials Name Provider Type    Courtney Dennis PT Physical Therapist                   Obj/Interventions       [facility] Name 07/23/24 1105          Balance    Balance Assessment sitting static balance;sitting dynamic balance;standing static balance  -KR     Static Sitting Balance moderate assist  -KR     Dynamic Sitting Balance maximum assist  -KR     Position, Sitting Balance unsupported;sitting in chair  -KR     Static Standing Balance dependent;2-person assist  -KR     Position/Device Used, Standing Balance supported;other (see comments)  BUE support  -KR     Balance Interventions sitting;standing;sit to stand;supported;static  -KR               User Key  (r) = Recorded By, (t) = Taken By, (c) = Cosigned By      Initials Name Provider Type    Courtney Dennis PT Physical Therapist                   Goals/Plan    No documentation.                  Clinical Impression       [facility] Name 07/23/24 1105          Pain Scale: FACES Pre/Post-Treatment    Pain: FACES Scale, Pretreatment 0-->no hurt  -KR     Posttreatment Pain Rating 0-->no hurt  -KR     Pre/Posttreatment Pain Comment no pain at baseline, pain with  weight bearing in B ankles and knees  -KR       Row Name 07/23/24 1105          Plan of Care Review    Plan of Care Reviewed With patient  -KR     Progress improving  -KR     Outcome Evaluation Pt required less assist for bed mobility this date. Pt able to sit EOC with BLE in WB for ~5 minutes with modA. Pt will continue to benefit from PT to address ongoing strength, balance, and endurance deficits. Rec SNF transition to LTC upon dc.  -KR       Row Name 07/23/24 1105          Vital Signs    Pre Patient Position Supine  -KR     Intra Patient Position Standing  -KR     Post Patient Position Sitting  -KR       Row Name 07/23/24 1105          Positioning and Restraints    Pre-Treatment Position in bed  -KR     Post Treatment Position chair  -KR     In Chair notified nsg;reclined;call light within reach;encouraged to call for assist;exit alarm on;waffle cushion;on mechanical lift sling;legs elevated;heels elevated;LUE elevated;RUE elevated  -KR               User Key  (r) = Recorded By, (t) = Taken By, (c) = Cosigned By      Initials Name Provider Type    KR Courtney Jackson, BILL Physical Therapist                   Outcome Measures       Row Name 07/23/24 1107 07/23/24 0849       How much help from another person do you currently need...    Turning from your back to your side while in flat bed without using bedrails? 3  -KR 2  -LM    Moving from lying on back to sitting on the side of a flat bed without bedrails? 2  -KR 2  -LM    Moving to and from a bed to a chair (including a wheelchair)? 1  -KR 1  -LM    Standing up from a chair using your arms (e.g., wheelchair, bedside chair)? 1  -KR 1  -LM    Climbing 3-5 steps with a railing? 1  -KR 1  -LM    To walk in hospital room? 1  -KR 1  -LM    AM-PAC 6 Clicks Score (PT) 9  -KR 8  -LM    Highest Level of Mobility Goal 3 --> Sit at edge of bed  -KR 3 --> Sit at edge of bed  -LM              User Key  (r) = Recorded By, (t) = Taken By, (c) = Cosigned By      Initials Name  Provider Type    Joleen Wolfe RN Registered Nurse    Courtney Dennis, PT Physical Therapist                                 Physical Therapy Education       Title: PT OT SLP Therapies (In Progress)       Topic: Physical Therapy (In Progress)       Point: Mobility training (In Progress)       Learning Progress Summary             Patient Acceptance, E, NR by KR at 7/23/2024 1107    Eager, E, VU,DU,NR by SS at 7/20/2024 1526    Comment: Educated safety/technique w/bed mobility, transfers, HEP, PT POC    Acceptance, E, VU,DU by ANUEL at 7/15/2024 0955   Family Eager, E, VU,DU,NR by SS at 7/20/2024 1526    Comment: Educated safety/technique w/bed mobility, transfers, HEP, PT POC                         Point: Home exercise program (Done)       Learning Progress Summary             Patient Eager, E, VU,DU,NR by SS at 7/20/2024 1526    Comment: Educated safety/technique w/bed mobility, transfers, HEP, PT POC   Family Eager, E, VU,DU,NR by SS at 7/20/2024 1526    Comment: Educated safety/technique w/bed mobility, transfers, HEP, PT POC                         Point: Body mechanics (In Progress)       Learning Progress Summary             Patient Acceptance, E, NR by KR at 7/23/2024 1107    Eager, E, VU,DU,NR by SS at 7/20/2024 1526    Comment: Educated safety/technique w/bed mobility, transfers, HEP, PT POC    Acceptance, E, VU,DU by ANUEL at 7/15/2024 0955   Family Eager, E, VU,DU,NR by SS at 7/20/2024 1526    Comment: Educated safety/technique w/bed mobility, transfers, HEP, PT POC                         Point: Precautions (In Progress)       Learning Progress Summary             Patient Acceptance, E, NR by KR at 7/23/2024 1107    Eager, E, VU,DU,NR by SS at 7/20/2024 1526    Comment: Educated safety/technique w/bed mobility, transfers, HEP, PT POC    Acceptance, E, VU,DU by ANUEL at 7/15/2024 0955   Family Eager, E, VU,DU,NR by SS at 7/20/2024 1526    Comment: Educated safety/technique w/bed mobility, transfers,  HEP, PT POC                                         User Key       Initials Effective Dates Name Provider Type Discipline    SS 21 -  Freda Leonard, PT Physical Therapist PT    KR 22 -  Courtney Jackson, PT Physical Therapist PT    ANUEL 24 -  Cate Joshi, BILL Student PT Student PT                  PT Recommendation and Plan     Plan of Care Reviewed With: patient  Progress: improving  Outcome Evaluation: Pt required less assist for bed mobility this date. Pt able to sit EOC with BLE in WB for ~5 minutes with modA. Pt will continue to benefit from PT to address ongoing strength, balance, and endurance deficits. Rec SNF transition to LTC upon dc.     Time Calculation:         PT Charges       Row Name 24 1108             Time Calculation    Start Time 1030  -KR      PT Received On 24  -KR         Timed Charges    70297 - PT Therapeutic Activity Minutes 10  -KR         Total Minutes    Timed Charges Total Minutes 10  -KR       Total Minutes 10  -KR                User Key  (r) = Recorded By, (t) = Taken By, (c) = Cosigned By      Initials Name Provider Type    KR Courtney Jackson, PT Physical Therapist                  Therapy Charges for Today       Code Description Service Date Service Provider Modifiers Qty    [id number]  PT THERAPEUTIC ACT EA 15 MIN 2024 Courtney Jackson, PT GP 1            PT G-Codes  Outcome Measure Options: AM-PAC 6 Clicks Basic Mobility (PT)  AM-PAC 6 Clicks Score (PT): 9  AM-PAC 6 Clicks Score (OT): 12  Modified Steven Scale: 5 - Severe disability.  Bedridden, incontinent, and requiring constant nursing care and attention.  PT Discharge Summary  Anticipated Discharge Disposition (PT): skilled nursing facility    Courtney Jackson PT  2024      Electronically signed by Courtney Jackson, PT at 24 1108          Occupational Therapy Notes (most recent note)        Karlo Rai, OT at 24 1025          Patient Name: Cheri Ej Cruz  :  1940    MRN: [id number]                              Today's Date: 7/23/2024       Admit Date: 7/14/2024    Visit Dx:     ICD-10-CM ICD-9-CM   1. Cognitive communication deficit  R41.841 799.52   2. Disorientation  R41.0 780.99     Patient Active Problem List   Diagnosis    Hyperlipidemia LDL goal <70    Type 2 diabetes mellitus without complication, without long-term current use of insulin    GERD (gastroesophageal reflux disease)    Essential hypertension    Abnormal EKG    Osteoarthritis    Anxiety    Palpitations    Vertigo    History of ischemic left MCA stroke    Hemorrhagic stroke    History of pulmonary embolus (PE)    Confusion    Hypotension    Constipation    UTI (urinary tract infection) due to urinary indwelling catheter    Metabolic encephalopathy    Symptomatic anemia    Acquired hypothyroidism    AMS (altered mental status)    Stroke     Past Medical History:   Diagnosis Date    Abnormal heart rhythm     Anxiety     Arrhythmia     Arthritis     Atrial fibrillation     Dementia     Diabetes mellitus     Hyperlipidemia     Hypertension     Kidney disorder     Stroke     Uterus cancer      Past Surgical History:   Procedure Laterality Date    CATARACT EXTRACTION, BILATERAL        General Information       Row Name 07/23/24 1116          OT Time and Intention    Document Type therapy note (daily note)  -CS     Mode of Treatment occupational therapy;co-treatment  -CS       Row Name 07/23/24 1116          General Information    Patient Profile Reviewed yes  -CS     Existing Precautions/Restrictions fall;other (see comments)  BUE tremors R>L, R sided hypertonia, non-ambulatory at baseline, diplopia, [place]  -CS     Barriers to Rehab medically complex;previous functional deficit;cognitive status;hearing deficit;visual deficit;ineffective coping  -CS       Row Name 07/23/24 1116          Cognition    Orientation Status (Cognition) oriented to;person;verbal cues/prompts needed for  orientation;place;disoriented to;situation;time  -       Row Name 07/23/24 1116          Safety Issues, Functional Mobility    Safety Issues Affecting Function (Mobility) awareness of need for assistance;insight into deficits/self-awareness;judgment;problem-solving;safety precaution awareness;safety precautions follow-through/compliance;sequencing abilities  -CS     Impairments Affecting Function (Mobility) balance;cognition;endurance/activity tolerance;pain;postural/trunk control;range of motion (ROM);strength;muscle tone abnormal;motor planning  -CS     Cognitive Impairments, Mobility Safety/Performance awareness, need for assistance;insight into deficits/self-awareness;problem-solving/reasoning;safety precaution awareness;safety precaution follow-through;sequencing abilities;judgment  -CS               User Key  (r) = Recorded By, (t) = Taken By, (c) = Cosigned By      Initials Name Provider Type     Karlo Rai OT Occupational Therapist                     Mobility/ADL's       Row Name 07/23/24 1117          Bed Mobility    Bed Mobility supine-sit;scooting/bridging  -     Rolling Left [place] (Bed Mobility) minimum assist (75% patient effort);1 person assist  -CS     Rolling Right [place] (Bed Mobility) minimum assist (75% patient effort);1 person assist  -CS     Scooting/Bridging [place] (Bed Mobility) maximum assist (25% patient effort);2 person assist;verbal cues;other (see comments);nonverbal cues (demo/gesture)  -     Assistive Device (Bed Mobility) draw sheet;bed rails  -       Row Name 07/23/24 1117          Transfers    Transfers bed-chair transfer  -     Comment, (Transfers) BUE support and Dep assist x 2 for STS attempt, unable to sustain any standing posture due to pain and weakness  -       Row Name 07/23/24 1117          Bed-Chair Transfer    Bed-Chair [place] (Transfers) dependent (less than 25% patient effort);2 person assist;verbal cues  -       Row Name  07/23/24 1117          Functional Mobility    Patient was able to Ambulate no, other medical factors prevent ambulation  -CS       Row Name 07/23/24 1117          Activities of Daily Living    BADL Assessment/Intervention upper body dressing;lower body dressing;grooming;toileting  -CS       Row Name 07/23/24 1117          Upper Body Dressing Assessment/Training    [place] Level (Upper Body Dressing) don;pajama/robe;moderate assist (50% patient effort)  -CS     Position (Upper Body Dressing) edge of bed sitting  -CS       Row Name 07/23/24 1117          Lower Body Dressing Assessment/Training    [place] Level (Lower Body Dressing) doff;don;socks;dependent (less than 25% patient effort);other (see comments)  -CS     Position (Lower Body Dressing) supine  -CS       Row Name 07/23/24 1117          Grooming Assessment/Training    [place] Level (Grooming) wash face, hands;supervision;set up;verbal cues  -CS     Position (Grooming) supported sitting  -CS       Row Name 07/23/24 1117          Self-Feeding Assessment/Training    [place] Level (Feeding) feeding skills;other (see comments)  -CS     Assistive Devices (Feeding) adapted cup  -CS     Comment, (Feeding) continues to utilize AE for feeding  -CS       Row Name 07/23/24 1117          Toileting Assessment/Training    [place] Level (Toileting) adjust/manage clothing;other (see comments);dependent (less than 25% patient effort)  -CS               User Key  (r) = Recorded By, (t) = Taken By, (c) = Cosigned By      Initials Name Provider Type    CS Karlo Rai OT Occupational Therapist                   Obj/Interventions       Row Name 07/23/24 1127          Shoulder (Therapeutic Exercise)    Shoulder (Therapeutic Exercise) AROM (active range of motion)  -     Shoulder AROM (Therapeutic Exercise) bilateral;scapular elevation;scapular retraction;2 sets;5 repetitions  -CS       Row Name 07/23/24 1127          Elbow/Forearm (Therapeutic  Exercise)    Elbow/Forearm (Therapeutic Exercise) AROM (active range of motion)  -     Elbow/Forearm AROM (Therapeutic Exercise) bilateral;extension;flexion;5 repetitions  -       Row Name 07/23/24 1127          Balance    Balance Assessment sitting static balance;sitting dynamic balance;standing static balance  -CS     Static Sitting Balance moderate assist  -CS     Dynamic Sitting Balance maximum assist  -CS     Position, Sitting Balance unsupported;sitting edge of bed  -     Static Standing Balance dependent  -CS     Balance Interventions sitting;standing;sit to stand;occupation based/functional task  -               User Key  (r) = Recorded By, (t) = Taken By, (c) = Cosigned By      Initials Name Provider Type     Karlo Rai, OT Occupational Therapist                   Goals/Plan    No documentation.                  Clinical Impression       [facility] Name 07/23/24 1128          Pain Assessment    Additional Documentation Pain Scale: FACES Pre/Post-Treatment (Group)  -[facility] Name 07/23/24 1128          Pain Scale: FACES Pre/Post-Treatment    Pain: FACES Scale, Pretreatment 0-->no hurt  -CS     Posttreatment Pain Rating 0-->no hurt  -CS       [facility] Name 07/23/24 1128          Plan of Care Review    Plan of Care Reviewed With patient  -CS     Progress no change  -     Outcome Evaluation Pt had met bed mobility goal progressing to [place] for rolling in bed, lifted to recliner, attempted STS but unable to bear weight through legs due to pain and weakness, recommend continued use of sling only for transfers. Engaged in BUE AROM, reeducated on benefits of feeding AE use, Will cont IPOT per POC as tolerated.  -       Row Name 07/23/24 1128          Vital Signs    Pre Systolic BP Rehab --  RN cleared for tx  -CS     O2 Delivery Pre Treatment room air  -CS     O2 Delivery Intra Treatment room air  -CS     O2 Delivery Post Treatment room air  -CS     Pre Patient Position Supine  -CS     Intra Patient  Position Standing  -CS     Post Patient Position Sitting  -CS       Row Name 07/23/24 1128          Positioning and Restraints    Pre-Treatment Position in bed  -CS     Post Treatment Position chair  -CS     In Chair notified nsg;reclined;sitting;call light within reach;encouraged to call for assist;legs elevated;waffle cushion;on mechanical lift sling  -CS               User Key  (r) = Recorded By, (t) = Taken By, (c) = Cosigned By      Initials Name Provider Type    CS Karlo Rai OT Occupational Therapist                   Outcome Measures       Row Name 07/23/24 1107 07/23/24 0849       How much help from another person do you currently need...    Turning from your back to your side while in flat bed without using bedrails? 3  -KR 2  -LM    Moving from lying on back to sitting on the side of a flat bed without bedrails? 2  -KR 2  -LM    Moving to and from a bed to a chair (including a wheelchair)? 1  -KR 1  -LM    Standing up from a chair using your arms (e.g., wheelchair, bedside chair)? 1  -KR 1  -LM    Climbing 3-5 steps with a railing? 1  -KR 1  -LM    To walk in hospital room? 1  -KR 1  -LM    AM-PAC 6 Clicks Score (PT) 9  -KR 8  -LM    Highest Level of Mobility Goal 3 --> Sit at edge of bed  -KR 3 --> Sit at edge of bed  -LM              User Key  (r) = Recorded By, (t) = Taken By, (c) = Cosigned By      Initials Name Provider Type    LM Joleen Dickens RN Registered Nurse    Courtney Dennis PT Physical Therapist                    Occupational Therapy Education       Title: PT OT SLP Therapies (In Progress)       Topic: Occupational Therapy (In Progress)       Point: ADL training (In Progress)       Description:   Instruct learner(s) on proper safety adaptation and remediation techniques during self care or transfers.   Instruct in proper use of assistive devices.                  Learning Progress Summary             Patient Acceptance, E,D, NR by PRAKASH at 7/20/2024 1020    Acceptance, E,D,  VU,DU by BELINDA at 7/15/2024 0936   Family Acceptance, E,D, NR by PRAKASH at 7/20/2024 1020                         Point: Home exercise program (In Progress)       Description:   Instruct learner(s) on appropriate technique for monitoring, assisting and/or progressing therapeutic exercises/activities.                  Learning Progress Summary             Patient Acceptance, E,D, NR by PRAKASH at 7/20/2024 1020    Acceptance, E,D, VU,DU by BELINDA at 7/15/2024 0936   Family Acceptance, E,D, NR by PRAKASH at 7/20/2024 1020                         Point: Precautions (In Progress)       Description:   Instruct learner(s) on prescribed precautions during self-care and functional transfers.                  Learning Progress Summary             Patient Acceptance, E,D, NR by PRAKASH at 7/20/2024 1020    Acceptance, E,D, VU,DU by BELINDA at 7/15/2024 0936   Family Acceptance, E,D, NR by PRAKASH at 7/20/2024 1020                         Point: Body mechanics (In Progress)       Description:   Instruct learner(s) on proper positioning and spine alignment during self-care, functional mobility activities and/or exercises.                  Learning Progress Summary             Patient Acceptance, E,D, NR by PRAKASH at 7/20/2024 1020    Acceptance, E,D, VU,DU by BELINDA at 7/15/2024 0936   Family Acceptance, E,D, NR by PRAKASH at 7/20/2024 1020                                         User Key       Initials Effective Dates Name Provider Type Discipline     06/16/21 -  Mirela Winston OT Occupational Therapist OT     06/16/21 -  Karlo Rai OT Occupational Therapist OT                  OT Recommendation and Plan  Planned Therapy Interventions (OT): activity tolerance training, functional balance retraining, occupation/activity based interventions, ROM/therapeutic exercise, strengthening exercise, transfer/mobility retraining, patient/caregiver education/training, neuromuscular control/coordination retraining, adaptive equipment training  Therapy Frequency (OT): 3  times/wk  Plan of Care Review  Plan of Care Reviewed With: patient  Progress: no change  Outcome Evaluation: Pt had met bed mobility goal progressing to [place] for rolling in bed, lifted to recliner, attempted STS but unable to bear weight through legs due to pain and weakness, recommend continued use of sling only for transfers. Engaged in BUE AROM, reeducated on benefits of feeding AE use, Will cont IPOT per POC as tolerated.     Time Calculation:   Evaluation Complexity (OT)  Review Occupational Profile/Medical/Therapy History Complexity: expanded/moderate complexity  Assessment, Occupational Performance/Identification of Deficit Complexity: 3-5 performance deficits  Overall Complexity of Evaluation (OT): moderate complexity     Time Calculation- OT       Row Name 24 1133             Time Calculation- OT    OT Start Time 1025  -CS      OT Received On 24  -CS      OT Goal Re-Cert Due Date 24  -CS         Timed Charges    07703 - OT Therapeutic Exercise Minutes 4  -CS      11024 - OT Therapeutic Activity Minutes 6  -CS         Total Minutes    Timed Charges Total Minutes 10  -CS       Total Minutes 10  -CS                User Key  (r) = Recorded By, (t) = Taken By, (c) = Cosigned By      Initials Name Provider Type    CS Karlo Rai OT Occupational Therapist                  Therapy Charges for Today       Code Description Service Date Service Provider Modifiers Qty    75323481015 HC OT THERAPEUTIC ACT EA 15 MIN 2024 Karlo Rai OT GO 1                 Karlo Rai OT  2024    Electronically signed by Karlo Rai OT at 24 1136          Speech Language Pathology Notes (most recent note)        Nabil Madrid MS CCC-SLP at 24 1608          Acute Care - Speech Language Pathology Treatment Note  [facility]     Patient Name: Cheri Cruz  : 1940  MRN: [id number]  Today's Date: 2024               Admit Date: 2024     Visit Dx:    ICD-10-CM  ICD-9-CM   1. Cognitive communication deficit  R41.841 799.52   2. Disorientation  R41.0 780.99     Patient Active Problem List   Diagnosis    Hyperlipidemia LDL goal <70    Type 2 diabetes mellitus without complication, without long-term current use of insulin    GERD (gastroesophageal reflux disease)    Essential hypertension    Abnormal EKG    Osteoarthritis    Anxiety    Palpitations    Vertigo    History of ischemic left MCA stroke    Hemorrhagic stroke    History of pulmonary embolus (PE)    Confusion    Hypotension    Constipation    UTI (urinary tract infection) due to urinary indwelling catheter    Metabolic encephalopathy    Symptomatic anemia    Acquired hypothyroidism    AMS (altered mental status)    Stroke     Past Medical History:   Diagnosis Date    Abnormal heart rhythm     Anxiety     Arrhythmia     Arthritis     Atrial fibrillation     Dementia     Diabetes mellitus     Hyperlipidemia     Hypertension     Kidney disorder     Stroke     Uterus cancer      Past Surgical History:   Procedure Laterality Date    CATARACT EXTRACTION, BILATERAL         SLP Recommendation and Plan        Anticipated Discharge Disposition (SLP): home with 24/7 care (07/25/24 1500)        Therapy Frequency (SLP SLC): 3 days per week (07/25/24 1500)  Predicted Duration Therapy Intervention (Days): 1 week (07/25/24 1500)        Daily Summary of Progress (SLP): progress towards functional goals is fair (07/25/24 1500)           Treatment Assessment (SLP): continued, cognitive-linguistic disorder (07/25/24 1500)  Treatment Assessment Comments (SLP): Pt seen for tx, no family present. (07/25/24 1500)  Plan for Continued Treatment (SLP): continue treatment per plan of care (07/25/24 1500)  Plan of Care Reviewed With: patient (07/25/24 1608)  Progress: no change (07/25/24 1608)      SLP EVALUATION (Last 72 Hours)       SLP SLC Evaluation       Row Name 07/25/24 1500 07/24/24 6980 07/22/24 1640             Communication  "Assessment/Intervention    Document Type therapy note (daily note)  -RS therapy note (daily note)  -CJ therapy note (daily note)  -      Subjective Information no complaints  -RS no complaints  -CJ no complaints  -      Patient Observations lethargic;cooperative;agree to therapy  -RS alert;cooperative;agree to therapy  -CJ alert;cooperative  -      Patient/Family/Caregiver Comments/Observations no family present  -RS no family present  -CJ No family present  -      Patient Effort good  -RS good  - good  -      Symptoms Noted During/After Treatment fatigue  -RS fatigue  -CJ none  -      Oral Care oral rinse provided  -RS -- --         Pain    Additional Documentation Pain Scale: FACES Pre/Post-Treatment (Group)  -RS -- Pain Scale: FACES Pre/Post-Treatment (Group)  -         Pain Scale: FACES Pre/Post-Treatment    Pain: FACES Scale, Pretreatment 2-->hurts little bit  -RS 0-->no hurt  -CJ 0-->no hurt  -      Posttreatment Pain Rating 2-->hurts little bit  -RS 0-->no hurt  -CJ 0-->no hurt  -      Pre/Posttreatment Pain Comment pt reports discomfort in her back  -RS -- --         SLP Treatment Clinical Impressions    Treatment Assessment (SLP) continued;cognitive-linguistic disorder  -RS continued;cognitive-linguistic disorder  -CJ continued;cognitive-linguistic disorder  -      Treatment Assessment Comments (SLP) Pt seen for tx, no family present.  -RS Pt seen for tx this date, difficult for pt to participate 2/2 pt stating \"not feeling well and sick all over\". Addressed all tx goals and provided support. Will continue to address deficits as needed  -CJ Cont w/ cognitive lingusitic deficits, suspect hearing deficits and lethargy contributing to overall severity of deficits. SLP will cont to f/u as appropriate  -      Daily Summary of Progress (SLP) progress towards functional goals is fair  -RS progress toward functional goals is gradual  - progress toward functional goals as expected  -      " Barriers to Overall Progress (SLP) Lethargy  -RS Lethargy  -CJ --      Plan for Continued Treatment (SLP) continue treatment per plan of care  -RS continue treatment per plan of care  - continue treatment per plan of care  -      Care Plan Review evaluation/treatment results reviewed  -RS care plan/treatment goals reviewed  -CJ care plan/treatment goals reviewed  -         Recommendations    Therapy Frequency (SLP SLC) 3 days per week  -RS 3 days per week  -CJ 3 days per week  -      Predicted Duration Therapy Intervention (Days) 1 week  -RS 1 week  -CJ 1 week  -      Anticipated Discharge Disposition (SLP) home with 24/7 care  -RS home with 24/7 care  -CJ home with 24/7 [facility]  -                User Key  (r) = Recorded By, (t) = Taken By, (c) = Cosigned By      Initials Name Effective Dates    CJ Dipika Up, MS CCC-SLP 06/03/24 -     MH Jazlyn Rivas, MS CCC-SLP 05/12/23 -     RS Nabil Madrid MS CCC-SLP 09/14/23 -                        EDUCATION  The patient has been educated in the following areas:     Cognitive Impairment.           SLP GOALS       Row Name 07/25/24 1500 07/24/24 1550 07/22/24 1640       Patient will demonstrate functional cognitive-linguistic skills for return to discharge environment    [place] with moderate cues  -RS with moderate cues  - with moderate cues  -    Time frame 1 week  -RS 1 week  -CJ 1 week  -    Progress/Outcomes continuing progress toward goal  -RS continuing progress toward goal  -CJ continuing progress toward goal  -       SLP Diagnostic Treatment     Patient will participate in further assessment in the following areas -- -- cognitive-linguistic;clarification of baseline cognitive communication status  -    Time Frame (Diagnostic) -- -- 1 week  -    Progress/Outcomes (Additional Goal 1, SLP) -- -- goal met  -       Comprehend Questions Goal 1 (SLP)    Improve Ability to Comprehend Questions Goal 1 (SLP) simple yes/no questions;complex  "yes/no questions;simple wh questions;simple general questions;80%;with minimal cues (75-90%)  -RS simple yes/no questions;complex yes/no questions;simple wh questions;simple general questions;80%;with minimal cues (75-90%)  -CJ simple yes/no questions;complex yes/no questions;simple wh questions;simple general questions;80%;with minimal cues (75-90%)  -    Time Frame (Comprehend Questions Goal 1, SLP) 1 week  -RS 1 week  -CJ 1 week  -MH    Progress (Ability to Comprehend Questions Goal 1, SLP) 70%;with minimal cues (75-90%)  -RS 70%;with minimal cues (75-90%)  -CJ 60%;with minimal cues (75-90%)  -    Progress/Outcomes (Comprehend Questions Goal 1, SLP) continuing progress toward goal  -RS continuing progress toward goal  -CJ continuing progress toward goal  -MH    Comment (Comprehend Questions Goal 1, SLP) -- simple but perseverated on not feeling well during session  -CJ Simple Y/N, suspect hearing deficits impacting as well  -       Follow Directions Goal 2 (SLP)    Improve Ability to Follow Directions Goal 1 (SLP) 2 step commands;80%;with moderate cues (50-74%)  -RS 2 step commands;80%;with moderate cues (50-74%)  -CJ 2 step commands;80%;with moderate cues (50-74%)  -    Time Frame (Follow Directions Goal 1, SLP) 1 week  -RS 1 week  -CJ 1 week  -    Progress (Ability to Follow Directions Goal 1, SLP) 60%;with minimal cues (75-90%);with moderate cues (50-74%)  -RS 50%;with minimal cues (75-90%)  -CJ 70%;with minimal cues (75-90%)  -    Progress/Outcomes (Follow Directions Goal 1, SLP) progress slower than expected  -RS progress slower than expected  -CJ continuing progress toward goal  -    Comment (Follow Directions Goal 1, SLP) Pt only intermittently able to follow 1/2 directions  -RS suspect difficulty to complete 2/2 unable to distract from \"being sick\"  -CJ --       Word Retrieval Skills Goal 1 (SLP)    Improve Word Retrieval Skills By Goal 1 (SLP) confrontational naming task;high " frequency;responsive naming task;simple;completing open ended structured sentence;answer WH question with one word;completing a divergent task;completing a convergent task;80%;with minimal cues (75-90%)  -RS confrontational naming task;high frequency;responsive naming task;simple;completing open ended structured sentence;answer WH question with one word;completing a divergent task;completing a convergent task;80%;with minimal cues (75-90%)  -CJ confrontational naming task;high frequency;responsive naming task;simple;completing open ended structured sentence;answer WH question with one word;completing a divergent task;completing a convergent task;80%;with minimal cues (75-90%)  -    Time Frame (Word Retrieval Goal 1, SLP) 1 week  -RS 1 week  -CJ 1 week  -    Progress (Word Retrieval Skills Goal 1, SLP) 50%;with minimal cues (75-90%)  -RS 50%;with moderate cues (50-74%)  -CJ --    Progress/Outcomes (Word Retrieval Goal 1, SLP) progress slower than expected  -RS continuing progress toward goal  -CJ new goal  -    Comment (Word Retrieval Goal 1, SLP) -- wh questions and naming during contextual tasks in room 2/2 difficult to redirect  -CJ --              User Key  (r) = Recorded By, (t) = Taken By, (c) = Cosigned By      Initials Name Provider Type    Dipika Alvarado, MS CCC-SLP Speech and Language Pathologist     Jazlyn Rivas, MS CCC-SLP Speech and Language Pathologist    Nabil Bowman, MS CCC-SLP Speech and Language Pathologist                              Time Calculation:      Time Calculation- SLP       Row Name 07/25/24 1605             Time Calculation- SLP    SLP Start Time 1530  -RS      SLP Received On 07/25/24  -RS         Untimed Charges    84804-HX Treatment/ST Modification Prosth Aug Alter  39  -RS         Total Minutes    Untimed Charges Total Minutes 39  -RS       Total Minutes 39  -RS                User Key  (r) = Recorded By, (t) = Taken By, (c) = Cosigned By      Initials Name  Provider Type    RS Nabil Madrdi MS CCC-SLP Speech and Language Pathologist                    Therapy Charges for Today       Code Description Service Date Service Provider Modifiers Qty    [id number] [facility] TREATMENT SPEECH 3 7/25/2024 Nabil Madrid MS CCC-SLP GN 1                       MS ROBERT Bowen  7/25/2024    Electronically signed by Nabil Madrid MS CCC-SLP at 07/25/24 8168

## 2024-07-26 NOTE — DISCHARGE PLACEMENT REQUEST
"Deya Cruz Cro (83 y.o. Female)     CM JAMMIE Oliver RN        Expedited Family Appeal Case Number [id number]      [phone]        Date of Birth   1940    Social Security Number       Address   [address] [facility] [address]    Home Phone   [phone]    MRN   [id number]       Mu-ism   [facility] [place]    Marital Status                               Admission Date   7/14/24    Admission Type   Urgent    Admitting Provider   Emy Diaz DO    Attending Provider   Emy Diaz DO    Department, Room/Bed   [facility] 3F, S304/1       Discharge Date       Discharge Disposition       Discharge Destination                                 Attending Provider: Emy Diaz DO    Allergies: Penicillins, Sulfa Antibiotics, Tetracyclines & Related, Valium [Diazepam]    Isolation: None   Infection: None   Code Status: CPR    Ht: 170.2 cm (67\")   Wt: 93.1 kg (205 lb 4 oz)    Admission Cmt: None   Principal Problem: AMS (altered mental status) [R41.82]                   Active Insurance as of 7/14/2024       Primary Coverage       Payor Plan Insurance Group Employer/Plan Group    HUMANA MEDICARE REPLACEMENT HUMANA MED ADV HMO [id number]       Payor Plan Address Payor Plan Phone Number Payor Plan Fax Number Effective Dates    [address] [address] [phone]  2/1/2023 - None Entered    [facility] [id number]         Subscriber Name Subscriber Birth Date Member ID       DEYA CRUZ CRO 1940 [id number]                     Emergency Contacts        (Rel.) Home Phone Work Phone Mobile Phone    CruzRadha (Daughter) [phone] -- [phone]    EVITA MADDEN (Daughter) [phone] -- --    JODYOMEGA (Son) [phone] -- [phone]    Vanessa Cruz (Spouse) [phone] -- --    Shayne Madden (Other) [phone] -- --                 History & Physical        Otilia Chang MD at 07/14/24 1917              [facility] " Services  HISTORY AND PHYSICAL    Patient Name: Cheri Cruz  : 1940  MRN: [id number]  Primary Care Physician: Lorrie Canada APRN  Date of admission: 2024      Subjective  Subjective     Chief Complaint:  Altered mental state    HPI:  Patient with dementia and expressive aphasia.  Cannot contribute much to HPI.  Most of the history is obtained from reviewing her old records and records from [facility].  Attempted to reach her daughter Ms. Garcia 3 times but voicemail is full    Cheri Cruz is a 83 y.o. 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    She presented to [facility] with worsening encephalopathy/altered mental state and generalized bodyaches.  She has baseline dementia but her mental state was significantly below her baseline.  She was found to be hypertensive with systolic in the 90s.  Lab workup with creatinine of 2.0 from her baseline of 1.5.  Potassium 5.6.  Blood sugar 436.  Hemoglobin of 11.0.  Urine analysis with too numerous WBCs.  Chest x-ray clear.  CT head with questionable left parietal tiny hemorrhage versus calcification.  She was transferred to [facility] for neurology evaluation after she was accepted by stroke team.    At the time of the encounter, patient is mildly confused.  She thinks she is in [place].  She is able to tell me the name of her daughter.  Complaining of some lower abdominal pain and burning sensation with a urine otherwise she has no active complaint.      Addendum 8:30 PM:  Managed to reach out to the daughter/Ms. Garcia who lives in provide care to the patient.  Daughter reported to me that her mother is bedbound since her stroke in  with  "minimal activity.  She is incontinent to urine and stool.  She has severe depression.  Over the last few weeks, her memory has been declining as well as her mood.  She became less engaged and more depressed.  Her appetite is good but she eats what she wants to eat.  This morning, her mother started complaining of shortness of breath and kept telling her \" I am going to die\" and when her daughter checked her oxygen saturation, it was in the 60s so she put oxygen on (she is on as needed oxygen at home but does not like to wear it).  Soon after, patient became pale and she could not feel pulse and was about to start CPR when the patient quickly recovered and her oxygen saturation improved to the upper 90s.  At that time, she thought that her mother recovered so she can call 911 immediately.  Later on, her mother kept acting weird and became more lethargic which eventually prompted her to call 911.    Personal History     Past Medical History:   Diagnosis Date    Abnormal heart rhythm     Anxiety     Arrhythmia     Arthritis     Atrial fibrillation     Dementia     Diabetes mellitus     Hyperlipidemia     Hypertension     Kidney disorder     Stroke     Uterus cancer            Past Surgical History:   Procedure Laterality Date    CATARACT EXTRACTION, BILATERAL         Family History: family history includes Alcohol abuse in her brother; Cancer in her brother, father, and mother; Congenital heart disease in her mother; Heart disease in her mother.     Social History:  reports that she has never smoked. She has never used smokeless tobacco. She reports that she does not drink alcohol and does not use drugs.  Social History     Social History Narrative    Not on file       Medications:  Available home medication information reviewed.  DULoxetine, HYDROcodone-acetaminophen, Insulin Lispro, LORazepam, QUEtiapine, apixaban, busPIRone, donepezil, gabapentin, levothyroxine, meclizine, omeprazole, rosuvastatin, " sennosides-docusate, and terazosin    Allergies   Allergen Reactions    Penicillins Unknown - Low Severity     Tolerates ceftriaxones    Sulfa Antibiotics     Tetracyclines & Related Unknown (See Comments)    Valium [Diazepam] Anxiety       Objective  Objective     Vital Signs:   Temp:  [97.7 °F (36.5 °C)-98.1 °F (36.7 °C)] 97.7 °F (36.5 °C)  Heart Rate:  [64-87] 65  Resp:  [18-20] 18  BP: ()/(46-65) 123/48  Flow (L/min):  [2] 2       Physical Exam   General: Pleasantly confused.  Not in distress.  Conversant with expressive aphasia  Head: Atraumatic and normocephalic  Eyes: No Icterus. No pallor  Ears:  Ears appear intact with no abnormalities noted  Throat: No oral lesions, no thrush  Neck: Supple, trachea midline  Lungs: Clear to auscultation bilaterally, equal air entry, no wheezing or crackles  Heart:  Normal S1 and S2, no murmur, no gallop, No JVD, no lower extremity swelling  Abdomen:  Soft, no tenderness, no organomegaly, normal bowel sounds, no organomegaly  Extremities: pulses equal bilaterally  Skin: No bleeding, bruising or rash, normal skin turgor and elasticity  Neurologic: Mild expressive aphasia.  No gross motor deficit  Psych: Alert and oriented x 1    Result Review:  I have personally reviewed the results from the time of this admission to 7/14/2024 19:55 EDT and agree with these findings:  [x]  Laboratory list / accordion  [x]  Microbiology  []  Radiology  [x]  EKG/Telemetry   [x]  Cardiology/Vascular   []  Pathology  [x]  Old records      LAB RESULTS:      Lab 07/14/24  1519   WBC 7.87   HEMOGLOBIN 11.0*   HEMATOCRIT 35.2   PLATELETS 139*   NEUTROS ABS 5.72   IMMATURE GRANS (ABS) 0.02   LYMPHS ABS 1.52   MONOS ABS 0.44   EOS ABS 0.14   .2*         Lab 07/14/24  1519   SODIUM 137   POTASSIUM 5.6*   CHLORIDE 101   CO2 27.8   ANION GAP 8.2   BUN 35*   CREATININE 2.06*   EGFR 23.7*   GLUCOSE 436*   CALCIUM 9.2   MAGNESIUM 1.6   TSH 2.380         Lab 07/14/24  1519   TOTAL PROTEIN 7.0    ALBUMIN 3.9   GLOBULIN 3.1   ALT (SGPT) 5   AST (SGOT) 13   BILIRUBIN 0.2   ALK PHOS 68         Lab 07/14/24  1519   HSTROP T 37*                 UA          12/17/2023    09:48 3/25/2024    01:50 7/14/2024    15:27   Urinalysis   Squamous Epithelial Cells, UA   None Seen    Specific [place], UA 1.020     1.015     >=1.030    Ketones, UA   Negative    Blood, UA Negative     Negative     Trace    Leukocytes, UA Negative     SMALL     Moderate (2+)    Nitrite, UA Negative     Negative     Negative    RBC, UA 0-2      Unable to determine due to loaded field    WBC, UA   11-20    Bacteria, UA Rare      None Seen       Details          This result is from an external source.               Microbiology Results (last 10 days)       Procedure Component Value - Date/Time    COVID-19 and FLU A/B PCR, 1 HR TAT - Swab, Nasopharynx [[id number]]  (Normal) Collected: 07/14/24 1622    Lab Status: Final result Specimen: Swab from Nasopharynx Updated: 07/14/24 1703     COVID19 Not Detected     Influenza A PCR Not Detected     Influenza B PCR Not Detected    Narrative:      Fact sheet for providers: https://www.fda.gov/media/708220/download    Fact sheet for patients: https://www.fda.gov/media/498513/download    Test performed by PCR.            XR Chest 1 View    Result Date: 7/14/2024  PROCEDURE: XR CHEST 1 VW-  HISTORY: Weak/Dizzy/AMS triage protocol, decreased O2 sats. Altered mental status.  COMPARISON: April 29, 2023.  FINDINGS: The heart is stable.. Decreased inspiratory effort noted. There is evidence of old calcified granulomatous disease. Few linear densities are noted bilaterally which are stable. No new area of consolidation seen.. The mediastinum is unremarkable. There is no pneumothorax.  There are no acute osseous abnormalities.      Impression: Stable chest..      This report was signed and finalized on 7/14/2024 4:23 PM by Mare Kwong MD.      CT Head Without Contrast    Result Date: 7/14/2024  PROCEDURE: CT HEAD WO  CONTRAST-  HISTORY: increased AMS, weakness, h/o stroke  COMPARISON: April 29, 2023..  TECHNIQUE: Multiple axial CT images were performed from the foramen magnum to the vertex. Individualized dose reduction techniques using automated exposure control or adjustment of mA and/or kV according to the patient size were employed.  FINDINGS: There is moderate, age-appropriate, stable generalized cerebral atrophy. The ventricles are enlarged. Again noted is the area of encephalomalacia in the left posterior parietal region. Extent is stable from prior exam. High attenuation has developed in the cortex has in the regions of the previous MCA; finding is new compared to the prior exam. Suspect this represents postischemic cortical calcification but hemorrhage is not completely excluded. Recommend brain MRI. There is no evidence of edema or hemorrhage.  No masses are identified. No extra-axial fluid is seen. The paranasal sinuses are unremarkable. Mild small vessel ischemic disease noted.      Impression: New cortical high attenuation material at site of previous left posterior parietal CVA compared to April 2023, suspect postischemic cortical calcification but hemorrhage not completely excluded. Recommend brain MRI.     CTDI: 36.09 mGy DLP:604.53 mGy.cm  This report was signed and finalized on 7/14/2024 4:21 PM by Mare Kwong MD.       Results for orders placed during the hospital encounter of 02/10/23    Adult Transthoracic Echo Complete W/ Cont if Necessary Per Protocol    Interpretation Summary    Left ventricular systolic function is hyperdynamic (EF > 70%). Calculated left ventricular EF = 70.6% Left ventricular ejection fraction appears to be greater than 70%. The left ventricular cavity is small in size. Left ventricular wall thickness is consistent with mild concentric hypertrophy. All left ventricular wall segments contract normally. Left ventricular intracavitary gradient noted to be 71 mmHg. Left ventricular  diastolic function is consistent with (grade I) impaired relaxation. Normal left atrial pressure.    The aortic valve is abnormal in structure. There is mild calcification of the aortic valve mainly affecting the right coronary cusp(s). The aortic valve appears trileaflet. No aortic valve regurgitation is present. Gradient noted through the LV and LVOT    Compared to TTE report from [facility] Dec 2019, hyperdynamic LV with intracavitary gradient is not a new finding but peak gradient noted on this exam is higher than previously described.      Assessment & Plan  Assessment & Plan       * No active hospital problems. *      Summary:  Cheri Cruz is a 83 y.o. 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    Concern for left parietal tiny hemorrhage versus calcification  Old left parietal ischemic stroke  Acute metabolic encephalopathy  Possible UTI  Baseline dementia  Patient with baseline dementia.  Presented to [facility] with cognitive decline and metabolic encephalopathy.  She was hypotensive with systolic in the 90s.  Patient does report urinary symptoms in the form of urgency, frequency and suprapubic tenderness.  UA with too numerous WBCs without bacteriuria.  Will initiate antibiotic therapy with IV Rocephin and repeat the urine analysis and obtain urine culture.  Patient has previous UTI with Enterococcus faecium  CT from [facility] with left parietal tiny hemorrhage versus calcification on top of old ischemic stroke.  Discussed with stroke team and plan to hold Eliquis for now.  Neurology to evaluate  Holding sedatives: Gabapentin, meclizine and lorazepam    Addendum 8:30 PM  Acute hypoxia, improved  ?  Near syncope  Per  patient report, patient became hypoxic this morning with oxygen saturation in the  60s.  At that time, she could not feel pulse and patient became pale.  Quickly recovered after she applied oxygen  Patient is on Eliquis 2.5 mg twice daily for previous history of PE and for A-fib.  With that history mentioned above, I am concerned about PE causing her hypoxia and near syncope  Will proceed with CTA chest.  Benefit outweighs the risk    RADHA on CKD stage III  Dehydration volume depletion  Mild hyperkalemia  IV fluids  Monitor kidney functions with a.m. lab    Type 2 diabetes with uncontrolled hyperglycemia  Check A1c  Insulin glargine and SSI    Essential hypertension  Was hypotensive and [facility].  No blood pressure improved  Not on antihypertensive  medications    Dyslipidemia  Statins    Dementia  Continue donepezil    VTE Prophylaxis:  Mechanical VTE prophylaxis orders are present.          CODE STATUS:    There are no questions and answers to display.       Expected Discharge   Expected Discharge Date: 7/15/2024; Expected Discharge Time:      Otilia Chang MD  24     Electronically signed by Otilia Chang MD at 24          Physician Progress Notes (most recent note)        Emy Diaz DO at 24 1250              [facility]  PROGRESS NOTE    Patient Name: Cheri Cruz  : 1940  MRN: [id number]    Date of Admission: 2024  Primary Care Physician: Lorrie Canada APRN    Subjective   Subjective     CC:  AMS    HPI:  No acute events. States she is ok today. States her symptoms come and go.       Objective   Objective     Vital Signs:   Temp:  [97 °F (36.1 °C)-98.4 °F (36.9 °C)] 98 °F (36.7 °C)  Heart Rate:  [64-74] 67  Resp:  [16-18] 16  BP: (125-171)/(48-69) 168/63  Flow (L/min):  [2] 2     Physical Exam:  Constitutional: frail  HENT: NCAT, mucous membranes moist  Respiratory: Clear to auscultation  bilaterally, respiratory effort normal   Cardiovascular: RRR, no murmurs, rubs, or gallops  Gastrointestinal: Positive bowel sounds, soft, nontender, nondistended  Musculoskeletal: No bilateral ankle edema  Neurologic:  Cranial Nerves grossly intact to confrontation, speech clear      Results Reviewed:  LAB RESULTS:      Lab 07/22/24  0440   WBC 7.35   HEMOGLOBIN 9.7*   HEMATOCRIT 29.8*   PLATELETS 124*   .4*         Lab 07/23/24  0454 07/22/24  0440 07/19/24  0550   SODIUM 144 139 137   POTASSIUM 4.7 4.1 4.7   CHLORIDE 106 101 98   CO2 29.0 28.0 27.0   ANION GAP 9.0 10.0 12.0   BUN 33* 41* 19   CREATININE 1.66* 1.82* 1.19*   EGFR 30.7* 27.5* 45.7*   GLUCOSE 152* 194* 202*   CALCIUM 8.4* 8.0* 9.3                         Lab 07/18/24  1510   PH, ARTERIAL 7.380   PCO2, ARTERIAL 53.1*   PO2 ART 82.4*   FIO2 24   HCO3 ART 31.4*   BASE EXCESS ART 5.2*   CARBOXYHEMOGLOBIN 1.1     Brief Urine Lab Results  (Last result in the past 365 days)        Color   Clarity   Blood   Leuk Est   Nitrite   Protein   CREAT   Urine HCG        07/14/24 2001 Yellow   Cloudy   Trace   Small (1+)   Negative   Negative                   Microbiology Results Abnormal       Procedure Component Value - Date/Time    Blood Culture - Blood, Wrist, Left [[id number]]  (Normal) Collected: 07/14/24 2022    Lab Status: Final result Specimen: Blood from Wrist, Left Updated: 07/19/24 2100     Blood Culture No growth at 5 days    Narrative:      Less than seven (7) mL's of blood was collected.  Insufficient quantity may yield false negative results.    Blood Culture - Blood, Hand, Left [[id number]]  (Normal) Collected: 07/14/24 2022    Lab Status: Final result Specimen: Blood from Hand, Left Updated: 07/19/24 2100     Blood Culture No growth at 5 days    Narrative:      Less than seven (7) mL's of blood was collected.  Insufficient quantity may yield false negative results.    Urine Culture - Urine, Urine, Random Void [[id number]] Collected: 07/14/24  2001    Lab Status: Final result Specimen: Urine, Random Void Updated: 07/16/24 1020     Urine Culture 50,000 CFU/mL Normal Urogenital Nickie    Narrative:      Colonization of the urinary tract without infection is common. Treatment is discouraged unless the patient is symptomatic, pregnant, or undergoing an invasive urologic procedure.            XR Knee 1 or 2 View Right    Result Date: 7/24/2024  XR KNEE 1 OR 2 VW RIGHT Date of Exam: 7/24/2024 9:43 PM EDT Indication: swelling/pain Comparison: None available. Findings: The bones are diffusely osteopenic. No fracture is visualized. There is tricompartment osteoarthritis. There is no significant fluid in the suprapatellar bursa. Atherosclerotic calcification noted     Impression: Impression: 1. No evidence of fracture or acute abnormality 2. Osteoarthritis Electronically Signed: Doyle Wyman  7/24/2024 10:02 PM EDT  Workstation ID: [id number]     Results for orders placed during the hospital encounter of 02/10/23    Adult Transthoracic Echo Complete W/ Cont if Necessary Per Protocol    Interpretation Summary    Left ventricular systolic function is hyperdynamic (EF > 70%). Calculated left ventricular EF = 70.6% Left ventricular ejection fraction appears to be greater than 70%. The left ventricular cavity is small in size. Left ventricular wall thickness is consistent with mild concentric hypertrophy. All left ventricular wall segments contract normally. Left ventricular intracavitary gradient noted to be 71 mmHg. Left ventricular diastolic function is consistent with (grade I) impaired relaxation. Normal left atrial pressure.    The aortic valve is abnormal in structure. There is mild calcification of the aortic valve mainly affecting the right coronary cusp(s). The aortic valve appears trileaflet. No aortic valve regurgitation is present. Gradient noted through the LV and LVOT    Compared to TTE report from [facility] Dec 2019, hyperdynamic LV with intracavitary gradient is  not a new finding but peak gradient noted on this exam is higher than previously described.      Current medications:  Scheduled Meds:apixaban, 5 mg, Oral, BID  atorvastatin, 80 mg, Oral, Nightly  busPIRone, 7.5 mg, Oral, BID  Diclofenac Sodium, 2 g, Topical, 4x Daily  donepezil, 10 mg, Oral, Nightly  DULoxetine, 60 mg, Oral, Daily  fluticasone, 2 spray, Each Nare, Daily  gabapentin, , ,   gabapentin, 300 mg, Oral, Daily  insulin glargine, 30 Units, Subcutaneous, Nightly  insulin lispro, 3-14 Units, Subcutaneous, 4x Daily AC & at Bedtime  Insulin Lispro, 7 Units, Subcutaneous, TID With Meals  levothyroxine, 25 mcg, Oral, Daily  LORazepam, 0.5 mg, Oral, Q12H  meclizine, 25 mg, Oral, TID  mineral oil, 1 enema, Rectal, Once  nitrofurantoin (macrocrystal-monohydrate), 100 mg, Oral, Q24H  nystatin, , Topical, 4x Daily  pantoprazole, 40 mg, Oral, BID AC  senna-docusate sodium, 2 tablet, Oral, Daily   And  polyethylene glycol, 17 g, Oral, Daily  QUEtiapine, 50 mg, Oral, Nightly  sodium chloride, 10 mL, Intravenous, Q12H  sodium chloride, 10 mL, Intravenous, Q12H      Continuous Infusions:   PRN Meds:.  acetaminophen **OR** acetaminophen **OR** acetaminophen    senna-docusate sodium **AND** polyethylene glycol **AND** bisacodyl **AND** bisacodyl    Calcium Replacement - Follow Nurse / BPA Driven Protocol    dextrose    dextrose    gabapentin    glucagon (human recombinant)    HYDROcodone-acetaminophen    Magnesium Standard Dose Replacement - Follow Nurse / BPA Driven Protocol    meclizine    ondansetron ODT **OR** ondansetron    Phosphorus Replacement - Follow Nurse / BPA Driven Protocol    Potassium Replacement - Follow Nurse / BPA Driven Protocol    sodium chloride    sodium chloride    sodium chloride    sodium chloride    Assessment & Plan   Assessment & Plan     Active Hospital Problems    Diagnosis  POA    **AMS (altered mental status) [R41.82]  Yes    Stroke [I63.9]  Yes      Resolved Hospital Problems   No resolved  problems to display.        Brief Hospital Course to date:  Cheri Cruz is a 83 y.o. 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  Initial stroke workup was negative.  MRI showed old left parietal ischemic stroke.  Stroke neurology followed and resumed Eliquis.  She received empiric IV ceftriaxone x 3 days due to concern for UTI.     Altered mental status on advanced dementia  Hypotension   Concern for UTI  - initial stroke workup negative.  MRI showed old L parietal ischemic stroke; pt seen by Stroke team; apixiban restarted.   -Possibly dehydration vs hypoxia  -COVID/flu negative. Blood cultures with no growth to date. LFTs normal. CXR no acute findings.    -CXR with old calcified gr  -S/p empiric IV ceftriaxone x 3 days.   -Held sedatives initially, Okay to resume PRN lorazepam (home med)   -Continue donepezil, Seroquel, BuSpar.       Hand tremors  - distressing to patient. Monitor.      Vertigo  - reports this is common for her  - change meclizine to scheduled     Acute on chronic hypoxemic respiratory failure  Near-syncope  -Per report, patient became hypoxic with oxygen saturation in the 60s  -CTA chest showed no PE, no acute process   -Patient is O2 dependent on 2L NC but is not compliant due her baseline dementia.  -Continue supplemental oxygenation, titrate for goal SpO2 greater than 90%.     Evolving old left parietal ischemic stroke  -Presented to [facility] with cognitive decline and metabolic encephalopathy. She was hypotensive with systolic in the 90s at [facility]   -CTH from [facility] with left parietal tiny hemorrhage vs calcification on top of old ischemic stroke.   -MRI showed evolving old left parietal lobe CVA with some  areas of associated cortical laminar necrosis. No hemorrhage.    -Neurology evaluated, recommended to resume Eliquis. Continue statin. Annual follow up in stroke clinic.   -PT/OT now recommending SNF      RADHA on CKD stage III  Volume depletion due to dehydration   Mild hyperkalemia  -Baseline Cr about 1.3, Cr 2.06 on presentation, improved to  1.2.  Then increased to 1.8 and got IV fluids again.    - pt at risk of recurrent dehydration/RADHA.  Will stop hytrin for this reason   - AM labs      Complex disposition  -PT/OT evaluated, felt patient's functional status is at baseline. Recommended home with 24/7 care.  -partner discussed care with both patient's daughters and case management on 7/17.   - Family leaning towards pursuing long-term care after discharge as they are having difficulty taking care of her on their own and do not have finances for full-time caregiver.     Nausea, constipation   -KUB 7/14 with nonobstructive bowel gas pattern. Repeat KUB 7/18 with moderate stool burden.  -Continue bowel regimen. Increased PPI to BID. PRN Zofran for nausea.      Uncontrolled diabetes mellitus type 2 with hyperglycemia  -A1c 9.80% this admission  -Increase Lantus to 30 units qhs, increase  lispro 7u TIDAC and moderate-high dose SSI -- improved   -Resumed home gabapentin at low-dose 300 mg daily.     Chronic anemia  Macrocytosis  -No evidence of overt GI bleeding this admission  -B12 and folate within normal limits  -Further workup of anemia as outpatient as appropriate.      Essential hypertension  -Was hypotensive at [facility].  -Not on antihypertensive medications.  - stable      Hyperlipidemia -Continue atorvastatin.  Hypothyroidism -Continue levothyroxine.  GERD -Continue PPI.     Expected Discharge Location and Transportation: [facility]   Expected Discharge   Expected Discharge Date: 7/25/2024; Expected Discharge Time:      VTE Prophylaxis:  Pharmacologic & mechanical VTE prophylaxis orders are present.          AM-PAC 6 Clicks Score (PT): 8 (24 0800)    CODE STATUS:   Code Status and Medical Interventions:   Ordered at: 07/15/24 1319     Level Of Support Discussed With:    Patient     Code Status (Patient has no pulse and is not breathing):    CPR (Attempt to Resuscitate)     Medical Interventions (Patient has pulse or is breathing):    Full Support       Emy Diaz DO  24        Electronically signed by Emy Diaz DO at 24 1256          Physical Therapy Notes (most recent note)        Courtney Jackson, PT at 24 1030  Version 1 of 1         Patient Name: Cheri Cruz  : 1940    MRN: [id number]                              Today's Date: 2024       Admit Date: 2024    Visit Dx:     ICD-10-CM ICD-9-CM   1. Cognitive communication deficit  R41.841 799.52   2. Disorientation  R41.0 780.99     Patient Active Problem List   Diagnosis    Hyperlipidemia LDL goal <70    Type 2 diabetes mellitus without complication, without long-term current use of insulin    GERD (gastroesophageal reflux disease)    Essential hypertension    Abnormal EKG    Osteoarthritis    Anxiety    Palpitations    Vertigo    History of ischemic left MCA stroke    Hemorrhagic stroke    History of pulmonary embolus (PE)    Confusion    Hypotension    Constipation    UTI (urinary tract infection) due to urinary indwelling catheter    Metabolic encephalopathy    Symptomatic anemia    Acquired hypothyroidism    AMS (altered mental status)    Stroke     Past Medical History:   Diagnosis Date    Abnormal heart rhythm     Anxiety     Arrhythmia     Arthritis     Atrial fibrillation     Dementia     Diabetes mellitus     Hyperlipidemia     Hypertension     Kidney disorder     Stroke     Uterus cancer      Past Surgical History:   Procedure Laterality Date    CATARACT EXTRACTION, BILATERAL        General Information       Row Name 24 1100          Physical Therapy Time and Intention    Document Type  therapy note (daily note)  -KR     Mode of Treatment co-treatment;physical therapy  -KR       Row Name 07/23/24 1100          General Information    Patient Profile Reviewed yes  -KR     Existing Precautions/Restrictions fall;other (see comments)  BUE tremors R>L, R sided hypertonia, non-ambulatory at baseline, diplopia, [place]  -KR     Barriers to Rehab medically complex;previous functional deficit;cognitive status;visual deficit;hearing deficit;ineffective coping  -KR       Row Name 07/23/24 1100          Cognition    Orientation Status (Cognition) oriented to;person;verbal cues/prompts needed for orientation;place  -KR       Row Name 07/23/24 1100          Safety Issues, Functional Mobility    Safety Issues Affecting Function (Mobility) awareness of need for assistance;insight into deficits/self-awareness;judgment;problem-solving;safety precaution awareness;sequencing abilities;safety precautions follow-through/compliance  -KR     Impairments Affecting Function (Mobility) balance;cognition;endurance/activity tolerance;pain;postural/trunk control;range of motion (ROM);strength;muscle tone abnormal  -KR     Cognitive Impairments, Mobility Safety/Performance attention;awareness, need for assistance;insight into deficits/self-awareness;judgment;problem-solving/reasoning;sequencing abilities;safety precaution follow-through;safety precaution awareness  -KR               User Key  (r) = Recorded By, (t) = Taken By, (c) = Cosigned By      Initials Name Provider Type    KR Courtney Jackson PT Physical Therapist                   Mobility       Row Name 07/23/24 1102          Bed Mobility    Bed Mobility rolling right;rolling left  -KR     Rolling Left [place] (Bed Mobility) minimum assist (75% patient effort);1 person assist  -KR     Rolling Right [place] (Bed Mobility) minimum assist (75% patient effort);1 person assist  -KR     Assistive Device (Bed Mobility) draw sheet;bed rails  -KR     Comment, (Bed Mobility)  cues for hand placement and sequencing  -KR       Row Name 07/23/24 1102          Bed-Chair Transfer    Bed-Chair [place] (Transfers) dependent (less than 25% patient effort);2 person assist;verbal cues  -KR     Assistive Device (Bed-Chair Transfers) lift device  -KR       Row Name 07/23/24 1102          Sit-Stand Transfer    Sit-Stand [place] (Transfers) 2 person assist;dependent (less than 25% patient effort)  -KR     Assistive Device (Sit-Stand Transfers) other (see comments)  BUE support  -KR     Comment, (Sit-Stand Transfer) 1x from chair. Pt unable to tolerate standing > 5 seconds d/ tB knee and ankle pain.  -KR       Row Name 07/23/24 1102          Gait/Stairs (Locomotion)    [place] Level (Gait) not tested  -KR               User Key  (r) = Recorded By, (t) = Taken By, (c) = Cosigned By      Initials Name Provider Type    Courtney Dennis PT Physical Therapist                   Obj/Interventions       [facility] Name 07/23/24 1105          Balance    Balance Assessment sitting static balance;sitting dynamic balance;standing static balance  -KR     Static Sitting Balance moderate assist  -KR     Dynamic Sitting Balance maximum assist  -KR     Position, Sitting Balance unsupported;sitting in chair  -KR     Static Standing Balance dependent;2-person assist  -KR     Position/Device Used, Standing Balance supported;other (see comments)  BUE support  -KR     Balance Interventions sitting;standing;sit to stand;supported;static  -KR               User Key  (r) = Recorded By, (t) = Taken By, (c) = Cosigned By      Initials Name Provider Type    Courtney Dennis PT Physical Therapist                   Goals/Plan    No documentation.                  Clinical Impression       [facility] Name 07/23/24 1105          Pain Scale: FACES Pre/Post-Treatment    Pain: FACES Scale, Pretreatment 0-->no hurt  -KR     Posttreatment Pain Rating 0-->no hurt  -KR     Pre/Posttreatment Pain Comment no pain at baseline, pain with  weight bearing in B ankles and knees  -KR       Row Name 07/23/24 1105          Plan of Care Review    Plan of Care Reviewed With patient  -KR     Progress improving  -KR     Outcome Evaluation Pt required less assist for bed mobility this date. Pt able to sit EOC with BLE in WB for ~5 minutes with modA. Pt will continue to benefit from PT to address ongoing strength, balance, and endurance deficits. Rec SNF transition to LTC upon dc.  -KR       Row Name 07/23/24 1105          Vital Signs    Pre Patient Position Supine  -KR     Intra Patient Position Standing  -KR     Post Patient Position Sitting  -KR       Row Name 07/23/24 1105          Positioning and Restraints    Pre-Treatment Position in bed  -KR     Post Treatment Position chair  -KR     In Chair notified nsg;reclined;call light within reach;encouraged to call for assist;exit alarm on;waffle cushion;on mechanical lift sling;legs elevated;heels elevated;LUE elevated;RUE elevated  -KR               User Key  (r) = Recorded By, (t) = Taken By, (c) = Cosigned By      Initials Name Provider Type    KR Courtney Jackson, BILL Physical Therapist                   Outcome Measures       Row Name 07/23/24 1107 07/23/24 0849       How much help from another person do you currently need...    Turning from your back to your side while in flat bed without using bedrails? 3  -KR 2  -LM    Moving from lying on back to sitting on the side of a flat bed without bedrails? 2  -KR 2  -LM    Moving to and from a bed to a chair (including a wheelchair)? 1  -KR 1  -LM    Standing up from a chair using your arms (e.g., wheelchair, bedside chair)? 1  -KR 1  -LM    Climbing 3-5 steps with a railing? 1  -KR 1  -LM    To walk in hospital room? 1  -KR 1  -LM    AM-PAC 6 Clicks Score (PT) 9  -KR 8  -LM    Highest Level of Mobility Goal 3 --> Sit at edge of bed  -KR 3 --> Sit at edge of bed  -LM              User Key  (r) = Recorded By, (t) = Taken By, (c) = Cosigned By      Initials Name  Provider Type    Joleen Wolfe RN Registered Nurse    Courtney Dennis, PT Physical Therapist                                 Physical Therapy Education       Title: PT OT SLP Therapies (In Progress)       Topic: Physical Therapy (In Progress)       Point: Mobility training (In Progress)       Learning Progress Summary             Patient Acceptance, E, NR by KR at 7/23/2024 1107    Eager, E, VU,DU,NR by SS at 7/20/2024 1526    Comment: Educated safety/technique w/bed mobility, transfers, HEP, PT POC    Acceptance, E, VU,DU by ANUEL at 7/15/2024 0955   Family Eager, E, VU,DU,NR by SS at 7/20/2024 1526    Comment: Educated safety/technique w/bed mobility, transfers, HEP, PT POC                         Point: Home exercise program (Done)       Learning Progress Summary             Patient Eager, E, VU,DU,NR by SS at 7/20/2024 1526    Comment: Educated safety/technique w/bed mobility, transfers, HEP, PT POC   Family Eager, E, VU,DU,NR by SS at 7/20/2024 1526    Comment: Educated safety/technique w/bed mobility, transfers, HEP, PT POC                         Point: Body mechanics (In Progress)       Learning Progress Summary             Patient Acceptance, E, NR by KR at 7/23/2024 1107    Eager, E, VU,DU,NR by SS at 7/20/2024 1526    Comment: Educated safety/technique w/bed mobility, transfers, HEP, PT POC    Acceptance, E, VU,DU by ANUEL at 7/15/2024 0955   Family Eager, E, VU,DU,NR by SS at 7/20/2024 1526    Comment: Educated safety/technique w/bed mobility, transfers, HEP, PT POC                         Point: Precautions (In Progress)       Learning Progress Summary             Patient Acceptance, E, NR by KR at 7/23/2024 1107    Eager, E, VU,DU,NR by SS at 7/20/2024 1526    Comment: Educated safety/technique w/bed mobility, transfers, HEP, PT POC    Acceptance, E, VU,DU by ANUEL at 7/15/2024 0955   Family Eager, E, VU,DU,NR by SS at 7/20/2024 1526    Comment: Educated safety/technique w/bed mobility, transfers,  HEP, PT POC                                         User Key       Initials Effective Dates Name Provider Type Discipline    SS 21 -  Freda Leonard, PT Physical Therapist PT    KR 22 -  Courtney Jackson, PT Physical Therapist PT    ANUEL 24 -  Cate Joshi, BILL Student PT Student PT                  PT Recommendation and Plan     Plan of Care Reviewed With: patient  Progress: improving  Outcome Evaluation: Pt required less assist for bed mobility this date. Pt able to sit EOC with BLE in WB for ~5 minutes with modA. Pt will continue to benefit from PT to address ongoing strength, balance, and endurance deficits. Rec SNF transition to LTC upon dc.     Time Calculation:         PT Charges       Row Name 24 1108             Time Calculation    Start Time 1030  -KR      PT Received On 24  -KR         Timed Charges    12908 - PT Therapeutic Activity Minutes 10  -KR         Total Minutes    Timed Charges Total Minutes 10  -KR       Total Minutes 10  -KR                User Key  (r) = Recorded By, (t) = Taken By, (c) = Cosigned By      Initials Name Provider Type    KR Courtney Jackson, PT Physical Therapist                  Therapy Charges for Today       Code Description Service Date Service Provider Modifiers Qty    [id number]  PT THERAPEUTIC ACT EA 15 MIN 2024 Courteny Jackson, PT GP 1            PT G-Codes  Outcome Measure Options: AM-PAC 6 Clicks Basic Mobility (PT)  AM-PAC 6 Clicks Score (PT): 9  AM-PAC 6 Clicks Score (OT): 12  Modified Steven Scale: 5 - Severe disability.  Bedridden, incontinent, and requiring constant nursing care and attention.  PT Discharge Summary  Anticipated Discharge Disposition (PT): skilled nursing facility    Courtney Jackson PT  2024      Electronically signed by Courtney Jackson, PT at 24 1108          Occupational Therapy Notes (most recent note)        Karlo Rai, OT at 24 1025          Patient Name: Cheri Ej Cruz  :  1940    MRN: [id number]                              Today's Date: 7/23/2024       Admit Date: 7/14/2024    Visit Dx:     ICD-10-CM ICD-9-CM   1. Cognitive communication deficit  R41.841 799.52   2. Disorientation  R41.0 780.99     Patient Active Problem List   Diagnosis    Hyperlipidemia LDL goal <70    Type 2 diabetes mellitus without complication, without long-term current use of insulin    GERD (gastroesophageal reflux disease)    Essential hypertension    Abnormal EKG    Osteoarthritis    Anxiety    Palpitations    Vertigo    History of ischemic left MCA stroke    Hemorrhagic stroke    History of pulmonary embolus (PE)    Confusion    Hypotension    Constipation    UTI (urinary tract infection) due to urinary indwelling catheter    Metabolic encephalopathy    Symptomatic anemia    Acquired hypothyroidism    AMS (altered mental status)    Stroke     Past Medical History:   Diagnosis Date    Abnormal heart rhythm     Anxiety     Arrhythmia     Arthritis     Atrial fibrillation     Dementia     Diabetes mellitus     Hyperlipidemia     Hypertension     Kidney disorder     Stroke     Uterus cancer      Past Surgical History:   Procedure Laterality Date    CATARACT EXTRACTION, BILATERAL        General Information       Row Name 07/23/24 1116          OT Time and Intention    Document Type therapy note (daily note)  -CS     Mode of Treatment occupational therapy;co-treatment  -CS       Row Name 07/23/24 1116          General Information    Patient Profile Reviewed yes  -CS     Existing Precautions/Restrictions fall;other (see comments)  BUE tremors R>L, R sided hypertonia, non-ambulatory at baseline, diplopia, [place]  -CS     Barriers to Rehab medically complex;previous functional deficit;cognitive status;hearing deficit;visual deficit;ineffective coping  -CS       Row Name 07/23/24 1116          Cognition    Orientation Status (Cognition) oriented to;person;verbal cues/prompts needed for  orientation;place;disoriented to;situation;time  -       Row Name 07/23/24 1116          Safety Issues, Functional Mobility    Safety Issues Affecting Function (Mobility) awareness of need for assistance;insight into deficits/self-awareness;judgment;problem-solving;safety precaution awareness;safety precautions follow-through/compliance;sequencing abilities  -CS     Impairments Affecting Function (Mobility) balance;cognition;endurance/activity tolerance;pain;postural/trunk control;range of motion (ROM);strength;muscle tone abnormal;motor planning  -CS     Cognitive Impairments, Mobility Safety/Performance awareness, need for assistance;insight into deficits/self-awareness;problem-solving/reasoning;safety precaution awareness;safety precaution follow-through;sequencing abilities;judgment  -CS               User Key  (r) = Recorded By, (t) = Taken By, (c) = Cosigned By      Initials Name Provider Type     Karlo Rai OT Occupational Therapist                     Mobility/ADL's       Row Name 07/23/24 1117          Bed Mobility    Bed Mobility supine-sit;scooting/bridging  -     Rolling Left [place] (Bed Mobility) minimum assist (75% patient effort);1 person assist  -CS     Rolling Right [place] (Bed Mobility) minimum assist (75% patient effort);1 person assist  -CS     Scooting/Bridging [place] (Bed Mobility) maximum assist (25% patient effort);2 person assist;verbal cues;other (see comments);nonverbal cues (demo/gesture)  -     Assistive Device (Bed Mobility) draw sheet;bed rails  -       Row Name 07/23/24 1117          Transfers    Transfers bed-chair transfer  -     Comment, (Transfers) BUE support and Dep assist x 2 for STS attempt, unable to sustain any standing posture due to pain and weakness  -       Row Name 07/23/24 1117          Bed-Chair Transfer    Bed-Chair [place] (Transfers) dependent (less than 25% patient effort);2 person assist;verbal cues  -       Row Name  07/23/24 1117          Functional Mobility    Patient was able to Ambulate no, other medical factors prevent ambulation  -CS       Row Name 07/23/24 1117          Activities of Daily Living    BADL Assessment/Intervention upper body dressing;lower body dressing;grooming;toileting  -CS       Row Name 07/23/24 1117          Upper Body Dressing Assessment/Training    [place] Level (Upper Body Dressing) don;pajama/robe;moderate assist (50% patient effort)  -CS     Position (Upper Body Dressing) edge of bed sitting  -CS       Row Name 07/23/24 1117          Lower Body Dressing Assessment/Training    [place] Level (Lower Body Dressing) doff;don;socks;dependent (less than 25% patient effort);other (see comments)  -CS     Position (Lower Body Dressing) supine  -CS       Row Name 07/23/24 1117          Grooming Assessment/Training    [place] Level (Grooming) wash face, hands;supervision;set up;verbal cues  -CS     Position (Grooming) supported sitting  -CS       Row Name 07/23/24 1117          Self-Feeding Assessment/Training    [place] Level (Feeding) feeding skills;other (see comments)  -CS     Assistive Devices (Feeding) adapted cup  -CS     Comment, (Feeding) continues to utilize AE for feeding  -CS       Row Name 07/23/24 1117          Toileting Assessment/Training    [place] Level (Toileting) adjust/manage clothing;other (see comments);dependent (less than 25% patient effort)  -CS               User Key  (r) = Recorded By, (t) = Taken By, (c) = Cosigned By      Initials Name Provider Type    CS Karlo Rai OT Occupational Therapist                   Obj/Interventions       Row Name 07/23/24 1127          Shoulder (Therapeutic Exercise)    Shoulder (Therapeutic Exercise) AROM (active range of motion)  -     Shoulder AROM (Therapeutic Exercise) bilateral;scapular elevation;scapular retraction;2 sets;5 repetitions  -CS       Row Name 07/23/24 1127          Elbow/Forearm (Therapeutic  Exercise)    Elbow/Forearm (Therapeutic Exercise) AROM (active range of motion)  -     Elbow/Forearm AROM (Therapeutic Exercise) bilateral;extension;flexion;5 repetitions  -       Row Name 07/23/24 1127          Balance    Balance Assessment sitting static balance;sitting dynamic balance;standing static balance  -CS     Static Sitting Balance moderate assist  -CS     Dynamic Sitting Balance maximum assist  -CS     Position, Sitting Balance unsupported;sitting edge of bed  -     Static Standing Balance dependent  -CS     Balance Interventions sitting;standing;sit to stand;occupation based/functional task  -               User Key  (r) = Recorded By, (t) = Taken By, (c) = Cosigned By      Initials Name Provider Type     Karlo Rai, OT Occupational Therapist                   Goals/Plan    No documentation.                  Clinical Impression       [facility] Name 07/23/24 1128          Pain Assessment    Additional Documentation Pain Scale: FACES Pre/Post-Treatment (Group)  -[facility] Name 07/23/24 1128          Pain Scale: FACES Pre/Post-Treatment    Pain: FACES Scale, Pretreatment 0-->no hurt  -CS     Posttreatment Pain Rating 0-->no hurt  -CS       [facility] Name 07/23/24 1128          Plan of Care Review    Plan of Care Reviewed With patient  -CS     Progress no change  -     Outcome Evaluation Pt had met bed mobility goal progressing to [place] for rolling in bed, lifted to recliner, attempted STS but unable to bear weight through legs due to pain and weakness, recommend continued use of sling only for transfers. Engaged in BUE AROM, reeducated on benefits of feeding AE use, Will cont IPOT per POC as tolerated.  -       Row Name 07/23/24 1128          Vital Signs    Pre Systolic BP Rehab --  RN cleared for tx  -CS     O2 Delivery Pre Treatment room air  -CS     O2 Delivery Intra Treatment room air  -CS     O2 Delivery Post Treatment room air  -CS     Pre Patient Position Supine  -CS     Intra Patient  Position Standing  -CS     Post Patient Position Sitting  -CS       Row Name 07/23/24 1128          Positioning and Restraints    Pre-Treatment Position in bed  -CS     Post Treatment Position chair  -CS     In Chair notified nsg;reclined;sitting;call light within reach;encouraged to call for assist;legs elevated;waffle cushion;on mechanical lift sling  -CS               User Key  (r) = Recorded By, (t) = Taken By, (c) = Cosigned By      Initials Name Provider Type    CS Karlo Rai OT Occupational Therapist                   Outcome Measures       Row Name 07/23/24 1107 07/23/24 0849       How much help from another person do you currently need...    Turning from your back to your side while in flat bed without using bedrails? 3  -KR 2  -LM    Moving from lying on back to sitting on the side of a flat bed without bedrails? 2  -KR 2  -LM    Moving to and from a bed to a chair (including a wheelchair)? 1  -KR 1  -LM    Standing up from a chair using your arms (e.g., wheelchair, bedside chair)? 1  -KR 1  -LM    Climbing 3-5 steps with a railing? 1  -KR 1  -LM    To walk in hospital room? 1  -KR 1  -LM    AM-PAC 6 Clicks Score (PT) 9  -KR 8  -LM    Highest Level of Mobility Goal 3 --> Sit at edge of bed  -KR 3 --> Sit at edge of bed  -LM              User Key  (r) = Recorded By, (t) = Taken By, (c) = Cosigned By      Initials Name Provider Type    LM Joleen Dickens RN Registered Nurse    Courtney Dennis PT Physical Therapist                    Occupational Therapy Education       Title: PT OT SLP Therapies (In Progress)       Topic: Occupational Therapy (In Progress)       Point: ADL training (In Progress)       Description:   Instruct learner(s) on proper safety adaptation and remediation techniques during self care or transfers.   Instruct in proper use of assistive devices.                  Learning Progress Summary             Patient Acceptance, E,D, NR by PRAKASH at 7/20/2024 1020    Acceptance, E,D,  VU,DU by BELINDA at 7/15/2024 0936   Family Acceptance, E,D, NR by PRAKASH at 7/20/2024 1020                         Point: Home exercise program (In Progress)       Description:   Instruct learner(s) on appropriate technique for monitoring, assisting and/or progressing therapeutic exercises/activities.                  Learning Progress Summary             Patient Acceptance, E,D, NR by PRAKASH at 7/20/2024 1020    Acceptance, E,D, VU,DU by BELINDA at 7/15/2024 0936   Family Acceptance, E,D, NR by PRAKASH at 7/20/2024 1020                         Point: Precautions (In Progress)       Description:   Instruct learner(s) on prescribed precautions during self-care and functional transfers.                  Learning Progress Summary             Patient Acceptance, E,D, NR by PRAKASH at 7/20/2024 1020    Acceptance, E,D, VU,DU by BELINDA at 7/15/2024 0936   Family Acceptance, E,D, NR by PRAKASH at 7/20/2024 1020                         Point: Body mechanics (In Progress)       Description:   Instruct learner(s) on proper positioning and spine alignment during self-care, functional mobility activities and/or exercises.                  Learning Progress Summary             Patient Acceptance, E,D, NR by PRAKASH at 7/20/2024 1020    Acceptance, E,D, VU,DU by BELINDA at 7/15/2024 0936   Family Acceptance, E,D, NR by PRAKASH at 7/20/2024 1020                                         User Key       Initials Effective Dates Name Provider Type Discipline     06/16/21 -  Mirela Winston OT Occupational Therapist OT     06/16/21 -  Karlo Rai OT Occupational Therapist OT                  OT Recommendation and Plan  Planned Therapy Interventions (OT): activity tolerance training, functional balance retraining, occupation/activity based interventions, ROM/therapeutic exercise, strengthening exercise, transfer/mobility retraining, patient/caregiver education/training, neuromuscular control/coordination retraining, adaptive equipment training  Therapy Frequency (OT): 3  times/wk  Plan of Care Review  Plan of Care Reviewed With: patient  Progress: no change  Outcome Evaluation: Pt had met bed mobility goal progressing to Wilbert for rolling in bed, lifted to recliner, attempted STS but unable to bear weight through legs due to pain and weakness, recommend continued use of sling only for transfers. Engaged in BUE AROM, reeducated on benefits of feeding AE use, Will cont IPOT per POC as tolerated.     Time Calculation:   Evaluation Complexity (OT)  Review Occupational Profile/Medical/Therapy History Complexity: expanded/moderate complexity  Assessment, Occupational Performance/Identification of Deficit Complexity: 3-5 performance deficits  Overall Complexity of Evaluation (OT): moderate complexity     Time Calculation- OT       Row Name 24 1133             Time Calculation- OT    OT Start Time 1025  -CS      OT Received On 24  -CS      OT Goal Re-Cert Due Date 24  -CS         Timed Charges    40246 - OT Therapeutic Exercise Minutes 4  -CS      29890 - OT Therapeutic Activity Minutes 6  -CS         Total Minutes    Timed Charges Total Minutes 10  -CS       Total Minutes 10  -CS                User Key  (r) = Recorded By, (t) = Taken By, (c) = Cosigned By      Initials Name Provider Type    CS Karlo Rai OT Occupational Therapist                  Therapy Charges for Today       Code Description Service Date Service Provider Modifiers Qty    [id number] HC OT THERAPEUTIC ACT EA 15 MIN 2024 Karlo Rai OT GO 1                 Karlo Rai OT  2024    Electronically signed by Karlo Rai OT at 24 1136          Speech Language Pathology Notes (most recent note)        Nabil Madrid MS CCC-SLP at 24 1608          Acute Care - Speech Language Pathology Treatment Note  [facility]     Patient Name: Cheri Cruz  : 1940  MRN: [id number]  Today's Date: 2024               Admit Date: 2024     Visit Dx:    ICD-10-CM  ICD-9-CM   1. Cognitive communication deficit  R41.841 799.52   2. Disorientation  R41.0 780.99     Patient Active Problem List   Diagnosis    Hyperlipidemia LDL goal <70    Type 2 diabetes mellitus without complication, without long-term current use of insulin    GERD (gastroesophageal reflux disease)    Essential hypertension    Abnormal EKG    Osteoarthritis    Anxiety    Palpitations    Vertigo    History of ischemic left MCA stroke    Hemorrhagic stroke    History of pulmonary embolus (PE)    Confusion    Hypotension    Constipation    UTI (urinary tract infection) due to urinary indwelling catheter    Metabolic encephalopathy    Symptomatic anemia    Acquired hypothyroidism    AMS (altered mental status)    Stroke     Past Medical History:   Diagnosis Date    Abnormal heart rhythm     Anxiety     Arrhythmia     Arthritis     Atrial fibrillation     Dementia     Diabetes mellitus     Hyperlipidemia     Hypertension     Kidney disorder     Stroke     Uterus cancer      Past Surgical History:   Procedure Laterality Date    CATARACT EXTRACTION, BILATERAL         SLP Recommendation and Plan        Anticipated Discharge Disposition (SLP): home with 24/7 care (07/25/24 1500)        Therapy Frequency (SLP SLC): 3 days per week (07/25/24 1500)  Predicted Duration Therapy Intervention (Days): 1 week (07/25/24 1500)        Daily Summary of Progress (SLP): progress towards functional goals is fair (07/25/24 1500)           Treatment Assessment (SLP): continued, cognitive-linguistic disorder (07/25/24 1500)  Treatment Assessment Comments (SLP): Pt seen for tx, no family present. (07/25/24 1500)  Plan for Continued Treatment (SLP): continue treatment per plan of care (07/25/24 1500)  Plan of Care Reviewed With: patient (07/25/24 1608)  Progress: no change (07/25/24 1608)      SLP EVALUATION (Last 72 Hours)       SLP SLC Evaluation       Row Name 07/25/24 1500 07/24/24 9420 07/22/24 1640             Communication  "Assessment/Intervention    Document Type therapy note (daily note)  -RS therapy note (daily note)  -CJ therapy note (daily note)  -      Subjective Information no complaints  -RS no complaints  -CJ no complaints  -      Patient Observations lethargic;cooperative;agree to therapy  -RS alert;cooperative;agree to therapy  -CJ alert;cooperative  -      Patient/Family/Caregiver Comments/Observations no family present  -RS no family present  -CJ No family present  -      Patient Effort good  -RS good  - good  -      Symptoms Noted During/After Treatment fatigue  -RS fatigue  -CJ none  -      Oral Care oral rinse provided  -RS -- --         Pain    Additional Documentation Pain Scale: FACES Pre/Post-Treatment (Group)  -RS -- Pain Scale: FACES Pre/Post-Treatment (Group)  -         Pain Scale: FACES Pre/Post-Treatment    Pain: FACES Scale, Pretreatment 2-->hurts little bit  -RS 0-->no hurt  -CJ 0-->no hurt  -      Posttreatment Pain Rating 2-->hurts little bit  -RS 0-->no hurt  -CJ 0-->no hurt  -      Pre/Posttreatment Pain Comment pt reports discomfort in her back  -RS -- --         SLP Treatment Clinical Impressions    Treatment Assessment (SLP) continued;cognitive-linguistic disorder  -RS continued;cognitive-linguistic disorder  -CJ continued;cognitive-linguistic disorder  -      Treatment Assessment Comments (SLP) Pt seen for tx, no family present.  -RS Pt seen for tx this date, difficult for pt to participate 2/2 pt stating \"not feeling well and sick all over\". Addressed all tx goals and provided support. Will continue to address deficits as needed  -CJ Cont w/ cognitive lingusitic deficits, suspect hearing deficits and lethargy contributing to overall severity of deficits. SLP will cont to f/u as appropriate  -      Daily Summary of Progress (SLP) progress towards functional goals is fair  -RS progress toward functional goals is gradual  - progress toward functional goals as expected  -      " Barriers to Overall Progress (SLP) Lethargy  -RS Lethargy  -CJ --      Plan for Continued Treatment (SLP) continue treatment per plan of care  -RS continue treatment per plan of care  - continue treatment per plan of care  -      Care Plan Review evaluation/treatment results reviewed  -RS care plan/treatment goals reviewed  -CJ care plan/treatment goals reviewed  -         Recommendations    Therapy Frequency (SLP SLC) 3 days per week  -RS 3 days per week  -CJ 3 days per week  -      Predicted Duration Therapy Intervention (Days) 1 week  -RS 1 week  -CJ 1 week  -      Anticipated Discharge Disposition (SLP) home with 24/7 care  -RS home with 24/7 care  -CJ home with 24/7 [facility]  -                User Key  (r) = Recorded By, (t) = Taken By, (c) = Cosigned By      Initials Name Effective Dates    CJ Dipika Up, MS CCC-SLP 06/03/24 -     MH Jazlyn Rivas, MS CCC-SLP 05/12/23 -     RS Nabil Madrid MS CCC-SLP 09/14/23 -                        EDUCATION  The patient has been educated in the following areas:     Cognitive Impairment.           SLP GOALS       Row Name 07/25/24 1500 07/24/24 1550 07/22/24 1640       Patient will demonstrate functional cognitive-linguistic skills for return to discharge environment    [place] with moderate cues  -RS with moderate cues  - with moderate cues  -    Time frame 1 week  -RS 1 week  -CJ 1 week  -    Progress/Outcomes continuing progress toward goal  -RS continuing progress toward goal  -CJ continuing progress toward goal  -       SLP Diagnostic Treatment     Patient will participate in further assessment in the following areas -- -- cognitive-linguistic;clarification of baseline cognitive communication status  -    Time Frame (Diagnostic) -- -- 1 week  -    Progress/Outcomes (Additional Goal 1, SLP) -- -- goal met  -       Comprehend Questions Goal 1 (SLP)    Improve Ability to Comprehend Questions Goal 1 (SLP) simple yes/no questions;complex  "yes/no questions;simple wh questions;simple general questions;80%;with minimal cues (75-90%)  -RS simple yes/no questions;complex yes/no questions;simple wh questions;simple general questions;80%;with minimal cues (75-90%)  -CJ simple yes/no questions;complex yes/no questions;simple wh questions;simple general questions;80%;with minimal cues (75-90%)  -    Time Frame (Comprehend Questions Goal 1, SLP) 1 week  -RS 1 week  -CJ 1 week  -MH    Progress (Ability to Comprehend Questions Goal 1, SLP) 70%;with minimal cues (75-90%)  -RS 70%;with minimal cues (75-90%)  -CJ 60%;with minimal cues (75-90%)  -    Progress/Outcomes (Comprehend Questions Goal 1, SLP) continuing progress toward goal  -RS continuing progress toward goal  -CJ continuing progress toward goal  -MH    Comment (Comprehend Questions Goal 1, SLP) -- simple but perseverated on not feeling well during session  -CJ Simple Y/N, suspect hearing deficits impacting as well  -       Follow Directions Goal 2 (SLP)    Improve Ability to Follow Directions Goal 1 (SLP) 2 step commands;80%;with moderate cues (50-74%)  -RS 2 step commands;80%;with moderate cues (50-74%)  -CJ 2 step commands;80%;with moderate cues (50-74%)  -    Time Frame (Follow Directions Goal 1, SLP) 1 week  -RS 1 week  -CJ 1 week  -    Progress (Ability to Follow Directions Goal 1, SLP) 60%;with minimal cues (75-90%);with moderate cues (50-74%)  -RS 50%;with minimal cues (75-90%)  -CJ 70%;with minimal cues (75-90%)  -    Progress/Outcomes (Follow Directions Goal 1, SLP) progress slower than expected  -RS progress slower than expected  -CJ continuing progress toward goal  -    Comment (Follow Directions Goal 1, SLP) Pt only intermittently able to follow 1/2 directions  -RS suspect difficulty to complete 2/2 unable to distract from \"being sick\"  -CJ --       Word Retrieval Skills Goal 1 (SLP)    Improve Word Retrieval Skills By Goal 1 (SLP) confrontational naming task;high " frequency;responsive naming task;simple;completing open ended structured sentence;answer WH question with one word;completing a divergent task;completing a convergent task;80%;with minimal cues (75-90%)  -RS confrontational naming task;high frequency;responsive naming task;simple;completing open ended structured sentence;answer WH question with one word;completing a divergent task;completing a convergent task;80%;with minimal cues (75-90%)  -CJ confrontational naming task;high frequency;responsive naming task;simple;completing open ended structured sentence;answer WH question with one word;completing a divergent task;completing a convergent task;80%;with minimal cues (75-90%)  -    Time Frame (Word Retrieval Goal 1, SLP) 1 week  -RS 1 week  -CJ 1 week  -    Progress (Word Retrieval Skills Goal 1, SLP) 50%;with minimal cues (75-90%)  -RS 50%;with moderate cues (50-74%)  -CJ --    Progress/Outcomes (Word Retrieval Goal 1, SLP) progress slower than expected  -RS continuing progress toward goal  -CJ new goal  -    Comment (Word Retrieval Goal 1, SLP) -- wh questions and naming during contextual tasks in room 2/2 difficult to redirect  -CJ --              User Key  (r) = Recorded By, (t) = Taken By, (c) = Cosigned By      Initials Name Provider Type    Dipika Alvarado, MS CCC-SLP Speech and Language Pathologist     Jazlyn Rivas, MS CCC-SLP Speech and Language Pathologist    Nbail Bowman, MS CCC-SLP Speech and Language Pathologist                              Time Calculation:      Time Calculation- SLP       Row Name 07/25/24 1605             Time Calculation- SLP    SLP Start Time 1530  -RS      SLP Received On 07/25/24  -RS         Untimed Charges    71029-CS Treatment/ST Modification Prosth Aug Alter  39  -RS         Total Minutes    Untimed Charges Total Minutes 39  -RS       Total Minutes 39  -RS                User Key  (r) = Recorded By, (t) = Taken By, (c) = Cosigned By      Initials Name  Provider Type    RS Nabil Madrid MS CCC-SLP Speech and Language Pathologist                    Therapy Charges for Today       Code Description Service Date Service Provider Modifiers Qty    [id number] [facility] TREATMENT SPEECH 3 7/25/2024 Nabil Madrid MS CCC-SLP GN 1                       MS ROBERT Bowen  7/25/2024    Electronically signed by Nabil Madrid MS CCC-SLP at 07/25/24 1849

## 2024-07-27 LAB
ANION GAP SERPL CALCULATED.3IONS-SCNC: 6 MMOL/L (ref 5–15)
BASOPHILS # BLD AUTO: 0.03 10*3/MM3 (ref 0–0.2)
BASOPHILS NFR BLD AUTO: 0.5 % (ref 0–1.5)
BUN SERPL-MCNC: 30 MG/DL (ref 8–23)
BUN/CREAT SERPL: 19.9 (ref 7–25)
CALCIUM SPEC-SCNC: 8.6 MG/DL (ref 8.6–10.5)
CHLORIDE SERPL-SCNC: 100 MMOL/L (ref 98–107)
CO2 SERPL-SCNC: 31 MMOL/L (ref 22–29)
CREAT SERPL-MCNC: 1.51 MG/DL (ref 0.57–1)
DEPRECATED RDW RBC AUTO: 48.7 FL (ref 37–54)
EGFRCR SERPLBLD CKD-EPI 2021: 34.4 ML/MIN/1.73
EOSINOPHIL # BLD AUTO: 0.25 10*3/MM3 (ref 0–0.4)
EOSINOPHIL NFR BLD AUTO: 4.3 % (ref 0.3–6.2)
ERYTHROCYTE [DISTWIDTH] IN BLOOD BY AUTOMATED COUNT: 13 % (ref 12.3–15.4)
GLUCOSE BLDC GLUCOMTR-MCNC: 129 MG/DL (ref 70–130)
GLUCOSE BLDC GLUCOMTR-MCNC: 181 MG/DL (ref 70–130)
GLUCOSE BLDC GLUCOMTR-MCNC: 186 MG/DL (ref 70–130)
GLUCOSE BLDC GLUCOMTR-MCNC: 203 MG/DL (ref 70–130)
GLUCOSE SERPL-MCNC: 169 MG/DL (ref 65–99)
HCT VFR BLD AUTO: 28.7 % (ref 34–46.6)
HGB BLD-MCNC: 9 G/DL (ref 12–15.9)
IMM GRANULOCYTES # BLD AUTO: 0.01 10*3/MM3 (ref 0–0.05)
IMM GRANULOCYTES NFR BLD AUTO: 0.2 % (ref 0–0.5)
LYMPHOCYTES # BLD AUTO: 1.43 10*3/MM3 (ref 0.7–3.1)
LYMPHOCYTES NFR BLD AUTO: 24.7 % (ref 19.6–45.3)
MCH RBC QN AUTO: 32.3 PG (ref 26.6–33)
MCHC RBC AUTO-ENTMCNC: 31.4 G/DL (ref 31.5–35.7)
MCV RBC AUTO: 102.9 FL (ref 79–97)
MONOCYTES # BLD AUTO: 0.61 10*3/MM3 (ref 0.1–0.9)
MONOCYTES NFR BLD AUTO: 10.5 % (ref 5–12)
NEUTROPHILS NFR BLD AUTO: 3.46 10*3/MM3 (ref 1.7–7)
NEUTROPHILS NFR BLD AUTO: 59.8 % (ref 42.7–76)
NRBC BLD AUTO-RTO: 0 /100 WBC (ref 0–0.2)
PLATELET # BLD AUTO: 138 10*3/MM3 (ref 140–450)
PMV BLD AUTO: 12.1 FL (ref 6–12)
POTASSIUM SERPL-SCNC: 5 MMOL/L (ref 3.5–5.2)
RBC # BLD AUTO: 2.79 10*6/MM3 (ref 3.77–5.28)
SODIUM SERPL-SCNC: 137 MMOL/L (ref 136–145)
WBC NRBC COR # BLD AUTO: 5.79 10*3/MM3 (ref 3.4–10.8)

## 2024-07-27 PROCEDURE — A9270 NON-COVERED ITEM OR SERVICE: HCPCS | Performed by: INTERNAL MEDICINE

## 2024-07-27 PROCEDURE — 63710000001 SENNOSIDES-DOCUSATE 8.6-50 MG TABLET: Performed by: STUDENT IN AN ORGANIZED HEALTH CARE EDUCATION/TRAINING PROGRAM

## 2024-07-27 PROCEDURE — A9270 NON-COVERED ITEM OR SERVICE: HCPCS | Performed by: STUDENT IN AN ORGANIZED HEALTH CARE EDUCATION/TRAINING PROGRAM

## 2024-07-27 PROCEDURE — 99231 SBSQ HOSP IP/OBS SF/LOW 25: CPT | Performed by: NURSE PRACTITIONER

## 2024-07-27 PROCEDURE — 85025 COMPLETE CBC W/AUTO DIFF WBC: CPT | Performed by: NURSE PRACTITIONER

## 2024-07-27 PROCEDURE — 63710000001 NITROFURANTOIN (MACROCRYSTAL-MONOHYDRATE) 100 MG CAPSULE: Performed by: INTERNAL MEDICINE

## 2024-07-27 PROCEDURE — 82948 REAGENT STRIP/BLOOD GLUCOSE: CPT

## 2024-07-27 PROCEDURE — 63710000001 INSULIN LISPRO (HUMAN) PER 5 UNITS: Performed by: NURSE PRACTITIONER

## 2024-07-27 PROCEDURE — 63710000001 ATORVASTATIN 40 MG TABLET

## 2024-07-27 PROCEDURE — 63710000001 GABAPENTIN 300 MG CAPSULE: Performed by: STUDENT IN AN ORGANIZED HEALTH CARE EDUCATION/TRAINING PROGRAM

## 2024-07-27 PROCEDURE — 63710000001 QUETIAPINE 25 MG TABLET: Performed by: INTERNAL MEDICINE

## 2024-07-27 PROCEDURE — 63710000001 BUSPIRONE 15 MG TABLET: Performed by: INTERNAL MEDICINE

## 2024-07-27 PROCEDURE — 63710000001 LORAZEPAM 0.5 MG TABLET: Performed by: INTERNAL MEDICINE

## 2024-07-27 PROCEDURE — 63710000001 INSULIN GLARGINE PER 5 UNITS: Performed by: STUDENT IN AN ORGANIZED HEALTH CARE EDUCATION/TRAINING PROGRAM

## 2024-07-27 PROCEDURE — 80048 BASIC METABOLIC PNL TOTAL CA: CPT | Performed by: NURSE PRACTITIONER

## 2024-07-27 PROCEDURE — A9270 NON-COVERED ITEM OR SERVICE: HCPCS | Performed by: NURSE PRACTITIONER

## 2024-07-27 PROCEDURE — 63710000001 LACTULOSE 10 GM/15ML SOLUTION: Performed by: NURSE PRACTITIONER

## 2024-07-27 PROCEDURE — 63710000001 MECLIZINE 12.5 MG TABLET: Performed by: INTERNAL MEDICINE

## 2024-07-27 PROCEDURE — 63710000001 POLYETHYLENE GLYCOL 17 G PACK: Performed by: STUDENT IN AN ORGANIZED HEALTH CARE EDUCATION/TRAINING PROGRAM

## 2024-07-27 PROCEDURE — G0378 HOSPITAL OBSERVATION PER HR: HCPCS

## 2024-07-27 PROCEDURE — 63710000001 INSULIN LISPRO (HUMAN) PER 5 UNITS: Performed by: STUDENT IN AN ORGANIZED HEALTH CARE EDUCATION/TRAINING PROGRAM

## 2024-07-27 PROCEDURE — 63710000001 HYDROCODONE-ACETAMINOPHEN 10-325 MG TABLET: Performed by: INTERNAL MEDICINE

## 2024-07-27 PROCEDURE — 63710000001 APIXABAN 5 MG TABLET: Performed by: STUDENT IN AN ORGANIZED HEALTH CARE EDUCATION/TRAINING PROGRAM

## 2024-07-27 PROCEDURE — 63710000001 DONEPEZIL 10 MG TABLET: Performed by: INTERNAL MEDICINE

## 2024-07-27 PROCEDURE — 63710000001 DULOXETINE 60 MG CAPSULE DELAYED-RELEASE PARTICLES: Performed by: INTERNAL MEDICINE

## 2024-07-27 PROCEDURE — 63710000001 LEVOTHYROXINE 25 MCG TABLET: Performed by: INTERNAL MEDICINE

## 2024-07-27 PROCEDURE — 63710000001 PANTOPRAZOLE 40 MG TABLET DELAYED-RELEASE: Performed by: STUDENT IN AN ORGANIZED HEALTH CARE EDUCATION/TRAINING PROGRAM

## 2024-07-27 PROCEDURE — A9270 NON-COVERED ITEM OR SERVICE: HCPCS

## 2024-07-27 RX ORDER — LACTULOSE 10 G/15ML
20 SOLUTION ORAL ONCE
Status: COMPLETED | OUTPATIENT
Start: 2024-07-27 | End: 2024-07-27

## 2024-07-27 RX ADMIN — FLUTICASONE PROPIONATE 2 SPRAY: 50 SPRAY, METERED NASAL at 08:11

## 2024-07-27 RX ADMIN — NYSTATIN: 100000 POWDER TOPICAL at 20:53

## 2024-07-27 RX ADMIN — INSULIN LISPRO 5 UNITS: 100 INJECTION, SOLUTION INTRAVENOUS; SUBCUTANEOUS at 20:52

## 2024-07-27 RX ADMIN — PANTOPRAZOLE SODIUM 40 MG: 40 TABLET, DELAYED RELEASE ORAL at 08:08

## 2024-07-27 RX ADMIN — NYSTATIN: 100000 POWDER TOPICAL at 17:09

## 2024-07-27 RX ADMIN — ATORVASTATIN CALCIUM 80 MG: 40 TABLET, FILM COATED ORAL at 20:51

## 2024-07-27 RX ADMIN — PANTOPRAZOLE SODIUM 40 MG: 40 TABLET, DELAYED RELEASE ORAL at 17:09

## 2024-07-27 RX ADMIN — INSULIN LISPRO 7 UNITS: 100 INJECTION, SOLUTION INTRAVENOUS; SUBCUTANEOUS at 17:11

## 2024-07-27 RX ADMIN — DICLOFENAC SODIUM 2 G: 10 GEL TOPICAL at 20:52

## 2024-07-27 RX ADMIN — MECLIZINE HYDROCHLORIDE 25 MG: 25 TABLET ORAL at 08:09

## 2024-07-27 RX ADMIN — INSULIN LISPRO 3 UNITS: 100 INJECTION, SOLUTION INTRAVENOUS; SUBCUTANEOUS at 17:11

## 2024-07-27 RX ADMIN — BUSPIRONE HYDROCHLORIDE 7.5 MG: 15 TABLET ORAL at 08:09

## 2024-07-27 RX ADMIN — DICLOFENAC SODIUM 2 G: 10 GEL TOPICAL at 12:17

## 2024-07-27 RX ADMIN — DICLOFENAC SODIUM 2 G: 10 GEL TOPICAL at 08:11

## 2024-07-27 RX ADMIN — NYSTATIN: 100000 POWDER TOPICAL at 12:17

## 2024-07-27 RX ADMIN — MECLIZINE HYDROCHLORIDE 25 MG: 25 TABLET ORAL at 20:51

## 2024-07-27 RX ADMIN — LORAZEPAM 0.5 MG: 0.5 TABLET ORAL at 20:51

## 2024-07-27 RX ADMIN — INSULIN GLARGINE 30 UNITS: 100 INJECTION, SOLUTION SUBCUTANEOUS at 20:52

## 2024-07-27 RX ADMIN — INSULIN LISPRO 7 UNITS: 100 INJECTION, SOLUTION INTRAVENOUS; SUBCUTANEOUS at 08:07

## 2024-07-27 RX ADMIN — DICLOFENAC SODIUM 2 G: 10 GEL TOPICAL at 17:09

## 2024-07-27 RX ADMIN — DULOXETINE HYDROCHLORIDE 60 MG: 60 CAPSULE, DELAYED RELEASE ORAL at 08:10

## 2024-07-27 RX ADMIN — GABAPENTIN 300 MG: 300 CAPSULE ORAL at 08:10

## 2024-07-27 RX ADMIN — POLYETHYLENE GLYCOL 3350 17 G: 17 POWDER, FOR SOLUTION ORAL at 08:07

## 2024-07-27 RX ADMIN — NITROFURANTOIN MONOHYDRATE/MACROCRYSTALS 100 MG: 75; 25 CAPSULE ORAL at 20:52

## 2024-07-27 RX ADMIN — LORAZEPAM 0.5 MG: 0.5 TABLET ORAL at 08:09

## 2024-07-27 RX ADMIN — APIXABAN 5 MG: 5 TABLET, FILM COATED ORAL at 20:51

## 2024-07-27 RX ADMIN — MECLIZINE HYDROCHLORIDE 25 MG: 25 TABLET ORAL at 17:09

## 2024-07-27 RX ADMIN — NYSTATIN: 100000 POWDER TOPICAL at 08:11

## 2024-07-27 RX ADMIN — DONEPEZIL HYDROCHLORIDE 10 MG: 10 TABLET, FILM COATED ORAL at 20:52

## 2024-07-27 RX ADMIN — INSULIN LISPRO 3 UNITS: 100 INJECTION, SOLUTION INTRAVENOUS; SUBCUTANEOUS at 12:17

## 2024-07-27 RX ADMIN — HYDROCODONE BITARTRATE AND ACETAMINOPHEN 1 TABLET: 10; 325 TABLET ORAL at 08:32

## 2024-07-27 RX ADMIN — LACTULOSE 20 G: 20 SOLUTION ORAL at 14:45

## 2024-07-27 RX ADMIN — HYDROCODONE BITARTRATE AND ACETAMINOPHEN 1 TABLET: 10; 325 TABLET ORAL at 20:52

## 2024-07-27 RX ADMIN — APIXABAN 5 MG: 5 TABLET, FILM COATED ORAL at 08:08

## 2024-07-27 RX ADMIN — QUETIAPINE FUMARATE 50 MG: 25 TABLET ORAL at 20:51

## 2024-07-27 RX ADMIN — LEVOTHYROXINE SODIUM 25 MCG: 25 TABLET ORAL at 05:31

## 2024-07-27 RX ADMIN — SENNOSIDES AND DOCUSATE SODIUM 2 TABLET: 50; 8.6 TABLET ORAL at 08:08

## 2024-07-27 RX ADMIN — BUSPIRONE HYDROCHLORIDE 7.5 MG: 15 TABLET ORAL at 20:51

## 2024-07-27 RX ADMIN — INSULIN LISPRO 7 UNITS: 100 INJECTION, SOLUTION INTRAVENOUS; SUBCUTANEOUS at 12:16

## 2024-07-27 NOTE — PROGRESS NOTES
[facility] Medicine Services  PROGRESS NOTE    Patient Name: Cheri Cruz  : 1940  MRN: [id number]    Date of Admission: 2024  Primary Care Physician: Lorrie Canada APRN    Subjective   Subjective     CC:  AMS    HPI:  Pt was sleeping on arrival, awaken with conversation. She is asking when she gets to go home. Informed her that we are waiting on rehab. She verbalized understanding.    Copied text in this note has been reviewed and is accurate as of 2024.         Objective   Objective     Vital Signs:   Temp:  [97.5 °F (36.4 °C)-98.7 °F (37.1 °C)] 97.6 °F (36.4 °C)  Heart Rate:  [57-71] 64  Resp:  [18] 18  BP: (115-160)/(49-85) 142/59  Flow (L/min):  [2] 2     Physical Exam:  Constitutional: Awake, alert, Frail appearing  HENT: NCAT, mucous membranes moist  Respiratory: Clear to auscultation bilaterally, nonlabored   Cardiovascular: RRR, no murmurs, rubs, or gallops  Gastrointestinal: Positive bowel sounds, soft, non-distended, non-tender  Musculoskeletal: No bilateral ankle edema  Psychiatric: Appropriate affect, cooperative  Neurologic: MIRELES,speech clear  Skin: No rashes    Results Reviewed:  LAB RESULTS:      Lab 24  1005 24  0530 24  0440   WBC 5.79 6.67 7.35   HEMOGLOBIN 9.0* 9.4* 9.7*   HEMATOCRIT 28.7* 29.4* 29.8*   PLATELETS 138* 125* 124*   NEUTROS ABS 3.46  --   --    IMMATURE GRANS (ABS) 0.01  --   --    LYMPHS ABS 1.43  --   --    MONOS ABS 0.61  --   --    EOS ABS 0.25  --   --    .9* 100.7* 102.4*         Lab 24  1005 24  0530 24  0454 24  0440   SODIUM 137 135* 144 139   POTASSIUM 5.0 4.8 4.7 4.1   CHLORIDE 100 98 106 101   CO2 31.0* 26.0 29.0 28.0   ANION GAP 6.0 11.0 9.0 10.0   BUN 30* 29* 33* 41*   CREATININE 1.51* 1.46* 1.66* 1.82*   EGFR 34.4* 35.8* 30.7* 27.5*   GLUCOSE 169* 103* 152* 194*   CALCIUM 8.6 8.6 8.4* 8.0*                           Brief Urine Lab Results  (Last result in the past 365  days)        Color   Clarity   Blood   Leuk Est   Nitrite   Protein   CREAT   Urine HCG        07/14/24 2001 Yellow   Cloudy   Trace   Small (1+)   Negative   Negative                   Microbiology Results Abnormal       Procedure Component Value - Date/Time    Blood Culture - Blood, Wrist, Left [[id number]]  (Normal) Collected: 07/14/24 2022    Lab Status: Final result Specimen: Blood from Wrist, Left Updated: 07/19/24 2100     Blood Culture No growth at 5 days    Narrative:      Less than seven (7) mL's of blood was collected.  Insufficient quantity may yield false negative results.    Blood Culture - Blood, Hand, Left [[id number]]  (Normal) Collected: 07/14/24 2022    Lab Status: Final result Specimen: Blood from Hand, Left Updated: 07/19/24 2100     Blood Culture No growth at 5 days    Narrative:      Less than seven (7) mL's of blood was collected.  Insufficient quantity may yield false negative results.    Urine Culture - Urine, Urine, Random Void [[id number]] Collected: 07/14/24 2001    Lab Status: Final result Specimen: Urine, Random Void Updated: 07/16/24 1020     Urine Culture 50,000 CFU/mL Normal Urogenital Nickie    Narrative:      Colonization of the urinary tract without infection is common. Treatment is discouraged unless the patient is symptomatic, pregnant, or undergoing an invasive urologic procedure.            No radiology results from the last 24 hrs    Results for orders placed during the hospital encounter of 02/10/23    Adult Transthoracic Echo Complete W/ Cont if Necessary Per Protocol    Interpretation Summary    Left ventricular systolic function is hyperdynamic (EF > 70%). Calculated left ventricular EF = 70.6% Left ventricular ejection fraction appears to be greater than 70%. The left ventricular cavity is small in size. Left ventricular wall thickness is consistent with mild concentric hypertrophy. All left ventricular wall segments contract normally. Left ventricular intracavitary  gradient noted to be 71 mmHg. Left ventricular diastolic function is consistent with (grade I) impaired relaxation. Normal left atrial pressure.    The aortic valve is abnormal in structure. There is mild calcification of the aortic valve mainly affecting the right coronary cusp(s). The aortic valve appears trileaflet. No aortic valve regurgitation is present. Gradient noted through the LV and LVOT    Compared to TTE report from  Dec 2019, hyperdynamic LV with intracavitary gradient is not a new finding but peak gradient noted on this exam is higher than previously described.      Current medications:  Scheduled Meds:apixaban, 5 mg, Oral, BID  atorvastatin, 80 mg, Oral, Nightly  busPIRone, 7.5 mg, Oral, BID  Diclofenac Sodium, 2 g, Topical, 4x Daily  donepezil, 10 mg, Oral, Nightly  DULoxetine, 60 mg, Oral, Daily  fluticasone, 2 spray, Each Nare, Daily  gabapentin, , ,   gabapentin, 300 mg, Oral, Daily  insulin glargine, 30 Units, Subcutaneous, Nightly  insulin lispro, 3-14 Units, Subcutaneous, 4x Daily AC & at Bedtime  Insulin Lispro, 7 Units, Subcutaneous, TID With Meals  levothyroxine, 25 mcg, Oral, Daily  LORazepam, 0.5 mg, Oral, Q12H  meclizine, 25 mg, Oral, TID  nitrofurantoin (macrocrystal-monohydrate), 100 mg, Oral, Q24H  nystatin, , Topical, 4x Daily  pantoprazole, 40 mg, Oral, BID AC  senna-docusate sodium, 2 tablet, Oral, Daily   And  polyethylene glycol, 17 g, Oral, Daily  QUEtiapine, 50 mg, Oral, Nightly  sodium chloride, 10 mL, Intravenous, Q12H  sodium chloride, 10 mL, Intravenous, Q12H      Continuous Infusions:   PRN Meds:.  acetaminophen **OR** acetaminophen **OR** acetaminophen    senna-docusate sodium **AND** polyethylene glycol **AND** bisacodyl **AND** bisacodyl    Calcium Replacement - Follow Nurse / BPA Driven Protocol    dextrose    dextrose    gabapentin    glucagon (human recombinant)    HYDROcodone-acetaminophen    Magnesium Standard Dose Replacement - Follow Nurse / BPA Driven  Protocol    meclizine    ondansetron ODT **OR** ondansetron    Phosphorus Replacement - Follow Nurse / BPA Driven Protocol    Potassium Replacement - Follow Nurse / BPA Driven Protocol    sodium chloride    sodium chloride    sodium chloride    sodium chloride    Assessment & Plan   Assessment & Plan     Active Hospital Problems    Diagnosis  POA    **AMS (altered mental status) [R41.82]  Yes    Stroke [I63.9]  Yes    Acquired hypothyroidism [E03.9]  Yes    History of pulmonary embolus (PE) [Z86.711]  Yes    Essential hypertension [I10]  Yes    GERD (gastroesophageal reflux disease) [K21.9]  Yes    Type 2 diabetes mellitus without complication, without long-term current use of insulin [E11.9]  Yes      Resolved Hospital Problems   No resolved problems to display.        Brief Hospital Course to date:  Cheri Cruz is a 83 y.o. female  with past medical history of dementia, type 2 diabetes mellitus, CKD stage III, essential hypertension, dyslipidemia, old left parietal stroke, PE on anticoagulation with Eliquis, who presented to the hospital as a transfer  from [facility] on 7/14/2024 due to altered mental status and concern for hemorrhagic stroke.  Initial stroke workup was negative.  MRI showed old left parietal ischemic stroke.  Stroke neurology followed and resumed Eliquis.  She received empiric IV ceftriaxone x 3 days due to concern for UTI.     Altered mental status on advanced dementia  Hypotension   Concern for UTI  - initial stroke workup negative.  MRI showed old L parietal ischemic stroke; pt seen by Stroke team; apixiban restarted.   -Possibly dehydration vs hypoxia  -COVID/flu negative. Blood cultures with no growth to date. LFTs normal. CXR no acute findings.    -CXR with old calcified gr  -S/p empiric IV ceftriaxone x 3 days.   -Held sedatives initially, Okay to resume PRN lorazepam (home med)   -Continue donepezil, Seroquel, BuSpar.       Hand tremors  - distressing to patient.  Monitor.      Vertigo  - reports this is common for her  - change meclizine to scheduled     Acute on chronic hypoxemic respiratory failure  Near-syncope  -Per report, patient became hypoxic with oxygen saturation in the 60s  -CTA chest showed no PE, no acute process   -Patient is O2 dependent on 2L NC but is not compliant due her baseline dementia.  -Continue supplemental oxygenation, titrate for goal SpO2 greater than 90%.     Evolving old left parietal ischemic stroke  -Presented to [facility] with cognitive decline and metabolic encephalopathy. She was hypotensive with systolic in the 90s at [facility].   -CTH from [facility] with left parietal tiny hemorrhage vs calcification on top of old ischemic stroke.   -MRI showed evolving old left parietal lobe CVA with some areas of associated cortical laminar necrosis. No hemorrhage.    -Neurology evaluated, recommended to resume Eliquis. Continue statin. Annual follow up in stroke clinic.   -PT/OT now recommending SNF      RADHA on CKD stage III  Volume depletion due to dehydration   Mild hyperkalemia  -Baseline Cr about 1.3, Cr 2.06 on presentation, improved to  1.2.  Then increased to 1.8 and got IV fluids again.    - pt at risk of recurrent dehydration/RADHA.  Will stop hytrin for this reason   - AM labs      Complex disposition  -PT/OT evaluated, felt patient's functional status is at baseline. Recommended home with 24/7 care.  -partner discussed care with both patient's daughters and case management on 7/17.   - Family leaning towards pursuing long-term care after discharge as they are having difficulty taking care of her on their own and do not have finances for full-time caregiver.     Nausea, constipation   -KUB 7/14 with nonobstructive bowel gas pattern. Repeat KUB 7/18 with moderate stool burden.  -Continue bowel regimen. Increased PPI to BID. PRN Zofran for nausea.      Uncontrolled diabetes mellitus type 2 with hyperglycemia  -A1c 9.80% this admission  -Increase Lantus to 30 units  qhs, increase  lispro 7u TIDAC and moderate-high dose SSI -- improved   -Resumed home gabapentin at low-dose 300 mg daily.     Chronic anemia  Macrocytosis  -No evidence of overt GI bleeding this admission  -B12 and folate within normal limits  -Further workup of anemia as outpatient as appropriate.      Essential hypertension  -Was hypotensive at [facility].  -Not on antihypertensive medications.  - stable      Hyperlipidemia -Continue atorvastatin.  Hypothyroidism -Continue levothyroxine.  GERD -Continue PPI.     Expected Discharge Location and Transportation: [facility]   Expected Discharge   Expected Discharge Date: 7/29/2024; Expected Discharge Time:      VTE Prophylaxis:  Pharmacologic & mechanical VTE prophylaxis orders are present.         AM-PAC 6 Clicks Score (PT): 10 (07/27/24 0800)    CODE STATUS:   Code Status and Medical Interventions:   Ordered at: 07/15/24 1319     Level Of Support Discussed With:    Patient     Code Status (Patient has no pulse and is not breathing):    CPR (Attempt to Resuscitate)     Medical Interventions (Patient has pulse or is breathing):    Full Support       EVELINA Larson  07/27/24

## 2024-07-28 LAB
GLUCOSE BLDC GLUCOMTR-MCNC: 112 MG/DL (ref 70–130)
GLUCOSE BLDC GLUCOMTR-MCNC: 163 MG/DL (ref 70–130)
GLUCOSE BLDC GLUCOMTR-MCNC: 222 MG/DL (ref 70–130)
GLUCOSE BLDC GLUCOMTR-MCNC: 253 MG/DL (ref 70–130)

## 2024-07-28 PROCEDURE — 63710000001 NITROFURANTOIN (MACROCRYSTAL-MONOHYDRATE) 100 MG CAPSULE: Performed by: INTERNAL MEDICINE

## 2024-07-28 PROCEDURE — A9270 NON-COVERED ITEM OR SERVICE: HCPCS | Performed by: STUDENT IN AN ORGANIZED HEALTH CARE EDUCATION/TRAINING PROGRAM

## 2024-07-28 PROCEDURE — 63710000001 LORAZEPAM 0.5 MG TABLET: Performed by: INTERNAL MEDICINE

## 2024-07-28 PROCEDURE — 63710000001 HYDROCODONE-ACETAMINOPHEN 10-325 MG TABLET: Performed by: INTERNAL MEDICINE

## 2024-07-28 PROCEDURE — A9270 NON-COVERED ITEM OR SERVICE: HCPCS | Performed by: INTERNAL MEDICINE

## 2024-07-28 PROCEDURE — 63710000001 DONEPEZIL 10 MG TABLET: Performed by: INTERNAL MEDICINE

## 2024-07-28 PROCEDURE — 63710000001 ATORVASTATIN 40 MG TABLET

## 2024-07-28 PROCEDURE — A9270 NON-COVERED ITEM OR SERVICE: HCPCS | Performed by: NURSE PRACTITIONER

## 2024-07-28 PROCEDURE — 63710000001 SENNOSIDES-DOCUSATE 8.6-50 MG TABLET: Performed by: STUDENT IN AN ORGANIZED HEALTH CARE EDUCATION/TRAINING PROGRAM

## 2024-07-28 PROCEDURE — 63710000001 BUSPIRONE 15 MG TABLET: Performed by: INTERNAL MEDICINE

## 2024-07-28 PROCEDURE — 63710000001 LEVOTHYROXINE 25 MCG TABLET: Performed by: INTERNAL MEDICINE

## 2024-07-28 PROCEDURE — 63710000001 INSULIN LISPRO (HUMAN) PER 5 UNITS: Performed by: NURSE PRACTITIONER

## 2024-07-28 PROCEDURE — 63710000001 MECLIZINE 12.5 MG TABLET: Performed by: INTERNAL MEDICINE

## 2024-07-28 PROCEDURE — 63710000001 GABAPENTIN 300 MG CAPSULE

## 2024-07-28 PROCEDURE — 99231 SBSQ HOSP IP/OBS SF/LOW 25: CPT | Performed by: NURSE PRACTITIONER

## 2024-07-28 PROCEDURE — 63710000001 APIXABAN 5 MG TABLET: Performed by: STUDENT IN AN ORGANIZED HEALTH CARE EDUCATION/TRAINING PROGRAM

## 2024-07-28 PROCEDURE — A9270 NON-COVERED ITEM OR SERVICE: HCPCS

## 2024-07-28 PROCEDURE — G0378 HOSPITAL OBSERVATION PER HR: HCPCS

## 2024-07-28 PROCEDURE — 63710000001 INSULIN GLARGINE PER 5 UNITS: Performed by: STUDENT IN AN ORGANIZED HEALTH CARE EDUCATION/TRAINING PROGRAM

## 2024-07-28 PROCEDURE — 82948 REAGENT STRIP/BLOOD GLUCOSE: CPT

## 2024-07-28 PROCEDURE — 63710000001 INSULIN LISPRO (HUMAN) PER 5 UNITS: Performed by: STUDENT IN AN ORGANIZED HEALTH CARE EDUCATION/TRAINING PROGRAM

## 2024-07-28 PROCEDURE — 63710000001 PANTOPRAZOLE 40 MG TABLET DELAYED-RELEASE: Performed by: STUDENT IN AN ORGANIZED HEALTH CARE EDUCATION/TRAINING PROGRAM

## 2024-07-28 PROCEDURE — 63710000001 QUETIAPINE 25 MG TABLET: Performed by: INTERNAL MEDICINE

## 2024-07-28 PROCEDURE — 63710000001 DULOXETINE 60 MG CAPSULE DELAYED-RELEASE PARTICLES: Performed by: INTERNAL MEDICINE

## 2024-07-28 PROCEDURE — 63710000001 POLYETHYLENE GLYCOL 17 G PACK: Performed by: STUDENT IN AN ORGANIZED HEALTH CARE EDUCATION/TRAINING PROGRAM

## 2024-07-28 RX ADMIN — PANTOPRAZOLE SODIUM 40 MG: 40 TABLET, DELAYED RELEASE ORAL at 17:17

## 2024-07-28 RX ADMIN — GABAPENTIN 300 MG: 300 CAPSULE ORAL at 08:45

## 2024-07-28 RX ADMIN — INSULIN LISPRO 7 UNITS: 100 INJECTION, SOLUTION INTRAVENOUS; SUBCUTANEOUS at 13:03

## 2024-07-28 RX ADMIN — DICLOFENAC SODIUM 2 G: 10 GEL TOPICAL at 08:44

## 2024-07-28 RX ADMIN — LORAZEPAM 0.5 MG: 0.5 TABLET ORAL at 08:44

## 2024-07-28 RX ADMIN — PANTOPRAZOLE SODIUM 40 MG: 40 TABLET, DELAYED RELEASE ORAL at 08:44

## 2024-07-28 RX ADMIN — BUSPIRONE HYDROCHLORIDE 7.5 MG: 15 TABLET ORAL at 08:43

## 2024-07-28 RX ADMIN — APIXABAN 5 MG: 5 TABLET, FILM COATED ORAL at 08:43

## 2024-07-28 RX ADMIN — DICLOFENAC SODIUM 2 G: 10 GEL TOPICAL at 13:03

## 2024-07-28 RX ADMIN — MECLIZINE HYDROCHLORIDE 25 MG: 25 TABLET ORAL at 08:45

## 2024-07-28 RX ADMIN — INSULIN GLARGINE 30 UNITS: 100 INJECTION, SOLUTION SUBCUTANEOUS at 21:01

## 2024-07-28 RX ADMIN — LEVOTHYROXINE SODIUM 25 MCG: 25 TABLET ORAL at 05:56

## 2024-07-28 RX ADMIN — NYSTATIN: 100000 POWDER TOPICAL at 08:45

## 2024-07-28 RX ADMIN — QUETIAPINE FUMARATE 50 MG: 25 TABLET ORAL at 20:54

## 2024-07-28 RX ADMIN — FLUTICASONE PROPIONATE 2 SPRAY: 50 SPRAY, METERED NASAL at 08:44

## 2024-07-28 RX ADMIN — NYSTATIN: 100000 POWDER TOPICAL at 13:03

## 2024-07-28 RX ADMIN — LORAZEPAM 0.5 MG: 0.5 TABLET ORAL at 20:52

## 2024-07-28 RX ADMIN — BUSPIRONE HYDROCHLORIDE 7.5 MG: 15 TABLET ORAL at 20:55

## 2024-07-28 RX ADMIN — ATORVASTATIN CALCIUM 80 MG: 40 TABLET, FILM COATED ORAL at 20:53

## 2024-07-28 RX ADMIN — INSULIN LISPRO 5 UNITS: 100 INJECTION, SOLUTION INTRAVENOUS; SUBCUTANEOUS at 17:18

## 2024-07-28 RX ADMIN — INSULIN LISPRO 8 UNITS: 100 INJECTION, SOLUTION INTRAVENOUS; SUBCUTANEOUS at 21:01

## 2024-07-28 RX ADMIN — DULOXETINE HYDROCHLORIDE 60 MG: 60 CAPSULE, DELAYED RELEASE ORAL at 08:43

## 2024-07-28 RX ADMIN — APIXABAN 5 MG: 5 TABLET, FILM COATED ORAL at 20:54

## 2024-07-28 RX ADMIN — HYDROCODONE BITARTRATE AND ACETAMINOPHEN 1 TABLET: 10; 325 TABLET ORAL at 21:05

## 2024-07-28 RX ADMIN — SENNOSIDES AND DOCUSATE SODIUM 2 TABLET: 50; 8.6 TABLET ORAL at 08:43

## 2024-07-28 RX ADMIN — NYSTATIN: 100000 POWDER TOPICAL at 17:18

## 2024-07-28 RX ADMIN — NITROFURANTOIN MONOHYDRATE/MACROCRYSTALS 100 MG: 75; 25 CAPSULE ORAL at 20:56

## 2024-07-28 RX ADMIN — INSULIN LISPRO 3 UNITS: 100 INJECTION, SOLUTION INTRAVENOUS; SUBCUTANEOUS at 13:03

## 2024-07-28 RX ADMIN — MECLIZINE HYDROCHLORIDE 25 MG: 25 TABLET ORAL at 17:17

## 2024-07-28 RX ADMIN — DONEPEZIL HYDROCHLORIDE 10 MG: 10 TABLET, FILM COATED ORAL at 20:54

## 2024-07-28 RX ADMIN — POLYETHYLENE GLYCOL 3350 17 G: 17 POWDER, FOR SOLUTION ORAL at 08:44

## 2024-07-28 RX ADMIN — INSULIN LISPRO 7 UNITS: 100 INJECTION, SOLUTION INTRAVENOUS; SUBCUTANEOUS at 08:44

## 2024-07-28 RX ADMIN — INSULIN LISPRO 7 UNITS: 100 INJECTION, SOLUTION INTRAVENOUS; SUBCUTANEOUS at 17:17

## 2024-07-28 RX ADMIN — DICLOFENAC SODIUM 2 G: 10 GEL TOPICAL at 17:18

## 2024-07-28 NOTE — PROGRESS NOTES
[facility] Medicine Services  PROGRESS NOTE    Patient Name: Cheri Cruz  : 1940  MRN: [id number]    Date of Admission: 2024  Primary Care Physician: Lorrie Canada APRN    Subjective   Subjective     CC:  AMS    HPI:  Pt sleeping upon arrival, awaken with conversation. Denies any issues today. Pt had BM yesterday.     Copied text in this note has been reviewed and is accurate as of 24.       Objective   Objective     Vital Signs:   Temp:  [97.5 °F (36.4 °C)-97.8 °F (36.6 °C)] 97.5 °F (36.4 °C)  Heart Rate:  [61-73] 61  Resp:  [16-18] 18  BP: (141-166)/(59-98) 141/64  Flow (L/min):  [2] 2     Physical Exam:  Constitutional: Awake, alert, Frail appearing  HENT: NCAT, mucous membranes moist  Respiratory: Clear to auscultation bilaterally, nonlabored 2L 99%  Cardiovascular: RRR, no murmurs, rubs, or gallops, HR 61  Gastrointestinal: Positive bowel sounds, soft, nontender, nondistended  Musculoskeletal: No bilateral ankle edema  Psychiatric: Appropriate affect, cooperative  Neurologic: Oriented to self, MIRELES, speech clear  Skin: No rashes      Results Reviewed:  LAB RESULTS:      Lab 24  1005 24  0530 24  0440   WBC 5.79 6.67 7.35   HEMOGLOBIN 9.0* 9.4* 9.7*   HEMATOCRIT 28.7* 29.4* 29.8*   PLATELETS 138* 125* 124*   NEUTROS ABS 3.46  --   --    IMMATURE GRANS (ABS) 0.01  --   --    LYMPHS ABS 1.43  --   --    MONOS ABS 0.61  --   --    EOS ABS 0.25  --   --    .9* 100.7* 102.4*         Lab 24  1005 24  0530 24  0454 24  0440   SODIUM 137 135* 144 139   POTASSIUM 5.0 4.8 4.7 4.1   CHLORIDE 100 98 106 101   CO2 31.0* 26.0 29.0 28.0   ANION GAP 6.0 11.0 9.0 10.0   BUN 30* 29* 33* 41*   CREATININE 1.51* 1.46* 1.66* 1.82*   EGFR 34.4* 35.8* 30.7* 27.5*   GLUCOSE 169* 103* 152* 194*   CALCIUM 8.6 8.6 8.4* 8.0*                           Brief Urine Lab Results  (Last result in the past 365 days)        Color   Clarity   Blood    Leuk Est   Nitrite   Protein   CREAT   Urine HCG        07/14/24 2001 Yellow   Cloudy   Trace   Small (1+)   Negative   Negative                   Microbiology Results Abnormal       Procedure Component Value - Date/Time    Blood Culture - Blood, Wrist, Left [[id number]]  (Normal) Collected: 07/14/24 2022    Lab Status: Final result Specimen: Blood from Wrist, Left Updated: 07/19/24 2100     Blood Culture No growth at 5 days    Narrative:      Less than seven (7) mL's of blood was collected.  Insufficient quantity may yield false negative results.    Blood Culture - Blood, Hand, Left [[id number]]  (Normal) Collected: 07/14/24 2022    Lab Status: Final result Specimen: Blood from Hand, Left Updated: 07/19/24 2100     Blood Culture No growth at 5 days    Narrative:      Less than seven (7) mL's of blood was collected.  Insufficient quantity may yield false negative results.    Urine Culture - Urine, Urine, Random Void [[id number]] Collected: 07/14/24 2001    Lab Status: Final result Specimen: Urine, Random Void Updated: 07/16/24 1020     Urine Culture 50,000 CFU/mL Normal Urogenital Nickie    Narrative:      Colonization of the urinary tract without infection is common. Treatment is discouraged unless the patient is symptomatic, pregnant, or undergoing an invasive urologic procedure.            No radiology results from the last 24 hrs    Results for orders placed during the hospital encounter of 02/10/23    Adult Transthoracic Echo Complete W/ Cont if Necessary Per Protocol    Interpretation Summary    Left ventricular systolic function is hyperdynamic (EF > 70%). Calculated left ventricular EF = 70.6% Left ventricular ejection fraction appears to be greater than 70%. The left ventricular cavity is small in size. Left ventricular wall thickness is consistent with mild concentric hypertrophy. All left ventricular wall segments contract normally. Left ventricular intracavitary gradient noted to be 71 mmHg. Left  ventricular diastolic function is consistent with (grade I) impaired relaxation. Normal left atrial pressure.    The aortic valve is abnormal in structure. There is mild calcification of the aortic valve mainly affecting the right coronary cusp(s). The aortic valve appears trileaflet. No aortic valve regurgitation is present. Gradient noted through the LV and LVOT    Compared to TTE report from  Dec 2019, hyperdynamic LV with intracavitary gradient is not a new finding but peak gradient noted on this exam is higher than previously described.      Current medications:  Scheduled Meds:apixaban, 5 mg, Oral, BID  atorvastatin, 80 mg, Oral, Nightly  busPIRone, 7.5 mg, Oral, BID  Diclofenac Sodium, 2 g, Topical, 4x Daily  donepezil, 10 mg, Oral, Nightly  DULoxetine, 60 mg, Oral, Daily  fluticasone, 2 spray, Each Nare, Daily  gabapentin, , ,   gabapentin, 300 mg, Oral, Daily  insulin glargine, 30 Units, Subcutaneous, Nightly  insulin lispro, 3-14 Units, Subcutaneous, 4x Daily AC & at Bedtime  Insulin Lispro, 7 Units, Subcutaneous, TID With Meals  levothyroxine, 25 mcg, Oral, Daily  LORazepam, 0.5 mg, Oral, Q12H  meclizine, 25 mg, Oral, TID  nitrofurantoin (macrocrystal-monohydrate), 100 mg, Oral, Q24H  nystatin, , Topical, 4x Daily  pantoprazole, 40 mg, Oral, BID AC  senna-docusate sodium, 2 tablet, Oral, Daily   And  polyethylene glycol, 17 g, Oral, Daily  QUEtiapine, 50 mg, Oral, Nightly  sodium chloride, 10 mL, Intravenous, Q12H  sodium chloride, 10 mL, Intravenous, Q12H      Continuous Infusions:   PRN Meds:.  acetaminophen **OR** acetaminophen **OR** acetaminophen    senna-docusate sodium **AND** polyethylene glycol **AND** bisacodyl **AND** bisacodyl    Calcium Replacement - Follow Nurse / BPA Driven Protocol    dextrose    dextrose    gabapentin    glucagon (human recombinant)    HYDROcodone-acetaminophen    Magnesium Standard Dose Replacement - Follow Nurse / BPA Driven Protocol    meclizine    ondansetron ODT  **OR** ondansetron    Phosphorus Replacement - Follow Nurse / BPA Driven Protocol    Potassium Replacement - Follow Nurse / BPA Driven Protocol    sodium chloride    sodium chloride    sodium chloride    sodium chloride    Assessment & Plan   Assessment & Plan     Active Hospital Problems    Diagnosis  POA    **AMS (altered mental status) [R41.82]  Yes    Stroke [I63.9]  Yes    Acquired hypothyroidism [E03.9]  Yes    History of pulmonary embolus (PE) [Z86.711]  Yes    Essential hypertension [I10]  Yes    GERD (gastroesophageal reflux disease) [K21.9]  Yes    Type 2 diabetes mellitus without complication, without long-term current use of insulin [E11.9]  Yes      Resolved Hospital Problems   No resolved problems to display.        Brief Hospital Course to date:  Cheri Cruz is a 83 y.o. 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  Initial stroke workup was negative.  MRI showed old left parietal ischemic stroke.  Stroke neurology followed and resumed Eliquis.  She received empiric IV ceftriaxone x 3 days due to concern for UTI.     Altered mental status on advanced dementia  Hypotension   Concern for UTI  - initial stroke workup negative.  MRI showed old L parietal ischemic stroke; pt seen by Stroke team; apixiban restarted.   -Possibly dehydration vs hypoxia  -COVID/flu negative. Blood cultures with no growth to date. LFTs normal. CXR no acute findings.    -CXR with old calcified gr  -S/p empiric IV ceftriaxone x 3 days.   -Held sedatives initially, Okay to resume PRN lorazepam (home med)   -Continue donepezil, Seroquel, BuSpar.       Hand tremors  - distressing to patient. Monitor.      Vertigo  - reports this is common  for her  - change meclizine to scheduled     Acute on chronic hypoxemic respiratory failure  Near-syncope  -Per report, patient became hypoxic with oxygen saturation in the 60s  -CTA chest showed no PE, no acute process   -Patient is O2 dependent on 2L NC but is not compliant due her baseline dementia.  -Continue supplemental oxygenation, titrate for goal SpO2 greater than 90%.     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 She was hypotensive with systolic in the 90s at [facility].   -CTH from [facility] with left parietal tiny hemorrhage vs calcification on top of old ischemic stroke.   -MRI showed evolving old left parietal lobe CVA with some areas of associated cortical laminar necrosis. No hemorrhage.    -Neurology evaluated, recommended to resume Eliquis. Continue statin. Annual follow up in stroke clinic.   -PT/OT now recommending SNF      RADHA on CKD stage III  Volume depletion due to dehydration   Mild hyperkalemia  -Baseline Cr about 1.3, Cr 2.06 on presentation, improved to  1.2.  Then increased to 1.8 and got IV fluids again.    - pt at risk of recurrent dehydration/RADHA.  Will stop hytrin for this reason      Complex disposition  -PT/OT evaluated, felt patient's functional status is at baseline. Recommended home with 24/7 care.  -partner discussed care with both patient's daughters and case management on 7/17.   - Family leaning towards pursuing long-term care after discharge as they are having difficulty taking care of her on their own and do not have finances for full-time caregiver.     Nausea, constipation   -KUB 7/14 with nonobstructive bowel gas pattern. Repeat KUB 7/18 with moderate stool burden.  -Continue bowel regimen. Increased PPI to BID. PRN Zofran for nausea.      Uncontrolled diabetes mellitus type 2 with hyperglycemia  -A1c 9.80% this admission  -Increase Lantus to 30 units qhs, increased  lispro 7u TIDAC and moderate-high dose SSI  -- improved   -Resumed home gabapentin at low-dose 300 mg daily.     Chronic anemia  Macrocytosis  -No evidence of overt GI bleeding this admission  -B12 and folate within normal limits  -Further workup of anemia as outpatient as appropriate.      Essential hypertension  -Was hypotensive at [facility].  -Not on antihypertensive medications.  - stable      Hyperlipidemia -Continue atorvastatin.  Hypothyroidism -Continue levothyroxine.  GERD -Continue PPI.     Expected Discharge Location and Transportation: [facility]   Expected Discharge   Expected Discharge Date: 7/29/2024; Expected Discharge Time:      VTE Prophylaxis:  Pharmacologic & mechanical VTE prophylaxis orders are present.         AM-PAC 6 Clicks Score (PT): 10 (07/27/24 2000)    CODE STATUS:   Code Status and Medical Interventions:   Ordered at: 07/15/24 1319     Level Of Support Discussed With:    Patient     Code Status (Patient has no pulse and is not breathing):    CPR (Attempt to Resuscitate)     Medical Interventions (Patient has pulse or is breathing):    Full Support       Lauryn White, EVELINA  07/28/24

## 2024-07-28 NOTE — PLAN OF CARE
Problem: Adult Inpatient Plan of Care  Goal: Plan of Care Review  Outcome: Ongoing, Progressing  Goal: Patient-Specific Goal (Individualized)  Outcome: Ongoing, Progressing  Goal: Absence of Hospital-Acquired Illness or Injury  Outcome: Ongoing, Progressing  Intervention: Identify and Manage Fall Risk  Description: Perform standard risk assessment on admission using a validated tool or comprehensive approach appropriate to the patient; reassess fall risk frequently, with change in status or transfer to another level of care.  Communicate fall injury risk to interprofessional healthcare team.  Determine need for increased observation, equipment and environmental modification, such as low bed, signage and supportive, nonskid footwear.  Adjust safety measures to individual developmental age, stage and identified risk factors.  Reinforce the importance of safety and physical activity with patient and family.  Perform regular intentional rounding to assess need for position change, pain assessment and personal needs, including assistance with toileting.  Recent Flowsheet Documentation  Taken 7/28/2024 1400 by Hazel Viera, RN  Safety Promotion/Fall Prevention:   activity supervised   assistive device/personal items within reach   clutter free environment maintained   fall prevention program maintained   nonskid shoes/slippers when out of bed   room organization consistent   safety round/check completed   toileting scheduled  Taken 7/28/2024 1200 by Hazel Viera, RN  Safety Promotion/Fall Prevention:   activity supervised   assistive device/personal items within reach   clutter free environment maintained   fall prevention program maintained   nonskid shoes/slippers when out of bed   room organization consistent   safety round/check completed   toileting scheduled  Taken 7/28/2024 1000 by Hazel Viera, RN  Safety Promotion/Fall Prevention:   activity supervised   assistive device/personal items within reach    clutter free environment maintained   fall prevention program maintained   nonskid shoes/slippers when out of bed   room organization consistent   safety round/check completed  Taken 7/28/2024 0800 by Hazel Viera RN  Safety Promotion/Fall Prevention:   activity supervised   assistive device/personal items within reach   clutter free environment maintained   fall prevention program maintained   nonskid shoes/slippers when out of bed   room organization consistent   safety round/check completed   toileting scheduled  Intervention: Prevent Skin Injury  Description: Perform a screening for skin injury risk, such as pressure or moisture associated skin damage on admission and at regular intervals throughout hospital stay.  Keep all areas of skin (especially folds) clean and dry.  Maintain adequate skin hydration.  Relieve and redistribute pressure and protect bony prominences; implement measures based on patient-specific risk factors.  Match turning and repositioning schedule to clinical condition.  Encourage weight shift frequently; assist with reposition if unable to complete independently.  Float heels off bed; avoid pressure on the Achilles tendon.  Keep skin free from extended contact with medical devices.  Encourage functional activity and mobility, as early as tolerated.  Use aids (e.g., slide boards, mechanical lift) during transfer.  Recent Flowsheet Documentation  Taken 7/28/2024 1400 by Hazel Viera, RN  Body Position:   turned   right  Skin Protection:   adhesive use limited   incontinence pads utilized   protective footwear used   skin-to-device areas padded   skin-to-skin areas padded   transparent dressing maintained   tubing/devices free from skin contact  Taken 7/28/2024 1200 by Hazle Viera, RN  Body Position:   turned   left  Skin Protection:   adhesive use limited   incontinence pads utilized   protective footwear used   skin-to-device areas padded   skin-to-skin areas padded   transparent  dressing maintained   tubing/devices free from skin contact  Taken 7/28/2024 1000 by Hazel Viera RN  Body Position:   tilted   right  Skin Protection:   adhesive use limited   incontinence pads utilized   protective footwear used   skin-to-device areas padded   skin-to-skin areas padded   transparent dressing maintained   tubing/devices free from skin contact  Taken 7/28/2024 0800 by Hazel Viera RN  Body Position: sitting up in bed  Skin Protection:   adhesive use limited   incontinence pads utilized   protective footwear used   skin-to-device areas padded   skin-to-skin areas padded   transparent dressing maintained   tubing/devices free from skin contact  Intervention: Prevent and Manage VTE (Venous Thromboembolism) Risk  Description: Assess for VTE (venous thromboembolism) risk.  Encourage and assist with early ambulation.  Initiate and maintain compression or other therapy, as indicated, based on identified risk in accordance with organizational protocol and provider order.  Encourage both active and passive leg exercises while in bed, if unable to ambulate.  Recent Flowsheet Documentation  Taken 7/28/2024 1400 by Hzael Viera RN  Activity Management: activity encouraged  VTE Prevention/Management: (eliquis) other (see comments)  Taken 7/28/2024 1200 by Hazel Viera RN  Activity Management: activity encouraged  VTE Prevention/Management: (eliquis) other (see comments)  Range of Motion: active ROM (range of motion) encouraged  Taken 7/28/2024 1000 by Hazel Viera RN  Activity Management: activity encouraged  VTE Prevention/Management: (eliquis) other (see comments)  Taken 7/28/2024 0800 by Hazel Viera RN  Activity Management: activity encouraged  VTE Prevention/Management: (eliquis) other (see comments)  Range of Motion: active ROM (range of motion) encouraged  Intervention: Prevent Infection  Description: Maintain skin and mucous membrane integrity; promote hand, oral and pulmonary  hygiene.  Optimize fluid balance, nutrition, sleep and glycemic control to maximize infection resistance.  Identify potential sources of infection early to prevent or mitigate progression of infection (e.g., wound, lines, devices).  Evaluate ongoing need for invasive devices; remove promptly when no longer indicated.  Recent Flowsheet Documentation  Taken 7/28/2024 1400 by Hazel Viera, RN  Infection Prevention:   environmental surveillance performed   hand hygiene promoted   rest/sleep promoted   single patient room provided  Taken 7/28/2024 1200 by Hazel Viera RN  Infection Prevention:   environmental surveillance performed   hand hygiene promoted   rest/sleep promoted   single patient room provided  Taken 7/28/2024 1000 by Hazel Viera RN  Infection Prevention:   environmental surveillance performed   hand hygiene promoted   rest/sleep promoted   single patient room provided  Taken 7/28/2024 0800 by Hazel Viera RN  Infection Prevention:   environmental surveillance performed   hand hygiene promoted   rest/sleep promoted   single patient room provided  Goal: Optimal Comfort and Wellbeing  Outcome: Ongoing, Progressing  Intervention: Provide Person-Centered Care  Description: Use a family-focused approach to care.  Develop trust and rapport by proactively providing information, encouraging questions, addressing concerns and offering reassurance.  Acknowledge emotional response to hospitalization.  Recognize and utilize personal coping strategies.  Honor spiritual and cultural preferences.  Recent Flowsheet Documentation  Taken 7/28/2024 0800 by Hazel Viera RN  Trust Relationship/Rapport:   care explained   choices provided   emotional support provided   empathic listening provided   questions answered   questions encouraged   reassurance provided   thoughts/feelings acknowledged  Goal: Readiness for Transition of Care  Outcome: Ongoing, Progressing   Goal Outcome Evaluation:

## 2024-07-29 LAB
GLUCOSE BLDC GLUCOMTR-MCNC: 130 MG/DL (ref 70–130)
GLUCOSE BLDC GLUCOMTR-MCNC: 169 MG/DL (ref 70–130)
GLUCOSE BLDC GLUCOMTR-MCNC: 173 MG/DL (ref 70–130)
GLUCOSE BLDC GLUCOMTR-MCNC: 208 MG/DL (ref 70–130)

## 2024-07-29 PROCEDURE — A9270 NON-COVERED ITEM OR SERVICE: HCPCS | Performed by: INTERNAL MEDICINE

## 2024-07-29 PROCEDURE — A9270 NON-COVERED ITEM OR SERVICE: HCPCS | Performed by: STUDENT IN AN ORGANIZED HEALTH CARE EDUCATION/TRAINING PROGRAM

## 2024-07-29 PROCEDURE — 63710000001 DONEPEZIL 10 MG TABLET: Performed by: INTERNAL MEDICINE

## 2024-07-29 PROCEDURE — 99231 SBSQ HOSP IP/OBS SF/LOW 25: CPT | Performed by: NURSE PRACTITIONER

## 2024-07-29 PROCEDURE — 63710000001 INSULIN LISPRO (HUMAN) PER 5 UNITS: Performed by: STUDENT IN AN ORGANIZED HEALTH CARE EDUCATION/TRAINING PROGRAM

## 2024-07-29 PROCEDURE — 63710000001 BUSPIRONE 15 MG TABLET: Performed by: INTERNAL MEDICINE

## 2024-07-29 PROCEDURE — 63710000001 INSULIN GLARGINE PER 5 UNITS: Performed by: STUDENT IN AN ORGANIZED HEALTH CARE EDUCATION/TRAINING PROGRAM

## 2024-07-29 PROCEDURE — 63710000001 NITROFURANTOIN (MACROCRYSTAL-MONOHYDRATE) 100 MG CAPSULE: Performed by: INTERNAL MEDICINE

## 2024-07-29 PROCEDURE — A9270 NON-COVERED ITEM OR SERVICE: HCPCS

## 2024-07-29 PROCEDURE — 63710000001 DICLOFENAC SODIUM 1 % GEL 50 G TUBE: Performed by: INTERNAL MEDICINE

## 2024-07-29 PROCEDURE — 63710000001 ONDANSETRON ODT 4 MG TABLET DISPERSIBLE: Performed by: INTERNAL MEDICINE

## 2024-07-29 PROCEDURE — 82948 REAGENT STRIP/BLOOD GLUCOSE: CPT

## 2024-07-29 PROCEDURE — 63710000001 MECLIZINE 12.5 MG TABLET: Performed by: INTERNAL MEDICINE

## 2024-07-29 PROCEDURE — 63710000001 INSULIN LISPRO (HUMAN) PER 5 UNITS: Performed by: NURSE PRACTITIONER

## 2024-07-29 PROCEDURE — 63710000001 DULOXETINE 60 MG CAPSULE DELAYED-RELEASE PARTICLES: Performed by: INTERNAL MEDICINE

## 2024-07-29 PROCEDURE — 97530 THERAPEUTIC ACTIVITIES: CPT

## 2024-07-29 PROCEDURE — 63710000001 PANTOPRAZOLE 40 MG TABLET DELAYED-RELEASE: Performed by: STUDENT IN AN ORGANIZED HEALTH CARE EDUCATION/TRAINING PROGRAM

## 2024-07-29 PROCEDURE — 63710000001 ATORVASTATIN 40 MG TABLET

## 2024-07-29 PROCEDURE — 63710000001 APIXABAN 5 MG TABLET: Performed by: STUDENT IN AN ORGANIZED HEALTH CARE EDUCATION/TRAINING PROGRAM

## 2024-07-29 PROCEDURE — 63710000001 SENNOSIDES-DOCUSATE 8.6-50 MG TABLET: Performed by: STUDENT IN AN ORGANIZED HEALTH CARE EDUCATION/TRAINING PROGRAM

## 2024-07-29 PROCEDURE — 63710000001 QUETIAPINE 25 MG TABLET: Performed by: INTERNAL MEDICINE

## 2024-07-29 PROCEDURE — 97110 THERAPEUTIC EXERCISES: CPT

## 2024-07-29 PROCEDURE — 63710000001 LEVOTHYROXINE 25 MCG TABLET: Performed by: INTERNAL MEDICINE

## 2024-07-29 PROCEDURE — 63710000001 GABAPENTIN 300 MG CAPSULE: Performed by: STUDENT IN AN ORGANIZED HEALTH CARE EDUCATION/TRAINING PROGRAM

## 2024-07-29 PROCEDURE — G0378 HOSPITAL OBSERVATION PER HR: HCPCS

## 2024-07-29 PROCEDURE — 63710000001 HYDROCODONE-ACETAMINOPHEN 10-325 MG TABLET: Performed by: INTERNAL MEDICINE

## 2024-07-29 PROCEDURE — 63710000001 POLYETHYLENE GLYCOL 17 G PACK: Performed by: STUDENT IN AN ORGANIZED HEALTH CARE EDUCATION/TRAINING PROGRAM

## 2024-07-29 PROCEDURE — A9270 NON-COVERED ITEM OR SERVICE: HCPCS | Performed by: NURSE PRACTITIONER

## 2024-07-29 RX ADMIN — INSULIN LISPRO 5 UNITS: 100 INJECTION, SOLUTION INTRAVENOUS; SUBCUTANEOUS at 20:15

## 2024-07-29 RX ADMIN — INSULIN LISPRO 3 UNITS: 100 INJECTION, SOLUTION INTRAVENOUS; SUBCUTANEOUS at 11:31

## 2024-07-29 RX ADMIN — NYSTATIN: 100000 POWDER TOPICAL at 11:31

## 2024-07-29 RX ADMIN — NITROFURANTOIN MONOHYDRATE/MACROCRYSTALS 100 MG: 75; 25 CAPSULE ORAL at 20:18

## 2024-07-29 RX ADMIN — INSULIN GLARGINE 30 UNITS: 100 INJECTION, SOLUTION SUBCUTANEOUS at 20:15

## 2024-07-29 RX ADMIN — MECLIZINE HYDROCHLORIDE 25 MG: 25 TABLET ORAL at 01:51

## 2024-07-29 RX ADMIN — NYSTATIN: 100000 POWDER TOPICAL at 20:15

## 2024-07-29 RX ADMIN — NYSTATIN: 100000 POWDER TOPICAL at 08:23

## 2024-07-29 RX ADMIN — INSULIN LISPRO 7 UNITS: 100 INJECTION, SOLUTION INTRAVENOUS; SUBCUTANEOUS at 08:22

## 2024-07-29 RX ADMIN — GABAPENTIN 300 MG: 300 CAPSULE ORAL at 08:22

## 2024-07-29 RX ADMIN — DULOXETINE HYDROCHLORIDE 60 MG: 60 CAPSULE, DELAYED RELEASE ORAL at 08:22

## 2024-07-29 RX ADMIN — SENNOSIDES AND DOCUSATE SODIUM 2 TABLET: 50; 8.6 TABLET ORAL at 08:22

## 2024-07-29 RX ADMIN — MECLIZINE HYDROCHLORIDE 25 MG: 25 TABLET ORAL at 08:22

## 2024-07-29 RX ADMIN — INSULIN LISPRO 3 UNITS: 100 INJECTION, SOLUTION INTRAVENOUS; SUBCUTANEOUS at 16:38

## 2024-07-29 RX ADMIN — MECLIZINE HYDROCHLORIDE 25 MG: 25 TABLET ORAL at 20:15

## 2024-07-29 RX ADMIN — INSULIN LISPRO 7 UNITS: 100 INJECTION, SOLUTION INTRAVENOUS; SUBCUTANEOUS at 16:39

## 2024-07-29 RX ADMIN — ONDANSETRON 4 MG: 4 TABLET, ORALLY DISINTEGRATING ORAL at 02:23

## 2024-07-29 RX ADMIN — DICLOFENAC SODIUM 2 G: 10 GEL TOPICAL at 16:39

## 2024-07-29 RX ADMIN — DICLOFENAC SODIUM 2 G: 10 GEL TOPICAL at 11:31

## 2024-07-29 RX ADMIN — APIXABAN 5 MG: 5 TABLET, FILM COATED ORAL at 20:15

## 2024-07-29 RX ADMIN — BUSPIRONE HYDROCHLORIDE 7.5 MG: 15 TABLET ORAL at 20:17

## 2024-07-29 RX ADMIN — POLYETHYLENE GLYCOL 3350 17 G: 17 POWDER, FOR SOLUTION ORAL at 08:26

## 2024-07-29 RX ADMIN — MECLIZINE HYDROCHLORIDE 25 MG: 25 TABLET ORAL at 16:38

## 2024-07-29 RX ADMIN — FLUTICASONE PROPIONATE 2 SPRAY: 50 SPRAY, METERED NASAL at 08:23

## 2024-07-29 RX ADMIN — PANTOPRAZOLE SODIUM 40 MG: 40 TABLET, DELAYED RELEASE ORAL at 16:38

## 2024-07-29 RX ADMIN — APIXABAN 5 MG: 5 TABLET, FILM COATED ORAL at 08:25

## 2024-07-29 RX ADMIN — BUSPIRONE HYDROCHLORIDE 7.5 MG: 15 TABLET ORAL at 08:21

## 2024-07-29 RX ADMIN — ATORVASTATIN CALCIUM 80 MG: 40 TABLET, FILM COATED ORAL at 20:15

## 2024-07-29 RX ADMIN — INSULIN LISPRO 7 UNITS: 100 INJECTION, SOLUTION INTRAVENOUS; SUBCUTANEOUS at 11:31

## 2024-07-29 RX ADMIN — DICLOFENAC SODIUM 2 G: 10 GEL TOPICAL at 08:23

## 2024-07-29 RX ADMIN — DONEPEZIL HYDROCHLORIDE 10 MG: 10 TABLET, FILM COATED ORAL at 20:15

## 2024-07-29 RX ADMIN — DICLOFENAC SODIUM 2 G: 10 GEL TOPICAL at 20:15

## 2024-07-29 RX ADMIN — QUETIAPINE FUMARATE 50 MG: 25 TABLET ORAL at 20:15

## 2024-07-29 RX ADMIN — LEVOTHYROXINE SODIUM 25 MCG: 25 TABLET ORAL at 06:02

## 2024-07-29 RX ADMIN — PANTOPRAZOLE SODIUM 40 MG: 40 TABLET, DELAYED RELEASE ORAL at 08:22

## 2024-07-29 RX ADMIN — NYSTATIN: 100000 POWDER TOPICAL at 16:39

## 2024-07-29 RX ADMIN — HYDROCODONE BITARTRATE AND ACETAMINOPHEN 1 TABLET: 10; 325 TABLET ORAL at 06:06

## 2024-07-29 NOTE — CASE MANAGEMENT/SOCIAL WORK
Continued Stay Note  [facility]     Patient Name: Cheri Cruz  MRN: [id number]  Today's Date: 7/29/2024    Admit Date: 7/14/2024    Plan: [facility]   Discharge Plan       Row Name 07/29/24 1219       Plan    Plan [facility]    Patient/Family in Agreement with Plan yes    Plan Comments Discussed Ms. Cruz in MDR. Family expedited appeal for rehab at [facility] still pending. CM will continue to follow up.    Final Discharge Disposition Code 03 - skilled nursing facility (SNF)                   Discharge Codes    No documentation.                 Expected Discharge Date and Time       Expected Discharge Date Expected Discharge Time    Jul 29, 2024               Jaye Kuhn RN

## 2024-07-29 NOTE — THERAPY PROGRESS REPORT/RE-CERT
Patient Name: Cheri Cruz  : 1940    MRN: [id number]                              Today's Date: 2024       Admit Date: 2024    Visit Dx:     ICD-10-CM ICD-9-CM   1. Cognitive communication deficit  R41.841 799.52   2. Disorientation  R41.0 780.99     Patient Active Problem List   Diagnosis    Hyperlipidemia LDL goal <70    Type 2 diabetes mellitus without complication, without long-term current use of insulin    GERD (gastroesophageal reflux disease)    Essential hypertension    Abnormal EKG    Osteoarthritis    Anxiety    Palpitations    Vertigo    History of ischemic left MCA stroke    Hemorrhagic stroke    History of pulmonary embolus (PE)    Confusion    Hypotension    Constipation    UTI (urinary tract infection) due to urinary indwelling catheter    Metabolic encephalopathy    Symptomatic anemia    Acquired hypothyroidism    AMS (altered mental status)    Stroke     Past Medical History:   Diagnosis Date    Abnormal heart rhythm     Anxiety     Arrhythmia     Arthritis     Atrial fibrillation     Dementia     Diabetes mellitus     Hyperlipidemia     Hypertension     Kidney disorder     Stroke     Uterus cancer      Past Surgical History:   Procedure Laterality Date    CATARACT EXTRACTION, BILATERAL        General Information       Row Name 24 1531          Physical Therapy Time and Intention    Document Type progress note/recertification  -KR (r) ANUEL (t) KR (c)     Mode of Treatment physical therapy  -KR (r) ANUEL (t) KR (c)       Row Name 24 1531          General Information    Patient Profile Reviewed yes  -KR (r) ANUEL (t) KR (c)     Existing Precautions/Restrictions fall;other (see comments)  BUE tremors R>L, R sided hypertonia, non-ambulatory at baseline, diplopia, [place]  -KR (r) ANUEL (t) KR (c)     Barriers to Rehab medically complex;previous functional deficit;cognitive status;hearing deficit  -KR (r) ANUEL (t) KR (c)       Row Name 24 1531          Cognition    Orientation  Status (Cognition) oriented to;person;place;verbal cues/prompts needed for orientation;time;disoriented to;situation  -KR (r) ANUEL (t) KR (c)       Row Name 07/29/24 1531          Safety Issues, Functional Mobility    Safety Issues Affecting Function (Mobility) awareness of need for assistance;insight into deficits/self-awareness;safety precaution awareness;safety precautions follow-through/compliance;sequencing abilities  -KR (r) ANUEL (t) KR (c)     Impairments Affecting Function (Mobility) balance;cognition;endurance/activity tolerance;pain;postural/trunk control;range of motion (ROM);strength  -KR (r) ANUEL (t) KR (c)     Cognitive Impairments, Mobility Safety/Performance awareness, need for assistance;insight into deficits/self-awareness;safety precaution awareness;safety precaution follow-through;sequencing abilities  -KR (r) ANUEL (t) KR (c)               User Key  (r) = Recorded By, (t) = Taken By, (c) = Cosigned By      Initials Name Provider Type    Courtney Dennis, PT Physical Therapist    Cate Bartlett, PT Student PT Student                   Mobility       Row Name 07/29/24 1532          Bed Mobility    Bed Mobility rolling left;rolling right  -KR (r) ANUEL (t) KR (c)     Rolling Left [place] (Bed Mobility) minimum assist (75% patient effort);1 person assist  -KR (r) ANUEL (t) KR (c)     Rolling Right [place] (Bed Mobility) minimum assist (75% patient effort);1 person assist  -KR (r) ANUEL (t) KR (c)     Assistive Device (Bed Mobility) bed rails;lift device  -KR (r) ANUEL (t) KR (c)     Comment, (Bed Mobility) Pt performed rolling for lift sling placement. Verbal cues to reach for rails in order to aid in roll.  -KR (r) ANUEL (t) KR (c)       Row Name 07/29/24 1532          Bed-Chair Transfer    Bed-Chair [place] (Transfers) dependent (less than 25% patient effort);2 person assist;verbal cues  -KR (r) ANUEL (t) KR (c)     Assistive Device (Bed-Chair Transfers) lift device  -KR (r) ANUEL (t) KR (c)     Comment,  (Bed-Chair Transfer) 1x bed-chair with vicky lift.  -KR (r) ANUEL (t) KR (c)       Row Name 07/29/24 1532          Sit-Stand Transfer    Sit-Stand [place] (Transfers) not tested  -KR (r) ANUEL (t) KR (c)       Row Name 07/29/24 1532          Gait/Stairs (Locomotion)    [place] Level (Gait) not tested  -KR (r) ANUEL (t) KR (c)     Patient was able to Ambulate no, other medical factors prevent ambulation  -KR (r) ANUEL (t) KR (c)     Reason Patient was unable to Ambulate Non-Ambulatory at Baseline  -KR (r) ANUEL (t) KR (c)     [place] Level (Stairs) not tested  -KR (r) ANUEL (t) KR (c)     Comment, (Gait/Stairs) Pt non ambulatory at baseline  -KR (r) ANUEL (t) KR (c)               User Key  (r) = Recorded By, (t) = Taken By, (c) = Cosigned By      Initials Name Provider Type    Courtney Dennis, PT Physical Therapist    Cate Bartlett, PT Student PT Student                   Obj/Interventions       Row Name 07/29/24 1533          Motor Skills    Therapeutic Exercise hip;knee;ankle  -KR (r) ANUEL (t) KR (c)       Row Name 07/29/24 1533          Hip (Therapeutic Exercise)    Hip (Therapeutic Exercise) strengthening exercise  -KR (r) ANUEL (t) KR (c)     Hip Strengthening (Therapeutic Exercise) bilateral;aBduction;aDduction;heel slides;10 repetitions  -KR (r) ANUEL (t) KR (c)       Row Name 07/29/24 1533          Knee (Therapeutic Exercise)    Knee (Therapeutic Exercise) AROM (active range of motion);isometric exercises  -KR (r) ANUEL (t) KR (c)     Knee AAROM (Therapeutic Exercise) bilateral;flexion;10 repetitions  -KR (r) ANUEL (t) KR (c)     Knee Isometrics (Therapeutic Exercise) bilateral;quad sets;10 repetitions;3 second hold  -KR (r) ANUEL (t) KR (c)     Knee Strengthening (Therapeutic Exercise) bilateral;SAQ (short arc quad);10 repetitions  -KR (r) ANUEL (t) KR (c)       Row Name 07/29/24 1533          Ankle (Therapeutic Exercise)    Ankle (Therapeutic Exercise) AROM (active range of motion)  -KR (r) ANUEL (t) KR (c)     Ankle AROM  (Therapeutic Exercise) bilateral;dorsiflexion;plantarflexion;10 repetitions  -KR (r) ANUEL (t) KR (c)       Row Name 07/29/24 1533          Balance    Balance Assessment sitting static balance;sitting dynamic balance  -KR (r) ANUEL (t) KR (c)     Static Sitting Balance supervision  -KR (r) ANUEL (t) KR (c)     Dynamic Sitting Balance contact guard;verbal cues  -KR (r) ANUEL (t) KR (c)     Position, Sitting Balance unsupported;sitting in chair  -KR (r) ANUEL (t) KR (c)     Balance Interventions sitting;static;dynamic;dynamic reaching  -KR (r) ANUEL (t) KR (c)     Comment, Balance 10x [place] dynamic reaching across body. Able to maintain upright sitting balance unsupported for approx. 2 mins.  -KR (r) ANUEL (t) KR (c)               User Key  (r) = Recorded By, (t) = Taken By, (c) = Cosigned By      Initials Name Provider Type    Courtney Dennis, PT Physical Therapist    Cate Bartlett, PT Student PT Student                   Goals/Plan       Row Name 07/29/24 1548          Bed Mobility Goal 1 (PT)    Activity/Assistive Device (Bed Mobility Goal 1, PT) bed mobility activities, all  -KR (r) ANUEL (t) KR (c)     [place] Level/Cues Needed (Bed Mobility Goal 1, PT) moderate assist (50-74% patient effort)  -KR (r) ANUEL (t) KR (c)     Time Frame (Bed Mobility Goal 1, PT) short term goal (STG);5 days  -KR (r) ANUEL (t) KR (c)     Progress/Outcomes (Bed Mobility Goal 1, PT) goal partially met;goal ongoing  -KR (r) ANUEL (t) KR (c)       Row Name 07/29/24 1542          Transfer Goal 1 (PT)    Activity/Assistive Device (Transfer Goal 1, PT) bed-to-chair/chair-to-bed;walker, rolling  -KR (r) ANUEL (t) KR (c)     [place] Level/Cues Needed (Transfer Goal 1, PT) maximum assist (25-49% patient effort)  -KR (r) ANUEL (t) KR (c)     Time Frame (Transfer Goal 1, PT) long term goal (LTG);10 days  -KR (r) ANUEL (t) KR (c)     Strategies/Barriers (Transfers Goal 1, PT) Pt lift transfer at baseline.  -KR (r) ANUEL (t) KR (c)     Progress/Outcome (Transfer Goal 1, PT)  goal no longer appropriate  -KR (r) ANUEL (t) KR (c)       Row Name 07/29/24 1549          Therapy Assessment/Plan (PT)    Planned Therapy Interventions (PT) balance training;bed mobility training;gait training;home exercise program;manual therapy techniques;neuromuscular re-education;patient/family education;postural re-education;ROM (range of motion);strengthening;stretching;transfer training  -KR (r) ANUEL (t) KR (c)               User Key  (r) = Recorded By, (t) = Taken By, (c) = Cosigned By      Initials Name Provider Type    Courtney Dennis, PT Physical Therapist    Cate Bartlett, PT Student PT Student                   Clinical Impression       Row Name 07/29/24 4658          Pain    Pain Intervention(s) Repositioned;Ambulation/increased activity  -KR (r) ANUEL (t) KR (c)     Additional Documentation Pain Scale: FACES Pre/Post-Treatment (Group)  -KR (r) ANUEL (t) KR (c)       Row Name 07/29/24 9437          Pain Scale: FACES Pre/Post-Treatment    Pain: FACES Scale, Pretreatment 2-->hurts little bit  -KR (r) ANUEL (t) KR (c)     Posttreatment Pain Rating 2-->hurts little bit  -KR (r) ANUEL (t) KR (c)     Pain Location - Side/Orientation Right  -KR (r) ANUEL (t) KR (c)     Pain Location lower  -KR (r) ANUEL (t) KR (c)     Pain Location - extremity  -KR (r) ANUEL (t) KR (c)       Row Name 07/29/24 3685          Plan of Care Review    Plan of Care Reviewed With patient  -KR (r) ANUEL (t) KR (c)     Progress improving  -KR (r) ANUEL (t) KR (c)     Outcome Evaluation Pt able to sit upright unsupported in chair for approx. 2 mins. Pt continues to require MinAx1 for rolling in bed. Continues to demonstrate BLE strength/ROM, balance, endurance and postural control below baseline requiring assist with functional mobility. Goals reassessed to reflect current level of function and anticipated progress. PT recommended to address functional limitations and return to optimal level of function.  -KR (r) ANUEL (t) KR (c)       Row Name 07/29/24 0208           Therapy Assessment/Plan (PT)    Rehab Potential (PT) fair, will monitor progress closely  -KR (r) ANUEL (t) KR (c)     Criteria for Skilled Interventions Met (PT) yes;meets criteria;skilled treatment is necessary  -KR (r) ANUEL (t) KR (c)     Therapy Frequency (PT) 3 times/wk  -KR (r) ANUEL (t) KR (c)     Predicted Duration of Therapy Intervention (PT) 10 days  -KR (r) ANUEL (t) KR (c)       Row Name 07/29/24 1537          Vital Signs    Pre Systolic BP Rehab 134  -KR (r) ANUEL (t) KR (c)     Pre Treatment Diastolic BP 44  -KR (r) ANUEL (t) KR (c)     Post Systolic BP Rehab 159  -KR (r) ANUEL (t) KR (c)     Post Treatment Diastolic BP 85  -KR (r) ANUEL (t) KR (c)     O2 Delivery Pre Treatment room air  -KR (r) ANUEL (t) KR (c)     O2 Delivery Intra Treatment room air  -KR (r) ANUEL (t) KR (c)     O2 Delivery Post Treatment room air  -KR (r) ANUEL (t) KR (c)     Pre Patient Position Supine  -KR (r) ANUEL (t) KR (c)     Intra Patient Position Supine  -KR (r) ANUEL (t) KR (c)     Post Patient Position Sitting  -KR (r) ANUEL (t) KR (c)       Row Name 07/29/24 1537          Positioning and Restraints    Pre-Treatment Position in bed  -KR (r) ANUEL (t) KR (c)     Post Treatment Position chair  -KR (r) ANUEL (t) KR (c)     In Chair notified nsg;reclined;call light within reach;encouraged to call for assist;exit alarm on;waffle cushion;on mechanical lift sling;legs elevated;heels elevated;waffle boot/both  -KR (r) ANUEL (t) KR (c)               User Key  (r) = Recorded By, (t) = Taken By, (c) = Cosigned By      Initials Name Provider Type    Courtney Dennis, PT Physical Therapist    Cate Bartlett, PT Student PT Student                   Outcome Measures       Row Name 07/29/24 1541          How much help from another person do you currently need...    Turning from your back to your side while in flat bed without using bedrails? 3  -KR (r) ANUEL (t) KR (c)     Moving from lying on back to sitting on the side of a flat bed without bedrails? 2  -KR (r) ANUEL (t) KR (c)      Moving to and from a bed to a chair (including a wheelchair)? 1  -KR (r) ANUEL (t) KR (c)     Standing up from a chair using your arms (e.g., wheelchair, bedside chair)? 1  -KR (r) ANUEL (t) KR (c)     Climbing 3-5 steps with a railing? 1  -KR (r) ANUEL (t) KR (c)     To walk in hospital room? 1  -KR (r) ANUEL (t) KR (c)     AM-PAC 6 Clicks Score (PT) 9  -KR (r) ANUEL (t)     Highest Level of Mobility Goal 3 --> Sit at edge of bed  -KR (r) ANUEL (t)       Row Name 07/29/24 1541          Functional Assessment    Outcome Measure Options AM-PAC 6 Clicks Basic Mobility (PT)  -KR (r) ANUEL (t) KR (c)               User Key  (r) = Recorded By, (t) = Taken By, (c) = Cosigned By      Initials Name Provider Type    Courtney Dennis, PT Physical Therapist    Cate Bartlett, PT Student PT Student                                 Physical Therapy Education       Title: PT OT SLP Therapies (In Progress)       Topic: Physical Therapy (Done)       Point: Mobility training (Done)       Learning Progress Summary             Patient Acceptance, E, VU,DU,NR by ANUEL at 7/29/2024 1541    Acceptance, E, NR by  at 7/23/2024 1107    Eager, E, VU,DU,NR by  at 7/20/2024 1526    Comment: Educated safety/technique w/bed mobility, transfers, HEP, PT POC    Acceptance, E, VU,DU by ANUEL at 7/15/2024 0955   Family Eager, E, VU,DU,NR by  at 7/20/2024 1526    Comment: Educated safety/technique w/bed mobility, transfers, HEP, PT POC                         Point: Home exercise program (Done)       Learning Progress Summary             Patient Eager, E, VU,DU,NR by  at 7/20/2024 1526    Comment: Educated safety/technique w/bed mobility, transfers, HEP, PT POC   Family Eager, E, VU,DU,NR by  at 7/20/2024 1526    Comment: Educated safety/technique w/bed mobility, transfers, HEP, PT POC                         Point: Body mechanics (Done)       Learning Progress Summary             Patient Acceptance, E, VU,DU,NR by ANUEL at 7/29/2024 1541    Acceptance, E, NR by  KR at 7/23/2024 1107    Eager, E, VU,DU,NR by SS at 7/20/2024 1526    Comment: Educated safety/technique w/bed mobility, transfers, HEP, PT POC    Acceptance, E, VU,DU by ANUEL at 7/15/2024 0955   Family Eager, E, VU,DU,NR by SS at 7/20/2024 1526    Comment: Educated safety/technique w/bed mobility, transfers, HEP, PT POC                         Point: Precautions (Done)       Learning Progress Summary             Patient Acceptance, E, VU,DU,NR by ANUEL at 7/29/2024 1541    Acceptance, E, NR by KR at 7/23/2024 1107    Eager, E, VU,DU,NR by  at 7/20/2024 1526    Comment: Educated safety/technique w/bed mobility, transfers, HEP, PT POC    Acceptance, E, VU,DU by ANUEL at 7/15/2024 0955   Family Eager, E, VU,DU,NR by  at 7/20/2024 1526    Comment: Educated safety/technique w/bed mobility, transfers, HEP, PT POC                                         User Key       Initials Effective Dates Name Provider Type Discipline     06/01/21 -  Freda Leonard, PT Physical Therapist PT     12/30/22 -  Courtney Jackson, PT Physical Therapist PT    ANUEL 05/07/24 -  Cate Joshi, BILL Student PT Student PT                  PT Recommendation and Plan  Planned Therapy Interventions (PT): balance training, bed mobility training, gait training, home exercise program, manual therapy techniques, neuromuscular re-education, patient/family education, postural re-education, ROM (range of motion), strengthening, stretching, transfer training  Plan of Care Reviewed With: patient  Progress: improving  Outcome Evaluation: Pt able to sit upright unsupported in chair for approx. 2 mins. Pt continues to require MinAx1 for rolling in bed. Continues to demonstrate BLE strength/ROM, balance, endurance and postural control below baseline requiring assist with functional mobility. Goals reassessed to reflect current level of function and anticipated progress. PT recommended to address functional limitations and return to optimal level of function.     Time  Calculation:   PT Evaluation Complexity  History, PT Evaluation Complexity: 3 or more personal factors and/or comorbidities  Examination of Body Systems (PT Eval Complexity): total of 4 or more elements  Clinical Presentation (PT Evaluation Complexity): evolving  Clinical Decision Making (PT Evaluation Complexity): moderate complexity  Overall Complexity (PT Evaluation Complexity): moderate complexity     PT Charges       Row Name 07/29/24 1541             Time Calculation    Start Time 1504  -KR (r) ANUEL (t) KR (c)      PT Received On 07/29/24  -KR (r) ANUEL (t) KR (c)      PT Goal Re-Cert Due Date 08/08/24  -KR (r) ANUEL (t) KR (c)         Timed Charges    26453 - PT Therapeutic Exercise Minutes 10  -KR (r) ANUEL (t) KR (c)      32375 -  PT Neuromuscular Reeducation Minutes 6  -KR (r) ANUEL (t) KR (c)      06042 - PT Therapeutic Activity Minutes 10  -KR (r) ANUEL (t) KR (c)         Total Minutes    Timed Charges Total Minutes 26  -KR (r) ANUEL (t)       Total Minutes 26  -KR (r) ANUEL (t)                User Key  (r) = Recorded By, (t) = Taken By, (c) = Cosigned By      Initials Name Provider Type    Courtney Dennis, PT Physical Therapist    Cate Bartlett, PT Student PT Student                  Therapy Charges for Today       Code Description Service Date Service Provider Modifiers Qty    01234445786 HC PT THER PROC EA 15 MIN 7/29/2024 Cate Joshi, PT Student GP 1    50614548270 HC PT THERAPEUTIC ACT EA 15 MIN 7/29/2024 Cate Joshi PT Student GP 1            PT G-Codes  Outcome Measure Options: AM-PAC 6 Clicks Basic Mobility (PT)  AM-PAC 6 Clicks Score (PT): 9  AM-PAC 6 Clicks Score (OT): 12  Modified [place] Scale: 5 - Severe disability.  Bedridden, incontinent, and requiring constant nursing care and attention.  PT Discharge Summary  Anticipated Discharge Disposition (PT): skilled nursing facility  Reason for Discharge: At baseline function    Cate Joshi PT Student  7/29/2024

## 2024-07-29 NOTE — PLAN OF CARE
Goal Outcome Evaluation:  Plan of Care Reviewed With: patient        Progress: improving  Outcome Evaluation: (P) Pt able to sit upright unsupported in chair for approx. 2 mins. Pt continues to require MinAx1 for rolling in bed. Continues to demonstrate BLE strength/ROM, balance, endurance and postural control below baseline requiring assist with functional mobility. Goals reassessed to reflect current level of function and anticipated progress. PT recommended to address functional limitations and return to optimal level of function.      Anticipated Discharge Disposition (PT): (P) skilled nursing facility

## 2024-07-29 NOTE — PLAN OF CARE
Goal Outcome Evaluation:      Pt awake and alert throughout the day. VSS. NSR. RA. Pt up in the chair this evening. Denies pain. Pt eager to be discharged and see family. POC discussed with patient's daughter over the phone. Will continue to monitor.   Problem: Adult Inpatient Plan of Care  Goal: Plan of Care Review  Outcome: Ongoing, Progressing  Flowsheets  Taken 7/29/2024 1704 by Lorie Maciel RN  Progress: no change  Taken 7/29/2024 1537 by Cate Joshi PT Student  Plan of Care Reviewed With: patient        Progress: no change

## 2024-07-29 NOTE — PROGRESS NOTES
[facility] Medicine Services  PROGRESS NOTE    Patient Name: Cheri Cruz  : 1940  MRN: [id number]    Date of Admission: 2024  Primary Care Physician: Lorrie Canada APRN    Subjective   Subjective     CC:  AMS    HPI:  Pt sitting up in bed watching TV. She is still having some dizziness but it is improved. She denies N/V.     Copied text in this note has been reviewed and is accurate as of 24.       Objective   Objective     Vital Signs:   Temp:  [97.8 °F (36.6 °C)-98.6 °F (37 °C)] 97.8 °F (36.6 °C)  Heart Rate:  [66-75] 66  Resp:  [16] 16  BP: (134-167)/(44-65) 134/44  Flow (L/min):  [2] 2     Physical Exam:  Constitutional: Awake, alert, NAD, frail appearing  Neck: Supple, no thyromegaly, no lymphadenopathy, trachea midline  Respiratory: Clear to auscultation bilaterally, nonlabored   Cardiovascular: RRR, no murmurs, rubs, or gallops  Gastrointestinal: Positive bowel sounds, soft, nontender, nondistended  Musculoskeletal: No bilateral ankle edema  Psychiatric: Appropriate affect, cooperative  Neurologic: Oriented to person, strength symmetric in all extremities, Cranial Nerves grossly intact to confrontation, speech clear  Skin: No rashes        Results Reviewed:  LAB RESULTS:      Lab 24  1005 24  0530   WBC 5.79 6.67   HEMOGLOBIN 9.0* 9.4*   HEMATOCRIT 28.7* 29.4*   PLATELETS 138* 125*   NEUTROS ABS 3.46  --    IMMATURE GRANS (ABS) 0.01  --    LYMPHS ABS 1.43  --    MONOS ABS 0.61  --    EOS ABS 0.25  --    .9* 100.7*         Lab 24  1005 24  0530 24  0454   SODIUM 137 135* 144   POTASSIUM 5.0 4.8 4.7   CHLORIDE 100 98 106   CO2 31.0* 26.0 29.0   ANION GAP 6.0 11.0 9.0   BUN 30* 29* 33*   CREATININE 1.51* 1.46* 1.66*   EGFR 34.4* 35.8* 30.7*   GLUCOSE 169* 103* 152*   CALCIUM 8.6 8.6 8.4*                           Brief Urine Lab Results  (Last result in the past 365 days)        Color   Clarity   Blood   Leuk Est   Nitrite    Protein   CREAT   Urine HCG        07/14/24 2001 Yellow   Cloudy   Trace   Small (1+)   Negative   Negative                   Microbiology Results Abnormal       Procedure Component Value - Date/Time    Blood Culture - Blood, Wrist, Left [[id number]]  (Normal) Collected: 07/14/24 2022    Lab Status: Final result Specimen: Blood from Wrist, Left Updated: 07/19/24 2100     Blood Culture No growth at 5 days    Narrative:      Less than seven (7) mL's of blood was collected.  Insufficient quantity may yield false negative results.    Blood Culture - Blood, Hand, Left [[id number]]  (Normal) Collected: 07/14/24 2022    Lab Status: Final result Specimen: Blood from Hand, Left Updated: 07/19/24 2100     Blood Culture No growth at 5 days    Narrative:      Less than seven (7) mL's of blood was collected.  Insufficient quantity may yield false negative results.    Urine Culture - Urine, Urine, Random Void [[id number]] Collected: 07/14/24 2001    Lab Status: Final result Specimen: Urine, Random Void Updated: 07/16/24 1020     Urine Culture 50,000 CFU/mL Normal Urogenital Nickie    Narrative:      Colonization of the urinary tract without infection is common. Treatment is discouraged unless the patient is symptomatic, pregnant, or undergoing an invasive urologic procedure.            No radiology results from the last 24 hrs    Results for orders placed during the hospital encounter of 02/10/23    Adult Transthoracic Echo Complete W/ Cont if Necessary Per Protocol    Interpretation Summary    Left ventricular systolic function is hyperdynamic (EF > 70%). Calculated left ventricular EF = 70.6% Left ventricular ejection fraction appears to be greater than 70%. The left ventricular cavity is small in size. Left ventricular wall thickness is consistent with mild concentric hypertrophy. All left ventricular wall segments contract normally. Left ventricular intracavitary gradient noted to be 71 mmHg. Left ventricular diastolic function  is consistent with (grade I) impaired relaxation. Normal left atrial pressure.    The aortic valve is abnormal in structure. There is mild calcification of the aortic valve mainly affecting the right coronary cusp(s). The aortic valve appears trileaflet. No aortic valve regurgitation is present. Gradient noted through the LV and LVOT    Compared to TTE report from  Dec 2019, hyperdynamic LV with intracavitary gradient is not a new finding but peak gradient noted on this exam is higher than previously described.      Current medications:  Scheduled Meds:apixaban, 5 mg, Oral, BID  atorvastatin, 80 mg, Oral, Nightly  busPIRone, 7.5 mg, Oral, BID  Diclofenac Sodium, 2 g, Topical, 4x Daily  donepezil, 10 mg, Oral, Nightly  DULoxetine, 60 mg, Oral, Daily  fluticasone, 2 spray, Each Nare, Daily  gabapentin, 300 mg, Oral, Daily  insulin glargine, 30 Units, Subcutaneous, Nightly  insulin lispro, 3-14 Units, Subcutaneous, 4x Daily AC & at Bedtime  Insulin Lispro, 7 Units, Subcutaneous, TID With Meals  levothyroxine, 25 mcg, Oral, Daily  meclizine, 25 mg, Oral, TID  nitrofurantoin (macrocrystal-monohydrate), 100 mg, Oral, Q24H  nystatin, , Topical, 4x Daily  pantoprazole, 40 mg, Oral, BID AC  senna-docusate sodium, 2 tablet, Oral, Daily   And  polyethylene glycol, 17 g, Oral, Daily  QUEtiapine, 50 mg, Oral, Nightly  sodium chloride, 10 mL, Intravenous, Q12H  sodium chloride, 10 mL, Intravenous, Q12H      Continuous Infusions:   PRN Meds:.  acetaminophen **OR** acetaminophen **OR** acetaminophen    senna-docusate sodium **AND** polyethylene glycol **AND** bisacodyl **AND** bisacodyl    Calcium Replacement - Follow Nurse / BPA Driven Protocol    dextrose    dextrose    glucagon (human recombinant)    HYDROcodone-acetaminophen    Magnesium Standard Dose Replacement - Follow Nurse / BPA Driven Protocol    meclizine    ondansetron ODT **OR** ondansetron    Phosphorus Replacement - Follow Nurse / BPA Driven Protocol    Potassium  Replacement - Follow Nurse / BPA Driven Protocol    sodium chloride    sodium chloride    sodium chloride    sodium chloride    Assessment & Plan   Assessment & Plan     Active Hospital Problems    Diagnosis  POA    **AMS (altered mental status) [R41.82]  Yes    Stroke [I63.9]  Yes    Acquired hypothyroidism [E03.9]  Yes    History of pulmonary embolus (PE) [Z86.711]  Yes    Essential hypertension [I10]  Yes    GERD (gastroesophageal reflux disease) [K21.9]  Yes    Type 2 diabetes mellitus without complication, without long-term current use of insulin [E11.9]  Yes      Resolved Hospital Problems   No resolved problems to display.        Brief Hospital Course to date:  Cheri Cruz is a 83 y.o. 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  Initial stroke workup was negative.  MRI showed old left parietal ischemic stroke.  Stroke neurology followed and resumed Eliquis.  She received empiric IV ceftriaxone x 3 days due to concern for UTI.     Altered mental status on advanced dementia  Hypotension   Concern for UTI  - initial stroke workup negative.  MRI showed old L parietal ischemic stroke; pt seen by Stroke team; apixiban restarted.   -Possibly dehydration vs hypoxia  -COVID/flu negative. Blood cultures with no growth to date. LFTs normal. CXR no acute findings.    -CXR with old calcified gr  -S/p empiric IV ceftriaxone x 3 days.   -Held sedatives initially, Okay to resume PRN lorazepam (home med)   -Continue donepezil, Seroquel, BuSpar.       Hand tremors  - distressing to patient. Monitor.      Vertigo  - reports this is common for her  - change meclizine to scheduled     Acute on chronic hypoxemic respiratory  failure  Near-syncope  -Per report, patient became hypoxic with oxygen saturation in the 60s  -CTA chest showed no PE, no acute process   -Patient is O2 dependent on 2L NC but is not compliant due her baseline dementia.  -Continue supplemental oxygenation, titrate for goal SpO2 greater than 90%.     Evolving old left parietal ischemic stroke  -Presented to [facility] with cognitive decline and metabolic encephalopathy. She was hypotensive with systolic in the 90s at [facility].   -CTH from [facility] with left parietal tiny hemorrhage vs calcification on top of old ischemic stroke.   -MRI showed evolving old left parietal lobe CVA with some areas of associated cortical laminar necrosis. No hemorrhage.    -Neurology evaluated, recommended to resume Eliquis. Continue statin. Annual follow up in stroke clinic.   -PT/OT now recommending SNF      RADHA on CKD stage III  Volume depletion due to dehydration   Mild hyperkalemia  -Baseline Cr about 1.3, Cr 2.06 on presentation, improved to  1.2.  Then increased to 1.8 and got IV fluids again.    - pt at risk of recurrent dehydration/RADHA.  Will stop hytrin for this reason      Complex disposition  -PT/OT evaluated, felt patient's functional status is at baseline. Recommended home with 24/7 care.  -partner discussed care with both patient's daughters and case management on 7/17.   - Family leaning towards pursuing long-term care after discharge as they are having difficulty taking care of her on their own and do not have finances for full-time caregiver.     Nausea, constipation   -KUB 7/14 with nonobstructive bowel gas pattern. Repeat KUB 7/18 with moderate stool burden.  -Continue bowel regimen. Increased PPI to BID. PRN Zofran for nausea.      Uncontrolled diabetes mellitus type 2 with hyperglycemia  -A1c 9.80% this admission  -Increase Lantus to 30 units qhs, increased  lispro 7u TIDAC and moderate-high dose SSI -- improved   -Resumed home gabapentin at low-dose 300 mg daily.     Chronic  anemia  Macrocytosis  -No evidence of overt GI bleeding this admission  -B12 and folate within normal limits  -Further workup of anemia as outpatient as appropriate.      Essential hypertension  -Was hypotensive at [facility].  -Not on antihypertensive medications.  - stable      Hyperlipidemia -Continue atorvastatin.  Hypothyroidism -Continue levothyroxine.  GERD -Continue PPI.     Expected Discharge Location and Transportation: [facility]   Expected Discharge   07/31/2024     VTE Prophylaxis:  Pharmacologic & mechanical VTE prophylaxis orders are present.         AM-PAC 6 Clicks Score (PT): 10 (07/28/24 0800)    CODE STATUS:   Code Status and Medical Interventions:   Ordered at: 07/15/24 1319     Level Of Support Discussed With:    Patient     Code Status (Patient has no pulse and is not breathing):    CPR (Attempt to Resuscitate)     Medical Interventions (Patient has pulse or is breathing):    Full Support       EVELINA Larson  07/29/24

## 2024-07-30 LAB
ANION GAP SERPL CALCULATED.3IONS-SCNC: 6 MMOL/L (ref 5–15)
BASOPHILS # BLD AUTO: 0.05 10*3/MM3 (ref 0–0.2)
BASOPHILS NFR BLD AUTO: 0.6 % (ref 0–1.5)
BUN SERPL-MCNC: 31 MG/DL (ref 8–23)
BUN/CREAT SERPL: 19.3 (ref 7–25)
CALCIUM SPEC-SCNC: 8.8 MG/DL (ref 8.6–10.5)
CHLORIDE SERPL-SCNC: 101 MMOL/L (ref 98–107)
CO2 SERPL-SCNC: 33 MMOL/L (ref 22–29)
CREAT SERPL-MCNC: 1.61 MG/DL (ref 0.57–1)
DEPRECATED RDW RBC AUTO: 49.6 FL (ref 37–54)
EGFRCR SERPLBLD CKD-EPI 2021: 31.8 ML/MIN/1.73
EOSINOPHIL # BLD AUTO: 0.26 10*3/MM3 (ref 0–0.4)
EOSINOPHIL NFR BLD AUTO: 3.1 % (ref 0.3–6.2)
ERYTHROCYTE [DISTWIDTH] IN BLOOD BY AUTOMATED COUNT: 13.4 % (ref 12.3–15.4)
GLUCOSE BLDC GLUCOMTR-MCNC: 123 MG/DL (ref 70–130)
GLUCOSE BLDC GLUCOMTR-MCNC: 137 MG/DL (ref 70–130)
GLUCOSE BLDC GLUCOMTR-MCNC: 153 MG/DL (ref 70–130)
GLUCOSE BLDC GLUCOMTR-MCNC: 180 MG/DL (ref 70–130)
GLUCOSE SERPL-MCNC: 102 MG/DL (ref 65–99)
HCT VFR BLD AUTO: 31 % (ref 34–46.6)
HGB BLD-MCNC: 9.8 G/DL (ref 12–15.9)
IMM GRANULOCYTES # BLD AUTO: 0.03 10*3/MM3 (ref 0–0.05)
IMM GRANULOCYTES NFR BLD AUTO: 0.4 % (ref 0–0.5)
LYMPHOCYTES # BLD AUTO: 2.31 10*3/MM3 (ref 0.7–3.1)
LYMPHOCYTES NFR BLD AUTO: 27.3 % (ref 19.6–45.3)
MCH RBC QN AUTO: 32.1 PG (ref 26.6–33)
MCHC RBC AUTO-ENTMCNC: 31.6 G/DL (ref 31.5–35.7)
MCV RBC AUTO: 101.6 FL (ref 79–97)
MONOCYTES # BLD AUTO: 0.84 10*3/MM3 (ref 0.1–0.9)
MONOCYTES NFR BLD AUTO: 9.9 % (ref 5–12)
NEUTROPHILS NFR BLD AUTO: 4.98 10*3/MM3 (ref 1.7–7)
NEUTROPHILS NFR BLD AUTO: 58.7 % (ref 42.7–76)
NRBC BLD AUTO-RTO: 0 /100 WBC (ref 0–0.2)
PLATELET # BLD AUTO: 181 10*3/MM3 (ref 140–450)
PMV BLD AUTO: 11.5 FL (ref 6–12)
POTASSIUM SERPL-SCNC: 4.9 MMOL/L (ref 3.5–5.2)
RBC # BLD AUTO: 3.05 10*6/MM3 (ref 3.77–5.28)
SODIUM SERPL-SCNC: 140 MMOL/L (ref 136–145)
WBC NRBC COR # BLD AUTO: 8.47 10*3/MM3 (ref 3.4–10.8)

## 2024-07-30 PROCEDURE — 63710000001 INSULIN LISPRO (HUMAN) PER 5 UNITS: Performed by: NURSE PRACTITIONER

## 2024-07-30 PROCEDURE — A9270 NON-COVERED ITEM OR SERVICE: HCPCS | Performed by: STUDENT IN AN ORGANIZED HEALTH CARE EDUCATION/TRAINING PROGRAM

## 2024-07-30 PROCEDURE — A9270 NON-COVERED ITEM OR SERVICE: HCPCS | Performed by: INTERNAL MEDICINE

## 2024-07-30 PROCEDURE — 63710000001 ATORVASTATIN 40 MG TABLET

## 2024-07-30 PROCEDURE — 63710000001 PANTOPRAZOLE 40 MG TABLET DELAYED-RELEASE: Performed by: STUDENT IN AN ORGANIZED HEALTH CARE EDUCATION/TRAINING PROGRAM

## 2024-07-30 PROCEDURE — 63710000001 DONEPEZIL 10 MG TABLET: Performed by: INTERNAL MEDICINE

## 2024-07-30 PROCEDURE — 63710000001 DULOXETINE 60 MG CAPSULE DELAYED-RELEASE PARTICLES: Performed by: INTERNAL MEDICINE

## 2024-07-30 PROCEDURE — 63710000001 BUSPIRONE 15 MG TABLET: Performed by: INTERNAL MEDICINE

## 2024-07-30 PROCEDURE — 63710000001 LEVOTHYROXINE 25 MCG TABLET: Performed by: INTERNAL MEDICINE

## 2024-07-30 PROCEDURE — 99232 SBSQ HOSP IP/OBS MODERATE 35: CPT | Performed by: NURSE PRACTITIONER

## 2024-07-30 PROCEDURE — A9270 NON-COVERED ITEM OR SERVICE: HCPCS | Performed by: NURSE PRACTITIONER

## 2024-07-30 PROCEDURE — 80048 BASIC METABOLIC PNL TOTAL CA: CPT | Performed by: NURSE PRACTITIONER

## 2024-07-30 PROCEDURE — A9270 NON-COVERED ITEM OR SERVICE: HCPCS

## 2024-07-30 PROCEDURE — 63710000001 SENNOSIDES-DOCUSATE 8.6-50 MG TABLET: Performed by: STUDENT IN AN ORGANIZED HEALTH CARE EDUCATION/TRAINING PROGRAM

## 2024-07-30 PROCEDURE — 92507 TX SP LANG VOICE COMM INDIV: CPT

## 2024-07-30 PROCEDURE — 97530 THERAPEUTIC ACTIVITIES: CPT

## 2024-07-30 PROCEDURE — 63710000001 GABAPENTIN 100 MG CAPSULE: Performed by: NURSE PRACTITIONER

## 2024-07-30 PROCEDURE — 82948 REAGENT STRIP/BLOOD GLUCOSE: CPT

## 2024-07-30 PROCEDURE — 92610 EVALUATE SWALLOWING FUNCTION: CPT

## 2024-07-30 PROCEDURE — 63710000001 MECLIZINE 12.5 MG TABLET: Performed by: INTERNAL MEDICINE

## 2024-07-30 PROCEDURE — 97535 SELF CARE MNGMENT TRAINING: CPT

## 2024-07-30 PROCEDURE — 63710000001 HYDROCODONE-ACETAMINOPHEN 10-325 MG TABLET: Performed by: INTERNAL MEDICINE

## 2024-07-30 PROCEDURE — 63710000001 LIDOCAINE 4 % PATCH: Performed by: NURSE PRACTITIONER

## 2024-07-30 PROCEDURE — G0378 HOSPITAL OBSERVATION PER HR: HCPCS

## 2024-07-30 PROCEDURE — 63710000001 POLYETHYLENE GLYCOL 17 G PACK: Performed by: STUDENT IN AN ORGANIZED HEALTH CARE EDUCATION/TRAINING PROGRAM

## 2024-07-30 PROCEDURE — 63710000001 QUETIAPINE 25 MG TABLET: Performed by: INTERNAL MEDICINE

## 2024-07-30 PROCEDURE — 63710000001 GABAPENTIN 300 MG CAPSULE: Performed by: STUDENT IN AN ORGANIZED HEALTH CARE EDUCATION/TRAINING PROGRAM

## 2024-07-30 PROCEDURE — 63710000001 INSULIN GLARGINE PER 5 UNITS: Performed by: STUDENT IN AN ORGANIZED HEALTH CARE EDUCATION/TRAINING PROGRAM

## 2024-07-30 PROCEDURE — 63710000001 NYSTATIN 100000 UNIT/GM POWDER 15 G BOTTLE: Performed by: STUDENT IN AN ORGANIZED HEALTH CARE EDUCATION/TRAINING PROGRAM

## 2024-07-30 PROCEDURE — 63710000001 NITROFURANTOIN (MACROCRYSTAL-MONOHYDRATE) 100 MG CAPSULE: Performed by: INTERNAL MEDICINE

## 2024-07-30 PROCEDURE — 85025 COMPLETE CBC W/AUTO DIFF WBC: CPT | Performed by: NURSE PRACTITIONER

## 2024-07-30 PROCEDURE — 63710000001 LORAZEPAM 0.5 MG TABLET: Performed by: NURSE PRACTITIONER

## 2024-07-30 PROCEDURE — 63710000001 APIXABAN 5 MG TABLET: Performed by: STUDENT IN AN ORGANIZED HEALTH CARE EDUCATION/TRAINING PROGRAM

## 2024-07-30 PROCEDURE — 63710000001 INSULIN LISPRO (HUMAN) PER 5 UNITS: Performed by: STUDENT IN AN ORGANIZED HEALTH CARE EDUCATION/TRAINING PROGRAM

## 2024-07-30 RX ORDER — GABAPENTIN 100 MG/1
200 CAPSULE ORAL EVERY 12 HOURS SCHEDULED
Status: DISCONTINUED | OUTPATIENT
Start: 2024-07-30 | End: 2024-08-09 | Stop reason: HOSPADM

## 2024-07-30 RX ORDER — LIDOCAINE 4 G/G
1 PATCH TOPICAL
Status: DISCONTINUED | OUTPATIENT
Start: 2024-07-30 | End: 2024-08-09 | Stop reason: HOSPADM

## 2024-07-30 RX ORDER — LORAZEPAM 0.5 MG/1
0.5 TABLET ORAL EVERY 12 HOURS SCHEDULED
Status: COMPLETED | OUTPATIENT
Start: 2024-07-30 | End: 2024-08-08

## 2024-07-30 RX ADMIN — HYDROCODONE BITARTRATE AND ACETAMINOPHEN 1 TABLET: 10; 325 TABLET ORAL at 18:13

## 2024-07-30 RX ADMIN — NYSTATIN: 100000 POWDER TOPICAL at 12:11

## 2024-07-30 RX ADMIN — APIXABAN 5 MG: 5 TABLET, FILM COATED ORAL at 20:03

## 2024-07-30 RX ADMIN — LORAZEPAM 0.5 MG: 0.5 TABLET ORAL at 14:18

## 2024-07-30 RX ADMIN — NYSTATIN: 100000 POWDER TOPICAL at 17:09

## 2024-07-30 RX ADMIN — QUETIAPINE FUMARATE 50 MG: 25 TABLET ORAL at 20:03

## 2024-07-30 RX ADMIN — NITROFURANTOIN MONOHYDRATE/MACROCRYSTALS 100 MG: 75; 25 CAPSULE ORAL at 20:03

## 2024-07-30 RX ADMIN — BUSPIRONE HYDROCHLORIDE 7.5 MG: 15 TABLET ORAL at 09:48

## 2024-07-30 RX ADMIN — DULOXETINE HYDROCHLORIDE 60 MG: 60 CAPSULE, DELAYED RELEASE ORAL at 09:47

## 2024-07-30 RX ADMIN — PANTOPRAZOLE SODIUM 40 MG: 40 TABLET, DELAYED RELEASE ORAL at 17:09

## 2024-07-30 RX ADMIN — HYDROCODONE BITARTRATE AND ACETAMINOPHEN 1 TABLET: 10; 325 TABLET ORAL at 12:21

## 2024-07-30 RX ADMIN — APIXABAN 5 MG: 5 TABLET, FILM COATED ORAL at 09:48

## 2024-07-30 RX ADMIN — MECLIZINE HYDROCHLORIDE 25 MG: 25 TABLET ORAL at 09:48

## 2024-07-30 RX ADMIN — BUSPIRONE HYDROCHLORIDE 7.5 MG: 15 TABLET ORAL at 20:03

## 2024-07-30 RX ADMIN — MECLIZINE HYDROCHLORIDE 25 MG: 25 TABLET ORAL at 17:09

## 2024-07-30 RX ADMIN — INSULIN LISPRO 3 UNITS: 100 INJECTION, SOLUTION INTRAVENOUS; SUBCUTANEOUS at 20:03

## 2024-07-30 RX ADMIN — DICLOFENAC SODIUM 2 G: 10 GEL TOPICAL at 09:51

## 2024-07-30 RX ADMIN — GABAPENTIN 300 MG: 300 CAPSULE ORAL at 09:48

## 2024-07-30 RX ADMIN — NYSTATIN: 100000 POWDER TOPICAL at 09:47

## 2024-07-30 RX ADMIN — INSULIN LISPRO 7 UNITS: 100 INJECTION, SOLUTION INTRAVENOUS; SUBCUTANEOUS at 12:11

## 2024-07-30 RX ADMIN — LORAZEPAM 0.5 MG: 0.5 TABLET ORAL at 20:12

## 2024-07-30 RX ADMIN — SENNOSIDES AND DOCUSATE SODIUM 2 TABLET: 50; 8.6 TABLET ORAL at 09:47

## 2024-07-30 RX ADMIN — DICLOFENAC SODIUM 2 G: 10 GEL TOPICAL at 12:12

## 2024-07-30 RX ADMIN — DICLOFENAC SODIUM 2 G: 10 GEL TOPICAL at 20:05

## 2024-07-30 RX ADMIN — ATORVASTATIN CALCIUM 80 MG: 40 TABLET, FILM COATED ORAL at 20:03

## 2024-07-30 RX ADMIN — MECLIZINE HYDROCHLORIDE 25 MG: 25 TABLET ORAL at 20:12

## 2024-07-30 RX ADMIN — INSULIN LISPRO 7 UNITS: 100 INJECTION, SOLUTION INTRAVENOUS; SUBCUTANEOUS at 09:47

## 2024-07-30 RX ADMIN — POLYETHYLENE GLYCOL 3350 17 G: 17 POWDER, FOR SOLUTION ORAL at 09:47

## 2024-07-30 RX ADMIN — LEVOTHYROXINE SODIUM 25 MCG: 25 TABLET ORAL at 05:14

## 2024-07-30 RX ADMIN — INSULIN LISPRO 7 UNITS: 100 INJECTION, SOLUTION INTRAVENOUS; SUBCUTANEOUS at 17:10

## 2024-07-30 RX ADMIN — PANTOPRAZOLE SODIUM 40 MG: 40 TABLET, DELAYED RELEASE ORAL at 09:47

## 2024-07-30 RX ADMIN — LIDOCAINE 1 PATCH: 4 PATCH TOPICAL at 14:18

## 2024-07-30 RX ADMIN — DICLOFENAC SODIUM 2 G: 10 GEL TOPICAL at 17:10

## 2024-07-30 RX ADMIN — FLUTICASONE PROPIONATE 2 SPRAY: 50 SPRAY, METERED NASAL at 09:48

## 2024-07-30 RX ADMIN — INSULIN LISPRO 3 UNITS: 100 INJECTION, SOLUTION INTRAVENOUS; SUBCUTANEOUS at 17:09

## 2024-07-30 RX ADMIN — DONEPEZIL HYDROCHLORIDE 10 MG: 10 TABLET, FILM COATED ORAL at 20:03

## 2024-07-30 RX ADMIN — GABAPENTIN 200 MG: 100 CAPSULE ORAL at 20:03

## 2024-07-30 RX ADMIN — INSULIN GLARGINE 30 UNITS: 100 INJECTION, SOLUTION SUBCUTANEOUS at 20:03

## 2024-07-30 RX ADMIN — NYSTATIN: 100000 POWDER TOPICAL at 20:04

## 2024-07-30 NOTE — CASE MANAGEMENT/SOCIAL WORK
Continued Stay Note  [facility]     Patient Name: Cheri Cruz  MRN: [id number]  Today's Date: 7/30/2024    Admit Date: 7/14/2024    Plan: [facility]   Discharge Plan       Row Name 07/30/24 1551       Plan    Plan [facility]    Patient/Family in Agreement with Plan yes    Plan Comments Discussed Ms. Cruz in MDR. Family expedited appeal still pending for rehab at [facility]. Attempted to call Daughter Janice to update and left a voicemail. CM will continue to follow up.    Final Discharge Disposition Code 03 - skilled nursing facility (SNF)                   Discharge Codes    No documentation.                 Expected Discharge Date and Time       Expected Discharge Date Expected Discharge Time    Jul 31, 2024               Jaye Kuhn RN

## 2024-07-30 NOTE — PROGRESS NOTES
[facility] Medicine Services  PROGRESS NOTE    Patient Name: Cheri Cruz  : 1940  MRN: [id number]    Date of Admission: 2024  Primary Care Physician: Lorrie Canada APRN    Subjective   Subjective     CC:  AMS    HPI:  Patient is resting in bed in NAD. She states her stomach is cramping and she needs to have a bm. No acute events overnight per nursing.       Objective   Objective     Vital Signs:   Temp:  [97.8 °F (36.6 °C)-98.2 °F (36.8 °C)] 98.2 °F (36.8 °C)  Heart Rate:  [65-71] 67  Resp:  [16-17] 16  BP: (132-160)/(53-96) 141/92     Physical Exam:  Constitutional: No acute distress, awake, alert  HENT: NCAT, mucous membranes moist  Respiratory: Clear to auscultation bilaterally, respiratory effort normal room air 96%  Cardiovascular: RRR, no murmurs, rubs, or gallops  Gastrointestinal: Positive bowel sounds, soft, nontender, nondistended  Musculoskeletal: No bilateral ankle edema  Psychiatric: Appropriate affect, cooperative  Neurologic: Oriented x 1, generalized weakness, pain in right hip when moving it, speech clear  Skin: No rashes, pale       Results Reviewed:  LAB RESULTS:      Lab 24  0543 24  1005 24  0530   WBC 8.47 5.79 6.67   HEMOGLOBIN 9.8* 9.0* 9.4*   HEMATOCRIT 31.0* 28.7* 29.4*   PLATELETS 181 138* 125*   NEUTROS ABS 4.98 3.46  --    IMMATURE GRANS (ABS) 0.03 0.01  --    LYMPHS ABS 2.31 1.43  --    MONOS ABS 0.84 0.61  --    EOS ABS 0.26 0.25  --    .6* 102.9* 100.7*         Lab 24  0543 24  1005 24  0530   SODIUM 140 137 135*   POTASSIUM 4.9 5.0 4.8   CHLORIDE 101 100 98   CO2 33.0* 31.0* 26.0   ANION GAP 6.0 6.0 11.0   BUN 31* 30* 29*   CREATININE 1.61* 1.51* 1.46*   EGFR 31.8* 34.4* 35.8*   GLUCOSE 102* 169* 103*   CALCIUM 8.8 8.6 8.6                         Brief Urine Lab Results  (Last result in the past 365 days)        Color   Clarity   Blood   Leuk Est   Nitrite   Protein   CREAT   Urine HCG         07/14/24 2001 Yellow   Cloudy   Trace   Small (1+)   Negative   Negative                   Microbiology Results Abnormal       Procedure Component Value - Date/Time    Blood Culture - Blood, Wrist, Left [[id number]]  (Normal) Collected: 07/14/24 2022    Lab Status: Final result Specimen: Blood from Wrist, Left Updated: 07/19/24 2100     Blood Culture No growth at 5 days    Narrative:      Less than seven (7) mL's of blood was collected.  Insufficient quantity may yield false negative results.    Blood Culture - Blood, Hand, Left [[id number]]  (Normal) Collected: 07/14/24 2022    Lab Status: Final result Specimen: Blood from Hand, Left Updated: 07/19/24 2100     Blood Culture No growth at 5 days    Narrative:      Less than seven (7) mL's of blood was collected.  Insufficient quantity may yield false negative results.    Urine Culture - Urine, Urine, Random Void [[id number]] Collected: 07/14/24 2001    Lab Status: Final result Specimen: Urine, Random Void Updated: 07/16/24 1020     Urine Culture 50,000 CFU/mL Normal Urogenital Nickie    Narrative:      Colonization of the urinary tract without infection is common. Treatment is discouraged unless the patient is symptomatic, pregnant, or undergoing an invasive urologic procedure.            No radiology results from the last 24 hrs    Results for orders placed during the hospital encounter of 02/10/23    Adult Transthoracic Echo Complete W/ Cont if Necessary Per Protocol    Interpretation Summary    Left ventricular systolic function is hyperdynamic (EF > 70%). Calculated left ventricular EF = 70.6% Left ventricular ejection fraction appears to be greater than 70%. The left ventricular cavity is small in size. Left ventricular wall thickness is consistent with mild concentric hypertrophy. All left ventricular wall segments contract normally. Left ventricular intracavitary gradient noted to be 71 mmHg. Left ventricular diastolic function is consistent with (grade I)  impaired relaxation. Normal left atrial pressure.    The aortic valve is abnormal in structure. There is mild calcification of the aortic valve mainly affecting the right coronary cusp(s). The aortic valve appears trileaflet. No aortic valve regurgitation is present. Gradient noted through the LV and LVOT    Compared to TTE report from  Dec 2019, hyperdynamic LV with intracavitary gradient is not a new finding but peak gradient noted on this exam is higher than previously described.      Current medications:  Scheduled Meds:apixaban, 5 mg, Oral, BID  atorvastatin, 80 mg, Oral, Nightly  busPIRone, 7.5 mg, Oral, BID  Diclofenac Sodium, 2 g, Topical, 4x Daily  donepezil, 10 mg, Oral, Nightly  DULoxetine, 60 mg, Oral, Daily  fluticasone, 2 spray, Each Nare, Daily  gabapentin, 300 mg, Oral, Daily  insulin glargine, 30 Units, Subcutaneous, Nightly  insulin lispro, 3-14 Units, Subcutaneous, 4x Daily AC & at Bedtime  Insulin Lispro, 7 Units, Subcutaneous, TID With Meals  levothyroxine, 25 mcg, Oral, Daily  Lidocaine, 1 patch, Transdermal, Q24H  LORazepam, 0.5 mg, Oral, Q12H  meclizine, 25 mg, Oral, TID  nitrofurantoin (macrocrystal-monohydrate), 100 mg, Oral, Q24H  nystatin, , Topical, 4x Daily  pantoprazole, 40 mg, Oral, BID AC  senna-docusate sodium, 2 tablet, Oral, Daily   And  polyethylene glycol, 17 g, Oral, Daily  QUEtiapine, 50 mg, Oral, Nightly  sodium chloride, 10 mL, Intravenous, Q12H  sodium chloride, 10 mL, Intravenous, Q12H      Continuous Infusions:   PRN Meds:.  acetaminophen **OR** acetaminophen **OR** acetaminophen    senna-docusate sodium **AND** polyethylene glycol **AND** bisacodyl **AND** bisacodyl    Calcium Replacement - Follow Nurse / BPA Driven Protocol    dextrose    dextrose    glucagon (human recombinant)    HYDROcodone-acetaminophen    Magnesium Standard Dose Replacement - Follow Nurse / BPA Driven Protocol    meclizine    ondansetron ODT **OR** ondansetron    Phosphorus Replacement - Follow  Nurse / BPA Driven Protocol    Potassium Replacement - Follow Nurse / BPA Driven Protocol    sodium chloride    sodium chloride    sodium chloride    sodium chloride    Assessment & Plan   Assessment & Plan     Active Hospital Problems    Diagnosis  POA    **AMS (altered mental status) [R41.82]  Yes    Stroke [I63.9]  Yes    Acquired hypothyroidism [E03.9]  Yes    History of pulmonary embolus (PE) [Z86.711]  Yes    Essential hypertension [I10]  Yes    GERD (gastroesophageal reflux disease) [K21.9]  Yes    Type 2 diabetes mellitus without complication, without long-term current use of insulin [E11.9]  Yes      Resolved Hospital Problems   No resolved problems to display.        Brief Hospital Course to date:  Cheri Cruz is a 83 y.o. 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  Initial stroke workup was negative.  MRI showed old left parietal ischemic stroke.  Stroke neurology followed and resumed Eliquis.  She received empiric IV ceftriaxone x 3 days due to concern for UTI.    Plan was partially entered by my partner and I have reviewed and updated as appropriate on 7/30/24     Altered mental status on advanced dementia  Hypotension   Concern for UTI  - initial stroke workup negative.  MRI showed old L parietal ischemic stroke; pt seen by Stroke team; apixiban restarted.   -Possibly dehydration vs hypoxia  -COVID/flu negative. Blood cultures with no growth to date. LFTs normal. CXR no acute findings.    -CXR with old calcified gr  -S/p empiric IV ceftriaxone x 3 days.   -Held sedatives initially, Okay to resume PRN lorazepam (home med)   -Continue donepezil, Seroquel, BuSpar.       Hand tremors  - distressing to patient.  Monitor.      Vertigo  - reports this is common for her  - change meclizine to scheduled     Acute on chronic hypoxemic respiratory failure  Near-syncope  -Per report, patient became hypoxic with oxygen saturation in the 60s  -CTA chest showed no PE, no acute process   -Patient is O2 dependent on 2L NC but is not compliant due her baseline dementia.  -Continue supplemental oxygenation, titrate for goal SpO2 greater than 90%.     Evolving old left parietal ischemic stroke  -Presented to [facility] with cognitive decline and metabolic encephalopathy. She was hypotensive with systolic in the 90s at [facility].   -CTH from [facility] with left parietal tiny hemorrhage vs calcification on top of old ischemic stroke.   -MRI showed evolving old left parietal lobe CVA with some areas of associated cortical laminar necrosis. No hemorrhage.    -Neurology evaluated, recommended to resume Eliquis. Continue statin. Annual follow up in stroke clinic.   -PT/OT now recommending SNF      RADHA on CKD stage III  Volume depletion due to dehydration   Mild hyperkalemia  -Baseline Cr about 1.3, Cr 2.06 on presentation, improved to  1.2.  Then increased to 1.8 and got IV fluids again.    - pt at risk of recurrent dehydration/RADHA.  Will stop hytrin for this reason      Complex disposition  -PT/OT evaluated, felt patient's functional status is at baseline. Recommended home with 24/7 care.  -partner discussed care with both patient's daughters and case management on 7/17.   - Family leaning towards pursuing long-term care after discharge as they are having difficulty taking care of her on their own and do not have finances for full-time caregiver.     Nausea, constipation   -KUB 7/14 with nonobstructive bowel gas pattern. Repeat KUB 7/18 with moderate stool burden.  -Continue bowel regimen. Increased PPI to BID. PRN Zofran for nausea.      Uncontrolled diabetes mellitus type 2 with hyperglycemia  -A1c 9.80% this admission  -Increase Lantus to 30 units qhs,  increased  lispro 7u TIDAC and moderate-high dose SSI -- improved   -Resumed home gabapentin at low-dose 300 mg daily.     Chronic anemia  Macrocytosis  -No evidence of overt GI bleeding this admission  -B12 and folate within normal limits  -Further workup of anemia as outpatient as appropriate.      Essential hypertension  -Was hypotensive at [facility].  -Not on antihypertensive medications.  - stable      Hyperlipidemia -Continue atorvastatin.  Hypothyroidism -Continue levothyroxine.  GERD -Continue PPI.       Expected Discharge Location and Transportation: rehab   Expected Discharge   Expected Discharge Date: 7/31/2024; Expected Discharge Time:      VTE Prophylaxis:  Pharmacologic & mechanical VTE prophylaxis orders are present.         AM-PAC 6 Clicks Score (PT): 9 (07/29/24 6552)    CODE STATUS:   Code Status and Medical Interventions:   Ordered at: 07/15/24 1319     Level Of Support Discussed With:    Patient     Code Status (Patient has no pulse and is not breathing):    CPR (Attempt to Resuscitate)     Medical Interventions (Patient has pulse or is breathing):    Full Support       Kristin Pillai, APRN  07/30/24  '

## 2024-07-30 NOTE — THERAPY RE-EVALUATION
Acute Care - Speech Language Pathology   Swallow Re-Evaluation [facility]  Clinical Swallow Evaluation  & Cognitive Communication Treatment     Patient Name: Cheri Cruz  : 1940  MRN: [id number]  Today's Date: 2024               Admit Date: 2024    Visit Dx:     ICD-10-CM ICD-9-CM   1. Cognitive communication deficit  R41.841 799.52   2. Disorientation  R41.0 780.99     Patient Active Problem List   Diagnosis    Hyperlipidemia LDL goal <70    Type 2 diabetes mellitus without complication, without long-term current use of insulin    GERD (gastroesophageal reflux disease)    Essential hypertension    Abnormal EKG    Osteoarthritis    Anxiety    Palpitations    Vertigo    History of ischemic left MCA stroke    Hemorrhagic stroke    History of pulmonary embolus (PE)    Confusion    Hypotension    Constipation    UTI (urinary tract infection) due to urinary indwelling catheter    Metabolic encephalopathy    Symptomatic anemia    Acquired hypothyroidism    AMS (altered mental status)    Stroke     Past Medical History:   Diagnosis Date    Abnormal heart rhythm     Anxiety     Arrhythmia     Arthritis     Atrial fibrillation     Dementia     Diabetes mellitus     Hyperlipidemia     Hypertension     Kidney disorder     Stroke     Uterus cancer      Past Surgical History:   Procedure Laterality Date    CATARACT EXTRACTION, BILATERAL         SLP Recommendation and Plan  SLP Swallowing Diagnosis: swallow WFL/no suspected pharyngeal impairment (24 1110)  SLP Diet Recommendation: soft to chew textures, thin liquids (24 1110)  Recommended Precautions and Strategies: upright posture during/after eating, general aspiration precautions (24 1110)  SLP Rec. for Method of Medication Administration: meds whole, with puree, as tolerated (24 1110)     Monitor for Signs of Aspiration: notify SLP if any concerns (24 111)     Swallow Criteria for Skilled Therapeutic Interventions Met:  "no problems identified which require skilled intervention (07/30/24 1110)  Anticipated Discharge Disposition (SLP): home with 24/7 care (07/30/24 1110)  Rehab Potential/Prognosis, Swallowing: good, to achieve stated therapy goals (07/30/24 1110)  Therapy Frequency (Swallow): evaluation only (07/30/24 1110)     Oral Care Recommendations: Oral Care BID/PRN, Toothbrush (07/30/24 1110)        Daily Summary of Progress (SLP): progress toward functional goals as expected (07/30/24 1110)               Treatment Assessment (SLP): continued, cognitive-linguistic disorder (07/30/24 1110)  Treatment Assessment Comments (SLP): no family present for therapy -- pt reconsulted for swallow eval as diff w/ meds however no difficulty w/ SLP evaluation. Will sign off dysphagia and cont to follow for cog-ling tx. (07/30/24 1110)  Plan for Continued Treatment (SLP): continue treatment per plan of care (07/30/24 1110)         Plan of Care Reviewed With: patient      SWALLOW EVALUATION (Last 72 Hours)       SLP Adult Swallow Evaluation       Row Name 07/30/24 1110                   Rehab Evaluation    Document Type evaluation  -EN        Subjective Information no complaints  -EN        Patient Observations alert;cooperative;agree to therapy  -EN        Patient/Family/Caregiver Comments/Observations no family present  -EN        Patient Effort good  -EN        Symptoms Noted During/After Treatment none  -EN           General Information    Patient Profile Reviewed yes  -EN        Pertinent History Of Current Problem See initial evaluation. RN concerned as pt \"choked\" when taking AM meds this morning.  -EN        Current Method of Nutrition soft to chew textures;thin liquids  -EN        Precautions/Limitations, Vision WFL;for purposes of eval  -EN        Precautions/Limitations, Hearing WFL;for purposes of eval  -EN        Prior Level of Function-Communication cognitive-linguistic impairment;other (see comments)  dementia  -EN        Prior " Level of Function-Swallowing soft to chew  -EN        Plans/Goals Discussed with patient  -EN        Barriers to Rehab cognitive status;previous functional deficit  -EN        Patient's Goals for Discharge return home  -EN           Pain    Additional Documentation Pain Scale: FACES Pre/Post-Treatment (Group)  -EN           Pain Scale: FACES Pre/Post-Treatment    Pain: FACES Scale, Pretreatment 0-->no hurt  -EN        Posttreatment Pain Rating 0-->no hurt  -EN           Clinical Swallow Eval    Oral Prep Phase WFL  -EN        Oral Transit WFL  -EN        Oral Residue WFL  -EN        Pharyngeal Phase no overt signs/symptoms of pharyngeal impairment  -EN        Esophageal Phase unremarkable  -EN           SLP Evaluation Clinical Impression    SLP Swallowing Diagnosis swallow WFL/no suspected pharyngeal impairment  -EN        Functional Impact no impact on function  -EN        Rehab Potential/Prognosis, Swallowing good, to achieve stated therapy goals  -EN        Swallow Criteria for Skilled Therapeutic Interventions Met no problems identified which require skilled intervention  -EN           SLP Treatment Clinical Impressions    Treatment Assessment (SLP) continued;cognitive-linguistic disorder  -EN        Treatment Assessment Comments (SLP) no family present for therapy -- pt reconsulted for swallow eval as diff w/ meds however no difficulty w/ SLP evaluation. Will sign off dysphagia and cont to follow for cog-ling tx.  -EN        Daily Summary of Progress (SLP) progress toward functional goals as expected  -EN        Barriers to Overall Progress (SLP) Cognitive status;Baseline deficits  -EN        Plan for Continued Treatment (SLP) continue treatment per plan of care  -EN        Care Plan Review care plan/treatment goals reviewed  -EN           Recommendations    Therapy Frequency (Swallow) evaluation only  -EN        SLP Diet Recommendation soft to chew textures;thin liquids  -EN        Recommended Precautions and  Strategies upright posture during/after eating;general aspiration precautions  -EN        Oral Care Recommendations Oral Care BID/PRN;Toothbrush  -EN        SLP Rec. for Method of Medication Administration meds whole;with puree;as tolerated  -EN        Monitor for Signs of Aspiration notify SLP if any concerns  -EN        Anticipated Discharge Disposition (SLP) home with 24/7 care  -EN                  User Key  (r) = Recorded By, (t) = Taken By, (c) = Cosigned By      Initials Name Effective Dates    EN Avril Sims MS CCC-SLP 06/22/22 -                     EDUCATION  The patient has been educated in the following areas:   Cognitive Impairment Communication Impairment Dysphagia (Swallowing Impairment).        SLP GOALS       Row Name 07/30/24 1110             Patient will demonstrate functional cognitive-linguistic skills for return to discharge environment    [place] with moderate cues  -EN      Time frame 1 week  -EN      Progress/Outcomes continuing progress toward goal  -EN         Comprehend Questions Goal 1 (SLP)    Improve Ability to Comprehend Questions Goal 1 (SLP) simple yes/no questions;complex yes/no questions;simple wh questions;simple general questions;80%;with minimal cues (75-90%)  -EN      Time Frame (Comprehend Questions Goal 1, SLP) 1 week  -EN      Progress (Ability to Comprehend Questions Goal 1, SLP) 70%;with minimal cues (75-90%)  -EN      Progress/Outcomes (Comprehend Questions Goal 1, SLP) continuing progress toward goal  -EN         Follow Directions Goal 2 (SLP)    Improve Ability to Follow Directions Goal 1 (SLP) 2 step commands;80%;with moderate cues (50-74%)  -EN      Time Frame (Follow Directions Goal 1, SLP) 1 week  -EN      Progress (Ability to Follow Directions Goal 1, SLP) 60%;with minimal cues (75-90%);with moderate cues (50-74%)  -EN      Progress/Outcomes (Follow Directions Goal 1, SLP) progress slower than expected  -EN      Comment (Follow Directions Goal 1, SLP) Pt  only intermittently able to follow 1/2 directions  -EN         Word Retrieval Skills Goal 1 (SLP)    Improve Word Retrieval Skills By Goal 1 (SLP) confrontational naming task;high frequency;responsive naming task;simple;completing open ended structured sentence;answer WH question with one word;completing a divergent task;completing a convergent task;80%;with minimal cues (75-90%)  -EN      Time Frame (Word Retrieval Goal 1, SLP) 1 week  -EN      Progress (Word Retrieval Skills Goal 1, SLP) 70%;with minimal cues (75-90%)  -EN      Progress/Outcomes (Word Retrieval Goal 1, SLP) continuing progress toward goal  -EN                User Key  (r) = Recorded By, (t) = Taken By, (c) = Cosigned By      Initials Name Provider Type    Avril Ramirez MS CCC-SLP Speech and Language Pathologist                         Time Calculation:    Time Calculation- SLP       Row Name 07/30/24 1223             Time Calculation- SLP    SLP Start Time 1110  -EN      SLP Received On 07/30/24  -EN         Untimed Charges    SLP Eval/Re-eval  ST Eval Oral Pharyng Swallow - [id number]  -EN      [id number] Eval Oral Pharyng Swallow Minutes 30  -EN      [id number] Treatment/ST Modification Prosth Aug Alter  30  -EN         Total Minutes    Untimed Charges Total Minutes 60  -EN       Total Minutes 60  -EN                User Key  (r) = Recorded By, (t) = Taken By, (c) = Cosigned By      Initials Name Provider Type    Avril Ramirez MS CCC-SLP Speech and Language Pathologist                    Therapy Charges for Today       Code Description Service Date Service Provider Modifiers Qty    24777496347 HC ST EVAL ORAL PHARYNG SWALLOW 2 7/30/2024 Avril Sims MS CCC-SLP GN 1    [id number] HC ST TREATMENT SPEECH 2 7/30/2024 Avril Sims MS CCC-SABINO GN 1                 MS ROBERT Bryson  7/30/2024

## 2024-07-30 NOTE — THERAPY PROGRESS REPORT/RE-CERT
Patient Name: Cheri Cruz  : 1940    MRN: [id number]                              Today's Date: 2024       Admit Date: 2024    Visit Dx:     ICD-10-CM ICD-9-CM   1. Cognitive communication deficit  R41.841 799.52   2. Disorientation  R41.0 780.99     Patient Active Problem List   Diagnosis    Hyperlipidemia LDL goal <70    Type 2 diabetes mellitus without complication, without long-term current use of insulin    GERD (gastroesophageal reflux disease)    Essential hypertension    Abnormal EKG    Osteoarthritis    Anxiety    Palpitations    Vertigo    History of ischemic left MCA stroke    Hemorrhagic stroke    History of pulmonary embolus (PE)    Confusion    Hypotension    Constipation    UTI (urinary tract infection) due to urinary indwelling catheter    Metabolic encephalopathy    Symptomatic anemia    Acquired hypothyroidism    AMS (altered mental status)    Stroke     Past Medical History:   Diagnosis Date    Abnormal heart rhythm     Anxiety     Arrhythmia     Arthritis     Atrial fibrillation     Dementia     Diabetes mellitus     Hyperlipidemia     Hypertension     Kidney disorder     Stroke     Uterus cancer      Past Surgical History:   Procedure Laterality Date    CATARACT EXTRACTION, BILATERAL        General Information       Row Name 24 1122          OT Time and Intention    Document Type therapy note (daily note)  -CS     Mode of Treatment occupational therapy  -CS       Row Name 24 1122          General Information    Patient Profile Reviewed yes  -CS     Existing Precautions/Restrictions fall;other (see comments)  BUE tremors R>L, R sided hypertonia, non-ambulatory at baseline, diplopia, [place]  -CS     Barriers to Rehab medically complex;previous functional deficit;cognitive status;contractures  -CS       Row Name 24 1122          Cognition    Orientation Status (Cognition) oriented to;person;verbal cues/prompts needed for orientation;place;disoriented  to;situation;time  -CS       Row Name 07/30/24 1122          Safety Issues, Functional Mobility    Safety Issues Affecting Function (Mobility) awareness of need for assistance;insight into deficits/self-awareness;judgment;problem-solving;safety precaution awareness;safety precautions follow-through/compliance;sequencing abilities  -CS     Impairments Affecting Function (Mobility) balance;cognition;endurance/activity tolerance;pain;postural/trunk control;range of motion (ROM);strength  -CS     Cognitive Impairments, Mobility Safety/Performance awareness, need for assistance;insight into deficits/self-awareness;safety precaution awareness;safety precaution follow-through  -CS               User Key  (r) = Recorded By, (t) = Taken By, (c) = Cosigned By      Initials Name Provider Type    CS Karlo Rai OT Occupational Therapist                     Mobility/ADL's       Row Name 07/30/24 1124          Bed Mobility    Bed Mobility supine-sit;scooting/bridging  -CS     Rolling Left [place] (Bed Mobility) moderate assist (50% patient effort);1 person assist  -CS     Rolling Right [place] (Bed Mobility) moderate assist (50% patient effort);1 person assist  -CS     Assistive Device (Bed Mobility) bed rails;draw sheet  -CS     Comment, (Bed Mobility) performed for sling placment and hygiene, follows cues for UE reach and LE pushthrough, able to assist with maintenance of sidelying  -CS       Row Name 07/30/24 1124          Transfers    Transfers bed-chair transfer  -CS       Row Name 07/30/24 1124          Bed-Chair Transfer    Bed-Chair [place] (Transfers) dependent (less than 25% patient effort);2 person assist;verbal cues  -CS     Assistive Device (Bed-Chair Transfers) lift device  -CS       Row Name 07/30/24 1124          Activities of Daily Living    BADL Assessment/Intervention lower body dressing;grooming;upper body dressing  -CS       Row Name 07/30/24 1124          Upper Body Dressing  Assessment/Training    [place] Level (Upper Body Dressing) doff;don;pajama/robe;minimum assist (75% patient effort)  -       Row Name 07/30/24 1124          Lower Body Dressing Assessment/Training    [place] Level (Lower Body Dressing) don;socks;dependent (less than 25% patient effort)  -       Row Name 07/30/24 1124          Grooming Assessment/Training    [place] Level (Grooming) wash face, hands;set up;verbal cues  -     Comment, (Grooming) cues for quality/completion  -CS       Row Name 07/30/24 1124          Self-Feeding Assessment/Training    Assistive Devices (Feeding) adapted cup  -     Comment, (Feeding) continues with improved functional use with adaptive cup  -CS       Row Name 07/30/24 1124          Toileting Assessment/Training    [place] Level (Toileting) adjust/manage clothing;other (see comments);dependent (less than 25% patient effort)  -CS               User Key  (r) = Recorded By, (t) = Taken By, (c) = Cosigned By      Initials Name Provider Type    CS Karlo Rai OT Occupational Therapist                   Obj/Interventions       Row Name 07/30/24 1127          Motor Skills    Therapeutic Exercise shoulder;elbow/forearm;other (see comments)  BUE AROM incorporated into warm-up prior to bed mobility and pulmonary ther-ex in sitting  -       Row Name 07/30/24 1127          Balance    Balance Assessment sitting static balance;sitting dynamic balance  -     Static Sitting Balance supervision  -     Dynamic Sitting Balance standby assist  -CS     Balance Interventions sitting;core stability exercise;weight shifting activity  -     Comment, Balance A/P weight shifting in sitting, fear with anterior weight shifting, pain verbalized throughout with any s/s of pain  -CS               User Key  (r) = Recorded By, (t) = Taken By, (c) = Cosigned By      Initials Name Provider Type    Karlo Croft OT Occupational Therapist                   Goals/Plan       [facility]  Name 07/30/24 1141          Bed Mobility Goal 1 (OT)    Activity/Assistive Device (Bed Mobility Goal 1, OT) rolling to right;rolling to left  -CS     [place] Level/Cues Needed (Bed Mobility Goal 1, OT) contact guard required  -CS     Time Frame (Bed Mobility Goal 1, OT) 5 days  -CS     Strategies/Barriers (Bed Mobility Goal 1, OT) improved sequencing and use of BUE/BLE during lift prep  -CS     Progress/Outcomes (Bed Mobility Goal 1, OT) goal revised this date  -CS       Row Name 07/30/24 1141          Dressing Goal 1 (OT)    Activity/Device (Dressing Goal 1, OT) upper body dressing  -CS     [place]/Cues Needed (Dressing Goal 1, OT) minimum assist (75% or more patient effort)  -CS     Time Frame (Dressing Goal 1, OT) long term goal (LTG);1 week  -CS     Progress/Outcome (Dressing Goal 1, OT) goal met  -CS       Row Name 07/30/24 1141          Grooming Goal 1 (OT)    Activity/Device (Grooming Goal 1, OT) hair care;oral care;wash face, hands  -CS     [place] (Grooming Goal 1, OT) minimum assist (75% or more patient effort)  -CS     Time Frame (Grooming Goal 1, OT) long term goal (LTG);10 days  -CS     Progress/Outcome (Grooming Goal 1, OT) new goal  -CS       Row Name 07/30/24 1141          Self-Feeding Goal 1 (OT)    Activity/Device (Self-Feeding Goal 1, OT) self-feeding skills, all;finger foods;liquids to mouth;scoop food and bring to mouth;adapted cup;built-up handle utensils  -CS     [place] Level/Cues Needed (Self-Feeding Goal 1, OT) modified independence;set-up required  -CS     Time Frame (Self-Feeding Goal 1, OT) long term goal (LTG);1 week  -CS     Progress/Outcomes (Self-Feeding Goal 1, OT) goal met  -CS       Row Name 07/30/24 1141          Therapy Assessment/Plan (OT)    Planned Therapy Interventions (OT) activity tolerance training;functional balance retraining;occupation/activity based interventions;ROM/therapeutic exercise;strengthening exercise;transfer/mobility  retraining;patient/caregiver education/training;neuromuscular control/coordination retraining;adaptive equipment training  -               User Key  (r) = Recorded By, (t) = Taken By, (c) = Cosigned By      Initials Name Provider Type    CS Karlo Rai, OT Occupational Therapist                   Clinical Impression       Row Name 07/30/24 1129          Pain Assessment    Additional Documentation Pain Scale: FACES Pre/Post-Treatment (Group)  -[facility] Name 07/30/24 1129          Pain Scale: FACES Pre/Post-Treatment    Pain: FACES Scale, Pretreatment 0-->no hurt  -CS     Posttreatment Pain Rating 0-->no hurt  -CS       Row Name 07/30/24 1129          Plan of Care Review    Plan of Care Reviewed With patient  -CS     Progress no change  -CS     Outcome Evaluation OT re-cert completed, goals reviewed revised. Pt remains near recent baseline for ADL completion with strength, balance, coordination, and cognitive deficits - OT will continue to follow while admitted for maintencance of function. Tolerated lift to recliner and engaged in BUE AROM and grooming tasks. Rec d/c to SNF.  -       Row Name 07/30/24 1129          Therapy Assessment/Plan (OT)    Criteria for Skilled Therapeutic Interventions Met (OT) yes;meets criteria;skilled treatment is necessary  -     Therapy Frequency (OT) 3 times/wk  -       Row Name 07/30/24 1129          Therapy Plan Review/Discharge Plan (OT)    Anticipated Discharge Disposition (OT) skilled nursing facility  -       Row Name 07/30/24 1129          Vital Signs    Pre Systolic BP Rehab --  RN cleared for tx  -CS     O2 Delivery Pre Treatment room air  -CS     O2 Delivery Intra Treatment room air  -CS     O2 Delivery Post Treatment room air  -CS     Pre Patient Position Supine  -CS     Intra Patient Position Standing  -CS     Post Patient Position Sitting  -CS       Row Name 07/30/24 1129          Positioning and Restraints    Pre-Treatment Position in bed  -CS     Post  Treatment Position chair  -CS     In Chair notified nsg;reclined;sitting;call light within reach;encouraged to call for assist;exit alarm on;legs elevated;on mechanical lift sling;LUE elevated;RUE elevated  -CS               User Key  (r) = Recorded By, (t) = Taken By, (c) = Cosigned By      Initials Name Provider Type    Karlo Croft OT Occupational Therapist                   Outcome Measures       Row Name 07/30/24 1143          How much help from another is currently needed...    Putting on and taking off regular lower body clothing? 1  -CS     Bathing (including washing, rinsing, and drying) 2  -CS     Toileting (which includes using toilet bed pan or urinal) 2  -CS     Putting on and taking off regular upper body clothing 2  -CS     Taking care of personal grooming (such as brushing teeth) 3  -CS     Eating meals 3  -CS     AM-PAC 6 Clicks Score (OT) 13  -CS       Row Name 07/30/24 1143          Modified [place] Scale    Modified [place] Scale 4 - Moderately severe disability.  Unable to walk without assistance, and unable to attend to own bodily needs without assistance.  -CS       Row Name 07/30/24 1143          Functional Assessment    Outcome Measure Options AM-PAC 6 Clicks Daily Activity (OT)  -CS               User Key  (r) = Recorded By, (t) = Taken By, (c) = Cosigned By      Initials Name Provider Type    Karlo Croft OT Occupational Therapist                    Occupational Therapy Education       Title: PT OT SLP Therapies (In Progress)       Topic: Occupational Therapy (In Progress)       Point: ADL training (In Progress)       Description:   Instruct learner(s) on proper safety adaptation and remediation techniques during self care or transfers.   Instruct in proper use of assistive devices.                  Learning Progress Summary             Patient Acceptance, E,D, VU,DU by  at 7/30/2024 1143    Acceptance, E,D, NR by JJEFF at 7/20/2024 1020    Acceptance, E,D, VU,DU by  at  7/15/2024 0936   Family Acceptance, E,D, NR by PRAKASH at 7/20/2024 1020                         Point: Home exercise program (In Progress)       Description:   Instruct learner(s) on appropriate technique for monitoring, assisting and/or progressing therapeutic exercises/activities.                  Learning Progress Summary             Patient Acceptance, E,D, VU,DU by  at 7/30/2024 1143    Acceptance, E,D, NR by PRAKASH at 7/20/2024 1020    Acceptance, E,D, VU,DU by CS at 7/15/2024 0936   Family Acceptance, E,D, NR by PRAKASH at 7/20/2024 1020                         Point: Precautions (In Progress)       Description:   Instruct learner(s) on prescribed precautions during self-care and functional transfers.                  Learning Progress Summary             Patient Acceptance, E,D, VU,DU by  at 7/30/2024 1143    Acceptance, E,D, NR by PRAKASH at 7/20/2024 1020    Acceptance, E,D, VU,DU by  at 7/15/2024 0936   Family Acceptance, E,D, NR by PRAKASH at 7/20/2024 1020                         Point: Body mechanics (In Progress)       Description:   Instruct learner(s) on proper positioning and spine alignment during self-care, functional mobility activities and/or exercises.                  Learning Progress Summary             Patient Acceptance, E,D, VU,DU by  at 7/30/2024 1143    Acceptance, E,D, NR by PRAKASH at 7/20/2024 1020    Acceptance, E,D, VU,DU by  at 7/15/2024 0936   Family Acceptance, E,D, NR by PRAKASH at 7/20/2024 1020                                         User Key       Initials Effective Dates Name Provider Type Discipline     06/16/21 -  Mirela Winston OT Occupational Therapist OT     06/16/21 -  Karlo Rai OT Occupational Therapist OT                  OT Recommendation and Plan  Planned Therapy Interventions (OT): activity tolerance training, functional balance retraining, occupation/activity based interventions, ROM/therapeutic exercise, strengthening exercise, transfer/mobility retraining,  patient/caregiver education/training, neuromuscular control/coordination retraining, adaptive equipment training  Therapy Frequency (OT): 3 times/wk  Plan of Care Review  Plan of Care Reviewed With: patient  Progress: no change  Outcome Evaluation: OT re-cert completed, goals reviewed revised. Pt remains near recent baseline for ADL completion with strength, balance, coordination, and cognitive deficits - OT will continue to follow while admitted for maintencance of function. Tolerated lift to recliner and engaged in BUE AROM and grooming tasks. Rec d/c to SNF.     Time Calculation:   Evaluation Complexity (OT)  Review Occupational Profile/Medical/Therapy History Complexity: expanded/moderate complexity  Assessment, Occupational Performance/Identification of Deficit Complexity: 3-5 performance deficits  Overall Complexity of Evaluation (OT): moderate complexity     Time Calculation- OT       Row Name 07/30/24 1143             Time Calculation- OT    OT Start Time 1011  -CS      OT Received On 07/30/24  -CS      OT Goal Re-Cert Due Date 08/09/24  -CS         Timed Charges    88320 - OT Therapeutic Activity Minutes 14  -CS      43691 - OT Self Care/Mgmt Minutes 12  -CS         Total Minutes    Timed Charges Total Minutes 26  -CS       Total Minutes 26  -CS                User Key  (r) = Recorded By, (t) = Taken By, (c) = Cosigned By      Initials Name Provider Type    CS Karlo Rai, OT Occupational Therapist                  Therapy Charges for Today       Code Description Service Date Service Provider Modifiers Qty    24760258529 HC OT THERAPEUTIC ACT EA 15 MIN 7/30/2024 Karlo Rai, OT GO 1    13714780916 HC OT SELF CARE/MGMT/TRAIN EA 15 MIN 7/30/2024 Karlo Rai, OT GO 1    38475367838 HC OT THER SUPP EA 15 MIN 7/30/2024 Karlo Rai, OT GO 1    11334640448 HC OT THER SUPP EA 15 MIN 7/30/2024 Karlo Rai, OT GO 1                 [place] OT  7/30/2024

## 2024-07-30 NOTE — PLAN OF CARE
Goal Outcome Evaluation:   Patient alert and oriented x1-2. Room air. Normal sinus rhythm. Ongoing reports of RLE pain throughout shift, prn and scheduled meds given as ordered, APRN notified a few times. Patient having difficulty moving extremity r/t pain. Bowel regimen very effective. Family at bedside for a couple hours, APRN notified for requests for nerve pill to be started back, see orders. Skin care provided, turning q2. Tolerated being up in chair for a couple hours.

## 2024-07-30 NOTE — PLAN OF CARE
Problem: Adult Inpatient Plan of Care  Goal: Plan of Care Review  Outcome: Ongoing, Progressing  Flowsheets  Taken 7/29/2024 1704 by Lorie Maciel RN  Progress: no change  Taken 7/29/2024 1537 by Cate Joshi PT Student  Plan of Care Reviewed With: patient  Outcome Evaluation: Pt able to sit upright unsupported in chair for approx. 2 mins. Pt continues to require MinAx1 for rolling in bed. Continues to demonstrate BLE strength/ROM, balance, endurance and postural control below baseline requiring assist with functional mobility. Goals reassessed to reflect current level of function and anticipated progress. PT recommended to address functional limitations and return to optimal level of function.  Goal: Patient-Specific Goal (Individualized)  Outcome: Ongoing, Progressing  Goal: Absence of Hospital-Acquired Illness or Injury  Outcome: Ongoing, Progressing  Intervention: Identify and Manage Fall Risk  Recent Flowsheet Documentation  Taken 7/30/2024 0555 by Tom Redd, RN  Safety Promotion/Fall Prevention:   clutter free environment maintained   activity supervised   assistive device/personal items within reach   fall prevention program maintained   nonskid shoes/slippers when out of bed   safety round/check completed  Taken 7/30/2024 0403 by Tom Redd, RN  Safety Promotion/Fall Prevention:   clutter free environment maintained   activity supervised   assistive device/personal items within reach   fall prevention program maintained   nonskid shoes/slippers when out of bed   safety round/check completed  Taken 7/30/2024 0207 by Tom Redd, RN  Safety Promotion/Fall Prevention:   clutter free environment maintained   activity supervised   assistive device/personal items within reach   fall prevention program maintained   nonskid shoes/slippers when out of bed   safety round/check completed  Taken 7/30/2024 0009 by Tom Redd, RN  Safety Promotion/Fall Prevention:   clutter  free environment maintained   activity supervised   assistive device/personal items within reach   fall prevention program maintained   nonskid shoes/slippers when out of bed   safety round/check completed  Taken 7/29/2024 2207 by Tom Redd RN  Safety Promotion/Fall Prevention:   clutter free environment maintained   activity supervised   assistive device/personal items within reach   fall prevention program maintained   nonskid shoes/slippers when out of bed   safety round/check completed  Taken 7/29/2024 2005 by Tom Redd RN  Safety Promotion/Fall Prevention:   clutter free environment maintained   activity supervised   assistive device/personal items within reach   fall prevention program maintained   nonskid shoes/slippers when out of bed   safety round/check completed  Intervention: Prevent Skin Injury  Recent Flowsheet Documentation  Taken 7/30/2024 0555 by Tom Redd RN  Body Position:   turned   left  Taken 7/30/2024 0403 by Tom Redd RN  Body Position:   turned   right  Taken 7/30/2024 0207 by Tom Redd RN  Body Position:   turned   left  Taken 7/30/2024 0009 by Tom Redd RN  Body Position:   turned   right  Taken 7/29/2024 2207 by Tom Redd RN  Body Position:   turned   left  Taken 7/29/2024 2005 by Tom Redd RN  Body Position:   turned   right  Intervention: Prevent and Manage VTE (Venous Thromboembolism) Risk  Recent Flowsheet Documentation  Taken 7/30/2024 0555 by Tom Redd RN  Activity Management: activity encouraged  VTE Prevention/Management: other (see comments)  Taken 7/30/2024 0403 by Tom Redd RN  Activity Management: activity encouraged  VTE Prevention/Management: other (see comments)  Taken 7/30/2024 0207 by Tom Redd RN  Activity Management: activity encouraged  VTE Prevention/Management: other (see comments)  Taken 7/30/2024 0009 by Tom Redd RN  Activity  Management: activity encouraged  VTE Prevention/Management: other (see comments)  Taken 7/29/2024 2207 by Tom Redd, RN  Activity Management: activity encouraged  VTE Prevention/Management: other (see comments)  Taken 7/29/2024 2005 by Tom Redd RN  Activity Management: activity encouraged  VTE Prevention/Management: other (see comments)  Intervention: Prevent Infection  Recent Flowsheet Documentation  Taken 7/30/2024 0555 by Tom Redd RN  Infection Prevention:   environmental surveillance performed   rest/sleep promoted  Taken 7/30/2024 0403 by Tom Redd RN  Infection Prevention:   environmental surveillance performed   rest/sleep promoted  Taken 7/30/2024 0207 by Tom Redd RN  Infection Prevention:   environmental surveillance performed   rest/sleep promoted  Taken 7/30/2024 0009 by Tom Redd RN  Infection Prevention:   environmental surveillance performed   rest/sleep promoted  Taken 7/29/2024 2207 by Tom Redd RN  Infection Prevention:   environmental surveillance performed   rest/sleep promoted  Taken 7/29/2024 2005 by Tom Redd RN  Infection Prevention:   environmental surveillance performed   rest/sleep promoted  Goal: Optimal Comfort and Wellbeing  Outcome: Ongoing, Progressing  Intervention: Provide Person-Centered Care  Recent Flowsheet Documentation  Taken 7/30/2024 0555 by Tom Redd RN  Trust Relationship/Rapport:   care explained   choices provided  Taken 7/30/2024 0403 by Tom Redd RN  Trust Relationship/Rapport:   care explained   choices provided  Taken 7/30/2024 0207 by Tom Redd RN  Trust Relationship/Rapport:   care explained   choices provided  Taken 7/30/2024 0009 by Tom Redd RN  Trust Relationship/Rapport:   care explained   choices provided  Taken 7/29/2024 2207 by Tom Redd RN  Trust Relationship/Rapport:   care explained   choices provided  Taken  7/29/2024 2005 by Tom Redd RN  Trust Relationship/Rapport:   care explained   choices provided  Goal: Readiness for Transition of Care  Outcome: Ongoing, Progressing     Problem: Skin Injury Risk Increased  Goal: Skin Health and Integrity  Outcome: Ongoing, Progressing  Intervention: Optimize Skin Protection  Recent Flowsheet Documentation  Taken 7/30/2024 0555 by Tom Redd RN  Head of Bed (HOB) Positioning: HOB elevated  Taken 7/30/2024 0403 by Tom Redd RN  Head of Bed (HOB) Positioning: HOB elevated  Taken 7/30/2024 0207 by Tom Redd RN  Head of Bed (HOB) Positioning: HOB elevated  Taken 7/30/2024 0009 by Tom Redd RN  Head of Bed (HOB) Positioning: HOB elevated  Taken 7/29/2024 2207 by Tom Redd RN  Head of Bed (HOB) Positioning: HOB elevated  Taken 7/29/2024 2005 by Tom Redd RN  Head of Bed (HOB) Positioning: HOB elevated     Problem: Diabetes Comorbidity  Goal: Blood Glucose Level Within Targeted Range  Outcome: Ongoing, Progressing  Intervention: Monitor and Manage Glycemia  Recent Flowsheet Documentation  Taken 7/30/2024 0555 by Tom Redd RN  Glycemic Management: blood glucose monitored  Taken 7/30/2024 0403 by Tom Redd RN  Glycemic Management: blood glucose monitored  Taken 7/30/2024 0207 by Tom Redd, RN  Glycemic Management: blood glucose monitored  Taken 7/30/2024 0009 by Tom Redd, RN  Glycemic Management: blood glucose monitored  Taken 7/29/2024 2207 by Tom Redd RN  Glycemic Management: blood glucose monitored  Taken 7/29/2024 2005 by Tom Redd, RN  Glycemic Management: blood glucose monitored     Problem: Hypertension Comorbidity  Goal: Blood Pressure in Desired Range  Outcome: Ongoing, Progressing  Intervention: Maintain Blood Pressure Management  Recent Flowsheet Documentation  Taken 7/30/2024 0555 by Tom Redd RN  Medication Review/Management:  medications reviewed  Taken 7/30/2024 0403 by Tom Redd RN  Medication Review/Management: medications reviewed  Taken 7/30/2024 0207 by Tom Redd RN  Medication Review/Management: medications reviewed  Taken 7/30/2024 0009 by Tom Redd RN  Medication Review/Management: medications reviewed  Taken 7/29/2024 2207 by Tom Redd RN  Medication Review/Management: medications reviewed  Taken 7/29/2024 2005 by Tom Redd RN  Medication Review/Management: medications reviewed     Problem: Pain Chronic (Persistent) (Comorbidity Management)  Goal: Acceptable Pain Control and Functional Ability  Outcome: Ongoing, Progressing  Intervention: Manage Persistent Pain  Recent Flowsheet Documentation  Taken 7/30/2024 0555 by Tom Redd RN  Medication Review/Management: medications reviewed  Taken 7/30/2024 0403 by Tom Redd RN  Medication Review/Management: medications reviewed  Taken 7/30/2024 0207 by Tom Redd RN  Medication Review/Management: medications reviewed  Taken 7/30/2024 0009 by Tom Redd RN  Medication Review/Management: medications reviewed  Taken 7/29/2024 2207 by Tom Redd RN  Medication Review/Management: medications reviewed  Taken 7/29/2024 2005 by Tom Redd RN  Medication Review/Management: medications reviewed  Intervention: Optimize Psychosocial Wellbeing  Recent Flowsheet Documentation  Taken 7/30/2024 0555 by Tom Redd RN  Diversional Activities: television  Family/Support System Care: support provided  Taken 7/30/2024 0403 by Tom Redd RN  Diversional Activities: television  Family/Support System Care: support provided  Taken 7/30/2024 0207 by Tom Redd RN  Diversional Activities: television  Family/Support System Care: support provided  Taken 7/30/2024 0009 by Tom Redd RN  Diversional Activities: television  Family/Support System Care: support  provided  Taken 7/29/2024 2207 by Tom Redd RN  Diversional Activities: television  Family/Support System Care: support provided  Taken 7/29/2024 2005 by Tom Redd RN  Diversional Activities: television  Family/Support System Care: support provided     Problem: Fall Injury Risk  Goal: Absence of Fall and Fall-Related Injury  Outcome: Ongoing, Progressing  Intervention: Identify and Manage Contributors  Recent Flowsheet Documentation  Taken 7/30/2024 0555 by Tom Redd RN  Medication Review/Management: medications reviewed  Taken 7/30/2024 0403 by Tom Redd RN  Medication Review/Management: medications reviewed  Taken 7/30/2024 0207 by Tom Redd RN  Medication Review/Management: medications reviewed  Taken 7/30/2024 0009 by Tom Redd RN  Medication Review/Management: medications reviewed  Taken 7/29/2024 2207 by Tom Redd RN  Medication Review/Management: medications reviewed  Taken 7/29/2024 2005 by Tom Redd RN  Medication Review/Management: medications reviewed  Intervention: Promote Injury-Free Environment  Recent Flowsheet Documentation  Taken 7/30/2024 0555 by Tom Redd, RN  Safety Promotion/Fall Prevention:   clutter free environment maintained   activity supervised   assistive device/personal items within reach   fall prevention program maintained   nonskid shoes/slippers when out of bed   safety round/check completed  Taken 7/30/2024 0403 by Tom Redd, RN  Safety Promotion/Fall Prevention:   clutter free environment maintained   activity supervised   assistive device/personal items within reach   fall prevention program maintained   nonskid shoes/slippers when out of bed   safety round/check completed  Taken 7/30/2024 0207 by Tom Redd RN  Safety Promotion/Fall Prevention:   clutter free environment maintained   activity supervised   assistive device/personal items within reach   fall  prevention program maintained   nonskid shoes/slippers when out of bed   safety round/check completed  Taken 7/30/2024 0009 by Tom Redd, RN  Safety Promotion/Fall Prevention:   clutter free environment maintained   activity supervised   assistive device/personal items within reach   fall prevention program maintained   nonskid shoes/slippers when out of bed   safety round/check completed  Taken 7/29/2024 2207 by Tom Redd, RN  Safety Promotion/Fall Prevention:   clutter free environment maintained   activity supervised   assistive device/personal items within reach   fall prevention program maintained   nonskid shoes/slippers when out of bed   safety round/check completed  Taken 7/29/2024 2005 by Tom Redd, RN  Safety Promotion/Fall Prevention:   clutter free environment maintained   activity supervised   assistive device/personal items within reach   fall prevention program maintained   nonskid shoes/slippers when out of bed   safety round/check completed   Goal Outcome Evaluation:

## 2024-07-30 NOTE — PLAN OF CARE
Goal Outcome Evaluation:  Plan of Care Reviewed With: patient                  Anticipated Discharge Disposition (SLP): home with 24/7 care          SLP Swallowing Diagnosis: swallow WFL/no suspected pharyngeal impairment (07/30/24 1110)  Treatment Assessment (SLP): continued, cognitive-linguistic disorder (07/30/24 1110)  Treatment Assessment Comments (SLP): no family present for therapy -- pt reconsulted for swallow eval as diff w/ meds however no difficulty w/ SLP evaluation. Will sign off dysphagia and cont to follow for cog-ling tx. (07/30/24 1110)  Plan for Continued Treatment (SLP): continue treatment per plan of care (07/30/24 1110)

## 2024-07-30 NOTE — PLAN OF CARE
Goal Outcome Evaluation:  Plan of Care Reviewed With: patient        Progress: no change  Outcome Evaluation: OT re-cert completed, goals reviewed revised. Pt remains near recent baseline for ADL completion with strength, balance, coordination, and cognitive deficits - OT will continue to follow while admitted for maintencance of function. Tolerated lift to recliner and engaged in BUE AROM and grooming tasks. Rec d/c to SNF.      Anticipated Discharge Disposition (OT): skilled nursing facility

## 2024-07-31 LAB
GLUCOSE BLDC GLUCOMTR-MCNC: 134 MG/DL (ref 70–130)
GLUCOSE BLDC GLUCOMTR-MCNC: 160 MG/DL (ref 70–130)
GLUCOSE BLDC GLUCOMTR-MCNC: 218 MG/DL (ref 70–130)
GLUCOSE BLDC GLUCOMTR-MCNC: 257 MG/DL (ref 70–130)

## 2024-07-31 PROCEDURE — 63710000001 HYDROCODONE-ACETAMINOPHEN 10-325 MG TABLET: Performed by: INTERNAL MEDICINE

## 2024-07-31 PROCEDURE — A9270 NON-COVERED ITEM OR SERVICE: HCPCS | Performed by: STUDENT IN AN ORGANIZED HEALTH CARE EDUCATION/TRAINING PROGRAM

## 2024-07-31 PROCEDURE — A9270 NON-COVERED ITEM OR SERVICE: HCPCS | Performed by: INTERNAL MEDICINE

## 2024-07-31 PROCEDURE — 63710000001 PANTOPRAZOLE 40 MG TABLET DELAYED-RELEASE: Performed by: STUDENT IN AN ORGANIZED HEALTH CARE EDUCATION/TRAINING PROGRAM

## 2024-07-31 PROCEDURE — 63710000001 DULOXETINE 60 MG CAPSULE DELAYED-RELEASE PARTICLES: Performed by: INTERNAL MEDICINE

## 2024-07-31 PROCEDURE — 63710000001 NITROFURANTOIN (MACROCRYSTAL-MONOHYDRATE) 100 MG CAPSULE: Performed by: INTERNAL MEDICINE

## 2024-07-31 PROCEDURE — 63710000001 GABAPENTIN 100 MG CAPSULE: Performed by: NURSE PRACTITIONER

## 2024-07-31 PROCEDURE — A9270 NON-COVERED ITEM OR SERVICE: HCPCS | Performed by: NURSE PRACTITIONER

## 2024-07-31 PROCEDURE — 63710000001 LORAZEPAM 0.5 MG TABLET: Performed by: NURSE PRACTITIONER

## 2024-07-31 PROCEDURE — 63710000001 QUETIAPINE 25 MG TABLET: Performed by: INTERNAL MEDICINE

## 2024-07-31 PROCEDURE — A9270 NON-COVERED ITEM OR SERVICE: HCPCS

## 2024-07-31 PROCEDURE — 63710000001 SENNOSIDES-DOCUSATE 8.6-50 MG TABLET: Performed by: STUDENT IN AN ORGANIZED HEALTH CARE EDUCATION/TRAINING PROGRAM

## 2024-07-31 PROCEDURE — 63710000001 LIDOCAINE 4 % PATCH: Performed by: NURSE PRACTITIONER

## 2024-07-31 PROCEDURE — 63710000001 INSULIN LISPRO (HUMAN) PER 5 UNITS: Performed by: STUDENT IN AN ORGANIZED HEALTH CARE EDUCATION/TRAINING PROGRAM

## 2024-07-31 PROCEDURE — 63710000001 POLYETHYLENE GLYCOL 17 G PACK: Performed by: STUDENT IN AN ORGANIZED HEALTH CARE EDUCATION/TRAINING PROGRAM

## 2024-07-31 PROCEDURE — 63710000001 DONEPEZIL 10 MG TABLET: Performed by: INTERNAL MEDICINE

## 2024-07-31 PROCEDURE — 63710000001 ONDANSETRON ODT 4 MG TABLET DISPERSIBLE: Performed by: INTERNAL MEDICINE

## 2024-07-31 PROCEDURE — 63710000001 LEVOTHYROXINE 25 MCG TABLET: Performed by: INTERNAL MEDICINE

## 2024-07-31 PROCEDURE — 63710000001 MECLIZINE 12.5 MG TABLET: Performed by: INTERNAL MEDICINE

## 2024-07-31 PROCEDURE — 63710000001 BUSPIRONE 15 MG TABLET: Performed by: INTERNAL MEDICINE

## 2024-07-31 PROCEDURE — 63710000001 APIXABAN 5 MG TABLET: Performed by: STUDENT IN AN ORGANIZED HEALTH CARE EDUCATION/TRAINING PROGRAM

## 2024-07-31 PROCEDURE — 82948 REAGENT STRIP/BLOOD GLUCOSE: CPT

## 2024-07-31 PROCEDURE — 63710000001 INSULIN LISPRO (HUMAN) PER 5 UNITS: Performed by: NURSE PRACTITIONER

## 2024-07-31 PROCEDURE — G0378 HOSPITAL OBSERVATION PER HR: HCPCS

## 2024-07-31 PROCEDURE — 63710000001 INSULIN GLARGINE PER 5 UNITS: Performed by: STUDENT IN AN ORGANIZED HEALTH CARE EDUCATION/TRAINING PROGRAM

## 2024-07-31 PROCEDURE — 63710000001 ATORVASTATIN 40 MG TABLET

## 2024-07-31 RX ADMIN — PANTOPRAZOLE SODIUM 40 MG: 40 TABLET, DELAYED RELEASE ORAL at 16:33

## 2024-07-31 RX ADMIN — APIXABAN 5 MG: 5 TABLET, FILM COATED ORAL at 08:32

## 2024-07-31 RX ADMIN — INSULIN LISPRO 7 UNITS: 100 INJECTION, SOLUTION INTRAVENOUS; SUBCUTANEOUS at 18:02

## 2024-07-31 RX ADMIN — INSULIN LISPRO 7 UNITS: 100 INJECTION, SOLUTION INTRAVENOUS; SUBCUTANEOUS at 08:18

## 2024-07-31 RX ADMIN — ONDANSETRON 4 MG: 4 TABLET, ORALLY DISINTEGRATING ORAL at 18:02

## 2024-07-31 RX ADMIN — NYSTATIN: 100000 POWDER TOPICAL at 12:56

## 2024-07-31 RX ADMIN — DICLOFENAC SODIUM 2 G: 10 GEL TOPICAL at 12:56

## 2024-07-31 RX ADMIN — INSULIN LISPRO 3 UNITS: 100 INJECTION, SOLUTION INTRAVENOUS; SUBCUTANEOUS at 12:56

## 2024-07-31 RX ADMIN — LORAZEPAM 0.5 MG: 0.5 TABLET ORAL at 21:48

## 2024-07-31 RX ADMIN — INSULIN LISPRO 7 UNITS: 100 INJECTION, SOLUTION INTRAVENOUS; SUBCUTANEOUS at 12:56

## 2024-07-31 RX ADMIN — GABAPENTIN 200 MG: 100 CAPSULE ORAL at 21:48

## 2024-07-31 RX ADMIN — BUSPIRONE HYDROCHLORIDE 7.5 MG: 15 TABLET ORAL at 08:32

## 2024-07-31 RX ADMIN — DICLOFENAC SODIUM 2 G: 10 GEL TOPICAL at 21:50

## 2024-07-31 RX ADMIN — INSULIN LISPRO 5 UNITS: 100 INJECTION, SOLUTION INTRAVENOUS; SUBCUTANEOUS at 18:01

## 2024-07-31 RX ADMIN — FLUTICASONE PROPIONATE 2 SPRAY: 50 SPRAY, METERED NASAL at 08:15

## 2024-07-31 RX ADMIN — NYSTATIN: 100000 POWDER TOPICAL at 08:15

## 2024-07-31 RX ADMIN — DONEPEZIL HYDROCHLORIDE 10 MG: 10 TABLET, FILM COATED ORAL at 21:48

## 2024-07-31 RX ADMIN — MECLIZINE HYDROCHLORIDE 25 MG: 25 TABLET ORAL at 08:32

## 2024-07-31 RX ADMIN — NITROFURANTOIN MONOHYDRATE/MACROCRYSTALS 100 MG: 75; 25 CAPSULE ORAL at 21:48

## 2024-07-31 RX ADMIN — GABAPENTIN 200 MG: 100 CAPSULE ORAL at 08:32

## 2024-07-31 RX ADMIN — NYSTATIN: 100000 POWDER TOPICAL at 21:50

## 2024-07-31 RX ADMIN — MECLIZINE HYDROCHLORIDE 25 MG: 25 TABLET ORAL at 16:33

## 2024-07-31 RX ADMIN — BUSPIRONE HYDROCHLORIDE 7.5 MG: 15 TABLET ORAL at 21:48

## 2024-07-31 RX ADMIN — LIDOCAINE 1 PATCH: 4 PATCH TOPICAL at 08:17

## 2024-07-31 RX ADMIN — DULOXETINE HYDROCHLORIDE 60 MG: 60 CAPSULE, DELAYED RELEASE ORAL at 08:32

## 2024-07-31 RX ADMIN — PANTOPRAZOLE SODIUM 40 MG: 40 TABLET, DELAYED RELEASE ORAL at 05:27

## 2024-07-31 RX ADMIN — HYDROCODONE BITARTRATE AND ACETAMINOPHEN 1 TABLET: 10; 325 TABLET ORAL at 18:02

## 2024-07-31 RX ADMIN — POLYETHYLENE GLYCOL 3350 17 G: 17 POWDER, FOR SOLUTION ORAL at 08:31

## 2024-07-31 RX ADMIN — INSULIN GLARGINE 30 UNITS: 100 INJECTION, SOLUTION SUBCUTANEOUS at 21:48

## 2024-07-31 RX ADMIN — MECLIZINE HYDROCHLORIDE 25 MG: 25 TABLET ORAL at 21:48

## 2024-07-31 RX ADMIN — NYSTATIN: 100000 POWDER TOPICAL at 18:05

## 2024-07-31 RX ADMIN — DICLOFENAC SODIUM 2 G: 10 GEL TOPICAL at 08:12

## 2024-07-31 RX ADMIN — APIXABAN 5 MG: 5 TABLET, FILM COATED ORAL at 21:48

## 2024-07-31 RX ADMIN — LORAZEPAM 0.5 MG: 0.5 TABLET ORAL at 08:32

## 2024-07-31 RX ADMIN — SENNOSIDES AND DOCUSATE SODIUM 2 TABLET: 50; 8.6 TABLET ORAL at 08:31

## 2024-07-31 RX ADMIN — DICLOFENAC SODIUM 2 G: 10 GEL TOPICAL at 18:02

## 2024-07-31 RX ADMIN — QUETIAPINE FUMARATE 50 MG: 25 TABLET ORAL at 21:48

## 2024-07-31 RX ADMIN — LEVOTHYROXINE SODIUM 25 MCG: 25 TABLET ORAL at 05:28

## 2024-07-31 RX ADMIN — ATORVASTATIN CALCIUM 80 MG: 40 TABLET, FILM COATED ORAL at 21:47

## 2024-07-31 RX ADMIN — INSULIN LISPRO 8 UNITS: 100 INJECTION, SOLUTION INTRAVENOUS; SUBCUTANEOUS at 21:48

## 2024-08-01 LAB
GLUCOSE BLDC GLUCOMTR-MCNC: 102 MG/DL (ref 70–130)
GLUCOSE BLDC GLUCOMTR-MCNC: 145 MG/DL (ref 70–130)
GLUCOSE BLDC GLUCOMTR-MCNC: 179 MG/DL (ref 70–130)
GLUCOSE BLDC GLUCOMTR-MCNC: 183 MG/DL (ref 70–130)

## 2024-08-01 PROCEDURE — 63710000001 LORAZEPAM 0.5 MG TABLET: Performed by: NURSE PRACTITIONER

## 2024-08-01 PROCEDURE — 63710000001 LEVOTHYROXINE 25 MCG TABLET: Performed by: INTERNAL MEDICINE

## 2024-08-01 PROCEDURE — A9270 NON-COVERED ITEM OR SERVICE: HCPCS | Performed by: INTERNAL MEDICINE

## 2024-08-01 PROCEDURE — 97530 THERAPEUTIC ACTIVITIES: CPT

## 2024-08-01 PROCEDURE — G0378 HOSPITAL OBSERVATION PER HR: HCPCS

## 2024-08-01 PROCEDURE — 63710000001 PANTOPRAZOLE 40 MG TABLET DELAYED-RELEASE: Performed by: STUDENT IN AN ORGANIZED HEALTH CARE EDUCATION/TRAINING PROGRAM

## 2024-08-01 PROCEDURE — 63710000001 GABAPENTIN 100 MG CAPSULE: Performed by: NURSE PRACTITIONER

## 2024-08-01 PROCEDURE — 97110 THERAPEUTIC EXERCISES: CPT

## 2024-08-01 PROCEDURE — 63710000001 INSULIN LISPRO (HUMAN) PER 5 UNITS: Performed by: STUDENT IN AN ORGANIZED HEALTH CARE EDUCATION/TRAINING PROGRAM

## 2024-08-01 PROCEDURE — A9270 NON-COVERED ITEM OR SERVICE: HCPCS | Performed by: STUDENT IN AN ORGANIZED HEALTH CARE EDUCATION/TRAINING PROGRAM

## 2024-08-01 PROCEDURE — 63710000001 HYDROCODONE-ACETAMINOPHEN 10-325 MG TABLET: Performed by: INTERNAL MEDICINE

## 2024-08-01 PROCEDURE — 99232 SBSQ HOSP IP/OBS MODERATE 35: CPT | Performed by: STUDENT IN AN ORGANIZED HEALTH CARE EDUCATION/TRAINING PROGRAM

## 2024-08-01 PROCEDURE — A9270 NON-COVERED ITEM OR SERVICE: HCPCS

## 2024-08-01 PROCEDURE — A9270 NON-COVERED ITEM OR SERVICE: HCPCS | Performed by: NURSE PRACTITIONER

## 2024-08-01 PROCEDURE — 63710000001 APIXABAN 5 MG TABLET: Performed by: STUDENT IN AN ORGANIZED HEALTH CARE EDUCATION/TRAINING PROGRAM

## 2024-08-01 PROCEDURE — 63710000001 ATORVASTATIN 40 MG TABLET

## 2024-08-01 PROCEDURE — 63710000001 DULOXETINE 60 MG CAPSULE DELAYED-RELEASE PARTICLES: Performed by: INTERNAL MEDICINE

## 2024-08-01 PROCEDURE — 63710000001 NITROFURANTOIN (MACROCRYSTAL-MONOHYDRATE) 100 MG CAPSULE: Performed by: INTERNAL MEDICINE

## 2024-08-01 PROCEDURE — 82948 REAGENT STRIP/BLOOD GLUCOSE: CPT

## 2024-08-01 PROCEDURE — 63710000001 INSULIN GLARGINE PER 5 UNITS: Performed by: STUDENT IN AN ORGANIZED HEALTH CARE EDUCATION/TRAINING PROGRAM

## 2024-08-01 PROCEDURE — 92507 TX SP LANG VOICE COMM INDIV: CPT

## 2024-08-01 PROCEDURE — 63710000001 DONEPEZIL 10 MG TABLET: Performed by: INTERNAL MEDICINE

## 2024-08-01 PROCEDURE — 63710000001 QUETIAPINE 25 MG TABLET: Performed by: INTERNAL MEDICINE

## 2024-08-01 PROCEDURE — 63710000001 BUSPIRONE 15 MG TABLET: Performed by: INTERNAL MEDICINE

## 2024-08-01 PROCEDURE — 63710000001 SENNOSIDES-DOCUSATE 8.6-50 MG TABLET: Performed by: STUDENT IN AN ORGANIZED HEALTH CARE EDUCATION/TRAINING PROGRAM

## 2024-08-01 PROCEDURE — 63710000001 ONDANSETRON ODT 4 MG TABLET DISPERSIBLE: Performed by: INTERNAL MEDICINE

## 2024-08-01 PROCEDURE — 63710000001 MECLIZINE 12.5 MG TABLET: Performed by: INTERNAL MEDICINE

## 2024-08-01 PROCEDURE — 63710000001 INSULIN LISPRO (HUMAN) PER 5 UNITS: Performed by: NURSE PRACTITIONER

## 2024-08-01 PROCEDURE — 63710000001 POLYETHYLENE GLYCOL 17 G PACK: Performed by: STUDENT IN AN ORGANIZED HEALTH CARE EDUCATION/TRAINING PROGRAM

## 2024-08-01 PROCEDURE — 63710000001 ACETAMINOPHEN 325 MG TABLET: Performed by: INTERNAL MEDICINE

## 2024-08-01 RX ADMIN — FLUTICASONE PROPIONATE 2 SPRAY: 50 SPRAY, METERED NASAL at 08:31

## 2024-08-01 RX ADMIN — INSULIN LISPRO 7 UNITS: 100 INJECTION, SOLUTION INTRAVENOUS; SUBCUTANEOUS at 13:01

## 2024-08-01 RX ADMIN — INSULIN LISPRO 7 UNITS: 100 INJECTION, SOLUTION INTRAVENOUS; SUBCUTANEOUS at 16:34

## 2024-08-01 RX ADMIN — APIXABAN 5 MG: 5 TABLET, FILM COATED ORAL at 20:04

## 2024-08-01 RX ADMIN — MECLIZINE HYDROCHLORIDE 25 MG: 25 TABLET ORAL at 20:04

## 2024-08-01 RX ADMIN — QUETIAPINE FUMARATE 50 MG: 25 TABLET ORAL at 20:04

## 2024-08-01 RX ADMIN — NYSTATIN: 100000 POWDER TOPICAL at 20:03

## 2024-08-01 RX ADMIN — PANTOPRAZOLE SODIUM 40 MG: 40 TABLET, DELAYED RELEASE ORAL at 16:34

## 2024-08-01 RX ADMIN — ONDANSETRON 4 MG: 4 TABLET, ORALLY DISINTEGRATING ORAL at 20:07

## 2024-08-01 RX ADMIN — GABAPENTIN 200 MG: 100 CAPSULE ORAL at 08:29

## 2024-08-01 RX ADMIN — LORAZEPAM 0.5 MG: 0.5 TABLET ORAL at 20:04

## 2024-08-01 RX ADMIN — GABAPENTIN 200 MG: 100 CAPSULE ORAL at 20:04

## 2024-08-01 RX ADMIN — HYDROCODONE BITARTRATE AND ACETAMINOPHEN 1 TABLET: 10; 325 TABLET ORAL at 21:03

## 2024-08-01 RX ADMIN — MECLIZINE HYDROCHLORIDE 25 MG: 25 TABLET ORAL at 16:34

## 2024-08-01 RX ADMIN — NYSTATIN: 100000 POWDER TOPICAL at 13:02

## 2024-08-01 RX ADMIN — BUSPIRONE HYDROCHLORIDE 7.5 MG: 15 TABLET ORAL at 20:04

## 2024-08-01 RX ADMIN — DICLOFENAC SODIUM 2 G: 10 GEL TOPICAL at 13:02

## 2024-08-01 RX ADMIN — INSULIN LISPRO 3 UNITS: 100 INJECTION, SOLUTION INTRAVENOUS; SUBCUTANEOUS at 13:02

## 2024-08-01 RX ADMIN — INSULIN GLARGINE 30 UNITS: 100 INJECTION, SOLUTION SUBCUTANEOUS at 20:04

## 2024-08-01 RX ADMIN — DONEPEZIL HYDROCHLORIDE 10 MG: 10 TABLET, FILM COATED ORAL at 20:04

## 2024-08-01 RX ADMIN — BUSPIRONE HYDROCHLORIDE 7.5 MG: 15 TABLET ORAL at 08:29

## 2024-08-01 RX ADMIN — ACETAMINOPHEN 650 MG: 325 TABLET ORAL at 20:16

## 2024-08-01 RX ADMIN — NYSTATIN: 100000 POWDER TOPICAL at 08:28

## 2024-08-01 RX ADMIN — APIXABAN 5 MG: 5 TABLET, FILM COATED ORAL at 08:29

## 2024-08-01 RX ADMIN — DICLOFENAC SODIUM 2 G: 10 GEL TOPICAL at 08:28

## 2024-08-01 RX ADMIN — DULOXETINE HYDROCHLORIDE 60 MG: 60 CAPSULE, DELAYED RELEASE ORAL at 08:29

## 2024-08-01 RX ADMIN — DICLOFENAC SODIUM 2 G: 10 GEL TOPICAL at 16:33

## 2024-08-01 RX ADMIN — POLYETHYLENE GLYCOL 3350 17 G: 17 POWDER, FOR SOLUTION ORAL at 08:28

## 2024-08-01 RX ADMIN — ATORVASTATIN CALCIUM 80 MG: 40 TABLET, FILM COATED ORAL at 20:04

## 2024-08-01 RX ADMIN — INSULIN LISPRO 3 UNITS: 100 INJECTION, SOLUTION INTRAVENOUS; SUBCUTANEOUS at 16:34

## 2024-08-01 RX ADMIN — LEVOTHYROXINE SODIUM 25 MCG: 25 TABLET ORAL at 04:57

## 2024-08-01 RX ADMIN — LORAZEPAM 0.5 MG: 0.5 TABLET ORAL at 08:28

## 2024-08-01 RX ADMIN — SENNOSIDES AND DOCUSATE SODIUM 2 TABLET: 50; 8.6 TABLET ORAL at 08:29

## 2024-08-01 RX ADMIN — NITROFURANTOIN MONOHYDRATE/MACROCRYSTALS 100 MG: 75; 25 CAPSULE ORAL at 20:04

## 2024-08-01 RX ADMIN — PANTOPRAZOLE SODIUM 40 MG: 40 TABLET, DELAYED RELEASE ORAL at 08:29

## 2024-08-01 RX ADMIN — DICLOFENAC SODIUM 2 G: 10 GEL TOPICAL at 20:03

## 2024-08-01 RX ADMIN — MECLIZINE HYDROCHLORIDE 25 MG: 25 TABLET ORAL at 08:29

## 2024-08-01 RX ADMIN — NYSTATIN: 100000 POWDER TOPICAL at 16:33

## 2024-08-01 RX ADMIN — INSULIN LISPRO 7 UNITS: 100 INJECTION, SOLUTION INTRAVENOUS; SUBCUTANEOUS at 08:28

## 2024-08-01 NOTE — PLAN OF CARE
Goal Outcome Evaluation:   Patient oriented x1-2, sleeping most of day but easily woken for care/meals, MD notified. PRN given x1 for pain. Prn given x1 for nausea. Skin care provided, turning q2. Family called for update.

## 2024-08-01 NOTE — PLAN OF CARE
Goal Outcome Evaluation:  Plan of Care Reviewed With: patient        Progress: no change  Outcome Evaluation: Pt continues to demo deficits in strength, balance, and endurance impacting mobility. Pt has made good improvements in rolling during hospital stay. PT will continue to follow for ongoing mobility.      Anticipated Discharge Disposition (PT): skilled nursing facility

## 2024-08-01 NOTE — THERAPY TREATMENT NOTE
Patient Name: Cheri Cruz  : 1940    MRN: [id number]                              Today's Date: 2024       Admit Date: 2024    Visit Dx:     ICD-10-CM ICD-9-CM   1. Cognitive communication deficit  R41.841 799.52   2. Disorientation  R41.0 780.99     Patient Active Problem List   Diagnosis    Hyperlipidemia LDL goal <70    Type 2 diabetes mellitus without complication, without long-term current use of insulin    GERD (gastroesophageal reflux disease)    Essential hypertension    Abnormal EKG    Osteoarthritis    Anxiety    Palpitations    Vertigo    History of ischemic left MCA stroke    Hemorrhagic stroke    History of pulmonary embolus (PE)    Confusion    Hypotension    Constipation    UTI (urinary tract infection) due to urinary indwelling catheter    Metabolic encephalopathy    Symptomatic anemia    Acquired hypothyroidism    AMS (altered mental status)    Stroke     Past Medical History:   Diagnosis Date    Abnormal heart rhythm     Anxiety     Arrhythmia     Arthritis     Atrial fibrillation     Dementia     Diabetes mellitus     Hyperlipidemia     Hypertension     Kidney disorder     Stroke     Uterus cancer      Past Surgical History:   Procedure Laterality Date    CATARACT EXTRACTION, BILATERAL        General Information       Row Name 24 1515          Physical Therapy Time and Intention    Document Type therapy note (daily note)  -KR     Mode of Treatment physical therapy  -KR       Row Name 24 1515          General Information    Patient Profile Reviewed yes  -KR     Existing Precautions/Restrictions fall;other (see comments)  BUE tremors R>L, R sided hypertonia, non-ambulatory at baseline, diplopia, [place]  -KR     Barriers to Rehab medically complex;previous functional deficit;cognitive status;contractures  -KR       Row Name 24 1515          Cognition    Orientation Status (Cognition) oriented to;person;verbal cues/prompts needed for orientation;place;disoriented  to;situation;time  -KR       Row Name 08/01/24 1515          Safety Issues, Functional Mobility    Safety Issues Affecting Function (Mobility) awareness of need for assistance;insight into deficits/self-awareness;safety precaution awareness;sequencing abilities;safety precautions follow-through/compliance  -KR     Impairments Affecting Function (Mobility) balance;cognition;endurance/activity tolerance;pain;postural/trunk control;range of motion (ROM);strength  -KR     Cognitive Impairments, Mobility Safety/Performance awareness, need for assistance;insight into deficits/self-awareness;judgment;problem-solving/reasoning;safety precaution follow-through;safety precaution awareness  -KR               User Key  (r) = Recorded By, (t) = Taken By, (c) = Cosigned By      Initials Name Provider Type    Courtney Dennis PT Physical Therapist                   Mobility       Row Name 08/01/24 1521          Bed Mobility    Bed Mobility rolling right;rolling left  -KR     Rolling Left [place] (Bed Mobility) 1 person assist;minimum assist (75% patient effort)  -KR     Rolling Right [place] (Bed Mobility) 1 person assist;minimum assist (75% patient effort)  -KR     Assistive Device (Bed Mobility) bed rails;draw sheet  -KR     Comment, (Bed Mobility) rolling for depA with pericare and placement of lift sling.  -KR       Row Name 08/01/24 1521          Bed-Chair Transfer    Bed-Chair [place] (Transfers) dependent (less than 25% patient effort);2 person assist;verbal cues  -KR     Assistive Device (Bed-Chair Transfers) lift device  -KR       Row Name 08/01/24 1521          Sit-Stand Transfer    Sit-Stand [place] (Transfers) not tested  -KR               User Key  (r) = Recorded By, (t) = Taken By, (c) = Cosigned By      Initials Name Provider Type    Courtney Dennis PT Physical Therapist                   Obj/Interventions       Row Name 08/01/24 1524          Motor Skills    Therapeutic Exercise  hip;knee;ankle  -KR       Row Name 08/01/24 1524          Hip (Therapeutic Exercise)    Hip Strengthening (Therapeutic Exercise) 10 repetitions;bilateral;aDduction;aBduction  -KR       Row Name 08/01/24 1524          Knee (Therapeutic Exercise)    Knee (Therapeutic Exercise) strengthening exercise  -KR     Knee Strengthening (Therapeutic Exercise) 10 repetitions;bilateral;SLR (straight leg raise);heel slides  -KR       Row Name 08/01/24 1524          Ankle (Therapeutic Exercise)    Ankle (Therapeutic Exercise) AROM (active range of motion)  -KR     Ankle AROM (Therapeutic Exercise) 10 repetitions;bilateral;dorsiflexion;plantarflexion  -KR               User Key  (r) = Recorded By, (t) = Taken By, (c) = Cosigned By      Initials Name Provider Type    Courtney Dennis, PT Physical Therapist                   Goals/Plan    No documentation.                  Clinical Impression       Row Name 08/01/24 1525          Pain    Pain Intervention(s) Repositioned;Ambulation/increased activity  -KR       Row Name 08/01/24 1525          Pain Scale: FACES Pre/Post-Treatment    Pain: FACES Scale, Pretreatment 4-->hurts little more  -KR     Posttreatment Pain Rating 4-->hurts little more  -KR     Pre/Posttreatment Pain Comment pt complained of stomach pain throughout session.  -       Row Name 08/01/24 1525          Plan of Care Review    Plan of Care Reviewed With patient  -KR     Progress no change  -KR     Outcome Evaluation Pt continues to demo deficits in strength, balance, and endurance impacting mobility. Pt has made good improvements in rolling during hospital stay. PT will continue to follow for ongoing mobility.  -       Row Name 08/01/24 1525          Positioning and Restraints    Pre-Treatment Position in bed  -KR     Post Treatment Position chair  -KR     In Chair notified nsg;reclined;encouraged to call for assist;exit alarm on;call light within reach;waffle cushion;on mechanical lift sling;legs elevated;heels  elevated  -KR               User Key  (r) = Recorded By, (t) = Taken By, (c) = Cosigned By      Initials Name Provider Type    Courtney Dennis PT Physical Therapist                   Outcome Measures       Row Name 08/01/24 1528 08/01/24 0800       How much help from another person do you currently need...    Turning from your back to your side while in flat bed without using bedrails? 2  -KR 2  -SN    Moving from lying on back to sitting on the side of a flat bed without bedrails? 2  -KR 2  -SN    Moving to and from a bed to a chair (including a wheelchair)? 1  -KR 1  -SN    Standing up from a chair using your arms (e.g., wheelchair, bedside chair)? 1  -KR 1  -SN    Climbing 3-5 steps with a railing? 1  -KR 1  -SN    To walk in hospital room? 1  -KR 1  -SN    AM-PAC 6 Clicks Score (PT) 8  -KR 8  -SN    Highest Level of Mobility Goal 3 --> Sit at edge of bed  -KR 3 --> Sit at edge of bed  -SN              User Key  (r) = Recorded By, (t) = Taken By, (c) = Cosigned By      Initials Name Provider Type    Courtney Dennis PT Physical Therapist    Hazel Dahl, RN Registered Nurse                                 Physical Therapy Education       Title: PT OT SLP Therapies (In Progress)       Topic: Physical Therapy (Done)       Point: Mobility training (Done)       Learning Progress Summary             Patient Acceptance, E, VU by ANDRY at 8/1/2024 1528    Acceptance, E, VU,DU,NR by ANUEL at 7/29/2024 1541    Acceptance, E, NR by ANDRY at 7/23/2024 1107    Eager, E, VU,DU,NR by  at 7/20/2024 1526    Comment: Educated safety/technique w/bed mobility, transfers, HEP, PT POC    Acceptance, E, VU,DU by ANUEL at 7/15/2024 0955   Family Eager, E, VU,DU,NR by  at 7/20/2024 1526    Comment: Educated safety/technique w/bed mobility, transfers, HEP, PT POC                         Point: Home exercise program (Done)       Learning Progress Summary             Patient Eager, E, VU,DU,NR by  at 7/20/2024 1526    Comment:  Educated safety/technique w/bed mobility, transfers, HEP, PT POC   Family Eager, E, VU,DU,NR by  at 7/20/2024 1526    Comment: Educated safety/technique w/bed mobility, transfers, HEP, PT POC                         Point: Body mechanics (Done)       Learning Progress Summary             Patient Acceptance, E, VU by KR at 8/1/2024 1528    Acceptance, E, VU,DU,NR by ANUEL at 7/29/2024 1541    Acceptance, E, NR by KR at 7/23/2024 1107    Eager, E, VU,DU,NR by  at 7/20/2024 1526    Comment: Educated safety/technique w/bed mobility, transfers, HEP, PT POC    Acceptance, E, VU,DU by ANUEL at 7/15/2024 0955   Family Eager, E, VU,DU,NR by  at 7/20/2024 1526    Comment: Educated safety/technique w/bed mobility, transfers, HEP, PT POC                         Point: Precautions (Done)       Learning Progress Summary             Patient Acceptance, E, VU by KR at 8/1/2024 1528    Acceptance, E, VU,DU,NR by ANUEL at 7/29/2024 1541    Acceptance, E, NR by KR at 7/23/2024 1107    Eager, E, VU,DU,NR by  at 7/20/2024 1526    Comment: Educated safety/technique w/bed mobility, transfers, HEP, PT POC    Acceptance, E, VU,DU by ANUEL at 7/15/2024 0955   Family Eager, E, VU,DU,NR by  at 7/20/2024 1526    Comment: Educated safety/technique w/bed mobility, transfers, HEP, PT POC                                         User Key       Initials Effective Dates Name Provider Type Discipline     06/01/21 -  Freda Leonard, PT Physical Therapist PT     12/30/22 -  Courtney Jackson, PT Physical Therapist PT    ANUEL 05/07/24 -  Cate Joshi, BILL Student PT Student PT                  PT Recommendation and Plan     Plan of Care Reviewed With: patient  Progress: no change  Outcome Evaluation: Pt continues to demo deficits in strength, balance, and endurance impacting mobility. Pt has made good improvements in rolling during hospital stay. PT will continue to follow for ongoing mobility.     Time Calculation:         PT Charges       Row Name  08/01/24 1529             Time Calculation    Start Time 1450  -KR      PT Received On 08/01/24  -KR         Timed Charges    71817 - PT Therapeutic Exercise Minutes 10  -KR      91521 - PT Therapeutic Activity Minutes 13  -KR         Total Minutes    Timed Charges Total Minutes 23  -KR       Total Minutes 23  -KR                User Key  (r) = Recorded By, (t) = Taken By, (c) = Cosigned By      Initials Name Provider Type    Courtney Dennis, PT Physical Therapist                  Therapy Charges for Today       Code Description Service Date Service Provider Modifiers Qty    20967211485 HC PT THER PROC EA 15 MIN 8/1/2024 Courtney Jackson, PT GP 1    52073827475 HC PT THERAPEUTIC ACT EA 15 MIN 8/1/2024 Courtney Jackson, PT GP 1            PT G-Codes  Outcome Measure Options: AM-PAC 6 Clicks Daily Activity (OT)  AM-PAC 6 Clicks Score (PT): 8  AM-PAC 6 Clicks Score (OT): 13  Modified Steven Scale: 4 - Moderately severe disability.  Unable to walk without assistance, and unable to attend to own bodily needs without assistance.  PT Discharge Summary  Anticipated Discharge Disposition (PT): skilled nursing facility    Courtney Jackson PT  8/1/2024

## 2024-08-01 NOTE — PLAN OF CARE
Goal Outcome Evaluation:  Plan of Care Reviewed With: patient        Progress: no change         Anticipated Discharge Disposition (SLP): No further SLP services warranted             Treatment Assessment (SLP): continued, cognitive-linguistic disorder (08/01/24 1600)  Treatment Assessment Comments (SLP): Pt has been followed by SLP service since admit. Pt has been u/a to meet goals established at initial session. Neuro note from 7/14 states that family has endorsed that pt is at cognitive baseline from previous CVA. As pt has been u/a to make functional improvements, SLP service will sign off. If status or condition changes please reconsult (08/01/24 1600)  Plan for Continued Treatment (SLP): other (see comments) (treatment no longer indicated as no fxnl improvement made) (08/01/24 1600)

## 2024-08-01 NOTE — PROGRESS NOTES
"          Clinical Nutrition Assessment     Patient Name: Cheri Cruz  YOB: 1940  MRN: [id number]  Date of Encounter: 08/01/24 19:52 EDT  Admission date: 7/14/2024  Reason for Visit: Follow-up protocol    Assessment   Nutrition Assessment   Admission Diagnosis:  AMS (altered mental status) [R41.82]  Stroke [I63.9]    Problem List:    AMS (altered mental status)    Type 2 diabetes mellitus without complication, without long-term current use of insulin    GERD (gastroesophageal reflux disease)    Essential hypertension    History of pulmonary embolus (PE)    Acquired hypothyroidism    Stroke      PMH:   She  has a past medical history of Abnormal heart rhythm, Anxiety, Arrhythmia, Arthritis, Atrial fibrillation, Dementia, Diabetes mellitus, Hyperlipidemia, Hypertension, Kidney disorder, Stroke, and Uterus cancer.    PSH:  She  has a past surgical history that includes Cataract extraction, bilateral.    Applicable Nutrition History:   7/30 SLP rec Soft to Chew Chopped food Thin liquid    Anthropometrics     Height: Height: 170.2 cm (67\")  Last Filed Weight: Weight: 93.1 kg (205 lb 4 oz) (07/14/24 1957)  Method: Weight Method: Bed scale  BMI: BMI (Calculated): 32.1    UBW:  Per EMR wts ranges 199 lbs to 216 lbs in 2023 bt Jan and Apr No recent wts not bed or estimate  Weight change: unable to evaluate at this time    Nutrition Focused Physical Exam    Date: 7/15    Pt does not meet criteria for malnutrition diagnosis, at this time.    Only mild muscle wasting at dorsal hand, clavicle and calf areas and mild orbital fat wasting detected    Subjective   Reported/Observed/Food/Nutrition Related History:     8/1  Pt cont rpt liking Boost. Rpts taking some of her food.    7/25  Eating well per report. Patient confirms this.  Likes Boost supplement.       7/15  Pt allows sometimes needs food cut up but eats ok. Note pt req pureed food items last adm s/p stroke. Per RN now requires food cut up.     Current " Nutrition Prescription   PO: Diet: Cardiac, Diabetic; Healthy Heart (2-3 Na+); Consistent Carbohydrate; Feeding Assistance - Nursing; Texture: Soft to Chew (NDD 3); Soft to Chew: Chopped Meat; Fluid Consistency: Thin (IDDSI 0)  Oral Nutrition Supplement: Boost Glucose Control daily   Intake:  12 DAYS  51% x 26 meals recorded intake appears to wax and wane w 31% x last 4 meals.       Assessment & Plan   Nutrition Diagnosis   Date:  7/15            Updated:  7/25, 8/1  Problem Potential sub optimal intake    Etiology Weakness    Signs/Symptoms 38% x 2 meals recorded thus far   Status: active w 51% x 26 meals overall in adm 31% x last 4 meals    Goal / Objectives:   Nutrition to support treatment and Increase intake consistency      Nutrition Intervention      Follow treatment progress, Care plan reviewed, Advise alternate selection, Menu provided, Encourage intake        Monitoring/Evaluation:   Per protocol, I&O, PO intake, Supplement intake, Pertinent labs, Weight, Symptoms    Radha Gonzalez RD  Time Spent:20 min

## 2024-08-01 NOTE — CASE MANAGEMENT/SOCIAL WORK
Continued Stay Note  [facility]     Patient Name: Cheri Cruz  MRN: [id number]  Today's Date: 8/1/2024    Admit Date: 7/14/2024    Plan: [facility]   Discharge Plan       Row Name 08/01/24 1416       Plan    Plan [facility]    Patient/Family in Agreement with Plan yes    Plan Comments Spoke with Ms. Cruz's Daughters by telephone. Family expedited appeal still pending for rehab at [facility]. CM will continue to follow up.    Final Discharge Disposition Code 03 - skilled nursing facility (SNF)                   Discharge Codes    No documentation.                 Expected Discharge Date and Time       Expected Discharge Date Expected Discharge Time    Aug 5, 2024               Jaye Kuhn RN

## 2024-08-01 NOTE — THERAPY DISCHARGE NOTE
Acute Care - Speech Language Pathology Treatment Note/Discharge  [facility]     Patient Name: Cheri Cruz  : 1940  MRN: [id number]  Today's Date: 2024               Admit Date: 2024     Visit Dx:    ICD-10-CM ICD-9-CM   1. Cognitive communication deficit  R41.841 799.52   2. Disorientation  R41.0 780.99     Patient Active Problem List   Diagnosis    Hyperlipidemia LDL goal <70    Type 2 diabetes mellitus without complication, without long-term current use of insulin    GERD (gastroesophageal reflux disease)    Essential hypertension    Abnormal EKG    Osteoarthritis    Anxiety    Palpitations    Vertigo    History of ischemic left MCA stroke    Hemorrhagic stroke    History of pulmonary embolus (PE)    Confusion    Hypotension    Constipation    UTI (urinary tract infection) due to urinary indwelling catheter    Metabolic encephalopathy    Symptomatic anemia    Acquired hypothyroidism    AMS (altered mental status)    Stroke     Past Medical History:   Diagnosis Date    Abnormal heart rhythm     Anxiety     Arrhythmia     Arthritis     Atrial fibrillation     Dementia     Diabetes mellitus     Hyperlipidemia     Hypertension     Kidney disorder     Stroke     Uterus cancer      Past Surgical History:   Procedure Laterality Date    CATARACT EXTRACTION, BILATERAL         SLP Recommendation and Plan              Anticipated Discharge Disposition (SLP): No further SLP services warranted (24 1600)           Daily Summary of Progress (SLP): unable to show any progress toward functional goals (24 1600)                    Treatment Assessment (SLP): continued, cognitive-linguistic disorder (24 1600)  Treatment Assessment Comments (SLP): Pt has been followed by SLP service since admit. Pt has been u/a to meet goals established at initial session. Neuro note from  states that family has endorsed that pt is at cognitive baseline from previous CVA. As pt has been u/a to make  functional improvements, SLP service will sign off. If status or condition changes please reconsult (08/01/24 1600)  Plan for Continued Treatment (SLP): other (see comments) (treatment no longer indicated as no fxnl improvement made) (08/01/24 1600)    Plan of Care Reviewed With: patient (08/01/24 1611)  Progress: no change (08/01/24 1611)    SLP EVALUATION (Last 72 Hours)       SLP SLC Evaluation       Row Name 08/01/24 1600                   Communication Assessment/Intervention    Document Type therapy note (daily note)  -RS        Subjective Information no complaints  -RS        Patient Observations cooperative  -RS        Patient/Family/Caregiver Comments/Observations no family present  -RS        Patient Effort adequate  -RS           Pain    Additional Documentation Pain Scale: FACES Pre/Post-Treatment (Group)  -RS           Pain Scale: FACES Pre/Post-Treatment    Pain: FACES Scale, Pretreatment 4-->hurts little more  -RS        Posttreatment Pain Rating 4-->hurts little more  -RS           SLP Treatment Clinical Impressions    Treatment Assessment (SLP) continued;cognitive-linguistic disorder  -RS        Treatment Assessment Comments (SLP) Pt has been followed by SLP service since admit. Pt has been u/a to meet goals established at initial session. Neuro note from 7/14 states that family has endorsed that pt is at cognitive baseline from previous CVA. As pt has been u/a to make functional improvements, SLP service will sign off. If status or condition changes please reconsult  -RS        Daily Summary of Progress (SLP) unable to show any progress toward functional goals  -RS        Barriers to Overall Progress (SLP) Cognitive status  -RS        Plan for Continued Treatment (SLP) other (see comments)  treatment no longer indicated as no fxnl improvement made  -RS           Recommendations    Anticipated Discharge Disposition (SLP) No further SLP services warranted  -RS                  User Key  (r) =  Recorded By, (t) = Taken By, (c) = Cosigned By      Initials Name Effective Dates    RS Julius Ferrer, MS CCC-SLP 09/14/23 -                        EDUCATION  The patient has been educated in the following areas:   Cognitive Impairment.           SLP GOALS       Row Name 08/01/24 1600 07/30/24 1110          Patient will demonstrate functional cognitive-linguistic skills for return to discharge environment    [place] with moderate cues  -RS with moderate cues  -EN     Time frame 1 week  -RS 1 week  -EN     Progress/Outcomes goal not met  -RS continuing progress toward goal  -EN        Comprehend Questions Goal 1 (SLP)    Improve Ability to Comprehend Questions Goal 1 (SLP) simple yes/no questions;complex yes/no questions;simple wh questions;simple general questions;80%;with minimal cues (75-90%)  -RS simple yes/no questions;complex yes/no questions;simple wh questions;simple general questions;80%;with minimal cues (75-90%)  -EN     Time Frame (Comprehend Questions Goal 1, SLP) 1 week  -RS 1 week  -EN     Progress (Ability to Comprehend Questions Goal 1, SLP) -- 70%;with minimal cues (75-90%)  -EN     Progress/Outcomes (Comprehend Questions Goal 1, SLP) goal not met  -RS continuing progress toward goal  -EN        Follow Directions Goal 2 (SLP)    Improve Ability to Follow Directions Goal 1 (SLP) 2 step commands;80%;with moderate cues (50-74%)  -RS 2 step commands;80%;with moderate cues (50-74%)  -EN     Time Frame (Follow Directions Goal 1, SLP) 1 week  -RS 1 week  -EN     Progress (Ability to Follow Directions Goal 1, SLP) -- 60%;with minimal cues (75-90%);with moderate cues (50-74%)  -EN     Progress/Outcomes (Follow Directions Goal 1, SLP) goal not met  -RS progress slower than expected  -EN     Comment (Follow Directions Goal 1, SLP) -- Pt only intermittently able to follow 1/2 directions  -EN        Word Retrieval Skills Goal 1 (SLP)    Improve Word Retrieval Skills By Goal 1 (SLP) confrontational naming  task;high frequency;responsive naming task;simple;completing open ended structured sentence;answer WH question with one word;completing a divergent task;completing a convergent task;80%;with minimal cues (75-90%)  -RS confrontational naming task;high frequency;responsive naming task;simple;completing open ended structured sentence;answer WH question with one word;completing a divergent task;completing a convergent task;80%;with minimal cues (75-90%)  -EN     Time Frame (Word Retrieval Goal 1, SLP) 1 week  -RS 1 week  -EN     Progress (Word Retrieval Skills Goal 1, SLP) -- 70%;with minimal cues (75-90%)  -EN     Progress/Outcomes (Word Retrieval Goal 1, SLP) goal not met  -RS continuing progress toward goal  -EN               User Key  (r) = Recorded By, (t) = Taken By, (c) = Cosigned By      Initials Name Provider Type    EN Avril Sims MS CCC-SLP Speech and Language Pathologist    Nabil Bowman MS CCC-SLP Speech and Language Pathologist                          Time Calculation:    Time Calculation- SLP       Row Name 08/01/24 1612             Time Calculation- SLP    SLP Start Time 1500  -RS      SLP Received On 08/01/24  -RS         Untimed Charges    87776-DV Treatment/ST Modification Prosth Aug Alter  30  -RS         Total Minutes    Untimed Charges Total Minutes 30  -RS       Total Minutes 30  -RS                User Key  (r) = Recorded By, (t) = Taken By, (c) = Cosigned By      Initials Name Provider Type    Nabil Bowman MS CCC-SLP Speech and Language Pathologist                    Therapy Charges for Today       Code Description Service Date Service Provider Modifiers Qty    92061002617  ST TREATMENT SPEECH 2 8/1/2024 Nabil Madrid MS CCC-SLP GN 1                     SLP Discharge Summary  Anticipated Discharge Disposition (SLP): No further SLP services warranted    MS ROBERT Bowen  8/1/2024

## 2024-08-01 NOTE — PROGRESS NOTES
[facility] Medicine Services  PROGRESS NOTE    Patient Name: Cheri Cruz  : 1940  MRN: [id number]    Date of Admission: 2024  Primary Care Physician: Lorrie Canada APRN    Subjective   Subjective     CC:  AMS    HPI:  No new issues overnight, continues to endorse abdominal discomfort but non specific      Objective   Objective     Vital Signs:   Temp:  [97.2 °F (36.2 °C)-97.7 °F (36.5 °C)] 97.7 °F (36.5 °C)  Heart Rate:  [64-79] 64  Resp:  [16] 16  BP: (115-138)/(51-81) 138/51  Flow (L/min):  [1-2] 1     Physical Exam:  Constitutional: No acute distress, awake, alert, chronically ill appearing  HENT: NCAT, mucous membranes moist  Respiratory: Clear to auscultation bilaterally, respiratory effort normal room air 96%  Cardiovascular: RRR, no murmurs, rubs, or gallops  Gastrointestinal: Positive bowel sounds, soft, nontender, slightly distended  Musculoskeletal: No bilateral ankle edema  Psychiatric: Appropriate affect, cooperative  Neurologic: Oriented x 1, generalized weakness, pain in right hip when moving it, speech clear  Skin: No rashes, pale       Results Reviewed:  LAB RESULTS:      Lab 24  0543 24  1005 24  0530   WBC 8.47 5.79 6.67   HEMOGLOBIN 9.8* 9.0* 9.4*   HEMATOCRIT 31.0* 28.7* 29.4*   PLATELETS 181 138* 125*   NEUTROS ABS 4.98 3.46  --    IMMATURE GRANS (ABS) 0.03 0.01  --    LYMPHS ABS 2.31 1.43  --    MONOS ABS 0.84 0.61  --    EOS ABS 0.26 0.25  --    .6* 102.9* 100.7*         Lab 24  0543 24  1005 24  0530   SODIUM 140 137 135*   POTASSIUM 4.9 5.0 4.8   CHLORIDE 101 100 98   CO2 33.0* 31.0* 26.0   ANION GAP 6.0 6.0 11.0   BUN 31* 30* 29*   CREATININE 1.61* 1.51* 1.46*   EGFR 31.8* 34.4* 35.8*   GLUCOSE 102* 169* 103*   CALCIUM 8.8 8.6 8.6                         Brief Urine Lab Results  (Last result in the past 365 days)        Color   Clarity   Blood   Leuk Est   Nitrite   Protein   CREAT   Urine HCG         07/14/24 2001 Yellow   Cloudy   Trace   Small (1+)   Negative   Negative                   Microbiology Results Abnormal       Procedure Component Value - Date/Time    Blood Culture - Blood, Wrist, Left [[id number]]  (Normal) Collected: 07/14/24 2022    Lab Status: Final result Specimen: Blood from Wrist, Left Updated: 07/19/24 2100     Blood Culture No growth at 5 days    Narrative:      Less than seven (7) mL's of blood was collected.  Insufficient quantity may yield false negative results.    Blood Culture - Blood, Hand, Left [[id number]]  (Normal) Collected: 07/14/24 2022    Lab Status: Final result Specimen: Blood from Hand, Left Updated: 07/19/24 2100     Blood Culture No growth at 5 days    Narrative:      Less than seven (7) mL's of blood was collected.  Insufficient quantity may yield false negative results.    Urine Culture - Urine, Urine, Random Void [[id number]] Collected: 07/14/24 2001    Lab Status: Final result Specimen: Urine, Random Void Updated: 07/16/24 1020     Urine Culture 50,000 CFU/mL Normal Urogenital Nickie    Narrative:      Colonization of the urinary tract without infection is common. Treatment is discouraged unless the patient is symptomatic, pregnant, or undergoing an invasive urologic procedure.            No radiology results from the last 24 hrs    Results for orders placed during the hospital encounter of 02/10/23    Adult Transthoracic Echo Complete W/ Cont if Necessary Per Protocol    Interpretation Summary    Left ventricular systolic function is hyperdynamic (EF > 70%). Calculated left ventricular EF = 70.6% Left ventricular ejection fraction appears to be greater than 70%. The left ventricular cavity is small in size. Left ventricular wall thickness is consistent with mild concentric hypertrophy. All left ventricular wall segments contract normally. Left ventricular intracavitary gradient noted to be 71 mmHg. Left ventricular diastolic function is consistent with (grade I)  impaired relaxation. Normal left atrial pressure.    The aortic valve is abnormal in structure. There is mild calcification of the aortic valve mainly affecting the right coronary cusp(s). The aortic valve appears trileaflet. No aortic valve regurgitation is present. Gradient noted through the LV and LVOT    Compared to TTE report from  Dec 2019, hyperdynamic LV with intracavitary gradient is not a new finding but peak gradient noted on this exam is higher than previously described.      Current medications:  Scheduled Meds:apixaban, 5 mg, Oral, BID  atorvastatin, 80 mg, Oral, Nightly  busPIRone, 7.5 mg, Oral, BID  Diclofenac Sodium, 2 g, Topical, 4x Daily  donepezil, 10 mg, Oral, Nightly  DULoxetine, 60 mg, Oral, Daily  fluticasone, 2 spray, Each Nare, Daily  gabapentin, 200 mg, Oral, Q12H  insulin glargine, 30 Units, Subcutaneous, Nightly  insulin lispro, 3-14 Units, Subcutaneous, 4x Daily AC & at Bedtime  Insulin Lispro, 7 Units, Subcutaneous, TID With Meals  levothyroxine, 25 mcg, Oral, Daily  Lidocaine, 1 patch, Transdermal, Q24H  LORazepam, 0.5 mg, Oral, Q12H  meclizine, 25 mg, Oral, TID  nitrofurantoin (macrocrystal-monohydrate), 100 mg, Oral, Q24H  nystatin, , Topical, 4x Daily  pantoprazole, 40 mg, Oral, BID AC  senna-docusate sodium, 2 tablet, Oral, Daily   And  polyethylene glycol, 17 g, Oral, Daily  QUEtiapine, 50 mg, Oral, Nightly  sodium chloride, 10 mL, Intravenous, Q12H  sodium chloride, 10 mL, Intravenous, Q12H      Continuous Infusions:   PRN Meds:.  acetaminophen **OR** acetaminophen **OR** acetaminophen    senna-docusate sodium **AND** polyethylene glycol **AND** bisacodyl **AND** bisacodyl    Calcium Replacement - Follow Nurse / BPA Driven Protocol    dextrose    dextrose    glucagon (human recombinant)    HYDROcodone-acetaminophen    Magnesium Standard Dose Replacement - Follow Nurse / BPA Driven Protocol    meclizine    ondansetron ODT **OR** ondansetron    Phosphorus Replacement - Follow  Nurse / BPA Driven Protocol    Potassium Replacement - Follow Nurse / BPA Driven Protocol    sodium chloride    sodium chloride    sodium chloride    sodium chloride    Assessment & Plan   Assessment & Plan     Active Hospital Problems    Diagnosis  POA    **AMS (altered mental status) [R41.82]  Yes    Stroke [I63.9]  Yes    Acquired hypothyroidism [E03.9]  Yes    History of pulmonary embolus (PE) [Z86.711]  Yes    Essential hypertension [I10]  Yes    GERD (gastroesophageal reflux disease) [K21.9]  Yes    Type 2 diabetes mellitus without complication, without long-term current use of insulin [E11.9]  Yes      Resolved Hospital Problems   No resolved problems to display.        Brief Hospital Course to date:  Cheri Cruz is a 83 y.o. 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  Initial stroke workup was negative.  MRI showed old left parietal ischemic stroke.  Stroke neurology followed and resumed Eliquis.  She received empiric IV ceftriaxone x 3 days due to concern for UTI.    Plan was partially entered by my partner and I have reviewed and updated as appropriate on 8/1/24     Altered mental status on advanced dementia  Hypotension   Concern for UTI  - initial stroke workup negative.  MRI showed old L parietal ischemic stroke; pt seen by Stroke team; apixiban restarted.   -Possibly dehydration vs hypoxia  -COVID/flu negative. Blood cultures with no growth to date. LFTs normal. CXR no acute findings.    -CXR with old calcified gr  -S/p empiric IV ceftriaxone x 3 days.   -Held sedatives initially, Okay to resume PRN lorazepam (home med)   -Continue donepezil, Seroquel, BuSpar.       Hand tremors  - distressing to patient.  Monitor.      Vertigo  - reports this is common for her  - changed meclizine to scheduled     Acute on chronic hypoxemic respiratory failure  Near-syncope  -Per report, patient became hypoxic with oxygen saturation in the 60s  -CTA chest showed no PE, no acute process   -Patient is O2 dependent on 2L NC but is not compliant due her baseline dementia.  -Continue supplemental oxygenation, titrate for goal SpO2 greater than 90%.     Evolving old left parietal ischemic stroke  -Presented to [facility] with cognitive decline and metabolic encephalopathy. She was hypotensive with systolic in the 90s at [facility].   -CTH from [facility] with left parietal tiny hemorrhage vs calcification on top of old ischemic stroke.   -MRI showed evolving old left parietal lobe CVA with some areas of associated cortical laminar necrosis. No hemorrhage.    -Neurology evaluated, recommended to resume Eliquis. Continue statin. Annual follow up in stroke clinic.   -PT/OT now recommending SNF      RADHA on CKD stage III  Volume depletion due to dehydration   Mild hyperkalemia  -Baseline Cr about 1.3, Cr 2.06 on presentation, improved to  1.2.  Then increased to 1.8 and got IV fluids again.    - pt at risk of recurrent dehydration/RADHA.  stopped hytrin for this reason      Complex disposition  -PT/OT evaluated, felt patient's functional status is at baseline. Recommended home with 24/7 care.  -partner discussed care with both patient's daughters and case management on 7/17.   - Family leaning towards pursuing long-term care after discharge as they are having difficulty taking care of her on their own and do not have finances for full-time caregiver.     Nausea, constipation   -KUB 7/14 with nonobstructive bowel gas pattern. Repeat KUB 7/18 with moderate stool burden.  -Continue bowel regimen. Increased PPI to BID. PRN Zofran for nausea.      Uncontrolled diabetes mellitus type 2 with hyperglycemia  -A1c 9.80% this admission  -Increased Lantus to 30 units qhs,  increased  lispro 7u TIDAC and moderate-high dose SSI -- improved   -Resumed home gabapentin at low-dose 300 mg daily.     Chronic anemia  Macrocytosis  -No evidence of overt GI bleeding this admission  -B12 and folate within normal limits  -Further workup of anemia as outpatient as appropriate.      Essential hypertension  -Was hypotensive at [facility].  -Not on antihypertensive medications.  - stable      Hyperlipidemia -Continue atorvastatin.  Hypothyroidism -Continue levothyroxine.  GERD -Continue PPI.       Expected Discharge Location and Transportation: rehab   Expected Discharge   Expected Discharge Date: 8/5/2024; Expected Discharge Time:      VTE Prophylaxis:  Pharmacologic & mechanical VTE prophylaxis orders are present.         AM-PAC 6 Clicks Score (PT): 8 (08/01/24 0800)    CODE STATUS:   Code Status and Medical Interventions:   Ordered at: 07/15/24 1319     Level Of Support Discussed With:    Patient     Code Status (Patient has no pulse and is not breathing):    CPR (Attempt to Resuscitate)     Medical Interventions (Patient has pulse or is breathing):    Full Support       Deepthi Sheffield MD  08/01/24  '

## 2024-08-01 NOTE — NURSING NOTE
Pt with no issues during shift. VSS, afebrile, up to chair with lift today. Bmx2, incontinent, purwick. Pt turned q2h, fall precautions in place. Bed exit alarm active, bed in low position, call light in reach. Pt resting comfortably at this time with no complaints.

## 2024-08-02 LAB
ANION GAP SERPL CALCULATED.3IONS-SCNC: 8 MMOL/L (ref 5–15)
BASOPHILS # BLD AUTO: 0.03 10*3/MM3 (ref 0–0.2)
BASOPHILS NFR BLD AUTO: 0.4 % (ref 0–1.5)
BUN SERPL-MCNC: 27 MG/DL (ref 8–23)
BUN/CREAT SERPL: 16.8 (ref 7–25)
CALCIUM SPEC-SCNC: 8.4 MG/DL (ref 8.6–10.5)
CHLORIDE SERPL-SCNC: 103 MMOL/L (ref 98–107)
CO2 SERPL-SCNC: 30 MMOL/L (ref 22–29)
CREAT SERPL-MCNC: 1.61 MG/DL (ref 0.57–1)
DEPRECATED RDW RBC AUTO: 51.5 FL (ref 37–54)
EGFRCR SERPLBLD CKD-EPI 2021: 31.8 ML/MIN/1.73
EOSINOPHIL # BLD AUTO: 0.25 10*3/MM3 (ref 0–0.4)
EOSINOPHIL NFR BLD AUTO: 3.7 % (ref 0.3–6.2)
ERYTHROCYTE [DISTWIDTH] IN BLOOD BY AUTOMATED COUNT: 13.5 % (ref 12.3–15.4)
GLUCOSE BLDC GLUCOMTR-MCNC: 108 MG/DL (ref 70–130)
GLUCOSE BLDC GLUCOMTR-MCNC: 120 MG/DL (ref 70–130)
GLUCOSE BLDC GLUCOMTR-MCNC: 128 MG/DL (ref 70–130)
GLUCOSE BLDC GLUCOMTR-MCNC: 141 MG/DL (ref 70–130)
GLUCOSE SERPL-MCNC: 116 MG/DL (ref 65–99)
HCT VFR BLD AUTO: 29.5 % (ref 34–46.6)
HGB BLD-MCNC: 9.2 G/DL (ref 12–15.9)
IMM GRANULOCYTES # BLD AUTO: 0.02 10*3/MM3 (ref 0–0.05)
IMM GRANULOCYTES NFR BLD AUTO: 0.3 % (ref 0–0.5)
LYMPHOCYTES # BLD AUTO: 2.38 10*3/MM3 (ref 0.7–3.1)
LYMPHOCYTES NFR BLD AUTO: 34.9 % (ref 19.6–45.3)
MCH RBC QN AUTO: 32.5 PG (ref 26.6–33)
MCHC RBC AUTO-ENTMCNC: 31.2 G/DL (ref 31.5–35.7)
MCV RBC AUTO: 104.2 FL (ref 79–97)
MONOCYTES # BLD AUTO: 0.63 10*3/MM3 (ref 0.1–0.9)
MONOCYTES NFR BLD AUTO: 9.3 % (ref 5–12)
NEUTROPHILS NFR BLD AUTO: 3.5 10*3/MM3 (ref 1.7–7)
NEUTROPHILS NFR BLD AUTO: 51.4 % (ref 42.7–76)
NRBC BLD AUTO-RTO: 0 /100 WBC (ref 0–0.2)
PLATELET # BLD AUTO: 156 10*3/MM3 (ref 140–450)
PMV BLD AUTO: 11.4 FL (ref 6–12)
POTASSIUM SERPL-SCNC: 4.6 MMOL/L (ref 3.5–5.2)
RBC # BLD AUTO: 2.83 10*6/MM3 (ref 3.77–5.28)
SODIUM SERPL-SCNC: 141 MMOL/L (ref 136–145)
WBC NRBC COR # BLD AUTO: 6.81 10*3/MM3 (ref 3.4–10.8)

## 2024-08-02 PROCEDURE — 63710000001 LEVOTHYROXINE 25 MCG TABLET: Performed by: INTERNAL MEDICINE

## 2024-08-02 PROCEDURE — A9270 NON-COVERED ITEM OR SERVICE: HCPCS | Performed by: NURSE PRACTITIONER

## 2024-08-02 PROCEDURE — 63710000001 SENNOSIDES-DOCUSATE 8.6-50 MG TABLET: Performed by: STUDENT IN AN ORGANIZED HEALTH CARE EDUCATION/TRAINING PROGRAM

## 2024-08-02 PROCEDURE — 82948 REAGENT STRIP/BLOOD GLUCOSE: CPT

## 2024-08-02 PROCEDURE — A9270 NON-COVERED ITEM OR SERVICE: HCPCS | Performed by: STUDENT IN AN ORGANIZED HEALTH CARE EDUCATION/TRAINING PROGRAM

## 2024-08-02 PROCEDURE — 63710000001 BUSPIRONE 15 MG TABLET: Performed by: INTERNAL MEDICINE

## 2024-08-02 PROCEDURE — 63710000001 DULOXETINE 60 MG CAPSULE DELAYED-RELEASE PARTICLES: Performed by: INTERNAL MEDICINE

## 2024-08-02 PROCEDURE — 63710000001 MECLIZINE 12.5 MG TABLET: Performed by: INTERNAL MEDICINE

## 2024-08-02 PROCEDURE — 63710000001 INSULIN GLARGINE PER 5 UNITS: Performed by: STUDENT IN AN ORGANIZED HEALTH CARE EDUCATION/TRAINING PROGRAM

## 2024-08-02 PROCEDURE — 63710000001 PANTOPRAZOLE 40 MG TABLET DELAYED-RELEASE: Performed by: STUDENT IN AN ORGANIZED HEALTH CARE EDUCATION/TRAINING PROGRAM

## 2024-08-02 PROCEDURE — 97530 THERAPEUTIC ACTIVITIES: CPT

## 2024-08-02 PROCEDURE — 63710000001 LORAZEPAM 0.5 MG TABLET: Performed by: NURSE PRACTITIONER

## 2024-08-02 PROCEDURE — G0378 HOSPITAL OBSERVATION PER HR: HCPCS

## 2024-08-02 PROCEDURE — 63710000001 ATORVASTATIN 40 MG TABLET

## 2024-08-02 PROCEDURE — A9270 NON-COVERED ITEM OR SERVICE: HCPCS | Performed by: INTERNAL MEDICINE

## 2024-08-02 PROCEDURE — 97110 THERAPEUTIC EXERCISES: CPT

## 2024-08-02 PROCEDURE — A9270 NON-COVERED ITEM OR SERVICE: HCPCS

## 2024-08-02 PROCEDURE — 80048 BASIC METABOLIC PNL TOTAL CA: CPT | Performed by: STUDENT IN AN ORGANIZED HEALTH CARE EDUCATION/TRAINING PROGRAM

## 2024-08-02 PROCEDURE — 63710000001 INSULIN LISPRO (HUMAN) PER 5 UNITS: Performed by: NURSE PRACTITIONER

## 2024-08-02 PROCEDURE — 63710000001 GABAPENTIN 100 MG CAPSULE: Performed by: NURSE PRACTITIONER

## 2024-08-02 PROCEDURE — 63710000001 HYDROCODONE-ACETAMINOPHEN 10-325 MG TABLET: Performed by: INTERNAL MEDICINE

## 2024-08-02 PROCEDURE — 63710000001 DONEPEZIL 10 MG TABLET: Performed by: INTERNAL MEDICINE

## 2024-08-02 PROCEDURE — 85025 COMPLETE CBC W/AUTO DIFF WBC: CPT | Performed by: STUDENT IN AN ORGANIZED HEALTH CARE EDUCATION/TRAINING PROGRAM

## 2024-08-02 PROCEDURE — 63710000001 NITROFURANTOIN (MACROCRYSTAL-MONOHYDRATE) 100 MG CAPSULE: Performed by: INTERNAL MEDICINE

## 2024-08-02 PROCEDURE — 25810000003 SODIUM CHLORIDE 0.9 % SOLUTION: Performed by: STUDENT IN AN ORGANIZED HEALTH CARE EDUCATION/TRAINING PROGRAM

## 2024-08-02 PROCEDURE — 63710000001 QUETIAPINE 25 MG TABLET: Performed by: INTERNAL MEDICINE

## 2024-08-02 PROCEDURE — 63710000001 APIXABAN 5 MG TABLET: Performed by: STUDENT IN AN ORGANIZED HEALTH CARE EDUCATION/TRAINING PROGRAM

## 2024-08-02 PROCEDURE — 63710000001 POLYETHYLENE GLYCOL 17 G PACK: Performed by: STUDENT IN AN ORGANIZED HEALTH CARE EDUCATION/TRAINING PROGRAM

## 2024-08-02 PROCEDURE — 63710000001 ACETAMINOPHEN 325 MG TABLET: Performed by: INTERNAL MEDICINE

## 2024-08-02 PROCEDURE — 99232 SBSQ HOSP IP/OBS MODERATE 35: CPT | Performed by: STUDENT IN AN ORGANIZED HEALTH CARE EDUCATION/TRAINING PROGRAM

## 2024-08-02 RX ORDER — SODIUM CHLORIDE 9 MG/ML
75 INJECTION, SOLUTION INTRAVENOUS CONTINUOUS
Status: ACTIVE | OUTPATIENT
Start: 2024-08-02 | End: 2024-08-02

## 2024-08-02 RX ADMIN — ATORVASTATIN CALCIUM 80 MG: 40 TABLET, FILM COATED ORAL at 20:35

## 2024-08-02 RX ADMIN — NITROFURANTOIN MONOHYDRATE/MACROCRYSTALS 100 MG: 75; 25 CAPSULE ORAL at 20:35

## 2024-08-02 RX ADMIN — MECLIZINE HYDROCHLORIDE 25 MG: 25 TABLET ORAL at 09:01

## 2024-08-02 RX ADMIN — APIXABAN 5 MG: 5 TABLET, FILM COATED ORAL at 09:01

## 2024-08-02 RX ADMIN — DICLOFENAC SODIUM 2 G: 10 GEL TOPICAL at 17:41

## 2024-08-02 RX ADMIN — DICLOFENAC SODIUM 2 G: 10 GEL TOPICAL at 20:35

## 2024-08-02 RX ADMIN — INSULIN LISPRO 7 UNITS: 100 INJECTION, SOLUTION INTRAVENOUS; SUBCUTANEOUS at 11:59

## 2024-08-02 RX ADMIN — PANTOPRAZOLE SODIUM 40 MG: 40 TABLET, DELAYED RELEASE ORAL at 09:01

## 2024-08-02 RX ADMIN — BUSPIRONE HYDROCHLORIDE 7.5 MG: 15 TABLET ORAL at 09:04

## 2024-08-02 RX ADMIN — INSULIN GLARGINE 30 UNITS: 100 INJECTION, SOLUTION SUBCUTANEOUS at 20:35

## 2024-08-02 RX ADMIN — Medication 10 ML: at 20:36

## 2024-08-02 RX ADMIN — GABAPENTIN 200 MG: 100 CAPSULE ORAL at 20:35

## 2024-08-02 RX ADMIN — MECLIZINE HYDROCHLORIDE 25 MG: 25 TABLET ORAL at 17:41

## 2024-08-02 RX ADMIN — LORAZEPAM 0.5 MG: 0.5 TABLET ORAL at 09:01

## 2024-08-02 RX ADMIN — DICLOFENAC SODIUM 2 G: 10 GEL TOPICAL at 09:02

## 2024-08-02 RX ADMIN — INSULIN LISPRO 7 UNITS: 100 INJECTION, SOLUTION INTRAVENOUS; SUBCUTANEOUS at 17:41

## 2024-08-02 RX ADMIN — NYSTATIN: 100000 POWDER TOPICAL at 09:01

## 2024-08-02 RX ADMIN — MECLIZINE HYDROCHLORIDE 25 MG: 25 TABLET ORAL at 20:35

## 2024-08-02 RX ADMIN — BUSPIRONE HYDROCHLORIDE 7.5 MG: 15 TABLET ORAL at 20:35

## 2024-08-02 RX ADMIN — SODIUM CHLORIDE 75 ML/HR: 9 INJECTION, SOLUTION INTRAVENOUS at 11:58

## 2024-08-02 RX ADMIN — DONEPEZIL HYDROCHLORIDE 10 MG: 10 TABLET, FILM COATED ORAL at 20:35

## 2024-08-02 RX ADMIN — FLUTICASONE PROPIONATE 2 SPRAY: 50 SPRAY, METERED NASAL at 09:02

## 2024-08-02 RX ADMIN — HYDROCODONE BITARTRATE AND ACETAMINOPHEN 1 TABLET: 10; 325 TABLET ORAL at 22:16

## 2024-08-02 RX ADMIN — POLYETHYLENE GLYCOL 3350 17 G: 17 POWDER, FOR SOLUTION ORAL at 09:01

## 2024-08-02 RX ADMIN — DULOXETINE HYDROCHLORIDE 60 MG: 60 CAPSULE, DELAYED RELEASE ORAL at 09:01

## 2024-08-02 RX ADMIN — NYSTATIN: 100000 POWDER TOPICAL at 20:34

## 2024-08-02 RX ADMIN — LEVOTHYROXINE SODIUM 25 MCG: 25 TABLET ORAL at 06:20

## 2024-08-02 RX ADMIN — GABAPENTIN 200 MG: 100 CAPSULE ORAL at 09:01

## 2024-08-02 RX ADMIN — DICLOFENAC SODIUM 2 G: 10 GEL TOPICAL at 11:59

## 2024-08-02 RX ADMIN — SENNOSIDES AND DOCUSATE SODIUM 2 TABLET: 50; 8.6 TABLET ORAL at 09:01

## 2024-08-02 RX ADMIN — QUETIAPINE FUMARATE 50 MG: 25 TABLET ORAL at 20:35

## 2024-08-02 RX ADMIN — PANTOPRAZOLE SODIUM 40 MG: 40 TABLET, DELAYED RELEASE ORAL at 17:44

## 2024-08-02 RX ADMIN — Medication 10 ML: at 20:37

## 2024-08-02 RX ADMIN — ACETAMINOPHEN 650 MG: 325 TABLET ORAL at 20:35

## 2024-08-02 RX ADMIN — APIXABAN 5 MG: 5 TABLET, FILM COATED ORAL at 20:36

## 2024-08-02 RX ADMIN — NYSTATIN: 100000 POWDER TOPICAL at 11:59

## 2024-08-02 RX ADMIN — NYSTATIN: 100000 POWDER TOPICAL at 17:41

## 2024-08-02 RX ADMIN — INSULIN LISPRO 7 UNITS: 100 INJECTION, SOLUTION INTRAVENOUS; SUBCUTANEOUS at 09:01

## 2024-08-02 RX ADMIN — LORAZEPAM 0.5 MG: 0.5 TABLET ORAL at 20:36

## 2024-08-02 NOTE — PROGRESS NOTES
[facility] Medicine Services  PROGRESS NOTE    Patient Name: Cheri Cruz  : 1940  MRN: [id number]    Date of Admission: 2024  Primary Care Physician: Lorrie Canada APRN    Subjective   Subjective     CC:  AMS    HPI:  She had a bowel movement and reports some improvement of her n/v. Able to finish her breakfast this am. No complaints      Objective   Objective     Vital Signs:   Temp:  [97.4 °F (36.3 °C)-98.6 °F (37 °C)] 97.8 °F (36.6 °C)  Heart Rate:  [58-70] 62  Resp:  [16-18] 18  BP: (115-137)/(45-69) 115/52  Flow (L/min):  [2] 2     Physical Exam:  Constitutional: No acute distress, awake, alert, chronically ill appearing  HENT: NCAT, mucous membranes moist  Respiratory: Clear to auscultation bilaterally, respiratory effort normal room air 96%  Cardiovascular: RRR, no murmurs, rubs, or gallops  Gastrointestinal: Positive bowel sounds, soft, nontender, less distended today  Musculoskeletal: No bilateral ankle edema  Psychiatric: Appropriate affect, cooperative  Neurologic: Oriented to person (and sometimes place w reminding), generalized weakness, pain in right hip when moving it, speech clear  Skin: No rashes, pale       Results Reviewed:  LAB RESULTS:      Lab 24  0557 24  0543 24  1005   WBC 6.81 8.47 5.79   HEMOGLOBIN 9.2* 9.8* 9.0*   HEMATOCRIT 29.5* 31.0* 28.7*   PLATELETS 156 181 138*   NEUTROS ABS 3.50 4.98 3.46   IMMATURE GRANS (ABS) 0.02 0.03 0.01   LYMPHS ABS 2.38 2.31 1.43   MONOS ABS 0.63 0.84 0.61   EOS ABS 0.25 0.26 0.25   .2* 101.6* 102.9*         Lab 24  0557 24  0543 24  1005   SODIUM 141 140 137   POTASSIUM 4.6 4.9 5.0   CHLORIDE 103 101 100   CO2 30.0* 33.0* 31.0*   ANION GAP 8.0 6.0 6.0   BUN 27* 31* 30*   CREATININE 1.61* 1.61* 1.51*   EGFR 31.8* 31.8* 34.4*   GLUCOSE 116* 102* 169*   CALCIUM 8.4* 8.8 8.6                         Brief Urine Lab Results  (Last result in the past 365 days)        Color    Clarity   Blood   Leuk Est   Nitrite   Protein   CREAT   Urine HCG        07/14/24 2001 Yellow   Cloudy   Trace   Small (1+)   Negative   Negative                   Microbiology Results Abnormal       Procedure Component Value - Date/Time    Blood Culture - Blood, Wrist, Left [[id number]]  (Normal) Collected: 07/14/24 2022    Lab Status: Final result Specimen: Blood from Wrist, Left Updated: 07/19/24 2100     Blood Culture No growth at 5 days    Narrative:      Less than seven (7) mL's of blood was collected.  Insufficient quantity may yield false negative results.    Blood Culture - Blood, Hand, Left [[id number]]  (Normal) Collected: 07/14/24 2022    Lab Status: Final result Specimen: Blood from Hand, Left Updated: 07/19/24 2100     Blood Culture No growth at 5 days    Narrative:      Less than seven (7) mL's of blood was collected.  Insufficient quantity may yield false negative results.    Urine Culture - Urine, Urine, Random Void [[id number]] Collected: 07/14/24 2001    Lab Status: Final result Specimen: Urine, Random Void Updated: 07/16/24 1020     Urine Culture 50,000 CFU/mL Normal Urogenital Nickie    Narrative:      Colonization of the urinary tract without infection is common. Treatment is discouraged unless the patient is symptomatic, pregnant, or undergoing an invasive urologic procedure.            No radiology results from the last 24 hrs    Results for orders placed during the hospital encounter of 02/10/23    Adult Transthoracic Echo Complete W/ Cont if Necessary Per Protocol    Interpretation Summary    Left ventricular systolic function is hyperdynamic (EF > 70%). Calculated left ventricular EF = 70.6% Left ventricular ejection fraction appears to be greater than 70%. The left ventricular cavity is small in size. Left ventricular wall thickness is consistent with mild concentric hypertrophy. All left ventricular wall segments contract normally. Left ventricular intracavitary gradient noted to be 71  mmHg. Left ventricular diastolic function is consistent with (grade I) impaired relaxation. Normal left atrial pressure.    The aortic valve is abnormal in structure. There is mild calcification of the aortic valve mainly affecting the right coronary cusp(s). The aortic valve appears trileaflet. No aortic valve regurgitation is present. Gradient noted through the LV and LVOT    Compared to TTE report from  Dec 2019, hyperdynamic LV with intracavitary gradient is not a new finding but peak gradient noted on this exam is higher than previously described.      Current medications:  Scheduled Meds:apixaban, 5 mg, Oral, BID  atorvastatin, 80 mg, Oral, Nightly  busPIRone, 7.5 mg, Oral, BID  Diclofenac Sodium, 2 g, Topical, 4x Daily  donepezil, 10 mg, Oral, Nightly  DULoxetine, 60 mg, Oral, Daily  fluticasone, 2 spray, Each Nare, Daily  gabapentin, 200 mg, Oral, Q12H  insulin glargine, 30 Units, Subcutaneous, Nightly  insulin lispro, 3-14 Units, Subcutaneous, 4x Daily AC & at Bedtime  Insulin Lispro, 7 Units, Subcutaneous, TID With Meals  levothyroxine, 25 mcg, Oral, Daily  Lidocaine, 1 patch, Transdermal, Q24H  LORazepam, 0.5 mg, Oral, Q12H  meclizine, 25 mg, Oral, TID  nitrofurantoin (macrocrystal-monohydrate), 100 mg, Oral, Q24H  nystatin, , Topical, 4x Daily  pantoprazole, 40 mg, Oral, BID AC  senna-docusate sodium, 2 tablet, Oral, Daily   And  polyethylene glycol, 17 g, Oral, Daily  QUEtiapine, 50 mg, Oral, Nightly  sodium chloride, 10 mL, Intravenous, Q12H  sodium chloride, 10 mL, Intravenous, Q12H      Continuous Infusions:   PRN Meds:.  acetaminophen **OR** acetaminophen **OR** acetaminophen    senna-docusate sodium **AND** polyethylene glycol **AND** bisacodyl **AND** bisacodyl    Calcium Replacement - Follow Nurse / BPA Driven Protocol    dextrose    dextrose    glucagon (human recombinant)    HYDROcodone-acetaminophen    Magnesium Standard Dose Replacement - Follow Nurse / BPA Driven Protocol    meclizine     ondansetron ODT **OR** ondansetron    Phosphorus Replacement - Follow Nurse / BPA Driven Protocol    Potassium Replacement - Follow Nurse / BPA Driven Protocol    sodium chloride    sodium chloride    sodium chloride    sodium chloride    Assessment & Plan   Assessment & Plan     Active Hospital Problems    Diagnosis  POA    **AMS (altered mental status) [R41.82]  Yes    Stroke [I63.9]  Yes    Acquired hypothyroidism [E03.9]  Yes    History of pulmonary embolus (PE) [Z86.711]  Yes    Essential hypertension [I10]  Yes    GERD (gastroesophageal reflux disease) [K21.9]  Yes    Type 2 diabetes mellitus without complication, without long-term current use of insulin [E11.9]  Yes      Resolved Hospital Problems   No resolved problems to display.        Brief Hospital Course to date:  Cheri Cruz is a 83 y.o. 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  Initial stroke workup was negative.  MRI showed old left parietal ischemic stroke.  Stroke neurology followed and resumed Eliquis.  She received empiric IV ceftriaxone x 3 days due to concern for UTI. Currently medically ready for discharge and awaiting rehab    Plan was partially entered by my partner and I have reviewed and updated as appropriate on 8/2/24     Altered mental status on advanced dementia  Hypotension   Concern for UTI  - initial stroke workup negative.  MRI showed old L parietal ischemic stroke; pt seen by Stroke team; apixiban restarted.   -Possibly dehydration vs hypoxia  -COVID/flu negative. Blood cultures with no growth to date. LFTs normal. CXR no acute findings.    -CXR with old calcified gr  -S/p empiric IV ceftriaxone x 3 days.   -Held sedatives  initially, Okay to resume PRN lorazepam (home med)   -Continue donepezil, Seroquel, BuSpar.       Hand tremors  - distressing to patient. Monitor.      Vertigo  - reports this is common for her  - changed meclizine to scheduled     Acute on chronic hypoxemic respiratory failure  Near-syncope  -Per report, patient became hypoxic with oxygen saturation in the 60s  -CTA chest showed no PE, no acute process   -Patient is O2 dependent on 2L NC but is not compliant due her baseline dementia.  -Continue supplemental oxygenation, titrate for goal SpO2 greater than 90%.     Evolving old left parietal ischemic stroke  -Presented to [facility] with cognitive decline and metabolic encephalopathy. She was hypotensive with systolic in the 90s at [facility].   -CTH from [facility] with left parietal tiny hemorrhage vs calcification on top of old ischemic stroke.   -MRI showed evolving old left parietal lobe CVA with some areas of associated cortical laminar necrosis. No hemorrhage.    -Neurology evaluated, recommended to resume Eliquis. Continue statin. Annual follow up in stroke clinic.   -PT/OT now recommending SNF      RADHA on CKD stage III  Volume depletion due to dehydration   Mild hyperkalemia  -Baseline Cr about 1.3, Cr 2.06 on presentation, improved to  1.2.  Then increased to 1.8 and got IV fluids again.    - pt at risk of recurrent dehydration/RADHA.  stopped hytrin for this reason   --currently around 1.6, will trial gentle fluids today     Complex disposition  -PT/OT evaluated, felt patient's functional status is at baseline. Recommended home with 24/7 care.  -partner discussed care with both patient's daughters and case management on 7/17.   - Family leaning towards pursuing long-term care after discharge as they are having difficulty taking care of her on their own and do not have finances for full-time caregiver.     Nausea, constipation   -KUB 7/14 with nonobstructive bowel gas pattern. Repeat KUB 7/18 with moderate stool  burden.  -Continue bowel regimen. Increased PPI to BID. PRN Zofran for nausea.   --improved today     Uncontrolled diabetes mellitus type 2 with hyperglycemia  -A1c 9.80% this admission  -Increased Lantus to 30 units qhs, increased  lispro 7u TIDAC and moderate-high dose SSI -- improved   -Resumed home gabapentin at low-dose 300 mg daily.     Chronic anemia  Macrocytosis  -No evidence of overt GI bleeding this admission  -B12 and folate within normal limits  -Further workup of anemia as outpatient as appropriate.      Essential hypertension  -Was hypotensive at [facility].  -Not on antihypertensive medications.  - stable      Hyperlipidemia -Continue atorvastatin.  Hypothyroidism -Continue levothyroxine.  GERD -Continue PPI.       Expected Discharge Location and Transportation: rehab   Expected Discharge   Expected Discharge Date: 8/5/2024; Expected Discharge Time:      VTE Prophylaxis:  Pharmacologic & mechanical VTE prophylaxis orders are present.         AM-PAC 6 Clicks Score (PT): 8 (08/01/24 2000)    CODE STATUS:   Code Status and Medical Interventions:   Ordered at: 07/15/24 1319     Level Of Support Discussed With:    Patient     Code Status (Patient has no pulse and is not breathing):    CPR (Attempt to Resuscitate)     Medical Interventions (Patient has pulse or is breathing):    Full Support       Deepthi Sheffield MD  08/02/24  '

## 2024-08-02 NOTE — PLAN OF CARE
Goal Outcome Evaluation:  Plan of Care Reviewed With: patient        Progress: no change  Outcome Evaluation: Pt continues to demonstrate deficits in generalized strength, balance and endurance requiring assist for all functional mobility. PT recommended to address functional limiations and return to optimal level of outcome.      Anticipated Discharge Disposition (PT): skilled nursing facility

## 2024-08-02 NOTE — CASE MANAGEMENT/SOCIAL WORK
Continued Stay Note  [facility]     Patient Name: Cheri Cruz  MRN: [id number]  Today's Date: 8/2/2024    Admit Date: 7/14/2024    Plan: Home   Discharge Plan       Row Name 08/02/24 5734       Plan    Plan Home    Patient/Family in Agreement with Plan yes    Plan Comments  attempted to call [employer] for Family Appeal. They would not give [employer] any information because [employer] does not show up on pt's information. [employer] stated that the family member would have to call them to see what the family appeal status is.  spoke with Case Management Manager/Sasha and Director/Valentina. They state that the pt has been medically ready for 1 week. [facility] can take up to 30 days to make a decision and that the pt cannot remain in the hospital until [employer] makes a decision. CM udpated pt's daughter Radha, on the phone, with this information. CM instructed Radha that she or her sister Janice will need to call [employer] and then call CM back today. By 1530 today, no family has contacted CM. Valentina/ attempted to call pt's daughters on all 3 numbers listed in EPIC. No answer. Case Management will follow up with daughters on Monday. CM did speak with [facility], Wendy stated that pt referal was for Skilled rehab only.  confirmed with daughter, Radha, that they are only seeking skilled rehab, at this time.  will continue to follow.    Final Discharge Disposition Code 01 - home or self-care                   Discharge Codes    No documentation.                 Expected Discharge Date and Time       Expected Discharge Date Expected Discharge Time    Aug 5, 2024               Jahaira Mcgrath RN

## 2024-08-02 NOTE — THERAPY TREATMENT NOTE
Patient Name: Cheri Cruz  : 1940    MRN: [id number]                              Today's Date: 2024       Admit Date: 2024    Visit Dx:     ICD-10-CM ICD-9-CM   1. Cognitive communication deficit  R41.841 799.52   2. Disorientation  R41.0 780.99     Patient Active Problem List   Diagnosis    Hyperlipidemia LDL goal <70    Type 2 diabetes mellitus without complication, without long-term current use of insulin    GERD (gastroesophageal reflux disease)    Essential hypertension    Abnormal EKG    Osteoarthritis    Anxiety    Palpitations    Vertigo    History of ischemic left MCA stroke    Hemorrhagic stroke    History of pulmonary embolus (PE)    Confusion    Hypotension    Constipation    UTI (urinary tract infection) due to urinary indwelling catheter    Metabolic encephalopathy    Symptomatic anemia    Acquired hypothyroidism    AMS (altered mental status)    Stroke     Past Medical History:   Diagnosis Date    Abnormal heart rhythm     Anxiety     Arrhythmia     Arthritis     Atrial fibrillation     Dementia     Diabetes mellitus     Hyperlipidemia     Hypertension     Kidney disorder     Stroke     Uterus cancer      Past Surgical History:   Procedure Laterality Date    CATARACT EXTRACTION, BILATERAL        General Information       Row Name 24 9379          Physical Therapy Time and Intention    Document Type therapy note (daily note)  -KR (r) ANUEL (t) KR (c)     Mode of Treatment physical therapy  -KR (r) ANUEL (t) KR (c)       Row Name 24 1344          General Information    Patient Profile Reviewed yes  -KR (r) ANUEL (t) KR (c)     Existing Precautions/Restrictions fall;other (see comments)  BUE tremors R>L, R sided hypertonia, non-ambulatory at baseline, diplopia, [place]  -KR (r) ANUEL (t) KR (c)     Barriers to Rehab medically complex;previous functional deficit;cognitive status;contractures  -KR (r) ANUEL (t) KR (c)       Row Name 24 3937          Cognition    Orientation Status  (Cognition) oriented to;person;verbal cues/prompts needed for orientation;place;disoriented to;situation;time  -KR (r) ANUEL (t) KR (c)       Row Name 08/02/24 1347          Safety Issues, Functional Mobility    Safety Issues Affecting Function (Mobility) awareness of need for assistance;insight into deficits/self-awareness;safety precaution awareness;safety precautions follow-through/compliance;sequencing abilities;judgment;problem-solving  -KR (r) ANUEL (t) KR (c)     Impairments Affecting Function (Mobility) balance;cognition;endurance/activity tolerance;pain;postural/trunk control;range of motion (ROM);strength  -KR (r) ANUEL (t) KR (c)     Cognitive Impairments, Mobility Safety/Performance awareness, need for assistance;insight into deficits/self-awareness;judgment;problem-solving/reasoning;safety precaution awareness;safety precaution follow-through;sequencing abilities  -KR (r) ANUEL (t) KR (c)               User Key  (r) = Recorded By, (t) = Taken By, (c) = Cosigned By      Initials Name Provider Type    Courtney Dennis, PT Physical Therapist    Cate Bartlett, PT Student PT Student                   Mobility       Row Name 08/02/24 1348          Bed Mobility    Bed Mobility rolling right;rolling left  -KR (r) ANUEL (t) KR (c)     Rolling Left [place] (Bed Mobility) 1 person assist;minimum assist (75% patient effort)  -KR (r) ANUEL (t) KR (c)     Rolling Right [place] (Bed Mobility) 1 person assist;minimum assist (75% patient effort)  -KR (r) ANUEL (t) KR (c)     Scooting/Bridging [place] (Bed Mobility) --  -KR (r) ANUEL (t) KR (c)     Assistive Device (Bed Mobility) bed rails;draw sheet  -KR (r) ANUEL (t) KR (c)     Comment, (Bed Mobility) Pt able to aid in rolling for lift sling placement.  -KR (r) ANUEL (t) KR (c)       Row Name 08/02/24 1348          Bed-Chair Transfer    Bed-Chair [place] (Transfers) dependent (less than 25% patient effort);2 person assist;verbal cues  -KR (r) ANUEL (t) KR (c)     Assistive  Device (Bed-Chair Transfers) lift device  -KR (r) ANUEL (t) KR (c)     Comment, (Bed-Chair Transfer) 1x bed-chair with vicky lift.  -KR (r) ANUEL (t) KR (c)       Row Name 08/02/24 1348          Sit-Stand Transfer    Sit-Stand [place] (Transfers) not tested  -KR (r) ANUEL (t) KR (c)       Row Name 08/02/24 1348          Gait/Stairs (Locomotion)    [place] Level (Gait) not tested  -KR (r) ANUEL (t) KR (c)     Patient was able to Ambulate no, other medical factors prevent ambulation  -KR (r) ANUEL (t) KR (c)     Reason Patient was unable to Ambulate Non-Ambulatory at Baseline  -KR (r) ANUEL (t) KR (c)     [place] Level (Stairs) not tested  -KR (r) ANUEL (t) KR (c)     Comment, (Gait/Stairs) Pt non ambulatory at baseline.  -KR (r) ANUEL (t) KR (c)               User Key  (r) = Recorded By, (t) = Taken By, (c) = Cosigned By      Initials Name Provider Type    Courtney Dennis, PT Physical Therapist    Cate Bartlett, PT Student PT Student                   Obj/Interventions       Row Name 08/02/24 1349          Motor Skills    Therapeutic Exercise hip;knee;ankle  -KR (r) ANUEL (t) KR (c)       Row Name 08/02/24 1349          Hip (Therapeutic Exercise)    Hip (Therapeutic Exercise) strengthening exercise  -KR (r) ANUEL (t) KR (c)     Hip Strengthening (Therapeutic Exercise) bilateral;aBduction;aDduction;internal rotation;external rotation;heel slides;marching while seated;10 repetitions  -KR (r) ANUEL (t) KR (c)       Row Name 08/02/24 1349          Knee (Therapeutic Exercise)    Knee (Therapeutic Exercise) strengthening exercise  -KR (r) ANUEL (t) KR (c)     Knee Strengthening (Therapeutic Exercise) bilateral;LAQ (long arc quad);10 repetitions  -KR (r) ANUEL (t) KR (c)       Row Name 08/02/24 1349          Ankle (Therapeutic Exercise)    Ankle (Therapeutic Exercise) AROM (active range of motion)  -KR (r) ANUEL (t) KR (c)     Ankle AROM (Therapeutic Exercise) bilateral;dorsiflexion;plantarflexion;10 repetitions  -KR (r) ANUEL (t) KR (c)        Row Name 08/02/24 1349          Balance    Balance Assessment sitting static balance;sitting dynamic balance  -KR (r) ANUEL (t) KR (c)     Static Sitting Balance supervision  -KR (r) ANUEL (t) KR (c)     Dynamic Sitting Balance standby assist  -KR (r) ANUEL (t) KR (c)     Position, Sitting Balance unsupported;sitting in chair  -KR (r) ANUEL (t) KR (c)     Balance Interventions sitting;core stability exercise;dynamic reaching;weight shifting activity  -KR (r) ANUEL (t) KR (c)     Comment, Balance Performed x15 dynamic reaching with L. Performed A/P weight shifts while sitting.  -KR (r) ANUEL (t) KR (c)               User Key  (r) = Recorded By, (t) = Taken By, (c) = Cosigned By      Initials Name Provider Type    Courtney Dennis, PT Physical Therapist    Cate Bartlett, PT Student PT Student                   Goals/Plan    No documentation.                  Clinical Impression       Row Name 08/02/24 1352          Pain    Pain Intervention(s) Repositioned;Ambulation/increased activity  -KR (r) ANUEL (t) KR (c)     Additional Documentation Pain Scale: FACES Pre/Post-Treatment (Group)  -KR (r) ANUEL (t) KR (c)       Row Name 08/02/24 1352          Pain Scale: FACES Pre/Post-Treatment    Pain: FACES Scale, Pretreatment 2-->hurts little bit  -KR (r) ANUEL (t) KR (c)     Posttreatment Pain Rating 2-->hurts little bit  -KR (r) ANUEL (t) KR (c)     Pain Location generalized  -KR (r) ANUEL (t) KR (c)     Pain Location - back  -KR (r) ANUEL (t) KR (c)       Row Name 08/02/24 1352          Plan of Care Review    Plan of Care Reviewed With patient  -KR (r) ANUEL (t) KR (c)     Progress no change  -KR (r) ANUEL (t) KR (c)     Outcome Evaluation Pt continues to demonstrate deficits in generalized strength, balance and endurance requiring assist for all functional mobility. PT recommended to address functional limiations and return to optimal level of outcome.  -KR (r) ANUEL (t) KR (c)       Row Name 08/02/24 1352          Vital Signs    Post Systolic BP Rehab 135  -KR  (r) ANUEL (t) KR (c)     Post Treatment Diastolic BP 84  -KR (r) ANUEL (t) KR (c)     O2 Delivery Pre Treatment room air  -KR (r) ANUEL (t) KR (c)     O2 Delivery Intra Treatment room air  -KR (r) ANUEL (t) KR (c)     O2 Delivery Post Treatment room air  -KR (r) ANUEL (t) KR (c)     Pre Patient Position Supine  -KR (r) ANUEL (t) KR (c)     Intra Patient Position Sitting  -KR (r) ANUEL (t) KR (c)     Post Patient Position Sitting  -KR (r) ANUEL (t) KR (c)       Row Name 08/02/24 1352          Positioning and Restraints    Pre-Treatment Position in bed  -KR (r) ANUEL (t) KR (c)     Post Treatment Position chair  -KR (r) ANUEL (t) KR (c)     In Chair notified nsg;reclined;call light within reach;encouraged to call for assist;exit alarm on;waffle cushion;on mechanical lift sling;legs elevated  -KR (r) ANUEL (t) KR (c)               User Key  (r) = Recorded By, (t) = Taken By, (c) = Cosigned By      Initials Name Provider Type    Courtney Dennis, PT Physical Therapist    Cate Bartlett, PT Student PT Student                   Outcome Measures       Row Name 08/02/24 1357 08/02/24 0800       How much help from another person do you currently need...    Turning from your back to your side while in flat bed without using bedrails? 3  -KR (r) ANUEL (t) KR (c) 2  -SN    Moving from lying on back to sitting on the side of a flat bed without bedrails? 2  -KR (r) ANUEL (t) KR (c) 2  -SN    Moving to and from a bed to a chair (including a wheelchair)? 1  -KR (r) ANUEL (t) KR (c) 1  -SN    Standing up from a chair using your arms (e.g., wheelchair, bedside chair)? 1  -KR (r) ANUEL (t) KR (c) 1  -SN    Climbing 3-5 steps with a railing? 1  -KR (r) ANUEL (t) KR (c) 1  -SN    To walk in hospital room? 1  -KR (r) ANUEL (t) KR (c) 1  -SN    AM-PAC 6 Clicks Score (PT) 9  -KR (r) ANUEL (t) 8  -SN    Highest Level of Mobility Goal 3 --> Sit at edge of bed  -KR (r) ANUEL (t) 3 --> Sit at edge of bed  -SN      Row Name 08/02/24 4172          Functional Assessment    Outcome Measure Options  AM-PAC 6 Clicks Basic Mobility (PT)  -KR (r) ANUEL (t) KR (c)               User Key  (r) = Recorded By, (t) = Taken By, (c) = Cosigned By      Initials Name Provider Type    Courtney Dennis, PT Physical Therapist    Hazel Dahl, RN Registered Nurse    Cate Bartlett, PT Student PT Student                                 Physical Therapy Education       Title: PT OT SLP Therapies (In Progress)       Topic: Physical Therapy (Done)       Point: Mobility training (Done)       Learning Progress Summary             Patient Acceptance, E, VU by ANUEL at 8/2/2024 1358    Acceptance, E, VU by ANDRY at 8/1/2024 1528    Acceptance, E, VU,DU,NR by ANUEL at 7/29/2024 1541    Acceptance, E, NR by KR at 7/23/2024 1107    Eager, E, VU,DU,NR by  at 7/20/2024 1526    Comment: Educated safety/technique w/bed mobility, transfers, HEP, PT POC    Acceptance, E, VU,DU by ANUEL at 7/15/2024 0955   Family Eager, E, VU,DU,NR by  at 7/20/2024 1526    Comment: Educated safety/technique w/bed mobility, transfers, HEP, PT POC                         Point: Home exercise program (Done)       Learning Progress Summary             Patient Acceptance, E, VU by ANUEL at 8/2/2024 1358    Eager, E, VU,DU,NR by  at 7/20/2024 1526    Comment: Educated safety/technique w/bed mobility, transfers, HEP, PT POC   Family Eager, E, VU,DU,NR by  at 7/20/2024 1526    Comment: Educated safety/technique w/bed mobility, transfers, HEP, PT POC                         Point: Body mechanics (Done)       Learning Progress Summary             Patient Acceptance, E, VU by ANUEL at 8/2/2024 1358    Acceptance, E, VU by ANDRY at 8/1/2024 1528    Acceptance, E, VU,DU,NR by ANUEL at 7/29/2024 1541    Acceptance, E, NR by KR at 7/23/2024 1107    Eager, E, VU,DU,NR by  at 7/20/2024 1526    Comment: Educated safety/technique w/bed mobility, transfers, HEP, PT POC    Acceptance, E, VU,DU by ANUEL at 7/15/2024 0955   Family GETACHEW Ariza VU, DU,NR by  at 7/20/2024 1526    Comment: Educated  safety/technique w/bed mobility, transfers, HEP, PT POC                         Point: Precautions (Done)       Learning Progress Summary             Patient Acceptance, E, VU by ANUEL at 8/2/2024 1358    Acceptance, E, VU by  at 8/1/2024 1528    Acceptance, E, VU,DU,NR by ANUEL at 7/29/2024 1541    Acceptance, E, NR by KR at 7/23/2024 1107    Eager, E, VU,DU,NR by  at 7/20/2024 1526    Comment: Educated safety/technique w/bed mobility, transfers, HEP, PT POC    Acceptance, E, VU,DU by ANUEL at 7/15/2024 0955   Family Eager, E, VU,DU,NR by  at 7/20/2024 1526    Comment: Educated safety/technique w/bed mobility, transfers, HEP, PT POC                                         User Key       Initials Effective Dates Name Provider Type Discipline     06/01/21 -  Freda Leonard, PT Physical Therapist PT     12/30/22 -  Courtney Jackson, PT Physical Therapist PT    ANUEL 05/07/24 -  Cate Joshi, PT Student PT Student PT                  PT Recommendation and Plan  Planned Therapy Interventions (PT): balance training, bed mobility training, gait training, home exercise program, manual therapy techniques, neuromuscular re-education, patient/family education, postural re-education, ROM (range of motion), strengthening, stretching, transfer training  Plan of Care Reviewed With: patient  Progress: no change  Outcome Evaluation: Pt continues to demonstrate deficits in generalized strength, balance and endurance requiring assist for all functional mobility. PT recommended to address functional limiations and return to optimal level of outcome.     Time Calculation:   PT Evaluation Complexity  History, PT Evaluation Complexity: 3 or more personal factors and/or comorbidities  Examination of Body Systems (PT Eval Complexity): total of 4 or more elements  Clinical Presentation (PT Evaluation Complexity): evolving  Clinical Decision Making (PT Evaluation Complexity): moderate complexity  Overall Complexity (PT Evaluation Complexity):  moderate complexity     PT Charges       Row Name 08/02/24 1358             Time Calculation    Start Time 1318  -KR (r) ANUEL (t) KR (c)      PT Received On 08/02/24  -KR (r) ANUEL (t) KR (c)         Timed Charges    83347 - PT Therapeutic Exercise Minutes 13  -KR (r) ANUEL (t) KR (c)      22280 - PT Therapeutic Activity Minutes 15  -KR (r) ANUEL (t) KR (c)         Total Minutes    Timed Charges Total Minutes 28  -KR (r) ANUEL (t)       Total Minutes 28  -KR (r) ANUEL (t)                User Key  (r) = Recorded By, (t) = Taken By, (c) = Cosigned By      Initials Name Provider Type    Courtney Dennis, PT Physical Therapist    Cate Bartlett, PT Student PT Student                  Therapy Charges for Today       Code Description Service Date Service Provider Modifiers Qty    43517755594 HC PT THER PROC EA 15 MIN 8/2/2024 Cate Joshi, PT Student GP 1    28506099251 HC PT THERAPEUTIC ACT EA 15 MIN 8/2/2024 Cate Joshi, PT Student GP 1    95025027522 HC PT THER SUPP EA 15 MIN 8/2/2024 Cate Joshi, PT Student GP 2            PT G-Codes  Outcome Measure Options: AM-PAC 6 Clicks Basic Mobility (PT)  AM-PAC 6 Clicks Score (PT): 9  AM-PAC 6 Clicks Score (OT): 13  Modified Hooker Scale: 4 - Moderately severe disability.  Unable to walk without assistance, and unable to attend to own bodily needs without assistance.  PT Discharge Summary  Anticipated Discharge Disposition (PT): skilled nursing facility  Reason for Discharge: At baseline function    BILL Garcia  8/2/2024

## 2024-08-03 LAB
ANION GAP SERPL CALCULATED.3IONS-SCNC: 7 MMOL/L (ref 5–15)
BUN SERPL-MCNC: 27 MG/DL (ref 8–23)
BUN/CREAT SERPL: 17.9 (ref 7–25)
CALCIUM SPEC-SCNC: 8.4 MG/DL (ref 8.6–10.5)
CHLORIDE SERPL-SCNC: 104 MMOL/L (ref 98–107)
CO2 SERPL-SCNC: 31 MMOL/L (ref 22–29)
CREAT SERPL-MCNC: 1.51 MG/DL (ref 0.57–1)
EGFRCR SERPLBLD CKD-EPI 2021: 34.4 ML/MIN/1.73
GLUCOSE BLDC GLUCOMTR-MCNC: 121 MG/DL (ref 70–130)
GLUCOSE BLDC GLUCOMTR-MCNC: 138 MG/DL (ref 70–130)
GLUCOSE BLDC GLUCOMTR-MCNC: 176 MG/DL (ref 70–130)
GLUCOSE BLDC GLUCOMTR-MCNC: 178 MG/DL (ref 70–130)
GLUCOSE SERPL-MCNC: 143 MG/DL (ref 65–99)
POTASSIUM SERPL-SCNC: 4.9 MMOL/L (ref 3.5–5.2)
SODIUM SERPL-SCNC: 142 MMOL/L (ref 136–145)

## 2024-08-03 PROCEDURE — 99232 SBSQ HOSP IP/OBS MODERATE 35: CPT | Performed by: NURSE PRACTITIONER

## 2024-08-03 PROCEDURE — A9270 NON-COVERED ITEM OR SERVICE: HCPCS | Performed by: STUDENT IN AN ORGANIZED HEALTH CARE EDUCATION/TRAINING PROGRAM

## 2024-08-03 PROCEDURE — 63710000001 INSULIN LISPRO (HUMAN) PER 5 UNITS: Performed by: NURSE PRACTITIONER

## 2024-08-03 PROCEDURE — A9270 NON-COVERED ITEM OR SERVICE: HCPCS | Performed by: INTERNAL MEDICINE

## 2024-08-03 PROCEDURE — 63710000001 DULOXETINE 60 MG CAPSULE DELAYED-RELEASE PARTICLES: Performed by: INTERNAL MEDICINE

## 2024-08-03 PROCEDURE — 63710000001 GABAPENTIN 100 MG CAPSULE: Performed by: NURSE PRACTITIONER

## 2024-08-03 PROCEDURE — 63710000001 INSULIN GLARGINE PER 5 UNITS: Performed by: STUDENT IN AN ORGANIZED HEALTH CARE EDUCATION/TRAINING PROGRAM

## 2024-08-03 PROCEDURE — 63710000001 ATORVASTATIN 40 MG TABLET

## 2024-08-03 PROCEDURE — 63710000001 PANTOPRAZOLE 40 MG TABLET DELAYED-RELEASE: Performed by: STUDENT IN AN ORGANIZED HEALTH CARE EDUCATION/TRAINING PROGRAM

## 2024-08-03 PROCEDURE — 63710000001 NITROFURANTOIN (MACROCRYSTAL-MONOHYDRATE) 100 MG CAPSULE: Performed by: INTERNAL MEDICINE

## 2024-08-03 PROCEDURE — 63710000001 SENNOSIDES-DOCUSATE 8.6-50 MG TABLET: Performed by: STUDENT IN AN ORGANIZED HEALTH CARE EDUCATION/TRAINING PROGRAM

## 2024-08-03 PROCEDURE — 63710000001 HYDROCODONE-ACETAMINOPHEN 10-325 MG TABLET: Performed by: INTERNAL MEDICINE

## 2024-08-03 PROCEDURE — 63710000001 LORAZEPAM 0.5 MG TABLET: Performed by: NURSE PRACTITIONER

## 2024-08-03 PROCEDURE — A9270 NON-COVERED ITEM OR SERVICE: HCPCS | Performed by: NURSE PRACTITIONER

## 2024-08-03 PROCEDURE — 82948 REAGENT STRIP/BLOOD GLUCOSE: CPT

## 2024-08-03 PROCEDURE — A9270 NON-COVERED ITEM OR SERVICE: HCPCS

## 2024-08-03 PROCEDURE — 63710000001 INSULIN LISPRO (HUMAN) PER 5 UNITS: Performed by: STUDENT IN AN ORGANIZED HEALTH CARE EDUCATION/TRAINING PROGRAM

## 2024-08-03 PROCEDURE — 63710000001 POLYETHYLENE GLYCOL 17 G PACK: Performed by: STUDENT IN AN ORGANIZED HEALTH CARE EDUCATION/TRAINING PROGRAM

## 2024-08-03 PROCEDURE — 63710000001 DONEPEZIL 10 MG TABLET: Performed by: INTERNAL MEDICINE

## 2024-08-03 PROCEDURE — 80048 BASIC METABOLIC PNL TOTAL CA: CPT | Performed by: STUDENT IN AN ORGANIZED HEALTH CARE EDUCATION/TRAINING PROGRAM

## 2024-08-03 PROCEDURE — 63710000001 LIDOCAINE 4 % PATCH: Performed by: NURSE PRACTITIONER

## 2024-08-03 PROCEDURE — G0378 HOSPITAL OBSERVATION PER HR: HCPCS

## 2024-08-03 PROCEDURE — 63710000001 MECLIZINE 12.5 MG TABLET: Performed by: INTERNAL MEDICINE

## 2024-08-03 PROCEDURE — 63710000001 LEVOTHYROXINE 25 MCG TABLET: Performed by: INTERNAL MEDICINE

## 2024-08-03 PROCEDURE — 63710000001 BUSPIRONE 15 MG TABLET: Performed by: INTERNAL MEDICINE

## 2024-08-03 PROCEDURE — 63710000001 QUETIAPINE 25 MG TABLET: Performed by: INTERNAL MEDICINE

## 2024-08-03 PROCEDURE — 63710000001 APIXABAN 5 MG TABLET: Performed by: STUDENT IN AN ORGANIZED HEALTH CARE EDUCATION/TRAINING PROGRAM

## 2024-08-03 RX ADMIN — MECLIZINE HYDROCHLORIDE 25 MG: 25 TABLET ORAL at 08:24

## 2024-08-03 RX ADMIN — INSULIN LISPRO 7 UNITS: 100 INJECTION, SOLUTION INTRAVENOUS; SUBCUTANEOUS at 12:13

## 2024-08-03 RX ADMIN — HYDROCODONE BITARTRATE AND ACETAMINOPHEN 1 TABLET: 10; 325 TABLET ORAL at 18:40

## 2024-08-03 RX ADMIN — PANTOPRAZOLE SODIUM 40 MG: 40 TABLET, DELAYED RELEASE ORAL at 16:48

## 2024-08-03 RX ADMIN — LORAZEPAM 0.5 MG: 0.5 TABLET ORAL at 20:46

## 2024-08-03 RX ADMIN — NYSTATIN: 100000 POWDER TOPICAL at 20:51

## 2024-08-03 RX ADMIN — QUETIAPINE FUMARATE 50 MG: 25 TABLET ORAL at 20:46

## 2024-08-03 RX ADMIN — NYSTATIN: 100000 POWDER TOPICAL at 08:25

## 2024-08-03 RX ADMIN — Medication 10 ML: at 20:51

## 2024-08-03 RX ADMIN — DULOXETINE HYDROCHLORIDE 60 MG: 60 CAPSULE, DELAYED RELEASE ORAL at 08:24

## 2024-08-03 RX ADMIN — DICLOFENAC SODIUM 2 G: 10 GEL TOPICAL at 08:26

## 2024-08-03 RX ADMIN — BUSPIRONE HYDROCHLORIDE 7.5 MG: 15 TABLET ORAL at 20:48

## 2024-08-03 RX ADMIN — APIXABAN 5 MG: 5 TABLET, FILM COATED ORAL at 08:24

## 2024-08-03 RX ADMIN — INSULIN GLARGINE 30 UNITS: 100 INJECTION, SOLUTION SUBCUTANEOUS at 20:50

## 2024-08-03 RX ADMIN — GABAPENTIN 200 MG: 100 CAPSULE ORAL at 08:24

## 2024-08-03 RX ADMIN — NYSTATIN: 100000 POWDER TOPICAL at 12:12

## 2024-08-03 RX ADMIN — INSULIN LISPRO 7 UNITS: 100 INJECTION, SOLUTION INTRAVENOUS; SUBCUTANEOUS at 17:00

## 2024-08-03 RX ADMIN — DICLOFENAC SODIUM 2 G: 10 GEL TOPICAL at 17:00

## 2024-08-03 RX ADMIN — NYSTATIN: 100000 POWDER TOPICAL at 17:00

## 2024-08-03 RX ADMIN — APIXABAN 5 MG: 5 TABLET, FILM COATED ORAL at 20:46

## 2024-08-03 RX ADMIN — INSULIN LISPRO 3 UNITS: 100 INJECTION, SOLUTION INTRAVENOUS; SUBCUTANEOUS at 16:49

## 2024-08-03 RX ADMIN — LEVOTHYROXINE SODIUM 25 MCG: 25 TABLET ORAL at 04:49

## 2024-08-03 RX ADMIN — HYDROCODONE BITARTRATE AND ACETAMINOPHEN 1 TABLET: 10; 325 TABLET ORAL at 10:37

## 2024-08-03 RX ADMIN — LIDOCAINE 1 PATCH: 4 PATCH TOPICAL at 08:26

## 2024-08-03 RX ADMIN — INSULIN LISPRO 3 UNITS: 100 INJECTION, SOLUTION INTRAVENOUS; SUBCUTANEOUS at 20:50

## 2024-08-03 RX ADMIN — Medication 10 ML: at 08:27

## 2024-08-03 RX ADMIN — NITROFURANTOIN MONOHYDRATE/MACROCRYSTALS 100 MG: 75; 25 CAPSULE ORAL at 20:48

## 2024-08-03 RX ADMIN — FLUTICASONE PROPIONATE 2 SPRAY: 50 SPRAY, METERED NASAL at 08:26

## 2024-08-03 RX ADMIN — DICLOFENAC SODIUM 2 G: 10 GEL TOPICAL at 12:12

## 2024-08-03 RX ADMIN — DONEPEZIL HYDROCHLORIDE 10 MG: 10 TABLET, FILM COATED ORAL at 20:46

## 2024-08-03 RX ADMIN — BUSPIRONE HYDROCHLORIDE 7.5 MG: 15 TABLET ORAL at 08:24

## 2024-08-03 RX ADMIN — POLYETHYLENE GLYCOL 3350 17 G: 17 POWDER, FOR SOLUTION ORAL at 08:25

## 2024-08-03 RX ADMIN — ATORVASTATIN CALCIUM 80 MG: 40 TABLET, FILM COATED ORAL at 20:45

## 2024-08-03 RX ADMIN — DICLOFENAC SODIUM 2 G: 10 GEL TOPICAL at 20:49

## 2024-08-03 RX ADMIN — MECLIZINE HYDROCHLORIDE 25 MG: 25 TABLET ORAL at 16:48

## 2024-08-03 RX ADMIN — PANTOPRAZOLE SODIUM 40 MG: 40 TABLET, DELAYED RELEASE ORAL at 08:25

## 2024-08-03 RX ADMIN — INSULIN LISPRO 7 UNITS: 100 INJECTION, SOLUTION INTRAVENOUS; SUBCUTANEOUS at 08:25

## 2024-08-03 RX ADMIN — MECLIZINE HYDROCHLORIDE 25 MG: 25 TABLET ORAL at 20:46

## 2024-08-03 RX ADMIN — SENNOSIDES AND DOCUSATE SODIUM 2 TABLET: 50; 8.6 TABLET ORAL at 08:24

## 2024-08-03 RX ADMIN — LORAZEPAM 0.5 MG: 0.5 TABLET ORAL at 08:25

## 2024-08-03 RX ADMIN — GABAPENTIN 200 MG: 100 CAPSULE ORAL at 20:46

## 2024-08-03 NOTE — PROGRESS NOTES
[facility] Medicine Services  PROGRESS NOTE    Patient Name: Cheri Cruz  : 1940  MRN: [id number]    Date of Admission: 2024  Primary Care Physician: Lorrie Canada APRN    Subjective   Subjective     CC:  AMS    HPI:  Patient is resting in bed in NAD. She states she feels ok. No acute events overnight per nursing       Objective   Objective     Vital Signs:   Temp:  [97.5 °F (36.4 °C)-97.9 °F (36.6 °C)] 97.8 °F (36.6 °C)  Heart Rate:  [58-80] 68  Resp:  [16-18] 18  BP: (122-147)/(43-80) 147/59  Flow (L/min):  [2] 2     Physical Exam:  Constitutional: No acute distress, awake, alert  HENT: NCAT, mucous membranes moist  Respiratory: Clear to auscultation bilaterally, respiratory effort normal 2L 99%  Cardiovascular: RRR, no murmurs, rubs, or gallops  Gastrointestinal: Positive bowel sounds, soft, nontender, nondistended  Musculoskeletal: No bilateral ankle edema  Psychiatric: Appropriate affect, cooperative  Neurologic: Oriented x 2, generalized weakness,  speech clear, pain in right hip when moving  Skin: No rashes,pale       Results Reviewed:  LAB RESULTS:      Lab 24  0557 24  0543   WBC 6.81 8.47   HEMOGLOBIN 9.2* 9.8*   HEMATOCRIT 29.5* 31.0*   PLATELETS 156 181   NEUTROS ABS 3.50 4.98   IMMATURE GRANS (ABS) 0.02 0.03   LYMPHS ABS 2.38 2.31   MONOS ABS 0.63 0.84   EOS ABS 0.25 0.26   .2* 101.6*         Lab 24  0930 24  0557 24  0543   SODIUM 142 141 140   POTASSIUM 4.9 4.6 4.9   CHLORIDE 104 103 101   CO2 31.0* 30.0* 33.0*   ANION GAP 7.0 8.0 6.0   BUN 27* 27* 31*   CREATININE 1.51* 1.61* 1.61*   EGFR 34.4* 31.8* 31.8*   GLUCOSE 143* 116* 102*   CALCIUM 8.4* 8.4* 8.8                         Brief Urine Lab Results  (Last result in the past 365 days)        Color   Clarity   Blood   Leuk Est   Nitrite   Protein   CREAT   Urine HCG        24 Yellow   Cloudy   Trace   Small (1+)   Negative   Negative                    Microbiology Results Abnormal       Procedure Component Value - Date/Time    Blood Culture - Blood, Wrist, Left [[id number]]  (Normal) Collected: 07/14/24 2022    Lab Status: Final result Specimen: Blood from Wrist, Left Updated: 07/19/24 2100     Blood Culture No growth at 5 days    Narrative:      Less than seven (7) mL's of blood was collected.  Insufficient quantity may yield false negative results.    Blood Culture - Blood, Hand, Left [[id number]]  (Normal) Collected: 07/14/24 2022    Lab Status: Final result Specimen: Blood from Hand, Left Updated: 07/19/24 2100     Blood Culture No growth at 5 days    Narrative:      Less than seven (7) mL's of blood was collected.  Insufficient quantity may yield false negative results.    Urine Culture - Urine, Urine, Random Void [[id number]] Collected: 07/14/24 2001    Lab Status: Final result Specimen: Urine, Random Void Updated: 07/16/24 1020     Urine Culture 50,000 CFU/mL Normal Urogenital Nickie    Narrative:      Colonization of the urinary tract without infection is common. Treatment is discouraged unless the patient is symptomatic, pregnant, or undergoing an invasive urologic procedure.            No radiology results from the last 24 hrs    Results for orders placed during the hospital encounter of 02/10/23    Adult Transthoracic Echo Complete W/ Cont if Necessary Per Protocol    Interpretation Summary    Left ventricular systolic function is hyperdynamic (EF > 70%). Calculated left ventricular EF = 70.6% Left ventricular ejection fraction appears to be greater than 70%. The left ventricular cavity is small in size. Left ventricular wall thickness is consistent with mild concentric hypertrophy. All left ventricular wall segments contract normally. Left ventricular intracavitary gradient noted to be 71 mmHg. Left ventricular diastolic function is consistent with (grade I) impaired relaxation. Normal left atrial pressure.    The aortic valve is abnormal in  structure. There is mild calcification of the aortic valve mainly affecting the right coronary cusp(s). The aortic valve appears trileaflet. No aortic valve regurgitation is present. Gradient noted through the LV and LVOT    Compared to TTE report from  Dec 2019, hyperdynamic LV with intracavitary gradient is not a new finding but peak gradient noted on this exam is higher than previously described.      Current medications:  Scheduled Meds:apixaban, 5 mg, Oral, BID  atorvastatin, 80 mg, Oral, Nightly  busPIRone, 7.5 mg, Oral, BID  Diclofenac Sodium, 2 g, Topical, 4x Daily  donepezil, 10 mg, Oral, Nightly  DULoxetine, 60 mg, Oral, Daily  fluticasone, 2 spray, Each Nare, Daily  gabapentin, 200 mg, Oral, Q12H  insulin glargine, 30 Units, Subcutaneous, Nightly  insulin lispro, 3-14 Units, Subcutaneous, 4x Daily AC & at Bedtime  Insulin Lispro, 7 Units, Subcutaneous, TID With Meals  levothyroxine, 25 mcg, Oral, Daily  Lidocaine, 1 patch, Transdermal, Q24H  LORazepam, 0.5 mg, Oral, Q12H  meclizine, 25 mg, Oral, TID  nitrofurantoin (macrocrystal-monohydrate), 100 mg, Oral, Q24H  nystatin, , Topical, 4x Daily  pantoprazole, 40 mg, Oral, BID AC  senna-docusate sodium, 2 tablet, Oral, Daily   And  polyethylene glycol, 17 g, Oral, Daily  QUEtiapine, 50 mg, Oral, Nightly  sodium chloride, 10 mL, Intravenous, Q12H  sodium chloride, 10 mL, Intravenous, Q12H      Continuous Infusions:   PRN Meds:.  acetaminophen **OR** acetaminophen **OR** acetaminophen    senna-docusate sodium **AND** polyethylene glycol **AND** bisacodyl **AND** bisacodyl    Calcium Replacement - Follow Nurse / BPA Driven Protocol    dextrose    dextrose    glucagon (human recombinant)    HYDROcodone-acetaminophen    Magnesium Standard Dose Replacement - Follow Nurse / BPA Driven Protocol    meclizine    ondansetron ODT **OR** ondansetron    Phosphorus Replacement - Follow Nurse / BPA Driven Protocol    Potassium Replacement - Follow Nurse / BPA Driven  Protocol    sodium chloride    sodium chloride    sodium chloride    sodium chloride    Assessment & Plan   Assessment & Plan     Active Hospital Problems    Diagnosis  POA    **AMS (altered mental status) [R41.82]  Yes    Stroke [I63.9]  Yes    Acquired hypothyroidism [E03.9]  Yes    History of pulmonary embolus (PE) [Z86.711]  Yes    Essential hypertension [I10]  Yes    GERD (gastroesophageal reflux disease) [K21.9]  Yes    Type 2 diabetes mellitus without complication, without long-term current use of insulin [E11.9]  Yes      Resolved Hospital Problems   No resolved problems to display.        Brief Hospital Course to date:  Cheri Cruz is a 83 y.o. 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  Initial stroke workup was negative.  MRI showed old left parietal ischemic stroke.  Stroke neurology followed and resumed Eliquis.  She received empiric IV ceftriaxone x 3 days due to concern for UTI. Currently medically ready for discharge and awaiting rehab     Plan was partially entered by my partner and I have reviewed and updated as appropriate on 8/3/24     Altered mental status on advanced dementia  Hypotension   Concern for UTI  - initial stroke workup negative.  MRI showed old L parietal ischemic stroke; pt seen by Stroke team; apixiban restarted.   -Possibly dehydration vs hypoxia  -COVID/flu negative. Blood cultures with no growth to date. LFTs normal. CXR no acute findings.    -CXR with old calcified gr  -S/p empiric IV ceftriaxone x 3 days.   -Held sedatives initially, Okay to resume PRN lorazepam (home med)   -Continue donepezil, Seroquel, BuSpar.       Hand tremors  - distressing to patient. Monitor.       Vertigo  - reports this is common for her  - changed meclizine to scheduled     Acute on chronic hypoxemic respiratory failure  Near-syncope  -Per report, patient became hypoxic with oxygen saturation in the 60s  -CTA chest showed no PE, no acute process   -Patient is O2 dependent on 2L NC but is not compliant due her baseline dementia.  -Continue supplemental oxygenation, titrate for goal SpO2 greater than 90%.     Evolving old left parietal ischemic stroke  -Presented to [facility] with cognitive decline and metabolic encephalopathy. She was hypotensive with systolic in the 90s at [facility].   -CTH from [facility] with left parietal tiny hemorrhage vs calcification on top of old ischemic stroke.   -MRI showed evolving old left parietal lobe CVA with some areas of associated cortical laminar necrosis. No hemorrhage.    -Neurology evaluated, recommended to resume Eliquis. Continue statin. Annual follow up in stroke clinic.   -PT/OT now recommending SNF      RADHA on CKD stage III  Volume depletion due to dehydration   Mild hyperkalemia  -Baseline Cr about 1.3, Cr 2.06 on presentation, improved to  1.2.  Then increased to 1.8 and got IV fluids again.    - pt at risk of recurrent dehydration/RADHA.  stopped hytrin for this reason   --currently around 1.6, will trial gentle fluids today     Complex disposition  -PT/OT evaluated, felt patient's functional status is at baseline. Recommended home with 24/7 care.  -partner discussed care with both patient's daughters and case management on 7/17.   - Family leaning towards pursuing long-term care after discharge as they are having difficulty taking care of her on their own and do not have finances for full-time caregiver.     Nausea, constipation   -KUB 7/14 with nonobstructive bowel gas pattern. Repeat KUB 7/18 with moderate stool burden.  -Continue bowel regimen. Increased PPI to BID. PRN Zofran for nausea.   --improved      Uncontrolled diabetes mellitus type 2 with hyperglycemia  -A1c 9.80%  this admission  -Increased Lantus to 30 units qhs, increased  lispro 7u TIDAC and moderate-high dose SSI -- improved   -Resumed home gabapentin at low-dose 300 mg daily.     Chronic anemia  Macrocytosis  -No evidence of overt GI bleeding this admission  -B12 and folate within normal limits  -Further workup of anemia as outpatient as appropriate.      Essential hypertension  -Was hypotensive at [facility].  -Not on antihypertensive medications.  - stable      Hyperlipidemia -Continue atorvastatin.  Hypothyroidism -Continue levothyroxine.  GERD -Continue PPI.          Expected Discharge Location and Transportation: rehab   Expected Discharge   Expected Discharge Date: 8/5/2024; Expected Discharge Time:      VTE Prophylaxis:  Pharmacologic & mechanical VTE prophylaxis orders are present.         AM-PAC 6 Clicks Score (PT): 9 (08/03/24 0824)    CODE STATUS:   Code Status and Medical Interventions:   Ordered at: 07/15/24 1319     Level Of Support Discussed With:    Patient     Code Status (Patient has no pulse and is not breathing):    CPR (Attempt to Resuscitate)     Medical Interventions (Patient has pulse or is breathing):    Full Support       Kristin Pillai, EVELINA  08/03/24

## 2024-08-04 LAB
GLUCOSE BLDC GLUCOMTR-MCNC: 103 MG/DL (ref 70–130)
GLUCOSE BLDC GLUCOMTR-MCNC: 127 MG/DL (ref 70–130)
GLUCOSE BLDC GLUCOMTR-MCNC: 159 MG/DL (ref 70–130)
GLUCOSE BLDC GLUCOMTR-MCNC: 161 MG/DL (ref 70–130)

## 2024-08-04 PROCEDURE — A9270 NON-COVERED ITEM OR SERVICE: HCPCS | Performed by: INTERNAL MEDICINE

## 2024-08-04 PROCEDURE — 63710000001 APIXABAN 5 MG TABLET: Performed by: STUDENT IN AN ORGANIZED HEALTH CARE EDUCATION/TRAINING PROGRAM

## 2024-08-04 PROCEDURE — A9270 NON-COVERED ITEM OR SERVICE: HCPCS | Performed by: STUDENT IN AN ORGANIZED HEALTH CARE EDUCATION/TRAINING PROGRAM

## 2024-08-04 PROCEDURE — 63710000001 DULOXETINE 60 MG CAPSULE DELAYED-RELEASE PARTICLES: Performed by: INTERNAL MEDICINE

## 2024-08-04 PROCEDURE — 63710000001 ATORVASTATIN 40 MG TABLET

## 2024-08-04 PROCEDURE — 99231 SBSQ HOSP IP/OBS SF/LOW 25: CPT | Performed by: NURSE PRACTITIONER

## 2024-08-04 PROCEDURE — 63710000001 DONEPEZIL 10 MG TABLET: Performed by: INTERNAL MEDICINE

## 2024-08-04 PROCEDURE — 63710000001 PANTOPRAZOLE 40 MG TABLET DELAYED-RELEASE: Performed by: STUDENT IN AN ORGANIZED HEALTH CARE EDUCATION/TRAINING PROGRAM

## 2024-08-04 PROCEDURE — A9270 NON-COVERED ITEM OR SERVICE: HCPCS | Performed by: NURSE PRACTITIONER

## 2024-08-04 PROCEDURE — 63710000001 LIDOCAINE 4 % PATCH: Performed by: NURSE PRACTITIONER

## 2024-08-04 PROCEDURE — 63710000001 LEVOTHYROXINE 25 MCG TABLET: Performed by: INTERNAL MEDICINE

## 2024-08-04 PROCEDURE — 63710000001 BUSPIRONE 15 MG TABLET: Performed by: INTERNAL MEDICINE

## 2024-08-04 PROCEDURE — 63710000001 SENNOSIDES-DOCUSATE 8.6-50 MG TABLET: Performed by: STUDENT IN AN ORGANIZED HEALTH CARE EDUCATION/TRAINING PROGRAM

## 2024-08-04 PROCEDURE — 63710000001 LORAZEPAM 0.5 MG TABLET: Performed by: NURSE PRACTITIONER

## 2024-08-04 PROCEDURE — G0378 HOSPITAL OBSERVATION PER HR: HCPCS

## 2024-08-04 PROCEDURE — 63710000001 MECLIZINE 12.5 MG TABLET: Performed by: INTERNAL MEDICINE

## 2024-08-04 PROCEDURE — 63710000001 POLYETHYLENE GLYCOL 17 G PACK: Performed by: STUDENT IN AN ORGANIZED HEALTH CARE EDUCATION/TRAINING PROGRAM

## 2024-08-04 PROCEDURE — 82948 REAGENT STRIP/BLOOD GLUCOSE: CPT

## 2024-08-04 PROCEDURE — A9270 NON-COVERED ITEM OR SERVICE: HCPCS

## 2024-08-04 PROCEDURE — 63710000001 INSULIN LISPRO (HUMAN) PER 5 UNITS: Performed by: NURSE PRACTITIONER

## 2024-08-04 PROCEDURE — 63710000001 QUETIAPINE 25 MG TABLET: Performed by: INTERNAL MEDICINE

## 2024-08-04 PROCEDURE — 63710000001 GABAPENTIN 100 MG CAPSULE: Performed by: NURSE PRACTITIONER

## 2024-08-04 PROCEDURE — 63710000001 INSULIN LISPRO (HUMAN) PER 5 UNITS: Performed by: STUDENT IN AN ORGANIZED HEALTH CARE EDUCATION/TRAINING PROGRAM

## 2024-08-04 PROCEDURE — 63710000001 NITROFURANTOIN (MACROCRYSTAL-MONOHYDRATE) 100 MG CAPSULE: Performed by: INTERNAL MEDICINE

## 2024-08-04 PROCEDURE — 63710000001 INSULIN GLARGINE PER 5 UNITS: Performed by: STUDENT IN AN ORGANIZED HEALTH CARE EDUCATION/TRAINING PROGRAM

## 2024-08-04 PROCEDURE — 63710000001 HYDROCODONE-ACETAMINOPHEN 10-325 MG TABLET: Performed by: INTERNAL MEDICINE

## 2024-08-04 RX ADMIN — SENNOSIDES AND DOCUSATE SODIUM 2 TABLET: 50; 8.6 TABLET ORAL at 09:19

## 2024-08-04 RX ADMIN — Medication 10 ML: at 20:53

## 2024-08-04 RX ADMIN — DULOXETINE HYDROCHLORIDE 60 MG: 60 CAPSULE, DELAYED RELEASE ORAL at 09:19

## 2024-08-04 RX ADMIN — INSULIN LISPRO 3 UNITS: 100 INJECTION, SOLUTION INTRAVENOUS; SUBCUTANEOUS at 11:44

## 2024-08-04 RX ADMIN — Medication 10 ML: at 09:19

## 2024-08-04 RX ADMIN — DICLOFENAC SODIUM 2 G: 10 GEL TOPICAL at 09:20

## 2024-08-04 RX ADMIN — APIXABAN 5 MG: 5 TABLET, FILM COATED ORAL at 20:52

## 2024-08-04 RX ADMIN — LORAZEPAM 0.5 MG: 0.5 TABLET ORAL at 09:19

## 2024-08-04 RX ADMIN — INSULIN LISPRO 7 UNITS: 100 INJECTION, SOLUTION INTRAVENOUS; SUBCUTANEOUS at 09:29

## 2024-08-04 RX ADMIN — BUSPIRONE HYDROCHLORIDE 7.5 MG: 15 TABLET ORAL at 09:19

## 2024-08-04 RX ADMIN — MECLIZINE HYDROCHLORIDE 25 MG: 25 TABLET ORAL at 09:18

## 2024-08-04 RX ADMIN — PANTOPRAZOLE SODIUM 40 MG: 40 TABLET, DELAYED RELEASE ORAL at 09:19

## 2024-08-04 RX ADMIN — ATORVASTATIN CALCIUM 80 MG: 40 TABLET, FILM COATED ORAL at 20:52

## 2024-08-04 RX ADMIN — POLYETHYLENE GLYCOL 3350 17 G: 17 POWDER, FOR SOLUTION ORAL at 09:19

## 2024-08-04 RX ADMIN — DICLOFENAC SODIUM 2 G: 10 GEL TOPICAL at 17:28

## 2024-08-04 RX ADMIN — GABAPENTIN 200 MG: 100 CAPSULE ORAL at 20:52

## 2024-08-04 RX ADMIN — APIXABAN 5 MG: 5 TABLET, FILM COATED ORAL at 09:19

## 2024-08-04 RX ADMIN — LORAZEPAM 0.5 MG: 0.5 TABLET ORAL at 20:52

## 2024-08-04 RX ADMIN — NITROFURANTOIN MONOHYDRATE/MACROCRYSTALS 100 MG: 75; 25 CAPSULE ORAL at 20:52

## 2024-08-04 RX ADMIN — DONEPEZIL HYDROCHLORIDE 10 MG: 10 TABLET, FILM COATED ORAL at 20:52

## 2024-08-04 RX ADMIN — HYDROCODONE BITARTRATE AND ACETAMINOPHEN 1 TABLET: 10; 325 TABLET ORAL at 09:19

## 2024-08-04 RX ADMIN — LIDOCAINE 1 PATCH: 4 PATCH TOPICAL at 09:20

## 2024-08-04 RX ADMIN — INSULIN GLARGINE 30 UNITS: 100 INJECTION, SOLUTION SUBCUTANEOUS at 21:13

## 2024-08-04 RX ADMIN — PANTOPRAZOLE SODIUM 40 MG: 40 TABLET, DELAYED RELEASE ORAL at 17:27

## 2024-08-04 RX ADMIN — NYSTATIN: 100000 POWDER TOPICAL at 09:19

## 2024-08-04 RX ADMIN — DICLOFENAC SODIUM 2 G: 10 GEL TOPICAL at 20:53

## 2024-08-04 RX ADMIN — NYSTATIN: 100000 POWDER TOPICAL at 11:45

## 2024-08-04 RX ADMIN — LEVOTHYROXINE SODIUM 25 MCG: 25 TABLET ORAL at 06:08

## 2024-08-04 RX ADMIN — GABAPENTIN 200 MG: 100 CAPSULE ORAL at 09:19

## 2024-08-04 RX ADMIN — INSULIN LISPRO 7 UNITS: 100 INJECTION, SOLUTION INTRAVENOUS; SUBCUTANEOUS at 17:28

## 2024-08-04 RX ADMIN — DICLOFENAC SODIUM 2 G: 10 GEL TOPICAL at 11:45

## 2024-08-04 RX ADMIN — INSULIN LISPRO 7 UNITS: 100 INJECTION, SOLUTION INTRAVENOUS; SUBCUTANEOUS at 11:44

## 2024-08-04 RX ADMIN — NYSTATIN: 100000 POWDER TOPICAL at 20:53

## 2024-08-04 RX ADMIN — NYSTATIN: 100000 POWDER TOPICAL at 17:28

## 2024-08-04 RX ADMIN — QUETIAPINE FUMARATE 50 MG: 25 TABLET ORAL at 20:52

## 2024-08-04 RX ADMIN — MECLIZINE HYDROCHLORIDE 25 MG: 25 TABLET ORAL at 17:28

## 2024-08-04 RX ADMIN — BUSPIRONE HYDROCHLORIDE 7.5 MG: 15 TABLET ORAL at 20:52

## 2024-08-04 NOTE — PROGRESS NOTES
[facility] Medicine Services  PROGRESS NOTE    Patient Name: Cheri Cruz  : 1940  MRN: [id number]    Date of Admission: 2024  Primary Care Physician: Lorrie Canada APRN    Subjective   Subjective     CC:  AMS    HPI:  Pt sitting up in bed watching TV. She denies any issues today. Last BM was 2 days ago.     Copied text in this note has been reviewed and is accurate as of 24.         Objective   Objective     Vital Signs:   Temp:  [97.7 °F (36.5 °C)-97.9 °F (36.6 °C)] 97.8 °F (36.6 °C)  Heart Rate:  [63-78] 63  Resp:  [16-18] 16  BP: (121-166)/(51-60) 122/54  Flow (L/min):  [2] 2     Physical Exam:  Constitutional: Awake, alert, NAD  HENT: NCAT, mucous membranes moist  Respiratory: Clear to auscultation bilaterally, nonlabored  Cardiovascular: RRR, no murmurs, rubs, or gallop  Gastrointestinal: Positive bowel sounds, soft, nontender, nondistended  Musculoskeletal: No bilateral ankle edema,  Psychiatric: Appropriate affect, cooperative  Neurologic: Oriented to person, place, MIRELES, speech clear  Skin: No rashes        Results Reviewed:  LAB RESULTS:      Lab 24  0557 24  0543   WBC 6.81 8.47   HEMOGLOBIN 9.2* 9.8*   HEMATOCRIT 29.5* 31.0*   PLATELETS 156 181   NEUTROS ABS 3.50 4.98   IMMATURE GRANS (ABS) 0.02 0.03   LYMPHS ABS 2.38 2.31   MONOS ABS 0.63 0.84   EOS ABS 0.25 0.26   .2* 101.6*         Lab 24  0930 24  0557 24  0543   SODIUM 142 141 140   POTASSIUM 4.9 4.6 4.9   CHLORIDE 104 103 101   CO2 31.0* 30.0* 33.0*   ANION GAP 7.0 8.0 6.0   BUN 27* 27* 31*   CREATININE 1.51* 1.61* 1.61*   EGFR 34.4* 31.8* 31.8*   GLUCOSE 143* 116* 102*   CALCIUM 8.4* 8.4* 8.8                         Brief Urine Lab Results  (Last result in the past 365 days)        Color   Clarity   Blood   Leuk Est   Nitrite   Protein   CREAT   Urine HCG        24 Yellow   Cloudy   Trace   Small (1+)   Negative   Negative                    Microbiology Results Abnormal       Procedure Component Value - Date/Time    Blood Culture - Blood, Wrist, Left [[id number]]  (Normal) Collected: 07/14/24 2022    Lab Status: Final result Specimen: Blood from Wrist, Left Updated: 07/19/24 2100     Blood Culture No growth at 5 days    Narrative:      Less than seven (7) mL's of blood was collected.  Insufficient quantity may yield false negative results.    Blood Culture - Blood, Hand, Left [[id number]]  (Normal) Collected: 07/14/24 2022    Lab Status: Final result Specimen: Blood from Hand, Left Updated: 07/19/24 2100     Blood Culture No growth at 5 days    Narrative:      Less than seven (7) mL's of blood was collected.  Insufficient quantity may yield false negative results.    Urine Culture - Urine, Urine, Random Void [[id number]] Collected: 07/14/24 2001    Lab Status: Final result Specimen: Urine, Random Void Updated: 07/16/24 1020     Urine Culture 50,000 CFU/mL Normal Urogenital Nickie    Narrative:      Colonization of the urinary tract without infection is common. Treatment is discouraged unless the patient is symptomatic, pregnant, or undergoing an invasive urologic procedure.            No radiology results from the last 24 hrs    Results for orders placed during the hospital encounter of 02/10/23    Adult Transthoracic Echo Complete W/ Cont if Necessary Per Protocol    Interpretation Summary    Left ventricular systolic function is hyperdynamic (EF > 70%). Calculated left ventricular EF = 70.6% Left ventricular ejection fraction appears to be greater than 70%. The left ventricular cavity is small in size. Left ventricular wall thickness is consistent with mild concentric hypertrophy. All left ventricular wall segments contract normally. Left ventricular intracavitary gradient noted to be 71 mmHg. Left ventricular diastolic function is consistent with (grade I) impaired relaxation. Normal left atrial pressure.    The aortic valve is abnormal in  structure. There is mild calcification of the aortic valve mainly affecting the right coronary cusp(s). The aortic valve appears trileaflet. No aortic valve regurgitation is present. Gradient noted through the LV and LVOT    Compared to TTE report from  Dec 2019, hyperdynamic LV with intracavitary gradient is not a new finding but peak gradient noted on this exam is higher than previously described.      Current medications:  Scheduled Meds:apixaban, 5 mg, Oral, BID  atorvastatin, 80 mg, Oral, Nightly  busPIRone, 7.5 mg, Oral, BID  Diclofenac Sodium, 2 g, Topical, 4x Daily  donepezil, 10 mg, Oral, Nightly  DULoxetine, 60 mg, Oral, Daily  fluticasone, 2 spray, Each Nare, Daily  gabapentin, 200 mg, Oral, Q12H  insulin glargine, 30 Units, Subcutaneous, Nightly  insulin lispro, 3-14 Units, Subcutaneous, 4x Daily AC & at Bedtime  Insulin Lispro, 7 Units, Subcutaneous, TID With Meals  levothyroxine, 25 mcg, Oral, Daily  Lidocaine, 1 patch, Transdermal, Q24H  LORazepam, 0.5 mg, Oral, Q12H  meclizine, 25 mg, Oral, TID  nitrofurantoin (macrocrystal-monohydrate), 100 mg, Oral, Q24H  nystatin, , Topical, 4x Daily  pantoprazole, 40 mg, Oral, BID AC  senna-docusate sodium, 2 tablet, Oral, Daily   And  polyethylene glycol, 17 g, Oral, Daily  QUEtiapine, 50 mg, Oral, Nightly  sodium chloride, 10 mL, Intravenous, Q12H  sodium chloride, 10 mL, Intravenous, Q12H      Continuous Infusions:   PRN Meds:.  acetaminophen **OR** acetaminophen **OR** acetaminophen    senna-docusate sodium **AND** polyethylene glycol **AND** bisacodyl **AND** bisacodyl    Calcium Replacement - Follow Nurse / BPA Driven Protocol    dextrose    dextrose    glucagon (human recombinant)    HYDROcodone-acetaminophen    Magnesium Standard Dose Replacement - Follow Nurse / BPA Driven Protocol    meclizine    ondansetron ODT **OR** ondansetron    Phosphorus Replacement - Follow Nurse / BPA Driven Protocol    Potassium Replacement - Follow Nurse / BPA Driven  Protocol    sodium chloride    sodium chloride    sodium chloride    sodium chloride    Assessment & Plan   Assessment & Plan     Active Hospital Problems    Diagnosis  POA    **AMS (altered mental status) [R41.82]  Yes    Stroke [I63.9]  Yes    Acquired hypothyroidism [E03.9]  Yes    History of pulmonary embolus (PE) [Z86.711]  Yes    Essential hypertension [I10]  Yes    GERD (gastroesophageal reflux disease) [K21.9]  Yes    Type 2 diabetes mellitus without complication, without long-term current use of insulin [E11.9]  Yes      Resolved Hospital Problems   No resolved problems to display.        Brief Hospital Course to date:  Cheri Cruz is a 83 y.o. 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  Initial stroke workup was negative.  MRI showed old left parietal ischemic stroke.  Stroke neurology followed and resumed Eliquis.  She received empiric IV ceftriaxone x 3 days due to concern for UTI. Currently medically ready for discharge and awaiting rehab          Altered mental status on advanced dementia  Hypotension   Concern for UTI  - initial stroke workup negative.  MRI showed old L parietal ischemic stroke; pt seen by Stroke team; apixiban restarted.   -Possibly dehydration vs hypoxia  -COVID/flu negative. Blood cultures with no growth to date. LFTs normal. CXR no acute findings.    -CXR with old calcified gr  -S/p empiric IV ceftriaxone x 3 days.   -Held sedatives initially, Okay to resume PRN lorazepam (home med)   -Continue donepezil, Seroquel, BuSpar.       Hand tremors  - distressing to patient. Monitor.      Vertigo  - reports this is common for her  - changed meclizine to scheduled     Acute on chronic  hypoxemic respiratory failure  Near-syncope  -Per report, patient became hypoxic with oxygen saturation in the 60s  -CTA chest showed no PE, no acute process   -Patient is O2 dependent on 2L NC but is not compliant due her baseline dementia.  -Continue supplemental oxygenation, titrate for goal SpO2 greater than 90%.     Evolving old left parietal ischemic stroke  -Presented to [facility] with cognitive decline and metabolic encephalopathy. She was hypotensive with systolic in the 90s at [facility].   -CTH from [facility] with left parietal tiny hemorrhage vs calcification on top of old ischemic stroke.   -MRI showed evolving old left parietal lobe CVA with some areas of associated cortical laminar necrosis. No hemorrhage.    -Neurology evaluated, recommended to resume Eliquis. Continue statin. Annual follow up in stroke clinic.   -PT/OT now recommending SNF      RADHA on CKD stage III  Volume depletion due to dehydration   Mild hyperkalemia  -Baseline Cr about 1.3, Cr 2.06 on presentation, improved to  1.2.  Then increased to 1.8 and got IV fluids again.    - pt at risk of recurrent dehydration/RADHA.  stopped hytrin for this reason   --08/03 Cr up to 1.6, s/p IV hydration. 08/04 Cr 1.51     Complex disposition  -PT/OT evaluated, felt patient's functional status is at baseline. Recommended home with 24/7 care.  -partner discussed care with both patient's daughters and case management on 7/17.   - Family leaning towards pursuing long-term care after discharge as they are having difficulty taking care of her on their own and do not have finances for full-time caregiver.     Nausea, constipation   -KUB 7/14 with nonobstructive bowel gas pattern. Repeat KUB 7/18 with moderate stool burden.  -Continue bowel regimen. Increased PPI to BID. PRN Zofran for nausea.   --improved      Uncontrolled diabetes mellitus type 2 with hyperglycemia  -A1c 9.80% this admission  -Increased Lantus to 30 units qhs, increased  lispro 7u TIDAC and moderate-high dose  SSI -- improved   -Resumed home gabapentin at low-dose 300 mg daily.     Chronic anemia  Macrocytosis  -No evidence of overt GI bleeding this admission  -B12 and folate within normal limits  -Further workup of anemia as outpatient as appropriate.      Essential hypertension  -Was hypotensive at [facility].  -Not on antihypertensive medications.  - stable      Hyperlipidemia -Continue atorvastatin.  Hypothyroidism -Continue levothyroxine.  GERD -Continue PPI.          Expected Discharge Location and Transportation: rehab   Expected Discharge   Expected Discharge Date: 8/5/2024; Expected Discharge Time:      VTE Prophylaxis:  Pharmacologic & mechanical VTE prophylaxis orders are present.         AM-PAC 6 Clicks Score (PT): 9 (08/03/24 0824)    CODE STATUS:   Code Status and Medical Interventions:   Ordered at: 07/15/24 1319     Level Of Support Discussed With:    Patient     Code Status (Patient has no pulse and is not breathing):    CPR (Attempt to Resuscitate)     Medical Interventions (Patient has pulse or is breathing):    Full Support       EVELINA Larson  08/04/24

## 2024-08-05 LAB
ANION GAP SERPL CALCULATED.3IONS-SCNC: 8 MMOL/L (ref 5–15)
BASOPHILS # BLD AUTO: 0.04 10*3/MM3 (ref 0–0.2)
BASOPHILS NFR BLD AUTO: 0.5 % (ref 0–1.5)
BUN SERPL-MCNC: 32 MG/DL (ref 8–23)
BUN/CREAT SERPL: 20.8 (ref 7–25)
CALCIUM SPEC-SCNC: 9 MG/DL (ref 8.6–10.5)
CHLORIDE SERPL-SCNC: 102 MMOL/L (ref 98–107)
CO2 SERPL-SCNC: 30 MMOL/L (ref 22–29)
CREAT SERPL-MCNC: 1.54 MG/DL (ref 0.57–1)
DEPRECATED RDW RBC AUTO: 50 FL (ref 37–54)
EGFRCR SERPLBLD CKD-EPI 2021: 33.6 ML/MIN/1.73
EOSINOPHIL # BLD AUTO: 0.29 10*3/MM3 (ref 0–0.4)
EOSINOPHIL NFR BLD AUTO: 3.5 % (ref 0.3–6.2)
ERYTHROCYTE [DISTWIDTH] IN BLOOD BY AUTOMATED COUNT: 13.7 % (ref 12.3–15.4)
GLUCOSE BLDC GLUCOMTR-MCNC: 183 MG/DL (ref 70–130)
GLUCOSE BLDC GLUCOMTR-MCNC: 200 MG/DL (ref 70–130)
GLUCOSE BLDC GLUCOMTR-MCNC: 225 MG/DL (ref 70–130)
GLUCOSE BLDC GLUCOMTR-MCNC: 256 MG/DL (ref 70–130)
GLUCOSE SERPL-MCNC: 213 MG/DL (ref 65–99)
HCT VFR BLD AUTO: 30.5 % (ref 34–46.6)
HGB BLD-MCNC: 9.9 G/DL (ref 12–15.9)
IMM GRANULOCYTES # BLD AUTO: 0.02 10*3/MM3 (ref 0–0.05)
IMM GRANULOCYTES NFR BLD AUTO: 0.2 % (ref 0–0.5)
LYMPHOCYTES # BLD AUTO: 2.08 10*3/MM3 (ref 0.7–3.1)
LYMPHOCYTES NFR BLD AUTO: 25.1 % (ref 19.6–45.3)
MCH RBC QN AUTO: 32.6 PG (ref 26.6–33)
MCHC RBC AUTO-ENTMCNC: 32.5 G/DL (ref 31.5–35.7)
MCV RBC AUTO: 100.3 FL (ref 79–97)
MONOCYTES # BLD AUTO: 0.64 10*3/MM3 (ref 0.1–0.9)
MONOCYTES NFR BLD AUTO: 7.7 % (ref 5–12)
NEUTROPHILS NFR BLD AUTO: 5.22 10*3/MM3 (ref 1.7–7)
NEUTROPHILS NFR BLD AUTO: 63 % (ref 42.7–76)
NRBC BLD AUTO-RTO: 0 /100 WBC (ref 0–0.2)
PLATELET # BLD AUTO: 156 10*3/MM3 (ref 140–450)
PMV BLD AUTO: 11.5 FL (ref 6–12)
POTASSIUM SERPL-SCNC: 4.9 MMOL/L (ref 3.5–5.2)
RBC # BLD AUTO: 3.04 10*6/MM3 (ref 3.77–5.28)
SODIUM SERPL-SCNC: 140 MMOL/L (ref 136–145)
WBC NRBC COR # BLD AUTO: 8.29 10*3/MM3 (ref 3.4–10.8)

## 2024-08-05 PROCEDURE — A9270 NON-COVERED ITEM OR SERVICE: HCPCS | Performed by: STUDENT IN AN ORGANIZED HEALTH CARE EDUCATION/TRAINING PROGRAM

## 2024-08-05 PROCEDURE — 63710000001 MECLIZINE 25 MG TABLET: Performed by: INTERNAL MEDICINE

## 2024-08-05 PROCEDURE — A9270 NON-COVERED ITEM OR SERVICE: HCPCS | Performed by: INTERNAL MEDICINE

## 2024-08-05 PROCEDURE — G0378 HOSPITAL OBSERVATION PER HR: HCPCS

## 2024-08-05 PROCEDURE — 63710000001 HYDROCODONE-ACETAMINOPHEN 10-325 MG TABLET: Performed by: INTERNAL MEDICINE

## 2024-08-05 PROCEDURE — 63710000001 NYSTATIN 100000 UNIT/GM POWDER 15 G BOTTLE: Performed by: STUDENT IN AN ORGANIZED HEALTH CARE EDUCATION/TRAINING PROGRAM

## 2024-08-05 PROCEDURE — 63710000001 BISACODYL 10 MG SUPPOSITORY: Performed by: STUDENT IN AN ORGANIZED HEALTH CARE EDUCATION/TRAINING PROGRAM

## 2024-08-05 PROCEDURE — 63710000001 SENNOSIDES-DOCUSATE 8.6-50 MG TABLET: Performed by: STUDENT IN AN ORGANIZED HEALTH CARE EDUCATION/TRAINING PROGRAM

## 2024-08-05 PROCEDURE — 63710000001 QUETIAPINE 25 MG TABLET: Performed by: INTERNAL MEDICINE

## 2024-08-05 PROCEDURE — A9270 NON-COVERED ITEM OR SERVICE: HCPCS | Performed by: NURSE PRACTITIONER

## 2024-08-05 PROCEDURE — 85025 COMPLETE CBC W/AUTO DIFF WBC: CPT | Performed by: NURSE PRACTITIONER

## 2024-08-05 PROCEDURE — 97110 THERAPEUTIC EXERCISES: CPT

## 2024-08-05 PROCEDURE — 63710000001 DULOXETINE 60 MG CAPSULE DELAYED-RELEASE PARTICLES: Performed by: INTERNAL MEDICINE

## 2024-08-05 PROCEDURE — 63710000001 INSULIN LISPRO (HUMAN) PER 5 UNITS: Performed by: STUDENT IN AN ORGANIZED HEALTH CARE EDUCATION/TRAINING PROGRAM

## 2024-08-05 PROCEDURE — 80048 BASIC METABOLIC PNL TOTAL CA: CPT | Performed by: NURSE PRACTITIONER

## 2024-08-05 PROCEDURE — 97530 THERAPEUTIC ACTIVITIES: CPT

## 2024-08-05 PROCEDURE — 63710000001 ATORVASTATIN 40 MG TABLET

## 2024-08-05 PROCEDURE — 63710000001 GABAPENTIN 100 MG CAPSULE: Performed by: NURSE PRACTITIONER

## 2024-08-05 PROCEDURE — 63710000001 LEVOTHYROXINE 25 MCG TABLET: Performed by: INTERNAL MEDICINE

## 2024-08-05 PROCEDURE — 82948 REAGENT STRIP/BLOOD GLUCOSE: CPT

## 2024-08-05 PROCEDURE — 63710000001 NITROFURANTOIN (MACROCRYSTAL-MONOHYDRATE) 100 MG CAPSULE: Performed by: INTERNAL MEDICINE

## 2024-08-05 PROCEDURE — 63710000001 INSULIN LISPRO (HUMAN) PER 5 UNITS: Performed by: NURSE PRACTITIONER

## 2024-08-05 PROCEDURE — 63710000001 LIDOCAINE 4 % PATCH: Performed by: NURSE PRACTITIONER

## 2024-08-05 PROCEDURE — 99231 SBSQ HOSP IP/OBS SF/LOW 25: CPT | Performed by: NURSE PRACTITIONER

## 2024-08-05 PROCEDURE — 63710000001 PANTOPRAZOLE 40 MG TABLET DELAYED-RELEASE: Performed by: STUDENT IN AN ORGANIZED HEALTH CARE EDUCATION/TRAINING PROGRAM

## 2024-08-05 PROCEDURE — A9270 NON-COVERED ITEM OR SERVICE: HCPCS

## 2024-08-05 PROCEDURE — 63710000001 INSULIN GLARGINE PER 5 UNITS: Performed by: STUDENT IN AN ORGANIZED HEALTH CARE EDUCATION/TRAINING PROGRAM

## 2024-08-05 PROCEDURE — 63710000001 APIXABAN 5 MG TABLET: Performed by: STUDENT IN AN ORGANIZED HEALTH CARE EDUCATION/TRAINING PROGRAM

## 2024-08-05 PROCEDURE — 63710000001 BUSPIRONE 15 MG TABLET: Performed by: INTERNAL MEDICINE

## 2024-08-05 PROCEDURE — 63710000001 POLYETHYLENE GLYCOL 17 G PACK: Performed by: STUDENT IN AN ORGANIZED HEALTH CARE EDUCATION/TRAINING PROGRAM

## 2024-08-05 PROCEDURE — 63710000001 LORAZEPAM 0.5 MG TABLET: Performed by: NURSE PRACTITIONER

## 2024-08-05 PROCEDURE — 63710000001 DONEPEZIL 10 MG TABLET: Performed by: INTERNAL MEDICINE

## 2024-08-05 RX ADMIN — BUSPIRONE HYDROCHLORIDE 7.5 MG: 15 TABLET ORAL at 09:45

## 2024-08-05 RX ADMIN — INSULIN LISPRO 7 UNITS: 100 INJECTION, SOLUTION INTRAVENOUS; SUBCUTANEOUS at 18:14

## 2024-08-05 RX ADMIN — NYSTATIN: 100000 POWDER TOPICAL at 13:19

## 2024-08-05 RX ADMIN — Medication 10 MG: at 09:46

## 2024-08-05 RX ADMIN — LIDOCAINE 1 PATCH: 4 PATCH TOPICAL at 08:49

## 2024-08-05 RX ADMIN — DONEPEZIL HYDROCHLORIDE 10 MG: 10 TABLET, FILM COATED ORAL at 20:49

## 2024-08-05 RX ADMIN — PANTOPRAZOLE SODIUM 40 MG: 40 TABLET, DELAYED RELEASE ORAL at 08:48

## 2024-08-05 RX ADMIN — Medication 10 ML: at 20:50

## 2024-08-05 RX ADMIN — POLYETHYLENE GLYCOL 3350 17 G: 17 POWDER, FOR SOLUTION ORAL at 09:45

## 2024-08-05 RX ADMIN — DICLOFENAC SODIUM 2 G: 10 GEL TOPICAL at 13:20

## 2024-08-05 RX ADMIN — NYSTATIN: 100000 POWDER TOPICAL at 18:14

## 2024-08-05 RX ADMIN — DICLOFENAC SODIUM 2 G: 10 GEL TOPICAL at 08:49

## 2024-08-05 RX ADMIN — DICLOFENAC SODIUM 2 G: 10 GEL TOPICAL at 20:51

## 2024-08-05 RX ADMIN — LORAZEPAM 0.5 MG: 0.5 TABLET ORAL at 08:48

## 2024-08-05 RX ADMIN — MECLIZINE HYDROCHLORIDE 25 MG: 25 TABLET ORAL at 15:19

## 2024-08-05 RX ADMIN — INSULIN LISPRO 3 UNITS: 100 INJECTION, SOLUTION INTRAVENOUS; SUBCUTANEOUS at 13:19

## 2024-08-05 RX ADMIN — DULOXETINE HYDROCHLORIDE 60 MG: 60 CAPSULE, DELAYED RELEASE ORAL at 08:47

## 2024-08-05 RX ADMIN — SENNOSIDES AND DOCUSATE SODIUM 2 TABLET: 50; 8.6 TABLET ORAL at 08:48

## 2024-08-05 RX ADMIN — INSULIN GLARGINE 30 UNITS: 100 INJECTION, SOLUTION SUBCUTANEOUS at 20:48

## 2024-08-05 RX ADMIN — MECLIZINE HYDROCHLORIDE 25 MG: 25 TABLET ORAL at 08:48

## 2024-08-05 RX ADMIN — INSULIN LISPRO 5 UNITS: 100 INJECTION, SOLUTION INTRAVENOUS; SUBCUTANEOUS at 20:48

## 2024-08-05 RX ADMIN — GABAPENTIN 200 MG: 100 CAPSULE ORAL at 08:47

## 2024-08-05 RX ADMIN — GABAPENTIN 200 MG: 100 CAPSULE ORAL at 20:49

## 2024-08-05 RX ADMIN — APIXABAN 5 MG: 5 TABLET, FILM COATED ORAL at 20:49

## 2024-08-05 RX ADMIN — INSULIN LISPRO 7 UNITS: 100 INJECTION, SOLUTION INTRAVENOUS; SUBCUTANEOUS at 13:20

## 2024-08-05 RX ADMIN — INSULIN LISPRO 5 UNITS: 100 INJECTION, SOLUTION INTRAVENOUS; SUBCUTANEOUS at 08:48

## 2024-08-05 RX ADMIN — QUETIAPINE FUMARATE 50 MG: 25 TABLET ORAL at 20:49

## 2024-08-05 RX ADMIN — LORAZEPAM 0.5 MG: 0.5 TABLET ORAL at 20:50

## 2024-08-05 RX ADMIN — DICLOFENAC SODIUM 2 G: 10 GEL TOPICAL at 17:54

## 2024-08-05 RX ADMIN — HYDROCODONE BITARTRATE AND ACETAMINOPHEN 1 TABLET: 10; 325 TABLET ORAL at 15:19

## 2024-08-05 RX ADMIN — LEVOTHYROXINE SODIUM 25 MCG: 25 TABLET ORAL at 05:38

## 2024-08-05 RX ADMIN — BUSPIRONE HYDROCHLORIDE 7.5 MG: 15 TABLET ORAL at 20:52

## 2024-08-05 RX ADMIN — NITROFURANTOIN MONOHYDRATE/MACROCRYSTALS 100 MG: 75; 25 CAPSULE ORAL at 20:52

## 2024-08-05 RX ADMIN — ATORVASTATIN CALCIUM 80 MG: 40 TABLET, FILM COATED ORAL at 20:49

## 2024-08-05 RX ADMIN — MECLIZINE HYDROCHLORIDE 25 MG: 25 TABLET ORAL at 20:49

## 2024-08-05 RX ADMIN — NYSTATIN: 100000 POWDER TOPICAL at 08:49

## 2024-08-05 RX ADMIN — FLUTICASONE PROPIONATE 2 SPRAY: 50 SPRAY, METERED NASAL at 09:46

## 2024-08-05 RX ADMIN — INSULIN LISPRO 7 UNITS: 100 INJECTION, SOLUTION INTRAVENOUS; SUBCUTANEOUS at 08:52

## 2024-08-05 RX ADMIN — NYSTATIN: 100000 POWDER TOPICAL at 20:48

## 2024-08-05 RX ADMIN — PANTOPRAZOLE SODIUM 40 MG: 40 TABLET, DELAYED RELEASE ORAL at 17:54

## 2024-08-05 RX ADMIN — INSULIN LISPRO 8 UNITS: 100 INJECTION, SOLUTION INTRAVENOUS; SUBCUTANEOUS at 17:54

## 2024-08-05 RX ADMIN — APIXABAN 5 MG: 5 TABLET, FILM COATED ORAL at 08:48

## 2024-08-05 NOTE — PLAN OF CARE
Goal Outcome Evaluation:  Plan of Care Reviewed With: patient        Progress: improving  Outcome Evaluation: Pt had xlarge soft, formed bowel movement this morning. Family was at bedside this afternoon; pt ate some sweets resulting in higher blood glucose before dinner. Anticipating discharge to home with HH on Thu.      Problem: Adult Inpatient Plan of Care  Goal: Plan of Care Review  Outcome: Ongoing, Progressing  Flowsheets (Taken 8/5/2024 1826)  Progress: improving  Plan of Care Reviewed With: patient  Outcome Evaluation:   Pt had xlarge soft, formed bowel movement this morning. Family was at bedside this afternoon   pt ate some sweets resulting in higher blood glucose before dinner. Anticipating discharge to home with HH on Thu.  Goal: Patient-Specific Goal (Individualized)  Outcome: Ongoing, Progressing  Goal: Absence of Hospital-Acquired Illness or Injury  Outcome: Ongoing, Progressing  Intervention: Identify and Manage Fall Risk  Recent Flowsheet Documentation  Taken 8/5/2024 0800 by Halle Ramirez RN  Safety Promotion/Fall Prevention:   safety round/check completed   nonskid shoes/slippers when out of bed   activity supervised   fall prevention program maintained  Intervention: Prevent and Manage VTE (Venous Thromboembolism) Risk  Recent Flowsheet Documentation  Taken 8/5/2024 0800 by Halle Ramirez RN  Activity Management: activity encouraged  Goal: Optimal Comfort and Wellbeing  Outcome: Ongoing, Progressing  Goal: Readiness for Transition of Care  Outcome: Ongoing, Progressing

## 2024-08-05 NOTE — THERAPY TREATMENT NOTE
Patient Name: Cheri Cruz  : 1940    MRN: [id number]                              Today's Date: 2024       Admit Date: 2024    Visit Dx:     ICD-10-CM ICD-9-CM   1. Cognitive communication deficit  R41.841 799.52   2. Disorientation  R41.0 780.99   3. History of ischemic left MCA stroke  Z86.73 V12.54     Patient Active Problem List   Diagnosis    Hyperlipidemia LDL goal <70    Type 2 diabetes mellitus without complication, without long-term current use of insulin    GERD (gastroesophageal reflux disease)    Essential hypertension    Abnormal EKG    Osteoarthritis    Anxiety    Palpitations    Vertigo    History of ischemic left MCA stroke    Hemorrhagic stroke    History of pulmonary embolus (PE)    Confusion    Hypotension    Constipation    UTI (urinary tract infection) due to urinary indwelling catheter    Metabolic encephalopathy    Symptomatic anemia    Acquired hypothyroidism    AMS (altered mental status)    Stroke     Past Medical History:   Diagnosis Date    Abnormal heart rhythm     Anxiety     Arrhythmia     Arthritis     Atrial fibrillation     Dementia     Diabetes mellitus     Hyperlipidemia     Hypertension     Kidney disorder     Stroke     Uterus cancer      Past Surgical History:   Procedure Laterality Date    CATARACT EXTRACTION, BILATERAL        General Information       Row Name 24 1447          Physical Therapy Time and Intention    Document Type therapy note (daily note)  -ES     Mode of Treatment physical therapy  -ES       Row Name 24 1447          General Information    Patient Profile Reviewed yes  -ES     Existing Precautions/Restrictions fall;other (see comments)  BUE tremors R>L, R sided hypertonia, non-ambulatory at baseline, diplopia, [place]  -ES     Barriers to Rehab medically complex;previous functional deficit;cognitive status;contractures  -ES       Row Name 24 1447          Cognition    Orientation Status (Cognition) oriented to;person;verbal  cues/prompts needed for orientation;place;disoriented to;situation;time  -ES       Row Name 08/05/24 1447          Safety Issues, Functional Mobility    Safety Issues Affecting Function (Mobility) awareness of need for assistance;insight into deficits/self-awareness;safety precaution awareness;safety precautions follow-through/compliance;sequencing abilities;judgment  -ES     Impairments Affecting Function (Mobility) balance;cognition;endurance/activity tolerance;pain;postural/trunk control;range of motion (ROM);strength  -ES     Cognitive Impairments, Mobility Safety/Performance awareness, need for assistance;problem-solving/reasoning;safety precaution awareness;safety precaution follow-through;sequencing abilities  -ES               User Key  (r) = Recorded By, (t) = Taken By, (c) = Cosigned By      Initials Name Provider Type    Edie Hannon PT Physical Therapist                   Mobility       Row Name 08/05/24 1448          Bed Mobility    Bed Mobility rolling right;rolling left  -ES     Rolling Left [place] (Bed Mobility) moderate assist (50% patient effort);verbal cues  -ES     Rolling Right [place] (Bed Mobility) moderate assist (50% patient effort);verbal cues  -ES     Assistive Device (Bed Mobility) bed rails;draw sheet  -ES       Row Name 08/05/24 1448          Bed-Chair Transfer    Bed-Chair [place] (Transfers) dependent (less than 25% patient effort);2 person assist;verbal cues  -ES     Assistive Device (Bed-Chair Transfers) lift device  -ES       Row Name 08/05/24 1448          Sit-Stand Transfer    Sit-Stand [place] (Transfers) not tested  -ES               User Key  (r) = Recorded By, (t) = Taken By, (c) = Cosigned By      Initials Name Provider Type    Edie Hannon PT Physical Therapist                   Obj/Interventions       Row Name 08/05/24 1449          Motor Skills    Therapeutic Exercise hip;knee;ankle  -ES       Row Name 08/05/24 1449          Hip  (Therapeutic Exercise)    Hip (Therapeutic Exercise) isometric exercises;strengthening exercise  -ES     Hip Isometrics (Therapeutic Exercise) bilateral;gluteal sets  -ES     Hip Strengthening (Therapeutic Exercise) bilateral;aBduction;heel slides;10 repetitions  -ES       Row Name 08/05/24 1449          Knee (Therapeutic Exercise)    Knee (Therapeutic Exercise) strengthening exercise  -ES     Knee Strengthening (Therapeutic Exercise) bilateral;heel slides;LAQ (long arc quad);10 repetitions  -ES       Row Name 08/05/24 1449          Ankle (Therapeutic Exercise)    Ankle (Therapeutic Exercise) AROM (active range of motion)  -ES     Ankle AROM (Therapeutic Exercise) bilateral;dorsiflexion;plantarflexion;10 repetitions  -ES       Row Name 08/05/24 1449          Balance    Balance Assessment sitting static balance;sitting dynamic balance  -ES     Static Sitting Balance standby assist  -ES     Dynamic Sitting Balance standby assist  -ES     Position, Sitting Balance unsupported;sitting in chair  -ES               User Key  (r) = Recorded By, (t) = Taken By, (c) = Cosigned By      Initials Name Provider Type    ES Edie Bueno, PT Physical Therapist                   Goals/Plan    No documentation.                  Clinical Impression       Row Name 08/05/24 1449          Pain    Pain Intervention(s) Repositioned;Ambulation/increased activity  -ES     Additional Documentation Pain Scale: FACES Pre/Post-Treatment (Group)  -ES       Row Name 08/05/24 1449          Pain Scale: FACES Pre/Post-Treatment    Pain: FACES Scale, Pretreatment 2-->hurts little bit  -ES     Posttreatment Pain Rating 2-->hurts little bit  -ES     Pain Location generalized  -ES     Pre/Posttreatment Pain Comment tolerated  -ES       Row Name 08/05/24 1449          Plan of Care Review    Plan of Care Reviewed With patient  -ES     Progress no change  -ES     Outcome Evaluation Pt continues to be limited by generalized weakness and RLE ROM deficits,  although agreeable and pleasant throughout session. Pt educated on BLE strengthening/ROM HEP, demonstrated good understanding and teach back. PT rec SNF at d/c.  -ES       Row Name 08/05/24 1444          Therapy Assessment/Plan (PT)    Rehab Potential (PT) fair, will monitor progress closely  -ES     Criteria for Skilled Interventions Met (PT) yes;meets criteria;skilled treatment is necessary  -ES     Therapy Frequency (PT) 3 times/wk  -ES     Predicted Duration of Therapy Intervention (PT) 10 days  -ES       Row Name 08/05/24 1449          Vital Signs    Pre Systolic BP Rehab --  non-tele, cleared by RN  -ES     O2 Delivery Pre Treatment room air  -ES     O2 Delivery Intra Treatment room air  -ES     O2 Delivery Post Treatment room air  -ES       Row Name 08/05/24 1444          Positioning and Restraints    Pre-Treatment Position in bed  -ES     Post Treatment Position chair  -ES     In Chair notified nsg;reclined;sitting;call light within reach;encouraged to call for assist;exit alarm on;waffle cushion;on mechanical lift sling;legs elevated;heels elevated  -ES               User Key  (r) = Recorded By, (t) = Taken By, (c) = Cosigned By      Initials Name Provider Type    ES Edie Bueno, PT Physical Therapist                   Outcome Measures       Row Name 08/05/24 8082          How much help from another person do you currently need...    Turning from your back to your side while in flat bed without using bedrails? 2  -ES     Moving from lying on back to sitting on the side of a flat bed without bedrails? 2  -ES     Moving to and from a bed to a chair (including a wheelchair)? 1  -ES     Standing up from a chair using your arms (e.g., wheelchair, bedside chair)? 1  -ES     Climbing 3-5 steps with a railing? 1  -ES     To walk in hospital room? 1  -ES     AM-PAC 6 Clicks Score (PT) 8  -ES     Highest Level of Mobility Goal 3 --> Sit at edge of bed  -ES       Row Name 08/05/24 1451          Functional  Assessment    Outcome Measure Options AM-PAC 6 Clicks Basic Mobility (PT)  -ES               User Key  (r) = Recorded By, (t) = Taken By, (c) = Cosigned By      Initials Name Provider Type    Edie Hannon PT Physical Therapist                                 Physical Therapy Education       Title: PT OT SLP Therapies (In Progress)       Topic: Physical Therapy (Done)       Point: Mobility training (Done)       Learning Progress Summary             Patient Acceptance, E, VU by ANUEL at 8/2/2024 1358    Acceptance, E, VU by KR at 8/1/2024 1528    Acceptance, E, VU,DU,NR by ANUEL at 7/29/2024 1541    Acceptance, E, NR by KR at 7/23/2024 1107    Eager, E, VU,DU,NR by  at 7/20/2024 1526    Comment: Educated safety/technique w/bed mobility, transfers, HEP, PT POC    Acceptance, E, VU,DU by ANUEL at 7/15/2024 0955   Family Eager, E, VU,DU,NR by  at 7/20/2024 1526    Comment: Educated safety/technique w/bed mobility, transfers, HEP, PT POC                         Point: Home exercise program (Done)       Learning Progress Summary             Patient Acceptance, E, VU by ANUEL at 8/2/2024 1358    Eager, E, VU,DU,NR by  at 7/20/2024 1526    Comment: Educated safety/technique w/bed mobility, transfers, HEP, PT POC   Family Eager, E, VU,DU,NR by  at 7/20/2024 1526    Comment: Educated safety/technique w/bed mobility, transfers, HEP, PT POC                         Point: Body mechanics (Done)       Learning Progress Summary             Patient Acceptance, E, VU by ANUEL at 8/2/2024 1358    Acceptance, E, VU by KR at 8/1/2024 1528    Acceptance, E, VU,DU,NR by ANUEL at 7/29/2024 1541    Acceptance, E, NR by KR at 7/23/2024 1107    Eager, E, VU,DU,NR by  at 7/20/2024 1526    Comment: Educated safety/technique w/bed mobility, transfers, HEP, PT POC    Acceptance, E, VU,DU by ANUEL at 7/15/2024 0955   Family Eager, E, VU,DU,NR by  at 7/20/2024 1526    Comment: Educated safety/technique w/bed mobility, transfers, HEP, PT POC                          Point: Precautions (Done)       Learning Progress Summary             Patient Acceptance, E, VU by ANUEL at 8/2/2024 1358    Acceptance, E, VU by KR at 8/1/2024 1528    Acceptance, E, VU,DU,NR by ANUEL at 7/29/2024 1541    Acceptance, E, NR by  at 7/23/2024 1107    Eager, E, VU,DU,NR by  at 7/20/2024 1526    Comment: Educated safety/technique w/bed mobility, transfers, HEP, PT POC    Acceptance, E, VU,DU by ANUEL at 7/15/2024 0955   Family Eager, E, VU,DU,NR by  at 7/20/2024 1526    Comment: Educated safety/technique w/bed mobility, transfers, HEP, PT POC                                         User Key       Initials Effective Dates Name Provider Type Discipline     06/01/21 -  Freda Leonard, PT Physical Therapist PT     12/30/22 -  Courtney Jackson, PT Physical Therapist PT    ANUEL 05/07/24 -  Cate Joshi, PT Student PT Student PT                  PT Recommendation and Plan     Plan of Care Reviewed With: patient  Progress: no change  Outcome Evaluation: Pt continues to be limited by generalized weakness and RLE ROM deficits, although agreeable and pleasant throughout session. Pt educated on BLE strengthening/ROM HEP, demonstrated good understanding and teach back. PT rec SNF at d/c.     Time Calculation:         PT Charges       Row Name 08/05/24 1451             Time Calculation    Start Time 1110  -ES      PT Received On 08/05/24  -ES      PT Goal Re-Cert Due Date 08/08/24  -ES         Time Calculation- PT    Total Timed Code Minutes- PT 26 minute(s)  -ES         Timed Charges    80287 - PT Therapeutic Exercise Minutes 11  -ES      08235 - PT Therapeutic Activity Minutes 15  -ES         Total Minutes    Timed Charges Total Minutes 26  -ES       Total Minutes 26  -ES                User Key  (r) = Recorded By, (t) = Taken By, (c) = Cosigned By      Initials Name Provider Type    ES Edie Bueno, PT Physical Therapist                  Therapy Charges for Today       Code Description Service  Date Service Provider Modifiers Qty    [id number]  PT THER PROC EA 15 MIN 8/5/2024 Edie Bueno, PT GP 1    [id number]  PT THERAPEUTIC ACT EA 15 MIN 8/5/2024 Edie Bueno, PT GP 1            PT G-Codes  Outcome Measure Options: AM-PAC 6 Clicks Basic Mobility (PT)  AM-PAC 6 Clicks Score (PT): 8  AM-PAC 6 Clicks Score (OT): 13  Modified Isabella Scale: 4 - Moderately severe disability.  Unable to walk without assistance, and unable to attend to own bodily needs without assistance.  PT Discharge Summary  Anticipated Discharge Disposition (PT): skilled nursing facility    Edie Bueno PT  8/5/2024

## 2024-08-05 NOTE — PLAN OF CARE
Goal Outcome Evaluation:  Plan of Care Reviewed With: patient        Progress: no change  Outcome Evaluation: VSS, on RA. No c/o pain. UOP-WNL. Pt slept well. New transfer to the floor this evening. Awaiting placement. Will continue to monitor.

## 2024-08-05 NOTE — CASE MANAGEMENT/SOCIAL WORK
Continued Stay Note  [facility]     Patient Name: Cheri Cruz  MRN: [id number]  Today's Date: 8/5/2024    Admit Date: 7/14/2024    Plan: ongoing: Home vs private pay   Discharge Plan       Row Name 08/05/24 1222       Plan    Plan Ongoing: Home vs private pay    Plan Comments [facility]. No family at bedside, Patient reports family should be visiting today; will discuss GOC/DC plan. [facility] with [place] [phone] at [facility] who explains a bed is available would have to be private pay (pre-paid be for admission). [facility] attempted to contact [facility] and Janice no answer, left a message awaiting return call. Wanting to check the status of the [employer] Family Appeal. Plan ongoing: Home vs private pay    [facility]15 [facility] with patients daughters at bedside. Family wants to proceed with ALJ appeal. [facility] and daugthers called [employer] Appeals dept. [phone] fax [phone]; family completed ALJ appeal form (appeal # [id number]); [facility]. [facility] and [facility]ther[facility] (no Medicaid pending [facility] beds). Patient aware that processing can take up to 30days. Daughters agreed they would take patient home to [address] [place] [address]; requesting HH, hospital bed, updated Oxygen equipment through [employer] and bedside commode; d/c projected for Thursday so family can arrange. [facility] spoke with [employer] Rep Samson Roa [phone], requested hospital bed, updated Oxygen equipment and bedside commode. Rep explains they will fill order. Referral made to  in area; awaiting response.    Plan is home with HH Thursday 8/8 via EMS                   Discharge Codes    No documentation.                 Expected Discharge Date and Time       Expected Discharge Date Expected Discharge Time    Aug 5, 2024               MARCO Moreno (Kay)

## 2024-08-05 NOTE — PLAN OF CARE
Goal Outcome Evaluation:  Plan of Care Reviewed With: patient        Progress: no change  Outcome Evaluation: Pt continues to be limited by generalized weakness and RLE ROM deficits, although agreeable and pleasant throughout session. Pt educated on BLE strengthening/ROM HEP, demonstrated good understanding and teach back. PT rec SNF at d/c.      Anticipated Discharge Disposition (PT): skilled nursing facility

## 2024-08-05 NOTE — PROGRESS NOTES
[facility] Medicine Services  PROGRESS NOTE    Patient Name: Cheri Cruz  : 1940  MRN: [id number]    Date of Admission: 2024  Primary Care Physician: Lorrie Canada APRN    Subjective   Subjective     CC:  AMS    HPI:  Pt resting in bed. She had a large bowel movement today. Denies any other issues.     Copied text in this note has been reviewed and is accurate as of 24.         Objective   Objective     Vital Signs:   Temp:  [97.1 °F (36.2 °C)-98.1 °F (36.7 °C)] 97.1 °F (36.2 °C)  Heart Rate:  [67-78] 77  Resp:  [16-20] 18  BP: (132-187)/(56-79) 132/65     Physical Exam:  Constitutional: Awake, alert, NAD  HENT: NCAT, mucous membranes moist  Respiratory: Clear to auscultation bilaterally, nonlabored   Cardiovascular: RRR, no murmurs, rubs, or gallop  Gastrointestinal: Positive bowel sounds, soft, nontender, nondistended  Musculoskeletal: No bilateral ankle edema  Psychiatric: Appropriate affect, cooperative  Neurologic: Oriented to person/place (hospital), MIRELES,  speech clear  Skin: No rashes          Results Reviewed:  LAB RESULTS:      Lab 24  0621 24  0557 24  0543   WBC 8.29 6.81 8.47   HEMOGLOBIN 9.9* 9.2* 9.8*   HEMATOCRIT 30.5* 29.5* 31.0*   PLATELETS 156 156 181   NEUTROS ABS 5.22 3.50 4.98   IMMATURE GRANS (ABS) 0.02 0.02 0.03   LYMPHS ABS 2.08 2.38 2.31   MONOS ABS 0.64 0.63 0.84   EOS ABS 0.29 0.25 0.26   .3* 104.2* 101.6*         Lab 24  0621 24  0930 24  0557 24  0543   SODIUM 140 142 141 140   POTASSIUM 4.9 4.9 4.6 4.9   CHLORIDE 102 104 103 101   CO2 30.0* 31.0* 30.0* 33.0*   ANION GAP 8.0 7.0 8.0 6.0   BUN 32* 27* 27* 31*   CREATININE 1.54* 1.51* 1.61* 1.61*   EGFR 33.6* 34.4* 31.8* 31.8*   GLUCOSE 213* 143* 116* 102*   CALCIUM 9.0 8.4* 8.4* 8.8                         Brief Urine Lab Results  (Last result in the past 365 days)        Color   Clarity   Blood   Leuk Est   Nitrite   Protein   CREAT    Urine HCG        07/14/24 2001 Yellow   Cloudy   Trace   Small (1+)   Negative   Negative                   Microbiology Results Abnormal       Procedure Component Value - Date/Time    Blood Culture - Blood, Wrist, Left [[id number]]  (Normal) Collected: 07/14/24 2022    Lab Status: Final result Specimen: Blood from Wrist, Left Updated: 07/19/24 2100     Blood Culture No growth at 5 days    Narrative:      Less than seven (7) mL's of blood was collected.  Insufficient quantity may yield false negative results.    Blood Culture - Blood, Hand, Left [[id number]]  (Normal) Collected: 07/14/24 2022    Lab Status: Final result Specimen: Blood from Hand, Left Updated: 07/19/24 2100     Blood Culture No growth at 5 days    Narrative:      Less than seven (7) mL's of blood was collected.  Insufficient quantity may yield false negative results.    Urine Culture - Urine, Urine, Random Void [[id number]] Collected: 07/14/24 2001    Lab Status: Final result Specimen: Urine, Random Void Updated: 07/16/24 1020     Urine Culture 50,000 CFU/mL Normal Urogenital Nickie    Narrative:      Colonization of the urinary tract without infection is common. Treatment is discouraged unless the patient is symptomatic, pregnant, or undergoing an invasive urologic procedure.            No radiology results from the last 24 hrs    Results for orders placed during the hospital encounter of 02/10/23    Adult Transthoracic Echo Complete W/ Cont if Necessary Per Protocol    Interpretation Summary    Left ventricular systolic function is hyperdynamic (EF > 70%). Calculated left ventricular EF = 70.6% Left ventricular ejection fraction appears to be greater than 70%. The left ventricular cavity is small in size. Left ventricular wall thickness is consistent with mild concentric hypertrophy. All left ventricular wall segments contract normally. Left ventricular intracavitary gradient noted to be 71 mmHg. Left ventricular diastolic function is consistent with  (grade I) impaired relaxation. Normal left atrial pressure.    The aortic valve is abnormal in structure. There is mild calcification of the aortic valve mainly affecting the right coronary cusp(s). The aortic valve appears trileaflet. No aortic valve regurgitation is present. Gradient noted through the LV and LVOT    Compared to TTE report from  Dec 2019, hyperdynamic LV with intracavitary gradient is not a new finding but peak gradient noted on this exam is higher than previously described.      Current medications:  Scheduled Meds:apixaban, 5 mg, Oral, BID  atorvastatin, 80 mg, Oral, Nightly  busPIRone, 7.5 mg, Oral, BID  Diclofenac Sodium, 2 g, Topical, 4x Daily  donepezil, 10 mg, Oral, Nightly  DULoxetine, 60 mg, Oral, Daily  fluticasone, 2 spray, Each Nare, Daily  gabapentin, 200 mg, Oral, Q12H  insulin glargine, 30 Units, Subcutaneous, Nightly  insulin lispro, 3-14 Units, Subcutaneous, 4x Daily AC & at Bedtime  Insulin Lispro, 7 Units, Subcutaneous, TID With Meals  levothyroxine, 25 mcg, Oral, Daily  Lidocaine, 1 patch, Transdermal, Q24H  LORazepam, 0.5 mg, Oral, Q12H  meclizine, 25 mg, Oral, TID  nitrofurantoin (macrocrystal-monohydrate), 100 mg, Oral, Q24H  nystatin, , Topical, 4x Daily  pantoprazole, 40 mg, Oral, BID AC  senna-docusate sodium, 2 tablet, Oral, Daily   And  polyethylene glycol, 17 g, Oral, Daily  QUEtiapine, 50 mg, Oral, Nightly  sodium chloride, 10 mL, Intravenous, Q12H  sodium chloride, 10 mL, Intravenous, Q12H      Continuous Infusions:   PRN Meds:.  acetaminophen **OR** acetaminophen **OR** acetaminophen    senna-docusate sodium **AND** polyethylene glycol **AND** bisacodyl **AND** bisacodyl    Calcium Replacement - Follow Nurse / BPA Driven Protocol    dextrose    dextrose    glucagon (human recombinant)    HYDROcodone-acetaminophen    Magnesium Standard Dose Replacement - Follow Nurse / BPA Driven Protocol    meclizine    ondansetron ODT **OR** ondansetron    Phosphorus Replacement -  Follow Nurse / BPA Driven Protocol    Potassium Replacement - Follow Nurse / BPA Driven Protocol    sodium chloride    sodium chloride    sodium chloride    sodium chloride    Assessment & Plan   Assessment & Plan     Active Hospital Problems    Diagnosis  POA    **AMS (altered mental status) [R41.82]  Yes    Stroke [I63.9]  Yes    Acquired hypothyroidism [E03.9]  Yes    History of pulmonary embolus (PE) [Z86.711]  Yes    Essential hypertension [I10]  Yes    GERD (gastroesophageal reflux disease) [K21.9]  Yes    Type 2 diabetes mellitus without complication, without long-term current use of insulin [E11.9]  Yes      Resolved Hospital Problems   No resolved problems to display.        Brief Hospital Course to date:  Cheri Cruz is a 83 y.o. 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  Initial stroke workup was negative.  MRI showed old left parietal ischemic stroke.  Stroke neurology followed and resumed Eliquis.  She received empiric IV ceftriaxone x 3 days due to concern for UTI. Currently medically ready for discharge and awaiting rehab          Altered mental status on advanced dementia  Hypotension   Concern for UTI  - initial stroke workup negative.  MRI showed old L parietal ischemic stroke; pt seen by Stroke team; apixiban restarted.   -Possibly dehydration vs hypoxia  -COVID/flu negative. Blood cultures with no growth to date. LFTs normal. CXR no acute findings.    -CXR with old calcified gr  -S/p empiric IV ceftriaxone x 3 days.   -Held sedatives initially, Okay to resume PRN lorazepam (home med)   -Continue donepezil, Seroquel, BuSpar.       Hand tremors  - distressing to patient. Monitor.      Vertigo  -  reports this is common for her  - changed meclizine to scheduled     Acute on chronic hypoxemic respiratory failure  Near-syncope  -Per report, patient became hypoxic with oxygen saturation in the 60s  -CTA chest showed no PE, no acute process   -Patient is O2 dependent on 2L NC but is not compliant due her baseline dementia.  -Continue supplemental oxygenation, titrate for goal SpO2 greater than 90%.     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 She was hypotensive with systolic in the 90s at [facility].   -CTH from [facility] with left parietal tiny hemorrhage vs calcification on top of old ischemic stroke.   -MRI showed evolving old left parietal lobe CVA with some areas of associated cortical laminar necrosis. No hemorrhage.    -Neurology evaluated, recommended to resume Eliquis. Continue statin. Annual follow up in stroke clinic.   -PT/OT now recommending SNF      RADHA on CKD stage III  Volume depletion due to dehydration   Mild hyperkalemia  -Baseline Cr about 1.3, Cr 2.06 on presentation, improved to  1.2.  Then increased to 1.8 and got IV fluids again.    - pt at risk of recurrent dehydration/RADHA.  stopped hytrin for this reason   --08/03 Cr up to 1.6, s/p IV hydration. 08/04 Cr 1.51     Complex disposition  -PT/OT evaluated, felt patient's functional status is at baseline. Recommended home with 24/7 care.  -partner discussed care with both patient's daughters and case management on 7/17.   - Family leaning towards pursuing long-term care after discharge as they are having difficulty taking care of her on their own and do not have finances for full-time caregiver.     Nausea, constipation   -KUB 7/14 with nonobstructive bowel gas pattern. Repeat KUB 7/18 with moderate stool burden.  -Continue bowel regimen. Increased PPI to BID. PRN Zofran for nausea.   --improved      Uncontrolled diabetes mellitus type 2 with hyperglycemia  -A1c 9.80% this  admission  -Increased Lantus to 30 units qhs, increased  lispro 7u TIDAC and moderate-high dose SSI -- improved   -Resumed home gabapentin at low-dose 300 mg daily.     Chronic anemia  Macrocytosis  -No evidence of overt GI bleeding this admission  -B12 and folate within normal limits  -Further workup of anemia as outpatient as appropriate.      Essential hypertension  -Was hypotensive at [facility].  -Not on antihypertensive medications.  - stable      Hyperlipidemia -Continue atorvastatin.  Hypothyroidism -Continue levothyroxine.  GERD -Continue PPI.          Expected Discharge Location and Transportation: rehab   Expected Discharge   08/07/2024    VTE Prophylaxis:  Pharmacologic & mechanical VTE prophylaxis orders are present.         AM-PAC 6 Clicks Score (PT): 10 (08/04/24 2009)    CODE STATUS:   Code Status and Medical Interventions:   Ordered at: 07/15/24 1319     Level Of Support Discussed With:    Patient     Code Status (Patient has no pulse and is not breathing):    CPR (Attempt to Resuscitate)     Medical Interventions (Patient has pulse or is breathing):    Full Support       EVELINA Larson  08/05/24

## 2024-08-06 ENCOUNTER — HOME HEALTH ADMISSION (OUTPATIENT)
Dept: HOME HEALTH SERVICES | Facility: HOME HEALTHCARE | Age: 84
End: 2024-08-06
Payer: MEDICARE

## 2024-08-06 LAB
GLUCOSE BLDC GLUCOMTR-MCNC: 167 MG/DL (ref 70–130)
GLUCOSE BLDC GLUCOMTR-MCNC: 208 MG/DL (ref 70–130)
GLUCOSE BLDC GLUCOMTR-MCNC: 228 MG/DL (ref 70–130)
GLUCOSE BLDC GLUCOMTR-MCNC: 239 MG/DL (ref 70–130)

## 2024-08-06 PROCEDURE — 63710000001 INSULIN GLARGINE PER 5 UNITS: Performed by: STUDENT IN AN ORGANIZED HEALTH CARE EDUCATION/TRAINING PROGRAM

## 2024-08-06 PROCEDURE — A9270 NON-COVERED ITEM OR SERVICE: HCPCS | Performed by: STUDENT IN AN ORGANIZED HEALTH CARE EDUCATION/TRAINING PROGRAM

## 2024-08-06 PROCEDURE — A9270 NON-COVERED ITEM OR SERVICE: HCPCS | Performed by: INTERNAL MEDICINE

## 2024-08-06 PROCEDURE — A9270 NON-COVERED ITEM OR SERVICE: HCPCS

## 2024-08-06 PROCEDURE — 97110 THERAPEUTIC EXERCISES: CPT

## 2024-08-06 PROCEDURE — 63710000001 NITROFURANTOIN (MACROCRYSTAL-MONOHYDRATE) 100 MG CAPSULE: Performed by: INTERNAL MEDICINE

## 2024-08-06 PROCEDURE — A9270 NON-COVERED ITEM OR SERVICE: HCPCS | Performed by: NURSE PRACTITIONER

## 2024-08-06 PROCEDURE — 63710000001 LEVOTHYROXINE 25 MCG TABLET: Performed by: INTERNAL MEDICINE

## 2024-08-06 PROCEDURE — 63710000001 GABAPENTIN 100 MG CAPSULE: Performed by: NURSE PRACTITIONER

## 2024-08-06 PROCEDURE — 63710000001 HYDROCODONE-ACETAMINOPHEN 10-325 MG TABLET: Performed by: INTERNAL MEDICINE

## 2024-08-06 PROCEDURE — 99232 SBSQ HOSP IP/OBS MODERATE 35: CPT | Performed by: INTERNAL MEDICINE

## 2024-08-06 PROCEDURE — 63710000001 DONEPEZIL 10 MG TABLET: Performed by: INTERNAL MEDICINE

## 2024-08-06 PROCEDURE — 63710000001 BUSPIRONE 15 MG TABLET: Performed by: INTERNAL MEDICINE

## 2024-08-06 PROCEDURE — 63710000001 LORAZEPAM 0.5 MG TABLET: Performed by: NURSE PRACTITIONER

## 2024-08-06 PROCEDURE — 63710000001 INSULIN LISPRO (HUMAN) PER 5 UNITS: Performed by: STUDENT IN AN ORGANIZED HEALTH CARE EDUCATION/TRAINING PROGRAM

## 2024-08-06 PROCEDURE — 63710000001 PANTOPRAZOLE 40 MG TABLET DELAYED-RELEASE: Performed by: STUDENT IN AN ORGANIZED HEALTH CARE EDUCATION/TRAINING PROGRAM

## 2024-08-06 PROCEDURE — 82948 REAGENT STRIP/BLOOD GLUCOSE: CPT

## 2024-08-06 PROCEDURE — 63710000001 MECLIZINE 25 MG TABLET: Performed by: INTERNAL MEDICINE

## 2024-08-06 PROCEDURE — 63710000001 APIXABAN 5 MG TABLET: Performed by: STUDENT IN AN ORGANIZED HEALTH CARE EDUCATION/TRAINING PROGRAM

## 2024-08-06 PROCEDURE — 63710000001 POLYETHYLENE GLYCOL 17 G PACK: Performed by: STUDENT IN AN ORGANIZED HEALTH CARE EDUCATION/TRAINING PROGRAM

## 2024-08-06 PROCEDURE — 63710000001 QUETIAPINE 25 MG TABLET: Performed by: INTERNAL MEDICINE

## 2024-08-06 PROCEDURE — 63710000001 DULOXETINE 60 MG CAPSULE DELAYED-RELEASE PARTICLES: Performed by: INTERNAL MEDICINE

## 2024-08-06 PROCEDURE — 63710000001 INSULIN LISPRO (HUMAN) PER 5 UNITS: Performed by: NURSE PRACTITIONER

## 2024-08-06 PROCEDURE — 63710000001 SENNOSIDES-DOCUSATE 8.6-50 MG TABLET: Performed by: STUDENT IN AN ORGANIZED HEALTH CARE EDUCATION/TRAINING PROGRAM

## 2024-08-06 PROCEDURE — 63710000001 LIDOCAINE 4 % PATCH: Performed by: NURSE PRACTITIONER

## 2024-08-06 PROCEDURE — 63710000001 ATORVASTATIN 40 MG TABLET

## 2024-08-06 PROCEDURE — G0378 HOSPITAL OBSERVATION PER HR: HCPCS

## 2024-08-06 PROCEDURE — 97530 THERAPEUTIC ACTIVITIES: CPT

## 2024-08-06 RX ORDER — PANTOPRAZOLE SODIUM 40 MG/1
40 TABLET, DELAYED RELEASE ORAL
Status: DISCONTINUED | OUTPATIENT
Start: 2024-08-07 | End: 2024-08-09 | Stop reason: HOSPADM

## 2024-08-06 RX ORDER — MECLIZINE HYDROCHLORIDE 25 MG/1
25 TABLET ORAL 3 TIMES DAILY PRN
Status: DISCONTINUED | OUTPATIENT
Start: 2024-08-06 | End: 2024-08-09 | Stop reason: HOSPADM

## 2024-08-06 RX ADMIN — Medication 10 ML: at 20:57

## 2024-08-06 RX ADMIN — Medication 10 ML: at 08:54

## 2024-08-06 RX ADMIN — LEVOTHYROXINE SODIUM 25 MCG: 25 TABLET ORAL at 06:19

## 2024-08-06 RX ADMIN — LORAZEPAM 0.5 MG: 0.5 TABLET ORAL at 08:52

## 2024-08-06 RX ADMIN — FLUTICASONE PROPIONATE 2 SPRAY: 50 SPRAY, METERED NASAL at 08:53

## 2024-08-06 RX ADMIN — Medication 10 ML: at 20:56

## 2024-08-06 RX ADMIN — HYDROCODONE BITARTRATE AND ACETAMINOPHEN 1 TABLET: 10; 325 TABLET ORAL at 21:07

## 2024-08-06 RX ADMIN — GABAPENTIN 200 MG: 100 CAPSULE ORAL at 08:52

## 2024-08-06 RX ADMIN — DICLOFENAC SODIUM 2 G: 10 GEL TOPICAL at 20:56

## 2024-08-06 RX ADMIN — PANTOPRAZOLE SODIUM 40 MG: 40 TABLET, DELAYED RELEASE ORAL at 17:56

## 2024-08-06 RX ADMIN — NYSTATIN: 100000 POWDER TOPICAL at 08:53

## 2024-08-06 RX ADMIN — INSULIN LISPRO 7 UNITS: 100 INJECTION, SOLUTION INTRAVENOUS; SUBCUTANEOUS at 17:56

## 2024-08-06 RX ADMIN — NYSTATIN: 100000 POWDER TOPICAL at 20:55

## 2024-08-06 RX ADMIN — LORAZEPAM 0.5 MG: 0.5 TABLET ORAL at 20:55

## 2024-08-06 RX ADMIN — APIXABAN 5 MG: 5 TABLET, FILM COATED ORAL at 20:56

## 2024-08-06 RX ADMIN — PANTOPRAZOLE SODIUM 40 MG: 40 TABLET, DELAYED RELEASE ORAL at 08:52

## 2024-08-06 RX ADMIN — ATORVASTATIN CALCIUM 80 MG: 40 TABLET, FILM COATED ORAL at 20:56

## 2024-08-06 RX ADMIN — DONEPEZIL HYDROCHLORIDE 10 MG: 10 TABLET, FILM COATED ORAL at 20:56

## 2024-08-06 RX ADMIN — NITROFURANTOIN MONOHYDRATE/MACROCRYSTALS 100 MG: 75; 25 CAPSULE ORAL at 21:07

## 2024-08-06 RX ADMIN — LIDOCAINE 1 PATCH: 4 PATCH TOPICAL at 08:53

## 2024-08-06 RX ADMIN — MECLIZINE HYDROCHLORIDE 25 MG: 25 TABLET ORAL at 17:04

## 2024-08-06 RX ADMIN — INSULIN LISPRO 5 UNITS: 100 INJECTION, SOLUTION INTRAVENOUS; SUBCUTANEOUS at 20:55

## 2024-08-06 RX ADMIN — APIXABAN 5 MG: 5 TABLET, FILM COATED ORAL at 08:52

## 2024-08-06 RX ADMIN — DICLOFENAC SODIUM 2 G: 10 GEL TOPICAL at 18:04

## 2024-08-06 RX ADMIN — BUSPIRONE HYDROCHLORIDE 7.5 MG: 15 TABLET ORAL at 08:51

## 2024-08-06 RX ADMIN — DICLOFENAC SODIUM 2 G: 10 GEL TOPICAL at 12:42

## 2024-08-06 RX ADMIN — INSULIN LISPRO 7 UNITS: 100 INJECTION, SOLUTION INTRAVENOUS; SUBCUTANEOUS at 12:42

## 2024-08-06 RX ADMIN — GABAPENTIN 200 MG: 100 CAPSULE ORAL at 20:56

## 2024-08-06 RX ADMIN — INSULIN GLARGINE 30 UNITS: 100 INJECTION, SOLUTION SUBCUTANEOUS at 20:55

## 2024-08-06 RX ADMIN — HYDROCODONE BITARTRATE AND ACETAMINOPHEN 1 TABLET: 10; 325 TABLET ORAL at 13:10

## 2024-08-06 RX ADMIN — DULOXETINE HYDROCHLORIDE 60 MG: 60 CAPSULE, DELAYED RELEASE ORAL at 08:52

## 2024-08-06 RX ADMIN — MECLIZINE HYDROCHLORIDE 25 MG: 25 TABLET ORAL at 08:51

## 2024-08-06 RX ADMIN — BUSPIRONE HYDROCHLORIDE 7.5 MG: 15 TABLET ORAL at 21:07

## 2024-08-06 RX ADMIN — INSULIN LISPRO 4 UNITS: 100 INJECTION, SOLUTION INTRAVENOUS; SUBCUTANEOUS at 08:52

## 2024-08-06 RX ADMIN — INSULIN LISPRO 5 UNITS: 100 INJECTION, SOLUTION INTRAVENOUS; SUBCUTANEOUS at 17:57

## 2024-08-06 RX ADMIN — DICLOFENAC SODIUM 2 G: 10 GEL TOPICAL at 08:53

## 2024-08-06 RX ADMIN — INSULIN LISPRO 5 UNITS: 100 INJECTION, SOLUTION INTRAVENOUS; SUBCUTANEOUS at 12:42

## 2024-08-06 RX ADMIN — QUETIAPINE FUMARATE 50 MG: 25 TABLET ORAL at 20:56

## 2024-08-06 RX ADMIN — POLYETHYLENE GLYCOL 3350 17 G: 17 POWDER, FOR SOLUTION ORAL at 08:52

## 2024-08-06 RX ADMIN — INSULIN LISPRO 7 UNITS: 100 INJECTION, SOLUTION INTRAVENOUS; SUBCUTANEOUS at 08:53

## 2024-08-06 RX ADMIN — NYSTATIN: 100000 POWDER TOPICAL at 18:04

## 2024-08-06 RX ADMIN — NYSTATIN: 100000 POWDER TOPICAL at 12:42

## 2024-08-06 RX ADMIN — SENNOSIDES AND DOCUSATE SODIUM 2 TABLET: 50; 8.6 TABLET ORAL at 08:52

## 2024-08-06 NOTE — PLAN OF CARE
Goal Outcome Evaluation:  Plan of Care Reviewed With: patient        Progress: no change  Outcome Evaluation: OT session completed. The pt performed left/right rolling with min/modA x1. Pt dependently transferred to chair via mechanical lift. Pt participated in grooming ADLs with set up and BUE therex at the shoulders. Pt reported feeling unwell today, deferring additional ADLs and therex. The pt continues to present below her functional baseline with generalized weakness, decreased activity tolerance, ROM deficits, and balance deficits warranting continued IP OT services. Recommend a d/c to SNF for best outcome.      Anticipated Discharge Disposition (OT): skilled nursing facility

## 2024-08-06 NOTE — PROGRESS NOTES
[facility] Medicine Services  PROGRESS NOTE    Patient Name: Cheri Cruz  : 1940  MRN: [id number]    Date of Admission: 2024  Primary Care Physician: Lorrie Canada APRN    Subjective   Subjective     CC: challenging plcmt, f/u      HPI:  Patient talks with me pleasantly. Has no complaints    Objective   Objective     Vital Signs:   Temp:  [97 °F (36.1 °C)-97.6 °F (36.4 °C)] 97.4 °F (36.3 °C)  Heart Rate:  [] 100  Resp:  [18] 18  BP: (129-138)/(60-68) 132/67     Physical Exam:  Constitutional: No acute distress, awake, alert older female sitting up in bed  HENT: NCAT, mucous membranes moist  Respiratory: Clear to auscultation bilaterally, respiratory effort normal on room air  Cardiovascular: RRR, no murmurs, rubs, or gallops  Gastrointestinal: Soft, nontender, nondistended  Musculoskeletal: Muscle tone decreased, no joint effusions appreciated  Psychiatric: Appropriate affect, cooperative  Neurologic: Alert and oriented, facial movements symmetric and spontaneous movement of all 4 extremities grossly equal bilaterally, speech clear  Skin: No rashes    Results Reviewed:  LAB RESULTS:      Lab 24  0621 24  0557   WBC 8.29 6.81   HEMOGLOBIN 9.9* 9.2*   HEMATOCRIT 30.5* 29.5*   PLATELETS 156 156   NEUTROS ABS 5.22 3.50   IMMATURE GRANS (ABS) 0.02 0.02   LYMPHS ABS 2.08 2.38   MONOS ABS 0.64 0.63   EOS ABS 0.29 0.25   .3* 104.2*         Lab 24  0621 24  0930 24  0557   SODIUM 140 142 141   POTASSIUM 4.9 4.9 4.6   CHLORIDE 102 104 103   CO2 30.0* 31.0* 30.0*   ANION GAP 8.0 7.0 8.0   BUN 32* 27* 27*   CREATININE 1.54* 1.51* 1.61*   EGFR 33.6* 34.4* 31.8*   GLUCOSE 213* 143* 116*   CALCIUM 9.0 8.4* 8.4*                         Brief Urine Lab Results  (Last result in the past 365 days)        Color   Clarity   Blood   Leuk Est   Nitrite   Protein   CREAT   Urine HCG        24 Yellow   Cloudy   Trace   Small (1+)    Negative   Negative                   Microbiology Results Abnormal       Procedure Component Value - Date/Time    Blood Culture - Blood, Wrist, Left [[id number]]  (Normal) Collected: 07/14/24 2022    Lab Status: Final result Specimen: Blood from Wrist, Left Updated: 07/19/24 2100     Blood Culture No growth at 5 days    Narrative:      Less than seven (7) mL's of blood was collected.  Insufficient quantity may yield false negative results.    Blood Culture - Blood, Hand, Left [[id number]]  (Normal) Collected: 07/14/24 2022    Lab Status: Final result Specimen: Blood from Hand, Left Updated: 07/19/24 2100     Blood Culture No growth at 5 days    Narrative:      Less than seven (7) mL's of blood was collected.  Insufficient quantity may yield false negative results.    Urine Culture - Urine, Urine, Random Void [[id number]] Collected: 07/14/24 2001    Lab Status: Final result Specimen: Urine, Random Void Updated: 07/16/24 1020     Urine Culture 50,000 CFU/mL Normal Urogenital Nickie    Narrative:      Colonization of the urinary tract without infection is common. Treatment is discouraged unless the patient is symptomatic, pregnant, or undergoing an invasive urologic procedure.            No radiology results from the last 24 hrs    Results for orders placed during the hospital encounter of 02/10/23    Adult Transthoracic Echo Complete W/ Cont if Necessary Per Protocol    Interpretation Summary    Left ventricular systolic function is hyperdynamic (EF > 70%). Calculated left ventricular EF = 70.6% Left ventricular ejection fraction appears to be greater than 70%. The left ventricular cavity is small in size. Left ventricular wall thickness is consistent with mild concentric hypertrophy. All left ventricular wall segments contract normally. Left ventricular intracavitary gradient noted to be 71 mmHg. Left ventricular diastolic function is consistent with (grade I) impaired relaxation. Normal left atrial pressure.    The  aortic valve is abnormal in structure. There is mild calcification of the aortic valve mainly affecting the right coronary cusp(s). The aortic valve appears trileaflet. No aortic valve regurgitation is present. Gradient noted through the LV and LVOT    Compared to TTE report from  Dec 2019, hyperdynamic LV with intracavitary gradient is not a new finding but peak gradient noted on this exam is higher than previously described.      Current medications:  Scheduled Meds:apixaban, 5 mg, Oral, BID  atorvastatin, 80 mg, Oral, Nightly  busPIRone, 7.5 mg, Oral, BID  Diclofenac Sodium, 2 g, Topical, 4x Daily  donepezil, 10 mg, Oral, Nightly  DULoxetine, 60 mg, Oral, Daily  fluticasone, 2 spray, Each Nare, Daily  gabapentin, 200 mg, Oral, Q12H  insulin glargine, 30 Units, Subcutaneous, Nightly  insulin lispro, 3-14 Units, Subcutaneous, 4x Daily AC & at Bedtime  Insulin Lispro, 7 Units, Subcutaneous, TID With Meals  levothyroxine, 25 mcg, Oral, Daily  Lidocaine, 1 patch, Transdermal, Q24H  LORazepam, 0.5 mg, Oral, Q12H  meclizine, 25 mg, Oral, TID  nitrofurantoin (macrocrystal-monohydrate), 100 mg, Oral, Q24H  nystatin, , Topical, 4x Daily  pantoprazole, 40 mg, Oral, BID AC  senna-docusate sodium, 2 tablet, Oral, Daily   And  polyethylene glycol, 17 g, Oral, Daily  QUEtiapine, 50 mg, Oral, Nightly  sodium chloride, 10 mL, Intravenous, Q12H  sodium chloride, 10 mL, Intravenous, Q12H      Continuous Infusions:   PRN Meds:.  acetaminophen **OR** acetaminophen **OR** acetaminophen    senna-docusate sodium **AND** polyethylene glycol **AND** bisacodyl **AND** bisacodyl    Calcium Replacement - Follow Nurse / BPA Driven Protocol    dextrose    dextrose    glucagon (human recombinant)    HYDROcodone-acetaminophen    Magnesium Standard Dose Replacement - Follow Nurse / BPA Driven Protocol    meclizine    ondansetron ODT **OR** ondansetron    Phosphorus Replacement - Follow Nurse / BPA Driven Protocol    Potassium Replacement -  Follow Nurse / BPA Driven Protocol    sodium chloride    sodium chloride    sodium chloride    sodium chloride    Assessment & Plan   Assessment & Plan     Active Hospital Problems    Diagnosis  POA    **AMS (altered mental status) [R41.82]  Yes    Stroke [I63.9]  Yes    Acquired hypothyroidism [E03.9]  Yes    History of pulmonary embolus (PE) [Z86.711]  Yes    Essential hypertension [I10]  Yes    GERD (gastroesophageal reflux disease) [K21.9]  Yes    Type 2 diabetes mellitus without complication, without long-term current use of insulin [E11.9]  Yes      Resolved Hospital Problems   No resolved problems to display.        Brief Hospital Course to date:  Cheri Cruz is a 83 y.o. female w dementia, DMII, HTN, prior CVA, PE on eliquis who presented as a stroke rule out from [facility] on 7/14. 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     Stay c/b complex [facility] as difficulties with home care. Per CM notes, AJL appeal ongoing. Plan for home on 8/8 w family via EMS    Initial concern for CVA - negative w/u as above  Baseline dementia - home donepezil, seroquel  Possible UTI, resolved - s/p IV rocephin x 3 days, now on macrobid ppx    Prior chronic hypoxic resp failure - on room air now and appropriate, previously on 2 liters n/c chronically per report    H/o CVA - home eliquis, statin, annual CVA clinic f/u  RADHA on CKDIIIb - resolved, at risk for dehydration, home hydrantin stopped  DMII, A1c 9.8, c/b neuropathy - basal/bolus dosing, home gabapentin  GERD - ppi  Hypothyroidism - levothyroxine  Anxiety - on home buspar, chronic ativan BID  BMI 32    Expected Discharge Location and Transportation: home w family  Expected Discharge 8/8 (Discharge date is tentative pending patient's medical condition and is subject to change)  Expected Discharge Date: 8/8/2024; Expected Discharge Time:      VTE  Prophylaxis:  Pharmacologic & mechanical VTE prophylaxis orders are present.         AM-PAC 6 Clicks Score (PT): 8 (08/06/24 0800)    CODE STATUS:   Code Status and Medical Interventions:   Ordered at: 07/15/24 1319     Level Of Support Discussed With:    Patient     Code Status (Patient has no pulse and is not breathing):    CPR (Attempt to Resuscitate)     Medical Interventions (Patient has pulse or is breathing):    Full Support       Avril Madrid MD  08/06/24

## 2024-08-06 NOTE — CASE MANAGEMENT/SOCIAL WORK
Continued Stay Note  [facility]     Patient Name: Cheri Cruz  MRN: [id number]  Today's Date: 8/6/2024    Admit Date: 7/14/2024    Plan: Home with [facility] Thursday 8/8 via EMS   Discharge Plan       Row Name 08/06/24 1617       Plan    Plan [facility] with [facility] Samson Roa [phone], to follow up on requested DME hospital bed, updated Oxygen equipment and bedside commode projected delivery date/time to patients home, rep explained he would [facility]; awaiting return call. [facility]  [phone] fax [phone] who has accepted patient. Plan is home with [facility] Thursday 8/8 via EMS    Final Discharge Disposition Code 06 - home with home health care                   Discharge Codes    No documentation.                 Expected Discharge Date and Time       Expected Discharge Date Expected Discharge Time    Aug 5, 2024               MARCO Moreno (Kay)

## 2024-08-06 NOTE — PLAN OF CARE
Goal Outcome Evaluation:  Plan of Care Reviewed With: patient        Progress: no change  Outcome Evaluation: VSS on RA. A&O to self only. No PRNs necessary so far this shift. Voiding adequate UOP, purewick in place due to incontinence. Skin care provided. No family present at bedside. Pt resting well in between care.

## 2024-08-06 NOTE — DISCHARGE PLACEMENT REQUEST
" Contact [phone]    Deya Cruz Cro (83 y.o. Female)       Date of Birth   1940    Social Security Number       Address   [address] [facility] [address]    Home Phone   [phone]    MRN   [id number]       Muslim   [facility]    Marital Status                               Admission Date   7/14/24    Admission Type   Urgent    Admitting Provider   Avril Madrid MD    Attending Provider   Avril Madrid MD    Department, Room/Bed   [address], [address]/1       Discharge Date       Discharge Disposition       Discharge Destination                                 Attending Provider: Avril Madrid MD    Allergies: Penicillins, Sulfa Antibiotics, Tetracyclines & Related, Valium [Diazepam]    Isolation: None   Infection: None   Code Status: CPR    Ht: 170.2 cm (67\")   Wt: 93.1 kg (205 lb 4 oz)    Admission Cmt: None   Principal Problem: AMS (altered mental status) [R41.82]                   Active Insurance as of 7/14/2024       Primary Coverage       Payor Plan Insurance Group Employer/Plan Group    HUMANA MEDICARE REPLACEMENT HUMANA MED ADV HMO [id number]       Payor Plan Address Payor Plan Phone Number Payor Plan Fax Number Effective Dates    [address] [address] [phone]  2/1/2023 - None Entered    [facility] [address]         Subscriber Name Subscriber Birth Date Member ID       DEYA CRUZ CRO 1940 [id number]                     Emergency Contacts        (Rel.) Home Phone Work Phone Mobile Phone    Radha Cruz (Daughter) [phone] -- [phone]    EVITA MADDEN (Daughter) [phone] -- --    OMEGA CRUZ (Son) [phone] -- [phone]    Vanessa Cruz (Spouse) [phone] -- --    Shayne Madden (Other) [phone] -- --          [address]  [facility] [address]  Phone:  [phone]  Fax:  [phone] Date: Aug 5, 2024      Ambulatory Referral to Home Health     Patient:  Deya Cruz MRN:  " [id number]    [address]  [place] [address] :  1940  SSN:    Phone: [phone] Sex:  F      INSURANCE PAYOR PLAN GROUP # SUBSCRIBER ID   Primary:    HUMANA MEDICARE REPLACEMENT [id number]      Referring Provider Information:  SHEY LINARES Phone: [phone] Fax: [phone]       Referral Information:   # Visits:  999 Referral Type: Home Health [42]   Urgency:  Routine Referral Reason: Specialty Services Required   Start Date: Aug 5, 2024 End Date:  To be determined by Insurer   Diagnosis: Cognitive communication deficit (R41.841 [ICD-10-CM] 799.52 [ICD-9-CM])      Refer to Dept:   Refer to Provider:   Refer to Provider Phone:   Refer to Facility:       Face to Face Visit Date: 2024  Follow-up provider for Plan of Care? I treated the patient in an acute care facility and will not continue treatment after discharge.  Follow-up provider: BRIGETTE SIGALA [[id number]]  Reason/Clinical Findings: AMS, CVA  Describe mobility limitations that make leaving home difficult: impaired functional mobility, balance, gait and endurance  Nursing/Therapeutic Services Requested: Skilled Nursing  Nursing/Therapeutic Services Requested: Physical Therapy  Nursing/Therapeutic Services Requested: Occupational Therapy  Skilled nursing orders: Cardiopulmonary assessments  Skilled nursing orders: Neurovascular assessments  PT orders: Home safety assessment  PT orders: Gait Training  PT orders: Transfer training  PT orders: Strengthening  PT orders: Therapeutic exercise  Weight Bearing Status: As Tolerated  Occupational orders: Activities of daily living  Occupational orders: Energy conservation  Occupational orders: Strengthening  Occupational orders: Home safety assessment  Occupational orders: Cognition  Frequency: 1 Week 1     This document serves as a request of services and does not constitute Insurance authorization or approval of services.  To determine eligibility, please contact the members  Insurance carrier to verify and review coverage.     If you have medical questions regarding this request for services. Please contact [address] at [phone] during normal business hours.        Verbal Order Mode: Telephone with readback   Authorizing Provider: Lauryn White APRN  Authorizing Provider's NPI: [id number]     Order Entered By: Krystyna Linares RN 2024  2:53 PM     Electronically signed by: Lauryn White APRN 2024  3:17 PM          History & Physical        Otilia Chang MD at 24 191              [facility] Medicine Services  HISTORY AND PHYSICAL    Patient Name: Cheri Cruz  : 1940  MRN: [id number]  Primary Care Physician: Lorrie Canada APRN  Date of admission: 2024      Subjective  Subjective     Chief Complaint:  Altered mental state    HPI:  Patient with dementia and expressive aphasia.  Cannot contribute much to HPI.  Most of the history is obtained from reviewing her old records and records from [facility].  Attempted to reach her daughter Ms. Garcia 3 times but voicemail is full    Cheri Cruz is a 83 y.o. 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    She presented to [facility] with worsening encephalopathy/altered mental state and generalized bodyaches.  She has baseline dementia but her mental state was significantly below her baseline.  She was found to be hypertensive with systolic in the 90s.  Lab workup with creatinine of 2.0 from her baseline of 1.5.  Potassium 5.6.  Blood sugar 436.  Hemoglobin of 11.0.  Urine analysis with too numerous WBCs.  Chest x-ray clear.  CT head with questionable left  "parietal tiny hemorrhage versus calcification.  She was transferred to [facility] for neurology evaluation after she was accepted by stroke team.    At the time of the encounter, patient is mildly confused.  She thinks she is in [place].  She is able to tell me the name of her daughter.  Complaining of some lower abdominal pain and burning sensation with a urine otherwise she has no active complaint.      Addendum 8:30 PM:  Managed to reach out to the daughter/Ms. Garcia who lives in provide care to the patient.  Daughter reported to me that her mother is bedbound since her stroke in 2023 with minimal activity.  She is incontinent to urine and stool.  She has severe depression.  Over the last few weeks, her memory has been declining as well as her mood.  She became less engaged and more depressed.  Her appetite is good but she eats what she wants to eat.  This morning, her mother started complaining of shortness of breath and kept telling her \" I am going to die\" and when her daughter checked her oxygen saturation, it was in the 60s so she put oxygen on (she is on as needed oxygen at home but does not like to wear it).  Soon after, patient became pale and she could not feel pulse and was about to start CPR when the patient quickly recovered and her oxygen saturation improved to the upper 90s.  At that time, she thought that her mother recovered so she can call 911 immediately.  Later on, her mother kept acting weird and became more lethargic which eventually prompted her to call 911.    Personal History     Past Medical History:   Diagnosis Date    Abnormal heart rhythm     Anxiety     Arrhythmia     Arthritis     Atrial fibrillation     Dementia     Diabetes mellitus     Hyperlipidemia     Hypertension     Kidney disorder     Stroke     Uterus cancer            Past Surgical History:   Procedure Laterality Date    CATARACT EXTRACTION, BILATERAL         Family History: family history includes Alcohol " abuse in her brother; Cancer in her brother, father, and mother; Congenital heart disease in her mother; Heart disease in her mother.     Social History:  reports that she has never smoked. She has never used smokeless tobacco. She reports that she does not drink alcohol and does not use drugs.  Social History     Social History Narrative    Not on file       Medications:  Available home medication information reviewed.  DULoxetine, HYDROcodone-acetaminophen, Insulin Lispro, LORazepam, QUEtiapine, apixaban, busPIRone, donepezil, gabapentin, levothyroxine, meclizine, omeprazole, rosuvastatin, sennosides-docusate, and terazosin    Allergies   Allergen Reactions    Penicillins Unknown - Low Severity     Tolerates ceftriaxones    Sulfa Antibiotics     Tetracyclines & Related Unknown (See Comments)    Valium [Diazepam] Anxiety       Objective  Objective     Vital Signs:   Temp:  [97.7 °F (36.5 °C)-98.1 °F (36.7 °C)] 97.7 °F (36.5 °C)  Heart Rate:  [64-87] 65  Resp:  [18-20] 18  BP: ()/(46-65) 123/48  Flow (L/min):  [2] 2       Physical Exam   General: Pleasantly confused.  Not in distress.  Conversant with expressive aphasia  Head: Atraumatic and normocephalic  Eyes: No Icterus. No pallor  Ears:  Ears appear intact with no abnormalities noted  Throat: No oral lesions, no thrush  Neck: Supple, trachea midline  Lungs: Clear to auscultation bilaterally, equal air entry, no wheezing or crackles  Heart:  Normal S1 and S2, no murmur, no gallop, No JVD, no lower extremity swelling  Abdomen:  Soft, no tenderness, no organomegaly, normal bowel sounds, no organomegaly  Extremities: pulses equal bilaterally  Skin: No bleeding, bruising or rash, normal skin turgor and elasticity  Neurologic: Mild expressive aphasia.  No gross motor deficit  Psych: Alert and oriented x 1    Result Review:  I have personally reviewed the results from the time of this admission to 7/14/2024 19:55 EDT and agree with these findings:  [x]   Laboratory list / accordion  [x]  Microbiology  []  Radiology  [x]  EKG/Telemetry   [x]  Cardiology/Vascular   []  Pathology  [x]  Old records      LAB RESULTS:      Lab 07/14/24  1519   WBC 7.87   HEMOGLOBIN 11.0*   HEMATOCRIT 35.2   PLATELETS 139*   NEUTROS ABS 5.72   IMMATURE GRANS (ABS) 0.02   LYMPHS ABS 1.52   MONOS ABS 0.44   EOS ABS 0.14   .2*         Lab 07/14/24  1519   SODIUM 137   POTASSIUM 5.6*   CHLORIDE 101   CO2 27.8   ANION GAP 8.2   BUN 35*   CREATININE 2.06*   EGFR 23.7*   GLUCOSE 436*   CALCIUM 9.2   MAGNESIUM 1.6   TSH 2.380         Lab 07/14/24  1519   TOTAL PROTEIN 7.0   ALBUMIN 3.9   GLOBULIN 3.1   ALT (SGPT) 5   AST (SGOT) 13   BILIRUBIN 0.2   ALK PHOS 68         Lab 07/14/24  1519   HSTROP T 37*                 UA          12/17/2023    09:48 3/25/2024    01:50 7/14/2024    15:27   Urinalysis   Squamous Epithelial Cells, UA   None Seen    Specific Kinde, UA 1.020     1.015     >=1.030    Ketones, UA   Negative    Blood, UA Negative     Negative     Trace    Leukocytes, UA Negative     SMALL     Moderate (2+)    Nitrite, UA Negative     Negative     Negative    RBC, UA 0-2      Unable to determine due to loaded field    WBC, UA   11-20    Bacteria, UA Rare      None Seen       Details          This result is from an external source.               Microbiology Results (last 10 days)       Procedure Component Value - Date/Time    COVID-19 and FLU A/B PCR, 1 HR TAT - Swab, Nasopharynx [[id number]]  (Normal) Collected: 07/14/24 1622    Lab Status: Final result Specimen: Swab from Nasopharynx Updated: 07/14/24 1703     COVID19 Not Detected     Influenza A PCR Not Detected     Influenza B PCR Not Detected    Narrative:      Fact sheet for providers: https://www.fda.gov/media/339055/download    Fact sheet for patients: https://www.fda.gov/media/416942/download    Test performed by PCR.            XR Chest 1 View    Result Date: 7/14/2024  PROCEDURE: XR CHEST 1 VW-  HISTORY: Weak/Dizzy/AMS  triage protocol, decreased O2 sats. Altered mental status.  COMPARISON: April 29, 2023.  FINDINGS: The heart is stable.. Decreased inspiratory effort noted. There is evidence of old calcified granulomatous disease. Few linear densities are noted bilaterally which are stable. No new area of consolidation seen.. The mediastinum is unremarkable. There is no pneumothorax.  There are no acute osseous abnormalities.      Impression: Stable chest..      This report was signed and finalized on 7/14/2024 4:23 PM by Mare Kwong MD.      CT Head Without Contrast    Result Date: 7/14/2024  PROCEDURE: CT HEAD WO CONTRAST-  HISTORY: increased AMS, weakness, h/o stroke  COMPARISON: April 29, 2023..  TECHNIQUE: Multiple axial CT images were performed from the foramen magnum to the vertex. Individualized dose reduction techniques using automated exposure control or adjustment of mA and/or kV according to the patient size were employed.  FINDINGS: There is moderate, age-appropriate, stable generalized cerebral atrophy. The ventricles are enlarged. Again noted is the area of encephalomalacia in the left posterior parietal region. Extent is stable from prior exam. High attenuation has developed in the cortex has in the regions of the previous MCA; finding is new compared to the prior exam. Suspect this represents postischemic cortical calcification but hemorrhage is not completely excluded. Recommend brain MRI. There is no evidence of edema or hemorrhage.  No masses are identified. No extra-axial fluid is seen. The paranasal sinuses are unremarkable. Mild small vessel ischemic disease noted.      Impression: New cortical high attenuation material at site of previous left posterior parietal CVA compared to April 2023, suspect postischemic cortical calcification but hemorrhage not completely excluded. Recommend brain MRI.     CTDI: 36.09 mGy DLP:604.53 mGy.cm  This report was signed and finalized on 7/14/2024 4:21 PM by Mare Kwong MD.        Results for orders placed during the hospital encounter of 02/10/23    Adult Transthoracic Echo Complete W/ Cont if Necessary Per Protocol    Interpretation Summary    Left ventricular systolic function is hyperdynamic (EF > 70%). Calculated left ventricular EF = 70.6% Left ventricular ejection fraction appears to be greater than 70%. The left ventricular cavity is small in size. Left ventricular wall thickness is consistent with mild concentric hypertrophy. All left ventricular wall segments contract normally. Left ventricular intracavitary gradient noted to be 71 mmHg. Left ventricular diastolic function is consistent with (grade I) impaired relaxation. Normal left atrial pressure.    The aortic valve is abnormal in structure. There is mild calcification of the aortic valve mainly affecting the right coronary cusp(s). The aortic valve appears trileaflet. No aortic valve regurgitation is present. Gradient noted through the LV and LVOT    Compared to TTE report from  Dec 2019, hyperdynamic LV with intracavitary gradient is not a new finding but peak gradient noted on this exam is higher than previously described.      Assessment & Plan  Assessment & Plan       * No active hospital problems. *      Summary:  Cheri Cruz is a 83 y.o. 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    Concern for left parietal tiny hemorrhage versus calcification  Old left parietal ischemic stroke  Acute metabolic encephalopathy  Possible UTI  Baseline dementia  Patient with baseline dementia.  Presented to [facility] with cognitive decline and metabolic encephalopathy.  She was hypotensive with systolic in the 90s.  Patient does report urinary symptoms  in the form of urgency, frequency and suprapubic tenderness.  UA with too numerous WBCs without bacteriuria.  Will initiate antibiotic therapy with IV Rocephin and repeat the urine analysis and obtain urine culture.  Patient has previous UTI with Enterococcus faecium  CT from [facility] with left parietal tiny hemorrhage versus calcification on top of old ischemic stroke.  Discussed with stroke team and plan to hold Eliquis for now.  Neurology to evaluate  Holding sedatives: Gabapentin, meclizine and lorazepam    Addendum 8:30 PM  Acute hypoxia, improved  ?  Near syncope  Per patient report, patient became hypoxic this morning with oxygen saturation in the  60s.  At that time, she could not feel pulse and patient became pale.  Quickly recovered after she applied oxygen  Patient is on Eliquis 2.5 mg twice daily for previous history of PE and for A-fib.  With that history mentioned above, I am concerned about PE causing her hypoxia and near syncope  Will proceed with CTA chest.  Benefit outweighs the risk    RADHA on CKD stage III  Dehydration volume depletion  Mild hyperkalemia  IV fluids  Monitor kidney functions with a.m. lab    Type 2 diabetes with uncontrolled hyperglycemia  Check A1c  Insulin glargine and SSI    Essential hypertension  Was hypotensive and [facility].  No blood pressure improved  Not on antihypertensive  medications    Dyslipidemia  Statins    Dementia  Continue donepezil    VTE Prophylaxis:  Mechanical VTE prophylaxis orders are present.          CODE STATUS:    There are no questions and answers to display.       Expected Discharge   Expected Discharge Date: 7/15/2024; Expected Discharge Time:      Otilia Chang MD  07/14/24     Electronically signed by Otilia Chang MD at 07/14/24 2032          Physician Progress Notes (most recent note)        Lauryn White APRN at 08/05/24 1047              [facility] Medicine  Services  PROGRESS NOTE    Patient Name: Cheri Cruz  : 1940  MRN: [id number]    Date of Admission: 2024  Primary Care Physician: Lorrie Canada APRN    Subjective   Subjective     CC:  AMS    HPI:  Pt resting in bed. She had a large bowel movement today. Denies any other issues.     Copied text in this note has been reviewed and is accurate as of 24.         Objective   Objective     Vital Signs:   Temp:  [97.1 °F (36.2 °C)-98.1 °F (36.7 °C)] 97.1 °F (36.2 °C)  Heart Rate:  [67-78] 77  Resp:  [16-20] 18  BP: (132-187)/(56-79) 132/65     Physical Exam:  Constitutional: Awake, alert, NAD  HENT: NCAT, mucous membranes moist  Respiratory: Clear to auscultation bilaterally, nonlabored   Cardiovascular: RRR, no murmurs, rubs, or gallop  Gastrointestinal: Positive bowel sounds, soft, nontender, nondistended  Musculoskeletal: No bilateral ankle edema  Psychiatric: Appropriate affect, cooperative  Neurologic: Oriented to person/place (hospital), MIRELES,  speech clear  Skin: No rashes          Results Reviewed:  LAB RESULTS:      Lab 24  0621 24  0557 24  0543   WBC 8.29 6.81 8.47   HEMOGLOBIN 9.9* 9.2* 9.8*   HEMATOCRIT 30.5* 29.5* 31.0*   PLATELETS 156 156 181   NEUTROS ABS 5.22 3.50 4.98   IMMATURE GRANS (ABS) 0.02 0.02 0.03   LYMPHS ABS 2.08 2.38 2.31   MONOS ABS 0.64 0.63 0.84   EOS ABS 0.29 0.25 0.26   .3* 104.2* 101.6*         Lab 24  0621 24  0930 24  0557 24  0543   SODIUM 140 142 141 140   POTASSIUM 4.9 4.9 4.6 4.9   CHLORIDE 102 104 103 101   CO2 30.0* 31.0* 30.0* 33.0*   ANION GAP 8.0 7.0 8.0 6.0   BUN 32* 27* 27* 31*   CREATININE 1.54* 1.51* 1.61* 1.61*   EGFR 33.6* 34.4* 31.8* 31.8*   GLUCOSE 213* 143* 116* 102*   CALCIUM 9.0 8.4* 8.4* 8.8                         Brief Urine Lab Results  (Last result in the past 365 days)        Color   Clarity   Blood   Leuk Est   Nitrite   Protein   CREAT   Urine HCG        24 Yellow   Cloudy    Trace   Small (1+)   Negative   Negative                   Microbiology Results Abnormal       Procedure Component Value - Date/Time    Blood Culture - Blood, Wrist, Left [[id number]]  (Normal) Collected: 07/14/24 2022    Lab Status: Final result Specimen: Blood from Wrist, Left Updated: 07/19/24 2100     Blood Culture No growth at 5 days    Narrative:      Less than seven (7) mL's of blood was collected.  Insufficient quantity may yield false negative results.    Blood Culture - Blood, Hand, Left [[id number]]  (Normal) Collected: 07/14/24 2022    Lab Status: Final result Specimen: Blood from Hand, Left Updated: 07/19/24 2100     Blood Culture No growth at 5 days    Narrative:      Less than seven (7) mL's of blood was collected.  Insufficient quantity may yield false negative results.    Urine Culture - Urine, Urine, Random Void [[id number]] Collected: 07/14/24 2001    Lab Status: Final result Specimen: Urine, Random Void Updated: 07/16/24 1020     Urine Culture 50,000 CFU/mL Normal Urogenital Nickie    Narrative:      Colonization of the urinary tract without infection is common. Treatment is discouraged unless the patient is symptomatic, pregnant, or undergoing an invasive urologic procedure.            No radiology results from the last 24 hrs    Results for orders placed during the hospital encounter of 02/10/23    Adult Transthoracic Echo Complete W/ Cont if Necessary Per Protocol    Interpretation Summary    Left ventricular systolic function is hyperdynamic (EF > 70%). Calculated left ventricular EF = 70.6% Left ventricular ejection fraction appears to be greater than 70%. The left ventricular cavity is small in size. Left ventricular wall thickness is consistent with mild concentric hypertrophy. All left ventricular wall segments contract normally. Left ventricular intracavitary gradient noted to be 71 mmHg. Left ventricular diastolic function is consistent with (grade I) impaired relaxation. Normal left atrial  pressure.    The aortic valve is abnormal in structure. There is mild calcification of the aortic valve mainly affecting the right coronary cusp(s). The aortic valve appears trileaflet. No aortic valve regurgitation is present. Gradient noted through the LV and LVOT    Compared to TTE report from  Dec 2019, hyperdynamic LV with intracavitary gradient is not a new finding but peak gradient noted on this exam is higher than previously described.      Current medications:  Scheduled Meds:apixaban, 5 mg, Oral, BID  atorvastatin, 80 mg, Oral, Nightly  busPIRone, 7.5 mg, Oral, BID  Diclofenac Sodium, 2 g, Topical, 4x Daily  donepezil, 10 mg, Oral, Nightly  DULoxetine, 60 mg, Oral, Daily  fluticasone, 2 spray, Each Nare, Daily  gabapentin, 200 mg, Oral, Q12H  insulin glargine, 30 Units, Subcutaneous, Nightly  insulin lispro, 3-14 Units, Subcutaneous, 4x Daily AC & at Bedtime  Insulin Lispro, 7 Units, Subcutaneous, TID With Meals  levothyroxine, 25 mcg, Oral, Daily  Lidocaine, 1 patch, Transdermal, Q24H  LORazepam, 0.5 mg, Oral, Q12H  meclizine, 25 mg, Oral, TID  nitrofurantoin (macrocrystal-monohydrate), 100 mg, Oral, Q24H  nystatin, , Topical, 4x Daily  pantoprazole, 40 mg, Oral, BID AC  senna-docusate sodium, 2 tablet, Oral, Daily   And  polyethylene glycol, 17 g, Oral, Daily  QUEtiapine, 50 mg, Oral, Nightly  sodium chloride, 10 mL, Intravenous, Q12H  sodium chloride, 10 mL, Intravenous, Q12H      Continuous Infusions:   PRN Meds:.  acetaminophen **OR** acetaminophen **OR** acetaminophen    senna-docusate sodium **AND** polyethylene glycol **AND** bisacodyl **AND** bisacodyl    Calcium Replacement - Follow Nurse / BPA Driven Protocol    dextrose    dextrose    glucagon (human recombinant)    HYDROcodone-acetaminophen    Magnesium Standard Dose Replacement - Follow Nurse / BPA Driven Protocol    meclizine    ondansetron ODT **OR** ondansetron    Phosphorus Replacement - Follow Nurse / BPA Driven Protocol    Potassium  Replacement - Follow Nurse / BPA Driven Protocol    sodium chloride    sodium chloride    sodium chloride    sodium chloride    Assessment & Plan   Assessment & Plan     Active Hospital Problems    Diagnosis  POA    **AMS (altered mental status) [R41.82]  Yes    Stroke [I63.9]  Yes    Acquired hypothyroidism [E03.9]  Yes    History of pulmonary embolus (PE) [Z86.711]  Yes    Essential hypertension [I10]  Yes    GERD (gastroesophageal reflux disease) [K21.9]  Yes    Type 2 diabetes mellitus without complication, without long-term current use of insulin [E11.9]  Yes      Resolved Hospital Problems   No resolved problems to display.        Brief Hospital Course to date:  Cheri Cruz is a 83 y.o. 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  Initial stroke workup was negative.  MRI showed old left parietal ischemic stroke.  Stroke neurology followed and resumed Eliquis.  She received empiric IV ceftriaxone x 3 days due to concern for UTI. Currently medically ready for discharge and awaiting rehab          Altered mental status on advanced dementia  Hypotension   Concern for UTI  - initial stroke workup negative.  MRI showed old L parietal ischemic stroke; pt seen by Stroke team; apixiban restarted.   -Possibly dehydration vs hypoxia  -COVID/flu negative. Blood cultures with no growth to date. LFTs normal. CXR no acute findings.    -CXR with old calcified gr  -S/p empiric IV ceftriaxone x 3 days.   -Held sedatives initially, Okay to resume PRN lorazepam (home med)   -Continue donepezil, Seroquel, BuSpar.       Hand tremors  - distressing to patient. Monitor.      Vertigo  - reports this is common for her  - changed meclizine  to scheduled     Acute on chronic hypoxemic respiratory failure  Near-syncope  -Per report, patient became hypoxic with oxygen saturation in the 60s  -CTA chest showed no PE, no acute process   -Patient is O2 dependent on 2L NC but is not compliant due her baseline dementia.  -Continue supplemental oxygenation, titrate for goal SpO2 greater than 90%.     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 She was hypotensive with systolic in the 90s at [facility].   -CTH from [facility] with left parietal tiny hemorrhage vs calcification on top of old ischemic stroke.   -MRI showed evolving old left parietal lobe CVA with some areas of associated cortical laminar necrosis. No hemorrhage.    -Neurology evaluated, recommended to resume Eliquis. Continue statin. Annual follow up in stroke clinic.   -PT/OT now recommending SNF      RADHA on CKD stage III  Volume depletion due to dehydration   Mild hyperkalemia  -Baseline Cr about 1.3, Cr 2.06 on presentation, improved to  1.2.  Then increased to 1.8 and got IV fluids again.    - pt at risk of recurrent dehydration/RADHA.  stopped hytrin for this reason   --08/03 Cr up to 1.6, s/p IV hydration. 08/04 Cr 1.51     Complex disposition  -PT/OT evaluated, felt patient's functional status is at baseline. Recommended home with 24/7 care.  -partner discussed care with both patient's daughters and case management on 7/17.   - Family leaning towards pursuing long-term care after discharge as they are having difficulty taking care of her on their own and do not have finances for full-time caregiver.     Nausea, constipation   -KUB 7/14 with nonobstructive bowel gas pattern. Repeat KUB 7/18 with moderate stool burden.  -Continue bowel regimen. Increased PPI to BID. PRN Zofran for nausea.   --improved      Uncontrolled diabetes mellitus type 2 with hyperglycemia  -A1c 9.80% this admission  -Increased Lantus to 30 units qhs, increased   lispro 7u TIDAC and moderate-high dose SSI -- improved   -Resumed home gabapentin at low-dose 300 mg daily.     Chronic anemia  Macrocytosis  -No evidence of overt GI bleeding this admission  -B12 and folate within normal limits  -Further workup of anemia as outpatient as appropriate.      Essential hypertension  -Was hypotensive at [facility].  -Not on antihypertensive medications.  - stable      Hyperlipidemia -Continue atorvastatin.  Hypothyroidism -Continue levothyroxine.  GERD -Continue PPI.          Expected Discharge Location and Transportation: rehab   Expected Discharge   2024    VTE Prophylaxis:  Pharmacologic & mechanical VTE prophylaxis orders are present.         AM-PAC 6 Clicks Score (PT): 10 (24)    CODE STATUS:   Code Status and Medical Interventions:   Ordered at: 07/15/24 1319     Level Of Support Discussed With:    Patient     Code Status (Patient has no pulse and is not breathing):    CPR (Attempt to Resuscitate)     Medical Interventions (Patient has pulse or is breathing):    Full Support       EVELINA Larson  24        Electronically signed by Lauryn White APRN at 24 1052          Physical Therapy Notes (most recent note)        Edie Bueno, PT at 24 1110  Version 1 of 1         Patient Name: Cheri Cruz  : 1940    MRN: [id number]                              Today's Date: 2024       Admit Date: 2024    Visit Dx:     ICD-10-CM ICD-9-CM   1. Cognitive communication deficit  R41.841 799.52   2. Disorientation  R41.0 780.99   3. History of ischemic left MCA stroke  Z86.73 V12.54     Patient Active Problem List   Diagnosis    Hyperlipidemia LDL goal <70    Type 2 diabetes mellitus without complication, without long-term current use of insulin    GERD (gastroesophageal reflux disease)    Essential hypertension    Abnormal EKG    Osteoarthritis    Anxiety    Palpitations    Vertigo    History of ischemic left MCA  stroke    Hemorrhagic stroke    History of pulmonary embolus (PE)    Confusion    Hypotension    Constipation    UTI (urinary tract infection) due to urinary indwelling catheter    Metabolic encephalopathy    Symptomatic anemia    Acquired hypothyroidism    AMS (altered mental status)    Stroke     Past Medical History:   Diagnosis Date    Abnormal heart rhythm     Anxiety     Arrhythmia     Arthritis     Atrial fibrillation     Dementia     Diabetes mellitus     Hyperlipidemia     Hypertension     Kidney disorder     Stroke     Uterus cancer      Past Surgical History:   Procedure Laterality Date    CATARACT EXTRACTION, BILATERAL        General Information       Row Name 08/05/24 1447          Physical Therapy Time and Intention    Document Type therapy note (daily note)  -ES     Mode of Treatment physical therapy  -ES       Row Name 08/05/24 1447          General Information    Patient Profile Reviewed yes  -ES     Existing Precautions/Restrictions fall;other (see comments)  BUE tremors R>L, R sided hypertonia, non-ambulatory at baseline, diplopia, [place]  -ES     Barriers to Rehab medically complex;previous functional deficit;cognitive status;contractures  -ES       Row Name 08/05/24 1443          Cognition    Orientation Status (Cognition) oriented to;person;verbal cues/prompts needed for orientation;place;disoriented to;situation;time  -ES       Row Name 08/05/24 1441          Safety Issues, Functional Mobility    Safety Issues Affecting Function (Mobility) awareness of need for assistance;insight into deficits/self-awareness;safety precaution awareness;safety precautions follow-through/compliance;sequencing abilities;judgment  -ES     Impairments Affecting Function (Mobility) balance;cognition;endurance/activity tolerance;pain;postural/trunk control;range of motion (ROM);strength  -ES     Cognitive Impairments, Mobility Safety/Performance awareness, need for assistance;problem-solving/reasoning;safety precaution  awareness;safety precaution follow-through;sequencing abilities  -ES               User Key  (r) = Recorded By, (t) = Taken By, (c) = Cosigned By      Initials Name Provider Type    ES Edie Bueno PT Physical Therapist                   Mobility       Row Name 08/05/24 1448          Bed Mobility    Bed Mobility rolling right;rolling left  -ES     Rolling Left [place] (Bed Mobility) moderate assist (50% patient effort);verbal cues  -ES     Rolling Right [place] (Bed Mobility) moderate assist (50% patient effort);verbal cues  -ES     Assistive Device (Bed Mobility) bed rails;draw sheet  -ES       Row Name 08/05/24 1448          Bed-Chair Transfer    Bed-Chair [place] (Transfers) dependent (less than 25% patient effort);2 person assist;verbal cues  -ES     Assistive Device (Bed-Chair Transfers) lift device  -ES       Row Name 08/05/24 1448          Sit-Stand Transfer    Sit-Stand [place] (Transfers) not tested  -ES               User Key  (r) = Recorded By, (t) = Taken By, (c) = Cosigned By      Initials Name Provider Type    Edie Hannon PT Physical Therapist                   Obj/Interventions       Row Name 08/05/24 1449          Motor Skills    Therapeutic Exercise hip;knee;ankle  -ES       Row Name 08/05/24 1449          Hip (Therapeutic Exercise)    Hip (Therapeutic Exercise) isometric exercises;strengthening exercise  -ES     Hip Isometrics (Therapeutic Exercise) bilateral;gluteal sets  -ES     Hip Strengthening (Therapeutic Exercise) bilateral;aBduction;heel slides;10 repetitions  -ES       Row Name 08/05/24 1449          Knee (Therapeutic Exercise)    Knee (Therapeutic Exercise) strengthening exercise  -ES     Knee Strengthening (Therapeutic Exercise) bilateral;heel slides;LAQ (long arc quad);10 repetitions  -ES       Row Name 08/05/24 1449          Ankle (Therapeutic Exercise)    Ankle (Therapeutic Exercise) AROM (active range of motion)  -ES     Ankle AROM (Therapeutic  Exercise) bilateral;dorsiflexion;plantarflexion;10 repetitions  -ES       Row Name 08/05/24 1449          Balance    Balance Assessment sitting static balance;sitting dynamic balance  -ES     Static Sitting Balance standby assist  -ES     Dynamic Sitting Balance standby assist  -ES     Position, Sitting Balance unsupported;sitting in chair  -ES               User Key  (r) = Recorded By, (t) = Taken By, (c) = Cosigned By      Initials Name Provider Type    ES Edie Bueno, PT Physical Therapist                   Goals/Plan    No documentation.                  Clinical Impression       Row Name 08/05/24 1449          Pain    Pain Intervention(s) Repositioned;Ambulation/increased activity  -ES     Additional Documentation Pain Scale: FACES Pre/Post-Treatment (Group)  -ES       Row Name 08/05/24 1449          Pain Scale: FACES Pre/Post-Treatment    Pain: FACES Scale, Pretreatment 2-->hurts little bit  -ES     Posttreatment Pain Rating 2-->hurts little bit  -ES     Pain Location generalized  -ES     Pre/Posttreatment Pain Comment tolerated  -ES       Row Name 08/05/24 1449          Plan of Care Review    Plan of Care Reviewed With patient  -ES     Progress no change  -ES     Outcome Evaluation Pt continues to be limited by generalized weakness and RLE ROM deficits, although agreeable and pleasant throughout session. Pt educated on BLE strengthening/ROM HEP, demonstrated good understanding and teach back. PT rec SNF at d/c.  -ES       Row Name 08/05/24 144          Therapy Assessment/Plan (PT)    Rehab Potential (PT) fair, will monitor progress closely  -ES     Criteria for Skilled Interventions Met (PT) yes;meets criteria;skilled treatment is necessary  -ES     Therapy Frequency (PT) 3 times/wk  -ES     Predicted Duration of Therapy Intervention (PT) 10 days  -ES       Row Name 08/05/24 1441          Vital Signs    Pre Systolic BP Rehab --  non-tele, cleared by RN  -ES     O2 Delivery Pre Treatment room air  -ES      O2 Delivery Intra Treatment room air  -ES     O2 Delivery Post Treatment room air  -ES       Row Name 08/05/24 1449          Positioning and Restraints    Pre-Treatment Position in bed  -ES     Post Treatment Position chair  -ES     In Chair notified nsg;reclined;sitting;call light within reach;encouraged to call for assist;exit alarm on;waffle cushion;on mechanical lift sling;legs elevated;heels elevated  -ES               User Key  (r) = Recorded By, (t) = Taken By, (c) = Cosigned By      Initials Name Provider Type    Edie Hannon PT Physical Therapist                   Outcome Measures       Row Name 08/05/24 1451          How much help from another person do you currently need...    Turning from your back to your side while in flat bed without using bedrails? 2  -ES     Moving from lying on back to sitting on the side of a flat bed without bedrails? 2  -ES     Moving to and from a bed to a chair (including a wheelchair)? 1  -ES     Standing up from a chair using your arms (e.g., wheelchair, bedside chair)? 1  -ES     Climbing 3-5 steps with a railing? 1  -ES     To walk in hospital room? 1  -ES     AM-PAC 6 Clicks Score (PT) 8  -ES     Highest Level of Mobility Goal 3 --> Sit at edge of bed  -ES       Row Name 08/05/24 1451          Functional Assessment    Outcome Measure Options AM-PAC 6 Clicks Basic Mobility (PT)  -ES               User Key  (r) = Recorded By, (t) = Taken By, (c) = Cosigned By      Initials Name Provider Type    Edie Hannon PT Physical Therapist                                 Physical Therapy Education       Title: PT OT SLP Therapies (In Progress)       Topic: Physical Therapy (Done)       Point: Mobility training (Done)       Learning Progress Summary             Patient Acceptance, E, VU by ANUEL at 8/2/2024 1358    Acceptance, E, VU by ANDRY at 8/1/2024 1528    Acceptance, E, VU,RAYMOND,NR by ANUEL at 7/29/2024 1541    Acceptance, E, NR by ANDRY at 7/23/2024 1107    EagerGETACHEW,  VU,DU,NR by SS at 7/20/2024 1526    Comment: Educated safety/technique w/bed mobility, transfers, HEP, PT POC    Acceptance, E, VU,DU by ANUEL at 7/15/2024 0955   Family Eager, E, VU,DU,NR by SS at 7/20/2024 1526    Comment: Educated safety/technique w/bed mobility, transfers, HEP, PT POC                         Point: Home exercise program (Done)       Learning Progress Summary             Patient Acceptance, E, VU by ANUEL at 8/2/2024 1358    Eager, E, VU,DU,NR by SS at 7/20/2024 1526    Comment: Educated safety/technique w/bed mobility, transfers, HEP, PT POC   Family Eager, E, VU,DU,NR by SS at 7/20/2024 1526    Comment: Educated safety/technique w/bed mobility, transfers, HEP, PT POC                         Point: Body mechanics (Done)       Learning Progress Summary             Patient Acceptance, E, VU by ANUEL at 8/2/2024 1358    Acceptance, E, VU by KR at 8/1/2024 1528    Acceptance, E, VU,DU,NR by ANUEL at 7/29/2024 1541    Acceptance, E, NR by KR at 7/23/2024 1107    Eager, E, VU,DU,NR by SS at 7/20/2024 1526    Comment: Educated safety/technique w/bed mobility, transfers, HEP, PT POC    Acceptance, E, VU,DU by ANUEL at 7/15/2024 0955   Family Eager, E, VU,DU,NR by SS at 7/20/2024 1526    Comment: Educated safety/technique w/bed mobility, transfers, HEP, PT POC                         Point: Precautions (Done)       Learning Progress Summary             Patient Acceptance, E, VU by ANUEL at 8/2/2024 1358    Acceptance, E, VU by KR at 8/1/2024 1528    Acceptance, E, VU,DU,NR by ANUEL at 7/29/2024 1541    Acceptance, E, NR by KR at 7/23/2024 1107    Eager, E, VU,DU,NR by SS at 7/20/2024 1526    Comment: Educated safety/technique w/bed mobility, transfers, HEP, PT POC    Acceptance, E, VU,DU by ANUEL at 7/15/2024 0955   Family Eager, E, VU,RAYMOND,NR by SS at 7/20/2024 4504    Comment: Educated safety/technique w/bed mobility, transfers, HEP, PT POC                                         User Key       Initials Effective Dates Name  Provider Type Discipline    SS 06/01/21 -  Freda Leonard, PT Physical Therapist PT    KR 12/30/22 -  Courtney Jackson, PT Physical Therapist PT    ANUEL 05/07/24 -  Cate Joshi, BILL Student PT Student PT                  PT Recommendation and Plan     Plan of Care Reviewed With: patient  Progress: no change  Outcome Evaluation: Pt continues to be limited by generalized weakness and RLE ROM deficits, although agreeable and pleasant throughout session. Pt educated on BLE strengthening/ROM HEP, demonstrated good understanding and teach back. PT rec SNF at d/c.     Time Calculation:         PT Charges       Row Name 08/05/24 1451             Time Calculation    Start Time 1110  -ES      PT Received On 08/05/24  -ES      PT Goal Re-Cert Due Date 08/08/24  -ES         Time Calculation- PT    Total Timed Code Minutes- PT 26 minute(s)  -ES         Timed Charges    80999 - PT Therapeutic Exercise Minutes 11  -ES      62722 - PT Therapeutic Activity Minutes 15  -ES         Total Minutes    Timed Charges Total Minutes 26  -ES       Total Minutes 26  -ES                User Key  (r) = Recorded By, (t) = Taken By, (c) = Cosigned By      Initials Name Provider Type    ES Edie Bueno, BILL Physical Therapist                  Therapy Charges for Today       Code Description Service Date Service Provider Modifiers Qty    90800777622 HC PT THER PROC EA 15 MIN 8/5/2024 Edie Bueno, PT GP 1    57798888630 HC PT THERAPEUTIC ACT EA 15 MIN 8/5/2024 Edie Bueno, PT GP 1            PT G-Codes  Outcome Measure Options: AM-PAC 6 Clicks Basic Mobility (PT)  AM-PAC 6 Clicks Score (PT): 8  AM-PAC 6 Clicks Score (OT): 13  Modified Rice Scale: 4 - Moderately severe disability.  Unable to walk without assistance, and unable to attend to own bodily needs without assistance.  PT Discharge Summary  Anticipated Discharge Disposition (PT): skilled nursing facility    Edie Bueno PT  8/5/2024      Electronically signed by Ximena  Edie, PT at 24 1452          Occupational Therapy Notes (most recent note)        Karlo Rai, OT at 24 1011          Patient Name: Cheri Cruz  : 1940    MRN: [id number]                              Today's Date: 2024       Admit Date: 2024    Visit Dx:     ICD-10-CM ICD-9-CM   1. Cognitive communication deficit  R41.841 799.52   2. Disorientation  R41.0 780.99     Patient Active Problem List   Diagnosis    Hyperlipidemia LDL goal <70    Type 2 diabetes mellitus without complication, without long-term current use of insulin    GERD (gastroesophageal reflux disease)    Essential hypertension    Abnormal EKG    Osteoarthritis    Anxiety    Palpitations    Vertigo    History of ischemic left MCA stroke    Hemorrhagic stroke    History of pulmonary embolus (PE)    Confusion    Hypotension    Constipation    UTI (urinary tract infection) due to urinary indwelling catheter    Metabolic encephalopathy    Symptomatic anemia    Acquired hypothyroidism    AMS (altered mental status)    Stroke     Past Medical History:   Diagnosis Date    Abnormal heart rhythm     Anxiety     Arrhythmia     Arthritis     Atrial fibrillation     Dementia     Diabetes mellitus     Hyperlipidemia     Hypertension     Kidney disorder     Stroke     Uterus cancer      Past Surgical History:   Procedure Laterality Date    CATARACT EXTRACTION, BILATERAL        General Information       Row Name 24 1122          OT Time and Intention    Document Type therapy note (daily note)  -CS     Mode of Treatment occupational therapy  -CS       Row Name 24 1122          General Information    Patient Profile Reviewed yes  -CS     Existing Precautions/Restrictions fall;other (see comments)  BUE tremors R>L, R sided hypertonia, non-ambulatory at baseline, diplopia, [place]  -CS     Barriers to Rehab medically complex;previous functional deficit;cognitive status;contractures  -CS       Row Name 24 1120           Cognition    Orientation Status (Cognition) oriented to;person;verbal cues/prompts needed for orientation;place;disoriented to;situation;time  -CS       Row Name 07/30/24 1122          Safety Issues, Functional Mobility    Safety Issues Affecting Function (Mobility) awareness of need for assistance;insight into deficits/self-awareness;judgment;problem-solving;safety precaution awareness;safety precautions follow-through/compliance;sequencing abilities  -CS     Impairments Affecting Function (Mobility) balance;cognition;endurance/activity tolerance;pain;postural/trunk control;range of motion (ROM);strength  -CS     Cognitive Impairments, Mobility Safety/Performance awareness, need for assistance;insight into deficits/self-awareness;safety precaution awareness;safety precaution follow-through  -CS               User Key  (r) = Recorded By, (t) = Taken By, (c) = Cosigned By      Initials Name Provider Type    CS Karlo Rai OT Occupational Therapist                     Mobility/ADL's       Row Name 07/30/24 1124          Bed Mobility    Bed Mobility supine-sit;scooting/bridging  -CS     Rolling Left [place] (Bed Mobility) moderate assist (50% patient effort);1 person assist  -CS     Rolling Right [place] (Bed Mobility) moderate assist (50% patient effort);1 person assist  -CS     Assistive Device (Bed Mobility) bed rails;draw sheet  -CS     Comment, (Bed Mobility) performed for sling placment and hygiene, follows cues for UE reach and LE pushthrough, able to assist with maintenance of sidelying  -CS       Row Name 07/30/24 1124          Transfers    Transfers bed-chair transfer  -CS       Row Name 07/30/24 1124          Bed-Chair Transfer    Bed-Chair [place] (Transfers) dependent (less than 25% patient effort);2 person assist;verbal cues  -CS     Assistive Device (Bed-Chair Transfers) lift device  -CS       Row Name 07/30/24 1124          Activities of Daily Living    BADL  Assessment/Intervention lower body dressing;grooming;upper body dressing  -       Row Name 07/30/24 1124          Upper Body Dressing Assessment/Training    [place] Level (Upper Body Dressing) doff;don;pajama/robe;minimum assist (75% patient effort)  -       Row Name 07/30/24 1124          Lower Body Dressing Assessment/Training    [place] Level (Lower Body Dressing) don;socks;dependent (less than 25% patient effort)  -       Row Name 07/30/24 1124          Grooming Assessment/Training    [place] Level (Grooming) wash face, hands;set up;verbal cues  -     Comment, (Grooming) cues for quality/completion  -       Row Name 07/30/24 1124          Self-Feeding Assessment/Training    Assistive Devices (Feeding) adapted cup  -     Comment, (Feeding) continues with improved functional use with adaptive cup  -[facility] Name 07/30/24 1124          Toileting Assessment/Training    [place] Level (Toileting) adjust/manage clothing;other (see comments);dependent (less than 25% patient effort)  -               User Key  (r) = Recorded By, (t) = Taken By, (c) = Cosigned By      Initials Name Provider Type     Karlo Rai OT Occupational Therapist                   Obj/Interventions       [facility] Name 07/30/24 1127          Motor Skills    Therapeutic Exercise shoulder;elbow/forearm;other (see comments)  BUE AROM incorporated into warm-up prior to bed mobility and pulmonary ther-ex in sitting  -[facility] Name 07/30/24 1127          Balance    Balance Assessment sitting static balance;sitting dynamic balance  -     Static Sitting Balance supervision  -     Dynamic Sitting Balance standby assist  -     Balance Interventions sitting;core stability exercise;weight shifting activity  -     Comment, Balance A/P weight shifting in sitting, fear with anterior weight shifting, pain verbalized throughout with any s/s of pain  -CS               User Key  (r) = Recorded By, (t) = Taken By, (c)  = Cosigned By      Initials Name Provider Type    CS Karlo Rai, OT Occupational Therapist                   Goals/Plan       Row Name 07/30/24 1141          Bed Mobility Goal 1 (OT)    Activity/Assistive Device (Bed Mobility Goal 1, OT) rolling to right;rolling to left  -CS     [place] Level/Cues Needed (Bed Mobility Goal 1, OT) contact guard required  -CS     Time Frame (Bed Mobility Goal 1, OT) 5 days  -CS     Strategies/Barriers (Bed Mobility Goal 1, OT) improved sequencing and use of BUE/BLE during lift prep  -CS     Progress/Outcomes (Bed Mobility Goal 1, OT) goal revised this date  -CS       Row Name 07/30/24 1141          Dressing Goal 1 (OT)    Activity/Device (Dressing Goal 1, OT) upper body dressing  -CS     [place]/Cues Needed (Dressing Goal 1, OT) minimum assist (75% or more patient effort)  -CS     Time Frame (Dressing Goal 1, OT) long term goal (LTG);1 week  -CS     Progress/Outcome (Dressing Goal 1, OT) goal met  -CS       Row Name 07/30/24 1141          Grooming Goal 1 (OT)    Activity/Device (Grooming Goal 1, OT) hair care;oral care;wash face, hands  -CS     [place] (Grooming Goal 1, OT) minimum assist (75% or more patient effort)  -CS     Time Frame (Grooming Goal 1, OT) long term goal (LTG);10 days  -CS     Progress/Outcome (Grooming Goal 1, OT) new goal  -CS       Row Name 07/30/24 1141          Self-Feeding Goal 1 (OT)    Activity/Device (Self-Feeding Goal 1, OT) self-feeding skills, all;finger foods;liquids to mouth;scoop food and bring to mouth;adapted cup;built-up handle utensils  -CS     [place] Level/Cues Needed (Self-Feeding Goal 1, OT) modified independence;set-up required  -CS     Time Frame (Self-Feeding Goal 1, OT) long term goal (LTG);1 week  -CS     Progress/Outcomes (Self-Feeding Goal 1, OT) goal met  -CS       Row Name 07/30/24 1141          Therapy Assessment/Plan (OT)    Planned Therapy Interventions (OT) activity tolerance training;functional balance  retraining;occupation/activity based interventions;ROM/therapeutic exercise;strengthening exercise;transfer/mobility retraining;patient/caregiver education/training;neuromuscular control/coordination retraining;adaptive equipment training  -               User Key  (r) = Recorded By, (t) = Taken By, (c) = Cosigned By      Initials Name Provider Type    CS Karlo Rai OT Occupational Therapist                   Clinical Impression       Row Name 07/30/24 1129          Pain Assessment    Additional Documentation Pain Scale: FACES Pre/Post-Treatment (Group)  -[facility] Name 07/30/24 1129          Pain Scale: FACES Pre/Post-Treatment    Pain: FACES Scale, Pretreatment 0-->no hurt  -CS     Posttreatment Pain Rating 0-->no hurt  -CS       Row Name 07/30/24 1129          Plan of Care Review    Plan of Care Reviewed With patient  -CS     Progress no change  -CS     Outcome Evaluation OT re-cert completed, goals reviewed revised. Pt remains near recent baseline for ADL completion with strength, balance, coordination, and cognitive deficits - OT will continue to follow while admitted for maintencance of function. Tolerated lift to recliner and engaged in BUE AROM and grooming tasks. Rec d/c to SNF.  -       Row Name 07/30/24 1129          Therapy Assessment/Plan (OT)    Criteria for Skilled Therapeutic Interventions Met (OT) yes;meets criteria;skilled treatment is necessary  -     Therapy Frequency (OT) 3 times/wk  -       Row Name 07/30/24 1129          Therapy Plan Review/Discharge Plan (OT)    Anticipated Discharge Disposition (OT) skilled nursing facility  -       Row Name 07/30/24 1129          Vital Signs    Pre Systolic BP Rehab --  RN cleared for tx  -CS     O2 Delivery Pre Treatment room air  -CS     O2 Delivery Intra Treatment room air  -CS     O2 Delivery Post Treatment room air  -CS     Pre Patient Position Supine  -CS     Intra Patient Position Standing  -CS     Post Patient Position Sitting   -       Row Name 07/30/24 1129          Positioning and Restraints    Pre-Treatment Position in bed  -CS     Post Treatment Position chair  -CS     In Chair notified nsg;reclined;sitting;call light within reach;encouraged to call for assist;exit alarm on;legs elevated;on mechanical lift sling;LUE elevated;RUE elevated  -CS               User Key  (r) = Recorded By, (t) = Taken By, (c) = Cosigned By      Initials Name Provider Type    Karlo Croft OT Occupational Therapist                   Outcome Measures       Row Name 07/30/24 1143          How much help from another is currently needed...    Putting on and taking off regular lower body clothing? 1  -CS     Bathing (including washing, rinsing, and drying) 2  -CS     Toileting (which includes using toilet bed pan or urinal) 2  -CS     Putting on and taking off regular upper body clothing 2  -CS     Taking care of personal grooming (such as brushing teeth) 3  -CS     Eating meals 3  -CS     AM-PAC 6 Clicks Score (OT) 13  -CS       Row Name 07/30/24 1143          Modified Steven Scale    Modified Baker Scale 4 - Moderately severe disability.  Unable to walk without assistance, and unable to attend to own bodily needs without assistance.  -CS       Row Name 07/30/24 1143          Functional Assessment    Outcome Measure Options AM-PAC 6 Clicks Daily Activity (OT)  -CS               User Key  (r) = Recorded By, (t) = Taken By, (c) = Cosigned By      Initials Name Provider Type    Karlo Croft OT Occupational Therapist                    Occupational Therapy Education       Title: PT OT SLP Therapies (In Progress)       Topic: Occupational Therapy (In Progress)       Point: ADL training (In Progress)       Description:   Instruct learner(s) on proper safety adaptation and remediation techniques during self care or transfers.   Instruct in proper use of assistive devices.                  Learning Progress Summary             Patient Acceptance, E,D,  VU,DU by BELINDA at 7/30/2024 1143    Acceptance, E,D, NR by PRAKASH at 7/20/2024 1020    Acceptance, E,D, VU,DU by BELINDA at 7/15/2024 0936   Family Acceptance, E,D, NR by PRAKASH at 7/20/2024 1020                         Point: Home exercise program (In Progress)       Description:   Instruct learner(s) on appropriate technique for monitoring, assisting and/or progressing therapeutic exercises/activities.                  Learning Progress Summary             Patient Acceptance, E,D, VU,DU by BELINDA at 7/30/2024 1143    Acceptance, E,D, NR by PRAKASH at 7/20/2024 1020    Acceptance, E,D, VU,DU by BELINDA at 7/15/2024 0936   Family Acceptance, E,D, NR by PRAKASH at 7/20/2024 1020                         Point: Precautions (In Progress)       Description:   Instruct learner(s) on prescribed precautions during self-care and functional transfers.                  Learning Progress Summary             Patient Acceptance, E,D, VU,DU by BELINDA at 7/30/2024 1143    Acceptance, E,D, NR by PRAKASH at 7/20/2024 1020    Acceptance, E,D, VU,DU by BELINDA at 7/15/2024 0936   Family Acceptance, E,D, NR by PRAKASH at 7/20/2024 1020                         Point: Body mechanics (In Progress)       Description:   Instruct learner(s) on proper positioning and spine alignment during self-care, functional mobility activities and/or exercises.                  Learning Progress Summary             Patient Acceptance, E,D, VU,DU by BELINDA at 7/30/2024 1143    Acceptance, E,D, NR by PRAKASH at 7/20/2024 1020    Acceptance, E,D, VU,DU by BELINDA at 7/15/2024 0936   Family Acceptance, E,D, NR by PRAKASH at 7/20/2024 1020                                         User Key       Initials Effective Dates Name Provider Type Discipline    PRAKASH 06/16/21 -  Mirela Winston, OT Occupational Therapist OT     06/16/21 -  Karlo Rai OT Occupational Therapist OT                  OT Recommendation and Plan  Planned Therapy Interventions (OT): activity tolerance training, functional balance retraining, occupation/activity  based interventions, ROM/therapeutic exercise, strengthening exercise, transfer/mobility retraining, patient/caregiver education/training, neuromuscular control/coordination retraining, adaptive equipment training  Therapy Frequency (OT): 3 times/wk  Plan of Care Review  Plan of Care Reviewed With: patient  Progress: no change  Outcome Evaluation: OT re-cert completed, goals reviewed revised. Pt remains near recent baseline for ADL completion with strength, balance, coordination, and cognitive deficits - OT will continue to follow while admitted for maintencance of function. Tolerated lift to recliner and engaged in BUE AROM and grooming tasks. Rec d/c to SNF.     Time Calculation:   Evaluation Complexity (OT)  Review Occupational Profile/Medical/Therapy History Complexity: expanded/moderate complexity  Assessment, Occupational Performance/Identification of Deficit Complexity: 3-5 performance deficits  Overall Complexity of Evaluation (OT): moderate complexity     Time Calculation- OT       Row Name 07/30/24 1143             Time Calculation- OT    OT Start Time 1011  -CS      OT Received On 07/30/24  -CS      OT Goal Re-Cert Due Date 08/09/24  -CS         Timed Charges    20025 - OT Therapeutic Activity Minutes 14  -CS      08646 - OT Self Care/Mgmt Minutes 12  -CS         Total Minutes    Timed Charges Total Minutes 26  -CS       Total Minutes 26  -CS                User Key  (r) = Recorded By, (t) = Taken By, (c) = Cosigned By      Initials Name Provider Type    CS Karlo Rai OT Occupational Therapist                  Therapy Charges for Today       Code Description Service Date Service Provider Modifiers Qty    29732422908 HC OT THERAPEUTIC ACT EA 15 MIN 7/30/2024 Karlo Rai, OT GO 1    11263644136 HC OT SELF CARE/MGMT/TRAIN EA 15 MIN 7/30/2024 Karlo Rai OT GO 1    86003070791 HC OT THER SUPP EA 15 MIN 7/30/2024 Karlo Rai, OT GO 1    46107459266 HC OT THER SUPP EA 15 MIN 7/30/2024  Karlo Rai, OT GO 1                 Karlo Rai, FATOUMATA  7/30/2024    Electronically signed by Kalro Rai, OT at 07/30/24 1143

## 2024-08-06 NOTE — THERAPY TREATMENT NOTE
Patient Name: Cheri Cruz  : 1940    MRN: [id number]                              Today's Date: 2024       Admit Date: 2024    Visit Dx:     ICD-10-CM ICD-9-CM   1. Cognitive communication deficit  R41.841 799.52   2. Disorientation  R41.0 780.99   3. History of ischemic left MCA stroke  Z86.73 V12.54     Patient Active Problem List   Diagnosis    Hyperlipidemia LDL goal <70    Type 2 diabetes mellitus without complication, without long-term current use of insulin    GERD (gastroesophageal reflux disease)    Essential hypertension    Abnormal EKG    Osteoarthritis    Anxiety    Palpitations    Vertigo    History of ischemic left MCA stroke    Hemorrhagic stroke    History of pulmonary embolus (PE)    Confusion    Hypotension    Constipation    UTI (urinary tract infection) due to urinary indwelling catheter    Metabolic encephalopathy    Symptomatic anemia    Acquired hypothyroidism    AMS (altered mental status)    Stroke     Past Medical History:   Diagnosis Date    Abnormal heart rhythm     Anxiety     Arrhythmia     Arthritis     Atrial fibrillation     Dementia     Diabetes mellitus     Hyperlipidemia     Hypertension     Kidney disorder     Stroke     Uterus cancer      Past Surgical History:   Procedure Laterality Date    CATARACT EXTRACTION, BILATERAL        General Information       Row Name 24 0952          OT Time and Intention    Document Type therapy note (daily note)  -KF     Mode of Treatment occupational therapy;individual therapy  -KF       Row Name 24 0952          General Information    Patient Profile Reviewed yes  -KF     Existing Precautions/Restrictions fall;other (see comments)  BUE tremors R>L, R sided hypertonia, non-ambulatory at baseline, diplopia, [place]  -KF     Barriers to Rehab medically complex;previous functional deficit;cognitive status;contractures  -KF       Row Name 24 0952          Cognition    Orientation Status (Cognition) oriented  to;person;place  -       Row Name 08/06/24 0952          Safety Issues, Functional Mobility    Safety Issues Affecting Function (Mobility) insight into deficits/self-awareness;awareness of need for assistance;safety precaution awareness;safety precautions follow-through/compliance;sequencing abilities  -     Impairments Affecting Function (Mobility) balance;cognition;endurance/activity tolerance;pain;postural/trunk control;range of motion (ROM);strength  -     Cognitive Impairments, Mobility Safety/Performance awareness, need for assistance;insight into deficits/self-awareness;judgment;problem-solving/reasoning;safety precaution awareness;safety precaution follow-through;sequencing abilities  -KF               User Key  (r) = Recorded By, (t) = Taken By, (c) = Cosigned By      Initials Name Provider Type    KF Erin Brandon OT Occupational Therapist                     Mobility/ADL's       Row Name 08/06/24 1338          Bed Mobility    Bed Mobility rolling left;rolling right  -     Rolling Left [place] (Bed Mobility) moderate assist (50% patient effort);1 person assist;verbal cues;nonverbal cues (demo/gesture)  -     Rolling Right [place] (Bed Mobility) minimum assist (75% patient effort);1 person assist;verbal cues;nonverbal cues (demo/gesture)  -     Assistive Device (Bed Mobility) bed rails;draw sheet  -     Comment, (Bed Mobility) Left/right rolling performed for lift sling placement  -       Row Name 08/06/24 1338          Transfers    Transfers bed-chair transfer  -       Row Name 08/06/24 1338          Bed-Chair Transfer    Bed-Chair [place] (Transfers) dependent (less than 25% patient effort);2 person assist;verbal cues  -     Assistive Device (Bed-Chair Transfers) lift device  -       Row Name 08/06/24 1338          Activities of Daily Living    BADL Assessment/Intervention grooming  -       Row Name 08/06/24 1338          Grooming Assessment/Training     [place] Level (Grooming) wash face, hands;set up  -KF     Position (Grooming) supported sitting  -KF     Comment, (Grooming) Pt declined participated in additional ADLs due to feeling unwell.  -KF               User Key  (r) = Recorded By, (t) = Taken By, (c) = Cosigned By      Initials Name Provider Type    KF Erin Brandon OT Occupational Therapist                   Obj/Interventions       Row Name 08/06/24 1339          Shoulder (Therapeutic Exercise)    Shoulder (Therapeutic Exercise) AROM (active range of motion)  -KF     Shoulder AROM (Therapeutic Exercise) bilateral;flexion;extension;aBduction;aDduction;scapular elevation;sitting;10 repetitions  -KF       Row Name 08/06/24 1339          Motor Skills    Therapeutic Exercise shoulder  -KF       Row Name 08/06/24 1339          Balance    Balance Assessment sitting static balance  -KF     Static Sitting Balance independent  -KF     Position, Sitting Balance supported  -KF               User Key  (r) = Recorded By, (t) = Taken By, (c) = Cosigned By      Initials Name Provider Type    KF Erin Brandon OT Occupational Therapist                   Goals/Plan    No documentation.                  Clinical Impression       Row Name 08/06/24 1340          Pain Assessment    Pretreatment Pain Rating 4/10  -KF     Posttreatment Pain Rating 4/10  -KF     Pain Location generalized  -KF     Pain Intervention(s) Repositioned;Ambulation/increased activity  -KF       Row Name 08/06/24 1340          Plan of Care Review    Plan of Care Reviewed With patient  -KF     Progress no change  -KF     Outcome Evaluation OT session completed. The pt performed left/right rolling with min/modA x1. Pt dependently transferred to chair via mechanical lift. Pt participated in grooming ADLs with set up and BUE therex at the shoulders. Pt reported feeling unwell today, deferring additional ADLs and therex. The pt continues to present below her functional baseline with generalized  weakness, decreased activity tolerance, ROM deficits, and balance deficits warranting continued IP OT services. Recommend a d/c to SNF for best outcome.  -       Row Name 08/06/24 1340          Therapy Plan Review/Discharge Plan (OT)    Anticipated Discharge Disposition (OT) [facility] nursing [facility]  -       Row Name 08/06/24 1340          Vital Signs    Pre Patient Position Supine  -KF     Intra Patient Position Side Lying  -KF     Post Patient Position Sitting  -KF       Row Name 08/06/24 1340          Positioning and Restraints    Pre-Treatment Position in bed  -KF     Post Treatment Position chair  -KF     In Chair notified nsg;reclined;call light within reach;encouraged to call for assist;exit alarm on;waffle cushion;on mechanical lift sling;legs elevated  -KF               User Key  (r) = Recorded By, (t) = Taken By, (c) = Cosigned By      Initials Name Provider Type    Erin Johnson OT Occupational Therapist                   Outcome Measures       Row Name 08/06/24 1342          How much help from another is currently needed...    Putting on and taking off regular lower body clothing? 1  -KF     Bathing (including washing, rinsing, and drying) 2  -KF     Toileting (which includes using toilet bed pan or urinal) 1  -KF     Putting on and taking off regular upper body clothing 3  -KF     Taking care of personal grooming (such as brushing teeth) 3  -KF     Eating meals 3  -KF     AM-PAC 6 Clicks Score (OT) 13  -KF       Row Name 08/06/24 1342          Functional Assessment    Outcome Measure Options AM-PAC 6 Clicks Daily Activity (OT)  -KF               User Key  (r) = Recorded By, (t) = Taken By, (c) = Cosigned By      Initials Name Provider Type    Erin Johnson OT Occupational Therapist                    Occupational Therapy Education       Title: PT OT SLP Therapies (In Progress)       Topic: Occupational Therapy (In Progress)       Point: ADL training (In Progress)       Description:    Instruct learner(s) on proper safety adaptation and remediation techniques during self care or transfers.   Instruct in proper use of assistive devices.                  Learning Progress Summary             Patient Acceptance, E,TB, VU,DU by KF at 8/6/2024 0902    Acceptance, E,D, VU,DU by CS at 7/30/2024 1143    Acceptance, E,D, NR by PRAKASH at 7/20/2024 1020    Acceptance, E,D, VU,DU by BELINDA at 7/15/2024 0936   Family Acceptance, E,D, NR by PRAKASH at 7/20/2024 1020                         Point: Home exercise program (In Progress)       Description:   Instruct learner(s) on appropriate technique for monitoring, assisting and/or progressing therapeutic exercises/activities.                  Learning Progress Summary             Patient Acceptance, E,TB, VU,DU by KF at 8/6/2024 0902    Acceptance, E,D, VU,DU by BELINDA at 7/30/2024 1143    Acceptance, E,D, NR by PRAKASH at 7/20/2024 1020    Acceptance, E,D, VU,DU by BELINDA at 7/15/2024 0936   Family Acceptance, E,D, NR by PRAKASH at 7/20/2024 1020                         Point: Precautions (In Progress)       Description:   Instruct learner(s) on prescribed precautions during self-care and functional transfers.                  Learning Progress Summary             Patient Acceptance, E,TB, VU,DU by KF at 8/6/2024 0902    Acceptance, E,D, VU,DU by BELINDA at 7/30/2024 1143    Acceptance, E,D, NR by PRAKASH at 7/20/2024 1020    Acceptance, E,D, VU,DU by BELINDA at 7/15/2024 0936   Family Acceptance, E,D, NR by PRAKASH at 7/20/2024 1020                         Point: Body mechanics (In Progress)       Description:   Instruct learner(s) on proper positioning and spine alignment during self-care, functional mobility activities and/or exercises.                  Learning Progress Summary             Patient Acceptance, E,TB, VU,DU by KF at 8/6/2024 0902    Acceptance, E,D, VU,DU by BELINDA at 7/30/2024 1143    Acceptance, E,D, NR by PRAKASH at 7/20/2024 1020    Acceptance, E,D, VU,DU by BELINDA at 7/15/2024 0936   Family  Acceptance, E,D, NR by PRAKASH at 7/20/2024 1020                                         User Key       Initials Effective Dates Name Provider Type Discipline    PRAKASH 06/16/21 -  Mirela Winston OT Occupational Therapist OT     06/16/21 -  Karlo Rai OT Occupational Therapist OT     08/09/23 -  Erin Brandon OT Occupational Therapist OT                  OT Recommendation and Plan     Plan of Care Review  Plan of Care Reviewed With: patient  Progress: no change  Outcome Evaluation: OT session completed. The pt performed left/right rolling with min/modA x1. Pt dependently transferred to chair via mechanical lift. Pt participated in grooming ADLs with set up and BUE therex at the shoulders. Pt reported feeling unwell today, deferring additional ADLs and therex. The pt continues to present below her functional baseline with generalized weakness, decreased activity tolerance, ROM deficits, and balance deficits warranting continued IP OT services. Recommend a d/c to SNF for best outcome.     Time Calculation:         Time Calculation- OT       Row Name 08/06/24 1342             Time Calculation- OT    OT Start Time 0902  -KF      OT Received On 08/06/24  -KF      OT Goal Re-Cert Due Date 08/09/24  -KF         Timed Charges    51843 - OT Therapeutic Exercise Minutes 7  -KF      26483 - OT Therapeutic Activity Minutes 10  -KF      25915 - OT Self Care/Mgmt Minutes 6  -KF         Total Minutes    Timed Charges Total Minutes 23  -KF       Total Minutes 23  -KF                User Key  (r) = Recorded By, (t) = Taken By, (c) = Cosigned By      Initials Name Provider Type    KF Erin Brandon OT Occupational Therapist                  Therapy Charges for Today       Code Description Service Date Service Provider Modifiers Qty    94661678054 HC OT THER PROC EA 15 MIN 8/6/2024 Erin Brandon OT GO 1    33164681982 HC OT THERAPEUTIC ACT EA 15 MIN 8/6/2024 Erin Brandon OT GO 1                 Erin Brandon  OT  8/6/2024

## 2024-08-07 LAB
GLUCOSE BLDC GLUCOMTR-MCNC: 187 MG/DL (ref 70–130)
GLUCOSE BLDC GLUCOMTR-MCNC: 197 MG/DL (ref 70–130)
GLUCOSE BLDC GLUCOMTR-MCNC: 205 MG/DL (ref 70–130)
GLUCOSE BLDC GLUCOMTR-MCNC: 249 MG/DL (ref 70–130)

## 2024-08-07 PROCEDURE — 63710000001 DULOXETINE 60 MG CAPSULE DELAYED-RELEASE PARTICLES: Performed by: INTERNAL MEDICINE

## 2024-08-07 PROCEDURE — 63710000001 GABAPENTIN 100 MG CAPSULE: Performed by: NURSE PRACTITIONER

## 2024-08-07 PROCEDURE — A9270 NON-COVERED ITEM OR SERVICE: HCPCS | Performed by: INTERNAL MEDICINE

## 2024-08-07 PROCEDURE — A9270 NON-COVERED ITEM OR SERVICE: HCPCS

## 2024-08-07 PROCEDURE — 63710000001 BUSPIRONE 15 MG TABLET: Performed by: INTERNAL MEDICINE

## 2024-08-07 PROCEDURE — 63710000001 POLYETHYLENE GLYCOL 17 G PACK: Performed by: STUDENT IN AN ORGANIZED HEALTH CARE EDUCATION/TRAINING PROGRAM

## 2024-08-07 PROCEDURE — A9270 NON-COVERED ITEM OR SERVICE: HCPCS | Performed by: STUDENT IN AN ORGANIZED HEALTH CARE EDUCATION/TRAINING PROGRAM

## 2024-08-07 PROCEDURE — 63710000001 INSULIN LISPRO (HUMAN) PER 5 UNITS: Performed by: STUDENT IN AN ORGANIZED HEALTH CARE EDUCATION/TRAINING PROGRAM

## 2024-08-07 PROCEDURE — A9270 NON-COVERED ITEM OR SERVICE: HCPCS | Performed by: NURSE PRACTITIONER

## 2024-08-07 PROCEDURE — 63710000001 LIDOCAINE 4 % PATCH: Performed by: NURSE PRACTITIONER

## 2024-08-07 PROCEDURE — 63710000001 PANTOPRAZOLE 40 MG TABLET DELAYED-RELEASE: Performed by: INTERNAL MEDICINE

## 2024-08-07 PROCEDURE — 63710000001 DONEPEZIL 10 MG TABLET: Performed by: INTERNAL MEDICINE

## 2024-08-07 PROCEDURE — 63710000001 NITROFURANTOIN (MACROCRYSTAL-MONOHYDRATE) 100 MG CAPSULE: Performed by: INTERNAL MEDICINE

## 2024-08-07 PROCEDURE — 63710000001 SENNOSIDES-DOCUSATE 8.6-50 MG TABLET: Performed by: STUDENT IN AN ORGANIZED HEALTH CARE EDUCATION/TRAINING PROGRAM

## 2024-08-07 PROCEDURE — 63710000001 LORAZEPAM 0.5 MG TABLET: Performed by: NURSE PRACTITIONER

## 2024-08-07 PROCEDURE — 63710000001 QUETIAPINE 25 MG TABLET: Performed by: INTERNAL MEDICINE

## 2024-08-07 PROCEDURE — 63710000001 ATORVASTATIN 40 MG TABLET

## 2024-08-07 PROCEDURE — 63710000001 INSULIN GLARGINE PER 5 UNITS: Performed by: STUDENT IN AN ORGANIZED HEALTH CARE EDUCATION/TRAINING PROGRAM

## 2024-08-07 PROCEDURE — 99232 SBSQ HOSP IP/OBS MODERATE 35: CPT | Performed by: INTERNAL MEDICINE

## 2024-08-07 PROCEDURE — 63710000001 INSULIN LISPRO (HUMAN) PER 5 UNITS: Performed by: NURSE PRACTITIONER

## 2024-08-07 PROCEDURE — 63710000001 APIXABAN 5 MG TABLET: Performed by: STUDENT IN AN ORGANIZED HEALTH CARE EDUCATION/TRAINING PROGRAM

## 2024-08-07 PROCEDURE — G0378 HOSPITAL OBSERVATION PER HR: HCPCS

## 2024-08-07 PROCEDURE — 82948 REAGENT STRIP/BLOOD GLUCOSE: CPT

## 2024-08-07 PROCEDURE — 63710000001 HYDROCODONE-ACETAMINOPHEN 10-325 MG TABLET: Performed by: INTERNAL MEDICINE

## 2024-08-07 PROCEDURE — 63710000001 LEVOTHYROXINE 25 MCG TABLET: Performed by: INTERNAL MEDICINE

## 2024-08-07 RX ADMIN — NYSTATIN: 100000 POWDER TOPICAL at 21:37

## 2024-08-07 RX ADMIN — POLYETHYLENE GLYCOL 3350 17 G: 17 POWDER, FOR SOLUTION ORAL at 08:15

## 2024-08-07 RX ADMIN — LORAZEPAM 0.5 MG: 0.5 TABLET ORAL at 08:15

## 2024-08-07 RX ADMIN — LIDOCAINE 1 PATCH: 4 PATCH TOPICAL at 08:15

## 2024-08-07 RX ADMIN — HYDROCODONE BITARTRATE AND ACETAMINOPHEN 1 TABLET: 10; 325 TABLET ORAL at 21:38

## 2024-08-07 RX ADMIN — INSULIN LISPRO 7 UNITS: 100 INJECTION, SOLUTION INTRAVENOUS; SUBCUTANEOUS at 17:28

## 2024-08-07 RX ADMIN — NYSTATIN: 100000 POWDER TOPICAL at 12:40

## 2024-08-07 RX ADMIN — NYSTATIN: 100000 POWDER TOPICAL at 08:14

## 2024-08-07 RX ADMIN — APIXABAN 5 MG: 5 TABLET, FILM COATED ORAL at 08:15

## 2024-08-07 RX ADMIN — INSULIN LISPRO 5 UNITS: 100 INJECTION, SOLUTION INTRAVENOUS; SUBCUTANEOUS at 12:39

## 2024-08-07 RX ADMIN — LEVOTHYROXINE SODIUM 25 MCG: 25 TABLET ORAL at 06:32

## 2024-08-07 RX ADMIN — Medication 10 ML: at 21:38

## 2024-08-07 RX ADMIN — GABAPENTIN 200 MG: 100 CAPSULE ORAL at 08:14

## 2024-08-07 RX ADMIN — INSULIN LISPRO 3 UNITS: 100 INJECTION, SOLUTION INTRAVENOUS; SUBCUTANEOUS at 08:16

## 2024-08-07 RX ADMIN — INSULIN LISPRO 7 UNITS: 100 INJECTION, SOLUTION INTRAVENOUS; SUBCUTANEOUS at 12:40

## 2024-08-07 RX ADMIN — ATORVASTATIN CALCIUM 80 MG: 40 TABLET, FILM COATED ORAL at 21:37

## 2024-08-07 RX ADMIN — LORAZEPAM 0.5 MG: 0.5 TABLET ORAL at 21:37

## 2024-08-07 RX ADMIN — DONEPEZIL HYDROCHLORIDE 10 MG: 10 TABLET, FILM COATED ORAL at 21:37

## 2024-08-07 RX ADMIN — SENNOSIDES AND DOCUSATE SODIUM 2 TABLET: 50; 8.6 TABLET ORAL at 08:14

## 2024-08-07 RX ADMIN — DICLOFENAC SODIUM 2 G: 10 GEL TOPICAL at 21:37

## 2024-08-07 RX ADMIN — DICLOFENAC SODIUM 2 G: 10 GEL TOPICAL at 17:28

## 2024-08-07 RX ADMIN — Medication 10 ML: at 08:17

## 2024-08-07 RX ADMIN — BUSPIRONE HYDROCHLORIDE 7.5 MG: 15 TABLET ORAL at 08:15

## 2024-08-07 RX ADMIN — NITROFURANTOIN MONOHYDRATE/MACROCRYSTALS 100 MG: 75; 25 CAPSULE ORAL at 21:38

## 2024-08-07 RX ADMIN — NYSTATIN: 100000 POWDER TOPICAL at 17:28

## 2024-08-07 RX ADMIN — GABAPENTIN 200 MG: 100 CAPSULE ORAL at 21:37

## 2024-08-07 RX ADMIN — INSULIN LISPRO 3 UNITS: 100 INJECTION, SOLUTION INTRAVENOUS; SUBCUTANEOUS at 17:28

## 2024-08-07 RX ADMIN — HYDROCODONE BITARTRATE AND ACETAMINOPHEN 1 TABLET: 10; 325 TABLET ORAL at 08:15

## 2024-08-07 RX ADMIN — DICLOFENAC SODIUM 2 G: 10 GEL TOPICAL at 12:40

## 2024-08-07 RX ADMIN — DULOXETINE HYDROCHLORIDE 60 MG: 60 CAPSULE, DELAYED RELEASE ORAL at 08:14

## 2024-08-07 RX ADMIN — DICLOFENAC SODIUM 2 G: 10 GEL TOPICAL at 08:14

## 2024-08-07 RX ADMIN — QUETIAPINE FUMARATE 50 MG: 25 TABLET ORAL at 21:37

## 2024-08-07 RX ADMIN — BUSPIRONE HYDROCHLORIDE 7.5 MG: 15 TABLET ORAL at 21:37

## 2024-08-07 RX ADMIN — PANTOPRAZOLE SODIUM 40 MG: 40 TABLET, DELAYED RELEASE ORAL at 06:32

## 2024-08-07 RX ADMIN — FLUTICASONE PROPIONATE 2 SPRAY: 50 SPRAY, METERED NASAL at 08:14

## 2024-08-07 RX ADMIN — APIXABAN 5 MG: 5 TABLET, FILM COATED ORAL at 21:37

## 2024-08-07 RX ADMIN — INSULIN GLARGINE 30 UNITS: 100 INJECTION, SOLUTION SUBCUTANEOUS at 21:36

## 2024-08-07 RX ADMIN — INSULIN LISPRO 5 UNITS: 100 INJECTION, SOLUTION INTRAVENOUS; SUBCUTANEOUS at 21:37

## 2024-08-07 RX ADMIN — HYDROCODONE BITARTRATE AND ACETAMINOPHEN 1 TABLET: 10; 325 TABLET ORAL at 16:04

## 2024-08-07 RX ADMIN — INSULIN LISPRO 7 UNITS: 100 INJECTION, SOLUTION INTRAVENOUS; SUBCUTANEOUS at 08:15

## 2024-08-07 NOTE — PROGRESS NOTES
[facility] Medicine Services  PROGRESS NOTE    Patient Name: Cheri Cruz  : 1940  MRN: [id number]    Date of Admission: 2024  Primary Care Physician: Lorrie Canada APRN    Subjective   Subjective     CC: challenging plcmt, f/u      HPI:  Feels some nausea today  No other complaints    Objective   Objective     Vital Signs:   Temp:  [96.4 °F (35.8 °C)-97.6 °F (36.4 °C)] 96.7 °F (35.9 °C)  Heart Rate:  [] 72  Resp:  [18] 18  BP: (111-139)/(59-73) 123/73     Physical Exam:  Constitutional: No acute distress, awake, alert older female sitting up in bed w stuffed bear closeby  HENT: NCAT, mucous membranes moist  Respiratory: Clear to auscultation bilaterally, respiratory effort normal on room air  Cardiovascular: RRR, no murmurs, rubs, or gallops  Gastrointestinal: Soft, nontender, nondistended  Musculoskeletal: Muscle tone decreased, no joint effusions appreciated  Psychiatric: Appropriate affect, cooperative  Neurologic: Alert and oriented, facial movements symmetric and spontaneous movement of all 4 extremities grossly equal bilaterally, speech clear  Skin: No rashes    Results Reviewed:  LAB RESULTS:      Lab 24  0621 24  0557   WBC 8.29 6.81   HEMOGLOBIN 9.9* 9.2*   HEMATOCRIT 30.5* 29.5*   PLATELETS 156 156   NEUTROS ABS 5.22 3.50   IMMATURE GRANS (ABS) 0.02 0.02   LYMPHS ABS 2.08 2.38   MONOS ABS 0.64 0.63   EOS ABS 0.29 0.25   .3* 104.2*         Lab 24  0621 24  0930 24  0557   SODIUM 140 142 141   POTASSIUM 4.9 4.9 4.6   CHLORIDE 102 104 103   CO2 30.0* 31.0* 30.0*   ANION GAP 8.0 7.0 8.0   BUN 32* 27* 27*   CREATININE 1.54* 1.51* 1.61*   EGFR 33.6* 34.4* 31.8*   GLUCOSE 213* 143* 116*   CALCIUM 9.0 8.4* 8.4*                         Brief Urine Lab Results  (Last result in the past 365 days)        Color   Clarity   Blood   Leuk Est   Nitrite   Protein   CREAT   Urine HCG        24 Yellow   Cloudy   Trace    Small (1+)   Negative   Negative                   Microbiology Results Abnormal       Procedure Component Value - Date/Time    Blood Culture - Blood, Wrist, Left [[id number]]  (Normal) Collected: 07/14/24 2022    Lab Status: Final result Specimen: Blood from Wrist, Left Updated: 07/19/24 2100     Blood Culture No growth at 5 days    Narrative:      Less than seven (7) mL's of blood was collected.  Insufficient quantity may yield false negative results.    Blood Culture - Blood, Hand, Left [[id number]]  (Normal) Collected: 07/14/24 2022    Lab Status: Final result Specimen: Blood from Hand, Left Updated: 07/19/24 2100     Blood Culture No growth at 5 days    Narrative:      Less than seven (7) mL's of blood was collected.  Insufficient quantity may yield false negative results.    Urine Culture - Urine, Urine, Random Void [[id number]] Collected: 07/14/24 2001    Lab Status: Final result Specimen: Urine, Random Void Updated: 07/16/24 1020     Urine Culture 50,000 CFU/mL Normal Urogenital Nickie    Narrative:      Colonization of the urinary tract without infection is common. Treatment is discouraged unless the patient is symptomatic, pregnant, or undergoing an invasive urologic procedure.            No radiology results from the last 24 hrs    Results for orders placed during the hospital encounter of 02/10/23    Adult Transthoracic Echo Complete W/ Cont if Necessary Per Protocol    Interpretation Summary    Left ventricular systolic function is hyperdynamic (EF > 70%). Calculated left ventricular EF = 70.6% Left ventricular ejection fraction appears to be greater than 70%. The left ventricular cavity is small in size. Left ventricular wall thickness is consistent with mild concentric hypertrophy. All left ventricular wall segments contract normally. Left ventricular intracavitary gradient noted to be 71 mmHg. Left ventricular diastolic function is consistent with (grade I) impaired relaxation. Normal left atrial  pressure.    The aortic valve is abnormal in structure. There is mild calcification of the aortic valve mainly affecting the right coronary cusp(s). The aortic valve appears trileaflet. No aortic valve regurgitation is present. Gradient noted through the LV and LVOT    Compared to TTE report from  Dec 2019, hyperdynamic LV with intracavitary gradient is not a new finding but peak gradient noted on this exam is higher than previously described.      Current medications:  Scheduled Meds:apixaban, 5 mg, Oral, BID  atorvastatin, 80 mg, Oral, Nightly  busPIRone, 7.5 mg, Oral, BID  Diclofenac Sodium, 2 g, Topical, 4x Daily  donepezil, 10 mg, Oral, Nightly  DULoxetine, 60 mg, Oral, Daily  fluticasone, 2 spray, Each Nare, Daily  gabapentin, 200 mg, Oral, Q12H  insulin glargine, 30 Units, Subcutaneous, Nightly  insulin lispro, 3-14 Units, Subcutaneous, 4x Daily AC & at Bedtime  Insulin Lispro, 7 Units, Subcutaneous, TID With Meals  levothyroxine, 25 mcg, Oral, Daily  Lidocaine, 1 patch, Transdermal, Q24H  LORazepam, 0.5 mg, Oral, Q12H  nitrofurantoin (macrocrystal-monohydrate), 100 mg, Oral, Q24H  nystatin, , Topical, 4x Daily  pantoprazole, 40 mg, Oral, Q AM  senna-docusate sodium, 2 tablet, Oral, Daily   And  polyethylene glycol, 17 g, Oral, Daily  QUEtiapine, 50 mg, Oral, Nightly  sodium chloride, 10 mL, Intravenous, Q12H  sodium chloride, 10 mL, Intravenous, Q12H      Continuous Infusions:   PRN Meds:.  acetaminophen **OR** acetaminophen **OR** acetaminophen    senna-docusate sodium **AND** polyethylene glycol **AND** bisacodyl **AND** bisacodyl    Calcium Replacement - Follow Nurse / BPA Driven Protocol    dextrose    dextrose    glucagon (human recombinant)    HYDROcodone-acetaminophen    Magnesium Standard Dose Replacement - Follow Nurse / BPA Driven Protocol    meclizine    ondansetron ODT **OR** ondansetron    Phosphorus Replacement - Follow Nurse / BPA Driven Protocol    Potassium Replacement - Follow Nurse / BPA  Driven Protocol    sodium chloride    sodium chloride    sodium chloride    sodium chloride    Assessment & Plan   Assessment & Plan     Active Hospital Problems    Diagnosis  POA    **AMS (altered mental status) [R41.82]  Yes    Stroke [I63.9]  Yes    Acquired hypothyroidism [E03.9]  Yes    History of pulmonary embolus (PE) [Z86.711]  Yes    Essential hypertension [I10]  Yes    GERD (gastroesophageal reflux disease) [K21.9]  Yes    Type 2 diabetes mellitus without complication, without long-term current use of insulin [E11.9]  Yes      Resolved Hospital Problems   No resolved problems to display.        Brief Hospital Course to date:  Cheri Cruz is a 83 y.o. female w dementia, DMII, HTN, prior CVA, PE on eliquis who presented as a stroke rule out from [facility] on 7/14. 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     Stay c/b complex [facility] as difficulties with home care. Per CM notes, [employer] appeal ongoing. 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         AM-PAC 6 Clicks Score (PT): 8  (08/07/24 0800)    CODE STATUS:   Code Status and Medical Interventions:   Ordered at: 07/15/24 1319     Level Of Support Discussed With:    Patient     Code Status (Patient has no pulse and is not breathing):    CPR (Attempt to Resuscitate)     Medical Interventions (Patient has pulse or is breathing):    Full Support       Avril Madrid MD  08/07/24

## 2024-08-07 NOTE — PLAN OF CARE
Goal Outcome Evaluation:  Plan of Care Reviewed With: patient        Progress: no change  Outcome Evaluation: VSS on RA. Pt is alert to self only. Some display of pain this shift requiring PRN pain meds x 2. Good oral intake with adequate UOP per flowsheets. Q2 turns conducted for skin integrity. Patent IV in place, due to be changed but remains due to plan for D/C on Thursday. Continuing POC and reporting as needed.

## 2024-08-07 NOTE — PLAN OF CARE
Goal Outcome Evaluation:  Plan of Care Reviewed With: patient        Progress: no change  Outcome Evaluation: VSS on RA. A&O to self only. PRN [place] given. Purewick in place. Smear BM this shift. Skin care provided. No family present at bedside. Pt resting well in between care.

## 2024-08-07 NOTE — CASE MANAGEMENT/SOCIAL WORK
Continued Stay Note  [facility]     Patient Name: Cheri Cruz  MRN: [id number]  Today's Date: 8/7/2024    Admit Date: 7/14/2024    Plan: Home with HH   Discharge Plan       Row Name 08/07/24 1215       Plan    Plan Home with HH    Plan Comments SW'er spoke with [employer] Rep Samson Roa [phone], to follow up on requested DME. Samson explains that the [facility] office [phone] will fill the order. Called [employer] [facility] office, no answer; left a message (office opens at 1pm). Awaiting return call.                   Discharge Codes    No documentation.                 Expected Discharge Date and Time       Expected Discharge Date Expected Discharge Time    Aug 8, 2024               MARCO Moreno (Kay)

## 2024-08-08 PROBLEM — I63.9 STROKE: Status: RESOLVED | Noted: 2024-07-15 | Resolved: 2024-08-08

## 2024-08-08 PROBLEM — R41.82 AMS (ALTERED MENTAL STATUS): Status: RESOLVED | Noted: 2024-07-14 | Resolved: 2024-08-08

## 2024-08-08 LAB
GLUCOSE BLDC GLUCOMTR-MCNC: 137 MG/DL (ref 70–130)
GLUCOSE BLDC GLUCOMTR-MCNC: 152 MG/DL (ref 70–130)
GLUCOSE BLDC GLUCOMTR-MCNC: 196 MG/DL (ref 70–130)
GLUCOSE BLDC GLUCOMTR-MCNC: 198 MG/DL (ref 70–130)

## 2024-08-08 PROCEDURE — A9270 NON-COVERED ITEM OR SERVICE: HCPCS | Performed by: NURSE PRACTITIONER

## 2024-08-08 PROCEDURE — 99239 HOSP IP/OBS DSCHRG MGMT >30: CPT | Performed by: NURSE PRACTITIONER

## 2024-08-08 PROCEDURE — 63710000001 INSULIN GLARGINE PER 5 UNITS: Performed by: STUDENT IN AN ORGANIZED HEALTH CARE EDUCATION/TRAINING PROGRAM

## 2024-08-08 PROCEDURE — 63710000001 LORAZEPAM 0.5 MG TABLET: Performed by: NURSE PRACTITIONER

## 2024-08-08 PROCEDURE — A9270 NON-COVERED ITEM OR SERVICE: HCPCS | Performed by: INTERNAL MEDICINE

## 2024-08-08 PROCEDURE — A9270 NON-COVERED ITEM OR SERVICE: HCPCS

## 2024-08-08 PROCEDURE — A9270 NON-COVERED ITEM OR SERVICE: HCPCS | Performed by: STUDENT IN AN ORGANIZED HEALTH CARE EDUCATION/TRAINING PROGRAM

## 2024-08-08 PROCEDURE — 63710000001 LEVOTHYROXINE 25 MCG TABLET: Performed by: INTERNAL MEDICINE

## 2024-08-08 PROCEDURE — 63710000001 INSULIN LISPRO (HUMAN) PER 5 UNITS: Performed by: NURSE PRACTITIONER

## 2024-08-08 PROCEDURE — 63710000001 NITROFURANTOIN (MACROCRYSTAL-MONOHYDRATE) 100 MG CAPSULE: Performed by: INTERNAL MEDICINE

## 2024-08-08 PROCEDURE — 63710000001 SENNOSIDES-DOCUSATE 8.6-50 MG TABLET: Performed by: STUDENT IN AN ORGANIZED HEALTH CARE EDUCATION/TRAINING PROGRAM

## 2024-08-08 PROCEDURE — 63710000001 DULOXETINE 60 MG CAPSULE DELAYED-RELEASE PARTICLES: Performed by: INTERNAL MEDICINE

## 2024-08-08 PROCEDURE — 63710000001 QUETIAPINE 25 MG TABLET: Performed by: INTERNAL MEDICINE

## 2024-08-08 PROCEDURE — 82948 REAGENT STRIP/BLOOD GLUCOSE: CPT

## 2024-08-08 PROCEDURE — 63710000001 BUSPIRONE 15 MG TABLET: Performed by: INTERNAL MEDICINE

## 2024-08-08 PROCEDURE — 63710000001 HYDROCODONE-ACETAMINOPHEN 10-325 MG TABLET: Performed by: INTERNAL MEDICINE

## 2024-08-08 PROCEDURE — 63710000001 POLYETHYLENE GLYCOL 17 G PACK: Performed by: STUDENT IN AN ORGANIZED HEALTH CARE EDUCATION/TRAINING PROGRAM

## 2024-08-08 PROCEDURE — 63710000001 GABAPENTIN 100 MG CAPSULE: Performed by: NURSE PRACTITIONER

## 2024-08-08 PROCEDURE — 63710000001 LIDOCAINE 4 % PATCH: Performed by: NURSE PRACTITIONER

## 2024-08-08 PROCEDURE — G0378 HOSPITAL OBSERVATION PER HR: HCPCS

## 2024-08-08 PROCEDURE — 63710000001 INSULIN LISPRO (HUMAN) PER 5 UNITS: Performed by: STUDENT IN AN ORGANIZED HEALTH CARE EDUCATION/TRAINING PROGRAM

## 2024-08-08 PROCEDURE — 63710000001 APIXABAN 5 MG TABLET: Performed by: STUDENT IN AN ORGANIZED HEALTH CARE EDUCATION/TRAINING PROGRAM

## 2024-08-08 PROCEDURE — 63710000001 PANTOPRAZOLE 40 MG TABLET DELAYED-RELEASE: Performed by: INTERNAL MEDICINE

## 2024-08-08 PROCEDURE — 63710000001 DONEPEZIL 10 MG TABLET: Performed by: INTERNAL MEDICINE

## 2024-08-08 PROCEDURE — 63710000001 ATORVASTATIN 40 MG TABLET

## 2024-08-08 RX ORDER — ATORVASTATIN CALCIUM 80 MG/1
80 TABLET, FILM COATED ORAL NIGHTLY
Qty: 30 TABLET | Refills: 0 | Status: SHIPPED | OUTPATIENT
Start: 2024-08-08

## 2024-08-08 RX ORDER — LIDOCAINE 4 G/G
1 PATCH TOPICAL
Qty: 30 EACH | Refills: 0 | Status: SHIPPED | OUTPATIENT
Start: 2024-08-09

## 2024-08-08 RX ORDER — GABAPENTIN 100 MG/1
200 CAPSULE ORAL EVERY 12 HOURS
Qty: 8 CAPSULE | Refills: 0 | Status: SHIPPED | OUTPATIENT
Start: 2024-08-08

## 2024-08-08 RX ORDER — FLUTICASONE PROPIONATE 50 MCG
2 SPRAY, SUSPENSION (ML) NASAL DAILY
Qty: 18.2 ML | Refills: 0 | Status: SHIPPED | OUTPATIENT
Start: 2024-08-09

## 2024-08-08 RX ORDER — NYSTATIN 100000 [USP'U]/G
POWDER TOPICAL 2 TIMES DAILY
Qty: 60 G | Refills: 0 | Status: SHIPPED | OUTPATIENT
Start: 2024-08-08

## 2024-08-08 RX ADMIN — DICLOFENAC SODIUM 2 G: 10 GEL TOPICAL at 08:49

## 2024-08-08 RX ADMIN — APIXABAN 5 MG: 5 TABLET, FILM COATED ORAL at 20:58

## 2024-08-08 RX ADMIN — DICLOFENAC SODIUM 2 G: 10 GEL TOPICAL at 17:38

## 2024-08-08 RX ADMIN — INSULIN LISPRO 3 UNITS: 100 INJECTION, SOLUTION INTRAVENOUS; SUBCUTANEOUS at 12:47

## 2024-08-08 RX ADMIN — BUSPIRONE HYDROCHLORIDE 7.5 MG: 15 TABLET ORAL at 22:02

## 2024-08-08 RX ADMIN — Medication 10 ML: at 20:59

## 2024-08-08 RX ADMIN — LEVOTHYROXINE SODIUM 25 MCG: 25 TABLET ORAL at 05:54

## 2024-08-08 RX ADMIN — INSULIN LISPRO 7 UNITS: 100 INJECTION, SOLUTION INTRAVENOUS; SUBCUTANEOUS at 08:48

## 2024-08-08 RX ADMIN — QUETIAPINE FUMARATE 50 MG: 25 TABLET ORAL at 20:58

## 2024-08-08 RX ADMIN — INSULIN LISPRO 7 UNITS: 100 INJECTION, SOLUTION INTRAVENOUS; SUBCUTANEOUS at 17:38

## 2024-08-08 RX ADMIN — NYSTATIN: 100000 POWDER TOPICAL at 17:38

## 2024-08-08 RX ADMIN — GABAPENTIN 200 MG: 100 CAPSULE ORAL at 20:58

## 2024-08-08 RX ADMIN — DULOXETINE HYDROCHLORIDE 60 MG: 60 CAPSULE, DELAYED RELEASE ORAL at 08:48

## 2024-08-08 RX ADMIN — LORAZEPAM 0.5 MG: 0.5 TABLET ORAL at 08:49

## 2024-08-08 RX ADMIN — DICLOFENAC SODIUM 2 G: 10 GEL TOPICAL at 20:59

## 2024-08-08 RX ADMIN — SENNOSIDES AND DOCUSATE SODIUM 2 TABLET: 50; 8.6 TABLET ORAL at 08:49

## 2024-08-08 RX ADMIN — INSULIN LISPRO 3 UNITS: 100 INJECTION, SOLUTION INTRAVENOUS; SUBCUTANEOUS at 17:38

## 2024-08-08 RX ADMIN — Medication 10 ML: at 08:50

## 2024-08-08 RX ADMIN — DICLOFENAC SODIUM 2 G: 10 GEL TOPICAL at 12:48

## 2024-08-08 RX ADMIN — INSULIN LISPRO 3 UNITS: 100 INJECTION, SOLUTION INTRAVENOUS; SUBCUTANEOUS at 20:59

## 2024-08-08 RX ADMIN — GABAPENTIN 200 MG: 100 CAPSULE ORAL at 08:49

## 2024-08-08 RX ADMIN — PANTOPRAZOLE SODIUM 40 MG: 40 TABLET, DELAYED RELEASE ORAL at 05:54

## 2024-08-08 RX ADMIN — INSULIN LISPRO 7 UNITS: 100 INJECTION, SOLUTION INTRAVENOUS; SUBCUTANEOUS at 12:47

## 2024-08-08 RX ADMIN — NYSTATIN: 100000 POWDER TOPICAL at 08:48

## 2024-08-08 RX ADMIN — FLUTICASONE PROPIONATE 2 SPRAY: 50 SPRAY, METERED NASAL at 08:49

## 2024-08-08 RX ADMIN — NYSTATIN: 100000 POWDER TOPICAL at 20:59

## 2024-08-08 RX ADMIN — INSULIN GLARGINE 30 UNITS: 100 INJECTION, SOLUTION SUBCUTANEOUS at 20:58

## 2024-08-08 RX ADMIN — ATORVASTATIN CALCIUM 80 MG: 40 TABLET, FILM COATED ORAL at 20:58

## 2024-08-08 RX ADMIN — HYDROCODONE BITARTRATE AND ACETAMINOPHEN 1 TABLET: 10; 325 TABLET ORAL at 20:58

## 2024-08-08 RX ADMIN — POLYETHYLENE GLYCOL 3350 17 G: 17 POWDER, FOR SOLUTION ORAL at 08:49

## 2024-08-08 RX ADMIN — DONEPEZIL HYDROCHLORIDE 10 MG: 10 TABLET, FILM COATED ORAL at 20:58

## 2024-08-08 RX ADMIN — LIDOCAINE 1 PATCH: 4 PATCH TOPICAL at 08:50

## 2024-08-08 RX ADMIN — BUSPIRONE HYDROCHLORIDE 7.5 MG: 15 TABLET ORAL at 08:50

## 2024-08-08 RX ADMIN — NYSTATIN: 100000 POWDER TOPICAL at 12:48

## 2024-08-08 RX ADMIN — APIXABAN 5 MG: 5 TABLET, FILM COATED ORAL at 08:49

## 2024-08-08 RX ADMIN — NITROFURANTOIN MONOHYDRATE/MACROCRYSTALS 100 MG: 75; 25 CAPSULE ORAL at 22:02

## 2024-08-08 NOTE — PLAN OF CARE
Goal Outcome Evaluation:           Progress: no change  Outcome Evaluation: VSS on RA. Alert & oriented to self only. Patient denies pain, no prns administered. Adequate UOP via purewick. No BM this shift. Tolerating diet, but needs tray set-up and feeding assistance. ACHS. Turned. Z--guard applied to coccyx. Nystatin powder to groin and skin folds. Plan is for discharge and transport via EMS 8/9 at 0900; however, patient's daughter Pamela called this evening to state there was another delay with the DME bed. When RN attempted to return phone message, there was no answer. Per patient's request, have attempted to dial Radha's number from patient's room phone, no answer. Care ongoing, continue plan of care.

## 2024-08-08 NOTE — DISCHARGE SUMMARY
[facility] Medicine Services  DISCHARGE SUMMARY    Patient Name: Cheri Cruz  : 1940  MRN: [id number]    Date of Admission: 2024  7:02 PM  Date of Discharge:  2024  Primary Care Physician: Lorrie Canada APRN    Consults       Date and Time Order Name Status Description    2024  7:15 PM Inpatient Neurology Consult Stroke Completed             Hospital Course     Presenting Problem: Altered mental status    Active Hospital Problems    Diagnosis  POA    Acquired hypothyroidism [E03.9]  Yes    History of pulmonary embolus (PE) [Z86.711]  Yes    Essential hypertension [I10]  Yes    GERD (gastroesophageal reflux disease) [K21.9]  Yes    Type 2 diabetes mellitus without complication, without long-term current use of insulin [E11.9]  Yes      Resolved Hospital Problems    Diagnosis Date Resolved POA    **AMS (altered mental status) [R41.82] 2024 Yes    Stroke [I63.9] 2024 Yes          Hospital Course:  Cheir Cruz is a 83 y.o. female  w dementia, DMII, HTN, prior CVA, PE on Eliquis who presented as a stroke rule out from [facility] on . 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 Patient was also treated for UTI with IV Rocephin x 3 days and will continue Macrobid for UTI ppx.      Patient's hospital stay was complicated by complex plcmt as family was interested in pursuing rehab. However, the family currently has an appeal ongoing for skilled rehab and decline LTC bed offers. Family will take patient home in the meantime as the appeal process can take up to 30 days. Patient has been medically ready for discharge and insurance apparently will stop paying the hospital bill. Plan is now home with family on  via EMS. CM has arranged home health and medical equipment.    Addendum: Plan is now DC at 9 AM tomorrow d/t DME  issue.     Initial concern for CVA, h/o CVA - negative w/u as above, Eliquis 2.5 g BID d/t creatinine clearance, continue statin, follow up in Stroke Clinic 4-8 weeks    Possible UTI, resolved - s/p IV rocephin x 3 days, now on macrobid ppx    DMII, A1c 9.8, c/b neuropathy - continue basal dosing with Lantus 30 units nightly and prandial insulin Lispro 7 units three times daily with meals. Home gabapentin dose reduced to 200 units BID.    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    Day of Discharge     HPI:   Patient resting in bed. NAD. Says she is bedbound at baseline. Says she had a BM yesterday. Says to ask her daughter, Radha, about where to send medications.      Vital Signs:   Temp:  [97.1 °F (36.2 °C)-97.8 °F (36.6 °C)] 97.8 °F (36.6 °C)  Heart Rate:  [70-76] 71  Resp:  [18] 18  BP: ()/(67-71) 98/67      Physical Exam:  Constitutional: No acute distress, awake, alert  HENT: NCAT, mucous membranes moist  Respiratory: Clear to auscultation bilaterally, respiratory effort normal, room air  Cardiovascular: RRR, no murmurs, rubs, or gallops  Gastrointestinal: Positive bowel sounds, soft, nontender, nondistended  Musculoskeletal: No bilateral ankle edema  Psychiatric: Appropriate affect, cooperative  Neurologic: Oriented x 3, moves all extremities, speech clear  Skin: No rashes      Pertinent  and/or  Most Recent Results     LAB RESULTS:      Lab 08/05/24  0621 08/02/24  0557   WBC 8.29 6.81   HEMOGLOBIN 9.9* 9.2*   HEMATOCRIT 30.5* 29.5*   PLATELETS 156 156   NEUTROS ABS 5.22 3.50   IMMATURE GRANS (ABS) 0.02 0.02   LYMPHS ABS 2.08 2.38   MONOS ABS 0.64 0.63   EOS ABS 0.29 0.25   .3* 104.2*         Lab 08/05/24  0621 08/03/24  0930 08/02/24  0557   SODIUM 140 142 141   POTASSIUM 4.9 4.9 4.6   CHLORIDE 102 104 103   CO2 30.0* 31.0* 30.0*   ANION GAP 8.0 7.0 8.0   BUN 32* 27* 27*   CREATININE 1.54* 1.51* 1.61*   EGFR 33.6* 34.4* 31.8*   GLUCOSE 213* 143* 116*   CALCIUM 9.0 8.4* 8.4*                         Brief Urine Lab Results  (Last result in the past 365 days)        Color   Clarity   Blood   Leuk Est   Nitrite   Protein   CREAT   Urine HCG        07/14/24 2001 Yellow   Cloudy   Trace   Small (1+)   Negative   Negative                 Microbiology Results (last 10 days)       ** No results found for the last 240 hours. **            No radiology results for the last 10 days            Results for orders placed during the hospital encounter of 02/10/23    Adult Transthoracic Echo Complete W/ Cont if Necessary Per Protocol    Interpretation Summary    Left ventricular systolic function is hyperdynamic (EF > 70%). Calculated left ventricular EF = 70.6% Left ventricular ejection fraction appears to be greater than 70%. The left ventricular cavity is small in size. Left ventricular wall thickness is consistent with mild concentric hypertrophy. All left ventricular wall segments contract normally. Left ventricular intracavitary gradient noted to be 71 mmHg. Left ventricular diastolic function is consistent with (grade I) impaired relaxation. Normal left atrial pressure.    The aortic valve is abnormal in structure. There is mild calcification of the aortic valve mainly affecting the right coronary cusp(s). The aortic valve appears trileaflet. No aortic valve regurgitation is present. Gradient noted through  the LV and LVOT    Compared to TTE report from  Dec 2019, hyperdynamic LV with intracavitary gradient is not a new finding but peak gradient noted on this exam is higher than previously described.      Plan for Follow-up of Pending Labs/Results:     Discharge Details        Discharge Medications        New Medications        Instructions Start Date   atorvastatin 80 MG tablet  Commonly known as: LIPITOR   80 mg, Oral, Nightly      Diclofenac Sodium 1 % gel gel  Commonly known as: VOLTAREN   2 g, Topical, 2 Times Daily, Apply to right knee.      fluticasone 50 MCG/ACT nasal spray  Commonly known as: FLONASE   2 sprays, Each Nare, Daily   Start Date: August 9, 2024     Insulin Glargine 100 UNIT/ML injection pen  Commonly known as: LANTUS SOLOSTAR   30 Units, Subcutaneous, Nightly      Insulin Lispro (0.5 Unit Dial) 100 UNIT/ML solution pen-injector  Commonly known as: HUMALOG DANEPEN MANDY  Replaces: Insulin Lispro 100 UNIT/ML injection   7 Units, Subcutaneous, 3 Times Daily Before Meals      Lidocaine 4 %   1 patch, Transdermal, Every 24 Hours Scheduled, Remove & Discard patch within 12 hours or as directed by MD   Start Date: August 9, 2024     nystatin [id number] UNIT/GM powder  Commonly known as: MYCOSTATIN   Topical, 2 Times Daily, Apply to skin folds.             Changes to Medications        Instructions Start Date   apixaban 5 MG tablet tablet  Commonly known as: ELIQUIS  What changed: how much to take   2.5 mg, Oral, 2 Times Daily      gabapentin 100 MG capsule  Commonly known as: NEURONTIN  What changed:   medication strength  how much to take  when to take this   200 mg, Oral, Every 12 Hours      HYDROcodone-acetaminophen  MG per tablet  Commonly known as: NORCO  What changed: when to take this   1 tablet, Oral, Every 6 Hours PRN      meclizine 25 MG tablet  Commonly known as: ANTIVERT  What changed:   how much to take  how to take this  when to take this   TAKE 1 TABLET THREE TIMES DAILY AS NEEDED FOR  DIZZINESS             Continue These Medications        Instructions Start Date   busPIRone 7.5 MG tablet  Commonly known as: BUSPAR   7.5 mg, Oral, 2 Times Daily      donepezil 10 MG tablet  Commonly known as: ARICEPT   10 mg, Oral, Nightly      DULoxetine 60 MG capsule  Commonly known as: CYMBALTA   60 mg, Oral, Daily      levothyroxine 25 MCG tablet  Commonly known as: SYNTHROID, LEVOTHROID   25 mcg, Oral, Daily      LORazepam 0.5 MG tablet  Commonly known as: ATIVAN   0.5 mg, Oral, Every 12 Hours, Once in AM once in PM      nitrofurantoin (macrocrystal-monohydrate) 100 MG capsule  Commonly known as: MACROBID   100 mg, Oral, Nightly      omeprazole 40 MG capsule  Commonly known as: priLOSEC   40 mg, Oral, Daily      QUEtiapine 50 MG tablet  Commonly known as: SEROquel   50 mg, Oral, Nightly      sennosides-docusate 8.6-50 MG per tablet  Commonly known as: PERICOLACE   2 tablets, Oral, 2 Times Daily             Stop These Medications      Insulin Lispro 100 UNIT/ML injection  Commonly known as: humaLOG  Replaced by: Insulin Lispro (0.5 Unit Dial) 100 UNIT/ML solution pen-injector     rosuvastatin 40 MG tablet  Commonly known as: CRESTOR     terazosin 2 MG capsule  Commonly known as: HYTRIN              Allergies   Allergen Reactions    Penicillins Unknown - Low Severity     Tolerates ceftriaxones    Sulfa Antibiotics     Tetracyclines & Related Unknown (See Comments)    Valium [Diazepam] Anxiety         Discharge Disposition:  Home-Health Care Sv    Diet:  Hospital:  Diet Order   Procedures    Diet: Cardiac, Diabetic; Healthy Heart (2-3 Na+); Consistent Carbohydrate; Feeding Assistance - Nursing; Texture: Soft to Chew (NDD 3); Soft to Chew: Chopped Meat; Fluid Consistency: Thin (IDDSI 0)       Diet Instructions       Diet: Cardiac Diets; Healthy Heart (2-3 Na+); Soft to Chew (NDD 3); Chopped Meat; Thin (IDDSI 0); Feeding Assistance - Nursing      Discharge Diet: Cardiac Diets    Cardiac Diet: Healthy Heart (2-3  Na+)    Texture: Soft to Chew (NDD 3)    Soft to Chew: Chopped Meat    Fluid Consistency: Thin (IDDSI 0)    Diet Modifiers / Additional Diets: Feeding Assistance - Nursing             Activity:  Activity Instructions       Activity as Tolerated              Restrictions or Other Recommendations: Check blood sugar 3 times a day before meals.  Record these numbers for 1 week and take to your follow-up appointment with your primary care provider for further titration of insulins.         CODE STATUS:    Code Status and Medical Interventions:   Ordered at: 07/15/24 1319     Level Of Support Discussed With:    Patient     Code Status (Patient has no pulse and is not breathing):    CPR (Attempt to Resuscitate)     Medical Interventions (Patient has pulse or is breathing):    Full Support       Future Appointments   Date Time Provider Department Center   8/9/2024  9:00 AM MED 7  [facility]       Additional Instructions for the Follow-ups that You Need to Schedule       Ambulatory Referral to Home Health   As directed      Face to Face Visit Date: 8/5/2024   Follow-up provider for Plan of Care?: I treated the patient in an acute care facility and will not continue treatment after discharge.   Follow-up provider: BRIGETTE SIGALA [[id number]]   Reason/Clinical Findings: AMS, CVA   Describe mobility limitations that make leaving home difficult: impaired functional mobility, balance, gait and endurance   Nursing/Therapeutic Services Requested: Skilled Nursing Physical Therapy Occupational Therapy   Skilled nursing orders: Cardiopulmonary assessments Neurovascular assessments   PT orders: Home safety assessment Gait Training Transfer training Strengthening Therapeutic exercise   Weight Bearing Status: As Tolerated   Occupational orders: Activities of daily living Energy conservation Strengthening Home safety assessment Cognition   Frequency: 1 Week 1        Discharge Follow-up with PCP   As directed       Currently Documented  PCP:    Lorrie Canada APRN    PCP Phone Number:    [phone]     Follow Up Details: One week for post-hosptial follow-up                  EVELINA Turpin  08/08/24      Time Spent on Discharge:  I spent  45  minutes on this discharge activity which included: face-to-face encounter with the patient, reviewing the data in the system, coordination of the care with the nursing staff as well as consultants, documentation, and entering orders.

## 2024-08-08 NOTE — CASE MANAGEMENT/SOCIAL WORK
Continued Stay Note  [facility]     Patient Name: Cheri Cruz  MRN: [id number]  Today's Date: 8/8/2024    Admit Date: 7/14/2024    Plan: Home with [facility] via  EMS 8/9 at 9am.   Discharge Plan       Row Name 08/08/24 1004       Plan    Plan Home with [facility] via  EMS 8/9 at 9am.    Plan Comments SW'er spoke with [employer] office [phone] Shane who explains they have attempted to deliver bed multiple times. Patients' daughters who explain they want a certain type of function with the bed and a new mattress. [employer] office has explained to the daughters that the mattress they have will not fit patients' old bed (which has the function desired) and the new bed from [employer] doesn't have that type of function for the past two days. [facility] rep explains he will attempt to deliver both bed and mattress today (to attempt to resolve issue and provided a option for daughters) but is concerned patients' daughters will refuse. SW'er has explained to daughters the only way to obtain what they desire is to private pay for the mattress and bed they desire. Daughters report they have to break down old bed to put the new bed in room; Sw'er inquired if the new bed could go in the Kitchen until they are able to arranged; daughter declined. Sw'er dicussed the Fire Dept assisting if daughters need to move patient another part of the house;  daughter declined. Sw'er requested a vicky lift from [employer] to assist family. Plan is home with [facility] via  EMS 8/9 at 9am.    Final Discharge Disposition Code 06 - home with home health care                   Discharge Codes    No documentation.                 Expected Discharge Date and Time       Expected Discharge Date Expected Discharge Time    Aug 8, 2024               MARCO Moreno (Kay)

## 2024-08-08 NOTE — PLAN OF CARE
Goal Outcome Evaluation:  Plan of Care Reviewed With: patient        Progress: no change  Outcome Evaluation: VSS on RA. A&O to self only. PRN [place] given. Purewick in place. Skin care provided. No family present at bedside. Pt resting well in between care.

## 2024-08-08 NOTE — CASE MANAGEMENT/SOCIAL WORK
Continued Stay Note  [facility]     Patient Name: Cheri Cruz  MRN: [id number]  Today's Date: 8/8/2024    Admit Date: 7/14/2024    Plan: [facility] via  [facility]S 8/9 at 9am.   Discharge Plan       Row Name 08/08/24 1649       Plan    Plan [facility] with [facility] via  EMS 8/9 at 9am.    Plan Comments SW'er spoke with [employer] office [phone] Rep who explains they have attempted to deliver bed today; patients daughter refused. Patients' daughter requested that the bed be delivered to [address] [place] [address]. Plan is home with [facility] via  EMS 8/9 at 9am.    Final Discharge Disposition Code 06 - home with home health care                   Discharge Codes    No documentation.                 Expected Discharge Date and Time       Expected Discharge Date Expected Discharge Time    Aug 8, 2024               MARCO Moreno (Kay)

## 2024-08-09 ENCOUNTER — READMISSION MANAGEMENT (OUTPATIENT)
Dept: CALL CENTER | Facility: HOSPITAL | Age: 84
End: 2024-08-09
Payer: MEDICARE

## 2024-08-09 VITALS
RESPIRATION RATE: 18 BRPM | SYSTOLIC BLOOD PRESSURE: 129 MMHG | HEIGHT: 67 IN | WEIGHT: 205.25 LBS | BODY MASS INDEX: 32.21 KG/M2 | HEART RATE: 66 BPM | TEMPERATURE: 97.5 F | DIASTOLIC BLOOD PRESSURE: 59 MMHG | OXYGEN SATURATION: 94 %

## 2024-08-09 PROBLEM — N30.00 ACUTE CYSTITIS WITHOUT HEMATURIA: Status: ACTIVE | Noted: 2024-08-09

## 2024-08-09 LAB — GLUCOSE BLDC GLUCOMTR-MCNC: 247 MG/DL (ref 70–130)

## 2024-08-09 PROCEDURE — A9270 NON-COVERED ITEM OR SERVICE: HCPCS | Performed by: INTERNAL MEDICINE

## 2024-08-09 PROCEDURE — A9270 NON-COVERED ITEM OR SERVICE: HCPCS | Performed by: STUDENT IN AN ORGANIZED HEALTH CARE EDUCATION/TRAINING PROGRAM

## 2024-08-09 PROCEDURE — 82948 REAGENT STRIP/BLOOD GLUCOSE: CPT

## 2024-08-09 PROCEDURE — 63710000001 INSULIN LISPRO (HUMAN) PER 5 UNITS: Performed by: STUDENT IN AN ORGANIZED HEALTH CARE EDUCATION/TRAINING PROGRAM

## 2024-08-09 PROCEDURE — A9270 NON-COVERED ITEM OR SERVICE: HCPCS | Performed by: NURSE PRACTITIONER

## 2024-08-09 PROCEDURE — 63710000001 PANTOPRAZOLE 40 MG TABLET DELAYED-RELEASE: Performed by: INTERNAL MEDICINE

## 2024-08-09 PROCEDURE — 63710000001 LEVOTHYROXINE 25 MCG TABLET: Performed by: INTERNAL MEDICINE

## 2024-08-09 PROCEDURE — 99232 SBSQ HOSP IP/OBS MODERATE 35: CPT | Performed by: NURSE PRACTITIONER

## 2024-08-09 PROCEDURE — 63710000001 INSULIN LISPRO (HUMAN) PER 5 UNITS: Performed by: NURSE PRACTITIONER

## 2024-08-09 PROCEDURE — G0378 HOSPITAL OBSERVATION PER HR: HCPCS

## 2024-08-09 PROCEDURE — 63710000001 BUSPIRONE 15 MG TABLET: Performed by: INTERNAL MEDICINE

## 2024-08-09 PROCEDURE — 63710000001 DULOXETINE 60 MG CAPSULE DELAYED-RELEASE PARTICLES: Performed by: INTERNAL MEDICINE

## 2024-08-09 PROCEDURE — 63710000001 GABAPENTIN 100 MG CAPSULE: Performed by: NURSE PRACTITIONER

## 2024-08-09 PROCEDURE — 63710000001 APIXABAN 5 MG TABLET: Performed by: STUDENT IN AN ORGANIZED HEALTH CARE EDUCATION/TRAINING PROGRAM

## 2024-08-09 PROCEDURE — 63710000001 LIDOCAINE 4 % PATCH: Performed by: NURSE PRACTITIONER

## 2024-08-09 PROCEDURE — 63710000001 SENNOSIDES-DOCUSATE 8.6-50 MG TABLET: Performed by: STUDENT IN AN ORGANIZED HEALTH CARE EDUCATION/TRAINING PROGRAM

## 2024-08-09 PROCEDURE — 63710000001 ACETAMINOPHEN 325 MG TABLET: Performed by: INTERNAL MEDICINE

## 2024-08-09 PROCEDURE — 63710000001 POLYETHYLENE GLYCOL 17 G PACK: Performed by: STUDENT IN AN ORGANIZED HEALTH CARE EDUCATION/TRAINING PROGRAM

## 2024-08-09 RX ADMIN — INSULIN LISPRO 7 UNITS: 100 INJECTION, SOLUTION INTRAVENOUS; SUBCUTANEOUS at 08:25

## 2024-08-09 RX ADMIN — PANTOPRAZOLE SODIUM 40 MG: 40 TABLET, DELAYED RELEASE ORAL at 06:08

## 2024-08-09 RX ADMIN — DULOXETINE HYDROCHLORIDE 60 MG: 60 CAPSULE, DELAYED RELEASE ORAL at 08:56

## 2024-08-09 RX ADMIN — APIXABAN 5 MG: 5 TABLET, FILM COATED ORAL at 08:56

## 2024-08-09 RX ADMIN — GABAPENTIN 200 MG: 100 CAPSULE ORAL at 08:56

## 2024-08-09 RX ADMIN — LIDOCAINE 1 PATCH: 4 PATCH TOPICAL at 08:55

## 2024-08-09 RX ADMIN — LEVOTHYROXINE SODIUM 25 MCG: 25 TABLET ORAL at 06:08

## 2024-08-09 RX ADMIN — FLUTICASONE PROPIONATE 2 SPRAY: 50 SPRAY, METERED NASAL at 08:55

## 2024-08-09 RX ADMIN — BUSPIRONE HYDROCHLORIDE 7.5 MG: 15 TABLET ORAL at 08:56

## 2024-08-09 RX ADMIN — NYSTATIN: 100000 POWDER TOPICAL at 08:57

## 2024-08-09 RX ADMIN — DICLOFENAC SODIUM 2 G: 10 GEL TOPICAL at 08:55

## 2024-08-09 RX ADMIN — ACETAMINOPHEN 650 MG: 325 TABLET ORAL at 06:08

## 2024-08-09 RX ADMIN — NYSTATIN: 100000 POWDER TOPICAL at 12:21

## 2024-08-09 RX ADMIN — INSULIN LISPRO 7 UNITS: 100 INJECTION, SOLUTION INTRAVENOUS; SUBCUTANEOUS at 12:21

## 2024-08-09 RX ADMIN — SENNOSIDES AND DOCUSATE SODIUM 2 TABLET: 50; 8.6 TABLET ORAL at 08:56

## 2024-08-09 RX ADMIN — INSULIN LISPRO 5 UNITS: 100 INJECTION, SOLUTION INTRAVENOUS; SUBCUTANEOUS at 12:20

## 2024-08-09 RX ADMIN — POLYETHYLENE GLYCOL 3350 17 G: 17 POWDER, FOR SOLUTION ORAL at 08:55

## 2024-08-09 NOTE — CASE MANAGEMENT/SOCIAL WORK
Case Management Discharge Note      Final Note: SW'er spoke with patient's daughter Janice who explains they will have the bed delivered today. [facility] [phone] [facility] who explains they will deliver bed today. SW'er spoke with [facility] who explains they will see patient; SW'er faxed discharge summary. Daughters agreed they would take patient home to [address] [place] [address]. Plan is home with daughter via [employer] at 2pm today.         Selected Continued Care - Admitted Since 7/14/2024       Destination    No services have been selected for the patient.                Durable Medical Equipment    No services have been selected for the patient.                Dialysis/Infusion    No services have been selected for the patient.                Home Medical Care Coordination complete.      Service Provider Selected Services Address Phone Fax Patient Preferred    [facility] ,  Home Rehabilitation [address] [place] [address], [address] [id number] [phone] --              Therapy    No services have been selected for the patient.                Community [facility]    No services have been selected for the patient.                [facility]    No services have been selected for the patient.                         Final Discharge Disposition Code: 01 - home or self-care

## 2024-08-09 NOTE — NURSING NOTE
"Janice called this evening at 2105 from phone [phone] and asked to speak with her mother's nurse.    RN was currently in patient's room and Janice was asked if that RN could call her as soon  she finished in patient room.    Janice agreed. Also stated she has made \"many attempts to call today but nobody has returned call.\"  Patient's RN came to desk at that time and was able to take the call and speak with Janice.   "

## 2024-08-09 NOTE — DISCHARGE SUMMARY
[facility] Medicine Services  DISCHARGE SUMMARY    Patient Name: Cheri Cruz  : 1940  MRN: [id number]    Date of Admission: 2024  7:02 PM  Date of Discharge:  2024  Primary Care Physician: Lorrie Canada APRN    Consults       Date and Time Order Name Status Description    2024  7:15 PM Inpatient Neurology Consult Stroke Completed             Hospital Course     Presenting Problem: Altered mental status    Active Hospital Problems    Diagnosis  POA   • **Acute cystitis without hematuria [N30.00]  Yes   • Acquired hypothyroidism [E03.9]  Yes   • History of pulmonary embolus (PE) [Z86.711]  Yes   • Essential hypertension [I10]  Yes   • GERD (gastroesophageal reflux disease) [K21.9]  Yes   • Type 2 diabetes mellitus without complication, without long-term current use of insulin [E11.9]  Yes      Resolved Hospital Problems    Diagnosis Date Resolved POA   • Stroke [I63.9] 2024 Yes   • AMS (altered mental status) [R41.82] 2024 Yes      Hospital Course:  Cheri Cruz is a 83 y.o. female  w dementia, DMII, HTN, prior CVA, PE on Eliquis who presented as a stroke rule out from [facility] on . 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 Patient was also treated for UTI with IV Rocephin x 3 days and will continue Macrobid for UTI ppx.      Patient's hospital stay was complicated by complex plcmt as family was interested in pursuing rehab. However, the family currently has an appeal ongoing for skilled rehab and decline LTC bed offers. Family will take patient home in the meantime as the appeal process can take up to 30 days. Patient has been medically ready for discharge and insurance apparently will stop paying the hospital bill. Plan is now home with family on  via EMS.  has arranged home health and medical  equipment.     Initial concern for CVA, h/o CVA - negative w/u as above, Eliquis 2.5 g BID d/t creatinine clearance, continue statin, follow up in Stroke Clinic 4-8 weeks    Possible UTI, resolved - s/p IV rocephin x 3 days, now on macrobid ppx    DMII, A1c 9.8, c/b neuropathy - continue basal dosing with Lantus 30 units nightly and prandial insulin Lispro 7 units three times daily with meals. Home gabapentin dose reduced to 200 units BID.    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  Discharge follow-up recommendations and appointments are listed below.    Discharge Follow Up Recommendations for outpatient labs/diagnostics:  --Follow up with PCP one week  --Patient instructed to check blood sugar 3 times daily before meals, to record these numbers for 1 week, and to take readings to follow-up appointment with her PCP for further titration of insulins.    Day of Discharge     HPI:   Patient sitting up in bed after eating all of her breakfast and states she is ready to be discharged home.  Still mildly confused and knows and city where she is and she knows she is in the hospital but cannot name it.  Has no idea what the year or month is.  No complaints at this time.    Vital Signs:   Temp:  [97.5 °F (36.4 °C)-97.8 °F (36.6 °C)] 97.5 °F (36.4 °C)  Heart Rate:  [66-76] 66  Resp:  [18] 18  BP: (129-147)/(53-59) 129/59    Physical Exam:  Constitutional: Alert, obese elderly female sitting up in bed in NAD  ENT: Pink, moist mucous membranes   Respiratory: Nonlabored, symmetrical chest expansion, CTAB, RA  Cardiovascular: RRR, no M/R/G  Gastrointestinal: Obese,  soft, NT, ND +BS  Musculoskeletal: MIRELES; no LE edema bilaterally  Neurologic: Oriented to person and city, follows all commands, speech clear  Skin: No rashes on exposed skin  Psychiatric: Pleasant and cooperative; normal affect    Pertinent  and/or Most Recent Results     LAB RESULTS:      Lab 08/05/24  0621   WBC 8.29   HEMOGLOBIN 9.9*   HEMATOCRIT 30.5*   PLATELETS 156   NEUTROS ABS 5.22   IMMATURE GRANS (ABS) 0.02   LYMPHS ABS 2.08   MONOS ABS 0.64   EOS ABS 0.29   .3*         Lab 08/05/24  0621 08/03/24  0930   SODIUM 140 142   POTASSIUM 4.9 4.9   CHLORIDE 102 104   CO2 30.0* 31.0*   ANION GAP 8.0 7.0   BUN 32* 27*   CREATININE 1.54* 1.51*   EGFR 33.6* 34.4*   GLUCOSE 213* 143*   CALCIUM 9.0 8.4*                         Brief Urine Lab Results  (Last result in the past 365 days)        Color   Clarity   Blood   Leuk Est   Nitrite   Protein   CREAT   Urine HCG        07/14/24 2001 Yellow   Cloudy   Trace   Small (1+)   Negative   Negative                 Microbiology Results (last 10 days)       ** No results found for the last 240 hours. **            No radiology results for the last 10 days            Results for orders placed during the hospital encounter of 02/10/23    Adult Transthoracic Echo Complete W/ Cont if Necessary Per Protocol    Interpretation Summary  •  Left ventricular systolic function is hyperdynamic (EF > 70%). Calculated left ventricular EF = 70.6% Left ventricular ejection fraction appears to be greater than 70%. The left ventricular cavity is small in size. Left ventricular wall thickness is consistent with mild concentric hypertrophy. All left ventricular wall segments contract normally. Left ventricular intracavitary gradient noted to be 71 mmHg. Left ventricular diastolic function is consistent with (grade I) impaired relaxation. Normal left atrial pressure.  •  The aortic valve is abnormal in structure. There is mild calcification of the aortic valve mainly affecting the right  coronary cusp(s). The aortic valve appears trileaflet. No aortic valve regurgitation is present. Gradient noted through the LV and LVOT    Compared to TTE report from  Dec 2019, hyperdynamic LV with intracavitary gradient is not a new finding but peak gradient noted on this exam is higher than previously described.      Plan for Follow-up of Pending Labs/Results:     Discharge Details        Discharge Medications        New Medications        Instructions Start Date   atorvastatin 80 MG tablet  Commonly known as: LIPITOR   80 mg, Oral, Nightly      Diclofenac Sodium 1 % gel gel  Commonly known as: VOLTAREN   2 g, Topical, 2 Times Daily, Apply to right knee.      fluticasone 50 MCG/ACT nasal spray  Commonly known as: FLONASE   2 sprays, Each Nare, Daily      Insulin Glargine 100 UNIT/ML injection pen  Commonly known as: LANTUS SOLOSTAR   30 Units, Subcutaneous, Nightly      Insulin Lispro (0.5 Unit Dial) 100 UNIT/ML solution pen-injector  Commonly known as: HUMALOG KWIKPEN MANDY  Replaces: Insulin Lispro 100 UNIT/ML injection   7 Units, Subcutaneous, 3 Times Daily Before Meals      Lidocaine 4 %   1 patch, Transdermal, Every 24 Hours Scheduled, Remove & Discard patch within 12 hours or as directed by MD      nystatin 593041 UNIT/GM powder  Commonly known as: MYCOSTATIN   Topical, 2 Times Daily, Apply to skin folds.             Changes to Medications        Instructions Start Date   apixaban 5 MG tablet tablet  Commonly known as: ELIQUIS  What changed: how much to take   2.5 mg, Oral, 2 Times Daily      gabapentin 100 MG capsule  Commonly known as: NEURONTIN  What changed:   medication strength  how much to take  when to take this   200 mg, Oral, Every 12 Hours      HYDROcodone-acetaminophen  MG per tablet  Commonly known as: NORCO  What changed: when to take this   1 tablet, Oral, Every 6 Hours PRN      meclizine 25 MG tablet  Commonly known as: ANTIVERT  What changed:   how much to take  how to take  this  when to take this   TAKE 1 TABLET THREE TIMES DAILY AS NEEDED FOR DIZZINESS             Continue These Medications        Instructions Start Date   busPIRone 7.5 MG tablet  Commonly known as: BUSPAR   7.5 mg, Oral, 2 Times Daily      donepezil 10 MG tablet  Commonly known as: ARICEPT   10 mg, Oral, Nightly      DULoxetine 60 MG capsule  Commonly known as: CYMBALTA   60 mg, Oral, Daily      levothyroxine 25 MCG tablet  Commonly known as: SYNTHROID, LEVOTHROID   25 mcg, Oral, Daily      LORazepam 0.5 MG tablet  Commonly known as: ATIVAN   0.5 mg, Oral, Every 12 Hours, Once in AM once in PM      nitrofurantoin (macrocrystal-monohydrate) 100 MG capsule  Commonly known as: MACROBID   100 mg, Oral, Nightly      omeprazole 40 MG capsule  Commonly known as: priLOSEC   40 mg, Oral, Daily      QUEtiapine 50 MG tablet  Commonly known as: SEROquel   50 mg, Oral, Nightly      sennosides-docusate 8.6-50 MG per tablet  Commonly known as: PERICOLACE   2 tablets, Oral, 2 Times Daily             Stop These Medications      Insulin Lispro 100 UNIT/ML injection  Commonly known as: humaLOG  Replaced by: Insulin Lispro (0.5 Unit Dial) 100 UNIT/ML solution pen-injector     rosuvastatin 40 MG tablet  Commonly known as: CRESTOR     terazosin 2 MG capsule  Commonly known as: HYTRIN              Allergies   Allergen Reactions   • Penicillins Unknown - Low Severity     Tolerates ceftriaxones   • Sulfa Antibiotics    • Tetracyclines & Related Unknown (See Comments)   • Valium [Diazepam] Anxiety         Discharge Disposition:  Home-Health Care [facility]    Diet:  Hospital:  Diet Order   Procedures   • Diet: Cardiac, Diabetic; Healthy Heart (2-3 Na+); Consistent Carbohydrate; Feeding Assistance - Nursing; Texture: Soft to Chew (NDD 3); Soft to Chew: Chopped Meat; Fluid Consistency: Thin (IDDSI 0)       Diet Instructions       Diet: Cardiac Diets; Healthy Heart (2-3 Na+); Soft to Chew (NDD 3); Chopped Meat; Thin (IDDSI 0); Feeding Assistance -  Nursing      Discharge Diet: Cardiac Diets    Cardiac Diet: Healthy Heart (2-3 Na+)    Texture: Soft to Chew (NDD 3)    Soft to Chew: Chopped Meat    Fluid Consistency: Thin (IDDSI 0)    Diet Modifiers / Additional Diets: Feeding Assistance - Nursing             Activity:  Activity Instructions       Activity as Tolerated              Restrictions or Other Recommendations: Check blood sugar 3 times a day before meals.  Record these numbers for 1 week and take to your follow-up appointment with your primary care provider for further titration of insulins.         CODE STATUS:    Code Status and Medical Interventions:   Ordered at: 07/15/24 1319     Level Of Support Discussed With:    Patient     Code Status (Patient has no pulse and is not breathing):    CPR (Attempt to Resuscitate)     Medical Interventions (Patient has pulse or is breathing):    Full Support       Future Appointments   Date Time Provider Department Center   8/9/2024  9:15 AM MED 7  [facility]       Additional Instructions for the Follow-ups that You Need to Schedule       Ambulatory Referral to Home Health   As directed      Face to Face Visit Date: 8/5/2024   Follow-up provider for Plan of Care?: I treated the patient in an acute care facility and will not continue treatment after discharge.   Follow-up provider: BRIGETTE SIGALA [[id number]]   Reason/Clinical Findings: AMS, CVA   Describe mobility limitations that make leaving home difficult: impaired functional mobility, balance, gait and endurance   Nursing/Therapeutic Services Requested: Skilled Nursing Physical Therapy Occupational Therapy   Skilled nursing orders: Cardiopulmonary assessments Neurovascular assessments   PT orders: Home safety assessment Gait Training Transfer training Strengthening Therapeutic exercise   Weight Bearing Status: As Tolerated   Occupational orders: Activities of daily living Energy conservation Strengthening Home safety assessment Cognition   Frequency: 1 Week 1         Discharge Follow-up with PCP   As directed       Currently Documented PCP:    Lorrie Canada APRN    PCP Phone Number:    [phone]     Follow Up Details: One week for post-hosptial follow-up                  EVELINA Reinoso  08/09/24      Time Spent on Discharge:  I spent  35 minutes on this discharge activity which included: face-to-face encounter with the patient, reviewing the data in the system, coordination of the care with the nursing staff as well as consultants, documentation, and entering orders.

## 2024-08-09 NOTE — NURSING NOTE
At 2105 this RN spoke with the patient's daughter Janice. Janice stated there would be another delay in the delivery of the bed. She states the bed would not be delivered until 1pm.     Janice also stated that she called multiple times during the day and received no return call. Per charting, the previous RN was unable to reach Janice  by phone. Janice confirmed the number listed in contacts as correct with another RN this shift.

## 2024-08-09 NOTE — PLAN OF CARE
Goal Outcome Evaluation:  Plan of Care Reviewed With: patient           Outcome Evaluation: VSS on RA. Oriented to self only. Pt verbalizes that she wants to go home frequently. Reassurance given. Attempted to reach both elie and Radha multiple times via room phone per patient request. Unable to reach. PRNs given for pain. Adequate UOP. No BM. Tolerating PO intake. Pt slept very well. EMS transport still scheduled for 0900 as of this timestamp.

## 2024-08-09 NOTE — PLAN OF CARE
Goal Outcome Evaluation:Discharged to home for family care.

## 2024-08-10 NOTE — OUTREACH NOTE
Prep Survey      Flowsheet Row Responses   [facility] patient discharged from? [facility]   Is LACE score < 7 ? No   Eligibility Readm Mgmt   Discharge diagnosis Acute cystitis without hematuria   Does the patient have one of the following disease processes/diagnoses(primary or secondary)? Other   Does the patient have Home health ordered? Yes   What is the Home health agency?  [facility]   Prep survey completed? Yes            Pricilla TORRES - Registered Nurse

## 2024-08-13 ENCOUNTER — READMISSION MANAGEMENT (OUTPATIENT)
Dept: CALL CENTER | Facility: HOSPITAL | Age: 84
End: 2024-08-13
Payer: MEDICARE

## 2024-08-13 ENCOUNTER — TELEPHONE (OUTPATIENT)
Dept: NEUROLOGY | Facility: CLINIC | Age: 84
End: 2024-08-13
Payer: MEDICARE

## 2024-08-13 NOTE — TELEPHONE ENCOUNTER
Provider: JOANNA CHRISTIAN    Caller: EVITA    Relationship to Patient: DAUGHTER    Phone Number: [phone]     Reason for Call: STATES SHE GOT A CALL THIS MORNING ADVISING THAT SHE NEEDED TO GET PATIENT'S [employer] SET UP FOR PROVIDER TO HAVE A VIDEO VISIT TO CHECK UP ON PATIENT. STATES IT WAS NOT A SCHEDULED APPT, THEY WERE JUST DOING A CHECK UP. STATES SHE IS NOT WITH HER MOTHER RIGHT NOW, ABOUT 11 MILES [address] THE [address] FROM HER, BUT HAS THE [employer] SET UP NOW. WOULD LIKE TO KNOW IF SHE NEEDS TO GO TO HER MOTHER'S HOUSE TO DO THE VIDEO CALL. PLEASE ADVISE, THANK YOU.

## 2024-08-13 NOTE — OUTREACH NOTE
Medical Week 1 Survey      Flowsheet Row Responses   [facility] patient discharged from? [place]   Does the patient have one of the following disease processes/diagnoses(primary or secondary)? Other   Week 1 attempt successful? No   Unsuccessful attempts Attempt 1  [Spoke with Pamela and asked to call Radha.  Called with no answer]            Jerri MARIE - Licensed Nurse

## 2024-08-16 ENCOUNTER — READMISSION MANAGEMENT (OUTPATIENT)
Dept: CALL CENTER | Facility: HOSPITAL | Age: 84
End: 2024-08-16
Payer: MEDICARE

## 2024-08-16 NOTE — OUTREACH NOTE
Medical Week 1 Survey      Flowsheet Row Responses   [facility] patient discharged from? [place]   Does the patient have one of the following disease processes/diagnoses(primary or secondary)? Other   Week 1 attempt successful? No   Unsuccessful attempts Attempt 2            Patsy MONTES - Registered Nurse

## 2024-08-20 ENCOUNTER — READMISSION MANAGEMENT (OUTPATIENT)
Dept: CALL CENTER | Facility: HOSPITAL | Age: 84
End: 2024-08-20
Payer: MEDICARE

## 2024-08-20 NOTE — OUTREACH NOTE
Medical Week 1 Survey      Flowsheet Row Responses   [facility] patient discharged from? [place]   Does the patient have one of the following disease processes/diagnoses(primary or secondary)? Other   Week 1 attempt successful? No   Unsuccessful attempts Attempt 3            Iona MONTES - Licensed Nurse

## 2024-09-19 ENCOUNTER — HOSPITAL ENCOUNTER (OUTPATIENT)
Facility: HOSPITAL | Age: 84
Discharge: HOME | End: 2024-09-19
Payer: MEDICARE

## 2024-09-19 LAB
25(OH)D3 SERPL-MCNC: 43 NG/ML (ref 32–100)
ALBUMIN SERPL-MCNC: 3.5 G/DL (ref 3.4–4.8)
ALBUMIN/GLOB SERPL: 1.3 {RATIO} (ref 0.8–2)
ALP SERPL-CCNC: 89 U/L (ref 25–100)
ALT SERPL-CCNC: 6 U/L (ref 4–36)
ANION GAP SERPL CALCULATED.3IONS-SCNC: 9 MMOL/L (ref 3–16)
AST SERPL-CCNC: 18 U/L (ref 8–33)
BILIRUB SERPL-MCNC: <0.2 MG/DL (ref 0.3–1.2)
BUN SERPL-MCNC: 29 MG/DL (ref 6–20)
CALCIUM SERPL-MCNC: 8.8 MG/DL (ref 8.5–10.5)
CHLORIDE SERPL-SCNC: 101 MMOL/L (ref 98–107)
CHOLEST SERPL-MCNC: 111 MG/DL (ref 0–200)
CO2 SERPL-SCNC: 26 MMOL/L (ref 20–30)
CREAT SERPL-MCNC: 1.5 MG/DL (ref 0.4–1.2)
FOLATE SERPL-MCNC: 13.86 NG/ML
GFR SERPLBLD CREATININE-BSD FMLA CKD-EPI: 34 ML/MIN/{1.73_M2}
GLOBULIN SER CALC-MCNC: 2.7 G/DL
GLUCOSE SERPL-MCNC: 264 MG/DL (ref 74–106)
HDLC SERPL-MCNC: 35 MG/DL (ref 40–60)
LDLC SERPL CALC-MCNC: 39 MG/DL
POTASSIUM SERPL-SCNC: 4.9 MMOL/L (ref 3.4–5.1)
PROT SERPL-MCNC: 6.2 G/DL (ref 6.4–8.3)
REASON FOR REJECTION: NORMAL
REJECTED TEST: NORMAL
SODIUM SERPL-SCNC: 136 MMOL/L (ref 136–145)
TRIGL SERPL-MCNC: 185 MG/DL (ref 0–249)
TSH SERPL DL<=0.005 MIU/L-ACNC: 2.53 UIU/ML (ref 0.27–4.2)
VIT B12 SERPL-MCNC: 704 PG/ML (ref 211–911)
VLDLC SERPL CALC-MCNC: 37 MG/DL

## 2024-09-19 PROCEDURE — 84443 ASSAY THYROID STIM HORMONE: CPT

## 2024-09-19 PROCEDURE — 82746 ASSAY OF FOLIC ACID SERUM: CPT

## 2024-09-19 PROCEDURE — 80053 COMPREHEN METABOLIC PANEL: CPT

## 2024-09-19 PROCEDURE — 82306 VITAMIN D 25 HYDROXY: CPT

## 2024-09-19 PROCEDURE — 82607 VITAMIN B-12: CPT

## 2024-09-19 PROCEDURE — 80061 LIPID PANEL: CPT

## 2024-09-25 ENCOUNTER — HOSPITAL ENCOUNTER (OUTPATIENT)
Facility: HOSPITAL | Age: 84
Discharge: HOME | End: 2024-09-25
Payer: MEDICARE

## 2024-09-25 LAB
BASOPHILS # BLD: 0 K/UL (ref 0–0.1)
BASOPHILS NFR BLD: 0.5 %
EOSINOPHIL # BLD: 0.2 K/UL (ref 0–0.4)
EOSINOPHIL NFR BLD: 2.9 %
ERYTHROCYTE [DISTWIDTH] IN BLOOD BY AUTOMATED COUNT: 13.5 % (ref 11–16)
HCT VFR BLD AUTO: 37.1 % (ref 37–47)
HGB BLD-MCNC: 11.5 G/DL (ref 11.5–16.5)
IMM GRANULOCYTES # BLD: 0 K/UL
IMM GRANULOCYTES NFR BLD: 0.4 % (ref 0–5)
LYMPHOCYTES # BLD: 2.2 K/UL (ref 1.5–4)
LYMPHOCYTES NFR BLD: 27 %
MCH RBC QN AUTO: 32.1 PG (ref 27–32)
MCHC RBC AUTO-ENTMCNC: 31 G/DL (ref 31–35)
MCV RBC AUTO: 103.6 FL (ref 80–100)
MONOCYTES # BLD: 0.7 K/UL (ref 0.2–0.8)
MONOCYTES NFR BLD: 8.4 %
NEUTROPHILS # BLD: 5.1 K/UL (ref 2–7.5)
NEUTS SEG NFR BLD: 60.8 %
PLATELET # BLD AUTO: 134 K/UL (ref 150–400)
PMV BLD AUTO: 12.4 FL (ref 6–10)
RBC # BLD AUTO: 3.58 M/UL (ref 3.8–5.8)
WBC # BLD AUTO: 8.3 K/UL (ref 4–11)

## 2024-09-25 PROCEDURE — 36415 COLL VENOUS BLD VENIPUNCTURE: CPT

## 2024-09-25 PROCEDURE — 85025 COMPLETE CBC W/AUTO DIFF WBC: CPT

## 2025-05-21 NOTE — FLOWSHEET NOTE
03/26/24 2100   Assessment   Charting Type Shift assessment   Psychosocial   Psychosocial (WDL) X   Patient Behaviors Verbal  (forgetful, unable to answer questions)   Neurological   Neuro (WDL) X   Level of Consciousness 0   Orientation Level Oriented to person;Disoriented to time;Oriented to place;Oriented to situation   Cognition Follows commands;Poor judgement;Poor safety awareness   Speech Clear   R Hand  Moderate   L Hand  Moderate   [place] Coma Scale   Eye Opening 4   Best Verbal Response 4   Best Motor Response 6   [place] Coma Scale Score 14   HEENT (Head, Ears, Eyes, Nose, & Throat)   HEENT (WDL) X   Right Eye Double vision   Left Eye Double Vision   Right Ear Impaired hearing   Left Ear Impaired hearing   Teeth Missing teeth   Respiratory   Respiratory (WDL) WDL   Respiratory Interventions H.O.B. elevated   Cardiac   Cardiac (WDL) X   Gastrointestinal   Abdomen Inspection Rounded   RUQ Bowel Sounds Active   LUQ Bowel Sounds Active   RLQ Bowel Sounds Active   LLQ Bowel Sounds Active   Genitourinary   Genitourinary (WDL) X   Suprapubic Tenderness No   Dysuria (Pain/Burning w/Urination) No   Urine Assessment   Urinary Status External catheter;Voiding   Peripheral Vascular   Peripheral Vascular (WDL) WDL   Edema None   Skin Integumentary    Skin Integumentary (WDL) X   Skin Color Pale   Skin Integrity Redness   Location Buttocks   Musculoskeletal   Musculoskeletal (WDL) X   RUE Weakness   LUE Weakness   RL Extremity Weakness;Unsteady;Other (comment)  (non-ambulatory)   LL Extremity Weakness;Other (comment)  (non ambulatory)     Patient is awake an alert upon entry. Conversational but confused. Pleasant.    Refill Routing Note   Medication(s) are not appropriate for processing by [facility] for the following reason(s):        Required labs outdated    [facility] action(s):  Defer     Requires labs : Yes             Appointments  past 12m or future 3m with PCP    Date Provider   Last Visit   3/31/2025 Juliann Amaro MD   Next Visit   Visit date not found Juliann Amaro MD   ED visits in past 90 days: 0        Note composed:9:09 AM 05/21/2025